# Patient Record
Sex: FEMALE | Race: WHITE | NOT HISPANIC OR LATINO
[De-identification: names, ages, dates, MRNs, and addresses within clinical notes are randomized per-mention and may not be internally consistent; named-entity substitution may affect disease eponyms.]

---

## 2018-03-06 PROBLEM — Z00.00 ENCOUNTER FOR PREVENTIVE HEALTH EXAMINATION: Status: ACTIVE | Noted: 2018-03-06

## 2018-04-11 ENCOUNTER — APPOINTMENT (OUTPATIENT)
Dept: ORTHOPEDIC SURGERY | Facility: CLINIC | Age: 58
End: 2018-04-11
Payer: MEDICARE

## 2018-05-04 ENCOUNTER — APPOINTMENT (OUTPATIENT)
Dept: ORTHOPEDIC SURGERY | Facility: CLINIC | Age: 58
End: 2018-05-04
Payer: MEDICARE

## 2018-05-04 VITALS
WEIGHT: 242 LBS | SYSTOLIC BLOOD PRESSURE: 144 MMHG | DIASTOLIC BLOOD PRESSURE: 88 MMHG | TEMPERATURE: 97.9 F | HEIGHT: 68.5 IN | BODY MASS INDEX: 36.26 KG/M2 | HEART RATE: 90 BPM

## 2018-05-04 DIAGNOSIS — Z86.69 PERSONAL HISTORY OF OTHER DISEASES OF THE NERVOUS SYSTEM AND SENSE ORGANS: ICD-10-CM

## 2018-05-04 DIAGNOSIS — Z80.42 FAMILY HISTORY OF MALIGNANT NEOPLASM OF PROSTATE: ICD-10-CM

## 2018-05-04 DIAGNOSIS — Z86.79 PERSONAL HISTORY OF OTHER DISEASES OF THE CIRCULATORY SYSTEM: ICD-10-CM

## 2018-05-04 DIAGNOSIS — Z87.09 PERSONAL HISTORY OF OTHER DISEASES OF THE RESPIRATORY SYSTEM: ICD-10-CM

## 2018-05-04 DIAGNOSIS — Z82.61 FAMILY HISTORY OF ARTHRITIS: ICD-10-CM

## 2018-05-04 DIAGNOSIS — Z87.39 PERSONAL HISTORY OF OTHER DISEASES OF THE MUSCULOSKELETAL SYSTEM AND CONNECTIVE TISSUE: ICD-10-CM

## 2018-05-04 DIAGNOSIS — Z78.9 OTHER SPECIFIED HEALTH STATUS: ICD-10-CM

## 2018-05-04 PROCEDURE — 73502 X-RAY EXAM HIP UNI 2-3 VIEWS: CPT | Mod: LT

## 2018-05-04 PROCEDURE — 99204 OFFICE O/P NEW MOD 45 MIN: CPT

## 2018-05-04 RX ORDER — MELOXICAM 15 MG/1
15 TABLET ORAL DAILY
Qty: 90 | Refills: 3 | Status: ACTIVE | COMMUNITY
Start: 2018-05-04 | End: 1900-01-01

## 2018-05-05 ENCOUNTER — TRANSCRIPTION ENCOUNTER (OUTPATIENT)
Age: 58
End: 2018-05-05

## 2018-05-10 ENCOUNTER — APPOINTMENT (OUTPATIENT)
Dept: MRI IMAGING | Facility: CLINIC | Age: 58
End: 2018-05-10
Payer: MEDICARE

## 2018-05-10 ENCOUNTER — OUTPATIENT (OUTPATIENT)
Dept: OUTPATIENT SERVICES | Facility: HOSPITAL | Age: 58
LOS: 1 days | End: 2018-05-10

## 2018-05-10 DIAGNOSIS — T56.91XA: ICD-10-CM

## 2018-05-10 PROCEDURE — 73721 MRI JNT OF LWR EXTRE W/O DYE: CPT | Mod: 26,LT

## 2018-05-16 LAB
COBALT: NORMAL UG/L
CR BLD-MCNC: 1.6 UG/L
CRP SERPL-MCNC: 0.2 MG/DL
ERYTHROCYTE [SEDIMENTATION RATE] IN BLOOD BY WESTERGREN METHOD: 41 MM/HR

## 2019-06-24 ENCOUNTER — OUTPATIENT (OUTPATIENT)
Dept: OUTPATIENT SERVICES | Facility: HOSPITAL | Age: 59
LOS: 1 days | Discharge: HOME | End: 2019-06-24
Payer: MEDICARE

## 2019-06-24 DIAGNOSIS — R93.7 ABNORMAL FINDINGS ON DIAGNOSTIC IMAGING OF OTHER PARTS OF MUSCULOSKELETAL SYSTEM: ICD-10-CM

## 2019-06-24 PROCEDURE — 72148 MRI LUMBAR SPINE W/O DYE: CPT | Mod: 26

## 2019-06-25 ENCOUNTER — OUTPATIENT (OUTPATIENT)
Dept: OUTPATIENT SERVICES | Facility: HOSPITAL | Age: 59
LOS: 1 days | Discharge: HOME | End: 2019-06-25
Payer: MEDICARE

## 2019-06-25 DIAGNOSIS — G89.29 OTHER CHRONIC PAIN: ICD-10-CM

## 2019-06-25 PROCEDURE — 72100 X-RAY EXAM L-S SPINE 2/3 VWS: CPT | Mod: 26

## 2019-07-08 ENCOUNTER — INPATIENT (INPATIENT)
Facility: HOSPITAL | Age: 59
LOS: 10 days | Discharge: HOME HEALTH | End: 2019-07-19
Attending: INTERNAL MEDICINE | Admitting: INTERNAL MEDICINE
Payer: MEDICARE

## 2019-07-08 VITALS
HEIGHT: 68 IN | DIASTOLIC BLOOD PRESSURE: 102 MMHG | OXYGEN SATURATION: 99 % | SYSTOLIC BLOOD PRESSURE: 146 MMHG | TEMPERATURE: 98 F | RESPIRATION RATE: 18 BRPM | HEART RATE: 94 BPM | WEIGHT: 250 LBS

## 2019-07-08 DIAGNOSIS — Z98.51 TUBAL LIGATION STATUS: Chronic | ICD-10-CM

## 2019-07-08 DIAGNOSIS — Z90.710 ACQUIRED ABSENCE OF BOTH CERVIX AND UTERUS: Chronic | ICD-10-CM

## 2019-07-08 DIAGNOSIS — Z96.649 PRESENCE OF UNSPECIFIED ARTIFICIAL HIP JOINT: Chronic | ICD-10-CM

## 2019-07-08 DIAGNOSIS — M51.16 INTERVERTEBRAL DISC DISORDERS WITH RADICULOPATHY, LUMBAR REGION: ICD-10-CM

## 2019-07-08 DIAGNOSIS — E11.9 TYPE 2 DIABETES MELLITUS WITHOUT COMPLICATIONS: ICD-10-CM

## 2019-07-08 LAB
ALBUMIN SERPL ELPH-MCNC: 3 G/DL — LOW (ref 3.3–5)
ALP SERPL-CCNC: 66 U/L — SIGNIFICANT CHANGE UP (ref 40–120)
ALT FLD-CCNC: 31 U/L — SIGNIFICANT CHANGE UP (ref 12–78)
ANION GAP SERPL CALC-SCNC: 5 MMOL/L — SIGNIFICANT CHANGE UP (ref 5–17)
APPEARANCE UR: CLEAR — SIGNIFICANT CHANGE UP
APTT BLD: 28.8 SEC — SIGNIFICANT CHANGE UP (ref 28.5–37)
AST SERPL-CCNC: 21 U/L — SIGNIFICANT CHANGE UP (ref 15–37)
BASOPHILS # BLD AUTO: 0.02 K/UL — SIGNIFICANT CHANGE UP (ref 0–0.2)
BASOPHILS NFR BLD AUTO: 0.4 % — SIGNIFICANT CHANGE UP (ref 0–2)
BILIRUB SERPL-MCNC: 0.3 MG/DL — SIGNIFICANT CHANGE UP (ref 0.2–1.2)
BILIRUB UR-MCNC: NEGATIVE — SIGNIFICANT CHANGE UP
BLD GP AB SCN SERPL QL: SIGNIFICANT CHANGE UP
BUN SERPL-MCNC: 14 MG/DL — SIGNIFICANT CHANGE UP (ref 7–23)
CALCIUM SERPL-MCNC: 8.8 MG/DL — SIGNIFICANT CHANGE UP (ref 8.5–10.1)
CHLORIDE SERPL-SCNC: 107 MMOL/L — SIGNIFICANT CHANGE UP (ref 96–108)
CO2 SERPL-SCNC: 29 MMOL/L — SIGNIFICANT CHANGE UP (ref 22–31)
COLOR SPEC: YELLOW — SIGNIFICANT CHANGE UP
CREAT SERPL-MCNC: 0.98 MG/DL — SIGNIFICANT CHANGE UP (ref 0.5–1.3)
DIFF PNL FLD: NEGATIVE — SIGNIFICANT CHANGE UP
EOSINOPHIL # BLD AUTO: 0.35 K/UL — SIGNIFICANT CHANGE UP (ref 0–0.5)
EOSINOPHIL NFR BLD AUTO: 7 % — HIGH (ref 0–6)
GLUCOSE SERPL-MCNC: 118 MG/DL — HIGH (ref 70–99)
GLUCOSE UR QL: NEGATIVE MG/DL — SIGNIFICANT CHANGE UP
HCG SERPL-ACNC: <1 MIU/ML — SIGNIFICANT CHANGE UP
HCT VFR BLD CALC: 36.2 % — SIGNIFICANT CHANGE UP (ref 34.5–45)
HGB BLD-MCNC: 11.5 G/DL — SIGNIFICANT CHANGE UP (ref 11.5–15.5)
IMM GRANULOCYTES NFR BLD AUTO: 0.2 % — SIGNIFICANT CHANGE UP (ref 0–1.5)
INR BLD: 0.93 RATIO — SIGNIFICANT CHANGE UP (ref 0.88–1.16)
KETONES UR-MCNC: NEGATIVE — SIGNIFICANT CHANGE UP
LEUKOCYTE ESTERASE UR-ACNC: NEGATIVE — SIGNIFICANT CHANGE UP
LYMPHOCYTES # BLD AUTO: 0.91 K/UL — LOW (ref 1–3.3)
LYMPHOCYTES # BLD AUTO: 18.2 % — SIGNIFICANT CHANGE UP (ref 13–44)
MCHC RBC-ENTMCNC: 28.3 PG — SIGNIFICANT CHANGE UP (ref 27–34)
MCHC RBC-ENTMCNC: 31.8 GM/DL — LOW (ref 32–36)
MCV RBC AUTO: 88.9 FL — SIGNIFICANT CHANGE UP (ref 80–100)
MONOCYTES # BLD AUTO: 0.35 K/UL — SIGNIFICANT CHANGE UP (ref 0–0.9)
MONOCYTES NFR BLD AUTO: 7 % — SIGNIFICANT CHANGE UP (ref 2–14)
NEUTROPHILS # BLD AUTO: 3.36 K/UL — SIGNIFICANT CHANGE UP (ref 1.8–7.4)
NEUTROPHILS NFR BLD AUTO: 67.2 % — SIGNIFICANT CHANGE UP (ref 43–77)
NITRITE UR-MCNC: NEGATIVE — SIGNIFICANT CHANGE UP
NRBC # BLD: 0 /100 WBCS — SIGNIFICANT CHANGE UP (ref 0–0)
PH UR: 7 — SIGNIFICANT CHANGE UP (ref 5–8)
PLATELET # BLD AUTO: 295 K/UL — SIGNIFICANT CHANGE UP (ref 150–400)
POTASSIUM SERPL-MCNC: 3.7 MMOL/L — SIGNIFICANT CHANGE UP (ref 3.5–5.3)
POTASSIUM SERPL-SCNC: 3.7 MMOL/L — SIGNIFICANT CHANGE UP (ref 3.5–5.3)
PROT SERPL-MCNC: 6.8 GM/DL — SIGNIFICANT CHANGE UP (ref 6–8.3)
PROT UR-MCNC: NEGATIVE MG/DL — SIGNIFICANT CHANGE UP
PROTHROM AB SERPL-ACNC: 10.4 SEC — SIGNIFICANT CHANGE UP (ref 10–12.9)
RBC # BLD: 4.07 M/UL — SIGNIFICANT CHANGE UP (ref 3.8–5.2)
RBC # FLD: 14.2 % — SIGNIFICANT CHANGE UP (ref 10.3–14.5)
SODIUM SERPL-SCNC: 141 MMOL/L — SIGNIFICANT CHANGE UP (ref 135–145)
SP GR SPEC: 1 — LOW (ref 1.01–1.02)
UROBILINOGEN FLD QL: NEGATIVE MG/DL — SIGNIFICANT CHANGE UP
WBC # BLD: 5 K/UL — SIGNIFICANT CHANGE UP (ref 3.8–10.5)
WBC # FLD AUTO: 5 K/UL — SIGNIFICANT CHANGE UP (ref 3.8–10.5)

## 2019-07-08 PROCEDURE — 99285 EMERGENCY DEPT VISIT HI MDM: CPT

## 2019-07-08 PROCEDURE — 71045 X-RAY EXAM CHEST 1 VIEW: CPT | Mod: 26

## 2019-07-08 PROCEDURE — 93010 ELECTROCARDIOGRAM REPORT: CPT

## 2019-07-08 RX ORDER — MORPHINE SULFATE 50 MG/1
4 CAPSULE, EXTENDED RELEASE ORAL ONCE
Refills: 0 | Status: DISCONTINUED | OUTPATIENT
Start: 2019-07-08 | End: 2019-07-08

## 2019-07-08 RX ORDER — DIAZEPAM 5 MG
5 TABLET ORAL ONCE
Refills: 0 | Status: DISCONTINUED | OUTPATIENT
Start: 2019-07-08 | End: 2019-07-08

## 2019-07-08 RX ORDER — GABAPENTIN 400 MG/1
300 CAPSULE ORAL THREE TIMES A DAY
Refills: 0 | Status: DISCONTINUED | OUTPATIENT
Start: 2019-07-08 | End: 2019-07-09

## 2019-07-08 RX ORDER — SODIUM CHLORIDE 9 MG/ML
1000 INJECTION, SOLUTION INTRAVENOUS
Refills: 0 | Status: DISCONTINUED | OUTPATIENT
Start: 2019-07-08 | End: 2019-07-08

## 2019-07-08 RX ORDER — POLYETHYLENE GLYCOL 3350 17 G/17G
17 POWDER, FOR SOLUTION ORAL DAILY
Refills: 0 | Status: DISCONTINUED | OUTPATIENT
Start: 2019-07-08 | End: 2019-07-09

## 2019-07-08 RX ORDER — DULOXETINE HYDROCHLORIDE 30 MG/1
60 CAPSULE, DELAYED RELEASE ORAL DAILY
Refills: 0 | Status: DISCONTINUED | OUTPATIENT
Start: 2019-07-08 | End: 2019-07-09

## 2019-07-08 RX ORDER — METOCLOPRAMIDE HCL 10 MG
5 TABLET ORAL EVERY 6 HOURS
Refills: 0 | Status: DISCONTINUED | OUTPATIENT
Start: 2019-07-08 | End: 2019-07-09

## 2019-07-08 RX ORDER — VERAPAMIL HCL 240 MG
180 CAPSULE, EXTENDED RELEASE PELLETS 24 HR ORAL DAILY
Refills: 0 | Status: DISCONTINUED | OUTPATIENT
Start: 2019-07-08 | End: 2019-07-09

## 2019-07-08 RX ORDER — ALPRAZOLAM 0.25 MG
0.5 TABLET ORAL DAILY
Refills: 0 | Status: DISCONTINUED | OUTPATIENT
Start: 2019-07-09 | End: 2019-07-09

## 2019-07-08 RX ORDER — SODIUM CHLORIDE 9 MG/ML
1000 INJECTION, SOLUTION INTRAVENOUS
Refills: 0 | Status: DISCONTINUED | OUTPATIENT
Start: 2019-07-08 | End: 2019-07-09

## 2019-07-08 RX ORDER — OXYCODONE AND ACETAMINOPHEN 5; 325 MG/1; MG/1
1 TABLET ORAL ONCE
Refills: 0 | Status: DISCONTINUED | OUTPATIENT
Start: 2019-07-08 | End: 2019-07-08

## 2019-07-08 RX ADMIN — Medication 5 MILLIGRAM(S): at 11:49

## 2019-07-08 RX ADMIN — MORPHINE SULFATE 4 MILLIGRAM(S): 50 CAPSULE, EXTENDED RELEASE ORAL at 11:49

## 2019-07-08 RX ADMIN — GABAPENTIN 300 MILLIGRAM(S): 400 CAPSULE ORAL at 14:36

## 2019-07-08 RX ADMIN — DULOXETINE HYDROCHLORIDE 60 MILLIGRAM(S): 30 CAPSULE, DELAYED RELEASE ORAL at 22:32

## 2019-07-08 RX ADMIN — GABAPENTIN 300 MILLIGRAM(S): 400 CAPSULE ORAL at 22:32

## 2019-07-08 RX ADMIN — OXYCODONE AND ACETAMINOPHEN 1 TABLET(S): 5; 325 TABLET ORAL at 23:13

## 2019-07-08 NOTE — PROGRESS NOTE ADULT - SUBJECTIVE AND OBJECTIVE BOX
Dx: L foot drop     Sx:  L3S1 Laminectomy with PSF    Surgeons:  Dr. An    Med Cleared: yes    H&P: completed by medicine    ALL:     Vital Signs Last 24 Hrs  Vital Signs Last 24 Hrs  T(C): 36.8 (08 Jul 2019 14:29), Max: 36.8 (08 Jul 2019 14:29)  T(F): 98.3 (08 Jul 2019 14:29), Max: 98.3 (08 Jul 2019 14:29)  HR: 78 (08 Jul 2019 14:29) (78 - 94)  BP: 147/81 (08 Jul 2019 14:29) (141/70 - 147/81)  BP(mean): --  RR: 16 (08 Jul 2019 14:29) (16 - 18)  SpO2: 98% (08 Jul 2019 14:29) (98% - 99%)    Labs   LABS:                        11.5   5.00  )-----------( 295      ( 08 Jul 2019 11:59 )             36.2     08 Jul 2019 11:59    141    |  107    |  14     ----------------------------<  118    3.7     |  29     |  0.98     Ca    8.8        08 Jul 2019 11:59    TPro  6.8    /  Alb  3.0    /  TBili  0.3    /  DBili  x      /  AST  21     /  ALT  31     /  AlkPhos  66     08 Jul 2019 11:59    PT/INR - ( 08 Jul 2019 11:59 )   PT: 10.4 sec;   INR: 0.93 ratio         PTT - ( 08 Jul 2019 11:59 )  PTT:28.8 sec    Type and Screen:   completed    UA:    pending    EKG:   in chart     CXR: no Pacemaker      58 yo F w/ left foot sid. To OR tomorrow  for _______ w/  _____.    - Pain Control  - (NWB?)  - DVT PPx: _____ - hold all chemical PPx at midnight  - NPO except for meds  - IVF while NPO  - Medical Clearance per  _____  - Plan for OR _____    Pt discussed w/  ____ who agrees with the plan. Dx: L foot drop     Sx:  L3S1 Laminectomy with PSF    Surgeons:  Dr. An    Med Cleared: yes    H&P: completed by medicine    ALL:     Vital Signs Last 24 Hrs  Vital Signs Last 24 Hrs  T(C): 36.8 (08 Jul 2019 14:29), Max: 36.8 (08 Jul 2019 14:29)  T(F): 98.3 (08 Jul 2019 14:29), Max: 98.3 (08 Jul 2019 14:29)  HR: 78 (08 Jul 2019 14:29) (78 - 94)  BP: 147/81 (08 Jul 2019 14:29) (141/70 - 147/81)  BP(mean): --  RR: 16 (08 Jul 2019 14:29) (16 - 18)  SpO2: 98% (08 Jul 2019 14:29) (98% - 99%)    Labs   LABS:                        11.5   5.00  )-----------( 295      ( 08 Jul 2019 11:59 )             36.2     08 Jul 2019 11:59    141    |  107    |  14     ----------------------------<  118    3.7     |  29     |  0.98     Ca    8.8        08 Jul 2019 11:59    TPro  6.8    /  Alb  3.0    /  TBili  0.3    /  DBili  x      /  AST  21     /  ALT  31     /  AlkPhos  66     08 Jul 2019 11:59    PT/INR - ( 08 Jul 2019 11:59 )   PT: 10.4 sec;   INR: 0.93 ratio         PTT - ( 08 Jul 2019 11:59 )  PTT:28.8 sec    Type and Screen:   completed    UA:    neg    EKG:   in chart     CXR: no Pacemaker      60 yo F w/ left foot sid. To OR tomorrow  for L3-S1 laminectomy w PSF w/ Dr. An.    - Pain Control  - (NWB?)  - DVT PPx: - hold all chemical PPx at midnight  - NPO except for meds  - IVF while NPO  - Medical Clearance per Dr. Linda  - Plan for OR 7/9    Pt discussed w/ Dr. Riley agrees with the plan.

## 2019-07-08 NOTE — ED ADULT NURSE NOTE - NSIMPLEMENTINTERV_GEN_ALL_ED
Implemented All Fall with Harm Risk Interventions:  [place] to call system. Call bell, personal items and telephone within reach. Instruct patient to call for assistance. Room bathroom lighting operational. Non-slip footwear when patient is off stretcher. Physically safe environment: no spills, clutter or unnecessary equipment. Stretcher in lowest position, wheels locked, appropriate side rails in place. Provide visual cue, wrist band, yellow gown, etc. Monitor gait and stability. Monitor for mental status changes and reorient to person, place, and time. Review medications for side effects contributing to fall risk. Reinforce activity limits and safety measures with patient and family. Provide visual clues: red socks.

## 2019-07-08 NOTE — ED ADULT TRIAGE NOTE - CHIEF COMPLAINT QUOTE
sent by dr An to be admitted for back surgery. history of severe radiculopathy with left foot drop and decreased sensation.  history of htn, dm and lupus

## 2019-07-08 NOTE — ED PROVIDER NOTE - MUSCULOSKELETAL MINIMAL EXAM
left leg with diminished sensation from toe up to left inner thigh, 1-2/5 strength of left leg, tenderness of left lower spinal area.

## 2019-07-08 NOTE — PHARMACY COMMUNICATION NOTE - COMMENTS
dr martínez verified start date of 7/9/19 for xanax due to pt having received diazepam 5mg at 11am 7/8/19

## 2019-07-08 NOTE — CONSULT NOTE ADULT - ASSESSMENT
58 yo F with L4 L5  spondylolistheses with L foot drop, patient's foot drop has not improved and will go to OR 7/9 for L3-S1 Laminectomy with PSF.       - plan for OR tomorrow 7/9 L3-S1 laminectomy and PSF   - Surgical plan, and risks discussed with patient. Patient is in agreeance with plan  - NPO after midnight  - medically cleared by medicine  - hold DVT ppx  - SCDS 60 yo F with L4 L5  spondylolistheses with L foot drop, patient's foot drop has not improved and will go to OR 7/9 for L3-S1 Laminectomy with PSF.       - plan for OR tomorrow 7/9 L3-S1 laminectomy and PSF   - Surgical plan, and risks discussed with patient. Patient is in agreeance with plan  - NPO after midnight  - medically cleared by medicine  - hold chemical DVT ppx  - SCDS  - Case discussed with attending who agrees with above

## 2019-07-08 NOTE — CONSULT NOTE ADULT - SUBJECTIVE AND OBJECTIVE BOX
Orthopedics was consulted to see Shea Thompson, a 59y F with hx of SLE, Guillan-Gunnison syndrome, L chronic L foot drop L4/L5 spondylolisthesis Patient came from [facility], and is known to Dr. An. Patient was seen and evaluated in the ED, and complains of L foot weakness and some L leg numbness which is unchanged. Patient does not complain of any pain. When in contact with the ED, they said pt has some decreased sensation around her perirectal area, but no decreased rectal tone on exam. Patient does not complain of any bladder or bowel incontinence.     O:  Vital Signs Last 24 Hrs  T(C): 36.7 (08 Jul 2019 12:21), Max: 36.7 (08 Jul 2019 09:48)  T(F): 98 (08 Jul 2019 12:21), Max: 98 (08 Jul 2019 09:48)  HR: 91 (08 Jul 2019 12:21) (91 - 94)  BP: 141/70 (08 Jul 2019 12:21) (141/70 - 146/102)  BP(mean): --  RR: 17 (08 Jul 2019 12:21) (17 - 18)  SpO2: 98% (08 Jul 2019 12:21) (98% - 99%)    PAST MEDICAL & SURGICAL HISTORY:  DM (diabetes mellitus)  Lupus  History of bilateral tubal ligation  H/O total hysterectomy  History of hip replacement: left    Home Medications:  Cymbalta 60 mg oral delayed release capsule: 1 cap(s) orally once a day (08 Jul 2019 10:53)  gabapentin: 900  orally once a day (08 Jul 2019 10:53)  verapamil 180 mg/12 hours oral tablet, extended release: 1 tab(s) orally once a day (in the morning) (08 Jul 2019 10:53)  Xanax 0.5 mg oral tablet: 1 tab(s) orally prn (08 Jul 2019 10:53)      Exam:  Gen: NAD, AOx3, laying in bed comfortably  Cervical spine: NTTP   Upper extremity  C4-T1 SILT b/l  RUE Weakness with abduction  RUE TTP near acromion and deltoid  RUE mildly painful passive range of motion   Radial pulses 2+ b/l  AIN/PIN,med/rad/ulnar 5/5    Lower extremity  NTTP to lower extremities  SILT L2-S5  Pain with L leg log roll in groin  Full hip ROM, knee ROM  RLE 5/5 EHL, TA, gastroc  LLE 4/5 EHL, TA, gastroc  Dp/PT 2+    Rectal exam:  Decreased sensation around perirectal area  + sphincter tone Orthopedics was consulted to see Shea Thompson, a 59y F with hx of SLE, Guillan-Hamel syndrome, L chronic L foot drop L4/L5 spondylolisthesis.  patient with known weakness that appears to have progressed.  She has not been able to ambulate with PT even after two weeks of intensive PT at rehab.  She continues to be in severe pain when she tries to stand up.  Because she has neurologic weakness, decreased ability to stand and walk patient is sent back for reevaluation as her neurologic pain and weakness is not improving.  Rehabiliation treatment is not working for the patient.  She complains of L foot weakness and some L leg numbness. Patient does not complain of any pain. When in contact with the ED, they said pt has some decreased sensation around her perirectal area, but no decreased rectal tone on exam. Patient has had two episodes of bed wetting while at rehab though she does have control most of the time.    O:  Vital Signs Last 24 Hrs  T(C): 36.7 (08 Jul 2019 12:21), Max: 36.7 (08 Jul 2019 09:48)  T(F): 98 (08 Jul 2019 12:21), Max: 98 (08 Jul 2019 09:48)  HR: 91 (08 Jul 2019 12:21) (91 - 94)  BP: 141/70 (08 Jul 2019 12:21) (141/70 - 146/102)  BP(mean): --  RR: 17 (08 Jul 2019 12:21) (17 - 18)  SpO2: 98% (08 Jul 2019 12:21) (98% - 99%)    PAST MEDICAL & SURGICAL HISTORY:  DM (diabetes mellitus)  Lupus  History of bilateral tubal ligation  H/O total hysterectomy  History of hip replacement: left  Knee OA    Home Medications:  Cymbalta 60 mg oral delayed release capsule: 1 cap(s) orally once a day (08 Jul 2019 10:53)  gabapentin: 900  orally once a day (08 Jul 2019 10:53)  verapamil 180 mg/12 hours oral tablet, extended release: 1 tab(s) orally once a day (in the morning) (08 Jul 2019 10:53)  Xanax 0.5 mg oral tablet: 1 tab(s) orally prn (08 Jul 2019 10:53)      Exam:  Gen: NAD, AOx3,  Not able to stand up and walk due to severe leg pain  Cervical spine: NTTP   Upper extremity  C4-T1 SILT b/l  RUE Weakness with abduction  RUE TTP near acromion and deltoid  RUE mildly painful passive range of motion   Radial pulses 2+ b/l  AIN/PIN,med/rad/ulnar 5/5    Lower extremity  NTTP to lower extremities  SILT L2-S5  Pain with L leg log roll in groin  Full hip ROM, knee ROM  RLE 5/5 EHL, TA, gastroc  LLE 3/5 EHL, 2/5 TA, 3/5 gastroc, (+) SLR  Dp/PT 2+    Rectal exam:  Decreased sensation around perirectal area  + sphincter tone

## 2019-07-08 NOTE — H&P ADULT - HISTORY OF PRESENT ILLNESS
59 yr old female with PMH of SLE, GB syndrome and previous left hip replacement, chronic left foot drop, otherwise with lower back pain and leg weakness that has increased with numbness down left leg - back MRI noted L3-S1 broad based disc herniations - otherwise sent from [facility] for spinal surgery with Dr. An.

## 2019-07-08 NOTE — ED PROVIDER NOTE - CLINICAL SUMMARY MEDICAL DECISION MAKING FREE TEXT BOX
pt with lumbar disc herniation with radiculopathy - will need admission for spinal surgery with Dr. An - Dr. Linda to admit.

## 2019-07-08 NOTE — ED ADULT NURSE NOTE - OBJECTIVE STATEMENT
pt a&o x3, pt admitted from [facility], pt states " I need to have back surgery". py receievs physcila therapy partial weight bearing, unable to wlak without assistance. pt pmh lupus, foot drop x few months, increased back pain and difficulty walking. As per md engle pt has compressed lumbar L4/L5. Pt alos c.o of difficulty bearing weight, back pain and left foot numbness, diminished sensation of left leg.

## 2019-07-08 NOTE — H&P ADULT - NSHPLABSRESULTS_GEN_ALL_CORE
11.5   5.00  )-----------( 295      ( 08 Jul 2019 11:59 )             36.2     07-08    141  |  107  |  14  ----------------------------<  118<H>  3.7   |  29  |  0.98    Ca    8.8      08 Jul 2019 11:59    TPro  6.8  /  Alb  3.0<L>  /  TBili  0.3  /  DBili  x   /  AST  21  /  ALT  31  /  AlkPhos  66  07-08    PT/INR - ( 08 Jul 2019 11:59 )   PT: 10.4 sec;   INR: 0.93 ratio         PTT - ( 08 Jul 2019 11:59 )  PTT:28.8 sec

## 2019-07-08 NOTE — ED PROVIDER NOTE - OBJECTIVE STATEMENT
59 yr old female with PMH of SLE, GB syndrome and previous left hip replacement, chronic left foot drop, otherwise with lower back pain and leg weakness that has increased with numbness down left leg - back MRI noted L3-S1 broad based disc herniations - otherwise sent from [facility] for spinal surgery with Dr. An.

## 2019-07-09 ENCOUNTER — TRANSCRIPTION ENCOUNTER (OUTPATIENT)
Age: 59
End: 2019-07-09

## 2019-07-09 LAB
ANION GAP SERPL CALC-SCNC: 6 MMOL/L — SIGNIFICANT CHANGE UP (ref 5–17)
ANION GAP SERPL CALC-SCNC: 8 MMOL/L — SIGNIFICANT CHANGE UP (ref 5–17)
APTT BLD: 28.6 SEC — SIGNIFICANT CHANGE UP (ref 28.5–37)
BASOPHILS # BLD AUTO: 0.03 K/UL — SIGNIFICANT CHANGE UP (ref 0–0.2)
BASOPHILS NFR BLD AUTO: 0.2 % — SIGNIFICANT CHANGE UP (ref 0–2)
BUN SERPL-MCNC: 11 MG/DL — SIGNIFICANT CHANGE UP (ref 7–23)
BUN SERPL-MCNC: 14 MG/DL — SIGNIFICANT CHANGE UP (ref 7–23)
CALCIUM SERPL-MCNC: 8.7 MG/DL — SIGNIFICANT CHANGE UP (ref 8.5–10.1)
CALCIUM SERPL-MCNC: 9.4 MG/DL — SIGNIFICANT CHANGE UP (ref 8.5–10.1)
CHLORIDE SERPL-SCNC: 106 MMOL/L — SIGNIFICANT CHANGE UP (ref 96–108)
CHLORIDE SERPL-SCNC: 107 MMOL/L — SIGNIFICANT CHANGE UP (ref 96–108)
CO2 SERPL-SCNC: 24 MMOL/L — SIGNIFICANT CHANGE UP (ref 22–31)
CO2 SERPL-SCNC: 30 MMOL/L — SIGNIFICANT CHANGE UP (ref 22–31)
CREAT SERPL-MCNC: 0.8 MG/DL — SIGNIFICANT CHANGE UP (ref 0.5–1.3)
CREAT SERPL-MCNC: 1.13 MG/DL — SIGNIFICANT CHANGE UP (ref 0.5–1.3)
EOSINOPHIL # BLD AUTO: 0.01 K/UL — SIGNIFICANT CHANGE UP (ref 0–0.5)
EOSINOPHIL NFR BLD AUTO: 0.1 % — SIGNIFICANT CHANGE UP (ref 0–6)
GLUCOSE BLDC GLUCOMTR-MCNC: 109 MG/DL — HIGH (ref 70–99)
GLUCOSE SERPL-MCNC: 183 MG/DL — HIGH (ref 70–99)
GLUCOSE SERPL-MCNC: 99 MG/DL — SIGNIFICANT CHANGE UP (ref 70–99)
HCG SERPL-ACNC: <1 MIU/ML — SIGNIFICANT CHANGE UP
HCT VFR BLD CALC: 34.7 % — SIGNIFICANT CHANGE UP (ref 34.5–45)
HCT VFR BLD CALC: 36.1 % — SIGNIFICANT CHANGE UP (ref 34.5–45)
HCV AB S/CO SERPL IA: 0.14 S/CO — SIGNIFICANT CHANGE UP (ref 0–0.99)
HCV AB SERPL-IMP: SIGNIFICANT CHANGE UP
HGB BLD-MCNC: 10.8 G/DL — LOW (ref 11.5–15.5)
HGB BLD-MCNC: 11.6 G/DL — SIGNIFICANT CHANGE UP (ref 11.5–15.5)
IMM GRANULOCYTES NFR BLD AUTO: 0.6 % — SIGNIFICANT CHANGE UP (ref 0–1.5)
INR BLD: 0.91 RATIO — SIGNIFICANT CHANGE UP (ref 0.88–1.16)
LYMPHOCYTES # BLD AUTO: 0.67 K/UL — LOW (ref 1–3.3)
LYMPHOCYTES # BLD AUTO: 4.7 % — LOW (ref 13–44)
MCHC RBC-ENTMCNC: 27.9 PG — SIGNIFICANT CHANGE UP (ref 27–34)
MCHC RBC-ENTMCNC: 28.3 PG — SIGNIFICANT CHANGE UP (ref 27–34)
MCHC RBC-ENTMCNC: 31.1 GM/DL — LOW (ref 32–36)
MCHC RBC-ENTMCNC: 32.1 GM/DL — SIGNIFICANT CHANGE UP (ref 32–36)
MCV RBC AUTO: 88 FL — SIGNIFICANT CHANGE UP (ref 80–100)
MCV RBC AUTO: 89.7 FL — SIGNIFICANT CHANGE UP (ref 80–100)
MONOCYTES # BLD AUTO: 0.27 K/UL — SIGNIFICANT CHANGE UP (ref 0–0.9)
MONOCYTES NFR BLD AUTO: 1.9 % — LOW (ref 2–14)
NEUTROPHILS # BLD AUTO: 13.15 K/UL — HIGH (ref 1.8–7.4)
NEUTROPHILS NFR BLD AUTO: 92.5 % — HIGH (ref 43–77)
NRBC # BLD: 0 /100 WBCS — SIGNIFICANT CHANGE UP (ref 0–0)
NRBC # BLD: 0 /100 WBCS — SIGNIFICANT CHANGE UP (ref 0–0)
PLATELET # BLD AUTO: 249 K/UL — SIGNIFICANT CHANGE UP (ref 150–400)
PLATELET # BLD AUTO: 293 K/UL — SIGNIFICANT CHANGE UP (ref 150–400)
POTASSIUM SERPL-MCNC: 3.6 MMOL/L — SIGNIFICANT CHANGE UP (ref 3.5–5.3)
POTASSIUM SERPL-MCNC: 4.6 MMOL/L — SIGNIFICANT CHANGE UP (ref 3.5–5.3)
POTASSIUM SERPL-SCNC: 3.6 MMOL/L — SIGNIFICANT CHANGE UP (ref 3.5–5.3)
POTASSIUM SERPL-SCNC: 4.6 MMOL/L — SIGNIFICANT CHANGE UP (ref 3.5–5.3)
PROTHROM AB SERPL-ACNC: 10.2 SEC — SIGNIFICANT CHANGE UP (ref 10–12.9)
RBC # BLD: 3.87 M/UL — SIGNIFICANT CHANGE UP (ref 3.8–5.2)
RBC # BLD: 4.1 M/UL — SIGNIFICANT CHANGE UP (ref 3.8–5.2)
RBC # FLD: 14.2 % — SIGNIFICANT CHANGE UP (ref 10.3–14.5)
RBC # FLD: 14.4 % — SIGNIFICANT CHANGE UP (ref 10.3–14.5)
SODIUM SERPL-SCNC: 139 MMOL/L — SIGNIFICANT CHANGE UP (ref 135–145)
SODIUM SERPL-SCNC: 142 MMOL/L — SIGNIFICANT CHANGE UP (ref 135–145)
WBC # BLD: 14.22 K/UL — HIGH (ref 3.8–10.5)
WBC # BLD: 4.07 K/UL — SIGNIFICANT CHANGE UP (ref 3.8–10.5)
WBC # FLD AUTO: 14.22 K/UL — HIGH (ref 3.8–10.5)
WBC # FLD AUTO: 4.07 K/UL — SIGNIFICANT CHANGE UP (ref 3.8–10.5)

## 2019-07-09 RX ORDER — GABAPENTIN 400 MG/1
200 CAPSULE ORAL THREE TIMES A DAY
Refills: 0 | Status: DISCONTINUED | OUTPATIENT
Start: 2019-07-09 | End: 2019-07-10

## 2019-07-09 RX ORDER — DOCUSATE SODIUM 100 MG
100 CAPSULE ORAL THREE TIMES A DAY
Refills: 0 | Status: DISCONTINUED | OUTPATIENT
Start: 2019-07-09 | End: 2019-07-19

## 2019-07-09 RX ORDER — CEFAZOLIN SODIUM 1 G
2000 VIAL (EA) INJECTION EVERY 8 HOURS
Refills: 0 | Status: DISCONTINUED | OUTPATIENT
Start: 2019-07-09 | End: 2019-07-15

## 2019-07-09 RX ORDER — ALPRAZOLAM 0.25 MG
0.5 TABLET ORAL DAILY
Refills: 0 | Status: DISCONTINUED | OUTPATIENT
Start: 2019-07-09 | End: 2019-07-14

## 2019-07-09 RX ORDER — ACETAMINOPHEN 500 MG
650 TABLET ORAL EVERY 6 HOURS
Refills: 0 | Status: DISCONTINUED | OUTPATIENT
Start: 2019-07-09 | End: 2019-07-19

## 2019-07-09 RX ORDER — SODIUM CHLORIDE 9 MG/ML
1000 INJECTION, SOLUTION INTRAVENOUS
Refills: 0 | Status: DISCONTINUED | OUTPATIENT
Start: 2019-07-09 | End: 2019-07-11

## 2019-07-09 RX ORDER — ONDANSETRON 8 MG/1
4 TABLET, FILM COATED ORAL
Refills: 0 | Status: DISCONTINUED | OUTPATIENT
Start: 2019-07-09 | End: 2019-07-10

## 2019-07-09 RX ORDER — HYDROMORPHONE HYDROCHLORIDE 2 MG/ML
30 INJECTION INTRAMUSCULAR; INTRAVENOUS; SUBCUTANEOUS
Refills: 0 | Status: DISCONTINUED | OUTPATIENT
Start: 2019-07-09 | End: 2019-07-11

## 2019-07-09 RX ORDER — CYCLOBENZAPRINE HYDROCHLORIDE 10 MG/1
10 TABLET, FILM COATED ORAL THREE TIMES A DAY
Refills: 0 | Status: DISCONTINUED | OUTPATIENT
Start: 2019-07-09 | End: 2019-07-19

## 2019-07-09 RX ORDER — ACETAMINOPHEN 500 MG
1000 TABLET ORAL ONCE
Refills: 0 | Status: COMPLETED | OUTPATIENT
Start: 2019-07-09 | End: 2019-07-10

## 2019-07-09 RX ORDER — NALOXONE HYDROCHLORIDE 4 MG/.1ML
0.1 SPRAY NASAL
Refills: 0 | Status: DISCONTINUED | OUTPATIENT
Start: 2019-07-09 | End: 2019-07-19

## 2019-07-09 RX ORDER — BENZOCAINE AND MENTHOL 5; 1 G/100ML; G/100ML
1 LIQUID ORAL
Refills: 0 | Status: DISCONTINUED | OUTPATIENT
Start: 2019-07-09 | End: 2019-07-19

## 2019-07-09 RX ORDER — HYDROMORPHONE HYDROCHLORIDE 2 MG/ML
1 INJECTION INTRAMUSCULAR; INTRAVENOUS; SUBCUTANEOUS
Refills: 0 | Status: DISCONTINUED | OUTPATIENT
Start: 2019-07-09 | End: 2019-07-09

## 2019-07-09 RX ORDER — GABAPENTIN 400 MG/1
300 CAPSULE ORAL THREE TIMES A DAY
Refills: 0 | Status: DISCONTINUED | OUTPATIENT
Start: 2019-07-09 | End: 2019-07-19

## 2019-07-09 RX ORDER — ONDANSETRON 8 MG/1
4 TABLET, FILM COATED ORAL EVERY 6 HOURS
Refills: 0 | Status: DISCONTINUED | OUTPATIENT
Start: 2019-07-09 | End: 2019-07-10

## 2019-07-09 RX ORDER — HYDROMORPHONE HYDROCHLORIDE 2 MG/ML
0.5 INJECTION INTRAMUSCULAR; INTRAVENOUS; SUBCUTANEOUS
Refills: 0 | Status: DISCONTINUED | OUTPATIENT
Start: 2019-07-09 | End: 2019-07-09

## 2019-07-09 RX ORDER — FOLIC ACID 0.8 MG
1 TABLET ORAL DAILY
Refills: 0 | Status: DISCONTINUED | OUTPATIENT
Start: 2019-07-09 | End: 2019-07-19

## 2019-07-09 RX ORDER — LANOLIN ALCOHOL/MO/W.PET/CERES
3 CREAM (GRAM) TOPICAL AT BEDTIME
Refills: 0 | Status: DISCONTINUED | OUTPATIENT
Start: 2019-07-09 | End: 2019-07-19

## 2019-07-09 RX ORDER — SENNA PLUS 8.6 MG/1
2 TABLET ORAL AT BEDTIME
Refills: 0 | Status: DISCONTINUED | OUTPATIENT
Start: 2019-07-09 | End: 2019-07-19

## 2019-07-09 RX ORDER — ONDANSETRON 8 MG/1
4 TABLET, FILM COATED ORAL EVERY 4 HOURS
Refills: 0 | Status: DISCONTINUED | OUTPATIENT
Start: 2019-07-09 | End: 2019-07-19

## 2019-07-09 RX ORDER — VERAPAMIL HCL 240 MG
180 CAPSULE, EXTENDED RELEASE PELLETS 24 HR ORAL DAILY
Refills: 0 | Status: DISCONTINUED | OUTPATIENT
Start: 2019-07-09 | End: 2019-07-19

## 2019-07-09 RX ORDER — SUGAMMADEX 100 MG/ML
450 INJECTION, SOLUTION INTRAVENOUS ONCE
Refills: 0 | Status: DISCONTINUED | OUTPATIENT
Start: 2019-07-09 | End: 2019-07-19

## 2019-07-09 RX ORDER — HYDROMORPHONE HYDROCHLORIDE 2 MG/ML
0.5 INJECTION INTRAMUSCULAR; INTRAVENOUS; SUBCUTANEOUS
Refills: 0 | Status: DISCONTINUED | OUTPATIENT
Start: 2019-07-09 | End: 2019-07-11

## 2019-07-09 RX ORDER — OXYCODONE HYDROCHLORIDE 5 MG/1
5 TABLET ORAL EVERY 6 HOURS
Refills: 0 | Status: DISCONTINUED | OUTPATIENT
Start: 2019-07-09 | End: 2019-07-09

## 2019-07-09 RX ORDER — ASCORBIC ACID 60 MG
500 TABLET,CHEWABLE ORAL
Refills: 0 | Status: DISCONTINUED | OUTPATIENT
Start: 2019-07-09 | End: 2019-07-19

## 2019-07-09 RX ORDER — OXYCODONE HYDROCHLORIDE 5 MG/1
10 TABLET ORAL EVERY 6 HOURS
Refills: 0 | Status: DISCONTINUED | OUTPATIENT
Start: 2019-07-09 | End: 2019-07-09

## 2019-07-09 RX ADMIN — Medication 180 MILLIGRAM(S): at 06:23

## 2019-07-09 RX ADMIN — OXYCODONE AND ACETAMINOPHEN 1 TABLET(S): 5; 325 TABLET ORAL at 07:50

## 2019-07-09 RX ADMIN — HYDROMORPHONE HYDROCHLORIDE 30 MILLILITER(S): 2 INJECTION INTRAMUSCULAR; INTRAVENOUS; SUBCUTANEOUS at 20:30

## 2019-07-09 RX ADMIN — Medication 100 MILLIGRAM(S): at 22:00

## 2019-07-09 RX ADMIN — SODIUM CHLORIDE 75 MILLILITER(S): 9 INJECTION, SOLUTION INTRAVENOUS at 00:07

## 2019-07-09 RX ADMIN — SENNA PLUS 2 TABLET(S): 8.6 TABLET ORAL at 22:00

## 2019-07-09 RX ADMIN — HYDROMORPHONE HYDROCHLORIDE 30 MILLILITER(S): 2 INJECTION INTRAMUSCULAR; INTRAVENOUS; SUBCUTANEOUS at 18:48

## 2019-07-09 RX ADMIN — GABAPENTIN 300 MILLIGRAM(S): 400 CAPSULE ORAL at 06:23

## 2019-07-09 RX ADMIN — Medication 1 TABLET(S): at 22:00

## 2019-07-09 RX ADMIN — GABAPENTIN 300 MILLIGRAM(S): 400 CAPSULE ORAL at 22:00

## 2019-07-09 RX ADMIN — Medication 100 MILLIGRAM(S): at 23:59

## 2019-07-09 RX ADMIN — SODIUM CHLORIDE 150 MILLILITER(S): 9 INJECTION, SOLUTION INTRAVENOUS at 19:38

## 2019-07-09 NOTE — PROGRESS NOTE ADULT - ASSESSMENT
A/P: 59y F s/p posterior spinal fusion L3-S1, L4-5 TLIF, bilateral hemilaminectomy L5-S1 with discectomy, right hemilaminectomy L3-L4 POD 0      Patient tolerated procedure well  JOSE R drains, please record drainage  Pt on PCA for analgesia at this time, tolerating well  DVT ppx, SCDs NO chemical dvt ppx in the setting of recent spine surgery  WBAT BL LE with LSO. Pt may ambulate and get OOBTC with abdominal binder until LSO is at bedside.   FU XR L Spine AM  Plan to DC Sera in the AM  PT/OT  DC planning  Will d/w attending and advise if plan changes.

## 2019-07-09 NOTE — PROGRESS NOTE ADULT - SUBJECTIVE AND OBJECTIVE BOX
Pt seen & examined. Pain controlled. No acute events overnight.  All vital signs stable      Exam:  Gen: NAD, AOx3,  Not able to stand up and walk due to severe leg pain  Cervical spine: NTTP   Upper extremity  C4-T1 SILT b/l and 5/5  Radial pulses 2+ b/l      Lower extremity  NTTP to lower extremities  SILT L2-S5  Full hip ROM, knee ROM  RLE 5/5 EHL, TA, gastroc  LLE 3/5 EHL, 2/5 TA, 3/5 gastroc, (+) SLR  Dp/PT 2+

## 2019-07-09 NOTE — PROGRESS NOTE ADULT - SUBJECTIVE AND OBJECTIVE BOX
Post op Check    Patient seen and examined at bedside. Pain is well controlled, on PCA. Patient tolerated procedure well, no complications. Denies new numbness/tingling of the BL LE. Denies fever/chills. No other complaints at this time.       Vital Signs Last 24 Hrs  T(C): 36.8 (09 Jul 2019 23:30), Max: 36.8 (09 Jul 2019 06:07)  T(F): 98.2 (09 Jul 2019 23:30), Max: 98.3 (09 Jul 2019 06:07)  HR: 90 (09 Jul 2019 23:30) (84 - 122)  BP: 137/87 (09 Jul 2019 23:30) (99/69 - 161/78)  BP(mean): --  RR: 16 (09 Jul 2019 23:30) (12 - 24)  SpO2: 98% (09 Jul 2019 23:30) (94% - 100%)      PE: Post op Check    Patient seen and examined at bedside. Pain is well controlled, on PCA. Patient tolerated procedure well, no complications. Denies new numbness/tingling of the BL LE. Denies fever/chills. No other complaints at this time.       Vital Signs Last 24 Hrs  T(C): 36.8 (09 Jul 2019 23:30), Max: 36.8 (09 Jul 2019 06:07)  T(F): 98.2 (09 Jul 2019 23:30), Max: 98.3 (09 Jul 2019 06:07)  HR: 90 (09 Jul 2019 23:30) (84 - 122)  BP: 137/87 (09 Jul 2019 23:30) (99/69 - 161/78)  BP(mean): --  RR: 16 (09 Jul 2019 23:30) (12 - 24)  SpO2: 98% (09 Jul 2019 23:30) (94% - 100%)    PE:    GEN: NAD     Spine:  Dsg c/d/i  JOSE R Drain x 2 in place  SILT C5-T1 & L2-S1  Distal pulses intact  Soft compartments, - calf ttp   Motor:               C5           C6            C7               C8           T1   R         5/5          5/5            5/5             5/5          5/5  L          5/5          5/5            5/5             5/5          5/5                L2             L3             L4               L5            S1  R         5/5           5/5          5/5             5/5           5/5  L          4/5          4/5           3/5             3/5           4/5    Sensory:            C5         C6         C7      C8       T1        (0=absent, 1=impaired, 2=normal, NT=not testable)  R         2            2           2        2         2  L          2            2           2        2         2               L2          L3         L4      L5       S1         (0=absent, 1=impaired, 2=normal, NT=not testable)  R         2            2            2        2        2  L          2            2           2        2         2 Post op Check    Patient seen and examined at bedside. Pain is well controlled, on PCA. Patient tolerated procedure well, no complications. Denies new numbness/tingling of the BL LE. Denies fever/chills. Pt reports of some L arm/wrist pain/swelling most likely from positioning. No other complaints at this time.       Vital Signs Last 24 Hrs  T(C): 36.8 (09 Jul 2019 23:30), Max: 36.8 (09 Jul 2019 06:07)  T(F): 98.2 (09 Jul 2019 23:30), Max: 98.3 (09 Jul 2019 06:07)  HR: 90 (09 Jul 2019 23:30) (84 - 122)  BP: 137/87 (09 Jul 2019 23:30) (99/69 - 161/78)  BP(mean): --  RR: 16 (09 Jul 2019 23:30) (12 - 24)  SpO2: 98% (09 Jul 2019 23:30) (94% - 100%)    PE:    GEN: NAD     Spine:  Dsg c/d/i  JOSE R Drain x 2 in place  SILT C5-T1 & L2-S1  Distal pulses intact  Soft compartments, - calf ttp   Motor:               C5           C6            C7               C8           T1   R         5/5          5/5            5/5             5/5          5/5  L          5/5          5/5            5/5             5/5          5/5                L2             L3             L4               L5            S1  R         5/5           5/5          5/5             5/5           5/5  L          4/5          4/5           3/5             3/5           4/5    Sensory:            C5         C6         C7      C8       T1        (0=absent, 1=impaired, 2=normal, NT=not testable)  R         2            2           2        2         2  L          2            2           2        2         2               L2          L3         L4      L5       S1         (0=absent, 1=impaired, 2=normal, NT=not testable)  R         2            2            2        2        2  L          2            2           2        2         2

## 2019-07-09 NOTE — PROGRESS NOTE ADULT - ASSESSMENT
59F w/ L4-5 spondylolisthesis and L foot drop  NPO  IVF  No chemical AC  Incentive spirometry  Analgesia  WBAT  Medically optimized for OR  Plan for OR today

## 2019-07-09 NOTE — PROGRESS NOTE ADULT - SUBJECTIVE AND OBJECTIVE BOX
Patient is a 59y old  Female who presents with a chief complaint of left foot drop (10 Jul 2019 06:31)      INTERVAL HPI/OVERNIGHT EVENTS:  pt feeling better  MEDICATIONS  (STANDING):  ascorbic acid 500 milliGRAM(s) Oral two times a day  calcium carbonate 1250 mG  + Vitamin D (OsCal 500 + D) 1 Tablet(s) Oral three times a day  ceFAZolin   IVPB 2000 milliGRAM(s) IV Intermittent every 8 hours  docusate sodium 100 milliGRAM(s) Oral three times a day  folic acid 1 milliGRAM(s) Oral daily  gabapentin 300 milliGRAM(s) Oral three times a day  HYDROmorphone PCA (1 mG/mL) 30 milliLiter(s) PCA Continuous PCA Continuous  lactated ringers. 1000 milliLiter(s) (150 mL/Hr) IV Continuous <Continuous>  lactated ringers. 1000 milliLiter(s) (150 mL/Hr) IV Continuous <Continuous>  multivitamin 1 Tablet(s) Oral daily  senna 2 Tablet(s) Oral at bedtime  sugammadex Injectable 450 milliGRAM(s) IV Push once  verapamil  milliGRAM(s) Oral daily    MEDICATIONS  (PRN):  acetaminophen   Tablet .. 650 milliGRAM(s) Oral every 6 hours PRN Temp greater or equal to 38C (100.4F), Mild Pain (1 - 3)  ALPRAZolam 0.5 milliGRAM(s) Oral daily PRN anxiety  benzocaine 15 mG/menthol 3.6 mG Lozenge 1 Lozenge Oral every 3 hours PRN Sore Throat  cyclobenzaprine 10 milliGRAM(s) Oral three times a day PRN Muscle Spasm  HYDROmorphone PCA (1 mG/mL) Rescue Clinician Bolus 0.5 milliGRAM(s) IV Push every 15 minutes PRN for Pain Scale GREATER THAN 6  melatonin 3 milliGRAM(s) Oral at bedtime PRN Sleep  naloxone Injectable 0.1 milliGRAM(s) IV Push every 3 minutes PRN For ANY of the following changes in patient status:  A. RR LESS THAN 10 breaths per minute, B. Oxygen saturation LESS THAN 90%, C. Sedation score of 6  ondansetron Injectable 4 milliGRAM(s) IV Push every 4 hours PRN Nausea  petrolatum white Ointment 1 Application(s) Topical daily PRN dry lips      Allergies    No Known Allergies    Intolerances        REVIEW OF SYSTEMS:  CONSTITUTIONAL: No fever, weight loss, or fatigue  EYES: No eye pain, visual disturbances, or discharge  ENMT:  No difficulty hearing, tinnitus, vertigo; No sinus or throat pain  NECK: No pain or stiffness  BREASTS: No pain, masses, or nipple discharge  RESPIRATORY: No cough, wheezing, chills or hemoptysis; No shortness of breath  CARDIOVASCULAR: No chest pain, palpitations, dizziness, or leg swelling  GASTROINTESTINAL: No abdominal or epigastric pain. No nausea, vomiting, or hematemesis; No diarrhea or constipation. No melena or hematochezia.  GENITOURINARY: No dysuria, frequency, hematuria, or incontinence  NEUROLOGICAL: No headaches, memory loss, loss of strength, numbness, or tremors  SKIN: No itching, burning, rashes, or lesions   LYMPH NODES: No enlarged glands  ENDOCRINE: No heat or cold intolerance; No hair loss  MUSCULOSKELETAL: No joint pain or swelling; No muscle, back, or extremity pain  PSYCHIATRIC: No depression, anxiety, mood swings, or difficulty sleeping  HEME/LYMPH: No easy bruising, or bleeding gums  ALLERGY AND IMMUNOLOGIC: No hives or eczema    Vital Signs Last 24 Hrs  T(C): 37 (10 Jul 2019 19:50), Max: 37 (10 Jul 2019 19:50)  T(F): 98.6 (10 Jul 2019 19:50), Max: 98.6 (10 Jul 2019 19:50)  HR: 91 (10 Jul 2019 19:50) (90 - 108)  BP: 132/70 (10 Jul 2019 19:50) (123/85 - 148/90)  BP(mean): --  RR: 17 (10 Jul 2019 19:50) (15 - 18)  SpO2: 100% (10 Jul 2019 19:50) (98% - 100%)    PHYSICAL EXAM:  GENERAL: NAD, well-groomed, well-developed  HEAD:  Atraumatic, Normocephalic  EYES: EOMI, PERRLA, conjunctiva and sclera clear  ENMT: No tonsillar erythema, exudates, or enlargement; Moist mucous membranes, Good dentition, No lesions  NECK: Supple, No JVD, Normal thyroid  NERVOUS SYSTEM:  Alert & Oriented X3, Good concentration; Motor Strength 5/5 B/L upper and lower extremities; DTRs 2+ intact and symmetric  CHEST/LUNG: Clear to percussion bilaterally; No rales, rhonchi, wheezing, or rubs  HEART: Regular rate and rhythm; No murmurs, rubs, or gallops  ABDOMEN: Soft, Nontender, Nondistended; Bowel sounds present  EXTREMITIES:  2+ Peripheral Pulses, No clubbing, cyanosis, or edema  LYMPH: No lymphadenopathy noted  SKIN: No rashes or lesions    LABS:                        9.9    8.57  )-----------( 229      ( 10 Jul 2019 06:34 )             31.7     07-10    139  |  104  |  16  ----------------------------<  146<H>  4.5   |  30  |  0.97    Ca    8.5      10 Jul 2019 06:34      PT/INR - ( 09 Jul 2019 05:31 )   PT: 10.2 sec;   INR: 0.91 ratio         PTT - ( 09 Jul 2019 05:31 )  PTT:28.6 sec    CAPILLARY BLOOD GLUCOSE        CULTURES:    HEMOGLOBIN A1C:    CHOLESTEROL:        RADIOLOGY & ADDITIONAL TESTS:

## 2019-07-09 NOTE — BRIEF OPERATIVE NOTE - OPERATION/FINDINGS
posterior spinal fusion L3-S1, L4-5 TLIF, bilateral hemilaminectomy L5-S1 with discectomy, right hemilaminectomy L3-L4

## 2019-07-10 LAB
ANION GAP SERPL CALC-SCNC: 5 MMOL/L — SIGNIFICANT CHANGE UP (ref 5–17)
BASOPHILS # BLD AUTO: 0.01 K/UL — SIGNIFICANT CHANGE UP (ref 0–0.2)
BASOPHILS NFR BLD AUTO: 0.1 % — SIGNIFICANT CHANGE UP (ref 0–2)
BUN SERPL-MCNC: 16 MG/DL — SIGNIFICANT CHANGE UP (ref 7–23)
CALCIUM SERPL-MCNC: 8.5 MG/DL — SIGNIFICANT CHANGE UP (ref 8.5–10.1)
CHLORIDE SERPL-SCNC: 104 MMOL/L — SIGNIFICANT CHANGE UP (ref 96–108)
CO2 SERPL-SCNC: 30 MMOL/L — SIGNIFICANT CHANGE UP (ref 22–31)
CREAT SERPL-MCNC: 0.97 MG/DL — SIGNIFICANT CHANGE UP (ref 0.5–1.3)
EOSINOPHIL # BLD AUTO: 0 K/UL — SIGNIFICANT CHANGE UP (ref 0–0.5)
EOSINOPHIL NFR BLD AUTO: 0 % — SIGNIFICANT CHANGE UP (ref 0–6)
GLUCOSE SERPL-MCNC: 146 MG/DL — HIGH (ref 70–99)
HCT VFR BLD CALC: 31.7 % — LOW (ref 34.5–45)
HGB BLD-MCNC: 9.9 G/DL — LOW (ref 11.5–15.5)
IMM GRANULOCYTES NFR BLD AUTO: 0.4 % — SIGNIFICANT CHANGE UP (ref 0–1.5)
LYMPHOCYTES # BLD AUTO: 1.01 K/UL — SIGNIFICANT CHANGE UP (ref 1–3.3)
LYMPHOCYTES # BLD AUTO: 11.8 % — LOW (ref 13–44)
MCHC RBC-ENTMCNC: 27.9 PG — SIGNIFICANT CHANGE UP (ref 27–34)
MCHC RBC-ENTMCNC: 31.2 GM/DL — LOW (ref 32–36)
MCV RBC AUTO: 89.3 FL — SIGNIFICANT CHANGE UP (ref 80–100)
MONOCYTES # BLD AUTO: 0.41 K/UL — SIGNIFICANT CHANGE UP (ref 0–0.9)
MONOCYTES NFR BLD AUTO: 4.8 % — SIGNIFICANT CHANGE UP (ref 2–14)
NEUTROPHILS # BLD AUTO: 7.11 K/UL — SIGNIFICANT CHANGE UP (ref 1.8–7.4)
NEUTROPHILS NFR BLD AUTO: 82.9 % — HIGH (ref 43–77)
NRBC # BLD: 0 /100 WBCS — SIGNIFICANT CHANGE UP (ref 0–0)
PLATELET # BLD AUTO: 229 K/UL — SIGNIFICANT CHANGE UP (ref 150–400)
POTASSIUM SERPL-MCNC: 4.5 MMOL/L — SIGNIFICANT CHANGE UP (ref 3.5–5.3)
POTASSIUM SERPL-SCNC: 4.5 MMOL/L — SIGNIFICANT CHANGE UP (ref 3.5–5.3)
RBC # BLD: 3.55 M/UL — LOW (ref 3.8–5.2)
RBC # FLD: 14.3 % — SIGNIFICANT CHANGE UP (ref 10.3–14.5)
SODIUM SERPL-SCNC: 139 MMOL/L — SIGNIFICANT CHANGE UP (ref 135–145)
WBC # BLD: 8.57 K/UL — SIGNIFICANT CHANGE UP (ref 3.8–10.5)
WBC # FLD AUTO: 8.57 K/UL — SIGNIFICANT CHANGE UP (ref 3.8–10.5)

## 2019-07-10 RX ORDER — PETROLATUM,WHITE
1 JELLY (GRAM) TOPICAL DAILY
Refills: 0 | Status: DISCONTINUED | OUTPATIENT
Start: 2019-07-10 | End: 2019-07-19

## 2019-07-10 RX ORDER — ACETAMINOPHEN 500 MG
1000 TABLET ORAL ONCE
Refills: 0 | Status: COMPLETED | OUTPATIENT
Start: 2019-07-10 | End: 2019-07-10

## 2019-07-10 RX ADMIN — SODIUM CHLORIDE 150 MILLILITER(S): 9 INJECTION, SOLUTION INTRAVENOUS at 06:18

## 2019-07-10 RX ADMIN — Medication 400 MILLIGRAM(S): at 14:12

## 2019-07-10 RX ADMIN — Medication 500 MILLIGRAM(S): at 06:15

## 2019-07-10 RX ADMIN — CYCLOBENZAPRINE HYDROCHLORIDE 10 MILLIGRAM(S): 10 TABLET, FILM COATED ORAL at 12:33

## 2019-07-10 RX ADMIN — CYCLOBENZAPRINE HYDROCHLORIDE 10 MILLIGRAM(S): 10 TABLET, FILM COATED ORAL at 21:28

## 2019-07-10 RX ADMIN — Medication 1 TABLET(S): at 06:13

## 2019-07-10 RX ADMIN — Medication 1 MILLIGRAM(S): at 12:33

## 2019-07-10 RX ADMIN — Medication 100 MILLIGRAM(S): at 14:34

## 2019-07-10 RX ADMIN — Medication 1000 MILLIGRAM(S): at 14:37

## 2019-07-10 RX ADMIN — Medication 100 MILLIGRAM(S): at 06:13

## 2019-07-10 RX ADMIN — Medication 500 MILLIGRAM(S): at 17:34

## 2019-07-10 RX ADMIN — Medication 180 MILLIGRAM(S): at 06:15

## 2019-07-10 RX ADMIN — GABAPENTIN 300 MILLIGRAM(S): 400 CAPSULE ORAL at 06:14

## 2019-07-10 RX ADMIN — Medication 1 TABLET(S): at 21:28

## 2019-07-10 RX ADMIN — SODIUM CHLORIDE 150 MILLILITER(S): 9 INJECTION, SOLUTION INTRAVENOUS at 17:33

## 2019-07-10 RX ADMIN — Medication 1000 MILLIGRAM(S): at 03:00

## 2019-07-10 RX ADMIN — GABAPENTIN 300 MILLIGRAM(S): 400 CAPSULE ORAL at 21:28

## 2019-07-10 RX ADMIN — Medication 400 MILLIGRAM(S): at 02:36

## 2019-07-10 RX ADMIN — Medication 100 MILLIGRAM(S): at 23:55

## 2019-07-10 RX ADMIN — Medication 100 MILLIGRAM(S): at 16:07

## 2019-07-10 RX ADMIN — HYDROMORPHONE HYDROCHLORIDE 30 MILLILITER(S): 2 INJECTION INTRAMUSCULAR; INTRAVENOUS; SUBCUTANEOUS at 07:28

## 2019-07-10 RX ADMIN — Medication 1 TABLET(S): at 12:36

## 2019-07-10 RX ADMIN — SENNA PLUS 2 TABLET(S): 8.6 TABLET ORAL at 21:28

## 2019-07-10 RX ADMIN — Medication 1 TABLET(S): at 14:34

## 2019-07-10 RX ADMIN — Medication 1 APPLICATION(S): at 04:14

## 2019-07-10 RX ADMIN — HYDROMORPHONE HYDROCHLORIDE 30 MILLILITER(S): 2 INJECTION INTRAMUSCULAR; INTRAVENOUS; SUBCUTANEOUS at 19:35

## 2019-07-10 RX ADMIN — GABAPENTIN 300 MILLIGRAM(S): 400 CAPSULE ORAL at 14:35

## 2019-07-10 RX ADMIN — Medication 100 MILLIGRAM(S): at 07:24

## 2019-07-10 RX ADMIN — HYDROMORPHONE HYDROCHLORIDE 0.5 MILLIGRAM(S): 2 INJECTION INTRAMUSCULAR; INTRAVENOUS; SUBCUTANEOUS at 12:55

## 2019-07-10 RX ADMIN — Medication 100 MILLIGRAM(S): at 21:28

## 2019-07-10 NOTE — PROGRESS NOTE ADULT - ASSESSMENT
A/P: 59y F s/p posterior spinal fusion L3-S1, L4-5 TLIF, bilateral hemilaminectomy L5-S1 with discectomy, right hemilaminectomy L3-L4 POD 1      Patient tolerated procedure well  JOSE R drains, please record drainage  -abx while drains in place  Pt on PCA for analgesia at this time, tolerating well  DVT ppx, SCDs NO chemical dvt ppx in the setting of recent spine surgery  WBAT BL LE with LSO. Pt may ambulate and get OOBTC with abdominal binder until LSO is at bedside.   FU XR L Spine AM  Plan to DC Sera in the AM  PT/OT  DC planning  Will d/w attending and advise if plan changes.

## 2019-07-10 NOTE — DISCHARGE NOTE PROVIDER - CARE PROVIDER_API CALL
Mati An (DO)  Orthopaedic Surgery Orthopaedics Surgery  [address] [facility] [facility], [place] [address]  Phone: [phone]  Fax: [phone]  Follow Up Time:

## 2019-07-10 NOTE — PROGRESS NOTE ADULT - SUBJECTIVE AND OBJECTIVE BOX
Patient seen and examined at bedside this am. Pain is well controlled, on PCA. NO acute overnight events. Pt reports of some L Arm/wrist swelling, most likely positional related. Denies new numbness/tingling of the BL LE. Denies fever/chills. No other complaints at this time.       Vital Signs Last 24 Hrs  T(C): 36.7 (10 Jul 2019 03:30), Max: 36.8 (09 Jul 2019 20:30)  T(F): 98 (10 Jul 2019 03:30), Max: 98.2 (09 Jul 2019 20:30)  HR: 101 (10 Jul 2019 03:30) (89 - 122)  BP: 135/87 (10 Jul 2019 03:30) (99/69 - 161/78)  BP(mean): --  RR: 16 (10 Jul 2019 03:30) (12 - 24)  SpO2: 100% (10 Jul 2019 03:30) (94% - 100%)    PE:    GEN: NAD     Spine:  Dsg c/d/i  JOSE R Drain x 2 in place  SILT C5-T1 & L2-S1  Distal pulses intact  Soft compartments, - calf ttp   Motor:               C5           C6            C7               C8           T1   R         5/5          5/5            5/5             5/5          5/5  L          5/5          5/5            5/5             5/5          5/5                L2             L3             L4               L5            S1  R         5/5           5/5          5/5             5/5           5/5  L          4/5          4/5           3/5             3/5           4/5    Sensory:            C5         C6         C7      C8       T1        (0=absent, 1=impaired, 2=normal, NT=not testable)  R         2            2           2        2         2  L          2            2           2        2         2               L2          L3         L4      L5       S1         (0=absent, 1=impaired, 2=normal, NT=not testable)  R         2            2            2        2        2  L          2            2           2        2         2 Patient seen and examined at bedside this am. Pain is well controlled, on PCA. NO acute overnight events. Pt reports of some L Arm/wrist swelling, most likely positional related, improved this am. Denies new numbness/tingling of the BL LE. Denies fever/chills. No other complaints at this time.       Vital Signs Last 24 Hrs  T(C): 36.7 (10 Jul 2019 03:30), Max: 36.8 (09 Jul 2019 20:30)  T(F): 98 (10 Jul 2019 03:30), Max: 98.2 (09 Jul 2019 20:30)  HR: 101 (10 Jul 2019 03:30) (89 - 122)  BP: 135/87 (10 Jul 2019 03:30) (99/69 - 161/78)  BP(mean): --  RR: 16 (10 Jul 2019 03:30) (12 - 24)  SpO2: 100% (10 Jul 2019 03:30) (94% - 100%)    PE:    GEN: NAD     Spine:  Dsg c/d/i  JOSE R Drain x 2 in place  SILT C5-T1 & L2-S1  Distal pulses intact  Soft compartments, - calf ttp   Motor:               C5           C6            C7               C8           T1   R         5/5          5/5            5/5             5/5          5/5  L          5/5          5/5            5/5             5/5          5/5                L2             L3             L4               L5            S1  R         5/5           5/5          5/5             5/5           5/5  L          4/5          4/5           3/5             3/5           4/5    Sensory:            C5         C6         C7      C8       T1        (0=absent, 1=impaired, 2=normal, NT=not testable)  R         2            2           2        2         2  L          2            2           2        2         2               L2          L3         L4      L5       S1         (0=absent, 1=impaired, 2=normal, NT=not testable)  R         2            2            2        2        2  L          2            2           2        2         2

## 2019-07-10 NOTE — DISCHARGE NOTE PROVIDER - HOSPITAL COURSE
The patient is a 59 year old Female status post PSF  of laminectomy and [id number] being admitted from [facility] for failed conservative treatment of L foot drop. The Patient was medically optimized for the previously mentioned surgical procedure. The patient was taken to the operating room on date mentioned above. Prophylactic antibiotics were started before the procedure and continued for 24 hours.  There were no complications during the procedure and patient tolerated the procedure well.  The patient was transferred to recovery room in stable condition and subsequently to surgical floor. Patient was given SCDs for DVT prophylaxis.  All home medications were continued.  The patient received physical therapy daily and daily labs were followed.  The dressing was kept clean, dry, intact. The drain was removed when output was appropriately decreased. Patient developed signs and symptoms consistent with PE during hospital admission, and CTA was obtained and confirmed the presence of a L  sided PE. She was started on therapeutic lovenox. The rest of the hospital stay was unremarkable. The patient was discharged in stable condition to rehab to follow up as outpatient.

## 2019-07-10 NOTE — DISCHARGE NOTE PROVIDER - NSDCCPTREATMENT_GEN_ALL_CORE_FT
PRINCIPAL PROCEDURE  Procedure: Lumbar spinal fusion  Findings and Treatment: 1.	Pain Control  2.	Walking with full weight bearing as tolerated, with assistive devices (walker/cane as needed).  3.	PT as needed  4.	Follow up with Dr. An as outpatient in 7-10 days after discharge from the hospital or rehab. Call office for appointment.  5.	Keep dressing clean and dry. Remove on Post-Op Day 3.  6.	No baths/hot tubs or soaks.   7.     Xaralto for DVT ppx

## 2019-07-10 NOTE — DISCHARGE NOTE PROVIDER - NSDCFUSCHEDAPPT_GEN_ALL_CORE_FT
ROSSI ROJAS ; 07/10/2019 ; [facility] PreAdmits  ROSSI ROJAS ; 07/19/2019 ; [facility] PreAdmits ROSSI ROJAS ; 07/19/2019 ; [facility] PreAdmits

## 2019-07-10 NOTE — PROGRESS NOTE ADULT - SUBJECTIVE AND OBJECTIVE BOX
Patient is a 59y old  Female who presents with a chief complaint of left foot drop (10 Jul 2019 06:31)      INTERVAL HPI/OVERNIGHT EVENTS:  pt still in pain she said  MEDICATIONS  (STANDING):  ascorbic acid 500 milliGRAM(s) Oral two times a day  calcium carbonate 1250 mG  + Vitamin D (OsCal 500 + D) 1 Tablet(s) Oral three times a day  ceFAZolin   IVPB 2000 milliGRAM(s) IV Intermittent every 8 hours  docusate sodium 100 milliGRAM(s) Oral three times a day  folic acid 1 milliGRAM(s) Oral daily  gabapentin 300 milliGRAM(s) Oral three times a day  HYDROmorphone PCA (1 mG/mL) 30 milliLiter(s) PCA Continuous PCA Continuous  lactated ringers. 1000 milliLiter(s) (150 mL/Hr) IV Continuous <Continuous>  lactated ringers. 1000 milliLiter(s) (150 mL/Hr) IV Continuous <Continuous>  multivitamin 1 Tablet(s) Oral daily  senna 2 Tablet(s) Oral at bedtime  sugammadex Injectable 450 milliGRAM(s) IV Push once  verapamil  milliGRAM(s) Oral daily    MEDICATIONS  (PRN):  acetaminophen   Tablet .. 650 milliGRAM(s) Oral every 6 hours PRN Temp greater or equal to 38C (100.4F), Mild Pain (1 - 3)  ALPRAZolam 0.5 milliGRAM(s) Oral daily PRN anxiety  benzocaine 15 mG/menthol 3.6 mG Lozenge 1 Lozenge Oral every 3 hours PRN Sore Throat  cyclobenzaprine 10 milliGRAM(s) Oral three times a day PRN Muscle Spasm  HYDROmorphone PCA (1 mG/mL) Rescue Clinician Bolus 0.5 milliGRAM(s) IV Push every 15 minutes PRN for Pain Scale GREATER THAN 6  melatonin 3 milliGRAM(s) Oral at bedtime PRN Sleep  naloxone Injectable 0.1 milliGRAM(s) IV Push every 3 minutes PRN For ANY of the following changes in patient status:  A. RR LESS THAN 10 breaths per minute, B. Oxygen saturation LESS THAN 90%, C. Sedation score of 6  ondansetron Injectable 4 milliGRAM(s) IV Push every 4 hours PRN Nausea  petrolatum white Ointment 1 Application(s) Topical daily PRN dry lips      Allergies    No Known Allergies    Intolerances        REVIEW OF SYSTEMS:  CONSTITUTIONAL: No fever, weight loss, or fatigue  EYES: No eye pain, visual disturbances, or discharge  ENMT:  No difficulty hearing, tinnitus, vertigo; No sinus or throat pain  NECK: No pain or stiffness  BREASTS: No pain, masses, or nipple discharge  RESPIRATORY: No cough, wheezing, chills or hemoptysis; No shortness of breath  CARDIOVASCULAR: No chest pain, palpitations, dizziness, or leg swelling  GASTROINTESTINAL: No abdominal or epigastric pain. No nausea, vomiting, or hematemesis; No diarrhea or constipation. No melena or hematochezia.  GENITOURINARY: No dysuria, frequency, hematuria, or incontinence  NEUROLOGICAL: No headaches, memory loss, loss of strength, numbness, or tremors  SKIN: No itching, burning, rashes, or lesions   LYMPH NODES: No enlarged glands  ENDOCRINE: No heat or cold intolerance; No hair loss  MUSCULOSKELETAL: No joint pain or swelling; No muscle, back, or extremity pain  PSYCHIATRIC: No depression, anxiety, mood swings, or difficulty sleeping  HEME/LYMPH: No easy bruising, or bleeding gums  ALLERGY AND IMMUNOLOGIC: No hives or eczema    Vital Signs Last 24 Hrs  T(C): 37 (10 Jul 2019 19:50), Max: 37 (10 Jul 2019 19:50)  T(F): 98.6 (10 Jul 2019 19:50), Max: 98.6 (10 Jul 2019 19:50)  HR: 91 (10 Jul 2019 19:50) (90 - 108)  BP: 132/70 (10 Jul 2019 19:50) (123/85 - 148/90)  BP(mean): --  RR: 17 (10 Jul 2019 19:50) (15 - 18)  SpO2: 100% (10 Jul 2019 19:50) (98% - 100%)    PHYSICAL EXAM:  GENERAL: NAD, well-groomed, well-developed  HEAD:  Atraumatic, Normocephalic  EYES: EOMI, PERRLA, conjunctiva and sclera clear  ENMT: No tonsillar erythema, exudates, or enlargement; Moist mucous membranes, Good dentition, No lesions  NECK: Supple, No JVD, Normal thyroid  NERVOUS SYSTEM:  Alert & Oriented X3, Good concentration; Motor Strength 5/5 B/L upper and lower extremities; DTRs 2+ intact and symmetric  CHEST/LUNG: Clear to percussion bilaterally; No rales, rhonchi, wheezing, or rubs  HEART: Regular rate and rhythm; No murmurs, rubs, or gallops  ABDOMEN: Soft, Nontender, Nondistended; Bowel sounds present  EXTREMITIES:  2+ Peripheral Pulses, No clubbing, cyanosis, or edema  LYMPH: No lymphadenopathy noted  SKIN: No rashes or lesions    LABS:                        9.9    8.57  )-----------( 229      ( 10 Jul 2019 06:34 )             31.7     07-10    139  |  104  |  16  ----------------------------<  146<H>  4.5   |  30  |  0.97    Ca    8.5      10 Jul 2019 06:34      PT/INR - ( 09 Jul 2019 05:31 )   PT: 10.2 sec;   INR: 0.91 ratio         PTT - ( 09 Jul 2019 05:31 )  PTT:28.6 sec    CAPILLARY BLOOD GLUCOSE        CULTURES:    HEMOGLOBIN A1C:    CHOLESTEROL:        RADIOLOGY & ADDITIONAL TESTS:

## 2019-07-11 DIAGNOSIS — R74.8 ABNORMAL LEVELS OF OTHER SERUM ENZYMES: ICD-10-CM

## 2019-07-11 LAB
ANION GAP SERPL CALC-SCNC: 5 MMOL/L — SIGNIFICANT CHANGE UP (ref 5–17)
BASOPHILS # BLD AUTO: 0.01 K/UL — SIGNIFICANT CHANGE UP (ref 0–0.2)
BASOPHILS NFR BLD AUTO: 0.1 % — SIGNIFICANT CHANGE UP (ref 0–2)
BUN SERPL-MCNC: 11 MG/DL — SIGNIFICANT CHANGE UP (ref 7–23)
CALCIUM SERPL-MCNC: 8.5 MG/DL — SIGNIFICANT CHANGE UP (ref 8.5–10.1)
CHLORIDE SERPL-SCNC: 103 MMOL/L — SIGNIFICANT CHANGE UP (ref 96–108)
CK MB BLD-MCNC: 0.2 % — SIGNIFICANT CHANGE UP (ref 0–3.5)
CK MB CFR SERPL CALC: 7.3 NG/ML — HIGH (ref 0.5–3.6)
CK SERPL-CCNC: 3483 U/L — HIGH (ref 26–192)
CO2 SERPL-SCNC: 29 MMOL/L — SIGNIFICANT CHANGE UP (ref 22–31)
CREAT SERPL-MCNC: 0.69 MG/DL — SIGNIFICANT CHANGE UP (ref 0.5–1.3)
EOSINOPHIL # BLD AUTO: 0.06 K/UL — SIGNIFICANT CHANGE UP (ref 0–0.5)
EOSINOPHIL NFR BLD AUTO: 0.9 % — SIGNIFICANT CHANGE UP (ref 0–6)
GLUCOSE SERPL-MCNC: 120 MG/DL — HIGH (ref 70–99)
HCT VFR BLD CALC: 25.6 % — LOW (ref 34.5–45)
HCT VFR BLD CALC: 31.9 % — LOW (ref 34.5–45)
HGB BLD-MCNC: 10.3 G/DL — LOW (ref 11.5–15.5)
HGB BLD-MCNC: 8.2 G/DL — LOW (ref 11.5–15.5)
IMM GRANULOCYTES NFR BLD AUTO: 0.3 % — SIGNIFICANT CHANGE UP (ref 0–1.5)
LYMPHOCYTES # BLD AUTO: 1.37 K/UL — SIGNIFICANT CHANGE UP (ref 1–3.3)
LYMPHOCYTES # BLD AUTO: 20.4 % — SIGNIFICANT CHANGE UP (ref 13–44)
MCHC RBC-ENTMCNC: 28 PG — SIGNIFICANT CHANGE UP (ref 27–34)
MCHC RBC-ENTMCNC: 28.4 PG — SIGNIFICANT CHANGE UP (ref 27–34)
MCHC RBC-ENTMCNC: 32 GM/DL — SIGNIFICANT CHANGE UP (ref 32–36)
MCHC RBC-ENTMCNC: 32.3 GM/DL — SIGNIFICANT CHANGE UP (ref 32–36)
MCV RBC AUTO: 87.4 FL — SIGNIFICANT CHANGE UP (ref 80–100)
MCV RBC AUTO: 87.9 FL — SIGNIFICANT CHANGE UP (ref 80–100)
MONOCYTES # BLD AUTO: 0.42 K/UL — SIGNIFICANT CHANGE UP (ref 0–0.9)
MONOCYTES NFR BLD AUTO: 6.3 % — SIGNIFICANT CHANGE UP (ref 2–14)
NEUTROPHILS # BLD AUTO: 4.84 K/UL — SIGNIFICANT CHANGE UP (ref 1.8–7.4)
NEUTROPHILS NFR BLD AUTO: 72 % — SIGNIFICANT CHANGE UP (ref 43–77)
NRBC # BLD: 0 /100 WBCS — SIGNIFICANT CHANGE UP (ref 0–0)
NRBC # BLD: 0 /100 WBCS — SIGNIFICANT CHANGE UP (ref 0–0)
PLATELET # BLD AUTO: 175 K/UL — SIGNIFICANT CHANGE UP (ref 150–400)
PLATELET # BLD AUTO: 213 K/UL — SIGNIFICANT CHANGE UP (ref 150–400)
POTASSIUM SERPL-MCNC: 3.6 MMOL/L — SIGNIFICANT CHANGE UP (ref 3.5–5.3)
POTASSIUM SERPL-SCNC: 3.6 MMOL/L — SIGNIFICANT CHANGE UP (ref 3.5–5.3)
RBC # BLD: 2.93 M/UL — LOW (ref 3.8–5.2)
RBC # BLD: 3.63 M/UL — LOW (ref 3.8–5.2)
RBC # FLD: 14.1 % — SIGNIFICANT CHANGE UP (ref 10.3–14.5)
RBC # FLD: 15.1 % — HIGH (ref 10.3–14.5)
SODIUM SERPL-SCNC: 137 MMOL/L — SIGNIFICANT CHANGE UP (ref 135–145)
TROPONIN I SERPL-MCNC: 0.06 NG/ML — HIGH (ref 0.01–0.04)
TROPONIN I SERPL-MCNC: <.015 NG/ML — SIGNIFICANT CHANGE UP (ref 0.01–0.04)
WBC # BLD: 10.27 K/UL — SIGNIFICANT CHANGE UP (ref 3.8–10.5)
WBC # BLD: 6.72 K/UL — SIGNIFICANT CHANGE UP (ref 3.8–10.5)
WBC # FLD AUTO: 10.27 K/UL — SIGNIFICANT CHANGE UP (ref 3.8–10.5)
WBC # FLD AUTO: 6.72 K/UL — SIGNIFICANT CHANGE UP (ref 3.8–10.5)

## 2019-07-11 PROCEDURE — 93971 EXTREMITY STUDY: CPT | Mod: 26,RT

## 2019-07-11 PROCEDURE — 93010 ELECTROCARDIOGRAM REPORT: CPT

## 2019-07-11 PROCEDURE — 93971 EXTREMITY STUDY: CPT | Mod: 26,59

## 2019-07-11 RX ORDER — ACETAMINOPHEN 500 MG
1000 TABLET ORAL ONCE
Refills: 0 | Status: COMPLETED | OUTPATIENT
Start: 2019-07-11 | End: 2019-07-11

## 2019-07-11 RX ORDER — OXYCODONE HYDROCHLORIDE 5 MG/1
10 TABLET ORAL EVERY 12 HOURS
Refills: 0 | Status: DISCONTINUED | OUTPATIENT
Start: 2019-07-11 | End: 2019-07-18

## 2019-07-11 RX ORDER — HYDROMORPHONE HYDROCHLORIDE 2 MG/ML
0.5 INJECTION INTRAMUSCULAR; INTRAVENOUS; SUBCUTANEOUS EVERY 4 HOURS
Refills: 0 | Status: DISCONTINUED | OUTPATIENT
Start: 2019-07-11 | End: 2019-07-18

## 2019-07-11 RX ORDER — DILTIAZEM HCL 120 MG
20 CAPSULE, EXT RELEASE 24 HR ORAL EVERY 4 HOURS
Refills: 0 | Status: DISCONTINUED | OUTPATIENT
Start: 2019-07-11 | End: 2019-07-19

## 2019-07-11 RX ORDER — OXYCODONE HYDROCHLORIDE 5 MG/1
5 TABLET ORAL EVERY 6 HOURS
Refills: 0 | Status: DISCONTINUED | OUTPATIENT
Start: 2019-07-11 | End: 2019-07-11

## 2019-07-11 RX ORDER — OXYCODONE HYDROCHLORIDE 5 MG/1
10 TABLET ORAL EVERY 6 HOURS
Refills: 0 | Status: DISCONTINUED | OUTPATIENT
Start: 2019-07-11 | End: 2019-07-11

## 2019-07-11 RX ORDER — SODIUM CHLORIDE 9 MG/ML
1000 INJECTION, SOLUTION INTRAVENOUS
Refills: 0 | Status: DISCONTINUED | OUTPATIENT
Start: 2019-07-11 | End: 2019-07-12

## 2019-07-11 RX ORDER — OXYCODONE HYDROCHLORIDE 5 MG/1
10 TABLET ORAL EVERY 4 HOURS
Refills: 0 | Status: DISCONTINUED | OUTPATIENT
Start: 2019-07-11 | End: 2019-07-18

## 2019-07-11 RX ORDER — OXYCODONE HYDROCHLORIDE 5 MG/1
5 TABLET ORAL EVERY 4 HOURS
Refills: 0 | Status: DISCONTINUED | OUTPATIENT
Start: 2019-07-11 | End: 2019-07-11

## 2019-07-11 RX ADMIN — CYCLOBENZAPRINE HYDROCHLORIDE 10 MILLIGRAM(S): 10 TABLET, FILM COATED ORAL at 05:34

## 2019-07-11 RX ADMIN — Medication 1000 MILLIGRAM(S): at 19:04

## 2019-07-11 RX ADMIN — GABAPENTIN 300 MILLIGRAM(S): 400 CAPSULE ORAL at 05:35

## 2019-07-11 RX ADMIN — Medication 100 MILLIGRAM(S): at 05:35

## 2019-07-11 RX ADMIN — Medication 100 MILLIGRAM(S): at 15:53

## 2019-07-11 RX ADMIN — HYDROMORPHONE HYDROCHLORIDE 0.5 MILLIGRAM(S): 2 INJECTION INTRAMUSCULAR; INTRAVENOUS; SUBCUTANEOUS at 10:07

## 2019-07-11 RX ADMIN — OXYCODONE HYDROCHLORIDE 10 MILLIGRAM(S): 5 TABLET ORAL at 18:39

## 2019-07-11 RX ADMIN — Medication 20 MILLIGRAM(S): at 23:09

## 2019-07-11 RX ADMIN — OXYCODONE HYDROCHLORIDE 10 MILLIGRAM(S): 5 TABLET ORAL at 17:39

## 2019-07-11 RX ADMIN — HYDROMORPHONE HYDROCHLORIDE 0.5 MILLIGRAM(S): 2 INJECTION INTRAMUSCULAR; INTRAVENOUS; SUBCUTANEOUS at 14:24

## 2019-07-11 RX ADMIN — GABAPENTIN 300 MILLIGRAM(S): 400 CAPSULE ORAL at 22:08

## 2019-07-11 RX ADMIN — OXYCODONE HYDROCHLORIDE 10 MILLIGRAM(S): 5 TABLET ORAL at 11:43

## 2019-07-11 RX ADMIN — OXYCODONE HYDROCHLORIDE 10 MILLIGRAM(S): 5 TABLET ORAL at 23:09

## 2019-07-11 RX ADMIN — OXYCODONE HYDROCHLORIDE 10 MILLIGRAM(S): 5 TABLET ORAL at 12:43

## 2019-07-11 RX ADMIN — HYDROMORPHONE HYDROCHLORIDE 0.5 MILLIGRAM(S): 2 INJECTION INTRAMUSCULAR; INTRAVENOUS; SUBCUTANEOUS at 14:39

## 2019-07-11 RX ADMIN — SENNA PLUS 2 TABLET(S): 8.6 TABLET ORAL at 22:09

## 2019-07-11 RX ADMIN — HYDROMORPHONE HYDROCHLORIDE 0.5 MILLIGRAM(S): 2 INJECTION INTRAMUSCULAR; INTRAVENOUS; SUBCUTANEOUS at 20:03

## 2019-07-11 RX ADMIN — Medication 500 MILLIGRAM(S): at 17:39

## 2019-07-11 RX ADMIN — SODIUM CHLORIDE 150 MILLILITER(S): 9 INJECTION, SOLUTION INTRAVENOUS at 22:13

## 2019-07-11 RX ADMIN — Medication 1 TABLET(S): at 11:43

## 2019-07-11 RX ADMIN — Medication 400 MILLIGRAM(S): at 18:34

## 2019-07-11 RX ADMIN — CYCLOBENZAPRINE HYDROCHLORIDE 10 MILLIGRAM(S): 10 TABLET, FILM COATED ORAL at 13:11

## 2019-07-11 RX ADMIN — HYDROMORPHONE HYDROCHLORIDE 0.5 MILLIGRAM(S): 2 INJECTION INTRAMUSCULAR; INTRAVENOUS; SUBCUTANEOUS at 20:18

## 2019-07-11 RX ADMIN — HYDROMORPHONE HYDROCHLORIDE 0.5 MILLIGRAM(S): 2 INJECTION INTRAMUSCULAR; INTRAVENOUS; SUBCUTANEOUS at 10:22

## 2019-07-11 RX ADMIN — Medication 100 MILLIGRAM(S): at 07:23

## 2019-07-11 RX ADMIN — Medication 1 MILLIGRAM(S): at 11:43

## 2019-07-11 RX ADMIN — SODIUM CHLORIDE 150 MILLILITER(S): 9 INJECTION, SOLUTION INTRAVENOUS at 12:58

## 2019-07-11 RX ADMIN — GABAPENTIN 300 MILLIGRAM(S): 400 CAPSULE ORAL at 13:11

## 2019-07-11 RX ADMIN — Medication 650 MILLIGRAM(S): at 12:43

## 2019-07-11 RX ADMIN — Medication 1 TABLET(S): at 05:34

## 2019-07-11 RX ADMIN — Medication 1 TABLET(S): at 13:11

## 2019-07-11 RX ADMIN — Medication 100 MILLIGRAM(S): at 13:11

## 2019-07-11 RX ADMIN — CYCLOBENZAPRINE HYDROCHLORIDE 10 MILLIGRAM(S): 10 TABLET, FILM COATED ORAL at 20:03

## 2019-07-11 RX ADMIN — Medication 400 MILLIGRAM(S): at 03:40

## 2019-07-11 RX ADMIN — Medication 500 MILLIGRAM(S): at 05:34

## 2019-07-11 RX ADMIN — Medication 100 MILLIGRAM(S): at 22:12

## 2019-07-11 RX ADMIN — SODIUM CHLORIDE 150 MILLILITER(S): 9 INJECTION, SOLUTION INTRAVENOUS at 18:33

## 2019-07-11 RX ADMIN — Medication 1000 MILLIGRAM(S): at 04:00

## 2019-07-11 RX ADMIN — Medication 1 TABLET(S): at 22:08

## 2019-07-11 RX ADMIN — Medication 100 MILLIGRAM(S): at 22:09

## 2019-07-11 RX ADMIN — Medication 650 MILLIGRAM(S): at 11:43

## 2019-07-11 NOTE — PROGRESS NOTE ADULT - SUBJECTIVE AND OBJECTIVE BOX
Patient is a 59y old  Female who presents with a chief complaint of left foot drop (11 Jul 2019 05:52)      INTERVAL HPI/OVERNIGHT EVENTS:  c/o pain at the incision site, no chest pain  MEDICATIONS  (STANDING):  ascorbic acid 500 milliGRAM(s) Oral two times a day  calcium carbonate 1250 mG  + Vitamin D (OsCal 500 + D) 1 Tablet(s) Oral three times a day  ceFAZolin   IVPB 2000 milliGRAM(s) IV Intermittent every 8 hours  docusate sodium 100 milliGRAM(s) Oral three times a day  folic acid 1 milliGRAM(s) Oral daily  gabapentin 300 milliGRAM(s) Oral three times a day  lactated ringers. 1000 milliLiter(s) (150 mL/Hr) IV Continuous <Continuous>  lactated ringers. 1000 milliLiter(s) (150 mL/Hr) IV Continuous <Continuous>  multivitamin 1 Tablet(s) Oral daily  senna 2 Tablet(s) Oral at bedtime  sugammadex Injectable 450 milliGRAM(s) IV Push once  verapamil  milliGRAM(s) Oral daily    MEDICATIONS  (PRN):  acetaminophen   Tablet .. 650 milliGRAM(s) Oral every 6 hours PRN Temp greater or equal to 38C (100.4F), Mild Pain (1 - 3)  ALPRAZolam 0.5 milliGRAM(s) Oral daily PRN anxiety  benzocaine 15 mG/menthol 3.6 mG Lozenge 1 Lozenge Oral every 3 hours PRN Sore Throat  cyclobenzaprine 10 milliGRAM(s) Oral three times a day PRN Muscle Spasm  HYDROmorphone  Injectable 0.5 milliGRAM(s) IV Push every 4 hours PRN Severe Pain (7 - 10)  melatonin 3 milliGRAM(s) Oral at bedtime PRN Sleep  naloxone Injectable 0.1 milliGRAM(s) IV Push every 3 minutes PRN For ANY of the following changes in patient status:  A. RR LESS THAN 10 breaths per minute, B. Oxygen saturation LESS THAN 90%, C. Sedation score of 6  ondansetron Injectable 4 milliGRAM(s) IV Push every 4 hours PRN Nausea  oxyCODONE    IR 5 milliGRAM(s) Oral every 6 hours PRN Mild Pain (1 - 3)  oxyCODONE    IR 10 milliGRAM(s) Oral every 6 hours PRN Moderate Pain (4 - 6)  petrolatum white Ointment 1 Application(s) Topical daily PRN dry lips      Allergies    No Known Allergies    Intolerances        REVIEW OF SYSTEMS:  CONSTITUTIONAL: No fever, weight loss, or fatigue  EYES: No eye pain, visual disturbances, or discharge  ENMT:  No difficulty hearing, tinnitus, vertigo; No sinus or throat pain  NECK: No pain or stiffness  BREASTS: No pain, masses, or nipple discharge  RESPIRATORY: No cough, wheezing, chills or hemoptysis; No shortness of breath  CARDIOVASCULAR: No chest pain, palpitations, dizziness, or leg swelling  GASTROINTESTINAL: No abdominal or epigastric pain. No nausea, vomiting, or hematemesis; No diarrhea or constipation. No melena or hematochezia.  GENITOURINARY: No dysuria, frequency, hematuria, or incontinence  NEUROLOGICAL: No headaches, memory loss, loss of strength, numbness, or tremors  SKIN: No itching, burning, rashes, or lesions   LYMPH NODES: No enlarged glands  ENDOCRINE: No heat or cold intolerance; No hair loss  MUSCULOSKELETAL: No joint pain or swelling; No muscle, back, or extremity pain  PSYCHIATRIC: No depression, anxiety, mood swings, or difficulty sleeping  HEME/LYMPH: No easy bruising, or bleeding gums  ALLERGY AND IMMUNOLOGIC: No hives or eczema    Vital Signs Last 24 Hrs  T(C): 37.7 (11 Jul 2019 12:25), Max: 37.7 (11 Jul 2019 12:25)  T(F): 99.9 (11 Jul 2019 12:25), Max: 99.9 (11 Jul 2019 12:25)  HR: 119 (11 Jul 2019 12:25) (90 - 120)  BP: 148/91 (11 Jul 2019 12:25) (103/69 - 149/95)  BP(mean): --  RR: 18 (11 Jul 2019 12:25) (16 - 18)  SpO2: 95% (11 Jul 2019 12:25) (94% - 100%)    PHYSICAL EXAM:  GENERAL: NAD, well-groomed, well-developed  HEAD:  Atraumatic, Normocephalic  EYES: EOMI, PERRLA, conjunctiva and sclera clear  ENMT: No tonsillar erythema, exudates, or enlargement; Moist mucous membranes, Good dentition, No lesions  NECK: Supple, No JVD, Normal thyroid  NERVOUS SYSTEM:  Alert & Oriented X3, Good concentration; Motor Strength 5/5 B/L upper and lower extremities; DTRs 2+ intact and symmetric  CHEST/LUNG: Clear to percussion bilaterally; No rales, rhonchi, wheezing, or rubs  HEART: Regular rate and rhythm; No murmurs, rubs, or gallops  ABDOMEN: Soft, Nontender, Nondistended; Bowel sounds present  EXTREMITIES:  2+ Peripheral Pulses, No clubbing, cyanosis, or edema  LYMPH: No lymphadenopathy noted  SKIN: No rashes or lesions    LABS:                        8.2    6.72  )-----------( 175      ( 11 Jul 2019 06:28 )             25.6     07-11    137  |  103  |  11  ----------------------------<  120<H>  3.6   |  29  |  0.69    Ca    8.5      11 Jul 2019 06:28          CAPILLARY BLOOD GLUCOSE        CULTURES:    HEMOGLOBIN A1C:    CHOLESTEROL:        RADIOLOGY & ADDITIONAL TESTS:

## 2019-07-11 NOTE — PROGRESS NOTE ADULT - ASSESSMENT
A/P: 59y F s/p posterior spinal fusion L3-S1, L4-5 TLIF, bilateral hemilaminectomy L5-S1 with discectomy, right hemilaminectomy L3-L4 POD 1      Patient tolerated procedure well  JOSE R drains, please record drainage  -abx while drains in place  Pt on PCA for analgesia at this time, plan for d/c pca today  DVT ppx, SCDs NO chemical dvt ppx in the setting of recent spine surgery  WBAT BL LE with LSO. Pt may ambulate and get OOBTC with abdominal binder until LSO is at bedside.   doppler LLE   XR L Spine   PT/OT  DC planning  Will d/w attending and advise if plan changes. A/P: 59y F s/p posterior spinal fusion L3-S1, L4-5 TLIF, bilateral hemilaminectomy L5-S1 with discectomy, right hemilaminectomy L3-L4 POD 2      Patient tolerated procedure well  JOSE R drains, please record drainage  -abx while drains in place  Pt on PCA for analgesia at this time, plan for d/c pca today  DVT ppx, SCDs NO chemical dvt ppx in the setting of recent spine surgery  WBAT BL LE with LSO. Pt may ambulate and get OOBTC with abdominal binder until LSO is at bedside.   doppler LLE   XR L Spine   PT/OT  DC planning  Will d/w attending and advise if plan changes.

## 2019-07-11 NOTE — PROVIDER CONTACT NOTE (CRITICAL VALUE NOTIFICATION) - SITUATION
S/p lumbar karmen S/p posterior spinal fusion L3-S1, L4-5 TLIF, bilateral hemilaminectomy L5-S1 with discectomy, right hemilaminectomy L3-. POD 3.

## 2019-07-11 NOTE — PROGRESS NOTE ADULT - SUBJECTIVE AND OBJECTIVE BOX
Patient seen and examined at bedside this am. Pain is well controlled, on PCA. NO acute overnight events. Pt reports of some L Arm/wrist swelling, most likely positional related, improved this am. Denies new numbness/tingling of the BL LE. Denies fever/chills. complaining of some LLE pain. Tachycardiac into 110s.      Vital Signs Last 24 Hrs  T(C): 37.5 (11 Jul 2019 05:37), Max: 37.6 (11 Jul 2019 03:30)  T(F): 99.5 (11 Jul 2019 05:37), Max: 99.6 (11 Jul 2019 03:30)  HR: 114 (11 Jul 2019 05:37) (90 - 117)  BP: 103/69 (11 Jul 2019 05:37) (103/69 - 148/90)  BP(mean): --  RR: 17 (11 Jul 2019 05:37) (16 - 18)  SpO2: 98% (11 Jul 2019 05:37) (94% - 100%)    PE:    GEN: NAD     Spine:  Dsg c/d/i  JOSE R Drain x 2 in place  SILT C5-T1 & L2-S1  Distal pulses intact  Soft compartments, + calf ttp LLE  + shayna sign   Motor:               C5           C6            C7               C8           T1   R         5/5          5/5            5/5             5/5          5/5  L          5/5          5/5            5/5             5/5          5/5                L2             L3             L4               L5            S1  R         5/5           5/5          5/5             5/5           5/5  L          4/5          4/5           3/5             3/5           4/5    Sensory:            C5         C6         C7      C8       T1        (0=absent, 1=impaired, 2=normal, NT=not testable)  R         2            2           2        2         2  L          2            2           2        2         2               L2          L3         L4      L5       S1         (0=absent, 1=impaired, 2=normal, NT=not testable)  R         2            2            2        2        2  L          2            2           2        2         2

## 2019-07-11 NOTE — CONSULT NOTE ADULT - SUBJECTIVE AND OBJECTIVE BOX
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 An. (08 Jul 2019 12:47)      Chief Complaint:  Patient is a 59y old  Female who presents with a chief complaint of left foot drop (11 Jul 2019 14:05)      Review of Systems:    General:  No wt loss, fevers, chills, night sweats  Eyes:  Good vision, no reported pain  ENT:  No sore throat, pain, runny nose, dysphagia  CV:  No pain, palpitations,  Resp:  No dyspnea, cough, tachypnea, wheezing  GI:  No pain, nausea, vomiting, diarrhea, constipation           Social History/Family History  SOCHX:   tobacco,  -  alcohol    FMHX: FA/MO  - contributory       Discussed with:  PMD, Family    Physical Exam:    Vital Signs:  Vital Signs Last 24 Hrs  T(C): 36.7 (11 Jul 2019 20:00), Max: 37.7 (11 Jul 2019 12:25)  T(F): 98 (11 Jul 2019 20:00), Max: 99.9 (11 Jul 2019 12:25)  HR: 130 (11 Jul 2019 20:00) (106 - 130)  BP: 149/99 (11 Jul 2019 20:00) (103/69 - 165/82)  BP(mean): --  RR: 15 (11 Jul 2019 20:00) (15 - 18)  SpO2: 96% (11 Jul 2019 20:00) (94% - 98%)  Daily     Daily   I&O's Summary    10 Jul 2019 07:01  -  11 Jul 2019 07:00  --------------------------------------------------------  IN: 3430 mL / OUT: 1850 mL / NET: 1580 mL    11 Jul 2019 07:01  -  11 Jul 2019 21:51  --------------------------------------------------------  IN: 350 mL / OUT: 150 mL / NET: 200 mL          Chest:  Full & symmetric excursion, no increased effort, breath sounds clear  Cardiovascular:  Regular rhythm, S1, S2, no murmur/rub/S3/S4, tachycardia  Abdomen:  Soft, non-tender, non-distended, normoactive bowel sounds, no HSM      Laboratory:                          8.2    6.72  )-----------( 175      ( 11 Jul 2019 06:28 )             25.6     07-11    137  |  103  |  11  ----------------------------<  120<H>  3.6   |  29  |  0.69    Ca    8.5      11 Jul 2019 06:28        CARDIAC MARKERS ( 11 Jul 2019 19:20 )  .057 ng/mL / x     / x     / x     / x      CARDIAC MARKERS ( 11 Jul 2019 06:28 )  <.015 ng/mL / x     / 3483 U/L / x     / 7.3 ng/mL        Assessment:  Post spine surgery  Pain not well controlled  Tachycardia  slight troponin leak  Not likely acute CV event   No acute EKG changes  Monitor serial enzymes  Cardizem for HR control PRN  TTE

## 2019-07-11 NOTE — CHART NOTE - NSCHARTNOTEFT_GEN_A_CORE
Was called by RN for critical lab value Troponin of 0.057. Patient seen and examined at bedside. Pain is controlled, however does reports of pain in between medication dosing. Patient Denies Chest Pain, SOB, Numbness/tingling of the BL LEs.   -Reached out to Dr. Menjivar who requested a cardiology consultation. Per medicine, low suspicion for active cardiac event at this time, however requested I reach out to Medicine Hospitalist PA. Hospitalist PA has been called and paged, awaiting reply.  -Will order EKG  -Will Order Doppler of the RLE, Doppler of the LLE performed earlier today which was negative  -Patient is tachycardic, however it could be related to pain.   -Denies chest pain/sob at this time, patient is resting comfortably in bed.   -will d/w attending and FU on EKG/DOppler

## 2019-07-12 DIAGNOSIS — I26.99 OTHER PULMONARY EMBOLISM WITHOUT ACUTE COR PULMONALE: ICD-10-CM

## 2019-07-12 LAB
ANION GAP SERPL CALC-SCNC: 6 MMOL/L — SIGNIFICANT CHANGE UP (ref 5–17)
BASOPHILS # BLD AUTO: 0.04 K/UL — SIGNIFICANT CHANGE UP (ref 0–0.2)
BASOPHILS NFR BLD AUTO: 0.4 % — SIGNIFICANT CHANGE UP (ref 0–2)
BUN SERPL-MCNC: 7 MG/DL — SIGNIFICANT CHANGE UP (ref 7–23)
CALCIUM SERPL-MCNC: 8.5 MG/DL — SIGNIFICANT CHANGE UP (ref 8.5–10.1)
CHLORIDE SERPL-SCNC: 103 MMOL/L — SIGNIFICANT CHANGE UP (ref 96–108)
CO2 SERPL-SCNC: 32 MMOL/L — HIGH (ref 22–31)
CREAT SERPL-MCNC: 0.76 MG/DL — SIGNIFICANT CHANGE UP (ref 0.5–1.3)
D DIMER BLD IA.RAPID-MCNC: 907 NG/ML DDU — HIGH
EOSINOPHIL # BLD AUTO: 0.2 K/UL — SIGNIFICANT CHANGE UP (ref 0–0.5)
EOSINOPHIL NFR BLD AUTO: 2.1 % — SIGNIFICANT CHANGE UP (ref 0–6)
GLUCOSE SERPL-MCNC: 118 MG/DL — HIGH (ref 70–99)
HCT VFR BLD CALC: 29.6 % — LOW (ref 34.5–45)
HGB BLD-MCNC: 9.6 G/DL — LOW (ref 11.5–15.5)
IMM GRANULOCYTES NFR BLD AUTO: 0.4 % — SIGNIFICANT CHANGE UP (ref 0–1.5)
LYMPHOCYTES # BLD AUTO: 1.78 K/UL — SIGNIFICANT CHANGE UP (ref 1–3.3)
LYMPHOCYTES # BLD AUTO: 19.1 % — SIGNIFICANT CHANGE UP (ref 13–44)
MAGNESIUM SERPL-MCNC: 1.9 MG/DL — SIGNIFICANT CHANGE UP (ref 1.6–2.6)
MCHC RBC-ENTMCNC: 28.4 PG — SIGNIFICANT CHANGE UP (ref 27–34)
MCHC RBC-ENTMCNC: 32.4 GM/DL — SIGNIFICANT CHANGE UP (ref 32–36)
MCV RBC AUTO: 87.6 FL — SIGNIFICANT CHANGE UP (ref 80–100)
MONOCYTES # BLD AUTO: 0.55 K/UL — SIGNIFICANT CHANGE UP (ref 0–0.9)
MONOCYTES NFR BLD AUTO: 5.9 % — SIGNIFICANT CHANGE UP (ref 2–14)
NEUTROPHILS # BLD AUTO: 6.7 K/UL — SIGNIFICANT CHANGE UP (ref 1.8–7.4)
NEUTROPHILS NFR BLD AUTO: 72.1 % — SIGNIFICANT CHANGE UP (ref 43–77)
NRBC # BLD: 0 /100 WBCS — SIGNIFICANT CHANGE UP (ref 0–0)
PHOSPHATE SERPL-MCNC: 3.7 MG/DL — SIGNIFICANT CHANGE UP (ref 2.5–4.5)
PLATELET # BLD AUTO: 199 K/UL — SIGNIFICANT CHANGE UP (ref 150–400)
POTASSIUM SERPL-MCNC: 3.6 MMOL/L — SIGNIFICANT CHANGE UP (ref 3.5–5.3)
POTASSIUM SERPL-SCNC: 3.6 MMOL/L — SIGNIFICANT CHANGE UP (ref 3.5–5.3)
RBC # BLD: 3.38 M/UL — LOW (ref 3.8–5.2)
RBC # FLD: 15 % — HIGH (ref 10.3–14.5)
SODIUM SERPL-SCNC: 141 MMOL/L — SIGNIFICANT CHANGE UP (ref 135–145)
TROPONIN I SERPL-MCNC: 0.11 NG/ML — HIGH (ref 0.01–0.04)
TSH SERPL-MCNC: 2.4 UU/ML — SIGNIFICANT CHANGE UP (ref 0.36–3.74)
WBC # BLD: 9.31 K/UL — SIGNIFICANT CHANGE UP (ref 3.8–10.5)
WBC # FLD AUTO: 9.31 K/UL — SIGNIFICANT CHANGE UP (ref 3.8–10.5)

## 2019-07-12 PROCEDURE — 93306 TTE W/DOPPLER COMPLETE: CPT | Mod: 26

## 2019-07-12 PROCEDURE — 93010 ELECTROCARDIOGRAM REPORT: CPT

## 2019-07-12 PROCEDURE — 71275 CT ANGIOGRAPHY CHEST: CPT | Mod: 26

## 2019-07-12 RX ORDER — ENOXAPARIN SODIUM 100 MG/ML
110 INJECTION SUBCUTANEOUS DAILY
Refills: 0 | Status: DISCONTINUED | OUTPATIENT
Start: 2019-07-12 | End: 2019-07-12

## 2019-07-12 RX ORDER — ENOXAPARIN SODIUM 100 MG/ML
110 INJECTION SUBCUTANEOUS EVERY 12 HOURS
Refills: 0 | Status: DISCONTINUED | OUTPATIENT
Start: 2019-07-12 | End: 2019-07-15

## 2019-07-12 RX ADMIN — Medication 100 MILLIGRAM(S): at 23:30

## 2019-07-12 RX ADMIN — Medication 1 MILLIGRAM(S): at 12:19

## 2019-07-12 RX ADMIN — OXYCODONE HYDROCHLORIDE 10 MILLIGRAM(S): 5 TABLET ORAL at 06:16

## 2019-07-12 RX ADMIN — Medication 1 TABLET(S): at 21:20

## 2019-07-12 RX ADMIN — HYDROMORPHONE HYDROCHLORIDE 0.5 MILLIGRAM(S): 2 INJECTION INTRAMUSCULAR; INTRAVENOUS; SUBCUTANEOUS at 06:45

## 2019-07-12 RX ADMIN — OXYCODONE HYDROCHLORIDE 10 MILLIGRAM(S): 5 TABLET ORAL at 13:47

## 2019-07-12 RX ADMIN — CYCLOBENZAPRINE HYDROCHLORIDE 10 MILLIGRAM(S): 10 TABLET, FILM COATED ORAL at 05:45

## 2019-07-12 RX ADMIN — Medication 650 MILLIGRAM(S): at 00:13

## 2019-07-12 RX ADMIN — GABAPENTIN 300 MILLIGRAM(S): 400 CAPSULE ORAL at 21:20

## 2019-07-12 RX ADMIN — Medication 1 TABLET(S): at 12:19

## 2019-07-12 RX ADMIN — OXYCODONE HYDROCHLORIDE 10 MILLIGRAM(S): 5 TABLET ORAL at 00:09

## 2019-07-12 RX ADMIN — GABAPENTIN 300 MILLIGRAM(S): 400 CAPSULE ORAL at 13:43

## 2019-07-12 RX ADMIN — HYDROMORPHONE HYDROCHLORIDE 0.5 MILLIGRAM(S): 2 INJECTION INTRAMUSCULAR; INTRAVENOUS; SUBCUTANEOUS at 00:28

## 2019-07-12 RX ADMIN — Medication 100 MILLIGRAM(S): at 06:30

## 2019-07-12 RX ADMIN — Medication 3 MILLIGRAM(S): at 00:12

## 2019-07-12 RX ADMIN — Medication 1 TABLET(S): at 05:45

## 2019-07-12 RX ADMIN — Medication 100 MILLIGRAM(S): at 05:46

## 2019-07-12 RX ADMIN — SENNA PLUS 2 TABLET(S): 8.6 TABLET ORAL at 21:20

## 2019-07-12 RX ADMIN — HYDROMORPHONE HYDROCHLORIDE 0.5 MILLIGRAM(S): 2 INJECTION INTRAMUSCULAR; INTRAVENOUS; SUBCUTANEOUS at 23:45

## 2019-07-12 RX ADMIN — OXYCODONE HYDROCHLORIDE 10 MILLIGRAM(S): 5 TABLET ORAL at 05:45

## 2019-07-12 RX ADMIN — Medication 650 MILLIGRAM(S): at 16:13

## 2019-07-12 RX ADMIN — OXYCODONE HYDROCHLORIDE 10 MILLIGRAM(S): 5 TABLET ORAL at 18:30

## 2019-07-12 RX ADMIN — HYDROMORPHONE HYDROCHLORIDE 0.5 MILLIGRAM(S): 2 INJECTION INTRAMUSCULAR; INTRAVENOUS; SUBCUTANEOUS at 00:13

## 2019-07-12 RX ADMIN — HYDROMORPHONE HYDROCHLORIDE 0.5 MILLIGRAM(S): 2 INJECTION INTRAMUSCULAR; INTRAVENOUS; SUBCUTANEOUS at 06:30

## 2019-07-12 RX ADMIN — Medication 650 MILLIGRAM(S): at 17:00

## 2019-07-12 RX ADMIN — GABAPENTIN 300 MILLIGRAM(S): 400 CAPSULE ORAL at 05:45

## 2019-07-12 RX ADMIN — Medication 650 MILLIGRAM(S): at 01:13

## 2019-07-12 RX ADMIN — HYDROMORPHONE HYDROCHLORIDE 0.5 MILLIGRAM(S): 2 INJECTION INTRAMUSCULAR; INTRAVENOUS; SUBCUTANEOUS at 12:35

## 2019-07-12 RX ADMIN — OXYCODONE HYDROCHLORIDE 10 MILLIGRAM(S): 5 TABLET ORAL at 09:45

## 2019-07-12 RX ADMIN — OXYCODONE HYDROCHLORIDE 10 MILLIGRAM(S): 5 TABLET ORAL at 22:21

## 2019-07-12 RX ADMIN — HYDROMORPHONE HYDROCHLORIDE 0.5 MILLIGRAM(S): 2 INJECTION INTRAMUSCULAR; INTRAVENOUS; SUBCUTANEOUS at 12:19

## 2019-07-12 RX ADMIN — Medication 500 MILLIGRAM(S): at 17:59

## 2019-07-12 RX ADMIN — Medication 100 MILLIGRAM(S): at 15:52

## 2019-07-12 RX ADMIN — Medication 500 MILLIGRAM(S): at 05:46

## 2019-07-12 RX ADMIN — Medication 180 MILLIGRAM(S): at 05:45

## 2019-07-12 RX ADMIN — Medication 1 TABLET(S): at 13:42

## 2019-07-12 RX ADMIN — Medication 100 MILLIGRAM(S): at 21:20

## 2019-07-12 RX ADMIN — ENOXAPARIN SODIUM 110 MILLIGRAM(S): 100 INJECTION SUBCUTANEOUS at 13:46

## 2019-07-12 RX ADMIN — OXYCODONE HYDROCHLORIDE 10 MILLIGRAM(S): 5 TABLET ORAL at 09:13

## 2019-07-12 RX ADMIN — OXYCODONE HYDROCHLORIDE 10 MILLIGRAM(S): 5 TABLET ORAL at 04:07

## 2019-07-12 RX ADMIN — OXYCODONE HYDROCHLORIDE 10 MILLIGRAM(S): 5 TABLET ORAL at 17:59

## 2019-07-12 RX ADMIN — Medication 100 MILLIGRAM(S): at 13:42

## 2019-07-12 RX ADMIN — OXYCODONE HYDROCHLORIDE 10 MILLIGRAM(S): 5 TABLET ORAL at 21:21

## 2019-07-12 RX ADMIN — OXYCODONE HYDROCHLORIDE 10 MILLIGRAM(S): 5 TABLET ORAL at 03:07

## 2019-07-12 RX ADMIN — OXYCODONE HYDROCHLORIDE 10 MILLIGRAM(S): 5 TABLET ORAL at 14:30

## 2019-07-12 RX ADMIN — HYDROMORPHONE HYDROCHLORIDE 0.5 MILLIGRAM(S): 2 INJECTION INTRAMUSCULAR; INTRAVENOUS; SUBCUTANEOUS at 23:30

## 2019-07-12 NOTE — CHART NOTE - NSCHARTNOTEFT_GEN_A_CORE
Medicine Hospitalist PA    CTA chest resulted noted with +left pulmonary artery PE without right heart strain. Orthopedic resident discussed results with Dr. An who recommends therapeutic lovenox dosing. Order placed for lovenox 110mg BID, first dose STAT. Patient s/p spinal surgery POD#3. Risk for epidural hematoma therefore Will monitor closely for any signs/symptoms of bleeding, motor/sensory deficits, acute neurological changes.  -informed Dr. Linda and Dr. Camarillo    CT Angio Chest w/ IV Cont (07.12.19 @ 11:16)  IMPRESSION:  There is intraluminal abnormalities in the left the pulmonary arteries   suggesting pulmonary embolism. No evidence of right heart strain. See   above. In conjunction there are atelectatic changes of the lower lobes   particularly on the left. Medicine Hospitalist PA    CTA chest resulted noted with +left pulmonary artery PE without right heart strain. Orthopedic resident discussed results with Dr. An who recommends therapeutic lovenox dosing. Dr. Linda in agreement with plan. Order placed for lovenox 110mg BID, first dose STAT. Patient s/p spinal surgery POD#3. Risk for epidural hematoma therefore Will monitor closely for any signs/symptoms of bleeding, motor/sensory deficits, acute neurological changes.  -informed Dr. Linda and Dr. Camarillo    CT Angio Chest w/ IV Cont (07.12.19 @ 11:16)  IMPRESSION:  There is intraluminal abnormalities in the left the pulmonary arteries   suggesting pulmonary embolism. No evidence of right heart strain. See   above. In conjunction there are atelectatic changes of the lower lobes   particularly on the left. Medicine Hospitalist PA    CTA chest resulted noted with +left pulmonary artery PE without right heart strain. Orthopedic resident discussed results with Dr. An who recommends therapeutic lovenox dosing. Dr. Linda in agreement with plan. Order placed for lovenox 110mg BID, first dose STAT. Patient s/p spinal surgery POD#3. Risk for epidural hematoma therefore Will monitor closely for any signs/symptoms of bleeding, motor/sensory deficits, acute neurological changes.  -informed Dr. Linda and Dr. Camarillo    CT Angio Chest w/ IV Cont (07.12.19 @ 11:16)  IMPRESSION:  There is intraluminal abnormalities in the left the pulmonary arteries   suggesting pulmonary embolism. No evidence of right heart strain. See   above. In conjunction there are atelectatic changes of the lower lobes   particularly on the left.    ADDENDUM 14:48  Discussed with Dr. Camarillo regarding +PE and lovenox. +PE confirmed with Dr. Bucio. ICU consulted per Dr. Camarillo's request. Informed patient of administration of lovenox BID for AC for +PE. Informed patient of risks of receiving AC including but not limited to, bleeding, hematoma formation at incision, site of operation, as well as epidural hematoma. Patient verbalized understanding of risks and in agreement with plan of care. Medicine Hospitalist PA    CTA chest resulted noted with +left pulmonary artery PE without right heart strain. Orthopedic resident discussed results with Dr. An who recommends therapeutic lovenox dosing. Dr. Linda in agreement with plan. Order placed for lovenox 110mg BID, first dose STAT. Patient s/p spinal surgery POD#3. Risk for epidural hematoma therefore Will monitor closely for any signs/symptoms of bleeding, motor/sensory deficits, acute neurological changes.  -informed Dr. Linda and Dr. Camarillo    CT Angio Chest w/ IV Cont (07.12.19 @ 11:16)  IMPRESSION:  There is intraluminal abnormalities in the left the pulmonary arteries   suggesting pulmonary embolism. No evidence of right heart strain. See   above. In conjunction there are atelectatic changes of the lower lobes   particularly on the left.    ADDENDUM 14:48  Discussed with Dr. Camarillo regarding +PE and lovenox. +PE confirmed with Dr. Bucio. ICU consulted per Dr. Camarillo's request. Informed patient of administration of lovenox BID for AC for +PE. Informed patient of risks of receiving AC including but not limited to, bleeding, hematoma formation at incision, site of operation, as well as epidural hematoma. Patient verbalized understanding of risks and in agreement with plan of care. Monitor H/H closely

## 2019-07-12 NOTE — CHART NOTE - NSCHARTNOTEFT_GEN_A_CORE
patient seen and evaluated at bedside. Patient doing well, no complaints at the time.     Physical Exam:  Gen: NAD      Motor:               C5           C6            C7               C8           T1   R         5/5          5/5            5/5             5/5          5/5  L          5/5          5/5            5/5             5/5          5/5                L2             L3             L4               L5            S1  R         5/5           5/5          5/5             5/5           5/5  L          5/5          5/5           5/5             5/5           5/5    Sensory:            C5         C6         C7      C8       T1        (0=absent, 1=impaired, 2=normal, NT=not testable)  R         2            2           2        2         2  L          2            2           2        2         2               L2          L3         L4      L5       S1         (0=absent, 1=impaired, 2=normal, NT=not testable)  R         2            2            2        2        2  L          2            2           2        2         2      Patient doing well since receiving lovenox this morning, no change in neuro exam.    Plan:  q2 neuro checks

## 2019-07-12 NOTE — PROGRESS NOTE ADULT - SUBJECTIVE AND OBJECTIVE BOX
Patient seen and examined at bedside this am. Pain is well more controlled this am after medication adjustments. PCA DC'd. Pain medications were adjusted yesterday evening when patient was seen with attending during evening rounds. Pt was tachycardic overnight and had a one time temperature. Pt reports of some L Arm/wrist swelling, most likely positional related, has improved this am. Denies new numbness/tingling of the BL LE. Denies chills. Denies SOB, CHest Pain. NO other complaints at this time.       Vital Signs Last 24 Hrs  T(C): 37.2 (12 Jul 2019 04:00), Max: 38.1 (12 Jul 2019 00:00)  T(F): 99 (12 Jul 2019 04:00), Max: 100.6 (12 Jul 2019 00:00)  HR: 117 (12 Jul 2019 04:00) (107 - 130)  BP: 128/86 (12 Jul 2019 04:00) (124/62 - 171/87)  BP(mean): --  RR: 16 (12 Jul 2019 04:00) (15 - 18)  SpO2: 97% (12 Jul 2019 04:00) (91% - 97%)    PE:    GEN: NAD     Spine:  Dsg c/d/i  JOSE R Drain x 2 in place  SILT C5-T1 & L2-S1  Distal pulses intact  Soft compartments, + calf ttp LLE  + shayna sign   Motor:               C5           C6            C7               C8           T1   R         5/5          5/5            5/5             5/5          5/5  L          5/5          5/5            5/5             5/5          5/5                L2             L3             L4               L5            S1  R         5/5           5/5          5/5             5/5           5/5  L          4/5          4/5           3/5             3/5           4/5    Sensory:            C5         C6         C7      C8       T1        (0=absent, 1=impaired, 2=normal, NT=not testable)  R         2            2           2        2         2  L          2            2           2        2         2               L2          L3         L4      L5       S1         (0=absent, 1=impaired, 2=normal, NT=not testable)  R         2            2            2        2        2  L          2            2           2        2         2

## 2019-07-12 NOTE — CONSULT NOTE ADULT - ASSESSMENT
59 yr old female with PMH of SLE, GB syndrome and previous left hip replacement, chronic left foot drop POD 3 lumbar laminectomy,  hypoxia. hemodynamically stable.    - Patient with post op left PE with negative bilat lower extremity DVTs, no heart strain.   - agree with full dose anticoagulation  - continue to monitor respiratory effort, incentive spirometry,   - consider pulmonary consult, and follow up  - No need for ICU at this time.  Please reconsult if condition changes. Thank you for this consult.

## 2019-07-12 NOTE — CONSULT NOTE ADULT - SUBJECTIVE AND OBJECTIVE BOX
Patient is a 59y old  Female who presents with a chief complaint of left foot drop (12 Jul 2019 18:06)    HPI:  59 yr old female with HTN,  SLE, GB syndrome with neuropathy, S/P left hip replacement for avascular necrosis, Chronic left foot drop and Obesity.  With lower back pain and leg weakness that has increased with numbness down left leg - back MRI noted L3-S1 broad based disc herniations. Sent from [facility] for spinal surgery with Dr. An.   07/12/19: Lumbar spinal fusion 09-Jul-2019 19:46:38  Marvin Jennings.  Post Op with persistent tachycardia, found to have left PE.  Non smoker.    PAST MEDICAL & SURGICAL HISTORY:  DM (diabetes mellitus)  Lupus  History of bilateral tubal ligation  H/O total hysterectomy  History of hip replacement: left    FAMILY HISTORY: not known    SOCIAL HISTORY:  non smoker.    Allergies  No Known Allergies    MEDICATIONS  (STANDING):  ascorbic acid 500 milliGRAM(s) Oral two times a day  calcium carbonate 1250 mG  + Vitamin D (OsCal 500 + D) 1 Tablet(s) Oral three times a day  ceFAZolin   IVPB 2000 milliGRAM(s) IV Intermittent every 8 hours  docusate sodium 100 milliGRAM(s) Oral three times a day  enoxaparin Injectable 110 milliGRAM(s) SubCutaneous every 12 hours  folic acid 1 milliGRAM(s) Oral daily  gabapentin 300 milliGRAM(s) Oral three times a day  multivitamin 1 Tablet(s) Oral daily  oxyCODONE  ER Tablet 10 milliGRAM(s) Oral every 12 hours  senna 2 Tablet(s) Oral at bedtime  sugammadex Injectable 450 milliGRAM(s) IV Push once  verapamil  milliGRAM(s) Oral daily    MEDICATIONS  (PRN):  acetaminophen   Tablet .. 650 milliGRAM(s) Oral every 6 hours PRN Temp greater or equal to 38C (100.4F), Mild Pain (1 - 3)  ALPRAZolam 0.5 milliGRAM(s) Oral daily PRN anxiety  benzocaine 15 mG/menthol 3.6 mG Lozenge 1 Lozenge Oral every 3 hours PRN Sore Throat  cyclobenzaprine 10 milliGRAM(s) Oral three times a day PRN Muscle Spasm  diltiazem Injectable 20 milliGRAM(s) IV Push every 4 hours PRN only if HR greater than 120 and SBP maintained greater than 110  HYDROmorphone  Injectable 0.5 milliGRAM(s) IV Push every 4 hours PRN Severe Pain (7 - 10)  melatonin 3 milliGRAM(s) Oral at bedtime PRN Sleep  naloxone Injectable 0.1 milliGRAM(s) IV Push every 3 minutes PRN For ANY of the following changes in patient status:  A. RR LESS THAN 10 breaths per minute, B. Oxygen saturation LESS THAN 90%, C. Sedation score of 6  ondansetron Injectable 4 milliGRAM(s) IV Push every 4 hours PRN Nausea  oxyCODONE    IR 5 milliGRAM(s) Oral every 4 hours PRN Moderate Pain (4 - 6)  oxyCODONE    IR 10 milliGRAM(s) Oral every 4 hours PRN Severe Pain (7 - 10)  petrolatum white Ointment 1 Application(s) Topical daily PRN dry lips    REVIEW OF SYSTEMS:    Constitutional:            No fever, weight loss or fatigue  HEENT:                        No difficulty hearing, tinnitus, vertigo; No sinus or throat pain  Respiratory:                 no sob  Cardiovascular:           No chest pain, palpitations  Gastrointestinal:        No abdominal or epigastric pain. No N/V/diarrhea or hematemesis  Genitourinary:            No dysuria, frequency, hematuria or incontinence  SKIN:                             no rash  Musculoskeletal:        No joint pain or swelling  Extremities:                No swelling  Neurological:              No headaches  Psychiatric:                 No depression, anxiety    MACRA & MIPS:   Vaccines - Influenza: no and Pneumovax: no  Tobacco: no  Blood Pressure Screening / Control of: 171/87  Current Medications Reviewed: yes    Vital Signs Last 24 Hrs  T(C): 38.3 (12 Jul 2019 16:05), Max: 38.3 (12 Jul 2019 16:05)  T(F): 101 (12 Jul 2019 16:05), Max: 101 (12 Jul 2019 16:05)  HR: 106 (12 Jul 2019 16:05) (105 - 130)  BP: 135/77 (12 Jul 2019 16:05) (128/86 - 171/87)  BP(mean): --  RR: 17 (12 Jul 2019 16:05) (15 - 18)  SpO2: 97% (12 Jul 2019 16:05) (91% - 97%)    PHYSICAL EXAM:  GEN:         Awake, responsive and comfortable.  HEENT:    Normal.    RESP:       no wheezing.  CVS:          Regular rate and rhythm.   ABD:         Soft, non-tender, non-distended;   :             No costovertebral angle tenderness  SKIN:           Warm and dry.  EXTR:            No clubbing, cyanosis or edema  CNS:              Intact sensory and motor function.  PSYCH:        cooperative, no anxiety or depression    LABS:                        9.6    9.31  )-----------( 199      ( 12 Jul 2019 06:19 )             29.6     07-12    141  |  103  |  7   ----------------------------<  118<H>  3.6   |  32<H>  |  0.76    Ca    8.5      12 Jul 2019 06:19  Phos  3.7     07-12  Mg     1.9     07-12    EKG: sinus rhythm    RADIOLOGY & ADDITIONAL STUDIES:  < from: CT Angio Chest w/ IV Cont (07.12.19 @ 11:16) >    EXAM:  CT ANGIO CHEST (W)AW IC                          PROCEDURE DATE:  07/12/2019      INTERPRETATION:  HISTORY:  Chest pain. Shortness-of-breath. Rule out   pulmonary emboli.    TECHNIQUE : Thin section imaging was performed. 100 cc Omnipaque  350 was   administered with rapid infusion and bolus tracking.  MIP/3D and   Reformatted images were generated from the axial data .      FINDINGS.:    PULMONARY ARTERIES:  There is an intraluminal abnormality in the left   distal main pulmonary artery and in segmental branches of the left lower   lobe. This is consistent with pulmonary embolism. There is no saddle   embolism or changes of right heart strain.   AORTA:     Normal in size. No dissection or aneurysmal dilatation is   noted.  LUNGS:   There are atelectatic changes in the lung bases with more   prominent left lower lobe atelectasis. There are mild atelectatic changes   in the right lung base. There is very mild bilateral pleural reaction.  HILUM:     No adenopathy or mass is suggested.  MEDIASTINUM:   No pericardial effusion is noted.   UPPER ABDOMEN :  No acute abnormality is noted  BONES.:    No fracture or destructive lesion is visualized.    IMPRESSION:    There is intraluminal abnormalities in the left the pulmonary arteries   suggesting pulmonary embolism. No evidence of right heart strain. See   above. In conjunction there are atelectatic changes of the lower lobes   particularly on the left.    Findings reviewed with Dr. Smiley 11:15 AM.     DOMINIQUE TRINIDAD M.D., ATTENDING RADIOLOGIST  This document has been electronically signed. Jul 12 2019 11:20AM      ASSESSMENT AND PLAN:  ·	Left pulmonary embolism.  ·	Tachycardia.  ·	S/P lumber spinal fusion.  ·	Anemia.  ·	HTN.  ·	SLE.  ·	GBS history with Neuropathy.  ·	Obesity.    CTA chest reviewed with Radiologist.  Continue full dose Lovenox, switch to oral upon discharge.  Pain control.

## 2019-07-12 NOTE — PROGRESS NOTE ADULT - ASSESSMENT
A/P: 59y F s/p posterior spinal fusion L3-S1, L4-5 TLIF, bilateral hemilaminectomy L5-S1 with discectomy, right hemilaminectomy L3-L4 POD 3      Patient with Tachycardia in the 130s yesterday, Cardiology eval rec Cardizem x 1. Pt tachy improved to 110s this am.  Pt with One time Temp overnight at 100.6.   Pt denies chest pain/shortness or breath. EKG ordered last night demonstrated sinus tachycardia. RLE DOpller was ordered, will FU official read. Medicine attending and PA were notified. Will FU with Their recs this am. If continued tachy and Dec O2 Sat may rec CT angio.    JOSE R drains, please record drainage  -abx while drains in place  -Pain medications modified yesterday pt on Oxycontin q12   DVT ppx, SCDs NO chemical dvt ppx in the setting of recent spine surgery  WBAT BL LE with LSO.  doppler LLE   XR L Spine   PT/OT  DC planning  Will d/w attending and advise if plan changes. A/P: 59y F s/p posterior spinal fusion L3-S1, L4-5 TLIF, bilateral hemilaminectomy L5-S1 with discectomy, right hemilaminectomy L3-L4 POD 3      Patient with Tachycardia in the 130s yesterday, Cardiology eval rec Cardizem x 1. Pt tachy improved to 110s this am.  Pt with One time Temp overnight at 100.6.   Pt denies chest pain/shortness or breath. EKG ordered last night demonstrated sinus tachycardia. RLE Dopller was ordered, will FU official read. Medicine attending and PA were notified. Will FU with Their recs this am. Setting of tachy and Dec O2 Sat On NC rec CT angio.    -FU Cardio  -JOSE R drains, please record drainage  -abx while drains in place  -Pain medications modified yesterday pt on Oxycontin q12   -DVT ppx, SCDs NO chemical dvt ppx in the setting of recent spine surgery  -WBAT BL LE with LSO.  -doppler LLE : Negative  -XR L Spine: Ordered    -PT/OT  -DC planning  Will d/w attending and advise if plan changes. A/P: 59y F s/p posterior spinal fusion L3-S1, L4-5 TLIF, bilateral hemilaminectomy L5-S1 with discectomy, right hemilaminectomy L3-L4 POD 3      Patient with Tachycardia in the 130s yesterday, Cardiology eval rec Cardizem x 1. Pt tachy improved to 110s this am.  Pt with One time Temp overnight at 100.6.   Pt denies chest pain/shortness or breath. EKG ordered last night demonstrated sinus tachycardia. RLE Dopller was ordered, will FU official read. Medicine attending and PA were notified. Will FU with Their recs this am. Setting of tachy and Dec O2 Sat On NC will Order CTA   -FU Cardio  -JOSE R drains, please record drainage  -abx while drains in place  -Pain medications modified yesterday pt on Oxycontin q12   -DVT ppx, SCDs NO chemical dvt ppx in the setting of recent spine surgery  -WBAT BL LE with LSO.  -doppler LLE : Negative  -XR L Spine: Ordered    -PT/OT  -DC planning  Will d/w attending and advise if plan changes.

## 2019-07-12 NOTE — PROGRESS NOTE ADULT - SUBJECTIVE AND OBJECTIVE BOX
Patient is a 59y old  Female who presents with a chief complaint of left foot drop (12 Jul 2019 06:26)      INTERVAL HPI/OVERNIGHT EVENTS:  pt had sob and tachycardia this morning  MEDICATIONS  (STANDING):  ascorbic acid 500 milliGRAM(s) Oral two times a day  calcium carbonate 1250 mG  + Vitamin D (OsCal 500 + D) 1 Tablet(s) Oral three times a day  ceFAZolin   IVPB 2000 milliGRAM(s) IV Intermittent every 8 hours  docusate sodium 100 milliGRAM(s) Oral three times a day  enoxaparin Injectable 110 milliGRAM(s) SubCutaneous every 12 hours  folic acid 1 milliGRAM(s) Oral daily  gabapentin 300 milliGRAM(s) Oral three times a day  multivitamin 1 Tablet(s) Oral daily  oxyCODONE  ER Tablet 10 milliGRAM(s) Oral every 12 hours  senna 2 Tablet(s) Oral at bedtime  sugammadex Injectable 450 milliGRAM(s) IV Push once  verapamil  milliGRAM(s) Oral daily    MEDICATIONS  (PRN):  acetaminophen   Tablet .. 650 milliGRAM(s) Oral every 6 hours PRN Temp greater or equal to 38C (100.4F), Mild Pain (1 - 3)  ALPRAZolam 0.5 milliGRAM(s) Oral daily PRN anxiety  benzocaine 15 mG/menthol 3.6 mG Lozenge 1 Lozenge Oral every 3 hours PRN Sore Throat  cyclobenzaprine 10 milliGRAM(s) Oral three times a day PRN Muscle Spasm  diltiazem Injectable 20 milliGRAM(s) IV Push every 4 hours PRN only if HR greater than 120 and SBP maintained greater than 110  HYDROmorphone  Injectable 0.5 milliGRAM(s) IV Push every 4 hours PRN Severe Pain (7 - 10)  melatonin 3 milliGRAM(s) Oral at bedtime PRN Sleep  naloxone Injectable 0.1 milliGRAM(s) IV Push every 3 minutes PRN For ANY of the following changes in patient status:  A. RR LESS THAN 10 breaths per minute, B. Oxygen saturation LESS THAN 90%, C. Sedation score of 6  ondansetron Injectable 4 milliGRAM(s) IV Push every 4 hours PRN Nausea  oxyCODONE    IR 5 milliGRAM(s) Oral every 4 hours PRN Moderate Pain (4 - 6)  oxyCODONE    IR 10 milliGRAM(s) Oral every 4 hours PRN Severe Pain (7 - 10)  petrolatum white Ointment 1 Application(s) Topical daily PRN dry lips      Allergies    No Known Allergies    Intolerances        REVIEW OF SYSTEMS:  CONSTITUTIONAL: No fever, weight loss, or fatigue  EYES: No eye pain, visual disturbances, or discharge  ENMT:  No difficulty hearing, tinnitus, vertigo; No sinus or throat pain  NECK: No pain or stiffness  BREASTS: No pain, masses, or nipple discharge  RESPIRATORY: No cough, wheezing, chills or hemoptysis; No shortness of breath  CARDIOVASCULAR: No chest pain, palpitations, dizziness, or leg swelling  GASTROINTESTINAL: No abdominal or epigastric pain. No nausea, vomiting, or hematemesis; No diarrhea or constipation. No melena or hematochezia.  GENITOURINARY: No dysuria, frequency, hematuria, or incontinence  NEUROLOGICAL: No headaches, memory loss, loss of strength, numbness, or tremors  SKIN: No itching, burning, rashes, or lesions   LYMPH NODES: No enlarged glands  ENDOCRINE: No heat or cold intolerance; No hair loss  MUSCULOSKELETAL: No joint pain or swelling; No muscle, back, or extremity pain  PSYCHIATRIC: No depression, anxiety, mood swings, or difficulty sleeping  HEME/LYMPH: No easy bruising, or bleeding gums  ALLERGY AND IMMUNOLOGIC: No hives or eczema    Vital Signs Last 24 Hrs  T(C): 37.3 (12 Jul 2019 08:27), Max: 38.1 (12 Jul 2019 00:00)  T(F): 99.2 (12 Jul 2019 08:27), Max: 100.6 (12 Jul 2019 00:00)  HR: 105 (12 Jul 2019 08:27) (105 - 130)  BP: 136/84 (12 Jul 2019 08:27) (128/86 - 171/87)  BP(mean): --  RR: 16 (12 Jul 2019 08:27) (15 - 18)  SpO2: 97% (12 Jul 2019 08:27) (91% - 97%)    PHYSICAL EXAM:  GENERAL: NAD, well-groomed, well-developed  HEAD:  Atraumatic, Normocephalic  EYES: EOMI, PERRLA, conjunctiva and sclera clear  ENMT: No tonsillar erythema, exudates, or enlargement; Moist mucous membranes, Good dentition, No lesions  NECK: Supple, No JVD, Normal thyroid  NERVOUS SYSTEM:  Alert & Oriented X3, Good concentration; Motor Strength 5/5 B/L upper and lower extremities; DTRs 2+ intact and symmetric  CHEST/LUNG: Clear to percussion bilaterally; No rales, rhonchi, wheezing, or rubs  HEART: Regular rate and rhythm; No murmurs, rubs, or gallops  ABDOMEN: Soft, Nontender, Nondistended; Bowel sounds present  EXTREMITIES:  2+ Peripheral Pulses, No clubbing, cyanosis, or edema  LYMPH: No lymphadenopathy noted  SKIN: No rashes or lesions    LABS:                        9.6    9.31  )-----------( 199      ( 12 Jul 2019 06:19 )             29.6     07-12    141  |  103  |  7   ----------------------------<  118<H>  3.6   |  32<H>  |  0.76    Ca    8.5      12 Jul 2019 06:19  Phos  3.7     07-12  Mg     1.9     07-12          CAPILLARY BLOOD GLUCOSE        CULTURES:    HEMOGLOBIN A1C:    CHOLESTEROL:        RADIOLOGY & ADDITIONAL TESTS:

## 2019-07-12 NOTE — CONSULT NOTE ADULT - SUBJECTIVE AND OBJECTIVE BOX
HPI:  HPI:  59 yr old female with PMH of SLE, GB syndrome and previous left hip replacement, chronic left foot drop, otherwise with lower back pain and leg weakness that has increased with numbness down left leg - back MRI noted L3-S1 broad based disc herniations - otherwise sent from [facility] for spinal surgery with Dr. An. (08 Jul 2019 12:47)      Chief Complaint:  Patient is a 59y old  Female who presents with a chief complaint of left foot drop (12 Jul 2019 18:37)      Review of Systems:    General:  No wt loss, fevers, chills, night sweats  Eyes:  Good vision, no reported pain  ENT:  No sore throat, pain, runny nose, dysphagia  CV:  No pain, palpitations, hypo/hypertension  Resp:   dyspnea,   GI:  No pain, nausea, vomiting, diarrhea, constipation           Social History/Family History  SOCHX:   tobacco,  -  alcohol    FMHX: FA/MO  - contributory       Discussed with:  PMD, Family    Physical Exam:    Vital Signs:  Vital Signs Last 24 Hrs  T(C): 37.6 (12 Jul 2019 18:00), Max: 38.3 (12 Jul 2019 16:05)  T(F): 99.6 (12 Jul 2019 18:00), Max: 101 (12 Jul 2019 16:05)  HR: 116 (12 Jul 2019 18:00) (105 - 130)  BP: 133/77 (12 Jul 2019 18:00) (128/86 - 171/87)  BP(mean): --  RR: 18 (12 Jul 2019 18:00) (15 - 18)  SpO2: 95% (12 Jul 2019 18:00) (91% - 97%)  Daily     Daily   I&O's Summary    11 Jul 2019 07:01  -  12 Jul 2019 07:00  --------------------------------------------------------  IN: 400 mL / OUT: 250 mL / NET: 150 mL    12 Jul 2019 07:01  -  12 Jul 2019 19:05  --------------------------------------------------------  IN: 150 mL / OUT: 0 mL / NET: 150 mL          Chest:  Full & symmetric excursion, no increased effort, breath sounds clear  Cardiovascular:  Regular rhythm, S1, S2, no murmur/rub/S3/S4, no carotid/femoral/abdominal bruit, radial/pedal pulses 2+, no edema  Abdomen:  Soft, non-tender, non-distended, normoactive bowel sounds, no HSM      Laboratory:                          9.6    9.31  )-----------( 199      ( 12 Jul 2019 06:19 )             29.6     07-12    141  |  103  |  7   ----------------------------<  118<H>  3.6   |  32<H>  |  0.76    Ca    8.5      12 Jul 2019 06:19  Phos  3.7     07-12  Mg     1.9     07-12        CARDIAC MARKERS ( 12 Jul 2019 06:19 )  .111 ng/mL / x     / x     / x     / x      CARDIAC MARKERS ( 11 Jul 2019 19:20 )  .057 ng/mL / x     / x     / x     / x      CARDIAC MARKERS ( 11 Jul 2019 06:28 )  <.015 ng/mL / x     / 3483 U/L / x     / 7.3 ng/mL          Assessment:  Post spine surgery  PE noted, hypoxia  Full dose Lovenox initiated  ICU consult called  TTE completed

## 2019-07-12 NOTE — CONSULT NOTE ADULT - SUBJECTIVE AND OBJECTIVE BOX
HPI HPI      Patient is a 59y old  Female who presents with a chief complaint of left foot drop (12 Jul 2019 19:05)    HPI:  59 yr old female with PMH of SLE, GB syndrome and previous left hip replacement, chronic left foot drop, otherwise with lower back pain and leg weakness that has increased with numbness down left leg - back MRI noted L3-S1 broad based disc herniations - otherwise sent from [facility] for spinal surgery with Dr. An. (08 Jul 2019 12:47)      HOSPITAL COURSE: Post spine surgery  POD 3  noted to have tachycardia and hypoxia CTA with left PE , no heart strain. To start full dose anticoagulation with Lovenox,  ICU consult called for evaluation         Vital Signs Last 24 Hrs  T(C): 37.6 (12 Jul 2019 18:00), Max: 38.3 (12 Jul 2019 16:05)  T(F): 99.6 (12 Jul 2019 18:00), Max: 101 (12 Jul 2019 16:05)  HR: 118 (12 Jul 2019 21:10) (105 - 126)  BP: 154/72 (12 Jul 2019 21:10) (128/86 - 171/87)  BP(mean): --  RR: 18 (12 Jul 2019 18:00) (16 - 18)  SpO2: 97% (12 Jul 2019 21:10) (91% - 97%)  I&O's Summary    11 Jul 2019 07:01  -  12 Jul 2019 07:00  --------------------------------------------------------  IN: 400 mL / OUT: 250 mL / NET: 150 mL    12 Jul 2019 07:01  -  12 Jul 2019 22:02  --------------------------------------------------------  IN: 150 mL / OUT: 90 mL / NET: 60 mL      MEDICATIONS  (STANDING):  ascorbic acid 500 milliGRAM(s) Oral two times a day  calcium carbonate 1250 mG  + Vitamin D (OsCal 500 + D) 1 Tablet(s) Oral three times a day  ceFAZolin   IVPB 2000 milliGRAM(s) IV Intermittent every 8 hours  docusate sodium 100 milliGRAM(s) Oral three times a day  enoxaparin Injectable 110 milliGRAM(s) SubCutaneous every 12 hours  folic acid 1 milliGRAM(s) Oral daily  gabapentin 300 milliGRAM(s) Oral three times a day  multivitamin 1 Tablet(s) Oral daily  oxyCODONE  ER Tablet 10 milliGRAM(s) Oral every 12 hours  senna 2 Tablet(s) Oral at bedtime  sugammadex Injectable 450 milliGRAM(s) IV Push once  verapamil  milliGRAM(s) Oral daily    MEDICATIONS  (PRN):  acetaminophen   Tablet .. 650 milliGRAM(s) Oral every 6 hours PRN Temp greater or equal to 38C (100.4F), Mild Pain (1 - 3)  ALPRAZolam 0.5 milliGRAM(s) Oral daily PRN anxiety  benzocaine 15 mG/menthol 3.6 mG Lozenge 1 Lozenge Oral every 3 hours PRN Sore Throat  cyclobenzaprine 10 milliGRAM(s) Oral three times a day PRN Muscle Spasm  diltiazem Injectable 20 milliGRAM(s) IV Push every 4 hours PRN only if HR greater than 120 and SBP maintained greater than 110  HYDROmorphone  Injectable 0.5 milliGRAM(s) IV Push every 4 hours PRN Severe Pain (7 - 10)  melatonin 3 milliGRAM(s) Oral at bedtime PRN Sleep  naloxone Injectable 0.1 milliGRAM(s) IV Push every 3 minutes PRN For ANY of the following changes in patient status:  A. RR LESS THAN 10 breaths per minute, B. Oxygen saturation LESS THAN 90%, C. Sedation score of 6  ondansetron Injectable 4 milliGRAM(s) IV Push every 4 hours PRN Nausea  oxyCODONE    IR 5 milliGRAM(s) Oral every 4 hours PRN Moderate Pain (4 - 6)  oxyCODONE    IR 10 milliGRAM(s) Oral every 4 hours PRN Severe Pain (7 - 10)  petrolatum white Ointment 1 Application(s) Topical daily PRN dry lips      PHYSICAL EXAM:  General:	In no acute distress, lying in bed, conversant   Respiratory:	Lungs CTA b/l. No wheezing. Effort even and unlabored.  CV:		RRR. Normal S1/S2. No murmurs, rubs, or gallop.   Abdomen:	Soft, non-distended. Bowel sounds present. No palpable hepatosplenomegaly.  Extremities:	Warm and well perfused. No gross extremity deformities.  Neurologic:	Alert and oriented. JIANG's     LABS                        9.6    9.31  )-----------( 199      ( 12 Jul 2019 06:19 )             29.6     07-12    141  |  103  |  7   ----------------------------<  118<H>  3.6   |  32<H>  |  0.76    Ca    8.5      12 Jul 2019 06:19  Phos  3.7     07-12  Mg     1.9     07-12      < from: CT Angio Chest w/ IV Cont (07.12.19 @ 11:16) >  INTERPRETATION:  HISTORY:  Chest pain. Shortness-of-breath. Rule out   pulmonary emboli.    TECHNIQUE : Thin section imaging was performed. 100 cc Omnipaque  350 was   administered with rapid infusion and bolus tracking.  MIP/3D and   Reformatted images were generated from the axial data .      FINDINGS.:    PULMONARY ARTERIES:  There is an intraluminal abnormality in the left   distal main pulmonary artery and in segmental branches of the left lower   lobe. This is consistent with pulmonary embolism. There is no saddle   embolism or changes of right heart strain.   AORTA:     Normal in size. No dissection or aneurysmal dilatation is   noted.  LUNGS:   There are atelectatic changes in the lung bases with more   prominent left lower lobe atelectasis. There are mild atelectatic changes   in the right lung base. There is very mild bilateral pleural reaction.  HILUM:     No adenopathy or mass is suggested.  MEDIASTINUM:   No pericardial effusion is noted.   UPPER ABDOMEN :  No acute abnormality is noted  BONES.:    No fracture or destructive lesion is visualized.    IMPRESSION:    There is intraluminal abnormalities in the left the pulmonary arteries   suggesting pulmonary embolism. No evidence of right heart strain. See   above. In conjunction there are atelectatic changes of the lower lobes   particularly on the left.    Findings reviewed with Dr. Smiley 11:15 AM.       < end of copied text >

## 2019-07-13 LAB
ALBUMIN SERPL ELPH-MCNC: 2.4 G/DL — LOW (ref 3.3–5)
ALP SERPL-CCNC: 71 U/L — SIGNIFICANT CHANGE UP (ref 40–120)
ALT FLD-CCNC: 48 U/L — SIGNIFICANT CHANGE UP (ref 12–78)
ANION GAP SERPL CALC-SCNC: 3 MMOL/L — LOW (ref 5–17)
AST SERPL-CCNC: 94 U/L — HIGH (ref 15–37)
BASOPHILS # BLD AUTO: 0.04 K/UL — SIGNIFICANT CHANGE UP (ref 0–0.2)
BASOPHILS NFR BLD AUTO: 0.5 % — SIGNIFICANT CHANGE UP (ref 0–2)
BILIRUB SERPL-MCNC: 0.4 MG/DL — SIGNIFICANT CHANGE UP (ref 0.2–1.2)
BUN SERPL-MCNC: 7 MG/DL — SIGNIFICANT CHANGE UP (ref 7–23)
CALCIUM SERPL-MCNC: 8.7 MG/DL — SIGNIFICANT CHANGE UP (ref 8.5–10.1)
CHLORIDE SERPL-SCNC: 98 MMOL/L — SIGNIFICANT CHANGE UP (ref 96–108)
CO2 SERPL-SCNC: 35 MMOL/L — HIGH (ref 22–31)
CREAT SERPL-MCNC: 0.93 MG/DL — SIGNIFICANT CHANGE UP (ref 0.5–1.3)
EOSINOPHIL # BLD AUTO: 0.43 K/UL — SIGNIFICANT CHANGE UP (ref 0–0.5)
EOSINOPHIL NFR BLD AUTO: 4.9 % — SIGNIFICANT CHANGE UP (ref 0–6)
GLUCOSE SERPL-MCNC: 158 MG/DL — HIGH (ref 70–99)
HCT VFR BLD CALC: 28.7 % — LOW (ref 34.5–45)
HGB BLD-MCNC: 9.2 G/DL — LOW (ref 11.5–15.5)
IMM GRANULOCYTES NFR BLD AUTO: 0.6 % — SIGNIFICANT CHANGE UP (ref 0–1.5)
LYMPHOCYTES # BLD AUTO: 1.71 K/UL — SIGNIFICANT CHANGE UP (ref 1–3.3)
LYMPHOCYTES # BLD AUTO: 19.4 % — SIGNIFICANT CHANGE UP (ref 13–44)
MAGNESIUM SERPL-MCNC: 2.2 MG/DL — SIGNIFICANT CHANGE UP (ref 1.6–2.6)
MCHC RBC-ENTMCNC: 28.3 PG — SIGNIFICANT CHANGE UP (ref 27–34)
MCHC RBC-ENTMCNC: 32.1 GM/DL — SIGNIFICANT CHANGE UP (ref 32–36)
MCV RBC AUTO: 88.3 FL — SIGNIFICANT CHANGE UP (ref 80–100)
MONOCYTES # BLD AUTO: 0.55 K/UL — SIGNIFICANT CHANGE UP (ref 0–0.9)
MONOCYTES NFR BLD AUTO: 6.2 % — SIGNIFICANT CHANGE UP (ref 2–14)
NEUTROPHILS # BLD AUTO: 6.05 K/UL — SIGNIFICANT CHANGE UP (ref 1.8–7.4)
NEUTROPHILS NFR BLD AUTO: 68.4 % — SIGNIFICANT CHANGE UP (ref 43–77)
NRBC # BLD: 0 /100 WBCS — SIGNIFICANT CHANGE UP (ref 0–0)
PHOSPHATE SERPL-MCNC: 4 MG/DL — SIGNIFICANT CHANGE UP (ref 2.5–4.5)
PLATELET # BLD AUTO: 211 K/UL — SIGNIFICANT CHANGE UP (ref 150–400)
POTASSIUM SERPL-MCNC: 3.6 MMOL/L — SIGNIFICANT CHANGE UP (ref 3.5–5.3)
POTASSIUM SERPL-SCNC: 3.6 MMOL/L — SIGNIFICANT CHANGE UP (ref 3.5–5.3)
PROT SERPL-MCNC: 6.3 GM/DL — SIGNIFICANT CHANGE UP (ref 6–8.3)
RBC # BLD: 3.25 M/UL — LOW (ref 3.8–5.2)
RBC # FLD: 14.6 % — HIGH (ref 10.3–14.5)
SODIUM SERPL-SCNC: 136 MMOL/L — SIGNIFICANT CHANGE UP (ref 135–145)
WBC # BLD: 8.83 K/UL — SIGNIFICANT CHANGE UP (ref 3.8–10.5)
WBC # FLD AUTO: 8.83 K/UL — SIGNIFICANT CHANGE UP (ref 3.8–10.5)

## 2019-07-13 PROCEDURE — 72100 X-RAY EXAM L-S SPINE 2/3 VWS: CPT | Mod: 26

## 2019-07-13 PROCEDURE — 74018 RADEX ABDOMEN 1 VIEW: CPT | Mod: 26

## 2019-07-13 RX ADMIN — Medication 1 ENEMA: at 17:48

## 2019-07-13 RX ADMIN — Medication 100 MILLIGRAM(S): at 22:43

## 2019-07-13 RX ADMIN — Medication 100 MILLIGRAM(S): at 15:15

## 2019-07-13 RX ADMIN — Medication 100 MILLIGRAM(S): at 13:12

## 2019-07-13 RX ADMIN — Medication 1 TABLET(S): at 21:04

## 2019-07-13 RX ADMIN — HYDROMORPHONE HYDROCHLORIDE 0.5 MILLIGRAM(S): 2 INJECTION INTRAMUSCULAR; INTRAVENOUS; SUBCUTANEOUS at 06:34

## 2019-07-13 RX ADMIN — GABAPENTIN 300 MILLIGRAM(S): 400 CAPSULE ORAL at 13:12

## 2019-07-13 RX ADMIN — HYDROMORPHONE HYDROCHLORIDE 0.5 MILLIGRAM(S): 2 INJECTION INTRAMUSCULAR; INTRAVENOUS; SUBCUTANEOUS at 13:44

## 2019-07-13 RX ADMIN — OXYCODONE HYDROCHLORIDE 10 MILLIGRAM(S): 5 TABLET ORAL at 11:07

## 2019-07-13 RX ADMIN — CYCLOBENZAPRINE HYDROCHLORIDE 10 MILLIGRAM(S): 10 TABLET, FILM COATED ORAL at 21:04

## 2019-07-13 RX ADMIN — GABAPENTIN 300 MILLIGRAM(S): 400 CAPSULE ORAL at 05:32

## 2019-07-13 RX ADMIN — ENOXAPARIN SODIUM 110 MILLIGRAM(S): 100 INJECTION SUBCUTANEOUS at 13:12

## 2019-07-13 RX ADMIN — HYDROMORPHONE HYDROCHLORIDE 0.5 MILLIGRAM(S): 2 INJECTION INTRAMUSCULAR; INTRAVENOUS; SUBCUTANEOUS at 23:46

## 2019-07-13 RX ADMIN — Medication 20 MILLIGRAM(S): at 00:47

## 2019-07-13 RX ADMIN — Medication 100 MILLIGRAM(S): at 05:33

## 2019-07-13 RX ADMIN — CYCLOBENZAPRINE HYDROCHLORIDE 10 MILLIGRAM(S): 10 TABLET, FILM COATED ORAL at 01:17

## 2019-07-13 RX ADMIN — ENOXAPARIN SODIUM 110 MILLIGRAM(S): 100 INJECTION SUBCUTANEOUS at 00:50

## 2019-07-13 RX ADMIN — OXYCODONE HYDROCHLORIDE 10 MILLIGRAM(S): 5 TABLET ORAL at 15:14

## 2019-07-13 RX ADMIN — Medication 100 MILLIGRAM(S): at 07:35

## 2019-07-13 RX ADMIN — OXYCODONE HYDROCHLORIDE 10 MILLIGRAM(S): 5 TABLET ORAL at 19:58

## 2019-07-13 RX ADMIN — Medication 650 MILLIGRAM(S): at 16:40

## 2019-07-13 RX ADMIN — Medication 500 MILLIGRAM(S): at 05:31

## 2019-07-13 RX ADMIN — OXYCODONE HYDROCHLORIDE 10 MILLIGRAM(S): 5 TABLET ORAL at 20:18

## 2019-07-13 RX ADMIN — GABAPENTIN 300 MILLIGRAM(S): 400 CAPSULE ORAL at 21:04

## 2019-07-13 RX ADMIN — Medication 500 MILLIGRAM(S): at 17:44

## 2019-07-13 RX ADMIN — HYDROMORPHONE HYDROCHLORIDE 0.5 MILLIGRAM(S): 2 INJECTION INTRAMUSCULAR; INTRAVENOUS; SUBCUTANEOUS at 17:59

## 2019-07-13 RX ADMIN — Medication 3 MILLIGRAM(S): at 01:17

## 2019-07-13 RX ADMIN — OXYCODONE HYDROCHLORIDE 10 MILLIGRAM(S): 5 TABLET ORAL at 12:07

## 2019-07-13 RX ADMIN — HYDROMORPHONE HYDROCHLORIDE 0.5 MILLIGRAM(S): 2 INJECTION INTRAMUSCULAR; INTRAVENOUS; SUBCUTANEOUS at 09:48

## 2019-07-13 RX ADMIN — Medication 180 MILLIGRAM(S): at 05:33

## 2019-07-13 RX ADMIN — Medication 100 MILLIGRAM(S): at 21:04

## 2019-07-13 RX ADMIN — Medication 3 MILLIGRAM(S): at 23:45

## 2019-07-13 RX ADMIN — OXYCODONE HYDROCHLORIDE 10 MILLIGRAM(S): 5 TABLET ORAL at 08:02

## 2019-07-13 RX ADMIN — HYDROMORPHONE HYDROCHLORIDE 0.5 MILLIGRAM(S): 2 INJECTION INTRAMUSCULAR; INTRAVENOUS; SUBCUTANEOUS at 13:29

## 2019-07-13 RX ADMIN — Medication 650 MILLIGRAM(S): at 01:19

## 2019-07-13 RX ADMIN — Medication 650 MILLIGRAM(S): at 02:40

## 2019-07-13 RX ADMIN — OXYCODONE HYDROCHLORIDE 10 MILLIGRAM(S): 5 TABLET ORAL at 07:02

## 2019-07-13 RX ADMIN — OXYCODONE HYDROCHLORIDE 10 MILLIGRAM(S): 5 TABLET ORAL at 18:58

## 2019-07-13 RX ADMIN — Medication 1 TABLET(S): at 05:32

## 2019-07-13 RX ADMIN — Medication 1 MILLIGRAM(S): at 11:07

## 2019-07-13 RX ADMIN — OXYCODONE HYDROCHLORIDE 10 MILLIGRAM(S): 5 TABLET ORAL at 01:17

## 2019-07-13 RX ADMIN — OXYCODONE HYDROCHLORIDE 10 MILLIGRAM(S): 5 TABLET ORAL at 16:14

## 2019-07-13 RX ADMIN — OXYCODONE HYDROCHLORIDE 10 MILLIGRAM(S): 5 TABLET ORAL at 21:18

## 2019-07-13 RX ADMIN — OXYCODONE HYDROCHLORIDE 10 MILLIGRAM(S): 5 TABLET ORAL at 02:17

## 2019-07-13 RX ADMIN — HYDROMORPHONE HYDROCHLORIDE 0.5 MILLIGRAM(S): 2 INJECTION INTRAMUSCULAR; INTRAVENOUS; SUBCUTANEOUS at 09:33

## 2019-07-13 RX ADMIN — HYDROMORPHONE HYDROCHLORIDE 0.5 MILLIGRAM(S): 2 INJECTION INTRAMUSCULAR; INTRAVENOUS; SUBCUTANEOUS at 05:34

## 2019-07-13 RX ADMIN — HYDROMORPHONE HYDROCHLORIDE 0.5 MILLIGRAM(S): 2 INJECTION INTRAMUSCULAR; INTRAVENOUS; SUBCUTANEOUS at 17:44

## 2019-07-13 RX ADMIN — CYCLOBENZAPRINE HYDROCHLORIDE 10 MILLIGRAM(S): 10 TABLET, FILM COATED ORAL at 09:33

## 2019-07-13 RX ADMIN — Medication 1 TABLET(S): at 13:12

## 2019-07-13 RX ADMIN — Medication 1 TABLET(S): at 11:07

## 2019-07-13 RX ADMIN — SENNA PLUS 2 TABLET(S): 8.6 TABLET ORAL at 21:04

## 2019-07-13 NOTE — PROGRESS NOTE ADULT - SUBJECTIVE AND OBJECTIVE BOX
INTERVAL HPI:  59 yr old female with HTN,  SLE, GB syndrome with neuropathy, S/P left hip replacement for avascular necrosis, Chronic left foot drop and Obesity.  With lower back pain and leg weakness that has increased with numbness down left leg - back MRI noted L3-S1 broad based disc herniations. Sent from [facility] for spinal surgery with Dr. An.   07/12/19: Lumbar spinal fusion 09-Jul-2019 19:46:38  Marvin Jennings.  Post Op with persistent tachycardia, found to have left PE.  Non smoker.    OVERNIGHT EVENTS:  Denies sob or chest pain.    Vital Signs Last 24 Hrs  T(C): 36.9 (13 Jul 2019 05:35), Max: 38.3 (12 Jul 2019 16:05)  T(F): 98.4 (13 Jul 2019 05:35), Max: 101 (12 Jul 2019 16:05)  HR: 115 (13 Jul 2019 13:37) (101 - 125)  BP: 131/70 (13 Jul 2019 13:37) (116/59 - 154/72)  BP(mean): --  RR: 18 (13 Jul 2019 13:37) (17 - 20)  SpO2: 96% (13 Jul 2019 13:37) (94% - 97%)    PHYSICAL EXAM:  GEN:         Awake, responsive and comfortable.  HEENT:    Normal.    RESP:        no wheezing.  CVS:            Regular rate and rhythm.   ABD:         Soft, non-tender, non-distended;     MEDICATIONS  (STANDING):  ascorbic acid 500 milliGRAM(s) Oral two times a day  calcium carbonate 1250 mG  + Vitamin D (OsCal 500 + D) 1 Tablet(s) Oral three times a day  ceFAZolin   IVPB 2000 milliGRAM(s) IV Intermittent every 8 hours  docusate sodium 100 milliGRAM(s) Oral three times a day  enoxaparin Injectable 110 milliGRAM(s) SubCutaneous every 12 hours  folic acid 1 milliGRAM(s) Oral daily  gabapentin 300 milliGRAM(s) Oral three times a day  multivitamin 1 Tablet(s) Oral daily  oxyCODONE  ER Tablet 10 milliGRAM(s) Oral every 12 hours  senna 2 Tablet(s) Oral at bedtime  sugammadex Injectable 450 milliGRAM(s) IV Push once  verapamil  milliGRAM(s) Oral daily    MEDICATIONS  (PRN):  acetaminophen   Tablet .. 650 milliGRAM(s) Oral every 6 hours PRN Temp greater or equal to 38C (100.4F), Mild Pain (1 - 3)  ALPRAZolam 0.5 milliGRAM(s) Oral daily PRN anxiety  benzocaine 15 mG/menthol 3.6 mG Lozenge 1 Lozenge Oral every 3 hours PRN Sore Throat  cyclobenzaprine 10 milliGRAM(s) Oral three times a day PRN Muscle Spasm  diltiazem Injectable 20 milliGRAM(s) IV Push every 4 hours PRN only if HR greater than 120 and SBP maintained greater than 110  HYDROmorphone  Injectable 0.5 milliGRAM(s) IV Push every 4 hours PRN Severe Pain (7 - 10)  melatonin 3 milliGRAM(s) Oral at bedtime PRN Sleep  naloxone Injectable 0.1 milliGRAM(s) IV Push every 3 minutes PRN For ANY of the following changes in patient status:  A. RR LESS THAN 10 breaths per minute, B. Oxygen saturation LESS THAN 90%, C. Sedation score of 6  ondansetron Injectable 4 milliGRAM(s) IV Push every 4 hours PRN Nausea  oxyCODONE    IR 5 milliGRAM(s) Oral every 4 hours PRN Moderate Pain (4 - 6)  oxyCODONE    IR 10 milliGRAM(s) Oral every 4 hours PRN Severe Pain (7 - 10)  petrolatum white Ointment 1 Application(s) Topical daily PRN dry lips    LABS:                        9.2    8.83  )-----------( 211      ( 13 Jul 2019 06:34 )             28.7     07-13    136  |  98  |  7   ----------------------------<  158<H>  3.6   |  35<H>  |  0.93    Ca    8.7      13 Jul 2019 06:34  Phos  4.0     07-13  Mg     2.2     07-13    TPro  6.3  /  Alb  2.4<L>  /  TBili  0.4  /  DBili  x   /  AST  94<H>  /  ALT  48  /  AlkPhos  71  07-13    ASSESSMENT AND PLAN:  ·	Left pulmonary embolism.  ·	Tachycardia.  ·	S/P lumber spinal fusion.  ·	Anemia.  ·	HTN.  ·	SLE.  ·	GBS history with Neuropathy.  ·	Obesity.    Continue full dose Lovenox, switch to oral upon discharge.  Pain control.

## 2019-07-13 NOTE — PROGRESS NOTE ADULT - SUBJECTIVE AND OBJECTIVE BOX
Patient seen and examined at bedside this am. Pain is well controlled. Pt was tachycardic overnight and had a one time temperature. Denies new numbness/tingling of the BL LE. Denies chills. Denies SOB, Chest Pain. NO other complaints at this time.       Vital Signs Last 24 Hrs  T(C): 36.9 (13 Jul 2019 05:35), Max: 38.3 (12 Jul 2019 16:05)  T(F): 98.4 (13 Jul 2019 05:35), Max: 101 (12 Jul 2019 16:05)  HR: 101 (13 Jul 2019 05:35) (101 - 125)  BP: 127/73 (13 Jul 2019 05:35) (127/73 - 154/72)  BP(mean): --  RR: 17 (13 Jul 2019 05:35) (16 - 18)  SpO2: 97% (13 Jul 2019 05:35) (95% - 97%)    PE:    GEN: NAD     Spine:  Dsg c/d/i  JOSE R Drain x 2 in place  SILT C5-T1 & L2-S1  Distal pulses intact  Soft compartments  Motor:               C5           C6            C7               C8           T1   R         5/5          5/5            5/5             5/5          5/5  L          5/5          5/5            5/5             5/5          5/5                L2             L3             L4               L5            S1  R         5/5           5/5          5/5             5/5           5/5  L          4/5          4/5           3/5             3/5           4/5    Sensory:            C5         C6         C7      C8       T1        (0=absent, 1=impaired, 2=normal, NT=not testable)  R         2            2           2        2         2  L          2            2           2        2         2               L2          L3         L4      L5       S1         (0=absent, 1=impaired, 2=normal, NT=not testable)  R         2            2            2        2        2  L          2            2           2        2         2

## 2019-07-13 NOTE — PROGRESS NOTE ADULT - ASSESSMENT
A/P: 59y F s/p posterior spinal fusion L3-S1, L4-5 TLIF, bilateral hemilaminectomy L5-S1 with discectomy, right hemilaminectomy L3-L4 POD 4    Pt has PE confirmed by CTA, started on therapeutic lovenox, TTE ordered EF 60-65% with no evidence of R heart strain     - will follow medicines recs for recent PE  - patient tachycardic into 110s  -JOSE R drains, please record drainage  -abx while drains in place  -Pain medications modified yesterday pt on Oxycontin q12   -DVT ppx per medicine  -WBAT BL LE with LSO.  -doppler LLE : Negative  -XR L Spine: Ordered    -PT/OT  -DC planning  Will d/w attending and advise if plan changes.

## 2019-07-13 NOTE — PROGRESS NOTE ADULT - SUBJECTIVE AND OBJECTIVE BOX
59 yr old female with PMH of SLE, GB syndrome and previous left hip replacement, chronic left foot drop, otherwise with lower back pain and leg weakness that has increased with numbness down left leg - back MRI noted L3-S1 broad based disc herniations - otherwise sent from [facility] for spinal surgery with Dr. An. (08 Jul 2019 12:47)      Chief Complaint:  Patient is a 59y old  Female who presents with a chief complaint of left foot drop (12 Jul 2019 18:37)      Review of Systems:    General:  No wt loss, fevers, chills, night sweats  Eyes:  Good vision, no reported pain  ENT:  No sore throat, pain, runny nose, dysphagia  CV:  No pain, palpitations, hypo/hypertension  Resp:   dyspnea,   GI:  No pain, nausea, vomiting, diarrhea, constipation           Social History/Family History  SOCHX:   tobacco,  -  alcohol    FMHX: FA/MO  - contributory       Discussed with:  PMD, Family    Physical Exam:    Vital Signs:  Vital Signs Last 24 Hrs  T(C): 37.6 (12 Jul 2019 18:00), Max: 38.3 (12 Jul 2019 16:05)  T(F): 99.6 (12 Jul 2019 18:00), Max: 101 (12 Jul 2019 16:05)  HR: 116 (12 Jul 2019 18:00) (105 - 130)  BP: 133/77 (12 Jul 2019 18:00) (128/86 - 171/87)  BP(mean): --  RR: 18 (12 Jul 2019 18:00) (15 - 18)  SpO2: 95% (12 Jul 2019 18:00) (91% - 97%)  Daily     Daily   I&O's Summary    11 Jul 2019 07:01  -  12 Jul 2019 07:00  --------------------------------------------------------  IN: 400 mL / OUT: 250 mL / NET: 150 mL    12 Jul 2019 07:01  -  12 Jul 2019 19:05  --------------------------------------------------------  IN: 150 mL / OUT: 0 mL / NET: 150 mL          Chest:  Full & symmetric excursion, no increased effort, breath sounds clear  Cardiovascular:  Regular rhythm, S1, S2, no murmur/rub/S3/S4, no carotid/femoral/abdominal bruit, radial/pedal pulses 2+, no edema  Abdomen:  Soft, non-tender, non-distended, normoactive bowel sounds, no HSM      Laboratory:                          9.6    9.31  )-----------( 199      ( 12 Jul 2019 06:19 )             29.6     07-12    141  |  103  |  7   ----------------------------<  118<H>  3.6   |  32<H>  |  0.76    Ca    8.5      12 Jul 2019 06:19  Phos  3.7     07-12  Mg     1.9     07-12        CARDIAC MARKERS ( 12 Jul 2019 06:19 )  .111 ng/mL / x     / x     / x     / x      CARDIAC MARKERS ( 11 Jul 2019 19:20 )  .057 ng/mL / x     / x     / x     / x      CARDIAC MARKERS ( 11 Jul 2019 06:28 )  <.015 ng/mL / x     / 3483 U/L / x     / 7.3 ng/mL          Assessment:  Post spine surgery  PE noted, hypoxia  Full dose Lovenox initiated and BID   ICU consult called, not needed ICU, stable today  TTE completed, essentially normal  Lovenox to Xarelto on DC

## 2019-07-13 NOTE — PROGRESS NOTE ADULT - SUBJECTIVE AND OBJECTIVE BOX
Patient is a 59y old  Female who presents with a chief complaint of left foot drop (13 Jul 2019 13:42)      INTERVAL HPI/OVERNIGHT EVENTS:  pt with no new episode  MEDICATIONS  (STANDING):  ascorbic acid 500 milliGRAM(s) Oral two times a day  calcium carbonate 1250 mG  + Vitamin D (OsCal 500 + D) 1 Tablet(s) Oral three times a day  ceFAZolin   IVPB 2000 milliGRAM(s) IV Intermittent every 8 hours  docusate sodium 100 milliGRAM(s) Oral three times a day  enoxaparin Injectable 110 milliGRAM(s) SubCutaneous every 12 hours  folic acid 1 milliGRAM(s) Oral daily  gabapentin 300 milliGRAM(s) Oral three times a day  multivitamin 1 Tablet(s) Oral daily  oxyCODONE  ER Tablet 10 milliGRAM(s) Oral every 12 hours  senna 2 Tablet(s) Oral at bedtime  sugammadex Injectable 450 milliGRAM(s) IV Push once  verapamil  milliGRAM(s) Oral daily    MEDICATIONS  (PRN):  acetaminophen   Tablet .. 650 milliGRAM(s) Oral every 6 hours PRN Temp greater or equal to 38C (100.4F), Mild Pain (1 - 3)  ALPRAZolam 0.5 milliGRAM(s) Oral daily PRN anxiety  benzocaine 15 mG/menthol 3.6 mG Lozenge 1 Lozenge Oral every 3 hours PRN Sore Throat  cyclobenzaprine 10 milliGRAM(s) Oral three times a day PRN Muscle Spasm  diltiazem Injectable 20 milliGRAM(s) IV Push every 4 hours PRN only if HR greater than 120 and SBP maintained greater than 110  HYDROmorphone  Injectable 0.5 milliGRAM(s) IV Push every 4 hours PRN Severe Pain (7 - 10)  melatonin 3 milliGRAM(s) Oral at bedtime PRN Sleep  naloxone Injectable 0.1 milliGRAM(s) IV Push every 3 minutes PRN For ANY of the following changes in patient status:  A. RR LESS THAN 10 breaths per minute, B. Oxygen saturation LESS THAN 90%, C. Sedation score of 6  ondansetron Injectable 4 milliGRAM(s) IV Push every 4 hours PRN Nausea  oxyCODONE    IR 5 milliGRAM(s) Oral every 4 hours PRN Moderate Pain (4 - 6)  oxyCODONE    IR 10 milliGRAM(s) Oral every 4 hours PRN Severe Pain (7 - 10)  petrolatum white Ointment 1 Application(s) Topical daily PRN dry lips      Allergies    No Known Allergies    Intolerances        REVIEW OF SYSTEMS:  CONSTITUTIONAL: No fever, weight loss, or fatigue  EYES: No eye pain, visual disturbances, or discharge  ENMT:  No difficulty hearing, tinnitus, vertigo; No sinus or throat pain  NECK: No pain or stiffness  BREASTS: No pain, masses, or nipple discharge  RESPIRATORY: No cough, wheezing, chills or hemoptysis; No shortness of breath  CARDIOVASCULAR: No chest pain, palpitations, dizziness, or leg swelling  GASTROINTESTINAL: No abdominal or epigastric pain. No nausea, vomiting, or hematemesis; No diarrhea or constipation. No melena or hematochezia.  GENITOURINARY: No dysuria, frequency, hematuria, or incontinence  NEUROLOGICAL: No headaches, memory loss, loss of strength, numbness, or tremors  SKIN: No itching, burning, rashes, or lesions   LYMPH NODES: No enlarged glands  ENDOCRINE: No heat or cold intolerance; No hair loss  MUSCULOSKELETAL: No joint pain or swelling; No muscle, back, or extremity pain  PSYCHIATRIC: No depression, anxiety, mood swings, or difficulty sleeping  HEME/LYMPH: No easy bruising, or bleeding gums  ALLERGY AND IMMUNOLOGIC: No hives or eczema    Vital Signs Last 24 Hrs  T(C): 37.1 (13 Jul 2019 12:00), Max: 38.3 (12 Jul 2019 16:05)  T(F): 98.7 (13 Jul 2019 12:00), Max: 101 (12 Jul 2019 16:05)  HR: 115 (13 Jul 2019 13:37) (101 - 125)  BP: 131/70 (13 Jul 2019 13:37) (116/59 - 154/72)  BP(mean): --  RR: 18 (13 Jul 2019 13:37) (17 - 20)  SpO2: 96% (13 Jul 2019 13:37) (94% - 97%)    PHYSICAL EXAM:  GENERAL: NAD, well-groomed, well-developed  HEAD:  Atraumatic, Normocephalic  EYES: EOMI, PERRLA, conjunctiva and sclera clear  ENMT: No tonsillar erythema, exudates, or enlargement; Moist mucous membranes, Good dentition, No lesions  NECK: Supple, No JVD, Normal thyroid  NERVOUS SYSTEM:  Alert & Oriented X3, Good concentration; Motor Strength 5/5 B/L upper and lower extremities; DTRs 2+ intact and symmetric  CHEST/LUNG: Clear to percussion bilaterally; No rales, rhonchi, wheezing, or rubs  HEART: Regular rate and rhythm; No murmurs, rubs, or gallops  ABDOMEN: Soft, Nontender, Nondistended; Bowel sounds present  EXTREMITIES:  2+ Peripheral Pulses, No clubbing, cyanosis, or edema  LYMPH: No lymphadenopathy noted  SKIN: No rashes or lesions    LABS:                        9.2    8.83  )-----------( 211      ( 13 Jul 2019 06:34 )             28.7     07-13    136  |  98  |  7   ----------------------------<  158<H>  3.6   |  35<H>  |  0.93    Ca    8.7      13 Jul 2019 06:34  Phos  4.0     07-13  Mg     2.2     07-13    TPro  6.3  /  Alb  2.4<L>  /  TBili  0.4  /  DBili  x   /  AST  94<H>  /  ALT  48  /  AlkPhos  71  07-13        CAPILLARY BLOOD GLUCOSE        CULTURES:    HEMOGLOBIN A1C:    CHOLESTEROL:        RADIOLOGY & ADDITIONAL TESTS:

## 2019-07-14 LAB
ANION GAP SERPL CALC-SCNC: 5 MMOL/L — SIGNIFICANT CHANGE UP (ref 5–17)
APPEARANCE UR: CLEAR — SIGNIFICANT CHANGE UP
BASOPHILS # BLD AUTO: 0.03 K/UL — SIGNIFICANT CHANGE UP (ref 0–0.2)
BASOPHILS NFR BLD AUTO: 0.4 % — SIGNIFICANT CHANGE UP (ref 0–2)
BILIRUB UR-MCNC: NEGATIVE — SIGNIFICANT CHANGE UP
BUN SERPL-MCNC: 9 MG/DL — SIGNIFICANT CHANGE UP (ref 7–23)
CALCIUM SERPL-MCNC: 9 MG/DL — SIGNIFICANT CHANGE UP (ref 8.5–10.1)
CHLORIDE SERPL-SCNC: 97 MMOL/L — SIGNIFICANT CHANGE UP (ref 96–108)
CO2 SERPL-SCNC: 34 MMOL/L — HIGH (ref 22–31)
COLOR SPEC: YELLOW — SIGNIFICANT CHANGE UP
CREAT SERPL-MCNC: 0.91 MG/DL — SIGNIFICANT CHANGE UP (ref 0.5–1.3)
DIFF PNL FLD: NEGATIVE — SIGNIFICANT CHANGE UP
EOSINOPHIL # BLD AUTO: 0.51 K/UL — HIGH (ref 0–0.5)
EOSINOPHIL NFR BLD AUTO: 6.7 % — HIGH (ref 0–6)
GLUCOSE SERPL-MCNC: 120 MG/DL — HIGH (ref 70–99)
GLUCOSE UR QL: NEGATIVE MG/DL — SIGNIFICANT CHANGE UP
HCT VFR BLD CALC: 26.8 % — LOW (ref 34.5–45)
HGB BLD-MCNC: 8.7 G/DL — LOW (ref 11.5–15.5)
IMM GRANULOCYTES NFR BLD AUTO: 0.5 % — SIGNIFICANT CHANGE UP (ref 0–1.5)
KETONES UR-MCNC: ABNORMAL
LEUKOCYTE ESTERASE UR-ACNC: ABNORMAL
LYMPHOCYTES # BLD AUTO: 1.84 K/UL — SIGNIFICANT CHANGE UP (ref 1–3.3)
LYMPHOCYTES # BLD AUTO: 24 % — SIGNIFICANT CHANGE UP (ref 13–44)
MCHC RBC-ENTMCNC: 28.8 PG — SIGNIFICANT CHANGE UP (ref 27–34)
MCHC RBC-ENTMCNC: 32.5 GM/DL — SIGNIFICANT CHANGE UP (ref 32–36)
MCV RBC AUTO: 88.7 FL — SIGNIFICANT CHANGE UP (ref 80–100)
MONOCYTES # BLD AUTO: 0.58 K/UL — SIGNIFICANT CHANGE UP (ref 0–0.9)
MONOCYTES NFR BLD AUTO: 7.6 % — SIGNIFICANT CHANGE UP (ref 2–14)
NEUTROPHILS # BLD AUTO: 4.66 K/UL — SIGNIFICANT CHANGE UP (ref 1.8–7.4)
NEUTROPHILS NFR BLD AUTO: 60.8 % — SIGNIFICANT CHANGE UP (ref 43–77)
NITRITE UR-MCNC: NEGATIVE — SIGNIFICANT CHANGE UP
NRBC # BLD: 0 /100 WBCS — SIGNIFICANT CHANGE UP (ref 0–0)
PH UR: 6 — SIGNIFICANT CHANGE UP (ref 5–8)
PLATELET # BLD AUTO: 248 K/UL — SIGNIFICANT CHANGE UP (ref 150–400)
POTASSIUM SERPL-MCNC: 4.1 MMOL/L — SIGNIFICANT CHANGE UP (ref 3.5–5.3)
POTASSIUM SERPL-SCNC: 4.1 MMOL/L — SIGNIFICANT CHANGE UP (ref 3.5–5.3)
PROT UR-MCNC: 30 MG/DL
RBC # BLD: 3.02 M/UL — LOW (ref 3.8–5.2)
RBC # FLD: 14.6 % — HIGH (ref 10.3–14.5)
SODIUM SERPL-SCNC: 136 MMOL/L — SIGNIFICANT CHANGE UP (ref 135–145)
SP GR SPEC: 1.01 — SIGNIFICANT CHANGE UP (ref 1.01–1.02)
UROBILINOGEN FLD QL: NEGATIVE MG/DL — SIGNIFICANT CHANGE UP
WBC # BLD: 7.66 K/UL — SIGNIFICANT CHANGE UP (ref 3.8–10.5)
WBC # FLD AUTO: 7.66 K/UL — SIGNIFICANT CHANGE UP (ref 3.8–10.5)

## 2019-07-14 RX ADMIN — GABAPENTIN 300 MILLIGRAM(S): 400 CAPSULE ORAL at 05:22

## 2019-07-14 RX ADMIN — Medication 100 MILLIGRAM(S): at 14:34

## 2019-07-14 RX ADMIN — Medication 650 MILLIGRAM(S): at 12:38

## 2019-07-14 RX ADMIN — Medication 650 MILLIGRAM(S): at 22:15

## 2019-07-14 RX ADMIN — OXYCODONE HYDROCHLORIDE 10 MILLIGRAM(S): 5 TABLET ORAL at 11:38

## 2019-07-14 RX ADMIN — CYCLOBENZAPRINE HYDROCHLORIDE 10 MILLIGRAM(S): 10 TABLET, FILM COATED ORAL at 05:22

## 2019-07-14 RX ADMIN — Medication 1 MILLIGRAM(S): at 11:38

## 2019-07-14 RX ADMIN — OXYCODONE HYDROCHLORIDE 10 MILLIGRAM(S): 5 TABLET ORAL at 02:20

## 2019-07-14 RX ADMIN — HYDROMORPHONE HYDROCHLORIDE 0.5 MILLIGRAM(S): 2 INJECTION INTRAMUSCULAR; INTRAVENOUS; SUBCUTANEOUS at 23:09

## 2019-07-14 RX ADMIN — Medication 1 TABLET(S): at 21:15

## 2019-07-14 RX ADMIN — HYDROMORPHONE HYDROCHLORIDE 0.5 MILLIGRAM(S): 2 INJECTION INTRAMUSCULAR; INTRAVENOUS; SUBCUTANEOUS at 14:32

## 2019-07-14 RX ADMIN — Medication 1 TABLET(S): at 11:39

## 2019-07-14 RX ADMIN — OXYCODONE HYDROCHLORIDE 10 MILLIGRAM(S): 5 TABLET ORAL at 07:34

## 2019-07-14 RX ADMIN — Medication 100 MILLIGRAM(S): at 23:09

## 2019-07-14 RX ADMIN — Medication 100 MILLIGRAM(S): at 05:22

## 2019-07-14 RX ADMIN — Medication 500 MILLIGRAM(S): at 17:25

## 2019-07-14 RX ADMIN — OXYCODONE HYDROCHLORIDE 10 MILLIGRAM(S): 5 TABLET ORAL at 17:25

## 2019-07-14 RX ADMIN — GABAPENTIN 300 MILLIGRAM(S): 400 CAPSULE ORAL at 21:16

## 2019-07-14 RX ADMIN — Medication 1 TABLET(S): at 13:22

## 2019-07-14 RX ADMIN — CYCLOBENZAPRINE HYDROCHLORIDE 10 MILLIGRAM(S): 10 TABLET, FILM COATED ORAL at 21:15

## 2019-07-14 RX ADMIN — HYDROMORPHONE HYDROCHLORIDE 0.5 MILLIGRAM(S): 2 INJECTION INTRAMUSCULAR; INTRAVENOUS; SUBCUTANEOUS at 23:19

## 2019-07-14 RX ADMIN — ENOXAPARIN SODIUM 110 MILLIGRAM(S): 100 INJECTION SUBCUTANEOUS at 13:23

## 2019-07-14 RX ADMIN — Medication 500 MILLIGRAM(S): at 05:22

## 2019-07-14 RX ADMIN — OXYCODONE HYDROCHLORIDE 10 MILLIGRAM(S): 5 TABLET ORAL at 12:38

## 2019-07-14 RX ADMIN — Medication 650 MILLIGRAM(S): at 21:16

## 2019-07-14 RX ADMIN — HYDROMORPHONE HYDROCHLORIDE 0.5 MILLIGRAM(S): 2 INJECTION INTRAMUSCULAR; INTRAVENOUS; SUBCUTANEOUS at 10:26

## 2019-07-14 RX ADMIN — HYDROMORPHONE HYDROCHLORIDE 0.5 MILLIGRAM(S): 2 INJECTION INTRAMUSCULAR; INTRAVENOUS; SUBCUTANEOUS at 00:01

## 2019-07-14 RX ADMIN — SENNA PLUS 2 TABLET(S): 8.6 TABLET ORAL at 21:19

## 2019-07-14 RX ADMIN — Medication 1 ENEMA: at 17:29

## 2019-07-14 RX ADMIN — HYDROMORPHONE HYDROCHLORIDE 0.5 MILLIGRAM(S): 2 INJECTION INTRAMUSCULAR; INTRAVENOUS; SUBCUTANEOUS at 10:11

## 2019-07-14 RX ADMIN — Medication 0.5 MILLIGRAM(S): at 21:19

## 2019-07-14 RX ADMIN — Medication 650 MILLIGRAM(S): at 11:39

## 2019-07-14 RX ADMIN — Medication 100 MILLIGRAM(S): at 21:15

## 2019-07-14 RX ADMIN — OXYCODONE HYDROCHLORIDE 10 MILLIGRAM(S): 5 TABLET ORAL at 03:20

## 2019-07-14 RX ADMIN — ENOXAPARIN SODIUM 110 MILLIGRAM(S): 100 INJECTION SUBCUTANEOUS at 01:18

## 2019-07-14 RX ADMIN — Medication 100 MILLIGRAM(S): at 06:00

## 2019-07-14 RX ADMIN — Medication 1 TABLET(S): at 05:22

## 2019-07-14 RX ADMIN — GABAPENTIN 300 MILLIGRAM(S): 400 CAPSULE ORAL at 13:22

## 2019-07-14 RX ADMIN — HYDROMORPHONE HYDROCHLORIDE 0.5 MILLIGRAM(S): 2 INJECTION INTRAMUSCULAR; INTRAVENOUS; SUBCUTANEOUS at 14:47

## 2019-07-14 RX ADMIN — Medication 100 MILLIGRAM(S): at 13:22

## 2019-07-14 RX ADMIN — HYDROMORPHONE HYDROCHLORIDE 0.5 MILLIGRAM(S): 2 INJECTION INTRAMUSCULAR; INTRAVENOUS; SUBCUTANEOUS at 18:30

## 2019-07-14 RX ADMIN — OXYCODONE HYDROCHLORIDE 10 MILLIGRAM(S): 5 TABLET ORAL at 18:25

## 2019-07-14 RX ADMIN — OXYCODONE HYDROCHLORIDE 10 MILLIGRAM(S): 5 TABLET ORAL at 06:22

## 2019-07-14 RX ADMIN — OXYCODONE HYDROCHLORIDE 10 MILLIGRAM(S): 5 TABLET ORAL at 05:22

## 2019-07-14 RX ADMIN — CYCLOBENZAPRINE HYDROCHLORIDE 10 MILLIGRAM(S): 10 TABLET, FILM COATED ORAL at 13:22

## 2019-07-14 RX ADMIN — HYDROMORPHONE HYDROCHLORIDE 0.5 MILLIGRAM(S): 2 INJECTION INTRAMUSCULAR; INTRAVENOUS; SUBCUTANEOUS at 19:30

## 2019-07-14 RX ADMIN — OXYCODONE HYDROCHLORIDE 10 MILLIGRAM(S): 5 TABLET ORAL at 08:34

## 2019-07-14 NOTE — PROGRESS NOTE ADULT - SUBJECTIVE AND OBJECTIVE BOX
INTERVAL HPI:   59 yr old female with HTN,  SLE, GB syndrome with neuropathy, S/P left hip replacement for avascular necrosis, Chronic left foot drop and Obesity.  With lower back pain and leg weakness that has increased with numbness down left leg - back MRI noted L3-S1 broad based disc herniations. Sent from [facility] for spinal surgery with Dr. An.   07/12/19: Lumbar spinal fusion 09-Jul-2019 19:46:38  Marvin Jennings.  Post Op with persistent tachycardia, found to have left PE.  Non smoker.    OVERNIGHT EVENTS:  Comfortable    Vital Signs Last 24 Hrs  T(C): 37.9 (14 Jul 2019 11:32), Max: 38.6 (13 Jul 2019 16:30)  T(F): 100.3 (14 Jul 2019 11:32), Max: 101.5 (13 Jul 2019 16:30)  HR: 122 (14 Jul 2019 11:32) (109 - 122)  BP: 109/70 (14 Jul 2019 11:32) (108/67 - 127/83)  BP(mean): --  RR: 17 (14 Jul 2019 11:32) (16 - 18)  SpO2: 95% (14 Jul 2019 11:32) (90% - 98%)    PHYSICAL EXAM:  GEN:         Awake, responsive and comfortable.  HEENT:    Normal.    RESP:        no wheezing.  CVS:             Regular rate and rhythm.   ABD:         Soft, non-tender, non-distended;     MEDICATIONS  (STANDING):  ascorbic acid 500 milliGRAM(s) Oral two times a day  calcium carbonate 1250 mG  + Vitamin D (OsCal 500 + D) 1 Tablet(s) Oral three times a day  ceFAZolin   IVPB 2000 milliGRAM(s) IV Intermittent every 8 hours  docusate sodium 100 milliGRAM(s) Oral three times a day  enoxaparin Injectable 110 milliGRAM(s) SubCutaneous every 12 hours  folic acid 1 milliGRAM(s) Oral daily  gabapentin 300 milliGRAM(s) Oral three times a day  multivitamin 1 Tablet(s) Oral daily  oxyCODONE  ER Tablet 10 milliGRAM(s) Oral every 12 hours  senna 2 Tablet(s) Oral at bedtime  sugammadex Injectable 450 milliGRAM(s) IV Push once  verapamil  milliGRAM(s) Oral daily    MEDICATIONS  (PRN):  acetaminophen   Tablet .. 650 milliGRAM(s) Oral every 6 hours PRN Temp greater or equal to 38C (100.4F), Mild Pain (1 - 3)  ALPRAZolam 0.5 milliGRAM(s) Oral daily PRN anxiety  benzocaine 15 mG/menthol 3.6 mG Lozenge 1 Lozenge Oral every 3 hours PRN Sore Throat  cyclobenzaprine 10 milliGRAM(s) Oral three times a day PRN Muscle Spasm  diltiazem Injectable 20 milliGRAM(s) IV Push every 4 hours PRN only if HR greater than 120 and SBP maintained greater than 110  HYDROmorphone  Injectable 0.5 milliGRAM(s) IV Push every 4 hours PRN Severe Pain (7 - 10)  melatonin 3 milliGRAM(s) Oral at bedtime PRN Sleep  naloxone Injectable 0.1 milliGRAM(s) IV Push every 3 minutes PRN For ANY of the following changes in patient status:  A. RR LESS THAN 10 breaths per minute, B. Oxygen saturation LESS THAN 90%, C. Sedation score of 6  ondansetron Injectable 4 milliGRAM(s) IV Push every 4 hours PRN Nausea  oxyCODONE    IR 5 milliGRAM(s) Oral every 4 hours PRN Moderate Pain (4 - 6)  oxyCODONE    IR 10 milliGRAM(s) Oral every 4 hours PRN Severe Pain (7 - 10)  petrolatum white Ointment 1 Application(s) Topical daily PRN dry lips  saline laxative (FLEET) Rectal Enema 1 Enema Rectal once PRN constipation    LABS:                        8.7    7.66  )-----------( 248      ( 14 Jul 2019 06:37 )             26.8     07-14    136  |  97  |  9   ----------------------------<  120<H>  4.1   |  34<H>  |  0.91    Ca    9.0      14 Jul 2019 06:37  Phos  4.0     07-13  Mg     2.2     07-13    TPro  6.3  /  Alb  2.4<L>  /  TBili  0.4  /  DBili  x   /  AST  94<H>  /  ALT  48  /  AlkPhos  71  07-13    ASSESSMENT AND PLAN:  ·	Left pulmonary embolism.  ·	Tachycardia.  ·	S/P lumber spinal fusion.  ·	Anemia.  ·	HTN.  ·	SLE.  ·	GBS history with Neuropathy.  ·	Obesity.    On full dose Lovenox.  Change to Xarelto upon discharge.

## 2019-07-14 NOTE — PROGRESS NOTE ADULT - SUBJECTIVE AND OBJECTIVE BOX
Patient is a 59y old  Female who presents with a chief complaint of left foot drop (2019 16:03)      INTERVAL HPI/OVERNIGHT EVENTS:  pt is doing better  MEDICATIONS  (STANDING):  ascorbic acid 500 milliGRAM(s) Oral two times a day  calcium carbonate 1250 mG  + Vitamin D (OsCal 500 + D) 1 Tablet(s) Oral three times a day  ceFAZolin   IVPB 2000 milliGRAM(s) IV Intermittent every 8 hours  docusate sodium 100 milliGRAM(s) Oral three times a day  enoxaparin Injectable 110 milliGRAM(s) SubCutaneous every 12 hours  folic acid 1 milliGRAM(s) Oral daily  gabapentin 300 milliGRAM(s) Oral three times a day  multivitamin 1 Tablet(s) Oral daily  oxyCODONE  ER Tablet 10 milliGRAM(s) Oral every 12 hours  senna 2 Tablet(s) Oral at bedtime  sugammadex Injectable 450 milliGRAM(s) IV Push once  verapamil  milliGRAM(s) Oral daily    MEDICATIONS  (PRN):  acetaminophen   Tablet .. 650 milliGRAM(s) Oral every 6 hours PRN Temp greater or equal to 38C (100.4F), Mild Pain (1 - 3)  ALPRAZolam 0.5 milliGRAM(s) Oral daily PRN anxiety  benzocaine 15 mG/menthol 3.6 mG Lozenge 1 Lozenge Oral every 3 hours PRN Sore Throat  cyclobenzaprine 10 milliGRAM(s) Oral three times a day PRN Muscle Spasm  diltiazem Injectable 20 milliGRAM(s) IV Push every 4 hours PRN only if HR greater than 120 and SBP maintained greater than 110  HYDROmorphone  Injectable 0.5 milliGRAM(s) IV Push every 4 hours PRN Severe Pain (7 - 10)  melatonin 3 milliGRAM(s) Oral at bedtime PRN Sleep  naloxone Injectable 0.1 milliGRAM(s) IV Push every 3 minutes PRN For ANY of the following changes in patient status:  A. RR LESS THAN 10 breaths per minute, B. Oxygen saturation LESS THAN 90%, C. Sedation score of 6  ondansetron Injectable 4 milliGRAM(s) IV Push every 4 hours PRN Nausea  oxyCODONE    IR 5 milliGRAM(s) Oral every 4 hours PRN Moderate Pain (4 - 6)  oxyCODONE    IR 10 milliGRAM(s) Oral every 4 hours PRN Severe Pain (7 - 10)  petrolatum white Ointment 1 Application(s) Topical daily PRN dry lips      Allergies    No Known Allergies    Intolerances        REVIEW OF SYSTEMS:  CONSTITUTIONAL: No fever, weight loss, or fatigue  EYES: No eye pain, visual disturbances, or discharge  ENMT:  No difficulty hearing, tinnitus, vertigo; No sinus or throat pain  NECK: No pain or stiffness  BREASTS: No pain, masses, or nipple discharge  RESPIRATORY: No cough, wheezing, chills or hemoptysis; No shortness of breath  CARDIOVASCULAR: No chest pain, palpitations, dizziness, or leg swelling  GASTROINTESTINAL: No abdominal or epigastric pain. No nausea, vomiting, or hematemesis; No diarrhea or constipation. No melena or hematochezia.  GENITOURINARY: No dysuria, frequency, hematuria, or incontinence  NEUROLOGICAL: No headaches, memory loss, loss of strength, numbness, or tremors  SKIN: No itching, burning, rashes, or lesions   LYMPH NODES: No enlarged glands  ENDOCRINE: No heat or cold intolerance; No hair loss  MUSCULOSKELETAL: No joint pain or swelling; No muscle, back, or extremity pain  PSYCHIATRIC: No depression, anxiety, mood swings, or difficulty sleeping  HEME/LYMPH: No easy bruising, or bleeding gums  ALLERGY AND IMMUNOLOGIC: No hives or eczema    Vital Signs Last 24 Hrs  T(C): 36.4 (2019 19:55), Max: 37.9 (2019 11:32)  T(F): 97.5 (2019 19:55), Max: 100.3 (2019 11:32)  HR: 117 (2019 19:55) (109 - 132)  BP: 111/68 (2019 19:55) (108/67 - 140/100)  BP(mean): --  RR: 17 (2019 19:55) (14 - 19)  SpO2: 100% (2019 19:55) (90% - 100%)    PHYSICAL EXAM:  GENERAL: NAD, well-groomed, well-developed  HEAD:  Atraumatic, Normocephalic  EYES: EOMI, PERRLA, conjunctiva and sclera clear  ENMT: No tonsillar erythema, exudates, or enlargement; Moist mucous membranes, Good dentition, No lesions  NECK: Supple, No JVD, Normal thyroid  NERVOUS SYSTEM:  Alert & Oriented X3, Good concentration; Motor Strength 5/5 B/L upper and lower extremities; DTRs 2+ intact and symmetric  CHEST/LUNG: Clear to percussion bilaterally; No rales, rhonchi, wheezing, or rubs  HEART: Regular rate and rhythm; No murmurs, rubs, or gallops  ABDOMEN: Soft, Nontender, Nondistended; Bowel sounds present  EXTREMITIES:  2+ Peripheral Pulses, No clubbing, cyanosis, or edema  LYMPH: No lymphadenopathy noted  SKIN: No rashes or lesions    LABS:                        8.7    7.66  )-----------( 248      ( 2019 06:37 )             26.8     07-14    136  |  97  |  9   ----------------------------<  120<H>  4.1   |  34<H>  |  0.91    Ca    9.0      2019 06:37  Phos  4.0     07-13  Mg     2.2     07-13    TPro  6.3  /  Alb  2.4<L>  /  TBili  0.4  /  DBili  x   /  AST  94<H>  /  ALT  48  /  AlkPhos  71  07-13      Urinalysis Basic - ( 2019 15:15 )    Color: Yellow / Appearance: Clear / S.015 / pH: x  Gluc: x / Ketone: Trace  / Bili: Negative / Urobili: Negative mg/dL   Blood: x / Protein: 30 mg/dL / Nitrite: Negative   Leuk Esterase: Trace / RBC: 0-2 /HPF / WBC 0-2   Sq Epi: x / Non Sq Epi: Few / Bacteria: Few      CAPILLARY BLOOD GLUCOSE        CULTURES:    HEMOGLOBIN A1C:    CHOLESTEROL:        RADIOLOGY & ADDITIONAL TESTS:

## 2019-07-14 NOTE — PROGRESS NOTE ADULT - SUBJECTIVE AND OBJECTIVE BOX
Patient seen and examined at bedside this am. Pain is well controlled. Pt was tachycardic overnight and had fever overnight to 101. Denies new numbness/tingling of the BL LE. Denies chills. Denies SOB, Chest Pain. NO other complaints at this time.       Vital Signs Last 24 Hrs  T(C): 36.9 (13 Jul 2019 05:35), Max: 38.3 (12 Jul 2019 16:05)  T(F): 98.4 (13 Jul 2019 05:35), Max: 101 (12 Jul 2019 16:05)  HR: 101 (13 Jul 2019 05:35) (101 - 125)  BP: 127/73 (13 Jul 2019 05:35) (127/73 - 154/72)  BP(mean): --  RR: 17 (13 Jul 2019 05:35) (16 - 18)  SpO2: 97% (13 Jul 2019 05:35) (95% - 97%)  Vital Signs Last 24 Hrs  T(C): 37.9 (14 Jul 2019 11:32), Max: 38.6 (13 Jul 2019 16:30)  T(F): 100.3 (14 Jul 2019 11:32), Max: 101.5 (13 Jul 2019 16:30)  HR: 115 (14 Jul 2019 14:11) (109 - 122)  BP: 123/69 (14 Jul 2019 14:11) (108/67 - 127/83)  BP(mean): --  RR: 18 (14 Jul 2019 14:11) (16 - 18)  SpO2: 94% (14 Jul 2019 14:11) (90% - 98%)  PE:    GEN: NAD     Spine:  Dsg c/d/i  JOSE R Drain x 2 in place  SILT C5-T1 & L2-S1  Distal pulses intact  Soft compartments  Motor:               C5           C6            C7               C8           T1   R         5/5          5/5            5/5             5/5          5/5  L          5/5          5/5            5/5             5/5          5/5                L2             L3             L4               L5            S1  R         5/5           5/5          5/5             5/5           5/5  L          4/5          4/5           3/5             3/5           4/5    Sensory:            C5         C6         C7      C8       T1        (0=absent, 1=impaired, 2=normal, NT=not testable)  R         2            2           2        2         2  L          2            2           2        2         2               L2          L3         L4      L5       S1         (0=absent, 1=impaired, 2=normal, NT=not testable)  R         2            2            2        2        2  L          2            2           2        2         2

## 2019-07-14 NOTE — PROGRESS NOTE ADULT - ASSESSMENT
A/P: 59y F s/p posterior spinal fusion L3-S1, L4-5 TLIF, bilateral hemilaminectomy L5-S1 with discectomy, right hemilaminectomy L3-L4 POD 5    Pt has PE confirmed by CTA, started on therapeutic lovenox, TTE ordered EF 60-65% with no evidence of R heart strain     - will follow medicines recs for recent PE  - patient tachycardic into 110s  -JOSE R drains, please record drainage, plan to pull tomorrow  -abx while drains in place, dc ABX when drains pulled  -Pain medications modified yesterday pt on Oxycontin q12   -DVT ppx per medicine  -WBAT BL LE with LSO.  -doppler LLE : Negative  -PT/OT  -DC planning, plan for DC tomorrow   Will d/w attending and advise if plan changes.

## 2019-07-14 NOTE — PROGRESS NOTE ADULT - SUBJECTIVE AND OBJECTIVE BOX
Patient is a 59y old  Female who presents with a chief complaint of left foot drop (15 Jul 2019 06:19)      Review of Systems:    General:  No wt loss, fevers, chills, night sweats  ENT:  No sore throat, pain, runny nose, dysphagia  CV:  No pain, palpitations, hypo/hypertension  Resp:  No dyspnea, cough, tachypnea, wheezing      Discussed with:  PMD, Family    Physical Exam:    Vital Signs:  Vital Signs Last 24 Hrs  T(C): 37.3 (15 Jul 2019 11:20), Max: 37.3 (15 Jul 2019 11:20)  T(F): 99.2 (15 Jul 2019 11:20), Max: 99.2 (15 Jul 2019 11:20)  HR: 104 (15 Jul 2019 11:20) (104 - 132)  BP: 100/72 (15 Jul 2019 11:20) (100/72 - 140/100)  BP(mean): --  RR: 16 (15 Jul 2019 11:20) (14 - 19)  SpO2: 96% (15 Jul 2019 11:20) (94% - 100%)  Daily     Daily   I&O's Summary    2019 07:01  -  15 Jul 2019 07:00  --------------------------------------------------------  IN: 630 mL / OUT: 105 mL / NET: 525 mL          Chest:  Full & symmetric excursion, no increased effort, breath sounds clear  Cardiovascular:  Regular rhythm, S1, S2, no murmur/rub/S3/S4, no carotid/femoral/abdominal bruit, radial/pedal pulses 2+,   Abdomen:  Soft, non-tender, non-distended, normoactive bowel sounds, no HSM      Laboratory:                          8.1    7.09  )-----------( 256      ( 15 Jul 2019 06:23 )             25.1     07-15    137  |  99  |  8   ----------------------------<  114<H>  3.9   |  33<H>  |  0.86    Ca    8.6      15 Jul 2019 06:23        Urinalysis Basic - ( 2019 15:15 )    Color: Yellow / Appearance: Clear / S.015 / pH: x  Gluc: x / Ketone: Trace  / Bili: Negative / Urobili: Negative mg/dL   Blood: x / Protein: 30 mg/dL / Nitrite: Negative   Leuk Esterase: Trace / RBC: 0-2 /HPF / WBC 0-2   Sq Epi: x / Non Sq Epi: Few / Bacteria: Few      Assessment:  Post spine surgery  PE noted, hypoxia  Full dose Lovenox initiated and BID   ICU consult called, not needed ICU, stable today  TTE completed, essentially normal  Lovenox to Xarelto on DC

## 2019-07-15 LAB
ANION GAP SERPL CALC-SCNC: 5 MMOL/L — SIGNIFICANT CHANGE UP (ref 5–17)
BASOPHILS # BLD AUTO: 0.04 K/UL — SIGNIFICANT CHANGE UP (ref 0–0.2)
BASOPHILS NFR BLD AUTO: 0.6 % — SIGNIFICANT CHANGE UP (ref 0–2)
BUN SERPL-MCNC: 8 MG/DL — SIGNIFICANT CHANGE UP (ref 7–23)
CALCIUM SERPL-MCNC: 8.6 MG/DL — SIGNIFICANT CHANGE UP (ref 8.5–10.1)
CHLORIDE SERPL-SCNC: 99 MMOL/L — SIGNIFICANT CHANGE UP (ref 96–108)
CO2 SERPL-SCNC: 33 MMOL/L — HIGH (ref 22–31)
CREAT SERPL-MCNC: 0.86 MG/DL — SIGNIFICANT CHANGE UP (ref 0.5–1.3)
CULTURE RESULTS: NO GROWTH — SIGNIFICANT CHANGE UP
EOSINOPHIL # BLD AUTO: 0.42 K/UL — SIGNIFICANT CHANGE UP (ref 0–0.5)
EOSINOPHIL NFR BLD AUTO: 5.9 % — SIGNIFICANT CHANGE UP (ref 0–6)
GLUCOSE SERPL-MCNC: 114 MG/DL — HIGH (ref 70–99)
HCT VFR BLD CALC: 25.1 % — LOW (ref 34.5–45)
HGB BLD-MCNC: 8.1 G/DL — LOW (ref 11.5–15.5)
IMM GRANULOCYTES NFR BLD AUTO: 0.4 % — SIGNIFICANT CHANGE UP (ref 0–1.5)
LYMPHOCYTES # BLD AUTO: 1.49 K/UL — SIGNIFICANT CHANGE UP (ref 1–3.3)
LYMPHOCYTES # BLD AUTO: 21 % — SIGNIFICANT CHANGE UP (ref 13–44)
MCHC RBC-ENTMCNC: 28.7 PG — SIGNIFICANT CHANGE UP (ref 27–34)
MCHC RBC-ENTMCNC: 32.3 GM/DL — SIGNIFICANT CHANGE UP (ref 32–36)
MCV RBC AUTO: 89 FL — SIGNIFICANT CHANGE UP (ref 80–100)
MONOCYTES # BLD AUTO: 0.67 K/UL — SIGNIFICANT CHANGE UP (ref 0–0.9)
MONOCYTES NFR BLD AUTO: 9.4 % — SIGNIFICANT CHANGE UP (ref 2–14)
NEUTROPHILS # BLD AUTO: 4.44 K/UL — SIGNIFICANT CHANGE UP (ref 1.8–7.4)
NEUTROPHILS NFR BLD AUTO: 62.7 % — SIGNIFICANT CHANGE UP (ref 43–77)
NRBC # BLD: 0 /100 WBCS — SIGNIFICANT CHANGE UP (ref 0–0)
PLATELET # BLD AUTO: 256 K/UL — SIGNIFICANT CHANGE UP (ref 150–400)
POTASSIUM SERPL-MCNC: 3.9 MMOL/L — SIGNIFICANT CHANGE UP (ref 3.5–5.3)
POTASSIUM SERPL-SCNC: 3.9 MMOL/L — SIGNIFICANT CHANGE UP (ref 3.5–5.3)
RBC # BLD: 2.82 M/UL — LOW (ref 3.8–5.2)
RBC # FLD: 14.2 % — SIGNIFICANT CHANGE UP (ref 10.3–14.5)
SODIUM SERPL-SCNC: 137 MMOL/L — SIGNIFICANT CHANGE UP (ref 135–145)
SPECIMEN SOURCE: SIGNIFICANT CHANGE UP
WBC # BLD: 7.09 K/UL — SIGNIFICANT CHANGE UP (ref 3.8–10.5)
WBC # FLD AUTO: 7.09 K/UL — SIGNIFICANT CHANGE UP (ref 3.8–10.5)

## 2019-07-15 RX ORDER — DOCUSATE SODIUM 100 MG
1 CAPSULE ORAL
Qty: 0 | Refills: 0 | DISCHARGE
Start: 2019-07-15

## 2019-07-15 RX ORDER — RIVAROXABAN 15 MG-20MG
1 KIT ORAL
Qty: 0 | Refills: 0 | DISCHARGE
Start: 2019-07-15

## 2019-07-15 RX ORDER — ACETAMINOPHEN 500 MG
2 TABLET ORAL
Qty: 0 | Refills: 0 | DISCHARGE
Start: 2019-07-15

## 2019-07-15 RX ORDER — GABAPENTIN 400 MG/1
900 CAPSULE ORAL
Qty: 0 | Refills: 0 | DISCHARGE

## 2019-07-15 RX ORDER — DILTIAZEM HCL 120 MG
1 CAPSULE, EXT RELEASE 24 HR ORAL
Qty: 60 | Refills: 0
Start: 2019-07-15 | End: 2019-08-13

## 2019-07-15 RX ORDER — CYCLOBENZAPRINE HYDROCHLORIDE 10 MG/1
1 TABLET, FILM COATED ORAL
Qty: 0 | Refills: 0 | DISCHARGE
Start: 2019-07-15

## 2019-07-15 RX ORDER — GABAPENTIN 400 MG/1
1 CAPSULE ORAL
Qty: 0 | Refills: 0 | DISCHARGE
Start: 2019-07-15

## 2019-07-15 RX ORDER — DILTIAZEM HCL 120 MG
4 CAPSULE, EXT RELEASE 24 HR ORAL
Qty: 0 | Refills: 0 | DISCHARGE
Start: 2019-07-15

## 2019-07-15 RX ORDER — PETROLATUM,WHITE
1 JELLY (GRAM) TOPICAL
Qty: 0 | Refills: 0 | DISCHARGE
Start: 2019-07-15

## 2019-07-15 RX ORDER — OXYCODONE HYDROCHLORIDE 5 MG/1
1 TABLET ORAL
Qty: 0 | Refills: 0 | DISCHARGE
Start: 2019-07-15

## 2019-07-15 RX ORDER — FOLIC ACID 0.8 MG
1 TABLET ORAL
Qty: 0 | Refills: 0 | DISCHARGE
Start: 2019-07-15

## 2019-07-15 RX ORDER — SENNA PLUS 8.6 MG/1
2 TABLET ORAL
Qty: 0 | Refills: 0 | DISCHARGE
Start: 2019-07-15

## 2019-07-15 RX ORDER — HYDROMORPHONE HYDROCHLORIDE 2 MG/ML
0.5 INJECTION INTRAMUSCULAR; INTRAVENOUS; SUBCUTANEOUS
Qty: 0 | Refills: 0 | DISCHARGE
Start: 2019-07-15

## 2019-07-15 RX ORDER — LANOLIN ALCOHOL/MO/W.PET/CERES
1 CREAM (GRAM) TOPICAL
Qty: 0 | Refills: 0 | DISCHARGE
Start: 2019-07-15

## 2019-07-15 RX ORDER — RIVAROXABAN 15 MG-20MG
15 KIT ORAL
Refills: 0 | Status: DISCONTINUED | OUTPATIENT
Start: 2019-07-15 | End: 2019-07-19

## 2019-07-15 RX ADMIN — Medication 100 MILLIGRAM(S): at 07:14

## 2019-07-15 RX ADMIN — Medication 3 MILLIGRAM(S): at 01:16

## 2019-07-15 RX ADMIN — Medication 100 MILLIGRAM(S): at 05:08

## 2019-07-15 RX ADMIN — Medication 1 TABLET(S): at 21:35

## 2019-07-15 RX ADMIN — OXYCODONE HYDROCHLORIDE 10 MILLIGRAM(S): 5 TABLET ORAL at 22:30

## 2019-07-15 RX ADMIN — Medication 180 MILLIGRAM(S): at 05:07

## 2019-07-15 RX ADMIN — Medication 500 MILLIGRAM(S): at 17:45

## 2019-07-15 RX ADMIN — HYDROMORPHONE HYDROCHLORIDE 0.5 MILLIGRAM(S): 2 INJECTION INTRAMUSCULAR; INTRAVENOUS; SUBCUTANEOUS at 22:57

## 2019-07-15 RX ADMIN — Medication 1 TABLET(S): at 12:24

## 2019-07-15 RX ADMIN — GABAPENTIN 300 MILLIGRAM(S): 400 CAPSULE ORAL at 14:28

## 2019-07-15 RX ADMIN — ENOXAPARIN SODIUM 110 MILLIGRAM(S): 100 INJECTION SUBCUTANEOUS at 01:12

## 2019-07-15 RX ADMIN — GABAPENTIN 300 MILLIGRAM(S): 400 CAPSULE ORAL at 21:35

## 2019-07-15 RX ADMIN — SENNA PLUS 2 TABLET(S): 8.6 TABLET ORAL at 23:01

## 2019-07-15 RX ADMIN — OXYCODONE HYDROCHLORIDE 10 MILLIGRAM(S): 5 TABLET ORAL at 08:00

## 2019-07-15 RX ADMIN — OXYCODONE HYDROCHLORIDE 10 MILLIGRAM(S): 5 TABLET ORAL at 17:44

## 2019-07-15 RX ADMIN — OXYCODONE HYDROCHLORIDE 10 MILLIGRAM(S): 5 TABLET ORAL at 18:45

## 2019-07-15 RX ADMIN — Medication 1 TABLET(S): at 14:28

## 2019-07-15 RX ADMIN — HYDROMORPHONE HYDROCHLORIDE 0.5 MILLIGRAM(S): 2 INJECTION INTRAMUSCULAR; INTRAVENOUS; SUBCUTANEOUS at 05:11

## 2019-07-15 RX ADMIN — Medication 100 MILLIGRAM(S): at 14:28

## 2019-07-15 RX ADMIN — Medication 1 TABLET(S): at 05:07

## 2019-07-15 RX ADMIN — Medication 500 MILLIGRAM(S): at 05:07

## 2019-07-15 RX ADMIN — OXYCODONE HYDROCHLORIDE 10 MILLIGRAM(S): 5 TABLET ORAL at 10:45

## 2019-07-15 RX ADMIN — HYDROMORPHONE HYDROCHLORIDE 0.5 MILLIGRAM(S): 2 INJECTION INTRAMUSCULAR; INTRAVENOUS; SUBCUTANEOUS at 05:25

## 2019-07-15 RX ADMIN — Medication 1 MILLIGRAM(S): at 12:24

## 2019-07-15 RX ADMIN — GABAPENTIN 300 MILLIGRAM(S): 400 CAPSULE ORAL at 05:07

## 2019-07-15 RX ADMIN — OXYCODONE HYDROCHLORIDE 10 MILLIGRAM(S): 5 TABLET ORAL at 11:45

## 2019-07-15 RX ADMIN — OXYCODONE HYDROCHLORIDE 10 MILLIGRAM(S): 5 TABLET ORAL at 21:36

## 2019-07-15 RX ADMIN — HYDROMORPHONE HYDROCHLORIDE 0.5 MILLIGRAM(S): 2 INJECTION INTRAMUSCULAR; INTRAVENOUS; SUBCUTANEOUS at 23:07

## 2019-07-15 RX ADMIN — RIVAROXABAN 15 MILLIGRAM(S): KIT at 14:28

## 2019-07-15 RX ADMIN — OXYCODONE HYDROCHLORIDE 10 MILLIGRAM(S): 5 TABLET ORAL at 07:14

## 2019-07-15 RX ADMIN — Medication 100 MILLIGRAM(S): at 21:36

## 2019-07-15 NOTE — DIETITIAN INITIAL EVALUATION ADULT. - OTHER INFO
Pt reports hx of DM treated c metformin which was discontinued as HbA1/GC% improved c wt. loss.  Pt/spouse did the shopping/cooking PTA.  Diet PTA: CHO controlled.  Pt educated on meal planning c plate method, Low sodium , low fat/ low cholesterol diet. Pt reports hx of DM treated c metformin which was discontinued as HbA1/GC% improved c wt. loss.  Pt/spouse did the shopping/cooking PTA.  Diet PTA: CHO controlled.  Pt educated on meal planning c plate method, Low sodium , low fat/ low cholesterol diet.  Pt is awaiting discharge to Rehab.

## 2019-07-15 NOTE — PROGRESS NOTE ADULT - ASSESSMENT
A/P: 59y F s/p posterior spinal fusion L3-S1, L4-5 TLIF, bilateral hemilaminectomy L5-S1 with discectomy, right hemilaminectomy L3-L4 POD 6    Pt has PE confirmed by CTA, started on therapeutic lovenox, TTE ordered EF 60-65% with no evidence of R heart strain     - will follow medicines recs for recent PE  -JOSE R drains, please record drainage, plan to pull today  -abx while drains in place, dc ABX when drains pulled  -Pain medications modified yesterday pt on Oxycontin q12   -DVT ppx per medicine  -WBAT BL LE with LSO.  -doppler LLE : Negative  -PT/OT  -DC planning  Will d/w attending and advise if plan changes.

## 2019-07-15 NOTE — DIETITIAN INITIAL EVALUATION ADULT. - PERTINENT MEDS FT
MEDICATIONS  (STANDING):  ascorbic acid 500 milliGRAM(s) Oral two times a day  calcium carbonate 1250 mG  + Vitamin D (OsCal 500 + D) 1 Tablet(s) Oral three times a day  docusate sodium 100 milliGRAM(s) Oral three times a day  folic acid 1 milliGRAM(s) Oral daily  gabapentin 300 milliGRAM(s) Oral three times a day  multivitamin 1 Tablet(s) Oral daily  oxyCODONE  ER Tablet 10 milliGRAM(s) Oral every 12 hours  rivaroxaban 15 milliGRAM(s) Oral two times a day with meals  senna 2 Tablet(s) Oral at bedtime  sugammadex Injectable 450 milliGRAM(s) IV Push once  verapamil  milliGRAM(s) Oral daily    MEDICATIONS  (PRN):  acetaminophen   Tablet .. 650 milliGRAM(s) Oral every 6 hours PRN Temp greater or equal to 38C (100.4F), Mild Pain (1 - 3)  ALPRAZolam 0.5 milliGRAM(s) Oral daily PRN anxiety  benzocaine 15 mG/menthol 3.6 mG Lozenge 1 Lozenge Oral every 3 hours PRN Sore Throat  cyclobenzaprine 10 milliGRAM(s) Oral three times a day PRN Muscle Spasm  diltiazem Injectable 20 milliGRAM(s) IV Push every 4 hours PRN only if HR greater than 120 and SBP maintained greater than 110  HYDROmorphone  Injectable 0.5 milliGRAM(s) IV Push every 4 hours PRN Severe Pain (7 - 10)  melatonin 3 milliGRAM(s) Oral at bedtime PRN Sleep  naloxone Injectable 0.1 milliGRAM(s) IV Push every 3 minutes PRN For ANY of the following changes in patient status:  A. RR LESS THAN 10 breaths per minute, B. Oxygen saturation LESS THAN 90%, C. Sedation score of 6  ondansetron Injectable 4 milliGRAM(s) IV Push every 4 hours PRN Nausea  oxyCODONE    IR 5 milliGRAM(s) Oral every 4 hours PRN Moderate Pain (4 - 6)  oxyCODONE    IR 10 milliGRAM(s) Oral every 4 hours PRN Severe Pain (7 - 10)  petrolatum white Ointment 1 Application(s) Topical daily PRN dry lips

## 2019-07-15 NOTE — PROGRESS NOTE ADULT - SUBJECTIVE AND OBJECTIVE BOX
59y old  Female who presents with a chief complaint of left foot drop (15 Jul 2019 06:19)      Review of Systems:    General:  No wt loss, fevers, chills, night sweats  ENT:  No sore throat, pain, runny nose, dysphagia  CV:  No pain, palpitations, hypo/hypertension  Resp:  No dyspnea, cough, tachypnea, wheezing      Discussed with:  PMD, Family    Physical Exam:    Vital Signs:  Vital Signs Last 24 Hrs  T(C): 37.3 (15 Jul 2019 11:20), Max: 37.3 (15 Jul 2019 11:20)  T(F): 99.2 (15 Jul 2019 11:20), Max: 99.2 (15 Jul 2019 11:20)  HR: 104 (15 Jul 2019 11:20) (104 - 132)  BP: 100/72 (15 Jul 2019 11:20) (100/72 - 140/100)  BP(mean): --  RR: 16 (15 Jul 2019 11:20) (14 - 19)  SpO2: 96% (15 Jul 2019 11:20) (94% - 100%)  Daily     Daily   I&O's Summary    2019 07:01  -  15 Jul 2019 07:00  --------------------------------------------------------  IN: 630 mL / OUT: 105 mL / NET: 525 mL          Chest:  Full & symmetric excursion, no increased effort, breath sounds clear  Cardiovascular:  Regular rhythm, S1, S2, no murmur/rub/S3/S4, no carotid/femoral/abdominal bruit, radial/pedal pulses 2+,   Abdomen:  Soft, non-tender, non-distended, normoactive bowel sounds, no HSM      Laboratory:                          8.1    7.09  )-----------( 256      ( 15 Jul 2019 06:23 )             25.1     07-15    137  |  99  |  8   ----------------------------<  114<H>  3.9   |  33<H>  |  0.86    Ca    8.6      15 Jul 2019 06:23        Urinalysis Basic - ( 2019 15:15 )    Color: Yellow / Appearance: Clear / S.015 / pH: x  Gluc: x / Ketone: Trace  / Bili: Negative / Urobili: Negative mg/dL   Blood: x / Protein: 30 mg/dL / Nitrite: Negative   Leuk Esterase: Trace / RBC: 0-2 /HPF / WBC 0-2   Sq Epi: x / Non Sq Epi: Few / Bacteria: Few      Assessment:  Post spine surgery  PE noted, hypoxia  TTE completed, essentially normal  Lovenox to Xarelto

## 2019-07-15 NOTE — DIETITIAN INITIAL EVALUATION ADULT. - ENERGY NEEDS
Height: 5'8'/ 172.72 cm   IBW:  63.5 kg         % IBW:  180%          UBW:  136 kg           %UBW: 84%

## 2019-07-15 NOTE — PROGRESS NOTE ADULT - SUBJECTIVE AND OBJECTIVE BOX
Patient seen and examined at bedside this am. Doing well. Progressing slowly. Plans for drains out this morning.    Vital Signs Last 24 Hrs  T(C): 36.6 (15 Jul 2019 05:00), Max: 37.9 (14 Jul 2019 11:32)  T(F): 97.9 (15 Jul 2019 05:00), Max: 100.3 (14 Jul 2019 11:32)  HR: 111 (15 Jul 2019 05:00) (109 - 132)  BP: 133/71 (15 Jul 2019 05:00) (109/70 - 140/100)  BP(mean): --  RR: 18 (15 Jul 2019 05:00) (14 - 19)  SpO2: 95% (15 Jul 2019 05:00) (92% - 100%)    GEN: NAD     Spine:  Dsg c/d/i  JOSE R Drain x 2 in place  SILT C5-T1 & L2-S1  Distal pulses intact  Soft compartments  Motor:               C5           C6            C7               C8           T1   R         5/5          5/5            5/5             5/5          5/5  L          5/5          5/5            5/5             5/5          5/5                L2             L3             L4               L5            S1  R         5/5           5/5          5/5             5/5           5/5  L          4/5          4/5           4/5             4/5           4/5    Sensory:            C5         C6         C7      C8       T1        (0=absent, 1=impaired, 2=normal, NT=not testable)  R         2            2           2        2         2  L          2            2           2        2         2               L2          L3         L4      L5       S1         (0=absent, 1=impaired, 2=normal, NT=not testable)  R         2            2            2        2        2  L          2            2           2        2         2

## 2019-07-15 NOTE — PROGRESS NOTE ADULT - SUBJECTIVE AND OBJECTIVE BOX
INTERVAL HPI:    59 yr old female with HTN,  SLE, GB syndrome with neuropathy, S/P left hip replacement for avascular necrosis, Chronic left foot drop and Obesity.  With lower back pain and leg weakness that has increased with numbness down left leg - back MRI noted L3-S1 broad based disc herniations. Sent from [facility] for spinal surgery with Dr. An.   19: Lumbar spinal fusion 2019 19:46:38  Marvin Jennings.  Post Op with persistent tachycardia, found to have left PE.  Non smoker.    OVERNIGHT EVENTS:  Complains of back pain.    Vital Signs Last 24 Hrs  T(C): 37.1 (15 Jul 2019 20:57), Max: 37.3 (15 Jul 2019 11:20)  T(F): 98.8 (15 Jul 2019 20:57), Max: 99.2 (15 Jul 2019 11:20)  HR: 98 (15 Jul 2019 20:57) (98 - 128)  BP: 128/67 (15 Jul 2019 20:57) (100/72 - 133/71)  BP(mean): --  RR: 15 (15 Jul 2019 20:57) (15 - 18)  SpO2: 98% (15 Jul 2019 20:57) (95% - 100%)    PHYSICAL EXAM:  GEN:         Awake, responsive and comfortable.  HEENT:    Normal.    RESP:       no wheezing.  CVS:          Regular rate and rhythm.     MEDICATIONS  (STANDING):  ascorbic acid 500 milliGRAM(s) Oral two times a day  calcium carbonate 1250 mG  + Vitamin D (OsCal 500 + D) 1 Tablet(s) Oral three times a day  docusate sodium 100 milliGRAM(s) Oral three times a day  folic acid 1 milliGRAM(s) Oral daily  gabapentin 300 milliGRAM(s) Oral three times a day  multivitamin 1 Tablet(s) Oral daily  oxyCODONE  ER Tablet 10 milliGRAM(s) Oral every 12 hours  rivaroxaban 15 milliGRAM(s) Oral two times a day with meals  senna 2 Tablet(s) Oral at bedtime  sugammadex Injectable 450 milliGRAM(s) IV Push once  verapamil  milliGRAM(s) Oral daily    MEDICATIONS  (PRN):  acetaminophen   Tablet .. 650 milliGRAM(s) Oral every 6 hours PRN Temp greater or equal to 38C (100.4F), Mild Pain (1 - 3)  ALPRAZolam 0.5 milliGRAM(s) Oral daily PRN anxiety  benzocaine 15 mG/menthol 3.6 mG Lozenge 1 Lozenge Oral every 3 hours PRN Sore Throat  cyclobenzaprine 10 milliGRAM(s) Oral three times a day PRN Muscle Spasm  diltiazem Injectable 20 milliGRAM(s) IV Push every 4 hours PRN only if HR greater than 120 and SBP maintained greater than 110  HYDROmorphone  Injectable 0.5 milliGRAM(s) IV Push every 4 hours PRN Severe Pain (7 - 10)  melatonin 3 milliGRAM(s) Oral at bedtime PRN Sleep  naloxone Injectable 0.1 milliGRAM(s) IV Push every 3 minutes PRN For ANY of the following changes in patient status:  A. RR LESS THAN 10 breaths per minute, B. Oxygen saturation LESS THAN 90%, C. Sedation score of 6  ondansetron Injectable 4 milliGRAM(s) IV Push every 4 hours PRN Nausea  oxyCODONE    IR 5 milliGRAM(s) Oral every 4 hours PRN Moderate Pain (4 - 6)  oxyCODONE    IR 10 milliGRAM(s) Oral every 4 hours PRN Severe Pain (7 - 10)  petrolatum white Ointment 1 Application(s) Topical daily PRN dry lips    LABS:                        8.1    7.09  )-----------( 256      ( 15 Jul 2019 06:23 )             25.1     07-15    137  |  99  |  8   ----------------------------<  114<H>  3.9   |  33<H>  |  0.86    Ca    8.6      15 Jul 2019 06:23    Urinalysis Basic - ( 2019 15:15 )    Color: Yellow / Appearance: Clear / S.015 / pH: x  Gluc: x / Ketone: Trace  / Bili: Negative / Urobili: Negative mg/dL   Blood: x / Protein: 30 mg/dL / Nitrite: Negative   Leuk Esterase: Trace / RBC: 0-2 /HPF / WBC 0-2   Sq Epi: x / Non Sq Epi: Few / Bacteria: Few    ASSESSMENT AND PLAN:  ·	Left pulmonary embolism.  ·	Tachycardia.  ·	S/P lumber spinal fusion.  ·	Anemia.  ·	HTN.  ·	SLE.  ·	GBS history with Neuropathy.  ·	Obesity.    Changed to Xarelto.  Discussed with .

## 2019-07-15 NOTE — DIETITIAN INITIAL EVALUATION ADULT. - PERTINENT LABORATORY DATA
07-15   Hgb 8.1 g/dL<L> Hct 25.1 %<L>  07-15 Na137 mmol/L Glu 114 mg/dL<H> K+ 3.9 mmol/L Cr  0.86 mg/dL BUN 8 mg/dL 07-13 Phos 4.0 mg/dL 07-13 Alb 2.4 g/dL<L>07-13 ALT 48 U/L AST 94 U/L<H> Alkaline Phosphatase 71 U/L

## 2019-07-15 NOTE — DIETITIAN INITIAL EVALUATION ADULT. - NS FNS CHANGE IN WEIGHT
Patient Instructions by Briana Johnson APN at 07/10/17 09:09 AM     Author:  Briana Johnson APN Service:  (none) Author Type:  Nurse Practitioner     Filed:  07/10/17 09:09 AM Encounter Date:  7/10/2017 Status:  Signed     :  Briana Johnson APN (Nurse Practitioner)            PATIENT INFORMATION   Please follow up:  Make an appointment for 1 month(s).     Please feel free to contact our office at [phone] with any additional concerns.    Thank you for allowing us to serve you today!     Additional Educational Resources:  For additional resources regarding your symptoms, diagnosis, or further health information, please visit the Health Resources section on [email or web address] or the Online Health Resources section in [email or web address].          Revision History        User Key Date/Time User Provider Type Action    > [N/A] 07/10/17 09:09 AM Briana Johnson APN Nurse Practitioner Sign            
Loss

## 2019-07-15 NOTE — DIETITIAN INITIAL EVALUATION ADULT. - FACTORS AFF FOOD INTAKE
depressed oral intake due constipation from meds, advised to try prune/prune juice, warm fluids/other (specify)

## 2019-07-16 DIAGNOSIS — R50.9 FEVER, UNSPECIFIED: ICD-10-CM

## 2019-07-16 LAB
ANION GAP SERPL CALC-SCNC: 5 MMOL/L — SIGNIFICANT CHANGE UP (ref 5–17)
BASOPHILS # BLD AUTO: 0.02 K/UL — SIGNIFICANT CHANGE UP (ref 0–0.2)
BASOPHILS NFR BLD AUTO: 0.3 % — SIGNIFICANT CHANGE UP (ref 0–2)
BUN SERPL-MCNC: 8 MG/DL — SIGNIFICANT CHANGE UP (ref 7–23)
CALCIUM SERPL-MCNC: 8.4 MG/DL — LOW (ref 8.5–10.1)
CHLORIDE SERPL-SCNC: 98 MMOL/L — SIGNIFICANT CHANGE UP (ref 96–108)
CO2 SERPL-SCNC: 32 MMOL/L — HIGH (ref 22–31)
CREAT SERPL-MCNC: 0.87 MG/DL — SIGNIFICANT CHANGE UP (ref 0.5–1.3)
EOSINOPHIL # BLD AUTO: 0.38 K/UL — SIGNIFICANT CHANGE UP (ref 0–0.5)
EOSINOPHIL NFR BLD AUTO: 4.9 % — SIGNIFICANT CHANGE UP (ref 0–6)
GLUCOSE SERPL-MCNC: 105 MG/DL — HIGH (ref 70–99)
HCT VFR BLD CALC: 26 % — LOW (ref 34.5–45)
HGB BLD-MCNC: 8.2 G/DL — LOW (ref 11.5–15.5)
IMM GRANULOCYTES NFR BLD AUTO: 0.5 % — SIGNIFICANT CHANGE UP (ref 0–1.5)
LYMPHOCYTES # BLD AUTO: 1.53 K/UL — SIGNIFICANT CHANGE UP (ref 1–3.3)
LYMPHOCYTES # BLD AUTO: 19.6 % — SIGNIFICANT CHANGE UP (ref 13–44)
MCHC RBC-ENTMCNC: 27.7 PG — SIGNIFICANT CHANGE UP (ref 27–34)
MCHC RBC-ENTMCNC: 31.5 GM/DL — LOW (ref 32–36)
MCV RBC AUTO: 87.8 FL — SIGNIFICANT CHANGE UP (ref 80–100)
MONOCYTES # BLD AUTO: 0.61 K/UL — SIGNIFICANT CHANGE UP (ref 0–0.9)
MONOCYTES NFR BLD AUTO: 7.8 % — SIGNIFICANT CHANGE UP (ref 2–14)
NEUTROPHILS # BLD AUTO: 5.24 K/UL — SIGNIFICANT CHANGE UP (ref 1.8–7.4)
NEUTROPHILS NFR BLD AUTO: 66.9 % — SIGNIFICANT CHANGE UP (ref 43–77)
NRBC # BLD: 0 /100 WBCS — SIGNIFICANT CHANGE UP (ref 0–0)
PLATELET # BLD AUTO: 330 K/UL — SIGNIFICANT CHANGE UP (ref 150–400)
POTASSIUM SERPL-MCNC: 4 MMOL/L — SIGNIFICANT CHANGE UP (ref 3.5–5.3)
POTASSIUM SERPL-SCNC: 4 MMOL/L — SIGNIFICANT CHANGE UP (ref 3.5–5.3)
RBC # BLD: 2.96 M/UL — LOW (ref 3.8–5.2)
RBC # FLD: 14.2 % — SIGNIFICANT CHANGE UP (ref 10.3–14.5)
SODIUM SERPL-SCNC: 135 MMOL/L — SIGNIFICANT CHANGE UP (ref 135–145)
WBC # BLD: 7.82 K/UL — SIGNIFICANT CHANGE UP (ref 3.8–10.5)
WBC # FLD AUTO: 7.82 K/UL — SIGNIFICANT CHANGE UP (ref 3.8–10.5)

## 2019-07-16 PROCEDURE — 71045 X-RAY EXAM CHEST 1 VIEW: CPT | Mod: 26

## 2019-07-16 RX ORDER — POLYETHYLENE GLYCOL 3350 17 G/17G
17 POWDER, FOR SOLUTION ORAL ONCE
Refills: 0 | Status: DISCONTINUED | OUTPATIENT
Start: 2019-07-16 | End: 2019-07-19

## 2019-07-16 RX ADMIN — Medication 100 MILLIGRAM(S): at 05:20

## 2019-07-16 RX ADMIN — Medication 3 MILLIGRAM(S): at 21:55

## 2019-07-16 RX ADMIN — OXYCODONE HYDROCHLORIDE 10 MILLIGRAM(S): 5 TABLET ORAL at 13:37

## 2019-07-16 RX ADMIN — GABAPENTIN 300 MILLIGRAM(S): 400 CAPSULE ORAL at 13:54

## 2019-07-16 RX ADMIN — OXYCODONE HYDROCHLORIDE 10 MILLIGRAM(S): 5 TABLET ORAL at 17:38

## 2019-07-16 RX ADMIN — GABAPENTIN 300 MILLIGRAM(S): 400 CAPSULE ORAL at 21:54

## 2019-07-16 RX ADMIN — RIVAROXABAN 15 MILLIGRAM(S): KIT at 02:15

## 2019-07-16 RX ADMIN — GABAPENTIN 300 MILLIGRAM(S): 400 CAPSULE ORAL at 05:22

## 2019-07-16 RX ADMIN — SENNA PLUS 2 TABLET(S): 8.6 TABLET ORAL at 21:59

## 2019-07-16 RX ADMIN — Medication 650 MILLIGRAM(S): at 17:35

## 2019-07-16 RX ADMIN — Medication 100 MILLIGRAM(S): at 21:54

## 2019-07-16 RX ADMIN — Medication 1 MILLIGRAM(S): at 12:44

## 2019-07-16 RX ADMIN — OXYCODONE HYDROCHLORIDE 10 MILLIGRAM(S): 5 TABLET ORAL at 04:30

## 2019-07-16 RX ADMIN — Medication 650 MILLIGRAM(S): at 18:24

## 2019-07-16 RX ADMIN — HYDROMORPHONE HYDROCHLORIDE 0.5 MILLIGRAM(S): 2 INJECTION INTRAMUSCULAR; INTRAVENOUS; SUBCUTANEOUS at 13:00

## 2019-07-16 RX ADMIN — HYDROMORPHONE HYDROCHLORIDE 0.5 MILLIGRAM(S): 2 INJECTION INTRAMUSCULAR; INTRAVENOUS; SUBCUTANEOUS at 12:43

## 2019-07-16 RX ADMIN — OXYCODONE HYDROCHLORIDE 10 MILLIGRAM(S): 5 TABLET ORAL at 06:20

## 2019-07-16 RX ADMIN — Medication 650 MILLIGRAM(S): at 05:20

## 2019-07-16 RX ADMIN — Medication 1 TABLET(S): at 21:55

## 2019-07-16 RX ADMIN — OXYCODONE HYDROCHLORIDE 10 MILLIGRAM(S): 5 TABLET ORAL at 18:24

## 2019-07-16 RX ADMIN — OXYCODONE HYDROCHLORIDE 10 MILLIGRAM(S): 5 TABLET ORAL at 14:24

## 2019-07-16 RX ADMIN — Medication 180 MILLIGRAM(S): at 05:21

## 2019-07-16 RX ADMIN — CYCLOBENZAPRINE HYDROCHLORIDE 10 MILLIGRAM(S): 10 TABLET, FILM COATED ORAL at 21:54

## 2019-07-16 RX ADMIN — HYDROMORPHONE HYDROCHLORIDE 0.5 MILLIGRAM(S): 2 INJECTION INTRAMUSCULAR; INTRAVENOUS; SUBCUTANEOUS at 22:09

## 2019-07-16 RX ADMIN — RIVAROXABAN 15 MILLIGRAM(S): KIT at 17:38

## 2019-07-16 RX ADMIN — Medication 1 TABLET(S): at 12:44

## 2019-07-16 RX ADMIN — OXYCODONE HYDROCHLORIDE 10 MILLIGRAM(S): 5 TABLET ORAL at 03:34

## 2019-07-16 RX ADMIN — Medication 1 TABLET(S): at 13:54

## 2019-07-16 RX ADMIN — Medication 100 MILLIGRAM(S): at 13:54

## 2019-07-16 RX ADMIN — Medication 500 MILLIGRAM(S): at 05:20

## 2019-07-16 RX ADMIN — Medication 500 MILLIGRAM(S): at 17:38

## 2019-07-16 RX ADMIN — OXYCODONE HYDROCHLORIDE 10 MILLIGRAM(S): 5 TABLET ORAL at 05:20

## 2019-07-16 RX ADMIN — HYDROMORPHONE HYDROCHLORIDE 0.5 MILLIGRAM(S): 2 INJECTION INTRAMUSCULAR; INTRAVENOUS; SUBCUTANEOUS at 21:54

## 2019-07-16 RX ADMIN — Medication 1 TABLET(S): at 05:22

## 2019-07-16 NOTE — PROGRESS NOTE ADULT - SUBJECTIVE AND OBJECTIVE BOX
Patient seen and examined at bedside this am. Doing well. No acute events overnight.    Vital Signs Last 24 Hrs  T(C): 38.9 (16 Jul 2019 05:00), Max: 38.9 (16 Jul 2019 05:00)  T(F): 102 (16 Jul 2019 05:00), Max: 102 (16 Jul 2019 05:00)  HR: 111 (16 Jul 2019 05:00) (98 - 111)  BP: 134/75 (16 Jul 2019 05:00) (100/72 - 134/75)  BP(mean): --  RR: 17 (16 Jul 2019 05:00) (15 - 17)  SpO2: 95% (16 Jul 2019 05:00) (95% - 100%)  GEN: NAD     Spine:  Dsg c/d/i  SILT C5-T1 & L2-S1  Distal pulses intact  Soft compartments  Motor:               C5           C6            C7               C8           T1   R         5/5          5/5            5/5             5/5          5/5  L          5/5          5/5            5/5             5/5          5/5                L2             L3             L4               L5            S1  R         5/5           5/5          5/5             5/5           5/5  L          4/5          4/5           4/5             4/5           4/5    Sensory:            C5         C6         C7      C8       T1        (0=absent, 1=impaired, 2=normal, NT=not testable)  R         2            2           2        2         2  L          2            2           2        2         2               L2          L3         L4      L5       S1         (0=absent, 1=impaired, 2=normal, NT=not testable)  R         2            2            2        2        2  L          2            2           2        2         2

## 2019-07-16 NOTE — PROGRESS NOTE ADULT - SUBJECTIVE AND OBJECTIVE BOX
59y old  Female who presents with a chief complaint of left foot drop (15 Jul 2019 06:19)      Review of Systems:    General:  No wt loss, fevers, chills, night sweats  ENT:  No sore throat, pain, runny nose, dysphagia  CV:  No pain, palpitations, hypo/hypertension  Resp:  No dyspnea, cough, tachypnea, wheezing      Discussed with:  PMD, Family    Physical Exam:    Vital Signs:  Vital Signs Last 24 Hrs  T(C): 37.3 (15 Jul 2019 11:20), Max: 37.3 (15 Jul 2019 11:20)  T(F): 99.2 (15 Jul 2019 11:20), Max: 99.2 (15 Jul 2019 11:20)  HR: 104 (15 Jul 2019 11:20) (104 - 132)  BP: 100/72 (15 Jul 2019 11:20) (100/72 - 140/100)  BP(mean): --  RR: 16 (15 Jul 2019 11:20) (14 - 19)  SpO2: 96% (15 Jul 2019 11:20) (94% - 100%)  Daily     Daily   I&O's Summary    2019 07:01  -  15 Jul 2019 07:00  --------------------------------------------------------  IN: 630 mL / OUT: 105 mL / NET: 525 mL          Chest:  Full & symmetric excursion, no increased effort, breath sounds clear  Cardiovascular:  Regular rhythm, S1, S2, no murmur/rub/S3/S4, no carotid/femoral/abdominal bruit, radial/pedal pulses 2+,   Abdomen:  Soft, non-tender, non-distended, normoactive bowel sounds, no HSM      Laboratory:                          8.1    7.09  )-----------( 256      ( 15 Jul 2019 06:23 )             25.1     07-15    137  |  99  |  8   ----------------------------<  114<H>  3.9   |  33<H>  |  0.86    Ca    8.6      15 Jul 2019 06:23        Urinalysis Basic - ( 2019 15:15 )    Color: Yellow / Appearance: Clear / S.015 / pH: x  Gluc: x / Ketone: Trace  / Bili: Negative / Urobili: Negative mg/dL   Blood: x / Protein: 30 mg/dL / Nitrite: Negative   Leuk Esterase: Trace / RBC: 0-2 /HPF / WBC 0-2   Sq Epi: x / Non Sq Epi: Few / Bacteria: Few      Assessment:  Post spine surgery  PE noted, hypoxia  TTE completed, essentially normal  Lovenox to Xarelto  Now with fevers,   PULM and now ID called

## 2019-07-16 NOTE — PROGRESS NOTE ADULT - SUBJECTIVE AND OBJECTIVE BOX
Patient is a 59y old  Female who presents with a chief complaint of left foot drop, leg weakness, intractable severe pain (2019 09:02)      INTERVAL HPI/OVERNIGHT EVENTS:  pt had Temp 102  MEDICATIONS  (STANDING):  ascorbic acid 500 milliGRAM(s) Oral two times a day  calcium carbonate 1250 mG  + Vitamin D (OsCal 500 + D) 1 Tablet(s) Oral three times a day  docusate sodium 100 milliGRAM(s) Oral three times a day  folic acid 1 milliGRAM(s) Oral daily  gabapentin 300 milliGRAM(s) Oral three times a day  multivitamin 1 Tablet(s) Oral daily  oxyCODONE  ER Tablet 10 milliGRAM(s) Oral every 12 hours  rivaroxaban 15 milliGRAM(s) Oral two times a day with meals  senna 2 Tablet(s) Oral at bedtime  sugammadex Injectable 450 milliGRAM(s) IV Push once  verapamil  milliGRAM(s) Oral daily    MEDICATIONS  (PRN):  acetaminophen   Tablet .. 650 milliGRAM(s) Oral every 6 hours PRN Temp greater or equal to 38C (100.4F), Mild Pain (1 - 3)  ALPRAZolam 0.5 milliGRAM(s) Oral daily PRN anxiety  benzocaine 15 mG/menthol 3.6 mG Lozenge 1 Lozenge Oral every 3 hours PRN Sore Throat  cyclobenzaprine 10 milliGRAM(s) Oral three times a day PRN Muscle Spasm  diltiazem Injectable 20 milliGRAM(s) IV Push every 4 hours PRN only if HR greater than 120 and SBP maintained greater than 110  HYDROmorphone  Injectable 0.5 milliGRAM(s) IV Push every 4 hours PRN Severe Pain (7 - 10)  melatonin 3 milliGRAM(s) Oral at bedtime PRN Sleep  naloxone Injectable 0.1 milliGRAM(s) IV Push every 3 minutes PRN For ANY of the following changes in patient status:  A. RR LESS THAN 10 breaths per minute, B. Oxygen saturation LESS THAN 90%, C. Sedation score of 6  ondansetron Injectable 4 milliGRAM(s) IV Push every 4 hours PRN Nausea  oxyCODONE    IR 5 milliGRAM(s) Oral every 4 hours PRN Moderate Pain (4 - 6)  oxyCODONE    IR 10 milliGRAM(s) Oral every 4 hours PRN Severe Pain (7 - 10)  petrolatum white Ointment 1 Application(s) Topical daily PRN dry lips      Allergies    No Known Allergies    Intolerances        REVIEW OF SYSTEMS:  CONSTITUTIONAL: No fever, weight loss, or fatigue  EYES: No eye pain, visual disturbances, or discharge  ENMT:  No difficulty hearing, tinnitus, vertigo; No sinus or throat pain  NECK: No pain or stiffness  BREASTS: No pain, masses, or nipple discharge  RESPIRATORY: No cough, wheezing, chills or hemoptysis; No shortness of breath  CARDIOVASCULAR: No chest pain, palpitations, dizziness, or leg swelling  GASTROINTESTINAL: No abdominal or epigastric pain. No nausea, vomiting, or hematemesis; No diarrhea or constipation. No melena or hematochezia.  GENITOURINARY: No dysuria, frequency, hematuria, or incontinence  NEUROLOGICAL: No headaches, memory loss, loss of strength, numbness, or tremors  SKIN: No itching, burning, rashes, or lesions   LYMPH NODES: No enlarged glands  ENDOCRINE: No heat or cold intolerance; No hair loss  MUSCULOSKELETAL: No joint pain or swelling; No muscle, back, or extremity pain  PSYCHIATRIC: No depression, anxiety, mood swings, or difficulty sleeping  HEME/LYMPH: No easy bruising, or bleeding gums  ALLERGY AND IMMUNOLOGIC: No hives or eczema    Vital Signs Last 24 Hrs  T(C): 37.9 (2019 12:27), Max: 38.9 (2019 05:00)  T(F): 100.2 (2019 12:27), Max: 102 (2019 05:00)  HR: 103 (2019 12:27) (98 - 111)  BP: 139/76 (2019 12:27) (108/55 - 139/76)  BP(mean): --  RR: 17 (2019 12:27) (15 - 17)  SpO2: 97% (2019 12:27) (95% - 100%)    PHYSICAL EXAM:  GENERAL: NAD, well-groomed, well-developed  HEAD:  Atraumatic, Normocephalic  EYES: EOMI, PERRLA, conjunctiva and sclera clear  ENMT: No tonsillar erythema, exudates, or enlargement; Moist mucous membranes, Good dentition, No lesions  NECK: Supple, No JVD, Normal thyroid  NERVOUS SYSTEM:  Alert & Oriented X3, Good concentration; Motor Strength 5/5 B/L upper and lower extremities; DTRs 2+ intact and symmetric  CHEST/LUNG: Clear to percussion bilaterally; No rales, rhonchi, wheezing, or rubs  HEART: Regular rate and rhythm; No murmurs, rubs, or gallops  ABDOMEN: Soft, Nontender, Nondistended; Bowel sounds present  EXTREMITIES:  2+ Peripheral Pulses, No clubbing, cyanosis, or edema  LYMPH: No lymphadenopathy noted  SKIN: No rashes or lesions    LABS:                        8.2    7.82  )-----------( 330      ( 2019 06:23 )             26.0     -    135  |  98  |  8   ----------------------------<  105<H>  4.0   |  32<H>  |  0.87    Ca    8.4<L>      2019 06:23        Urinalysis Basic - ( 2019 15:15 )    Color: Yellow / Appearance: Clear / S.015 / pH: x  Gluc: x / Ketone: Trace  / Bili: Negative / Urobili: Negative mg/dL   Blood: x / Protein: 30 mg/dL / Nitrite: Negative   Leuk Esterase: Trace / RBC: 0-2 /HPF / WBC 0-2   Sq Epi: x / Non Sq Epi: Few / Bacteria: Few      CAPILLARY BLOOD GLUCOSE        CULTURES:  Culture Results:   No growth ( @ 16:54)    HEMOGLOBIN A1C:    CHOLESTEROL:        RADIOLOGY & ADDITIONAL TESTS:

## 2019-07-16 NOTE — PROGRESS NOTE ADULT - SUBJECTIVE AND OBJECTIVE BOX
[facility] [place], [place]  Operative Report  Date of Surgery: 07/09/19  Patient: dena Thompson  Surgeon: Mati nA DO – Orthopedic Surgery Attending  Assistant: Orthopedic Resident Dr. Jennings  Dictation:   Preop Diagnosis: Leg weakness, acute foot drop on top of chronic leg weakness, Lumbar Disc Herniation L5/S1, l3/4 and L4/5, Lumbar Stenosis L3/4, L4/5, L5/S1, lumbar radiculopathy, severe back pain, unable to ambulate even after 3 weeks of rehab.    Post op Diagnosis: as above  Procedure Summary:   L5/S1, L4/5 an L3/4 Partial Facetectomy on right side.    Complete facetectomy with henna osteotomy at L4/5 to help reduce spondylolisthesis at left L4/5  Partial facetectomy left L3/4 and left L4/5  Hemilaminectomy and foramintomy Right L3  Hemilaminectomy and foraminotomy Right L4  Hemilaminectomy and foraminotomy Right L5  Hemilaminectomy and foraminotomy Right S1  Hemilaminectomy and foraminotomy Left L5  Hemilaminectomy and foraminotomy Left S1  Hemilaminectomy and foraminotomy Left L4  Subtotal discectomy Left L4/5  Microdiscectomy Left L5/S1  Segmental Instrumentation L3/4/5/S1 with Pedicle Screw Insertion and Interconnecting bilateral rods  Arthrodesis posterolateral L3/4/L5/S1  Arthrodesis interbody L4/5 with biomechanical cage  Local autograft Harvestation  Flouroscopy  Allograft, BMP    Anesthesia: General Endotracheal Intubation    Positioning: Prone on Reynold Table with Neto frame    Complication None:    Procedure in Detail:  Preoperative Consent was obtained.  Patient has severe back pain and leg pain. She has developed progressive leg weakness after her fall and is not refractory to inpatient rehabilitation treatment.  she has also developed foot drop on the left leg that is not improving.  She is still not able to walk and stand safely with inpatient rehabilitation.  Due to foot drop, lack of improvement, patient was sent to ER for repeat evaluation.  Therefore, surgical treatment was offered due to lack of improvement and acute foot drop on top of chronic leg weakness.      Patient has significant bilateral leg pain.  She reports overall pain score is 8/10 – 10/10 on most days.  Due to severe back pain, with lumbar instability and multilevel disc herniations lumbar fusion was added to her surgical treatment.  Extensive discussion regarding patient’s diagnosis, imaging, operative and non-operative options discussed with patient over multiple visits.  All questions are answered.  Informed consent in obtained.  No guarantees implied.  Goals are improvement in pain and quality of life.    Patient received general anesthesia.  Neuromonitoring devices were placed by neuromonitoring service.  Zamora catheter was inserted. Patient was placed prone on the operative table.  All bony prominences were padded.  Neuromonitoring signals were confirmed.  Patient received preoperative antibiotics.      Patient’s lumbar spine was prepped and draped in sterile manner.  Flouroscopy was used to confirm the location of the pedicle. Midline incision were made and lamina of L3, L4, L5 and S1 exposed.  Interspaced confirmed with xray.  Subsequently, facet joints and Transverse processes was exposed from L3, L4, L5-S1 for posterolateral fusion.  Severe facet hypertrophy is noted at L4/5 level leading to the lumbar spondylolisthesis.  This can potentially explain the severe back pain after injury as a cause of additional severe pain.  Decortication of pars, facets, dorsal lamina and transverse processes was performed until cancellous bone seen to prepare bed for posterolateral fusion bilateral.     Each pedicle was then tapped and probed.  Biplanar flouroscope was used to confirm instrument positioning.   Pediguard probe was used prepare the pedicles. Subsequently 6.5*40mm and 6.5*45mm Nuvasive pedicle screws were instrumented from L3-S1 due to excellent bone quality otherwise.  At this point wound was irrigated.  Midline interspinous ligament was perserved to prevent any additional instability of spine and part of minimum invasive techniques.     Partial facetectomies were performed to improve alignment, restore lordosis and develop decorticated bone bed for fusion L3-S1 bilateral.     Decompression  First right sided decompression performed L3, L4 and L5 and S1    Laminectomy of S1 and superior hemilaminectomy of L5 to adequately decompress the exiting nerves on the right side.  Next dura was mobilized over the disc-space.  Broad based degenerative bulge noted at L5-s1.  Once foraminotomy and laminotomy was performed nerve was noted to be free free and lateral recess decompressed.  Bipolar was used to maintain coagulation.     Next Right sided laminectomy and foraminotomy L3, L4    Laminectomy of S1 and superior hemilaminectomy of L5 to adequately decompress the exiting nerves on the right side.  Next dura was mobilized over the disc-space.  Broad based degenerative bulge noted at L5-s1.  Once foraminotomy and laminotomy was performed nerve was noted to be free free and lateral recess decompressed.  Bipolar was used to maintain coagulation.     Next Left sided decompression performed L5 and S1 with microdiscectomy    Laminectomy of S1 and superior hemilaminectomy of L5 to adequately decompress the exiting nerves on the right side.  Next dura was mobilized over the disc-space.  Broad based degenerative bulge noted at L5-s1.  Once foraminotomy and laminotomy was performed nerve was noted to be free free and lateral recess decompressed.  Bipolar was used to maintain coagulation.   Annulotomy was performed and then herniated disc-was decompressed.  No pressure on the nerve roots noted.   Partial facetectomies performed bilateral L5/S1. Nabb was used to confirm adequate foraminal space as well.    Next Left sided decompression, henna osteotomy with subtotal discectomy  Complete Henna osteotomy was performed on the left side.  Inferior articular facet of L4,  Superior articular facet of L5,  pars interarticularis are completely resected.  Foraminal disc herniation identified.  Complete discectomy performed, melissa are used to prepare endplates for interbody fusion.  ring curretes, rasps is used to prepare end plates.  Next, trials are used and expandable 13mm interbody cage is noted to be off good size.  Disc space is irrigated.  Bone graft is inserted and packed and then cage is inserted and expanded.    Then faye reduction steps are performed from L4-L5 to reduce spondylolisthesis to realign spine.  no changes in EMG are noted on spondylolisthesis reduction.    Then, rods are place from L3-s1 bilateral.  Instrumentation is final tightened.    Wound is copiously irrigated.    Subsequently, BMP, autograft from the facets and partial laminotomy and decortication and allograft using coricocancellous chips were placed in the gutters for posterolateral fusion.  Subsequently, faye was inserted and set screws placed.  All set screws were final tightened.  Final xrays were taken and I was satisfied with overall positioning of hardware and instrumentation.     Next, each incision was closed with number 1 vicryl for fascia, 0 vicryl for subcutaneous tissue and 2.0 vicryl.  Drain was inserted over the epidural space.   2gm of vancomycin powder was also placed in deep and superficial fascia.    Skin adhesive was applied.  Dry sterile dressing was applied.  Patient was transferred to supine position on regular bed.  Patient was extubated.  Patient had tolerated the procedure well.  Patient was moving b/l lower extremity    I personally spoke with patient's family at the end of the case.    Mati An DO  Orthopedic Spine Surgeon

## 2019-07-16 NOTE — PROGRESS NOTE ADULT - ASSESSMENT
A/P: 59y F s/p posterior spinal fusion L3-S1, L4-5 TLIF, bilateral hemilaminectomy L5-S1 with discectomy, right hemilaminectomy L3-L4 POD 6    - will follow medicines recs for recent PE  -Analgesia  -DVT ppx and PE treatment - Xarelto per medicine  -WBAT BL LE with LSO.  -PT/OT  -DC planning  Will d/w attending and advise if plan changes. A/P: 59y F s/p posterior spinal fusion L3-S1, L4-5 TLIF, bilateral hemilaminectomy L5-S1 with discectomy, right hemilaminectomy L3-L4 POD 7    - will follow medicines recs for recent PE  -Analgesia  -DVT ppx and PE treatment - Xarelto per medicine  -WBAT BL LE with LSO.  -PT/OT  -DC planning  Will d/w attending and advise if plan changes.

## 2019-07-16 NOTE — PROGRESS NOTE ADULT - SUBJECTIVE AND OBJECTIVE BOX
INTERVAL HPI:    59 yr old female with HTN,  SLE, GB syndrome with neuropathy, S/P left hip replacement for avascular necrosis, Chronic left foot drop and Obesity.  With lower back pain and leg weakness that has increased with numbness down left leg - back MRI noted L3-S1 broad based disc herniations. Sent from [facility] for spinal surgery with Dr. An.   07/12/19: Lumbar spinal fusion 09-Jul-2019 19:46:38  Marvin Jennings.  Post Op with persistent tachycardia, found to have left PE.  Non smoker.    OVERNIGHT EVENTS:  Awake, responsive. Fever spike to 102 noted.    Vital Signs Last 24 Hrs  T(C): 38.8 (16 Jul 2019 17:21), Max: 38.9 (16 Jul 2019 05:00)  T(F): 101.8 (16 Jul 2019 17:21), Max: 102 (16 Jul 2019 05:00)  HR: 106 (16 Jul 2019 17:21) (98 - 111)  BP: 120/61 (16 Jul 2019 17:21) (110/56 - 139/76)  BP(mean): --  RR: 16 (16 Jul 2019 17:21) (15 - 17)  SpO2: 96% (16 Jul 2019 17:21) (95% - 100%)    PHYSICAL EXAM:  GEN:         Awake, responsive and comfortable.  HEENT:    Normal.    RESP:        no distress.  CVS:           Regular rate and rhythm.   ABD:         Soft, non-tender, non-distended;     MEDICATIONS  (STANDING):  ascorbic acid 500 milliGRAM(s) Oral two times a day  calcium carbonate 1250 mG  + Vitamin D (OsCal 500 + D) 1 Tablet(s) Oral three times a day  docusate sodium 100 milliGRAM(s) Oral three times a day  folic acid 1 milliGRAM(s) Oral daily  gabapentin 300 milliGRAM(s) Oral three times a day  multivitamin 1 Tablet(s) Oral daily  oxyCODONE  ER Tablet 10 milliGRAM(s) Oral every 12 hours  rivaroxaban 15 milliGRAM(s) Oral two times a day with meals  senna 2 Tablet(s) Oral at bedtime  sugammadex Injectable 450 milliGRAM(s) IV Push once  verapamil  milliGRAM(s) Oral daily    MEDICATIONS  (PRN):  acetaminophen   Tablet .. 650 milliGRAM(s) Oral every 6 hours PRN Temp greater or equal to 38C (100.4F), Mild Pain (1 - 3)  ALPRAZolam 0.5 milliGRAM(s) Oral daily PRN anxiety  benzocaine 15 mG/menthol 3.6 mG Lozenge 1 Lozenge Oral every 3 hours PRN Sore Throat  cyclobenzaprine 10 milliGRAM(s) Oral three times a day PRN Muscle Spasm  diltiazem Injectable 20 milliGRAM(s) IV Push every 4 hours PRN only if HR greater than 120 and SBP maintained greater than 110  HYDROmorphone  Injectable 0.5 milliGRAM(s) IV Push every 4 hours PRN Severe Pain (7 - 10)  melatonin 3 milliGRAM(s) Oral at bedtime PRN Sleep  naloxone Injectable 0.1 milliGRAM(s) IV Push every 3 minutes PRN For ANY of the following changes in patient status:  A. RR LESS THAN 10 breaths per minute, B. Oxygen saturation LESS THAN 90%, C. Sedation score of 6  ondansetron Injectable 4 milliGRAM(s) IV Push every 4 hours PRN Nausea  oxyCODONE    IR 5 milliGRAM(s) Oral every 4 hours PRN Moderate Pain (4 - 6)  oxyCODONE    IR 10 milliGRAM(s) Oral every 4 hours PRN Severe Pain (7 - 10)  petrolatum white Ointment 1 Application(s) Topical daily PRN dry lips    LABS:                        8.2    7.82  )-----------( 330      ( 16 Jul 2019 06:23 )             26.0     07-16    135  |  98  |  8   ----------------------------<  105<H>  4.0   |  32<H>  |  0.87    Ca    8.4<L>      16 Jul 2019 06:23  < from: Xray Chest 1 View-PORTABLE IMMEDIATE (07.16.19 @ 12:44) >    EXAM:  XR CHEST PORTABLE IMMED 1V                          PROCEDURE DATE:  07/16/2019      INTERPRETATION:  CLINICAL STATEMENT: Fever    TECHNIQUE: AP view of the chest.    COMPARISON: 7/8/2019     Lordotic projection. The cardiomediastinal silhouette is normal and the   christina are not enlarged. The trachea is midline. There is no focal   infiltrate or pleural effusion. The osseous structures are intact.    IMPRESSION:    Normal chest    ARLEEN MARAVILLA M.D., ATTENDING RADIOLOGIST  This document has been electronically signed. Jul 16 2019  2:07PM      ASSESSMENT AND PLAN:  ·	Left pulmonary embolism.  ·	Tachycardia.  ·	S/P lumber spinal fusion.  ·	Anemia.  ·	HTN.  ·	SLE.  ·	GBS history with Neuropathy.  ·	Obesity.  ·	Fever.    CXR clear. No significant pulmonary symptoms, so reason for fever is not clear.  ID consult called by Dr. Linda.  Continue Xaronto.

## 2019-07-17 DIAGNOSIS — D50.0 IRON DEFICIENCY ANEMIA SECONDARY TO BLOOD LOSS (CHRONIC): ICD-10-CM

## 2019-07-17 LAB
ANION GAP SERPL CALC-SCNC: 8 MMOL/L — SIGNIFICANT CHANGE UP (ref 5–17)
BUN SERPL-MCNC: 8 MG/DL — SIGNIFICANT CHANGE UP (ref 7–23)
CALCIUM SERPL-MCNC: 8.7 MG/DL — SIGNIFICANT CHANGE UP (ref 8.5–10.1)
CHLORIDE SERPL-SCNC: 99 MMOL/L — SIGNIFICANT CHANGE UP (ref 96–108)
CO2 SERPL-SCNC: 30 MMOL/L — SIGNIFICANT CHANGE UP (ref 22–31)
CREAT SERPL-MCNC: 0.82 MG/DL — SIGNIFICANT CHANGE UP (ref 0.5–1.3)
GLUCOSE SERPL-MCNC: 104 MG/DL — HIGH (ref 70–99)
HCT VFR BLD CALC: 24.4 % — LOW (ref 34.5–45)
HGB BLD-MCNC: 7.6 G/DL — LOW (ref 11.5–15.5)
MCHC RBC-ENTMCNC: 27.9 PG — SIGNIFICANT CHANGE UP (ref 27–34)
MCHC RBC-ENTMCNC: 31.1 GM/DL — LOW (ref 32–36)
MCV RBC AUTO: 89.7 FL — SIGNIFICANT CHANGE UP (ref 80–100)
NRBC # BLD: 0 /100 WBCS — SIGNIFICANT CHANGE UP (ref 0–0)
PLATELET # BLD AUTO: 362 K/UL — SIGNIFICANT CHANGE UP (ref 150–400)
POTASSIUM SERPL-MCNC: 4.1 MMOL/L — SIGNIFICANT CHANGE UP (ref 3.5–5.3)
POTASSIUM SERPL-SCNC: 4.1 MMOL/L — SIGNIFICANT CHANGE UP (ref 3.5–5.3)
RBC # BLD: 2.72 M/UL — LOW (ref 3.8–5.2)
RBC # FLD: 14.3 % — SIGNIFICANT CHANGE UP (ref 10.3–14.5)
SODIUM SERPL-SCNC: 137 MMOL/L — SIGNIFICANT CHANGE UP (ref 135–145)
WBC # BLD: 7.71 K/UL — SIGNIFICANT CHANGE UP (ref 3.8–10.5)
WBC # FLD AUTO: 7.71 K/UL — SIGNIFICANT CHANGE UP (ref 3.8–10.5)

## 2019-07-17 RX ADMIN — OXYCODONE HYDROCHLORIDE 10 MILLIGRAM(S): 5 TABLET ORAL at 06:31

## 2019-07-17 RX ADMIN — OXYCODONE HYDROCHLORIDE 10 MILLIGRAM(S): 5 TABLET ORAL at 20:53

## 2019-07-17 RX ADMIN — Medication 100 MILLIGRAM(S): at 21:28

## 2019-07-17 RX ADMIN — GABAPENTIN 300 MILLIGRAM(S): 400 CAPSULE ORAL at 06:32

## 2019-07-17 RX ADMIN — Medication 180 MILLIGRAM(S): at 06:33

## 2019-07-17 RX ADMIN — GABAPENTIN 300 MILLIGRAM(S): 400 CAPSULE ORAL at 14:25

## 2019-07-17 RX ADMIN — Medication 650 MILLIGRAM(S): at 18:32

## 2019-07-17 RX ADMIN — OXYCODONE HYDROCHLORIDE 10 MILLIGRAM(S): 5 TABLET ORAL at 21:53

## 2019-07-17 RX ADMIN — Medication 100 MILLIGRAM(S): at 06:32

## 2019-07-17 RX ADMIN — GABAPENTIN 300 MILLIGRAM(S): 400 CAPSULE ORAL at 21:28

## 2019-07-17 RX ADMIN — Medication 500 MILLIGRAM(S): at 06:32

## 2019-07-17 RX ADMIN — Medication 1 TABLET(S): at 06:32

## 2019-07-17 RX ADMIN — Medication 650 MILLIGRAM(S): at 06:32

## 2019-07-17 RX ADMIN — Medication 1 TABLET(S): at 12:07

## 2019-07-17 RX ADMIN — RIVAROXABAN 15 MILLIGRAM(S): KIT at 08:04

## 2019-07-17 RX ADMIN — HYDROMORPHONE HYDROCHLORIDE 0.5 MILLIGRAM(S): 2 INJECTION INTRAMUSCULAR; INTRAVENOUS; SUBCUTANEOUS at 18:38

## 2019-07-17 RX ADMIN — OXYCODONE HYDROCHLORIDE 10 MILLIGRAM(S): 5 TABLET ORAL at 09:01

## 2019-07-17 RX ADMIN — Medication 500 MILLIGRAM(S): at 18:32

## 2019-07-17 RX ADMIN — HYDROMORPHONE HYDROCHLORIDE 0.5 MILLIGRAM(S): 2 INJECTION INTRAMUSCULAR; INTRAVENOUS; SUBCUTANEOUS at 22:58

## 2019-07-17 RX ADMIN — Medication 650 MILLIGRAM(S): at 01:07

## 2019-07-17 RX ADMIN — Medication 1 MILLIGRAM(S): at 12:07

## 2019-07-17 RX ADMIN — SENNA PLUS 2 TABLET(S): 8.6 TABLET ORAL at 21:28

## 2019-07-17 RX ADMIN — Medication 1 TABLET(S): at 21:27

## 2019-07-17 RX ADMIN — HYDROMORPHONE HYDROCHLORIDE 0.5 MILLIGRAM(S): 2 INJECTION INTRAMUSCULAR; INTRAVENOUS; SUBCUTANEOUS at 07:26

## 2019-07-17 RX ADMIN — Medication 650 MILLIGRAM(S): at 00:10

## 2019-07-17 RX ADMIN — HYDROMORPHONE HYDROCHLORIDE 0.5 MILLIGRAM(S): 2 INJECTION INTRAMUSCULAR; INTRAVENOUS; SUBCUTANEOUS at 07:45

## 2019-07-17 RX ADMIN — RIVAROXABAN 15 MILLIGRAM(S): KIT at 18:38

## 2019-07-17 RX ADMIN — Medication 1 TABLET(S): at 14:25

## 2019-07-17 RX ADMIN — CYCLOBENZAPRINE HYDROCHLORIDE 10 MILLIGRAM(S): 10 TABLET, FILM COATED ORAL at 14:25

## 2019-07-17 RX ADMIN — OXYCODONE HYDROCHLORIDE 10 MILLIGRAM(S): 5 TABLET ORAL at 07:11

## 2019-07-17 RX ADMIN — Medication 650 MILLIGRAM(S): at 07:26

## 2019-07-17 RX ADMIN — Medication 100 MILLIGRAM(S): at 14:25

## 2019-07-17 RX ADMIN — HYDROMORPHONE HYDROCHLORIDE 0.5 MILLIGRAM(S): 2 INJECTION INTRAMUSCULAR; INTRAVENOUS; SUBCUTANEOUS at 23:03

## 2019-07-17 RX ADMIN — OXYCODONE HYDROCHLORIDE 10 MILLIGRAM(S): 5 TABLET ORAL at 08:04

## 2019-07-17 NOTE — PROGRESS NOTE ADULT - ASSESSMENT
A/P: 59y F s/p posterior spinal fusion L3-S1, L4-5 TLIF, bilateral hemilaminectomy L5-S1 with discectomy, right hemilaminectomy L3-L4 POD 8  -febrile overnight, FU ID C/s per medicine  - will follow medicines recs for recent PE  -Analgesia  -DVT ppx and PE treatment - Xarelto per medicine  -WBAT BL LE with LSO.  -PT/OT  -DC planning  Will d/w attending and advise if plan changes.

## 2019-07-17 NOTE — PROGRESS NOTE ADULT - SUBJECTIVE AND OBJECTIVE BOX
Patient is a 59y old  Female who presents with a chief complaint of left foot drop (17 Jul 2019 06:03)      INTERVAL HPI/OVERNIGHT EVENTS:  less pain today  MEDICATIONS  (STANDING):  ascorbic acid 500 milliGRAM(s) Oral two times a day  calcium carbonate 1250 mG  + Vitamin D (OsCal 500 + D) 1 Tablet(s) Oral three times a day  docusate sodium 100 milliGRAM(s) Oral three times a day  folic acid 1 milliGRAM(s) Oral daily  gabapentin 300 milliGRAM(s) Oral three times a day  multivitamin 1 Tablet(s) Oral daily  oxyCODONE  ER Tablet 10 milliGRAM(s) Oral every 12 hours  polyethylene glycol 3350 17 Gram(s) Oral once  rivaroxaban 15 milliGRAM(s) Oral two times a day with meals  senna 2 Tablet(s) Oral at bedtime  sugammadex Injectable 450 milliGRAM(s) IV Push once  verapamil  milliGRAM(s) Oral daily    MEDICATIONS  (PRN):  acetaminophen   Tablet .. 650 milliGRAM(s) Oral every 6 hours PRN Temp greater or equal to 38C (100.4F), Mild Pain (1 - 3)  benzocaine 15 mG/menthol 3.6 mG Lozenge 1 Lozenge Oral every 3 hours PRN Sore Throat  cyclobenzaprine 10 milliGRAM(s) Oral three times a day PRN Muscle Spasm  diltiazem Injectable 20 milliGRAM(s) IV Push every 4 hours PRN only if HR greater than 120 and SBP maintained greater than 110  HYDROmorphone  Injectable 0.5 milliGRAM(s) IV Push every 4 hours PRN Severe Pain (7 - 10)  melatonin 3 milliGRAM(s) Oral at bedtime PRN Sleep  naloxone Injectable 0.1 milliGRAM(s) IV Push every 3 minutes PRN For ANY of the following changes in patient status:  A. RR LESS THAN 10 breaths per minute, B. Oxygen saturation LESS THAN 90%, C. Sedation score of 6  ondansetron Injectable 4 milliGRAM(s) IV Push every 4 hours PRN Nausea  oxyCODONE    IR 5 milliGRAM(s) Oral every 4 hours PRN Moderate Pain (4 - 6)  oxyCODONE    IR 10 milliGRAM(s) Oral every 4 hours PRN Severe Pain (7 - 10)  petrolatum white Ointment 1 Application(s) Topical daily PRN dry lips      Allergies    No Known Allergies    Intolerances        REVIEW OF SYSTEMS:  CONSTITUTIONAL: No fever, weight loss, or fatigue  EYES: No eye pain, visual disturbances, or discharge  ENMT:  No difficulty hearing, tinnitus, vertigo; No sinus or throat pain  NECK: No pain or stiffness  BREASTS: No pain, masses, or nipple discharge  RESPIRATORY: No cough, wheezing, chills or hemoptysis; No shortness of breath  CARDIOVASCULAR: No chest pain, palpitations, dizziness, or leg swelling  GASTROINTESTINAL: No abdominal or epigastric pain. No nausea, vomiting, or hematemesis; No diarrhea or constipation. No melena or hematochezia.  GENITOURINARY: No dysuria, frequency, hematuria, or incontinence  NEUROLOGICAL: No headaches, memory loss, loss of strength, numbness, or tremors  SKIN: No itching, burning, rashes, or lesions   LYMPH NODES: No enlarged glands  ENDOCRINE: No heat or cold intolerance; No hair loss  MUSCULOSKELETAL: No joint pain or swelling; No muscle, back, or extremity pain  PSYCHIATRIC: No depression, anxiety, mood swings, or difficulty sleeping  HEME/LYMPH: No easy bruising, or bleeding gums  ALLERGY AND IMMUNOLOGIC: No hives or eczema    Vital Signs Last 24 Hrs  T(C): 38.3 (17 Jul 2019 06:04), Max: 39.2 (17 Jul 2019 00:34)  T(F): 100.9 (17 Jul 2019 06:04), Max: 102.5 (17 Jul 2019 00:34)  HR: 99 (17 Jul 2019 10:33) (99 - 110)  BP: 120/79 (17 Jul 2019 10:33) (116/71 - 139/76)  BP(mean): --  RR: 18 (17 Jul 2019 10:33) (16 - 18)  SpO2: 95% (17 Jul 2019 10:33) (93% - 97%)    PHYSICAL EXAM:  GENERAL: NAD, well-groomed, well-developed  HEAD:  Atraumatic, Normocephalic  EYES: EOMI, PERRLA, conjunctiva and sclera clear  ENMT: No tonsillar erythema, exudates, or enlargement; Moist mucous membranes, Good dentition, No lesions  NECK: Supple, No JVD, Normal thyroid  NERVOUS SYSTEM:  Alert & Oriented X3, Good concentration; Motor Strength 5/5 B/L upper and lower extremities; DTRs 2+ intact and symmetric  CHEST/LUNG: Clear to percussion bilaterally; No rales, rhonchi, wheezing, or rubs  HEART: Regular rate and rhythm; No murmurs, rubs, or gallops  ABDOMEN: Soft, Nontender, Nondistended; Bowel sounds present  EXTREMITIES:  2+ Peripheral Pulses, No clubbing, cyanosis, or edema  LYMPH: No lymphadenopathy noted  SKIN: No rashes or lesions    LABS:                        7.6    7.71  )-----------( 362      ( 17 Jul 2019 06:42 )             24.4     07-17    137  |  99  |  8   ----------------------------<  104<H>  4.1   |  30  |  0.82    Ca    8.7      17 Jul 2019 06:42          CAPILLARY BLOOD GLUCOSE        CULTURES:  Culture Results:   No growth (07-14 @ 16:54)    HEMOGLOBIN A1C:    CHOLESTEROL:        RADIOLOGY & ADDITIONAL TESTS:

## 2019-07-17 NOTE — PROGRESS NOTE ADULT - ATTENDING COMMENTS
Patient personally seen and examined.  JOSE R out put reviewed.  JOSE R output drending lower and not rising with anticoagulation, therefore at this point it will be safe to remove it tomorrow.  Will get medicine and cardiology input for discharge planning.    tachycardia - likely due to PE, pulse ox 100% on RA    Pain - primarily back pain as expected after multilevel fusion with overlapping obesity making recovery slower along with marked deconditioning due to lack of significant activity for last one month.  Leg pain and strength is improved from baseline.  Continue combination of long acting oxycontin and short acting oxycodone with tylenol and muscle relaxant as needed.
agree with plan as outlined above.
Patient seen in evening rounds.  Patient's cardiac work up is reviewed.  1UpRBC given already.  Follow cbc is pending.  Cardizem ordered by cardiology.  Input received from medicine team appreciated.  Ordered remote tele for monitoring overnight.  Pain management is adjusted with addition of long acting oxycontin and increasing frequency of short acting oxycodone.      goals of care and treatment plan discussed with patient and spouse.  Treatment plan acknowledged    Medicine, cardiac team input appreciated.

## 2019-07-17 NOTE — PROGRESS NOTE ADULT - SUBJECTIVE AND OBJECTIVE BOX
INTERVAL HPI:   59 yr old female with HTN,  SLE, GB syndrome with neuropathy, S/P left hip replacement for avascular necrosis, Chronic left foot drop and Obesity.  With lower back pain and leg weakness that has increased with numbness down left leg - back MRI noted L3-S1 broad based disc herniations. Sent from [facility] [address] [facility] for spinal surgery with Dr. An.   07/12/19: Lumbar spinal fusion 09-Jul-2019 19:46:38  Marvin Jennings.  Post Op with persistent tachycardia, found to have left PE.  Non smoker.    OVERNIGHT EVENTS:  Still with fever. Denies SOB.    Vital Signs Last 24 Hrs  T(C): 37.2 (17 Jul 2019 15:23), Max: 39.2 (17 Jul 2019 00:34)  T(F): 98.9 (17 Jul 2019 15:23), Max: 102.5 (17 Jul 2019 00:34)  HR: 91 (17 Jul 2019 15:23) (89 - 110)  BP: 124/65 (17 Jul 2019 15:23) (111/64 - 127/75)  BP(mean): --  RR: 18 (17 Jul 2019 15:23) (16 - 18)  SpO2: 98% (17 Jul 2019 15:23) (93% - 99%)    PHYSICAL EXAM:  GEN:         Awake, responsive and comfortable.  HEENT:    Normal.    RESP:        no wheezing.  CVS:           Regular rate and rhythm.   ABD:         Soft, non-tender, non-distended;     MEDICATIONS  (STANDING):  ascorbic acid 500 milliGRAM(s) Oral two times a day  calcium carbonate 1250 mG  + Vitamin D (OsCal 500 + D) 1 Tablet(s) Oral three times a day  docusate sodium 100 milliGRAM(s) Oral three times a day  folic acid 1 milliGRAM(s) Oral daily  gabapentin 300 milliGRAM(s) Oral three times a day  multivitamin 1 Tablet(s) Oral daily  oxyCODONE  ER Tablet 10 milliGRAM(s) Oral every 12 hours  polyethylene glycol 3350 17 Gram(s) Oral once  rivaroxaban 15 milliGRAM(s) Oral two times a day with meals  senna 2 Tablet(s) Oral at bedtime  sugammadex Injectable 450 milliGRAM(s) IV Push once  verapamil  milliGRAM(s) Oral daily    MEDICATIONS  (PRN):  acetaminophen   Tablet .. 650 milliGRAM(s) Oral every 6 hours PRN Temp greater or equal to 38C (100.4F), Mild Pain (1 - 3)  benzocaine 15 mG/menthol 3.6 mG Lozenge 1 Lozenge Oral every 3 hours PRN Sore Throat  cyclobenzaprine 10 milliGRAM(s) Oral three times a day PRN Muscle Spasm  diltiazem Injectable 20 milliGRAM(s) IV Push every 4 hours PRN only if HR greater than 120 and SBP maintained greater than 110  HYDROmorphone  Injectable 0.5 milliGRAM(s) IV Push every 4 hours PRN Severe Pain (7 - 10)  melatonin 3 milliGRAM(s) Oral at bedtime PRN Sleep  naloxone Injectable 0.1 milliGRAM(s) IV Push every 3 minutes PRN For ANY of the following changes in patient status:  A. RR LESS THAN 10 breaths per minute, B. Oxygen saturation LESS THAN 90%, C. Sedation score of 6  ondansetron Injectable 4 milliGRAM(s) IV Push every 4 hours PRN Nausea  oxyCODONE    IR 5 milliGRAM(s) Oral every 4 hours PRN Moderate Pain (4 - 6)  oxyCODONE    IR 10 milliGRAM(s) Oral every 4 hours PRN Severe Pain (7 - 10)  petrolatum white Ointment 1 Application(s) Topical daily PRN dry lips    LABS:                        7.6    7.71  )-----------( 362      ( 17 Jul 2019 06:42 )             24.4     07-17    137  |  99  |  8   ----------------------------<  104<H>  4.1   |  30  |  0.82    Ca    8.7      17 Jul 2019 06:42    ASSESSMENT AND PLAN:  ·	Left pulmonary embolism.  ·	Tachycardia.  ·	S/P lumber spinal fusion.  ·	Anemia.  ·	HTN.  ·	SLE.  ·	GBS history with Neuropathy.  ·	Obesity.  ·	Fever.    Getting PRBC.  On Xarelto.  Awaiting ID consult.

## 2019-07-17 NOTE — PROGRESS NOTE ADULT - SUBJECTIVE AND OBJECTIVE BOX
Patient seen, doing well, comfortable, pain control adequate.  Reports marked improvement in leg pain, and weakness in the last 3 days.  Able to walk better with PT.    Febrile    H/H: Acute blood loss anemia - receiving transfusion today, labs reviewed    tachycardia - secondary to PE and two episodes of fever - improving    Incision: reevaluated, no redness, no drainage, no concerns for infection noted    Foot drop: strength now 4+/5 on exam today so 80% improvement in pain and function    A: patient needs ongoing treatment for PE, febrile episodes likely secondary to PE,  msg left with Dr. Serrano for recommendations,  Blood cx pending results, patient is non-toxic appearing    P: Discussed role of post hospital care  If she is able to walk 200 feet and manage stairs she may be discharged home.  Xarelto for PE  Will wait for febrile episodes to resolve prior to discharge.

## 2019-07-17 NOTE — PROGRESS NOTE ADULT - SUBJECTIVE AND OBJECTIVE BOX
Patient seen and examined at bedside this am. Febrile overnight.    Vital Signs Last 24 Hrs  T(C): 38.1 (17 Jul 2019 02:30), Max: 39.2 (17 Jul 2019 00:34)  T(F): 100.5 (17 Jul 2019 02:30), Max: 102.5 (17 Jul 2019 00:34)  HR: 110 (17 Jul 2019 00:34) (99 - 110)  BP: 125/78 (17 Jul 2019 00:34) (120/61 - 139/76)  BP(mean): --  RR: 17 (17 Jul 2019 00:34) (16 - 17)  SpO2: 93% (17 Jul 2019 00:34) (93% - 97%)    GEN: NAD     Spine:  Dsg changed today--incision well-approximated, no erythema/edema drainage  SILT C5-T1 & L2-S1  Distal pulses intact  Soft compartments  Motor:               C5           C6            C7               C8           T1   R         5/5          5/5            5/5             5/5          5/5  L          5/5          5/5            5/5             5/5          5/5                L2             L3             L4               L5            S1  R         5/5           5/5          5/5             5/5           5/5  L          4/5          4/5           4/5             4/5           4/5    Sensory:            C5         C6         C7      C8       T1        (0=absent, 1=impaired, 2=normal, NT=not testable)  R         2            2           2        2         2  L          2            2           2        2         2               L2          L3         L4      L5       S1         (0=absent, 1=impaired, 2=normal, NT=not testable)  R         2            2            2        2        2  L          2            2           2        2         2

## 2019-07-17 NOTE — PROGRESS NOTE ADULT - SUBJECTIVE AND OBJECTIVE BOX
59y old  Female who presents with a chief complaint of left foot drop (15 Jul 2019 06:19)      Review of Systems:    General:  No wt loss, fevers, chills, night sweats  ENT:  No sore throat, pain, runny nose, dysphagia  CV:  No pain, palpitations, hypo/hypertension  Resp:  No dyspnea, cough, tachypnea, wheezing      Discussed with:  PMD, Family    Physical Exam:    Vital Signs:  Vital Signs Last 24 Hrs  T(C): 37.3 (15 Jul 2019 11:20), Max: 37.3 (15 Jul 2019 11:20)  T(F): 99.2 (15 Jul 2019 11:20), Max: 99.2 (15 Jul 2019 11:20)  HR: 104 (15 Jul 2019 11:20) (104 - 132)  BP: 100/72 (15 Jul 2019 11:20) (100/72 - 140/100)  BP(mean): --  RR: 16 (15 Jul 2019 11:20) (14 - 19)  SpO2: 96% (15 Jul 2019 11:20) (94% - 100%)  Daily     Daily   I&O's Summary    2019 07:01  -  15 Jul 2019 07:00  --------------------------------------------------------  IN: 630 mL / OUT: 105 mL / NET: 525 mL          Chest:  Full & symmetric excursion, no increased effort, breath sounds clear  Cardiovascular:  Regular rhythm, S1, S2, no murmur/rub/S3/S4, no carotid/femoral/abdominal bruit, radial/pedal pulses 2+,   Abdomen:  Soft, non-tender, non-distended, normoactive bowel sounds, no HSM      Laboratory:                          8.1    7.09  )-----------( 256      ( 15 Jul 2019 06:23 )             25.1     07-15    137  |  99  |  8   ----------------------------<  114<H>  3.9   |  33<H>  |  0.86    Ca    8.6      15 Jul 2019 06:23        Urinalysis Basic - ( 2019 15:15 )    Color: Yellow / Appearance: Clear / S.015 / pH: x  Gluc: x / Ketone: Trace  / Bili: Negative / Urobili: Negative mg/dL   Blood: x / Protein: 30 mg/dL / Nitrite: Negative   Leuk Esterase: Trace / RBC: 0-2 /HPF / WBC 0-2   Sq Epi: x / Non Sq Epi: Few / Bacteria: Few      Assessment:  Post spine surgery  PE   TTE completed, essentially normal  Lovenox to Xarelto  Fevers improved  PULM and ID noted

## 2019-07-18 LAB
ANION GAP SERPL CALC-SCNC: 7 MMOL/L — SIGNIFICANT CHANGE UP (ref 5–17)
BUN SERPL-MCNC: 8 MG/DL — SIGNIFICANT CHANGE UP (ref 7–23)
CALCIUM SERPL-MCNC: 8.8 MG/DL — SIGNIFICANT CHANGE UP (ref 8.5–10.1)
CHLORIDE SERPL-SCNC: 100 MMOL/L — SIGNIFICANT CHANGE UP (ref 96–108)
CO2 SERPL-SCNC: 29 MMOL/L — SIGNIFICANT CHANGE UP (ref 22–31)
CREAT SERPL-MCNC: 0.82 MG/DL — SIGNIFICANT CHANGE UP (ref 0.5–1.3)
GLUCOSE SERPL-MCNC: 118 MG/DL — HIGH (ref 70–99)
HCT VFR BLD CALC: 28.4 % — LOW (ref 34.5–45)
HGB BLD-MCNC: 9.3 G/DL — LOW (ref 11.5–15.5)
MCHC RBC-ENTMCNC: 28.1 PG — SIGNIFICANT CHANGE UP (ref 27–34)
MCHC RBC-ENTMCNC: 32.7 GM/DL — SIGNIFICANT CHANGE UP (ref 32–36)
MCV RBC AUTO: 85.8 FL — SIGNIFICANT CHANGE UP (ref 80–100)
NRBC # BLD: 0 /100 WBCS — SIGNIFICANT CHANGE UP (ref 0–0)
PLATELET # BLD AUTO: 386 K/UL — SIGNIFICANT CHANGE UP (ref 150–400)
POTASSIUM SERPL-MCNC: 3.9 MMOL/L — SIGNIFICANT CHANGE UP (ref 3.5–5.3)
POTASSIUM SERPL-SCNC: 3.9 MMOL/L — SIGNIFICANT CHANGE UP (ref 3.5–5.3)
RBC # BLD: 3.31 M/UL — LOW (ref 3.8–5.2)
RBC # FLD: 14.3 % — SIGNIFICANT CHANGE UP (ref 10.3–14.5)
SODIUM SERPL-SCNC: 136 MMOL/L — SIGNIFICANT CHANGE UP (ref 135–145)
WBC # BLD: 7.68 K/UL — SIGNIFICANT CHANGE UP (ref 3.8–10.5)
WBC # FLD AUTO: 7.68 K/UL — SIGNIFICANT CHANGE UP (ref 3.8–10.5)

## 2019-07-18 RX ORDER — RIVAROXABAN 15 MG-20MG
1 KIT ORAL
Qty: 60 | Refills: 0
Start: 2019-07-18 | End: 2019-08-16

## 2019-07-18 RX ADMIN — Medication 1 MILLIGRAM(S): at 11:32

## 2019-07-18 RX ADMIN — Medication 1 TABLET(S): at 14:45

## 2019-07-18 RX ADMIN — HYDROMORPHONE HYDROCHLORIDE 0.5 MILLIGRAM(S): 2 INJECTION INTRAMUSCULAR; INTRAVENOUS; SUBCUTANEOUS at 05:29

## 2019-07-18 RX ADMIN — Medication 100 MILLIGRAM(S): at 05:25

## 2019-07-18 RX ADMIN — OXYCODONE HYDROCHLORIDE 10 MILLIGRAM(S): 5 TABLET ORAL at 21:00

## 2019-07-18 RX ADMIN — Medication 650 MILLIGRAM(S): at 18:30

## 2019-07-18 RX ADMIN — Medication 1 TABLET(S): at 11:32

## 2019-07-18 RX ADMIN — Medication 500 MILLIGRAM(S): at 17:54

## 2019-07-18 RX ADMIN — Medication 1 TABLET(S): at 21:39

## 2019-07-18 RX ADMIN — SENNA PLUS 2 TABLET(S): 8.6 TABLET ORAL at 21:40

## 2019-07-18 RX ADMIN — OXYCODONE HYDROCHLORIDE 10 MILLIGRAM(S): 5 TABLET ORAL at 23:52

## 2019-07-18 RX ADMIN — GABAPENTIN 300 MILLIGRAM(S): 400 CAPSULE ORAL at 21:43

## 2019-07-18 RX ADMIN — HYDROMORPHONE HYDROCHLORIDE 0.5 MILLIGRAM(S): 2 INJECTION INTRAMUSCULAR; INTRAVENOUS; SUBCUTANEOUS at 05:44

## 2019-07-18 RX ADMIN — OXYCODONE HYDROCHLORIDE 10 MILLIGRAM(S): 5 TABLET ORAL at 17:55

## 2019-07-18 RX ADMIN — CYCLOBENZAPRINE HYDROCHLORIDE 10 MILLIGRAM(S): 10 TABLET, FILM COATED ORAL at 21:39

## 2019-07-18 RX ADMIN — GABAPENTIN 300 MILLIGRAM(S): 400 CAPSULE ORAL at 14:45

## 2019-07-18 RX ADMIN — RIVAROXABAN 15 MILLIGRAM(S): KIT at 07:59

## 2019-07-18 RX ADMIN — OXYCODONE HYDROCHLORIDE 10 MILLIGRAM(S): 5 TABLET ORAL at 20:04

## 2019-07-18 RX ADMIN — Medication 3 MILLIGRAM(S): at 23:53

## 2019-07-18 RX ADMIN — OXYCODONE HYDROCHLORIDE 10 MILLIGRAM(S): 5 TABLET ORAL at 18:55

## 2019-07-18 RX ADMIN — Medication 1 ENEMA: at 11:32

## 2019-07-18 RX ADMIN — OXYCODONE HYDROCHLORIDE 10 MILLIGRAM(S): 5 TABLET ORAL at 09:00

## 2019-07-18 RX ADMIN — OXYCODONE HYDROCHLORIDE 10 MILLIGRAM(S): 5 TABLET ORAL at 07:58

## 2019-07-18 RX ADMIN — GABAPENTIN 300 MILLIGRAM(S): 400 CAPSULE ORAL at 05:25

## 2019-07-18 RX ADMIN — Medication 180 MILLIGRAM(S): at 06:08

## 2019-07-18 RX ADMIN — Medication 500 MILLIGRAM(S): at 05:25

## 2019-07-18 RX ADMIN — Medication 1 TABLET(S): at 05:25

## 2019-07-18 RX ADMIN — RIVAROXABAN 15 MILLIGRAM(S): KIT at 17:55

## 2019-07-18 RX ADMIN — Medication 100 MILLIGRAM(S): at 14:45

## 2019-07-18 RX ADMIN — Medication 100 MILLIGRAM(S): at 21:40

## 2019-07-18 RX ADMIN — Medication 650 MILLIGRAM(S): at 17:54

## 2019-07-18 NOTE — PHYSICAL THERAPY INITIAL EVALUATION ADULT - IMPAIRED TRANSFERS: SIT/STAND, REHAB EVAL
decreased sensation/impaired postural control/impaired sensory feedback/decreased strength/decreased flexibility/pain/impaired balance
decreased ROM/impaired balance/narrow base of support/decreased strength

## 2019-07-18 NOTE — PROGRESS NOTE ADULT - SUBJECTIVE AND OBJECTIVE BOX
Patient is a 59y old  Female who presents with a chief complaint of left foot drop (18 Jul 2019 06:19)      INTERVAL HPI/OVERNIGHT EVENTS:  No fever  MEDICATIONS  (STANDING):  ascorbic acid 500 milliGRAM(s) Oral two times a day  calcium carbonate 1250 mG  + Vitamin D (OsCal 500 + D) 1 Tablet(s) Oral three times a day  docusate sodium 100 milliGRAM(s) Oral three times a day  folic acid 1 milliGRAM(s) Oral daily  gabapentin 300 milliGRAM(s) Oral three times a day  multivitamin 1 Tablet(s) Oral daily  oxyCODONE  ER Tablet 10 milliGRAM(s) Oral every 12 hours  polyethylene glycol 3350 17 Gram(s) Oral once  rivaroxaban 15 milliGRAM(s) Oral two times a day with meals  senna 2 Tablet(s) Oral at bedtime  sugammadex Injectable 450 milliGRAM(s) IV Push once  verapamil  milliGRAM(s) Oral daily    MEDICATIONS  (PRN):  acetaminophen   Tablet .. 650 milliGRAM(s) Oral every 6 hours PRN Temp greater or equal to 38C (100.4F), Mild Pain (1 - 3)  benzocaine 15 mG/menthol 3.6 mG Lozenge 1 Lozenge Oral every 3 hours PRN Sore Throat  cyclobenzaprine 10 milliGRAM(s) Oral three times a day PRN Muscle Spasm  diltiazem Injectable 20 milliGRAM(s) IV Push every 4 hours PRN only if HR greater than 120 and SBP maintained greater than 110  HYDROmorphone  Injectable 0.5 milliGRAM(s) IV Push every 4 hours PRN Severe Pain (7 - 10)  melatonin 3 milliGRAM(s) Oral at bedtime PRN Sleep  naloxone Injectable 0.1 milliGRAM(s) IV Push every 3 minutes PRN For ANY of the following changes in patient status:  A. RR LESS THAN 10 breaths per minute, B. Oxygen saturation LESS THAN 90%, C. Sedation score of 6  ondansetron Injectable 4 milliGRAM(s) IV Push every 4 hours PRN Nausea  oxyCODONE    IR 5 milliGRAM(s) Oral every 4 hours PRN Moderate Pain (4 - 6)  oxyCODONE    IR 10 milliGRAM(s) Oral every 4 hours PRN Severe Pain (7 - 10)  petrolatum white Ointment 1 Application(s) Topical daily PRN dry lips      Allergies    No Known Allergies    Intolerances        REVIEW OF SYSTEMS:  CONSTITUTIONAL: No fever, weight loss, or fatigue  EYES: No eye pain, visual disturbances, or discharge  ENMT:  No difficulty hearing, tinnitus, vertigo; No sinus or throat pain  NECK: No pain or stiffness  BREASTS: No pain, masses, or nipple discharge  RESPIRATORY: No cough, wheezing, chills or hemoptysis; No shortness of breath  CARDIOVASCULAR: No chest pain, palpitations, dizziness, or leg swelling  GASTROINTESTINAL: No abdominal or epigastric pain. No nausea, vomiting, or hematemesis; No diarrhea or constipation. No melena or hematochezia.  GENITOURINARY: No dysuria, frequency, hematuria, or incontinence  NEUROLOGICAL: No headaches, memory loss, loss of strength, numbness, or tremors  SKIN: No itching, burning, rashes, or lesions   LYMPH NODES: No enlarged glands  ENDOCRINE: No heat or cold intolerance; No hair loss  MUSCULOSKELETAL: No joint pain or swelling; No muscle, back, or extremity pain  PSYCHIATRIC: No depression, anxiety, mood swings, or difficulty sleeping  HEME/LYMPH: No easy bruising, or bleeding gums  ALLERGY AND IMMUNOLOGIC: No hives or eczema    Vital Signs Last 24 Hrs  T(C): 37.1 (18 Jul 2019 06:01), Max: 38.1 (17 Jul 2019 18:16)  T(F): 98.8 (18 Jul 2019 06:01), Max: 100.6 (17 Jul 2019 18:16)  HR: 95 (18 Jul 2019 06:01) (89 - 107)  BP: 126/63 (18 Jul 2019 06:01) (111/64 - 126/69)  BP(mean): --  RR: 17 (18 Jul 2019 06:01) (16 - 18)  SpO2: 97% (18 Jul 2019 06:01) (96% - 99%)    PHYSICAL EXAM:  GENERAL: NAD, well-groomed, well-developed  HEAD:  Atraumatic, Normocephalic  EYES: EOMI, PERRLA, conjunctiva and sclera clear  ENMT: No tonsillar erythema, exudates, or enlargement; Moist mucous membranes, Good dentition, No lesions  NECK: Supple, No JVD, Normal thyroid  NERVOUS SYSTEM:  Alert & Oriented X3, Good concentration; Motor Strength 5/5 B/L upper and lower extremities; DTRs 2+ intact and symmetric  CHEST/LUNG: Clear to percussion bilaterally; No rales, rhonchi, wheezing, or rubs  HEART: Regular rate and rhythm; No murmurs, rubs, or gallops  ABDOMEN: Soft, Nontender, Nondistended; Bowel sounds present  EXTREMITIES:  2+ Peripheral Pulses, No clubbing, cyanosis, or edema  LYMPH: No lymphadenopathy noted  SKIN: No rashes or lesions    LABS:                        9.3    7.68  )-----------( 386      ( 18 Jul 2019 06:32 )             28.4     07-18    136  |  100  |  8   ----------------------------<  118<H>  3.9   |  29  |  0.82    Ca    8.8      18 Jul 2019 06:32          CAPILLARY BLOOD GLUCOSE        CULTURES:  Culture Results:   No growth to date. (07-16 @ 16:44)    HEMOGLOBIN A1C:    CHOLESTEROL:        RADIOLOGY & ADDITIONAL TESTS:

## 2019-07-18 NOTE — PHYSICAL THERAPY INITIAL EVALUATION ADULT - DIAGNOSIS, PT EVAL
Z 98.1 surgical fusion ; M62.8 muscle weakness ; Z74.0 Reduced mobility
R26.2 Difficulty Walking, M62.81 Muscle Weakness

## 2019-07-18 NOTE — PHYSICAL THERAPY INITIAL EVALUATION ADULT - PRECAUTIONS/LIMITATIONS, REHAB EVAL
spinal precautions/fall precautions/oxygen therapy device and L/min
fall precautions/spinal precautions

## 2019-07-18 NOTE — PHYSICAL THERAPY INITIAL EVALUATION ADULT - ACTIVE RANGE OF MOTION EXAMINATION, REHAB EVAL
AROM of L shoulder flexion 0-30 degrees in flexion./deficits as listed below
AROM of L shoulder flexion 0-30 degrees in flexion./deficits as listed below

## 2019-07-18 NOTE — PROGRESS NOTE ADULT - PROBLEM SELECTOR PLAN 2
cont AC
cont Levonox,
cont AC
cont Levonox,
RISS
RISS
cont AC
surgery follow up, pain control
surgery follow up, pain control

## 2019-07-18 NOTE — PROGRESS NOTE ADULT - SUBJECTIVE AND OBJECTIVE BOX
Patient seen, doing well, comfortable, pain control adequate.  Reports marked improvement in leg pain, and weakness in the last 4 days. Pt received 2 transfusions PRBC yesterday and tolerated. Able to walk better with PT.    Febrile    H/H: Acute blood loss anemia - post 2U PRBC labs reviewed    tachycardia - secondary to PE and episode of fever last night - improving    Incision: reevaluated, no redness, no drainage, no concerns for infection noted    Foot drop: strength now 4+/5 on exam today so 80% improvement in pain and function    A: patient needs ongoing treatment for PE, febrile episodes likely secondary to PE,  msg left with Dr. Serrano for recommendations,  Blood cx NGTD, patient is non-toxic appearing    P: Discussed role of post hospital care  If she is able to walk 200 feet and manage stairs she may be discharged home.  Xarelto for PE  Will wait for febrile episodes to resolve prior to discharge

## 2019-07-18 NOTE — PROGRESS NOTE ADULT - PROBLEM SELECTOR PLAN 1
no WBC, fever resolved, pt is clinically improving doubt infectious, will hold off abx till pt is seen by ID, probable -post op fever no WBC, fever resolved, pt is clinically improving, CXR normal, blood and urine cx are negative doubt infectious, will hold off abx till pt is seen by ID, probable -post op fever

## 2019-07-18 NOTE — PHYSICAL THERAPY INITIAL EVALUATION ADULT - BALANCE TRAINING, PT EVAL
Pt will increase static/dynamic sitting balance to good and static/dynamic standing balance to good to perform all functional mobility without LOB, c a Rolling Walker, by 4weeks.
GOAL: Patient will demonstrate independent performance of ADLS c low falls risk with appropriate mobility/adaptive devices, with adherence to orthopedic precautions, in 4 weeks.

## 2019-07-18 NOTE — PHYSICAL THERAPY INITIAL EVALUATION ADULT - BED MOBILITY LIMITATIONS, REHAB EVAL
decreased ability to use arms for pushing/pulling
decreased ability to use legs for bridging/pushing/impaired ability to control trunk for mobility/decreased ability to use arms for pushing/pulling

## 2019-07-18 NOTE — PHYSICAL THERAPY INITIAL EVALUATION ADULT - GAIT TRAINING, PT EVAL
Pt will independently ambulate 200 feet with rolling walker without loss of balance, by 4 weeks.
GOAL: Patient will demonstrate independent and safe gait indoors with appropriate mobility/adaptive devices for =/> 500 feet, in 4 weeks.

## 2019-07-18 NOTE — PHYSICAL THERAPY INITIAL EVALUATION ADULT - MANUAL MUSCLE TESTING RESULTS, REHAB EVAL
Myotomes: L1-S1 grossly 3+/5 on both lower limbs
R UE 3/5; L shoulder 3-/5, L elbow and wrist and hand 3/5; R hip 3/5, R knee and ankle 3+/5, and L hip 3-/5, L knee and ankle, foot 3/5

## 2019-07-18 NOTE — PHYSICAL THERAPY INITIAL EVALUATION ADULT - DISCHARGE DISPOSITION, PT EVAL
subacute rehab./rehabilitation facility
Change in recommendation to Home c home PT to further improve balance, strength and endurance in home setting with now evidence of sufficient pain control, confidence with near-independent mobility and stable hemodynamics.

## 2019-07-18 NOTE — PROGRESS NOTE ADULT - PROBLEM SELECTOR PLAN 3
surgery follow up
spoke to ortho, agree Levonox, pulmonary consult called

## 2019-07-18 NOTE — PHYSICAL THERAPY INITIAL EVALUATION ADULT - PLANNED THERAPY INTERVENTIONS, PT EVAL
transfer training/ROM/strengthening/bed mobility training/gait training/balance training
transfer training/balance training/strengthening/gait training

## 2019-07-18 NOTE — PHYSICAL THERAPY INITIAL EVALUATION ADULT - GAIT DEVIATIONS NOTED, PT EVAL
increased time in double stance/decreased velocity of limb motion/decreased step length/decreased weight-shifting ability/decreased cristofer
decreased step length/decreased stride length/decreased cristofer

## 2019-07-18 NOTE — PHYSICAL THERAPY INITIAL EVALUATION ADULT - GENERAL OBSERVATIONS, REHAB EVAL
Pt was seen in supine c IV, PCA, O2 at 2l/min  through nasal cannula , penny catheter,  Venodyne pumps, and lumbar  dressing and lidia Neto Kumar drainage intact, alert and Ox4. Pt was instructed spinal precaution prior to P.T. intervention and pt had verbal understanding. Pt needed constant verbal cues for proper body mechanics to perform all functional activities.
Patient sat on edge of bed. + Cardiac monitor, continuous pulse oximetry. Lower back dressing rolled up from bottom, clean and dry.

## 2019-07-18 NOTE — PROGRESS NOTE ADULT - PROBLEM SELECTOR PROBLEM 3
Lumbar disc herniation with radiculopathy
Acute pulmonary embolism without acute cor pulmonale, unspecified pulmonary embolism type

## 2019-07-18 NOTE — PHYSICAL THERAPY INITIAL EVALUATION ADULT - CRITERIA FOR SKILLED THERAPEUTIC INTERVENTIONS
anticipated discharge recommendation/predicted duration of therapy intervention/functional limitations in following categories/impairments found/risk reduction/prevention/rehab potential
impairments found/rehab potential/risk reduction/prevention/anticipated equipment needs at discharge/functional limitations in following categories/anticipated discharge recommendation

## 2019-07-18 NOTE — PROGRESS NOTE ADULT - PROBLEM SELECTOR PROBLEM 2
Acute pulmonary embolism without acute cor pulmonale, unspecified pulmonary embolism type
Lumbar disc herniation with radiculopathy
Type 2 diabetes mellitus without complication, without long-term current use of insulin
Type 2 diabetes mellitus without complication, without long-term current use of insulin
Lumbar disc herniation with radiculopathy
Acute pulmonary embolism without acute cor pulmonale, unspecified pulmonary embolism type

## 2019-07-18 NOTE — CONSULT NOTE ADULT - CONSULT REQUESTED DATE/TIME
08-Jul-2019 12:55
11-Jul-2019 21:51
12-Jul-2019 14:00
12-Jul-2019 19:05
19-Jul-2019
12-Jul-2019 18:37

## 2019-07-18 NOTE — CONSULT NOTE ADULT - SUBJECTIVE AND OBJECTIVE BOX
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 An. (08 Jul 2019 12:47)  sp sx   feels fine     PAST MEDICAL & SURGICAL HISTORY:  DM (diabetes mellitus)  Lupus  History of bilateral tubal ligation  H/O total hysterectomy  History of hip replacement: left      SOCHX:  no tobacco,  no-  alcohol    FMHX: FA/MO  -non contributory       Recent Travel:    Immunizations:    Allergies    No Known Allergies    Intolerances        MEDICATIONS  (STANDING):  ascorbic acid 500 milliGRAM(s) Oral two times a day  calcium carbonate 1250 mG  + Vitamin D (OsCal 500 + D) 1 Tablet(s) Oral three times a day  docusate sodium 100 milliGRAM(s) Oral three times a day  folic acid 1 milliGRAM(s) Oral daily  gabapentin 300 milliGRAM(s) Oral three times a day  multivitamin 1 Tablet(s) Oral daily  polyethylene glycol 3350 17 Gram(s) Oral once  rivaroxaban 15 milliGRAM(s) Oral two times a day with meals  senna 2 Tablet(s) Oral at bedtime  sugammadex Injectable 450 milliGRAM(s) IV Push once  verapamil  milliGRAM(s) Oral daily    MEDICATIONS  (PRN):  acetaminophen   Tablet .. 650 milliGRAM(s) Oral every 6 hours PRN Temp greater or equal to 38C (100.4F), Mild Pain (1 - 3)  benzocaine 15 mG/menthol 3.6 mG Lozenge 1 Lozenge Oral every 3 hours PRN Sore Throat  cyclobenzaprine 10 milliGRAM(s) Oral three times a day PRN Muscle Spasm  diltiazem Injectable 20 milliGRAM(s) IV Push every 4 hours PRN only if HR greater than 120 and SBP maintained greater than 110  melatonin 3 milliGRAM(s) Oral at bedtime PRN Sleep  naloxone Injectable 0.1 milliGRAM(s) IV Push every 3 minutes PRN For ANY of the following changes in patient status:  A. RR LESS THAN 10 breaths per minute, B. Oxygen saturation LESS THAN 90%, C. Sedation score of 6  ondansetron Injectable 4 milliGRAM(s) IV Push every 4 hours PRN Nausea  petrolatum white Ointment 1 Application(s) Topical daily PRN dry lips      REVIEW OF SYSTEMS:  CONSTITUTIONAL: No fever, weight loss, or fatigue  EYES: No eye pain, visual disturbances, or discharge  ENMT:  No difficulty hearing, tinnitus, vertigo; No sinus or throat pain  NECK: No pain or stiffness  BREASTS: No pain, masses, or nipple discharge  RESPIRATORY: No cough, wheezing, chills or hemoptysis; No shortness of breath  CARDIOVASCULAR: No chest pain, palpitations, dizziness, or leg swelling  GASTROINTESTINAL: No abdominal or epigastric pain. No nausea, vomiting, or hematemesis; No diarrhea or constipation. No melena or hematochezia.  GENITOURINARY: No dysuria, frequency, hematuria, or incontinence  NEUROLOGICAL: No headaches, memory loss, loss of strength, numbness, or tremors  SKIN: No itching, burning, rashes, or lesions   LYMPH NODES: No enlarged glands  ENDOCRINE: No heat or cold intolerance; No hair loss  MUSCULOSKELETAL: recurrent joint pains and muscle pains   PSYCHIATRIC: No depression, anxiety, mood swings, or difficulty sleeping  HEME/LYMPH: No easy bruising, or bleeding gums  ALLERGY AND IMMUNOLOGIC: No hives or eczema    VITAL SIGNS:    T(C): 37.8 (07-18-19 @ 23:15), Max: 38 (07-18-19 @ 17:43)  T(F): 100 (07-18-19 @ 23:15), Max: 100.4 (07-18-19 @ 17:43)  HR: 84 (07-18-19 @ 23:15) (84 - 104)  BP: 141/82 (07-18-19 @ 23:15) (125/67 - 141/82)  RR: 16 (07-18-19 @ 23:15) (16 - 18)  SpO2: 97% (07-18-19 @ 23:15) (97% - 99%)    PHYSICAL EXAM:    GENERAL: NAD, well-groomed, well-developed  HEAD:  Atraumatic, Normocephalic  EYES: EOMI, PERRLA, conjunctiva and sclera clear  ENMT: No tonsillar erythema, exudates, or enlargement; Moist mucous membrane  NECK: Supple, No JVD, Normal thyroid  NERVOUS SYSTEM:  Alert & Oriented X3, Good concentration;   CHEST/LUNG: Clear to auscultation bilaterally; No rales, rhonchi, wheezing bilaterally  HEART: Regular rate and rhythm; No murmurs, rubs, or gallops  ABDOMEN: Soft, Nontender, Nondistended; Bowel sounds present  EXTREMITIES:  2+ Peripheral Pulses, No clubbing, cyanosis, or edema  LYMPH: No lymphadenopathy noted  SKIN: No rashes or lesions  BACK: no pressor sore     LABS:                         9.3    7.68  )-----------( 386      ( 18 Jul 2019 06:32 )             28.4     07-18    136  |  100  |  8   ----------------------------<  118<H>  3.9   |  29  |  0.82    Ca    8.8      18 Jul 2019 06:32                                Culture Results:   No growth to date. (07-16 @ 16:44)  Culture Results:   No growth (07-14 @ 16:54)                Radiology:

## 2019-07-18 NOTE — PHYSICAL THERAPY INITIAL EVALUATION ADULT - IMPAIRMENTS FOUND, PT EVAL
aerobic capacity/endurance/ROM/gait, locomotion, and balance/muscle strength
aerobic capacity/endurance/joint integrity and mobility/ROM/muscle strength/poor safety awareness/posture

## 2019-07-18 NOTE — PHYSICAL THERAPY INITIAL EVALUATION ADULT - ADDITIONAL COMMENTS
(Per EMILIANO Mccray on July 8,2019, patient confirming as the same.) Pt came from [facility]. Pt was able to ambulate c a Rolling Walker for about 100 ft under supervision in [facility] prior to surgery. At home, there is 2 steps at the entry w/o rail and one flight of stairs c chair lift. Family assist ADL as needed prior to admitted to [facility]. Pt has a Rolling Walker at home. Today also states has old crutches that need replacing.
Pt came from [facility]. Pt was able to ambulate c a Rolling Walker for about 100 ft under supervision in [facility] prior to surgery. At home, there is 2 steps at the entry w/o rail and one flight of stairs c chair lift. Family assist ADL as needed prior to admitted to [facility]. Pt has a Rolling Walker at home.

## 2019-07-18 NOTE — PHYSICAL THERAPY INITIAL EVALUATION ADULT - IMPAIRMENTS CONTRIBUTING IMPAIRED BED MOBILITY, REHAB EVAL
decreased strength/decreased ROM/impaired balance/narrow base of support
decreased flexibility/pain/decreased strength

## 2019-07-18 NOTE — PHYSICAL THERAPY INITIAL EVALUATION ADULT - IMPAIRMENTS CONTRIBUTING TO GAIT DEVIATIONS, PT EVAL
decreased strength/pain/decreased sensation/impaired sensory feedback/decreased flexibility/impaired balance
impaired balance/decreased strength/decreased ROM/narrow base of support

## 2019-07-18 NOTE — PHYSICAL THERAPY INITIAL EVALUATION ADULT - STRENGTHENING, PT EVAL
To improve strength of all four extremities by one grade in 4 weeks.
GOAL: To demonstrate gross muscle strength of 4/5 or greater to both lower limbs, to enable transfers and gait across all surfaces, elevated and flat, without undue fatigue or falls risk but with good lower limb control and balance.

## 2019-07-18 NOTE — PHYSICAL THERAPY INITIAL EVALUATION ADULT - PERTINENT HX OF CURRENT PROBLEM, REHAB EVAL
L drop foot; lumbar radiculopathy, herniated disc ; lumbar stenosis.
Increased length of stay due to spinal surgery complicated by pulmonary embolism and later pyrexial episodes and pain management issues. Received x2 pRBCs with good effect yesterday. Patient now apyrexial, on xarelto for PE. Pain sufficiently controlled (no analgesia given in past hour).

## 2019-07-18 NOTE — CONSULT NOTE ADULT - ASSESSMENT
resolved post op fever today   as per patient when ever she has lupus exacerbation she gets fever  feels fine   antibiotics   follow up for health care associated [place]   will follow with you thanks

## 2019-07-18 NOTE — PHYSICAL THERAPY INITIAL EVALUATION ADULT - DID THE PATIENT HAVE SURGERY?
yes/s/p PSF L3-S1, L4-5 TLIF, lidia hemilaminectomy L5-S1 c disectomy, R heminectomy L3-4
yes/s/p PSF L3-S1, L4-5 TLIF, lidia hemilaminectomy L5-S1 c disectomy, R heminectomy L3-4

## 2019-07-18 NOTE — PHYSICAL THERAPY INITIAL EVALUATION ADULT - TRANSFER TRAINING, PT EVAL
Pt will independently perform sit to stand to sit transfers without LOB using rolling walker by 2-5 days
GOAL: Patient will demonstrate independent transfers across all surfaces with appropriate mobility/adaptive devices, with good balance, in 4 weeks.

## 2019-07-19 ENCOUNTER — TRANSCRIPTION ENCOUNTER (OUTPATIENT)
Age: 59
End: 2019-07-19

## 2019-07-19 VITALS
RESPIRATION RATE: 17 BRPM | DIASTOLIC BLOOD PRESSURE: 72 MMHG | HEART RATE: 88 BPM | OXYGEN SATURATION: 98 % | SYSTOLIC BLOOD PRESSURE: 126 MMHG | TEMPERATURE: 98 F

## 2019-07-19 DIAGNOSIS — E11.9 TYPE 2 DIABETES MELLITUS WITHOUT COMPLICATIONS: ICD-10-CM

## 2019-07-19 RX ORDER — VERAPAMIL HCL 240 MG
1 CAPSULE, EXTENDED RELEASE PELLETS 24 HR ORAL
Qty: 30 | Refills: 0
Start: 2019-07-19 | End: 2019-08-17

## 2019-07-19 RX ORDER — DULOXETINE HYDROCHLORIDE 30 MG/1
1 CAPSULE, DELAYED RELEASE ORAL
Qty: 30 | Refills: 0
Start: 2019-07-19 | End: 2019-08-17

## 2019-07-19 RX ORDER — CYCLOBENZAPRINE HYDROCHLORIDE 10 MG/1
1 TABLET, FILM COATED ORAL
Qty: 40 | Refills: 0
Start: 2019-07-19 | End: 2019-08-07

## 2019-07-19 RX ORDER — FOLIC ACID 0.8 MG
1 TABLET ORAL
Qty: 30 | Refills: 0
Start: 2019-07-19 | End: 2019-08-17

## 2019-07-19 RX ORDER — OXYCODONE HYDROCHLORIDE 5 MG/1
1 TABLET ORAL
Qty: 30 | Refills: 0
Start: 2019-07-19 | End: 2019-07-23

## 2019-07-19 RX ORDER — SENNA PLUS 8.6 MG/1
2 TABLET ORAL
Qty: 60 | Refills: 0
Start: 2019-07-19 | End: 2019-08-17

## 2019-07-19 RX ORDER — LANOLIN ALCOHOL/MO/W.PET/CERES
1 CREAM (GRAM) TOPICAL
Qty: 30 | Refills: 0
Start: 2019-07-19 | End: 2019-08-17

## 2019-07-19 RX ORDER — DOCUSATE SODIUM 100 MG
1 CAPSULE ORAL
Qty: 90 | Refills: 0
Start: 2019-07-19 | End: 2019-08-17

## 2019-07-19 RX ORDER — DULOXETINE HYDROCHLORIDE 30 MG/1
1 CAPSULE, DELAYED RELEASE ORAL
Qty: 0 | Refills: 0 | DISCHARGE

## 2019-07-19 RX ORDER — ALPRAZOLAM 0.25 MG
1 TABLET ORAL
Qty: 0 | Refills: 0 | DISCHARGE

## 2019-07-19 RX ORDER — GABAPENTIN 400 MG/1
1 CAPSULE ORAL
Qty: 90 | Refills: 0
Start: 2019-07-19 | End: 2019-08-17

## 2019-07-19 RX ORDER — RIVAROXABAN 15 MG-20MG
1 KIT ORAL
Qty: 42 | Refills: 0
Start: 2019-07-19 | End: 2019-08-08

## 2019-07-19 RX ORDER — VERAPAMIL HCL 240 MG
1 CAPSULE, EXTENDED RELEASE PELLETS 24 HR ORAL
Qty: 0 | Refills: 0 | DISCHARGE

## 2019-07-19 RX ADMIN — Medication 650 MILLIGRAM(S): at 14:56

## 2019-07-19 RX ADMIN — CYCLOBENZAPRINE HYDROCHLORIDE 10 MILLIGRAM(S): 10 TABLET, FILM COATED ORAL at 11:27

## 2019-07-19 RX ADMIN — Medication 1 TABLET(S): at 06:05

## 2019-07-19 RX ADMIN — Medication 1 MILLIGRAM(S): at 11:27

## 2019-07-19 RX ADMIN — RIVAROXABAN 15 MILLIGRAM(S): KIT at 17:37

## 2019-07-19 RX ADMIN — Medication 650 MILLIGRAM(S): at 15:30

## 2019-07-19 RX ADMIN — Medication 1 TABLET(S): at 11:28

## 2019-07-19 RX ADMIN — GABAPENTIN 300 MILLIGRAM(S): 400 CAPSULE ORAL at 06:05

## 2019-07-19 RX ADMIN — Medication 650 MILLIGRAM(S): at 06:56

## 2019-07-19 RX ADMIN — Medication 650 MILLIGRAM(S): at 06:05

## 2019-07-19 RX ADMIN — Medication 1 TABLET(S): at 14:55

## 2019-07-19 RX ADMIN — Medication 100 MILLIGRAM(S): at 06:04

## 2019-07-19 RX ADMIN — RIVAROXABAN 15 MILLIGRAM(S): KIT at 07:41

## 2019-07-19 RX ADMIN — OXYCODONE HYDROCHLORIDE 10 MILLIGRAM(S): 5 TABLET ORAL at 00:50

## 2019-07-19 RX ADMIN — Medication 500 MILLIGRAM(S): at 17:37

## 2019-07-19 RX ADMIN — Medication 500 MILLIGRAM(S): at 06:04

## 2019-07-19 RX ADMIN — GABAPENTIN 300 MILLIGRAM(S): 400 CAPSULE ORAL at 14:56

## 2019-07-19 NOTE — PROGRESS NOTE ADULT - SUBJECTIVE AND OBJECTIVE BOX
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 An. (08 Jul 2019 12:47)      Allergies    No Known Allergies    Intolerances        MEDICATIONS  (STANDING):  ascorbic acid 500 milliGRAM(s) Oral two times a day  calcium carbonate 1250 mG  + Vitamin D (OsCal 500 + D) 1 Tablet(s) Oral three times a day  docusate sodium 100 milliGRAM(s) Oral three times a day  folic acid 1 milliGRAM(s) Oral daily  gabapentin 300 milliGRAM(s) Oral three times a day  multivitamin 1 Tablet(s) Oral daily  polyethylene glycol 3350 17 Gram(s) Oral once  rivaroxaban 15 milliGRAM(s) Oral two times a day with meals  senna 2 Tablet(s) Oral at bedtime  sugammadex Injectable 450 milliGRAM(s) IV Push once  verapamil  milliGRAM(s) Oral daily    MEDICATIONS  (PRN):  acetaminophen   Tablet .. 650 milliGRAM(s) Oral every 6 hours PRN Temp greater or equal to 38C (100.4F), Mild Pain (1 - 3)  benzocaine 15 mG/menthol 3.6 mG Lozenge 1 Lozenge Oral every 3 hours PRN Sore Throat  cyclobenzaprine 10 milliGRAM(s) Oral three times a day PRN Muscle Spasm  diltiazem Injectable 20 milliGRAM(s) IV Push every 4 hours PRN only if HR greater than 120 and SBP maintained greater than 110  melatonin 3 milliGRAM(s) Oral at bedtime PRN Sleep  naloxone Injectable 0.1 milliGRAM(s) IV Push every 3 minutes PRN For ANY of the following changes in patient status:  A. RR LESS THAN 10 breaths per minute, B. Oxygen saturation LESS THAN 90%, C. Sedation score of 6  ondansetron Injectable 4 milliGRAM(s) IV Push every 4 hours PRN Nausea  petrolatum white Ointment 1 Application(s) Topical daily PRN dry lips      REVIEW OF SYSTEMS:    CONSTITUTIONAL: No fever, chills, weight loss, or fatigue  HEENT: No sore throat, runny nose, ear ache  RESPIRATORY: No cough, wheezing, No shortness of breath  CARDIOVASCULAR: No chest pain, palpitations, dizziness  GASTROINTESTINAL: No abdominal pain. No nausea, vomiting, diarrhea  GENITOURINARY: No dysuria, increase frequency, hematuria, or incontinence  NEUROLOGICAL: No headaches, memory loss, loss of strength, numbness, or tremors, no weakness  EXTREMITY: No pedal edema BLE  SKIN: No itching, burning, rashes, or lesions     VITAL SIGNS:  T(C): 37.6 (07-19-19 @ 05:02), Max: 38 (07-18-19 @ 17:43)  T(F): 99.6 (07-19-19 @ 05:02), Max: 100.4 (07-18-19 @ 17:43)  HR: 100 (07-19-19 @ 11:23) (84 - 104)  BP: 127/77 (07-19-19 @ 11:23) (105/68 - 141/82)  RR: 18 (07-19-19 @ 11:23) (16 - 18)  SpO2: 98% (07-19-19 @ 11:23) (97% - 99%)  Wt(kg): --    PHYSICAL EXAM:    GENERAL: not in any distress  HEENT: Neck is supple, normocephalic, atraumatic   CHEST/LUNG: Clear to percussion bilaterally; No rales, rhonchi, wheezing  HEART: Regular rate and rhythm; No murmurs, rubs, or gallops  ABDOMEN: Soft, Nontender, Nondistended; Bowel sounds present, no rebound   EXTREMITIES:  2+ Peripheral Pulses, No clubbing, cyanosis, or edema  GENITOURINARY:   SKIN: No rashes or lesions  BACK: no pressor sore   NERVOUS SYSTEM:  Alert & Oriented X3, Good concentration  PSYCH: normal affect     LABS:                         9.3    7.68  )-----------( 386      ( 18 Jul 2019 06:32 )             28.4     07-18    136  |  100  |  8   ----------------------------<  118<H>  3.9   |  29  |  0.82    Ca    8.8      18 Jul 2019 06:32                      Culture Results:   No growth to date. (07-16 @ 16:44)  Culture Results:   No growth (07-14 @ 16:54)                Radiology:

## 2019-07-19 NOTE — PROGRESS NOTE ADULT - SUBJECTIVE AND OBJECTIVE BOX
Patient seen, doing well, comfortable, pain control adequate.  Reports marked improvement in leg pain, and weakness in the last 4 days. Pt received 2 transfusions PRBC yesterday and tolerated. Able to walk better with PT.    Afebrile    H/H: Acute blood loss anemia - post 2U PRBC labs reviewed    tachycardia - secondary to PE and episode of fever last night - improving    Incision: reevaluated, no redness, no drainage, no concerns for infection noted    Foot drop: strength 4+/5 on exam today so 80% improvement in pain and function      s/p: L3-S1 decompression and fusion  P: Discussed role of post hospital care  Analgesia  Xarelto for PE  Per ID, fevers likely 2/2 lupus  WBAT  PT/OT  Discharge today

## 2019-07-19 NOTE — DISCHARGE NOTE NURSING/CASE MANAGEMENT/SOCIAL WORK - NSDCDPATPORTLINK_GEN_ALL_CORE
You can access the [facility] Patient Portal, offered by [facility], by registering with the following website: [email or web address]

## 2019-07-19 NOTE — PROGRESS NOTE ADULT - PROVIDER SPECIALTY LIST ADULT
Cardiology
Infectious Disease
Internal Medicine
Orthopedics
Pulmonology
Internal Medicine
Cardiology
Pulmonology
Orthopedics
Orthopedics
Internal Medicine

## 2019-07-19 NOTE — CONSULT NOTE ADULT - SUBJECTIVE AND OBJECTIVE BOX
59y old  Female who presents with a chief complaint of left foot drop (15 Jul 2019 06:19)      Review of Systems:    General:  No wt loss, fevers, chills, night sweats  ENT:  No sore throat, pain, runny nose, dysphagia  CV:  No pain, palpitations, hypo/hypertension  Resp:  No dyspnea, cough, tachypnea, wheezing      Discussed with:  PMD, Family    Physical Exam:    Vital Signs:  Vital Signs Last 24 Hrs  T(C): 37.3 (15 Jul 2019 11:20), Max: 37.3 (15 Jul 2019 11:20)  T(F): 99.2 (15 Jul 2019 11:20), Max: 99.2 (15 Jul 2019 11:20)  HR: 104 (15 Jul 2019 11:20) (104 - 132)  BP: 100/72 (15 Jul 2019 11:20) (100/72 - 140/100)  BP(mean): --  RR: 16 (15 Jul 2019 11:20) (14 - 19)  SpO2: 96% (15 Jul 2019 11:20) (94% - 100%)  Daily     Daily   I&O's Summary    2019 07:01  -  15 Jul 2019 07:00  --------------------------------------------------------  IN: 630 mL / OUT: 105 mL / NET: 525 mL          Chest:  Full & symmetric excursion, no increased effort, breath sounds clear  Cardiovascular:  Regular rhythm, S1, S2, no murmur/rub/S3/S4, no carotid/femoral/abdominal bruit, radial/pedal pulses 2+,   Abdomen:  Soft, non-tender, non-distended, normoactive bowel sounds, no HSM      Laboratory:                          8.1    7.09  )-----------( 256      ( 15 Jul 2019 06:23 )             25.1     07-15    137  |  99  |  8   ----------------------------<  114<H>  3.9   |  33<H>  |  0.86    Ca    8.6      15 Jul 2019 06:23        Urinalysis Basic - ( 2019 15:15 )    Color: Yellow / Appearance: Clear / S.015 / pH: x  Gluc: x / Ketone: Trace  / Bili: Negative / Urobili: Negative mg/dL   Blood: x / Protein: 30 mg/dL / Nitrite: Negative   Leuk Esterase: Trace / RBC: 0-2 /HPF / WBC 0-2   Sq Epi: x / Non Sq Epi: Few / Bacteria: Few      Assessment:  Post spine surgery  PE   TTE completed, essentially normal  Lovenox to Xarelto  Fevers improved  PULM and ID noted  For DC

## 2019-07-19 NOTE — PROGRESS NOTE ADULT - ASSESSMENT
resolved post op fever   low grade fever of 100.4 with sweating last night   no cough, no diarrhea, nontoxic   as per patient when ever she has lupus exacerbation she gets fever  cxr clear and no cough  clinically ok   not on antibiotics and ok  will follow with you thanks   case discussed with pt

## 2019-07-19 NOTE — CONSULT NOTE ADULT - REASON FOR ADMISSION
left foot drop

## 2019-07-19 NOTE — PROGRESS NOTE ADULT - REASON FOR ADMISSION
left foot drop
left foot drop, leg weakness, intractable severe pain
left foot drop

## 2019-07-21 LAB
CULTURE RESULTS: SIGNIFICANT CHANGE UP
SPECIMEN SOURCE: SIGNIFICANT CHANGE UP

## 2019-07-29 DIAGNOSIS — R09.02 HYPOXEMIA: ICD-10-CM

## 2019-07-29 DIAGNOSIS — I10 ESSENTIAL (PRIMARY) HYPERTENSION: ICD-10-CM

## 2019-07-29 DIAGNOSIS — E66.9 OBESITY, UNSPECIFIED: ICD-10-CM

## 2019-07-29 DIAGNOSIS — M21.372 FOOT DROP, LEFT FOOT: ICD-10-CM

## 2019-07-29 DIAGNOSIS — M48.061 SPINAL STENOSIS, LUMBAR REGION WITHOUT NEUROGENIC CLAUDICATION: ICD-10-CM

## 2019-07-29 DIAGNOSIS — I26.99 OTHER PULMONARY EMBOLISM WITHOUT ACUTE COR PULMONALE: ICD-10-CM

## 2019-07-29 DIAGNOSIS — R50.9 FEVER, UNSPECIFIED: ICD-10-CM

## 2019-07-29 DIAGNOSIS — M43.16 SPONDYLOLISTHESIS, LUMBAR REGION: ICD-10-CM

## 2019-07-29 DIAGNOSIS — M51.16 INTERVERTEBRAL DISC DISORDERS WITH RADICULOPATHY, LUMBAR REGION: ICD-10-CM

## 2019-07-29 DIAGNOSIS — M32.9 SYSTEMIC LUPUS ERYTHEMATOSUS, UNSPECIFIED: ICD-10-CM

## 2019-07-29 DIAGNOSIS — D62 ACUTE POSTHEMORRHAGIC ANEMIA: ICD-10-CM

## 2019-07-29 DIAGNOSIS — G61.0 GUILLAIN-BARRE SYNDROME: ICD-10-CM

## 2019-07-29 DIAGNOSIS — Z96.642 PRESENCE OF LEFT ARTIFICIAL HIP JOINT: ICD-10-CM

## 2019-07-29 DIAGNOSIS — R00.0 TACHYCARDIA, UNSPECIFIED: ICD-10-CM

## 2019-07-29 DIAGNOSIS — E11.9 TYPE 2 DIABETES MELLITUS WITHOUT COMPLICATIONS: ICD-10-CM

## 2019-08-10 ENCOUNTER — EMERGENCY (EMERGENCY)
Facility: HOSPITAL | Age: 59
LOS: 0 days | Discharge: HOME | End: 2019-08-10
Payer: MEDICARE

## 2019-08-10 VITALS
WEIGHT: 250 LBS | HEART RATE: 84 BPM | OXYGEN SATURATION: 100 % | SYSTOLIC BLOOD PRESSURE: 128 MMHG | RESPIRATION RATE: 98 BRPM | TEMPERATURE: 98 F | HEIGHT: 68 IN | DIASTOLIC BLOOD PRESSURE: 76 MMHG

## 2019-08-10 DIAGNOSIS — Z96.649 PRESENCE OF UNSPECIFIED ARTIFICIAL HIP JOINT: Chronic | ICD-10-CM

## 2019-08-10 DIAGNOSIS — M32.9 SYSTEMIC LUPUS ERYTHEMATOSUS, UNSPECIFIED: ICD-10-CM
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
## 2019-08-10 DIAGNOSIS — T81.49XA INFECTION FOLLOWING A PROCEDURE, OTHER SURGICAL SITE, INITIAL ENCOUNTER: ICD-10-CM

## 2019-08-10 DIAGNOSIS — Z90.710 ACQUIRED ABSENCE OF BOTH CERVIX AND UTERUS: Chronic | ICD-10-CM

## 2019-08-10 DIAGNOSIS — Z00.00 ENCOUNTER FOR GENERAL ADULT MEDICAL EXAMINATION WITHOUT ABNORMAL FINDINGS: ICD-10-CM

## 2019-08-10 DIAGNOSIS — E11.9 TYPE 2 DIABETES MELLITUS WITHOUT COMPLICATIONS: ICD-10-CM

## 2019-08-10 DIAGNOSIS — Z98.51 TUBAL LIGATION STATUS: Chronic | ICD-10-CM

## 2019-08-10 LAB
ERYTHROCYTE [SEDIMENTATION RATE] IN BLOOD: 42 MM/HR — HIGH (ref 0–20)
HCT VFR BLD CALC: 36.4 % — SIGNIFICANT CHANGE UP (ref 34.5–45)
HGB BLD-MCNC: 11.5 G/DL — SIGNIFICANT CHANGE UP (ref 11.5–15.5)
MCHC RBC-ENTMCNC: 27.6 PG — SIGNIFICANT CHANGE UP (ref 27–34)
MCHC RBC-ENTMCNC: 31.6 GM/DL — LOW (ref 32–36)
MCV RBC AUTO: 87.5 FL — SIGNIFICANT CHANGE UP (ref 80–100)
NRBC # BLD: 0 /100 WBCS — SIGNIFICANT CHANGE UP (ref 0–0)
PLATELET # BLD AUTO: 249 K/UL — SIGNIFICANT CHANGE UP (ref 150–400)
RBC # BLD: 4.16 M/UL — SIGNIFICANT CHANGE UP (ref 3.8–5.2)
RBC # FLD: 14.6 % — HIGH (ref 10.3–14.5)
WBC # BLD: 5.66 K/UL — SIGNIFICANT CHANGE UP (ref 3.8–10.5)
WBC # FLD AUTO: 5.66 K/UL — SIGNIFICANT CHANGE UP (ref 3.8–10.5)

## 2019-08-10 PROCEDURE — 99284 EMERGENCY DEPT VISIT MOD MDM: CPT

## 2019-08-10 RX ORDER — AZTREONAM 2 G
1 VIAL (EA) INJECTION
Qty: 14 | Refills: 0
Start: 2019-08-10 | End: 2019-08-16

## 2019-08-10 RX ADMIN — Medication 1 TABLET(S): at 20:12

## 2019-08-10 NOTE — ED ADULT TRIAGE NOTE - CHIEF COMPLAINT QUOTE
S/P laminectomy w/fusion July 9 @ [facility] PW with painful non-healing incision site with moderate amount of pink tinged discharge pt denies fever and chills. Dr. Yan whom performed sx told pt to come to ED and have incision. site checked. PT on Xarelto. back brace present. PMHX of HTN and Lupus

## 2019-08-10 NOTE — ED PROVIDER NOTE - CLINICAL SUMMARY MEDICAL DECISION MAKING FREE TEXT BOX
Based on exam and history likely wound infection, ortho at the bedside ordering labs, medications and patient will follow up in office in 2 weeks.

## 2019-08-10 NOTE — ED PROVIDER NOTE - OBJECTIVE STATEMENT
60 yo female with PMHx of laminectomy 6 weeks ago, presenting to ED with possible wound dehiscence and infection. Patient was sent in by wound care nurse with concerns of the superior portion of wound opening with drainage. Patient denies pain, fever or discomfort. No other complaints at this time.

## 2019-08-10 NOTE — CONSULT NOTE ADULT - SUBJECTIVE AND OBJECTIVE BOX
Patient is a 59y Female presenting with wound drainage s/p posterior spinal fusion L3-S1, L4-5 TLIF, bilateral hemilaminectomy L5-S1 with discectomy, right hemilaminectomy L3-L4 on 7/9/19 with Dr. An. Pt endorses a small amount of serosanguineous wound drainage from the top half of surgical incision. Pt denies trauma, headstrike, LOC, or other orthopaedic injuries. Pt denies numbness, tingling, or paresthesias in saddle distribution or extremities. Denies bowel/bladder incontinence. Pt is a community ambulatory at baseline. Denies fevers, recent illness, dizziness, CP, SOB, N/V, weakness, calf pain.    PMHx:  DM (diabetes mellitus)  Lupus    PSHx:  History of bilateral tubal ligation  H/O total hysterectomy  History of hip replacement  posterior spinal fusion L3-S1, L4-5 TLIF, bilateral hemilaminectomy L5-S1 with discectomy, right hemilaminectomy L3-L4 on 7/9/19 with Dr. An    Allergies:  No Known Allergies    Social: Denies tobacco, EtOH, or illicit drug use.    Vitals:  T(C): 36.8 (08-10-19 @ 19:06), Max: 36.8 (08-10-19 @ 19:06)  HR: 84 (08-10-19 @ 19:06) (84 - 84)  BP: 128/76 (08-10-19 @ 19:06) (128/76 - 128/76)  RR: 98 (08-10-19 @ 19:06) (98 - 98)  SpO2: 100% (08-10-19 @ 19:06) (100% - 100%)    Physical Exam:  Gen: NAD    Spine Exam:  1 cm opening at proximal-most aspect of lumbar surgical incision  No active drainage  <1cc of purulent drainage expressed  Wound probed without obvious sinus tract or deep tissue involved  Superficial knot of suture excised from wound  No gross deformity  No midline TTP C/T/L/S spine  No bony step offs  No paraspinal muscle TTP/Hypertonicity   No Radicular Symptoms with SLR  Negative clonus  Negative babinski  Negative maria  Negative ulnar drift  No calf TTP    Motor:               C5           C6            C7               C8           T1   R         5/5          5/5            5/5             5/5          5/5  L          5/5          5/5            5/5             5/5          5/5                L2             L3             L4               L5            S1  R         5/5           5/5          5/5             5/5           5/5  L          5/5          5/5           4/5             4/5           5/5 *Baseline per pt    Sensory:            C5         C6         C7      C8       T1        (0=absent, 1=impaired, 2=normal, NT=not testable)  R         2            2           2        2         2  L          2            2           2        2         2               L2          L3         L4      L5       S1         (0=absent, 1=impaired, 2=normal, NT=not testable)  R         2            2            2        2        2  L          2            2           2        2         2

## 2019-08-10 NOTE — CONSULT NOTE ADULT - ASSESSMENT
Pt is a 59y Female s/p lumbar spine surgery on 7/9/19 with wound drainage.  -Afebrile, no leukocytosis (5.6)  -Wound probed without deeper tissue involvement, superficial suture excised, steri strips placed   -Pain Control  -WBAT  -FU BCx, ESR, CRP  -Recommend Bactrim x 7 days  -Med Management  -No further orthopaedic surgical intervention  -Ortho stable for DC  -Will follow up labs and cultures peripherally    Ivette Crocker M.D.  PGY-2 Orthopaedic Surgery

## 2019-08-10 NOTE — ED ADULT NURSE NOTE - CHIEF COMPLAINT QUOTE
S/P laminectomy w/fusion July 9 @ [facility] PW with painful non-healing incision site with moderate amount of pink tinged discharge pt denies fever and chills. Dr. Yan whom performed sx told pt to come to ED and have incision. site checked. PT on Xarelto. back brace present. PMHX of HTN and Lupus

## 2019-08-11 PROBLEM — M32.9 SYSTEMIC LUPUS ERYTHEMATOSUS, UNSPECIFIED: Chronic | Status: ACTIVE | Noted: 2019-07-08

## 2019-08-11 LAB — CRP SERPL-MCNC: 0.36 MG/DL — SIGNIFICANT CHANGE UP (ref 0–0.4)

## 2019-08-16 ENCOUNTER — TRANSCRIPTION ENCOUNTER (OUTPATIENT)
Age: 59
End: 2019-08-16

## 2019-08-16 LAB
CULTURE RESULTS: SIGNIFICANT CHANGE UP
CULTURE RESULTS: SIGNIFICANT CHANGE UP
SPECIMEN SOURCE: SIGNIFICANT CHANGE UP
SPECIMEN SOURCE: SIGNIFICANT CHANGE UP

## 2019-11-13 ENCOUNTER — OUTPATIENT (OUTPATIENT)
Dept: OUTPATIENT SERVICES | Facility: HOSPITAL | Age: 59
LOS: 1 days | Discharge: HOME | End: 2019-11-13

## 2019-11-13 VITALS
HEART RATE: 97 BPM | HEIGHT: 68 IN | DIASTOLIC BLOOD PRESSURE: 64 MMHG | OXYGEN SATURATION: 98 % | TEMPERATURE: 98 F | RESPIRATION RATE: 16 BRPM | SYSTOLIC BLOOD PRESSURE: 124 MMHG | WEIGHT: 241.41 LBS

## 2019-11-13 DIAGNOSIS — Z90.710 ACQUIRED ABSENCE OF BOTH CERVIX AND UTERUS: Chronic | ICD-10-CM

## 2019-11-13 DIAGNOSIS — Z98.1 ARTHRODESIS STATUS: Chronic | ICD-10-CM

## 2019-11-13 DIAGNOSIS — S83.209A UNSPECIFIED TEAR OF UNSPECIFIED MENISCUS, CURRENT INJURY, UNSPECIFIED KNEE, INITIAL ENCOUNTER: ICD-10-CM

## 2019-11-13 DIAGNOSIS — S83.289A OTHER TEAR OF LATERAL MENISCUS, CURRENT INJURY, UNSPECIFIED KNEE, INITIAL ENCOUNTER: ICD-10-CM

## 2019-11-13 DIAGNOSIS — I26.99 OTHER PULMONARY EMBOLISM WITHOUT ACUTE COR PULMONALE: ICD-10-CM

## 2019-11-13 DIAGNOSIS — Z96.649 PRESENCE OF UNSPECIFIED ARTIFICIAL HIP JOINT: Chronic | ICD-10-CM

## 2019-11-13 DIAGNOSIS — Z98.890 OTHER SPECIFIED POSTPROCEDURAL STATES: Chronic | ICD-10-CM

## 2019-11-13 DIAGNOSIS — Z01.818 ENCOUNTER FOR OTHER PREPROCEDURAL EXAMINATION: ICD-10-CM

## 2019-11-13 DIAGNOSIS — Z98.51 TUBAL LIGATION STATUS: Chronic | ICD-10-CM

## 2019-11-13 LAB
ANION GAP SERPL CALC-SCNC: 6 MMOL/L — SIGNIFICANT CHANGE UP (ref 5–17)
APTT BLD: 32.4 SEC — SIGNIFICANT CHANGE UP (ref 27.5–36.3)
BASOPHILS # BLD AUTO: 0.02 K/UL — SIGNIFICANT CHANGE UP (ref 0–0.2)
BASOPHILS NFR BLD AUTO: 0.4 % — SIGNIFICANT CHANGE UP (ref 0–2)
BUN SERPL-MCNC: 13 MG/DL — SIGNIFICANT CHANGE UP (ref 7–23)
CALCIUM SERPL-MCNC: 9.7 MG/DL — SIGNIFICANT CHANGE UP (ref 8.5–10.1)
CHLORIDE SERPL-SCNC: 104 MMOL/L — SIGNIFICANT CHANGE UP (ref 96–108)
CO2 SERPL-SCNC: 30 MMOL/L — SIGNIFICANT CHANGE UP (ref 22–31)
CREAT SERPL-MCNC: 0.92 MG/DL — SIGNIFICANT CHANGE UP (ref 0.5–1.3)
EOSINOPHIL # BLD AUTO: 0.51 K/UL — HIGH (ref 0–0.5)
EOSINOPHIL NFR BLD AUTO: 10 % — HIGH (ref 0–6)
GLUCOSE SERPL-MCNC: 93 MG/DL — SIGNIFICANT CHANGE UP (ref 70–99)
HCT VFR BLD CALC: 40.5 % — SIGNIFICANT CHANGE UP (ref 34.5–45)
HGB BLD-MCNC: 12.7 G/DL — SIGNIFICANT CHANGE UP (ref 11.5–15.5)
IMM GRANULOCYTES NFR BLD AUTO: 0.2 % — SIGNIFICANT CHANGE UP (ref 0–1.5)
INR BLD: 1.03 RATIO — SIGNIFICANT CHANGE UP (ref 0.88–1.16)
LYMPHOCYTES # BLD AUTO: 1.35 K/UL — SIGNIFICANT CHANGE UP (ref 1–3.3)
LYMPHOCYTES # BLD AUTO: 26.6 % — SIGNIFICANT CHANGE UP (ref 13–44)
MCHC RBC-ENTMCNC: 26.9 PG — LOW (ref 27–34)
MCHC RBC-ENTMCNC: 31.4 GM/DL — LOW (ref 32–36)
MCV RBC AUTO: 85.8 FL — SIGNIFICANT CHANGE UP (ref 80–100)
MONOCYTES # BLD AUTO: 0.33 K/UL — SIGNIFICANT CHANGE UP (ref 0–0.9)
MONOCYTES NFR BLD AUTO: 6.5 % — SIGNIFICANT CHANGE UP (ref 2–14)
NEUTROPHILS # BLD AUTO: 2.86 K/UL — SIGNIFICANT CHANGE UP (ref 1.8–7.4)
NEUTROPHILS NFR BLD AUTO: 56.3 % — SIGNIFICANT CHANGE UP (ref 43–77)
NRBC # BLD: 0 /100 WBCS — SIGNIFICANT CHANGE UP (ref 0–0)
PLATELET # BLD AUTO: 321 K/UL — SIGNIFICANT CHANGE UP (ref 150–400)
POTASSIUM SERPL-MCNC: 4.3 MMOL/L — SIGNIFICANT CHANGE UP (ref 3.5–5.3)
POTASSIUM SERPL-SCNC: 4.3 MMOL/L — SIGNIFICANT CHANGE UP (ref 3.5–5.3)
PROTHROM AB SERPL-ACNC: 11.5 SEC — SIGNIFICANT CHANGE UP (ref 10–12.9)
RBC # BLD: 4.72 M/UL — SIGNIFICANT CHANGE UP (ref 3.8–5.2)
RBC # FLD: 16.2 % — HIGH (ref 10.3–14.5)
SODIUM SERPL-SCNC: 140 MMOL/L — SIGNIFICANT CHANGE UP (ref 135–145)
WBC # BLD: 5.08 K/UL — SIGNIFICANT CHANGE UP (ref 3.8–10.5)
WBC # FLD AUTO: 5.08 K/UL — SIGNIFICANT CHANGE UP (ref 3.8–10.5)

## 2019-11-13 NOTE — H&P PST ADULT - NSICDXPASTSURGICALHX_GEN_ALL_CORE_FT
PAST SURGICAL HISTORY:  H/O total hysterectomy     History of bilateral tubal ligation     History of hip replacement left    History of shoulder surgery     S/P laminectomy with spinal fusion

## 2019-11-13 NOTE — H&P PST ADULT - HISTORY OF PRESENT ILLNESS
59 year old female with pain to right knee, scheduled for right knee arthroscopy with partial lateral meniscal resection on 11/22/19

## 2019-11-13 NOTE — H&P PST ADULT - ASSESSMENT
59 year old female with pain to right knee, scheduled for right knee arthroscopy with partial lateral meniscal resection on 19.    CAPRINI SCORE [CLOT]    AGE RELATED RISK FACTORS                                                       MOBILITY RELATED FACTORS  [x ] Age 41-60 years                                            (1 Point)                  [ ] Bed rest                                                        (1 Point)  [ ] Age: 61-74 years                                           (2 Points)                 [ ] Plaster cast                                                   (2 Points)  [ ] Age= 75 years                                              (3 Points)                 [ ] Bed bound for more than 72 hours                 (2 Points)    DISEASE RELATED RISK FACTORS                                               GENDER SPECIFIC FACTORS  [ ] Edema in the lower extremities                       (1 Point)                  [ ] Pregnancy                                                     (1 Point)  [ ] Varicose veins                                               (1 Point)                  [ ] Post-partum < 6 weeks                                   (1 Point)             [ ] BMI > 25 Kg/m2                                            (1 Point)                  [ ] Hormonal therapy  or oral contraception          (1 Point)                 [ ] Sepsis (in the previous month)                        (1 Point)                  [ ] History of pregnancy complications                 (1 point)  [ ] Pneumonia or serious lung disease                                               [ ] Unexplained or recurrent                     (1 Point)           (in the previous month)                               (1 Point)  [ ] Abnormal pulmonary function test                     (1 Point)                 SURGERY RELATED RISK FACTORS  [ ] Acute myocardial infarction                              (1 Point)                 [ ]  Section                                             (1 Point)  [ ] Congestive heart failure (in the previous month)  (1 Point)               [ ] Minor surgery                                                  (1 Point)   [ ] Inflammatory bowel disease                             (1 Point)                 [x ] Arthroscopic surgery                                        (2 Points)  [ ] Central venous access                                      (2 Points)                [ ] General surgery lasting more than 45 minutes   (2 Points)       [ ] Stroke (in the previous month)                          (5 Points)               [ ] Elective arthroplasty                                         (5 Points)                                                                                                                                               HEMATOLOGY RELATED FACTORS                                                 TRAUMA RELATED RISK FACTORS  [ x] Prior episodes of VTE                                     (3 Points)                [ ] Fracture of the hip, pelvis, or leg                       (5 Points)  [ ] Positive family history for VTE                         (3 Points)                 [ ] Acute spinal cord injury (in the previous month)  (5 Points)  [ ] Prothrombin 66724 A                                     (3 Points)                 [ ] Paralysis  (less than 1 month)                             (5 Points)  [ ] Factor V Leiden                                             (3 Points)                  [ ] Multiple Trauma within 1 month                        (5 Points)  [ ] Lupus anticoagulants                                     (3 Points)                                                           [ ] Anticardiolipin antibodies                               (3 Points)                                                       [ ] High homocysteine in the blood                      (3 Points)                                             [ ] Other congenital or acquired thrombophilia      (3 Points)                                                [ ] Heparin induced thrombocytopenia                  (3 Points)                                          Total Score [    6      ]    Caprini Score 0 - 2:  Low Risk, No VTE Prophylaxis required for most patients, encourage ambulation  Caprini Score 3 - 6:  At Risk, pharmacologic VTE prophylaxis is indicated for most patients (in the absence of a contraindication)  Caprini Score Greater than or = 7:  High Risk, pharmacologic VTE prophylaxis is indicated for most patients (in the absence of a contraindication)

## 2019-11-13 NOTE — H&P PST ADULT - ATTENDING COMMENTS
here today for right knee arthroscopy and possible lateral mensical resection. r/b/a of surgical intervention discussed in detail with the patient, patient given the opportunity to ask all questions and all questions were answered.  patient, under no duress, signed informed consent and would like to proceed with surgery and discussed

## 2019-11-13 NOTE — H&P PST ADULT - NSICDXPROBLEM_GEN_ALL_CORE_FT
PROBLEM DIAGNOSES  Problem: Traumatic tear of lateral meniscus of knee  Assessment and Plan: right knee arthroscopy with partial lateral meniscal rescection

## 2019-11-21 ENCOUNTER — TRANSCRIPTION ENCOUNTER (OUTPATIENT)
Age: 59
End: 2019-11-21

## 2019-11-22 ENCOUNTER — OUTPATIENT (OUTPATIENT)
Dept: OUTPATIENT SERVICES | Facility: HOSPITAL | Age: 59
LOS: 1 days | Discharge: HOME | End: 2019-11-22

## 2019-11-22 VITALS
DIASTOLIC BLOOD PRESSURE: 79 MMHG | HEIGHT: 68 IN | RESPIRATION RATE: 16 BRPM | TEMPERATURE: 99 F | WEIGHT: 240.08 LBS | SYSTOLIC BLOOD PRESSURE: 130 MMHG | OXYGEN SATURATION: 99 % | HEART RATE: 104 BPM

## 2019-11-22 VITALS
TEMPERATURE: 98 F | HEART RATE: 85 BPM | OXYGEN SATURATION: 100 % | DIASTOLIC BLOOD PRESSURE: 76 MMHG | RESPIRATION RATE: 15 BRPM | SYSTOLIC BLOOD PRESSURE: 124 MMHG

## 2019-11-22 DIAGNOSIS — Z90.710 ACQUIRED ABSENCE OF BOTH CERVIX AND UTERUS: Chronic | ICD-10-CM

## 2019-11-22 DIAGNOSIS — Z98.51 TUBAL LIGATION STATUS: Chronic | ICD-10-CM

## 2019-11-22 DIAGNOSIS — Z98.890 OTHER SPECIFIED POSTPROCEDURAL STATES: Chronic | ICD-10-CM

## 2019-11-22 DIAGNOSIS — Z96.649 PRESENCE OF UNSPECIFIED ARTIFICIAL HIP JOINT: Chronic | ICD-10-CM

## 2019-11-22 DIAGNOSIS — Z98.1 ARTHRODESIS STATUS: Chronic | ICD-10-CM

## 2019-11-22 RX ORDER — HYDROMORPHONE HYDROCHLORIDE 2 MG/ML
0.5 INJECTION INTRAMUSCULAR; INTRAVENOUS; SUBCUTANEOUS
Refills: 0 | Status: DISCONTINUED | OUTPATIENT
Start: 2019-11-22 | End: 2019-11-23

## 2019-11-22 RX ORDER — SODIUM CHLORIDE 9 MG/ML
3 INJECTION INTRAMUSCULAR; INTRAVENOUS; SUBCUTANEOUS EVERY 8 HOURS
Refills: 0 | Status: DISCONTINUED | OUTPATIENT
Start: 2019-11-22 | End: 2019-11-22

## 2019-11-22 RX ORDER — SODIUM CHLORIDE 9 MG/ML
1000 INJECTION, SOLUTION INTRAVENOUS
Refills: 0 | Status: DISCONTINUED | OUTPATIENT
Start: 2019-11-22 | End: 2019-11-23

## 2019-11-22 RX ADMIN — SODIUM CHLORIDE 75 MILLILITER(S): 9 INJECTION, SOLUTION INTRAVENOUS at 13:29

## 2019-11-22 NOTE — BRIEF OPERATIVE NOTE - NSICDXBRIEFPROCEDURE_GEN_ALL_CORE_FT
PROCEDURES:  Arthroscopic debridement of knee joint 22-Nov-2019 12:52:02  Ajit Braun  Meniscectomy, knee, lateral 22-Nov-2019 12:51:46  Ajit Braun

## 2019-11-22 NOTE — ASU DISCHARGE PLAN (ADULT/PEDIATRIC) - CALL YOUR DOCTOR IF YOU HAVE ANY OF THE FOLLOWING:
Fever greater than (need to indicate Fahrenheit or Celsius)/Wound/Surgical Site with redness, or foul smelling discharge or pus/Bleeding that does not stop/Swelling that gets worse/Numbness, tingling, color or temperature change to extremity/Pain not relieved by Medications

## 2019-11-22 NOTE — ASU PATIENT PROFILE, ADULT - PSH
H/O total hysterectomy    History of bilateral tubal ligation    History of hip replacement  left  History of shoulder surgery    S/P laminectomy with spinal fusion

## 2019-11-22 NOTE — ASU DISCHARGE PLAN (ADULT/PEDIATRIC) - CARE PROVIDER_API CALL
Gino Joya)  Georgi Hardin  Physicians  [address] [place] [address]  Phone: [phone]  Fax: [phone]  Follow Up Time:

## 2019-11-29 DIAGNOSIS — Z79.82 LONG TERM (CURRENT) USE OF ASPIRIN: ICD-10-CM

## 2019-11-29 DIAGNOSIS — M23.251 DERANGEMENT OF POSTERIOR HORN OF LATERAL MENISCUS DUE TO OLD TEAR OR INJURY, RIGHT KNEE: ICD-10-CM

## 2019-11-29 DIAGNOSIS — E11.9 TYPE 2 DIABETES MELLITUS WITHOUT COMPLICATIONS: ICD-10-CM

## 2019-11-29 DIAGNOSIS — Z79.01 LONG TERM (CURRENT) USE OF ANTICOAGULANTS: ICD-10-CM

## 2019-11-29 DIAGNOSIS — M32.9 SYSTEMIC LUPUS ERYTHEMATOSUS, UNSPECIFIED: ICD-10-CM

## 2019-11-29 DIAGNOSIS — Z96.642 PRESENCE OF LEFT ARTIFICIAL HIP JOINT: ICD-10-CM

## 2019-11-29 DIAGNOSIS — M17.11 UNILATERAL PRIMARY OSTEOARTHRITIS, RIGHT KNEE: ICD-10-CM

## 2019-11-29 DIAGNOSIS — D64.9 ANEMIA, UNSPECIFIED: ICD-10-CM

## 2019-11-29 DIAGNOSIS — F32.9 MAJOR DEPRESSIVE DISORDER, SINGLE EPISODE, UNSPECIFIED: ICD-10-CM

## 2019-11-29 DIAGNOSIS — Z90.710 ACQUIRED ABSENCE OF BOTH CERVIX AND UTERUS: ICD-10-CM

## 2019-11-29 DIAGNOSIS — F41.9 ANXIETY DISORDER, UNSPECIFIED: ICD-10-CM

## 2019-11-29 DIAGNOSIS — G47.00 INSOMNIA, UNSPECIFIED: ICD-10-CM

## 2019-11-29 DIAGNOSIS — M23.41 LOOSE BODY IN KNEE, RIGHT KNEE: ICD-10-CM

## 2019-11-29 DIAGNOSIS — Z79.1 LONG TERM (CURRENT) USE OF NON-STEROIDAL ANTI-INFLAMMATORIES (NSAID): ICD-10-CM

## 2019-11-29 DIAGNOSIS — J45.909 UNSPECIFIED ASTHMA, UNCOMPLICATED: ICD-10-CM

## 2019-11-29 DIAGNOSIS — Z98.1 ARTHRODESIS STATUS: ICD-10-CM

## 2019-11-29 DIAGNOSIS — Z79.891 LONG TERM (CURRENT) USE OF OPIATE ANALGESIC: ICD-10-CM

## 2019-11-29 DIAGNOSIS — I10 ESSENTIAL (PRIMARY) HYPERTENSION: ICD-10-CM

## 2020-01-02 ENCOUNTER — EMERGENCY (EMERGENCY)
Facility: HOSPITAL | Age: 60
LOS: 0 days | Discharge: HOME | End: 2020-01-03
Attending: EMERGENCY MEDICINE
Payer: MEDICARE

## 2020-01-02 VITALS
HEART RATE: 100 BPM | TEMPERATURE: 98 F | WEIGHT: 238.98 LBS | DIASTOLIC BLOOD PRESSURE: 103 MMHG | OXYGEN SATURATION: 100 % | SYSTOLIC BLOOD PRESSURE: 163 MMHG | RESPIRATION RATE: 17 BRPM | HEIGHT: 68 IN

## 2020-01-02 DIAGNOSIS — I10 ESSENTIAL (PRIMARY) HYPERTENSION: ICD-10-CM

## 2020-01-02 DIAGNOSIS — G62.9 POLYNEUROPATHY, UNSPECIFIED: ICD-10-CM

## 2020-01-02 DIAGNOSIS — Z90.710 ACQUIRED ABSENCE OF BOTH CERVIX AND UTERUS: Chronic | ICD-10-CM

## 2020-01-02 DIAGNOSIS — R07.9 CHEST PAIN, UNSPECIFIED: ICD-10-CM

## 2020-01-02 DIAGNOSIS — L93.2 OTHER LOCAL LUPUS ERYTHEMATOSUS: ICD-10-CM

## 2020-01-02 DIAGNOSIS — Z98.1 ARTHRODESIS STATUS: Chronic | ICD-10-CM

## 2020-01-02 DIAGNOSIS — J45.909 UNSPECIFIED ASTHMA, UNCOMPLICATED: ICD-10-CM

## 2020-01-02 DIAGNOSIS — E10.9 TYPE 1 DIABETES MELLITUS WITHOUT COMPLICATIONS: ICD-10-CM

## 2020-01-02 DIAGNOSIS — Z98.890 OTHER SPECIFIED POSTPROCEDURAL STATES: Chronic | ICD-10-CM

## 2020-01-02 DIAGNOSIS — Z98.51 TUBAL LIGATION STATUS: Chronic | ICD-10-CM

## 2020-01-02 DIAGNOSIS — Z96.649 PRESENCE OF UNSPECIFIED ARTIFICIAL HIP JOINT: Chronic | ICD-10-CM

## 2020-01-02 PROCEDURE — 93010 ELECTROCARDIOGRAM REPORT: CPT

## 2020-01-02 PROCEDURE — 99284 EMERGENCY DEPT VISIT MOD MDM: CPT

## 2020-01-03 VITALS
HEART RATE: 86 BPM | DIASTOLIC BLOOD PRESSURE: 86 MMHG | OXYGEN SATURATION: 100 % | SYSTOLIC BLOOD PRESSURE: 148 MMHG | TEMPERATURE: 98 F | RESPIRATION RATE: 18 BRPM

## 2020-01-03 PROBLEM — E11.9 TYPE 2 DIABETES MELLITUS WITHOUT COMPLICATIONS: Chronic | Status: ACTIVE | Noted: 2019-11-13

## 2020-01-03 PROBLEM — J45.909 UNSPECIFIED ASTHMA, UNCOMPLICATED: Chronic | Status: ACTIVE | Noted: 2019-11-13

## 2020-01-03 PROBLEM — G62.9 POLYNEUROPATHY, UNSPECIFIED: Chronic | Status: ACTIVE | Noted: 2019-11-22

## 2020-01-03 PROBLEM — I10 ESSENTIAL (PRIMARY) HYPERTENSION: Chronic | Status: ACTIVE | Noted: 2019-11-13

## 2020-01-03 LAB
ACETONE SERPL-MCNC: NEGATIVE — SIGNIFICANT CHANGE UP
ALBUMIN SERPL ELPH-MCNC: 3.5 G/DL — SIGNIFICANT CHANGE UP (ref 3.3–5)
ALP SERPL-CCNC: 82 U/L — SIGNIFICANT CHANGE UP (ref 40–120)
ALT FLD-CCNC: 21 U/L — SIGNIFICANT CHANGE UP (ref 12–78)
ANION GAP SERPL CALC-SCNC: 7 MMOL/L — SIGNIFICANT CHANGE UP (ref 5–17)
APTT BLD: 39.3 SEC — HIGH (ref 27.5–36.3)
AST SERPL-CCNC: 18 U/L — SIGNIFICANT CHANGE UP (ref 15–37)
BILIRUB SERPL-MCNC: 0.2 MG/DL — SIGNIFICANT CHANGE UP (ref 0.2–1.2)
BUN SERPL-MCNC: 15 MG/DL — SIGNIFICANT CHANGE UP (ref 7–23)
CALCIUM SERPL-MCNC: 9.1 MG/DL — SIGNIFICANT CHANGE UP (ref 8.5–10.1)
CHLORIDE SERPL-SCNC: 107 MMOL/L — SIGNIFICANT CHANGE UP (ref 96–108)
CO2 SERPL-SCNC: 28 MMOL/L — SIGNIFICANT CHANGE UP (ref 22–31)
CREAT SERPL-MCNC: 0.87 MG/DL — SIGNIFICANT CHANGE UP (ref 0.5–1.3)
GLUCOSE BLDC GLUCOMTR-MCNC: 80 MG/DL — SIGNIFICANT CHANGE UP (ref 70–99)
GLUCOSE SERPL-MCNC: 95 MG/DL — SIGNIFICANT CHANGE UP (ref 70–99)
HCT VFR BLD CALC: 38 % — SIGNIFICANT CHANGE UP (ref 34.5–45)
HGB BLD-MCNC: 12 G/DL — SIGNIFICANT CHANGE UP (ref 11.5–15.5)
INR BLD: 1.57 RATIO — HIGH (ref 0.88–1.16)
LIDOCAIN IGE QN: 105 U/L — SIGNIFICANT CHANGE UP (ref 73–393)
MCHC RBC-ENTMCNC: 27.4 PG — SIGNIFICANT CHANGE UP (ref 27–34)
MCHC RBC-ENTMCNC: 31.6 GM/DL — LOW (ref 32–36)
MCV RBC AUTO: 86.8 FL — SIGNIFICANT CHANGE UP (ref 80–100)
NRBC # BLD: 0 /100 WBCS — SIGNIFICANT CHANGE UP (ref 0–0)
NT-PROBNP SERPL-SCNC: 11 PG/ML — SIGNIFICANT CHANGE UP (ref 0–125)
PLATELET # BLD AUTO: 311 K/UL — SIGNIFICANT CHANGE UP (ref 150–400)
POTASSIUM SERPL-MCNC: 4.1 MMOL/L — SIGNIFICANT CHANGE UP (ref 3.5–5.3)
POTASSIUM SERPL-SCNC: 4.1 MMOL/L — SIGNIFICANT CHANGE UP (ref 3.5–5.3)
PROT SERPL-MCNC: 8.1 GM/DL — SIGNIFICANT CHANGE UP (ref 6–8.3)
PROTHROM AB SERPL-ACNC: 17.8 SEC — HIGH (ref 10–12.9)
RBC # BLD: 4.38 M/UL — SIGNIFICANT CHANGE UP (ref 3.8–5.2)
RBC # FLD: 15.2 % — HIGH (ref 10.3–14.5)
SODIUM SERPL-SCNC: 142 MMOL/L — SIGNIFICANT CHANGE UP (ref 135–145)
TROPONIN I SERPL-MCNC: <.015 NG/ML — SIGNIFICANT CHANGE UP (ref 0.01–0.04)
TROPONIN I SERPL-MCNC: <.015 NG/ML — SIGNIFICANT CHANGE UP (ref 0.01–0.04)
WBC # BLD: 5.11 K/UL — SIGNIFICANT CHANGE UP (ref 3.8–10.5)
WBC # FLD AUTO: 5.11 K/UL — SIGNIFICANT CHANGE UP (ref 3.8–10.5)

## 2020-01-03 PROCEDURE — 71045 X-RAY EXAM CHEST 1 VIEW: CPT | Mod: 26

## 2020-01-03 RX ORDER — SODIUM CHLORIDE 9 MG/ML
1000 INJECTION INTRAMUSCULAR; INTRAVENOUS; SUBCUTANEOUS ONCE
Refills: 0 | Status: COMPLETED | OUTPATIENT
Start: 2020-01-03 | End: 2020-01-03

## 2020-01-03 RX ADMIN — SODIUM CHLORIDE 1000 MILLILITER(S): 9 INJECTION INTRAMUSCULAR; INTRAVENOUS; SUBCUTANEOUS at 01:07

## 2020-01-03 NOTE — ED PROVIDER NOTE - PATIENT PORTAL LINK FT
You can access the FollowMyHealth Patient Portal offered by [facility] by registering at the following website: [email or web address]. By joining [employer]’s FollowMyHealth portal, you will also be able to view your health information using other applications (apps) compatible with our system.

## 2020-01-03 NOTE — ED ADULT NURSE NOTE - NSHOSCREENINGQ1_ED_ALL_ED
Patient is Patient is a 57 yo male with h/o PUD (erosive gastritis and duodenitis, s/p EGD) who presented to [facility] ED with acute pancreatitis with diffuse abdominal pain and distension. CT A/P and labs were consistent with cholelithiasis and borderline dilated CBD without obstructing stone. The patient was admitted to the SICU for close monitoring and started on IVF and pain control. On second day of admission CT was done and showed reactive ileus. Patient had NG tube inserted which drained copious bilious fluid. GI was consulted and MRCP was performed, which showed multiple gallstones with partial obstruction of CBD. ERCP with stone removal and sphincterotomy was performed on 6/21. After ERCP patient reported improved abdominal pain and multiple BM. He was then stepped down to regional floor. Patient developed new fevers on 6/25, and CT Abd/pelvis was done and showed persistent pancreatitis with necrosis and significant ileus. PICC line was started for TPN. On 7/6 patient began tolerating clear liquid diet and reported improved pain. In subsequent days fever and leukocytosis resolved, diet was advanced and patient tolerated with no complaints. TPN was discontinued on 7/10. On day of discharge patient is tolerating soft diet well, afebrile, and stable for discharge home. Patient is a 55 yo male with h/o PUD (erosive gastritis and duodenitis, s/p EGD) who presented to [facility] ED with acute pancreatitis with diffuse abdominal pain and distension. CT A/P and labs were consistent with cholelithiasis and borderline dilated CBD without obstructing stone. The patient was admitted to the SICU for close monitoring and started on IVF and pain control. On second day of admission CT was done and showed reactive ileus. Patient had NG tube inserted which drained copious bilious fluid. GI was consulted and MRCP was performed, which showed multiple gallstones with partial obstruction of CBD. ERCP with stone removal and sphincterotomy was performed on 6/21. After ERCP patient reported improved abdominal pain and multiple BMs. He was then stepped down to regional floor. Patient developed new fevers on 6/25, and CT Abd/pelvis was done and showed persistent pancreatitis with necrosis and significant ileus. PICC line was started for TPN. On 7/6 patient began tolerating clear liquid diet and reported improved pain. In subsequent days fever and leukocytosis resolved, diet was advanced and patient tolerated with no complaints. TPN was discontinued on 7/10. On day of discharge patient is tolerating soft diet well, afebrile, and stable for discharge home. No

## 2020-01-03 NOTE — ED PROVIDER NOTE - OBJECTIVE STATEMENT
58 y/o F with pmhx of HTN, DM1, lupus, and asthma presents to ED for evaluation of high blood sugar since 9pm today. Pt reports CP since last night that has been constant and described as pressure. Pt also reports tingling in fingers, diaphoresis, and SOB for a few days. Pt reports similar feeling to prev PE. Pt on Xarelto. 60 y/o F with pmhx of HTN, DM1, lupus, and asthma presents to ED for evaluation of high blood sugar since 9pm today. Pt reports CP since last night that has been constant and described as pressure. Pt also reports tingling in fingers, diaphoresis, and SOB for a few days. Pt on Xarelto. No pleuritic pain. No chest pain now.

## 2020-01-03 NOTE — ED PROVIDER NOTE - CARDIAC, MLM
Normal rate, regular rhythm.  Heart sounds S1, S2.  No murmurs, rubs or gallops. + center chest pain since yesterday, no pleuritic pain on Xarelto.

## 2020-01-03 NOTE — ED PROVIDER NOTE - CONSTITUTIONAL, MLM
normal... appears unwell, awake, alert, oriented to person, place, time/situation and in no apparent distress.

## 2020-01-03 NOTE — ED PROVIDER NOTE - CLINICAL SUMMARY MEDICAL DECISION MAKING FREE TEXT BOX
Hyperglycemia/ DKA , rule out ACS, heart score 2, no pleuritic pain to suggest PE    PLAN: CBC, CMP, cxr, fluids, reassess.

## 2020-01-04 NOTE — DISCHARGE NOTE NURSING/CASE MANAGEMENT/SOCIAL WORK - MODE OF TRANSPORTATION
Pt arrived to unit via cart. Pt alert and oriented.  Pt transferred from cart to bed x3 staff assist. Wheelchair/Stroller

## 2020-03-13 ENCOUNTER — OUTPATIENT (OUTPATIENT)
Dept: OUTPATIENT SERVICES | Facility: HOSPITAL | Age: 60
LOS: 1 days | Discharge: HOME | End: 2020-03-13

## 2020-03-13 VITALS
HEIGHT: 68 IN | TEMPERATURE: 99 F | WEIGHT: 244.05 LBS | DIASTOLIC BLOOD PRESSURE: 87 MMHG | OXYGEN SATURATION: 99 % | SYSTOLIC BLOOD PRESSURE: 142 MMHG | HEART RATE: 98 BPM | RESPIRATION RATE: 18 BRPM

## 2020-03-13 DIAGNOSIS — Z01.818 ENCOUNTER FOR OTHER PREPROCEDURAL EXAMINATION: ICD-10-CM

## 2020-03-13 DIAGNOSIS — Z96.649 PRESENCE OF UNSPECIFIED ARTIFICIAL HIP JOINT: Chronic | ICD-10-CM

## 2020-03-13 DIAGNOSIS — Z98.890 OTHER SPECIFIED POSTPROCEDURAL STATES: Chronic | ICD-10-CM

## 2020-03-13 DIAGNOSIS — Z98.51 TUBAL LIGATION STATUS: Chronic | ICD-10-CM

## 2020-03-13 DIAGNOSIS — I10 ESSENTIAL (PRIMARY) HYPERTENSION: ICD-10-CM

## 2020-03-13 DIAGNOSIS — M75.122 COMPLETE ROTATOR CUFF TEAR OR RUPTURE OF LEFT SHOULDER, NOT SPECIFIED AS TRAUMATIC: ICD-10-CM

## 2020-03-13 DIAGNOSIS — M32.9 SYSTEMIC LUPUS ERYTHEMATOSUS, UNSPECIFIED: ICD-10-CM

## 2020-03-13 DIAGNOSIS — M75.102 UNSPECIFIED ROTATOR CUFF TEAR OR RUPTURE OF LEFT SHOULDER, NOT SPECIFIED AS TRAUMATIC: ICD-10-CM

## 2020-03-13 DIAGNOSIS — Z90.710 ACQUIRED ABSENCE OF BOTH CERVIX AND UTERUS: Chronic | ICD-10-CM

## 2020-03-13 DIAGNOSIS — Z98.1 ARTHRODESIS STATUS: Chronic | ICD-10-CM

## 2020-03-13 DIAGNOSIS — Z86.711 PERSONAL HISTORY OF PULMONARY EMBOLISM: ICD-10-CM

## 2020-03-13 LAB
ANION GAP SERPL CALC-SCNC: 6 MMOL/L — SIGNIFICANT CHANGE UP (ref 5–17)
APTT BLD: 43.6 SEC — HIGH (ref 28.5–37)
BASOPHILS # BLD AUTO: 0.03 K/UL — SIGNIFICANT CHANGE UP (ref 0–0.2)
BASOPHILS NFR BLD AUTO: 0.8 % — SIGNIFICANT CHANGE UP (ref 0–2)
BUN SERPL-MCNC: 11 MG/DL — SIGNIFICANT CHANGE UP (ref 7–23)
CALCIUM SERPL-MCNC: 9.4 MG/DL — SIGNIFICANT CHANGE UP (ref 8.5–10.1)
CHLORIDE SERPL-SCNC: 105 MMOL/L — SIGNIFICANT CHANGE UP (ref 96–108)
CO2 SERPL-SCNC: 30 MMOL/L — SIGNIFICANT CHANGE UP (ref 22–31)
CREAT SERPL-MCNC: 0.9 MG/DL — SIGNIFICANT CHANGE UP (ref 0.5–1.3)
EOSINOPHIL # BLD AUTO: 0.27 K/UL — SIGNIFICANT CHANGE UP (ref 0–0.5)
EOSINOPHIL NFR BLD AUTO: 6.8 % — HIGH (ref 0–6)
GLUCOSE SERPL-MCNC: 111 MG/DL — HIGH (ref 70–99)
HCT VFR BLD CALC: 39.9 % — SIGNIFICANT CHANGE UP (ref 34.5–45)
HGB BLD-MCNC: 12.8 G/DL — SIGNIFICANT CHANGE UP (ref 11.5–15.5)
IMM GRANULOCYTES NFR BLD AUTO: 0 % — SIGNIFICANT CHANGE UP (ref 0–1.5)
INR BLD: 1.83 RATIO — HIGH (ref 0.88–1.16)
LYMPHOCYTES # BLD AUTO: 1.49 K/UL — SIGNIFICANT CHANGE UP (ref 1–3.3)
LYMPHOCYTES # BLD AUTO: 37.3 % — SIGNIFICANT CHANGE UP (ref 13–44)
MCHC RBC-ENTMCNC: 27.6 PG — SIGNIFICANT CHANGE UP (ref 27–34)
MCHC RBC-ENTMCNC: 32.1 GM/DL — SIGNIFICANT CHANGE UP (ref 32–36)
MCV RBC AUTO: 86.2 FL — SIGNIFICANT CHANGE UP (ref 80–100)
MONOCYTES # BLD AUTO: 0.24 K/UL — SIGNIFICANT CHANGE UP (ref 0–0.9)
MONOCYTES NFR BLD AUTO: 6 % — SIGNIFICANT CHANGE UP (ref 2–14)
NEUTROPHILS # BLD AUTO: 1.96 K/UL — SIGNIFICANT CHANGE UP (ref 1.8–7.4)
NEUTROPHILS NFR BLD AUTO: 49.1 % — SIGNIFICANT CHANGE UP (ref 43–77)
NRBC # BLD: 0 /100 WBCS — SIGNIFICANT CHANGE UP (ref 0–0)
PLATELET # BLD AUTO: 315 K/UL — SIGNIFICANT CHANGE UP (ref 150–400)
POTASSIUM SERPL-MCNC: 4 MMOL/L — SIGNIFICANT CHANGE UP (ref 3.5–5.3)
POTASSIUM SERPL-SCNC: 4 MMOL/L — SIGNIFICANT CHANGE UP (ref 3.5–5.3)
PROTHROM AB SERPL-ACNC: 20.9 SEC — HIGH (ref 10–12.9)
RBC # BLD: 4.63 M/UL — SIGNIFICANT CHANGE UP (ref 3.8–5.2)
RBC # FLD: 14.6 % — HIGH (ref 10.3–14.5)
SODIUM SERPL-SCNC: 141 MMOL/L — SIGNIFICANT CHANGE UP (ref 135–145)
WBC # BLD: 3.99 K/UL — SIGNIFICANT CHANGE UP (ref 3.8–10.5)
WBC # FLD AUTO: 3.99 K/UL — SIGNIFICANT CHANGE UP (ref 3.8–10.5)

## 2020-03-13 NOTE — H&P PST ADULT - HISTORY OF PRESENT ILLNESS
61 yo female c/o left shoulder pain secondary to rotator cuff tear - scheduled for left shoulder arthroscopy

## 2020-03-13 NOTE — H&P PST ADULT - NSICDXPASTMEDICALHX_GEN_ALL_CORE_FT
PAST MEDICAL HISTORY:  Asthma     Diabetes     Hypertension     Lupus     Peripheral polyneuropathy     Pulmonary emboli 7/2019

## 2020-03-13 NOTE — H&P PST ADULT - NSICDXPROBLEM_GEN_ALL_CORE_FT
PROBLEM DIAGNOSES  Problem: Left rotator cuff tear  Assessment and Plan: scheduled for left shoulder arthroscopy    Problem: History of pulmonary embolism  Assessment and Plan: to hold xarelto as per MD 2- 3 days    Problem: Lupus  Assessment and Plan:     Problem: HTN (hypertension)  Assessment and Plan: continue meds

## 2020-03-13 NOTE — H&P PST ADULT - ASSESSMENT
left shoulder rotator cuff tear  CAPRINI SCORE    AGE RELATED RISK FACTORS                                                       MOBILITY RELATED FACTORS  [x ] Age 41-60 years                                            (1 Point)                  [ ] Bed rest                                                        (1 Point)  [ ] Age: 61-74 years                                           (2 Points)                [ ] Plaster cast                                                   (2 Points)  [ ] Age= 75 years                                              (3 Points)                 [ ] Bed bound for more than 72 hours                   (2 Points)    DISEASE RELATED RISK FACTORS                                               GENDER SPECIFIC FACTORS  [ ] Edema in the lower extremities                       (1 Point)                  [ ] Pregnancy                                                     (1 Point)  [ ] Varicose veins                                               (1 Point)                  [ ] Post-partum < 6 weeks                                   (1 Point)             x[ ] BMI > 25 Kg/m2                                            (1 Point)                  [ ] Hormonal therapy  or oral contraception            (1 Point)                 [ ] Sepsis (in the previous month)                        (1 Point)                  [ ] History of pregnancy complications  [ ] Pneumonia or serious lung disease                                               [ ] Unexplained or recurrent                       (1 Point)           (in the previous month)                               (1 Point)  [ ] Abnormal pulmonary function test                     (1 Point)                 SURGERY RELATED RISK FACTORS  [ ] Acute myocardial infarction                              (1 Point)                 [ ]  Section                                            (1 Point)  [ ] Congestive heart failure (in the previous month)  (1 Point)                 [ ] Minor surgery                                                 (1 Point)   [ ] Inflammatory bowel disease                             (1 Point)                 x[ ] Arthroscopic surgery                                        (2 Points)  [ ] Central venous access                                    (2 Points)                [ ] General surgery lasting more than 45 minutes   (2 Points)       [ ] Stroke (in the previous month)                          (5 Points)               [ ] Elective arthroplasty                                        (5 Points)                                                                                                                                               HEMATOLOGY RELATED FACTORS                                                 TRAUMA RELATED RISK FACTORS  [x ] Prior episodes of VTE                                     (3 Points)                 [ ] Fracture of the hip, pelvis, or leg                       (5 Points)  [ ] Positive family history for VTE                         (3 Points)                 [ ] Acute spinal cord injury (in the previous month)  (5 Points)  [ ] Prothrombin 45609 A                                      (3 Points)                 [ ] Paralysis  (less than 1 month)                          (5 Points)  [ ] Factor V Leiden                                             (3 Points)                 [ ] Multiple Trauma within 1 month                         (5 Points)  [ ] Lupus anticoagulants                                     (3 Points)                                                           [ ] Anticardiolipin antibodies                                (3 Points)                                                       [ ] High homocysteine in the blood                      (3 Points)                                             [ ] Other congenital or acquired thrombophilia       (3 Points)                                                [ ] Heparin induced thrombocytopenia                  (3 Points)                                          Total Score [    6      ]  Caprini Score 0-2: Low risk, No VTE Prophylaxis required for most patient's, encourage ambulation  Caprini Score 3-6: At Risk, Pharmacologic VTE prophylaxis is indicated for most patients ( in the absence of a contraindication)  Caprini Score Greater than or = 7: High Risk , pharmacologic VTE is indicated for most patients ( in the absence of a contraindication)    Caprini score indicates that the patient is high risk for VTE event ( score 6 or greater). Surgical patient's in this group will benefit from both pharmacologic prophylaxis and intermittent compression devices . Surgical team will determine the balance between VTE  risk and bleeding risk and other clinical considerations

## 2020-03-13 NOTE — H&P PST ADULT - NSICDXPASTSURGICALHX_GEN_ALL_CORE_FT
PAST SURGICAL HISTORY:  H/O knee surgery     H/O total hysterectomy     History of bilateral tubal ligation     History of hip replacement left    History of shoulder surgery     S/P laminectomy with spinal fusion

## 2020-07-18 NOTE — PROGRESS NOTE ADULT - SUBJECTIVE AND OBJECTIVE BOX
done INTERVAL HPI:   59 yr old female with HTN,  SLE, GB syndrome with neuropathy, S/P left hip replacement for avascular necrosis, Chronic left foot drop and Obesity.  With lower back pain and leg weakness that has increased with numbness down left leg - back MRI noted L3-S1 broad based disc herniations. Sent from [facility] for spinal surgery with Dr. An.   07/12/19: Lumbar spinal fusion 09-Jul-2019 19:46:38  Marvin Jennings.  Post Op with persistent tachycardia, found to have left PE.  Non smoker.    OVERNIGHT EVENTS:  Low grade fever. Denies sob.    Vital Signs Last 24 Hrs  T(C): 37.1 (18 Jul 2019 12:57), Max: 38.1 (17 Jul 2019 18:16)  T(F): 98.7 (18 Jul 2019 12:57), Max: 100.6 (17 Jul 2019 18:16)  HR: 101 (18 Jul 2019 12:57) (89 - 107)  BP: 125/67 (18 Jul 2019 12:57) (111/64 - 126/63)  BP(mean): --  RR: 16 (18 Jul 2019 12:57) (16 - 18)  SpO2: 98% (18 Jul 2019 12:57) (96% - 99%)    PHYSICAL EXAM:  GEN:         Awake, responsive and comfortable.  HEENT:    Normal.    RESP:      no distress  CVS:          Regular rate and rhythm.   ABD:         Soft, non-tender, non-distended;     MEDICATIONS  (STANDING):  ascorbic acid 500 milliGRAM(s) Oral two times a day  calcium carbonate 1250 mG  + Vitamin D (OsCal 500 + D) 1 Tablet(s) Oral three times a day  docusate sodium 100 milliGRAM(s) Oral three times a day  folic acid 1 milliGRAM(s) Oral daily  gabapentin 300 milliGRAM(s) Oral three times a day  multivitamin 1 Tablet(s) Oral daily  oxyCODONE  ER Tablet 10 milliGRAM(s) Oral every 12 hours  polyethylene glycol 3350 17 Gram(s) Oral once  rivaroxaban 15 milliGRAM(s) Oral two times a day with meals  senna 2 Tablet(s) Oral at bedtime  sugammadex Injectable 450 milliGRAM(s) IV Push once  verapamil  milliGRAM(s) Oral daily    MEDICATIONS  (PRN):  acetaminophen   Tablet .. 650 milliGRAM(s) Oral every 6 hours PRN Temp greater or equal to 38C (100.4F), Mild Pain (1 - 3)  benzocaine 15 mG/menthol 3.6 mG Lozenge 1 Lozenge Oral every 3 hours PRN Sore Throat  cyclobenzaprine 10 milliGRAM(s) Oral three times a day PRN Muscle Spasm  diltiazem Injectable 20 milliGRAM(s) IV Push every 4 hours PRN only if HR greater than 120 and SBP maintained greater than 110  HYDROmorphone  Injectable 0.5 milliGRAM(s) IV Push every 4 hours PRN Severe Pain (7 - 10)  melatonin 3 milliGRAM(s) Oral at bedtime PRN Sleep  naloxone Injectable 0.1 milliGRAM(s) IV Push every 3 minutes PRN For ANY of the following changes in patient status:  A. RR LESS THAN 10 breaths per minute, B. Oxygen saturation LESS THAN 90%, C. Sedation score of 6  ondansetron Injectable 4 milliGRAM(s) IV Push every 4 hours PRN Nausea  oxyCODONE    IR 5 milliGRAM(s) Oral every 4 hours PRN Moderate Pain (4 - 6)  oxyCODONE    IR 10 milliGRAM(s) Oral every 4 hours PRN Severe Pain (7 - 10)  petrolatum white Ointment 1 Application(s) Topical daily PRN dry lips    LABS:                        9.3    7.68  )-----------( 386      ( 18 Jul 2019 06:32 )             28.4     07-18    136  |  100  |  8   ----------------------------<  118<H>  3.9   |  29  |  0.82    Ca    8.8      18 Jul 2019 06:32    ASSESSMENT AND PLAN:  ·	Left pulmonary embolism.  ·	Tachycardia.  ·	S/P lumber spinal fusion.  ·	Anemia.  ·	HTN.  ·	SLE.  ·	GBS history with Neuropathy.  ·	Obesity.  ·	Fever    Continue Xarelto for PE.

## 2021-01-04 PROBLEM — I26.99 OTHER PULMONARY EMBOLISM WITHOUT ACUTE COR PULMONALE: Chronic | Status: ACTIVE | Noted: 2020-03-13

## 2021-01-06 ENCOUNTER — APPOINTMENT (OUTPATIENT)
Dept: RHEUMATOLOGY | Facility: CLINIC | Age: 61
End: 2021-01-06
Payer: MEDICARE

## 2021-01-06 ENCOUNTER — LABORATORY RESULT (OUTPATIENT)
Age: 61
End: 2021-01-06

## 2021-01-06 VITALS
WEIGHT: 259 LBS | BODY MASS INDEX: 38.81 KG/M2 | DIASTOLIC BLOOD PRESSURE: 93 MMHG | OXYGEN SATURATION: 97 % | TEMPERATURE: 94.4 F | HEART RATE: 111 BPM | RESPIRATION RATE: 16 BRPM | SYSTOLIC BLOOD PRESSURE: 148 MMHG

## 2021-01-06 DIAGNOSIS — Z80.42 FAMILY HISTORY OF MALIGNANT NEOPLASM OF PROSTATE: ICD-10-CM

## 2021-01-06 DIAGNOSIS — Z82.49 FAMILY HISTORY OF ISCHEMIC HEART DISEASE AND OTHER DISEASES OF THE CIRCULATORY SYSTEM: ICD-10-CM

## 2021-01-06 DIAGNOSIS — M32.9 SYSTEMIC LUPUS ERYTHEMATOSUS, UNSPECIFIED: ICD-10-CM

## 2021-01-06 DIAGNOSIS — Z86.79 PERSONAL HISTORY OF OTHER DISEASES OF THE CIRCULATORY SYSTEM: ICD-10-CM

## 2021-01-06 DIAGNOSIS — Z86.39 PERSONAL HISTORY OF OTHER ENDOCRINE, NUTRITIONAL AND METABOLIC DISEASE: ICD-10-CM

## 2021-01-06 DIAGNOSIS — Z13.820 ENCOUNTER FOR SCREENING FOR OSTEOPOROSIS: ICD-10-CM

## 2021-01-06 PROCEDURE — 99204 OFFICE O/P NEW MOD 45 MIN: CPT

## 2021-01-06 PROCEDURE — 99072 ADDL SUPL MATRL&STAF TM PHE: CPT

## 2021-01-06 RX ORDER — VERAPAMIL HYDROCHLORIDE 80 MG/1
TABLET ORAL
Refills: 0 | Status: ACTIVE | COMMUNITY

## 2021-01-11 ENCOUNTER — APPOINTMENT (OUTPATIENT)
Dept: RADIOLOGY | Facility: CLINIC | Age: 61
End: 2021-01-11
Payer: MEDICARE

## 2021-01-11 ENCOUNTER — OUTPATIENT (OUTPATIENT)
Dept: OUTPATIENT SERVICES | Facility: HOSPITAL | Age: 61
LOS: 1 days | End: 2021-01-11
Payer: MEDICARE

## 2021-01-11 DIAGNOSIS — Z98.1 ARTHRODESIS STATUS: Chronic | ICD-10-CM

## 2021-01-11 DIAGNOSIS — Z13.820 ENCOUNTER FOR SCREENING FOR OSTEOPOROSIS: ICD-10-CM

## 2021-01-11 DIAGNOSIS — Z98.890 OTHER SPECIFIED POSTPROCEDURAL STATES: Chronic | ICD-10-CM

## 2021-01-11 DIAGNOSIS — Z90.710 ACQUIRED ABSENCE OF BOTH CERVIX AND UTERUS: Chronic | ICD-10-CM

## 2021-01-11 DIAGNOSIS — Z96.649 PRESENCE OF UNSPECIFIED ARTIFICIAL HIP JOINT: Chronic | ICD-10-CM

## 2021-01-11 DIAGNOSIS — Z98.51 TUBAL LIGATION STATUS: Chronic | ICD-10-CM

## 2021-01-11 PROCEDURE — 77080 DXA BONE DENSITY AXIAL: CPT

## 2021-01-11 PROCEDURE — 77080 DXA BONE DENSITY AXIAL: CPT | Mod: 26

## 2021-01-14 LAB
25(OH)D3 SERPL-MCNC: 35 NG/ML
ALBUMIN MFR SERPL ELPH: 56.6 %
ALBUMIN SERPL ELPH-MCNC: 4.4 G/DL
ALBUMIN SERPL-MCNC: 4 G/DL
ALBUMIN/GLOB SERPL: 1.3 RATIO
ALP BLD-CCNC: 79 U/L
ALPHA1 GLOB MFR SERPL ELPH: 4.1 %
ALPHA1 GLOB SERPL ELPH-MCNC: 0.3 G/DL
ALPHA2 GLOB MFR SERPL ELPH: 12.9 %
ALPHA2 GLOB SERPL ELPH-MCNC: 0.9 G/DL
ALT SERPL-CCNC: 17 U/L
ANA SER IF-ACNC: NEGATIVE
ANION GAP SERPL CALC-SCNC: 12 MMOL/L
APPEARANCE: CLEAR
AST SERPL-CCNC: 11 U/L
B-GLOBULIN MFR SERPL ELPH: 11.5 %
B-GLOBULIN SERPL ELPH-MCNC: 0.8 G/DL
B2 GLYCOPROT1 IGA SERPL IA-ACNC: <5 SAU
B2 GLYCOPROT1 IGG SER-ACNC: <5 SGU
B2 GLYCOPROT1 IGM SER-ACNC: <5 SMU
BASOPHILS # BLD AUTO: 0.04 K/UL
BASOPHILS NFR BLD AUTO: 1 %
BILIRUB SERPL-MCNC: 0.2 MG/DL
BILIRUBIN URINE: NEGATIVE
BLOOD URINE: NEGATIVE
BUN SERPL-MCNC: 11 MG/DL
C3 SERPL-MCNC: 172 MG/DL
C4 SERPL-MCNC: 36 MG/DL
CALCIUM SERPL-MCNC: 10 MG/DL
CARDIOLIPIN AB SER IA-ACNC: NEGATIVE
CARDIOLIPIN IGM SER-MCNC: 6.5 MPL
CARDIOLIPIN IGM SER-MCNC: <5 GPL
CCP AB SER IA-ACNC: <8 UNITS
CHLORIDE SERPL-SCNC: 103 MMOL/L
CK SERPL-CCNC: 61 U/L
CO2 SERPL-SCNC: 28 MMOL/L
COLOR: YELLOW
CONFIRM: 59.2 SEC
CREAT SERPL-MCNC: 0.95 MG/DL
CREAT SPEC-SCNC: 271 MG/DL
CREAT/PROT UR: 0.1 RATIO
CRP SERPL-MCNC: 0.15 MG/DL
DEPRECATED CARDIOLIPIN IGA SER: <5 APL
DEPRECATED KAPPA LC FREE/LAMBDA SER: 1.63 RATIO
DRVVT IMM 1:2 NP PPP: ABNORMAL
DRVVT SCREEN TO CONFIRM RATIO: 1.69 RATIO
DSDNA AB SER-ACNC: <12 IU/ML
ENA RNP AB SER IA-ACNC: 0.2 AL
ENA SM AB SER IA-ACNC: <0.2 AL
ENA SS-A AB SER IA-ACNC: <0.2 AL
ENA SS-B AB SER IA-ACNC: <0.2 AL
EOSINOPHIL # BLD AUTO: 0.16 K/UL
EOSINOPHIL NFR BLD AUTO: 4.1 %
ERYTHROCYTE [SEDIMENTATION RATE] IN BLOOD BY WESTERGREN METHOD: 43 MM/HR
FERRITIN SERPL-MCNC: 103 NG/ML
G6PD SER-CCNC: 17.5 U/G HGB
GAMMA GLOB FLD ELPH-MCNC: 1.1 G/DL
GAMMA GLOB MFR SERPL ELPH: 14.9 %
GLUCOSE QUALITATIVE U: NEGATIVE
GLUCOSE SERPL-MCNC: 91 MG/DL
HCT VFR BLD CALC: 37.9 %
HGB BLD-MCNC: 11.7 G/DL
IGA SER QL IEP: 160 MG/DL
IGG SER QL IEP: 1250 MG/DL
IGM SER QL IEP: 171 MG/DL
IMM GRANULOCYTES NFR BLD AUTO: 0.3 %
INTERPRETATION SERPL IEP-IMP: NORMAL
KAPPA LC CSF-MCNC: 2.11 MG/DL
KAPPA LC SERPL-MCNC: 3.43 MG/DL
KETONES URINE: NORMAL
LEUKOCYTE ESTERASE URINE: NEGATIVE
LYMPHOCYTES # BLD AUTO: 1.19 K/UL
LYMPHOCYTES NFR BLD AUTO: 30.5 %
MAN DIFF?: NORMAL
MCHC RBC-ENTMCNC: 27.1 PG
MCHC RBC-ENTMCNC: 30.9 GM/DL
MCV RBC AUTO: 87.9 FL
MONOCYTES # BLD AUTO: 0.35 K/UL
MONOCYTES NFR BLD AUTO: 9 %
NEUTROPHILS # BLD AUTO: 2.15 K/UL
NEUTROPHILS NFR BLD AUTO: 55.1 %
NITRITE URINE: NEGATIVE
PH URINE: 6.5
PLATELET # BLD AUTO: 314 K/UL
POTASSIUM SERPL-SCNC: 4.2 MMOL/L
PROT SERPL-MCNC: 7.1 G/DL
PROT UR-MCNC: 19 MG/DL
PROTEIN URINE: NORMAL
PS IGA SER QL: 1
PS IGG SER QL: 4
PS IGM SER QL: 20
RBC # BLD: 4.31 M/UL
RBC # FLD: 15.3 %
RF+CCP IGG SER-IMP: NEGATIVE
RHEUMATOID FACT SER QL: <10 IU/ML
SCREEN DRVVT: 110.7 SEC
SILICA CLOTTING TIME INTERPRETATION: NORMAL
SILICA CLOTTING TIME S/C: 1.09 RATIO
SODIUM SERPL-SCNC: 142 MMOL/L
SPECIFIC GRAVITY URINE: 1.03
THYROGLOB AB SERPL-ACNC: <20 IU/ML
THYROPEROXIDASE AB SERPL IA-ACNC: <10 IU/ML
UROBILINOGEN URINE: ABNORMAL
WBC # FLD AUTO: 3.9 K/UL

## 2021-01-19 LAB
MISCELLANEOUS TEST: NORMAL
PROC NAME: NORMAL
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
## 2021-03-01 ENCOUNTER — NON-APPOINTMENT (OUTPATIENT)
Age: 61
End: 2021-03-01

## 2021-03-08 ENCOUNTER — APPOINTMENT (OUTPATIENT)
Dept: RHEUMATOLOGY | Facility: CLINIC | Age: 61
End: 2021-03-08

## 2021-07-20 ENCOUNTER — APPOINTMENT (OUTPATIENT)
Dept: RHEUMATOLOGY | Facility: CLINIC | Age: 61
End: 2021-07-20
Payer: MEDICARE

## 2021-07-20 VITALS
HEART RATE: 92 BPM | OXYGEN SATURATION: 98 % | BODY MASS INDEX: 29.66 KG/M2 | TEMPERATURE: 96 F | WEIGHT: 198 LBS | HEIGHT: 68.5 IN | DIASTOLIC BLOOD PRESSURE: 78 MMHG | RESPIRATION RATE: 16 BRPM | SYSTOLIC BLOOD PRESSURE: 120 MMHG

## 2021-07-20 PROCEDURE — 99072 ADDL SUPL MATRL&STAF TM PHE: CPT

## 2021-07-20 PROCEDURE — 99214 OFFICE O/P EST MOD 30 MIN: CPT

## 2021-07-22 LAB
ALBUMIN SERPL ELPH-MCNC: 4.2 G/DL
ALP BLD-CCNC: 62 U/L
ALT SERPL-CCNC: 23 U/L
ANION GAP SERPL CALC-SCNC: 14 MMOL/L
AST SERPL-CCNC: 21 U/L
BASOPHILS # BLD AUTO: 0.03 K/UL
BASOPHILS NFR BLD AUTO: 0.7 %
BILIRUB SERPL-MCNC: 0.2 MG/DL
BUN SERPL-MCNC: 13 MG/DL
CALCIUM SERPL-MCNC: 10.2 MG/DL
CHLORIDE SERPL-SCNC: 104 MMOL/L
CO2 SERPL-SCNC: 27 MMOL/L
CREAT SERPL-MCNC: 0.89 MG/DL
CRP SERPL-MCNC: <3 MG/L
EOSINOPHIL # BLD AUTO: 0.13 K/UL
EOSINOPHIL NFR BLD AUTO: 3.2 %
ERYTHROCYTE [SEDIMENTATION RATE] IN BLOOD BY WESTERGREN METHOD: 45 MM/HR
GLUCOSE SERPL-MCNC: 128 MG/DL
HCT VFR BLD CALC: 38.4 %
HGB BLD-MCNC: 11.8 G/DL
IMM GRANULOCYTES NFR BLD AUTO: 0.2 %
LYMPHOCYTES # BLD AUTO: 1.18 K/UL
LYMPHOCYTES NFR BLD AUTO: 29.1 %
MAN DIFF?: NORMAL
MCHC RBC-ENTMCNC: 28.4 PG
MCHC RBC-ENTMCNC: 30.7 GM/DL
MCV RBC AUTO: 92.3 FL
MONOCYTES # BLD AUTO: 0.26 K/UL
MONOCYTES NFR BLD AUTO: 6.4 %
NEUTROPHILS # BLD AUTO: 2.45 K/UL
NEUTROPHILS NFR BLD AUTO: 60.4 %
PLATELET # BLD AUTO: 263 K/UL
POTASSIUM SERPL-SCNC: 4.4 MMOL/L
PROT SERPL-MCNC: 7.1 G/DL
RBC # BLD: 4.16 M/UL
RBC # FLD: 15.2 %
SODIUM SERPL-SCNC: 145 MMOL/L
WBC # FLD AUTO: 4.06 K/UL

## 2021-07-22 NOTE — HISTORY OF PRESENT ILLNESS
[___ Month(s) Ago] : [unfilled] month(s) ago [FreeTextEntry1] : noticing more rashes on the arms and chest wall from sun exposure. Rash gets itchy then painful.  [Currently Experiencing] : currently [Anorexia] : no anorexia [Weight Loss] : no weight loss [Malaise] : no malaise [Fever] : no fever [Chills] : no chills [Fatigue] : no fatigue [Depression] : no depression [Malar Facial Rash] : no malar facial rash [Skin Lesions] : skin lesions [Skin Nodules] : no skin nodules [Oral Ulcers] : no oral ulcers [Cough] : no cough [Dry Mouth] : no dry mouth [Dysphonia] : no dysphonia [Dysphagia] : no dysphagia [Shortness of Breath] : no shortness of breath [Chest Pain] : no chest pain [Arthralgias] : arthralgias [Joint Swelling] : no joint swelling [Joint Warmth] : no joint warmth [Joint Deformity] : no joint deformity [Decreased ROM] : no decreased range of motion [Morning Stiffness] : no morning stiffness [Falls] : no falls [Difficulty Standing] : no difficulty standing [Difficulty Walking] : no difficulty walking [Dyspnea] : no dyspnea [Myalgias] : no myalgias [Muscle Weakness] : no muscle weakness [Muscle Spasms] : no muscle spasms [Muscle Cramping] : no muscle cramping [Visual Changes] : no visual changes [Eye Pain] : no eye pain [Eye Redness] : no eye redness [Dry Eyes] : no dry eyes

## 2021-07-22 NOTE — REVIEW OF SYSTEMS
[SOB on Exertion] : shortness of breath during exertion [Arthralgias] : arthralgias [Joint Stiffness] : joint stiffness [Limb Pain] : limb pain [As Noted in HPI] : as noted in HPI [Skin Lesions] : skin lesion [Chest Pain] : no chest pain [Palpitations] : no palpitations [Lower Ext Edema] : no lower extremity edema [Shortness Of Breath] : no shortness of breath [Joint Pain] : no joint pain [Joint Swelling] : no joint swelling [Limb Swelling] : no limb swelling [Skin Wound] : no skin wound [Negative] : Cardiovascular
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
## 2021-07-22 NOTE — PHYSICAL EXAM
[General Appearance - Alert] : alert [General Appearance - In No Acute Distress] : in no acute distress [Sclera] : the sclera and conjunctiva were normal [PERRL With Normal Accommodation] : pupils were equal in size, round, and reactive to light [Extraocular Movements] : extraocular movements were intact [Oropharynx] : the oropharynx was normal [Outer Ear] : the ears and nose were normal in appearance [Neck Appearance] : the appearance of the neck was normal [Neck Cervical Mass (___cm)] : no neck mass was observed [Jugular Venous Distention Increased] : there was no jugular-venous distention [Thyroid Diffuse Enlargement] : the thyroid was not enlarged [Thyroid Nodule] : there were no palpable thyroid nodules [] : no respiratory distress [Auscultation Breath Sounds / Voice Sounds] : lungs were clear to auscultation bilaterally [Heart Rate And Rhythm] : heart rate was normal and rhythm regular [Heart Sounds] : normal S1 and S2 [Heart Sounds Gallop] : no gallops [Murmurs] : no murmurs [Heart Sounds Pericardial Friction Rub] : no pericardial rub [Full Pulse] : the pedal pulses are present [Edema] : there was no peripheral edema [Cervical Lymph Nodes Enlarged Posterior Bilaterally] : posterior cervical [Cervical Lymph Nodes Enlarged Anterior Bilaterally] : anterior cervical [Supraclavicular Lymph Nodes Enlarged Bilaterally] : supraclavicular [Axillary Lymph Nodes Enlarged Bilaterally] : axillary [No Spinal Tenderness] : no spinal tenderness [No CVA Tenderness] : no ~M costovertebral angle tenderness [Abnormal Walk] : normal gait [Nail Clubbing] : no clubbing  or cyanosis of the fingernails [Involuntary Movements] : no involuntary movements were seen [Musculoskeletal - Swelling] : no joint swelling seen [Motor Tone] : muscle strength and tone were normal [Skin Color & Pigmentation] : normal skin color and pigmentation [Skin Turgor] : normal skin turgor [No Focal Deficits] : no focal deficits [Oriented To Time, Place, And Person] : oriented to person, place, and time [Impaired Insight] : insight and judgment were intact [Affect] : the affect was normal [FreeTextEntry1] : faint erythema over the R arm and cheeks

## 2021-07-28 LAB
MISCELLANEOUS TEST: NORMAL
PROC NAME: NORMAL

## 2021-08-06 LAB
EJ AB SER QL: NEGATIVE
ENA JO1 AB SER IA-ACNC: <20 UNITS
ENA PM/SCL AB SER-ACNC: <20 UNITS
ENA SM+RNP AB SER IA-ACNC: <20 UNITS
ENA SS-A IGG SER QL: <20 UNITS
FIBRILLARIN AB SER QL: NEGATIVE
KU AB SER QL: NEGATIVE
MDA-5 (P140)(CADM-140): <20 UNITS
MI2 AB SER QL: NEGATIVE
NXP-2 (P140): <20 UNITS
OJ AB SER QL: NEGATIVE
PL12 AB SER QL: NEGATIVE
PL7 AB SER QL: NEGATIVE
SRP AB SERPL QL: NEGATIVE
TIF GAMMA (P155/140): <20 UNITS
U2 SNRNP AB SER QL: NEGATIVE

## 2021-11-09 ENCOUNTER — APPOINTMENT (OUTPATIENT)
Dept: RHEUMATOLOGY | Facility: CLINIC | Age: 61
End: 2021-11-09

## 2021-11-09 NOTE — PHYSICAL EXAM
[General Appearance - Alert] : alert [General Appearance - In No Acute Distress] : in no acute distress [Sclera] : the sclera and conjunctiva were normal [PERRL With Normal Accommodation] : pupils were equal in size, round, and reactive to light [Extraocular Movements] : extraocular movements were intact [Outer Ear] : the ears and nose were normal in appearance [Oropharynx] : the oropharynx was normal [Neck Appearance] : the appearance of the neck was normal [Neck Cervical Mass (___cm)] : no neck mass was observed [Jugular Venous Distention Increased] : there was no jugular-venous distention [Thyroid Diffuse Enlargement] : the thyroid was not enlarged [Thyroid Nodule] : there were no palpable thyroid nodules [] : no respiratory distress [Auscultation Breath Sounds / Voice Sounds] : lungs were clear to auscultation bilaterally [Heart Rate And Rhythm] : heart rate was normal and rhythm regular [Heart Sounds] : normal S1 and S2 [Heart Sounds Gallop] : no gallops [Murmurs] : no murmurs [Heart Sounds Pericardial Friction Rub] : no pericardial rub [Full Pulse] : the pedal pulses are present [Edema] : there was no peripheral edema [Cervical Lymph Nodes Enlarged Posterior Bilaterally] : posterior cervical [Cervical Lymph Nodes Enlarged Anterior Bilaterally] : anterior cervical [Supraclavicular Lymph Nodes Enlarged Bilaterally] : supraclavicular [Axillary Lymph Nodes Enlarged Bilaterally] : axillary [No CVA Tenderness] : no ~M costovertebral angle tenderness [No Spinal Tenderness] : no spinal tenderness [Abnormal Walk] : normal gait [Nail Clubbing] : no clubbing  or cyanosis of the fingernails [Involuntary Movements] : no involuntary movements were seen [Musculoskeletal - Swelling] : no joint swelling seen [Motor Tone] : muscle strength and tone were normal [Skin Color & Pigmentation] : normal skin color and pigmentation [Skin Turgor] : normal skin turgor [FreeTextEntry1] : faint erythema over the R arm and cheeks [No Focal Deficits] : no focal deficits [Oriented To Time, Place, And Person] : oriented to person, place, and time [Impaired Insight] : insight and judgment were intact [Affect] : the affect was normal

## 2021-11-09 NOTE — REVIEW OF SYSTEMS
[Chest Pain] : no chest pain [Palpitations] : no palpitations [Lower Ext Edema] : no lower extremity edema [Shortness Of Breath] : no shortness of breath [SOB on Exertion] : shortness of breath during exertion [Arthralgias] : arthralgias [Joint Pain] : no joint pain [Joint Swelling] : no joint swelling [Joint Stiffness] : joint stiffness [Limb Pain] : limb pain [Limb Swelling] : no limb swelling [As Noted in HPI] : as noted in HPI [Skin Lesions] : skin lesion [Skin Wound] : no skin wound [Negative] : Heme/Lymph

## 2021-11-09 NOTE — HISTORY OF PRESENT ILLNESS
[FreeTextEntry1] : 61 year old female here for evaluation of SLE, arthralgia \par \par Patient is here because of a h/o SLE in 2007 - malar rash, alopecia, full body pain, arthralgia in the bigger joints and abnormal labs at the time. However symptoms have been mild in the last 2 years. Initially was taking Hydroxychloroquine for a few years but had a lot of side effects and on and off Prednisone. \par \par H/o Lupus anticoagulant positive, had a PE in July 2019 following a long surgery with laminectomy and lumbar fusion. No prior h/o DVT. No h/o strokes, CAD. Had had 4 pregnancies and 1 miscarriage < 10 weeks. 3 successful pregnancies after the miscarriage without any complication to the mother or babies. \par \par Has been feeling unwell on and off lately. Has had some arrhythmia and tachycardia with HR in 120s at rest - on a Holter at this time. Has had some chest discomfort as well. Currently awaiting her work up which has been postponed due to tachycardia. Saw her hematologist a few months ago at the recommendation of her cardiologist. Labs looked good except had an elevated LAC. Has had rashes over the arms and belly with itching - Benadryl does not help. Fatigue + C/o arthralgias in the fingers as well as knees and hips. Sometimes they swell up like sausages, usually at the end of the day. Also ankle and feet edema. Has had some mucosal ulcers in the mouth on the sides of the cheeks, painful. No hard palate ulcers. \par \par HCM: Mammogram: 2 years ago with densities. Colonoscopy: 4 years ago was normal.  DEXA: never done\par \par 07/2021: noticing more rashes on the arms and chest wall from sun exposure. Rash gets itchy then painful. \par  [___ Month(s) Ago] : [unfilled] month(s) ago [Currently Experiencing] : currently [Anorexia] : no anorexia [Weight Loss] : no weight loss [Malaise] : no malaise [Fever] : no fever [Chills] : no chills [Fatigue] : no fatigue [Depression] : no depression [Malar Facial Rash] : no malar facial rash [Skin Lesions] : skin lesions [Skin Nodules] : no skin nodules [Oral Ulcers] : no oral ulcers [Cough] : no cough [Dry Mouth] : no dry mouth [Dysphonia] : no dysphonia [Dysphagia] : no dysphagia [Shortness of Breath] : no shortness of breath [Chest Pain] : no chest pain [Arthralgias] : arthralgias [Joint Swelling] : no joint swelling [Joint Warmth] : no joint warmth [Joint Deformity] : no joint deformity [Decreased ROM] : no decreased range of motion [Morning Stiffness] : no morning stiffness [Falls] : no falls [Difficulty Standing] : no difficulty standing [Difficulty Walking] : no difficulty walking [Dyspnea] : no dyspnea [Myalgias] : no myalgias [Muscle Weakness] : no muscle weakness [Muscle Spasms] : no muscle spasms [Muscle Cramping] : no muscle cramping [Visual Changes] : no visual changes [Eye Pain] : no eye pain [Eye Redness] : no eye redness [Dry Eyes] : no dry eyes

## 2022-01-09 NOTE — H&P PST ADULT - MAMMOGRAM, RESULTS OF LAST, PROFILE
Anemia  Froilan persona tiene anemia cuando oleary cuerpo no posee suficientes glóbulos rojos sanos. Los glóbulos rojos tamiko parte de la rivera y se encargan de transportar el oxígeno por todo el cuerpo. Froilan proteína llamada hemoglobina les permite a los glóbulos rojos absorber y liberar el oxígeno. Sin glóbulos rojos o hemoglobina suficientes, el cuerpo no recibe el oxígeno necesario. Por consiguiente, pueden presentarse síntomas de anemia.     ¿Cuáles son los síntomas de la anemia?   Algunas personas con anemia no tienen síntomas. Mitul la mayoría tiene síntomas que van de leves a graves. Estos incluyen los siguientes:   · cansancio (fatiga);  · debilidad;  · palidez;  · falta de aire;  · mareos o desmayos;  · latidos acelerados;  · problemas para realizar las actividades habituales;  · coloración amarillenta de los ojos, la piel o la boca y orina oscura (ictericia).  ¿Cuáles son las causas de la anemia?   La anemia puede ocurrir cuando el cuerpo:   · pierde demasiada rivera;  · no produce suficientes glóbulos rojos;  · elimina los glóbulos rojos más rápido de lo que los produce;  · no produce froilan cantidad normal de hemoglobina en los glóbulos rojos.  Dichos problemas pueden ocurrir por varios motivos; por ejemplo:   · froilan afección de nacimiento (congénita o hereditaria), dilshad anemia de células falciformes o talasemia;  · sangrado intenso por cualquier motivo, ya sea lesión, cirugía, parto o hasta períodos menstruales intensos;  · la falta de determinados nutrientes dilshad faby, folato o vitamina B12;  · algunas afecciones de pineda plazo (crónicas) dilshad diabetes, artritis o enfermedad renal;  · algunas infecciones crónicas dilshad tuberculosis o VIH;  · la exposición a determinados medicamentos, dilshad los usados para la quimioterapia.  Hay diferentes tipos de anemia. Oleary proveedor de atención médica puede brindarle más información sobre el tipo de anemia que tiene y ana maria causas.   ¿Cómo se diagnostica la anemia?  Para  diagnosticar la anemia, oleary proveedor de atención médica pedirá análisis de rivera. Estos incluyen los siguientes:   · Un hemograma completo. Esta prueba mide las cantidades de los diferentes tipos de células sanguíneas.  · Un frotis de rivera. Esta prueba evalúa el tamaño y la forma de las células sanguíneas. Para realizar la prueba, se debe analizar froilan gota de rivera con un microscopio. Se usa un colorante para hacer más visibles las células sanguíneas.  · Exámenes de faby. Miden la cantidad de faby que hay en la rivera. El faby es necesario para producir hemoglobina en los glóbulos rojos.  · Exámenes de vitamina B12 y folato. Estos exámenes comprueban la existencia de algunos de los componentes que ayudan a cedric a los glóbulos rojos un tamaño y froilan forma normales.  · Recuento de reticulocitos. Con esta prueba se puede medir la cantidad de glóbulos rojos nuevos que produce la médula ósea.  · Electroforesis de hemoglobina. Esta prueba sirve para descubrir problemas en la hemoglobina de los glóbulos rojos.  · Biopsia de la médula ósea. Esta prueba evalúa la médula ósea donde se producen los glóbulos rojos.  ¿Cómo se trata la anemia?  Los tratamientos que se utilizan dependen del tipo de anemia, oleary causa y la gravedad de los síntomas. Los tratamientos pueden incluir los siguientes:   · Cambios en la dieta. Consisten en aumentar la cantidad de determinados nutrientes en la dieta, dilshad faby, vitamina B12 o folato. También es posible que oleary proveedor de atención médica le recete suplementos nutritivos.  · Medicamentos. Algunos medicamentos se utilizan para tratar la causa de la anemia. Otros ayudan a crear glóbulos rojos nuevos o a aliviar los síntomas. Si algún medicamento le produce anemia, debe dejar de tomarlo o cambiarlo.  · Transfusiones de rivera. Reemplazar parte de oleary rivera puede aumentar el número de glóbulos rojos sanos de oleary cuerpo.  · Cirugía. En algunos casos, oleary proveedor de atención médica puede  realizar froilan cirugía para tratar la causa subyacente de la anemia. Si dicha cirugía es necesaria, oleary proveedor de atención médica le explicará el procedimiento y le dará froilan idea general de los beneficios y riesgos que puede tener.  ¿Cuáles son las inquietudes a pineda plazo?   Si tiene algunos tipos de anemia, puede recuperarse completamente froilan vez terminado el tratamiento. Si tiene otros tipos de anemia (en especial las que son de nacimiento), necesitará tratamiento mallkia toda la basil. Oleary proveedor de atención médica puede darle más información al respecto.   © 6181-9693 [employer]. [address] [place] [id number]. Todos los derechos reservados. Esta información no pretende sustituir la atención médica profesional. Sólo oleary médico puede diagnosticar y tratar un problema de reinier.         ok

## 2022-01-18 NOTE — H&P ADULT - PROBLEM SELECTOR PLAN 2
RISS Pt with c/o of chest tightness and burning.  Pt states she tested positive for covid yesterday with an at home rapid test.  Denies NVD

## 2022-03-17 NOTE — PRE-OP CHECKLIST - VERIFY SURGICAL SITE/SIDE WITH PATIENT
"Flavio Curiel - Telemetry Stepdown ([place] [id number])  [facility] Medicine  Progress Note    Patient Name: Marisol Kerr  MRN: [id number]  Patient Class: OP- Observation   Admission Date: 3/15/2022  Length of Stay: 0 days  Attending Physician: Nico Street MD  Primary Care Provider: Khadar Dwyer MD        Subjective:     Principal Problem:Chest pain        HPI:  Marisol Kerr is a 74 y.o. female with CAD, CHF, HTN, HLD, T2DM, CKD, and COPD on 2 L nasal cannula who presented to the ED from [facility] for evaluation of chest pain. She was laying in bed when she noticed a substernal chest tightness that felt like a "chest cold". The pain was 10/10 but improved on its own prior to receiving NTG with EMS. She denies associated nausea or diaphoresis. She says the pain is different from her previous chest pain, but it scared her. She did have an episode of her usual chest pain a few days ago that was severe and relieved with NTG. She denies SOB, cough, abdominal pain, LE edema.     Of note, patient was admitted on 2/11/22 for NSTEMI. Interventional cardiology and CTS were both consulted. CABG was not recommended due to patient's debility. They were planning for LHC, however patient developed a rectal sheath hematoma requiring IR embolization. Patient had anemia requiring blood transfusions later in the admission. Interventional cardiology defered procedure to outpatient setting due to recent bleed. Patient was discharged to SNF on 2/22. Has appointment with Dr. Chappell for possible PCI on 3/17/2022.     ED: VSSAF. H/H stable. K 5.4. Cr 1.7. . Troponin 0.014 (lower than prev admission). EKG without acute ST changes.       Overview/Hospital Course:  74 y.o. who was admitted to hospital medicine for chest pain.       Interval History: NAEON. Pt seen and evaluated at bedside this am. Pt is doing well. Interventional cards have no plans for angiography/intervention during this hospitalization. Will continue anemia workup, checking " ferritin and retic.    Review of Systems   Constitutional:  Negative for activity change, chills, diaphoresis, fatigue and fever.   HENT:  Negative for congestion, facial swelling, rhinorrhea and sore throat.    Eyes:  Negative for photophobia, itching and visual disturbance.   Respiratory:  Negative for cough, chest tightness, shortness of breath and wheezing.    Cardiovascular:  Positive for chest pain. Negative for palpitations and leg swelling.   Gastrointestinal:  Negative for abdominal distention, abdominal pain, blood in stool, constipation, diarrhea, nausea and vomiting.   Genitourinary:  Negative for difficulty urinating, dysuria, frequency, hematuria and urgency.   Musculoskeletal:  Positive for arthralgias (chronic). Negative for back pain, myalgias and neck stiffness.   Skin:  Negative for rash.   Neurological:  Negative for dizziness, tremors, seizures, syncope, weakness, light-headedness, numbness and headaches.   Psychiatric/Behavioral:  Negative for agitation, confusion and hallucinations.    Objective:     Vital Signs (Most Recent):  Temp: 97.7 °F (36.5 °C) (03/17/22 1625)  Pulse: 64 (03/17/22 1625)  Resp: 18 (03/17/22 1625)  BP: 139/63 (03/17/22 1625)  SpO2: 100 % (03/17/22 1625) Vital Signs (24h Range):  Temp:  [97.7 °F (36.5 °C)-98.4 °F (36.9 °C)] 97.7 °F (36.5 °C)  Pulse:  [52-70] 64  Resp:  [18-20] 18  SpO2:  [95 %-100 %] 100 %  BP: (114-156)/(50-71) 139/63     Weight: 87.2 kg (192 lb 3.9 oz)  Body mass index is 34.05 kg/m².    Intake/Output Summary (Last 24 hours) at 3/17/2022 1625  Last data filed at 3/17/2022 0828  Gross per 24 hour   Intake 25 ml   Output 1050 ml   Net -1025 ml      Physical Exam  Vitals and nursing note reviewed.   Constitutional:       General: She is not in acute distress.     Appearance: She is well-developed.   HENT:      Head: Normocephalic and atraumatic.      Mouth/Throat:      Mouth: Mucous membranes are moist.   Eyes:      Conjunctiva/sclera: Conjunctivae normal.       Pupils: Pupils are equal, round, and reactive to light.   Cardiovascular:      Rate and Rhythm: Normal rate and regular rhythm.      Heart sounds: Normal heart sounds.   Pulmonary:      Effort: No respiratory distress.      Breath sounds: Normal breath sounds. No wheezing.      Comments: On 2L NC  Abdominal:      General: Bowel sounds are normal. There is no distension.      Palpations: Abdomen is soft.      Tenderness: There is no abdominal tenderness.   Musculoskeletal:         General: No tenderness. Normal range of motion.      Cervical back: Normal range of motion and neck supple.      Right lower leg: No edema.      Left lower leg: No edema.   Lymphadenopathy:      Cervical: No cervical adenopathy.   Skin:     General: Skin is warm and dry.      Capillary Refill: Capillary refill takes less than 2 seconds.      Comments: Chronic skin changes to bilateral LE   Neurological:      General: No focal deficit present.      Mental Status: She is alert and oriented to person, place, and time.      Cranial Nerves: No cranial nerve deficit.   Psychiatric:         Behavior: Behavior normal.         Thought Content: Thought content normal.         Judgment: Judgment normal.       Significant Labs: All pertinent labs within the past 24 hours have been reviewed.    Significant Imaging: I have reviewed all pertinent imaging results/findings within the past 24 hours.      Assessment/Plan:      * Chest pain  - EKG without ST changes, troponin 0.014, will trend  - Recent hx of increasing episodes of angina requiring more frequent Nitroglycerin use and underwent LHC at OSH.   -Recent Left heart cath[02/11/22] showed:  · The RPAV lesion was 100% stenosed.  · The RPDA lesion was 100% stenosed.  · The Prox Cx to Mid Cx lesion was 80% stenosed.  · The 1st LPL lesion was 90% stenosed.  · The Prox RCA-2 lesion was 70% stenosed.  · The Prox RCA-1 lesion was 90% stenosed.  · The Mid LAD-2 lesion was 60% stenosed.  - pt scheduled  with cards on 3/17 for follow up appointment, will consult  - interventional cardiology consulted, no plans for angiogram at this time  - continue 81 mg ASA qd, Atorvastatin 80mg qhs, Metoprolol 50 mg XL, Ticagrelor 90mg bid  - EKG prn chest pain  - NTG prn chest pain    Hyperkalemia  - K 5.4, given lokelma at SNF and had multiple bowel movements  - no EKG changes  - monitor with daily labs    Chronic diastolic congestive heart failure  - TTE notable for Grade II left ventricular diastolic dysfunction, EF 65%  - continue Lasix 40 mg BID, lisinopril 10 mg daily  - Fluid restriction at 1.5L with strict I/Os and daily weights    Chronic hypoxemic respiratory failure  Emphysema    - Known history of severe COPD (GOLD IV D PFT 2020). On home oxygen (2-3L)  - Home medications: albuterol (VENTOLIN HFA) 90 mcg/actuation inhaler, ipratropium-albuteroL (COMBIVENT RESPIMAT)  mcg/actuation inhaler,  umeclidinium (INCRUSE ELLIPTA) 62.5 mcg/actuation inhalation capsule, fluticasone-salmeterol diskus inhaler 250-50 mcg  - continue Breo and Spiriva inhaler   - Continue DuoNebs PRN    CKD (chronic kidney disease), stage III  - Baseline sCr 1.5- 1.8   - avoid nephrotoxic agents  - renally dose medications when appropriate    Gastroesophageal reflux disease without esophagitis  - continue PPI    Coronary artery disease, multivessel with history of previous PCI  - continue ASA, statin, brilinta     DM type 2, controlled, with complication  - last A1c 5.2 on 01/27/2022  - Accu-Cheks AC/HS  - continue LDSSI PRN     Essential hypertension, benign  - home regimen with amlodipine 5 mg daily, benazepril 40 mg daily  - continue amlodipine 10 mg daily, lisinopril 10 mg daily, metoprolol XL 50 mg daily    Hypercholesterolemia  - continue statin      VTE Risk Mitigation (From admission, onward)         Ordered     IP VTE HIGH RISK PATIENT  Once         03/16/22 0202     Place sequential compression device  Until discontinued          03/16/22 0202                Discharge Planning   LUZ: 3/18/2022     Code Status: Full Code   Is the patient medically ready for discharge?: No    Reason for patient still in hospital (select all that apply): Patient trending condition, Laboratory test, Treatment and Pending disposition  Discharge Plan A: Skilled Nursing Facility                  Nighat Mcdonald PA-C  Department of Hospital Medicine   Flavio rosita - Telemetry Stepdown ([place]-[facility])     done

## 2022-07-15 NOTE — H&P ADULT - ASSESSMENT
59 yr old female with PMH of SLE, GB syndrome and previous left hip replacement, chronic left foot drop, otherwise with lower back pain and leg weakness that has increased with numbness down left leg - back MRI noted L3-S1 broad based disc herniations - otherwise sent from [facility] for spinal surgery with Dr. An. Topical Retinoid counseling:  Patient advised to apply a pea-sized amount only at bedtime and wait 30 minutes after washing their face before applying.  If too drying, patient may add a non-comedogenic moisturizer. The patient verbalized understanding of the proper use and possible adverse effects of retinoids.  All of the patient's questions and concerns were addressed.

## 2022-11-23 NOTE — ED PROVIDER NOTE - NS ED ROS FT
Wound dehiscence and possible infection, s/p surgery 6 weeks ago. Yes - the patient is able to be screened

## 2023-02-28 ENCOUNTER — APPOINTMENT (OUTPATIENT)
Dept: ORTHOPEDIC SURGERY | Facility: CLINIC | Age: 63
End: 2023-02-28

## 2023-05-05 ENCOUNTER — INPATIENT (INPATIENT)
Facility: HOSPITAL | Age: 63
LOS: 3 days | Discharge: HOME | End: 2023-05-09
Attending: HOSPITALIST | Admitting: HOSPITALIST
Payer: MEDICARE

## 2023-05-05 VITALS
OXYGEN SATURATION: 99 % | TEMPERATURE: 98 F | RESPIRATION RATE: 17 BRPM | SYSTOLIC BLOOD PRESSURE: 104 MMHG | DIASTOLIC BLOOD PRESSURE: 77 MMHG | WEIGHT: 266.1 LBS | HEIGHT: 68 IN | HEART RATE: 103 BPM

## 2023-05-05 DIAGNOSIS — Z90.710 ACQUIRED ABSENCE OF BOTH CERVIX AND UTERUS: Chronic | ICD-10-CM

## 2023-05-05 DIAGNOSIS — Z98.1 ARTHRODESIS STATUS: Chronic | ICD-10-CM

## 2023-05-05 DIAGNOSIS — Z96.649 PRESENCE OF UNSPECIFIED ARTIFICIAL HIP JOINT: Chronic | ICD-10-CM

## 2023-05-05 DIAGNOSIS — Z98.51 TUBAL LIGATION STATUS: Chronic | ICD-10-CM

## 2023-05-05 DIAGNOSIS — Z98.890 OTHER SPECIFIED POSTPROCEDURAL STATES: Chronic | ICD-10-CM

## 2023-05-05 LAB
ALBUMIN SERPL ELPH-MCNC: 3.4 G/DL — SIGNIFICANT CHANGE UP (ref 3.3–5)
ALP SERPL-CCNC: 77 U/L — SIGNIFICANT CHANGE UP (ref 40–120)
ALT FLD-CCNC: 57 U/L — SIGNIFICANT CHANGE UP (ref 12–78)
ANION GAP SERPL CALC-SCNC: 4 MMOL/L — LOW (ref 5–17)
APTT BLD: 42.7 SEC — HIGH (ref 27.5–35.5)
AST SERPL-CCNC: 50 U/L — HIGH (ref 15–37)
BASOPHILS # BLD AUTO: 0.01 K/UL — SIGNIFICANT CHANGE UP (ref 0–0.2)
BASOPHILS NFR BLD AUTO: 0.3 % — SIGNIFICANT CHANGE UP (ref 0–2)
BILIRUB SERPL-MCNC: 0.2 MG/DL — SIGNIFICANT CHANGE UP (ref 0.2–1.2)
BUN SERPL-MCNC: 17 MG/DL — SIGNIFICANT CHANGE UP (ref 7–23)
CALCIUM SERPL-MCNC: 9.8 MG/DL — SIGNIFICANT CHANGE UP (ref 8.5–10.1)
CHLORIDE SERPL-SCNC: 105 MMOL/L — SIGNIFICANT CHANGE UP (ref 96–108)
CO2 SERPL-SCNC: 28 MMOL/L — SIGNIFICANT CHANGE UP (ref 22–31)
CREAT SERPL-MCNC: 1.4 MG/DL — HIGH (ref 0.5–1.3)
EGFR: 42 ML/MIN/1.73M2 — LOW
EOSINOPHIL # BLD AUTO: 0 K/UL — SIGNIFICANT CHANGE UP (ref 0–0.5)
EOSINOPHIL NFR BLD AUTO: 0 % — SIGNIFICANT CHANGE UP (ref 0–6)
GLUCOSE SERPL-MCNC: 103 MG/DL — HIGH (ref 70–99)
HCT VFR BLD CALC: 36.5 % — SIGNIFICANT CHANGE UP (ref 34.5–45)
HGB BLD-MCNC: 11.7 G/DL — SIGNIFICANT CHANGE UP (ref 11.5–15.5)
IMM GRANULOCYTES NFR BLD AUTO: 0.3 % — SIGNIFICANT CHANGE UP (ref 0–0.9)
INR BLD: 1.86 RATIO — HIGH (ref 0.88–1.16)
LYMPHOCYTES # BLD AUTO: 0.95 K/UL — LOW (ref 1–3.3)
LYMPHOCYTES # BLD AUTO: 26.3 % — SIGNIFICANT CHANGE UP (ref 13–44)
MCHC RBC-ENTMCNC: 27.3 PG — SIGNIFICANT CHANGE UP (ref 27–34)
MCHC RBC-ENTMCNC: 32.1 G/DL — SIGNIFICANT CHANGE UP (ref 32–36)
MCV RBC AUTO: 85.1 FL — SIGNIFICANT CHANGE UP (ref 80–100)
MONOCYTES # BLD AUTO: 0.27 K/UL — SIGNIFICANT CHANGE UP (ref 0–0.9)
MONOCYTES NFR BLD AUTO: 7.5 % — SIGNIFICANT CHANGE UP (ref 2–14)
NEUTROPHILS # BLD AUTO: 2.37 K/UL — SIGNIFICANT CHANGE UP (ref 1.8–7.4)
NEUTROPHILS NFR BLD AUTO: 65.6 % — SIGNIFICANT CHANGE UP (ref 43–77)
NRBC # BLD: 0 /100 WBCS — SIGNIFICANT CHANGE UP (ref 0–0)
PLATELET # BLD AUTO: 229 K/UL — SIGNIFICANT CHANGE UP (ref 150–400)
POTASSIUM SERPL-MCNC: 4 MMOL/L — SIGNIFICANT CHANGE UP (ref 3.5–5.3)
POTASSIUM SERPL-SCNC: 4 MMOL/L — SIGNIFICANT CHANGE UP (ref 3.5–5.3)
PROT SERPL-MCNC: 7.7 GM/DL — SIGNIFICANT CHANGE UP (ref 6–8.3)
PROTHROM AB SERPL-ACNC: 22.5 SEC — HIGH (ref 10.5–13.4)
RBC # BLD: 4.29 M/UL — SIGNIFICANT CHANGE UP (ref 3.8–5.2)
RBC # FLD: 14.6 % — HIGH (ref 10.3–14.5)
SODIUM SERPL-SCNC: 137 MMOL/L — SIGNIFICANT CHANGE UP (ref 135–145)
TROPONIN I, HIGH SENSITIVITY RESULT: 3.4 NG/L — SIGNIFICANT CHANGE UP
WBC # BLD: 3.61 K/UL — LOW (ref 3.8–10.5)
WBC # FLD AUTO: 3.61 K/UL — LOW (ref 3.8–10.5)

## 2023-05-05 PROCEDURE — 70496 CT ANGIOGRAPHY HEAD: CPT | Mod: 26,MA

## 2023-05-05 PROCEDURE — 99285 EMERGENCY DEPT VISIT HI MDM: CPT

## 2023-05-05 PROCEDURE — 70498 CT ANGIOGRAPHY NECK: CPT | Mod: 26,MA

## 2023-05-05 PROCEDURE — 70450 CT HEAD/BRAIN W/O DYE: CPT | Mod: 26,59,MA

## 2023-05-05 RX ORDER — GABAPENTIN 400 MG/1
300 CAPSULE ORAL THREE TIMES A DAY
Refills: 0 | Status: DISCONTINUED | OUTPATIENT
Start: 2023-05-05 | End: 2023-05-09

## 2023-05-05 RX ORDER — ATORVASTATIN CALCIUM 80 MG/1
80 TABLET, FILM COATED ORAL AT BEDTIME
Refills: 0 | Status: DISCONTINUED | OUTPATIENT
Start: 2023-05-05 | End: 2023-05-09

## 2023-05-05 RX ORDER — ASPIRIN/CALCIUM CARB/MAGNESIUM 324 MG
81 TABLET ORAL DAILY
Refills: 0 | Status: DISCONTINUED | OUTPATIENT
Start: 2023-05-05 | End: 2023-05-07

## 2023-05-05 RX ORDER — DULOXETINE HYDROCHLORIDE 30 MG/1
60 CAPSULE, DELAYED RELEASE ORAL DAILY
Refills: 0 | Status: DISCONTINUED | OUTPATIENT
Start: 2023-05-05 | End: 2023-05-09

## 2023-05-05 RX ORDER — VERAPAMIL HCL 240 MG
180 CAPSULE, EXTENDED RELEASE PELLETS 24 HR ORAL DAILY
Refills: 0 | Status: DISCONTINUED | OUTPATIENT
Start: 2023-05-05 | End: 2023-05-05

## 2023-05-05 RX ORDER — FOLIC ACID 0.8 MG
1 TABLET ORAL DAILY
Refills: 0 | Status: DISCONTINUED | OUTPATIENT
Start: 2023-05-05 | End: 2023-05-09

## 2023-05-05 NOTE — ED PROVIDER NOTE - ST/T WAVE
80-year-old female complains of some lower back pain started up to 3 days ago. She was lifting a box at that time and it dropped. She caught it but it jerked her back and she felt a pop. Increasing pain since then. Pain worse with bending or twisting. Legs feel slightly weak but no change in bowel or bladder. No focal numbness or weakness. Pain does not really radiate down the legs. No abdominal pain. Past medical history negative. The history is provided by the patient. Back Pain    This is a new problem. The current episode started more than 2 days ago. The problem has been gradually worsening. The problem occurs constantly. The pain is associated with lifting and twisting. The pain is present in the lower back. The quality of the pain is described as aching and dull. The pain does not radiate. The pain is moderate. The symptoms are aggravated by bending, certain positions and twisting. Pertinent negatives include no fever, no numbness, no abdominal pain, no dysuria and no weakness. She has tried NSAIDs for the symptoms. History reviewed. No pertinent past medical history. History reviewed. No pertinent surgical history. History reviewed. No pertinent family history.     Social History     Socioeconomic History    Marital status: Not on file     Spouse name: Not on file    Number of children: Not on file    Years of education: Not on file    Highest education level: Not on file   Occupational History    Not on file   Social Needs    Financial resource strain: Not on file    Food insecurity:     Worry: Not on file     Inability: Not on file    Transportation needs:     Medical: Not on file     Non-medical: Not on file   Tobacco Use    Smoking status: Never Smoker    Smokeless tobacco: Never Used   Substance and Sexual Activity    Alcohol use: Never     Frequency: Never    Drug use: Never    Sexual activity: Not on file   Lifestyle    Physical activity:     Days per week: Not on file     Minutes per session: Not on file    Stress: Not on file   Relationships    Social connections:     Talks on phone: Not on file     Gets together: Not on file     Attends Ten[employer] service: Not on file     Active member of club or organization: Not on file     Attends meetings of clubs or organizations: Not on file     Relationship status: Not on file    Intimate partner violence:     Fear of current or ex partner: Not on file     Emotionally abused: Not on file     Physically abused: Not on file     Forced sexual activity: Not on file   Other Topics Concern    Not on file   Social History Narrative    Not on file         ALLERGIES: Patient has no known allergies. Review of Systems   Constitutional: Negative for chills and fever. Gastrointestinal: Negative for abdominal pain and vomiting. Genitourinary: Negative for difficulty urinating, dysuria and hematuria. Musculoskeletal: Positive for back pain. Neurological: Negative for weakness and numbness. Vitals:    10/15/19 1652   BP: (!) 145/92   Pulse: 94   Resp: 16   Temp: 97.8 °F (36.6 °C)   SpO2: 98%   Weight: 104.3 kg (230 lb)   Height: 5' 8\" (1.727 m)            Physical Exam   Constitutional: She appears well-developed and well-nourished. No distress. Musculoskeletal:        Lumbar back: She exhibits decreased range of motion and tenderness. She exhibits no bony tenderness. Neurological:   Reflex Scores:       Patellar reflexes are 2+ on the right side and 2+ on the left side. Achilles reflexes are 2+ on the right side and 2+ on the left side. Straight leg raising negative   Skin: Skin is warm and dry. Nursing note and vitals reviewed. MDM  Number of Diagnoses or Management Options  Diagnosis management comments: Suspect lumbar strain.   No imaging needed    Risk of Complications, Morbidity, and/or Mortality  Presenting problems: moderate  Diagnostic procedures: minimal  Management options: low    Patient Progress  Patient progress: stable         Procedures no stemi

## 2023-05-05 NOTE — ED PROVIDER NOTE - PHYSICAL EXAMINATION
General appearance: Nontoxic appearing, conversant, afebrile    Eyes: anicteric sclerae, CHAZ, EOMI   HENT: Atraumatic; oropharynx clear, MMM and no ulcerations, no pharyngeal erythema or exudate   Neck: Trachea midline; Full range of motion, supple   Pulm: CTA bl, normal respiratory effort and no intercostal retractions, normal work of breathing   CV: RRR, No murmurs, rubs, or gallops   Abdomen: Soft, non-tender, non-distended; no guarding or rebound   Extremities: No peripheral edema, no gross deformities, FROM x4   Skin: Dry, normal temperature, turgor and texture; no rash   Psych: Appropriate affect, cooperative    CN2-12 grossly intact, speech coherent, MS +5/5 in UE and LE BL, finger to nose smooth and rapid, gross sensation intact in UE and LE BL, NIHSS 2

## 2023-05-05 NOTE — ED ADULT TRIAGE NOTE - CHIEF COMPLAINT QUOTE
left side numbness for 2 days , today felt his eye different left side numbness for 2 days , today felt his eye different,

## 2023-05-05 NOTE — ED ADULT NURSE NOTE - OBJECTIVE STATEMENT
Pt is A&OX4, ambulatory. Complaining of numbness on left side of body. Pt states starting having slurred speech and numbness since Wednesday night. States slurred speech has improved. Denies dizziness and HA. No neuro deficients at this time. Not in acute distress.

## 2023-05-05 NOTE — ED ADULT NURSE NOTE - NSIMPLEMENTINTERV_GEN_ALL_ED
Implemented All Universal Safety Interventions:  [place] to call system. Call bell, personal items and telephone within reach. Instruct patient to call for assistance. Room bathroom lighting operational. Non-slip footwear when patient is off stretcher. Physically safe environment: no spills, clutter or unnecessary equipment. Stretcher in lowest position, wheels locked, appropriate side rails in place.

## 2023-05-05 NOTE — ED PROVIDER NOTE - CLINICAL SUMMARY MEDICAL DECISION MAKING FREE TEXT BOX
L sided numbness/ weakness with episode slurred speech now resolved.  NIHSS 2 for LLE and decreased sensation.  No hx cva.  Code stroke not called as out of window.  Plan for labs, ct, admit.

## 2023-05-05 NOTE — ED PROVIDER NOTE - OBJECTIVE STATEMENT
62yo female with pmh sle, htn, dm, gbs, presenting with L sided numbness, weakness.  Started 2 days ago noted decreased sensation in LUE/ LLE.  Yesterday had brief episode of slurring speech and now with slight numbness/ tingling to L face.  Has difficulty walking 2/2 LLE symptoms.  No pain.  No trauma, ac.

## 2023-05-06 ENCOUNTER — TRANSCRIPTION ENCOUNTER (OUTPATIENT)
Age: 63
End: 2023-05-06

## 2023-05-06 DIAGNOSIS — G62.9 POLYNEUROPATHY, UNSPECIFIED: ICD-10-CM

## 2023-05-06 DIAGNOSIS — I63.9 CEREBRAL INFARCTION, UNSPECIFIED: ICD-10-CM

## 2023-05-06 DIAGNOSIS — E11.9 TYPE 2 DIABETES MELLITUS WITHOUT COMPLICATIONS: ICD-10-CM

## 2023-05-06 DIAGNOSIS — M32.9 SYSTEMIC LUPUS ERYTHEMATOSUS, UNSPECIFIED: ICD-10-CM

## 2023-05-06 DIAGNOSIS — I10 ESSENTIAL (PRIMARY) HYPERTENSION: ICD-10-CM

## 2023-05-06 LAB
ANION GAP SERPL CALC-SCNC: 7 MMOL/L — SIGNIFICANT CHANGE UP (ref 5–17)
BUN SERPL-MCNC: 17 MG/DL — SIGNIFICANT CHANGE UP (ref 7–23)
CALCIUM SERPL-MCNC: 9.7 MG/DL — SIGNIFICANT CHANGE UP (ref 8.5–10.1)
CHLORIDE SERPL-SCNC: 106 MMOL/L — SIGNIFICANT CHANGE UP (ref 96–108)
CHOLEST SERPL-MCNC: 151 MG/DL — SIGNIFICANT CHANGE UP
CO2 SERPL-SCNC: 26 MMOL/L — SIGNIFICANT CHANGE UP (ref 22–31)
CREAT SERPL-MCNC: 1.15 MG/DL — SIGNIFICANT CHANGE UP (ref 0.5–1.3)
EGFR: 54 ML/MIN/1.73M2 — LOW
GLUCOSE BLDC GLUCOMTR-MCNC: 112 MG/DL — HIGH (ref 70–99)
GLUCOSE BLDC GLUCOMTR-MCNC: 123 MG/DL — HIGH (ref 70–99)
GLUCOSE BLDC GLUCOMTR-MCNC: 127 MG/DL — HIGH (ref 70–99)
GLUCOSE SERPL-MCNC: 97 MG/DL — SIGNIFICANT CHANGE UP (ref 70–99)
HDLC SERPL-MCNC: 58 MG/DL — SIGNIFICANT CHANGE UP
LIPID PNL WITH DIRECT LDL SERPL: 70 MG/DL — SIGNIFICANT CHANGE UP
NON HDL CHOLESTEROL: 93 MG/DL — SIGNIFICANT CHANGE UP
POTASSIUM SERPL-MCNC: 3.8 MMOL/L — SIGNIFICANT CHANGE UP (ref 3.5–5.3)
POTASSIUM SERPL-SCNC: 3.8 MMOL/L — SIGNIFICANT CHANGE UP (ref 3.5–5.3)
SODIUM SERPL-SCNC: 139 MMOL/L — SIGNIFICANT CHANGE UP (ref 135–145)
TRIGL SERPL-MCNC: 114 MG/DL — SIGNIFICANT CHANGE UP

## 2023-05-06 PROCEDURE — 93010 ELECTROCARDIOGRAM REPORT: CPT

## 2023-05-06 PROCEDURE — 99223 1ST HOSP IP/OBS HIGH 75: CPT

## 2023-05-06 RX ORDER — AMITRIPTYLINE HCL 25 MG
150 TABLET ORAL AT BEDTIME
Refills: 0 | Status: DISCONTINUED | OUTPATIENT
Start: 2023-05-06 | End: 2023-05-09

## 2023-05-06 RX ORDER — DEXTROSE 50 % IN WATER 50 %
25 SYRINGE (ML) INTRAVENOUS ONCE
Refills: 0 | Status: DISCONTINUED | OUTPATIENT
Start: 2023-05-06 | End: 2023-05-09

## 2023-05-06 RX ORDER — SODIUM CHLORIDE 9 MG/ML
1000 INJECTION, SOLUTION INTRAVENOUS
Refills: 0 | Status: DISCONTINUED | OUTPATIENT
Start: 2023-05-06 | End: 2023-05-09

## 2023-05-06 RX ORDER — RIVAROXABAN 15 MG-20MG
20 KIT ORAL
Refills: 0 | Status: DISCONTINUED | OUTPATIENT
Start: 2023-05-06 | End: 2023-05-09

## 2023-05-06 RX ORDER — GLUCAGON INJECTION, SOLUTION 0.5 MG/.1ML
1 INJECTION, SOLUTION SUBCUTANEOUS ONCE
Refills: 0 | Status: DISCONTINUED | OUTPATIENT
Start: 2023-05-06 | End: 2023-05-09

## 2023-05-06 RX ORDER — DEXTROSE 50 % IN WATER 50 %
12.5 SYRINGE (ML) INTRAVENOUS ONCE
Refills: 0 | Status: DISCONTINUED | OUTPATIENT
Start: 2023-05-06 | End: 2023-05-09

## 2023-05-06 RX ORDER — DEXTROSE 50 % IN WATER 50 %
15 SYRINGE (ML) INTRAVENOUS ONCE
Refills: 0 | Status: DISCONTINUED | OUTPATIENT
Start: 2023-05-06 | End: 2023-05-09

## 2023-05-06 RX ORDER — INSULIN LISPRO 100/ML
VIAL (ML) SUBCUTANEOUS
Refills: 0 | Status: DISCONTINUED | OUTPATIENT
Start: 2023-05-06 | End: 2023-05-09

## 2023-05-06 RX ADMIN — Medication 81 MILLIGRAM(S): at 12:24

## 2023-05-06 RX ADMIN — Medication 150 MILLIGRAM(S): at 21:34

## 2023-05-06 RX ADMIN — GABAPENTIN 300 MILLIGRAM(S): 400 CAPSULE ORAL at 06:25

## 2023-05-06 RX ADMIN — GABAPENTIN 300 MILLIGRAM(S): 400 CAPSULE ORAL at 14:11

## 2023-05-06 RX ADMIN — RIVAROXABAN 20 MILLIGRAM(S): KIT at 17:56

## 2023-05-06 RX ADMIN — GABAPENTIN 300 MILLIGRAM(S): 400 CAPSULE ORAL at 21:34

## 2023-05-06 RX ADMIN — Medication 1 MILLIGRAM(S): at 12:24

## 2023-05-06 RX ADMIN — DULOXETINE HYDROCHLORIDE 60 MILLIGRAM(S): 30 CAPSULE, DELAYED RELEASE ORAL at 14:11

## 2023-05-06 RX ADMIN — ATORVASTATIN CALCIUM 80 MILLIGRAM(S): 80 TABLET, FILM COATED ORAL at 21:34

## 2023-05-06 NOTE — H&P ADULT - PROBLEM SELECTOR PLAN 1
pt with elft sided weakness and numbness  CT imaging negative.   FU MRI in AM   Neuro consult  ASA  restarted xarelto

## 2023-05-06 NOTE — PATIENT PROFILE ADULT - FUNCTIONAL SCREEN CURRENT LEVEL: SWALLOWING (IF SCORE 2 OR MORE FOR ANY ITEM, CONSULT REHAB SERVICES), MLM)
Prior to admit pt states she has been having a hard time swallowing./2 = difficulty swallowing foods

## 2023-05-06 NOTE — PATIENT PROFILE ADULT - FALL HARM RISK - HARM RISK INTERVENTIONS

## 2023-05-06 NOTE — CHART NOTE - NSCHARTNOTEFT_GEN_A_CORE
Patient seen and examined  H and P  reviewed. Agree with H and P with the following addenda    Patient clinically stable  for MRI and echo  Neuro consulted by writer  on xarelto    Rest of care as per H and P Otezla Counseling: The side effects of Otezla were discussed with the patient, including but not limited to worsening or new depression, weight loss, diarrhea, nausea, upper respiratory tract infection, and headache. Patient instructed to call the office should any adverse effect occur.  The patient verbalized understanding of the proper use and possible adverse effects of Otezla.  All the patient's questions and concerns were addressed.

## 2023-05-06 NOTE — H&P ADULT - NSHPREVIEWOFSYSTEMS_GEN_ALL_CORE
REVIEW OF SYSTEMS:    CONSTITUTIONAL: No weakness, fevers or chills  EYES/ENT: No visual changes;  No vertigo or throat pain   NECK: No pain or stiffness  RESPIRATORY: No cough, wheezing, hemoptysis; No shortness of breath  CARDIOVASCULAR: No chest pain or palpitations  GASTROINTESTINAL: No abdominal or epigastric pain. No nausea, vomiting, or hematemesis; No diarrhea or constipation. No melena or hematochezia.  GENITOURINARY: No dysuria, frequency or hematuria  NEUROLOGICAL: + numbness + weakness  SKIN: No itching, rashes

## 2023-05-06 NOTE — DISCHARGE NOTE NURSING/CASE MANAGEMENT/SOCIAL WORK - PATIENT PORTAL LINK FT
You can access the FollowMyHealth Patient Portal offered by [facility] by registering at the following website: [email or web address]. By joining [employer]’s FollowMyHealth portal, you will also be able to view your health information using other applications (apps) compatible with our system.

## 2023-05-06 NOTE — PATIENT PROFILE ADULT - NSPROPTRIGHTNOTIFYOBTAINDET_GEN_A_NUR
Detail Level: Simple Initiate Treatment: Betamethasone cream \\ntwice weekly for maintenance Initiate Treatment: betamethasone dipropionate 0.05 % topical cream \\nApply a thin layer topically to the affected area on the back BID for up to 2 weeks. Primary MD not aware of admit

## 2023-05-06 NOTE — H&P ADULT - ASSESSMENT
64yo female with pmh sle, htn, dm, gbs,chronic left foot drop  presenting with L sided numbness, weakness. workup negative for CT findings for stroke.

## 2023-05-06 NOTE — DISCHARGE NOTE NURSING/CASE MANAGEMENT/SOCIAL WORK - NSDCPEFALRISK_GEN_ALL_CORE
For information on Fall & Injury Prevention, visit: [email or web address] OR  [email or web address] OR  https://www.cdc.gov/steadi/patient.html

## 2023-05-06 NOTE — H&P ADULT - HISTORY OF PRESENT ILLNESS
64yo female with pmh sle, htn, dm, gbs,chronic left foot drop  presenting with L sided numbness, weakness.  Started 2 days ago noted decreased sensation in LUE/ LLE and face.   Yesterday had brief episode of slurring speech and now with slight numbness/ tingling to L face.  Has difficulty walking 2/2 LLE symptoms.  No pain.  No trauma, pt taking home medications includinc xarelto.

## 2023-05-06 NOTE — PATIENT PROFILE ADULT - NSPROHMDIABETMGMTSTRAT_GEN_A_NUR
walking and riding recumbent bike. Metformin gave pt lose BM's, so doctor lowered dose./activity/blood glucose testing/diet modification/exercise/medication therapy

## 2023-05-06 NOTE — PATIENT PROFILE ADULT - NSPROHMSYMPCOND_GEN_A_NUR
Problem: Falls - Risk of 
Goal: *Absence of Falls Document Yadira Latin Fall Risk and appropriate interventions in the flowsheet. Outcome: Progressing Towards Goal 
Fall Risk Interventions: 
  
 
  
 
Medication Interventions: Evaluate medications/consider consulting pharmacy, Patient to call before getting OOB Elimination Interventions: Call light in reach, Elevated toilet seat, Patient to call for help with toileting needs History of Falls Interventions: Consult care management for discharge planning, Door open when patient unattended, Investigate reason for fall, Evaluate medications/consider consulting pharmacy cardiovascular/diabetes/musculoskeletal/neurologic/respiratory

## 2023-05-06 NOTE — CONSULT NOTE ADULT - SUBJECTIVE AND OBJECTIVE BOX
BIB family late yesterday.  History from Admission H&P early today.    <Start of quote(s) from H&P>  "Reason for Admission: stroke/tia  History of Present Illness:   62yo female with pmh sle, htn, dm, gbs,chronic left foot drop  presenting with L sided numbness, weakness.  Started 2 days ago noted decreased sensation in LUE/ LLE and face.   Yesterday had brief episode of slurring speech and now with slight numbness/ tingling to L face.  Has difficulty walking 2/2 LLE symptoms.  No pain.  No trauma, pt taking home medications includinc xarelto.      Review of Systems:  Review of Systems: REVIEW OF SYSTEMS:    CONSTITUTIONAL: No weakness, fevers or chills  EYES/ENT: No visual changes;  No vertigo or throat pain   NECK: No pain or stiffness  RESPIRATORY: No cough, wheezing, hemoptysis; No shortness of breath  CARDIOVASCULAR: No chest pain or palpitations  GASTROINTESTINAL: No abdominal or epigastric pain. No nausea, vomiting, or hematemesis; No diarrhea or constipation. No melena or hematochezia.  GENITOURINARY: No dysuria, frequency or hematuria  NEUROLOGICAL: + numbness + weakness  SKIN: No itching, rashes     . . .     Home Medications:   * Incomplete Medication History as of 05-May-2023 23:44 documented in Structured Notes  · 	folic acid 1 mg oral tablet: Last Dose Taken:  , 1 tab(s) orally once a day  · 	cyclobenzaprine 10 mg oral tablet: Last Dose Taken:  , 1 tab(s) orally 2 times a day, As Needed -Muscle Spasm MDD:2 tablets  · 	docusate sodium 100 mg oral capsule: Last Dose Taken:  , 1 cap(s) orally 3 times a day  · 	verapamil 180 mg/12 hours oral tablet, extended release: 1 tab(s) orally once a day (in the morning)  · 	melatonin 3 mg oral tablet: 1 tab(s) orally once a day (at bedtime), As needed, Sleep  · 	Cymbalta 60 mg oral delayed release capsule: Last Dose Taken:  , 1 cap(s) orally once a day  · 	gabapentin 300 mg oral capsule: Last Dose Taken:  , 1 cap(s) orally 3 times a day  · 	oxyCODONE 10 mg oral tablet: 1 tab(s) orally every 4 hours, As needed, Severe Pain (7 - 10) MDD:6  · 	Xarelto 20 mg oral tablet: orally 2 times a day  · 	amitriptyline 150 mg oral tablet: Last Dose Taken:  , 1 orally once a day (at bedtime)  · 	nebivolol 10 mg oral tablet: Last Dose Taken:  , 1 orally once a day    Patient History:    Past Medical, Past Surgical, and Family History:  PAST MEDICAL HISTORY:  Asthma   Diabetes   Hypertension   Lupus   Peripheral polyneuropathy   Pulmonary emboli 7/2019.     PAST SURGICAL HISTORY:  H/O knee surgery   H/O total hysterectomy   History of bilateral tubal ligation   History of hip replacement left  History of shoulder surgery   S/P laminectomy with spinal fusion.     Social History:  · Substance use	No   . . .   · Has the patient used tobacco in the past 30 days?	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  She was hospitalized at [facility]; required respirator support for a "short time."  She does not know what results there may have been from CSF analysis.  She had lumbar spine surgery here in 2019.    Per Brief Op Note 7/9/19 (partial quote):  "·  PRE-OP DIAGNOSIS:  Lumbar stenosis 09-Jul-2019 19:46:50  Marvin Jennings.  ·  POST-OP DIAGNOSIS:  Lumbar stenosis 09-Jul-2019 19:47:01  Marvin Jennings.  ·  PROCEDURES:  Lumbar spinal fusion 09-Jul-2019 19:46:38  Marvin Jennings.      Operative Findings:  · Operative Findings  posterior spinal fusion L3-S1, L4-5 TLIF, bilateral hemilaminectomy L5-S1 with discectomy, right hemilaminectomy L3-L4"    Per Brief Op Note 11/22/19 (partial quote):  "·  PRE-OP DIAGNOSIS:  Tear of meniscus of knee 22-Nov-2019 12:51:42  Ajit Braun.  ·  POST-OP DIAGNOSIS:  Tear of meniscus of knee 22-Nov-2019 12:51:37  Ajit Braun.  ·  PROCEDURES:  Arthroscopic debridement of knee joint 22-Nov-2019 12:52:02  Ajit Braun  Meniscectomy, knee, lateral 22-Nov-2019 12:51:46  Ajit Braun.      Operative Findings:  · Operative Findings  Right knee arthroscopic lateral menisectomy, debridement patella fat pad"  xx BIB family late yesterday.  History from Admission H&P early today.    <Start of quote(s) from H&P>  "Reason for Admission: stroke/tia  History of Present Illness:   62yo female with pmh sle, htn, dm, gbs,chronic left foot drop  presenting with L sided numbness, weakness.  Started 2 days ago noted decreased sensation in LUE/ LLE and face.   Yesterday had brief episode of slurring speech and now with slight numbness/ tingling to L face.  Has difficulty walking 2/2 LLE symptoms.  No pain.  No trauma, pt taking home medications includinc xarelto.      Review of Systems:  Review of Systems: REVIEW OF SYSTEMS:    CONSTITUTIONAL: No weakness, fevers or chills  EYES/ENT: No visual changes;  No vertigo or throat pain   NECK: No pain or stiffness  RESPIRATORY: No cough, wheezing, hemoptysis; No shortness of breath  CARDIOVASCULAR: No chest pain or palpitations  GASTROINTESTINAL: No abdominal or epigastric pain. No nausea, vomiting, or hematemesis; No diarrhea or constipation. No melena or hematochezia.  GENITOURINARY: No dysuria, frequency or hematuria  NEUROLOGICAL: + numbness + weakness  SKIN: No itching, rashes     . . .     Home Medications:   * Incomplete Medication History as of 05-May-2023 23:44 documented in Structured Notes  · 	folic acid 1 mg oral tablet: Last Dose Taken:  , 1 tab(s) orally once a day  · 	cyclobenzaprine 10 mg oral tablet: Last Dose Taken:  , 1 tab(s) orally 2 times a day, As Needed -Muscle Spasm MDD:2 tablets  · 	docusate sodium 100 mg oral capsule: Last Dose Taken:  , 1 cap(s) orally 3 times a day  · 	verapamil 180 mg/12 hours oral tablet, extended release: 1 tab(s) orally once a day (in the morning)  · 	melatonin 3 mg oral tablet: 1 tab(s) orally once a day (at bedtime), As needed, Sleep  · 	Cymbalta 60 mg oral delayed release capsule: Last Dose Taken:  , 1 cap(s) orally once a day  · 	gabapentin 300 mg oral capsule: Last Dose Taken:  , 1 cap(s) orally 3 times a day  · 	oxyCODONE 10 mg oral tablet: 1 tab(s) orally every 4 hours, As needed, Severe Pain (7 - 10) MDD:6  · 	Xarelto 20 mg oral tablet: orally 2 times a day  · 	amitriptyline 150 mg oral tablet: Last Dose Taken:  , 1 orally once a day (at bedtime)  · 	nebivolol 10 mg oral tablet: Last Dose Taken:  , 1 orally once a day    Patient History:    Past Medical, Past Surgical, and Family History:  PAST MEDICAL HISTORY:  Asthma   Diabetes   Hypertension   Lupus   Peripheral polyneuropathy   Pulmonary emboli 7/2019.     PAST SURGICAL HISTORY:  H/O knee surgery   H/O total hysterectomy   History of bilateral tubal ligation   History of hip replacement left  History of shoulder surgery   S/P laminectomy with spinal fusion.     Social History:  · Substance use	No   . . .   · Has the patient used tobacco in the past 30 days?	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  She was hospitalized at [facility]; required respirator support for a "short time."  She does not know what results there may have been from CSF analysis.  She had lumbar spine surgery here in 2019.    Per Ortho Consult Note 7/8/19 (selected quotes):  ". . .  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  patient with known weakness that appears to have progressed.  She has not been able to ambulate with PT even after two weeks of intensive PT at rehab.  She continues to be in severe pain when she tries to stand up. . . . She complains of L foot weakness and some L leg numbness. . . .  Patient has had two episodes of bed wetting while at rehab though she does have control most of the time.   . . .   · Assessment	  58 yo F with L4 L5  spondylolistheses with L foot drop, patient's foot drop has not improved and will go to OR 7/9 for L3-S1 Laminectomy with PSF."    Per Brief Op Note 7/9/19 (partial quote):  "·  PRE-OP DIAGNOSIS:  Lumbar stenosis 09-Jul-2019 19:46:50  Marvin Jennings.  ·  POST-OP DIAGNOSIS:  Lumbar stenosis 09-Jul-2019 19:47:01  Marvin Jennings.  ·  PROCEDURES:  Lumbar spinal fusion 09-Jul-2019 19:46:38  Marvin Jennings.    Operative Findings:  · Operative Findings  posterior spinal fusion L3-S1, L4-5 TLIF, bilateral hemilaminectomy L5-S1 with discectomy, right hemilaminectomy L3-L4"    Per Brief Op Note 11/22/19 (partial quote):  "·  PRE-OP DIAGNOSIS:  Tear of meniscus of knee 22-Nov-2019 12:51:42  Ajit Braun.  ·  POST-OP DIAGNOSIS:  Tear of meniscus of knee 22-Nov-2019 12:51:37  Ajit Braun.  ·  PROCEDURES:  Arthroscopic debridement of knee joint 22-Nov-2019 12:52:02  Ajit Braun  Meniscectomy, knee, lateral 22-Nov-2019 12:51:46  Ajit Braun.    Operative Findings:  · Operative Findings  Right knee arthroscopic lateral menisectomy, debridement patella fat pad"      EXAMINATION    Awake, alert.  Grossly normal comprehension, expressio.  S L O W deliberate speech , mildly slurred.  PERRL.  Left homonynous hemianopsia.      Reflex                           Right    Left   Comment    Scapulohumeral               0         0  Biceps                             2         2  Triceps                           2-        2-2+  Brachiorad                      2+        2+  Schwab                     absent   present  Patellar                           0           0  Gastroc                           0          0  Plantar                         flexor    flexor    Step length < foot length.  Wide based.      Does not differentiate P from LT entire L side (including face).         To be completed. BIB family late yesterday.  History from Admission H&P early today.    <Start of quote(s) from H&P>  "Reason for Admission: stroke/tia  History of Present Illness:   64yo female with pmh sle, htn, dm, gbs,chronic left foot drop  presenting with L sided numbness, weakness.  Started 2 days ago noted decreased sensation in LUE/ LLE and face.   Yesterday had brief episode of slurring speech and now with slight numbness/ tingling to L face.  Has difficulty walking 2/2 LLE symptoms.  No pain.  No trauma, pt taking home medications includinc xarelto.      Review of Systems:  Review of Systems: REVIEW OF SYSTEMS:    CONSTITUTIONAL: No weakness, fevers or chills  EYES/ENT: No visual changes;  No vertigo or throat pain   NECK: No pain or stiffness  RESPIRATORY: No cough, wheezing, hemoptysis; No shortness of breath  CARDIOVASCULAR: No chest pain or palpitations  GASTROINTESTINAL: No abdominal or epigastric pain. No nausea, vomiting, or hematemesis; No diarrhea or constipation. No melena or hematochezia.  GENITOURINARY: No dysuria, frequency or hematuria  NEUROLOGICAL: + numbness + weakness  SKIN: No itching, rashes     . . .     Home Medications:   * Incomplete Medication History as of 05-May-2023 23:44 documented in Structured Notes  · 	folic acid 1 mg oral tablet: Last Dose Taken:  , 1 tab(s) orally once a day  · 	cyclobenzaprine 10 mg oral tablet: Last Dose Taken:  , 1 tab(s) orally 2 times a day, As Needed -Muscle Spasm MDD:2 tablets  · 	docusate sodium 100 mg oral capsule: Last Dose Taken:  , 1 cap(s) orally 3 times a day  · 	verapamil 180 mg/12 hours oral tablet, extended release: 1 tab(s) orally once a day (in the morning)  · 	melatonin 3 mg oral tablet: 1 tab(s) orally once a day (at bedtime), As needed, Sleep  · 	Cymbalta 60 mg oral delayed release capsule: Last Dose Taken:  , 1 cap(s) orally once a day  · 	gabapentin 300 mg oral capsule: Last Dose Taken:  , 1 cap(s) orally 3 times a day  · 	oxyCODONE 10 mg oral tablet: 1 tab(s) orally every 4 hours, As needed, Severe Pain (7 - 10) MDD:6  · 	Xarelto 20 mg oral tablet: orally 2 times a day  · 	amitriptyline 150 mg oral tablet: Last Dose Taken:  , 1 orally once a day (at bedtime)  · 	nebivolol 10 mg oral tablet: Last Dose Taken:  , 1 orally once a day    Patient History:    Past Medical, Past Surgical, and Family History:  PAST MEDICAL HISTORY:  Asthma   Diabetes   Hypertension   Lupus   Peripheral polyneuropathy   Pulmonary emboli 7/2019.     PAST SURGICAL HISTORY:  H/O knee surgery   H/O total hysterectomy   History of bilateral tubal ligation   History of hip replacement left  History of shoulder surgery   S/P laminectomy with spinal fusion.     Social History:  · Substance use	No   . . .   · Has the patient used tobacco in the past 30 days?	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  She was hospitalized at [facility]; required respirator support for a "short time."  She does not know what results there may have been from CSF analysis.  She had lumbar spine surgery here in 2019.    Per Ortho Consult Note 7/8/19 (selected quotes):  ". . .  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  patient with known weakness that appears to have progressed.  She has not been able to ambulate with PT even after two weeks of intensive PT at rehab.  She continues to be in severe pain when she tries to stand up. . . . She complains of L foot weakness and some L leg numbness. . . .  Patient has had two episodes of bed wetting while at rehab though she does have control most of the time.   . . .   · Assessment	  58 yo F with L4 L5  spondylolistheses with L foot drop, patient's foot drop has not improved and will go to OR 7/9 for L3-S1 Laminectomy with PSF."    Per Brief Op Note 7/9/19 (partial quote):  "·  PRE-OP DIAGNOSIS:  Lumbar stenosis 09-Jul-2019 19:46:50  Marvin Jennings.  ·  POST-OP DIAGNOSIS:  Lumbar stenosis 09-Jul-2019 19:47:01  Marvin Jennings.  ·  PROCEDURES:  Lumbar spinal fusion 09-Jul-2019 19:46:38  Marvin Jennings.    Operative Findings:  · Operative Findings  posterior spinal fusion L3-S1, L4-5 TLIF, bilateral hemilaminectomy L5-S1 with discectomy, right hemilaminectomy L3-L4"    Per Brief Op Note 11/22/19 (partial quote):  "·  PRE-OP DIAGNOSIS:  Tear of meniscus of knee 22-Nov-2019 12:51:42  Ajit Braun.  ·  POST-OP DIAGNOSIS:  Tear of meniscus of knee 22-Nov-2019 12:51:37  Ajit Braun.  ·  PROCEDURES:  Arthroscopic debridement of knee joint 22-Nov-2019 12:52:02  Ajit Braun  Meniscectomy, knee, lateral 22-Nov-2019 12:51:46  Ajit Braun.    Operative Findings:  · Operative Findings  Right knee arthroscopic lateral menisectomy, debridement patella fat pad"      Per radiology report of non-con head CT yesterday:  "  No prior studies are available for comparison.    FINDINGS:    There is no acute intra-axial or extra-axial hemorrhage. There is no mass   effect or shift of the midline. The basal cisterns are not effaced. The   ventricles are not dilated. Gray-white matter differentiation is   preserved. There is no CT evidence of an acutevascular territorial   infarct.    The regional soft tissues and osseous structures are unremarkable. The   visualized paranasal sinuses and tympanic/mastoid cavities are clear   apart from minimal left anterior ethmoid mucosal thickening.      IMPRESSION:    No acute intracranial hemorrhage, mass effect, or CT evidence of an acute   vascular territorial infarct."      Per radiology report of [facility]As brain and neck yesterday:  "IMPRESSION:    CTA NECK:  No significant stenosis of the cervical carotid arteries based   on NASCET criteria. Patent cervical vertebral arteries. No evidence of   cervical carotid or vertebral artery dissection.    CTA HEAD: No high-grade stenosis stenosis or occlusion of the major   proximal arterial branches."    Per cardiology report of [facility] earlier today:  ". . . Technically limited study. Study quality was adversely   affected due to patient obesity and body habitus.  . . .   Summary:   1. Technically limited study.   2. Normal global left ventricular systolic function.   3. Left ventricular ejection fraction, by visual estimation, is 50 to   55%.   4. Increased relative wall thickness with normal mass index consistent   with left ventricular concentric remodeling.   5. Spectral Doppler shows impaired relaxation pattern of left   ventricular myocardial filling (Grade I diastolic dysfunction).   6. The left atrium is normal in size.   7. Structurally normal mitral valve, with normal leaflet excursion.   8. Normal trileaflet aortic valve with normal opening.   9. Structurally normal tricuspid valve, with normal leaflet excursion.  10. Structurally normal pulmonic valve, with normal leaflet excursion."        EXAMINATION    Awake, alert.  Grossly normal comprehension, expressio.  S L O W deliberate speech , mildly slurred.  PERRL.  Left homonynous hemianopsia.      Reflex                           Right    Left   Comment    Scapulohumeral               0         0  Biceps                             2         2  Triceps                           2-        2-2+  Brachiorad                      2+        2+  Schwab                     absent   present  Patellar                           0           0  Gastroc                           0          0  Plantar                         flexor    flexor    Step length < foot length.  Wide based.      Does not differentiate P from LT entire L side (including face).         To be completed. BIB family late yesterday.  History from Admission H&P early today.    <Start of quote(s) from H&P>  "Reason for Admission: stroke/tia  History of Present Illness:   64yo female with pmh sle, htn, dm, gbs,chronic left foot drop  presenting with L sided numbness, weakness.  Started 2 days ago noted decreased sensation in LUE/ LLE and face.   Yesterday had brief episode of slurring speech and now with slight numbness/ tingling to L face.  Has difficulty walking 2/2 LLE symptoms.  No pain.  No trauma, pt taking home medications includinc xarelto.      Review of Systems:  Review of Systems: REVIEW OF SYSTEMS:    CONSTITUTIONAL: No weakness, fevers or chills  EYES/ENT: No visual changes;  No vertigo or throat pain   NECK: No pain or stiffness  RESPIRATORY: No cough, wheezing, hemoptysis; No shortness of breath  CARDIOVASCULAR: No chest pain or palpitations  GASTROINTESTINAL: No abdominal or epigastric pain. No nausea, vomiting, or hematemesis; No diarrhea or constipation. No melena or hematochezia.  GENITOURINARY: No dysuria, frequency or hematuria  NEUROLOGICAL: + numbness + weakness  SKIN: No itching, rashes     . . .     Home Medications:   * Incomplete Medication History as of 05-May-2023 23:44 documented in Structured Notes  · 	folic acid 1 mg oral tablet: Last Dose Taken:  , 1 tab(s) orally once a day  · 	cyclobenzaprine 10 mg oral tablet: Last Dose Taken:  , 1 tab(s) orally 2 times a day, As Needed -Muscle Spasm MDD:2 tablets  · 	docusate sodium 100 mg oral capsule: Last Dose Taken:  , 1 cap(s) orally 3 times a day  · 	verapamil 180 mg/12 hours oral tablet, extended release: 1 tab(s) orally once a day (in the morning)  · 	melatonin 3 mg oral tablet: 1 tab(s) orally once a day (at bedtime), As needed, Sleep  · 	Cymbalta 60 mg oral delayed release capsule: Last Dose Taken:  , 1 cap(s) orally once a day  · 	gabapentin 300 mg oral capsule: Last Dose Taken:  , 1 cap(s) orally 3 times a day  · 	oxyCODONE 10 mg oral tablet: 1 tab(s) orally every 4 hours, As needed, Severe Pain (7 - 10) MDD:6  · 	Xarelto 20 mg oral tablet: orally 2 times a day  · 	amitriptyline 150 mg oral tablet: Last Dose Taken:  , 1 orally once a day (at bedtime)  · 	nebivolol 10 mg oral tablet: Last Dose Taken:  , 1 orally once a day    Patient History:    Past Medical, Past Surgical, and Family History:  PAST MEDICAL HISTORY:  Asthma   Diabetes   Hypertension   Lupus   Peripheral polyneuropathy   Pulmonary emboli 7/2019.     PAST SURGICAL HISTORY:  H/O knee surgery   H/O total hysterectomy   History of bilateral tubal ligation   History of hip replacement left  History of shoulder surgery   S/P laminectomy with spinal fusion.     Social History:  · Substance use	No   . . .   · Has the patient used tobacco in the past 30 days?	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  She was hospitalized at [facility]; required respirator support for a "short time."  She does not know what results there may have been from CSF analysis.  She had lumbar spine surgery here in 2019.    Per Ortho Consult Note 7/8/19 (selected quotes):  ". . .  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  patient with known weakness that appears to have progressed.  She has not been able to ambulate with PT even after two weeks of intensive PT at rehab.  She continues to be in severe pain when she tries to stand up. . . . She complains of L foot weakness and some L leg numbness. . . .  Patient has had two episodes of bed wetting while at rehab though she does have control most of the time.   . . .   · Assessment	  58 yo F with L4 L5  spondylolistheses with L foot drop, patient's foot drop has not improved and will go to OR 7/9 for L3-S1 Laminectomy with PSF."    Per Brief Op Note 7/9/19 (partial quote):  "·  PRE-OP DIAGNOSIS:  Lumbar stenosis 09-Jul-2019 19:46:50  Marvin Jennings.  ·  POST-OP DIAGNOSIS:  Lumbar stenosis 09-Jul-2019 19:47:01  Marvin Jennings.  ·  PROCEDURES:  Lumbar spinal fusion 09-Jul-2019 19:46:38  Marvin Jennings.    Operative Findings:  · Operative Findings  posterior spinal fusion L3-S1, L4-5 TLIF, bilateral hemilaminectomy L5-S1 with discectomy, right hemilaminectomy L3-L4"    Per Brief Op Note 11/22/19 (partial quote):  "·  PRE-OP DIAGNOSIS:  Tear of meniscus of knee 22-Nov-2019 12:51:42  Ajit Braun.  ·  POST-OP DIAGNOSIS:  Tear of meniscus of knee 22-Nov-2019 12:51:37  Ajit Braun.  ·  PROCEDURES:  Arthroscopic debridement of knee joint 22-Nov-2019 12:52:02  Ajti Braun  Meniscectomy, knee, lateral 22-Nov-2019 12:51:46  Ajit Braun.    Operative Findings:  · Operative Findings  Right knee arthroscopic lateral menisectomy, debridement patella fat pad"      Per radiology report of non-con head CT yesterday:  "  No prior studies are available for comparison.    FINDINGS:    There is no acute intra-axial or extra-axial hemorrhage. There is no mass   effect or shift of the midline. The basal cisterns are not effaced. The   ventricles are not dilated. Gray-white matter differentiation is   preserved. There is no CT evidence of an acutevascular territorial   infarct.    The regional soft tissues and osseous structures are unremarkable. The   visualized paranasal sinuses and tympanic/mastoid cavities are clear   apart from minimal left anterior ethmoid mucosal thickening.      IMPRESSION:    No acute intracranial hemorrhage, mass effect, or CT evidence of an acute   vascular territorial infarct."      Per radiology report of [facility]As brain and neck yesterday:  "IMPRESSION:    CTA NECK:  No significant stenosis of the cervical carotid arteries based   on NASCET criteria. Patent cervical vertebral arteries. No evidence of   cervical carotid or vertebral artery dissection.    CTA HEAD: No high-grade stenosis stenosis or occlusion of the major   proximal arterial branches."    Per cardiology report of [facility] earlier today:  ". . . Technically limited study. Study quality was adversely   affected due to patient obesity and body habitus.  . . .   Summary:   1. Technically limited study.   2. Normal global left ventricular systolic function.   3. Left ventricular ejection fraction, by visual estimation, is 50 to   55%.   4. Increased relative wall thickness with normal mass index consistent   with left ventricular concentric remodeling.   5. Spectral Doppler shows impaired relaxation pattern of left   ventricular myocardial filling (Grade I diastolic dysfunction).   6. The left atrium is normal in size.   7. Structurally normal mitral valve, with normal leaflet excursion.   8. Normal trileaflet aortic valve with normal opening.   9. Structurally normal tricuspid valve, with normal leaflet excursion.  10. Structurally normal pulmonic valve, with normal leaflet excursion."        EXAMINATION    Awake, alert.  Grossly normal comprehension, expression.  S L O W deliberate speech , mildly slurred.  PERRL.  Left homonymous hemianopsia.      Generalized weakness; guarding of effort due to painful joints.  Poor ability to sustain effort.  LLE clearly weaker distally compared with the RLE (chronic deficit?).         Reflex                           Right    Left   Comment    Scapulohumeral               0         0  Biceps                             2         2  Triceps                           2-        2-2+  Brachiorad                      2+        2+  Schwab                     absent   present  Patellar                           0           0  Gastroc                           0          0  Plantar                         flexor    flexor    Step length < foot length.  Wide based.      Does not differentiate P from LT entire L side (including face).

## 2023-05-06 NOTE — CONSULT NOTE ADULT - ASSESSMENT
To be completed    L homonymous hemianopsia.      L hemisensory disturbance.    Dysarthria.      Bilateral UE relative hyperreflexia. L homonymous hemianopsia (presumed acute; was not a Pt complaint).      L hemisensory disturbance (acute by Hx)    Dysarthria (acute by Hx).      Generalized weakness, weaker on the L side.      Bilateral UE relative hyperreflexia.    Suspicious for having cervical spondylosis and myelopathy (the reflex findings, and the already-known lumbar multilevel degenerative spine disease).      Hx of GBS 1999.  Pt relates that she has had four-limb pain and paresthesias since then, ascribed to the GBS.  The long-term symptoms do not correspond with sequelae of GBS.    Persistently elevated INR/PT - why?.      RECOMMENDATIONS    IN ADDITION TO MR BRAIN she needs non-con MR c-spine.    ESR, CRP, RF, CCP, SPEP, B12, folate, methylmalonic acid, homocysteine, copper, zinc, GGT, ammonia, B6 level, serum and urine immunofixation, RPR, THERON.                                                             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        Patients requiring neurological follow-up after discharge may contact one of the following offices.     [facility] [place]  [address], [place] [id number] [phone]    [facility]  [address].  [place], [place]  [phone]      L homonymous hemianopsia (presumed acute; was not a Pt complaint).      L hemisensory disturbance (acute by Hx)    Dysarthria (acute by Hx).      Generalized weakness;  weaker LLE (unknown baseline - i.e., unknown what the degree of motor function was prior to the current episode of hemianopsia, dysarthria, hemisensory disturbance).        Bilateral UE relative hyperreflexia.    Clinically: acute R MCA ischemic stroke, CT (so far) negative.       Suspicious for having cervical spondylosis and myelopathy (the reflex findings, the already-known lumbar multilevel degenerative spine disease, and qiuadriparesis).      Hx of GBS 1999.  Pt relates that she has had four-limb pain and paresthesias since then, ascribed to the GBS.  The long-term symptoms do not correspond with sequelae of GBS.    Persistently elevated INR/PT - why?    Pt is on rivaroxaban.  From the neurologic perspective she should not also be on an antiplatelet agent because the risk of ICH is > any additional stroke prevention benefit.  I am D/C'ing ASA.  There would have to be a clear medical non-neurologic indication to be on both classes of medications.        RECOMMENDATIONS    IN ADDITION TO MR BRAIN she needs non-con MR c-spine.    ESR, CRP, Hgb A1c, RF, CCP, SPEP, B12, folate, methylmalonic acid, homocysteine, copper, zinc, GGT, ammonia, B6 level, serum and urine immunofixation, RPR, THERON.      ASA D/C'ed.                                                             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        Patients requiring neurological follow-up after discharge may contact one of the following offices.     [facility] [place]  [address].  [place], [place] [id number] [phone]    [facility]  [address].  [place], [place]  [phone]      L homonymous hemianopsia (presumed acute; was not a Pt complaint).      L hemisensory disturbance (acute by Hx)    Dysarthria (acute by Hx).      Generalized weakness;  weaker LLE (unknown baseline - i.e., unknown what the degree of motor function was prior to the current episode of hemianopsia, dysarthria, hemisensory disturbance).        Bilateral UE relative hyperreflexia.    Clinically: acute R MCA ischemic stroke, CT (so far) negative.       Suspicious for having cervical spondylosis and myelopathy (the reflex findings, the already-known lumbar multilevel degenerative spine disease, and quadriparesis).      Polyarthralgia; OA; R/O seropositive arthropathy.      Hx of GBS 1999.  Pt relates that she has had four-limb pain and paresthesias since then, ascribed to the GBS.  The long-term symptoms do not correspond with sequelae of GBS.    Persistently elevated INR/PT - why?    Pt is on rivaroxaban.  From the neurologic perspective she should not also be on an antiplatelet agent because the risk of ICH is > any additional stroke prevention benefit.  I am D/C'ing ASA.  There would have to be a clear medical non-neurologic indication to be on both classes of medications.        RECOMMENDATIONS    IN ADDITION TO MR BRAIN she needs non-con MR c-spine.    ESR, CRP, Hgb A1c, RF, CCP, SPEP, B12, folate, methylmalonic acid, homocysteine, copper, zinc, GGT, ammonia, B6 level, serum and urine immunofixation, RPR, THERON.      ASA D/C'ed.                                                             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        Patients requiring neurological follow-up after discharge may contact one of the following offices.     [facility] [place]  [address] [place] [address]  [phone]    [facility]  [address].  [place], [place]  [phone]

## 2023-05-06 NOTE — H&P ADULT - NSHPLABSRESULTS_GEN_ALL_CORE
=======================================================  Labs:                        11.7   3.61  )-----------( 229      ( 05 May 2023 21:05 )             36.5     05-05    137  |  105  |  17  ----------------------------<  103<H>  4.0   |  28  |  1.40<H>    Ca    9.8      05 May 2023 21:05    TPro  7.7  /  Alb  3.4  /  TBili  0.2  /  DBili  x   /  AST  50<H>  /  ALT  57  /  AlkPhos  77  05-05      Creatinine, Serum: 1.40 mg/dL (05-05-23 @ 21:05)            WBC Count: 3.61 K/uL (05-05-23 @ 21:05)        Alkaline Phosphatase, Serum: 77 U/L (05-05-23 @ 21:05)  Alanine Aminotransferase (ALT/SGPT): 57 U/L (05-05-23 @ 21:05)  Aspartate Aminotransferase (AST/SGOT): 50 U/L (05-05-23 @ 21:05)  Bilirubin Total, Serum: 0.2 mg/dL (05-05-23 @ 21:05)      < from: CT Angio Neck Stroke Protocol w/ IV Cont (05.05.23 @ 22:33) >    IMPRESSION:    CTA NECK:  No significant stenosis of the cervical carotid arteries based   on NASCET criteria. Patent cervical vertebral arteries. No evidence of   cervical carotid or vertebral artery dissection.    CTA HEAD: No high-grade stenosis stenosis or occlusion of the major   proximal arterial branches.    < end of copied text >    < from: CT Brain Stroke Protocol (05.05.23 @ 22:18) >    FINDINGS:    There is no acute intra-axial or extra-axial hemorrhage. There is no mass   effect or shift of the midline. The basal cisterns are not effaced. The   ventricles are not dilated. Gray-white matter differentiation is   preserved. There is no CT evidence of an acutevascular territorial   infarct.    The regional soft tissues and osseous structures are unremarkable. The   visualized paranasal sinuses and tympanic/mastoid cavities are clear   apart from minimal left anterior ethmoid mucosal thickening.      IMPRESSION:    No acute intracranial hemorrhage, mass effect, or CT evidence of an acute   vascular territorial infarct.    < end of copied text >

## 2023-05-06 NOTE — H&P ADULT - NSHPPHYSICALEXAM_GEN_ALL_CORE
VITALS:   T(C): 36.9 (05-06-23 @ 05:45), Max: 36.9 (05-06-23 @ 05:45)  HR: 94 (05-06-23 @ 05:45) (89 - 103)  BP: 151/93 (05-06-23 @ 05:45) (104/77 - 151/93)  RR: 14 (05-06-23 @ 05:45) (14 - 17)  SpO2: 99% (05-06-23 @ 05:45) (93% - 99%)    GENERAL: NAD, lying in bed comfortably  HEAD:  Atraumatic, Normocephalic  EYES: EOMI, PERRLA, conjunctiva and sclera clear  ENT: Moist mucous membranes  NECK: Supple, No JVD  CHEST/LUNG: Clear to auscultation bilaterally; No rales, rhonchi, wheezing, or rubs. Unlabored respirations  HEART: Regular rate and rhythm; No murmurs, rubs, or gallops  ABDOMEN: BSx4; Soft, nontender, nondistended  EXTREMITIES:  2+ Peripheral Pulses, brisk capillary refill. No clubbing, cyanosis, or edema  NERVOUS SYSTEM:  A&Ox3, CN 2-12 grossly intact. LLE 3-4/5 vs RLE 5/5 mild decrease sensation LUE , LLE and left side of face.   SKIN: No rashes or lesions

## 2023-05-07 LAB
A1C WITH ESTIMATED AVERAGE GLUCOSE RESULT: 6.5 % — HIGH (ref 4–5.6)
ESTIMATED AVERAGE GLUCOSE: 140 MG/DL — HIGH (ref 68–114)
GLUCOSE BLDC GLUCOMTR-MCNC: 141 MG/DL — HIGH (ref 70–99)
GLUCOSE BLDC GLUCOMTR-MCNC: 148 MG/DL — HIGH (ref 70–99)
GLUCOSE BLDC GLUCOMTR-MCNC: 152 MG/DL — HIGH (ref 70–99)

## 2023-05-07 PROCEDURE — 99233 SBSQ HOSP IP/OBS HIGH 50: CPT

## 2023-05-07 PROCEDURE — 93306 TTE W/DOPPLER COMPLETE: CPT | Mod: 26

## 2023-05-07 RX ADMIN — Medication 150 MILLIGRAM(S): at 22:01

## 2023-05-07 RX ADMIN — Medication 1 MILLIGRAM(S): at 12:32

## 2023-05-07 RX ADMIN — RIVAROXABAN 20 MILLIGRAM(S): KIT at 16:44

## 2023-05-07 RX ADMIN — DULOXETINE HYDROCHLORIDE 60 MILLIGRAM(S): 30 CAPSULE, DELAYED RELEASE ORAL at 12:32

## 2023-05-07 RX ADMIN — GABAPENTIN 300 MILLIGRAM(S): 400 CAPSULE ORAL at 22:03

## 2023-05-07 RX ADMIN — Medication 1: at 11:23

## 2023-05-07 RX ADMIN — GABAPENTIN 300 MILLIGRAM(S): 400 CAPSULE ORAL at 12:32

## 2023-05-07 RX ADMIN — ATORVASTATIN CALCIUM 80 MILLIGRAM(S): 80 TABLET, FILM COATED ORAL at 22:04

## 2023-05-07 RX ADMIN — GABAPENTIN 300 MILLIGRAM(S): 400 CAPSULE ORAL at 06:18

## 2023-05-07 NOTE — PROGRESS NOTE ADULT - SUBJECTIVE AND OBJECTIVE BOX
Patient seen and examined  vitals stable  for MR brain and C spine  Neuro eval appreciated    Review of Systems:  General:denies fever chills, headache, weakness  HEENT: denies blurry vision,diffculty swallowing, difficulty hearing, tinnitus  Cardiovascular: denies chest pain  ,palpitations  Pulmonary:denies shortness of breath, cough, wheezing, hemoptysis  Gastrointestinal: denies abdominal pain, constipation, diarrhea,nausea , vomiting, hematochezia  : denies hematuria, dysuria, or incontinence  Neurological: denies weakness, numbness , tingling, dizziness, tremors  MSK: denies muscle pain, difficulty ambulating, swelling, back pain  skin: denies skin rash, itching, burning, or  skin lesions  Psychiatrical: denies mood disturbances, anxierty, feeling depressed, depression , or difficulty sleeping    Objective:  Vitals  T(C): 36.6 (05-07-23 @ 11:25), Max: 36.6 (05-06-23 @ 15:49)  HR: 92 (05-07-23 @ 11:25) (84 - 95)  BP: 128/80 (05-07-23 @ 11:25) (123/84 - 159/89)  RR: 17 (05-07-23 @ 11:25) (17 - 18)  SpO2: 98% (05-07-23 @ 11:25) (97% - 100%)    Physical Exam:  General: comfortable, no acute distress  HEENT: Atraumatic, no LAD, trachea midline  Cardiovascular: normal s1s2, no murmurs, gallops or fricition rubs  Pulmonary: clear to ausculation Bilaterally, no wheezing , rhonchi  Gastrointestinal: soft non tender non distended, no masses felt, no organomegally  Muscloskeletal: no lower extremity edema, intact bilateral lower extremity pulses  Neurological: CN II-12 intact. LLE weaker distally compared with the RLE (chronic deficit?), slightly dysarthric    Psychiatrical: normal mood, cooperative  SKIN: no rash, lesions or ulcers    Labs:                          11.7   3.61  )-----------( 229      ( 05 May 2023 21:05 )             36.5     05-06    139  |  106  |  17  ----------------------------<  97  3.8   |  26  |  1.15    Ca    9.7      06 May 2023 09:55    TPro  7.7  /  Alb  3.4  /  TBili  0.2  /  DBili  x   /  AST  50<H>  /  ALT  57  /  AlkPhos  77  05-05    LIVER FUNCTIONS - ( 05 May 2023 21:05 )  Alb: 3.4 g/dL / Pro: 7.7 gm/dL / ALK PHOS: 77 U/L / ALT: 57 U/L / AST: 50 U/L / GGT: x           PT/INR - ( 05 May 2023 21:05 )   PT: 22.5 sec;   INR: 1.86 ratio         PTT - ( 05 May 2023 21:05 )  PTT:42.7 sec      Active Medications  MEDICATIONS  (STANDING):  amitriptyline 150 milliGRAM(s) Oral at bedtime  atorvastatin 80 milliGRAM(s) Oral at bedtime  dextrose 5%. 1000 milliLiter(s) (50 mL/Hr) IV Continuous <Continuous>  dextrose 5%. 1000 milliLiter(s) (100 mL/Hr) IV Continuous <Continuous>  dextrose 50% Injectable 25 Gram(s) IV Push once  dextrose 50% Injectable 25 Gram(s) IV Push once  dextrose 50% Injectable 12.5 Gram(s) IV Push once  DULoxetine 60 milliGRAM(s) Oral daily  folic acid 1 milliGRAM(s) Oral daily  gabapentin 300 milliGRAM(s) Oral three times a day  glucagon  Injectable 1 milliGRAM(s) IntraMuscular once  insulin lispro (ADMELOG) corrective regimen sliding scale   SubCutaneous three times a day before meals  rivaroxaban 20 milliGRAM(s) Oral with dinner    MEDICATIONS  (PRN):  dextrose Oral Gel 15 Gram(s) Oral once PRN Blood Glucose LESS THAN 70 milliGRAM(s)/deciliter  oxycodone    5 mG/acetaminophen 325 mG 1 Tablet(s) Oral every 8 hours PRN Severe Pain (7 - 10)     Patient seen and examined  vitals stable bp acceptable  for MR brain and C spine  Neuro eval appreciated: likely clinical  CVA with myelopathy  patient asking for rehab    Review of Systems:  General:denies fever chills, headache, weakness  HEENT: denies blurry vision,diffculty swallowing, difficulty hearing, tinnitus  Cardiovascular: denies chest pain  ,palpitations  Pulmonary:denies shortness of breath, cough, wheezing, hemoptysis  Gastrointestinal: denies abdominal pain, constipation, diarrhea,nausea , vomiting, hematochezia  : denies hematuria, dysuria, or incontinence  Neurological: denies weakness, numbness , tingling, dizziness, tremors  MSK: denies muscle pain, difficulty ambulating, swelling, back pain  skin: denies skin rash, itching, burning, or  skin lesions  Psychiatrical: denies mood disturbances, anxierty, feeling depressed, depression , or difficulty sleeping    Objective:  Vitals  T(C): 36.6 (05-07-23 @ 11:25), Max: 36.6 (05-06-23 @ 15:49)  HR: 92 (05-07-23 @ 11:25) (84 - 95)  BP: 128/80 (05-07-23 @ 11:25) (123/84 - 159/89)  RR: 17 (05-07-23 @ 11:25) (17 - 18)  SpO2: 98% (05-07-23 @ 11:25) (97% - 100%)    Physical Exam:  General: comfortable, no acute distress  HEENT: Atraumatic, no LAD, trachea midline  Cardiovascular: normal s1s2, no murmurs, gallops or fricition rubs  Pulmonary: clear to ausculation Bilaterally, no wheezing , rhonchi  Gastrointestinal: soft non tender non distended, no masses felt, no organomegally  Muscloskeletal: no lower extremity edema, intact bilateral lower extremity pulses  Neurological: CN II-12 intact. LLE weaker distally compared with the RLE (chronic deficit?), slightly dysarthric    Psychiatrical: normal mood, cooperative  SKIN: no rash, lesions or ulcers    Labs:                          11.7   3.61  )-----------( 229      ( 05 May 2023 21:05 )             36.5     05-06    139  |  106  |  17  ----------------------------<  97  3.8   |  26  |  1.15    Ca    9.7      06 May 2023 09:55    TPro  7.7  /  Alb  3.4  /  TBili  0.2  /  DBili  x   /  AST  50<H>  /  ALT  57  /  AlkPhos  77  05-05    LIVER FUNCTIONS - ( 05 May 2023 21:05 )  Alb: 3.4 g/dL / Pro: 7.7 gm/dL / ALK PHOS: 77 U/L / ALT: 57 U/L / AST: 50 U/L / GGT: x           PT/INR - ( 05 May 2023 21:05 )   PT: 22.5 sec;   INR: 1.86 ratio         PTT - ( 05 May 2023 21:05 )  PTT:42.7 sec      Active Medications  MEDICATIONS  (STANDING):  amitriptyline 150 milliGRAM(s) Oral at bedtime  atorvastatin 80 milliGRAM(s) Oral at bedtime  dextrose 5%. 1000 milliLiter(s) (50 mL/Hr) IV Continuous <Continuous>  dextrose 5%. 1000 milliLiter(s) (100 mL/Hr) IV Continuous <Continuous>  dextrose 50% Injectable 25 Gram(s) IV Push once  dextrose 50% Injectable 25 Gram(s) IV Push once  dextrose 50% Injectable 12.5 Gram(s) IV Push once  DULoxetine 60 milliGRAM(s) Oral daily  folic acid 1 milliGRAM(s) Oral daily  gabapentin 300 milliGRAM(s) Oral three times a day  glucagon  Injectable 1 milliGRAM(s) IntraMuscular once  insulin lispro (ADMELOG) corrective regimen sliding scale   SubCutaneous three times a day before meals  rivaroxaban 20 milliGRAM(s) Oral with dinner    MEDICATIONS  (PRN):  dextrose Oral Gel 15 Gram(s) Oral once PRN Blood Glucose LESS THAN 70 milliGRAM(s)/deciliter  oxycodone    5 mG/acetaminophen 325 mG 1 Tablet(s) Oral every 8 hours PRN Severe Pain (7 - 10)

## 2023-05-08 LAB
ANION GAP SERPL CALC-SCNC: 3 MMOL/L — LOW (ref 5–17)
ANISOCYTOSIS BLD QL: SLIGHT — SIGNIFICANT CHANGE UP
BASOPHILS # BLD AUTO: 0 K/UL — SIGNIFICANT CHANGE UP (ref 0–0.2)
BASOPHILS NFR BLD AUTO: 0 % — SIGNIFICANT CHANGE UP (ref 0–2)
BUN SERPL-MCNC: 11 MG/DL — SIGNIFICANT CHANGE UP (ref 7–23)
CALCIUM SERPL-MCNC: 9.6 MG/DL — SIGNIFICANT CHANGE UP (ref 8.5–10.1)
CHLORIDE SERPL-SCNC: 107 MMOL/L — SIGNIFICANT CHANGE UP (ref 96–108)
CO2 SERPL-SCNC: 29 MMOL/L — SIGNIFICANT CHANGE UP (ref 22–31)
CREAT SERPL-MCNC: 0.97 MG/DL — SIGNIFICANT CHANGE UP (ref 0.5–1.3)
EGFR: 66 ML/MIN/1.73M2 — SIGNIFICANT CHANGE UP
EOSINOPHIL # BLD AUTO: 0 K/UL — SIGNIFICANT CHANGE UP (ref 0–0.5)
EOSINOPHIL NFR BLD AUTO: 0 % — SIGNIFICANT CHANGE UP (ref 0–6)
GLUCOSE BLDC GLUCOMTR-MCNC: 104 MG/DL — HIGH (ref 70–99)
GLUCOSE BLDC GLUCOMTR-MCNC: 119 MG/DL — HIGH (ref 70–99)
GLUCOSE BLDC GLUCOMTR-MCNC: 155 MG/DL — HIGH (ref 70–99)
GLUCOSE SERPL-MCNC: 116 MG/DL — HIGH (ref 70–99)
HCT VFR BLD CALC: 34.4 % — LOW (ref 34.5–45)
HGB BLD-MCNC: 11 G/DL — LOW (ref 11.5–15.5)
HYPOCHROMIA BLD QL: SLIGHT — SIGNIFICANT CHANGE UP
LYMPHOCYTES # BLD AUTO: 1.17 K/UL — SIGNIFICANT CHANGE UP (ref 1–3.3)
LYMPHOCYTES # BLD AUTO: 37 % — SIGNIFICANT CHANGE UP (ref 13–44)
MANUAL SMEAR VERIFICATION: SIGNIFICANT CHANGE UP
MCHC RBC-ENTMCNC: 27.4 PG — SIGNIFICANT CHANGE UP (ref 27–34)
MCHC RBC-ENTMCNC: 32 G/DL — SIGNIFICANT CHANGE UP (ref 32–36)
MCV RBC AUTO: 85.8 FL — SIGNIFICANT CHANGE UP (ref 80–100)
MICROCYTES BLD QL: SLIGHT — SIGNIFICANT CHANGE UP
MONOCYTES # BLD AUTO: 0.44 K/UL — SIGNIFICANT CHANGE UP (ref 0–0.9)
MONOCYTES NFR BLD AUTO: 14 % — SIGNIFICANT CHANGE UP (ref 2–14)
NEUTROPHILS # BLD AUTO: 1.33 K/UL — LOW (ref 1.8–7.4)
NEUTROPHILS NFR BLD AUTO: 42 % — LOW (ref 43–77)
NRBC # BLD: 0 /100 — SIGNIFICANT CHANGE UP (ref 0–0)
NRBC # BLD: SIGNIFICANT CHANGE UP /100 WBCS (ref 0–0)
OVALOCYTES BLD QL SMEAR: SLIGHT — SIGNIFICANT CHANGE UP
PLAT MORPH BLD: NORMAL — SIGNIFICANT CHANGE UP
PLATELET # BLD AUTO: 222 K/UL — SIGNIFICANT CHANGE UP (ref 150–400)
POIKILOCYTOSIS BLD QL AUTO: SLIGHT — SIGNIFICANT CHANGE UP
POTASSIUM SERPL-MCNC: 3.9 MMOL/L — SIGNIFICANT CHANGE UP (ref 3.5–5.3)
POTASSIUM SERPL-SCNC: 3.9 MMOL/L — SIGNIFICANT CHANGE UP (ref 3.5–5.3)
RBC # BLD: 4.01 M/UL — SIGNIFICANT CHANGE UP (ref 3.8–5.2)
RBC # FLD: 14.6 % — HIGH (ref 10.3–14.5)
RBC BLD AUTO: ABNORMAL
SODIUM SERPL-SCNC: 139 MMOL/L — SIGNIFICANT CHANGE UP (ref 135–145)
VARIANT LYMPHS # BLD: 7 % — HIGH (ref 0–6)
WBC # BLD: 3.17 K/UL — LOW (ref 3.8–10.5)
WBC # FLD AUTO: 3.17 K/UL — LOW (ref 3.8–10.5)

## 2023-05-08 PROCEDURE — 72141 MRI NECK SPINE W/O DYE: CPT | Mod: 26

## 2023-05-08 PROCEDURE — 99233 SBSQ HOSP IP/OBS HIGH 50: CPT

## 2023-05-08 PROCEDURE — 70551 MRI BRAIN STEM W/O DYE: CPT | Mod: 26

## 2023-05-08 RX ORDER — LANOLIN ALCOHOL/MO/W.PET/CERES
3 CREAM (GRAM) TOPICAL ONCE
Refills: 0 | Status: COMPLETED | OUTPATIENT
Start: 2023-05-08 | End: 2023-05-08

## 2023-05-08 RX ADMIN — GABAPENTIN 300 MILLIGRAM(S): 400 CAPSULE ORAL at 06:25

## 2023-05-08 RX ADMIN — ATORVASTATIN CALCIUM 80 MILLIGRAM(S): 80 TABLET, FILM COATED ORAL at 22:37

## 2023-05-08 RX ADMIN — Medication 1 MILLIGRAM(S): at 13:04

## 2023-05-08 RX ADMIN — Medication 150 MILLIGRAM(S): at 22:25

## 2023-05-08 RX ADMIN — GABAPENTIN 300 MILLIGRAM(S): 400 CAPSULE ORAL at 13:05

## 2023-05-08 RX ADMIN — DULOXETINE HYDROCHLORIDE 60 MILLIGRAM(S): 30 CAPSULE, DELAYED RELEASE ORAL at 13:04

## 2023-05-08 RX ADMIN — GABAPENTIN 300 MILLIGRAM(S): 400 CAPSULE ORAL at 22:25

## 2023-05-08 RX ADMIN — RIVAROXABAN 20 MILLIGRAM(S): KIT at 17:04

## 2023-05-08 RX ADMIN — Medication 3 MILLIGRAM(S): at 00:20

## 2023-05-08 NOTE — PROGRESS NOTE ADULT - SUBJECTIVE AND OBJECTIVE BOX
Neurology consult    ROSSI [id number]     Patient is a 63y old  Female who presents with a chief complaint of stroke/tia (07 May 2023 12:03)      HPI:  62yo female with pmh sle, htn, dm, gbs,chronic left foot drop  presenting with L sided numbness, weakness.  Started 2 days ago noted decreased sensation in LUE/ LLE and face.   Yesterday had brief episode of slurring speech and now with slight numbness/ tingling to L face.  Has difficulty walking 2/2 LLE symptoms.  No pain.  No trauma, pt taking home medications includinc xarelto.  (06 May 2023 07:32)          MEDICATIONS    amitriptyline 150 milliGRAM(s) Oral at bedtime  atorvastatin 80 milliGRAM(s) Oral at bedtime  dextrose 5%. 1000 milliLiter(s) IV Continuous <Continuous>  dextrose 5%. 1000 milliLiter(s) IV Continuous <Continuous>  dextrose 50% Injectable 25 Gram(s) IV Push once  dextrose 50% Injectable 25 Gram(s) IV Push once  dextrose 50% Injectable 12.5 Gram(s) IV Push once  dextrose Oral Gel 15 Gram(s) Oral once PRN  DULoxetine 60 milliGRAM(s) Oral daily  folic acid 1 milliGRAM(s) Oral daily  gabapentin 300 milliGRAM(s) Oral three times a day  glucagon  Injectable 1 milliGRAM(s) IntraMuscular once  insulin lispro (ADMELOG) corrective regimen sliding scale   SubCutaneous three times a day before meals  oxycodone    5 mG/acetaminophen 325 mG 1 Tablet(s) Oral every 8 hours PRN  rivaroxaban 20 milliGRAM(s) Oral with dinner      PMH: Lupus    DM (diabetes mellitus)    Diabetes    Hypertension    Asthma    Peripheral polyneuropathy    Pulmonary emboli         PSH: No significant past surgical history    History of hip replacement    H/O total hysterectomy    History of bilateral tubal ligation    S/P laminectomy with spinal fusion    History of shoulder surgery    H/O knee surgery        Family history: No history of dementia, strokes, or seizures   FAMILY HISTORY:      SOCIAL HISTORY:  No history of tobacco or alcohol use     Allergies    No Known Allergies    Intolerances            Vital Signs Last 24 Hrs  T(C): 36.6 (08 May 2023 12:39), Max: 37.2 (08 May 2023 00:44)  T(F): 97.9 (08 May 2023 12:39), Max: 98.9 (08 May 2023 00:44)  HR: 94 (08 May 2023 12:39) (89 - 98)  BP: 135/85 (08 May 2023 12:39) (135/85 - 152/94)  BP(mean): --  RR: 18 (08 May 2023 12:39) (18 - 18)  SpO2: 98% (08 May 2023 12:39) (95% - 98%)    Parameters below as of 08 May 2023 05:35  Patient On (Oxygen Delivery Method): room air          On Neurological Examination:    Mental Status - Patient is alert, awake, oriented X3. fluent, names, no dysarthria no aphasia Follows commands well slow speech stutters    Cranial Nerves - PERRL, EOMI, VFF, V1-V3 intact, no gross facial asymmetry, tongue/uvula midline    Motor Exam -   no drift tremulouesness LLE weakness    Sensory    Intact to light touch and pinprick bilaterally    Coord: FTN intact bilaterally     Gait -  normal      Reflexes:         brisk            LABS:  CBC Full  -  ( 08 May 2023 06:07 )  WBC Count : 3.17 K/uL  RBC Count : 4.01 M/uL  Hemoglobin : 11.0 g/dL  Hematocrit : 34.4 %  Platelet Count - Automated : 222 K/uL  Mean Cell Volume : 85.8 fl  Mean Cell Hemoglobin : 27.4 pg  Mean Cell Hemoglobin Concentration : 32.0 g/dL  Auto Neutrophil # : 1.33 K/uL  Auto Lymphocyte # : 1.17 K/uL  Auto Monocyte # : 0.44 K/uL  Auto Eosinophil # : 0.00 K/uL  Auto Basophil # : 0.00 K/uL  Auto Neutrophil % : 42.0 %  Auto Lymphocyte % : 37.0 %  Auto Monocyte % : 14.0 %  Auto Eosinophil % : 0.0 %  Auto Basophil % : 0.0 %      05-08    139  |  107  |  11  ----------------------------<  116<H>  3.9   |  29  |  0.97    Ca    9.6      08 May 2023 06:07        Hemoglobin A1C:             RADIOLOGY  [facility]   MRI:

## 2023-05-08 NOTE — PROGRESS NOTE ADULT - SUBJECTIVE AND OBJECTIVE BOX
Patient seen and examined  anxious today  reports facial twitching, slurred speech and numbness  MR head negative  MR cspine: with disk herniations . no cord compression    Review of Systems:  General:denies fever chills, headache, weakness  HEENT: denies blurry vision,diffculty swallowing, difficulty hearing, tinnitus  Cardiovascular: denies chest pain  ,palpitations  Pulmonary:denies shortness of breath, cough, wheezing, hemoptysis  Gastrointestinal: denies abdominal pain, constipation, diarrhea,nausea , vomiting, hematochezia  : denies hematuria, dysuria, or incontinence  Neurological: denies weakness, numbness , tingling, dizziness, tremors  MSK: denies muscle pain, difficulty ambulating, swelling, back pain  skin: denies skin rash, itching, burning, or  skin lesions  Psychiatrical: denies mood disturbances, anxierty, feeling depressed, depression , or difficulty sleeping    Objective:  Vitals  T(C): 36.6 (05-08-23 @ 12:39), Max: 37.2 (05-08-23 @ 00:44)  HR: 94 (05-08-23 @ 12:39) (89 - 98)  BP: 135/85 (05-08-23 @ 12:39) (135/85 - 152/94)  RR: 18 (05-08-23 @ 12:39) (18 - 18)  SpO2: 98% (05-08-23 @ 12:39) (95% - 98%)    Physical Exam:  General: comfortable, no acute distress  HEENT: Atraumatic, no LAD, trachea midline, PERRLA  Cardiovascular: normal s1s2, no murmurs, gallops or fricition rubs  Pulmonary: clear to ausculation Bilaterally, no wheezing , rhonchi  Gastrointestinal: soft non tender non distended, no masses felt, no organomegally  Muscloskeletal: no lower extremity edema, intact bilateral lower extremity pulses  Neurological: CN II-12 intact. No focal weakness  Psychiatrical: normal mood, cooperative  SKIN: no rash, lesions or ulcers    Labs:                          11.0   3.17  )-----------( 222      ( 08 May 2023 06:07 )             34.4     05-08    139  |  107  |  11  ----------------------------<  116<H>  3.9   |  29  |  0.97    Ca    9.6      08 May 2023 06:07              Active Medications  MEDICATIONS  (STANDING):  amitriptyline 150 milliGRAM(s) Oral at bedtime  atorvastatin 80 milliGRAM(s) Oral at bedtime  dextrose 5%. 1000 milliLiter(s) (100 mL/Hr) IV Continuous <Continuous>  dextrose 5%. 1000 milliLiter(s) (50 mL/Hr) IV Continuous <Continuous>  dextrose 50% Injectable 25 Gram(s) IV Push once  dextrose 50% Injectable 25 Gram(s) IV Push once  dextrose 50% Injectable 12.5 Gram(s) IV Push once  DULoxetine 60 milliGRAM(s) Oral daily  folic acid 1 milliGRAM(s) Oral daily  gabapentin 300 milliGRAM(s) Oral three times a day  glucagon  Injectable 1 milliGRAM(s) IntraMuscular once  insulin lispro (ADMELOG) corrective regimen sliding scale   SubCutaneous three times a day before meals  rivaroxaban 20 milliGRAM(s) Oral with dinner    MEDICATIONS  (PRN):  dextrose Oral Gel 15 Gram(s) Oral once PRN Blood Glucose LESS THAN 70 milliGRAM(s)/deciliter  oxycodone    5 mG/acetaminophen 325 mG 1 Tablet(s) Oral every 8 hours PRN Severe Pain (7 - 10)

## 2023-05-08 NOTE — CONSULT NOTE ADULT - SUBJECTIVE AND OBJECTIVE BOX
Patient is a 63y Female who presents c/o LUE, LLE and left face subjective numbness/tingling and weakness. Pt admitted for r/o CVA. Pt has a h/o lupus, polyneuropathy and DM. Pt s/p L3-S1 PSF, L4-5 TLIF, L5-S1 lami with Dr. An in 2019. Denies any trauma. Denies pain/injury elsewhere. Endorses trouble with fine motor functions/tasks with LUE. Denies bowel/bladder incontinence. Denies fevers/chills. No other complaints at this time.    HEALTH ISSUES - PROBLEM Dx:  Lupus    Hypertension    Diabetes    Peripheral polyneuropathy    Cerebrovascular accident (CVA)            MEDICATIONS  (STANDING):  amitriptyline 150 milliGRAM(s) Oral at bedtime  atorvastatin 80 milliGRAM(s) Oral at bedtime  dextrose 5%. 1000 milliLiter(s) IV Continuous <Continuous>  dextrose 5%. 1000 milliLiter(s) IV Continuous <Continuous>  dextrose 50% Injectable 25 Gram(s) IV Push once  dextrose 50% Injectable 25 Gram(s) IV Push once  dextrose 50% Injectable 12.5 Gram(s) IV Push once  DULoxetine 60 milliGRAM(s) Oral daily  folic acid 1 milliGRAM(s) Oral daily  gabapentin 300 milliGRAM(s) Oral three times a day  glucagon  Injectable 1 milliGRAM(s) IntraMuscular once  insulin lispro (ADMELOG) corrective regimen sliding scale   SubCutaneous three times a day before meals  rivaroxaban 20 milliGRAM(s) Oral with dinner      Allergies    No Known Allergies    Intolerances        PAST MEDICAL & SURGICAL HISTORY:  Lupus    DM (diabetes mellitus)    Diabetes    Hypertension    Asthma    Peripheral polyneuropathy    Pulmonary emboli  7/2019    No significant past surgical history    History of hip replacement  left    H/O total hysterectomy    History of bilateral tubal ligation    S/P laminectomy with spinal fusion    History of shoulder surgery    H/O knee surgery                              11.0   3.17  )-----------( 222      ( 08 May 2023 06:07 )             34.4       08 May 2023 06:07    139    |  107    |  11     ----------------------------<  116    3.9     |  29     |  0.97     Ca    9.6        08 May 2023 06:07      Vital Signs Last 24 Hrs  T(C): 37.1 (05-08-23 @ 15:37), Max: 37.2 (05-08-23 @ 00:44)  T(F): 98.7 (05-08-23 @ 15:37), Max: 98.9 (05-08-23 @ 00:44)  HR: 97 (05-08-23 @ 15:37) (94 - 98)  BP: 154/85 (05-08-23 @ 15:37) (135/85 - 154/85)  BP(mean): --  RR: 19 (05-08-23 @ 15:37) (18 - 19)  SpO2: 97% (05-08-23 @ 15:37) (95% - 98%)    Gen: NAD    Spine PE:  Skin intact  No gross deformity  No midnline TTP C/T/L/S spine  No bony step offs  No paraspinal muscle ttp/hypertonicity   Negative clonus  Negative babinski  + maria LUE                    Bicep        Tricep       Wrist Ext        Finger Flex          Finger Abd  R            5/5          5/5           5/5                       5/5	           5/5                          L             4/5           4/5          4/5                       4/5                   3/5                                        Hip Flex       Quad     Ankle DF        Ankle PF       Toe Ext            R            5/5              5/5          5/5                 5/5                 5/5	                  L            4/5              4/5           4/5                 4/5                 3/5                    Sensory:            C5         C6         C7      C8       T1        (0=absent, 1=impaired, 2=normal, NT=not testable)  R         2            2           2        2         2  L          2            2           2        2         2               L2          L3         L4      L5       S1         (0=absent, 1=impaired, 2=normal, NT=not testable)  R         2            2            2        2        2  L          1            1           1        1         1      Imaging: MR Cervical spine demonstrating degenerative changes, no cord effacement,   CT cervical spine, XR C/L spine ordered     A/P: 63y Female with paresthesia and mild weakness to LUE and LLE, likely at least partially myelopathic in nature  Pain control prn  WBAT with assistive devices as needed  FU Labs/imaging  No acute orthopaedic surgical intervention indicated at this time. This patient is orthopaedically stable for discharge.   Patient to follow up with Dr. An and/or Dr Velasquez as an outpatient for further evaluation and management and possible discussion for operative management of cervical spine.   Medical management per primary  Follow neuro recs   All of the patient's questions and concerns were answered and addressed.

## 2023-05-08 NOTE — PROGRESS NOTE ADULT - ASSESSMENT
64yo female with pmh sle, htn, dm, gbs,chronic left foot drop  presenting with L sided numbness, weakness. workup negative for CT findings for stroke.     Left sided weakness and numbness and facial numbness  MRI of Brain negative: C spine may account for findings  Neuro consulted  restarted xarelto.  dc asa    Lupus.   pt with hx of lupus increasing risk of stoke.     Problem/Plan - 3:  ·  Problem: Peripheral polyneuropathy.   ·  Plan: pt on gabapentin   Amitryptaline 150 QHS  Cymbalta.     Problem/Plan - 4:  ·  Problem: Diabetes.   ·  Plan: sliding scale.     Problem/Plan - 5:  ·  Problem: Hypertension.     
62yo female with pmh sle, htn, dm, gbs,chronic left foot drop  presenting with L sided numbness, weakness. Pe non-focal other than stuttering tremors and chronic LLE weakness  MRI brain no acute fining  MRI C-spine degenerative changes      Impression: numbness    On Xarelto  outpt Spine evaluation  Can follow up with Neurology, Dr. Domenico Salvador at [phone] for EMG  pt/ot  -No further inpatient Neurologic workup at this time
62yo female with pmh sle, htn, dm, gbs,chronic left foot drop  presenting with L sided numbness, weakness. workup negative for CT findings for stroke.        Problem/Plan - 1:  ·  Problem: Cerebrovascular accident (CVA).   ·  Plan: pt with elft sided weakness and numbness  CT imaging negative.   FU MRI of Brain and  Cspine    Neuro consulted  restarted xarelto.  dc asa     Problem/Plan - 2:  ·  Problem: Lupus.   ·  Plan: pt with hx of lupus increasing risk of stoke.     Problem/Plan - 3:  ·  Problem: Peripheral polyneuropathy.   ·  Plan: pt on gabapentin   Amitryptaline 150 QHS  Cymbalta.     Problem/Plan - 4:  ·  Problem: Diabetes.   ·  Plan: sliding scale.     Problem/Plan - 5:  ·  Problem: Hypertension.   ·  Plan: home med held for BP being on the lower end. Can restart when appropriate.

## 2023-05-08 NOTE — PROGRESS NOTE ADULT - TIME BILLING
Patient encounter, f-2-f, Medical decision making and chart review
Patient encounter, f-2-f, Medical decision making and chart review

## 2023-05-09 ENCOUNTER — TRANSCRIPTION ENCOUNTER (OUTPATIENT)
Age: 63
End: 2023-05-09

## 2023-05-09 VITALS — HEART RATE: 118 BPM | DIASTOLIC BLOOD PRESSURE: 73 MMHG | SYSTOLIC BLOOD PRESSURE: 110 MMHG | OXYGEN SATURATION: 97 %

## 2023-05-09 LAB — GLUCOSE BLDC GLUCOMTR-MCNC: 96 MG/DL — SIGNIFICANT CHANGE UP (ref 70–99)

## 2023-05-09 PROCEDURE — 99239 HOSP IP/OBS DSCHRG MGMT >30: CPT

## 2023-05-09 PROCEDURE — 72125 CT NECK SPINE W/O DYE: CPT | Mod: 26

## 2023-05-09 PROCEDURE — 72040 X-RAY EXAM NECK SPINE 2-3 VW: CPT | Mod: 26

## 2023-05-09 PROCEDURE — 72100 X-RAY EXAM L-S SPINE 2/3 VWS: CPT | Mod: 26

## 2023-05-09 RX ORDER — VERAPAMIL HCL 240 MG
1 CAPSULE, EXTENDED RELEASE PELLETS 24 HR ORAL
Qty: 0 | Refills: 0 | DISCHARGE
Start: 2023-05-09

## 2023-05-09 RX ORDER — VERAPAMIL HCL 240 MG
180 CAPSULE, EXTENDED RELEASE PELLETS 24 HR ORAL DAILY
Refills: 0 | Status: DISCONTINUED | OUTPATIENT
Start: 2023-05-09 | End: 2023-05-09

## 2023-05-09 RX ORDER — AMITRIPTYLINE HCL 25 MG
1 TABLET ORAL
Refills: 0 | DISCHARGE

## 2023-05-09 RX ORDER — AMITRIPTYLINE HCL 25 MG
1 TABLET ORAL
Qty: 0 | Refills: 0 | DISCHARGE
Start: 2023-05-09

## 2023-05-09 RX ADMIN — Medication 1 MILLIGRAM(S): at 12:12

## 2023-05-09 RX ADMIN — GABAPENTIN 300 MILLIGRAM(S): 400 CAPSULE ORAL at 06:05

## 2023-05-09 RX ADMIN — DULOXETINE HYDROCHLORIDE 60 MILLIGRAM(S): 30 CAPSULE, DELAYED RELEASE ORAL at 12:12

## 2023-05-09 NOTE — CHART NOTE - NSCHARTNOTEFT_GEN_A_CORE
Imaging reviewed and case discussed with Dr. Velasquez, plan for pt to see neurology outpatient first then follow up outpatient in office with Dr. Velasquez for evaluation and discussion of possible need for operative intervention. Patient is on xarelto for lupus, per disscusion with Dr. Bennett pt may stop xarelto 3 days preop.     Discussion with neurologist Dr. Salvador regarding patient left facial/LUE/LLE numbness, neuro not convinced pt had CVA, stating may be anxiety or metabolic in nature. Neuro recommending outpatient neurology follow up.      No acute orthopaedic surgical intervention indicated at this time. This patient is orthopaedically stable for discharge.   Patient to follow up with Dr. Velasquez as an outpatient for further evaluation and management.   All of the patient's questions and concerns were answered and addressed.  Dr. Velasquez aware and in agreement with above plan. Imaging reviewed and case discussed with Dr. Velasquez, plan for pt to see neurology outpatient first then follow up outpatient in office with Dr. Velasquez for evaluation and discussion of possible need for operative intervention. Patient is on xarelto for lupus, per disscusion with Dr. Bennett pt may stop xarelto 3 days preop.     Discussion with neurologist Dr. Salvador regarding patient left facial/LUE/LLE numbness, neuro not convinced pt had CVA, stating may be anxiety or metabolic in nature. Neuro recommending outpatient neurology follow up.      The patient will follow-up as an outpatient to discuss surgical intervention in the cervical spine.  Given the above discussion with neurology while the patient has remained admitted to the hospital, the patient will follow-up with her neurologist as an outpatient and then follow-up with orthopedic surgery.  Given that the patient is currently taking Xarelto, which would need to be held preoperatively, and Dr. Bennett has reported that the patient may stop Xarelto 3 days preoperatively–patient will be discharged in order to allow for appropriate management and timing of surgical intervention.  The patient should return to the emergency department for rapidly progressive or worsening of signs/symptoms.  Questions were sought and answered satisfactorily.  Patient voiced understanding of the above.  Dr. Velasquez aware and in agreement with above plan.

## 2023-05-09 NOTE — DISCHARGE NOTE PROVIDER - NSDCCPCAREPLAN_GEN_ALL_CORE_FT
PRINCIPAL DISCHARGE DIAGNOSIS  Diagnosis: Left sided numbness  Assessment and Plan of Treatment: Your left sided numbness is likely due to your Cervical Spine  Stroke was ruled out  We consulted  Neurology and Orthopedic Surgery  please followup with Dr. Salvador ( Neurology ) in 1- 2 weeks post discharge  Please followup with Dr. Velasquez (Orthopedic Spine Surgery in 1-2 weeks post discharge

## 2023-05-09 NOTE — DISCHARGE NOTE PROVIDER - CARE PROVIDER_API CALL
Isra Velasquez (DO)  Orthopaedic Surgery Surgery  [address] [place] [address]  Phone: [phone]  Fax: [phone]  Follow Up Time: 2 weeks    Domenico Salvador)  Neurology; Neurophysiology  [address] [facility], [address]  [place] [address]  Phone: [phone]  Fax: [phone]  Follow Up Time: 1 week

## 2023-05-09 NOTE — DISCHARGE NOTE PROVIDER - PROVIDER TOKENS
PROVIDER:[TOKEN:[[id number]],FOLLOWUP:[2 weeks]],PROVIDER:[TOKEN:[[id number]],FOLLOWUP:[1 week]] Detail Level: Zone Otc Regimen: Benzoyl peroxide wash Initiate Treatment: Tretinoin twice daily to the face Plan: Use vasoline and sun protection daily Otc Regimen: Amlactin

## 2023-05-09 NOTE — DISCHARGE NOTE PROVIDER - HOSPITAL COURSE
62yo female with pmh sle, htn, dm, gbs,chronic left foot drop  presenting with L sided numbness, weakness. workup negative for CT findings for stroke.     Left sided weakness and numbness and facial numbness  MRI of Brain negative: C spine may account for findings  Neuro consulted  restarted xarelto.  dc asa    Lupus.   pt with hx of lupus increasing risk of stoke.     Problem/Plan - 3:  ·  Problem: Peripheral polyneuropathy.   ·  Plan: pt on gabapentin   Amitryptaline 150 QHS  Cymbalta.     Problem/Plan - 4:  ·  Problem: Diabetes.   ·  Plan: sliding scale.     Problem/Plan - 5:  ·  Problem: Hypertension.

## 2023-05-09 NOTE — DISCHARGE NOTE PROVIDER - NSDCMRMEDTOKEN_GEN_ALL_CORE_FT
amitriptyline 150 mg oral tablet: 1 tab(s) orally once a day (at bedtime)  Cymbalta 60 mg oral delayed release capsule: 1 cap(s) orally once a day  docusate sodium 100 mg oral capsule: 1 cap(s) orally 3 times a day  folic acid 1 mg oral tablet: 1 tab(s) orally once a day  gabapentin 300 mg oral capsule: 1 cap(s) orally 3 times a day  melatonin 3 mg oral tablet: 1 tab(s) orally once a day (at bedtime), As needed, Sleep  nebivolol 10 mg oral tablet: 1 orally once a day  oxyCODONE 10 mg oral tablet: 1 tab(s) orally every 4 hours, As needed, Severe Pain (7 - 10) MDD:6  verapamil 180 mg/12 hours oral tablet, extended release: 1 tab(s) orally once a day (in the morning)  Xarelto 20 mg oral tablet: orally 2 times a day

## 2023-05-09 NOTE — PHYSICAL THERAPY INITIAL EVALUATION ADULT - PAIN SENSATION, RUE, REHAB EVAL
L Supraclavicular Brachial Plexus Single Injection Block    Patient location during procedure: OR    Reason for block: primary anesthetic   Diagnosis: ESRD   Start time: 11/5/2020 9:00 AM  Timeout: 11/5/2020 9:00 AM   End time: 11/5/2020 9:08 AM    Staffing  Authorizing Provider: Chio Day MD  Performing Provider: Dominguez Valdivia MD    Preanesthetic Checklist  Completed: patient identified, site marked, surgical consent, pre-op evaluation, timeout performed, IV checked, risks and benefits discussed and monitors and equipment checked  Peripheral Block  Patient position: supine  Prep: ChloraPrep  Patient monitoring: heart rate, cardiac monitor, continuous pulse ox, continuous capnometry and frequent blood pressure checks  Block type: supraclavicular  Laterality: left  Injection technique: single shot  Needle  Needle type: Stimuplex   Needle gauge: 22 G  Needle length: 2 in  Needle localization: anatomical landmarks and ultrasound guidance   -ultrasound image captured on disc.  Assessment  Injection assessment: negative aspiration, negative parasthesia and local visualized surrounding nerve  Paresthesia pain: none  Heart rate change: no  Slow fractionated injection: yes  Additional Notes  Intercostal brachial field block performed with mepivacaine 1.5% 6ml for additional coverage.    VSS.  See anesthesia record.  Patient tolerated procedure well.               
within normal limits

## 2023-05-09 NOTE — PROGRESS NOTE ADULT - SUBJECTIVE AND OBJECTIVE BOX
Patient seen and examined at bedside. No acute complaints at this time. Pain well controlled. Still reporting subjective numbness/tingling LUE/LLE. Denies chest pain, shortness of breath, nausea or vomiting.     PE:  Vital Signs Last 24 Hrs  T(C): 37.1 (05-09-23 @ 04:30), Max: 37.4 (05-08-23 @ 23:50)  T(F): 98.7 (05-09-23 @ 04:30), Max: 99.3 (05-08-23 @ 23:50)  HR: 116 (05-09-23 @ 04:30) (94 - 119)  BP: 136/88 (05-09-23 @ 04:30) (116/87 - 154/85)  BP(mean): --  RR: 18 (05-09-23 @ 04:30) (18 - 19)  SpO2: 97% (05-09-23 @ 04:30) (97% - 99%)                          11.0   3.17  )-----------( 222      ( 08 May 2023 06:07 )             34.4       05-08    139  |  107  |  11  ----------------------------<  116<H>  3.9   |  29  |  0.97    Ca    9.6      08 May 2023 06:07            General: NAD, resting comfortably in bed    2+ radial pulses  2+ DP Pulses    Spine PE:  Skin intact  No gross deformity  No midnline TTP C/T/L/S spine  No bony step offs  No paraspinal muscle ttp/hypertonicity   Negative clonus  Negative babinski  + maria LUE                  Bicep        Tricep       Wrist Ext        Finger Flex          Finger Abd  R            5/5          5/5           5/5                       5/5	           5/5                          L             4/5           4/5          4/5                       4/5                   3/5                                        Hip Flex       Quad     Ankle DF        Ankle PF       Toe Ext            R            5/5              5/5          5/5                 5/5                 5/5	                  L            4/5              4/5           4/5                 4/5                 3/5                    Sensory:            C5         C6         C7      C8       T1        (0=absent, 1=impaired, 2=normal, NT=not testable)  R         2            2           2        2         2  L          2            2           2        2         2               L2          L3         L4      L5       S1         (0=absent, 1=impaired, 2=normal, NT=not testable)  R         2            2            2        2        2  L          1            1           1        1         1      A/P: 63y Female with paresthesia and mild weakness to LUE and LLE, likely at least partially myelopathic in nature  Pain control prn  WBAT with assistive devices as needed  FU Labs/imaging  No acute orthopaedic surgical intervention indicated at this time. This patient is orthopaedically stable for discharge.   Patient to follow up with Dr Velasquez as an outpatient for further evaluation and management and possible discussion for operative management of cervical spine.   Medical management per primary  Follow neuro recs   All of the patient's questions and concerns were answered and addressed.

## 2023-05-09 NOTE — PHYSICAL THERAPY INITIAL EVALUATION ADULT - ADDITIONAL COMMENTS
pt encountered in bed supine, aaox4, pt indep in bed mob and transfers,  able o amb with RW x 80 ft, no gait deficit. pt lives with spouse and mother in-law at home, has no steps to enter the house, uses stair lift to get to bedroom on second floor. pt uses rollator at home for mobility, and only amb short distances. indep with ADL

## 2023-05-09 NOTE — DISCHARGE NOTE PROVIDER - NSDCFUADDAPPT_GEN_ALL_CORE_FT
we have scheduled an appoitment for you with Dr. Mooney  to  discuss your preoperative assessment on June 1

## 2023-05-16 DIAGNOSIS — Z96.642 PRESENCE OF LEFT ARTIFICIAL HIP JOINT: ICD-10-CM

## 2023-05-16 DIAGNOSIS — I10 ESSENTIAL (PRIMARY) HYPERTENSION: ICD-10-CM

## 2023-05-16 DIAGNOSIS — M32.9 SYSTEMIC LUPUS ERYTHEMATOSUS, UNSPECIFIED: ICD-10-CM

## 2023-05-16 DIAGNOSIS — M47.12 OTHER SPONDYLOSIS WITH MYELOPATHY, CERVICAL REGION: ICD-10-CM

## 2023-05-16 DIAGNOSIS — R20.0 ANESTHESIA OF SKIN: ICD-10-CM

## 2023-05-16 DIAGNOSIS — R47.81 SLURRED SPEECH: ICD-10-CM

## 2023-05-16 DIAGNOSIS — M21.372 FOOT DROP, LEFT FOOT: ICD-10-CM

## 2023-05-16 DIAGNOSIS — E11.42 TYPE 2 DIABETES MELLITUS WITH DIABETIC POLYNEUROPATHY: ICD-10-CM

## 2023-06-27 NOTE — PROGRESS NOTE ADULT - SUBJECTIVE AND OBJECTIVE BOX
Patient: Zainab Jimenes Date of Service: 2023   : 1981 MRN: [id number]     SUBJECTIVE:     HISTORY OF PRESENT ILLNESS:  CC:  Zainab Jimenes is a 41 year old year old female who presents to the clinic today for physical. 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      Patient's medications, allergies, past medical, surgical, social and family histories were reviewed and updated as appropriate.      Recent ER visit or hospitalization- none  Recent injuries- none    last PE 2023      PAST MEDICAL HISTORY:  Past Medical History:   Diagnosis Date   • Acne    • Anxiety    • GERD (gastroesophageal reflux disease)    :    There is no problem list on file for this patient.      History reviewed. No pertinent family history.:    Current Outpatient Medications   Medication Sig   • buPROPion XL (WELLBUTRIN XL) 150 MG 24 hr tablet Take 150 mg by mouth every morning.   • erythromycin (ILOTYCIN) ophthalmic ointment Place 0.25 inches into left eye every 6 hours.   • methylPREDNISolone (MEDROL DOSEPAK) 4 MG tablet Take 1 tablet by mouth as directed. Do not start before 2021. follow package directions   • albuterol 108 (90 Base) MCG/ACT inhaler Inhale 2 puffs into the lungs every 4 hours as needed for Shortness of Breath, Wheezing or Other (cough).   • citalopram (CELEXA) 40 MG tablet Take 40 mg by mouth daily.   • spironolactone (ALDACTONE) 100 MG tablet Take 100 mg by mouth daily.   • albuterol 108 (90 Base) MCG/ACT inhaler Administer 2 puffs po Q 4-6 hrs/prn for tightness in chest     No current facility-administered medications for this visit.   :      ALLERGIES:  No Known Allergies:    Past Surgical History:   Procedure Laterality Date   •  section, low transverse     :    SOCIAL HISTORY:  Social History     Tobacco Use   • Smoking status: Never   • Smokeless tobacco: Never   Substance Use Topics   • Alcohol use: Yes   • Drug use: Never          REVIEW OF SYSTEMS:  Gen: Feeling well, no fever/chills, no recent rapid weight changes.  Derm: No new or changes in existing moles or lesions. No rashes.  Neuro: No dizziness, no HAs, numbness/tingling  Resp: No cough, wheeze, SOB  Nodes: No noted lymphadenopathy, swollen glands  Cardiac: No chest pain, palpitations, skipped beats. No prior hx of murmur. No hx of syncope.  GI: No emesis, diarrhea, constipation, or blood in stools  : No polyuria or dysuria, urgency, or frequency  MS: No joint swelling/warmth, no recent injuries  Hemat: No easy bruising or bleeding  Psych: No depressive symptoms. No sleeping issues.  No safety concerns.          OBJECTIVE:     PHYSICAL EXAM:    VISION SCREENING NOTED- No results found.    Blood pressure 100/60, pulse 88, temperature 99.4 °F (37.4 °C), resp. rate 16, height 5' 6\" (1.676 m), weight 55.8 kg (123 lb 0.3 oz), SpO2 100 %.  Body mass index is 19.86 kg/m².    Gen: Well appearing female, no acute distress  Derm: No lesions or rashes noted  HEENT: PERRL, EOMI, no conjunctival injection. No scleral icterus. TM with normal landmarks bilaterally.  Oropharynx with moist mucus membranes, clear.  Neck: supple  Nodes: no adenopathy  Lungs: Clear to auscultation. No wheezes, rales, rhonchi. Normal work of breathing.  Cardiac: Regular rate and rhythm, normal S1S2, no murmur appreciated.  Abdomen: Soft, nontender, nondistended. Normoactive bowel sounds. No organomegaly or masses.  Extremities: Full ROM UE/LE. Warm, well perfused. No clubbing/cyanosis/edema  Neuro: Grossly intact. Strength 5/5 bilaterally. Normal tone. Gait normal      No LMP recorded. Patient has had an ablation.      DIAGNOSTIC STUDIES:     No results found for this visit on 06/27/23.        ASSESSMENT AND PLAN:     Assessment   Problem List Items Addressed This Visit    None  Visit Diagnoses     Physical exam, pre-employment    -  Primary          ASSESSMENT AND PLAN:  I have examined patient and completed  59y old  Female who presents with a chief complaint of left foot drop (15 Jul 2019 06:19)      Review of Systems:    General:  No wt loss, fevers, chills, night sweats  ENT:  No sore throat, pain, runny nose, dysphagia  CV:  No pain, palpitations, hypo/hypertension  Resp:  No dyspnea, cough, tachypnea, wheezing      Discussed with:  PMD, Family    Physical Exam:    Vital Signs:  Vital Signs Last 24 Hrs  T(C): 37.3 (15 Jul 2019 11:20), Max: 37.3 (15 Jul 2019 11:20)  T(F): 99.2 (15 Jul 2019 11:20), Max: 99.2 (15 Jul 2019 11:20)  HR: 104 (15 Jul 2019 11:20) (104 - 132)  BP: 100/72 (15 Jul 2019 11:20) (100/72 - 140/100)  BP(mean): --  RR: 16 (15 Jul 2019 11:20) (14 - 19)  SpO2: 96% (15 Jul 2019 11:20) (94% - 100%)  Daily     Daily   I&O's Summary    2019 07:01  -  15 Jul 2019 07:00  --------------------------------------------------------  IN: 630 mL / OUT: 105 mL / NET: 525 mL          Chest:  Full & symmetric excursion, no increased effort, breath sounds clear  Cardiovascular:  Regular rhythm, S1, S2, no murmur/rub/S3/S4, no carotid/femoral/abdominal bruit, radial/pedal pulses 2+,   Abdomen:  Soft, non-tender, non-distended, normoactive bowel sounds, no HSM      Laboratory:                          8.1    7.09  )-----------( 256      ( 15 Jul 2019 06:23 )             25.1     07-15    137  |  99  |  8   ----------------------------<  114<H>  3.9   |  33<H>  |  0.86    Ca    8.6      15 Jul 2019 06:23        Urinalysis Basic - ( 2019 15:15 )    Color: Yellow / Appearance: Clear / S.015 / pH: x  Gluc: x / Ketone: Trace  / Bili: Negative / Urobili: Negative mg/dL   Blood: x / Protein: 30 mg/dL / Nitrite: Negative   Leuk Esterase: Trace / RBC: 0-2 /HPF / WBC 0-2   Sq Epi: x / Non Sq Epi: Few / Bacteria: Few      Assessment:  Post spine surgery  PE   TTE completed, essentially normal  Lovenox to Xarelto  Fevers improved  PULM and ID noted  For DC the administrative physical evaluation.   The patient does not present with apparent clinical contraindications to work  outlined above.  If conditions arise after the patient has been cleared for work, a physician may rescind the clearance until the problem is resolved.      Refer to your Human Resource Department for tests or immunizations required for employment.      This does not replace your annual wellness exam with your primary doctor.  Continue regular follow up with your primary or you can call [phone] for assistance in choosing a primary /family physician.        Idalmis Bello, CNP

## 2023-07-07 NOTE — ED PROVIDER NOTE - BIRTH SEX
The patient has been examined and the H&P has been reviewed:    I concur with the findings and no changes have occurred since H&P was written.  The risks and benefits of this intervention, and alternative therapies were discussed with the patient.  The discussion of risks included infection, bleeding, need for additional procedures or surgery, nerve damage.  Questions regarding the procedure, risks, expected outcome, and possible side effects were solicited and answered to the patient's satisfaction.  Karla Bonilla wishes to proceed with the injection or procedure.  Written consent was obtained.      There are no hospital problems to display for this patient.    
Female

## 2023-07-11 ENCOUNTER — APPOINTMENT (OUTPATIENT)
Dept: ORTHOPEDIC SURGERY | Facility: CLINIC | Age: 63
End: 2023-07-11
Payer: MEDICARE

## 2023-07-11 VITALS — HEIGHT: 68 IN | BODY MASS INDEX: 39.4 KG/M2 | WEIGHT: 260 LBS

## 2023-07-11 DIAGNOSIS — Z96.649 BROKEN INTERNAL JOINT PROSTHESIS, OTHER SITE, INITIAL ENCOUNTER: ICD-10-CM

## 2023-07-11 DIAGNOSIS — M16.11 UNILATERAL PRIMARY OSTEOARTHRITIS, RIGHT HIP: ICD-10-CM

## 2023-07-11 DIAGNOSIS — T84.018A BROKEN INTERNAL JOINT PROSTHESIS, OTHER SITE, INITIAL ENCOUNTER: ICD-10-CM

## 2023-07-11 DIAGNOSIS — T56.91XA TOXIC EFFECT OF UNSPECIFIED METAL, ACCIDENTAL (UNINTENTIONAL), INITIAL ENCOUNTER: ICD-10-CM

## 2023-07-11 PROCEDURE — 99204 OFFICE O/P NEW MOD 45 MIN: CPT

## 2023-07-11 PROCEDURE — 73523 X-RAY EXAM HIPS BI 5/> VIEWS: CPT

## 2023-07-11 NOTE — DISCUSSION/SUMMARY
[de-identified] : This patient has right hip pain secondary to right hip osteoarthritis.  She is well-functioning left total hip replacement however this is metal-on-metal.  She should recheck her cobalt chromium ESR and CRP which were all ordered.  I prescribed right hip intra-articular cortisone injection.  The patient has been counseled regarding the elevated risks associated with surgical complications in patients with a BMI>35.  I explained to the patient the risk of obesity, which is well documented in the orthopedic literature as increasing risks of wound breakdown (dehiscence), drainage, periprosthetic joint infection, dissatisfaction, chronic pain, early failure and/or loosening of the prosthesis, and impaired overall outcome to the patient. The patient demonstrates a profound understanding of the increased risk. Nutritionist referral, methods of monitoring nutrition intake and calorie counting and bariatric surgery options were discussed with the patient. After a lengthy discussion, the patient agreed to make a coordinated effort at weight loss. The patient understands our BMI policy and if they are planning surgical intervention, then they will need to bring their BMI below 40 in order to proceed and they are agreeable to this.  The patient is not an appropriate candidate for surgical intervention at this time. An extensive discussion was conducted on the natural history of the disease and the variety of surgical and non-surgical options available to the patient including, but not limited to non-steroidal anti-inflammatory medications, steroid injections, physical therapy, maintenance of ideal body weight, and reduction of activity. The patient will schedule an appointment as needed

## 2023-07-11 NOTE — PHYSICAL EXAM
[de-identified] : Patient is well nourished, well-developed, in no acute distress, with appropriate mood and affect. The patient is oriented to time, place, and person. Respirations are even and unlabored. Gait evaluation reveals a limp. There is no inguinal adenopathy.  The right limb is well-perfused and showed 2+ dp/pt pulses, without skin lesions, shows a grossly normal motor and sensory examination. Examination of the hip shows no skin lesions. Hip motion is reduced and causes pain. FADIR is positive and MADDIE is positive. Stinchfield test is positive. The left limb is well-perfused and showed 2+ dp/pt pulses, without skin lesions, shows a grossly normal motor and sensory examination. Examination of the hip shows a well-healed surgical scar. Hip motion is full and painless . FADIR is negative and MADDIE is negative.  Stinchfield test is negative.  Leg lengths are approximately equal. Both hips are stable and muscle strength is normal with good strength with resisted abduction and adduction. Pedal pulses are palpable. [de-identified] : AP and lateral x-rays of the right hip, pelvis, and femur were ordered and taken in the office and demonstrate degenerative joint disease of the hip with joint space narrowing, osteophyte formation, and subchondral sclerosis.\par \par AP pelvis, AP hip, and lateral x-rays of the left hip were ordered and obtained in the office and demonstrate satisfactory position and alignment of the components are present. No signs of loosening are seen.

## 2023-07-11 NOTE — HISTORY OF PRESENT ILLNESS
[de-identified] : This is very nice 63-year-old female experiencing right hip and groin and thigh pain, which is severe in intensity. The pain substantially limits activities of daily living. Walking tolerance is reduced.  Also has a history of a left total hip arthroplasty which is metal-on-metal done by Dr. Nye in 2008.  She has had cobalt and chromium levels checked 5 years ago but nothing recently.  Sometimes has left hip pain.  No fevers or chills.  Ambulates with a walker.  BMI is 40.  No attempts at weight loss.  The patient denies any radiation of the pain to the feet and it is not associated with numbness, tingling, or weakness.

## 2023-09-13 NOTE — PHYSICAL THERAPY INITIAL EVALUATION ADULT - TRANSFER SKILLS, REHAB EVAL
Cryotherapy Text: The wound bed was treated with cryotherapy after the biopsy was performed.
needs device/with a rolling walker/independent
independent/with a rolling walker/needs device

## 2023-09-25 NOTE — ED PROVIDER NOTE - NS_EDPROVIDERDISPOUSERTYPE_ED_A_ED
Theo Cardenas MD (Nephrology progress note)  [address], [facility],  [address],  [id number]  TEl: [phone]  Cell: [phone]    Patient is a 57y Male seen and evaluated at bedside. Vital signs, laboratory data, imaging studies, consult notes reviewed done within past 24 hours. Overnight events noted. Patient lying in bed remains with poor oral intake and fatigue. Interval S cr 1.6 with non oliguria.    Allergies    No Known Allergies    Intolerances        ROS:  CONSTITUTIONAL: No fever or chills.  HEENT: No headcahe or visual disturbances.  RESPIRATORY: No shortness of breath or cough  CARDIOVASCULAR: No Chest pain or SOB  GASTROINTESTINAL: Poor appetite and nausea but denies diarrhea, hematemesis or blood per rectum.  GENITOURINARY: No flank or supra pubic pain  NEUROLOGICAL: No headache, focal limb weakness, tingling or numbness or seizure like activity  SKIN: No skin rash or lesion  MUSCULOSKELETAL: No leg pain, calf pain or swelling    VITALS:    T(C): 36.7 (09-25-23 @ 09:47), Max: 37 (09-24-23 @ 22:06)  HR: 123 (09-25-23 @ 09:47) (93 - 123)  BP: 119/96 (09-25-23 @ 09:47) (119/96 - 129/100)  RR: 18 (09-25-23 @ 09:47) (18 - 20)  SpO2: 100% (09-25-23 @ 09:47) (95% - 100%)  CAPILLARY BLOOD GLUCOSE      POCT Blood Glucose.: 106 mg/dL (25 Sep 2023 04:14)  POCT Blood Glucose.: 115 mg/dL (24 Sep 2023 11:03)      09-24-23 @ 07:01  -  09-25-23 @ 07:00  --------------------------------------------------------  IN: 0 mL / OUT: 350 mL / NET: -350 mL      MEDICATIONS  (STANDING):  dextrose 5% + sodium chloride 0.45%. 1000 milliLiter(s) (75 mL/Hr) IV Continuous <Continuous>  dextrose 50% Injectable 12.5 Gram(s) IV Push once  dextrose 50% Injectable 25 Gram(s) IV Push once  dextrose 50% Injectable 25 Gram(s) IV Push once  dextrose Oral Gel 15 Gram(s) Oral once  enoxaparin Injectable 60 milliGRAM(s) SubCutaneous every 12 hours  fluconAZOLE   Tablet 200 milliGRAM(s) Oral daily  glucagon  Injectable 1 milliGRAM(s) IntraMuscular once  pantoprazole    Tablet 40 milliGRAM(s) Oral two times a day    MEDICATIONS  (PRN):      PHYSICAL EXAM:  GENERAL: Alert, awake, cachectica appearing  HEENT: ROBYN, EOMI, neck supple, no JVP  CHEST/LUNG: Bilateral clear breath sounds, no rales, no crepitations, no rhonchi, no wheezing  HEART: Regular rate and rhythm, LOVE II/VI at LPSB, no gallops, no rub   ABDOMEN: Soft, nontender, non distended, bowel sounds present  : No flank or supra pubic tenderness.  EXTREMITIES: Peripheral pulses are palpable, no pedal edema  Neurology: AAOx3, no focal neurological deficit  SKIN: No rash or skin lesion  Musculoskeletal: No joint deformity or spinal tenderness.      Vascular ACCESS:     LABS:                        11.2   4.17  )-----------( 444      ( 25 Sep 2023 05:50 )             35.6     09-25    135  |  96<L>  |  19  ----------------------------<  131<H>  4.0   |  22  |  1.65<H>    Ca    9.4      25 Sep 2023 05:50  Phos  2.9     09-25  Mg     1.90     09-25          Urinalysis Basic - ( 25 Sep 2023 05:50 )    Color: x / Appearance: x / SG: x / pH: x  Gluc: 131 mg/dL / Ketone: x  / Bili: x / Urobili: x   Blood: x / Protein: x / Nitrite: x   Leuk Esterase: x / RBC: x / WBC x   Sq Epi: x / Non Sq Epi: x / Bacteria: x            RADIOLOGY & ADDITIONAL STUDIES:  rad  Imaging Personally Reviewed:  [x] YES  [ ] NO    Consultant(s) Notes Reviewed:  [x] YES  [ ] NO    Care Discussed with Primary team/ Other Providers [x] YES  [ ] NO       Attending Attestation (For Attendings USE Only)...

## 2023-11-17 ENCOUNTER — INPATIENT (INPATIENT)
Facility: HOSPITAL | Age: 63
LOS: 4 days | Discharge: NURSING, REHAB OR LONG-TERM CARE | End: 2023-11-22
Attending: INTERNAL MEDICINE | Admitting: INTERNAL MEDICINE
Payer: MEDICARE

## 2023-11-17 VITALS
OXYGEN SATURATION: 96 % | HEART RATE: 84 BPM | DIASTOLIC BLOOD PRESSURE: 70 MMHG | RESPIRATION RATE: 18 BRPM | HEIGHT: 68 IN | SYSTOLIC BLOOD PRESSURE: 106 MMHG | TEMPERATURE: 101 F | WEIGHT: 248.02 LBS

## 2023-11-17 DIAGNOSIS — I10 ESSENTIAL (PRIMARY) HYPERTENSION: ICD-10-CM

## 2023-11-17 DIAGNOSIS — I63.9 CEREBRAL INFARCTION, UNSPECIFIED: ICD-10-CM

## 2023-11-17 DIAGNOSIS — Z98.51 TUBAL LIGATION STATUS: Chronic | ICD-10-CM

## 2023-11-17 DIAGNOSIS — E11.9 TYPE 2 DIABETES MELLITUS WITHOUT COMPLICATIONS: ICD-10-CM

## 2023-11-17 DIAGNOSIS — Z96.649 PRESENCE OF UNSPECIFIED ARTIFICIAL HIP JOINT: Chronic | ICD-10-CM

## 2023-11-17 DIAGNOSIS — Z98.890 OTHER SPECIFIED POSTPROCEDURAL STATES: Chronic | ICD-10-CM

## 2023-11-17 DIAGNOSIS — Z98.1 ARTHRODESIS STATUS: Chronic | ICD-10-CM

## 2023-11-17 DIAGNOSIS — M32.9 SYSTEMIC LUPUS ERYTHEMATOSUS, UNSPECIFIED: ICD-10-CM

## 2023-11-17 DIAGNOSIS — Z90.710 ACQUIRED ABSENCE OF BOTH CERVIX AND UTERUS: Chronic | ICD-10-CM

## 2023-11-17 LAB
A1C WITH ESTIMATED AVERAGE GLUCOSE RESULT: 6.6 % — HIGH (ref 4–5.6)
A1C WITH ESTIMATED AVERAGE GLUCOSE RESULT: 6.6 % — HIGH (ref 4–5.6)
ALBUMIN SERPL ELPH-MCNC: 3 G/DL — LOW (ref 3.3–5)
ALBUMIN SERPL ELPH-MCNC: 3 G/DL — LOW (ref 3.3–5)
ALP SERPL-CCNC: 74 U/L — SIGNIFICANT CHANGE UP (ref 40–120)
ALP SERPL-CCNC: 74 U/L — SIGNIFICANT CHANGE UP (ref 40–120)
ALT FLD-CCNC: 32 U/L — SIGNIFICANT CHANGE UP (ref 12–78)
ALT FLD-CCNC: 32 U/L — SIGNIFICANT CHANGE UP (ref 12–78)
AMMONIA BLD-MCNC: 12 UMOL/L — SIGNIFICANT CHANGE UP (ref 11–32)
AMMONIA BLD-MCNC: 12 UMOL/L — SIGNIFICANT CHANGE UP (ref 11–32)
ANION GAP SERPL CALC-SCNC: 7 MMOL/L — SIGNIFICANT CHANGE UP (ref 5–17)
ANION GAP SERPL CALC-SCNC: 7 MMOL/L — SIGNIFICANT CHANGE UP (ref 5–17)
APTT BLD: 34.3 SEC — SIGNIFICANT CHANGE UP (ref 24.5–35.6)
APTT BLD: 34.3 SEC — SIGNIFICANT CHANGE UP (ref 24.5–35.6)
AST SERPL-CCNC: 24 U/L — SIGNIFICANT CHANGE UP (ref 15–37)
AST SERPL-CCNC: 24 U/L — SIGNIFICANT CHANGE UP (ref 15–37)
BASOPHILS # BLD AUTO: 0.01 K/UL — SIGNIFICANT CHANGE UP (ref 0–0.2)
BASOPHILS # BLD AUTO: 0.01 K/UL — SIGNIFICANT CHANGE UP (ref 0–0.2)
BASOPHILS NFR BLD AUTO: 0.1 % — SIGNIFICANT CHANGE UP (ref 0–2)
BASOPHILS NFR BLD AUTO: 0.1 % — SIGNIFICANT CHANGE UP (ref 0–2)
BILIRUB SERPL-MCNC: 0.3 MG/DL — SIGNIFICANT CHANGE UP (ref 0.2–1.2)
BILIRUB SERPL-MCNC: 0.3 MG/DL — SIGNIFICANT CHANGE UP (ref 0.2–1.2)
BLD GP AB SCN SERPL QL: SIGNIFICANT CHANGE UP
BLD GP AB SCN SERPL QL: SIGNIFICANT CHANGE UP
BUN SERPL-MCNC: 15 MG/DL — SIGNIFICANT CHANGE UP (ref 7–23)
BUN SERPL-MCNC: 15 MG/DL — SIGNIFICANT CHANGE UP (ref 7–23)
CALCIUM SERPL-MCNC: 9 MG/DL — SIGNIFICANT CHANGE UP (ref 8.5–10.1)
CALCIUM SERPL-MCNC: 9 MG/DL — SIGNIFICANT CHANGE UP (ref 8.5–10.1)
CHLORIDE SERPL-SCNC: 103 MMOL/L — SIGNIFICANT CHANGE UP (ref 96–108)
CHLORIDE SERPL-SCNC: 103 MMOL/L — SIGNIFICANT CHANGE UP (ref 96–108)
CHOLEST SERPL-MCNC: 102 MG/DL — SIGNIFICANT CHANGE UP
CHOLEST SERPL-MCNC: 102 MG/DL — SIGNIFICANT CHANGE UP
CO2 SERPL-SCNC: 27 MMOL/L — SIGNIFICANT CHANGE UP (ref 22–31)
CO2 SERPL-SCNC: 27 MMOL/L — SIGNIFICANT CHANGE UP (ref 22–31)
CREAT SERPL-MCNC: 1.24 MG/DL — SIGNIFICANT CHANGE UP (ref 0.5–1.3)
CREAT SERPL-MCNC: 1.24 MG/DL — SIGNIFICANT CHANGE UP (ref 0.5–1.3)
EGFR: 49 ML/MIN/1.73M2 — LOW
EGFR: 49 ML/MIN/1.73M2 — LOW
EOSINOPHIL # BLD AUTO: 0 K/UL — SIGNIFICANT CHANGE UP (ref 0–0.5)
EOSINOPHIL # BLD AUTO: 0 K/UL — SIGNIFICANT CHANGE UP (ref 0–0.5)
EOSINOPHIL NFR BLD AUTO: 0 % — SIGNIFICANT CHANGE UP (ref 0–6)
EOSINOPHIL NFR BLD AUTO: 0 % — SIGNIFICANT CHANGE UP (ref 0–6)
ESTIMATED AVERAGE GLUCOSE: 143 MG/DL — HIGH (ref 68–114)
ESTIMATED AVERAGE GLUCOSE: 143 MG/DL — HIGH (ref 68–114)
ETHANOL SERPL-MCNC: <10 MG/DL — SIGNIFICANT CHANGE UP (ref 0–10)
ETHANOL SERPL-MCNC: <10 MG/DL — SIGNIFICANT CHANGE UP (ref 0–10)
GLUCOSE BLDC GLUCOMTR-MCNC: 117 MG/DL — HIGH (ref 70–99)
GLUCOSE BLDC GLUCOMTR-MCNC: 117 MG/DL — HIGH (ref 70–99)
GLUCOSE BLDC GLUCOMTR-MCNC: 120 MG/DL — HIGH (ref 70–99)
GLUCOSE BLDC GLUCOMTR-MCNC: 120 MG/DL — HIGH (ref 70–99)
GLUCOSE BLDC GLUCOMTR-MCNC: 135 MG/DL — HIGH (ref 70–99)
GLUCOSE BLDC GLUCOMTR-MCNC: 135 MG/DL — HIGH (ref 70–99)
GLUCOSE SERPL-MCNC: 128 MG/DL — HIGH (ref 70–99)
GLUCOSE SERPL-MCNC: 128 MG/DL — HIGH (ref 70–99)
HCT VFR BLD CALC: 32.5 % — LOW (ref 34.5–45)
HCT VFR BLD CALC: 32.5 % — LOW (ref 34.5–45)
HDLC SERPL-MCNC: 56 MG/DL — SIGNIFICANT CHANGE UP
HDLC SERPL-MCNC: 56 MG/DL — SIGNIFICANT CHANGE UP
HGB BLD-MCNC: 10.4 G/DL — LOW (ref 11.5–15.5)
HGB BLD-MCNC: 10.4 G/DL — LOW (ref 11.5–15.5)
IMM GRANULOCYTES NFR BLD AUTO: 0.6 % — SIGNIFICANT CHANGE UP (ref 0–0.9)
IMM GRANULOCYTES NFR BLD AUTO: 0.6 % — SIGNIFICANT CHANGE UP (ref 0–0.9)
INR BLD: 1.37 RATIO — HIGH (ref 0.85–1.18)
INR BLD: 1.37 RATIO — HIGH (ref 0.85–1.18)
LACTATE SERPL-SCNC: 2 MMOL/L — SIGNIFICANT CHANGE UP (ref 0.7–2)
LACTATE SERPL-SCNC: 2 MMOL/L — SIGNIFICANT CHANGE UP (ref 0.7–2)
LIPID PNL WITH DIRECT LDL SERPL: 33 MG/DL — SIGNIFICANT CHANGE UP
LIPID PNL WITH DIRECT LDL SERPL: 33 MG/DL — SIGNIFICANT CHANGE UP
LYMPHOCYTES # BLD AUTO: 1.25 K/UL — SIGNIFICANT CHANGE UP (ref 1–3.3)
LYMPHOCYTES # BLD AUTO: 1.25 K/UL — SIGNIFICANT CHANGE UP (ref 1–3.3)
LYMPHOCYTES # BLD AUTO: 13.5 % — SIGNIFICANT CHANGE UP (ref 13–44)
LYMPHOCYTES # BLD AUTO: 13.5 % — SIGNIFICANT CHANGE UP (ref 13–44)
MCHC RBC-ENTMCNC: 27.1 PG — SIGNIFICANT CHANGE UP (ref 27–34)
MCHC RBC-ENTMCNC: 27.1 PG — SIGNIFICANT CHANGE UP (ref 27–34)
MCHC RBC-ENTMCNC: 32 G/DL — SIGNIFICANT CHANGE UP (ref 32–36)
MCHC RBC-ENTMCNC: 32 G/DL — SIGNIFICANT CHANGE UP (ref 32–36)
MCV RBC AUTO: 84.6 FL — SIGNIFICANT CHANGE UP (ref 80–100)
MCV RBC AUTO: 84.6 FL — SIGNIFICANT CHANGE UP (ref 80–100)
MONOCYTES # BLD AUTO: 0.6 K/UL — SIGNIFICANT CHANGE UP (ref 0–0.9)
MONOCYTES # BLD AUTO: 0.6 K/UL — SIGNIFICANT CHANGE UP (ref 0–0.9)
MONOCYTES NFR BLD AUTO: 6.5 % — SIGNIFICANT CHANGE UP (ref 2–14)
MONOCYTES NFR BLD AUTO: 6.5 % — SIGNIFICANT CHANGE UP (ref 2–14)
NEUTROPHILS # BLD AUTO: 7.35 K/UL — SIGNIFICANT CHANGE UP (ref 1.8–7.4)
NEUTROPHILS # BLD AUTO: 7.35 K/UL — SIGNIFICANT CHANGE UP (ref 1.8–7.4)
NEUTROPHILS NFR BLD AUTO: 79.3 % — HIGH (ref 43–77)
NEUTROPHILS NFR BLD AUTO: 79.3 % — HIGH (ref 43–77)
NON HDL CHOLESTEROL: 46 MG/DL — SIGNIFICANT CHANGE UP
NON HDL CHOLESTEROL: 46 MG/DL — SIGNIFICANT CHANGE UP
NRBC # BLD: 0 /100 WBCS — SIGNIFICANT CHANGE UP (ref 0–0)
NRBC # BLD: 0 /100 WBCS — SIGNIFICANT CHANGE UP (ref 0–0)
PLATELET # BLD AUTO: 299 K/UL — SIGNIFICANT CHANGE UP (ref 150–400)
PLATELET # BLD AUTO: 299 K/UL — SIGNIFICANT CHANGE UP (ref 150–400)
POTASSIUM SERPL-MCNC: 3.9 MMOL/L — SIGNIFICANT CHANGE UP (ref 3.5–5.3)
POTASSIUM SERPL-MCNC: 3.9 MMOL/L — SIGNIFICANT CHANGE UP (ref 3.5–5.3)
POTASSIUM SERPL-SCNC: 3.9 MMOL/L — SIGNIFICANT CHANGE UP (ref 3.5–5.3)
POTASSIUM SERPL-SCNC: 3.9 MMOL/L — SIGNIFICANT CHANGE UP (ref 3.5–5.3)
PROT SERPL-MCNC: 7 GM/DL — SIGNIFICANT CHANGE UP (ref 6–8.3)
PROT SERPL-MCNC: 7 GM/DL — SIGNIFICANT CHANGE UP (ref 6–8.3)
PROTHROM AB SERPL-ACNC: 16.1 SEC — HIGH (ref 9.5–13)
PROTHROM AB SERPL-ACNC: 16.1 SEC — HIGH (ref 9.5–13)
RAPID RVP RESULT: SIGNIFICANT CHANGE UP
RAPID RVP RESULT: SIGNIFICANT CHANGE UP
RBC # BLD: 3.84 M/UL — SIGNIFICANT CHANGE UP (ref 3.8–5.2)
RBC # BLD: 3.84 M/UL — SIGNIFICANT CHANGE UP (ref 3.8–5.2)
RBC # FLD: 16 % — HIGH (ref 10.3–14.5)
RBC # FLD: 16 % — HIGH (ref 10.3–14.5)
SARS-COV-2 RNA SPEC QL NAA+PROBE: SIGNIFICANT CHANGE UP
SARS-COV-2 RNA SPEC QL NAA+PROBE: SIGNIFICANT CHANGE UP
SODIUM SERPL-SCNC: 137 MMOL/L — SIGNIFICANT CHANGE UP (ref 135–145)
SODIUM SERPL-SCNC: 137 MMOL/L — SIGNIFICANT CHANGE UP (ref 135–145)
TRIGL SERPL-MCNC: 59 MG/DL — SIGNIFICANT CHANGE UP
TRIGL SERPL-MCNC: 59 MG/DL — SIGNIFICANT CHANGE UP
TROPONIN I, HIGH SENSITIVITY RESULT: 4.2 NG/L — SIGNIFICANT CHANGE UP
TROPONIN I, HIGH SENSITIVITY RESULT: 4.2 NG/L — SIGNIFICANT CHANGE UP
WBC # BLD: 9.27 K/UL — SIGNIFICANT CHANGE UP (ref 3.8–10.5)
WBC # BLD: 9.27 K/UL — SIGNIFICANT CHANGE UP (ref 3.8–10.5)
WBC # FLD AUTO: 9.27 K/UL — SIGNIFICANT CHANGE UP (ref 3.8–10.5)
WBC # FLD AUTO: 9.27 K/UL — SIGNIFICANT CHANGE UP (ref 3.8–10.5)

## 2023-11-17 PROCEDURE — 99232 SBSQ HOSP IP/OBS MODERATE 35: CPT

## 2023-11-17 PROCEDURE — 70496 CT ANGIOGRAPHY HEAD: CPT | Mod: 26,MA

## 2023-11-17 PROCEDURE — 99223 1ST HOSP IP/OBS HIGH 75: CPT

## 2023-11-17 PROCEDURE — 99291 CRITICAL CARE FIRST HOUR: CPT

## 2023-11-17 PROCEDURE — 70551 MRI BRAIN STEM W/O DYE: CPT | Mod: 26

## 2023-11-17 PROCEDURE — 70450 CT HEAD/BRAIN W/O DYE: CPT | Mod: 26,MA,XU

## 2023-11-17 PROCEDURE — 93010 ELECTROCARDIOGRAM REPORT: CPT

## 2023-11-17 PROCEDURE — 0042T: CPT | Mod: MA

## 2023-11-17 PROCEDURE — 70498 CT ANGIOGRAPHY NECK: CPT | Mod: 26,MA

## 2023-11-17 RX ORDER — ATORVASTATIN CALCIUM 80 MG/1
80 TABLET, FILM COATED ORAL AT BEDTIME
Refills: 0 | Status: DISCONTINUED | OUTPATIENT
Start: 2023-11-17 | End: 2023-11-22

## 2023-11-17 RX ORDER — DEXTROSE 50 % IN WATER 50 %
12.5 SYRINGE (ML) INTRAVENOUS ONCE
Refills: 0 | Status: DISCONTINUED | OUTPATIENT
Start: 2023-11-17 | End: 2023-11-22

## 2023-11-17 RX ORDER — SODIUM CHLORIDE 9 MG/ML
1000 INJECTION, SOLUTION INTRAVENOUS
Refills: 0 | Status: DISCONTINUED | OUTPATIENT
Start: 2023-11-17 | End: 2023-11-22

## 2023-11-17 RX ORDER — ACETAMINOPHEN 500 MG
975 TABLET ORAL ONCE
Refills: 0 | Status: COMPLETED | OUTPATIENT
Start: 2023-11-17 | End: 2023-11-17

## 2023-11-17 RX ORDER — GABAPENTIN 400 MG/1
300 CAPSULE ORAL THREE TIMES A DAY
Refills: 0 | Status: DISCONTINUED | OUTPATIENT
Start: 2023-11-17 | End: 2023-11-22

## 2023-11-17 RX ORDER — AMITRIPTYLINE HCL 25 MG
150 TABLET ORAL AT BEDTIME
Refills: 0 | Status: DISCONTINUED | OUTPATIENT
Start: 2023-11-17 | End: 2023-11-22

## 2023-11-17 RX ORDER — DEXTROSE 50 % IN WATER 50 %
25 SYRINGE (ML) INTRAVENOUS ONCE
Refills: 0 | Status: DISCONTINUED | OUTPATIENT
Start: 2023-11-17 | End: 2023-11-22

## 2023-11-17 RX ORDER — DEXTROSE 50 % IN WATER 50 %
15 SYRINGE (ML) INTRAVENOUS ONCE
Refills: 0 | Status: DISCONTINUED | OUTPATIENT
Start: 2023-11-17 | End: 2023-11-22

## 2023-11-17 RX ORDER — GLUCAGON INJECTION, SOLUTION 0.5 MG/.1ML
1 INJECTION, SOLUTION SUBCUTANEOUS ONCE
Refills: 0 | Status: DISCONTINUED | OUTPATIENT
Start: 2023-11-17 | End: 2023-11-22

## 2023-11-17 RX ORDER — INFLUENZA VIRUS VACCINE 15; 15; 15; 15 UG/.5ML; UG/.5ML; UG/.5ML; UG/.5ML
0.5 SUSPENSION INTRAMUSCULAR ONCE
Refills: 0 | Status: COMPLETED | OUTPATIENT
Start: 2023-11-17 | End: 2023-11-17

## 2023-11-17 RX ORDER — RIVAROXABAN 15 MG-20MG
20 KIT ORAL
Refills: 0 | Status: DISCONTINUED | OUTPATIENT
Start: 2023-11-17 | End: 2023-11-22

## 2023-11-17 RX ORDER — FOLIC ACID 0.8 MG
1 TABLET ORAL DAILY
Refills: 0 | Status: DISCONTINUED | OUTPATIENT
Start: 2023-11-17 | End: 2023-11-22

## 2023-11-17 RX ORDER — INSULIN LISPRO 100/ML
VIAL (ML) SUBCUTANEOUS
Refills: 0 | Status: DISCONTINUED | OUTPATIENT
Start: 2023-11-17 | End: 2023-11-22

## 2023-11-17 RX ORDER — CARVEDILOL PHOSPHATE 80 MG/1
12.5 CAPSULE, EXTENDED RELEASE ORAL EVERY 12 HOURS
Refills: 0 | Status: DISCONTINUED | OUTPATIENT
Start: 2023-11-17 | End: 2023-11-22

## 2023-11-17 RX ORDER — DULOXETINE HYDROCHLORIDE 30 MG/1
60 CAPSULE, DELAYED RELEASE ORAL DAILY
Refills: 0 | Status: DISCONTINUED | OUTPATIENT
Start: 2023-11-17 | End: 2023-11-22

## 2023-11-17 RX ORDER — MONTELUKAST 4 MG/1
10 TABLET, CHEWABLE ORAL DAILY
Refills: 0 | Status: DISCONTINUED | OUTPATIENT
Start: 2023-11-17 | End: 2023-11-22

## 2023-11-17 RX ORDER — ASPIRIN/CALCIUM CARB/MAGNESIUM 324 MG
81 TABLET ORAL DAILY
Refills: 0 | Status: DISCONTINUED | OUTPATIENT
Start: 2023-11-17 | End: 2023-11-22

## 2023-11-17 RX ADMIN — MONTELUKAST 10 MILLIGRAM(S): 4 TABLET, CHEWABLE ORAL at 12:49

## 2023-11-17 RX ADMIN — DULOXETINE HYDROCHLORIDE 60 MILLIGRAM(S): 30 CAPSULE, DELAYED RELEASE ORAL at 12:49

## 2023-11-17 RX ADMIN — Medication 1 MILLIGRAM(S): at 12:49

## 2023-11-17 RX ADMIN — Medication 150 MILLIGRAM(S): at 21:30

## 2023-11-17 RX ADMIN — GABAPENTIN 300 MILLIGRAM(S): 400 CAPSULE ORAL at 13:25

## 2023-11-17 RX ADMIN — Medication 81 MILLIGRAM(S): at 12:49

## 2023-11-17 RX ADMIN — Medication 975 MILLIGRAM(S): at 05:47

## 2023-11-17 RX ADMIN — GABAPENTIN 300 MILLIGRAM(S): 400 CAPSULE ORAL at 21:30

## 2023-11-17 RX ADMIN — RIVAROXABAN 20 MILLIGRAM(S): KIT at 17:05

## 2023-11-17 RX ADMIN — CARVEDILOL PHOSPHATE 12.5 MILLIGRAM(S): 80 CAPSULE, EXTENDED RELEASE ORAL at 17:05

## 2023-11-17 RX ADMIN — ATORVASTATIN CALCIUM 80 MILLIGRAM(S): 80 TABLET, FILM COATED ORAL at 21:30

## 2023-11-17 NOTE — ED ADULT NURSE NOTE - NSFALLLASTSIX_ED_ALL_ED
abdominal pain abdominal pain abdominal pain abdominal pain abdominal pain abdominal pain abdominal pain No.

## 2023-11-17 NOTE — ED PROVIDER NOTE - INTERPRETATION
EKG performed in ED, NSR at 88, No acute ischemic changes, +non-specific TW changes, normal intervals

## 2023-11-17 NOTE — H&P ADULT - PROBLEM SELECTOR PLAN 1
hx of previous CVA with left side deficits  Left side weaker than baseline as per family though strength in 4/5 when I examined.   CT negative for new stroke  ASA, xarelto( previos med)  lipitor  MRI in AM   Neuro consult in AM
pt owns rolling walker and straight cane

## 2023-11-17 NOTE — ED ADULT NURSE NOTE - OBJECTIVE STATEMENT
10:30 fine went to sleep  after midnight, pt calling  she was sitting on the floor in the bed, as per pt she fell and hit her head   helped her to bathroom  after bathroom she felt very weak, couldn't walk much  0030  stutter worse weakness, confused    previous cva in august 2023  L side numbness, stutter, weakness    fall last week - head strike, L ankle pain sprain Pt A&Ox4 presents to ED for stroke eval. Of note, pt PMH CVA in May 2023 with residual L sided weakness/numbness and stuttering. As per  at bedside last known normal 2230 tonight;  woke up at 0130 and found pt on the floor. Of  reports that patient seems more weak and AMS. Upon presentation to ED, code stroke called and pt transported to CT stat. IV access placed, pt placed on cardiac monitor, EKG completed. Pt endorsing mild headache at this time. Pt denies cp, sob, n/v/d, cough, abdominal pain, dysuria, hematuria. VSS, nad noted. NKDA.

## 2023-11-17 NOTE — ED PROVIDER NOTE - CLINICAL SUMMARY MEDICAL DECISION MAKING FREE TEXT BOX
64 yo F with acute change in mental status, L sided weakness worse than before per 's history at bedside, difficulty word-finding, slurred speech, no reported trauma, concerning for stroke/bleed; code stroke called at triage  -cbc, cmp, lactate, lipid profile, finger stick, trop, CTA head/neck, CT brain, EKG, iv, monitor, sz precautions  -out of window for TPA  -f/u results, reeval

## 2023-11-17 NOTE — H&P ADULT - NSHPPHYSICALEXAM_GEN_ALL_CORE
VITALS:   T(C): 37.4 (11-17-23 @ 06:35), Max: 38.2 (11-17-23 @ 04:06)  HR: 85 (11-17-23 @ 06:35) (84 - 86)  BP: 138/86 (11-17-23 @ 06:35) (106/70 - 138/86)  RR: 20 (11-17-23 @ 06:35) (17 - 20)  SpO2: 96% (11-17-23 @ 06:35) (96% - 98%)    GENERAL: NAD, lying in bed comfortably  HEAD:  Atraumatic, Normocephalic  EYES: EOMI, PERRLA, conjunctiva and sclera clear  ENT: Moist mucous membranes  NECK: Supple, No JVD  CHEST/LUNG: Clear to auscultation bilaterally;  Unlabored respirations  HEART: Regular rate and rhythm; No murmurs, rubs, or gallops  ABDOMEN: BSx4; Soft, nontender, nondistended  EXTREMITIES:  2+ Peripheral Pulses, brisk capillary refill. No clubbing, cyanosis, or edema  NERVOUS SYSTEM:  A&Ox2, moving all extremities. Lest side 4/5 vs right 5/5 both upper and lower. Sensation intact. word finding difficulty and confusion noted when answering questions.   SKIN: No rashes or lesions

## 2023-11-17 NOTE — ED ADULT TRIAGE NOTE - CHIEF COMPLAINT QUOTE
BIBA, generalized weakness since 1:30am,  (pt had L-sided weakness s/p CVA 8/2023). pt sitting on rollator in living room.  per EMS,   SO states last known normal was approx 10:30pm 11/16, pt was sitting on floor, has slurred speech and thought process slower

## 2023-11-17 NOTE — H&P ADULT - ASSESSMENT
64 yo F with  PMH SLE, htn, dm, gbs, chronic left foot drop, stroke/tia here with acute onset AMS, acute on chronic L sided weakness, pt with mild symptoms in ED also noted fever in ED. admit for stroke workup.

## 2023-11-17 NOTE — ED PROVIDER NOTE - OBJECTIVE STATEMENT
62 yo F with acute onset AMS, acute on chronic L sided weakness, noted at  tonight by , last seen normal 10:30 pm today, found on floor at 1:30 am today.    ROS: negative for fever, cough, headache, chest pain, shortness of breath, abd pain, nausea, vomiting, diarrhea, rash--all other systems reviewed are negative.   PMH: SLE, htn, dm, gbs, chronic left foot drop, stroke/tia (07 May 2023); Meds: See EMR for list; SH: Denies smoking/drinking/drug use

## 2023-11-17 NOTE — PATIENT PROFILE ADULT - FUNCTIONAL ASSESSMENT - BASIC MOBILITY 6.
4-calculated by average/Not able to assess (calculate score using [facility] averaging method)

## 2023-11-17 NOTE — PROGRESS NOTE ADULT - PROBLEM SELECTOR PLAN 1
hx of previous CVA with left side deficits  Left side weaker than baseline as per family though strength in 4/5  CT negative for new stroke  ASA, Xarelto (previous med)  lipitor  MRI pending  Neuro consult placed

## 2023-11-17 NOTE — H&P ADULT - NSHPLABSRESULTS_GEN_ALL_CORE
=======================================================  Labs:                        10.4   9.27  )-----------( 299      ( 17 Nov 2023 04:47 )             32.5     11-17    137  |  103  |  15  ----------------------------<  128<H>  3.9   |  27  |  1.24    Ca    9.0      17 Nov 2023 04:47    TPro  7.0  /  Alb  3.0<L>  /  TBili  0.3  /  DBili  x   /  AST  24  /  ALT  32  /  AlkPhos  74  11-17      Creatinine: 1.24 mg/dL (11-17-23 @ 04:47)            WBC Count: 9.27 K/uL (11-17-23 @ 04:47)    SARS-CoV-2: NotDetec (11-17-23 @ 05:46)      Alkaline Phosphatase: 74 U/L (11-17-23 @ 04:47)  Alanine Aminotransferase (ALT/SGPT): 32 U/L (11-17-23 @ 04:47)  Aspartate Aminotransferase (AST/SGOT): 24 U/L (11-17-23 @ 04:47)  Bilirubin Total: 0.3 mg/dL (11-17-23 @ 04:47)    < from: CT Angio Neck Stroke Protocol w/ IV Cont (11.17.23 @ 04:41) >    IMPRESSION:    CT PERFUSION BRAIN:  Degraded by motion. Apparent delayed perfusion to both MCA territories   probably artifactual given the arterial systemon the CTA.    CTA HEAD:  No flow-limiting stenosis, occlusion or aneurysm.    CTA NECK:  No flow-limiting stenosis, occlusion or dissection.      < end of copied text >    < from: CT Brain Stroke Protocol (11.17.23 @ 04:35) >      BRAIN: No apparent acute infarct, hemorrhage or mass. No hydrocephalus.   Normal brain volume and density.    CALVARIUM: No skull fracture or concerning osseous lesions.    EXTRACRANIAL SOFT TISSUES: No acute findings.    VISUALIZED PORTIONS OF THE PARANASAL SINUSES AND MASTOID AIR CELLS: No   air fluid levels.    IMPRESSION:  No acute intracranial findings. Discussed with Dr.Ewy, [place] prior to   dictation.    < end of copied text >

## 2023-11-17 NOTE — ED PROVIDER NOTE - PHYSICAL EXAMINATION
Vitals: WNL  Gen: AAOx3, NAD, sitting comfortably in stretcher  Head: ncat, perrla, eomi b/l  Neck: supple, no lymphadenopathy, no midline deviation  Heart: rrr, no m/r/g  Lungs: CTA b/l, no rales/ronchi/wheezes  Abd: soft, nontender, non-distended, no rebound or guarding  Ext: no clubbing/cyanosis/edema  Neuro: L sided facial droop, slurred speech, difficulty finding words, fragmented speech all present now, (reportedly worse than baseline)

## 2023-11-17 NOTE — ED ADULT NURSE NOTE - ED STAT RN HANDOFF DETAILS
report given to fabiana johansen on ed hold. rn made aware of pt pmh and hpi. rn made aware of lab and imaging results, iv access, medications given, and POC. RN made aware NIHSS 5 at this time. RN made aware pt A&Ox4, nad noted, vss except pt febrile- tylenol administered. rn made aware of pending UA/culture.

## 2023-11-17 NOTE — PATIENT PROFILE ADULT - FALL HARM RISK - HARM RISK INTERVENTIONS

## 2023-11-17 NOTE — ED PROVIDER NOTE - PROGRESS NOTE DETAILS
imaging negative for acute stroke, rvp and ua/cx added for fever, no focal source, normal WBCs  will admit for further care/investigation   pt. stable/comfortable

## 2023-11-17 NOTE — H&P ADULT - NSICDXPASTMEDICALHX_GEN_ALL_CORE_FT
Dr. Chitra ELLINGTON had spoken with the patient's wife regarding the importance of being seen in clinic with possible cellulitis. The redness is on the left leg. Intermittently painful. He does not have a fever. It is very red and swollen. He is able to walk on it. Noticed it two days ago, does not seem like it is spreading. He was seen in the ED twice for cellulitis in the same area. The redness came back two days ago.   Can you review message below? Please advise.  Lyubov Holt RN     PAST MEDICAL HISTORY:  Asthma     Diabetes     Hypertension     Lupus     Peripheral polyneuropathy     Pulmonary emboli 7/2019

## 2023-11-17 NOTE — ED PROVIDER NOTE - CARE PLAN
1 Principal Discharge DX:	Weakness  Secondary Diagnosis:	AMS (altered mental status)   Principal Discharge DX:	Weakness  Secondary Diagnosis:	AMS (altered mental status)  Secondary Diagnosis:	Fever

## 2023-11-17 NOTE — H&P ADULT - HISTORY OF PRESENT ILLNESS
62 yo F with  PMH SLE, htn, dm, gbs, chronic left foot drop, stroke/tia here with acute onset AMS, acute on chronic L sided weakness, noted at  tonight by , last seen normal 10:30 pm today, found on floor at 1:30 am today.      Per  when he came to her room he found her on the floor. She was slurring her speech and very weak. he had to practically carry her to the chair. he also says he stuttering has worsened and she is confused.  Pt had a fever noted while in the ED but family denies any at home or other symptoms of infectious process.

## 2023-11-17 NOTE — PROGRESS NOTE ADULT - ASSESSMENT
64 yo F with  PMH SLE, htn, dm, gbs, chronic left foot drop, stroke/tia here with acute onset AMS, acute on chronic L sided weakness, pt with mild symptoms in ED also noted fever in ED. admit for stroke workup.     64 yo F with  PMH SLE, htn, dm, gbs, chronic left foot drop, stroke/tia here with acute onset AMS, acute on chronic L sided weakness, pt with mild symptoms in ED also noted fever in ED. admit for stroke workup.     Dispo- pending PT/OT evaluation

## 2023-11-18 LAB
A1C WITH ESTIMATED AVERAGE GLUCOSE RESULT: 6.1 % — HIGH (ref 4–5.6)
A1C WITH ESTIMATED AVERAGE GLUCOSE RESULT: 6.1 % — HIGH (ref 4–5.6)
ANION GAP SERPL CALC-SCNC: 6 MMOL/L — SIGNIFICANT CHANGE UP (ref 5–17)
ANION GAP SERPL CALC-SCNC: 6 MMOL/L — SIGNIFICANT CHANGE UP (ref 5–17)
APPEARANCE UR: CLEAR — SIGNIFICANT CHANGE UP
APPEARANCE UR: CLEAR — SIGNIFICANT CHANGE UP
BACTERIA # UR AUTO: ABNORMAL /HPF
BACTERIA # UR AUTO: ABNORMAL /HPF
BILIRUB UR-MCNC: NEGATIVE — SIGNIFICANT CHANGE UP
BILIRUB UR-MCNC: NEGATIVE — SIGNIFICANT CHANGE UP
BUN SERPL-MCNC: 14 MG/DL — SIGNIFICANT CHANGE UP (ref 7–23)
BUN SERPL-MCNC: 14 MG/DL — SIGNIFICANT CHANGE UP (ref 7–23)
CALCIUM SERPL-MCNC: 9.1 MG/DL — SIGNIFICANT CHANGE UP (ref 8.5–10.1)
CALCIUM SERPL-MCNC: 9.1 MG/DL — SIGNIFICANT CHANGE UP (ref 8.5–10.1)
CHLORIDE SERPL-SCNC: 104 MMOL/L — SIGNIFICANT CHANGE UP (ref 96–108)
CHLORIDE SERPL-SCNC: 104 MMOL/L — SIGNIFICANT CHANGE UP (ref 96–108)
CHOLEST SERPL-MCNC: 108 MG/DL — SIGNIFICANT CHANGE UP
CHOLEST SERPL-MCNC: 108 MG/DL — SIGNIFICANT CHANGE UP
CO2 SERPL-SCNC: 30 MMOL/L — SIGNIFICANT CHANGE UP (ref 22–31)
CO2 SERPL-SCNC: 30 MMOL/L — SIGNIFICANT CHANGE UP (ref 22–31)
COLOR SPEC: YELLOW — SIGNIFICANT CHANGE UP
COLOR SPEC: YELLOW — SIGNIFICANT CHANGE UP
CREAT SERPL-MCNC: 1.15 MG/DL — SIGNIFICANT CHANGE UP (ref 0.5–1.3)
CREAT SERPL-MCNC: 1.15 MG/DL — SIGNIFICANT CHANGE UP (ref 0.5–1.3)
DIFF PNL FLD: ABNORMAL
DIFF PNL FLD: ABNORMAL
EGFR: 54 ML/MIN/1.73M2 — LOW
EGFR: 54 ML/MIN/1.73M2 — LOW
EPI CELLS # UR: PRESENT
EPI CELLS # UR: PRESENT
ESTIMATED AVERAGE GLUCOSE: 128 MG/DL — HIGH (ref 68–114)
ESTIMATED AVERAGE GLUCOSE: 128 MG/DL — HIGH (ref 68–114)
GLUCOSE BLDC GLUCOMTR-MCNC: 116 MG/DL — HIGH (ref 70–99)
GLUCOSE BLDC GLUCOMTR-MCNC: 116 MG/DL — HIGH (ref 70–99)
GLUCOSE BLDC GLUCOMTR-MCNC: 118 MG/DL — HIGH (ref 70–99)
GLUCOSE BLDC GLUCOMTR-MCNC: 118 MG/DL — HIGH (ref 70–99)
GLUCOSE BLDC GLUCOMTR-MCNC: 132 MG/DL — HIGH (ref 70–99)
GLUCOSE BLDC GLUCOMTR-MCNC: 132 MG/DL — HIGH (ref 70–99)
GLUCOSE BLDC GLUCOMTR-MCNC: 140 MG/DL — HIGH (ref 70–99)
GLUCOSE BLDC GLUCOMTR-MCNC: 140 MG/DL — HIGH (ref 70–99)
GLUCOSE SERPL-MCNC: 118 MG/DL — HIGH (ref 70–99)
GLUCOSE SERPL-MCNC: 118 MG/DL — HIGH (ref 70–99)
GLUCOSE UR QL: NEGATIVE MG/DL — SIGNIFICANT CHANGE UP
GLUCOSE UR QL: NEGATIVE MG/DL — SIGNIFICANT CHANGE UP
HDLC SERPL-MCNC: 65 MG/DL — SIGNIFICANT CHANGE UP
HDLC SERPL-MCNC: 65 MG/DL — SIGNIFICANT CHANGE UP
KETONES UR-MCNC: NEGATIVE MG/DL — SIGNIFICANT CHANGE UP
KETONES UR-MCNC: NEGATIVE MG/DL — SIGNIFICANT CHANGE UP
LEUKOCYTE ESTERASE UR-ACNC: ABNORMAL
LEUKOCYTE ESTERASE UR-ACNC: ABNORMAL
LIPID PNL WITH DIRECT LDL SERPL: 29 MG/DL — SIGNIFICANT CHANGE UP
LIPID PNL WITH DIRECT LDL SERPL: 29 MG/DL — SIGNIFICANT CHANGE UP
NITRITE UR-MCNC: NEGATIVE — SIGNIFICANT CHANGE UP
NITRITE UR-MCNC: NEGATIVE — SIGNIFICANT CHANGE UP
NON HDL CHOLESTEROL: 43 MG/DL — SIGNIFICANT CHANGE UP
NON HDL CHOLESTEROL: 43 MG/DL — SIGNIFICANT CHANGE UP
PH UR: 6 — SIGNIFICANT CHANGE UP (ref 5–8)
PH UR: 6 — SIGNIFICANT CHANGE UP (ref 5–8)
POTASSIUM SERPL-MCNC: 3.8 MMOL/L — SIGNIFICANT CHANGE UP (ref 3.5–5.3)
POTASSIUM SERPL-MCNC: 3.8 MMOL/L — SIGNIFICANT CHANGE UP (ref 3.5–5.3)
POTASSIUM SERPL-SCNC: 3.8 MMOL/L — SIGNIFICANT CHANGE UP (ref 3.5–5.3)
POTASSIUM SERPL-SCNC: 3.8 MMOL/L — SIGNIFICANT CHANGE UP (ref 3.5–5.3)
PROT UR-MCNC: NEGATIVE MG/DL — SIGNIFICANT CHANGE UP
PROT UR-MCNC: NEGATIVE MG/DL — SIGNIFICANT CHANGE UP
RBC CASTS # UR COMP ASSIST: SIGNIFICANT CHANGE UP /HPF (ref 0–4)
RBC CASTS # UR COMP ASSIST: SIGNIFICANT CHANGE UP /HPF (ref 0–4)
SODIUM SERPL-SCNC: 140 MMOL/L — SIGNIFICANT CHANGE UP (ref 135–145)
SODIUM SERPL-SCNC: 140 MMOL/L — SIGNIFICANT CHANGE UP (ref 135–145)
SP GR SPEC: 1.01 — SIGNIFICANT CHANGE UP (ref 1–1.03)
SP GR SPEC: 1.01 — SIGNIFICANT CHANGE UP (ref 1–1.03)
TRIGL SERPL-MCNC: 68 MG/DL — SIGNIFICANT CHANGE UP
TRIGL SERPL-MCNC: 68 MG/DL — SIGNIFICANT CHANGE UP
UROBILINOGEN FLD QL: 0.2 MG/DL — SIGNIFICANT CHANGE UP (ref 0.2–1)
UROBILINOGEN FLD QL: 0.2 MG/DL — SIGNIFICANT CHANGE UP (ref 0.2–1)
WBC UR QL: SIGNIFICANT CHANGE UP /HPF (ref 0–5)
WBC UR QL: SIGNIFICANT CHANGE UP /HPF (ref 0–5)

## 2023-11-18 PROCEDURE — 99232 SBSQ HOSP IP/OBS MODERATE 35: CPT

## 2023-11-18 RX ADMIN — RIVAROXABAN 20 MILLIGRAM(S): KIT at 17:17

## 2023-11-18 RX ADMIN — Medication 81 MILLIGRAM(S): at 11:48

## 2023-11-18 RX ADMIN — CARVEDILOL PHOSPHATE 12.5 MILLIGRAM(S): 80 CAPSULE, EXTENDED RELEASE ORAL at 17:17

## 2023-11-18 RX ADMIN — GABAPENTIN 300 MILLIGRAM(S): 400 CAPSULE ORAL at 22:17

## 2023-11-18 RX ADMIN — MONTELUKAST 10 MILLIGRAM(S): 4 TABLET, CHEWABLE ORAL at 11:48

## 2023-11-18 RX ADMIN — ATORVASTATIN CALCIUM 80 MILLIGRAM(S): 80 TABLET, FILM COATED ORAL at 22:17

## 2023-11-18 RX ADMIN — GABAPENTIN 300 MILLIGRAM(S): 400 CAPSULE ORAL at 13:54

## 2023-11-18 RX ADMIN — DULOXETINE HYDROCHLORIDE 60 MILLIGRAM(S): 30 CAPSULE, DELAYED RELEASE ORAL at 11:49

## 2023-11-18 RX ADMIN — Medication 1 MILLIGRAM(S): at 11:49

## 2023-11-18 RX ADMIN — Medication 150 MILLIGRAM(S): at 22:16

## 2023-11-18 RX ADMIN — GABAPENTIN 300 MILLIGRAM(S): 400 CAPSULE ORAL at 06:26

## 2023-11-18 NOTE — PHYSICAL THERAPY INITIAL EVALUATION ADULT - PERTINENT HX OF CURRENT PROBLEM, REHAB EVAL
per EMR: 64 yo F with  PMH SLE, htn, dm, gbs, chronic left foot drop, stroke/tia here with acute onset AMS, acute on chronic L sided weakness, noted at  tonight by , last seen normal 10:30 pm today, found on floor at 1:30 am today.      Per  when he came to her room he found her on the floor. She was slurring her speech and very weak. he had to practically carry her to the chair. he also says he stuttering has worsened and she is confused.  Pt had a fever noted while in the ED but family denies any at home or other symptoms of infectious process.

## 2023-11-18 NOTE — OCCUPATIONAL THERAPY INITIAL EVALUATION ADULT - COORDINATION ASSESSED, REHAB EVAL
LUE finger to nose grossly impaired; RUE finger to nose grossly intact; BLE heel to shin grossly intact.

## 2023-11-18 NOTE — CONSULT NOTE ADULT - SUBJECTIVE AND OBJECTIVE BOX
Neurology consult    ROSSI [id number]     Patient is a 63y old  Female who presents with a chief complaint of AMS , Stroke workup (2023 15:22)      HPI:   62 yo F with  PMH SLE, htn, dm, gbs, chronic left foot drop, stroke/tia here with acute onset AMS, acute on chronic L sided weakness, noted at  tonight by , last seen normal 10:30 pm today, found on floor at 1:30 am today.      Per  when he came to her room he found her on the floor. She was slurring her speech and very weak. he had to practically carry her to the chair. he also says he stuttering has worsened and she is confused.  Pt had a fever noted while in the ED but family denies any at home or other symptoms of infectious process.  (2023 07:34)      MEDICATIONS    amitriptyline 150 milliGRAM(s) Oral at bedtime  aspirin enteric coated 81 milliGRAM(s) Oral daily  atorvastatin 80 milliGRAM(s) Oral at bedtime  carvedilol 12.5 milliGRAM(s) Oral every 12 hours  dextrose 5%. 1000 milliLiter(s) IV Continuous <Continuous>  dextrose 5%. 1000 milliLiter(s) IV Continuous <Continuous>  dextrose 50% Injectable 25 Gram(s) IV Push once  dextrose 50% Injectable 12.5 Gram(s) IV Push once  dextrose 50% Injectable 25 Gram(s) IV Push once  dextrose Oral Gel 15 Gram(s) Oral once PRN  DULoxetine 60 milliGRAM(s) Oral daily  folic acid 1 milliGRAM(s) Oral daily  gabapentin 300 milliGRAM(s) Oral three times a day  glucagon  Injectable 1 milliGRAM(s) IntraMuscular once  influenza   Vaccine 0.5 milliLiter(s) IntraMuscular once  insulin lispro (ADMELOG) corrective regimen sliding scale   SubCutaneous three times a day before meals  montelukast 10 milliGRAM(s) Oral daily  rivaroxaban 20 milliGRAM(s) Oral with dinner      PMH: Lupus    DM (diabetes mellitus)    Diabetes    Hypertension    Asthma    Peripheral polyneuropathy    Pulmonary emboli         PSH: No significant past surgical history    History of hip replacement    H/O total hysterectomy    History of bilateral tubal ligation    S/P laminectomy with spinal fusion    History of shoulder surgery    H/O knee surgery        Family history: No history of dementia, strokes, or seizures   FAMILY HISTORY:      SOCIAL HISTORY:  No history of tobacco or alcohol use     Allergies    No Known Allergies    Intolerances            Vital Signs Last 24 Hrs  T(C): 36.8 (2023 16:44), Max: 37.5 (2023 04:59)  T(F): 98.3 (2023 16:44), Max: 99.5 (2023 04:59)  HR: 86 (2023 16:44) (78 - 86)  BP: 153/91 (2023 16:44) (103/69 - 153/91)  BP(mean): --  RR: 18 (2023 16:44) (18 - 18)  SpO2: 96% (2023 16:44) (96% - 98%)    Parameters below as of 2023 16:44  Patient On (Oxygen Delivery Method): room air          On Neurological Examination:    Mental Status - Patient is alert, awake, oriented X3. fluent, names, no dysarthria no aphasia Follows commands well and able to answer questions appropriately. Mood and affect  normal    Cranial Nerves - PERRL, EOMI, VFF, V1-V3 intact, no gross facial asymmetry, tongue/uvula midline    Motor Exam -   Right upper 5/5  Left upper porr effort 4/5  Right lower 5/5  Left lower poor effort 4/5     nml bulk/tone    Sensory    Intact to light touch and pinprick bilaterally    Coord: FTN intact bilaterally     Reflexes:          1+ throughout                                             LABS:  CBC Full  -  ( 2023 04:47 )  WBC Count : 9.27 K/uL  RBC Count : 3.84 M/uL  Hemoglobin : 10.4 g/dL  Hematocrit : 32.5 %  Platelet Count - Automated : 299 K/uL  Mean Cell Volume : 84.6 fl  Mean Cell Hemoglobin : 27.1 pg  Mean Cell Hemoglobin Concentration : 32.0 g/dL  Auto Neutrophil # : 7.35 K/uL  Auto Lymphocyte # : 1.25 K/uL  Auto Monocyte # : 0.60 K/uL  Auto Eosinophil # : 0.00 K/uL  Auto Basophil # : 0.01 K/uL  Auto Neutrophil % : 79.3 %  Auto Lymphocyte % : 13.5 %  Auto Monocyte % : 6.5 %  Auto Eosinophil % : 0.0 %  Auto Basophil % : 0.1 %    Urinalysis Basic - ( 2023 06:30 )    Color: Yellow / Appearance: Clear / S.011 / pH: x  Gluc: x / Ketone: Negative mg/dL  / Bili: Negative / Urobili: 0.2 mg/dL   Blood: x / Protein: Negative mg/dL / Nitrite: Negative   Leuk Esterase: Trace / RBC: 0-2 /HPF / WBC 0-2 /HPF   Sq Epi: x / Non Sq Epi: x / Bacteria: Many /HPF          140  |  104  |  14  ----------------------------<  118<H>  3.8   |  30  |  1.15    Ca    9.1      2023 06:20    TPro  7.0  /  Alb  3.0<L>  /  TBili  0.3  /  DBili  x   /  AST  24  /  ALT  32  /  AlkPhos  74      LIVER FUNCTIONS - ( 2023 04:47 )  Alb: 3.0 g/dL / Pro: 7.0 gm/dL / ALK PHOS: 74 U/L / ALT: 32 U/L / AST: 24 U/L / GGT: x           Hemoglobin A1C:   Lipid Panel  @ 06:20  Total Cholesterol, Serum 108  LDL --  Triglycerides 68      PT/INR - ( 2023 04:47 )   PT: 16.1 sec;   INR: 1.37 ratio         PTT - ( 2023 04:47 )  PTT:34.3 sec      RADIOLOGY  < from: MR Head No Cont (23 @ 16:48) >    1)  No diffusion restriction or MR evidence of acute ischemia. Scattered   white matter lesions noted in both hemispheres, many of which are   superficial and/or subcortical. See above. Within the differential as the   possibility of inflammatory etiologies and/or Lyme's disease that. No   hemorrhagic lesion or mass identified.  2)  clear sinuses and mastoids..    --- End of Report ---    < end of copied text >  < from: CT Angio Neck Stroke Protocol w/ IV Cont (23 @ 04:41) >    CT PERFUSION BRAIN:  Degraded by motion. Apparent delayed perfusion to both MCA territories   probably artifactual given the arterial systemon the CTA.    CTA HEAD:  No flow-limiting stenosis, occlusion or aneurysm.    CTA NECK:  No flow-limiting stenosis, occlusion or dissection.    < end of copied text >

## 2023-11-18 NOTE — PHYSICAL THERAPY INITIAL EVALUATION ADULT - FOLLOWS COMMANDS/ANSWERS QUESTIONS, REHAB EVAL
verbal cueing and manual guidance/100% of the time/able to follow multistep instructions/able to follow single-step instructions

## 2023-11-18 NOTE — PHYSICAL THERAPY INITIAL EVALUATION ADULT - GENERAL OBSERVATIONS, REHAB EVAL
pt encountered semi-reclined in bed, alert and oriented x4, cardiac monitor in place, NAD. MRI (-) for acute changes.

## 2023-11-18 NOTE — PROGRESS NOTE ADULT - PROBLEM SELECTOR PLAN 1
hx of previous CVA with left side deficits  Left side weaker than baseline as per family though strength in 4/5  CT negative for new stroke  ASA, Xarelto (previous med)  lipitor  MRI-H reviewed  Neuro recs appreciated

## 2023-11-18 NOTE — PHYSICAL THERAPY INITIAL EVALUATION ADULT - ADDITIONAL COMMENTS
pt reporting that she lives in a private house with 1 threshold step to enter and a chair lift to the 2nd floor. Bathroom is a tub shower with regular toilet height, no grab bars. Pt uses a rollator when out, a cane prn at home. Pt uses reading glasses, R handed, does not use orthotics. Pt has a fall history, reduction in mobility recently - ambulating mostly household distances.

## 2023-11-18 NOTE — OCCUPATIONAL THERAPY INITIAL EVALUATION ADULT - IMPAIRED TRANSFERS: SIT/STAND, REHAB EVAL
ataxic/impaired balance/impaired coordination/impaired motor control/pain/impaired postural control/impaired sensory feedback/decreased strength

## 2023-11-18 NOTE — CONSULT NOTE ADULT - ASSESSMENT
62 yo F with  PMH SLE, htn, dm, gbs, chronic left foot drop, stroke/tia here with acute onset AMS, resolved   PE left sided weakness, fluctuating exam, seems to be elaborating  MRI brain no acute finding, compared to 5/23, does not appear substantively different    Impression left sided weakness    MRI brain reviewed  Reviewed MRI Brain and C-spine from 5/23  reviwed TTE from 5/23  Can get EEG here or as an outpt  Can follow up with Neurology, Dr. Domenico Salvador at [phone]

## 2023-11-18 NOTE — OCCUPATIONAL THERAPY INITIAL EVALUATION ADULT - ADDITIONAL COMMENTS
Pt reports that she lives in a private house with 1 threshold step to enter and a chair lift to the 2nd floor. Bathroom is a tub shower with regular toilet height, no grab bars. Pt uses a rollator when out, a cane prn at home. Pt uses reading glasses, R handed, does not use orthotics. Pt has a fall history, reduction in mobility recently - ambulating mostly household distances.

## 2023-11-18 NOTE — PROGRESS NOTE ADULT - ASSESSMENT
64 yo F with  PMH SLE, htn, dm, gbs, chronic left foot drop, stroke/tia here with acute onset AMS, acute on chronic L sided weakness, pt with mild symptoms in ED also noted fever in ED. admit for stroke workup.     Dispo- pending PT/OT evaluation

## 2023-11-18 NOTE — OCCUPATIONAL THERAPY INITIAL EVALUATION ADULT - GENERAL OBSERVATIONS, REHAB EVAL
Pt was encountered supine in bed; NAD, portable telemetry +, alert, verbalized name and , slurred speech, word finding difficulties, followed commands, cooperative; pt c/o pain in L thigh which impacts pts ability to perform functional tasks/transfers and mobility. PT Saray present.

## 2023-11-18 NOTE — OCCUPATIONAL THERAPY INITIAL EVALUATION ADULT - TRANSFER TRAINING, PT EVAL
Patient will be able to perform functional transfers, using least restrictive device, independently within 4 weeks. Soolantra Counseling: I discussed with the patients the risks of topial Soolantra. This is a medicine which decreases the number of mites and inflammation in the skin. You experience burning, stinging, eye irritation or allergic reactions.  Please call our office if you develop any problems from using this medication.

## 2023-11-18 NOTE — OCCUPATIONAL THERAPY INITIAL EVALUATION ADULT - PERTINENT HX OF CURRENT PROBLEM, REHAB EVAL
As per H&P, 64 yo F with  PMH SLE, htn, dm, gbs, chronic left foot drop, stroke/tia here with acute onset AMS, acute on chronic L sided weakness, noted at  tonight by , last seen normal 10:30 pm today, found on floor at 1:30 am today.      Per  when he came to her room he found her on the floor. She was slurring her speech and very weak. he had to practically carry her to the chair. he also says he stuttering has worsened and she is confused.  Pt had a fever noted while in the ED but family denies any at home or other symptoms of infectious process.

## 2023-11-19 LAB
GLUCOSE BLDC GLUCOMTR-MCNC: 107 MG/DL — HIGH (ref 70–99)
GLUCOSE BLDC GLUCOMTR-MCNC: 107 MG/DL — HIGH (ref 70–99)
GLUCOSE BLDC GLUCOMTR-MCNC: 121 MG/DL — HIGH (ref 70–99)
GLUCOSE BLDC GLUCOMTR-MCNC: 121 MG/DL — HIGH (ref 70–99)
GLUCOSE BLDC GLUCOMTR-MCNC: 138 MG/DL — HIGH (ref 70–99)
GLUCOSE BLDC GLUCOMTR-MCNC: 138 MG/DL — HIGH (ref 70–99)
GLUCOSE BLDC GLUCOMTR-MCNC: 161 MG/DL — HIGH (ref 70–99)
GLUCOSE BLDC GLUCOMTR-MCNC: 161 MG/DL — HIGH (ref 70–99)

## 2023-11-19 PROCEDURE — 99232 SBSQ HOSP IP/OBS MODERATE 35: CPT

## 2023-11-19 RX ADMIN — DULOXETINE HYDROCHLORIDE 60 MILLIGRAM(S): 30 CAPSULE, DELAYED RELEASE ORAL at 12:32

## 2023-11-19 RX ADMIN — GABAPENTIN 300 MILLIGRAM(S): 400 CAPSULE ORAL at 13:42

## 2023-11-19 RX ADMIN — Medication 1 MILLIGRAM(S): at 12:32

## 2023-11-19 RX ADMIN — Medication 150 MILLIGRAM(S): at 22:11

## 2023-11-19 RX ADMIN — CARVEDILOL PHOSPHATE 12.5 MILLIGRAM(S): 80 CAPSULE, EXTENDED RELEASE ORAL at 17:13

## 2023-11-19 RX ADMIN — ATORVASTATIN CALCIUM 80 MILLIGRAM(S): 80 TABLET, FILM COATED ORAL at 22:11

## 2023-11-19 RX ADMIN — MONTELUKAST 10 MILLIGRAM(S): 4 TABLET, CHEWABLE ORAL at 12:32

## 2023-11-19 RX ADMIN — GABAPENTIN 300 MILLIGRAM(S): 400 CAPSULE ORAL at 22:11

## 2023-11-19 RX ADMIN — GABAPENTIN 300 MILLIGRAM(S): 400 CAPSULE ORAL at 06:09

## 2023-11-19 RX ADMIN — RIVAROXABAN 20 MILLIGRAM(S): KIT at 17:13

## 2023-11-19 RX ADMIN — Medication 81 MILLIGRAM(S): at 12:32

## 2023-11-19 NOTE — PROGRESS NOTE ADULT - PROBLEM SELECTOR PLAN 1
hx of previous CVA with left side deficits  Left side weaker than baseline as per family though strength in 4/5  CT negative for new stroke  ASA, Xarelto (previous med)  lipitor  MRI-H reviewed  Neuro recs appreciated hx of previous CVA with left side deficits  Left side weaker than baseline as per family though strength in 4/5  CT negative for new stroke  ASA, Xarelto (previous med)  lipitor  MRI-H reviewed  Neuro recs appreciated- will need outpatient EEG

## 2023-11-19 NOTE — PROGRESS NOTE ADULT - ASSESSMENT
62 yo F with  PMH SLE, htn, dm, gbs, chronic left foot drop, stroke/tia here with acute onset AMS, acute on chronic L sided weakness, pt with mild symptoms in ED also noted fever in ED. admit for stroke workup.     Dispo- pending acute rehab

## 2023-11-19 NOTE — PROGRESS NOTE ADULT - ASSESSMENT
64 yo F with  PMH SLE, htn, dm, gbs, chronic left foot drop, stroke/tia here with acute onset AMS, resolved   PE left sided weakness, fluctuating exam, seems to be elaborating  MRI brain no acute finding, compared to 5/23, does not appear substantively different    Impression left sided weakness no clear cause with fluctuating exam    MRI brain reviewed  Reviewed MRI Brain and C-spine from 5/23  reviewed TTE from 5/23  Can get EEG here or as an outpt  Can follow up with Neurology, Dr. Domenico Salvador at [phone]  physical therapy

## 2023-11-20 LAB
CULTURE RESULTS: SIGNIFICANT CHANGE UP
CULTURE RESULTS: SIGNIFICANT CHANGE UP
GLUCOSE BLDC GLUCOMTR-MCNC: 108 MG/DL — HIGH (ref 70–99)
GLUCOSE BLDC GLUCOMTR-MCNC: 108 MG/DL — HIGH (ref 70–99)
GLUCOSE BLDC GLUCOMTR-MCNC: 114 MG/DL — HIGH (ref 70–99)
GLUCOSE BLDC GLUCOMTR-MCNC: 114 MG/DL — HIGH (ref 70–99)
GLUCOSE BLDC GLUCOMTR-MCNC: 118 MG/DL — HIGH (ref 70–99)
GLUCOSE BLDC GLUCOMTR-MCNC: 118 MG/DL — HIGH (ref 70–99)
GLUCOSE BLDC GLUCOMTR-MCNC: 128 MG/DL — HIGH (ref 70–99)
GLUCOSE BLDC GLUCOMTR-MCNC: 128 MG/DL — HIGH (ref 70–99)
SPECIMEN SOURCE: SIGNIFICANT CHANGE UP
SPECIMEN SOURCE: SIGNIFICANT CHANGE UP

## 2023-11-20 PROCEDURE — 99232 SBSQ HOSP IP/OBS MODERATE 35: CPT

## 2023-11-20 RX ADMIN — GABAPENTIN 300 MILLIGRAM(S): 400 CAPSULE ORAL at 05:50

## 2023-11-20 RX ADMIN — ATORVASTATIN CALCIUM 80 MILLIGRAM(S): 80 TABLET, FILM COATED ORAL at 22:07

## 2023-11-20 RX ADMIN — CARVEDILOL PHOSPHATE 12.5 MILLIGRAM(S): 80 CAPSULE, EXTENDED RELEASE ORAL at 18:02

## 2023-11-20 RX ADMIN — GABAPENTIN 300 MILLIGRAM(S): 400 CAPSULE ORAL at 22:06

## 2023-11-20 RX ADMIN — RIVAROXABAN 20 MILLIGRAM(S): KIT at 18:02

## 2023-11-20 RX ADMIN — CARVEDILOL PHOSPHATE 12.5 MILLIGRAM(S): 80 CAPSULE, EXTENDED RELEASE ORAL at 05:50

## 2023-11-20 RX ADMIN — DULOXETINE HYDROCHLORIDE 60 MILLIGRAM(S): 30 CAPSULE, DELAYED RELEASE ORAL at 11:04

## 2023-11-20 RX ADMIN — Medication 1 MILLIGRAM(S): at 11:03

## 2023-11-20 RX ADMIN — Medication 81 MILLIGRAM(S): at 11:04

## 2023-11-20 RX ADMIN — GABAPENTIN 300 MILLIGRAM(S): 400 CAPSULE ORAL at 15:00

## 2023-11-20 RX ADMIN — MONTELUKAST 10 MILLIGRAM(S): 4 TABLET, CHEWABLE ORAL at 11:04

## 2023-11-20 RX ADMIN — Medication 150 MILLIGRAM(S): at 22:03

## 2023-11-20 NOTE — SWALLOW BEDSIDE ASSESSMENT ADULT - SWALLOW EVAL: PATIENT/FAMILY GOALS STATEMENT
Pt c/o right neck pain, occasional food "going down the wrong way", ongoing hx of slurred speech and occasional word finding difficulty which her  helps her with.

## 2023-11-20 NOTE — SWALLOW BEDSIDE ASSESSMENT ADULT - COMMENTS
MRI HEAD (11/17/23): 1)  No diffusion restriction or MR evidence of acute ischemia. Scattered white matter lesions noted in both hemispheres, many of which are superficial and/or subcortical. See above. Within the differential as the possibility of inflammatory etiologies and/or Lyme's disease that. No hemorrhagic lesion or mass identified.2)  clear sinuses and mastoids..

## 2023-11-20 NOTE — SWALLOW BEDSIDE ASSESSMENT ADULT - H & P REVIEW
"62 yo F with  PMH SLE, htn, dm, gbs, chronic left foot drop, stroke/tia here with acute onset AMS, acute on chronic L sided weakness, noted at  tonight by , slurring speech and stuttering."/yes

## 2023-11-20 NOTE — SWALLOW BEDSIDE ASSESSMENT ADULT - SLP PERTINENT HISTORY OF CURRENT PROBLEM
Pt presented with acute onset AMS and slurred speech; MRI brain no acute finding, compared to 5/23, does not appear substantively different.

## 2023-11-20 NOTE — PROGRESS NOTE ADULT - ASSESSMENT
62 yo F with  PMH SLE, htn, dm, gbs, chronic left foot drop, stroke/tia here with acute onset AMS, acute on chronic L sided weakness, pt with mild symptoms in ED also noted fever in ED. admit for stroke workup.     Dispo- pending acute rehab  64 yo F with  PMH SLE, htn, dm, gbs, chronic left foot drop, stroke/tia here with acute onset AMS, acute on chronic L sided weakness, pt with mild symptoms in ED also noted fever in ED. admit for stroke workup.     Dispo- pending acute rehab  Patient with clinical CVA and will benefit from acute rehab.

## 2023-11-20 NOTE — SWALLOW BEDSIDE ASSESSMENT ADULT - SLP GENERAL OBSERVATIONS
Pt approached sleeping in bed; easily aroused, HOB elevated with NAD. At baseline, noted with mild dysarthria marked by +slurred speech and fair intelligibility. Pt able to follow directions and effectively express wants/needs.

## 2023-11-20 NOTE — SWALLOW BEDSIDE ASSESSMENT ADULT - SWALLOW EVAL: DIAGNOSIS
Pt presented with overtly adequate oropharyngeal skills for puree, regular solid and thin liquid with no overt signs of suspected aspiration.

## 2023-11-21 LAB
GLUCOSE BLDC GLUCOMTR-MCNC: 121 MG/DL — HIGH (ref 70–99)
GLUCOSE BLDC GLUCOMTR-MCNC: 121 MG/DL — HIGH (ref 70–99)
GLUCOSE BLDC GLUCOMTR-MCNC: 141 MG/DL — HIGH (ref 70–99)
GLUCOSE BLDC GLUCOMTR-MCNC: 141 MG/DL — HIGH (ref 70–99)
GLUCOSE BLDC GLUCOMTR-MCNC: 243 MG/DL — HIGH (ref 70–99)
GLUCOSE BLDC GLUCOMTR-MCNC: 243 MG/DL — HIGH (ref 70–99)
GLUCOSE BLDC GLUCOMTR-MCNC: 96 MG/DL — SIGNIFICANT CHANGE UP (ref 70–99)
GLUCOSE BLDC GLUCOMTR-MCNC: 96 MG/DL — SIGNIFICANT CHANGE UP (ref 70–99)
GLUCOSE BLDC GLUCOMTR-MCNC: >600 MG/DL — CRITICAL HIGH (ref 70–99)
GLUCOSE BLDC GLUCOMTR-MCNC: >600 MG/DL — CRITICAL HIGH (ref 70–99)

## 2023-11-21 PROCEDURE — 99232 SBSQ HOSP IP/OBS MODERATE 35: CPT

## 2023-11-21 RX ORDER — ACETAMINOPHEN 500 MG
650 TABLET ORAL EVERY 6 HOURS
Refills: 0 | Status: DISCONTINUED | OUTPATIENT
Start: 2023-11-21 | End: 2023-11-22

## 2023-11-21 RX ADMIN — CARVEDILOL PHOSPHATE 12.5 MILLIGRAM(S): 80 CAPSULE, EXTENDED RELEASE ORAL at 17:14

## 2023-11-21 RX ADMIN — DULOXETINE HYDROCHLORIDE 60 MILLIGRAM(S): 30 CAPSULE, DELAYED RELEASE ORAL at 11:24

## 2023-11-21 RX ADMIN — Medication 1 MILLIGRAM(S): at 11:24

## 2023-11-21 RX ADMIN — ATORVASTATIN CALCIUM 80 MILLIGRAM(S): 80 TABLET, FILM COATED ORAL at 22:22

## 2023-11-21 RX ADMIN — GABAPENTIN 300 MILLIGRAM(S): 400 CAPSULE ORAL at 22:22

## 2023-11-21 RX ADMIN — GABAPENTIN 300 MILLIGRAM(S): 400 CAPSULE ORAL at 11:24

## 2023-11-21 RX ADMIN — GABAPENTIN 300 MILLIGRAM(S): 400 CAPSULE ORAL at 06:01

## 2023-11-21 RX ADMIN — Medication 150 MILLIGRAM(S): at 22:22

## 2023-11-21 RX ADMIN — Medication 650 MILLIGRAM(S): at 23:26

## 2023-11-21 RX ADMIN — MONTELUKAST 10 MILLIGRAM(S): 4 TABLET, CHEWABLE ORAL at 11:24

## 2023-11-21 RX ADMIN — RIVAROXABAN 20 MILLIGRAM(S): KIT at 17:13

## 2023-11-21 RX ADMIN — Medication 650 MILLIGRAM(S): at 22:49

## 2023-11-21 RX ADMIN — Medication 81 MILLIGRAM(S): at 11:24

## 2023-11-21 RX ADMIN — Medication 2: at 11:58

## 2023-11-21 NOTE — PROGRESS NOTE ADULT - PROBLEM SELECTOR PLAN 4
pt on nebivolol at home   switch for coreg with parameters

## 2023-11-21 NOTE — PROGRESS NOTE ADULT - ASSESSMENT
64 yo F with  PMH SLE, htn, dm, gbs, chronic left foot drop, stroke/tia here with acute onset AMS, acute on chronic L sided weakness, pt with mild symptoms in ED also noted fever in ED. admit for stroke workup.     Dispo- pending acute rehab  Patient with clinical CVA and will benefit from acute rehab.

## 2023-11-21 NOTE — PROGRESS NOTE ADULT - ASSESSMENT
62 yo F with  PMH SLE, htn, dm, gbs, chronic left foot drop, stroke/tia here with acute onset AMS, resolved   PE left sided weakness, fluctuating exam, seems to be elaborating  MRI brain no acute finding, compared to 5/23, does not appear substantively different    Impression left sided weakness no clear cause with fluctuating exam    MRI brain reviewed  Reviewed MRI Brain and C-spine from 5/23  reviewed TTE from 5/23  Can get EEG here or as an outpt  Can follow up with Neurology, Dr. Domenico Salvador at [phone]  physical therapy    62 yo F with  PMH SLE, htn, dm, gbs, chronic left foot drop, stroke/tia here with acute onset AMS, resolved   PE left sided weakness, fluctuating exam, seems to be elaborating  MRI brain no acute finding, compared to 5/23, does not appear substantively different    Impression left sided weakness no clear cause with fluctuating exam. possibly functional. Would treat as possible MRI negative clinical stroke     MRI brain reviewed  On Aspirin statin  Reviewed MRI Brain and C-spine from 5/23  reviewed TTE from 5/23  Can get EEG here or as an outpt  Can follow up with Neurology, Dr. Domenico Salvador at [phone]  Acute rehab for clinical stroke

## 2023-11-21 NOTE — PROGRESS NOTE ADULT - PROBLEM SELECTOR PLAN 1
hx of previous CVA with left side deficits  Left side weaker than baseline as per family though strength in 4/5  CT negative for new stroke  ASA, Xarelto, lipitor  MRI-H reviewed  Neuro recs appreciated- will need outpatient EEG    - Patient with clinical CVA and will benefit from acute rehab.   - F/u S/S evaluation

## 2023-11-22 ENCOUNTER — TRANSCRIPTION ENCOUNTER (OUTPATIENT)
Age: 63
End: 2023-11-22

## 2023-11-22 VITALS
OXYGEN SATURATION: 96 % | HEART RATE: 91 BPM | SYSTOLIC BLOOD PRESSURE: 148 MMHG | DIASTOLIC BLOOD PRESSURE: 89 MMHG | TEMPERATURE: 98 F | RESPIRATION RATE: 18 BRPM

## 2023-11-22 LAB
FLUAV AG NPH QL: SIGNIFICANT CHANGE UP
FLUAV AG NPH QL: SIGNIFICANT CHANGE UP
FLUBV AG NPH QL: SIGNIFICANT CHANGE UP
FLUBV AG NPH QL: SIGNIFICANT CHANGE UP
GLUCOSE BLDC GLUCOMTR-MCNC: 103 MG/DL — HIGH (ref 70–99)
GLUCOSE BLDC GLUCOMTR-MCNC: 103 MG/DL — HIGH (ref 70–99)
GLUCOSE BLDC GLUCOMTR-MCNC: 131 MG/DL — HIGH (ref 70–99)
GLUCOSE BLDC GLUCOMTR-MCNC: 131 MG/DL — HIGH (ref 70–99)
GLUCOSE BLDC GLUCOMTR-MCNC: 155 MG/DL — HIGH (ref 70–99)
GLUCOSE BLDC GLUCOMTR-MCNC: 155 MG/DL — HIGH (ref 70–99)
SARS-COV-2 RNA SPEC QL NAA+PROBE: SIGNIFICANT CHANGE UP
SARS-COV-2 RNA SPEC QL NAA+PROBE: SIGNIFICANT CHANGE UP

## 2023-11-22 PROCEDURE — 99232 SBSQ HOSP IP/OBS MODERATE 35: CPT

## 2023-11-22 RX ORDER — KETOROLAC TROMETHAMINE 30 MG/ML
15 SYRINGE (ML) INJECTION ONCE
Refills: 0 | Status: DISCONTINUED | OUTPATIENT
Start: 2023-11-22 | End: 2023-11-22

## 2023-11-22 RX ORDER — ATORVASTATIN CALCIUM 80 MG/1
1 TABLET, FILM COATED ORAL
Qty: 30 | Refills: 0
Start: 2023-11-22

## 2023-11-22 RX ORDER — ASPIRIN/CALCIUM CARB/MAGNESIUM 324 MG
1 TABLET ORAL
Qty: 0 | Refills: 0 | DISCHARGE
Start: 2023-11-22

## 2023-11-22 RX ORDER — ATORVASTATIN CALCIUM 80 MG/1
0 TABLET, FILM COATED ORAL
Qty: 0 | Refills: 1 | DISCHARGE

## 2023-11-22 RX ADMIN — CARVEDILOL PHOSPHATE 12.5 MILLIGRAM(S): 80 CAPSULE, EXTENDED RELEASE ORAL at 06:12

## 2023-11-22 RX ADMIN — RIVAROXABAN 20 MILLIGRAM(S): KIT at 17:01

## 2023-11-22 RX ADMIN — Medication 15 MILLIGRAM(S): at 11:40

## 2023-11-22 RX ADMIN — MONTELUKAST 10 MILLIGRAM(S): 4 TABLET, CHEWABLE ORAL at 11:08

## 2023-11-22 RX ADMIN — Medication 15 MILLIGRAM(S): at 12:10

## 2023-11-22 RX ADMIN — CARVEDILOL PHOSPHATE 12.5 MILLIGRAM(S): 80 CAPSULE, EXTENDED RELEASE ORAL at 17:01

## 2023-11-22 RX ADMIN — Medication 81 MILLIGRAM(S): at 11:09

## 2023-11-22 RX ADMIN — Medication 15 MILLIGRAM(S): at 02:01

## 2023-11-22 RX ADMIN — Medication 15 MILLIGRAM(S): at 02:45

## 2023-11-22 RX ADMIN — GABAPENTIN 300 MILLIGRAM(S): 400 CAPSULE ORAL at 06:12

## 2023-11-22 RX ADMIN — Medication 1 MILLIGRAM(S): at 11:08

## 2023-11-22 RX ADMIN — GABAPENTIN 300 MILLIGRAM(S): 400 CAPSULE ORAL at 15:01

## 2023-11-22 RX ADMIN — DULOXETINE HYDROCHLORIDE 60 MILLIGRAM(S): 30 CAPSULE, DELAYED RELEASE ORAL at 11:09

## 2023-11-22 NOTE — DISCHARGE NOTE NURSING/CASE MANAGEMENT/SOCIAL WORK - PATIENT PORTAL LINK FT
You can access the FollowMyHealth Patient Portal offered by [facility] by registering at the following website: [email or web address]. By joining [facility]’s FollowMyHealth portal, you will also be able to view your health information using other applications (apps) compatible with our system.

## 2023-11-22 NOTE — SPEECH LANGUAGE PATHOLOGY EVALUATION - SLP GENERAL OBSERVATIONS
pt seen bedside sitting upright, alert & ox4. administered functional communication assessment, pt motivated and cooperative. she c/o HA "coming back", RN vicente made aware. pt known to this service wilmer mhor completed 11/20, see report for details.

## 2023-11-22 NOTE — PROGRESS NOTE ADULT - SUBJECTIVE AND OBJECTIVE BOX
Patient seen and examined this am. No new events    MEDICATIONS:    amitriptyline 150 milliGRAM(s) Oral at bedtime  aspirin enteric coated 81 milliGRAM(s) Oral daily  atorvastatin 80 milliGRAM(s) Oral at bedtime  carvedilol 12.5 milliGRAM(s) Oral every 12 hours  dextrose 5%. 1000 milliLiter(s) IV Continuous <Continuous>  dextrose 5%. 1000 milliLiter(s) IV Continuous <Continuous>  dextrose 50% Injectable 25 Gram(s) IV Push once  dextrose 50% Injectable 25 Gram(s) IV Push once  dextrose 50% Injectable 12.5 Gram(s) IV Push once  dextrose Oral Gel 15 Gram(s) Oral once PRN  DULoxetine 60 milliGRAM(s) Oral daily  folic acid 1 milliGRAM(s) Oral daily  gabapentin 300 milliGRAM(s) Oral three times a day  glucagon  Injectable 1 milliGRAM(s) IntraMuscular once  influenza   Vaccine 0.5 milliLiter(s) IntraMuscular once  insulin lispro (ADMELOG) corrective regimen sliding scale   SubCutaneous three times a day before meals  montelukast 10 milliGRAM(s) Oral daily  rivaroxaban 20 milliGRAM(s) Oral with dinner      LABS:            CAPILLARY BLOOD GLUCOSE      POCT Blood Glucose.: 96 mg/dL (21 Nov 2023 07:59)  POCT Blood Glucose.: 108 mg/dL (20 Nov 2023 22:49)  POCT Blood Glucose.: 118 mg/dL (20 Nov 2023 16:56)  POCT Blood Glucose.: 128 mg/dL (20 Nov 2023 12:09)        I&O's Summary    20 Nov 2023 07:01  -  21 Nov 2023 07:00  --------------------------------------------------------  IN: 740 mL / OUT: 0 mL / NET: 740 mL      Vital Signs Last 24 Hrs  T(C): 37.1 (21 Nov 2023 04:55), Max: 37.1 (21 Nov 2023 04:55)  T(F): 98.7 (21 Nov 2023 04:55), Max: 98.7 (21 Nov 2023 04:55)  HR: 88 (21 Nov 2023 04:55) (78 - 88)  BP: 119/78 (21 Nov 2023 04:55) (119/78 - 151/83)  BP(mean): --  RR: 18 (21 Nov 2023 04:55) (17 - 18)  SpO2: 98% (21 Nov 2023 04:55) (96% - 99%)    Parameters below as of 21 Nov 2023 04:55  Patient On (Oxygen Delivery Method): room air        On Neurological Examination:    Mental Status - Patient is alert, awake, oriented X3. fluent, names, no dysarthria no aphasia Follows commands well and able to answer questions appropriately. Mood and affect  normal    Cranial Nerves - PERRL, EOMI, VFF, V1-V3 intact, no gross facial asymmetry, tongue/uvula midline    Motor Exam -   Right upper 5/5  Left upper porr effort 4/5  Right lower 5/5  Left lower poor effort 4/5     nml bulk/tone    Sensory    Intact to light touch and pinprick bilaterally    Coord: FTN intact bilaterally     Reflexes:          1+ throughout                                           RADIOLOGY  < from: MR Head No Cont (11.17.23 @ 16:48) >    1)  No diffusion restriction or MR evidence of acute ischemia. Scattered   white matter lesions noted in both hemispheres, many of which are   superficial and/or subcortical. See above. Within the differential as the   possibility of inflammatory etiologies and/or Lyme's disease that. No   hemorrhagic lesion or mass identified.  2)  clear sinuses and mastoids..    --- End of Report ---    < end of copied text >  < from: CT Angio Neck Stroke Protocol w/ IV Cont (11.17.23 @ 04:41) >    CT PERFUSION BRAIN:  Degraded by motion. Apparent delayed perfusion to both MCA territories   probably artifactual given the arterial systemon the CTA.    CTA HEAD:  No flow-limiting stenosis, occlusion or aneurysm.    CTA NECK:  No flow-limiting stenosis, occlusion or dissection.    < end of copied text >                
Patient is a 63y old  Female who presents with a chief complaint of AMS , Stroke workup (17 Nov 2023 07:34)    INTERVAL HPI/OVERNIGHT EVENTS: No acute events overnight. HD stable.     MEDICATIONS  (STANDING):  amitriptyline 150 milliGRAM(s) Oral at bedtime  aspirin enteric coated 81 milliGRAM(s) Oral daily  atorvastatin 80 milliGRAM(s) Oral at bedtime  carvedilol 12.5 milliGRAM(s) Oral every 12 hours  dextrose 5%. 1000 milliLiter(s) (100 mL/Hr) IV Continuous <Continuous>  dextrose 5%. 1000 milliLiter(s) (50 mL/Hr) IV Continuous <Continuous>  dextrose 50% Injectable 25 Gram(s) IV Push once  dextrose 50% Injectable 12.5 Gram(s) IV Push once  dextrose 50% Injectable 25 Gram(s) IV Push once  DULoxetine 60 milliGRAM(s) Oral daily  folic acid 1 milliGRAM(s) Oral daily  gabapentin 300 milliGRAM(s) Oral three times a day  glucagon  Injectable 1 milliGRAM(s) IntraMuscular once  influenza   Vaccine 0.5 milliLiter(s) IntraMuscular once  insulin lispro (ADMELOG) corrective regimen sliding scale   SubCutaneous three times a day before meals  montelukast 10 milliGRAM(s) Oral daily  rivaroxaban 20 milliGRAM(s) Oral with dinner    MEDICATIONS  (PRN):  dextrose Oral Gel 15 Gram(s) Oral once PRN Blood Glucose LESS THAN 70 milliGRAM(s)/deciliter      Allergies    No Known Allergies    Intolerances        REVIEW OF SYSTEMS: all negative with exception of above    Vital Signs Last 24 Hrs  T(C): 37.4 (17 Nov 2023 06:35), Max: 38.2 (17 Nov 2023 04:06)  T(F): 99.3 (17 Nov 2023 06:35), Max: 100.7 (17 Nov 2023 04:06)  HR: 85 (17 Nov 2023 06:35) (84 - 86)  BP: 138/86 (17 Nov 2023 06:35) (106/70 - 138/86)  BP(mean): --  RR: 20 (17 Nov 2023 06:35) (17 - 20)  SpO2: 96% (17 Nov 2023 06:35) (96% - 98%)    Parameters below as of 17 Nov 2023 06:35  Patient On (Oxygen Delivery Method): room air        PHYSICAL EXAM:  GENERAL: NAD, lying in bed comfortably  CHEST/LUNG: Clear to auscultation bilaterally;  Unlabored respirations  HEART: Regular rate and rhythm; No murmurs, rubs, or gallops  ABDOMEN: BSx4; Soft, nontender, nondistended  EXTREMITIES:  2+ Peripheral Pulses, brisk capillary refill. No clubbing, cyanosis, or edema  NERVOUS SYSTEM:  A&Ox2, moving all extremities. Lest side 4/5 vs right 5/5 both upper and lower. Sensation intact. word finding difficulty and confusion noted when answering questions.     LABS:                        10.4   9.27  )-----------( 299      ( 17 Nov 2023 04:47 )             32.5     11-17    137  |  103  |  15  ----------------------------<  128<H>  3.9   |  27  |  1.24    Ca    9.0      17 Nov 2023 04:47    TPro  7.0  /  Alb  3.0<L>  /  TBili  0.3  /  DBili  x   /  AST  24  /  ALT  32  /  AlkPhos  74  11-17    PT/INR - ( 17 Nov 2023 04:47 )   PT: 16.1 sec;   INR: 1.37 ratio         PTT - ( 17 Nov 2023 04:47 )  PTT:34.3 sec  Urinalysis Basic - ( 17 Nov 2023 04:47 )    Color: x / Appearance: x / SG: x / pH: x  Gluc: 128 mg/dL / Ketone: x  / Bili: x / Urobili: x   Blood: x / Protein: x / Nitrite: x   Leuk Esterase: x / RBC: x / WBC x   Sq Epi: x / Non Sq Epi: x / Bacteria: x      CAPILLARY BLOOD GLUCOSE      POCT Blood Glucose.: 117 mg/dL (17 Nov 2023 12:26)  POCT Blood Glucose.: 127 mg/dL (17 Nov 2023 04:26)      RADIOLOGY & ADDITIONAL TESTS:    Imaging Personally Reviewed:  [ ] YES  [ ] NO    Consultant(s) Notes Reviewed:  [ ] YES  [ ] NO    Care Discussed with Consultants/Other Providers [ ] YES  [ ] NO
                          Patient: ROSSI ROJAS [id number] 63y Female                            Hospitalist Attending Note    Reports continued L sided weakness.  No new weakness / numbness.       ____________________PHYSICAL EXAM:  GENERAL:  NAD Alert and Oriented x 3   HEENT: NCAT  CARDIOVASCULAR:  S1, S2  LUNGS: CTAB  ABDOMEN:  soft, (-) tenderness, (-) distension, (+) bowel sounds, (-) guarding, (-) rebound (-) rigidity  EXTREMITIES:  no cyanosis / clubbing / edema.   NEURO: mild L sided weakness, L foot drop.    ____________________     VITALS:  Vital Signs Last 24 Hrs  T(C): 36.7 (22 Nov 2023 11:00), Max: 37.1 (21 Nov 2023 16:09)  T(F): 98 (22 Nov 2023 11:00), Max: 98.7 (21 Nov 2023 16:09)  HR: 81 (22 Nov 2023 11:00) (81 - 95)  BP: 147/86 (22 Nov 2023 11:00) (135/79 - 162/82)  BP(mean): --  RR: 19 (22 Nov 2023 11:00) (18 - 19)  SpO2: 96% (22 Nov 2023 11:00) (95% - 98%)    Parameters below as of 22 Nov 2023 11:00  Patient On (Oxygen Delivery Method): room air     Daily     Daily   CAPILLARY BLOOD GLUCOSE      POCT Blood Glucose.: 131 mg/dL (22 Nov 2023 12:02)  POCT Blood Glucose.: 103 mg/dL (22 Nov 2023 08:33)  POCT Blood Glucose.: 141 mg/dL (21 Nov 2023 22:48)  POCT Blood Glucose.: 121 mg/dL (21 Nov 2023 17:10)    I&O's Summary      HISTORY:  PAST MEDICAL & SURGICAL HISTORY:  Lupus      Diabetes      Hypertension      Asthma      Peripheral polyneuropathy      Pulmonary emboli  7/2019      History of hip replacement  left      H/O total hysterectomy      History of bilateral tubal ligation      S/P laminectomy with spinal fusion      History of shoulder surgery      H/O knee surgery      Allergies    No Known Allergies    Intolerances       LABS:                        MEDICATIONS:  MEDICATIONS  (STANDING):  amitriptyline 150 milliGRAM(s) Oral at bedtime  aspirin enteric coated 81 milliGRAM(s) Oral daily  atorvastatin 80 milliGRAM(s) Oral at bedtime  carvedilol 12.5 milliGRAM(s) Oral every 12 hours  dextrose 5%. 1000 milliLiter(s) (100 mL/Hr) IV Continuous <Continuous>  dextrose 5%. 1000 milliLiter(s) (50 mL/Hr) IV Continuous <Continuous>  dextrose 50% Injectable 25 Gram(s) IV Push once  dextrose 50% Injectable 25 Gram(s) IV Push once  dextrose 50% Injectable 12.5 Gram(s) IV Push once  DULoxetine 60 milliGRAM(s) Oral daily  folic acid 1 milliGRAM(s) Oral daily  gabapentin 300 milliGRAM(s) Oral three times a day  glucagon  Injectable 1 milliGRAM(s) IntraMuscular once  influenza   Vaccine 0.5 milliLiter(s) IntraMuscular once  insulin lispro (ADMELOG) corrective regimen sliding scale   SubCutaneous three times a day before meals  montelukast 10 milliGRAM(s) Oral daily  rivaroxaban 20 milliGRAM(s) Oral with dinner    MEDICATIONS  (PRN):  acetaminophen     Tablet .. 650 milliGRAM(s) Oral every 6 hours PRN Mild Pain (1 - 3)  dextrose Oral Gel 15 Gram(s) Oral once PRN Blood Glucose LESS THAN 70 milliGRAM(s)/deciliter  
Patient seen and examined this am. No new events    MEDICATIONS:    amitriptyline 150 milliGRAM(s) Oral at bedtime  aspirin enteric coated 81 milliGRAM(s) Oral daily  atorvastatin 80 milliGRAM(s) Oral at bedtime  carvedilol 12.5 milliGRAM(s) Oral every 12 hours  dextrose 5%. 1000 milliLiter(s) IV Continuous <Continuous>  dextrose 5%. 1000 milliLiter(s) IV Continuous <Continuous>  dextrose 50% Injectable 25 Gram(s) IV Push once  dextrose 50% Injectable 12.5 Gram(s) IV Push once  dextrose 50% Injectable 25 Gram(s) IV Push once  dextrose Oral Gel 15 Gram(s) Oral once PRN  DULoxetine 60 milliGRAM(s) Oral daily  folic acid 1 milliGRAM(s) Oral daily  gabapentin 300 milliGRAM(s) Oral three times a day  glucagon  Injectable 1 milliGRAM(s) IntraMuscular once  influenza   Vaccine 0.5 milliLiter(s) IntraMuscular once  insulin lispro (ADMELOG) corrective regimen sliding scale   SubCutaneous three times a day before meals  montelukast 10 milliGRAM(s) Oral daily  rivaroxaban 20 milliGRAM(s) Oral with dinner      LABS:          140  |  104  |  14  ----------------------------<  118<H>  3.8   |  30  |  1.15    Ca    9.1      2023 06:20      CAPILLARY BLOOD GLUCOSE      POCT Blood Glucose.: 107 mg/dL (2023 08:11)  POCT Blood Glucose.: 140 mg/dL (2023 22:28)  POCT Blood Glucose.: 118 mg/dL (2023 17:11)      Urinalysis Basic - ( 2023 06:30 )    Color: Yellow / Appearance: Clear / S.011 / pH: x  Gluc: x / Ketone: Negative mg/dL  / Bili: Negative / Urobili: 0.2 mg/dL   Blood: x / Protein: Negative mg/dL / Nitrite: Negative   Leuk Esterase: Trace / RBC: 0-2 /HPF / WBC 0-2 /HPF   Sq Epi: x / Non Sq Epi: x / Bacteria: Many /HPF      I&O's Summary    2023 07:01  -  2023 07:00  --------------------------------------------------------  IN: 320 mL / OUT: 0 mL / NET: 320 mL      Vital Signs Last 24 Hrs  T(C): 36.9 (2023 10:29), Max: 37.1 (2023 23:55)  T(F): 98.4 (2023 10:29), Max: 98.8 (2023 23:55)  HR: 73 (2023 10:29) (73 - 86)  BP: 129/86 (2023 10:) (116/79 - 153/91)  BP(mean): --  RR: 18 (2023 10:29) (18 - 18)  SpO2: 95% (2023 10:) (95% - 96%)    Parameters below as of 2023 10:29  Patient On (Oxygen Delivery Method): room air          On Neurological Examination:    Mental Status - Patient is alert, awake, oriented X3. fluent, names, no dysarthria no aphasia Follows commands well and able to answer questions appropriately. Mood and affect  normal    Cranial Nerves - PERRL, EOMI, VFF, V1-V3 intact, no gross facial asymmetry, tongue/uvula midline    Motor Exam -   Right upper 5/5  Left upper porr effort 4/5  Right lower 5/5  Left lower poor effort 4/5     nml bulk/tone    Sensory    Intact to light touch and pinprick bilaterally    Coord: FTN intact bilaterally     Reflexes:          1+ throughout                                           RADIOLOGY  < from: MR Head No Cont (23 @ 16:48) >    1)  No diffusion restriction or MR evidence of acute ischemia. Scattered   white matter lesions noted in both hemispheres, many of which are   superficial and/or subcortical. See above. Within the differential as the   possibility of inflammatory etiologies and/or Lyme's disease that. No   hemorrhagic lesion or mass identified.  2)  clear sinuses and mastoids..    --- End of Report ---    < end of copied text >  < from: CT Angio Neck Stroke Protocol w/ IV Cont (23 @ 04:41) >    CT PERFUSION BRAIN:  Degraded by motion. Apparent delayed perfusion to both MCA territories   probably artifactual given the arterial systemon the CTA.    CTA HEAD:  No flow-limiting stenosis, occlusion or aneurysm.    CTA NECK:  No flow-limiting stenosis, occlusion or dissection.    < end of copied text >                
Patient is a 63y old  Female who presents with a chief complaint of AMS , Stroke workup (2023 22:14)    INTERVAL HPI/OVERNIGHT EVENTS: No acute events overnight. HD stable.     MEDICATIONS  (STANDING):  amitriptyline 150 milliGRAM(s) Oral at bedtime  aspirin enteric coated 81 milliGRAM(s) Oral daily  atorvastatin 80 milliGRAM(s) Oral at bedtime  carvedilol 12.5 milliGRAM(s) Oral every 12 hours  dextrose 5%. 1000 milliLiter(s) (100 mL/Hr) IV Continuous <Continuous>  dextrose 5%. 1000 milliLiter(s) (50 mL/Hr) IV Continuous <Continuous>  dextrose 50% Injectable 25 Gram(s) IV Push once  dextrose 50% Injectable 12.5 Gram(s) IV Push once  dextrose 50% Injectable 25 Gram(s) IV Push once  DULoxetine 60 milliGRAM(s) Oral daily  folic acid 1 milliGRAM(s) Oral daily  gabapentin 300 milliGRAM(s) Oral three times a day  glucagon  Injectable 1 milliGRAM(s) IntraMuscular once  influenza   Vaccine 0.5 milliLiter(s) IntraMuscular once  insulin lispro (ADMELOG) corrective regimen sliding scale   SubCutaneous three times a day before meals  montelukast 10 milliGRAM(s) Oral daily  rivaroxaban 20 milliGRAM(s) Oral with dinner    MEDICATIONS  (PRN):  dextrose Oral Gel 15 Gram(s) Oral once PRN Blood Glucose LESS THAN 70 milliGRAM(s)/deciliter      Allergies    No Known Allergies    Intolerances        REVIEW OF SYSTEMS: all negative with exception of above    Vital Signs Last 24 Hrs  T(C): 36.9 (2023 04:56), Max: 37.1 (2023 23:55)  T(F): 98.5 (2023 04:56), Max: 98.8 (2023 23:55)  HR: 75 (2023 04:56) (75 - 86)  BP: 116/79 (2023 04:56) (116/79 - 153/91)  BP(mean): --  RR: 18 (2023 04:56) (18 - 18)  SpO2: 96% (2023 04:56) (96% - 96%)    Parameters below as of 2023 04:56  Patient On (Oxygen Delivery Method): room air    PHYSICAL EXAM:  GENERAL: NAD, well-groomed  NERVOUS SYSTEM:  Alert & Oriented X3, Good concentration; Motor Strength 5/5 B/L upper and lower extremities; DTRs 2+ intact and symmetric  CHEST/LUNG: Clear to percussion bilaterally; No rales, rhonchi, wheezing, or rubs  HEART: Regular rate and rhythm; No murmurs, rubs, or gallops  ABDOMEN: Soft, Nontender, Nondistended; Bowel sounds present  EXTREMITIES:  2+ Peripheral Pulses, No clubbing, cyanosis, or edema    LABS:        140  |  104  |  14  ----------------------------<  118<H>  3.8   |  30  |  1.15    Ca    9.1      2023 06:20        Urinalysis Basic - ( 2023 06:30 )    Color: Yellow / Appearance: Clear / S.011 / pH: x  Gluc: x / Ketone: Negative mg/dL  / Bili: Negative / Urobili: 0.2 mg/dL   Blood: x / Protein: Negative mg/dL / Nitrite: Negative   Leuk Esterase: Trace / RBC: 0-2 /HPF / WBC 0-2 /HPF   Sq Epi: x / Non Sq Epi: x / Bacteria: Many /HPF      CAPILLARY BLOOD GLUCOSE      POCT Blood Glucose.: 107 mg/dL (2023 08:11)  POCT Blood Glucose.: 140 mg/dL (2023 22:28)  POCT Blood Glucose.: 118 mg/dL (2023 17:11)      RADIOLOGY & ADDITIONAL TESTS:    Imaging Personally Reviewed:  [ ] YES  [ ] NO    Consultant(s) Notes Reviewed:  [ ] YES  [ ] NO    Care Discussed with Consultants/Other Providers [ ] YES  [ ] NO
Patient is a 63y old  Female who presents with a chief complaint of AMS , Stroke workup (21 Nov 2023 09:24)    INTERVAL HPI/OVERNIGHT EVENTS: No acute events overnight. HD stable.     MEDICATIONS  (STANDING):  amitriptyline 150 milliGRAM(s) Oral at bedtime  aspirin enteric coated 81 milliGRAM(s) Oral daily  atorvastatin 80 milliGRAM(s) Oral at bedtime  carvedilol 12.5 milliGRAM(s) Oral every 12 hours  dextrose 5%. 1000 milliLiter(s) (100 mL/Hr) IV Continuous <Continuous>  dextrose 5%. 1000 milliLiter(s) (50 mL/Hr) IV Continuous <Continuous>  dextrose 50% Injectable 25 Gram(s) IV Push once  dextrose 50% Injectable 25 Gram(s) IV Push once  dextrose 50% Injectable 12.5 Gram(s) IV Push once  DULoxetine 60 milliGRAM(s) Oral daily  folic acid 1 milliGRAM(s) Oral daily  gabapentin 300 milliGRAM(s) Oral three times a day  glucagon  Injectable 1 milliGRAM(s) IntraMuscular once  influenza   Vaccine 0.5 milliLiter(s) IntraMuscular once  insulin lispro (ADMELOG) corrective regimen sliding scale   SubCutaneous three times a day before meals  montelukast 10 milliGRAM(s) Oral daily  rivaroxaban 20 milliGRAM(s) Oral with dinner    MEDICATIONS  (PRN):  dextrose Oral Gel 15 Gram(s) Oral once PRN Blood Glucose LESS THAN 70 milliGRAM(s)/deciliter      Allergies    No Known Allergies    Intolerances        REVIEW OF SYSTEMS: all negative with exception of above    Vital Signs Last 24 Hrs  T(C): 37.2 (21 Nov 2023 11:24), Max: 37.2 (21 Nov 2023 11:24)  T(F): 98.9 (21 Nov 2023 11:24), Max: 98.9 (21 Nov 2023 11:24)  HR: 94 (21 Nov 2023 11:24) (78 - 94)  BP: 158/100 (21 Nov 2023 11:24) (119/78 - 158/100)  BP(mean): --  RR: 18 (21 Nov 2023 11:24) (17 - 18)  SpO2: 96% (21 Nov 2023 11:24) (96% - 99%)    Parameters below as of 21 Nov 2023 11:24  Patient On (Oxygen Delivery Method): room air        PHYSICAL EXAM:  GENERAL: NAD, well-groomed  NERVOUS SYSTEM:  Alert & Oriented X3, Good concentration; Motor Strength 5/5 B/L upper and lower extremities; DTRs 2+ intact and symmetric  CHEST/LUNG: Clear to percussion bilaterally; No rales, rhonchi, wheezing, or rubs  HEART: Regular rate and rhythm; No murmurs, rubs, or gallops  ABDOMEN: Soft, Nontender, Nondistended; Bowel sounds present  EXTREMITIES:  2+ Peripheral Pulses, No clubbing, cyanosis, or edema    LABS:              CAPILLARY BLOOD GLUCOSE      POCT Blood Glucose.: 243 mg/dL (21 Nov 2023 11:30)  POCT Blood Glucose.: >600 mg/dL (21 Nov 2023 11:27)  POCT Blood Glucose.: 96 mg/dL (21 Nov 2023 07:59)  POCT Blood Glucose.: 108 mg/dL (20 Nov 2023 22:49)  POCT Blood Glucose.: 118 mg/dL (20 Nov 2023 16:56)      RADIOLOGY & ADDITIONAL TESTS:    Imaging Personally Reviewed:  [ ] YES  [ ] NO    Consultant(s) Notes Reviewed:  [ ] YES  [ ] NO    Care Discussed with Consultants/Other Providers [ ] YES  [ ] NO
Patient is a 63y old  Female who presents with a chief complaint of AMS , Stroke workup (19 Nov 2023 11:41)    INTERVAL HPI/OVERNIGHT EVENTS: No acute events overnight. HD stable.     MEDICATIONS  (STANDING):  amitriptyline 150 milliGRAM(s) Oral at bedtime  aspirin enteric coated 81 milliGRAM(s) Oral daily  atorvastatin 80 milliGRAM(s) Oral at bedtime  carvedilol 12.5 milliGRAM(s) Oral every 12 hours  dextrose 5%. 1000 milliLiter(s) (100 mL/Hr) IV Continuous <Continuous>  dextrose 5%. 1000 milliLiter(s) (50 mL/Hr) IV Continuous <Continuous>  dextrose 50% Injectable 25 Gram(s) IV Push once  dextrose 50% Injectable 25 Gram(s) IV Push once  dextrose 50% Injectable 12.5 Gram(s) IV Push once  DULoxetine 60 milliGRAM(s) Oral daily  folic acid 1 milliGRAM(s) Oral daily  gabapentin 300 milliGRAM(s) Oral three times a day  glucagon  Injectable 1 milliGRAM(s) IntraMuscular once  influenza   Vaccine 0.5 milliLiter(s) IntraMuscular once  insulin lispro (ADMELOG) corrective regimen sliding scale   SubCutaneous three times a day before meals  montelukast 10 milliGRAM(s) Oral daily  rivaroxaban 20 milliGRAM(s) Oral with dinner    MEDICATIONS  (PRN):  dextrose Oral Gel 15 Gram(s) Oral once PRN Blood Glucose LESS THAN 70 milliGRAM(s)/deciliter      Allergies    No Known Allergies    Intolerances        REVIEW OF SYSTEMS: all negative with exception of above    Vital Signs Last 24 Hrs  T(C): 36.3 (20 Nov 2023 11:30), Max: 37.1 (20 Nov 2023 00:13)  T(F): 97.3 (20 Nov 2023 11:30), Max: 98.7 (20 Nov 2023 00:13)  HR: 85 (20 Nov 2023 11:30) (73 - 89)  BP: 135/83 (20 Nov 2023 11:30) (131/76 - 137/87)  BP(mean): --  RR: 18 (20 Nov 2023 11:30) (18 - 18)  SpO2: 96% (20 Nov 2023 11:30) (96% - 100%)    Parameters below as of 19 Nov 2023 16:14  Patient On (Oxygen Delivery Method): room air        PHYSICAL EXAM:  GENERAL: NAD, well-groomed  NERVOUS SYSTEM:  Alert & Oriented X3, Good concentration; Motor Strength 5/5 B/L upper and lower extremities; DTRs 2+ intact and symmetric  CHEST/LUNG: Clear to percussion bilaterally; No rales, rhonchi, wheezing, or rubs  HEART: Regular rate and rhythm; No murmurs, rubs, or gallops  ABDOMEN: Soft, Nontender, Nondistended; Bowel sounds present  EXTREMITIES:  2+ Peripheral Pulses, No clubbing, cyanosis, or edema    LABS:              CAPILLARY BLOOD GLUCOSE      POCT Blood Glucose.: 128 mg/dL (20 Nov 2023 12:09)  POCT Blood Glucose.: 114 mg/dL (20 Nov 2023 08:29)  POCT Blood Glucose.: 161 mg/dL (19 Nov 2023 22:41)  POCT Blood Glucose.: 121 mg/dL (19 Nov 2023 16:50)      RADIOLOGY & ADDITIONAL TESTS:    Imaging Personally Reviewed:  [ ] YES  [ ] NO    Consultant(s) Notes Reviewed:  [ ] YES  [ ] NO    Care Discussed with Consultants/Other Providers [ ] YES  [ ] NO
Patient is a 63y old  Female who presents with a chief complaint of AMS , Stroke workup (2023 14:17)    INTERVAL HPI/OVERNIGHT EVENTS: No acute events overnight. HD stable.     MEDICATIONS  (STANDING):  amitriptyline 150 milliGRAM(s) Oral at bedtime  aspirin enteric coated 81 milliGRAM(s) Oral daily  atorvastatin 80 milliGRAM(s) Oral at bedtime  carvedilol 12.5 milliGRAM(s) Oral every 12 hours  dextrose 5%. 1000 milliLiter(s) (100 mL/Hr) IV Continuous <Continuous>  dextrose 5%. 1000 milliLiter(s) (50 mL/Hr) IV Continuous <Continuous>  dextrose 50% Injectable 25 Gram(s) IV Push once  dextrose 50% Injectable 12.5 Gram(s) IV Push once  dextrose 50% Injectable 25 Gram(s) IV Push once  DULoxetine 60 milliGRAM(s) Oral daily  folic acid 1 milliGRAM(s) Oral daily  gabapentin 300 milliGRAM(s) Oral three times a day  glucagon  Injectable 1 milliGRAM(s) IntraMuscular once  influenza   Vaccine 0.5 milliLiter(s) IntraMuscular once  insulin lispro (ADMELOG) corrective regimen sliding scale   SubCutaneous three times a day before meals  montelukast 10 milliGRAM(s) Oral daily  rivaroxaban 20 milliGRAM(s) Oral with dinner    MEDICATIONS  (PRN):  dextrose Oral Gel 15 Gram(s) Oral once PRN Blood Glucose LESS THAN 70 milliGRAM(s)/deciliter      Allergies    No Known Allergies    Intolerances        REVIEW OF SYSTEMS: all negative with exception of above    Vital Signs Last 24 Hrs  T(C): 36.9 (2023 10:40), Max: 37.5 (2023 04:59)  T(F): 98.4 (2023 10:40), Max: 99.5 (2023 04:59)  HR: 82 (2023 10:40) (74 - 83)  BP: 116/75 (2023 10:40) (103/69 - 119/69)  BP(mean): --  RR: 18 (2023 10:40) (18 - 18)  SpO2: 98% (2023 10:40) (95% - 98%)    Parameters below as of 2023 10:40  Patient On (Oxygen Delivery Method): room air    PHYSICAL EXAM:  GENERAL: NAD, well-groomed  NERVOUS SYSTEM:  Alert & Oriented X3, Good concentration; Motor Strength 5/5 B/L upper and lower extremities; DTRs 2+ intact and symmetric  CHEST/LUNG: Clear to percussion bilaterally; No rales, rhonchi, wheezing, or rubs  HEART: Regular rate and rhythm; No murmurs, rubs, or gallops  ABDOMEN: Soft, Nontender, Nondistended; Bowel sounds present  EXTREMITIES:  2+ Peripheral Pulses, No clubbing, cyanosis, or edema    LABS:                        10.4   9.27  )-----------( 299      ( 2023 04:47 )             32.5     11-18    140  |  104  |  14  ----------------------------<  118<H>  3.8   |  30  |  1.15    Ca    9.1      2023 06:20    TPro  7.0  /  Alb  3.0<L>  /  TBili  0.3  /  DBili  x   /  AST  24  /  ALT  32  /  AlkPhos  74  11-17    PT/INR - ( 2023 04:47 )   PT: 16.1 sec;   INR: 1.37 ratio         PTT - ( 2023 04:47 )  PTT:34.3 sec  Urinalysis Basic - ( 2023 06:30 )    Color: Yellow / Appearance: Clear / S.011 / pH: x  Gluc: x / Ketone: Negative mg/dL  / Bili: Negative / Urobili: 0.2 mg/dL   Blood: x / Protein: Negative mg/dL / Nitrite: Negative   Leuk Esterase: Trace / RBC: 0-2 /HPF / WBC 0-2 /HPF   Sq Epi: x / Non Sq Epi: x / Bacteria: Many /HPF      CAPILLARY BLOOD GLUCOSE      POCT Blood Glucose.: 132 mg/dL (2023 11:16)  POCT Blood Glucose.: 116 mg/dL (2023 08:06)  POCT Blood Glucose.: 135 mg/dL (2023 22:35)  POCT Blood Glucose.: 120 mg/dL (2023 16:50)      RADIOLOGY & ADDITIONAL TESTS:    Imaging Personally Reviewed:  [ ] YES  [ ] NO    Consultant(s) Notes Reviewed:  [ ] YES  [ ] NO    Care Discussed with Consultants/Other Providers [ ] YES  [ ] NO

## 2023-11-22 NOTE — SPEECH LANGUAGE PATHOLOGY EVALUATION - SLP PERTINENT HISTORY OF CURRENT PROBLEM
c/o found on the floor at home, slurring speech and very weak. admitted to telemetry for AMS , Stroke workup

## 2023-11-22 NOTE — PROGRESS NOTE ADULT - REASON FOR ADMISSION
AMS , Stroke workup

## 2023-11-22 NOTE — PROGRESS NOTE ADULT - ASSESSMENT
64 yo F with  PMH SLE, htn, dm, gbs, chronic left foot drop, stroke/tia here with acute onset AMS, acute on chronic L sided weakness, pt with mild symptoms in ED also noted fever in ED. admit for stroke workup.     # Presumed Acute CVA (cerebrovascular accident).   ·  Plan: hx of previous CVA with left side deficits -  Left side weaker than baseline as per family though strength in 4/5  CT negative for new stroke  ASA, Xarelto, lipitor  MRI-H reviewed  Neuro recs appreciated- will need outpatient EEG  - Patient with clinical CVA and will benefit from acute rehab - she refuses, wishes for d/c home.  Tolerating po intake.  # SLE - outpatient f/u.  On AC  # Diabetes Type II - monitor fingersticks.  Insulin coverage for hyperglycemia.  # Essential HTN - Monitor BP.  Continue antihypertensives.    Option of Acute Rehab d/w pt.  She acknowledges the risks, benefits and alternatives, opts for d/c home.

## 2023-11-22 NOTE — DISCHARGE NOTE PROVIDER - NSDCFUADDAPPT_GEN_ALL_CORE_FT
APPTS ARE READY TO BE MADE: [X] YES    Best Family or Patient Contact (if needed):    Additional Information about above appointments (if needed):    1:   2:   3:     Other comments or requests:    APPTS ARE READY TO BE MADE: [X] YES    Best Family or Patient Contact (if needed):    Additional Information about above appointments (if needed):    1:   2:   3:     Other comments or requests:   Outreached on 11/24, 11/27 and 11/28 which have been unsuccessful. Will await call back from patient/caregiver to coordinate follow up care.

## 2023-11-22 NOTE — SPEECH LANGUAGE PATHOLOGY EVALUATION - SLP DIAGNOSIS
pt presented with receptive language WFL krystal by adequate word recognition, left/right discrimination, body part ID, auditory comprehension for factual/biographical yes no questions, complex yes no questions, and 1 & 2 step directions. noted functional oral reading for single words and reading comprehension for phrases and sentences. mild deficits in expressive language marked by adequate expository speech, responsive/confrontational naming and difficulty with automatic sequences. expressive language effortful, halting, noted dysfluency and perseveration. pt presented with receptive language WFL krystal by adequate word recognition, left/right discrimination, body part ID, auditory comprehension for factual/biographical yes no questions, complex yes no questions, and 1 & 2 step directions. noted functional oral reading for single words and reading comprehension for phrases and sentences. mild deficits in expressive language marked by adequate expository speech, responsive/confrontational naming and difficulty with automatic sequences. expressive language effortful, halting, noted dysfluency ?blocking and perseveration.

## 2023-11-22 NOTE — SPEECH LANGUAGE PATHOLOGY EVALUATION - COMMENTS
MRI head no contrast 11/17/2023 IMPRESSION: 1)  No diffusion restriction or MR evidence of acute ischemia. Scattered white matter lesions noted in both hemispheres, many of which are superficial and/or subcortical. See above. Within the differential as the possibility of inflammatory etiologies and/or Lyme's disease that. No hemorrhagic lesion or mass identified. 2)  clear sinuses and mastoids..

## 2023-11-22 NOTE — DISCHARGE NOTE PROVIDER - NSDCMRMEDTOKEN_GEN_ALL_CORE_FT
amitriptyline 150 mg oral tablet: 1 tab(s) orally once a day (at bedtime)  aspirin 81 mg oral delayed release tablet: 1 tab(s) orally once a day  atorvastatin 80 mg oral tablet: 1 tab(s) orally once a day (at bedtime)  Cymbalta 60 mg oral delayed release capsule: 1 cap(s) orally once a day  docusate sodium 100 mg oral capsule: 1 cap(s) orally 3 times a day  folic acid 1 mg oral tablet: 1 tab(s) orally once a day  gabapentin 300 mg oral capsule: 1 cap(s) orally 3 times a day  MONTELUKAST SOD 10 MG TABLET: TAKE 1 TABLET BY MOUTH EVERY DAY AS NEEDED  nebivolol 10 mg oral tablet: 1 orally once a day  verapamil 180 mg/12 hours oral tablet, extended release: 1 tab(s) orally once a day  Xarelto 20 mg oral tablet: orally 2 times a day

## 2023-11-22 NOTE — DISCHARGE NOTE NURSING/CASE MANAGEMENT/SOCIAL WORK - NSDCPEFALRISK_GEN_ALL_CORE
For information on Fall & Injury Prevention, visit: [email or web address] OR  [email or web address] OR  https://www.cdc.gov/steadi/patient.html

## 2023-11-22 NOTE — PROGRESS NOTE ADULT - PROVIDER SPECIALTY LIST ADULT
Hospitalist
Hospitalist
Internal Medicine
Neurology
Neurology
Hospitalist
Hospitalist
No
Hospitalist

## 2023-11-22 NOTE — SPEECH LANGUAGE PATHOLOGY EVALUATION - H & P REVIEW
"64 yo F with  PMH SLE, htn, dm, gbs, chronic left foot drop, stroke/tia here with acute onset AMS, acute on chronic L sided weakness, noted at  tonight by , last seen normal 10:30 pm today, found on floor at 1:30 am today."/yes

## 2023-11-22 NOTE — DISCHARGE NOTE PROVIDER - CARE PROVIDER_API CALL
Domenico Salvador)  Neurology  [address], [address]  [place] [address]  Phone: [phone]  Fax: [phone]  Follow Up Time:     Primary MD,   Phone: (   )    -  Fax: (   )    -  Follow Up Time:

## 2023-11-22 NOTE — DISCHARGE NOTE PROVIDER - NSDCCPCAREPLAN_GEN_ALL_CORE_FT
PRINCIPAL DISCHARGE DIAGNOSIS  Diagnosis: CVA (cerebrovascular accident)  Assessment and Plan of Treatment:       SECONDARY DISCHARGE DIAGNOSES  Diagnosis: Diabetes  Assessment and Plan of Treatment:     Diagnosis: Lupus  Assessment and Plan of Treatment:     Diagnosis: AMS (altered mental status)  Assessment and Plan of Treatment:

## 2023-11-22 NOTE — DISCHARGE NOTE PROVIDER - NSDCFUADDINST_GEN_ALL_CORE_FT
It is important to see your primary physician as well as any specialty physicians within the next week to perform a comprehensive medical review.  Call their offices for an appointment as soon as you leave the hospital.  You will also need to see them for renewal of your medications.  If have any difficulty following with a physician, contact the [facility] [phone] or via [email or web address].   To obtain your results, you can access the [employer] Patient Portal at [email or web address].  Your medical issues appear to be stable at this time, but if your symptoms recur or worsen, contact your physicians and/or return to the hospital if necessary.  If you encounter any issues or questions with your medication, call your physicians before stopping the medication.  Do not drive.  Limit your diet to 2 grams of sodium daily.

## 2023-11-22 NOTE — DISCHARGE NOTE PROVIDER - HOSPITAL COURSE
62 yo F with  PMH SLE, htn, dm, gbs, chronic left foot drop, stroke/tia here with acute onset AMS, acute on chronic L sided weakness, pt with mild symptoms in ED also noted fever in ED. admit for stroke workup.     < from: CT Angio Neck Stroke Protocol w/ IV Cont (11.17.23 @ 04:41) >  CT PERFUSION BRAIN: Degraded by motion. Apparent delayed perfusion to both MCA territories  probably artifactual given the arterial systemon the CTA.  CTA HEAD: No flow-limiting stenosis, occlusion or aneurysm.  CTA NECK: No flow-limiting stenosis, occlusion or dissection.  < end of copied text >  < from: CT Brain Stroke Protocol (11.17.23 @ 04:35) >  No acute intracranial findings < end of copied text >  < from: MR Head No Cont (11.17.23 @ 16:48) >    1)  No diffusion restriction or MR evidence of acute ischemia. Scattered   white matter lesions noted in both hemispheres, many of which are   superficial and/or subcortical. See above. Within the differential as the   possibility of inflammatory etiologies and/or Lyme's disease that. No   hemorrhagic lesion or mass identified.  2)  clear sinuses and mastoids..    < end of copied text >          # Presumed Acute CVA (cerebrovascular accident).   ·  Plan: hx of previous CVA with left side deficits -  Left side weaker than baseline as per family though strength in 4/5  CT negative for new stroke  ASA, Xarelto, lipitor  MRI-H reviewed  Neuro recs appreciated- will need outpatient EEG  - Patient with clinical CVA and will benefit from acute rehab - she refuses, wishes for d/c home.  Tolerating po intake.  # SLE - outpatient f/u.  On AC  # Diabetes Type II - monitor fingersticks.  Insulin coverage for hyperglycemia.  # Essential HTN - Monitor BP.  Continue antihypertensives.    Option of Acute Rehab d/w pt.  She acknowledges the risks, benefits and alternatives, opts for d/c home.

## 2023-11-27 NOTE — CHART NOTE - NSCHARTNOTEFT_GEN_A_CORE
Left (2) message(s) for patient in regards to follow up care with callback information.
A voicemail was left to schedule follow up appointments on 11/24.

## 2023-12-06 DIAGNOSIS — I10 ESSENTIAL (PRIMARY) HYPERTENSION: ICD-10-CM

## 2023-12-06 DIAGNOSIS — J45.909 UNSPECIFIED ASTHMA, UNCOMPLICATED: ICD-10-CM

## 2023-12-06 DIAGNOSIS — R29.706 NIHSS SCORE 6: ICD-10-CM

## 2023-12-06 DIAGNOSIS — E11.42 TYPE 2 DIABETES MELLITUS WITH DIABETIC POLYNEUROPATHY: ICD-10-CM

## 2023-12-06 DIAGNOSIS — M32.9 SYSTEMIC LUPUS ERYTHEMATOSUS, UNSPECIFIED: ICD-10-CM

## 2023-12-06 DIAGNOSIS — Z96.642 PRESENCE OF LEFT ARTIFICIAL HIP JOINT: ICD-10-CM

## 2023-12-06 DIAGNOSIS — Z86.711 PERSONAL HISTORY OF PULMONARY EMBOLISM: ICD-10-CM

## 2023-12-06 DIAGNOSIS — I63.9 CEREBRAL INFARCTION, UNSPECIFIED: ICD-10-CM

## 2023-12-06 DIAGNOSIS — M21.372 FOOT DROP, LEFT FOOT: ICD-10-CM

## 2023-12-17 ENCOUNTER — INPATIENT (INPATIENT)
Facility: HOSPITAL | Age: 63
LOS: 23 days | Discharge: HOME | End: 2024-01-10
Attending: STUDENT IN AN ORGANIZED HEALTH CARE EDUCATION/TRAINING PROGRAM | Admitting: STUDENT IN AN ORGANIZED HEALTH CARE EDUCATION/TRAINING PROGRAM
Payer: MEDICARE

## 2023-12-17 VITALS
HEIGHT: 68 IN | DIASTOLIC BLOOD PRESSURE: 77 MMHG | TEMPERATURE: 99 F | WEIGHT: 202.38 LBS | SYSTOLIC BLOOD PRESSURE: 114 MMHG | HEART RATE: 77 BPM | OXYGEN SATURATION: 97 % | RESPIRATION RATE: 18 BRPM

## 2023-12-17 DIAGNOSIS — Z96.649 PRESENCE OF UNSPECIFIED ARTIFICIAL HIP JOINT: Chronic | ICD-10-CM

## 2023-12-17 DIAGNOSIS — Z98.51 TUBAL LIGATION STATUS: Chronic | ICD-10-CM

## 2023-12-17 DIAGNOSIS — Z98.1 ARTHRODESIS STATUS: Chronic | ICD-10-CM

## 2023-12-17 DIAGNOSIS — Z98.890 OTHER SPECIFIED POSTPROCEDURAL STATES: Chronic | ICD-10-CM

## 2023-12-17 DIAGNOSIS — Z90.710 ACQUIRED ABSENCE OF BOTH CERVIX AND UTERUS: Chronic | ICD-10-CM

## 2023-12-17 LAB
ALBUMIN SERPL ELPH-MCNC: 2.3 G/DL — LOW (ref 3.3–5)
ALBUMIN SERPL ELPH-MCNC: 2.3 G/DL — LOW (ref 3.3–5)
ALP SERPL-CCNC: 84 U/L — SIGNIFICANT CHANGE UP (ref 40–120)
ALP SERPL-CCNC: 84 U/L — SIGNIFICANT CHANGE UP (ref 40–120)
ALT FLD-CCNC: 21 U/L — SIGNIFICANT CHANGE UP (ref 12–78)
ALT FLD-CCNC: 21 U/L — SIGNIFICANT CHANGE UP (ref 12–78)
AMPHET UR-MCNC: NEGATIVE — SIGNIFICANT CHANGE UP
AMPHET UR-MCNC: NEGATIVE — SIGNIFICANT CHANGE UP
ANION GAP SERPL CALC-SCNC: 7 MMOL/L — SIGNIFICANT CHANGE UP (ref 5–17)
ANION GAP SERPL CALC-SCNC: 7 MMOL/L — SIGNIFICANT CHANGE UP (ref 5–17)
APPEARANCE UR: CLEAR — SIGNIFICANT CHANGE UP
APPEARANCE UR: CLEAR — SIGNIFICANT CHANGE UP
APTT BLD: 40.4 SEC — HIGH (ref 24.5–35.6)
APTT BLD: 40.4 SEC — HIGH (ref 24.5–35.6)
AST SERPL-CCNC: 33 U/L — SIGNIFICANT CHANGE UP (ref 15–37)
AST SERPL-CCNC: 33 U/L — SIGNIFICANT CHANGE UP (ref 15–37)
BACTERIA # UR AUTO: ABNORMAL /HPF
BACTERIA # UR AUTO: ABNORMAL /HPF
BARBITURATES UR SCN-MCNC: POSITIVE — SIGNIFICANT CHANGE UP
BARBITURATES UR SCN-MCNC: POSITIVE — SIGNIFICANT CHANGE UP
BASOPHILS # BLD AUTO: 0.03 K/UL — SIGNIFICANT CHANGE UP (ref 0–0.2)
BASOPHILS # BLD AUTO: 0.03 K/UL — SIGNIFICANT CHANGE UP (ref 0–0.2)
BASOPHILS NFR BLD AUTO: 0.3 % — SIGNIFICANT CHANGE UP (ref 0–2)
BASOPHILS NFR BLD AUTO: 0.3 % — SIGNIFICANT CHANGE UP (ref 0–2)
BENZODIAZ UR-MCNC: NEGATIVE — SIGNIFICANT CHANGE UP
BENZODIAZ UR-MCNC: NEGATIVE — SIGNIFICANT CHANGE UP
BILIRUB SERPL-MCNC: 0.3 MG/DL — SIGNIFICANT CHANGE UP (ref 0.2–1.2)
BILIRUB SERPL-MCNC: 0.3 MG/DL — SIGNIFICANT CHANGE UP (ref 0.2–1.2)
BILIRUB UR-MCNC: NEGATIVE — SIGNIFICANT CHANGE UP
BILIRUB UR-MCNC: NEGATIVE — SIGNIFICANT CHANGE UP
BUN SERPL-MCNC: 20 MG/DL — SIGNIFICANT CHANGE UP (ref 7–23)
BUN SERPL-MCNC: 20 MG/DL — SIGNIFICANT CHANGE UP (ref 7–23)
CALCIUM SERPL-MCNC: 8.5 MG/DL — SIGNIFICANT CHANGE UP (ref 8.5–10.1)
CALCIUM SERPL-MCNC: 8.5 MG/DL — SIGNIFICANT CHANGE UP (ref 8.5–10.1)
CHLORIDE SERPL-SCNC: 101 MMOL/L — SIGNIFICANT CHANGE UP (ref 96–108)
CHLORIDE SERPL-SCNC: 101 MMOL/L — SIGNIFICANT CHANGE UP (ref 96–108)
CO2 SERPL-SCNC: 27 MMOL/L — SIGNIFICANT CHANGE UP (ref 22–31)
CO2 SERPL-SCNC: 27 MMOL/L — SIGNIFICANT CHANGE UP (ref 22–31)
COCAINE METAB.OTHER UR-MCNC: NEGATIVE — SIGNIFICANT CHANGE UP
COCAINE METAB.OTHER UR-MCNC: NEGATIVE — SIGNIFICANT CHANGE UP
COLOR SPEC: YELLOW — SIGNIFICANT CHANGE UP
COLOR SPEC: YELLOW — SIGNIFICANT CHANGE UP
CREAT SERPL-MCNC: 1.21 MG/DL — SIGNIFICANT CHANGE UP (ref 0.5–1.3)
CREAT SERPL-MCNC: 1.21 MG/DL — SIGNIFICANT CHANGE UP (ref 0.5–1.3)
DIFF PNL FLD: NEGATIVE — SIGNIFICANT CHANGE UP
DIFF PNL FLD: NEGATIVE — SIGNIFICANT CHANGE UP
EGFR: 50 ML/MIN/1.73M2 — LOW
EGFR: 50 ML/MIN/1.73M2 — LOW
EOSINOPHIL # BLD AUTO: 0.22 K/UL — SIGNIFICANT CHANGE UP (ref 0–0.5)
EOSINOPHIL # BLD AUTO: 0.22 K/UL — SIGNIFICANT CHANGE UP (ref 0–0.5)
EOSINOPHIL NFR BLD AUTO: 2.5 % — SIGNIFICANT CHANGE UP (ref 0–6)
EOSINOPHIL NFR BLD AUTO: 2.5 % — SIGNIFICANT CHANGE UP (ref 0–6)
EPI CELLS # UR: PRESENT
EPI CELLS # UR: PRESENT
GLUCOSE BLDC GLUCOMTR-MCNC: 114 MG/DL — HIGH (ref 70–99)
GLUCOSE BLDC GLUCOMTR-MCNC: 114 MG/DL — HIGH (ref 70–99)
GLUCOSE BLDC GLUCOMTR-MCNC: 116 MG/DL — HIGH (ref 70–99)
GLUCOSE BLDC GLUCOMTR-MCNC: 116 MG/DL — HIGH (ref 70–99)
GLUCOSE BLDC GLUCOMTR-MCNC: 124 MG/DL — HIGH (ref 70–99)
GLUCOSE BLDC GLUCOMTR-MCNC: 124 MG/DL — HIGH (ref 70–99)
GLUCOSE SERPL-MCNC: 125 MG/DL — HIGH (ref 70–99)
GLUCOSE SERPL-MCNC: 125 MG/DL — HIGH (ref 70–99)
GLUCOSE UR QL: NEGATIVE MG/DL — SIGNIFICANT CHANGE UP
GLUCOSE UR QL: NEGATIVE MG/DL — SIGNIFICANT CHANGE UP
HCT VFR BLD CALC: 31.3 % — LOW (ref 34.5–45)
HCT VFR BLD CALC: 31.3 % — LOW (ref 34.5–45)
HGB BLD-MCNC: 9.8 G/DL — LOW (ref 11.5–15.5)
HGB BLD-MCNC: 9.8 G/DL — LOW (ref 11.5–15.5)
IMM GRANULOCYTES NFR BLD AUTO: 0.4 % — SIGNIFICANT CHANGE UP (ref 0–0.9)
IMM GRANULOCYTES NFR BLD AUTO: 0.4 % — SIGNIFICANT CHANGE UP (ref 0–0.9)
INR BLD: 1.81 RATIO — HIGH (ref 0.85–1.18)
INR BLD: 1.81 RATIO — HIGH (ref 0.85–1.18)
KETONES UR-MCNC: NEGATIVE MG/DL — SIGNIFICANT CHANGE UP
KETONES UR-MCNC: NEGATIVE MG/DL — SIGNIFICANT CHANGE UP
LEUKOCYTE ESTERASE UR-ACNC: NEGATIVE — SIGNIFICANT CHANGE UP
LEUKOCYTE ESTERASE UR-ACNC: NEGATIVE — SIGNIFICANT CHANGE UP
LYMPHOCYTES # BLD AUTO: 1.17 K/UL — SIGNIFICANT CHANGE UP (ref 1–3.3)
LYMPHOCYTES # BLD AUTO: 1.17 K/UL — SIGNIFICANT CHANGE UP (ref 1–3.3)
LYMPHOCYTES # BLD AUTO: 13.2 % — SIGNIFICANT CHANGE UP (ref 13–44)
LYMPHOCYTES # BLD AUTO: 13.2 % — SIGNIFICANT CHANGE UP (ref 13–44)
MCHC RBC-ENTMCNC: 26.8 PG — LOW (ref 27–34)
MCHC RBC-ENTMCNC: 26.8 PG — LOW (ref 27–34)
MCHC RBC-ENTMCNC: 31.3 G/DL — LOW (ref 32–36)
MCHC RBC-ENTMCNC: 31.3 G/DL — LOW (ref 32–36)
MCV RBC AUTO: 85.5 FL — SIGNIFICANT CHANGE UP (ref 80–100)
MCV RBC AUTO: 85.5 FL — SIGNIFICANT CHANGE UP (ref 80–100)
METHADONE UR-MCNC: NEGATIVE — SIGNIFICANT CHANGE UP
METHADONE UR-MCNC: NEGATIVE — SIGNIFICANT CHANGE UP
MONOCYTES # BLD AUTO: 0.58 K/UL — SIGNIFICANT CHANGE UP (ref 0–0.9)
MONOCYTES # BLD AUTO: 0.58 K/UL — SIGNIFICANT CHANGE UP (ref 0–0.9)
MONOCYTES NFR BLD AUTO: 6.5 % — SIGNIFICANT CHANGE UP (ref 2–14)
MONOCYTES NFR BLD AUTO: 6.5 % — SIGNIFICANT CHANGE UP (ref 2–14)
NEUTROPHILS # BLD AUTO: 6.85 K/UL — SIGNIFICANT CHANGE UP (ref 1.8–7.4)
NEUTROPHILS # BLD AUTO: 6.85 K/UL — SIGNIFICANT CHANGE UP (ref 1.8–7.4)
NEUTROPHILS NFR BLD AUTO: 77.1 % — HIGH (ref 43–77)
NEUTROPHILS NFR BLD AUTO: 77.1 % — HIGH (ref 43–77)
NITRITE UR-MCNC: NEGATIVE — SIGNIFICANT CHANGE UP
NITRITE UR-MCNC: NEGATIVE — SIGNIFICANT CHANGE UP
NRBC # BLD: 0 /100 WBCS — SIGNIFICANT CHANGE UP (ref 0–0)
NRBC # BLD: 0 /100 WBCS — SIGNIFICANT CHANGE UP (ref 0–0)
OPIATES UR-MCNC: POSITIVE — SIGNIFICANT CHANGE UP
OPIATES UR-MCNC: POSITIVE — SIGNIFICANT CHANGE UP
PCP SPEC-MCNC: SIGNIFICANT CHANGE UP
PCP SPEC-MCNC: SIGNIFICANT CHANGE UP
PCP UR-MCNC: NEGATIVE — SIGNIFICANT CHANGE UP
PCP UR-MCNC: NEGATIVE — SIGNIFICANT CHANGE UP
PH UR: 6.5 — SIGNIFICANT CHANGE UP (ref 5–8)
PH UR: 6.5 — SIGNIFICANT CHANGE UP (ref 5–8)
PLATELET # BLD AUTO: 310 K/UL — SIGNIFICANT CHANGE UP (ref 150–400)
PLATELET # BLD AUTO: 310 K/UL — SIGNIFICANT CHANGE UP (ref 150–400)
POTASSIUM SERPL-MCNC: 4.3 MMOL/L — SIGNIFICANT CHANGE UP (ref 3.5–5.3)
POTASSIUM SERPL-MCNC: 4.3 MMOL/L — SIGNIFICANT CHANGE UP (ref 3.5–5.3)
POTASSIUM SERPL-SCNC: 4.3 MMOL/L — SIGNIFICANT CHANGE UP (ref 3.5–5.3)
POTASSIUM SERPL-SCNC: 4.3 MMOL/L — SIGNIFICANT CHANGE UP (ref 3.5–5.3)
PROT SERPL-MCNC: 6.6 GM/DL — SIGNIFICANT CHANGE UP (ref 6–8.3)
PROT SERPL-MCNC: 6.6 GM/DL — SIGNIFICANT CHANGE UP (ref 6–8.3)
PROT UR-MCNC: 30 MG/DL
PROT UR-MCNC: 30 MG/DL
PROTHROM AB SERPL-ACNC: 21.2 SEC — HIGH (ref 9.5–13)
PROTHROM AB SERPL-ACNC: 21.2 SEC — HIGH (ref 9.5–13)
RAPID RVP RESULT: SIGNIFICANT CHANGE UP
RAPID RVP RESULT: SIGNIFICANT CHANGE UP
RBC # BLD: 3.66 M/UL — LOW (ref 3.8–5.2)
RBC # BLD: 3.66 M/UL — LOW (ref 3.8–5.2)
RBC # FLD: 16.9 % — HIGH (ref 10.3–14.5)
RBC # FLD: 16.9 % — HIGH (ref 10.3–14.5)
RBC CASTS # UR COMP ASSIST: SIGNIFICANT CHANGE UP /HPF (ref 0–4)
RBC CASTS # UR COMP ASSIST: SIGNIFICANT CHANGE UP /HPF (ref 0–4)
SARS-COV-2 RNA SPEC QL NAA+PROBE: SIGNIFICANT CHANGE UP
SARS-COV-2 RNA SPEC QL NAA+PROBE: SIGNIFICANT CHANGE UP
SODIUM SERPL-SCNC: 135 MMOL/L — SIGNIFICANT CHANGE UP (ref 135–145)
SODIUM SERPL-SCNC: 135 MMOL/L — SIGNIFICANT CHANGE UP (ref 135–145)
SP GR SPEC: >1.03 — HIGH (ref 1–1.03)
SP GR SPEC: >1.03 — HIGH (ref 1–1.03)
THC UR QL: NEGATIVE — SIGNIFICANT CHANGE UP
THC UR QL: NEGATIVE — SIGNIFICANT CHANGE UP
TROPONIN I, HIGH SENSITIVITY RESULT: 4.6 NG/L — SIGNIFICANT CHANGE UP
TROPONIN I, HIGH SENSITIVITY RESULT: 4.6 NG/L — SIGNIFICANT CHANGE UP
UROBILINOGEN FLD QL: 0.2 MG/DL — SIGNIFICANT CHANGE UP (ref 0.2–1)
UROBILINOGEN FLD QL: 0.2 MG/DL — SIGNIFICANT CHANGE UP (ref 0.2–1)
WBC # BLD: 8.89 K/UL — SIGNIFICANT CHANGE UP (ref 3.8–10.5)
WBC # BLD: 8.89 K/UL — SIGNIFICANT CHANGE UP (ref 3.8–10.5)
WBC # FLD AUTO: 8.89 K/UL — SIGNIFICANT CHANGE UP (ref 3.8–10.5)
WBC # FLD AUTO: 8.89 K/UL — SIGNIFICANT CHANGE UP (ref 3.8–10.5)
WBC UR QL: SIGNIFICANT CHANGE UP /HPF (ref 0–5)
WBC UR QL: SIGNIFICANT CHANGE UP /HPF (ref 0–5)

## 2023-12-17 PROCEDURE — 99285 EMERGENCY DEPT VISIT HI MDM: CPT

## 2023-12-17 PROCEDURE — 70450 CT HEAD/BRAIN W/O DYE: CPT | Mod: 26,MA,XU

## 2023-12-17 PROCEDURE — 70498 CT ANGIOGRAPHY NECK: CPT | Mod: 26,MA

## 2023-12-17 PROCEDURE — 0042T: CPT | Mod: MA

## 2023-12-17 PROCEDURE — 93010 ELECTROCARDIOGRAM REPORT: CPT

## 2023-12-17 PROCEDURE — 71045 X-RAY EXAM CHEST 1 VIEW: CPT | Mod: 26

## 2023-12-17 PROCEDURE — 70496 CT ANGIOGRAPHY HEAD: CPT | Mod: 26,MA

## 2023-12-17 PROCEDURE — 99223 1ST HOSP IP/OBS HIGH 75: CPT

## 2023-12-17 RX ORDER — RIVAROXABAN 15 MG-20MG
20 KIT ORAL
Refills: 0 | Status: DISCONTINUED | OUTPATIENT
Start: 2023-12-17 | End: 2024-01-10

## 2023-12-17 RX ORDER — AMITRIPTYLINE HCL 25 MG
0 TABLET ORAL
Qty: 0 | Refills: 0 | DISCHARGE

## 2023-12-17 RX ORDER — INSULIN LISPRO 100/ML
VIAL (ML) SUBCUTANEOUS
Refills: 0 | Status: DISCONTINUED | OUTPATIENT
Start: 2023-12-17 | End: 2024-01-10

## 2023-12-17 RX ORDER — DEXTROSE 50 % IN WATER 50 %
25 SYRINGE (ML) INTRAVENOUS ONCE
Refills: 0 | Status: DISCONTINUED | OUTPATIENT
Start: 2023-12-17 | End: 2024-01-10

## 2023-12-17 RX ORDER — LANOLIN ALCOHOL/MO/W.PET/CERES
3 CREAM (GRAM) TOPICAL AT BEDTIME
Refills: 0 | Status: DISCONTINUED | OUTPATIENT
Start: 2023-12-17 | End: 2024-01-10

## 2023-12-17 RX ORDER — INFLUENZA VIRUS VACCINE 15; 15; 15; 15 UG/.5ML; UG/.5ML; UG/.5ML; UG/.5ML
0.5 SUSPENSION INTRAMUSCULAR ONCE
Refills: 0 | Status: DISCONTINUED | OUTPATIENT
Start: 2023-12-17 | End: 2024-01-10

## 2023-12-17 RX ORDER — INSULIN LISPRO 100/ML
VIAL (ML) SUBCUTANEOUS AT BEDTIME
Refills: 0 | Status: DISCONTINUED | OUTPATIENT
Start: 2023-12-17 | End: 2024-01-10

## 2023-12-17 RX ORDER — PIPERACILLIN AND TAZOBACTAM 4; .5 G/20ML; G/20ML
3.38 INJECTION, POWDER, LYOPHILIZED, FOR SOLUTION INTRAVENOUS ONCE
Refills: 0 | Status: COMPLETED | OUTPATIENT
Start: 2023-12-17 | End: 2023-12-17

## 2023-12-17 RX ORDER — SODIUM CHLORIDE 9 MG/ML
1000 INJECTION, SOLUTION INTRAVENOUS
Refills: 0 | Status: DISCONTINUED | OUTPATIENT
Start: 2023-12-17 | End: 2024-01-10

## 2023-12-17 RX ORDER — ASPIRIN/CALCIUM CARB/MAGNESIUM 324 MG
81 TABLET ORAL DAILY
Refills: 0 | Status: DISCONTINUED | OUTPATIENT
Start: 2023-12-17 | End: 2024-01-10

## 2023-12-17 RX ORDER — DULOXETINE HYDROCHLORIDE 30 MG/1
60 CAPSULE, DELAYED RELEASE ORAL DAILY
Refills: 0 | Status: DISCONTINUED | OUTPATIENT
Start: 2023-12-17 | End: 2024-01-10

## 2023-12-17 RX ORDER — PIPERACILLIN AND TAZOBACTAM 4; .5 G/20ML; G/20ML
3.38 INJECTION, POWDER, LYOPHILIZED, FOR SOLUTION INTRAVENOUS EVERY 8 HOURS
Refills: 0 | Status: DISCONTINUED | OUTPATIENT
Start: 2023-12-17 | End: 2023-12-21

## 2023-12-17 RX ORDER — VANCOMYCIN HCL 1 G
VIAL (EA) INTRAVENOUS
Refills: 0 | Status: DISCONTINUED | OUTPATIENT
Start: 2023-12-17 | End: 2023-12-18

## 2023-12-17 RX ORDER — VERAPAMIL HCL 240 MG
240 CAPSULE, EXTENDED RELEASE PELLETS 24 HR ORAL DAILY
Refills: 0 | Status: DISCONTINUED | OUTPATIENT
Start: 2023-12-17 | End: 2024-01-10

## 2023-12-17 RX ORDER — AMITRIPTYLINE HCL 25 MG
150 TABLET ORAL DAILY
Refills: 0 | Status: DISCONTINUED | OUTPATIENT
Start: 2023-12-17 | End: 2023-12-18

## 2023-12-17 RX ORDER — GLUCAGON INJECTION, SOLUTION 0.5 MG/.1ML
1 INJECTION, SOLUTION SUBCUTANEOUS ONCE
Refills: 0 | Status: DISCONTINUED | OUTPATIENT
Start: 2023-12-17 | End: 2024-01-10

## 2023-12-17 RX ORDER — GABAPENTIN 400 MG/1
600 CAPSULE ORAL AT BEDTIME
Refills: 0 | Status: DISCONTINUED | OUTPATIENT
Start: 2023-12-17 | End: 2024-01-10

## 2023-12-17 RX ORDER — SODIUM CHLORIDE 9 MG/ML
1000 INJECTION, SOLUTION INTRAVENOUS ONCE
Refills: 0 | Status: COMPLETED | OUTPATIENT
Start: 2023-12-17 | End: 2023-12-17

## 2023-12-17 RX ORDER — CYCLOBENZAPRINE HYDROCHLORIDE 10 MG/1
5 TABLET, FILM COATED ORAL THREE TIMES A DAY
Refills: 0 | Status: DISCONTINUED | OUTPATIENT
Start: 2023-12-17 | End: 2023-12-20

## 2023-12-17 RX ORDER — DEXTROSE 50 % IN WATER 50 %
12.5 SYRINGE (ML) INTRAVENOUS ONCE
Refills: 0 | Status: DISCONTINUED | OUTPATIENT
Start: 2023-12-17 | End: 2024-01-10

## 2023-12-17 RX ORDER — ONDANSETRON 8 MG/1
4 TABLET, FILM COATED ORAL EVERY 8 HOURS
Refills: 0 | Status: DISCONTINUED | OUTPATIENT
Start: 2023-12-17 | End: 2023-12-20

## 2023-12-17 RX ORDER — VANCOMYCIN HCL 1 G
1750 VIAL (EA) INTRAVENOUS EVERY 24 HOURS
Refills: 0 | Status: DISCONTINUED | OUTPATIENT
Start: 2023-12-18 | End: 2023-12-18

## 2023-12-17 RX ORDER — ATORVASTATIN CALCIUM 80 MG/1
0 TABLET, FILM COATED ORAL
Qty: 0 | Refills: 0 | DISCHARGE

## 2023-12-17 RX ORDER — ATORVASTATIN CALCIUM 80 MG/1
80 TABLET, FILM COATED ORAL AT BEDTIME
Refills: 0 | Status: DISCONTINUED | OUTPATIENT
Start: 2023-12-17 | End: 2024-01-10

## 2023-12-17 RX ORDER — DEXTROSE 50 % IN WATER 50 %
15 SYRINGE (ML) INTRAVENOUS ONCE
Refills: 0 | Status: DISCONTINUED | OUTPATIENT
Start: 2023-12-17 | End: 2024-01-10

## 2023-12-17 RX ORDER — CELECOXIB 200 MG/1
200 CAPSULE ORAL DAILY
Refills: 0 | Status: DISCONTINUED | OUTPATIENT
Start: 2023-12-17 | End: 2024-01-10

## 2023-12-17 RX ORDER — VANCOMYCIN HCL 1 G
1750 VIAL (EA) INTRAVENOUS ONCE
Refills: 0 | Status: COMPLETED | OUTPATIENT
Start: 2023-12-17 | End: 2023-12-17

## 2023-12-17 RX ORDER — MONTELUKAST 4 MG/1
10 TABLET, CHEWABLE ORAL DAILY
Refills: 0 | Status: DISCONTINUED | OUTPATIENT
Start: 2023-12-17 | End: 2024-01-10

## 2023-12-17 RX ORDER — ACETAMINOPHEN 500 MG
650 TABLET ORAL EVERY 6 HOURS
Refills: 0 | Status: DISCONTINUED | OUTPATIENT
Start: 2023-12-17 | End: 2024-01-10

## 2023-12-17 RX ORDER — RIVAROXABAN 15 MG-20MG
0 KIT ORAL
Qty: 0 | Refills: 0 | DISCHARGE

## 2023-12-17 RX ADMIN — Medication 81 MILLIGRAM(S): at 11:34

## 2023-12-17 RX ADMIN — CELECOXIB 200 MILLIGRAM(S): 200 CAPSULE ORAL at 11:33

## 2023-12-17 RX ADMIN — ATORVASTATIN CALCIUM 80 MILLIGRAM(S): 80 TABLET, FILM COATED ORAL at 21:47

## 2023-12-17 RX ADMIN — GABAPENTIN 600 MILLIGRAM(S): 400 CAPSULE ORAL at 21:47

## 2023-12-17 RX ADMIN — Medication 150 MILLIGRAM(S): at 11:32

## 2023-12-17 RX ADMIN — Medication 1750 MILLIGRAM(S): at 11:46

## 2023-12-17 RX ADMIN — SODIUM CHLORIDE 1000 MILLILITER(S): 9 INJECTION, SOLUTION INTRAVENOUS at 05:11

## 2023-12-17 RX ADMIN — DULOXETINE HYDROCHLORIDE 60 MILLIGRAM(S): 30 CAPSULE, DELAYED RELEASE ORAL at 11:34

## 2023-12-17 RX ADMIN — MONTELUKAST 10 MILLIGRAM(S): 4 TABLET, CHEWABLE ORAL at 11:33

## 2023-12-17 RX ADMIN — PIPERACILLIN AND TAZOBACTAM 25 GRAM(S): 4; .5 INJECTION, POWDER, LYOPHILIZED, FOR SOLUTION INTRAVENOUS at 17:36

## 2023-12-17 RX ADMIN — PIPERACILLIN AND TAZOBACTAM 25 GRAM(S): 4; .5 INJECTION, POWDER, LYOPHILIZED, FOR SOLUTION INTRAVENOUS at 23:49

## 2023-12-17 RX ADMIN — Medication 240 MILLIGRAM(S): at 17:35

## 2023-12-17 RX ADMIN — PIPERACILLIN AND TAZOBACTAM 200 GRAM(S): 4; .5 INJECTION, POWDER, LYOPHILIZED, FOR SOLUTION INTRAVENOUS at 12:22

## 2023-12-17 RX ADMIN — RIVAROXABAN 20 MILLIGRAM(S): KIT at 17:35

## 2023-12-17 NOTE — PATIENT PROFILE ADULT - LIVING ENVIRONMENT
Patient ate breakfast and attended group. Patient had no issues this am. Patient did morning ADL'S. Patient cleaned room and made bed. Patient interacted with the other patients. Patient involved in no falls this shift, Skid proof footwear utilized. Patient is safe on the unit. Patient took morning medications. no

## 2023-12-17 NOTE — ED PROVIDER NOTE - CLINICAL SUMMARY MEDICAL DECISION MAKING FREE TEXT BOX
62 y/o F with PMH SLE, HTN, DM, GBS, chronic L foot drop, CVA w/ L sided deficits, presenting to the ED w/ AMS.  Vitals stable.  Patient is altered at bedside. NIH >10  EKG is NSR  CODE STROKE initiated  Will obtain stroke eval   Patient is not TNK candidate as currently on xarelto 64 y/o F with PMH SLE, HTN, DM, GBS, chronic L foot drop, CVA w/ L sided deficits, presenting to the ED w/ AMS.  Vitals stable.  Patient is altered at bedside. NIH >10  EKG is NSR  CODE STROKE initiated  Will obtain stroke eval   Patient is not TNK candidate as currently on xarelto

## 2023-12-17 NOTE — H&P ADULT - NSHPLABSRESULTS_GEN_ALL_CORE
T(C): 37.4 (12-17-23 @ 09:18), Max: 37.4 (12-17-23 @ 09:18)  HR: 82 (12-17-23 @ 09:18) (77 - 87)  BP: 112/83 (12-17-23 @ 09:18) (94/61 - 142/108)  RR: 14 (12-17-23 @ 09:18) (13 - 18)  SpO2: 91% (12-17-23 @ 09:18) (86% - 99%)                        9.8    8.89  )-----------( 310      ( 17 Dec 2023 05:06 )             31.3     12-17    135  |  101  |  20  ----------------------------<  125<H>  4.3   |  27  |  1.21    Ca    8.5      17 Dec 2023 05:31    TPro  6.6  /  Alb  2.3<L>  /  TBili  0.3  /  DBili  x   /  AST  33  /  ALT  21  /  AlkPhos  84  12-17    LIVER FUNCTIONS - ( 17 Dec 2023 05:31 )  Alb: 2.3 g/dL / Pro: 6.6 gm/dL / ALK PHOS: 84 U/L / ALT: 21 U/L / AST: 33 U/L / GGT: x           PT/INR - ( 17 Dec 2023 05:06 )   PT: 21.2 sec;   INR: 1.81 ratio         PTT - ( 17 Dec 2023 05:06 )  PTT:40.4 sec  Urinalysis Basic - ( 17 Dec 2023 06:16 )    Color: Yellow / Appearance: Clear / SG: >1.030 / pH: x  Gluc: x / Ketone: Negative mg/dL  / Bili: Negative / Urobili: 0.2 mg/dL   Blood: x / Protein: 30 mg/dL / Nitrite: Negative   Leuk Esterase: Negative / RBC: 0-2 /HPF / WBC 0-2 /HPF   Sq Epi: x / Non Sq Epi: x / Bacteria: Occasional /HPF    < from: CT Angio Brain Stroke Protocol  w/ IV Cont (12.17.23 @ 05:12) >    IMPRESSION:    CT PERFUSION: Markedly degraded by patient motion. No core infarct. Small   areas of abnormal perfusion in the left frontal and right temporal lobes   not confined to a vascular territory, likely artifactual.    If symptoms persist consider follow up head CT or MRI, MRA  if no   contraindication.    CTA COW:  Patent intracranial circulation without flow limiting stenosis.    CTA NECK: Patent, ECAs, ICAs, no  hemodynamically significant stenosis at    ICA origins by NASCET criteria. Stable fusiform aneurysmal dilatation of   the ICA origins/proximal segments.  Bilateral vertebral arteries are patent without flow limiting stenosis.    Patchy nonspecific groundglass and consolidations in the visualized upper   lobes.      < end of copied text >    < from: Xray Chest 1 View- PORTABLE-Urgent (12.17.23 @ 05:35) >    IMPRESSION: Multifocal pneumonia. Recommend follow-up.      < end of copied text >      EKG - NSR, nonsp T changes      acetaminophen     Tablet .. 650 milliGRAM(s) Oral every 6 hours PRN  aluminum hydroxide/magnesium hydroxide/simethicone Suspension 30 milliLiter(s) Oral every 4 hours PRN  amitriptyline 150 milliGRAM(s) Oral daily  aspirin enteric coated 81 milliGRAM(s) Oral daily  atorvastatin 80 milliGRAM(s) Oral at bedtime  celecoxib 200 milliGRAM(s) Oral daily  cyclobenzaprine 5 milliGRAM(s) Oral three times a day PRN  dextrose 5%. 1000 milliLiter(s) IV Continuous <Continuous>  dextrose 5%. 1000 milliLiter(s) IV Continuous <Continuous>  dextrose 50% Injectable 25 Gram(s) IV Push once  dextrose 50% Injectable 12.5 Gram(s) IV Push once  dextrose 50% Injectable 25 Gram(s) IV Push once  dextrose Oral Gel 15 Gram(s) Oral once PRN  DULoxetine 60 milliGRAM(s) Oral daily  gabapentin 600 milliGRAM(s) Oral at bedtime  glucagon  Injectable 1 milliGRAM(s) IntraMuscular once  influenza   Vaccine 0.5 milliLiter(s) IntraMuscular once  insulin lispro (ADMELOG) corrective regimen sliding scale   SubCutaneous three times a day before meals  insulin lispro (ADMELOG) corrective regimen sliding scale   SubCutaneous at bedtime  melatonin 3 milliGRAM(s) Oral at bedtime PRN  montelukast 10 milliGRAM(s) Oral daily  ondansetron Injectable 4 milliGRAM(s) IV Push every 8 hours PRN  oxycodone    5 mG/acetaminophen 325 mG 1 Tablet(s) Oral every 4 hours PRN  rivaroxaban 20 milliGRAM(s) Oral with dinner  verapamil  milliGRAM(s) Oral daily

## 2023-12-17 NOTE — H&P ADULT - ASSESSMENT
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 Of note pt recently developed URI symptoms and was started on Doxy as well as Hycodan, Alprazolam and Fioricet. Of note pt already on Percocet, flexeril for chronic pain, Gabapentin for seizures per  all which she took. Pt being admitted for Acute hypoxic respiratory failure due to Multifocal PNA, AMS r/o TIA/CVA  vs metabolic encephalopathy secondary to polypharmacy and/or hypoxia.    # AMS  # r/o TIA/CVA  vs metabolic encephalopathy secondary to polypharmacy and/or hypoxia.  - f/u MRI, MRA  - Per  pt w/ hx of Seizure in the past for which she is on Gabapentin  - f/u EEG  - c/w ASA, statin  - PT/OT    #Acute hypoxic respiratory failure due to Multifocal PNA  - pt hospitalized for 1 week last month, will cover for HCAP  - place on Zosyn and Vanco  - c/w supplemental oxygen    # PE  - c/w Xarelto    # SLE  # GBS  # Chronic pain  - c/w Percocet and flexeril prn    # HTN/HLD  - c/w verapemil, statin    # DM type 2  - FS qAC and HS w/ SSI    # DVT ppx - Pt on Xarelto

## 2023-12-17 NOTE — ED ADULT NURSE NOTE - OBJECTIVE STATEMENT
Pt alert and oriented x 0, change in mental status, last time seeing normal was at 0200 hx stroke with left side weakness, pt on XARELTO .Pt had a cold last few days. Code stroke called in triage, placed on monitor, NSR. Hypoxic to low 80s on room air, placed on 3 L NC 99%. Unable to follow any commands but is noted to be Moving extremities, displaying generalized weakness, pupils are perrla, speech is muffled and incoherent.

## 2023-12-17 NOTE — ED PROVIDER NOTE - PROGRESS NOTE DETAILS
CT and labs unremarkable. Discussed with Dr. Larkin on call for neurology, recommends MRI/EEG. Results discussed with  at bedside. Will admit for stroke work up.

## 2023-12-17 NOTE — ED ADULT NURSE REASSESSMENT NOTE - NS ED NURSE REASSESS COMMENT FT1
Pt observed lying in stretcher sleeping. Attempted to arouse patient to conduct neurological exam. Pt responsive to sternal rub from sleep, cannot follow commands. Pt pupils PERRLA. Pt currently attached to continuous pulse oximetry monitoring and continuous cardiac monitor. Pt  endorses recent intake of codeine prescription by Pt prior to hospitalization which may be contributing to current state.

## 2023-12-17 NOTE — ED ADULT NURSE NOTE - CHIEF COMPLAINT QUOTE
alter mental status last time seeing normal was at 0200 hx stroke with left side weakness, pt on XARELTO .Pt had a cold last few days .

## 2023-12-17 NOTE — ED ADULT TRIAGE NOTE - CHIEF COMPLAINT QUOTE
alter mental status last time seeing normal was at 0200 hx stroke with left side weakness alter mental status last time seeing normal was at 0200 hx stroke with left side weakness, pt on XARELTO .Pt had a cold last few days .

## 2023-12-17 NOTE — PATIENT PROFILE ADULT - FALL HARM RISK - HARM RISK INTERVENTIONS
Assistance with ambulation/Assistance OOB with selected safe patient handling equipment/Communicate Risk of Fall with Harm to all staff/Discuss with provider need for PT consult/Monitor gait and stability/Provide patient with walking aids - walker, cane, crutches/Reinforce activity limits and safety measures with patient and family/Tailored Fall Risk Interventions/Visual Cue: Yellow wristband and red socks/Bed in lowest position, wheels locked, appropriate side rails in place/Call bell, personal items and telephone in reach/Instruct patient to call for assistance before getting out of bed or chair/Non-slip footwear when patient is out of bed/[place] to call system/Physically safe environment - no spills, clutter or unnecessary equipment/Purposeful Proactive Rounding/Room/bathroom lighting operational, light cord in reach Assistance with ambulation/Assistance OOB with selected safe patient handling equipment/Communicate Risk of Fall with Harm to all staff/Discuss with provider need for PT consult/Monitor gait and stability/Provide patient with walking aids - walker, cane, crutches/Reinforce activity limits and safety measures with patient and family/Tailored Fall Risk Interventions/Visual Cue: Yellow wristband and red socks/Bed in lowest position, wheels locked, appropriate side rails in place/Call bell, personal items and telephone in reach/Instruct patient to call for assistance before getting out of bed or chair/Non-slip footwear when patient is out of bed/[place] to call system/Physically safe environment - no spills, clutter or unnecessary equipment/Purposeful Proactive Rounding/Room/bathroom lighting operational, light cord in reach

## 2023-12-17 NOTE — ED ADULT NURSE NOTE - NSFALLHARMRISKINTERV_ED_ALL_ED
Assistance OOB with selected safe patient handling equipment if applicable/Assistance with ambulation/Communicate risk of Fall with Harm to all staff, patient, and family/Monitor gait and stability/Monitor for mental status changes and reorient to person, place, and time, as needed/Move patient closer to nursing station/within visual sight of ED staff/Provide visual cue: red socks, yellow wristband, yellow gown, etc/Reinforce activity limits and safety measures with patient and family/Toileting schedule using arm’s reach rule for commode and bathroom/Use of alarms - bed, stretcher, chair and/or video monitoring/Bed in lowest position, wheels locked, appropriate side rails in place/Call bell, personal items and telephone in reach/Instruct patient to call for assistance before getting out of bed/chair/stretcher/Non-slip footwear applied when patient is off stretcher/[place] to call system/Physically safe environment - no spills, clutter or unnecessary equipment/Purposeful Proactive Rounding/Room/bathroom lighting operational, light cord in reach Assistance OOB with selected safe patient handling equipment if applicable/Assistance with ambulation/Communicate risk of Fall with Harm to all staff, patient, and family/Monitor gait and stability/Monitor for mental status changes and reorient to person, place, and time, as needed/Move patient closer to nursing station/within visual sight of ED staff/Provide visual cue: red socks, yellow wristband, yellow gown, etc/Reinforce activity limits and safety measures with patient and family/Toileting schedule using arm’s reach rule for commode and bathroom/Use of alarms - bed, stretcher, chair and/or video monitoring/Bed in lowest position, wheels locked, appropriate side rails in place/Call bell, personal items and telephone in reach/Instruct patient to call for assistance before getting out of bed/chair/stretcher/Non-slip footwear applied when patient is off stretcher/[place] to call system/Physically safe environment - no spills, clutter or unnecessary equipment/Purposeful Proactive Rounding/Room/bathroom lighting operational, light cord in reach

## 2023-12-17 NOTE — PATIENT PROFILE ADULT - FUNCTIONAL ASSESSMENT - BASIC MOBILITY 6.
2-calculated by average/Not able to assess (calculate score using [facility] averaging method)  2-calculated by average/Not able to assess (calculate score using [facility] averaging method)

## 2023-12-17 NOTE — H&P ADULT - HISTORY OF PRESENT ILLNESS
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  Pt lethargic rousable, but confused, oriented to self and time; not to place or situation. Hx obtained from  at bedside. Per  last evening he helped pt to the restroom and a few minutes later when he  on her she was unresponsive.  reports pt seemed to open eyes at times, however was not speaking thus he called EMS. Pt remained same way till after arrival to the ED. Per , pt recently developed URI symptoms and was started on Doxy as well as Hycodan, Alprazolam and Fioricet. Of note pt already on Percocet, flexeril for chronic pain, Gabapentin for seizures per . All of which  believes pt took last evening.     ED Course  Vitals BP 94/61, SPO2 84% on RA, rest of vitals stable. Labs sig for H/H 9.8/31.3, rest of labs stable. CT head neg for acute stroke, CT perfusion Markedly degraded by patient motion. No core infarct. Small areas of abnormal perfusion in the left frontal and right temporal lobes not confined to a vascular territory, likely artifactual. CXR showed Multifocalpneumonia.         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  Pt lethargic rousable, but confused, oriented to self and time; not to place or situation. Hx obtained from  at bedside. Per  last evening he helped pt to the restroom and a few minutes later when he  on her she was unresponsive.  reports pt seemed to open eyes at times, however was not speaking thus he called EMS. Pt remained same way till after arrival to the ED. Per , pt recently developed URI symptoms and was started on Doxy as well as Hycodan, Alprazolam and Fioricet. Of note pt already on Percocet, flexeril for chronic pain, Gabapentin for seizures per . All of which  believes pt took last evening.     ED Course  Vitals BP 94/61, SPO2 84% on RA, rest of vitals stable. Labs sig for H/H 9.8/31.3, rest of labs stable. CT head neg for acute stroke, CT perfusion Markedly degraded by patient motion. No core infarct. Small areas of abnormal perfusion in the left frontal and right temporal lobes not confined to a vascular territory, likely artifactual. CXR showed Multifocalpneumonia.

## 2023-12-17 NOTE — ED PROVIDER NOTE - PHYSICAL EXAMINATION
GENERAL: Awake, alert, NAD  HEENT: NC/AT, moist mucous membranes, PERRL, EOMI  LUNGS: CTAB, no wheezes or crackles   CARDIAC: RRR, no m/r/g  ABDOMEN: Soft, non tender, non distended, no rebound, no guarding  EXT: No edema, no calf tenderness, 2+ DP pulses bilaterally, no deformities.  NEURO: A&Ox2. Moving all extremities. +L sided FNDs  SKIN: Warm and dry. No rash.  PSYCH: Normal affect.

## 2023-12-17 NOTE — H&P ADULT - NSHPPHYSICALEXAM_GEN_ALL_CORE
PHYSICAL EXAM:    Vital Signs Last 24 Hrs  T(C): 37.4 (17 Dec 2023 09:18), Max: 37.4 (17 Dec 2023 09:18)  T(F): 99.3 (17 Dec 2023 09:18), Max: 99.3 (17 Dec 2023 09:18)  HR: 82 (17 Dec 2023 09:18) (77 - 87)  BP: 112/83 (17 Dec 2023 09:18) (94/61 - 142/108)  BP(mean): 91 (17 Dec 2023 07:13) (91 - 91)  RR: 14 (17 Dec 2023 09:18) (13 - 18)  SpO2: 91% (17 Dec 2023 09:18) (86% - 99%)    Parameters below as of 17 Dec 2023 09:18  Patient On (Oxygen Delivery Method): nasal cannula  O2 Flow (L/min): 2      GENERAL: Pt lying in bed comfortably in NAD, lethargic  HEENT:  Atraumatic, EOMI, PERRL, conjunctiva and sclera clear, dry MM  NECK: Supple  CHEST/LUNG: rhonchi  HEART: Regular rate and rhythm  ABDOMEN: Bowel sounds present; Soft, Nontender, Nondistended.   EXTREMITIES:  2+ Peripheral Pulses, brisk capillary refill. No edema  NEUROLOGICAL:  lethargic, oriented to self and time. confused. 4/5 LE, 4-/5 UE, sensation intact  MSK: no overt deformities  SKIN: warm, dry

## 2023-12-17 NOTE — ED PROVIDER NOTE - OBJECTIVE STATEMENT
64 y/o F with PMH SLE, HTN, DM, GBS, chronic L foot drop, CVA w/ L sided deficits, presenting to the ED w/ AMS.  Per EMS, patient was able to ambulate to the bathroom around 2:00am but at that time became altered and unable to get off the toilet.  Per , patient had similar symptoms when she was admitted for likely CVA last month.  On exam, patient w/ waxing-waning mental status, A&OX2. Hx limited. Currently on ASA/xarelto.

## 2023-12-18 LAB
ANION GAP SERPL CALC-SCNC: 7 MMOL/L — SIGNIFICANT CHANGE UP (ref 5–17)
ANION GAP SERPL CALC-SCNC: 7 MMOL/L — SIGNIFICANT CHANGE UP (ref 5–17)
BUN SERPL-MCNC: 16 MG/DL — SIGNIFICANT CHANGE UP (ref 7–23)
BUN SERPL-MCNC: 16 MG/DL — SIGNIFICANT CHANGE UP (ref 7–23)
CALCIUM SERPL-MCNC: 8.7 MG/DL — SIGNIFICANT CHANGE UP (ref 8.5–10.1)
CALCIUM SERPL-MCNC: 8.7 MG/DL — SIGNIFICANT CHANGE UP (ref 8.5–10.1)
CHLORIDE SERPL-SCNC: 105 MMOL/L — SIGNIFICANT CHANGE UP (ref 96–108)
CHLORIDE SERPL-SCNC: 105 MMOL/L — SIGNIFICANT CHANGE UP (ref 96–108)
CHOLEST SERPL-MCNC: 91 MG/DL — SIGNIFICANT CHANGE UP
CHOLEST SERPL-MCNC: 91 MG/DL — SIGNIFICANT CHANGE UP
CO2 SERPL-SCNC: 27 MMOL/L — SIGNIFICANT CHANGE UP (ref 22–31)
CO2 SERPL-SCNC: 27 MMOL/L — SIGNIFICANT CHANGE UP (ref 22–31)
CREAT SERPL-MCNC: 1 MG/DL — SIGNIFICANT CHANGE UP (ref 0.5–1.3)
CREAT SERPL-MCNC: 1 MG/DL — SIGNIFICANT CHANGE UP (ref 0.5–1.3)
EGFR: 63 ML/MIN/1.73M2 — SIGNIFICANT CHANGE UP
EGFR: 63 ML/MIN/1.73M2 — SIGNIFICANT CHANGE UP
GLUCOSE BLDC GLUCOMTR-MCNC: 118 MG/DL — HIGH (ref 70–99)
GLUCOSE BLDC GLUCOMTR-MCNC: 118 MG/DL — HIGH (ref 70–99)
GLUCOSE BLDC GLUCOMTR-MCNC: 131 MG/DL — HIGH (ref 70–99)
GLUCOSE BLDC GLUCOMTR-MCNC: 140 MG/DL — HIGH (ref 70–99)
GLUCOSE BLDC GLUCOMTR-MCNC: 140 MG/DL — HIGH (ref 70–99)
GLUCOSE SERPL-MCNC: 101 MG/DL — HIGH (ref 70–99)
GLUCOSE SERPL-MCNC: 101 MG/DL — HIGH (ref 70–99)
HCT VFR BLD CALC: 26 % — LOW (ref 34.5–45)
HCT VFR BLD CALC: 26 % — LOW (ref 34.5–45)
HDLC SERPL-MCNC: 46 MG/DL — LOW
HDLC SERPL-MCNC: 46 MG/DL — LOW
HGB BLD-MCNC: 8 G/DL — LOW (ref 11.5–15.5)
HGB BLD-MCNC: 8 G/DL — LOW (ref 11.5–15.5)
LEGIONELLA AG UR QL: NEGATIVE — SIGNIFICANT CHANGE UP
LEGIONELLA AG UR QL: NEGATIVE — SIGNIFICANT CHANGE UP
LIPID PNL WITH DIRECT LDL SERPL: 33 MG/DL — SIGNIFICANT CHANGE UP
LIPID PNL WITH DIRECT LDL SERPL: 33 MG/DL — SIGNIFICANT CHANGE UP
MCHC RBC-ENTMCNC: 25.8 PG — LOW (ref 27–34)
MCHC RBC-ENTMCNC: 25.8 PG — LOW (ref 27–34)
MCHC RBC-ENTMCNC: 30.8 G/DL — LOW (ref 32–36)
MCHC RBC-ENTMCNC: 30.8 G/DL — LOW (ref 32–36)
MCV RBC AUTO: 83.9 FL — SIGNIFICANT CHANGE UP (ref 80–100)
MCV RBC AUTO: 83.9 FL — SIGNIFICANT CHANGE UP (ref 80–100)
NON HDL CHOLESTEROL: 46 MG/DL — SIGNIFICANT CHANGE UP
NON HDL CHOLESTEROL: 46 MG/DL — SIGNIFICANT CHANGE UP
NRBC # BLD: 0 /100 WBCS — SIGNIFICANT CHANGE UP (ref 0–0)
NRBC # BLD: 0 /100 WBCS — SIGNIFICANT CHANGE UP (ref 0–0)
PLATELET # BLD AUTO: 298 K/UL — SIGNIFICANT CHANGE UP (ref 150–400)
PLATELET # BLD AUTO: 298 K/UL — SIGNIFICANT CHANGE UP (ref 150–400)
POTASSIUM SERPL-MCNC: 3.8 MMOL/L — SIGNIFICANT CHANGE UP (ref 3.5–5.3)
POTASSIUM SERPL-MCNC: 3.8 MMOL/L — SIGNIFICANT CHANGE UP (ref 3.5–5.3)
POTASSIUM SERPL-SCNC: 3.8 MMOL/L — SIGNIFICANT CHANGE UP (ref 3.5–5.3)
POTASSIUM SERPL-SCNC: 3.8 MMOL/L — SIGNIFICANT CHANGE UP (ref 3.5–5.3)
RBC # BLD: 3.1 M/UL — LOW (ref 3.8–5.2)
RBC # BLD: 3.1 M/UL — LOW (ref 3.8–5.2)
RBC # FLD: 16.6 % — HIGH (ref 10.3–14.5)
RBC # FLD: 16.6 % — HIGH (ref 10.3–14.5)
S PNEUM AG UR QL: NEGATIVE — SIGNIFICANT CHANGE UP
S PNEUM AG UR QL: NEGATIVE — SIGNIFICANT CHANGE UP
SODIUM SERPL-SCNC: 139 MMOL/L — SIGNIFICANT CHANGE UP (ref 135–145)
SODIUM SERPL-SCNC: 139 MMOL/L — SIGNIFICANT CHANGE UP (ref 135–145)
TRIGL SERPL-MCNC: 52 MG/DL — SIGNIFICANT CHANGE UP
TRIGL SERPL-MCNC: 52 MG/DL — SIGNIFICANT CHANGE UP
WBC # BLD: 6.18 K/UL — SIGNIFICANT CHANGE UP (ref 3.8–10.5)
WBC # BLD: 6.18 K/UL — SIGNIFICANT CHANGE UP (ref 3.8–10.5)
WBC # FLD AUTO: 6.18 K/UL — SIGNIFICANT CHANGE UP (ref 3.8–10.5)
WBC # FLD AUTO: 6.18 K/UL — SIGNIFICANT CHANGE UP (ref 3.8–10.5)

## 2023-12-18 PROCEDURE — 95816 EEG AWAKE AND DROWSY: CPT | Mod: 26

## 2023-12-18 PROCEDURE — 70544 MR ANGIOGRAPHY HEAD W/O DYE: CPT | Mod: 26,XU

## 2023-12-18 PROCEDURE — 70551 MRI BRAIN STEM W/O DYE: CPT | Mod: 26

## 2023-12-18 PROCEDURE — 99233 SBSQ HOSP IP/OBS HIGH 50: CPT

## 2023-12-18 PROCEDURE — 70547 MR ANGIOGRAPHY NECK W/O DYE: CPT | Mod: 26

## 2023-12-18 RX ORDER — AMITRIPTYLINE HCL 25 MG
50 TABLET ORAL AT BEDTIME
Refills: 0 | Status: DISCONTINUED | OUTPATIENT
Start: 2023-12-18 | End: 2023-12-20

## 2023-12-18 RX ADMIN — Medication 650 MILLIGRAM(S): at 05:20

## 2023-12-18 RX ADMIN — GABAPENTIN 600 MILLIGRAM(S): 400 CAPSULE ORAL at 21:53

## 2023-12-18 RX ADMIN — PIPERACILLIN AND TAZOBACTAM 25 GRAM(S): 4; .5 INJECTION, POWDER, LYOPHILIZED, FOR SOLUTION INTRAVENOUS at 21:52

## 2023-12-18 RX ADMIN — Medication 3 MILLIGRAM(S): at 01:38

## 2023-12-18 RX ADMIN — RIVAROXABAN 20 MILLIGRAM(S): KIT at 17:34

## 2023-12-18 RX ADMIN — ATORVASTATIN CALCIUM 80 MILLIGRAM(S): 80 TABLET, FILM COATED ORAL at 21:53

## 2023-12-18 RX ADMIN — Medication 50 MILLIGRAM(S): at 21:54

## 2023-12-18 RX ADMIN — Medication 81 MILLIGRAM(S): at 13:36

## 2023-12-18 RX ADMIN — CELECOXIB 200 MILLIGRAM(S): 200 CAPSULE ORAL at 14:30

## 2023-12-18 RX ADMIN — PIPERACILLIN AND TAZOBACTAM 25 GRAM(S): 4; .5 INJECTION, POWDER, LYOPHILIZED, FOR SOLUTION INTRAVENOUS at 05:22

## 2023-12-18 RX ADMIN — PIPERACILLIN AND TAZOBACTAM 25 GRAM(S): 4; .5 INJECTION, POWDER, LYOPHILIZED, FOR SOLUTION INTRAVENOUS at 13:42

## 2023-12-18 RX ADMIN — DULOXETINE HYDROCHLORIDE 60 MILLIGRAM(S): 30 CAPSULE, DELAYED RELEASE ORAL at 13:36

## 2023-12-18 RX ADMIN — Medication 240 MILLIGRAM(S): at 05:20

## 2023-12-18 RX ADMIN — CELECOXIB 200 MILLIGRAM(S): 200 CAPSULE ORAL at 13:36

## 2023-12-18 RX ADMIN — MONTELUKAST 10 MILLIGRAM(S): 4 TABLET, CHEWABLE ORAL at 13:37

## 2023-12-18 NOTE — OCCUPATIONAL THERAPY INITIAL EVALUATION ADULT - PERTINENT HX OF CURRENT PROBLEM, REHAB EVAL
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  Pt lethargic rousable, but confused, oriented to self and time; not to place or situation. Hx obtained from  at bedside. Per  last evening he helped pt to the restroom and a few minutes later when he  on her she was unresponsive.  reports pt seemed to open eyes at times, however was not speaking thus he called EMS. Pt remained same way till after arrival to the ED. Per , pt recently developed URI symptoms and was started on Doxy as well as Hycodan, Alprazolam and Fioricet. Of note pt already on Percocet, flexeril for chronic pain, Gabapentin for seizures per . All of which  believes pt took last evening.

## 2023-12-18 NOTE — PROGRESS NOTE ADULT - ASSESSMENT
63F with PMHx of SLE, HTN, T2DM, PE (on Xarelto), seizure?? and prior CVA/TIA w/ mild residual left sided weakness presenting for confusion and being found on the floor. She was recently being teated for URI symptoms and has been on Hycodan, Fioricet and Alprazolam? Also on percocet and flexeril for pain. Patient likely with encephalopathy of metabolic origin and found to have AHRF 2/2 to multifocal PNA.    #Acute Encephalopathy  - Seems resolved and back at baseline  - Neurology consult appreciated  - MRI brain w/o stroke. MRA H&N limited, but no noted LVO  - EEG w/ background slowing, but no epileptic discharge  - Treat for infection below    #Multifocal PNA with AHRF  - Zosyn, can likely DC on Augmentin  - MRSA swab  - Follow up on mycoplasma, strep, and legionella  - Wean O2 as tolerated  - Will need OP CT chest in 4-6 weeks for resolution    #Prior PE  - Co Xarelto    #SLE  - Continue with Plaquenil    #Chronic pain  - c/w Percocet and flexeril prn    #HTN/HLD  - c/w verapemil, statin    #DM type 2  - FS qAC and HS w/ SSI

## 2023-12-18 NOTE — PHYSICAL THERAPY INITIAL EVALUATION ADULT - ADDITIONAL COMMENTS
As per previous PT eval and reviewed with patient: Patient lives in a private house with 1 threshold step to enter and a chair lift to the 2nd floor. Bathroom is a tub shower with regular toilet height, no grab bars. Pt uses a rollator when out, a cane prn at home. Pt has a fall history.

## 2023-12-18 NOTE — OCCUPATIONAL THERAPY INITIAL EVALUATION ADULT - GENERAL OBSERVATIONS, REHAB EVAL
Pt was encountered supine in bed; NAD, O2 supplementation (3LO2) via nasal cannula, portable telemetry +, primafit +, AXOX3, followed commands, cooperative; pt had no c/o pain. PT Keely present.

## 2023-12-18 NOTE — SWALLOW BEDSIDE ASSESSMENT ADULT - SLP GENERAL OBSERVATIONS
Pt approached Seated upright in bed, alert and orientedx4 with patient's daughter at bedside. Pt able to effectively communicate wants/needs and follow commands however effortfull speech nand +dysarthria noted, although pt with fair+ intelligibility.

## 2023-12-18 NOTE — SPEECH LANGUAGE PATHOLOGY EVALUATION - SLP DIAGNOSIS
Pt presented as alert and orientedx4 with receptive skills judged to be WFL characterized by adequate ability to follow commands, respond to yes/no and responsive naming questions, and able to repeat at word/phrase/sentence level. Pt noted with mild expressive aphasia marked by overall effortful speech and difficulty with automatic tasks with halting/pausing noted.  Mild dysarthria noted marked by +slurred speech and +dysphonic vocal quality.

## 2023-12-18 NOTE — SWALLOW BEDSIDE ASSESSMENT ADULT - SWALLOW EVAL: DIAGNOSIS
Pt presented with overtly adequate oropharyngeal skills for puree and thin liquid with timely A-P transit time, timely pharyngeal swallow trigger and no overt signs of suspected aspiration. Oral dysphagia for regular solid marked by +prolonged mastication time however oral cavity effectively cleared post-swallow. Pt offered easy to chew diet consistency for easy of management however she prefers to remain on regular solid at this time.

## 2023-12-18 NOTE — OCCUPATIONAL THERAPY INITIAL EVALUATION ADULT - ADDITIONAL COMMENTS
Patient lives in a private house with 1 threshold step to enter and a chair lift to the 2nd floor. Bathroom is a tub shower with regular toilet height, no grab bars. Pt uses a rollator when out, a cane prn at home. Pt has a fall history.

## 2023-12-18 NOTE — EEG REPORT - NS EEG TEXT BOX
ROSSI ROJAS [facility]-[id number]     Study Date: 		12-18-23  Duration:  x22 mins    --------------------------------------------------------------------------------------------------  History:  CC/ HPI Patient is a 63y old  Female who presents with a chief complaint of AMS, HCAP, Acute Hypoxic respiratory failure (18 Dec 2023 07:40)    MEDICATIONS  (STANDING):  amitriptyline 150 milliGRAM(s) Oral daily  aspirin enteric coated 81 milliGRAM(s) Oral daily  atorvastatin 80 milliGRAM(s) Oral at bedtime  celecoxib 200 milliGRAM(s) Oral daily  dextrose 5%. 1000 milliLiter(s) (100 mL/Hr) IV Continuous <Continuous>  dextrose 5%. 1000 milliLiter(s) (50 mL/Hr) IV Continuous <Continuous>  dextrose 50% Injectable 25 Gram(s) IV Push once  dextrose 50% Injectable 12.5 Gram(s) IV Push once  dextrose 50% Injectable 25 Gram(s) IV Push once  DULoxetine 60 milliGRAM(s) Oral daily  gabapentin 600 milliGRAM(s) Oral at bedtime  glucagon  Injectable 1 milliGRAM(s) IntraMuscular once  influenza   Vaccine 0.5 milliLiter(s) IntraMuscular once  insulin lispro (ADMELOG) corrective regimen sliding scale   SubCutaneous three times a day before meals  insulin lispro (ADMELOG) corrective regimen sliding scale   SubCutaneous at bedtime  montelukast 10 milliGRAM(s) Oral daily  piperacillin/tazobactam IVPB.. 3.375 Gram(s) IV Intermittent every 8 hours  rivaroxaban 20 milliGRAM(s) Oral with dinner  verapamil  milliGRAM(s) Oral daily    --------------------------------------------------------------------------------------------------  Study Interpretation:    [Abbreviation Key:  PDR=alpha rhythm/posterior dominant rhythm. A-P=anterior posterior.  Amplitude: ‘very low’:<20; ‘low’:20-49; ‘medium’:; ‘high’:>150uV.  Persistence for periodic/rhythmic patterns (% of epoch) ‘rare’:<1%; ‘occasional’:1-10%; ‘frequent’:10-50%; ‘abundant’:50-90%; ‘continuous’:>90%.  Persistence for sporadic discharges: ‘rare’:<1/hr; ‘occasional’:1/min-1/hr; ‘frequent’:>1/min; ‘abundant’:>1/10 sec.  RPP=rhythmic and periodic patterns; GRDA=generalized rhythmic delta activity; FIRDA=frontal intermittent GRDA; LRDA=lateralized rhythmic delta activity; TIRDA=temporal intermittent rhythmic delta activity;  LPD=PLED=lateralized periodic discharges; GPD=generalized periodic discharges; BIPDs =bilateral independent periodic discharges; Mf=multifocal; SIRPDs=stimulus induced rhythmic, periodic, or ictal appearing discharges; BIRDs=brief potentially ictal rhythmic discharges >4 Hz, lasting .5-10s; PFA (paroxysmal bursts >13 Hz or =8 Hz <10s).  Modifiers: +F=with fast component; +S=with spike component; +R=with rhythmic component.  S-B=burst suppression pattern.  Max=maximal. N1-drowsy; N2-stage II sleep; N3-slow wave sleep. SSS/BETS=small sharp spikes/benign epileptiform transients of sleep. HV=hyperventilation; PS=photic stimulation]    FINDINGS:      Background:  Continuity: continuous  Symmetry: symmetric  PDR: 8 Hz activity, with amplitude to 40 uV, that attenuated to eye opening.    Reactivity: present  Voltage: normal (between 20-150uV)  Anterior Posterior Gradient: present  Other background findings: none  Breach: absent    Background Slowing:  Generalized slowing: none  Focal slowing: none was present.    State Changes:   -N2 sleep transients were not recorded.    Sporadic Epileptiform Discharges:    None    Rhythmic and Periodic Patterns (RPPs):  None     Electrographic and Electroclinical seizures:  None    Other Clinical Events:  None    Activation Procedures:   -Hyperventilation was not performed.    -Photic stimulation was performed and did not elicit any abnormalities.       Artifacts:  Abundant electrode, myogenic and movement artifacts significantly limits the evaluation.    ECG:  The heart rate on single channel ECG was predominantly between 60-70 BPM.    EEG Classification / Summary:  Abnormal EEG study  Technically limited study due to abundant artifacts as above shows mild generalized background slowing.    -----------------------------------------------------------------------------------------------------    Clinical Impression:  Technically limited study shows mild diffuse/multi-focal cerebral dysfunction, not specific as to etiology.  There were no clear epileptiform abnormalities or seizures recorded.   This is a preliminary report pending attending review and attestation.    Ike Zavala MD  Fellow, [facility]      -------------------------------------------------------------------------------------------------------  [facility] EEG Reading Room Ph#: [phone]  Epilepsy Answering Service after 5PM and before 8:30AM: Ph#: [phone]   ROSSI ROJAS [facility]-[id number]     Study Date: 		12-18-23  Duration:  x22 mins    --------------------------------------------------------------------------------------------------  History:  CC/ HPI Patient is a 63y old  Female who presents with a chief complaint of AMS, HCAP, Acute Hypoxic respiratory failure (18 Dec 2023 07:40)    MEDICATIONS  (STANDING):  amitriptyline 150 milliGRAM(s) Oral daily  aspirin enteric coated 81 milliGRAM(s) Oral daily  atorvastatin 80 milliGRAM(s) Oral at bedtime  celecoxib 200 milliGRAM(s) Oral daily  dextrose 5%. 1000 milliLiter(s) (100 mL/Hr) IV Continuous <Continuous>  dextrose 5%. 1000 milliLiter(s) (50 mL/Hr) IV Continuous <Continuous>  dextrose 50% Injectable 25 Gram(s) IV Push once  dextrose 50% Injectable 12.5 Gram(s) IV Push once  dextrose 50% Injectable 25 Gram(s) IV Push once  DULoxetine 60 milliGRAM(s) Oral daily  gabapentin 600 milliGRAM(s) Oral at bedtime  glucagon  Injectable 1 milliGRAM(s) IntraMuscular once  influenza   Vaccine 0.5 milliLiter(s) IntraMuscular once  insulin lispro (ADMELOG) corrective regimen sliding scale   SubCutaneous three times a day before meals  insulin lispro (ADMELOG) corrective regimen sliding scale   SubCutaneous at bedtime  montelukast 10 milliGRAM(s) Oral daily  piperacillin/tazobactam IVPB.. 3.375 Gram(s) IV Intermittent every 8 hours  rivaroxaban 20 milliGRAM(s) Oral with dinner  verapamil  milliGRAM(s) Oral daily    --------------------------------------------------------------------------------------------------  Study Interpretation:    [Abbreviation Key:  PDR=alpha rhythm/posterior dominant rhythm. A-P=anterior posterior.  Amplitude: ‘very low’:<20; ‘low’:20-49; ‘medium’:; ‘high’:>150uV.  Persistence for periodic/rhythmic patterns (% of epoch) ‘rare’:<1%; ‘occasional’:1-10%; ‘frequent’:10-50%; ‘abundant’:50-90%; ‘continuous’:>90%.  Persistence for sporadic discharges: ‘rare’:<1/hr; ‘occasional’:1/min-1/hr; ‘frequent’:>1/min; ‘abundant’:>1/10 sec.  RPP=rhythmic and periodic patterns; GRDA=generalized rhythmic delta activity; FIRDA=frontal intermittent GRDA; LRDA=lateralized rhythmic delta activity; TIRDA=temporal intermittent rhythmic delta activity;  LPD=PLED=lateralized periodic discharges; GPD=generalized periodic discharges; BIPDs =bilateral independent periodic discharges; Mf=multifocal; SIRPDs=stimulus induced rhythmic, periodic, or ictal appearing discharges; BIRDs=brief potentially ictal rhythmic discharges >4 Hz, lasting .5-10s; PFA (paroxysmal bursts >13 Hz or =8 Hz <10s).  Modifiers: +F=with fast component; +S=with spike component; +R=with rhythmic component.  S-B=burst suppression pattern.  Max=maximal. N1-drowsy; N2-stage II sleep; N3-slow wave sleep. SSS/BETS=small sharp spikes/benign epileptiform transients of sleep. HV=hyperventilation; PS=photic stimulation]    FINDINGS:      Background:  Continuity: continuous  Symmetry: symmetric  PDR: 8 Hz activity, with amplitude to 40 uV, that attenuated to eye opening.    Reactivity: present  Voltage: normal (between 20-150uV)  Anterior Posterior Gradient: present  Other background findings: none  Breach: absent    Background Slowing:  Generalized slowing: none  Focal slowing: none was present.    State Changes:   -N2 sleep transients were not recorded.    Sporadic Epileptiform Discharges:    None    Rhythmic and Periodic Patterns (RPPs):  None     Electrographic and Electroclinical seizures:  None    Other Clinical Events:  None    Activation Procedures:   -Hyperventilation was not performed.    -Photic stimulation was performed and did not elicit any abnormalities.       Artifacts:  Abundant electrode, myogenic and movement artifacts significantly limits the evaluation.    ECG:  The heart rate on single channel ECG was predominantly between 60-70 BPM.    EEG Classification / Summary:  Abnormal EEG study  Technically limited study due to abundant artifacts as above shows mild generalized background slowing.    -----------------------------------------------------------------------------------------------------    Clinical Impression:  Technically limited study shows mild diffuse/multi-focal cerebral dysfunction, not specific as to etiology.  There were no clear epileptiform abnormalities or seizures recorded.   This is a preliminary report pending attending review and attestation.    Ike Zavala MD  Fellow, [facility]      -------------------------------------------------------------------------------------------------------  [facility] EEG Reading Room Ph#: [phone]  Epilepsy Answering Service after 5PM and before 8:30AM: Ph#: [phone]   ROSSI ROJAS [facility]-[id number]     Study Date: 12-18-23  Duration:  22 min    --------------------------------------------------------------------------------------------------  History:  CC/ HPI Patient is a 63y old  Female who presents with a chief complaint of AMS, HCAP, Acute Hypoxic respiratory failure (18 Dec 2023 07:40)    MEDICATIONS  (STANDING):  amitriptyline 150 milliGRAM(s) Oral daily  aspirin enteric coated 81 milliGRAM(s) Oral daily  atorvastatin 80 milliGRAM(s) Oral at bedtime  celecoxib 200 milliGRAM(s) Oral daily  dextrose 5%. 1000 milliLiter(s) (100 mL/Hr) IV Continuous <Continuous>  dextrose 5%. 1000 milliLiter(s) (50 mL/Hr) IV Continuous <Continuous>  dextrose 50% Injectable 25 Gram(s) IV Push once  dextrose 50% Injectable 12.5 Gram(s) IV Push once  dextrose 50% Injectable 25 Gram(s) IV Push once  DULoxetine 60 milliGRAM(s) Oral daily  gabapentin 600 milliGRAM(s) Oral at bedtime  glucagon  Injectable 1 milliGRAM(s) IntraMuscular once  influenza   Vaccine 0.5 milliLiter(s) IntraMuscular once  insulin lispro (ADMELOG) corrective regimen sliding scale   SubCutaneous three times a day before meals  insulin lispro (ADMELOG) corrective regimen sliding scale   SubCutaneous at bedtime  montelukast 10 milliGRAM(s) Oral daily  piperacillin/tazobactam IVPB.. 3.375 Gram(s) IV Intermittent every 8 hours  rivaroxaban 20 milliGRAM(s) Oral with dinner  verapamil  milliGRAM(s) Oral daily    --------------------------------------------------------------------------------------------------  Study Interpretation:    [Abbreviation Key:  PDR=alpha rhythm/posterior dominant rhythm. A-P=anterior posterior.  Amplitude: ‘very low’:<20; ‘low’:20-49; ‘medium’:; ‘high’:>150uV.  Persistence for periodic/rhythmic patterns (% of epoch) ‘rare’:<1%; ‘occasional’:1-10%; ‘frequent’:10-50%; ‘abundant’:50-90%; ‘continuous’:>90%.  Persistence for sporadic discharges: ‘rare’:<1/hr; ‘occasional’:1/min-1/hr; ‘frequent’:>1/min; ‘abundant’:>1/10 sec.  RPP=rhythmic and periodic patterns; GRDA=generalized rhythmic delta activity; FIRDA=frontal intermittent GRDA; LRDA=lateralized rhythmic delta activity; TIRDA=temporal intermittent rhythmic delta activity;  LPD=PLED=lateralized periodic discharges; GPD=generalized periodic discharges; BIPDs =bilateral independent periodic discharges; Mf=multifocal; SIRPDs=stimulus induced rhythmic, periodic, or ictal appearing discharges; BIRDs=brief potentially ictal rhythmic discharges >4 Hz, lasting .5-10s; PFA (paroxysmal bursts >13 Hz or =8 Hz <10s).  Modifiers: +F=with fast component; +S=with spike component; +R=with rhythmic component.  S-B=burst suppression pattern.  Max=maximal. N1-drowsy; N2-stage II sleep; N3-slow wave sleep. SSS/BETS=small sharp spikes/benign epileptiform transients of sleep. HV=hyperventilation; PS=photic stimulation]    FINDINGS:      Background:  Continuity: continuous  Symmetry: symmetric  PDR: 7.5-8 Hz, attenuated to eye opening.    Voltage: normal  Anterior Posterior Gradient: present  Other background findings: none  Breach: absent    Background Slowing:  Generalized slowing: as above  Focal slowing: none    State Changes:   Drowsiness and stage 2 sleep not captured.    Sporadic Epileptiform Discharges:    None    Rhythmic and Periodic Patterns (RPPs):  None     Electrographic and Electroclinical seizures:  None    Other Clinical Events:  None    Activation Procedures:   -Hyperventilation was not performed.    -Photic stimulation was performed and did not elicit any abnormalities.       Artifacts:  Frequent myogenic and movement artifacts somewhat limit interpretation.    Single-lead EKG: Regular rhythm    -----------------------------------------------------------------------------------------------------    EEG Classification / Summary:  Abnormal routine EEG in the awake state.  Mild diffuse slowing.  No epileptiform abnormalities.  Artifacts somewhat limit interpretation.    Clinical Impression:  Mild diffuse cerebral dysfunction is nonspecific in etiology.   Artifacts somewhat limit interpretation.    -------------------------------------------------------------------------------------------------------  Iek Zavala MD  Fellow, [facility] [place]    Mitra Jason MD  Attending Physician, [facility] EEG Reading Room Ph#: [phone]  Epilepsy Answering Service after 5PM and before 8:30AM: Ph#: [phone]   ROSSI ROJAS [facility]-[id number]     Study Date: 12-18-23  Duration:  22 min    --------------------------------------------------------------------------------------------------  History:  CC/ HPI Patient is a 63y old  Female who presents with a chief complaint of AMS, HCAP, Acute Hypoxic respiratory failure (18 Dec 2023 07:40)    MEDICATIONS  (STANDING):  amitriptyline 150 milliGRAM(s) Oral daily  aspirin enteric coated 81 milliGRAM(s) Oral daily  atorvastatin 80 milliGRAM(s) Oral at bedtime  celecoxib 200 milliGRAM(s) Oral daily  dextrose 5%. 1000 milliLiter(s) (100 mL/Hr) IV Continuous <Continuous>  dextrose 5%. 1000 milliLiter(s) (50 mL/Hr) IV Continuous <Continuous>  dextrose 50% Injectable 25 Gram(s) IV Push once  dextrose 50% Injectable 12.5 Gram(s) IV Push once  dextrose 50% Injectable 25 Gram(s) IV Push once  DULoxetine 60 milliGRAM(s) Oral daily  gabapentin 600 milliGRAM(s) Oral at bedtime  glucagon  Injectable 1 milliGRAM(s) IntraMuscular once  influenza   Vaccine 0.5 milliLiter(s) IntraMuscular once  insulin lispro (ADMELOG) corrective regimen sliding scale   SubCutaneous three times a day before meals  insulin lispro (ADMELOG) corrective regimen sliding scale   SubCutaneous at bedtime  montelukast 10 milliGRAM(s) Oral daily  piperacillin/tazobactam IVPB.. 3.375 Gram(s) IV Intermittent every 8 hours  rivaroxaban 20 milliGRAM(s) Oral with dinner  verapamil  milliGRAM(s) Oral daily    --------------------------------------------------------------------------------------------------  Study Interpretation:    [Abbreviation Key:  PDR=alpha rhythm/posterior dominant rhythm. A-P=anterior posterior.  Amplitude: ‘very low’:<20; ‘low’:20-49; ‘medium’:; ‘high’:>150uV.  Persistence for periodic/rhythmic patterns (% of epoch) ‘rare’:<1%; ‘occasional’:1-10%; ‘frequent’:10-50%; ‘abundant’:50-90%; ‘continuous’:>90%.  Persistence for sporadic discharges: ‘rare’:<1/hr; ‘occasional’:1/min-1/hr; ‘frequent’:>1/min; ‘abundant’:>1/10 sec.  RPP=rhythmic and periodic patterns; GRDA=generalized rhythmic delta activity; FIRDA=frontal intermittent GRDA; LRDA=lateralized rhythmic delta activity; TIRDA=temporal intermittent rhythmic delta activity;  LPD=PLED=lateralized periodic discharges; GPD=generalized periodic discharges; BIPDs =bilateral independent periodic discharges; Mf=multifocal; SIRPDs=stimulus induced rhythmic, periodic, or ictal appearing discharges; BIRDs=brief potentially ictal rhythmic discharges >4 Hz, lasting .5-10s; PFA (paroxysmal bursts >13 Hz or =8 Hz <10s).  Modifiers: +F=with fast component; +S=with spike component; +R=with rhythmic component.  S-B=burst suppression pattern.  Max=maximal. N1-drowsy; N2-stage II sleep; N3-slow wave sleep. SSS/BETS=small sharp spikes/benign epileptiform transients of sleep. HV=hyperventilation; PS=photic stimulation]    FINDINGS:      Background:  Continuity: continuous  Symmetry: symmetric  PDR: 7.5-8 Hz, attenuated to eye opening.    Voltage: normal  Anterior Posterior Gradient: present  Other background findings: none  Breach: absent    Background Slowing:  Generalized slowing: as above  Focal slowing: none    State Changes:   Drowsiness and stage 2 sleep not captured.    Sporadic Epileptiform Discharges:    None    Rhythmic and Periodic Patterns (RPPs):  None     Electrographic and Electroclinical seizures:  None    Other Clinical Events:  None    Activation Procedures:   -Hyperventilation was not performed.    -Photic stimulation was performed and did not elicit any abnormalities.       Artifacts:  Frequent myogenic and movement artifacts somewhat limit interpretation.    Single-lead EKG: Regular rhythm    -----------------------------------------------------------------------------------------------------    EEG Classification / Summary:  Abnormal routine EEG in the awake state.  Mild diffuse slowing.  No epileptiform abnormalities.  Artifacts somewhat limit interpretation.    Clinical Impression:  Mild diffuse cerebral dysfunction is nonspecific in etiology.   Artifacts somewhat limit interpretation.    -------------------------------------------------------------------------------------------------------  Ike Zavala MD  Fellow, [facility] [place]    Mitra Jason MD  Attending Physician, [facility] EEG Reading Room Ph#: [phone]  Epilepsy Answering Service after 5PM and before 8:30AM: Ph#: [phone]

## 2023-12-18 NOTE — PHYSICAL THERAPY INITIAL EVALUATION ADULT - TRANSFER TRAINING, PT EVAL
Patient will perform sit to stand transfers in 2-3 weeks with minimal assistance x 1 with rolling walker.

## 2023-12-18 NOTE — CONSULT NOTE ADULT - ASSESSMENT
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 Jose treated for URI outpatient. Hypoxic to 84% on RA,  CT head without acute pathology  CTA H/N - bilateral ICA calcifications with ectasia of proximal R ICA and L cerivcal ICA fusiform aneurysm 1.4cm, R V4 narrowing  CTP motion degraded  CXR showed Multifocal PNA    Impression:  AMS likely TME - infectious, metabolic, possible polypharmacy. 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 Jose treated for URI outpatient. Hypoxic to 84% on RA,  CT head without acute pathology  CTA H/N - bilateral ICA calcifications with ectasia of proximal R ICA and L cerivcal ICA fusiform aneurysm 1.4cm, R V4 narrowing  CTP motion degraded  CXR showed Multifocal PNA    Impression:  AMS likely TME - infectious, metabolic, possible polypharmacy. 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 Being treated for URI outpatient. Hypoxic to 84% on RA,  CT head without acute pathology  CTA H/N - bilateral ICA calcifications with ectasia of proximal R ICA and L cerivcal ICA fusiform aneurysm 1.4cm, R V4 narrowing  CTP motion degraded  CXR showed Multifocal PNA    Impression:  AMS likely TME - infectious, metabolic, possible polypharmacy. Low suspicion for neurovascular etiology, seizure  SLE  PE on xarelto    Plan:  - continue xarelto for recent PE - should likely be on lifelong AC given hx of SLE  - consider EEG, MRI if mental status does not improve with treatment of underlying infections  - would hold sedating meds to monitor mental status   - ?on gabapentin for seizures vs neuropathy (does not know name of outpt neurologist) - unsual choice, ok to continue for now but would rec outpatient followup    Sophy Liang,   Vascular Neurology  Office [phone]  63F with SLE, HTN, DM type 2, GBS, chronic left foot drop, PE on Xarelto, 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 Being treated for URI outpatient. Hypoxic to 84% on RA,  CT head without acute pathology  CTA H/N - bilateral ICA calcifications with ectasia of proximal R ICA and L cerivcal ICA fusiform aneurysm 1.4cm, R V4 narrowing  CTP motion degraded  CXR showed Multifocal PNA    Impression:  AMS likely TME - infectious, metabolic, possible polypharmacy. Low suspicion for neurovascular etiology, seizure  SLE  PE on xarelto    Plan:  - continue xarelto for recent PE - should likely be on lifelong AC given hx of SLE  - consider EEG, MRI if mental status does not improve with treatment of underlying infections  - would hold sedating meds to monitor mental status   - ?on gabapentin for seizures vs neuropathy (does not know name of outpt neurologist) - unsual choice, ok to continue for now but would rec outpatient followup    Sophy Liang,   Vascular Neurology  Office [phone]

## 2023-12-18 NOTE — SWALLOW BEDSIDE ASSESSMENT ADULT - H & P REVIEW
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

## 2023-12-18 NOTE — SWALLOW BEDSIDE ASSESSMENT ADULT - SWALLOW EVAL: PATIENT/FAMILY GOALS STATEMENT
Patient stated "My throat hesitates sometimes and it takes me a while to chew" and that since her most recent hospitalization here ~2 weeks ago, she has noticed intermittent coughing when eating/drinking.

## 2023-12-18 NOTE — PHYSICAL THERAPY INITIAL EVALUATION ADULT - GAIT TRAINING, PT EVAL
Patient will ambulate 50 feet with rolling walker with minimal assistance x 1 for household ambulation in 3-4 weeks.

## 2023-12-18 NOTE — CONSULT NOTE ADULT - SUBJECTIVE AND OBJECTIVE BOX
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  Pt lethargic rousable, but confused, oriented to self and time; not to place or situation. Hx obtained from  at bedside. Per  last evening he helped pt to the restroom and a few minutes later when he  on her she was unresponsive.  reports pt seemed to open eyes at times, however was not speaking thus he called EMS. Pt remained same way till after arrival to the ED. Per , pt recently developed URI symptoms and was started on Doxy as well as Hycodan, Alprazolam and Fioricet. Of note pt already on Percocet, flexeril for chronic pain, Gabapentin for seizures per . All of which  believes pt took last evening.     ED Course  Vitals BP 94/61, SPO2 84% on RA, rest of vitals stable. Labs sig for H/H 9.8/31.3, rest of labs stable. CT head neg for acute stroke, CT perfusion Markedly degraded by patient motion. No core infarct. Small areas of abnormal perfusion in the left frontal and right temporal lobes not confined to a vascular territory, likely artifactual. CXR showed Multifocalpneumonia.  (17 Dec 2023 09:06)       PAST MEDICAL & SURGICAL HISTORY:  Lupus      Diabetes      Hypertension      Asthma      Peripheral polyneuropathy      Pulmonary emboli  7/2019      History of hip replacement  left      H/O total hysterectomy      History of bilateral tubal ligation      S/P laminectomy with spinal fusion      History of shoulder surgery      H/O knee surgery          Allergies: Allergies    No Known Allergies    Intolerances        Social History: Denies toxic habits including tobacco, ETOH or illicit drugs.    Family History: FAMILY HISTORY:  No pertinent family history in first degree relatives    . No family history of strokes    Medications: MEDICATIONS  (STANDING):  amitriptyline 150 milliGRAM(s) Oral daily  aspirin enteric coated 81 milliGRAM(s) Oral daily  atorvastatin 80 milliGRAM(s) Oral at bedtime  celecoxib 200 milliGRAM(s) Oral daily  dextrose 5%. 1000 milliLiter(s) (100 mL/Hr) IV Continuous <Continuous>  dextrose 5%. 1000 milliLiter(s) (50 mL/Hr) IV Continuous <Continuous>  dextrose 50% Injectable 25 Gram(s) IV Push once  dextrose 50% Injectable 12.5 Gram(s) IV Push once  dextrose 50% Injectable 25 Gram(s) IV Push once  DULoxetine 60 milliGRAM(s) Oral daily  gabapentin 600 milliGRAM(s) Oral at bedtime  glucagon  Injectable 1 milliGRAM(s) IntraMuscular once  influenza   Vaccine 0.5 milliLiter(s) IntraMuscular once  insulin lispro (ADMELOG) corrective regimen sliding scale   SubCutaneous three times a day before meals  insulin lispro (ADMELOG) corrective regimen sliding scale   SubCutaneous at bedtime  montelukast 10 milliGRAM(s) Oral daily  piperacillin/tazobactam IVPB.. 3.375 Gram(s) IV Intermittent every 8 hours  rivaroxaban 20 milliGRAM(s) Oral with dinner  vancomycin  IVPB      vancomycin  IVPB 1750 milliGRAM(s) IV Intermittent every 24 hours  verapamil  milliGRAM(s) Oral daily    MEDICATIONS  (PRN):  acetaminophen     Tablet .. 650 milliGRAM(s) Oral every 6 hours PRN Temp greater or equal to 38C (100.4F), Mild Pain (1 - 3)  aluminum hydroxide/magnesium hydroxide/simethicone Suspension 30 milliLiter(s) Oral every 4 hours PRN Dyspepsia  cyclobenzaprine 5 milliGRAM(s) Oral three times a day PRN Muscle Spasm  dextrose Oral Gel 15 Gram(s) Oral once PRN Blood Glucose LESS THAN 70 milliGRAM(s)/deciliter  melatonin 3 milliGRAM(s) Oral at bedtime PRN Insomnia  ondansetron Injectable 4 milliGRAM(s) IV Push every 8 hours PRN Nausea and/or Vomiting  oxycodone    5 mG/acetaminophen 325 mG 1 Tablet(s) Oral every 4 hours PRN Severe Pain (7 - 10)      Review of Systems:  CONSTITUTIONAL:  No weight loss, fever, chills, weakness or fatigue.  HEENT:  Eyes:  No visual loss, blurred vision, double vision or yellow sclera. Ears, Nose, Throat:  No hearing loss, sneezing, congestion, runny nose or sore throat.  SKIN:  No rash or itching.  CARDIOVASCULAR:  No chest pain, chest pressure or chest discomfort. No palpitations or edema.  RESPIRATORY:  No shortness of breath, cough or sputum.  GASTROINTESTINAL:  No anorexia, nausea, vomiting or diarrhea. No abdominal pain or blood.  GENITOURINARY:  No burning on urination or incontinence   NEUROLOGICAL:  No headache, dizziness, syncope, paralysis, ataxia, numbness or tingling in the extremities. No change in bowel or bladder control. no limb weakness. no vision changes.   MUSCULOSKELETAL:  No muscle, back pain, joint pain or stiffness.  HEMATOLOGIC:  No anemia, bleeding or bruising.  LYMPHATICS:  No enlarged nodes. No history of splenectomy.  PSYCHIATRIC:  No history of depression or anxiety.  ENDOCRINOLOGIC:  No reports of sweating, cold or heat intolerance. No polyuria or polydipsia.      Vitals:  Vital Signs Last 24 Hrs  T(C): 37.8 (18 Dec 2023 06:24), Max: 38.2 (18 Dec 2023 04:46)  T(F): 100.1 (18 Dec 2023 06:24), Max: 100.7 (18 Dec 2023 04:46)  HR: 86 (18 Dec 2023 04:46) (65 - 86)  BP: 157/78 (18 Dec 2023 04:46) (104/65 - 157/78)  BP(mean): --  RR: 20 (18 Dec 2023 06:24) (13 - 20)  SpO2: 94% (18 Dec 2023 06:24) (90% - 97%)    Parameters below as of 18 Dec 2023 06:24  Patient On (Oxygen Delivery Method): nasal cannula  O2 Flow (L/min): 3      General Exam:   General Appearance: Appropriately dressed and in no acute distress       Head: Normocephalic, atraumatic and no dysmorphic features  Ear, Nose, and Throat: Moist mucous membranes  CVS: S1S2+  Resp: No SOB, no wheeze or rhonchi  GI: soft NT/ND  Extremities: No edema or cyanosis  Skin: No bruises or rashes     Neurological Exam:  Mental Status: Awake, alert and oriented x 3.  Able to follow simple and complex verbal commands. Able to name and repeat. fluent speech. No obvious aphasia or dysarthria noted.   Cranial Nerves: PERRL, EOMI, VFFC, sensation V1-V3 intact,  no obvious facial asymmetry, equal elevation of palate, scm/trap 5/5, tongue is midline on protrusion. no obvious papilledema on fundoscopic exam. hearing is grossly intact.   Motor: Normal bulk, tone and strength throughout. Fine finger movements were intact and symmetric. no tremors or drift noted.    Sensation: Intact to light touch and pinprick throughout. no right/left confusion. no extinction to tactile on DSS. Romberg was negative.   Reflexes: 1+ throughout at biceps, brachioradialis, triceps, patellars and ankles bilaterally and equal. No clonus. R toe and L toe were both downgoing.  Coordination: No dysmetria on FNF or HKS  Gait: Narrow based and steady. Able to tandem. no limitations in gait.     Data/Labs/Imaging which I personally reviewed.     Labs:     CBC Full  -  ( 17 Dec 2023 05:06 )  WBC Count : 8.89 K/uL  RBC Count : 3.66 M/uL  Hemoglobin : 9.8 g/dL  Hematocrit : 31.3 %  Platelet Count - Automated : 310 K/uL  Mean Cell Volume : 85.5 fl  Mean Cell Hemoglobin : 26.8 pg  Mean Cell Hemoglobin Concentration : 31.3 g/dL  Auto Neutrophil # : 6.85 K/uL  Auto Lymphocyte # : 1.17 K/uL  Auto Monocyte # : 0.58 K/uL  Auto Eosinophil # : 0.22 K/uL  Auto Basophil # : 0.03 K/uL  Auto Neutrophil % : 77.1 %  Auto Lymphocyte % : 13.2 %  Auto Monocyte % : 6.5 %  Auto Eosinophil % : 2.5 %  Auto Basophil % : 0.3 %    12-17    135  |  101  |  20  ----------------------------<  125<H>  4.3   |  27  |  1.21    Ca    8.5      17 Dec 2023 05:31    TPro  6.6  /  Alb  2.3<L>  /  TBili  0.3  /  DBili  x   /  AST  33  /  ALT  21  /  AlkPhos  84  12-17    LIVER FUNCTIONS - ( 17 Dec 2023 05:31 )  Alb: 2.3 g/dL / Pro: 6.6 gm/dL / ALK PHOS: 84 U/L / ALT: 21 U/L / AST: 33 U/L / GGT: x           PT/INR - ( 17 Dec 2023 05:06 )   PT: 21.2 sec;   INR: 1.81 ratio         PTT - ( 17 Dec 2023 05:06 )  PTT:40.4 sec  Urinalysis Basic - ( 17 Dec 2023 06:16 )    Color: Yellow / Appearance: Clear / SG: >1.030 / pH: x  Gluc: x / Ketone: Negative mg/dL  / Bili: Negative / Urobili: 0.2 mg/dL   Blood: x / Protein: 30 mg/dL / Nitrite: Negative   Leuk Esterase: Negative / RBC: 0-2 /HPF / WBC 0-2 /HPF   Sq Epi: x / Non Sq Epi: x / Bacteria: Occasional /HPF    < from: CT Brain Stroke Protocol (12.17.23 @ 04:51) >    ACC: [id number] EXAM:  CT BRAIN STROKE PROTOCOL   ORDERED BY: MINA CONROY     PROCEDURE DATE:  12/17/2023          INTERPRETATION:  CLINICAL INFORMATION:  Stroke Code    TECHNIQUE:  Axial CT images were acquired through the head.  Intravenouscontrast: None  Two-dimensional reformats were generated.    COMPARISON STUDY: MRI brain 11/17/2023, CTA head and neck 11/17/2023.    FINDINGS:    There is no CT evidence of acute intracranial hemorrhage, mass effect,   midline shift, or acute, largeterritorial infarct.  The ventricles and   sulci are age-appropriate in size and configuration. The basal cisterns   are patent.    The mastoid air cells and middle ear cavities are grossly clear. The   visualized paranasal sinuses are well aerated.    The calvarium and skull base are grossly intact.    IMPRESSION:  No acute intracranial hemorrhage or mass effect.    Results were discussed with Dr. Conroy by Dr. Gastelum at 4:50 AM on   12/17/2023. with read back followed.    < end of copied text >        < from: CT Angio Neck Stroke Protocol w/ IV Cont (12.17.23 @ 05:12) >    ACC: [id number] EXAM:  CT ANGIO NECK STROKE PROTCL IC   ORDERED BY: MINA CONROY     ACC: [id number] EXAM:  CT BRAIN PERFUSION MAPS STROKE   ORDERED BY: MINA CONROY     ACC: [id number] EXAM:  CT ANGIO BRAIN STROKE PROTC IC   ORDERED BY: MINA CONROY     PROCEDURE DATE:  12/17/2023          INTERPRETATION:  CT PERFUSION, CTA OF THE [place] OF [place] AND NECK:    INDICATIONS: Stroke code.    TECHNIQUE:    RAPID artificial intelligence was used for perfusion analysis and for   preliminary evaluation of intracranial hemorrhage.    CTA [place]:    After the intravenous power injection of non-ionic contrast material,   serial thin sections were obtained through the intracranial circulation   on a multislice CT scanner.  Images were reformatted using a dedicated 3D   software package and viewed on a dedicated workstation in multiple planes.    CTA NECK:    After the intravenous power injection of non-ionic contrast material,   serial thin sections were obtained through the cervical circulation on a   multislice CT scanner.  Images were reformatted using a dedicated 3D   software package and viewed on a dedicated workstation in multiple planes.      COMPARISON EXAMINATION: Noncontrast head CT performed the same day. CTA   head and neck 11/17/2023    FINDINGS:      CT RAPID PERFUSION:  Markedly degraded by patient motion.    INFARCT CORE: 0 cc    TISSUE AT RISK: 8 cc combined in the left frontal and right temporal lobes    MISMATCH RATIO: N/A      CTA [place]:    ANTERIOR CIRCULATION    ICA  CAVERNOUS, SUPRACLINOID, BIFURCATION SEGMENTS: Patent without flow   limiting stenosis. Atherosclerotic calcifications of the bilateral   cavernous ICA segments.    ANTERIOR CEREBRAL ARTERIES: Bilateral A1, anterior communicating and A2   anterior cerebral arteries are unremarkable in course and caliber without   flow limiting stenosis. Hypoplastic right A1 segment.    MIDDLE CEREBRAL ARTERIES: Patent bilateral M1, M2, and distal MCA   branches without flow limiting stenosis.    POSTERIOR CIRCULATION:    VERTEBRAL ARTERIES: Patent without flow limiting stenosis. Mild focal   narrowing of the right V4 segment.    BASILAR ARTERY: Patent no flow limiting stenosis.    POSTERIOR CEREBRAL ARTERIES: Patent without flow limiting stenosis.    CTA NECK:    GREAT VESSELS: Visualized segments are patent, no flow limiting stenosis.   Bovine aortic arch, normal variant.    COMMON CAROTID ARTERIES:  RIGHT: Patent without flow limiting stenosis  LEFT: Patent without flow limiting stenosis    CAROTID BULBS:  RIGHT: Patent without flow limiting stenosis  LEFT: Patent without flow limiting stenosis    INTERNAL CAROTID ARTERIES:  RIGHT: Patent no evidence for any hemodynamically significant stenosis at   the ICA origin by NASCET criteria. Stable mild ectasia of the proximal   cervical ICA measuring 0.9 cm in diameter. Partial retropharyngeal course.  LEFT: Patent no evidence for any hemodynamically significant stenosis at   the ICA origin by NASCET criteria. Stable fusiform aneurysmal dilatation   of the proximal cervical ICA measuring 1.4 cm in diameter and gradually   tapering in the mid segment. Partial retropharyngeal course.    VERTEBRAL ARTERIES:    RIGHT: Patent no evidence for any flow limiting stenosis.  LEFT: Patent no evidence for any flow limiting stenosis. Left dominant   vertebral system.      SOFT TISSUES: Patchy nonspecific groundglass opacities and consolidations   in the visualized upper lobes.    BONES: Multilevel degenerative changes inthe spine.    IMPRESSION:    CT PERFUSION: Markedly degraded by patient motion. No core infarct. Small   areas of abnormal perfusion in the left frontal and right temporal lobes   not confined to a vascular territory, likely artifactual.    If symptoms persist consider follow up head CT or MRI, MRA  if no   contraindication.    CTA COW:  Patent intracranial circulation without flow limiting stenosis.    CTA NECK: Patent, ECAs, ICAs, no  hemodynamically significant stenosis at    ICA origins by NASCET criteria. Stable fusiform aneurysmal dilatation of   the ICA origins/proximal segments.  Bilateral vertebral arteries are patent without flow limiting stenosis.    Patchy nonspecific groundglass and consolidations in the visualized upper   lobes.    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  Pt lethargic rousable, but confused, oriented to self and time; not to place or situation. Hx obtained from  at bedside. Per  last evening he helped pt to the restroom and a few minutes later when he  on her she was unresponsive.  reports pt seemed to open eyes at times, however was not speaking thus he called EMS. Pt remained same way till after arrival to the ED. Per , pt recently developed URI symptoms and was started on Doxy as well as Hycodan, Alprazolam and Fioricet. Of note pt already on Percocet, flexeril for chronic pain, Gabapentin for seizures per . All of which  believes pt took last evening.     ED Course  Vitals BP 94/61, SPO2 84% on RA, rest of vitals stable. Labs sig for H/H 9.8/31.3, rest of labs stable. CT head neg for acute stroke, CT perfusion Markedly degraded by patient motion. No core infarct. Small areas of abnormal perfusion in the left frontal and right temporal lobes not confined to a vascular territory, likely artifactual. CXR showed Multifocalpneumonia.  (17 Dec 2023 09:06)       PAST MEDICAL & SURGICAL HISTORY:  Lupus      Diabetes      Hypertension      Asthma      Peripheral polyneuropathy      Pulmonary emboli  7/2019      History of hip replacement  left      H/O total hysterectomy      History of bilateral tubal ligation      S/P laminectomy with spinal fusion      History of shoulder surgery      H/O knee surgery          Allergies: Allergies    No Known Allergies    Intolerances        Social History: Denies toxic habits including tobacco, ETOH or illicit drugs.    Family History: FAMILY HISTORY:  No pertinent family history in first degree relatives    . No family history of strokes    Medications: MEDICATIONS  (STANDING):  amitriptyline 150 milliGRAM(s) Oral daily  aspirin enteric coated 81 milliGRAM(s) Oral daily  atorvastatin 80 milliGRAM(s) Oral at bedtime  celecoxib 200 milliGRAM(s) Oral daily  dextrose 5%. 1000 milliLiter(s) (100 mL/Hr) IV Continuous <Continuous>  dextrose 5%. 1000 milliLiter(s) (50 mL/Hr) IV Continuous <Continuous>  dextrose 50% Injectable 25 Gram(s) IV Push once  dextrose 50% Injectable 12.5 Gram(s) IV Push once  dextrose 50% Injectable 25 Gram(s) IV Push once  DULoxetine 60 milliGRAM(s) Oral daily  gabapentin 600 milliGRAM(s) Oral at bedtime  glucagon  Injectable 1 milliGRAM(s) IntraMuscular once  influenza   Vaccine 0.5 milliLiter(s) IntraMuscular once  insulin lispro (ADMELOG) corrective regimen sliding scale   SubCutaneous three times a day before meals  insulin lispro (ADMELOG) corrective regimen sliding scale   SubCutaneous at bedtime  montelukast 10 milliGRAM(s) Oral daily  piperacillin/tazobactam IVPB.. 3.375 Gram(s) IV Intermittent every 8 hours  rivaroxaban 20 milliGRAM(s) Oral with dinner  vancomycin  IVPB      vancomycin  IVPB 1750 milliGRAM(s) IV Intermittent every 24 hours  verapamil  milliGRAM(s) Oral daily    MEDICATIONS  (PRN):  acetaminophen     Tablet .. 650 milliGRAM(s) Oral every 6 hours PRN Temp greater or equal to 38C (100.4F), Mild Pain (1 - 3)  aluminum hydroxide/magnesium hydroxide/simethicone Suspension 30 milliLiter(s) Oral every 4 hours PRN Dyspepsia  cyclobenzaprine 5 milliGRAM(s) Oral three times a day PRN Muscle Spasm  dextrose Oral Gel 15 Gram(s) Oral once PRN Blood Glucose LESS THAN 70 milliGRAM(s)/deciliter  melatonin 3 milliGRAM(s) Oral at bedtime PRN Insomnia  ondansetron Injectable 4 milliGRAM(s) IV Push every 8 hours PRN Nausea and/or Vomiting  oxycodone    5 mG/acetaminophen 325 mG 1 Tablet(s) Oral every 4 hours PRN Severe Pain (7 - 10)      Review of Systems:  CONSTITUTIONAL:  No weight loss, fever, chills, weakness or fatigue.  HEENT:  Eyes:  No visual loss, blurred vision, double vision or yellow sclera. Ears, Nose, Throat:  No hearing loss, sneezing, congestion, runny nose or sore throat.  SKIN:  No rash or itching.  CARDIOVASCULAR:  No chest pain, chest pressure or chest discomfort. No palpitations or edema.  RESPIRATORY:  No shortness of breath, cough or sputum.  GASTROINTESTINAL:  No anorexia, nausea, vomiting or diarrhea. No abdominal pain or blood.  GENITOURINARY:  No burning on urination or incontinence   NEUROLOGICAL:  No headache, dizziness, syncope, paralysis, ataxia, numbness or tingling in the extremities. No change in bowel or bladder control. no limb weakness. no vision changes.   MUSCULOSKELETAL:  No muscle, back pain, joint pain or stiffness.  HEMATOLOGIC:  No anemia, bleeding or bruising.  LYMPHATICS:  No enlarged nodes. No history of splenectomy.  PSYCHIATRIC:  No history of depression or anxiety.  ENDOCRINOLOGIC:  No reports of sweating, cold or heat intolerance. No polyuria or polydipsia.      Vitals:  Vital Signs Last 24 Hrs  T(C): 37.8 (18 Dec 2023 06:24), Max: 38.2 (18 Dec 2023 04:46)  T(F): 100.1 (18 Dec 2023 06:24), Max: 100.7 (18 Dec 2023 04:46)  HR: 86 (18 Dec 2023 04:46) (65 - 86)  BP: 157/78 (18 Dec 2023 04:46) (104/65 - 157/78)  BP(mean): --  RR: 20 (18 Dec 2023 06:24) (13 - 20)  SpO2: 94% (18 Dec 2023 06:24) (90% - 97%)    Parameters below as of 18 Dec 2023 06:24  Patient On (Oxygen Delivery Method): nasal cannula  O2 Flow (L/min): 3      General Exam:   General Appearance: Appropriately dressed and in no acute distress       Head: Normocephalic, atraumatic and no dysmorphic features  Ear, Nose, and Throat: Moist mucous membranes  CVS: S1S2+  Resp: No SOB, no wheeze or rhonchi  GI: soft NT/ND  Extremities: No edema or cyanosis  Skin: No bruises or rashes     Neurological Exam:  Mental Status: Awake, alert and oriented x 3.  Able to follow simple and complex verbal commands. Able to name and repeat. fluent speech. No obvious aphasia or dysarthria noted.   Cranial Nerves: PERRL, EOMI, VFFC, sensation V1-V3 intact,  no obvious facial asymmetry, equal elevation of palate, scm/trap 5/5, tongue is midline on protrusion. no obvious papilledema on fundoscopic exam. hearing is grossly intact.   Motor: Normal bulk, tone and strength throughout. Fine finger movements were intact and symmetric. no tremors or drift noted.    Sensation: Intact to light touch and pinprick throughout. no right/left confusion. no extinction to tactile on DSS. Romberg was negative.   Reflexes: 1+ throughout at biceps, brachioradialis, triceps, patellars and ankles bilaterally and equal. No clonus. R toe and L toe were both downgoing.  Coordination: No dysmetria on FNF or HKS  Gait: Narrow based and steady. Able to tandem. no limitations in gait.     Data/Labs/Imaging which I personally reviewed.     Labs:     CBC Full  -  ( 17 Dec 2023 05:06 )  WBC Count : 8.89 K/uL  RBC Count : 3.66 M/uL  Hemoglobin : 9.8 g/dL  Hematocrit : 31.3 %  Platelet Count - Automated : 310 K/uL  Mean Cell Volume : 85.5 fl  Mean Cell Hemoglobin : 26.8 pg  Mean Cell Hemoglobin Concentration : 31.3 g/dL  Auto Neutrophil # : 6.85 K/uL  Auto Lymphocyte # : 1.17 K/uL  Auto Monocyte # : 0.58 K/uL  Auto Eosinophil # : 0.22 K/uL  Auto Basophil # : 0.03 K/uL  Auto Neutrophil % : 77.1 %  Auto Lymphocyte % : 13.2 %  Auto Monocyte % : 6.5 %  Auto Eosinophil % : 2.5 %  Auto Basophil % : 0.3 %    12-17    135  |  101  |  20  ----------------------------<  125<H>  4.3   |  27  |  1.21    Ca    8.5      17 Dec 2023 05:31    TPro  6.6  /  Alb  2.3<L>  /  TBili  0.3  /  DBili  x   /  AST  33  /  ALT  21  /  AlkPhos  84  12-17    LIVER FUNCTIONS - ( 17 Dec 2023 05:31 )  Alb: 2.3 g/dL / Pro: 6.6 gm/dL / ALK PHOS: 84 U/L / ALT: 21 U/L / AST: 33 U/L / GGT: x           PT/INR - ( 17 Dec 2023 05:06 )   PT: 21.2 sec;   INR: 1.81 ratio         PTT - ( 17 Dec 2023 05:06 )  PTT:40.4 sec  Urinalysis Basic - ( 17 Dec 2023 06:16 )    Color: Yellow / Appearance: Clear / SG: >1.030 / pH: x  Gluc: x / Ketone: Negative mg/dL  / Bili: Negative / Urobili: 0.2 mg/dL   Blood: x / Protein: 30 mg/dL / Nitrite: Negative   Leuk Esterase: Negative / RBC: 0-2 /HPF / WBC 0-2 /HPF   Sq Epi: x / Non Sq Epi: x / Bacteria: Occasional /HPF    < from: CT Brain Stroke Protocol (12.17.23 @ 04:51) >    ACC: [id number] EXAM:  CT BRAIN STROKE PROTOCOL   ORDERED BY: MINA CONROY     PROCEDURE DATE:  12/17/2023          INTERPRETATION:  CLINICAL INFORMATION:  Stroke Code    TECHNIQUE:  Axial CT images were acquired through the head.  Intravenouscontrast: None  Two-dimensional reformats were generated.    COMPARISON STUDY: MRI brain 11/17/2023, CTA head and neck 11/17/2023.    FINDINGS:    There is no CT evidence of acute intracranial hemorrhage, mass effect,   midline shift, or acute, largeterritorial infarct.  The ventricles and   sulci are age-appropriate in size and configuration. The basal cisterns   are patent.    The mastoid air cells and middle ear cavities are grossly clear. The   visualized paranasal sinuses are well aerated.    The calvarium and skull base are grossly intact.    IMPRESSION:  No acute intracranial hemorrhage or mass effect.    Results were discussed with Dr. Conroy by Dr. Gastelum at 4:50 AM on   12/17/2023. with read back followed.    < end of copied text >        < from: CT Angio Neck Stroke Protocol w/ IV Cont (12.17.23 @ 05:12) >    ACC: [id number] EXAM:  CT ANGIO NECK STROKE PROTCL IC   ORDERED BY: MINA CONROY     ACC: [id number] EXAM:  CT BRAIN PERFUSION MAPS STROKE   ORDERED BY: MINA CONROY     ACC: [id number] EXAM:  CT ANGIO BRAIN STROKE PROTC IC   ORDERED BY: MINA CONROY     PROCEDURE DATE:  12/17/2023          INTERPRETATION:  CT PERFUSION, CTA OF THE [place] OF [place] AND NECK:    INDICATIONS: Stroke code.    TECHNIQUE:    RAPID artificial intelligence was used for perfusion analysis and for   preliminary evaluation of intracranial hemorrhage.    CTA [place] OF [place]:    After the intravenous power injection of non-ionic contrast material,   serial thin sections were obtained through the intracranial circulation   on a multislice CT scanner.  Images were reformatted using a dedicated 3D   software package and viewed on a dedicated workstation in multiple planes.    CTA NECK:    After the intravenous power injection of non-ionic contrast material,   serial thin sections were obtained through the cervical circulation on a   multislice CT scanner.  Images were reformatted using a dedicated 3D   software package and viewed on a dedicated workstation in multiple planes.      COMPARISON EXAMINATION: Noncontrast head CT performed the same day. CTA   head and neck 11/17/2023    FINDINGS:      CT RAPID PERFUSION:  Markedly degraded by patient motion.    INFARCT CORE: 0 cc    TISSUE AT RISK: 8 cc combined in the left frontal and right temporal lobes    MISMATCH RATIO: N/A      CTA [place] [facility] [place]:    ANTERIOR CIRCULATION    ICA  CAVERNOUS, SUPRACLINOID, BIFURCATION SEGMENTS: Patent without flow   limiting stenosis. Atherosclerotic calcifications of the bilateral   cavernous ICA segments.    ANTERIOR CEREBRAL ARTERIES: Bilateral A1, anterior communicating and A2   anterior cerebral arteries are unremarkable in course and caliber without   flow limiting stenosis. Hypoplastic right A1 segment.    MIDDLE CEREBRAL ARTERIES: Patent bilateral M1, M2, and distal MCA   branches without flow limiting stenosis.    POSTERIOR CIRCULATION:    VERTEBRAL ARTERIES: Patent without flow limiting stenosis. Mild focal   narrowing of the right V4 segment.    BASILAR ARTERY: Patent no flow limiting stenosis.    POSTERIOR CEREBRAL ARTERIES: Patent without flow limiting stenosis.    CTA NECK:    GREAT VESSELS: Visualized segments are patent, no flow limiting stenosis.   Bovine aortic arch, normal variant.    COMMON CAROTID ARTERIES:  RIGHT: Patent without flow limiting stenosis  LEFT: Patent without flow limiting stenosis    CAROTID BULBS:  RIGHT: Patent without flow limiting stenosis  LEFT: Patent without flow limiting stenosis    INTERNAL CAROTID ARTERIES:  RIGHT: Patent no evidence for any hemodynamically significant stenosis at   the ICA origin by NASCET criteria. Stable mild ectasia of the proximal   cervical ICA measuring 0.9 cm in diameter. Partial retropharyngeal course.  LEFT: Patent no evidence for any hemodynamically significant stenosis at   the ICA origin by NASCET criteria. Stable fusiform aneurysmal dilatation   of the proximal cervical ICA measuring 1.4 cm in diameter and gradually   tapering in the mid segment. Partial retropharyngeal course.    VERTEBRAL ARTERIES:    RIGHT: Patent no evidence for any flow limiting stenosis.  LEFT: Patent no evidence for any flow limiting stenosis. Left dominant   vertebral system.      SOFT TISSUES: Patchy nonspecific groundglass opacities and consolidations   in the visualized upper lobes.    BONES: Multilevel degenerative changes inthe spine.    IMPRESSION:    CT PERFUSION: Markedly degraded by patient motion. No core infarct. Small   areas of abnormal perfusion in the left frontal and right temporal lobes   not confined to a vascular territory, likely artifactual.    If symptoms persist consider follow up head CT or MRI, MRA  if no   contraindication.    CTA COW:  Patent intracranial circulation without flow limiting stenosis.    CTA NECK: Patent, ECAs, ICAs, no  hemodynamically significant stenosis at    ICA origins by NASCET criteria. Stable fusiform aneurysmal dilatation of   the ICA origins/proximal segments.  Bilateral vertebral arteries are patent without flow limiting stenosis.    Patchy nonspecific groundglass and consolidations in the visualized upper   lobes.    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  Pt lethargic rousable, but confused, oriented to self and time; not to place or situation. Hx obtained from  at bedside. Per  last evening he helped pt to the restroom and a few minutes later when he  on her she was unresponsive.  reports pt seemed to open eyes at times, however was not speaking thus he called EMS. Pt remained same way till after arrival to the ED. Per , pt recently developed URI symptoms and was started on Doxy as well as Hycodan, Alprazolam and Fioricet. Of note pt already on Percocet, flexeril for chronic pain, Gabapentin for seizures per . All of which  believes pt took last evening.     ED Course  Vitals BP 94/61, SPO2 84% on RA, rest of vitals stable. Labs sig for H/H 9.8/31.3, rest of labs stable. CT head neg for acute stroke, CT perfusion Markedly degraded by patient motion. No core infarct. Small areas of abnormal perfusion in the left frontal and right temporal lobes not confined to a vascular territory, likely artifactual. CXR showed Multifocalpneumonia.  (17 Dec 2023 09:06)       PAST MEDICAL & SURGICAL HISTORY:  Lupus      Diabetes      Hypertension      Asthma      Peripheral polyneuropathy      Pulmonary emboli  7/2019      History of hip replacement  left      H/O total hysterectomy      History of bilateral tubal ligation      S/P laminectomy with spinal fusion      History of shoulder surgery      H/O knee surgery          Allergies: Allergies    No Known Allergies    Intolerances        Social History: Denies toxic habits including tobacco, ETOH or illicit drugs.    Family History: FAMILY HISTORY:  No pertinent family history in first degree relatives    . No family history of strokes    Medications: MEDICATIONS  (STANDING):  amitriptyline 150 milliGRAM(s) Oral daily  aspirin enteric coated 81 milliGRAM(s) Oral daily  atorvastatin 80 milliGRAM(s) Oral at bedtime  celecoxib 200 milliGRAM(s) Oral daily  dextrose 5%. 1000 milliLiter(s) (100 mL/Hr) IV Continuous <Continuous>  dextrose 5%. 1000 milliLiter(s) (50 mL/Hr) IV Continuous <Continuous>  dextrose 50% Injectable 25 Gram(s) IV Push once  dextrose 50% Injectable 12.5 Gram(s) IV Push once  dextrose 50% Injectable 25 Gram(s) IV Push once  DULoxetine 60 milliGRAM(s) Oral daily  gabapentin 600 milliGRAM(s) Oral at bedtime  glucagon  Injectable 1 milliGRAM(s) IntraMuscular once  influenza   Vaccine 0.5 milliLiter(s) IntraMuscular once  insulin lispro (ADMELOG) corrective regimen sliding scale   SubCutaneous three times a day before meals  insulin lispro (ADMELOG) corrective regimen sliding scale   SubCutaneous at bedtime  montelukast 10 milliGRAM(s) Oral daily  piperacillin/tazobactam IVPB.. 3.375 Gram(s) IV Intermittent every 8 hours  rivaroxaban 20 milliGRAM(s) Oral with dinner  vancomycin  IVPB      vancomycin  IVPB 1750 milliGRAM(s) IV Intermittent every 24 hours  verapamil  milliGRAM(s) Oral daily    MEDICATIONS  (PRN):  acetaminophen     Tablet .. 650 milliGRAM(s) Oral every 6 hours PRN Temp greater or equal to 38C (100.4F), Mild Pain (1 - 3)  aluminum hydroxide/magnesium hydroxide/simethicone Suspension 30 milliLiter(s) Oral every 4 hours PRN Dyspepsia  cyclobenzaprine 5 milliGRAM(s) Oral three times a day PRN Muscle Spasm  dextrose Oral Gel 15 Gram(s) Oral once PRN Blood Glucose LESS THAN 70 milliGRAM(s)/deciliter  melatonin 3 milliGRAM(s) Oral at bedtime PRN Insomnia  ondansetron Injectable 4 milliGRAM(s) IV Push every 8 hours PRN Nausea and/or Vomiting  oxycodone    5 mG/acetaminophen 325 mG 1 Tablet(s) Oral every 4 hours PRN Severe Pain (7 - 10)      Review of Systems:  CONSTITUTIONAL:  No weight loss, fever, chills, weakness or fatigue.  HEENT:  Eyes:  No visual loss, blurred vision, double vision or yellow sclera. Ears, Nose, Throat:  No hearing loss, sneezing, congestion, runny nose or sore throat.  SKIN:  No rash or itching.  CARDIOVASCULAR:  No chest pain, chest pressure or chest discomfort. No palpitations or edema.  RESPIRATORY:  No shortness of breath, cough or sputum.  GASTROINTESTINAL:  No anorexia, nausea, vomiting or diarrhea. No abdominal pain or blood.  GENITOURINARY:  No burning on urination or incontinence   NEUROLOGICAL:  No headache, dizziness, syncope, paralysis, ataxia, numbness or tingling in the extremities. No change in bowel or bladder control. no limb weakness. no vision changes.   MUSCULOSKELETAL:  No muscle, back pain, joint pain or stiffness.  HEMATOLOGIC:  No anemia, bleeding or bruising.  LYMPHATICS:  No enlarged nodes. No history of splenectomy.  PSYCHIATRIC:  No history of depression or anxiety.  ENDOCRINOLOGIC:  No reports of sweating, cold or heat intolerance. No polyuria or polydipsia.      Vitals:  Vital Signs Last 24 Hrs  T(C): 37.8 (18 Dec 2023 06:24), Max: 38.2 (18 Dec 2023 04:46)  T(F): 100.1 (18 Dec 2023 06:24), Max: 100.7 (18 Dec 2023 04:46)  HR: 86 (18 Dec 2023 04:46) (65 - 86)  BP: 157/78 (18 Dec 2023 04:46) (104/65 - 157/78)  BP(mean): --  RR: 20 (18 Dec 2023 06:24) (13 - 20)  SpO2: 94% (18 Dec 2023 06:24) (90% - 97%)    Parameters below as of 18 Dec 2023 06:24  Patient On (Oxygen Delivery Method): nasal cannula  O2 Flow (L/min): 3      General Exam:   General Appearance: Appropriately dressed and in no acute distress       Head: Normocephalic, atraumatic and no dysmorphic features  Ear, Nose, and Throat: Moist mucous membranes  CVS: S1S2+  Resp: No SOB, no wheeze or rhonchi  GI: soft NT/ND  Extremities: No edema or cyanosis  Skin: No bruises or rashes     Neurological Exam:  Mental Status: Awake, alert and oriented x 3.  Able to follow simple verbal commands. Able to name and repeat. Speech is fluent but slow  Cranial Nerves: PERRL, EOMI, VFFC, sensation V1-V3 intact,  no obvious facial asymmetry, equal elevation of palate, scm/trap 5/5, tongue is midline on protrusion.. hearing is grossly intact.   Motor: Normal bulk, tone. JIANG, LLE slightly weaker but poor efffort  Sensation: Intact to light touch and pinprick throughout. no right/left confusion. no extinction to tactile on DSS.   Reflexes: 1+ throughout at biceps, brachioradialis, triceps, patellars and ankles bilaterally and equal. No clonus. R toe and L toe were both downgoing.  Coordination: No dysmetria on FNF   Gait: deferred    Data/Labs/Imaging which I personally reviewed.     Labs:     CBC Full  -  ( 17 Dec 2023 05:06 )  WBC Count : 8.89 K/uL  RBC Count : 3.66 M/uL  Hemoglobin : 9.8 g/dL  Hematocrit : 31.3 %  Platelet Count - Automated : 310 K/uL  Mean Cell Volume : 85.5 fl  Mean Cell Hemoglobin : 26.8 pg  Mean Cell Hemoglobin Concentration : 31.3 g/dL  Auto Neutrophil # : 6.85 K/uL  Auto Lymphocyte # : 1.17 K/uL  Auto Monocyte # : 0.58 K/uL  Auto Eosinophil # : 0.22 K/uL  Auto Basophil # : 0.03 K/uL  Auto Neutrophil % : 77.1 %  Auto Lymphocyte % : 13.2 %  Auto Monocyte % : 6.5 %  Auto Eosinophil % : 2.5 %  Auto Basophil % : 0.3 %    12-17    135  |  101  |  20  ----------------------------<  125<H>  4.3   |  27  |  1.21    Ca    8.5      17 Dec 2023 05:31    TPro  6.6  /  Alb  2.3<L>  /  TBili  0.3  /  DBili  x   /  AST  33  /  ALT  21  /  AlkPhos  84  12-17    LIVER FUNCTIONS - ( 17 Dec 2023 05:31 )  Alb: 2.3 g/dL / Pro: 6.6 gm/dL / ALK PHOS: 84 U/L / ALT: 21 U/L / AST: 33 U/L / GGT: x           PT/INR - ( 17 Dec 2023 05:06 )   PT: 21.2 sec;   INR: 1.81 ratio         PTT - ( 17 Dec 2023 05:06 )  PTT:40.4 sec  Urinalysis Basic - ( 17 Dec 2023 06:16 )    Color: Yellow / Appearance: Clear / SG: >1.030 / pH: x  Gluc: x / Ketone: Negative mg/dL  / Bili: Negative / Urobili: 0.2 mg/dL   Blood: x / Protein: 30 mg/dL / Nitrite: Negative   Leuk Esterase: Negative / RBC: 0-2 /HPF / WBC 0-2 /HPF   Sq Epi: x / Non Sq Epi: x / Bacteria: Occasional /HPF    < from: CT Brain Stroke Protocol (12.17.23 @ 04:51) >    ACC: [id number] EXAM:  CT BRAIN STROKE PROTOCOL   ORDERED BY: MINA CONROY     PROCEDURE DATE:  12/17/2023          INTERPRETATION:  CLINICAL INFORMATION:  Stroke Code    TECHNIQUE:  Axial CT images were acquired through the head.  Intravenouscontrast: None  Two-dimensional reformats were generated.    COMPARISON STUDY: MRI brain 11/17/2023, CTA head and neck 11/17/2023.    FINDINGS:    There is no CT evidence of acute intracranial hemorrhage, mass effect,   midline shift, or acute, largeterritorial infarct.  The ventricles and   sulci are age-appropriate in size and configuration. The basal cisterns   are patent.    The mastoid air cells and middle ear cavities are grossly clear. The   visualized paranasal sinuses are well aerated.    The calvarium and skull base are grossly intact.    IMPRESSION:  No acute intracranial hemorrhage or mass effect.    Results were discussed with Dr. Conroy by Dr. Gastelum at 4:50 AM on   12/17/2023. with read back followed.    < end of copied text >        < from: CT Angio Neck Stroke Protocol w/ IV Cont (12.17.23 @ 05:12) >    ACC: [id number] EXAM:  CT ANGIO NECK STROKE PROTCL IC   ORDERED BY: MINA CONROY     ACC: [id number] EXAM:  CT BRAIN PERFUSION MAPS STROKE   ORDERED BY: MINA CONROY     ACC: [id number] EXAM:  CT ANGIO BRAIN STROKE PROTC IC   ORDERED BY: MINA CONROY     PROCEDURE DATE:  12/17/2023          INTERPRETATION:  CT PERFUSION, CTA OF THE [place] [facility] [place] AND NECK:    INDICATIONS: Stroke code.    TECHNIQUE:    RAPID artificial intelligence was used for perfusion analysis and for   preliminary evaluation of intracranial hemorrhage.    CTA [place] [facility] [place]:    After the intravenous power injection of non-ionic contrast material,   serial thin sections were obtained through the intracranial circulation   on a multislice CT scanner.  Images were reformatted using a dedicated 3D   software package and viewed on a dedicated workstation in multiple planes.    CTA NECK:    After the intravenous power injection of non-ionic contrast material,   serial thin sections were obtained through the cervical circulation on a   multislice CT scanner.  Images were reformatted using a dedicated 3D   software package and viewed on a dedicated workstation in multiple planes.      COMPARISON EXAMINATION: Noncontrast head CT performed the same day. CTA   head and neck 11/17/2023    FINDINGS:      CT RAPID PERFUSION:  Markedly degraded by patient motion.    INFARCT CORE: 0 cc    TISSUE AT RISK: 8 cc combined in the left frontal and right temporal lobes    MISMATCH RATIO: N/A      CTA [place] [facility] [place]:    ANTERIOR CIRCULATION    ICA  CAVERNOUS, SUPRACLINOID, BIFURCATION SEGMENTS: Patent without flow   limiting stenosis. Atherosclerotic calcifications of the bilateral   cavernous ICA segments.    ANTERIOR CEREBRAL ARTERIES: Bilateral A1, anterior communicating and A2   anterior cerebral arteries are unremarkable in course and caliber without   flow limiting stenosis. Hypoplastic right A1 segment.    MIDDLE CEREBRAL ARTERIES: Patent bilateral M1, M2, and distal MCA   branches without flow limiting stenosis.    POSTERIOR CIRCULATION:    VERTEBRAL ARTERIES: Patent without flow limiting stenosis. Mild focal   narrowing of the right V4 segment.    BASILAR ARTERY: Patent no flow limiting stenosis.    POSTERIOR CEREBRAL ARTERIES: Patent without flow limiting stenosis.    CTA NECK:    GREAT VESSELS: Visualized segments are patent, no flow limiting stenosis.   Bovine aortic arch, normal variant.    COMMON CAROTID ARTERIES:  RIGHT: Patent without flow limiting stenosis  LEFT: Patent without flow limiting stenosis    CAROTID BULBS:  RIGHT: Patent without flow limiting stenosis  LEFT: Patent without flow limiting stenosis    INTERNAL CAROTID ARTERIES:  RIGHT: Patent no evidence for any hemodynamically significant stenosis at   the ICA origin by NASCET criteria. Stable mild ectasia of the proximal   cervical ICA measuring 0.9 cm in diameter. Partial retropharyngeal course.  LEFT: Patent no evidence for any hemodynamically significant stenosis at   the ICA origin by NASCET criteria. Stable fusiform aneurysmal dilatation   of the proximal cervical ICA measuring 1.4 cm in diameter and gradually   tapering in the mid segment. Partial retropharyngeal course.    VERTEBRAL ARTERIES:    RIGHT: Patent no evidence for any flow limiting stenosis.  LEFT: Patent no evidence for any flow limiting stenosis. Left dominant   vertebral system.      SOFT TISSUES: Patchy nonspecific groundglass opacities and consolidations   in the visualized upper lobes.    BONES: Multilevel degenerative changes inthe spine.    IMPRESSION:    CT PERFUSION: Markedly degraded by patient motion. No core infarct. Small   areas of abnormal perfusion in the left frontal and right temporal lobes   not confined to a vascular territory, likely artifactual.    If symptoms persist consider follow up head CT or MRI, MRA  if no   contraindication.    CTA COW:  Patent intracranial circulation without flow limiting stenosis.    CTA NECK: Patent, ECAs, ICAs, no  hemodynamically significant stenosis at    ICA origins by NASCET criteria. Stable fusiform aneurysmal dilatation of   the ICA origins/proximal segments.  Bilateral vertebral arteries are patent without flow limiting stenosis.    Patchy nonspecific groundglass and consolidations in the visualized upper   lobes.    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  Pt lethargic rousable, but confused, oriented to self and time; not to place or situation. Hx obtained from  at bedside. Per  last evening he helped pt to the restroom and a few minutes later when he  on her she was unresponsive.  reports pt seemed to open eyes at times, however was not speaking thus he called EMS. Pt remained same way till after arrival to the ED. Per , pt recently developed URI symptoms and was started on Doxy as well as Hycodan, Alprazolam and Fioricet. Of note pt already on Percocet, flexeril for chronic pain, Gabapentin for seizures per . All of which  believes pt took last evening.     ED Course  Vitals BP 94/61, SPO2 84% on RA, rest of vitals stable. Labs sig for H/H 9.8/31.3, rest of labs stable. CT head neg for acute stroke, CT perfusion Markedly degraded by patient motion. No core infarct. Small areas of abnormal perfusion in the left frontal and right temporal lobes not confined to a vascular territory, likely artifactual. CXR showed Multifocalpneumonia.  (17 Dec 2023 09:06)       PAST MEDICAL & SURGICAL HISTORY:  Lupus      Diabetes      Hypertension      Asthma      Peripheral polyneuropathy      Pulmonary emboli  7/2019      History of hip replacement  left      H/O total hysterectomy      History of bilateral tubal ligation      S/P laminectomy with spinal fusion      History of shoulder surgery      H/O knee surgery          Allergies: Allergies    No Known Allergies    Intolerances        Social History: Denies toxic habits including tobacco, ETOH or illicit drugs.    Family History: FAMILY HISTORY:  No pertinent family history in first degree relatives    . No family history of strokes    Medications: MEDICATIONS  (STANDING):  amitriptyline 150 milliGRAM(s) Oral daily  aspirin enteric coated 81 milliGRAM(s) Oral daily  atorvastatin 80 milliGRAM(s) Oral at bedtime  celecoxib 200 milliGRAM(s) Oral daily  dextrose 5%. 1000 milliLiter(s) (100 mL/Hr) IV Continuous <Continuous>  dextrose 5%. 1000 milliLiter(s) (50 mL/Hr) IV Continuous <Continuous>  dextrose 50% Injectable 25 Gram(s) IV Push once  dextrose 50% Injectable 12.5 Gram(s) IV Push once  dextrose 50% Injectable 25 Gram(s) IV Push once  DULoxetine 60 milliGRAM(s) Oral daily  gabapentin 600 milliGRAM(s) Oral at bedtime  glucagon  Injectable 1 milliGRAM(s) IntraMuscular once  influenza   Vaccine 0.5 milliLiter(s) IntraMuscular once  insulin lispro (ADMELOG) corrective regimen sliding scale   SubCutaneous three times a day before meals  insulin lispro (ADMELOG) corrective regimen sliding scale   SubCutaneous at bedtime  montelukast 10 milliGRAM(s) Oral daily  piperacillin/tazobactam IVPB.. 3.375 Gram(s) IV Intermittent every 8 hours  rivaroxaban 20 milliGRAM(s) Oral with dinner  vancomycin  IVPB      vancomycin  IVPB 1750 milliGRAM(s) IV Intermittent every 24 hours  verapamil  milliGRAM(s) Oral daily    MEDICATIONS  (PRN):  acetaminophen     Tablet .. 650 milliGRAM(s) Oral every 6 hours PRN Temp greater or equal to 38C (100.4F), Mild Pain (1 - 3)  aluminum hydroxide/magnesium hydroxide/simethicone Suspension 30 milliLiter(s) Oral every 4 hours PRN Dyspepsia  cyclobenzaprine 5 milliGRAM(s) Oral three times a day PRN Muscle Spasm  dextrose Oral Gel 15 Gram(s) Oral once PRN Blood Glucose LESS THAN 70 milliGRAM(s)/deciliter  melatonin 3 milliGRAM(s) Oral at bedtime PRN Insomnia  ondansetron Injectable 4 milliGRAM(s) IV Push every 8 hours PRN Nausea and/or Vomiting  oxycodone    5 mG/acetaminophen 325 mG 1 Tablet(s) Oral every 4 hours PRN Severe Pain (7 - 10)      Review of Systems:  CONSTITUTIONAL:  No weight loss, fever, chills, weakness or fatigue.  HEENT:  Eyes:  No visual loss, blurred vision, double vision or yellow sclera. Ears, Nose, Throat:  No hearing loss, sneezing, congestion, runny nose or sore throat.  SKIN:  No rash or itching.  CARDIOVASCULAR:  No chest pain, chest pressure or chest discomfort. No palpitations or edema.  RESPIRATORY:  No shortness of breath, cough or sputum.  GASTROINTESTINAL:  No anorexia, nausea, vomiting or diarrhea. No abdominal pain or blood.  GENITOURINARY:  No burning on urination or incontinence   NEUROLOGICAL:  No headache, dizziness, syncope, paralysis, ataxia, numbness or tingling in the extremities. No change in bowel or bladder control. no limb weakness. no vision changes.   MUSCULOSKELETAL:  No muscle, back pain, joint pain or stiffness.  HEMATOLOGIC:  No anemia, bleeding or bruising.  LYMPHATICS:  No enlarged nodes. No history of splenectomy.  PSYCHIATRIC:  No history of depression or anxiety.  ENDOCRINOLOGIC:  No reports of sweating, cold or heat intolerance. No polyuria or polydipsia.      Vitals:  Vital Signs Last 24 Hrs  T(C): 37.8 (18 Dec 2023 06:24), Max: 38.2 (18 Dec 2023 04:46)  T(F): 100.1 (18 Dec 2023 06:24), Max: 100.7 (18 Dec 2023 04:46)  HR: 86 (18 Dec 2023 04:46) (65 - 86)  BP: 157/78 (18 Dec 2023 04:46) (104/65 - 157/78)  BP(mean): --  RR: 20 (18 Dec 2023 06:24) (13 - 20)  SpO2: 94% (18 Dec 2023 06:24) (90% - 97%)    Parameters below as of 18 Dec 2023 06:24  Patient On (Oxygen Delivery Method): nasal cannula  O2 Flow (L/min): 3      General Exam:   General Appearance: Appropriately dressed and in no acute distress       Head: Normocephalic, atraumatic and no dysmorphic features  Ear, Nose, and Throat: Moist mucous membranes  CVS: S1S2+  Resp: No SOB, no wheeze or rhonchi  GI: soft NT/ND  Extremities: No edema or cyanosis  Skin: No bruises or rashes     Neurological Exam:  Mental Status: Awake, alert and oriented x 3.  Able to follow simple verbal commands. Able to name and repeat. Speech is fluent but slow  Cranial Nerves: PERRL, EOMI, VFFC, sensation V1-V3 intact,  no obvious facial asymmetry, equal elevation of palate, scm/trap 5/5, tongue is midline on protrusion.. hearing is grossly intact.   Motor: Normal bulk, tone. JIANG, LLE slightly weaker but poor efffort  Sensation: Intact to light touch and pinprick throughout. no right/left confusion. no extinction to tactile on DSS.   Reflexes: 1+ throughout at biceps, brachioradialis, triceps, patellars and ankles bilaterally and equal. No clonus. R toe and L toe were both downgoing.  Coordination: No dysmetria on FNF   Gait: deferred    Data/Labs/Imaging which I personally reviewed.     Labs:     CBC Full  -  ( 17 Dec 2023 05:06 )  WBC Count : 8.89 K/uL  RBC Count : 3.66 M/uL  Hemoglobin : 9.8 g/dL  Hematocrit : 31.3 %  Platelet Count - Automated : 310 K/uL  Mean Cell Volume : 85.5 fl  Mean Cell Hemoglobin : 26.8 pg  Mean Cell Hemoglobin Concentration : 31.3 g/dL  Auto Neutrophil # : 6.85 K/uL  Auto Lymphocyte # : 1.17 K/uL  Auto Monocyte # : 0.58 K/uL  Auto Eosinophil # : 0.22 K/uL  Auto Basophil # : 0.03 K/uL  Auto Neutrophil % : 77.1 %  Auto Lymphocyte % : 13.2 %  Auto Monocyte % : 6.5 %  Auto Eosinophil % : 2.5 %  Auto Basophil % : 0.3 %    12-17    135  |  101  |  20  ----------------------------<  125<H>  4.3   |  27  |  1.21    Ca    8.5      17 Dec 2023 05:31    TPro  6.6  /  Alb  2.3<L>  /  TBili  0.3  /  DBili  x   /  AST  33  /  ALT  21  /  AlkPhos  84  12-17    LIVER FUNCTIONS - ( 17 Dec 2023 05:31 )  Alb: 2.3 g/dL / Pro: 6.6 gm/dL / ALK PHOS: 84 U/L / ALT: 21 U/L / AST: 33 U/L / GGT: x           PT/INR - ( 17 Dec 2023 05:06 )   PT: 21.2 sec;   INR: 1.81 ratio         PTT - ( 17 Dec 2023 05:06 )  PTT:40.4 sec  Urinalysis Basic - ( 17 Dec 2023 06:16 )    Color: Yellow / Appearance: Clear / SG: >1.030 / pH: x  Gluc: x / Ketone: Negative mg/dL  / Bili: Negative / Urobili: 0.2 mg/dL   Blood: x / Protein: 30 mg/dL / Nitrite: Negative   Leuk Esterase: Negative / RBC: 0-2 /HPF / WBC 0-2 /HPF   Sq Epi: x / Non Sq Epi: x / Bacteria: Occasional /HPF    < from: CT Brain Stroke Protocol (12.17.23 @ 04:51) >    ACC: [id number] EXAM:  CT BRAIN STROKE PROTOCOL   ORDERED BY: MINA CONROY     PROCEDURE DATE:  12/17/2023          INTERPRETATION:  CLINICAL INFORMATION:  Stroke Code    TECHNIQUE:  Axial CT images were acquired through the head.  Intravenouscontrast: None  Two-dimensional reformats were generated.    COMPARISON STUDY: MRI brain 11/17/2023, CTA head and neck 11/17/2023.    FINDINGS:    There is no CT evidence of acute intracranial hemorrhage, mass effect,   midline shift, or acute, largeterritorial infarct.  The ventricles and   sulci are age-appropriate in size and configuration. The basal cisterns   are patent.    The mastoid air cells and middle ear cavities are grossly clear. The   visualized paranasal sinuses are well aerated.    The calvarium and skull base are grossly intact.    IMPRESSION:  No acute intracranial hemorrhage or mass effect.    Results were discussed with Dr. Conroy by Dr. Gastelum at 4:50 AM on   12/17/2023. with read back followed.    < end of copied text >        < from: CT Angio Neck Stroke Protocol w/ IV Cont (12.17.23 @ 05:12) >    ACC: [id number] EXAM:  CT ANGIO NECK STROKE PROTCL IC   ORDERED BY: MINA CONROY     ACC: [id number] EXAM:  CT BRAIN PERFUSION MAPS STROKE   ORDERED BY: MINA CONROY     ACC: [id number] EXAM:  CT ANGIO BRAIN STROKE PROTC IC   ORDERED BY: MINA CONROY     PROCEDURE DATE:  12/17/2023          INTERPRETATION:  CT PERFUSION, CTA OF THE [place] AND NECK:    INDICATIONS: Stroke code.    TECHNIQUE:    RAPID artificial intelligence was used for perfusion analysis and for   preliminary evaluation of intracranial hemorrhage.    CTA [place]:    After the intravenous power injection of non-ionic contrast material,   serial thin sections were obtained through the intracranial circulation   on a multislice CT scanner.  Images were reformatted using a dedicated 3D   software package and viewed on a dedicated workstation in multiple planes.    CTA NECK:    After the intravenous power injection of non-ionic contrast material,   serial thin sections were obtained through the cervical circulation on a   multislice CT scanner.  Images were reformatted using a dedicated 3D   software package and viewed on a dedicated workstation in multiple planes.      COMPARISON EXAMINATION: Noncontrast head CT performed the same day. CTA   head and neck 11/17/2023    FINDINGS:      CT RAPID PERFUSION:  Markedly degraded by patient motion.    INFARCT CORE: 0 cc    TISSUE AT RISK: 8 cc combined in the left frontal and right temporal lobes    MISMATCH RATIO: N/A      CTA [place]:    ANTERIOR CIRCULATION    ICA  CAVERNOUS, SUPRACLINOID, BIFURCATION SEGMENTS: Patent without flow   limiting stenosis. Atherosclerotic calcifications of the bilateral   cavernous ICA segments.    ANTERIOR CEREBRAL ARTERIES: Bilateral A1, anterior communicating and A2   anterior cerebral arteries are unremarkable in course and caliber without   flow limiting stenosis. Hypoplastic right A1 segment.    MIDDLE CEREBRAL ARTERIES: Patent bilateral M1, M2, and distal MCA   branches without flow limiting stenosis.    POSTERIOR CIRCULATION:    VERTEBRAL ARTERIES: Patent without flow limiting stenosis. Mild focal   narrowing of the right V4 segment.    BASILAR ARTERY: Patent no flow limiting stenosis.    POSTERIOR CEREBRAL ARTERIES: Patent without flow limiting stenosis.    CTA NECK:    GREAT VESSELS: Visualized segments are patent, no flow limiting stenosis.   Bovine aortic arch, normal variant.    COMMON CAROTID ARTERIES:  RIGHT: Patent without flow limiting stenosis  LEFT: Patent without flow limiting stenosis    CAROTID BULBS:  RIGHT: Patent without flow limiting stenosis  LEFT: Patent without flow limiting stenosis    INTERNAL CAROTID ARTERIES:  RIGHT: Patent no evidence for any hemodynamically significant stenosis at   the ICA origin by NASCET criteria. Stable mild ectasia of the proximal   cervical ICA measuring 0.9 cm in diameter. Partial retropharyngeal course.  LEFT: Patent no evidence for any hemodynamically significant stenosis at   the ICA origin by NASCET criteria. Stable fusiform aneurysmal dilatation   of the proximal cervical ICA measuring 1.4 cm in diameter and gradually   tapering in the mid segment. Partial retropharyngeal course.    VERTEBRAL ARTERIES:    RIGHT: Patent no evidence for any flow limiting stenosis.  LEFT: Patent no evidence for any flow limiting stenosis. Left dominant   vertebral system.      SOFT TISSUES: Patchy nonspecific groundglass opacities and consolidations   in the visualized upper lobes.    BONES: Multilevel degenerative changes inthe spine.    IMPRESSION:    CT PERFUSION: Markedly degraded by patient motion. No core infarct. Small   areas of abnormal perfusion in the left frontal and right temporal lobes   not confined to a vascular territory, likely artifactual.    If symptoms persist consider follow up head CT or MRI, MRA  if no   contraindication.    CTA COW:  Patent intracranial circulation without flow limiting stenosis.    CTA NECK: Patent, ECAs, ICAs, no  hemodynamically significant stenosis at    ICA origins by NASCET criteria. Stable fusiform aneurysmal dilatation of   the ICA origins/proximal segments.  Bilateral vertebral arteries are patent without flow limiting stenosis.    Patchy nonspecific groundglass and consolidations in the visualized upper   lobes.    --- End of Report ---      < end of copied text >       None

## 2023-12-18 NOTE — PROGRESS NOTE ADULT - SUBJECTIVE AND OBJECTIVE BOX
Patient is a 63y old  Female who presents with a chief complaint of AMS, HCAP, Acute Hypoxic respiratory failure (18 Dec 2023 07:40)      SUBJECTIVE / OVERNIGHT EVENTS: Patient seen and examined at bedside. No acute events overnight. AOx3. Seems to be back at baseline mentation. No complaints, feels okay.    MEDICATIONS  (STANDING):  amitriptyline 150 milliGRAM(s) Oral daily  aspirin enteric coated 81 milliGRAM(s) Oral daily  atorvastatin 80 milliGRAM(s) Oral at bedtime  celecoxib 200 milliGRAM(s) Oral daily  dextrose 5%. 1000 milliLiter(s) (100 mL/Hr) IV Continuous <Continuous>  dextrose 5%. 1000 milliLiter(s) (50 mL/Hr) IV Continuous <Continuous>  dextrose 50% Injectable 25 Gram(s) IV Push once  dextrose 50% Injectable 12.5 Gram(s) IV Push once  dextrose 50% Injectable 25 Gram(s) IV Push once  DULoxetine 60 milliGRAM(s) Oral daily  gabapentin 600 milliGRAM(s) Oral at bedtime  glucagon  Injectable 1 milliGRAM(s) IntraMuscular once  influenza   Vaccine 0.5 milliLiter(s) IntraMuscular once  insulin lispro (ADMELOG) corrective regimen sliding scale   SubCutaneous three times a day before meals  insulin lispro (ADMELOG) corrective regimen sliding scale   SubCutaneous at bedtime  montelukast 10 milliGRAM(s) Oral daily  piperacillin/tazobactam IVPB.. 3.375 Gram(s) IV Intermittent every 8 hours  rivaroxaban 20 milliGRAM(s) Oral with dinner  verapamil  milliGRAM(s) Oral daily    MEDICATIONS  (PRN):  acetaminophen     Tablet .. 650 milliGRAM(s) Oral every 6 hours PRN Temp greater or equal to 38C (100.4F), Mild Pain (1 - 3)  cyclobenzaprine 5 milliGRAM(s) Oral three times a day PRN Muscle Spasm  dextrose Oral Gel 15 Gram(s) Oral once PRN Blood Glucose LESS THAN 70 milliGRAM(s)/deciliter  melatonin 3 milliGRAM(s) Oral at bedtime PRN Insomnia  ondansetron Injectable 4 milliGRAM(s) IV Push every 8 hours PRN Nausea and/or Vomiting  oxycodone    5 mG/acetaminophen 325 mG 1 Tablet(s) Oral every 4 hours PRN Severe Pain (7 - 10)      CAPILLARY BLOOD GLUCOSE      POCT Blood Glucose.: 140 mg/dL (18 Dec 2023 13:32)  POCT Blood Glucose.: 118 mg/dL (18 Dec 2023 07:45)  POCT Blood Glucose.: 124 mg/dL (17 Dec 2023 21:12)  POCT Blood Glucose.: 116 mg/dL (17 Dec 2023 17:41)    I&O's Summary    17 Dec 2023 07:01  -  18 Dec 2023 07:00  --------------------------------------------------------  IN: 0 mL / OUT: 950 mL / NET: -950 mL        PHYSICAL EXAM:  Vital Signs Last 24 Hrs  T(C): 37.8 (18 Dec 2023 06:24), Max: 38.2 (18 Dec 2023 04:46)  T(F): 100.1 (18 Dec 2023 06:24), Max: 100.7 (18 Dec 2023 04:46)  HR: 65 (18 Dec 2023 08:59) (65 - 86)  BP: 110/70 (18 Dec 2023 08:59) (104/65 - 157/78)  BP(mean): --  RR: 18 (18 Dec 2023 08:59) (16 - 20)  SpO2: 96% (18 Dec 2023 08:59) (90% - 96%)    Parameters below as of 18 Dec 2023 08:59  Patient On (Oxygen Delivery Method): nasal cannula  O2 Flow (L/min): 3      GEN: female in NAD, appears comfortable, no diaphoresis  EYES: No scleral injection, EOMI  ENTM: neck supple & symmetric without tracheal deviation, moist membranes, no gross hearing impairment, thyroid gland not enlarged  CV: +S1/S2, no m/r/g, no abdominal bruit, no LE edema  RESP: breathing comfortably, no respiratory accessory muscle use, CTAB, no w/r/r  GI: normoactive BS, soft, NTND, no rebounding/guarding, no palpable masses    LABS:                        8.0    6.18  )-----------( 298      ( 18 Dec 2023 07:02 )             26.0     12-18    139  |  105  |  16  ----------------------------<  101<H>  3.8   |  27  |  1.00    Ca    8.7      18 Dec 2023 07:02    TPro  6.6  /  Alb  2.3<L>  /  TBili  0.3  /  DBili  x   /  AST  33  /  ALT  21  /  AlkPhos  84  12-17    PT/INR - ( 17 Dec 2023 05:06 )   PT: 21.2 sec;   INR: 1.81 ratio         PTT - ( 17 Dec 2023 05:06 )  PTT:40.4 sec      Urinalysis Basic - ( 18 Dec 2023 07:02 )    Color: x / Appearance: x / SG: x / pH: x  Gluc: 101 mg/dL / Ketone: x  / Bili: x / Urobili: x   Blood: x / Protein: x / Nitrite: x   Leuk Esterase: x / RBC: x / WBC x   Sq Epi: x / Non Sq Epi: x / Bacteria: x          RADIOLOGY & ADDITIONAL TESTS:  Results Reviewed:   Imaging Personally Reviewed:  Electrocardiogram Personally Reviewed:    COORDINATION OF CARE:  Care Discussed with Consultants/Other Providers [Y/N]:  Prior or Outpatient Records Reviewed [Y/N]:

## 2023-12-18 NOTE — PHYSICAL THERAPY INITIAL EVALUATION ADULT - GAIT DEVIATIONS NOTED, PT EVAL
B knee buckling/decreased cristofer/decreased step length/decreased stride length/decreased weight-shifting ability

## 2023-12-18 NOTE — PHYSICAL THERAPY INITIAL EVALUATION ADULT - PERTINENT HX OF CURRENT PROBLEM, REHAB EVAL
Patient admitted with altered mental status, CT head negative for acute processes, MR brain pending. Patient noted to have multifocal pneumonia.

## 2023-12-18 NOTE — SWALLOW BEDSIDE ASSESSMENT ADULT - SLP PERTINENT HISTORY OF CURRENT PROBLEM
presented to ED with AMS and acute hypoxic respiratory failure. CXR results showed multifocal pneumonia.

## 2023-12-18 NOTE — OCCUPATIONAL THERAPY INITIAL EVALUATION ADULT - RANGE OF MOTION EXAMINATION, UPPER EXTREMITY
Spoke with pt, he was numb Monday, very sore Tuesday and today feeling better relief but still very sore.  He was advised to allow up to 2 weeks, can apply ice/ibuprofen or tylenol.  He asked if he can use heat.  Advised preferably ice is better but he could try heat as well.  
Grossly/bilateral UE Active ROM was WFL  (within functional limits)

## 2023-12-19 LAB
ANION GAP SERPL CALC-SCNC: 5 MMOL/L — SIGNIFICANT CHANGE UP (ref 5–17)
ANION GAP SERPL CALC-SCNC: 5 MMOL/L — SIGNIFICANT CHANGE UP (ref 5–17)
BUN SERPL-MCNC: 15 MG/DL — SIGNIFICANT CHANGE UP (ref 7–23)
BUN SERPL-MCNC: 15 MG/DL — SIGNIFICANT CHANGE UP (ref 7–23)
CALCIUM SERPL-MCNC: 8.7 MG/DL — SIGNIFICANT CHANGE UP (ref 8.5–10.1)
CALCIUM SERPL-MCNC: 8.7 MG/DL — SIGNIFICANT CHANGE UP (ref 8.5–10.1)
CHLORIDE SERPL-SCNC: 106 MMOL/L — SIGNIFICANT CHANGE UP (ref 96–108)
CHLORIDE SERPL-SCNC: 106 MMOL/L — SIGNIFICANT CHANGE UP (ref 96–108)
CO2 SERPL-SCNC: 29 MMOL/L — SIGNIFICANT CHANGE UP (ref 22–31)
CO2 SERPL-SCNC: 29 MMOL/L — SIGNIFICANT CHANGE UP (ref 22–31)
CREAT SERPL-MCNC: 0.86 MG/DL — SIGNIFICANT CHANGE UP (ref 0.5–1.3)
CREAT SERPL-MCNC: 0.86 MG/DL — SIGNIFICANT CHANGE UP (ref 0.5–1.3)
EGFR: 76 ML/MIN/1.73M2 — SIGNIFICANT CHANGE UP
EGFR: 76 ML/MIN/1.73M2 — SIGNIFICANT CHANGE UP
GLUCOSE BLDC GLUCOMTR-MCNC: 102 MG/DL — HIGH (ref 70–99)
GLUCOSE BLDC GLUCOMTR-MCNC: 102 MG/DL — HIGH (ref 70–99)
GLUCOSE BLDC GLUCOMTR-MCNC: 103 MG/DL — HIGH (ref 70–99)
GLUCOSE BLDC GLUCOMTR-MCNC: 103 MG/DL — HIGH (ref 70–99)
GLUCOSE BLDC GLUCOMTR-MCNC: 104 MG/DL — HIGH (ref 70–99)
GLUCOSE BLDC GLUCOMTR-MCNC: 104 MG/DL — HIGH (ref 70–99)
GLUCOSE BLDC GLUCOMTR-MCNC: 108 MG/DL — HIGH (ref 70–99)
GLUCOSE BLDC GLUCOMTR-MCNC: 108 MG/DL — HIGH (ref 70–99)
GLUCOSE SERPL-MCNC: 102 MG/DL — HIGH (ref 70–99)
GLUCOSE SERPL-MCNC: 102 MG/DL — HIGH (ref 70–99)
HCT VFR BLD CALC: 26.9 % — LOW (ref 34.5–45)
HCT VFR BLD CALC: 26.9 % — LOW (ref 34.5–45)
HGB BLD-MCNC: 8.4 G/DL — LOW (ref 11.5–15.5)
HGB BLD-MCNC: 8.4 G/DL — LOW (ref 11.5–15.5)
MCHC RBC-ENTMCNC: 26.2 PG — LOW (ref 27–34)
MCHC RBC-ENTMCNC: 26.2 PG — LOW (ref 27–34)
MCHC RBC-ENTMCNC: 31.2 G/DL — LOW (ref 32–36)
MCHC RBC-ENTMCNC: 31.2 G/DL — LOW (ref 32–36)
MCV RBC AUTO: 83.8 FL — SIGNIFICANT CHANGE UP (ref 80–100)
MCV RBC AUTO: 83.8 FL — SIGNIFICANT CHANGE UP (ref 80–100)
MRSA PCR RESULT.: SIGNIFICANT CHANGE UP
MRSA PCR RESULT.: SIGNIFICANT CHANGE UP
NRBC # BLD: 0 /100 WBCS — SIGNIFICANT CHANGE UP (ref 0–0)
NRBC # BLD: 0 /100 WBCS — SIGNIFICANT CHANGE UP (ref 0–0)
PLATELET # BLD AUTO: 337 K/UL — SIGNIFICANT CHANGE UP (ref 150–400)
PLATELET # BLD AUTO: 337 K/UL — SIGNIFICANT CHANGE UP (ref 150–400)
POTASSIUM SERPL-MCNC: 3.5 MMOL/L — SIGNIFICANT CHANGE UP (ref 3.5–5.3)
POTASSIUM SERPL-MCNC: 3.5 MMOL/L — SIGNIFICANT CHANGE UP (ref 3.5–5.3)
POTASSIUM SERPL-SCNC: 3.5 MMOL/L — SIGNIFICANT CHANGE UP (ref 3.5–5.3)
POTASSIUM SERPL-SCNC: 3.5 MMOL/L — SIGNIFICANT CHANGE UP (ref 3.5–5.3)
RBC # BLD: 3.21 M/UL — LOW (ref 3.8–5.2)
RBC # BLD: 3.21 M/UL — LOW (ref 3.8–5.2)
RBC # FLD: 16.4 % — HIGH (ref 10.3–14.5)
RBC # FLD: 16.4 % — HIGH (ref 10.3–14.5)
S AUREUS DNA NOSE QL NAA+PROBE: SIGNIFICANT CHANGE UP
S AUREUS DNA NOSE QL NAA+PROBE: SIGNIFICANT CHANGE UP
SODIUM SERPL-SCNC: 140 MMOL/L — SIGNIFICANT CHANGE UP (ref 135–145)
SODIUM SERPL-SCNC: 140 MMOL/L — SIGNIFICANT CHANGE UP (ref 135–145)
WBC # BLD: 5.26 K/UL — SIGNIFICANT CHANGE UP (ref 3.8–10.5)
WBC # BLD: 5.26 K/UL — SIGNIFICANT CHANGE UP (ref 3.8–10.5)
WBC # FLD AUTO: 5.26 K/UL — SIGNIFICANT CHANGE UP (ref 3.8–10.5)
WBC # FLD AUTO: 5.26 K/UL — SIGNIFICANT CHANGE UP (ref 3.8–10.5)

## 2023-12-19 PROCEDURE — 99233 SBSQ HOSP IP/OBS HIGH 50: CPT

## 2023-12-19 PROCEDURE — 74230 X-RAY XM SWLNG FUNCJ C+: CPT | Mod: 26

## 2023-12-19 RX ADMIN — Medication 240 MILLIGRAM(S): at 05:17

## 2023-12-19 RX ADMIN — MONTELUKAST 10 MILLIGRAM(S): 4 TABLET, CHEWABLE ORAL at 12:56

## 2023-12-19 RX ADMIN — Medication 50 MILLIGRAM(S): at 22:24

## 2023-12-19 RX ADMIN — PIPERACILLIN AND TAZOBACTAM 25 GRAM(S): 4; .5 INJECTION, POWDER, LYOPHILIZED, FOR SOLUTION INTRAVENOUS at 05:17

## 2023-12-19 RX ADMIN — CELECOXIB 200 MILLIGRAM(S): 200 CAPSULE ORAL at 13:40

## 2023-12-19 RX ADMIN — Medication 81 MILLIGRAM(S): at 12:56

## 2023-12-19 RX ADMIN — RIVAROXABAN 20 MILLIGRAM(S): KIT at 17:04

## 2023-12-19 RX ADMIN — PIPERACILLIN AND TAZOBACTAM 25 GRAM(S): 4; .5 INJECTION, POWDER, LYOPHILIZED, FOR SOLUTION INTRAVENOUS at 14:29

## 2023-12-19 RX ADMIN — DULOXETINE HYDROCHLORIDE 60 MILLIGRAM(S): 30 CAPSULE, DELAYED RELEASE ORAL at 12:56

## 2023-12-19 RX ADMIN — ATORVASTATIN CALCIUM 80 MILLIGRAM(S): 80 TABLET, FILM COATED ORAL at 22:25

## 2023-12-19 RX ADMIN — CELECOXIB 200 MILLIGRAM(S): 200 CAPSULE ORAL at 12:57

## 2023-12-19 RX ADMIN — PIPERACILLIN AND TAZOBACTAM 25 GRAM(S): 4; .5 INJECTION, POWDER, LYOPHILIZED, FOR SOLUTION INTRAVENOUS at 22:25

## 2023-12-19 RX ADMIN — GABAPENTIN 600 MILLIGRAM(S): 400 CAPSULE ORAL at 22:24

## 2023-12-19 NOTE — SWALLOW VFSS/MBS ASSESSMENT ADULT - DIAGNOSTIC IMPRESSIONS
oral and pharyngeal phases of swallow were grossly functional for solids and liquids. oral phase marked by adequate labial seal/ oral containment, and delayed bolus collection/formation and AP transport for thin liquid- oral holding. oral residue coating lingual surface post primary swallow adequate but prolonged mastication bolus manipulation and AP transport for solid. likely impacted by decreased tolerance for barium. pharyngeal phase marked by adequate BOT retraction, epiglottic deflection, pharyngeal contraction, laryngeal elevation and laryngeal closure. there was no aspiration observed before during or after the swallow. oral and pharyngeal phases of swallow were grossly functional for solids and liquids. oral phase marked by adequate labial seal/ oral containment, and delayed bolus collection/formation and AP transport for thin liquid- oral holding suspect behavioral secondary to reduced tolerance of barium. trace to mild oral residue coating lingual surface post primary swallow. adequate but prolonged mastication/ bolus manipulation and AP transport for solid. likely impacted by decreased tolerance for barium. pharyngeal phase marked by adequate BOT retraction, epiglottic deflection, pharyngeal contraction, laryngeal elevation and laryngeal vestibule closure. there was no aspiration observed before, during or after the swallow.

## 2023-12-19 NOTE — SWALLOW VFSS/MBS ASSESSMENT ADULT - H & P REVIEW
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  Pt lethargic rousable, but confused, oriented to self and time; not to place or situation. Hx obtained from  at bedside. Per  last evening he helped pt to the restroom and a few minutes later when he  on her she was unresponsive.  reports pt seemed to open eyes at times, however was not speaking thus he called EMS. 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  Pt lethargic rousable, but confused, oriented to self and time; not to place or situation. Hx obtained from  at bedside. Per  last evening he helped pt to the restroom and a few minutes later when he  on her she was unresponsive.  reports pt seemed to open eyes at times, however was not speaking thus he called EMS. Pt remained same way till after arrival to the ED"/yes

## 2023-12-19 NOTE — PROGRESS NOTE ADULT - SUBJECTIVE AND OBJECTIVE BOX
CHIEF COMPLAINT: FU of confusion with acute respiratory failure with multifocal pneumonia  no fever  no chest pain  no diarrhea reported  no active gross bleeding   +JARVIS    PHYSICAL EXAM:    GENERAL: Obese and on NC oxygen  CHEST/LUNG: + crackles bilaterally. no wheezing   HEART: Regular rate and rhythm;   ABDOMEN: Soft, Nontender, Nondistended; Bowel sounds present  EXTREMITIES:  No clubbing, cyanosis, or edema  NERVOUS SYSTEM:  Grossly non focal.  Psychiatry: AAO x 3, mood is appropriate       OBJECTIVE DATA:   Vital Signs Last 24 Hrs  T(C): 37.5 (19 Dec 2023 11:05), Max: 37.5 (19 Dec 2023 11:05)  T(F): 99.5 (19 Dec 2023 11:05), Max: 99.5 (19 Dec 2023 11:05)  HR: 79 (19 Dec 2023 11:05) (60 - 96)  BP: 108/70 (19 Dec 2023 11:05) (93/60 - 148/84)  BP(mean): --  RR: 18 (19 Dec 2023 11:05) (18 - 20)  SpO2: 91% (19 Dec 2023 11:05) (91% - 97%)    Parameters below as of 19 Dec 2023 11:05  Patient On (Oxygen Delivery Method): room air               Daily     Daily   LABS:                        8.4    5.26  )-----------( 337      ( 19 Dec 2023 06:36 )             26.9             12-19    140  |  106  |  15  ----------------------------<  102<H>  3.5   |  29  |  0.86    Ca    8.7      19 Dec 2023 06:36                  Urinalysis Basic - ( 19 Dec 2023 06:36 )    Color: x / Appearance: x / SG: x / pH: x  Gluc: 102 mg/dL / Ketone: x  / Bili: x / Urobili: x   Blood: x / Protein: x / Nitrite: x   Leuk Esterase: x / RBC: x / WBC x   Sq Epi: x / Non Sq Epi: x / Bacteria: x            CAPILLARY BLOOD GLUCOSE      POCT Blood Glucose.: 108 mg/dL (19 Dec 2023 12:56)    Interval Radiology studies: reviewed by me  < from: MR Angio Head No Cont (12.18.23 @ 12:53) >  IMPRESSION: No acute territorial infarcts are seen    MRA of the neck is limited by motion    MRA of the [place] appears grossly unremarkable.    < end of copied text >    Tele reviewed by me showed NSR     MEDICATIONS  (STANDING):  amitriptyline 50 milliGRAM(s) Oral at bedtime  aspirin enteric coated 81 milliGRAM(s) Oral daily  atorvastatin 80 milliGRAM(s) Oral at bedtime  celecoxib 200 milliGRAM(s) Oral daily  dextrose 5%. 1000 milliLiter(s) (100 mL/Hr) IV Continuous <Continuous>  dextrose 5%. 1000 milliLiter(s) (50 mL/Hr) IV Continuous <Continuous>  dextrose 50% Injectable 25 Gram(s) IV Push once  dextrose 50% Injectable 12.5 Gram(s) IV Push once  dextrose 50% Injectable 25 Gram(s) IV Push once  DULoxetine 60 milliGRAM(s) Oral daily  gabapentin 600 milliGRAM(s) Oral at bedtime  glucagon  Injectable 1 milliGRAM(s) IntraMuscular once  influenza   Vaccine 0.5 milliLiter(s) IntraMuscular once  insulin lispro (ADMELOG) corrective regimen sliding scale   SubCutaneous three times a day before meals  insulin lispro (ADMELOG) corrective regimen sliding scale   SubCutaneous at bedtime  montelukast 10 milliGRAM(s) Oral daily  piperacillin/tazobactam IVPB.. 3.375 Gram(s) IV Intermittent every 8 hours  rivaroxaban 20 milliGRAM(s) Oral with dinner  verapamil  milliGRAM(s) Oral daily    MEDICATIONS  (PRN):  acetaminophen     Tablet .. 650 milliGRAM(s) Oral every 6 hours PRN Temp greater or equal to 38C (100.4F), Mild Pain (1 - 3)  cyclobenzaprine 5 milliGRAM(s) Oral three times a day PRN Muscle Spasm  dextrose Oral Gel 15 Gram(s) Oral once PRN Blood Glucose LESS THAN 70 milliGRAM(s)/deciliter  melatonin 3 milliGRAM(s) Oral at bedtime PRN Insomnia  ondansetron Injectable 4 milliGRAM(s) IV Push every 8 hours PRN Nausea and/or Vomiting  oxycodone    5 mG/acetaminophen 325 mG 1 Tablet(s) Oral every 4 hours PRN Severe Pain (7 - 10)       CHIEF COMPLAINT: FU of confusion with acute respiratory failure with multifocal pneumonia  no fever  no chest pain  no diarrhea reported  no active gross bleeding   +JARVIS    PHYSICAL EXAM:    GENERAL: Obese and on NC oxygen  CHEST/LUNG: + crackles bilaterally. no wheezing   HEART: Regular rate and rhythm;   ABDOMEN: Soft, Nontender, Nondistended; Bowel sounds present  EXTREMITIES:  No clubbing, cyanosis, or edema  NERVOUS SYSTEM:  Grossly non focal.  Psychiatry: AAO x 3, mood is appropriate       OBJECTIVE DATA:   Vital Signs Last 24 Hrs  T(C): 37.5 (19 Dec 2023 11:05), Max: 37.5 (19 Dec 2023 11:05)  T(F): 99.5 (19 Dec 2023 11:05), Max: 99.5 (19 Dec 2023 11:05)  HR: 79 (19 Dec 2023 11:05) (60 - 96)  BP: 108/70 (19 Dec 2023 11:05) (93/60 - 148/84)  BP(mean): --  RR: 18 (19 Dec 2023 11:05) (18 - 20)  SpO2: 91% (19 Dec 2023 11:05) (91% - 97%)    Parameters below as of 19 Dec 2023 11:05  Patient On (Oxygen Delivery Method): room air               Daily     Daily   LABS:                        8.4    5.26  )-----------( 337      ( 19 Dec 2023 06:36 )             26.9             12-19    140  |  106  |  15  ----------------------------<  102<H>  3.5   |  29  |  0.86    Ca    8.7      19 Dec 2023 06:36                  Urinalysis Basic - ( 19 Dec 2023 06:36 )    Color: x / Appearance: x / SG: x / pH: x  Gluc: 102 mg/dL / Ketone: x  / Bili: x / Urobili: x   Blood: x / Protein: x / Nitrite: x   Leuk Esterase: x / RBC: x / WBC x   Sq Epi: x / Non Sq Epi: x / Bacteria: x            CAPILLARY BLOOD GLUCOSE      POCT Blood Glucose.: 108 mg/dL (19 Dec 2023 12:56)    Interval Radiology studies: reviewed by me  < from: MR Angio Head No Cont (12.18.23 @ 12:53) >  IMPRESSION: No acute territorial infarcts are seen    MRA of the neck is limited by motion    MRA of the [place] appears grossly unremarkable.    < end of copied text >    Tele reviewed by me showed NSR     MEDICATIONS  (STANDING):  amitriptyline 50 milliGRAM(s) Oral at bedtime  aspirin enteric coated 81 milliGRAM(s) Oral daily  atorvastatin 80 milliGRAM(s) Oral at bedtime  celecoxib 200 milliGRAM(s) Oral daily  dextrose 5%. 1000 milliLiter(s) (100 mL/Hr) IV Continuous <Continuous>  dextrose 5%. 1000 milliLiter(s) (50 mL/Hr) IV Continuous <Continuous>  dextrose 50% Injectable 25 Gram(s) IV Push once  dextrose 50% Injectable 12.5 Gram(s) IV Push once  dextrose 50% Injectable 25 Gram(s) IV Push once  DULoxetine 60 milliGRAM(s) Oral daily  gabapentin 600 milliGRAM(s) Oral at bedtime  glucagon  Injectable 1 milliGRAM(s) IntraMuscular once  influenza   Vaccine 0.5 milliLiter(s) IntraMuscular once  insulin lispro (ADMELOG) corrective regimen sliding scale   SubCutaneous three times a day before meals  insulin lispro (ADMELOG) corrective regimen sliding scale   SubCutaneous at bedtime  montelukast 10 milliGRAM(s) Oral daily  piperacillin/tazobactam IVPB.. 3.375 Gram(s) IV Intermittent every 8 hours  rivaroxaban 20 milliGRAM(s) Oral with dinner  verapamil  milliGRAM(s) Oral daily    MEDICATIONS  (PRN):  acetaminophen     Tablet .. 650 milliGRAM(s) Oral every 6 hours PRN Temp greater or equal to 38C (100.4F), Mild Pain (1 - 3)  cyclobenzaprine 5 milliGRAM(s) Oral three times a day PRN Muscle Spasm  dextrose Oral Gel 15 Gram(s) Oral once PRN Blood Glucose LESS THAN 70 milliGRAM(s)/deciliter  melatonin 3 milliGRAM(s) Oral at bedtime PRN Insomnia  ondansetron Injectable 4 milliGRAM(s) IV Push every 8 hours PRN Nausea and/or Vomiting  oxycodone    5 mG/acetaminophen 325 mG 1 Tablet(s) Oral every 4 hours PRN Severe Pain (7 - 10)

## 2023-12-19 NOTE — SWALLOW VFSS/MBS ASSESSMENT ADULT - ORAL PHASE COMMENTS
suspect behavioral holding bolus/delayed transport and oral residue secondary to pt dislike of barium holding bolus/delayed transport and oral residue suspect behavioral secondary to pt dislike/tolerance of barium oral holding /delayed transport  suspect behavioral secondary to pt dislike/tolerance of barium.

## 2023-12-19 NOTE — PROGRESS NOTE ADULT - ASSESSMENT
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 Of note pt recently developed URI symptoms and was started on Doxy as well as Hycodan, Alprazolam and Fioricet. Of note pt already on Percocet, flexeril for chronic pain, Gabapentin for seizures per  all which she took. Pt being admitted for Acute hypoxic respiratory failure due to Multifocal PNA, AMS r/o TIA/CVA  vs metabolic encephalopathy secondary to polypharmacy and/or hypoxia.    # Acute metabolic encephalopathy likely with acute respiratory failure with multifocal pneumonia. acute stroke is ruled out. EEG did not show any epileptiform activity.   cont iv zosyn and follow final blood cultures and procalcitonin. cont bronchodilators. legionella and streptococcus negative. advised adequate oral hydration. seizure precautions  cont oxygen to keep sat > 94%.     # PE  - c/w Xarelto. no active gross bleeding.     # SLE  # GBS  # Chronic pain  - c/w Percocet and flexeril prn    # HTN/HLD. controlled.   - c/w verapemil, statin    # DM type 2. controlled.   - FS qAC and HS w/ SSI    #Weakness. PT eval>>[facility]      # DVT ppx - Pt on Xarelto    Full code.   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 Of note pt recently developed URI symptoms and was started on Doxy as well as Hycodan, Alprazolam and Fioricet. Of note pt already on Percocet, flexeril for chronic pain, Gabapentin for seizures per  all which she took. Pt being admitted for Acute hypoxic respiratory failure due to Multifocal PNA, AMS r/o TIA/CVA  vs metabolic encephalopathy secondary to polypharmacy and/or hypoxia.    # Acute metabolic encephalopathy likely with acute respiratory failure with multifocal pneumonia. acute stroke is ruled out. EEG did not show any epileptiform activity.   cont iv zosyn and follow final blood cultures and procalcitonin. cont bronchodilators. legionella and streptococcus negative. advised adequate oral hydration. seizure precautions  cont oxygen to keep sat > 94%.     # PE  - c/w Xarelto. no active gross bleeding.     # SLE  # GBS  # Chronic pain  - c/w Percocet and flexeril prn    # HTN/HLD. controlled.   - c/w verapemil, statin    # DM type 2. controlled.   - FS qAC and HS w/ SSI    #Weakness. PT eval>>[facility]      # DVT ppx - Pt on Xarelto    Full code.   Time spent by me managing the patient including but not limited to reviewing the chart, discussion with the nurse and Family, physical exam and assessment and plan is 54 mins

## 2023-12-19 NOTE — SWALLOW VFSS/MBS ASSESSMENT ADULT - SLP GENERAL OBSERVATIONS
pt seen in radiology suite sitting upright securely in the stretcher, on RA and alert. pt cognition WFL for testing, she was able to follow directions. adequate ROM/strength of articulators and +dentition- partial lower/upper. noted baseline dry cough prior to testing and pt reported poor appetite. speech intelligibility subjectively judged to be WNL. noted delay in response. pt seen in radiology suite sitting upright securely in the stretcher, on RA and alert. pt cognition WFL for testing, she was able to follow directions, and noted delay in response. adequate ROM/strength of articulators and +dentition- partial lower/upper. speech intelligibility subjectively judged to be WNL. pt with baseline dry cough prior to testing and she reported decreased appetite. ?nonobstructive and obstructive cervical osteophytes C2-5

## 2023-12-19 NOTE — SWALLOW VFSS/MBS ASSESSMENT ADULT - COMMENTS
MRI head no contrast 12/18/2023 IMPRESSION: No acute territorial infarcts are seen    CXR 12/17/2023 Comparison: 1/3/2020 Normal heart and mediastinum. Diffuse lung infiltrates greatest at the right upper lobe, right lung base and left perihilar region are noted. Angles are minimally blunted consistent with small effusions. Bones without acute abnormality. IMPRESSION: Multifocal pneumonia. Recommend follow-up.    in chart review noted initial clinical swallow eval completed 12/18, with recommendations for regular solid/thin liquid. see report for details.

## 2023-12-19 NOTE — PROGRESS NOTE ADULT - ASSESSMENT
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 Being treated for URI outpatient. Hypoxic to 84% on RA,  CT head without acute pathology  CTA H/N - bilateral ICA calcifications with ectasia of proximal R ICA and L cerivcal ICA fusiform aneurysm 1.4cm, R V4 narrowing  CTP motion degraded  CXR showed Multifocal PNA    Impression:  AMS likely TME - infectious, metabolic, possible polypharmacy. Low suspicion for neurovascular etiology, seizure  SLE  PE on xarelto    Plan:  - continue xarelto for recent PE - should likely be on lifelong AC given hx of SLE  - consider EEG, MRI if mental status does not improve with treatment of underlying infections  - would hold sedating meds to monitor mental status   - ?on gabapentin for seizures vs neuropathy (does not know name of outpt neurologist) - unsual choice, ok to continue for now but would rec outpatient followup    Sophy Liang,   Vascular Neurology  Office [phone]          63F with SLE, HTN, DM type 2, GBS, chronic left foot drop, PE on Xarelto, 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 Being treated for URI outpatient. Hypoxic to 84% on RA,  CT head without acute pathology  CTA H/N - bilateral ICA calcifications with ectasia of proximal R ICA and L cerivcal ICA fusiform aneurysm 1.4cm, R V4 narrowing  CTP motion degraded  CXR showed Multifocal PNA    Impression:  AMS likely TME - infectious, metabolic, possible polypharmacy. Low suspicion for neurovascular etiology, seizure  SLE  PE on xarelto    Plan:  - continue xarelto for recent PE - should likely be on lifelong AC given hx of SLE  - consider EEG, MRI if mental status does not improve with treatment of underlying infections  - would hold sedating meds to monitor mental status   - ?on gabapentin for seizures vs neuropathy (does not know name of outpt neurologist) - unsual choice, ok to continue for now but would rec outpatient followup    Sophy Liang,   Vascular Neurology  Office [phone]          63F with SLE, HTN, DM type 2, GBS, chronic left foot drop, PE on Xarelto, 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 Being treated for URI outpatient. Hypoxic to 84% on RA,  CT head without acute pathology  CTA H/N - bilateral ICA calcifications with ectasia of proximal R ICA and L cerivcal ICA fusiform aneurysm 1.4cm, R V4 narrowing  CTP motion degraded  CXR showed Multifocal PNA  Routine eeg with diffuse slowing    Impression:  AMS likely TME - infectious, metabolic, possible polypharmacy. Low suspicion for neurovascular etiology, seizure  SLE  PE on xarelto    Plan:  - continue xarelto for recent PE - should likely be on lifelong AC given hx of SLE  - EEG as above  - consider MRI if mental status does not improve with treatment of underlying infections  - would hold sedating meds to monitor mental status   - ?on gabapentin for seizures vs neuropathy (does not know name of outpt neurologist) - unsual choice, ok to continue for now but would rec outpatient followup    Sophy Liang,   Vascular Neurology  Office [phone]          63F with SLE, HTN, DM type 2, GBS, chronic left foot drop, PE on Xarelto, 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 Being treated for URI outpatient. Hypoxic to 84% on RA,  CT head without acute pathology  CTA H/N - bilateral ICA calcifications with ectasia of proximal R ICA and L cerivcal ICA fusiform aneurysm 1.4cm, R V4 narrowing  CTP motion degraded  CXR showed Multifocal PNA  Routine eeg with diffuse slowing    Impression:  AMS likely TME - infectious, metabolic, possible polypharmacy. Low suspicion for neurovascular etiology, seizure  SLE  PE on xarelto    Plan:  - continue xarelto for recent PE - should likely be on lifelong AC given hx of SLE  - EEG as above  - consider MRI if mental status does not improve with treatment of underlying infections  - would hold sedating meds to monitor mental status   - ?on gabapentin for seizures vs neuropathy (does not know name of outpt neurologist) - unsual choice, ok to continue for now but would rec outpatient followup    Sophy Liang,   Vascular Neurology  Office [phone]

## 2023-12-19 NOTE — PROGRESS NOTE ADULT - SUBJECTIVE AND OBJECTIVE BOX
Neurology Progress Note    S: Patient seen and examined. No new events overnight. patient denied CP, SOB, HA or pain.     Medication:  acetaminophen     Tablet .. 650 milliGRAM(s) Oral every 6 hours PRN  amitriptyline 50 milliGRAM(s) Oral at bedtime  aspirin enteric coated 81 milliGRAM(s) Oral daily  atorvastatin 80 milliGRAM(s) Oral at bedtime  celecoxib 200 milliGRAM(s) Oral daily  cyclobenzaprine 5 milliGRAM(s) Oral three times a day PRN  dextrose 5%. 1000 milliLiter(s) IV Continuous <Continuous>  dextrose 5%. 1000 milliLiter(s) IV Continuous <Continuous>  dextrose 50% Injectable 25 Gram(s) IV Push once  dextrose 50% Injectable 12.5 Gram(s) IV Push once  dextrose 50% Injectable 25 Gram(s) IV Push once  dextrose Oral Gel 15 Gram(s) Oral once PRN  DULoxetine 60 milliGRAM(s) Oral daily  gabapentin 600 milliGRAM(s) Oral at bedtime  glucagon  Injectable 1 milliGRAM(s) IntraMuscular once  influenza   Vaccine 0.5 milliLiter(s) IntraMuscular once  insulin lispro (ADMELOG) corrective regimen sliding scale   SubCutaneous three times a day before meals  insulin lispro (ADMELOG) corrective regimen sliding scale   SubCutaneous at bedtime  melatonin 3 milliGRAM(s) Oral at bedtime PRN  montelukast 10 milliGRAM(s) Oral daily  ondansetron Injectable 4 milliGRAM(s) IV Push every 8 hours PRN  oxycodone    5 mG/acetaminophen 325 mG 1 Tablet(s) Oral every 4 hours PRN  piperacillin/tazobactam IVPB.. 3.375 Gram(s) IV Intermittent every 8 hours  rivaroxaban 20 milliGRAM(s) Oral with dinner  verapamil  milliGRAM(s) Oral daily      Vitals:  Vital Signs Last 24 Hrs  T(C): 37.5 (19 Dec 2023 11:05), Max: 37.5 (19 Dec 2023 11:05)  T(F): 99.5 (19 Dec 2023 11:05), Max: 99.5 (19 Dec 2023 11:05)  HR: 79 (19 Dec 2023 11:05) (60 - 96)  BP: 108/70 (19 Dec 2023 11:05) (93/60 - 148/84)  BP(mean): --  RR: 18 (19 Dec 2023 11:05) (18 - 20)  SpO2: 91% (19 Dec 2023 11:05) (91% - 96%)    Parameters below as of 19 Dec 2023 11:05  Patient On (Oxygen Delivery Method): room air        General Exam:   General Appearance: Appropriately dressed and in no acute distress       Head: Normocephalic, atraumatic and no dysmorphic features  Ear, Nose, and Throat: Moist mucous membranes  CVS: S1S2+  Resp: No SOB, no wheeze or rhonchi  Abd: soft NTND  Extremities: No edema, no cyanosis  Skin: No bruises, no rashes     Neurological Exam:  Mental Status: Awake, alert and oriented x 3.  Able to follow simple verbal commands. Able to name and repeat. Speech is fluent but slow  Cranial Nerves: PERRL, EOMI, VFFC, sensation V1-V3 intact,  no obvious facial asymmetry, equal elevation of palate, scm/trap 5/5, tongue is midline on protrusion.. hearing is grossly intact.   Motor: Normal bulk, tone. JIANG, LLE slightly weaker but poor efffort  Sensation: Intact to light touch and pinprick throughout. no right/left confusion. no extinction to tactile on DSS.   Reflexes: 1+ throughout at biceps, brachioradialis, triceps, patellars and ankles bilaterally and equal. No clonus. R toe and L toe were both downgoing.  Coordination: No dysmetria on FNF   Gait: deferred      I personally reviewed the below data/images/labs:      CBC Full  -  ( 19 Dec 2023 06:36 )  WBC Count : 5.26 K/uL  RBC Count : 3.21 M/uL  Hemoglobin : 8.4 g/dL  Hematocrit : 26.9 %  Platelet Count - Automated : 337 K/uL  Mean Cell Volume : 83.8 fl  Mean Cell Hemoglobin : 26.2 pg  Mean Cell Hemoglobin Concentration : 31.2 g/dL  Auto Neutrophil # : x  Auto Lymphocyte # : x  Auto Monocyte # : x  Auto Eosinophil # : x  Auto Basophil # : x  Auto Neutrophil % : x  Auto Lymphocyte % : x  Auto Monocyte % : x  Auto Eosinophil % : x  Auto Basophil % : x    12-19    140  |  106  |  15  ----------------------------<  102<H>  3.5   |  29  |  0.86    Ca    8.7      19 Dec 2023 06:36          Urinalysis Basic - ( 19 Dec 2023 06:36 )    Color: x / Appearance: x / SG: x / pH: x  Gluc: 102 mg/dL / Ketone: x  / Bili: x / Urobili: x   Blood: x / Protein: x / Nitrite: x   Leuk Esterase: x / RBC: x / WBC x   Sq Epi: x / Non Sq Epi: x / Bacteria: x    < from: CT Brain Stroke Protocol (12.17.23 @ 04:51) >    ACC: [id number] EXAM:  CT BRAIN STROKE PROTOCOL   ORDERED BY: MINA CONROY     PROCEDURE DATE:  12/17/2023          INTERPRETATION:  CLINICAL INFORMATION:  Stroke Code    TECHNIQUE:  Axial CT images were acquired through the head.  Intravenouscontrast: None  Two-dimensional reformats were generated.    COMPARISON STUDY: MRI brain 11/17/2023, CTA head and neck 11/17/2023.    FINDINGS:    There is no CT evidence of acute intracranial hemorrhage, mass effect,   midline shift, or acute, largeterritorial infarct.  The ventricles and   sulci are age-appropriate in size and configuration. The basal cisterns   are patent.    The mastoid air cells and middle ear cavities are grossly clear. The   visualized paranasal sinuses are well aerated.    The calvarium and skull base are grossly intact.    IMPRESSION:  No acute intracranial hemorrhage or mass effect.    Results were discussed with Dr. Conroy by Dr. Gastelum at 4:50 AM on   12/17/2023. with read back followed.    < end of copied text >        < from: CT Angio Neck Stroke Protocol w/ IV Cont (12.17.23 @ 05:12) >    ACC: [id number] EXAM:  CT ANGIO NECK STROKE PROTCL IC   ORDERED BY: MINA CONROY     ACC: [id number] EXAM:  CT BRAIN PERFUSION MAPS STROKE   ORDERED BY: MINA CONROY     ACC: [id number] EXAM:  CT ANGIO BRAIN STROKE PROTC IC   ORDERED BY: MINA CONROY     PROCEDURE DATE:  12/17/2023          INTERPRETATION:  CT PERFUSION, CTA OF THE [place] OF [place] AND NECK:    INDICATIONS: Stroke code.    TECHNIQUE:    RAPID artificial intelligence was used for perfusion analysis and for   preliminary evaluation of intracranial hemorrhage.    CTA [place]:    After the intravenous power injection of non-ionic contrast material,   serial thin sections were obtained through the intracranial circulation   on a multislice CT scanner.  Images were reformatted using a dedicated 3D   software package and viewed on a dedicated workstation in multiple planes.    CTA NECK:    After the intravenous power injection of non-ionic contrast material,   serial thin sections were obtained through the cervical circulation on a   multislice CT scanner.  Images were reformatted using a dedicated 3D   software package and viewed on a dedicated workstation in multiple planes.      COMPARISON EXAMINATION: Noncontrast head CT performed the same day. CTA   head and neck 11/17/2023    FINDINGS:      CT RAPID PERFUSION:  Markedly degraded by patient motion.    INFARCT CORE: 0 cc    TISSUE AT RISK: 8 cc combined in the left frontal and right temporal lobes    MISMATCH RATIO: N/A      CTA [place]:    ANTERIOR CIRCULATION    ICA  CAVERNOUS, SUPRACLINOID, BIFURCATION SEGMENTS: Patent without flow   limiting stenosis. Atherosclerotic calcifications of the bilateral   cavernous ICA segments.    ANTERIOR CEREBRAL ARTERIES: Bilateral A1, anterior communicating and A2   anterior cerebral arteries are unremarkable in course and caliber without   flow limiting stenosis. Hypoplastic right A1 segment.    MIDDLE CEREBRAL ARTERIES: Patent bilateral M1, M2, and distal MCA   branches without flow limiting stenosis.    POSTERIOR CIRCULATION:    VERTEBRAL ARTERIES: Patent without flow limiting stenosis. Mild focal   narrowing of the right V4 segment.    BASILAR ARTERY: Patent no flow limiting stenosis.    POSTERIOR CEREBRAL ARTERIES: Patent without flow limiting stenosis.    CTA NECK:    GREAT VESSELS: Visualized segments are patent, no flow limiting stenosis.   Bovine aortic arch, normal variant.    COMMON CAROTID ARTERIES:  RIGHT: Patent without flow limiting stenosis  LEFT: Patent without flow limiting stenosis    CAROTID BULBS:  RIGHT: Patent without flow limiting stenosis  LEFT: Patent without flow limiting stenosis    INTERNAL CAROTID ARTERIES:  RIGHT: Patent no evidence for any hemodynamically significant stenosis at   the ICA origin by NASCET criteria. Stable mild ectasia of the proximal   cervical ICA measuring 0.9 cm in diameter. Partial retropharyngeal course.  LEFT: Patent no evidence for any hemodynamically significant stenosis at   the ICA origin by NASCET criteria. Stable fusiform aneurysmal dilatation   of the proximal cervical ICA measuring 1.4 cm in diameter and gradually   tapering in the mid segment. Partial retropharyngeal course.    VERTEBRAL ARTERIES:    RIGHT: Patent no evidence for any flow limiting stenosis.  LEFT: Patent no evidence for any flow limiting stenosis. Left dominant   vertebral system.      SOFT TISSUES: Patchy nonspecific groundglass opacities and consolidations   in the visualized upper lobes.    BONES: Multilevel degenerative changes inthe spine.    IMPRESSION:    CT PERFUSION: Markedly degraded by patient motion. No core infarct. Small   areas of abnormal perfusion in the left frontal and right temporal lobes   not confined to a vascular territory, likely artifactual.    If symptoms persist consider follow up head CT or MRI, MRA  if no   contraindication.    CTA COW:  Patent intracranial circulation without flow limiting stenosis.    CTA NECK: Patent, ECAs, ICAs, no  hemodynamically significant stenosis at    ICA origins by NASCET criteria. Stable fusiform aneurysmal dilatation of   the ICA origins/proximal segments.  Bilateral vertebral arteries are patent without flow limiting stenosis.    Patchy nonspecific groundglass and consolidations in the visualized upper   lobes.    --- End of Report ---      < end of copied text >         Neurology Progress Note    S: Patient seen and examined. No new events overnight. patient denied CP, SOB, HA or pain.     Medication:  acetaminophen     Tablet .. 650 milliGRAM(s) Oral every 6 hours PRN  amitriptyline 50 milliGRAM(s) Oral at bedtime  aspirin enteric coated 81 milliGRAM(s) Oral daily  atorvastatin 80 milliGRAM(s) Oral at bedtime  celecoxib 200 milliGRAM(s) Oral daily  cyclobenzaprine 5 milliGRAM(s) Oral three times a day PRN  dextrose 5%. 1000 milliLiter(s) IV Continuous <Continuous>  dextrose 5%. 1000 milliLiter(s) IV Continuous <Continuous>  dextrose 50% Injectable 25 Gram(s) IV Push once  dextrose 50% Injectable 12.5 Gram(s) IV Push once  dextrose 50% Injectable 25 Gram(s) IV Push once  dextrose Oral Gel 15 Gram(s) Oral once PRN  DULoxetine 60 milliGRAM(s) Oral daily  gabapentin 600 milliGRAM(s) Oral at bedtime  glucagon  Injectable 1 milliGRAM(s) IntraMuscular once  influenza   Vaccine 0.5 milliLiter(s) IntraMuscular once  insulin lispro (ADMELOG) corrective regimen sliding scale   SubCutaneous three times a day before meals  insulin lispro (ADMELOG) corrective regimen sliding scale   SubCutaneous at bedtime  melatonin 3 milliGRAM(s) Oral at bedtime PRN  montelukast 10 milliGRAM(s) Oral daily  ondansetron Injectable 4 milliGRAM(s) IV Push every 8 hours PRN  oxycodone    5 mG/acetaminophen 325 mG 1 Tablet(s) Oral every 4 hours PRN  piperacillin/tazobactam IVPB.. 3.375 Gram(s) IV Intermittent every 8 hours  rivaroxaban 20 milliGRAM(s) Oral with dinner  verapamil  milliGRAM(s) Oral daily      Vitals:  Vital Signs Last 24 Hrs  T(C): 37.5 (19 Dec 2023 11:05), Max: 37.5 (19 Dec 2023 11:05)  T(F): 99.5 (19 Dec 2023 11:05), Max: 99.5 (19 Dec 2023 11:05)  HR: 79 (19 Dec 2023 11:05) (60 - 96)  BP: 108/70 (19 Dec 2023 11:05) (93/60 - 148/84)  BP(mean): --  RR: 18 (19 Dec 2023 11:05) (18 - 20)  SpO2: 91% (19 Dec 2023 11:05) (91% - 96%)    Parameters below as of 19 Dec 2023 11:05  Patient On (Oxygen Delivery Method): room air        General Exam:   General Appearance: Appropriately dressed and in no acute distress       Head: Normocephalic, atraumatic and no dysmorphic features  Ear, Nose, and Throat: Moist mucous membranes  CVS: S1S2+  Resp: No SOB, no wheeze or rhonchi  Abd: soft NTND  Extremities: No edema, no cyanosis  Skin: No bruises, no rashes     Neurological Exam:  Mental Status: Awake, alert and oriented x 3.  Able to follow simple verbal commands. Able to name and repeat. Speech is fluent but slow  Cranial Nerves: PERRL, EOMI, VFFC, sensation V1-V3 intact,  no obvious facial asymmetry, equal elevation of palate, scm/trap 5/5, tongue is midline on protrusion.. hearing is grossly intact.   Motor: Normal bulk, tone. JIANG, LLE slightly weaker but poor efffort  Sensation: Intact to light touch and pinprick throughout. no right/left confusion. no extinction to tactile on DSS.   Reflexes: 1+ throughout at biceps, brachioradialis, triceps, patellars and ankles bilaterally and equal. No clonus. R toe and L toe were both downgoing.  Coordination: No dysmetria on FNF   Gait: deferred      I personally reviewed the below data/images/labs:      CBC Full  -  ( 19 Dec 2023 06:36 )  WBC Count : 5.26 K/uL  RBC Count : 3.21 M/uL  Hemoglobin : 8.4 g/dL  Hematocrit : 26.9 %  Platelet Count - Automated : 337 K/uL  Mean Cell Volume : 83.8 fl  Mean Cell Hemoglobin : 26.2 pg  Mean Cell Hemoglobin Concentration : 31.2 g/dL  Auto Neutrophil # : x  Auto Lymphocyte # : x  Auto Monocyte # : x  Auto Eosinophil # : x  Auto Basophil # : x  Auto Neutrophil % : x  Auto Lymphocyte % : x  Auto Monocyte % : x  Auto Eosinophil % : x  Auto Basophil % : x    12-19    140  |  106  |  15  ----------------------------<  102<H>  3.5   |  29  |  0.86    Ca    8.7      19 Dec 2023 06:36          Urinalysis Basic - ( 19 Dec 2023 06:36 )    Color: x / Appearance: x / SG: x / pH: x  Gluc: 102 mg/dL / Ketone: x  / Bili: x / Urobili: x   Blood: x / Protein: x / Nitrite: x   Leuk Esterase: x / RBC: x / WBC x   Sq Epi: x / Non Sq Epi: x / Bacteria: x    < from: CT Brain Stroke Protocol (12.17.23 @ 04:51) >    ACC: [id number] EXAM:  CT BRAIN STROKE PROTOCOL   ORDERED BY: MINA CONROY     PROCEDURE DATE:  12/17/2023          INTERPRETATION:  CLINICAL INFORMATION:  Stroke Code    TECHNIQUE:  Axial CT images were acquired through the head.  Intravenouscontrast: None  Two-dimensional reformats were generated.    COMPARISON STUDY: MRI brain 11/17/2023, CTA head and neck 11/17/2023.    FINDINGS:    There is no CT evidence of acute intracranial hemorrhage, mass effect,   midline shift, or acute, largeterritorial infarct.  The ventricles and   sulci are age-appropriate in size and configuration. The basal cisterns   are patent.    The mastoid air cells and middle ear cavities are grossly clear. The   visualized paranasal sinuses are well aerated.    The calvarium and skull base are grossly intact.    IMPRESSION:  No acute intracranial hemorrhage or mass effect.    Results were discussed with Dr. Conroy by Dr. Gastelum at 4:50 AM on   12/17/2023. with read back followed.    < end of copied text >        < from: CT Angio Neck Stroke Protocol w/ IV Cont (12.17.23 @ 05:12) >    ACC: [id number] EXAM:  CT ANGIO NECK STROKE PROTCL IC   ORDERED BY: MINA CONROY     ACC: [id number] EXAM:  CT BRAIN PERFUSION MAPS STROKE   ORDERED BY: MINA CONROY     ACC: [id number] EXAM:  CT ANGIO BRAIN STROKE PROTC IC   ORDERED BY: MINA CONROY     PROCEDURE DATE:  12/17/2023          INTERPRETATION:  CT PERFUSION, CTA OF THE [place] OF [place] AND NECK:    INDICATIONS: Stroke code.    TECHNIQUE:    RAPID artificial intelligence was used for perfusion analysis and for   preliminary evaluation of intracranial hemorrhage.    CTA [place] OF [place]:    After the intravenous power injection of non-ionic contrast material,   serial thin sections were obtained through the intracranial circulation   on a multislice CT scanner.  Images were reformatted using a dedicated 3D   software package and viewed on a dedicated workstation in multiple planes.    CTA NECK:    After the intravenous power injection of non-ionic contrast material,   serial thin sections were obtained through the cervical circulation on a   multislice CT scanner.  Images were reformatted using a dedicated 3D   software package and viewed on a dedicated workstation in multiple planes.      COMPARISON EXAMINATION: Noncontrast head CT performed the same day. CTA   head and neck 11/17/2023    FINDINGS:      CT RAPID PERFUSION:  Markedly degraded by patient motion.    INFARCT CORE: 0 cc    TISSUE AT RISK: 8 cc combined in the left frontal and right temporal lobes    MISMATCH RATIO: N/A      CTA [place] OF [place]:    ANTERIOR CIRCULATION    ICA  CAVERNOUS, SUPRACLINOID, BIFURCATION SEGMENTS: Patent without flow   limiting stenosis. Atherosclerotic calcifications of the bilateral   cavernous ICA segments.    ANTERIOR CEREBRAL ARTERIES: Bilateral A1, anterior communicating and A2   anterior cerebral arteries are unremarkable in course and caliber without   flow limiting stenosis. Hypoplastic right A1 segment.    MIDDLE CEREBRAL ARTERIES: Patent bilateral M1, M2, and distal MCA   branches without flow limiting stenosis.    POSTERIOR CIRCULATION:    VERTEBRAL ARTERIES: Patent without flow limiting stenosis. Mild focal   narrowing of the right V4 segment.    BASILAR ARTERY: Patent no flow limiting stenosis.    POSTERIOR CEREBRAL ARTERIES: Patent without flow limiting stenosis.    CTA NECK:    GREAT VESSELS: Visualized segments are patent, no flow limiting stenosis.   Bovine aortic arch, normal variant.    COMMON CAROTID ARTERIES:  RIGHT: Patent without flow limiting stenosis  LEFT: Patent without flow limiting stenosis    CAROTID BULBS:  RIGHT: Patent without flow limiting stenosis  LEFT: Patent without flow limiting stenosis    INTERNAL CAROTID ARTERIES:  RIGHT: Patent no evidence for any hemodynamically significant stenosis at   the ICA origin by NASCET criteria. Stable mild ectasia of the proximal   cervical ICA measuring 0.9 cm in diameter. Partial retropharyngeal course.  LEFT: Patent no evidence for any hemodynamically significant stenosis at   the ICA origin by NASCET criteria. Stable fusiform aneurysmal dilatation   of the proximal cervical ICA measuring 1.4 cm in diameter and gradually   tapering in the mid segment. Partial retropharyngeal course.    VERTEBRAL ARTERIES:    RIGHT: Patent no evidence for any flow limiting stenosis.  LEFT: Patent no evidence for any flow limiting stenosis. Left dominant   vertebral system.      SOFT TISSUES: Patchy nonspecific groundglass opacities and consolidations   in the visualized upper lobes.    BONES: Multilevel degenerative changes inthe spine.    IMPRESSION:    CT PERFUSION: Markedly degraded by patient motion. No core infarct. Small   areas of abnormal perfusion in the left frontal and right temporal lobes   not confined to a vascular territory, likely artifactual.    If symptoms persist consider follow up head CT or MRI, MRA  if no   contraindication.    CTA COW:  Patent intracranial circulation without flow limiting stenosis.    CTA NECK: Patent, ECAs, ICAs, no  hemodynamically significant stenosis at    ICA origins by NASCET criteria. Stable fusiform aneurysmal dilatation of   the ICA origins/proximal segments.  Bilateral vertebral arteries are patent without flow limiting stenosis.    Patchy nonspecific groundglass and consolidations in the visualized upper   lobes.    --- End of Report ---      < end of copied text >         Neurology Progress Note    S: Patient seen and examined. No new events overnight. patient denied CP, SOB, HA or pain.     Medication:  acetaminophen     Tablet .. 650 milliGRAM(s) Oral every 6 hours PRN  amitriptyline 50 milliGRAM(s) Oral at bedtime  aspirin enteric coated 81 milliGRAM(s) Oral daily  atorvastatin 80 milliGRAM(s) Oral at bedtime  celecoxib 200 milliGRAM(s) Oral daily  cyclobenzaprine 5 milliGRAM(s) Oral three times a day PRN  dextrose 5%. 1000 milliLiter(s) IV Continuous <Continuous>  dextrose 5%. 1000 milliLiter(s) IV Continuous <Continuous>  dextrose 50% Injectable 25 Gram(s) IV Push once  dextrose 50% Injectable 12.5 Gram(s) IV Push once  dextrose 50% Injectable 25 Gram(s) IV Push once  dextrose Oral Gel 15 Gram(s) Oral once PRN  DULoxetine 60 milliGRAM(s) Oral daily  gabapentin 600 milliGRAM(s) Oral at bedtime  glucagon  Injectable 1 milliGRAM(s) IntraMuscular once  influenza   Vaccine 0.5 milliLiter(s) IntraMuscular once  insulin lispro (ADMELOG) corrective regimen sliding scale   SubCutaneous three times a day before meals  insulin lispro (ADMELOG) corrective regimen sliding scale   SubCutaneous at bedtime  melatonin 3 milliGRAM(s) Oral at bedtime PRN  montelukast 10 milliGRAM(s) Oral daily  ondansetron Injectable 4 milliGRAM(s) IV Push every 8 hours PRN  oxycodone    5 mG/acetaminophen 325 mG 1 Tablet(s) Oral every 4 hours PRN  piperacillin/tazobactam IVPB.. 3.375 Gram(s) IV Intermittent every 8 hours  rivaroxaban 20 milliGRAM(s) Oral with dinner  verapamil  milliGRAM(s) Oral daily      Vitals:  Vital Signs Last 24 Hrs  T(C): 37.5 (19 Dec 2023 11:05), Max: 37.5 (19 Dec 2023 11:05)  T(F): 99.5 (19 Dec 2023 11:05), Max: 99.5 (19 Dec 2023 11:05)  HR: 79 (19 Dec 2023 11:05) (60 - 96)  BP: 108/70 (19 Dec 2023 11:05) (93/60 - 148/84)  BP(mean): --  RR: 18 (19 Dec 2023 11:05) (18 - 20)  SpO2: 91% (19 Dec 2023 11:05) (91% - 96%)    Parameters below as of 19 Dec 2023 11:05  Patient On (Oxygen Delivery Method): room air        General Exam:   General Appearance: Appropriately dressed and in no acute distress       Head: Normocephalic, atraumatic and no dysmorphic features  Ear, Nose, and Throat: Moist mucous membranes  CVS: S1S2+  Resp: No SOB, no wheeze or rhonchi  Abd: soft NTND  Extremities: No edema, no cyanosis  Skin: No bruises, no rashes     Neurological Exam:  Mental Status: Awake, alert and oriented x 3.  Able to follow simple verbal commands. Able to name and repeat. Speech is fluent but slow  Cranial Nerves: PERRL, EOMI, VFFC, sensation V1-V3 intact,  no obvious facial asymmetry, equal elevation of palate, scm/trap 5/5, tongue is midline on protrusion.. hearing is grossly intact.   Motor: Normal bulk, tone. JIANG, LLE slightly weaker but poor efffort  Sensation: Intact to light touch and pinprick throughout. no right/left confusion. no extinction to tactile on DSS.   Reflexes: 1+ throughout at biceps, brachioradialis, triceps, patellars and ankles bilaterally and equal. No clonus. R toe and L toe were both downgoing.  Coordination: No dysmetria on FNF   Gait: deferred      I personally reviewed the below data/images/labs:      CBC Full  -  ( 19 Dec 2023 06:36 )  WBC Count : 5.26 K/uL  RBC Count : 3.21 M/uL  Hemoglobin : 8.4 g/dL  Hematocrit : 26.9 %  Platelet Count - Automated : 337 K/uL  Mean Cell Volume : 83.8 fl  Mean Cell Hemoglobin : 26.2 pg  Mean Cell Hemoglobin Concentration : 31.2 g/dL  Auto Neutrophil # : x  Auto Lymphocyte # : x  Auto Monocyte # : x  Auto Eosinophil # : x  Auto Basophil # : x  Auto Neutrophil % : x  Auto Lymphocyte % : x  Auto Monocyte % : x  Auto Eosinophil % : x  Auto Basophil % : x    12-19    140  |  106  |  15  ----------------------------<  102<H>  3.5   |  29  |  0.86    Ca    8.7      19 Dec 2023 06:36          Urinalysis Basic - ( 19 Dec 2023 06:36 )    Color: x / Appearance: x / SG: x / pH: x  Gluc: 102 mg/dL / Ketone: x  / Bili: x / Urobili: x   Blood: x / Protein: x / Nitrite: x   Leuk Esterase: x / RBC: x / WBC x   Sq Epi: x / Non Sq Epi: x / Bacteria: x    < from: CT Brain Stroke Protocol (12.17.23 @ 04:51) >    ACC: [id number] EXAM:  CT BRAIN STROKE PROTOCOL   ORDERED BY: MINA CONROY     PROCEDURE DATE:  12/17/2023          INTERPRETATION:  CLINICAL INFORMATION:  Stroke Code    TECHNIQUE:  Axial CT images were acquired through the head.  Intravenouscontrast: None  Two-dimensional reformats were generated.    COMPARISON STUDY: MRI brain 11/17/2023, CTA head and neck 11/17/2023.    FINDINGS:    There is no CT evidence of acute intracranial hemorrhage, mass effect,   midline shift, or acute, largeterritorial infarct.  The ventricles and   sulci are age-appropriate in size and configuration. The basal cisterns   are patent.    The mastoid air cells and middle ear cavities are grossly clear. The   visualized paranasal sinuses are well aerated.    The calvarium and skull base are grossly intact.    IMPRESSION:  No acute intracranial hemorrhage or mass effect.    Results were discussed with Dr. Conroy by Dr. Gastelum at 4:50 AM on   12/17/2023. with read back followed.    < end of copied text >        < from: CT Angio Neck Stroke Protocol w/ IV Cont (12.17.23 @ 05:12) >    ACC: [id number] EXAM:  CT ANGIO NECK STROKE PROTCL IC   ORDERED BY: MINA CONROY     ACC: [id number] EXAM:  CT BRAIN PERFUSION MAPS STROKE   ORDERED BY: MINA CONROY     ACC: [id number] EXAM:  CT ANGIO BRAIN STROKE PROTC IC   ORDERED BY: MINA CONROY     PROCEDURE DATE:  12/17/2023          INTERPRETATION:  CT PERFUSION, CTA OF THE [place] OF G[place]ARD AND NECK:    INDICATIONS: Stroke code.    TECHNIQUE:    RAPID artificial intelligence was used for perfusion analysis and for   preliminary evaluation of intracranial hemorrhage.    CTA [place] OF GIRARD:    After the intravenous power injection of non-ionic contrast material,   serial thin sections were obtained through the intracranial circulation   on a multislice CT scanner.  Images were reformatted using a dedicated 3D   software package and viewed on a dedicated workstation in multiple planes.    CTA NECK:    After the intravenous power injection of non-ionic contrast material,   serial thin sections were obtained through the cervical circulation on a   multislice CT scanner.  Images were reformatted using a dedicated 3D   software package and viewed on a dedicated workstation in multiple planes.      COMPARISON EXAMINATION: Noncontrast head CT performed the same day. CTA   head and neck 11/17/2023    FINDINGS:      CT RAPID PERFUSION:  Markedly degraded by patient motion.    INFARCT CORE: 0 cc    TISSUE AT RISK: 8 cc combined in the left frontal and right temporal lobes    MISMATCH RATIO: N/A      CTA [place] OF GIRARD:    ANTERIOR CIRCULATION    ICA  CAVERNOUS, SUPRACLINOID, BIFURCATION SEGMENTS: Patent without flow   limiting stenosis. Atherosclerotic calcifications of the bilateral   cavernous ICA segments.    ANTERIOR CEREBRAL ARTERIES: Bilateral A1, anterior communicating and A2   anterior cerebral arteries are unremarkable in course and caliber without   flow limiting stenosis. Hypoplastic right A1 segment.    MIDDLE CEREBRAL ARTERIES: Patent bilateral M1, M2, and distal MCA   branches without flow limiting stenosis.    POSTERIOR CIRCULATION:    VERTEBRAL ARTERIES: Patent without flow limiting stenosis. Mild focal   narrowing of the right V4 segment.    BASILAR ARTERY: Patent no flow limiting stenosis.    POSTERIOR CEREBRAL ARTERIES: Patent without flow limiting stenosis.    CTA NECK:    GREAT VESSELS: Visualized segments are patent, no flow limiting stenosis.   Bovine aortic arch, normal variant.    COMMON CAROTID ARTERIES:  RIGHT: Patent without flow limiting stenosis  LEFT: Patent without flow limiting stenosis    CAROTID BULBS:  RIGHT: Patent without flow limiting stenosis  LEFT: Patent without flow limiting stenosis    INTERNAL CAROTID ARTERIES:  RIGHT: Patent no evidence for any hemodynamically significant stenosis at   the ICA origin by NASCET criteria. Stable mild ectasia of the proximal   cervical ICA measuring 0.9 cm in diameter. Partial retropharyngeal course.  LEFT: Patent no evidence for any hemodynamically significant stenosis at   the ICA origin by NASCET criteria. Stable fusiform aneurysmal dilatation   of the proximal cervical ICA measuring 1.4 cm in diameter and gradually   tapering in the mid segment. Partial retropharyngeal course.    VERTEBRAL ARTERIES:    RIGHT: Patent no evidence for any flow limiting stenosis.  LEFT: Patent no evidence for any flow limiting stenosis. Left dominant   vertebral system.      SOFT TISSUES: Patchy nonspecific groundglass opacities and consolidations   in the visualized upper lobes.    BONES: Multilevel degenerative changes inthe spine.    IMPRESSION:    CT PERFUSION: Markedly degraded by patient motion. No core infarct. Small   areas of abnormal perfusion in the left frontal and right temporal lobes   not confined to a vascular territory, likely artifactual.    If symptoms persist consider follow up head CT or MRI, MRA  if no   contraindication.    CTA COW:  Patent intracranial circulation without flow limiting stenosis.    CTA NECK: Patent, ECAs, ICAs, no  hemodynamically significant stenosis at    ICA origins by NASCET criteria. Stable fusiform aneurysmal dilatation of   the ICA origins/proximal segments.  Bilateral vertebral arteries are patent without flow limiting stenosis.    Patchy nonspecific groundglass and consolidations in the visualized upper   lobes.    --- End of Report ---      < end of copied text >        EEG Classification / Summary:  Abnormal routine EEG in the awake state.  Mild diffuse slowing.  No epileptiform abnormalities.  Artifacts somewhat limit interpretation.    Clinical Impression:  Mild diffuse cerebral dysfunction is nonspecific in etiology.   Artifacts somewhat limit interpretation.   Neurology Progress Note    S: Patient seen and examined. No new events overnight. patient denied CP, SOB, HA or pain.     Medication:  acetaminophen     Tablet .. 650 milliGRAM(s) Oral every 6 hours PRN  amitriptyline 50 milliGRAM(s) Oral at bedtime  aspirin enteric coated 81 milliGRAM(s) Oral daily  atorvastatin 80 milliGRAM(s) Oral at bedtime  celecoxib 200 milliGRAM(s) Oral daily  cyclobenzaprine 5 milliGRAM(s) Oral three times a day PRN  dextrose 5%. 1000 milliLiter(s) IV Continuous <Continuous>  dextrose 5%. 1000 milliLiter(s) IV Continuous <Continuous>  dextrose 50% Injectable 25 Gram(s) IV Push once  dextrose 50% Injectable 12.5 Gram(s) IV Push once  dextrose 50% Injectable 25 Gram(s) IV Push once  dextrose Oral Gel 15 Gram(s) Oral once PRN  DULoxetine 60 milliGRAM(s) Oral daily  gabapentin 600 milliGRAM(s) Oral at bedtime  glucagon  Injectable 1 milliGRAM(s) IntraMuscular once  influenza   Vaccine 0.5 milliLiter(s) IntraMuscular once  insulin lispro (ADMELOG) corrective regimen sliding scale   SubCutaneous three times a day before meals  insulin lispro (ADMELOG) corrective regimen sliding scale   SubCutaneous at bedtime  melatonin 3 milliGRAM(s) Oral at bedtime PRN  montelukast 10 milliGRAM(s) Oral daily  ondansetron Injectable 4 milliGRAM(s) IV Push every 8 hours PRN  oxycodone    5 mG/acetaminophen 325 mG 1 Tablet(s) Oral every 4 hours PRN  piperacillin/tazobactam IVPB.. 3.375 Gram(s) IV Intermittent every 8 hours  rivaroxaban 20 milliGRAM(s) Oral with dinner  verapamil  milliGRAM(s) Oral daily      Vitals:  Vital Signs Last 24 Hrs  T(C): 37.5 (19 Dec 2023 11:05), Max: 37.5 (19 Dec 2023 11:05)  T(F): 99.5 (19 Dec 2023 11:05), Max: 99.5 (19 Dec 2023 11:05)  HR: 79 (19 Dec 2023 11:05) (60 - 96)  BP: 108/70 (19 Dec 2023 11:05) (93/60 - 148/84)  BP(mean): --  RR: 18 (19 Dec 2023 11:05) (18 - 20)  SpO2: 91% (19 Dec 2023 11:05) (91% - 96%)    Parameters below as of 19 Dec 2023 11:05  Patient On (Oxygen Delivery Method): room air        General Exam:   General Appearance: Appropriately dressed and in no acute distress       Head: Normocephalic, atraumatic and no dysmorphic features  Ear, Nose, and Throat: Moist mucous membranes  CVS: S1S2+  Resp: No SOB, no wheeze or rhonchi  Abd: soft NTND  Extremities: No edema, no cyanosis  Skin: No bruises, no rashes     Neurological Exam:  Mental Status: Awake, alert and oriented x 3.  Able to follow simple verbal commands. Able to name and repeat. Speech is fluent but slow  Cranial Nerves: PERRL, EOMI, VFFC, sensation V1-V3 intact,  no obvious facial asymmetry, equal elevation of palate, scm/trap 5/5, tongue is midline on protrusion.. hearing is grossly intact.   Motor: Normal bulk, tone. JIANG, LLE slightly weaker but poor efffort  Sensation: Intact to light touch and pinprick throughout. no right/left confusion. no extinction to tactile on DSS.   Reflexes: 1+ throughout at biceps, brachioradialis, triceps, patellars and ankles bilaterally and equal. No clonus. R toe and L toe were both downgoing.  Coordination: No dysmetria on FNF   Gait: deferred      I personally reviewed the below data/images/labs:      CBC Full  -  ( 19 Dec 2023 06:36 )  WBC Count : 5.26 K/uL  RBC Count : 3.21 M/uL  Hemoglobin : 8.4 g/dL  Hematocrit : 26.9 %  Platelet Count - Automated : 337 K/uL  Mean Cell Volume : 83.8 fl  Mean Cell Hemoglobin : 26.2 pg  Mean Cell Hemoglobin Concentration : 31.2 g/dL  Auto Neutrophil # : x  Auto Lymphocyte # : x  Auto Monocyte # : x  Auto Eosinophil # : x  Auto Basophil # : x  Auto Neutrophil % : x  Auto Lymphocyte % : x  Auto Monocyte % : x  Auto Eosinophil % : x  Auto Basophil % : x    12-19    140  |  106  |  15  ----------------------------<  102<H>  3.5   |  29  |  0.86    Ca    8.7      19 Dec 2023 06:36          Urinalysis Basic - ( 19 Dec 2023 06:36 )    Color: x / Appearance: x / SG: x / pH: x  Gluc: 102 mg/dL / Ketone: x  / Bili: x / Urobili: x   Blood: x / Protein: x / Nitrite: x   Leuk Esterase: x / RBC: x / WBC x   Sq Epi: x / Non Sq Epi: x / Bacteria: x    < from: CT Brain Stroke Protocol (12.17.23 @ 04:51) >    ACC: [id number] EXAM:  CT BRAIN STROKE PROTOCOL   ORDERED BY: MINA CONROY     PROCEDURE DATE:  12/17/2023          INTERPRETATION:  CLINICAL INFORMATION:  Stroke Code    TECHNIQUE:  Axial CT images were acquired through the head.  Intravenouscontrast: None  Two-dimensional reformats were generated.    COMPARISON STUDY: MRI brain 11/17/2023, CTA head and neck 11/17/2023.    FINDINGS:    There is no CT evidence of acute intracranial hemorrhage, mass effect,   midline shift, or acute, largeterritorial infarct.  The ventricles and   sulci are age-appropriate in size and configuration. The basal cisterns   are patent.    The mastoid air cells and middle ear cavities are grossly clear. The   visualized paranasal sinuses are well aerated.    The calvarium and skull base are grossly intact.    IMPRESSION:  No acute intracranial hemorrhage or mass effect.    Results were discussed with Dr. Conroy by Dr. Gastelum at 4:50 AM on   12/17/2023. with read back followed.    < end of copied text >        < from: CT Angio Neck Stroke Protocol w/ IV Cont (12.17.23 @ 05:12) >    ACC: [id number] EXAM:  CT ANGIO NECK STROKE PROTCL IC   ORDERED BY: MINA CONROY     ACC: [id number] EXAM:  CT BRAIN PERFUSION MAPS STROKE   ORDERED BY: MINA CONROY     ACC: [id number] EXAM:  CT ANGIO BRAIN STROKE PROTC IC   ORDERED BY: MINA CONROY     PROCEDURE DATE:  12/17/2023          INTERPRETATION:  CT PERFUSION, CTA OF THE [place] OF [place] AND NECK:    INDICATIONS: Stroke code.    TECHNIQUE:    RAPID artificial intelligence was used for perfusion analysis and for   preliminary evaluation of intracranial hemorrhage.    CTA [place] OF [place]:    After the intravenous power injection of non-ionic contrast material,   serial thin sections were obtained through the intracranial circulation   on a multislice CT scanner.  Images were reformatted using a dedicated 3D   software package and viewed on a dedicated workstation in multiple planes.    CTA NECK:    After the intravenous power injection of non-ionic contrast material,   serial thin sections were obtained through the cervical circulation on a   multislice CT scanner.  Images were reformatted using a dedicated 3D   software package and viewed on a dedicated workstation in multiple planes.      COMPARISON EXAMINATION: Noncontrast head CT performed the same day. CTA   head and neck 11/17/2023    FINDINGS:      CT RAPID PERFUSION:  Markedly degraded by patient motion.    INFARCT CORE: 0 cc    TISSUE AT RISK: 8 cc combined in the left frontal and right temporal lobes    MISMATCH RATIO: N/A      CTA [place] OF G[place]ARD:    ANTERIOR CIRCULATION    ICA  CAVERNOUS, SUPRACLINOID, BIFURCATION SEGMENTS: Patent without flow   limiting stenosis. Atherosclerotic calcifications of the bilateral   cavernous ICA segments.    ANTERIOR CEREBRAL ARTERIES: Bilateral A1, anterior communicating and A2   anterior cerebral arteries are unremarkable in course and caliber without   flow limiting stenosis. Hypoplastic right A1 segment.    MIDDLE CEREBRAL ARTERIES: Patent bilateral M1, M2, and distal MCA   branches without flow limiting stenosis.    POSTERIOR CIRCULATION:    VERTEBRAL ARTERIES: Patent without flow limiting stenosis. Mild focal   narrowing of the right V4 segment.    BASILAR ARTERY: Patent no flow limiting stenosis.    POSTERIOR CEREBRAL ARTERIES: Patent without flow limiting stenosis.    CTA NECK:    GREAT VESSELS: Visualized segments are patent, no flow limiting stenosis.   Bovine aortic arch, normal variant.    COMMON CAROTID ARTERIES:  RIGHT: Patent without flow limiting stenosis  LEFT: Patent without flow limiting stenosis    CAROTID BULBS:  RIGHT: Patent without flow limiting stenosis  LEFT: Patent without flow limiting stenosis    INTERNAL CAROTID ARTERIES:  RIGHT: Patent no evidence for any hemodynamically significant stenosis at   the ICA origin by NASCET criteria. Stable mild ectasia of the proximal   cervical ICA measuring 0.9 cm in diameter. Partial retropharyngeal course.  LEFT: Patent no evidence for any hemodynamically significant stenosis at   the ICA origin by NASCET criteria. Stable fusiform aneurysmal dilatation   of the proximal cervical ICA measuring 1.4 cm in diameter and gradually   tapering in the mid segment. Partial retropharyngeal course.    VERTEBRAL ARTERIES:    RIGHT: Patent no evidence for any flow limiting stenosis.  LEFT: Patent no evidence for any flow limiting stenosis. Left dominant   vertebral system.      SOFT TISSUES: Patchy nonspecific groundglass opacities and consolidations   in the visualized upper lobes.    BONES: Multilevel degenerative changes inthe spine.    IMPRESSION:    CT PERFUSION: Markedly degraded by patient motion. No core infarct. Small   areas of abnormal perfusion in the left frontal and right temporal lobes   not confined to a vascular territory, likely artifactual.    If symptoms persist consider follow up head CT or MRI, MRA  if no   contraindication.    CTA COW:  Patent intracranial circulation without flow limiting stenosis.    CTA NECK: Patent, ECAs, ICAs, no  hemodynamically significant stenosis at    ICA origins by NASCET criteria. Stable fusiform aneurysmal dilatation of   the ICA origins/proximal segments.  Bilateral vertebral arteries are patent without flow limiting stenosis.    Patchy nonspecific groundglass and consolidations in the visualized upper   lobes.    --- End of Report ---      < end of copied text >        EEG Classification / Summary:  Abnormal routine EEG in the awake state.  Mild diffuse slowing.  No epileptiform abnormalities.  Artifacts somewhat limit interpretation.    Clinical Impression:  Mild diffuse cerebral dysfunction is nonspecific in etiology.   Artifacts somewhat limit interpretation.

## 2023-12-20 LAB
ANION GAP SERPL CALC-SCNC: 5 MMOL/L — SIGNIFICANT CHANGE UP (ref 5–17)
ANION GAP SERPL CALC-SCNC: 5 MMOL/L — SIGNIFICANT CHANGE UP (ref 5–17)
BUN SERPL-MCNC: 10 MG/DL — SIGNIFICANT CHANGE UP (ref 7–23)
BUN SERPL-MCNC: 10 MG/DL — SIGNIFICANT CHANGE UP (ref 7–23)
CALCIUM SERPL-MCNC: 8.4 MG/DL — LOW (ref 8.5–10.1)
CALCIUM SERPL-MCNC: 8.4 MG/DL — LOW (ref 8.5–10.1)
CHLORIDE SERPL-SCNC: 107 MMOL/L — SIGNIFICANT CHANGE UP (ref 96–108)
CHLORIDE SERPL-SCNC: 107 MMOL/L — SIGNIFICANT CHANGE UP (ref 96–108)
CO2 SERPL-SCNC: 26 MMOL/L — SIGNIFICANT CHANGE UP (ref 22–31)
CO2 SERPL-SCNC: 26 MMOL/L — SIGNIFICANT CHANGE UP (ref 22–31)
CREAT SERPL-MCNC: 0.68 MG/DL — SIGNIFICANT CHANGE UP (ref 0.5–1.3)
CREAT SERPL-MCNC: 0.68 MG/DL — SIGNIFICANT CHANGE UP (ref 0.5–1.3)
EGFR: 98 ML/MIN/1.73M2 — SIGNIFICANT CHANGE UP
EGFR: 98 ML/MIN/1.73M2 — SIGNIFICANT CHANGE UP
GLUCOSE BLDC GLUCOMTR-MCNC: 103 MG/DL — HIGH (ref 70–99)
GLUCOSE BLDC GLUCOMTR-MCNC: 103 MG/DL — HIGH (ref 70–99)
GLUCOSE BLDC GLUCOMTR-MCNC: 111 MG/DL — HIGH (ref 70–99)
GLUCOSE BLDC GLUCOMTR-MCNC: 111 MG/DL — HIGH (ref 70–99)
GLUCOSE BLDC GLUCOMTR-MCNC: 157 MG/DL — HIGH (ref 70–99)
GLUCOSE BLDC GLUCOMTR-MCNC: 157 MG/DL — HIGH (ref 70–99)
GLUCOSE BLDC GLUCOMTR-MCNC: 98 MG/DL — SIGNIFICANT CHANGE UP (ref 70–99)
GLUCOSE BLDC GLUCOMTR-MCNC: 98 MG/DL — SIGNIFICANT CHANGE UP (ref 70–99)
GLUCOSE SERPL-MCNC: 106 MG/DL — HIGH (ref 70–99)
GLUCOSE SERPL-MCNC: 106 MG/DL — HIGH (ref 70–99)
HCT VFR BLD CALC: 26.5 % — LOW (ref 34.5–45)
HCT VFR BLD CALC: 26.5 % — LOW (ref 34.5–45)
HGB BLD-MCNC: 8.6 G/DL — LOW (ref 11.5–15.5)
HGB BLD-MCNC: 8.6 G/DL — LOW (ref 11.5–15.5)
M PNEUMO IGG SER IA-ACNC: 0.87 INDEX — SIGNIFICANT CHANGE UP (ref 0–0.9)
M PNEUMO IGG SER IA-ACNC: 0.87 INDEX — SIGNIFICANT CHANGE UP (ref 0–0.9)
M PNEUMO IGG SER IA-ACNC: NEGATIVE — SIGNIFICANT CHANGE UP
M PNEUMO IGG SER IA-ACNC: NEGATIVE — SIGNIFICANT CHANGE UP
M PNEUMO IGM SER-ACNC: 1.23 INDEX — HIGH (ref 0–0.9)
M PNEUMO IGM SER-ACNC: 1.23 INDEX — HIGH (ref 0–0.9)
MCHC RBC-ENTMCNC: 26.6 PG — LOW (ref 27–34)
MCHC RBC-ENTMCNC: 26.6 PG — LOW (ref 27–34)
MCHC RBC-ENTMCNC: 32.5 G/DL — SIGNIFICANT CHANGE UP (ref 32–36)
MCHC RBC-ENTMCNC: 32.5 G/DL — SIGNIFICANT CHANGE UP (ref 32–36)
MCV RBC AUTO: 82 FL — SIGNIFICANT CHANGE UP (ref 80–100)
MCV RBC AUTO: 82 FL — SIGNIFICANT CHANGE UP (ref 80–100)
MYCOPLASMA AG SPEC QL: POSITIVE
MYCOPLASMA AG SPEC QL: POSITIVE
NRBC # BLD: 0 /100 WBCS — SIGNIFICANT CHANGE UP (ref 0–0)
NRBC # BLD: 0 /100 WBCS — SIGNIFICANT CHANGE UP (ref 0–0)
PLATELET # BLD AUTO: 362 K/UL — SIGNIFICANT CHANGE UP (ref 150–400)
PLATELET # BLD AUTO: 362 K/UL — SIGNIFICANT CHANGE UP (ref 150–400)
POTASSIUM SERPL-MCNC: 3.7 MMOL/L — SIGNIFICANT CHANGE UP (ref 3.5–5.3)
POTASSIUM SERPL-MCNC: 3.7 MMOL/L — SIGNIFICANT CHANGE UP (ref 3.5–5.3)
POTASSIUM SERPL-SCNC: 3.7 MMOL/L — SIGNIFICANT CHANGE UP (ref 3.5–5.3)
POTASSIUM SERPL-SCNC: 3.7 MMOL/L — SIGNIFICANT CHANGE UP (ref 3.5–5.3)
PROCALCITONIN SERPL-MCNC: 0.09 NG/ML — SIGNIFICANT CHANGE UP (ref 0.02–0.1)
PROCALCITONIN SERPL-MCNC: 0.09 NG/ML — SIGNIFICANT CHANGE UP (ref 0.02–0.1)
RBC # BLD: 3.23 M/UL — LOW (ref 3.8–5.2)
RBC # BLD: 3.23 M/UL — LOW (ref 3.8–5.2)
RBC # FLD: 16.2 % — HIGH (ref 10.3–14.5)
RBC # FLD: 16.2 % — HIGH (ref 10.3–14.5)
SODIUM SERPL-SCNC: 138 MMOL/L — SIGNIFICANT CHANGE UP (ref 135–145)
SODIUM SERPL-SCNC: 138 MMOL/L — SIGNIFICANT CHANGE UP (ref 135–145)
WBC # BLD: 6.02 K/UL — SIGNIFICANT CHANGE UP (ref 3.8–10.5)
WBC # BLD: 6.02 K/UL — SIGNIFICANT CHANGE UP (ref 3.8–10.5)
WBC # FLD AUTO: 6.02 K/UL — SIGNIFICANT CHANGE UP (ref 3.8–10.5)
WBC # FLD AUTO: 6.02 K/UL — SIGNIFICANT CHANGE UP (ref 3.8–10.5)

## 2023-12-20 PROCEDURE — 99232 SBSQ HOSP IP/OBS MODERATE 35: CPT

## 2023-12-20 RX ORDER — GUAIFENESIN/DEXTROMETHORPHAN 600MG-30MG
10 TABLET, EXTENDED RELEASE 12 HR ORAL EVERY 6 HOURS
Refills: 0 | Status: DISCONTINUED | OUTPATIENT
Start: 2023-12-20 | End: 2024-01-10

## 2023-12-20 RX ADMIN — Medication 81 MILLIGRAM(S): at 13:31

## 2023-12-20 RX ADMIN — CELECOXIB 200 MILLIGRAM(S): 200 CAPSULE ORAL at 14:20

## 2023-12-20 RX ADMIN — CELECOXIB 200 MILLIGRAM(S): 200 CAPSULE ORAL at 13:30

## 2023-12-20 RX ADMIN — RIVAROXABAN 20 MILLIGRAM(S): KIT at 17:33

## 2023-12-20 RX ADMIN — ATORVASTATIN CALCIUM 80 MILLIGRAM(S): 80 TABLET, FILM COATED ORAL at 21:32

## 2023-12-20 RX ADMIN — Medication 10 MILLILITER(S): at 21:32

## 2023-12-20 RX ADMIN — PIPERACILLIN AND TAZOBACTAM 25 GRAM(S): 4; .5 INJECTION, POWDER, LYOPHILIZED, FOR SOLUTION INTRAVENOUS at 13:29

## 2023-12-20 RX ADMIN — MONTELUKAST 10 MILLIGRAM(S): 4 TABLET, CHEWABLE ORAL at 13:31

## 2023-12-20 RX ADMIN — GABAPENTIN 600 MILLIGRAM(S): 400 CAPSULE ORAL at 21:32

## 2023-12-20 RX ADMIN — PIPERACILLIN AND TAZOBACTAM 25 GRAM(S): 4; .5 INJECTION, POWDER, LYOPHILIZED, FOR SOLUTION INTRAVENOUS at 05:24

## 2023-12-20 RX ADMIN — Medication 200 MILLIGRAM(S): at 00:52

## 2023-12-20 RX ADMIN — PIPERACILLIN AND TAZOBACTAM 25 GRAM(S): 4; .5 INJECTION, POWDER, LYOPHILIZED, FOR SOLUTION INTRAVENOUS at 21:33

## 2023-12-20 RX ADMIN — DULOXETINE HYDROCHLORIDE 60 MILLIGRAM(S): 30 CAPSULE, DELAYED RELEASE ORAL at 13:29

## 2023-12-20 RX ADMIN — Medication 240 MILLIGRAM(S): at 05:24

## 2023-12-20 NOTE — PROGRESS NOTE ADULT - ASSESSMENT
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 Of note pt recently developed URI symptoms and was started on Doxy as well as Hycodan, Alprazolam and Fioricet. Of note pt already on Percocet, flexeril for chronic pain, Gabapentin for seizures per  all which she took. Pt being admitted for Acute hypoxic respiratory failure due to Multifocal PNA, AMS r/o TIA/CVA  vs metabolic encephalopathy secondary to polypharmacy and/or hypoxia.      # Acute metabolic encephalopathy likely with acute respiratory failure with multifocal pneumonia. acute stroke is ruled out. EEG did not show any epileptiform activity.  Cont iv zosyn and follow final blood cultures and procalcitonin. cont bronchodilators. legionella and streptococcus negative. advised adequate oral hydration. seizure precautions  cont oxygen to keep sat > 94%.     Per Neuro note, monitor MS with HCAP infection, if no improvement will order brain MRI.     # PE  - c/w Xarelto. no active gross bleeding.     # SLE    # HTN/HLD. controlled with Verapemil, statin    # DM type 2. controlled.   - FS qAC and HS w/ SSI    #Weakness. 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 Of note pt recently developed URI symptoms and was started on Doxy as well as Hycodan, Alprazolam and Fioricet. Of note pt already on Percocet, flexeril for chronic pain, Gabapentin for seizures per  all which she took. Pt being admitted for Acute hypoxic respiratory failure due to Multifocal PNA, AMS r/o TIA/CVA  vs metabolic encephalopathy secondary to polypharmacy and/or hypoxia.      # Acute metabolic encephalopathy likely with acute respiratory failure with multifocal pneumonia. acute stroke is ruled out. EEG did not show any epileptiform activity.  Cont iv zosyn and follow final blood cultures and procalcitonin. cont bronchodilators. legionella and streptococcus negative. advised adequate oral hydration. seizure precautions  cont oxygen to keep sat > 94%.     Per Neuro note, monitor MS with HCAP infection, if no improvement will order brain MRI.     # PE  - c/w Xarelto. no active gross bleeding.     # SLE    # HTN/HLD. controlled with Verapemil, statin    # DM type 2. controlled.   - FS qAC and HS w/ SSI    #Weakness. 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 Of note pt recently developed URI symptoms and was started on Doxy as well as Hycodan, Alprazolam and Fioricet. Of note pt already on Percocet, flexeril for chronic pain, Gabapentin for seizures per  all which she took. Pt being admitted for Acute hypoxic respiratory failure due to Multifocal PNA, AMS r/o TIA/CVA  vs metabolic encephalopathy secondary to polypharmacy and/or hypoxia.      # Acute metabolic encephalopathy likely with acute respiratory failure with multifocal pneumonia. acute stroke is ruled out. EEG did not show any epileptiform activity.  Cont iv zosyn and follow final blood cultures and procalcitonin. cont bronchodilators. legionella and streptococcus negative. advised adequate oral hydration. seizure precautions  cont oxygen to keep sat > 94%.     Per Neuro note, monitor MS with HCAP infection, if no improvement will order brain MRI. Zosyn for 5-7 days.     # PE  - c/w Xarelto. no active gross bleeding.     # SLE stable.     # HTN/HLD. controlled with Verapemil, statin    # DM type 2. controlled.   - FS qAC and HS w/ SSI    #Weakness. PT eval>>W[facility]ON    Will stop Elavil, no need for Elavil and Neurontin QHS.

## 2023-12-20 NOTE — PROGRESS NOTE ADULT - SUBJECTIVE AND OBJECTIVE BOX
Neurology Progress Note    S: Patient seen and examined. No new events overnight. patient denied CP, SOB, HA or pain.     Medications: MEDICATIONS  (STANDING):  amitriptyline 50 milliGRAM(s) Oral at bedtime  aspirin enteric coated 81 milliGRAM(s) Oral daily  atorvastatin 80 milliGRAM(s) Oral at bedtime  celecoxib 200 milliGRAM(s) Oral daily  dextrose 5%. 1000 milliLiter(s) (100 mL/Hr) IV Continuous <Continuous>  dextrose 5%. 1000 milliLiter(s) (50 mL/Hr) IV Continuous <Continuous>  dextrose 50% Injectable 25 Gram(s) IV Push once  dextrose 50% Injectable 12.5 Gram(s) IV Push once  dextrose 50% Injectable 25 Gram(s) IV Push once  DULoxetine 60 milliGRAM(s) Oral daily  gabapentin 600 milliGRAM(s) Oral at bedtime  glucagon  Injectable 1 milliGRAM(s) IntraMuscular once  influenza   Vaccine 0.5 milliLiter(s) IntraMuscular once  insulin lispro (ADMELOG) corrective regimen sliding scale   SubCutaneous three times a day before meals  insulin lispro (ADMELOG) corrective regimen sliding scale   SubCutaneous at bedtime  montelukast 10 milliGRAM(s) Oral daily  piperacillin/tazobactam IVPB.. 3.375 Gram(s) IV Intermittent every 8 hours  rivaroxaban 20 milliGRAM(s) Oral with dinner  verapamil  milliGRAM(s) Oral daily    MEDICATIONS  (PRN):  acetaminophen     Tablet .. 650 milliGRAM(s) Oral every 6 hours PRN Temp greater or equal to 38C (100.4F), Mild Pain (1 - 3)  cyclobenzaprine 5 milliGRAM(s) Oral three times a day PRN Muscle Spasm  dextrose Oral Gel 15 Gram(s) Oral once PRN Blood Glucose LESS THAN 70 milliGRAM(s)/deciliter  melatonin 3 milliGRAM(s) Oral at bedtime PRN Insomnia  ondansetron Injectable 4 milliGRAM(s) IV Push every 8 hours PRN Nausea and/or Vomiting  oxycodone    5 mG/acetaminophen 325 mG 1 Tablet(s) Oral every 4 hours PRN Severe Pain (7 - 10)       Vitals:  Vital Signs Last 24 Hrs  T(C): 37 (20 Dec 2023 05:18), Max: 37 (20 Dec 2023 05:18)  T(F): 98.6 (20 Dec 2023 05:18), Max: 98.6 (20 Dec 2023 05:18)  HR: 91 (20 Dec 2023 06:36) (73 - 101)  BP: 143/85 (20 Dec 2023 06:36) (118/74 - 168/98)  BP(mean): --  RR: 18 (20 Dec 2023 06:36) (18 - 18)  SpO2: 95% (20 Dec 2023 06:36) (92% - 97%)    Parameters below as of 20 Dec 2023 06:36  Patient On (Oxygen Delivery Method): nasal cannula, 2L              General Exam:   General Appearance: Appropriately dressed and in no acute distress       Head: Normocephalic, atraumatic and no dysmorphic features  Ear, Nose, and Throat: Moist mucous membranes  CVS: S1S2+  Resp: No SOB, no wheeze or rhonchi  Abd: soft NTND  Extremities: No edema, no cyanosis  Skin: No bruises, no rashes     Neurological Exam:  Mental Status: Awake, alert and oriented x 3.  Able to follow simple verbal commands. Able to name and repeat. Speech is fluent but slow  Cranial Nerves: PERRL, EOMI, VFFC, sensation V1-V3 intact,  no obvious facial asymmetry, equal elevation of palate, scm/trap 5/5, tongue is midline on protrusion.. hearing is grossly intact.   Motor: Normal bulk, tone. JIANG, LLE slightly weaker but poor efffort  Sensation: Intact to light touch and pinprick throughout. no right/left confusion. no extinction to tactile on DSS.   Reflexes: 1+ throughout at biceps, brachioradialis, triceps, patellars and ankles bilaterally and equal. No clonus. R toe and L toe were both downgoing.  Coordination: No dysmetria on FNF   Gait: deferred      I personally reviewed the below data/images/labs:      LABS:                          8.6    6.02  )-----------( 362      ( 20 Dec 2023 07:01 )             26.5     12-20    138  |  107  |  10  ----------------------------<  106<H>  3.7   |  26  |  0.68    Ca    8.4<L>      20 Dec 2023 07:01          Urinalysis Basic - ( 20 Dec 2023 07:01 )    Color: x / Appearance: x / SG: x / pH: x  Gluc: 106 mg/dL / Ketone: x  / Bili: x / Urobili: x   Blood: x / Protein: x / Nitrite: x   Leuk Esterase: x / RBC: x / WBC x   Sq Epi: x / Non Sq Epi: x / Bacteria: x      < from: CT Brain Stroke Protocol (12.17.23 @ 04:51) >    ACC: [id number] EXAM:  CT BRAIN STROKE PROTOCOL   ORDERED BY: MINA CONROY     PROCEDURE DATE:  12/17/2023          INTERPRETATION:  CLINICAL INFORMATION:  Stroke Code    TECHNIQUE:  Axial CT images were acquired through the head.  Intravenouscontrast: None  Two-dimensional reformats were generated.    COMPARISON STUDY: MRI brain 11/17/2023, CTA head and neck 11/17/2023.    FINDINGS:    There is no CT evidence of acute intracranial hemorrhage, mass effect,   midline shift, or acute, largeterritorial infarct.  The ventricles and   sulci are age-appropriate in size and configuration. The basal cisterns   are patent.    The mastoid air cells and middle ear cavities are grossly clear. The   visualized paranasal sinuses are well aerated.    The calvarium and skull base are grossly intact.    IMPRESSION:  No acute intracranial hemorrhage or mass effect.    Results were discussed with Dr. Conroy by Dr. Gastelum at 4:50 AM on   12/17/2023. with read back followed.    < end of copied text >        < from: CT Angio Neck Stroke Protocol w/ IV Cont (12.17.23 @ 05:12) >    ACC: [id number] EXAM:  CT ANGIO NECK STROKE PROTCL IC   ORDERED BY: MINA CONROY     ACC: [id number] EXAM:  CT BRAIN PERFUSION MAPS STROKE   ORDERED BY: MINA CONROY     ACC: [id number] EXAM:  CT ANGIO BRAIN STROKE PROTC IC   ORDERED BY: MINA CONROY     PROCEDURE DATE:  12/17/2023          INTERPRETATION:  CT PERFUSION, CTA OF THE [place] OF [place] AND NECK:    INDICATIONS: Stroke code.    TECHNIQUE:    RAPID artificial intelligence was used for perfusion analysis and for   preliminary evaluation of intracranial hemorrhage.    CTA [place] OF [place]:    After the intravenous power injection of non-ionic contrast material,   serial thin sections were obtained through the intracranial circulation   on a multislice CT scanner.  Images were reformatted using a dedicated 3D   software package and viewed on a dedicated workstation in multiple planes.    CTA NECK:    After the intravenous power injection of non-ionic contrast material,   serial thin sections were obtained through the cervical circulation on a   multislice CT scanner.  Images were reformatted using a dedicated 3D   software package and viewed on a dedicated workstation in multiple planes.      COMPARISON EXAMINATION: Noncontrast head CT performed the same day. CTA   head and neck 11/17/2023    FINDINGS:      CT RAPID PERFUSION:  Markedly degraded by patient motion.    INFARCT CORE: 0 cc    TISSUE AT RISK: 8 cc combined in the left frontal and right temporal lobes    MISMATCH RATIO: N/A      CTA [place]:    ANTERIOR CIRCULATION    ICA  CAVERNOUS, SUPRACLINOID, BIFURCATION SEGMENTS: Patent without flow   limiting stenosis. Atherosclerotic calcifications of the bilateral   cavernous ICA segments.    ANTERIOR CEREBRAL ARTERIES: Bilateral A1, anterior communicating and A2   anterior cerebral arteries are unremarkable in course and caliber without   flow limiting stenosis. Hypoplastic right A1 segment.    MIDDLE CEREBRAL ARTERIES: Patent bilateral M1, M2, and distal MCA   branches without flow limiting stenosis.    POSTERIOR CIRCULATION:    VERTEBRAL ARTERIES: Patent without flow limiting stenosis. Mild focal   narrowing of the right V4 segment.    BASILAR ARTERY: Patent no flow limiting stenosis.    POSTERIOR CEREBRAL ARTERIES: Patent without flow limiting stenosis.    CTA NECK:    GREAT VESSELS: Visualized segments are patent, no flow limiting stenosis.   Bovine aortic arch, normal variant.    COMMON CAROTID ARTERIES:  RIGHT: Patent without flow limiting stenosis  LEFT: Patent without flow limiting stenosis    CAROTID BULBS:  RIGHT: Patent without flow limiting stenosis  LEFT: Patent without flow limiting stenosis    INTERNAL CAROTID ARTERIES:  RIGHT: Patent no evidence for any hemodynamically significant stenosis at   the ICA origin by NASCET criteria. Stable mild ectasia of the proximal   cervical ICA measuring 0.9 cm in diameter. Partial retropharyngeal course.  LEFT: Patent no evidence for any hemodynamically significant stenosis at   the ICA origin by NASCET criteria. Stable fusiform aneurysmal dilatation   of the proximal cervical ICA measuring 1.4 cm in diameter and gradually   tapering in the mid segment. Partial retropharyngeal course.    VERTEBRAL ARTERIES:    RIGHT: Patent no evidence for any flow limiting stenosis.  LEFT: Patent no evidence for any flow limiting stenosis. Left dominant   vertebral system.      SOFT TISSUES: Patchy nonspecific groundglass opacities and consolidations   in the visualized upper lobes.    BONES: Multilevel degenerative changes inthe spine.    IMPRESSION:    CT PERFUSION: Markedly degraded by patient motion. No core infarct. Small   areas of abnormal perfusion in the left frontal and right temporal lobes   not confined to a vascular territory, likely artifactual.    If symptoms persist consider follow up head CT or MRI, MRA  if no   contraindication.    CTA COW:  Patent intracranial circulation without flow limiting stenosis.    CTA NECK: Patent, ECAs, ICAs, no  hemodynamically significant stenosis at    ICA origins by NASCET criteria. Stable fusiform aneurysmal dilatation of   the ICA origins/proximal segments.  Bilateral vertebral arteries are patent without flow limiting stenosis.    Patchy nonspecific groundglass and consolidations in the visualized upper   lobes.    --- End of Report ---      < end of copied text >        EEG Classification / Summary:  Abnormal routine EEG in the awake state.  Mild diffuse slowing.  No epileptiform abnormalities.  Artifacts somewhat limit interpretation.    Clinical Impression:  Mild diffuse cerebral dysfunction is nonspecific in etiology.   Artifacts somewhat limit interpretation.   Neurology Progress Note    S: Patient seen and examined. No new events overnight. patient denied CP, SOB, HA or pain.     Medications: MEDICATIONS  (STANDING):  amitriptyline 50 milliGRAM(s) Oral at bedtime  aspirin enteric coated 81 milliGRAM(s) Oral daily  atorvastatin 80 milliGRAM(s) Oral at bedtime  celecoxib 200 milliGRAM(s) Oral daily  dextrose 5%. 1000 milliLiter(s) (100 mL/Hr) IV Continuous <Continuous>  dextrose 5%. 1000 milliLiter(s) (50 mL/Hr) IV Continuous <Continuous>  dextrose 50% Injectable 25 Gram(s) IV Push once  dextrose 50% Injectable 12.5 Gram(s) IV Push once  dextrose 50% Injectable 25 Gram(s) IV Push once  DULoxetine 60 milliGRAM(s) Oral daily  gabapentin 600 milliGRAM(s) Oral at bedtime  glucagon  Injectable 1 milliGRAM(s) IntraMuscular once  influenza   Vaccine 0.5 milliLiter(s) IntraMuscular once  insulin lispro (ADMELOG) corrective regimen sliding scale   SubCutaneous three times a day before meals  insulin lispro (ADMELOG) corrective regimen sliding scale   SubCutaneous at bedtime  montelukast 10 milliGRAM(s) Oral daily  piperacillin/tazobactam IVPB.. 3.375 Gram(s) IV Intermittent every 8 hours  rivaroxaban 20 milliGRAM(s) Oral with dinner  verapamil  milliGRAM(s) Oral daily    MEDICATIONS  (PRN):  acetaminophen     Tablet .. 650 milliGRAM(s) Oral every 6 hours PRN Temp greater or equal to 38C (100.4F), Mild Pain (1 - 3)  cyclobenzaprine 5 milliGRAM(s) Oral three times a day PRN Muscle Spasm  dextrose Oral Gel 15 Gram(s) Oral once PRN Blood Glucose LESS THAN 70 milliGRAM(s)/deciliter  melatonin 3 milliGRAM(s) Oral at bedtime PRN Insomnia  ondansetron Injectable 4 milliGRAM(s) IV Push every 8 hours PRN Nausea and/or Vomiting  oxycodone    5 mG/acetaminophen 325 mG 1 Tablet(s) Oral every 4 hours PRN Severe Pain (7 - 10)       Vitals:  Vital Signs Last 24 Hrs  T(C): 37 (20 Dec 2023 05:18), Max: 37 (20 Dec 2023 05:18)  T(F): 98.6 (20 Dec 2023 05:18), Max: 98.6 (20 Dec 2023 05:18)  HR: 91 (20 Dec 2023 06:36) (73 - 101)  BP: 143/85 (20 Dec 2023 06:36) (118/74 - 168/98)  BP(mean): --  RR: 18 (20 Dec 2023 06:36) (18 - 18)  SpO2: 95% (20 Dec 2023 06:36) (92% - 97%)    Parameters below as of 20 Dec 2023 06:36  Patient On (Oxygen Delivery Method): nasal cannula, 2L              General Exam:   General Appearance: Appropriately dressed and in no acute distress       Head: Normocephalic, atraumatic and no dysmorphic features  Ear, Nose, and Throat: Moist mucous membranes  CVS: S1S2+  Resp: No SOB, no wheeze or rhonchi  Abd: soft NTND  Extremities: No edema, no cyanosis  Skin: No bruises, no rashes     Neurological Exam:  Mental Status: Awake, alert and oriented x 3.  Able to follow simple verbal commands. Able to name and repeat. Speech is fluent but slow  Cranial Nerves: PERRL, EOMI, VFFC, sensation V1-V3 intact,  no obvious facial asymmetry, equal elevation of palate, scm/trap 5/5, tongue is midline on protrusion.. hearing is grossly intact.   Motor: Normal bulk, tone. JIANG, LLE slightly weaker but poor efffort  Sensation: Intact to light touch and pinprick throughout. no right/left confusion. no extinction to tactile on DSS.   Reflexes: 1+ throughout at biceps, brachioradialis, triceps, patellars and ankles bilaterally and equal. No clonus. R toe and L toe were both downgoing.  Coordination: No dysmetria on FNF   Gait: deferred      I personally reviewed the below data/images/labs:      LABS:                          8.6    6.02  )-----------( 362      ( 20 Dec 2023 07:01 )             26.5     12-20    138  |  107  |  10  ----------------------------<  106<H>  3.7   |  26  |  0.68    Ca    8.4<L>      20 Dec 2023 07:01          Urinalysis Basic - ( 20 Dec 2023 07:01 )    Color: x / Appearance: x / SG: x / pH: x  Gluc: 106 mg/dL / Ketone: x  / Bili: x / Urobili: x   Blood: x / Protein: x / Nitrite: x   Leuk Esterase: x / RBC: x / WBC x   Sq Epi: x / Non Sq Epi: x / Bacteria: x      < from: CT Brain Stroke Protocol (12.17.23 @ 04:51) >    ACC: [id number] EXAM:  CT BRAIN STROKE PROTOCOL   ORDERED BY: MINA CONROY     PROCEDURE DATE:  12/17/2023          INTERPRETATION:  CLINICAL INFORMATION:  Stroke Code    TECHNIQUE:  Axial CT images were acquired through the head.  Intravenouscontrast: None  Two-dimensional reformats were generated.    COMPARISON STUDY: MRI brain 11/17/2023, CTA head and neck 11/17/2023.    FINDINGS:    There is no CT evidence of acute intracranial hemorrhage, mass effect,   midline shift, or acute, largeterritorial infarct.  The ventricles and   sulci are age-appropriate in size and configuration. The basal cisterns   are patent.    The mastoid air cells and middle ear cavities are grossly clear. The   visualized paranasal sinuses are well aerated.    The calvarium and skull base are grossly intact.    IMPRESSION:  No acute intracranial hemorrhage or mass effect.    Results were discussed with Dr. Conroy by Dr. Gastelum at 4:50 AM on   12/17/2023. with read back followed.    < end of copied text >        < from: CT Angio Neck Stroke Protocol w/ IV Cont (12.17.23 @ 05:12) >    ACC: [id number] EXAM:  CT ANGIO NECK STROKE PROTCL IC   ORDERED BY: MINA CONROY     ACC: [id number] EXAM:  CT BRAIN PERFUSION MAPS STROKE   ORDERED BY: MINA CONROY     ACC: [id number] EXAM:  CT ANGIO BRAIN STROKE PROTC IC   ORDERED BY: MINA CONROY     PROCEDURE DATE:  12/17/2023          INTERPRETATION:  CT PERFUSION, CTA OF THE [place] OF [place] AND NECK:    INDICATIONS: Stroke code.    TECHNIQUE:    RAPID artificial intelligence was used for perfusion analysis and for   preliminary evaluation of intracranial hemorrhage.    CTA [place] OF [place]:    After the intravenous power injection of non-ionic contrast material,   serial thin sections were obtained through the intracranial circulation   on a multislice CT scanner.  Images were reformatted using a dedicated 3D   software package and viewed on a dedicated workstation in multiple planes.    CTA NECK:    After the intravenous power injection of non-ionic contrast material,   serial thin sections were obtained through the cervical circulation on a   multislice CT scanner.  Images were reformatted using a dedicated 3D   software package and viewed on a dedicated workstation in multiple planes.      COMPARISON EXAMINATION: Noncontrast head CT performed the same day. CTA   head and neck 11/17/2023    FINDINGS:      CT RAPID PERFUSION:  Markedly degraded by patient motion.    INFARCT CORE: 0 cc    TISSUE AT RISK: 8 cc combined in the left frontal and right temporal lobes    MISMATCH RATIO: N/A      CTA [place] OF [place]:    ANTERIOR CIRCULATION    ICA  CAVERNOUS, SUPRACLINOID, BIFURCATION SEGMENTS: Patent without flow   limiting stenosis. Atherosclerotic calcifications of the bilateral   cavernous ICA segments.    ANTERIOR CEREBRAL ARTERIES: Bilateral A1, anterior communicating and A2   anterior cerebral arteries are unremarkable in course and caliber without   flow limiting stenosis. Hypoplastic right A1 segment.    MIDDLE CEREBRAL ARTERIES: Patent bilateral M1, M2, and distal MCA   branches without flow limiting stenosis.    POSTERIOR CIRCULATION:    VERTEBRAL ARTERIES: Patent without flow limiting stenosis. Mild focal   narrowing of the right V4 segment.    BASILAR ARTERY: Patent no flow limiting stenosis.    POSTERIOR CEREBRAL ARTERIES: Patent without flow limiting stenosis.    CTA NECK:    GREAT VESSELS: Visualized segments are patent, no flow limiting stenosis.   Bovine aortic arch, normal variant.    COMMON CAROTID ARTERIES:  RIGHT: Patent without flow limiting stenosis  LEFT: Patent without flow limiting stenosis    CAROTID BULBS:  RIGHT: Patent without flow limiting stenosis  LEFT: Patent without flow limiting stenosis    INTERNAL CAROTID ARTERIES:  RIGHT: Patent no evidence for any hemodynamically significant stenosis at   the ICA origin by NASCET criteria. Stable mild ectasia of the proximal   cervical ICA measuring 0.9 cm in diameter. Partial retropharyngeal course.  LEFT: Patent no evidence for any hemodynamically significant stenosis at   the ICA origin by NASCET criteria. Stable fusiform aneurysmal dilatation   of the proximal cervical ICA measuring 1.4 cm in diameter and gradually   tapering in the mid segment. Partial retropharyngeal course.    VERTEBRAL ARTERIES:    RIGHT: Patent no evidence for any flow limiting stenosis.  LEFT: Patent no evidence for any flow limiting stenosis. Left dominant   vertebral system.      SOFT TISSUES: Patchy nonspecific groundglass opacities and consolidations   in the visualized upper lobes.    BONES: Multilevel degenerative changes inthe spine.    IMPRESSION:    CT PERFUSION: Markedly degraded by patient motion. No core infarct. Small   areas of abnormal perfusion in the left frontal and right temporal lobes   not confined to a vascular territory, likely artifactual.    If symptoms persist consider follow up head CT or MRI, MRA  if no   contraindication.    CTA COW:  Patent intracranial circulation without flow limiting stenosis.    CTA NECK: Patent, ECAs, ICAs, no  hemodynamically significant stenosis at    ICA origins by NASCET criteria. Stable fusiform aneurysmal dilatation of   the ICA origins/proximal segments.  Bilateral vertebral arteries are patent without flow limiting stenosis.    Patchy nonspecific groundglass and consolidations in the visualized upper   lobes.    --- End of Report ---      < end of copied text >        EEG Classification / Summary:  Abnormal routine EEG in the awake state.  Mild diffuse slowing.  No epileptiform abnormalities.  Artifacts somewhat limit interpretation.    Clinical Impression:  Mild diffuse cerebral dysfunction is nonspecific in etiology.   Artifacts somewhat limit interpretation.

## 2023-12-20 NOTE — PROGRESS NOTE ADULT - SUBJECTIVE AND OBJECTIVE BOX
INTERVAL HPI/OVERNIGHT EVENTS:  Pt seen and examined at bedside.     Allergies/Intolerance: No Known Allergies      MEDICATIONS  (STANDING):  amitriptyline 50 milliGRAM(s) Oral at bedtime  aspirin enteric coated 81 milliGRAM(s) Oral daily  atorvastatin 80 milliGRAM(s) Oral at bedtime  celecoxib 200 milliGRAM(s) Oral daily  dextrose 5%. 1000 milliLiter(s) (100 mL/Hr) IV Continuous <Continuous>  dextrose 5%. 1000 milliLiter(s) (50 mL/Hr) IV Continuous <Continuous>  dextrose 50% Injectable 25 Gram(s) IV Push once  dextrose 50% Injectable 12.5 Gram(s) IV Push once  dextrose 50% Injectable 25 Gram(s) IV Push once  DULoxetine 60 milliGRAM(s) Oral daily  gabapentin 600 milliGRAM(s) Oral at bedtime  glucagon  Injectable 1 milliGRAM(s) IntraMuscular once  influenza   Vaccine 0.5 milliLiter(s) IntraMuscular once  insulin lispro (ADMELOG) corrective regimen sliding scale   SubCutaneous three times a day before meals  insulin lispro (ADMELOG) corrective regimen sliding scale   SubCutaneous at bedtime  montelukast 10 milliGRAM(s) Oral daily  piperacillin/tazobactam IVPB.. 3.375 Gram(s) IV Intermittent every 8 hours  rivaroxaban 20 milliGRAM(s) Oral with dinner  verapamil  milliGRAM(s) Oral daily    MEDICATIONS  (PRN):  acetaminophen     Tablet .. 650 milliGRAM(s) Oral every 6 hours PRN Temp greater or equal to 38C (100.4F), Mild Pain (1 - 3)  cyclobenzaprine 5 milliGRAM(s) Oral three times a day PRN Muscle Spasm  dextrose Oral Gel 15 Gram(s) Oral once PRN Blood Glucose LESS THAN 70 milliGRAM(s)/deciliter  melatonin 3 milliGRAM(s) Oral at bedtime PRN Insomnia  oxycodone    5 mG/acetaminophen 325 mG 1 Tablet(s) Oral every 4 hours PRN Severe Pain (7 - 10)        ROS: all systems reviewed and wnl      PHYSICAL EXAMINATION:  Vital Signs Last 24 Hrs  T(C): 36.8 (20 Dec 2023 11:20), Max: 37 (20 Dec 2023 05:18)  T(F): 98.3 (20 Dec 2023 11:20), Max: 98.6 (20 Dec 2023 05:18)  HR: 63 (20 Dec 2023 11:20) (63 - 101)  BP: 102/67 (20 Dec 2023 11:20) (102/67 - 168/98)  BP(mean): --  RR: 19 (20 Dec 2023 11:20) (18 - 19)  SpO2: 93% (20 Dec 2023 11:20) (92% - 97%)    Parameters below as of 20 Dec 2023 06:36  Patient On (Oxygen Delivery Method): nasal cannula, 2L      CAPILLARY BLOOD GLUCOSE      POCT Blood Glucose.: 98 mg/dL (20 Dec 2023 12:02)  POCT Blood Glucose.: 103 mg/dL (20 Dec 2023 08:43)  POCT Blood Glucose.: 103 mg/dL (19 Dec 2023 22:22)  POCT Blood Glucose.: 104 mg/dL (19 Dec 2023 16:56)      12-19 @ 07:01  -  12-20 @ 07:00  --------------------------------------------------------  IN: 500 mL / OUT: 0 mL / NET: 500 mL        GENERAL: stable, no fevers or cough.   NECK: supple, No JVD  CHEST/LUNG: clear to auscultation bilaterally; no rales, rhonchi, or wheezing b/l  HEART: normal S1, S2  ABDOMEN: BS+, soft, ND, NT   EXTREMITIES:  pulses palpable; no clubbing, cyanosis, or edema b/l LEs    LABS:                        8.6    6.02  )-----------( 362      ( 20 Dec 2023 07:01 )             26.5     12-20    138  |  107  |  10  ----------------------------<  106<H>  3.7   |  26  |  0.68    Ca    8.4<L>      20 Dec 2023 07:01        Urinalysis Basic - ( 20 Dec 2023 07:01 )    Color: x / Appearance: x / SG: x / pH: x  Gluc: 106 mg/dL / Ketone: x  / Bili: x / Urobili: x   Blood: x / Protein: x / Nitrite: x   Leuk Esterase: x / RBC: x / WBC x   Sq Epi: x / Non Sq Epi: x / Bacteria: x

## 2023-12-20 NOTE — PROGRESS NOTE ADULT - ASSESSMENT
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 Being treated for URI outpatient. Hypoxic to 84% on RA,  CT head without acute pathology  CTA H/N - bilateral ICA calcifications with ectasia of proximal R ICA and L cerivcal ICA fusiform aneurysm 1.4cm, R V4 narrowing  CTP motion degraded  CXR showed Multifocal PNA  Routine eeg with diffuse slowing    Impression:  AMS likely TME - infectious, metabolic, possible polypharmacy. Low suspicion for neurovascular etiology, seizure  SLE  PE on xarelto    Plan:  - continue xarelto for recent PE - should likely be on lifelong AC given hx of SLE  - EEG as above  - consider MRI if mental status does not improve with treatment of underlying infections  - would hold sedating meds to monitor mental status   - ?on gabapentin for seizures vs neuropathy (does not know name of outpt neurologist) - unsual choice, ok to continue for now but would rec outpatient followup    Sophy Liang,   Vascular Neurology  Office [phone]          63F with SLE, HTN, DM type 2, GBS, chronic left foot drop, PE on Xarelto, 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 Being treated for URI outpatient. Hypoxic to 84% on RA,  CT head without acute pathology  CTA H/N - bilateral ICA calcifications with ectasia of proximal R ICA and L cerivcal ICA fusiform aneurysm 1.4cm, R V4 narrowing  CTP motion degraded  CXR showed Multifocal PNA  Routine eeg with diffuse slowing    Impression:  AMS likely TME - infectious, metabolic, possible polypharmacy. Low suspicion for neurovascular etiology, seizure  SLE  PE on xarelto    Plan:  - continue xarelto for recent PE - should likely be on lifelong AC given hx of SLE  - EEG as above  - consider MRI if mental status does not improve with treatment of underlying infections  - would hold sedating meds to monitor mental status   - ?on gabapentin for seizures vs neuropathy (does not know name of outpt neurologist) - unsual choice, ok to continue for now but would rec outpatient followup    Sophy Liang,   Vascular Neurology  Office [phone]

## 2023-12-21 LAB
GLUCOSE BLDC GLUCOMTR-MCNC: 104 MG/DL — HIGH (ref 70–99)
GLUCOSE BLDC GLUCOMTR-MCNC: 104 MG/DL — HIGH (ref 70–99)
GLUCOSE BLDC GLUCOMTR-MCNC: 107 MG/DL — HIGH (ref 70–99)
GLUCOSE BLDC GLUCOMTR-MCNC: 107 MG/DL — HIGH (ref 70–99)
GLUCOSE BLDC GLUCOMTR-MCNC: 110 MG/DL — HIGH (ref 70–99)
GLUCOSE BLDC GLUCOMTR-MCNC: 110 MG/DL — HIGH (ref 70–99)
GLUCOSE BLDC GLUCOMTR-MCNC: 118 MG/DL — HIGH (ref 70–99)
GLUCOSE BLDC GLUCOMTR-MCNC: 118 MG/DL — HIGH (ref 70–99)
RAPID RVP RESULT: SIGNIFICANT CHANGE UP
RAPID RVP RESULT: SIGNIFICANT CHANGE UP
SARS-COV-2 RNA SPEC QL NAA+PROBE: SIGNIFICANT CHANGE UP
SARS-COV-2 RNA SPEC QL NAA+PROBE: SIGNIFICANT CHANGE UP

## 2023-12-21 PROCEDURE — 99232 SBSQ HOSP IP/OBS MODERATE 35: CPT

## 2023-12-21 PROCEDURE — 99222 1ST HOSP IP/OBS MODERATE 55: CPT

## 2023-12-21 RX ADMIN — PIPERACILLIN AND TAZOBACTAM 25 GRAM(S): 4; .5 INJECTION, POWDER, LYOPHILIZED, FOR SOLUTION INTRAVENOUS at 05:43

## 2023-12-21 RX ADMIN — Medication 240 MILLIGRAM(S): at 05:42

## 2023-12-21 RX ADMIN — CELECOXIB 200 MILLIGRAM(S): 200 CAPSULE ORAL at 12:22

## 2023-12-21 RX ADMIN — RIVAROXABAN 20 MILLIGRAM(S): KIT at 16:33

## 2023-12-21 RX ADMIN — MONTELUKAST 10 MILLIGRAM(S): 4 TABLET, CHEWABLE ORAL at 11:56

## 2023-12-21 RX ADMIN — Medication 650 MILLIGRAM(S): at 05:42

## 2023-12-21 RX ADMIN — Medication 650 MILLIGRAM(S): at 06:42

## 2023-12-21 RX ADMIN — Medication 3 MILLIGRAM(S): at 21:35

## 2023-12-21 RX ADMIN — CELECOXIB 200 MILLIGRAM(S): 200 CAPSULE ORAL at 11:57

## 2023-12-21 RX ADMIN — GABAPENTIN 600 MILLIGRAM(S): 400 CAPSULE ORAL at 21:35

## 2023-12-21 RX ADMIN — Medication 81 MILLIGRAM(S): at 11:56

## 2023-12-21 RX ADMIN — ATORVASTATIN CALCIUM 80 MILLIGRAM(S): 80 TABLET, FILM COATED ORAL at 21:34

## 2023-12-21 RX ADMIN — PIPERACILLIN AND TAZOBACTAM 25 GRAM(S): 4; .5 INJECTION, POWDER, LYOPHILIZED, FOR SOLUTION INTRAVENOUS at 13:44

## 2023-12-21 RX ADMIN — DULOXETINE HYDROCHLORIDE 60 MILLIGRAM(S): 30 CAPSULE, DELAYED RELEASE ORAL at 11:56

## 2023-12-21 RX ADMIN — Medication 10 MILLILITER(S): at 05:42

## 2023-12-21 NOTE — PROGRESS NOTE ADULT - ASSESSMENT
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 Of note pt recently developed URI symptoms and was started on Doxy as well as Hycodan, Alprazolam and Fioricet. Of note pt already on Percocet, flexeril for chronic pain, Gabapentin for seizures per  all which she took. Pt being admitted for Acute hypoxic respiratory failure due to Multifocal PNA, AMS r/o TIA/CVA  vs metabolic encephalopathy secondary to polypharmacy and/or hypoxia.      # Acute metabolic encephalopathy likely with acute respiratory failure with multifocal pneumonia. acute stroke is ruled out. EEG did not show any epileptiform activity.   Continues to have low grade fevers - Seen by ID today    Will hold further iv zosyn   cont oxygen to keep sat > 94%.   Mental status improved - patient AAO3   .     # PE  - c/w Xarelto. no active gross bleeding.     # SLE stable.     # HTN/HLD. controlled with Verapemil, statin    # DM type 2. controlled.   - FS qAC and HS w/ SSI    #Weakness. PT eval>>W[facility]ON    Will stop Elavil, no need for Elavil and Neurontin QHS.  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 Of note pt recently developed URI symptoms and was started on Doxy as well as Hycodan, Alprazolam and Fioricet. Of note pt already on Percocet, flexeril for chronic pain, Gabapentin for seizures per  all which she took. Pt being admitted for Acute hypoxic respiratory failure due to Multifocal PNA, AMS r/o TIA/CVA  vs metabolic encephalopathy secondary to polypharmacy and/or hypoxia.      # Acute metabolic encephalopathy likely with acute respiratory failure with multifocal pneumonia. acute stroke is ruled out. EEG did not show any epileptiform activity.   Continues to have low grade fevers - Seen by ID today    Will hold further iv zosyn   cont oxygen to keep sat > 94%.   Mental status improved - patient AAO3   .     # PE  - c/w Xarelto. no active gross bleeding.     # SLE stable.     # HTN/HLD. controlled with Verapemil, statin    # DM type 2. controlled.   - FS qAC and HS w/ SSI    #Weakness. PT eval>>[facility]    Will stop Elavil, no need for Elavil and Neurontin QHS.

## 2023-12-21 NOTE — CONSULT NOTE ADULT - SUBJECTIVE AND OBJECTIVE BOX
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  Pt lethargic rousable, but confused, oriented to self and time; not to place or situation. Hx obtained from  at bedside. Per  last evening he helped pt to the restroom and a few minutes later when he  on her she was unresponsive.  reports pt seemed to open eyes at times, however was not speaking thus he called EMS. Pt remained same way till after arrival to the ED. Per , pt recently developed URI symptoms and was started on Doxy as well as Hycodan, Alprazolam and Fioricet. Of note pt already on Percocet, flexeril for chronic pain, Gabapentin for seizures per . All of which  believes pt took last evening.     ED Course  Vitals BP 94/61, SPO2 84% on RA, rest of vitals stable. Labs sig for H/H 9.8/31.3, rest of labs stable. CT head neg for acute stroke, CT perfusion Markedly degraded by patient motion. No core infarct. Small areas of abnormal perfusion in the left frontal and right temporal lobes not confined to a vascular territory, likely artifactual. CXR showed Multifocalpneumonia.  (17 Dec 2023 09:06)      PAST MEDICAL & SURGICAL HISTORY:  Lupus      Diabetes      Hypertension      Asthma      Peripheral polyneuropathy      Pulmonary emboli  7/2019      History of hip replacement  left      H/O total hysterectomy      History of bilateral tubal ligation      S/P laminectomy with spinal fusion      History of shoulder surgery      H/O knee surgery          Allergies    No Known Allergies    Intolerances        ANTIMICROBIALS:  piperacillin/tazobactam IVPB.. 3.375 every 8 hours      OTHER MEDS:  acetaminophen     Tablet .. 650 milliGRAM(s) Oral every 6 hours PRN  aspirin enteric coated 81 milliGRAM(s) Oral daily  atorvastatin 80 milliGRAM(s) Oral at bedtime  celecoxib 200 milliGRAM(s) Oral daily  dextrose 5%. 1000 milliLiter(s) IV Continuous <Continuous>  dextrose 5%. 1000 milliLiter(s) IV Continuous <Continuous>  dextrose 50% Injectable 12.5 Gram(s) IV Push once  dextrose 50% Injectable 25 Gram(s) IV Push once  dextrose 50% Injectable 25 Gram(s) IV Push once  dextrose Oral Gel 15 Gram(s) Oral once PRN  DULoxetine 60 milliGRAM(s) Oral daily  gabapentin 600 milliGRAM(s) Oral at bedtime  glucagon  Injectable 1 milliGRAM(s) IntraMuscular once  guaifenesin/dextromethorphan Oral Liquid 10 milliLiter(s) Oral every 6 hours PRN  influenza   Vaccine 0.5 milliLiter(s) IntraMuscular once  insulin lispro (ADMELOG) corrective regimen sliding scale   SubCutaneous three times a day before meals  insulin lispro (ADMELOG) corrective regimen sliding scale   SubCutaneous at bedtime  melatonin 3 milliGRAM(s) Oral at bedtime PRN  montelukast 10 milliGRAM(s) Oral daily  oxycodone    5 mG/acetaminophen 325 mG 1 Tablet(s) Oral every 4 hours PRN  rivaroxaban 20 milliGRAM(s) Oral with dinner  verapamil  milliGRAM(s) Oral daily      SOCIAL HISTORY:    Marital Status:    Occupation:   Lives with:     Substance Use (street drugs):   Tobacco Usage:    Alcohol Usage: Social EtOH    FAMILY HISTORY:  No pertinent family history in first degree relatives        ROS:  Unobtainable because:   All other systems negative     Constitutional: no fever, no chills, no weight loss, no night sweats  Eye: no eye pain, no redness, no vision changes  ENT:  no sore throat, no rhinorrhea  Cardiovascular:  no chest pain, no palpitation  Respiratory:  no SOB, no cough  GI:  no abd pain, no vomiting, no diarrhea  urinary: no dysuria, no hematuria, no flank pain  : no  discharge or bleeding  musculoskeletal:  no joint pain, no joint swelling  skin:  no rash  neurology:  no headache, no seizure, no change in mental status  psych: no anxiety, no depression     Physical Exam:    General:    NAD, non toxic  Head: atraumatic, normocephalic  Eyes: normal sclera and conjunctiva  ENT:   no oropharyngeal lesions, no LAD, neck supple  Cardio:    regular S1,S2, no murmur  Respiratory:   clear to auscultation b/l, no wheezing  abd:   soft, BS +, not tender, no hepatosplenomegaly  :     no CVAT, no suprapubic tenderness, no penny  Musculoskeletal : no joint swelling, no edema  Skin:    no rash  vascular:  normal pulses  Neurologic:     no focal deficits  psych: normal affect, no suicidal ideation      Drug Dosing Weight  Height (cm): 172.7 (17 Dec 2023 04:40)  Weight (kg): 112.6 (17 Dec 2023 09:18)  BMI (kg/m2): 37.8 (17 Dec 2023 09:18)  BSA (m2): 2.24 (17 Dec 2023 09:18)    Vital Signs Last 24 Hrs  T(F): 98.2 (12-21-23 @ 11:07), Max: 100.7 (12-18-23 @ 04:46)    Vital Signs Last 24 Hrs  HR: 58 (12-21-23 @ 11:07) (58 - 93)  BP: 109/63 (12-21-23 @ 11:07) (109/63 - 147/86)  RR: 16 (12-21-23 @ 11:07)  SpO2: 92% (12-21-23 @ 11:07) (92% - 97%)  Wt(kg): --                          8.6    6.02  )-----------( 362      ( 20 Dec 2023 07:01 )             26.5       12-20    138  |  107  |  10  ----------------------------<  106<H>  3.7   |  26  |  0.68    Ca    8.4<L>      20 Dec 2023 07:01        Urinalysis Basic - ( 20 Dec 2023 07:01 )    Color: x / Appearance: x / SG: x / pH: x  Gluc: 106 mg/dL / Ketone: x  / Bili: x / Urobili: x   Blood: x / Protein: x / Nitrite: x   Leuk Esterase: x / RBC: x / WBC x   Sq Epi: x / Non Sq Epi: x / Bacteria: x        MICROBIOLOGY:  v      Rapid RVP Result: Jagdeeptec (12-17 @ 05:51)          RADIOLOGY:       HPI:         64 y/o F with  PMHx of SLE, HTN, DM type 2, GBS, chronic left foot drop, PE on Xarelto, 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  Pt lethargic rousable, but confused, oriented to self and time; not to place or situation. Hx obtained from  at bedside. Per  last evening he helped pt to the restroom and a few minutes later when he  on her she was unresponsive.  reports pt seemed to open eyes at times, however was not speaking thus he called EMS. Pt remained same way till after arrival to the ED. Per , pt recently developed URI symptoms and was started on Doxy as well as Hycodan, Alprazolam and Fioricet. Of note pt already on Percocet, flexeril for chronic pain, Gabapentin for seizures per . All of which  believes pt took last evening.     ED Course  Vitals BP 94/61, SPO2 84% on RA, rest of vitals stable. Labs sig for H/H 9.8/31.3, rest of labs stable. CT head neg for acute stroke, CT perfusion Markedly degraded by patient motion. No core infarct. Small areas of abnormal perfusion in the left frontal and right temporal lobes not confined to a vascular territory, likely artifactual. CXR showed Multifocalpneumonia.  (17 Dec 2023 09:06)      PAST MEDICAL & SURGICAL HISTORY:  Lupus      Diabetes      Hypertension      Asthma      Peripheral polyneuropathy      Pulmonary emboli  7/2019      History of hip replacement  left      H/O total hysterectomy      History of bilateral tubal ligation      S/P laminectomy with spinal fusion      History of shoulder surgery      H/O knee surgery          Allergies    No Known Allergies    Intolerances        ANTIMICROBIALS:  piperacillin/tazobactam IVPB.. 3.375 every 8 hours      OTHER MEDS:  acetaminophen     Tablet .. 650 milliGRAM(s) Oral every 6 hours PRN  aspirin enteric coated 81 milliGRAM(s) Oral daily  atorvastatin 80 milliGRAM(s) Oral at bedtime  celecoxib 200 milliGRAM(s) Oral daily  dextrose 5%. 1000 milliLiter(s) IV Continuous <Continuous>  dextrose 5%. 1000 milliLiter(s) IV Continuous <Continuous>  dextrose 50% Injectable 12.5 Gram(s) IV Push once  dextrose 50% Injectable 25 Gram(s) IV Push once  dextrose 50% Injectable 25 Gram(s) IV Push once  dextrose Oral Gel 15 Gram(s) Oral once PRN  DULoxetine 60 milliGRAM(s) Oral daily  gabapentin 600 milliGRAM(s) Oral at bedtime  glucagon  Injectable 1 milliGRAM(s) IntraMuscular once  guaifenesin/dextromethorphan Oral Liquid 10 milliLiter(s) Oral every 6 hours PRN  influenza   Vaccine 0.5 milliLiter(s) IntraMuscular once  insulin lispro (ADMELOG) corrective regimen sliding scale   SubCutaneous three times a day before meals  insulin lispro (ADMELOG) corrective regimen sliding scale   SubCutaneous at bedtime  melatonin 3 milliGRAM(s) Oral at bedtime PRN  montelukast 10 milliGRAM(s) Oral daily  oxycodone    5 mG/acetaminophen 325 mG 1 Tablet(s) Oral every 4 hours PRN  rivaroxaban 20 milliGRAM(s) Oral with dinner  verapamil  milliGRAM(s) Oral daily      SOCIAL HISTORY:    Marital Status:    Occupation:   Lives with:     Substance Use (street drugs):   Tobacco Usage:    Alcohol Usage: Social EtOH    FAMILY HISTORY:  No pertinent family history in first degree relatives        ROS:  Unobtainable because:   All other systems negative     Constitutional: no fever, no chills, no weight loss, no night sweats  Eye: no eye pain, no redness, no vision changes  ENT:  no sore throat, no rhinorrhea  Cardiovascular:  no chest pain, no palpitation  Respiratory:  no SOB, no cough  GI:  no abd pain, no vomiting, no diarrhea  urinary: no dysuria, no hematuria, no flank pain  : no  discharge or bleeding  musculoskeletal:  no joint pain, no joint swelling  skin:  no rash  neurology:  no headache, no seizure, no change in mental status  psych: no anxiety, no depression     Physical Exam:    General:    NAD, non toxic  Head: atraumatic, normocephalic  Eyes: normal sclera and conjunctiva  ENT:   no oropharyngeal lesions, no LAD, neck supple  Cardio:    regular S1,S2, no murmur  Respiratory:   clear to auscultation b/l, no wheezing  abd:   soft, BS +, not tender, no hepatosplenomegaly  :     no CVAT, no suprapubic tenderness, no penny  Musculoskeletal : no joint swelling, no edema  Skin:    no rash  vascular:  normal pulses  Neurologic:     no focal deficits  psych: normal affect, no suicidal ideation      Drug Dosing Weight  Height (cm): 172.7 (17 Dec 2023 04:40)  Weight (kg): 112.6 (17 Dec 2023 09:18)  BMI (kg/m2): 37.8 (17 Dec 2023 09:18)  BSA (m2): 2.24 (17 Dec 2023 09:18)    Vital Signs Last 24 Hrs  T(F): 98.2 (12-21-23 @ 11:07), Max: 100.7 (12-18-23 @ 04:46)    Vital Signs Last 24 Hrs  HR: 58 (12-21-23 @ 11:07) (58 - 93)  BP: 109/63 (12-21-23 @ 11:07) (109/63 - 147/86)  RR: 16 (12-21-23 @ 11:07)  SpO2: 92% (12-21-23 @ 11:07) (92% - 97%)  Wt(kg): --                          8.6    6.02  )-----------( 362      ( 20 Dec 2023 07:01 )             26.5       12-20    138  |  107  |  10  ----------------------------<  106<H>  3.7   |  26  |  0.68    Ca    8.4<L>      20 Dec 2023 07:01        Urinalysis Basic - ( 20 Dec 2023 07:01 )    Color: x / Appearance: x / SG: x / pH: x  Gluc: 106 mg/dL / Ketone: x  / Bili: x / Urobili: x   Blood: x / Protein: x / Nitrite: x   Leuk Esterase: x / RBC: x / WBC x   Sq Epi: x / Non Sq Epi: x / Bacteria: x        MICROBIOLOGY:  v      Rapid RVP Result: Jagdeeptec (12-17 @ 05:51)          RADIOLOGY:       HPI:  Patient is a 64 y/o Female  with  PMHx of SLE, HTN, DM type 2, GBS, chronic left foot drop, PE on Xarelto, 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   as per med records on admission Pt was  lethargic rousable, but confused, oriented to self and time; not to place or situation  currently patient is awake, alert but ID consultation is requested to help with antibiotic management as her CXR had reported multifocal pneumonia.  Patient states she does have alot of cough but dry cough, she denies any fever, denies any nausea, denies any diarrhea, denies any sob, denies any chest pain, denies any abd pain, denies any dysurea.        PAST MEDICAL & SURGICAL HISTORY:  Lupus      Diabetes      Hypertension      Asthma      Peripheral polyneuropathy      Pulmonary emboli  7/2019      History of hip replacement  left      H/O total hysterectomy      History of bilateral tubal ligation      S/P laminectomy with spinal fusion      History of shoulder surgery      H/O knee surgery          Allergies    No Known Drug Allergies      ANTIMICROBIALS:  piperacillin/tazobactam IVPB.. 3.375 every 8 hours      OTHER MEDS:  acetaminophen     Tablet .. 650 milliGRAM(s) Oral every 6 hours PRN  aspirin enteric coated 81 milliGRAM(s) Oral daily  atorvastatin 80 milliGRAM(s) Oral at bedtime  celecoxib 200 milliGRAM(s) Oral daily  dextrose 5%. 1000 milliLiter(s) IV Continuous <Continuous>  dextrose 5%. 1000 milliLiter(s) IV Continuous <Continuous>  dextrose 50% Injectable 12.5 Gram(s) IV Push once  dextrose 50% Injectable 25 Gram(s) IV Push once  dextrose 50% Injectable 25 Gram(s) IV Push once  dextrose Oral Gel 15 Gram(s) Oral once PRN  DULoxetine 60 milliGRAM(s) Oral daily  gabapentin 600 milliGRAM(s) Oral at bedtime  glucagon  Injectable 1 milliGRAM(s) IntraMuscular once  guaifenesin/dextromethorphan Oral Liquid 10 milliLiter(s) Oral every 6 hours PRN  influenza   Vaccine 0.5 milliLiter(s) IntraMuscular once  insulin lispro (ADMELOG) corrective regimen sliding scale   SubCutaneous three times a day before meals  insulin lispro (ADMELOG) corrective regimen sliding scale   SubCutaneous at bedtime  melatonin 3 milliGRAM(s) Oral at bedtime PRN  montelukast 10 milliGRAM(s) Oral daily  oxycodone    5 mG/acetaminophen 325 mG 1 Tablet(s) Oral every 4 hours PRN  rivaroxaban 20 milliGRAM(s) Oral with dinner  verapamil  milliGRAM(s) Oral daily      SOCIAL HISTORY:    Lives with: family      Tobacco Usage:  denies  Alcohol Usage: denies    FAMILY HISTORY:  No pertinent family history in first degree relatives        ROS:    Constitutional: no fever, no chills, no weight loss, no night sweats  Eye: no eye pain, no redness, no vision changes  ENT:  no sore throat, no rhinorrhea  Cardiovascular:  no chest pain, no palpitation  Respiratory:  no SOB, + cough dry   GI:  no abd pain, no vomiting, no diarrhea  urinary: no dysuria, no hematuria, no flank pain  : no  discharge or bleeding  musculoskeletal:  no joint pain, no joint swelling  skin:  no rash  neurology:  no headache      Physical Exam:    General:    NAD, non toxic  Head: atraumatic, normocephalic  Eyes: normal sclera and conjunctiva  ENT:   no oropharyngeal lesions, no LAD, neck supple  Cardio:    regular S1,S2, no murmur  Respiratory:   no wheezing, scattered rhonchi   abd:   soft, BS +, not tender, no distention  :     no CVAT   Musculoskeletal : no joint swelling, no edema  Skin:    no rash  vascular:  normal pulses  Neurologic:     no focal deficits  psych: normal affect      Drug Dosing Weight  Height (cm): 172.7 (17 Dec 2023 04:40)  Weight (kg): 112.6 (17 Dec 2023 09:18)  BMI (kg/m2): 37.8 (17 Dec 2023 09:18)  BSA (m2): 2.24 (17 Dec 2023 09:18)    Vital Signs Last 24 Hrs  T(F): 98.2 (12-21-23 @ 11:07), Max: 100.7 (12-18-23 @ 04:46)    Vital Signs Last 24 Hrs  HR: 58 (12-21-23 @ 11:07) (58 - 93)  BP: 109/63 (12-21-23 @ 11:07) (109/63 - 147/86)  RR: 16 (12-21-23 @ 11:07)  SpO2: 92% (12-21-23 @ 11:07) (92% - 97%)  Wt(kg): --                          8.6    6.02  )-----------( 362      ( 20 Dec 2023 07:01 )             26.5       12-20    138  |  107  |  10  ----------------------------<  106<H>  3.7   |  26  |  0.68    Ca    8.4<L>      20 Dec 2023 07:01        Urinalysis Basic - ( 20 Dec 2023 07:01 )    Color: x / Appearance: x / SG: x / pH: x  Gluc: 106 mg/dL / Ketone: x  / Bili: x / Urobili: x   Blood: x / Protein: x / Nitrite: x   Leuk Esterase: x / RBC: x / WBC x   Sq Epi: x / Non Sq Epi: x / Bacteria: x      MICROBIOLOGY:        Rapid RVP Result: Apolonia (12-17 @ 05:51)

## 2023-12-21 NOTE — PROGRESS NOTE ADULT - SUBJECTIVE AND OBJECTIVE BOX
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 Admitted for multifocal PNA       Patient seen and examined at the bedside  Patient complains of continued unproductive cough  Also having low grade temperatures overnight  Has been on zosyn for multiple days    Consulted ID for reassessment          Physical exam:  General: patient in no acute distress, resting comfortably  Head:  Atraumatic, Normocephalic  Eyes: EOMI, PERRLA, clear sclera  Neck: Supple, thyroid nontender, non enlarged  Cardio: S1/S2 +ve, regular rate and rhythm, no M/G/R  Resp: occasional rales  GI: abdomen soft, nontender, non distended, no guarding, BS +ve x 4  Ext: no significant pedal edema  Neuro: AAOx3,   Skin: No rashes or lesions     Recent Vitals  T(C): 36.5 (12-21-23 @ 16:59), Max: 38.1 (12-21-23 @ 06:08)  HR: 70 (12-21-23 @ 16:59) (58 - 93)  BP: 106/66 (12-21-23 @ 16:59) (106/66 - 147/86)  RR: 16 (12-21-23 @ 16:59) (16 - 18)  SpO2: 95% (12-21-23 @ 16:59) (92% - 95%)                        8.6    6.02  )-----------( 362      ( 20 Dec 2023 07:01 )             26.5     12-20    138  |  107  |  10  ----------------------------<  106<H>  3.7   |  26  |  0.68    Ca    8.4<L>      20 Dec 2023 07:01          Urinalysis Basic - ( 20 Dec 2023 07:01 )    Color: x / Appearance: x / SG: x / pH: x  Gluc: 106 mg/dL / Ketone: x  / Bili: x / Urobili: x   Blood: x / Protein: x / Nitrite: x   Leuk Esterase: x / RBC: x / WBC x   Sq Epi: x / Non Sq Epi: x / Bacteria: x        Home Medications:  ALPRAZOLAM 0.5 MG TABLET: TAKE 1/2 TABLET (0.25 MG TOTAL) BY MOUTH NIGHTLY AS NEEDED FOR SLEEP. MAX DAILY AMOUNT: 0.25 MG. (17 Dec 2023 09:17)  AMITRIPTYLINE HCL 50 MG TAB: TAKE 1 TABLET BY MOUTH EVERY DAY AT NIGHT (17 Dec 2023 09:17)  aspirin 81 mg oral delayed release tablet: 1 tab(s) orally once a day (17 Dec 2023 09:17)  [id number] -40: TAKE 1 TABLET BY MOUTH EVERY 4 (FOUR) HOURS AS NEEDED FOR PAIN. MAX DAILY AMOUNT: 6 TABLETS. (17 Dec 2023 09:17)  CYCLOBENZAPRINE 10 MG TABLET: TAKE 1 TABLET BY MOUTH TWICE A DAY (17 Dec 2023 09:17)  DOXYCYCLINE HYCLATE  100 MG TABS:  (17 Dec 2023 09:17)  DULOXETINE HCL DR 60 MG CAP: TAKE 1 CAPSULE BY MOUTH EVERY DAY (17 Dec 2023 09:17)  GABAPENTIN 300 MG CAPSULE: TAKE 2 CAPSULES BY MOUTH AT BEDTIME. (17 Dec 2023 09:17)  HYDROCHLOROTHIAZIDE 12.5 MG TB: TAKE 1 TABLET BY MOUTH DAILY AS NEEDED. (17 Dec 2023 09:17)  HYDROCODONE-HOMATROPINE SOLN: TAKE 5 MLS BY MOUTH 4 (FOUR) TIMES DAILY AS NEEDED. MAX DAILY AMOUNT: 20 MLS. (17 Dec 2023 09:17)  MELOXICAM 15 MG TABLET: TAKE 1 TABLET BY MOUTH EVERY DAY (17 Dec 2023 09:17)  METFORMIN HCL  MG TABLET: TAKE 1 TABLET BY MOUTH IN THE MORNING AND TAKE 1 TABLET IN THE EVENING (17 Dec 2023 09:17)  MONTELUKAST SOD 10 MG TABLET: TAKE 1 TABLET BY MOUTH EVERY DAY AS NEEDED (17 Dec 2023 09:17)  nebivolol 10 mg oral tablet: 1 orally once a day (17 Dec 2023 09:17)  OXYCODONE/ACETAMINOPHEN 5-325MG TAB: TAKE 1 TABLET BY MOUTH EVERY DAY (17 Dec 2023 09:17)  OZEMPIC 0.25-0.5 MG/DOSE PEN: INJECT 0.5 MG INTO THE SKIN ONCE A WEEK.. 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 Admitted for multifocal PNA       Patient seen and examined at the bedside  Patient complains of continued unproductive cough  Also having low grade temperatures overnight  Has been on zosyn for multiple days    Consulted ID for reassessment          Physical exam:  General: patient in no acute distress, resting comfortably  Head:  Atraumatic, Normocephalic  Eyes: EOMI, PERRLA, clear sclera  Neck: Supple, thyroid nontender, non enlarged  Cardio: S1/S2 +ve, regular rate and rhythm, no M/G/R  Resp: occasional rales  GI: abdomen soft, nontender, non distended, no guarding, BS +ve x 4  Ext: no significant pedal edema  Neuro: AAOx3,   Skin: No rashes or lesions     Recent Vitals  T(C): 36.5 (12-21-23 @ 16:59), Max: 38.1 (12-21-23 @ 06:08)  HR: 70 (12-21-23 @ 16:59) (58 - 93)  BP: 106/66 (12-21-23 @ 16:59) (106/66 - 147/86)  RR: 16 (12-21-23 @ 16:59) (16 - 18)  SpO2: 95% (12-21-23 @ 16:59) (92% - 95%)                        8.6    6.02  )-----------( 362      ( 20 Dec 2023 07:01 )             26.5     12-20    138  |  107  |  10  ----------------------------<  106<H>  3.7   |  26  |  0.68    Ca    8.4<L>      20 Dec 2023 07:01          Urinalysis Basic - ( 20 Dec 2023 07:01 )    Color: x / Appearance: x / SG: x / pH: x  Gluc: 106 mg/dL / Ketone: x  / Bili: x / Urobili: x   Blood: x / Protein: x / Nitrite: x   Leuk Esterase: x / RBC: x / WBC x   Sq Epi: x / Non Sq Epi: x / Bacteria: x        Home Medications:  ALPRAZOLAM 0.5 MG TABLET: TAKE 1/2 TABLET (0.25 MG TOTAL) BY MOUTH NIGHTLY AS NEEDED FOR SLEEP. MAX DAILY AMOUNT: 0.25 MG. (17 Dec 2023 09:17)  AMITRIPTYLINE HCL 50 MG TAB: TAKE 1 TABLET BY MOUTH EVERY DAY AT NIGHT (17 Dec 2023 09:17)  aspirin 81 mg oral delayed release tablet: 1 tab(s) orally once a day (17 Dec 2023 09:17)  YQZVVJ-[id number]-ULUZ -40: TAKE 1 TABLET BY MOUTH EVERY 4 (FOUR) HOURS AS NEEDED FOR PAIN. MAX DAILY AMOUNT: 6 TABLETS. (17 Dec 2023 09:17)  CYCLOBENZAPRINE 10 MG TABLET: TAKE 1 TABLET BY MOUTH TWICE A DAY (17 Dec 2023 09:17)  DOXYCYCLINE HYCLATE  100 MG TABS:  (17 Dec 2023 09:17)  DULOXETINE HCL DR 60 MG CAP: TAKE 1 CAPSULE BY MOUTH EVERY DAY (17 Dec 2023 09:17)  GABAPENTIN 300 MG CAPSULE: TAKE 2 CAPSULES BY MOUTH AT BEDTIME. (17 Dec 2023 09:17)  HYDROCHLOROTHIAZIDE 12.5 MG TB: TAKE 1 TABLET BY MOUTH DAILY AS NEEDED. (17 Dec 2023 09:17)  HYDROCODONE-HOMATROPINE SOLN: TAKE 5 MLS BY MOUTH 4 (FOUR) TIMES DAILY AS NEEDED. MAX DAILY AMOUNT: 20 MLS. (17 Dec 2023 09:17)  MELOXICAM 15 MG TABLET: TAKE 1 TABLET BY MOUTH EVERY DAY (17 Dec 2023 09:17)  METFORMIN HCL  MG TABLET: TAKE 1 TABLET BY MOUTH IN THE MORNING AND TAKE 1 TABLET IN THE EVENING (17 Dec 2023 09:17)  MONTELUKAST SOD 10 MG TABLET: TAKE 1 TABLET BY MOUTH EVERY DAY AS NEEDED (17 Dec 2023 09:17)  nebivolol 10 mg oral tablet: 1 orally once a day (17 Dec 2023 09:17)  OXYCODONE/ACETAMINOPHEN 5-325MG TAB: TAKE 1 TABLET BY MOUTH EVERY DAY (17 Dec 2023 09:17)  OZEMPIC 0.25-0.5 MG/DOSE PEN: INJECT 0.5 MG INTO THE SKIN ONCE A WEEK.. (17 Dec 2023 09:17)  VERAPAMIL ER  MG CAPSULE: TAKE 1 CAPSULE BY MOUTH EVERY DAY (17 Dec 2023 09:17)  XARELTO 20 MG TABLET: TAKE 1 TABLET BY MOUTH EVERY DAY IN THE MORNING (17 Dec 2023 09:17)

## 2023-12-21 NOTE — CONSULT NOTE ADULT - ASSESSMENT
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     found to have multifocal pneumonia on CXR on admission  low grade temp today  no Leukocytosis    plan-  check sputum cx  has been on zosyn since 12/17 ( 5 days).  advise to d/c IV abx and observe off antibiotics  check RVP      All labs and imaging and chart notes reviewed.     All above discussed with patient and patient verbalizes full understanding of all above and agrees with above plan of care.    Thank you for this consultation.    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     found to have multifocal pneumonia on CXR on admission  low grade temp today  no Leukocytosis    plan-  check sputum cx  has been on zosyn since 12/17 ( 5 days).  advise to d/c IV abx and observe off antibiotics  check RVP      All labs and imaging and chart notes reviewed.     All above discussed with patient and patient verbalizes full understanding of all above and agrees with above plan of care.    Thank you for this consultation.    Betina Soni MD  Infectious Disease Attending    for any questions please do not hesitate to contact me either via teams or by calling [phone]

## 2023-12-22 LAB
ANION GAP SERPL CALC-SCNC: 9 MMOL/L — SIGNIFICANT CHANGE UP (ref 5–17)
ANION GAP SERPL CALC-SCNC: 9 MMOL/L — SIGNIFICANT CHANGE UP (ref 5–17)
BUN SERPL-MCNC: 10 MG/DL — SIGNIFICANT CHANGE UP (ref 7–23)
BUN SERPL-MCNC: 10 MG/DL — SIGNIFICANT CHANGE UP (ref 7–23)
CALCIUM SERPL-MCNC: 8.5 MG/DL — SIGNIFICANT CHANGE UP (ref 8.5–10.1)
CALCIUM SERPL-MCNC: 8.5 MG/DL — SIGNIFICANT CHANGE UP (ref 8.5–10.1)
CHLORIDE SERPL-SCNC: 107 MMOL/L — SIGNIFICANT CHANGE UP (ref 96–108)
CHLORIDE SERPL-SCNC: 107 MMOL/L — SIGNIFICANT CHANGE UP (ref 96–108)
CO2 SERPL-SCNC: 24 MMOL/L — SIGNIFICANT CHANGE UP (ref 22–31)
CO2 SERPL-SCNC: 24 MMOL/L — SIGNIFICANT CHANGE UP (ref 22–31)
CREAT SERPL-MCNC: 0.65 MG/DL — SIGNIFICANT CHANGE UP (ref 0.5–1.3)
CREAT SERPL-MCNC: 0.65 MG/DL — SIGNIFICANT CHANGE UP (ref 0.5–1.3)
EGFR: 99 ML/MIN/1.73M2 — SIGNIFICANT CHANGE UP
EGFR: 99 ML/MIN/1.73M2 — SIGNIFICANT CHANGE UP
GLUCOSE BLDC GLUCOMTR-MCNC: 129 MG/DL — HIGH (ref 70–99)
GLUCOSE BLDC GLUCOMTR-MCNC: 129 MG/DL — HIGH (ref 70–99)
GLUCOSE BLDC GLUCOMTR-MCNC: 134 MG/DL — HIGH (ref 70–99)
GLUCOSE BLDC GLUCOMTR-MCNC: 134 MG/DL — HIGH (ref 70–99)
GLUCOSE BLDC GLUCOMTR-MCNC: 91 MG/DL — SIGNIFICANT CHANGE UP (ref 70–99)
GLUCOSE BLDC GLUCOMTR-MCNC: 91 MG/DL — SIGNIFICANT CHANGE UP (ref 70–99)
GLUCOSE BLDC GLUCOMTR-MCNC: 96 MG/DL — SIGNIFICANT CHANGE UP (ref 70–99)
GLUCOSE BLDC GLUCOMTR-MCNC: 96 MG/DL — SIGNIFICANT CHANGE UP (ref 70–99)
GLUCOSE SERPL-MCNC: 100 MG/DL — HIGH (ref 70–99)
GLUCOSE SERPL-MCNC: 100 MG/DL — HIGH (ref 70–99)
HCT VFR BLD CALC: 27 % — LOW (ref 34.5–45)
HCT VFR BLD CALC: 27 % — LOW (ref 34.5–45)
HGB BLD-MCNC: 8.6 G/DL — LOW (ref 11.5–15.5)
HGB BLD-MCNC: 8.6 G/DL — LOW (ref 11.5–15.5)
MCHC RBC-ENTMCNC: 26.4 PG — LOW (ref 27–34)
MCHC RBC-ENTMCNC: 26.4 PG — LOW (ref 27–34)
MCHC RBC-ENTMCNC: 31.9 G/DL — LOW (ref 32–36)
MCHC RBC-ENTMCNC: 31.9 G/DL — LOW (ref 32–36)
MCV RBC AUTO: 82.8 FL — SIGNIFICANT CHANGE UP (ref 80–100)
MCV RBC AUTO: 82.8 FL — SIGNIFICANT CHANGE UP (ref 80–100)
NRBC # BLD: 0 /100 WBCS — SIGNIFICANT CHANGE UP (ref 0–0)
NRBC # BLD: 0 /100 WBCS — SIGNIFICANT CHANGE UP (ref 0–0)
PLATELET # BLD AUTO: 414 K/UL — HIGH (ref 150–400)
PLATELET # BLD AUTO: 414 K/UL — HIGH (ref 150–400)
POTASSIUM SERPL-MCNC: 3.6 MMOL/L — SIGNIFICANT CHANGE UP (ref 3.5–5.3)
POTASSIUM SERPL-MCNC: 3.6 MMOL/L — SIGNIFICANT CHANGE UP (ref 3.5–5.3)
POTASSIUM SERPL-SCNC: 3.6 MMOL/L — SIGNIFICANT CHANGE UP (ref 3.5–5.3)
POTASSIUM SERPL-SCNC: 3.6 MMOL/L — SIGNIFICANT CHANGE UP (ref 3.5–5.3)
RBC # BLD: 3.26 M/UL — LOW (ref 3.8–5.2)
RBC # BLD: 3.26 M/UL — LOW (ref 3.8–5.2)
RBC # FLD: 16.6 % — HIGH (ref 10.3–14.5)
RBC # FLD: 16.6 % — HIGH (ref 10.3–14.5)
SODIUM SERPL-SCNC: 140 MMOL/L — SIGNIFICANT CHANGE UP (ref 135–145)
SODIUM SERPL-SCNC: 140 MMOL/L — SIGNIFICANT CHANGE UP (ref 135–145)
WBC # BLD: 7.44 K/UL — SIGNIFICANT CHANGE UP (ref 3.8–10.5)
WBC # BLD: 7.44 K/UL — SIGNIFICANT CHANGE UP (ref 3.8–10.5)
WBC # FLD AUTO: 7.44 K/UL — SIGNIFICANT CHANGE UP (ref 3.8–10.5)
WBC # FLD AUTO: 7.44 K/UL — SIGNIFICANT CHANGE UP (ref 3.8–10.5)

## 2023-12-22 PROCEDURE — 99232 SBSQ HOSP IP/OBS MODERATE 35: CPT

## 2023-12-22 RX ORDER — AZITHROMYCIN 500 MG/1
250 TABLET, FILM COATED ORAL DAILY
Refills: 0 | Status: COMPLETED | OUTPATIENT
Start: 2023-12-23 | End: 2023-12-26

## 2023-12-22 RX ORDER — AZITHROMYCIN 500 MG/1
500 TABLET, FILM COATED ORAL ONCE
Refills: 0 | Status: COMPLETED | OUTPATIENT
Start: 2023-12-22 | End: 2023-12-22

## 2023-12-22 RX ORDER — IPRATROPIUM/ALBUTEROL SULFATE 18-103MCG
3 AEROSOL WITH ADAPTER (GRAM) INHALATION ONCE
Refills: 0 | Status: COMPLETED | OUTPATIENT
Start: 2023-12-22 | End: 2023-12-22

## 2023-12-22 RX ADMIN — AZITHROMYCIN 500 MILLIGRAM(S): 500 TABLET, FILM COATED ORAL at 16:21

## 2023-12-22 RX ADMIN — ATORVASTATIN CALCIUM 80 MILLIGRAM(S): 80 TABLET, FILM COATED ORAL at 22:10

## 2023-12-22 RX ADMIN — Medication 10 MILLILITER(S): at 16:21

## 2023-12-22 RX ADMIN — Medication 240 MILLIGRAM(S): at 05:28

## 2023-12-22 RX ADMIN — CELECOXIB 200 MILLIGRAM(S): 200 CAPSULE ORAL at 12:04

## 2023-12-22 RX ADMIN — Medication 3 MILLILITER(S): at 14:15

## 2023-12-22 RX ADMIN — CELECOXIB 200 MILLIGRAM(S): 200 CAPSULE ORAL at 12:03

## 2023-12-22 RX ADMIN — Medication 650 MILLIGRAM(S): at 05:28

## 2023-12-22 RX ADMIN — DULOXETINE HYDROCHLORIDE 60 MILLIGRAM(S): 30 CAPSULE, DELAYED RELEASE ORAL at 12:03

## 2023-12-22 RX ADMIN — Medication 650 MILLIGRAM(S): at 06:28

## 2023-12-22 RX ADMIN — RIVAROXABAN 20 MILLIGRAM(S): KIT at 16:49

## 2023-12-22 RX ADMIN — GABAPENTIN 600 MILLIGRAM(S): 400 CAPSULE ORAL at 22:10

## 2023-12-22 RX ADMIN — MONTELUKAST 10 MILLIGRAM(S): 4 TABLET, CHEWABLE ORAL at 12:03

## 2023-12-22 RX ADMIN — Medication 81 MILLIGRAM(S): at 12:03

## 2023-12-22 RX ADMIN — Medication 10 MILLILITER(S): at 22:09

## 2023-12-22 NOTE — PROGRESS NOTE ADULT - SUBJECTIVE AND OBJECTIVE BOX
Patient is a 63y old  Female who presents with a chief complaint of AMS, HCAP, Acute Hypoxic respiratory failure (21 Dec 2023 17:40)      INTERVAL HPI/OVERNIGHT EVENTS:  Pt was seen and examined, no acute events.      MEDICATIONS  (STANDING):  aspirin enteric coated 81 milliGRAM(s) Oral daily  atorvastatin 80 milliGRAM(s) Oral at bedtime  celecoxib 200 milliGRAM(s) Oral daily  dextrose 5%. 1000 milliLiter(s) (100 mL/Hr) IV Continuous <Continuous>  dextrose 5%. 1000 milliLiter(s) (50 mL/Hr) IV Continuous <Continuous>  dextrose 50% Injectable 25 Gram(s) IV Push once  dextrose 50% Injectable 12.5 Gram(s) IV Push once  dextrose 50% Injectable 25 Gram(s) IV Push once  DULoxetine 60 milliGRAM(s) Oral daily  gabapentin 600 milliGRAM(s) Oral at bedtime  glucagon  Injectable 1 milliGRAM(s) IntraMuscular once  influenza   Vaccine 0.5 milliLiter(s) IntraMuscular once  insulin lispro (ADMELOG) corrective regimen sliding scale   SubCutaneous three times a day before meals  insulin lispro (ADMELOG) corrective regimen sliding scale   SubCutaneous at bedtime  montelukast 10 milliGRAM(s) Oral daily  rivaroxaban 20 milliGRAM(s) Oral with dinner  verapamil  milliGRAM(s) Oral daily    MEDICATIONS  (PRN):  acetaminophen     Tablet .. 650 milliGRAM(s) Oral every 6 hours PRN Temp greater or equal to 38C (100.4F), Mild Pain (1 - 3)  dextrose Oral Gel 15 Gram(s) Oral once PRN Blood Glucose LESS THAN 70 milliGRAM(s)/deciliter  guaifenesin/dextromethorphan Oral Liquid 10 milliLiter(s) Oral every 6 hours PRN Cough  hydrocodone/homatropine Syrup 5 milliLiter(s) Oral every 6 hours PRN Cough  melatonin 3 milliGRAM(s) Oral at bedtime PRN Insomnia  oxycodone    5 mG/acetaminophen 325 mG 1 Tablet(s) Oral every 4 hours PRN Severe Pain (7 - 10)      Allergies  No Known Allergies        Vital Signs Last 24 Hrs  T(C): 38.2 (22 Dec 2023 05:23), Max: 38.2 (22 Dec 2023 05:23)  T(F): 100.7 (22 Dec 2023 05:23), Max: 100.7 (22 Dec 2023 05:23)  HR: 109 (22 Dec 2023 05:23) (70 - 109)  BP: 145/82 (22 Dec 2023 05:23) (106/66 - 150/97)  BP(mean): --  RR: 18 (22 Dec 2023 05:23) (16 - 18)  SpO2: 91% (22 Dec 2023 05:23) (91% - 95%)    Parameters below as of 22 Dec 2023 05:23  Patient On (Oxygen Delivery Method): nasal cannula  O2 Flow (L/min): 2      PHYSICAL EXAM:  General: patient in no acute distress  Head:  Atraumatic, Normocephalic  Eyes: EOMI, PERRLA, clear sclera  Neck: Supple, thyroid nontender, non enlarged  Cardio: S1/S2 +ve, regular rate and rhythm,  Resp: occasional rales  GI: abdomen soft, nontender, non distended, no guarding, BS +ve   Ext: no significant pedal edema  Neuro: AAOx3,   Skin: No rashes or lesions         LABS:                        8.6    7.44  )-----------( 414      ( 22 Dec 2023 07:09 )             27.0     12-22    140  |  107  |  10  ----------------------------<  100<H>  3.6   |  24  |  0.65    Ca    8.5      22 Dec 2023 07:09        Urinalysis Basic - ( 22 Dec 2023 07:09 )    Color: x / Appearance: x / SG: x / pH: x  Gluc: 100 mg/dL / Ketone: x  / Bili: x / Urobili: x   Blood: x / Protein: x / Nitrite: x   Leuk Esterase: x / RBC: x / WBC x   Sq Epi: x / Non Sq Epi: x / Bacteria: x      CAPILLARY BLOOD GLUCOSE      POCT Blood Glucose.: 134 mg/dL (22 Dec 2023 11:55)  POCT Blood Glucose.: 96 mg/dL (22 Dec 2023 08:06)  POCT Blood Glucose.: 110 mg/dL (21 Dec 2023 21:25)  POCT Blood Glucose.: 118 mg/dL (21 Dec 2023 16:25)      RADIOLOGY & ADDITIONAL TESTS:    Imaging Personally Reviewed:  [ ] YES  [ ] NO    Consultant(s) Notes Reviewed:  [ ] YES  [ ] NO    Care Discussed with Consultants/Other Providers [ ] YES  [ ] NO

## 2023-12-22 NOTE — PROGRESS NOTE ADULT - ASSESSMENT
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 Of note pt recently developed URI symptoms and was started on Doxy as well as Hycodan, Alprazolam and Fioricet. Of note pt already on Percocet, flexeril for chronic pain, Gabapentin for seizures per  all which she took. Pt being admitted for Acute hypoxic respiratory failure due to Multifocal PNA, AMS r/o TIA/CVA  vs metabolic encephalopathy secondary to polypharmacy and/or hypoxia.      # Acute metabolic encephalopathy likely with acute respiratory failure with multifocal pneumonia. acute stroke is ruled out. EEG did not show any epileptiform activity.   Continues to have low grade fevers - Seen by ID  Will hold further iv zosyn   cont oxygen to keep sat > 94%.   Mental status improved - patient AAO3   .     # PE  - c/w Xarelto. no active gross bleeding.     # SLE stable.     # HTN/HLD. controlled with Verapemil, statin    # DM type 2. controlled.   - FS qAC and HS w/ SSI    #Weakness. PT eval>>[facility]    Stopped Elavil, no need for Elavil and Neurontin QHS.

## 2023-12-22 NOTE — PROGRESS NOTE ADULT - SUBJECTIVE AND OBJECTIVE BOX
ROSSI ROJAS  MRN-[id number]    Follow Up:  pna, fever    Interval History: the pt was seen and examined earlier, not in acute distress, complains of persistent dry cough, had a low grade temp this morning 100.7, no leukocytosis.     PAST MEDICAL & SURGICAL HISTORY:  Lupus      Diabetes      Hypertension      Asthma      Peripheral polyneuropathy      Pulmonary emboli  7/2019      History of hip replacement  left      H/O total hysterectomy      History of bilateral tubal ligation      S/P laminectomy with spinal fusion      History of shoulder surgery      H/O knee surgery          ROS:    [ ] Unobtainable because:  [x ] All other systems negative    Constitutional: no fever, no chills  Head: no trauma  Eyes: no vision changes, no eye pain  ENT:  no sore throat, no rhinorrhea  Cardiovascular:  no chest pain, no palpitation  Respiratory:  no SOB, + cough  GI:  no abd pain, no vomiting, no diarrhea  urinary: no dysuria, no hematuria, no flank pain  musculoskeletal:  no joint pain, no joint swelling  skin:  no rash  neurology:  no headache, no seizure, no change in mental status  psych: no anxiety, no depression         Allergies  No Known Allergies        ANTIMICROBIALS:      OTHER MEDS:  acetaminophen     Tablet .. 650 milliGRAM(s) Oral every 6 hours PRN  aspirin enteric coated 81 milliGRAM(s) Oral daily  atorvastatin 80 milliGRAM(s) Oral at bedtime  celecoxib 200 milliGRAM(s) Oral daily  dextrose 5%. 1000 milliLiter(s) IV Continuous <Continuous>  dextrose 5%. 1000 milliLiter(s) IV Continuous <Continuous>  dextrose 50% Injectable 25 Gram(s) IV Push once  dextrose 50% Injectable 12.5 Gram(s) IV Push once  dextrose 50% Injectable 25 Gram(s) IV Push once  dextrose Oral Gel 15 Gram(s) Oral once PRN  DULoxetine 60 milliGRAM(s) Oral daily  gabapentin 600 milliGRAM(s) Oral at bedtime  glucagon  Injectable 1 milliGRAM(s) IntraMuscular once  guaifenesin/dextromethorphan Oral Liquid 10 milliLiter(s) Oral every 6 hours PRN  hydrocodone/homatropine Syrup 5 milliLiter(s) Oral every 6 hours PRN  influenza   Vaccine 0.5 milliLiter(s) IntraMuscular once  insulin lispro (ADMELOG) corrective regimen sliding scale   SubCutaneous three times a day before meals  insulin lispro (ADMELOG) corrective regimen sliding scale   SubCutaneous at bedtime  melatonin 3 milliGRAM(s) Oral at bedtime PRN  montelukast 10 milliGRAM(s) Oral daily  oxycodone    5 mG/acetaminophen 325 mG 1 Tablet(s) Oral every 4 hours PRN  rivaroxaban 20 milliGRAM(s) Oral with dinner  verapamil  milliGRAM(s) Oral daily      Vital Signs Last 24 Hrs  T(C): 36.4 (22 Dec 2023 12:00), Max: 38.2 (22 Dec 2023 05:23)  T(F): 97.5 (22 Dec 2023 12:00), Max: 100.7 (22 Dec 2023 05:23)  HR: 100 (22 Dec 2023 12:00) (70 - 109)  BP: 141/60 (22 Dec 2023 12:00) (106/66 - 150/97)  BP(mean): --  RR: 18 (22 Dec 2023 12:00) (16 - 18)  SpO2: 95% (22 Dec 2023 12:00) (91% - 95%)    Parameters below as of 22 Dec 2023 12:00  Patient On (Oxygen Delivery Method): nasal cannula  O2 Flow (L/min): 2      Physical Exam:  General:    NAD, non toxic, NC  Head: atraumatic, normocephalic  Eyes: normal sclera and conjunctiva  ENT:   no oropharyngeal lesions, no LAD, neck supple  Cardio:    regular S1,S2, no murmur  Respiratory:   no wheezing, scattered rhonchi b/l, + persistent dry cough   abd:   soft, BS +, not tender, no distention, obese   :     no CVAT, no penny   Musculoskeletal : no joint swelling, no edema  Skin:    no rash  vascular:  normal pulses  Neurologic:     no focal deficits  psych: normal affect    WBC Count: 7.44 K/uL (12-22 @ 07:09)  WBC Count: 6.02 K/uL (12-20 @ 07:01)  WBC Count: 5.26 K/uL (12-19 @ 06:36)  WBC Count: 6.18 K/uL (12-18 @ 07:02)  WBC Count: 8.89 K/uL (12-17 @ 05:06)                            8.6    7.44  )-----------( 414      ( 22 Dec 2023 07:09 )             27.0       12-22    140  |  107  |  10  ----------------------------<  100<H>  3.6   |  24  |  0.65    Ca    8.5      22 Dec 2023 07:09        Urinalysis Basic - ( 22 Dec 2023 07:09 )    Color: x / Appearance: x / SG: x / pH: x  Gluc: 100 mg/dL / Ketone: x  / Bili: x / Urobili: x   Blood: x / Protein: x / Nitrite: x   Leuk Esterase: x / RBC: x / WBC x   Sq Epi: x / Non Sq Epi: x / Bacteria: x        Creatinine Trend: 0.65<--, 0.68<--, 0.86<--, 1.00<--, 1.21<--      MICROBIOLOGY:  v      Rapid RVP Result: NotDetec (12-21 @ 16:30)  Rapid RVP Result: NotDetec (12-17 @ 05:51)      Procalcitonin, Serum: 0.09 (12-20-23 @ 07:01)    SARS-CoV-2: NotDetec (12-21-23 @ 16:30)  Rapid RVP Result: NotDetec (12-21-23 @ 16:30)    SARS-CoV-2: NotDetec (21 Dec 2023 16:30)  SARS-CoV-2: NotDetec (17 Dec 2023 05:51)  SARS-CoV-2: NotDetec (17 Nov 2023 05:46)    RADIOLOGY:         ROSSI ROJAS  MRN-[id number]    Follow Up:  pna, fever    Interval History: the pt was seen and examined earlier, not in acute distress, complains of persistent dry cough, had a low grade temp this morning 100.7, no leukocytosis.     PAST MEDICAL & SURGICAL HISTORY:  Lupus      Diabetes      Hypertension      Asthma      Peripheral polyneuropathy      Pulmonary emboli  7/2019      History of hip replacement  left      H/O total hysterectomy      History of bilateral tubal ligation      S/P laminectomy with spinal fusion      History of shoulder surgery      H/O knee surgery          ROS:    [ ] Unobtainable because:  [x ] All other systems negative    Constitutional: no fever, no chills  Head: no trauma  Eyes: no vision changes, no eye pain  ENT:  no sore throat, no rhinorrhea  Cardiovascular:  no chest pain, no palpitation  Respiratory:  no SOB, + cough  GI:  no abd pain, no vomiting, no diarrhea  urinary: no dysuria, no hematuria, no flank pain  musculoskeletal:  no joint pain, no joint swelling  skin:  no rash  neurology:  no headache, no seizure, no change in mental status  psych: no anxiety, no depression         Allergies  No Known Allergies        ANTIMICROBIALS:      OTHER MEDS:  acetaminophen     Tablet .. 650 milliGRAM(s) Oral every 6 hours PRN  aspirin enteric coated 81 milliGRAM(s) Oral daily  atorvastatin 80 milliGRAM(s) Oral at bedtime  celecoxib 200 milliGRAM(s) Oral daily  dextrose 5%. 1000 milliLiter(s) IV Continuous <Continuous>  dextrose 5%. 1000 milliLiter(s) IV Continuous <Continuous>  dextrose 50% Injectable 25 Gram(s) IV Push once  dextrose 50% Injectable 12.5 Gram(s) IV Push once  dextrose 50% Injectable 25 Gram(s) IV Push once  dextrose Oral Gel 15 Gram(s) Oral once PRN  DULoxetine 60 milliGRAM(s) Oral daily  gabapentin 600 milliGRAM(s) Oral at bedtime  glucagon  Injectable 1 milliGRAM(s) IntraMuscular once  guaifenesin/dextromethorphan Oral Liquid 10 milliLiter(s) Oral every 6 hours PRN  hydrocodone/homatropine Syrup 5 milliLiter(s) Oral every 6 hours PRN  influenza   Vaccine 0.5 milliLiter(s) IntraMuscular once  insulin lispro (ADMELOG) corrective regimen sliding scale   SubCutaneous three times a day before meals  insulin lispro (ADMELOG) corrective regimen sliding scale   SubCutaneous at bedtime  melatonin 3 milliGRAM(s) Oral at bedtime PRN  montelukast 10 milliGRAM(s) Oral daily  oxycodone    5 mG/acetaminophen 325 mG 1 Tablet(s) Oral every 4 hours PRN  rivaroxaban 20 milliGRAM(s) Oral with dinner  verapamil  milliGRAM(s) Oral daily      Vital Signs Last 24 Hrs  T(C): 36.4 (22 Dec 2023 12:00), Max: 38.2 (22 Dec 2023 05:23)  T(F): 97.5 (22 Dec 2023 12:00), Max: 100.7 (22 Dec 2023 05:23)  HR: 100 (22 Dec 2023 12:00) (70 - 109)  BP: 141/60 (22 Dec 2023 12:00) (106/66 - 150/97)  BP(mean): --  RR: 18 (22 Dec 2023 12:00) (16 - 18)  SpO2: 95% (22 Dec 2023 12:00) (91% - 95%)    Parameters below as of 22 Dec 2023 12:00  Patient On (Oxygen Delivery Method): nasal cannula  O2 Flow (L/min): 2      Physical Exam:  General:    NAD, non toxic, NC  Head: atraumatic, normocephalic  Eyes: normal sclera and conjunctiva  ENT:   no oropharyngeal lesions, no LAD, neck supple  Cardio:    regular S1,S2, no murmur  Respiratory:   no wheezing, scattered rhonchi b/l, + persistent dry cough   abd:   soft, BS +, not tender, no distention, obese   :     no CVAT, no penny   Musculoskeletal : no joint swelling, no edema  Skin:    no rash  vascular:  normal pulses  Neurologic:     no focal deficits  psych: normal affect    WBC Count: 7.44 K/uL (12-22 @ 07:09)  WBC Count: 6.02 K/uL (12-20 @ 07:01)  WBC Count: 5.26 K/uL (12-19 @ 06:36)  WBC Count: 6.18 K/uL (12-18 @ 07:02)  WBC Count: 8.89 K/uL (12-17 @ 05:06)                            8.6    7.44  )-----------( 414      ( 22 Dec 2023 07:09 )             27.0       12-22    140  |  107  |  10  ----------------------------<  100<H>  3.6   |  24  |  0.65    Ca    8.5      22 Dec 2023 07:09        Urinalysis Basic - ( 22 Dec 2023 07:09 )    Color: x / Appearance: x / SG: x / pH: x  Gluc: 100 mg/dL / Ketone: x  / Bili: x / Urobili: x   Blood: x / Protein: x / Nitrite: x   Leuk Esterase: x / RBC: x / WBC x   Sq Epi: x / Non Sq Epi: x / Bacteria: x        Creatinine Trend: 0.65<--, 0.68<--, 0.86<--, 1.00<--, 1.21<--      MICROBIOLOGY:  v      Rapid RVP Result: NotDetec (12-21 @ 16:30)  Rapid RVP Result: NotDetec (12-17 @ 05:51)      Procalcitonin, Serum: 0.09 (12-20-23 @ 07:01)    SARS-CoV-2: NotDetec (12-21-23 @ 16:30)  Rapid RVP Result: NotDetec (12-21-23 @ 16:30)    SARS-CoV-2: NotDetec (21 Dec 2023 16:30)  SARS-CoV-2: NotDetec (17 Dec 2023 05:51)  SARS-CoV-2: NotDetec (17 Nov 2023 05:46)    RADIOLOGY:

## 2023-12-22 NOTE — PROGRESS NOTE ADULT - ASSESSMENT
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     found to have multifocal pneumonia on CXR on admission  low grade temp today  no Leukocytosis  RVP negative x 3 on this admission   MRSA PCR negative   Legionella ag - negative  Strep pneumo ag - negative  Mycoplasma IGM positive     plan-  start Azithromycin po for the treatment of Mycoplasma pna - ordered   check sputum cx - pending result   has been on zosyn since 12/17 ( 5 days).  obtain two sets of BCs   wean off supplemental O2 as able, pt has O2 at home, has not been using it for a long time, as per pt  cough suppression    Discussed with Dr. Soni  Msg sent to Dr. Stover   Discussed with RN

## 2023-12-22 NOTE — PROGRESS NOTE ADULT - NS ATTEND AMEND GEN_ALL_CORE FT
All labs and cultures and imaging and pertinent chart notes reviewed by me.    case d/w Np Leidy at length and agree with her assessment and plan.    zithromax started for positive Mycoplasma    Betina Soni MD  Infectious Disease Attending     will be covering the ID service for next 4 days and if any questions please contact him either via teams or by calling [phone] All labs and cultures and imaging and pertinent chart notes reviewed by me.    case d/w Np Leidy at length and agree with her assessment and plan.    zithromax started for positive Mycoplasma    Betina Soni MD  Infectious Disease Attending     will be covering the ID service for next 4 days and if any questions please contact him either via teams or by calling [phone]

## 2023-12-23 LAB
GLUCOSE BLDC GLUCOMTR-MCNC: 108 MG/DL — HIGH (ref 70–99)
GLUCOSE BLDC GLUCOMTR-MCNC: 108 MG/DL — HIGH (ref 70–99)
GLUCOSE BLDC GLUCOMTR-MCNC: 112 MG/DL — HIGH (ref 70–99)
GLUCOSE BLDC GLUCOMTR-MCNC: 112 MG/DL — HIGH (ref 70–99)
GLUCOSE BLDC GLUCOMTR-MCNC: 118 MG/DL — HIGH (ref 70–99)
GLUCOSE BLDC GLUCOMTR-MCNC: 118 MG/DL — HIGH (ref 70–99)
GLUCOSE BLDC GLUCOMTR-MCNC: 121 MG/DL — HIGH (ref 70–99)
GLUCOSE BLDC GLUCOMTR-MCNC: 121 MG/DL — HIGH (ref 70–99)

## 2023-12-23 PROCEDURE — 99232 SBSQ HOSP IP/OBS MODERATE 35: CPT

## 2023-12-23 RX ADMIN — AZITHROMYCIN 250 MILLIGRAM(S): 500 TABLET, FILM COATED ORAL at 11:40

## 2023-12-23 RX ADMIN — Medication 3 MILLIGRAM(S): at 21:28

## 2023-12-23 RX ADMIN — DULOXETINE HYDROCHLORIDE 60 MILLIGRAM(S): 30 CAPSULE, DELAYED RELEASE ORAL at 11:41

## 2023-12-23 RX ADMIN — GABAPENTIN 600 MILLIGRAM(S): 400 CAPSULE ORAL at 21:27

## 2023-12-23 RX ADMIN — RIVAROXABAN 20 MILLIGRAM(S): KIT at 17:20

## 2023-12-23 RX ADMIN — Medication 81 MILLIGRAM(S): at 11:40

## 2023-12-23 RX ADMIN — Medication 650 MILLIGRAM(S): at 09:37

## 2023-12-23 RX ADMIN — Medication 650 MILLIGRAM(S): at 08:37

## 2023-12-23 RX ADMIN — CELECOXIB 200 MILLIGRAM(S): 200 CAPSULE ORAL at 12:42

## 2023-12-23 RX ADMIN — Medication 240 MILLIGRAM(S): at 05:40

## 2023-12-23 RX ADMIN — ATORVASTATIN CALCIUM 80 MILLIGRAM(S): 80 TABLET, FILM COATED ORAL at 21:27

## 2023-12-23 RX ADMIN — CELECOXIB 200 MILLIGRAM(S): 200 CAPSULE ORAL at 11:42

## 2023-12-23 RX ADMIN — MONTELUKAST 10 MILLIGRAM(S): 4 TABLET, CHEWABLE ORAL at 11:42

## 2023-12-23 NOTE — PROGRESS NOTE ADULT - ASSESSMENT
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 Of note pt recently developed URI symptoms and was started on Doxy as well as Hycodan, Alprazolam and Fioricet. Of note pt already on Percocet, flexeril for chronic pain, Gabapentin for seizures per  all which she took. Pt being admitted for Acute hypoxic respiratory failure due to Multifocal PNA, AMS r/o TIA/CVA  vs metabolic encephalopathy secondary to polypharmacy and/or hypoxia.      # Acute metabolic encephalopathy    acute respiratory failure with multifocal pneumonia.   acute stroke is ruled out. EEG did not show any epileptiform activity.   Continues to have low grade fevers - Seen by ID  Will hold further iv zosyn   Added Azithromycin, + mycoplasma   cont oxygen to keep sat > 94%.  SO2 in RA after ambulation 86 today  Mental status improved - patient AAO3   .     # PE  - c/w Xarelto. no active gross bleeding.     # SLE stable.     # HTN/HLD. controlled with Verapemil, statin    # DM type 2. controlled.   - FS qAC and HS w/ SSI    #Weakness. PT eval>>[facility]    Stopped Elavil, no need for Elavil and Neurontin QHS.

## 2023-12-23 NOTE — PROGRESS NOTE ADULT - SUBJECTIVE AND OBJECTIVE BOX
Patient is a 63y old  Female who presents with a chief complaint of AMS, HCAP, Acute Hypoxic respiratory failure (22 Dec 2023 15:06)      INTERVAL HPI/OVERNIGHT EVENTS:  Pt was seen and examined, no acute events.      MEDICATIONS  (STANDING):  aspirin enteric coated 81 milliGRAM(s) Oral daily  atorvastatin 80 milliGRAM(s) Oral at bedtime  azithromycin   Tablet 250 milliGRAM(s) Oral daily  celecoxib 200 milliGRAM(s) Oral daily  dextrose 5%. 1000 milliLiter(s) (50 mL/Hr) IV Continuous <Continuous>  dextrose 5%. 1000 milliLiter(s) (100 mL/Hr) IV Continuous <Continuous>  dextrose 50% Injectable 25 Gram(s) IV Push once  dextrose 50% Injectable 12.5 Gram(s) IV Push once  dextrose 50% Injectable 25 Gram(s) IV Push once  DULoxetine 60 milliGRAM(s) Oral daily  gabapentin 600 milliGRAM(s) Oral at bedtime  glucagon  Injectable 1 milliGRAM(s) IntraMuscular once  influenza   Vaccine 0.5 milliLiter(s) IntraMuscular once  insulin lispro (ADMELOG) corrective regimen sliding scale   SubCutaneous three times a day before meals  insulin lispro (ADMELOG) corrective regimen sliding scale   SubCutaneous at bedtime  montelukast 10 milliGRAM(s) Oral daily  rivaroxaban 20 milliGRAM(s) Oral with dinner  verapamil  milliGRAM(s) Oral daily    MEDICATIONS  (PRN):  acetaminophen     Tablet .. 650 milliGRAM(s) Oral every 6 hours PRN Temp greater or equal to 38C (100.4F), Mild Pain (1 - 3)  dextrose Oral Gel 15 Gram(s) Oral once PRN Blood Glucose LESS THAN 70 milliGRAM(s)/deciliter  guaifenesin/dextromethorphan Oral Liquid 10 milliLiter(s) Oral every 6 hours PRN Cough  hydrocodone/homatropine Syrup 5 milliLiter(s) Oral every 6 hours PRN Cough  melatonin 3 milliGRAM(s) Oral at bedtime PRN Insomnia  oxycodone    5 mG/acetaminophen 325 mG 1 Tablet(s) Oral every 4 hours PRN Severe Pain (7 - 10)      Allergies  No Known Allergies        Vital Signs Last 24 Hrs  T(C): 36.6 (23 Dec 2023 10:54), Max: 37.1 (22 Dec 2023 23:11)  T(F): 97.8 (23 Dec 2023 10:54), Max: 98.7 (22 Dec 2023 23:11)  HR: 98 (23 Dec 2023 13:29) (98 - 110)  BP: 103/65 (23 Dec 2023 10:54) (103/65 - 135/79)  BP(mean): --  RR: 18 (23 Dec 2023 10:54) (18 - 18)  SpO2: 94% (23 Dec 2023 13:29) (86% - 98%)    Parameters below as of 23 Dec 2023 13:29  Patient On (Oxygen Delivery Method): nasal cannula, 2LPM sitting  O2 Flow (L/min): 2      PHYSICAL EXAM:  General: patient in no acute distress  Head:  Atraumatic, Normocephalic  Eyes: EOMI, PERRLA, clear sclera  Neck: Supple, thyroid nontender, non enlarged  Cardio: S1/S2 +ve, regular rate and rhythm,  Resp: occasional rales  GI: abdomen soft, nontender, non distended, no guarding, BS +ve   Ext: no significant pedal edema  Neuro: AAOx3,   Skin: No rashes or lesions             LABS:                        8.6    7.44  )-----------( 414      ( 22 Dec 2023 07:09 )             27.0     12-22    140  |  107  |  10  ----------------------------<  100<H>  3.6   |  24  |  0.65    Ca    8.5      22 Dec 2023 07:09        Urinalysis Basic - ( 22 Dec 2023 07:09 )    Color: x / Appearance: x / SG: x / pH: x  Gluc: 100 mg/dL / Ketone: x  / Bili: x / Urobili: x   Blood: x / Protein: x / Nitrite: x   Leuk Esterase: x / RBC: x / WBC x   Sq Epi: x / Non Sq Epi: x / Bacteria: x      CAPILLARY BLOOD GLUCOSE      POCT Blood Glucose.: 108 mg/dL (23 Dec 2023 16:48)  POCT Blood Glucose.: 112 mg/dL (23 Dec 2023 11:31)  POCT Blood Glucose.: 118 mg/dL (23 Dec 2023 08:05)  POCT Blood Glucose.: 91 mg/dL (22 Dec 2023 21:30)      RADIOLOGY & ADDITIONAL TESTS:    Imaging Personally Reviewed:  [ ] YES  [ ] NO    Consultant(s) Notes Reviewed:  [ ] YES  [ ] NO    Care Discussed with Consultants/Other Providers [ ] YES  [ ] NO

## 2023-12-24 LAB
GLUCOSE BLDC GLUCOMTR-MCNC: 104 MG/DL — HIGH (ref 70–99)
GLUCOSE BLDC GLUCOMTR-MCNC: 104 MG/DL — HIGH (ref 70–99)
GLUCOSE BLDC GLUCOMTR-MCNC: 107 MG/DL — HIGH (ref 70–99)
GLUCOSE BLDC GLUCOMTR-MCNC: 107 MG/DL — HIGH (ref 70–99)
GLUCOSE BLDC GLUCOMTR-MCNC: 110 MG/DL — HIGH (ref 70–99)
GLUCOSE BLDC GLUCOMTR-MCNC: 110 MG/DL — HIGH (ref 70–99)
GLUCOSE BLDC GLUCOMTR-MCNC: 145 MG/DL — HIGH (ref 70–99)
GLUCOSE BLDC GLUCOMTR-MCNC: 145 MG/DL — HIGH (ref 70–99)

## 2023-12-24 PROCEDURE — 99232 SBSQ HOSP IP/OBS MODERATE 35: CPT

## 2023-12-24 PROCEDURE — 71045 X-RAY EXAM CHEST 1 VIEW: CPT | Mod: 26

## 2023-12-24 RX ORDER — ALBUTEROL 90 UG/1
1 AEROSOL, METERED ORAL EVERY 6 HOURS
Refills: 0 | Status: DISCONTINUED | OUTPATIENT
Start: 2023-12-24 | End: 2023-12-25

## 2023-12-24 RX ADMIN — CELECOXIB 200 MILLIGRAM(S): 200 CAPSULE ORAL at 11:39

## 2023-12-24 RX ADMIN — AZITHROMYCIN 250 MILLIGRAM(S): 500 TABLET, FILM COATED ORAL at 11:39

## 2023-12-24 RX ADMIN — DULOXETINE HYDROCHLORIDE 60 MILLIGRAM(S): 30 CAPSULE, DELAYED RELEASE ORAL at 11:38

## 2023-12-24 RX ADMIN — GABAPENTIN 600 MILLIGRAM(S): 400 CAPSULE ORAL at 21:57

## 2023-12-24 RX ADMIN — Medication 10 MILLILITER(S): at 09:38

## 2023-12-24 RX ADMIN — MONTELUKAST 10 MILLIGRAM(S): 4 TABLET, CHEWABLE ORAL at 11:39

## 2023-12-24 RX ADMIN — Medication 240 MILLIGRAM(S): at 05:36

## 2023-12-24 RX ADMIN — Medication 81 MILLIGRAM(S): at 11:40

## 2023-12-24 RX ADMIN — ALBUTEROL 1 PUFF(S): 90 AEROSOL, METERED ORAL at 05:36

## 2023-12-24 RX ADMIN — ATORVASTATIN CALCIUM 80 MILLIGRAM(S): 80 TABLET, FILM COATED ORAL at 21:57

## 2023-12-24 RX ADMIN — RIVAROXABAN 20 MILLIGRAM(S): KIT at 17:44

## 2023-12-24 RX ADMIN — Medication 650 MILLIGRAM(S): at 11:40

## 2023-12-24 RX ADMIN — ALBUTEROL 1 PUFF(S): 90 AEROSOL, METERED ORAL at 19:38

## 2023-12-24 NOTE — PROGRESS NOTE ADULT - SUBJECTIVE AND OBJECTIVE BOX
Patient is a 63y old  Female who presents with a chief complaint of ATRIAL MENTAL STATUS (24 Dec 2023 12:51)      INTERVAL HPI/OVERNIGHT EVENTS:  Pt was seen and examined, no acute events.      MEDICATIONS  (STANDING):  aspirin enteric coated 81 milliGRAM(s) Oral daily  atorvastatin 80 milliGRAM(s) Oral at bedtime  azithromycin   Tablet 250 milliGRAM(s) Oral daily  celecoxib 200 milliGRAM(s) Oral daily  dextrose 5%. 1000 milliLiter(s) (50 mL/Hr) IV Continuous <Continuous>  dextrose 5%. 1000 milliLiter(s) (100 mL/Hr) IV Continuous <Continuous>  dextrose 50% Injectable 25 Gram(s) IV Push once  dextrose 50% Injectable 12.5 Gram(s) IV Push once  dextrose 50% Injectable 25 Gram(s) IV Push once  DULoxetine 60 milliGRAM(s) Oral daily  gabapentin 600 milliGRAM(s) Oral at bedtime  glucagon  Injectable 1 milliGRAM(s) IntraMuscular once  influenza   Vaccine 0.5 milliLiter(s) IntraMuscular once  insulin lispro (ADMELOG) corrective regimen sliding scale   SubCutaneous three times a day before meals  insulin lispro (ADMELOG) corrective regimen sliding scale   SubCutaneous at bedtime  montelukast 10 milliGRAM(s) Oral daily  rivaroxaban 20 milliGRAM(s) Oral with dinner  verapamil  milliGRAM(s) Oral daily    MEDICATIONS  (PRN):  acetaminophen     Tablet .. 650 milliGRAM(s) Oral every 6 hours PRN Temp greater or equal to 38C (100.4F), Mild Pain (1 - 3)  albuterol    90 MICROgram(s) HFA Inhaler 1 Puff(s) Inhalation every 6 hours PRN Bronchospasm  dextrose Oral Gel 15 Gram(s) Oral once PRN Blood Glucose LESS THAN 70 milliGRAM(s)/deciliter  guaifenesin/dextromethorphan Oral Liquid 10 milliLiter(s) Oral every 6 hours PRN Cough  hydrocodone/homatropine Syrup 5 milliLiter(s) Oral every 6 hours PRN Cough  melatonin 3 milliGRAM(s) Oral at bedtime PRN Insomnia  oxycodone    5 mG/acetaminophen 325 mG 1 Tablet(s) Oral every 4 hours PRN Severe Pain (7 - 10)      Allergies  No Known Allergies    Intolerances  dislikes Glucerna Shake (Other)        Vital Signs Last 24 Hrs  T(C): 36.8 (24 Dec 2023 17:35), Max: 38.1 (24 Dec 2023 11:09)  T(F): 98.2 (24 Dec 2023 17:35), Max: 100.6 (24 Dec 2023 11:09)  HR: 84 (24 Dec 2023 17:35) (84 - 100)  BP: 123/73 (24 Dec 2023 17:35) (123/73 - 156/89)  BP(mean): --  RR: 18 (24 Dec 2023 17:35) (18 - 18)  SpO2: 94% (24 Dec 2023 17:35) (81% - 97%)    Parameters below as of 24 Dec 2023 17:35  Patient On (Oxygen Delivery Method): nasal cannula        PHYSICAL EXAM:  General: patient in no acute distress  Head:  Atraumatic, Normocephalic  Eyes: EOMI, PERRLA, clear sclera  Neck: Supple, thyroid nontender, non enlarged  Cardio: S1/S2 +ve, regular rate and rhythm,  Resp: occasional rales  GI: abdomen soft, nontender, non distended, no guarding, BS +ve   Ext: no significant pedal edema  Neuro: AAOx3,   Skin: No rashes or lesions         LABS:              CAPILLARY BLOOD GLUCOSE      POCT Blood Glucose.: 110 mg/dL (24 Dec 2023 15:59)  POCT Blood Glucose.: 145 mg/dL (24 Dec 2023 11:18)  POCT Blood Glucose.: 104 mg/dL (24 Dec 2023 08:13)  POCT Blood Glucose.: 121 mg/dL (23 Dec 2023 22:19)      Culture - Blood (collected 22 Dec 2023 16:15)  Source: .Blood Blood-Peripheral  Preliminary Report (23 Dec 2023 22:02):    No growth at 24 hours    Culture - Blood (collected 22 Dec 2023 16:00)  Source: .Blood Blood-Peripheral  Preliminary Report (23 Dec 2023 22:02):    No growth at 24 hours      RADIOLOGY & ADDITIONAL TESTS:    Imaging Personally Reviewed:  [ ] YES  [ ] NO    Consultant(s) Notes Reviewed:  [ ] YES  [ ] NO    Care Discussed with Consultants/Other Providers [ ] YES  [ ] NO

## 2023-12-24 NOTE — DIETITIAN INITIAL EVALUATION ADULT - NAME AND PHONE
Nona Messina RD, CDN, [facility]  Nona Messina RD, CDN, [facility]

## 2023-12-24 NOTE — DIETITIAN INITIAL EVALUATION ADULT - PHYSICAL ASSESSMENT TEMPLES
S/P hernia repair  b/l  S/P knee replacement  b/l  Status post cataract extraction and insertion of intraocular lens, unspecified laterality  b/l none

## 2023-12-24 NOTE — DIETITIAN INITIAL EVALUATION ADULT - ORAL INTAKE PTA/DIET HISTORY
Pt reports depressed appetite for ~ 2 weeks PTA.  Pt's  did the shopping/cooking.  Diet PTA: CHO controlled for diabetes.  Pt without report of food allergies/intolerances.  Pt reports some difficulty c swallowing due to persistent cough.

## 2023-12-24 NOTE — DIETITIAN INITIAL EVALUATION ADULT - OTHER INFO
Per chart review, problem diagnosis include acute metabolic encephalopathy, acute respiratory failure, pneumonia, AMS improved.  PMH include GBS, chronic left foot droop,  CVA/TIA, chronic residual left sided weakness, slurred speech, seizure disorder.  D/C plan is for [facility].  12/19, swallow evaluation, VFSS/MBS c recommendation for regular solids, thin liquids.  Pt was on metformin and medication similar to Ozempic as per pt for DM.  Pt was not self monitoring blood glucose regularly, was unsure of A1C% PTA.  Pt educated on adequate intake of protein-energy intake, willing to try AdNectar Glucose Support supplement, dislikes Glucerna Shake.  Pt declined written education on DM as she stated that she is aware of diabetes nutrition therapy. Per chart review, problem diagnosis include acute metabolic encephalopathy, acute respiratory failure, pneumonia, AMS improved.  PMH include GBS, chronic left foot droop,  CVA/TIA, chronic residual left sided weakness, slurred speech, seizure disorder.  D/C plan is for [facility].  12/19, swallow evaluation, VFSS/MBS c recommendation for regular solids, thin liquids.  Pt was on metformin and medication similar to Ozempic as per pt for DM.  Pt was not self monitoring blood glucose regularly, was unsure of A1C% PTA.  Pt educated on adequate intake of protein-energy intake, willing to try AdMaster Glucose Support supplement, dislikes Glucerna Shake.  Pt declined written education on DM as she stated that she is aware of diabetes nutrition therapy.

## 2023-12-24 NOTE — DIETITIAN INITIAL EVALUATION ADULT - PERTINENT MEDS FT
MEDICATIONS  (STANDING):  aspirin enteric coated 81 milliGRAM(s) Oral daily  atorvastatin 80 milliGRAM(s) Oral at bedtime  azithromycin   Tablet 250 milliGRAM(s) Oral daily  celecoxib 200 milliGRAM(s) Oral daily  dextrose 5%. 1000 milliLiter(s) (50 mL/Hr) IV Continuous <Continuous>  dextrose 5%. 1000 milliLiter(s) (100 mL/Hr) IV Continuous <Continuous>  dextrose 50% Injectable 25 Gram(s) IV Push once  dextrose 50% Injectable 12.5 Gram(s) IV Push once  dextrose 50% Injectable 25 Gram(s) IV Push once  DULoxetine 60 milliGRAM(s) Oral daily  gabapentin 600 milliGRAM(s) Oral at bedtime  glucagon  Injectable 1 milliGRAM(s) IntraMuscular once  influenza   Vaccine 0.5 milliLiter(s) IntraMuscular once  insulin lispro (ADMELOG) corrective regimen sliding scale   SubCutaneous three times a day before meals  insulin lispro (ADMELOG) corrective regimen sliding scale   SubCutaneous at bedtime  montelukast 10 milliGRAM(s) Oral daily  rivaroxaban 20 milliGRAM(s) Oral with dinner  verapamil  milliGRAM(s) Oral daily    MEDICATIONS  (PRN):  acetaminophen     Tablet .. 650 milliGRAM(s) Oral every 6 hours PRN Temp greater or equal to 38C (100.4F), Mild Pain (1 - 3)  albuterol    90 MICROgram(s) HFA Inhaler 1 Puff(s) Inhalation every 6 hours PRN Bronchospasm  dextrose Oral Gel 15 Gram(s) Oral once PRN Blood Glucose LESS THAN 70 milliGRAM(s)/deciliter  guaifenesin/dextromethorphan Oral Liquid 10 milliLiter(s) Oral every 6 hours PRN Cough  hydrocodone/homatropine Syrup 5 milliLiter(s) Oral every 6 hours PRN Cough  melatonin 3 milliGRAM(s) Oral at bedtime PRN Insomnia  oxycodone    5 mG/acetaminophen 325 mG 1 Tablet(s) Oral every 4 hours PRN Severe Pain (7 - 10)

## 2023-12-24 NOTE — DIETITIAN INITIAL EVALUATION ADULT - PERTINENT LABORATORY DATA
POCT Blood Glucose.: 145 mg/dL (12-24-23 @ 11:18)  A1C with Estimated Average Glucose Result: 6.1 %<H-good glycemic control> (11-18-23 @ 06:20)  A1C with Estimated Average Glucose Result: 6.6 % (11-17-23 @ 04:47)  A1C with Estimated Average Glucose Result: 6.5 % (05-07-23 @ 05:15)

## 2023-12-24 NOTE — DIETITIAN INITIAL EVALUATION ADULT - ORAL NUTRITION SUPPLEMENTS
[facility] Glucose Support 250 ml 1 x day(will provide c dinner meal)=300 calories, 16 grams proten  [facility] Glucose Support 250 ml 1 x day(will provide c dinner meal)=300 calories, 16 grams proten

## 2023-12-24 NOTE — DIETITIAN INITIAL EVALUATION ADULT - NS FNS DIET ORDER
Diet, DASH/TLC:   Sodium & Cholesterol Restricted  Consistent Carbohydrate {Evening Snack} (12-17-23 @ 10:22) [Active]

## 2023-12-24 NOTE — PROGRESS NOTE ADULT - ASSESSMENT
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 Of note pt recently developed URI symptoms and was started on Doxy as well as Hycodan, Alprazolam and Fioricet. Of note pt already on Percocet, flexeril for chronic pain, Gabapentin for seizures per  all which she took. Pt being admitted for Acute hypoxic respiratory failure due to Multifocal PNA, AMS r/o TIA/CVA  vs metabolic encephalopathy secondary to polypharmacy and/or hypoxia.      # Acute metabolic encephalopathy   Acute respiratory failure with multifocal pneumonia.   Acute stroke is ruled out. EEG did not show any epileptiform activity.   Continues to have low grade fevers - Seen by ID  Will hold further iv zosyn , completed 5 days   Added Azithromycin, + mycoplasma   cont oxygen to keep sat > 94%.  SO2 in RA after ambulation 86   Mental status improved - patient AAO3   Cough meds, albuterol PRN  .     # PE  - c/w Xarelto. no active gross bleeding.     # SLE stable.     # HTN/HLD. controlled with Verapemil, statin    # DM type 2. controlled.   - FS qAC and HS w/ SSI    #Weakness. PT eval>>[facility], pt unsure , will follow up     Stopped Elavil, no need for Elavil and Neurontin QHS.  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 Of note pt recently developed URI symptoms and was started on Doxy as well as Hycodan, Alprazolam and Fioricet. Of note pt already on Percocet, flexeril for chronic pain, Gabapentin for seizures per  all which she took. Pt being admitted for Acute hypoxic respiratory failure due to Multifocal PNA, AMS r/o TIA/CVA  vs metabolic encephalopathy secondary to polypharmacy and/or hypoxia.      # Acute metabolic encephalopathy   Acute respiratory failure with multifocal pneumonia.   Acute stroke is ruled out. EEG did not show any epileptiform activity.   Continues to have low grade fevers - Seen by ID  Will hold further iv zosyn , completed 5 days   Added Azithromycin, + mycoplasma   cont oxygen to keep sat > 94%.  SO2 in RA after ambulation 86   Mental status improved - patient AAO3   Cough meds, albuterol PRN  .     # PE  - c/w Xarelto. no active gross bleeding.     # SLE stable.     # HTN/HLD. controlled with Verapemil, statin    # DM type 2. controlled.   - FS qAC and HS w/ SSI    #Weakness. PT eval>>[facility], pt unsure , will follow up     Stopped Elavil, no need for Elavil and Neurontin QHS.

## 2023-12-25 LAB
ALBUMIN SERPL ELPH-MCNC: 1.9 G/DL — LOW (ref 3.3–5)
ALBUMIN SERPL ELPH-MCNC: 1.9 G/DL — LOW (ref 3.3–5)
ALP SERPL-CCNC: 105 U/L — SIGNIFICANT CHANGE UP (ref 40–120)
ALP SERPL-CCNC: 105 U/L — SIGNIFICANT CHANGE UP (ref 40–120)
ALT FLD-CCNC: 20 U/L — SIGNIFICANT CHANGE UP (ref 12–78)
ALT FLD-CCNC: 20 U/L — SIGNIFICANT CHANGE UP (ref 12–78)
ANION GAP SERPL CALC-SCNC: 6 MMOL/L — SIGNIFICANT CHANGE UP (ref 5–17)
ANION GAP SERPL CALC-SCNC: 6 MMOL/L — SIGNIFICANT CHANGE UP (ref 5–17)
AST SERPL-CCNC: 29 U/L — SIGNIFICANT CHANGE UP (ref 15–37)
AST SERPL-CCNC: 29 U/L — SIGNIFICANT CHANGE UP (ref 15–37)
BASE EXCESS BLDA CALC-SCNC: 3.5 MMOL/L — HIGH (ref -2–3)
BASE EXCESS BLDA CALC-SCNC: 3.5 MMOL/L — HIGH (ref -2–3)
BILIRUB SERPL-MCNC: 0.4 MG/DL — SIGNIFICANT CHANGE UP (ref 0.2–1.2)
BILIRUB SERPL-MCNC: 0.4 MG/DL — SIGNIFICANT CHANGE UP (ref 0.2–1.2)
BLOOD GAS COMMENTS ARTERIAL: SIGNIFICANT CHANGE UP
BLOOD GAS COMMENTS ARTERIAL: SIGNIFICANT CHANGE UP
BUN SERPL-MCNC: 10 MG/DL — SIGNIFICANT CHANGE UP (ref 7–23)
BUN SERPL-MCNC: 10 MG/DL — SIGNIFICANT CHANGE UP (ref 7–23)
CALCIUM SERPL-MCNC: 9.2 MG/DL — SIGNIFICANT CHANGE UP (ref 8.5–10.1)
CALCIUM SERPL-MCNC: 9.2 MG/DL — SIGNIFICANT CHANGE UP (ref 8.5–10.1)
CHLORIDE SERPL-SCNC: 103 MMOL/L — SIGNIFICANT CHANGE UP (ref 96–108)
CHLORIDE SERPL-SCNC: 103 MMOL/L — SIGNIFICANT CHANGE UP (ref 96–108)
CO2 BLDA-SCNC: 29 MMOL/L — HIGH (ref 19–24)
CO2 BLDA-SCNC: 29 MMOL/L — HIGH (ref 19–24)
CO2 SERPL-SCNC: 29 MMOL/L — SIGNIFICANT CHANGE UP (ref 22–31)
CO2 SERPL-SCNC: 29 MMOL/L — SIGNIFICANT CHANGE UP (ref 22–31)
CREAT SERPL-MCNC: 0.68 MG/DL — SIGNIFICANT CHANGE UP (ref 0.5–1.3)
CREAT SERPL-MCNC: 0.68 MG/DL — SIGNIFICANT CHANGE UP (ref 0.5–1.3)
EGFR: 98 ML/MIN/1.73M2 — SIGNIFICANT CHANGE UP
EGFR: 98 ML/MIN/1.73M2 — SIGNIFICANT CHANGE UP
GAS PNL BLDA: SIGNIFICANT CHANGE UP
GAS PNL BLDA: SIGNIFICANT CHANGE UP
GLUCOSE BLDC GLUCOMTR-MCNC: 100 MG/DL — HIGH (ref 70–99)
GLUCOSE BLDC GLUCOMTR-MCNC: 100 MG/DL — HIGH (ref 70–99)
GLUCOSE BLDC GLUCOMTR-MCNC: 110 MG/DL — HIGH (ref 70–99)
GLUCOSE BLDC GLUCOMTR-MCNC: 110 MG/DL — HIGH (ref 70–99)
GLUCOSE BLDC GLUCOMTR-MCNC: 112 MG/DL — HIGH (ref 70–99)
GLUCOSE BLDC GLUCOMTR-MCNC: 112 MG/DL — HIGH (ref 70–99)
GLUCOSE BLDC GLUCOMTR-MCNC: 117 MG/DL — HIGH (ref 70–99)
GLUCOSE BLDC GLUCOMTR-MCNC: 117 MG/DL — HIGH (ref 70–99)
GLUCOSE SERPL-MCNC: 119 MG/DL — HIGH (ref 70–99)
GLUCOSE SERPL-MCNC: 119 MG/DL — HIGH (ref 70–99)
HCO3 BLDA-SCNC: 28 MMOL/L — SIGNIFICANT CHANGE UP (ref 21–28)
HCO3 BLDA-SCNC: 28 MMOL/L — SIGNIFICANT CHANGE UP (ref 21–28)
HCT VFR BLD CALC: 26.1 % — LOW (ref 34.5–45)
HCT VFR BLD CALC: 26.1 % — LOW (ref 34.5–45)
HGB BLD-MCNC: 8.3 G/DL — LOW (ref 11.5–15.5)
HGB BLD-MCNC: 8.3 G/DL — LOW (ref 11.5–15.5)
HOROWITZ INDEX BLDA+IHG-RTO: 44 — SIGNIFICANT CHANGE UP
HOROWITZ INDEX BLDA+IHG-RTO: 44 — SIGNIFICANT CHANGE UP
MAGNESIUM SERPL-MCNC: 1.7 MG/DL — SIGNIFICANT CHANGE UP (ref 1.6–2.6)
MAGNESIUM SERPL-MCNC: 1.7 MG/DL — SIGNIFICANT CHANGE UP (ref 1.6–2.6)
MCHC RBC-ENTMCNC: 26.1 PG — LOW (ref 27–34)
MCHC RBC-ENTMCNC: 26.1 PG — LOW (ref 27–34)
MCHC RBC-ENTMCNC: 31.8 G/DL — LOW (ref 32–36)
MCHC RBC-ENTMCNC: 31.8 G/DL — LOW (ref 32–36)
MCV RBC AUTO: 82.1 FL — SIGNIFICANT CHANGE UP (ref 80–100)
MCV RBC AUTO: 82.1 FL — SIGNIFICANT CHANGE UP (ref 80–100)
NRBC # BLD: 0 /100 WBCS — SIGNIFICANT CHANGE UP (ref 0–0)
NRBC # BLD: 0 /100 WBCS — SIGNIFICANT CHANGE UP (ref 0–0)
PCO2 BLDA: 40 MMHG — SIGNIFICANT CHANGE UP (ref 32–46)
PCO2 BLDA: 40 MMHG — SIGNIFICANT CHANGE UP (ref 32–46)
PH BLDA: 7.45 — SIGNIFICANT CHANGE UP (ref 7.35–7.45)
PH BLDA: 7.45 — SIGNIFICANT CHANGE UP (ref 7.35–7.45)
PHOSPHATE SERPL-MCNC: 2.8 MG/DL — SIGNIFICANT CHANGE UP (ref 2.5–4.5)
PHOSPHATE SERPL-MCNC: 2.8 MG/DL — SIGNIFICANT CHANGE UP (ref 2.5–4.5)
PLATELET # BLD AUTO: 472 K/UL — HIGH (ref 150–400)
PLATELET # BLD AUTO: 472 K/UL — HIGH (ref 150–400)
PO2 BLDA: 57 MMHG — LOW (ref 83–108)
PO2 BLDA: 57 MMHG — LOW (ref 83–108)
POTASSIUM SERPL-MCNC: 4 MMOL/L — SIGNIFICANT CHANGE UP (ref 3.5–5.3)
POTASSIUM SERPL-MCNC: 4 MMOL/L — SIGNIFICANT CHANGE UP (ref 3.5–5.3)
POTASSIUM SERPL-SCNC: 4 MMOL/L — SIGNIFICANT CHANGE UP (ref 3.5–5.3)
POTASSIUM SERPL-SCNC: 4 MMOL/L — SIGNIFICANT CHANGE UP (ref 3.5–5.3)
PROT SERPL-MCNC: 6.8 GM/DL — SIGNIFICANT CHANGE UP (ref 6–8.3)
PROT SERPL-MCNC: 6.8 GM/DL — SIGNIFICANT CHANGE UP (ref 6–8.3)
RAPID RVP RESULT: SIGNIFICANT CHANGE UP
RAPID RVP RESULT: SIGNIFICANT CHANGE UP
RBC # BLD: 3.18 M/UL — LOW (ref 3.8–5.2)
RBC # BLD: 3.18 M/UL — LOW (ref 3.8–5.2)
RBC # FLD: 16.6 % — HIGH (ref 10.3–14.5)
RBC # FLD: 16.6 % — HIGH (ref 10.3–14.5)
SAO2 % BLDA: 88.8 % — LOW (ref 94–98)
SAO2 % BLDA: 88.8 % — LOW (ref 94–98)
SARS-COV-2 RNA SPEC QL NAA+PROBE: SIGNIFICANT CHANGE UP
SARS-COV-2 RNA SPEC QL NAA+PROBE: SIGNIFICANT CHANGE UP
SODIUM SERPL-SCNC: 138 MMOL/L — SIGNIFICANT CHANGE UP (ref 135–145)
SODIUM SERPL-SCNC: 138 MMOL/L — SIGNIFICANT CHANGE UP (ref 135–145)
WBC # BLD: 12.42 K/UL — HIGH (ref 3.8–10.5)
WBC # BLD: 12.42 K/UL — HIGH (ref 3.8–10.5)
WBC # FLD AUTO: 12.42 K/UL — HIGH (ref 3.8–10.5)
WBC # FLD AUTO: 12.42 K/UL — HIGH (ref 3.8–10.5)

## 2023-12-25 PROCEDURE — 99291 CRITICAL CARE FIRST HOUR: CPT

## 2023-12-25 PROCEDURE — 71250 CT THORAX DX C-: CPT | Mod: 26

## 2023-12-25 PROCEDURE — 99232 SBSQ HOSP IP/OBS MODERATE 35: CPT

## 2023-12-25 RX ORDER — IPRATROPIUM/ALBUTEROL SULFATE 18-103MCG
3 AEROSOL WITH ADAPTER (GRAM) INHALATION EVERY 6 HOURS
Refills: 0 | Status: DISCONTINUED | OUTPATIENT
Start: 2023-12-25 | End: 2024-01-04

## 2023-12-25 RX ADMIN — ALBUTEROL 1 PUFF(S): 90 AEROSOL, METERED ORAL at 10:06

## 2023-12-25 RX ADMIN — Medication 81 MILLIGRAM(S): at 11:39

## 2023-12-25 RX ADMIN — AZITHROMYCIN 250 MILLIGRAM(S): 500 TABLET, FILM COATED ORAL at 11:39

## 2023-12-25 RX ADMIN — ALBUTEROL 1 PUFF(S): 90 AEROSOL, METERED ORAL at 16:22

## 2023-12-25 RX ADMIN — Medication 650 MILLIGRAM(S): at 05:54

## 2023-12-25 RX ADMIN — Medication 3 MILLILITER(S): at 18:11

## 2023-12-25 RX ADMIN — Medication 10 MILLILITER(S): at 08:47

## 2023-12-25 RX ADMIN — DULOXETINE HYDROCHLORIDE 60 MILLIGRAM(S): 30 CAPSULE, DELAYED RELEASE ORAL at 11:39

## 2023-12-25 RX ADMIN — Medication 240 MILLIGRAM(S): at 05:48

## 2023-12-25 RX ADMIN — CELECOXIB 200 MILLIGRAM(S): 200 CAPSULE ORAL at 11:39

## 2023-12-25 RX ADMIN — RIVAROXABAN 20 MILLIGRAM(S): KIT at 16:53

## 2023-12-25 RX ADMIN — CELECOXIB 200 MILLIGRAM(S): 200 CAPSULE ORAL at 12:18

## 2023-12-25 RX ADMIN — Medication 60 MILLIGRAM(S): at 18:29

## 2023-12-25 RX ADMIN — Medication 650 MILLIGRAM(S): at 07:30

## 2023-12-25 RX ADMIN — MONTELUKAST 10 MILLIGRAM(S): 4 TABLET, CHEWABLE ORAL at 11:39

## 2023-12-25 NOTE — RAPID RESPONSE TEAM SUMMARY - NSSITUATIONBACKGROUNDRRT_GEN_ALL_CORE
Responded to RRT called for pt feeling SOB and O2 sat 89% on 6L NC. Pt with history of asthma, htn, Finger sticks every ac/hs with humulog insulin sliding scale coverage, lupus. Reported has never been intubated for her asthma. Pt on zithromax po for mycoplasma pna and xarelto for PE. Completed 5 day course of zosyn. RVP done today, negative, repeat CXR yesterday improved from prior cxr.  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  Pt lethargic rousable, but confused, oriented to self and time; not to place or situation. Hx obtained from  at bedside. Per  last evening he helped pt to the restroom and a few minutes later when he  on her she was unresponsive.  reports pt seemed to open eyes at times, however was not speaking thus he called EMS. Pt remained same way till after arrival to the ED. Per , pt recently developed URI symptoms and was started on Doxy as well as Hycodan, Alprazolam and Fioricet. Of note pt already on Percocet, flexeril for chronic pain, Gabapentin for seizures per . All of which  believes pt took last evening.     ED Course  Vitals BP 94/61, SPO2 84% on RA, rest of vitals stable. Labs sig for H/H 9.8/31.3, rest of labs stable. CT head neg for acute stroke, CT perfusion Markedly degraded by patient motion. No core infarct. Small areas of abnormal perfusion in the left frontal and right temporal lobes not confined to a vascular territory, likely artifactual. CXR showed Multifocalpneumonia.  (17 Dec 2023 09:06)  Also present at RRT hospitalist Dr. Ford

## 2023-12-25 NOTE — PROGRESS NOTE ADULT - SUBJECTIVE AND OBJECTIVE BOX
Neurology Progress Note    S: Patient seen and examined. No new events overnight.     MEDICATIONS:    acetaminophen     Tablet .. 650 milliGRAM(s) Oral every 6 hours PRN  albuterol    90 MICROgram(s) HFA Inhaler 1 Puff(s) Inhalation every 6 hours PRN  aspirin enteric coated 81 milliGRAM(s) Oral daily  atorvastatin 80 milliGRAM(s) Oral at bedtime  azithromycin   Tablet 250 milliGRAM(s) Oral daily  celecoxib 200 milliGRAM(s) Oral daily  dextrose 5%. 1000 milliLiter(s) IV Continuous <Continuous>  dextrose 5%. 1000 milliLiter(s) IV Continuous <Continuous>  dextrose 50% Injectable 25 Gram(s) IV Push once  dextrose 50% Injectable 12.5 Gram(s) IV Push once  dextrose 50% Injectable 25 Gram(s) IV Push once  dextrose Oral Gel 15 Gram(s) Oral once PRN  DULoxetine 60 milliGRAM(s) Oral daily  gabapentin 600 milliGRAM(s) Oral at bedtime  glucagon  Injectable 1 milliGRAM(s) IntraMuscular once  guaifenesin/dextromethorphan Oral Liquid 10 milliLiter(s) Oral every 6 hours PRN  hydrocodone/homatropine Syrup 5 milliLiter(s) Oral every 6 hours PRN  influenza   Vaccine 0.5 milliLiter(s) IntraMuscular once  insulin lispro (ADMELOG) corrective regimen sliding scale   SubCutaneous three times a day before meals  insulin lispro (ADMELOG) corrective regimen sliding scale   SubCutaneous at bedtime  melatonin 3 milliGRAM(s) Oral at bedtime PRN  montelukast 10 milliGRAM(s) Oral daily  rivaroxaban 20 milliGRAM(s) Oral with dinner  verapamil  milliGRAM(s) Oral daily      LABS:            CAPILLARY BLOOD GLUCOSE      POCT Blood Glucose.: 117 mg/dL (25 Dec 2023 11:25)  POCT Blood Glucose.: 110 mg/dL (25 Dec 2023 08:46)  POCT Blood Glucose.: 107 mg/dL (24 Dec 2023 21:21)  POCT Blood Glucose.: 110 mg/dL (24 Dec 2023 15:59)        I&O's Summary    Vital Signs Last 24 Hrs  T(C): 37.7 (25 Dec 2023 08:02), Max: 38.2 (25 Dec 2023 06:48)  T(F): 99.8 (25 Dec 2023 08:02), Max: 100.7 (25 Dec 2023 06:48)  HR: 106 (25 Dec 2023 08:02) (84 - 127)  BP: 140/82 (25 Dec 2023 08:02) (123/73 - 157/84)  BP(mean): --  RR: 19 (25 Dec 2023 08:02) (17 - 20)  SpO2: 97% (25 Dec 2023 08:02) (94% - 97%)    Parameters below as of 25 Dec 2023 08:02  Patient On (Oxygen Delivery Method): nasal cannula  O2 Flow (L/min): 5          General Exam:   General Appearance: Appropriately dressed and in no acute distress       Head: Normocephalic, atraumatic and no dysmorphic features  Ear, Nose, and Throat: Moist mucous membranes  CVS: S1S2+  Resp: No SOB, no wheeze or rhonchi  Abd: soft NTND  Extremities: No edema, no cyanosis  Skin: No bruises, no rashes     Neurological Exam:  Mental Status: Awake, alert and oriented x 3.  Able to follow simple verbal commands. Able to name and repeat. Speech is fluent but slow  Cranial Nerves: PERRL, EOMI, VFFC, sensation V1-V3 intact,  no obvious facial asymmetry, equal elevation of palate, scm/trap 5/5, tongue is midline on protrusion.. hearing is grossly intact.   Motor: Normal bulk, tone. JIANG, LLE slightly weaker but poor efffort  Sensation: Intact to light touch and pinprick throughout. no right/left confusion. no extinction to tactile on DSS.   Reflexes: 1+ throughout at biceps, brachioradialis, triceps, patellars and ankles bilaterally and equal. No clonus. R toe and L toe were both downgoing.  Coordination: No dysmetria on FNF   Gait: deferred      I personally reviewed the below data/images/labs:      LABS:    < from: CT Brain Stroke Protocol (12.17.23 @ 04:51) >    ACC: [id number] EXAM:  CT BRAIN STROKE PROTOCOL   ORDERED BY: MINA CONROY     PROCEDURE DATE:  12/17/2023          INTERPRETATION:  CLINICAL INFORMATION:  Stroke Code    TECHNIQUE:  Axial CT images were acquired through the head.  Intravenouscontrast: None  Two-dimensional reformats were generated.    COMPARISON STUDY: MRI brain 11/17/2023, CTA head and neck 11/17/2023.    FINDINGS:    There is no CT evidence of acute intracranial hemorrhage, mass effect,   midline shift, or acute, largeterritorial infarct.  The ventricles and   sulci are age-appropriate in size and configuration. The basal cisterns   are patent.    The mastoid air cells and middle ear cavities are grossly clear. The   visualized paranasal sinuses are well aerated.    The calvarium and skull base are grossly intact.    IMPRESSION:  No acute intracranial hemorrhage or mass effect.    Results were discussed with Dr. Conroy by Dr. Gastelum at 4:50 AM on   12/17/2023. with read back followed.    < end of copied text >        < from: CT Angio Neck Stroke Protocol w/ IV Cont (12.17.23 @ 05:12) >    ACC: [id number] EXAM:  CT ANGIO NECK STROKE PROTCL IC   ORDERED BY: MINA CONROY     ACC: [id number] EXAM:  CT BRAIN PERFUSION MAPS STROKE   ORDERED BY: MINA CONROY     ACC: [id number] EXAM:  CT ANGIO BRAIN STROKE PROTC IC   ORDERED BY: MINA CONROY     PROCEDURE DATE:  12/17/2023          INTERPRETATION:  CT PERFUSION, CTA OF THE [place] AND NECK:    INDICATIONS: Stroke code.    TECHNIQUE:    RAPID artificial intelligence was used for perfusion analysis and for   preliminary evaluation of intracranial hemorrhage.    CTA [place]:    After the intravenous power injection of non-ionic contrast material,   serial thin sections were obtained through the intracranial circulation   on a multislice CT scanner.  Images were reformatted using a dedicated 3D   software package and viewed on a dedicated workstation in multiple planes.    CTA NECK:    After the intravenous power injection of non-ionic contrast material,   serial thin sections were obtained through the cervical circulation on a   multislice CT scanner.  Images were reformatted using a dedicated 3D   software package and viewed on a dedicated workstation in multiple planes.      COMPARISON EXAMINATION: Noncontrast head CT performed the same day. CTA   head and neck 11/17/2023    FINDINGS:      CT RAPID PERFUSION:  Markedly degraded by patient motion.    INFARCT CORE: 0 cc    TISSUE AT RISK: 8 cc combined in the left frontal and right temporal lobes    MISMATCH RATIO: N/A      CTA [place]:    ANTERIOR CIRCULATION    ICA  CAVERNOUS, SUPRACLINOID, BIFURCATION SEGMENTS: Patent without flow   limiting stenosis. Atherosclerotic calcifications of the bilateral   cavernous ICA segments.    ANTERIOR CEREBRAL ARTERIES: Bilateral A1, anterior communicating and A2   anterior cerebral arteries are unremarkable in course and caliber without   flow limiting stenosis. Hypoplastic right A1 segment.    MIDDLE CEREBRAL ARTERIES: Patent bilateral M1, M2, and distal MCA   branches without flow limiting stenosis.    POSTERIOR CIRCULATION:    VERTEBRAL ARTERIES: Patent without flow limiting stenosis. Mild focal   narrowing of the right V4 segment.    BASILAR ARTERY: Patent no flow limiting stenosis.    POSTERIOR CEREBRAL ARTERIES: Patent without flow limiting stenosis.    CTA NECK:    GREAT VESSELS: Visualized segments are patent, no flow limiting stenosis.   Bovine aortic arch, normal variant.    COMMON CAROTID ARTERIES:  RIGHT: Patent without flow limiting stenosis  LEFT: Patent without flow limiting stenosis    CAROTID BULBS:  RIGHT: Patent without flow limiting stenosis  LEFT: Patent without flow limiting stenosis    INTERNAL CAROTID ARTERIES:  RIGHT: Patent no evidence for any hemodynamically significant stenosis at   the ICA origin by NASCET criteria. Stable mild ectasia of the proximal   cervical ICA measuring 0.9 cm in diameter. Partial retropharyngeal course.  LEFT: Patent no evidence for any hemodynamically significant stenosis at   the ICA origin by NASCET criteria. Stable fusiform aneurysmal dilatation   of the proximal cervical ICA measuring 1.4 cm in diameter and gradually   tapering in the mid segment. Partial retropharyngeal course.    VERTEBRAL ARTERIES:    RIGHT: Patent no evidence for any flow limiting stenosis.  LEFT: Patent no evidence for any flow limiting stenosis. Left dominant   vertebral system.      SOFT TISSUES: Patchy nonspecific groundglass opacities and consolidations   in the visualized upper lobes.    BONES: Multilevel degenerative changes inthe spine.    IMPRESSION:    CT PERFUSION: Markedly degraded by patient motion. No core infarct. Small   areas of abnormal perfusion in the left frontal and right temporal lobes   not confined to a vascular territory, likely artifactual.    If symptoms persist consider follow up head CT or MRI, MRA  if no   contraindication.    CTA COW:  Patent intracranial circulation without flow limiting stenosis.    CTA NECK: Patent, ECAs, ICAs, no  hemodynamically significant stenosis at    ICA origins by NASCET criteria. Stable fusiform aneurysmal dilatation of   the ICA origins/proximal segments.  Bilateral vertebral arteries are patent without flow limiting stenosis.    Patchy nonspecific groundglass and consolidations in the visualized upper   lobes.    --- End of Report ---      < end of copied text >    < from: MR Head No Cont (12.18.23 @ 12:52) >  IMPRESSION: No acute territorial infarcts are seen    < end of copied text >      EEG Classification / Summary:  Abnormal routine EEG in the awake state.  Mild diffuse slowing.  No epileptiform abnormalities.  Artifacts somewhat limit interpretation.    Clinical Impression:  Mild diffuse cerebral dysfunction is nonspecific in etiology.   Artifacts somewhat limit interpretation.   Neurology Progress Note    S: Patient seen and examined. No new events overnight.     MEDICATIONS:    acetaminophen     Tablet .. 650 milliGRAM(s) Oral every 6 hours PRN  albuterol    90 MICROgram(s) HFA Inhaler 1 Puff(s) Inhalation every 6 hours PRN  aspirin enteric coated 81 milliGRAM(s) Oral daily  atorvastatin 80 milliGRAM(s) Oral at bedtime  azithromycin   Tablet 250 milliGRAM(s) Oral daily  celecoxib 200 milliGRAM(s) Oral daily  dextrose 5%. 1000 milliLiter(s) IV Continuous <Continuous>  dextrose 5%. 1000 milliLiter(s) IV Continuous <Continuous>  dextrose 50% Injectable 25 Gram(s) IV Push once  dextrose 50% Injectable 12.5 Gram(s) IV Push once  dextrose 50% Injectable 25 Gram(s) IV Push once  dextrose Oral Gel 15 Gram(s) Oral once PRN  DULoxetine 60 milliGRAM(s) Oral daily  gabapentin 600 milliGRAM(s) Oral at bedtime  glucagon  Injectable 1 milliGRAM(s) IntraMuscular once  guaifenesin/dextromethorphan Oral Liquid 10 milliLiter(s) Oral every 6 hours PRN  hydrocodone/homatropine Syrup 5 milliLiter(s) Oral every 6 hours PRN  influenza   Vaccine 0.5 milliLiter(s) IntraMuscular once  insulin lispro (ADMELOG) corrective regimen sliding scale   SubCutaneous three times a day before meals  insulin lispro (ADMELOG) corrective regimen sliding scale   SubCutaneous at bedtime  melatonin 3 milliGRAM(s) Oral at bedtime PRN  montelukast 10 milliGRAM(s) Oral daily  rivaroxaban 20 milliGRAM(s) Oral with dinner  verapamil  milliGRAM(s) Oral daily      LABS:            CAPILLARY BLOOD GLUCOSE      POCT Blood Glucose.: 117 mg/dL (25 Dec 2023 11:25)  POCT Blood Glucose.: 110 mg/dL (25 Dec 2023 08:46)  POCT Blood Glucose.: 107 mg/dL (24 Dec 2023 21:21)  POCT Blood Glucose.: 110 mg/dL (24 Dec 2023 15:59)        I&O's Summary    Vital Signs Last 24 Hrs  T(C): 37.7 (25 Dec 2023 08:02), Max: 38.2 (25 Dec 2023 06:48)  T(F): 99.8 (25 Dec 2023 08:02), Max: 100.7 (25 Dec 2023 06:48)  HR: 106 (25 Dec 2023 08:02) (84 - 127)  BP: 140/82 (25 Dec 2023 08:02) (123/73 - 157/84)  BP(mean): --  RR: 19 (25 Dec 2023 08:02) (17 - 20)  SpO2: 97% (25 Dec 2023 08:02) (94% - 97%)    Parameters below as of 25 Dec 2023 08:02  Patient On (Oxygen Delivery Method): nasal cannula  O2 Flow (L/min): 5          General Exam:   General Appearance: Appropriately dressed and in no acute distress       Head: Normocephalic, atraumatic and no dysmorphic features  Ear, Nose, and Throat: Moist mucous membranes  CVS: S1S2+  Resp: No SOB, no wheeze or rhonchi  Abd: soft NTND  Extremities: No edema, no cyanosis  Skin: No bruises, no rashes     Neurological Exam:  Mental Status: Awake, alert and oriented x 3.  Able to follow simple verbal commands. Able to name and repeat. Speech is fluent but slow  Cranial Nerves: PERRL, EOMI, VFFC, sensation V1-V3 intact,  no obvious facial asymmetry, equal elevation of palate, scm/trap 5/5, tongue is midline on protrusion.. hearing is grossly intact.   Motor: Normal bulk, tone. JIANG, LLE slightly weaker but poor efffort  Sensation: Intact to light touch and pinprick throughout. no right/left confusion. no extinction to tactile on DSS.   Reflexes: 1+ throughout at biceps, brachioradialis, triceps, patellars and ankles bilaterally and equal. No clonus. R toe and L toe were both downgoing.  Coordination: No dysmetria on FNF   Gait: deferred      I personally reviewed the below data/images/labs:      LABS:    < from: CT Brain Stroke Protocol (12.17.23 @ 04:51) >    ACC: [id number] EXAM:  CT BRAIN STROKE PROTOCOL   ORDERED BY: MINA CONROY     PROCEDURE DATE:  12/17/2023          INTERPRETATION:  CLINICAL INFORMATION:  Stroke Code    TECHNIQUE:  Axial CT images were acquired through the head.  Intravenouscontrast: None  Two-dimensional reformats were generated.    COMPARISON STUDY: MRI brain 11/17/2023, CTA head and neck 11/17/2023.    FINDINGS:    There is no CT evidence of acute intracranial hemorrhage, mass effect,   midline shift, or acute, largeterritorial infarct.  The ventricles and   sulci are age-appropriate in size and configuration. The basal cisterns   are patent.    The mastoid air cells and middle ear cavities are grossly clear. The   visualized paranasal sinuses are well aerated.    The calvarium and skull base are grossly intact.    IMPRESSION:  No acute intracranial hemorrhage or mass effect.    Results were discussed with Dr. Conroy by Dr. Gastelum at 4:50 AM on   12/17/2023. with read back followed.    < end of copied text >        < from: CT Angio Neck Stroke Protocol w/ IV Cont (12.17.23 @ 05:12) >    ACC: [id number] EXAM:  CT ANGIO NECK STROKE PROTCL IC   ORDERED BY: MINA CONROY     ACC: [id number] EXAM:  CT BRAIN PERFUSION MAPS STROKE   ORDERED BY: MINA CONROY     ACC: [id number] EXAM:  CT ANGIO BRAIN STROKE PROTC IC   ORDERED BY: MINA CONROY     PROCEDURE DATE:  12/17/2023          INTERPRETATION:  CT PERFUSION, CTA OF THE [place] AND NECK:    INDICATIONS: Stroke code.    TECHNIQUE:    RAPID artificial intelligence was used for perfusion analysis and for   preliminary evaluation of intracranial hemorrhage.    CTA [place]:    After the intravenous power injection of non-ionic contrast material,   serial thin sections were obtained through the intracranial circulation   on a multislice CT scanner.  Images were reformatted using a dedicated 3D   software package and viewed on a dedicated workstation in multiple planes.    CTA NECK:    After the intravenous power injection of non-ionic contrast material,   serial thin sections were obtained through the cervical circulation on a   multislice CT scanner.  Images were reformatted using a dedicated 3D   software package and viewed on a dedicated workstation in multiple planes.      COMPARISON EXAMINATION: Noncontrast head CT performed the same day. CTA   head and neck 11/17/2023    FINDINGS:      CT RAPID PERFUSION:  Markedly degraded by patient motion.    INFARCT CORE: 0 cc    TISSUE AT RISK: 8 cc combined in the left frontal and right temporal lobes    MISMATCH RATIO: N/A      CTA [place]:    ANTERIOR CIRCULATION    ICA  CAVERNOUS, SUPRACLINOID, BIFURCATION SEGMENTS: Patent without flow   limiting stenosis. Atherosclerotic calcifications of the bilateral   cavernous ICA segments.    ANTERIOR CEREBRAL ARTERIES: Bilateral A1, anterior communicating and A2   anterior cerebral arteries are unremarkable in course and caliber without   flow limiting stenosis. Hypoplastic right A1 segment.    MIDDLE CEREBRAL ARTERIES: Patent bilateral M1, M2, and distal MCA   branches without flow limiting stenosis.    POSTERIOR CIRCULATION:    VERTEBRAL ARTERIES: Patent without flow limiting stenosis. Mild focal   narrowing of the right V4 segment.    BASILAR ARTERY: Patent no flow limiting stenosis.    POSTERIOR CEREBRAL ARTERIES: Patent without flow limiting stenosis.    CTA NECK:    GREAT VESSELS: Visualized segments are patent, no flow limiting stenosis.   Bovine aortic arch, normal variant.    COMMON CAROTID ARTERIES:  RIGHT: Patent without flow limiting stenosis  LEFT: Patent without flow limiting stenosis    CAROTID BULBS:  RIGHT: Patent without flow limiting stenosis  LEFT: Patent without flow limiting stenosis    INTERNAL CAROTID ARTERIES:  RIGHT: Patent no evidence for any hemodynamically significant stenosis at   the ICA origin by NASCET criteria. Stable mild ectasia of the proximal   cervical ICA measuring 0.9 cm in diameter. Partial retropharyngeal course.  LEFT: Patent no evidence for any hemodynamically significant stenosis at   the ICA origin by NASCET criteria. Stable fusiform aneurysmal dilatation   of the proximal cervical ICA measuring 1.4 cm in diameter and gradually   tapering in the mid segment. Partial retropharyngeal course.    VERTEBRAL ARTERIES:    RIGHT: Patent no evidence for any flow limiting stenosis.  LEFT: Patent no evidence for any flow limiting stenosis. Left dominant   vertebral system.      SOFT TISSUES: Patchy nonspecific groundglass opacities and consolidations   in the visualized upper lobes.    BONES: Multilevel degenerative changes inthe spine.    IMPRESSION:    CT PERFUSION: Markedly degraded by patient motion. No core infarct. Small   areas of abnormal perfusion in the left frontal and right temporal lobes   not confined to a vascular territory, likely artifactual.    If symptoms persist consider follow up head CT or MRI, MRA  if no   contraindication.    CTA COW:  Patent intracranial circulation without flow limiting stenosis.    CTA NECK: Patent, ECAs, ICAs, no  hemodynamically significant stenosis at    ICA origins by NASCET criteria. Stable fusiform aneurysmal dilatation of   the ICA origins/proximal segments.  Bilateral vertebral arteries are patent without flow limiting stenosis.    Patchy nonspecific groundglass and consolidations in the visualized upper   lobes.    --- End of Report ---      < end of copied text >    < from: MR Head No Cont (12.18.23 @ 12:52) >  IMPRESSION: No acute territorial infarcts are seen    < end of copied text >      EEG Classification / Summary:  Abnormal routine EEG in the awake state.  Mild diffuse slowing.  No epileptiform abnormalities.  Artifacts somewhat limit interpretation.    Clinical Impression:  Mild diffuse cerebral dysfunction is nonspecific in etiology.   Artifacts somewhat limit interpretation.

## 2023-12-25 NOTE — RAPID RESPONSE TEAM SUMMARY - NSADDTLFINDINGSRRT_GEN_ALL_CORE
T 99.3 /85 RR20 O2 sat 86% on 6L NC Blood sugar 100  Pt a+O x 4  pulm-+ dyspnea + tachypnea +accessory muscles in use + wheeze and tightness to BL lungs  extr- no edema

## 2023-12-25 NOTE — PROGRESS NOTE ADULT - ASSESSMENT
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 Of note pt recently developed URI symptoms and was started on Doxy as well as Hycodan, Alprazolam and Fioricet. Of note pt already on Percocet, flexeril for chronic pain, Gabapentin for seizures per  all which she took. Pt being admitted for Acute hypoxic respiratory failure due to Multifocal PNA, AMS r/o TIA/CVA  vs metabolic encephalopathy secondary to polypharmacy and/or hypoxia.      # Acute metabolic encephalopathy   Acute respiratory failure with multifocal pneumonia.   Acute stroke is ruled out. EEG did not show any epileptiform activity.   Intermittent low grade temp, Seen by ID  Will hold further iv zosyn , completed 5 days   Added Azithromycin, + mycoplasma   cont oxygen to keep sat > 94%.  SO2 in RA after ambulation 86   Mental status improved - patient AAO3   Cough meds, albuterol PRN  Albuterol PRN    # PE  - c/w Xarelto. no active gross bleeding.     # SLE stable.     # HTN/HLD. controlled with Verapemil, statin    # DM type 2. controlled.   - FS qAC and HS w/ SSI    #Weakness. PT eval>>WINSTON, pt unsure , will follow up     Stopped Elavil, no need for Elavil and Neurontin QHS.  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 Of note pt recently developed URI symptoms and was started on Doxy as well as Hycodan, Alprazolam and Fioricet. Of note pt already on Percocet, flexeril for chronic pain, Gabapentin for seizures per  all which she took. Pt being admitted for Acute hypoxic respiratory failure due to Multifocal PNA, AMS r/o TIA/CVA  vs metabolic encephalopathy secondary to polypharmacy and/or hypoxia.      # Acute metabolic encephalopathy   Acute respiratory failure with multifocal pneumonia.   Acute stroke is ruled out. EEG did not show any epileptiform activity.   Intermittent low grade temp, Seen by ID  Will hold further iv zosyn , completed 5 days   Added Azithromycin, + mycoplasma   cont oxygen to keep sat > 94%.  SO2 in RA after ambulation 86   Mental status improved - patient AAO3   Cough meds, albuterol PRN  Albuterol PRN    # PE  - c/w Xarelto. no active gross bleeding.     # SLE stable.     # HTN/HLD. controlled with Verapemil, statin    # DM type 2. controlled.   - FS qAC and HS w/ SSI    #Weakness. PT eval>>W[facility]ON, pt unsure , will follow up     Stopped Elavil, no need for Elavil and Neurontin QHS.

## 2023-12-25 NOTE — PROGRESS NOTE ADULT - ASSESSMENT
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 Being treated for URI outpatient. Hypoxic to 84% on RA,  CT head without acute pathology  CTA H/N - bilateral ICA calcifications with ectasia of proximal R ICA and L cerivcal ICA fusiform aneurysm 1.4cm, R V4 narrowing  CTP motion degraded  CXR showed Multifocal PNA  Routine eeg with diffuse slowing    Impression:  AMS likely TME - infectious, metabolic, possible polypharmacy. 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 Being treated for URI outpatient. Hypoxic to 84% on RA,  CT head without acute pathology  CTA H/N - bilateral ICA calcifications with ectasia of proximal R ICA and L cerivcal ICA fusiform aneurysm 1.4cm, R V4 narrowing  CTP motion degraded  CXR showed Multifocal PNA  Routine eeg with diffuse slowing    Impression:  AMS likely TME - infectious, metabolic, possible polypharmacy. Low suspicion for neurovascular etiology, seizure  SLE  PE on xarelto    Plan:  - continue xarelto for recent PE - should likely be on lifelong AC given hx of SLE  - EEG as above  - MRI no aucte findings  - would hold sedating meds to monitor mental status   - ?on gabapentin for seizures vs neuropathy,    Neurology  Office [phone]

## 2023-12-25 NOTE — RAPID RESPONSE TEAM SUMMARY - NSOTHERINTERVENTIONSRRT_GEN_ALL_CORE
cbc, cmp, mg, phos, procal, CRP  Discussed with Dr. Stover, if no improvement will CT chest and call pulm in am    Time spent 45 minutes

## 2023-12-25 NOTE — PROGRESS NOTE ADULT - SUBJECTIVE AND OBJECTIVE BOX
Patient is a 63y old  Female who presents with a chief complaint of AMS, HCAP, Acute Hypoxic respiratory failure (25 Dec 2023 12:43)      INTERVAL HPI/OVERNIGHT EVENTS:  Pt was seen and examined, no acute events.      MEDICATIONS  (STANDING):  aspirin enteric coated 81 milliGRAM(s) Oral daily  atorvastatin 80 milliGRAM(s) Oral at bedtime  azithromycin   Tablet 250 milliGRAM(s) Oral daily  celecoxib 200 milliGRAM(s) Oral daily  dextrose 5%. 1000 milliLiter(s) (50 mL/Hr) IV Continuous <Continuous>  dextrose 5%. 1000 milliLiter(s) (100 mL/Hr) IV Continuous <Continuous>  dextrose 50% Injectable 25 Gram(s) IV Push once  dextrose 50% Injectable 12.5 Gram(s) IV Push once  dextrose 50% Injectable 25 Gram(s) IV Push once  DULoxetine 60 milliGRAM(s) Oral daily  gabapentin 600 milliGRAM(s) Oral at bedtime  glucagon  Injectable 1 milliGRAM(s) IntraMuscular once  influenza   Vaccine 0.5 milliLiter(s) IntraMuscular once  insulin lispro (ADMELOG) corrective regimen sliding scale   SubCutaneous three times a day before meals  insulin lispro (ADMELOG) corrective regimen sliding scale   SubCutaneous at bedtime  montelukast 10 milliGRAM(s) Oral daily  rivaroxaban 20 milliGRAM(s) Oral with dinner  verapamil  milliGRAM(s) Oral daily    MEDICATIONS  (PRN):  acetaminophen     Tablet .. 650 milliGRAM(s) Oral every 6 hours PRN Temp greater or equal to 38C (100.4F), Mild Pain (1 - 3)  albuterol    90 MICROgram(s) HFA Inhaler 1 Puff(s) Inhalation every 6 hours PRN Bronchospasm  dextrose Oral Gel 15 Gram(s) Oral once PRN Blood Glucose LESS THAN 70 milliGRAM(s)/deciliter  guaifenesin/dextromethorphan Oral Liquid 10 milliLiter(s) Oral every 6 hours PRN Cough  hydrocodone/homatropine Syrup 5 milliLiter(s) Oral every 6 hours PRN Cough  melatonin 3 milliGRAM(s) Oral at bedtime PRN Insomnia      Allergies    No Known Allergies    Intolerances    dislikes Glucerna Shake (Other)        Vital Signs Last 24 Hrs  T(C): 36.9 (25 Dec 2023 12:46), Max: 38.2 (25 Dec 2023 06:48)  T(F): 98.4 (25 Dec 2023 12:46), Max: 100.7 (25 Dec 2023 06:48)  HR: 95 (25 Dec 2023 12:46) (84 - 127)  BP: 105/71 (25 Dec 2023 12:46) (105/71 - 157/84)  BP(mean): --  RR: 17 (25 Dec 2023 12:46) (17 - 20)  SpO2: 95% (25 Dec 2023 12:46) (94% - 97%)    Parameters below as of 25 Dec 2023 12:46  Patient On (Oxygen Delivery Method): nasal cannula  O2 Flow (L/min): 4      PHYSICAL EXAM:  General: patient in no acute distress  Head:  Atraumatic, Normocephalic  Eyes: EOMI, PERRLA, clear sclera  Neck: Supple, thyroid nontender, non enlarged  Cardio: S1/S2 +ve, regular rate and rhythm,  Resp: occasional rales  GI: abdomen soft, nontender, non distended, no guarding, BS +ve   Ext: no significant pedal edema  Neuro: AAOx3,   Skin: No rashes or lesions         LABS:        CAPILLARY BLOOD GLUCOSE      POCT Blood Glucose.: 112 mg/dL (25 Dec 2023 16:09)  POCT Blood Glucose.: 117 mg/dL (25 Dec 2023 11:25)  POCT Blood Glucose.: 110 mg/dL (25 Dec 2023 08:46)  POCT Blood Glucose.: 107 mg/dL (24 Dec 2023 21:21)      Culture - Blood (collected 22 Dec 2023 16:15)  Source: .Blood Blood-Peripheral  Preliminary Report (24 Dec 2023 22:01):    No growth at 48 Hours    Culture - Blood (collected 22 Dec 2023 16:00)  Source: .Blood Blood-Peripheral  Preliminary Report (24 Dec 2023 22:01):    No growth at 48 Hours      RADIOLOGY & ADDITIONAL TESTS:    Imaging Personally Reviewed:  [ ] YES  [ ] NO    Consultant(s) Notes Reviewed:  [ ] YES  [ ] NO    Care Discussed with Consultants/Other Providers [ ] YES  [ ] NO

## 2023-12-26 LAB
ALBUMIN SERPL ELPH-MCNC: 1.8 G/DL — LOW (ref 3.3–5)
ALBUMIN SERPL ELPH-MCNC: 1.8 G/DL — LOW (ref 3.3–5)
ALP SERPL-CCNC: 100 U/L — SIGNIFICANT CHANGE UP (ref 40–120)
ALP SERPL-CCNC: 100 U/L — SIGNIFICANT CHANGE UP (ref 40–120)
ALT FLD-CCNC: 17 U/L — SIGNIFICANT CHANGE UP (ref 12–78)
ALT FLD-CCNC: 17 U/L — SIGNIFICANT CHANGE UP (ref 12–78)
ANION GAP SERPL CALC-SCNC: 7 MMOL/L — SIGNIFICANT CHANGE UP (ref 5–17)
ANION GAP SERPL CALC-SCNC: 7 MMOL/L — SIGNIFICANT CHANGE UP (ref 5–17)
AST SERPL-CCNC: 23 U/L — SIGNIFICANT CHANGE UP (ref 15–37)
AST SERPL-CCNC: 23 U/L — SIGNIFICANT CHANGE UP (ref 15–37)
BILIRUB SERPL-MCNC: 0.3 MG/DL — SIGNIFICANT CHANGE UP (ref 0.2–1.2)
BILIRUB SERPL-MCNC: 0.3 MG/DL — SIGNIFICANT CHANGE UP (ref 0.2–1.2)
BUN SERPL-MCNC: 10 MG/DL — SIGNIFICANT CHANGE UP (ref 7–23)
BUN SERPL-MCNC: 10 MG/DL — SIGNIFICANT CHANGE UP (ref 7–23)
CALCIUM SERPL-MCNC: 8.8 MG/DL — SIGNIFICANT CHANGE UP (ref 8.5–10.1)
CALCIUM SERPL-MCNC: 8.8 MG/DL — SIGNIFICANT CHANGE UP (ref 8.5–10.1)
CHLORIDE SERPL-SCNC: 103 MMOL/L — SIGNIFICANT CHANGE UP (ref 96–108)
CHLORIDE SERPL-SCNC: 103 MMOL/L — SIGNIFICANT CHANGE UP (ref 96–108)
CO2 SERPL-SCNC: 27 MMOL/L — SIGNIFICANT CHANGE UP (ref 22–31)
CO2 SERPL-SCNC: 27 MMOL/L — SIGNIFICANT CHANGE UP (ref 22–31)
CREAT SERPL-MCNC: 0.57 MG/DL — SIGNIFICANT CHANGE UP (ref 0.5–1.3)
CREAT SERPL-MCNC: 0.57 MG/DL — SIGNIFICANT CHANGE UP (ref 0.5–1.3)
CRP SERPL-MCNC: 159 MG/L — HIGH
CRP SERPL-MCNC: 159 MG/L — HIGH
EGFR: 102 ML/MIN/1.73M2 — SIGNIFICANT CHANGE UP
EGFR: 102 ML/MIN/1.73M2 — SIGNIFICANT CHANGE UP
GLUCOSE BLDC GLUCOMTR-MCNC: 166 MG/DL — HIGH (ref 70–99)
GLUCOSE BLDC GLUCOMTR-MCNC: 166 MG/DL — HIGH (ref 70–99)
GLUCOSE BLDC GLUCOMTR-MCNC: 174 MG/DL — HIGH (ref 70–99)
GLUCOSE BLDC GLUCOMTR-MCNC: 174 MG/DL — HIGH (ref 70–99)
GLUCOSE BLDC GLUCOMTR-MCNC: 179 MG/DL — HIGH (ref 70–99)
GLUCOSE BLDC GLUCOMTR-MCNC: 179 MG/DL — HIGH (ref 70–99)
GLUCOSE BLDC GLUCOMTR-MCNC: 186 MG/DL — HIGH (ref 70–99)
GLUCOSE BLDC GLUCOMTR-MCNC: 186 MG/DL — HIGH (ref 70–99)
GLUCOSE BLDC GLUCOMTR-MCNC: 197 MG/DL — HIGH (ref 70–99)
GLUCOSE BLDC GLUCOMTR-MCNC: 197 MG/DL — HIGH (ref 70–99)
GLUCOSE SERPL-MCNC: 200 MG/DL — HIGH (ref 70–99)
GLUCOSE SERPL-MCNC: 200 MG/DL — HIGH (ref 70–99)
HCT VFR BLD CALC: 25.3 % — LOW (ref 34.5–45)
HCT VFR BLD CALC: 25.3 % — LOW (ref 34.5–45)
HGB BLD-MCNC: 8.2 G/DL — LOW (ref 11.5–15.5)
HGB BLD-MCNC: 8.2 G/DL — LOW (ref 11.5–15.5)
MCHC RBC-ENTMCNC: 26.4 PG — LOW (ref 27–34)
MCHC RBC-ENTMCNC: 26.4 PG — LOW (ref 27–34)
MCHC RBC-ENTMCNC: 32.4 G/DL — SIGNIFICANT CHANGE UP (ref 32–36)
MCHC RBC-ENTMCNC: 32.4 G/DL — SIGNIFICANT CHANGE UP (ref 32–36)
MCV RBC AUTO: 81.4 FL — SIGNIFICANT CHANGE UP (ref 80–100)
MCV RBC AUTO: 81.4 FL — SIGNIFICANT CHANGE UP (ref 80–100)
NRBC # BLD: 0 /100 WBCS — SIGNIFICANT CHANGE UP (ref 0–0)
NRBC # BLD: 0 /100 WBCS — SIGNIFICANT CHANGE UP (ref 0–0)
PLATELET # BLD AUTO: 501 K/UL — HIGH (ref 150–400)
PLATELET # BLD AUTO: 501 K/UL — HIGH (ref 150–400)
POTASSIUM SERPL-MCNC: 4 MMOL/L — SIGNIFICANT CHANGE UP (ref 3.5–5.3)
POTASSIUM SERPL-MCNC: 4 MMOL/L — SIGNIFICANT CHANGE UP (ref 3.5–5.3)
POTASSIUM SERPL-SCNC: 4 MMOL/L — SIGNIFICANT CHANGE UP (ref 3.5–5.3)
POTASSIUM SERPL-SCNC: 4 MMOL/L — SIGNIFICANT CHANGE UP (ref 3.5–5.3)
PROCALCITONIN SERPL-MCNC: 0.24 NG/ML — HIGH (ref 0.02–0.1)
PROCALCITONIN SERPL-MCNC: 0.24 NG/ML — HIGH (ref 0.02–0.1)
PROT SERPL-MCNC: 6.7 GM/DL — SIGNIFICANT CHANGE UP (ref 6–8.3)
PROT SERPL-MCNC: 6.7 GM/DL — SIGNIFICANT CHANGE UP (ref 6–8.3)
RBC # BLD: 3.11 M/UL — LOW (ref 3.8–5.2)
RBC # BLD: 3.11 M/UL — LOW (ref 3.8–5.2)
RBC # FLD: 16.7 % — HIGH (ref 10.3–14.5)
RBC # FLD: 16.7 % — HIGH (ref 10.3–14.5)
SODIUM SERPL-SCNC: 137 MMOL/L — SIGNIFICANT CHANGE UP (ref 135–145)
SODIUM SERPL-SCNC: 137 MMOL/L — SIGNIFICANT CHANGE UP (ref 135–145)
WBC # BLD: 11.54 K/UL — HIGH (ref 3.8–10.5)
WBC # BLD: 11.54 K/UL — HIGH (ref 3.8–10.5)
WBC # FLD AUTO: 11.54 K/UL — HIGH (ref 3.8–10.5)
WBC # FLD AUTO: 11.54 K/UL — HIGH (ref 3.8–10.5)

## 2023-12-26 PROCEDURE — 99232 SBSQ HOSP IP/OBS MODERATE 35: CPT

## 2023-12-26 RX ORDER — AZITHROMYCIN 500 MG/1
250 TABLET, FILM COATED ORAL DAILY
Refills: 0 | Status: COMPLETED | OUTPATIENT
Start: 2023-12-27 | End: 2023-12-28

## 2023-12-26 RX ADMIN — Medication 40 MILLIGRAM(S): at 06:44

## 2023-12-26 RX ADMIN — Medication 240 MILLIGRAM(S): at 06:43

## 2023-12-26 RX ADMIN — Medication 1: at 08:03

## 2023-12-26 RX ADMIN — MONTELUKAST 10 MILLIGRAM(S): 4 TABLET, CHEWABLE ORAL at 11:24

## 2023-12-26 RX ADMIN — CELECOXIB 200 MILLIGRAM(S): 200 CAPSULE ORAL at 12:13

## 2023-12-26 RX ADMIN — Medication 1: at 11:30

## 2023-12-26 RX ADMIN — AZITHROMYCIN 250 MILLIGRAM(S): 500 TABLET, FILM COATED ORAL at 11:24

## 2023-12-26 RX ADMIN — DULOXETINE HYDROCHLORIDE 60 MILLIGRAM(S): 30 CAPSULE, DELAYED RELEASE ORAL at 11:24

## 2023-12-26 RX ADMIN — CELECOXIB 200 MILLIGRAM(S): 200 CAPSULE ORAL at 11:24

## 2023-12-26 RX ADMIN — Medication 3 MILLILITER(S): at 06:40

## 2023-12-26 RX ADMIN — ATORVASTATIN CALCIUM 80 MILLIGRAM(S): 80 TABLET, FILM COATED ORAL at 00:10

## 2023-12-26 RX ADMIN — GABAPENTIN 600 MILLIGRAM(S): 400 CAPSULE ORAL at 22:00

## 2023-12-26 RX ADMIN — Medication 3 MILLILITER(S): at 17:01

## 2023-12-26 RX ADMIN — Medication 3 MILLILITER(S): at 00:52

## 2023-12-26 RX ADMIN — Medication 3 MILLILITER(S): at 11:13

## 2023-12-26 RX ADMIN — Medication 3 MILLIGRAM(S): at 01:19

## 2023-12-26 RX ADMIN — Medication 3 MILLILITER(S): at 23:36

## 2023-12-26 RX ADMIN — Medication 40 MILLIGRAM(S): at 18:22

## 2023-12-26 RX ADMIN — Medication 81 MILLIGRAM(S): at 11:24

## 2023-12-26 RX ADMIN — Medication 1: at 17:05

## 2023-12-26 RX ADMIN — ATORVASTATIN CALCIUM 80 MILLIGRAM(S): 80 TABLET, FILM COATED ORAL at 22:01

## 2023-12-26 RX ADMIN — GABAPENTIN 600 MILLIGRAM(S): 400 CAPSULE ORAL at 00:09

## 2023-12-26 RX ADMIN — RIVAROXABAN 20 MILLIGRAM(S): KIT at 18:22

## 2023-12-26 NOTE — PROGRESS NOTE ADULT - NS ATTEND AMEND GEN_ALL_CORE FT
I have reviewed all pertinent clinical information and agree with the NP's note.   s/p RRT yesterday for hypoxemia, required Nonrebreather mask, then Bipap, now on 10L NC  RVP negativesent yesterday 12/25    MRSA PCR negative   Legionella ag - negative  Strep pneumo ag - negative  Mycoplasma IGM positive, already treated for 5 days which is typically enough   Her CTA neck saw GGO CXR showed some clearing though now has worsening respiratory status   could be fluids vs her underlying lung disease not responding to treatment     plan-  would monitor status and attempt to wean amount of oxygen with aggressive pulmonary toilet  if febrile again or worsening respiratory status, please start meropenem 1g q8hrs   Azithromycin  two more days   check sputum cx - new sputum is pending collection   completed course of zosyn since 12/17  follow all culture data   wean off supplemental O2 as able, pt has O2 at home, has not been using it for a long time, as per pt  cough suppression    Akbar Eldridge, DO  Infectious Disease Attending  Reachable via [employer] Teams or ID office: [phone]  After 5pm/weekends please call [phone] for all inquiries and new consults I have reviewed all pertinent clinical information and agree with the NP's note.   s/p RRT yesterday for hypoxemia, required Nonrebreather mask, then Bipap, now on 10L NC  RVP negativesent yesterday 12/25    MRSA PCR negative   Legionella ag - negative  Strep pneumo ag - negative  Mycoplasma IGM positive, already treated for 5 days which is typically enough   Her CTA neck saw GGO CXR showed some clearing though now has worsening respiratory status   could be fluids vs her underlying lung disease not responding to treatment     plan-  would monitor status and attempt to wean amount of oxygen with aggressive pulmonary toilet  if febrile again or worsening respiratory status, please start meropenem 1g q8hrs   Azithromycin  two more days   check sputum cx - new sputum is pending collection   completed course of zosyn since 12/17  follow all culture data   wean off supplemental O2 as able, pt has O2 at home, has not been using it for a long time, as per pt  cough suppression    Akbar Eldridge, DO  Infectious Disease Attending  Reachable via [employer] Teams or ID office: [phone]  After 5pm/weekends please call [phone] for all inquiries and new consults

## 2023-12-26 NOTE — PROGRESS NOTE ADULT - ASSESSMENT
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     multifocal pneumonia on CXR on admission  low grade temp yesterday  s/p RRT yesterday for hypoxemia, required Nonrebreather mask, then Bipap, now on 10L NC  Leukocytosis better 11.54 - on steroids   RVP negative x 4 on this admission   MRSA PCR negative   Legionella ag - negative  Strep pneumo ag - negative  Mycoplasma IGM positive     plan-  completed 5 days course of Azithromycin po for the treatment of Mycoplasma pna  Azithromycin resumed by medicine for two more days   check sputum cx - cancelled by lab (oropharyngeal contamination), new sputum is pending collection   completed course of zosyn since 12/17 ( 5 days).  follow all culture data   wean off supplemental O2 as able, pt has O2 at home, has not been using it for a long time, as per pt  cough suppression    discussed with Dr. Eldridge

## 2023-12-26 NOTE — PROGRESS NOTE ADULT - ASSESSMENT
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 Of note pt recently developed URI symptoms and was started on Doxy as well as Hycodan, Alprazolam and Fioricet. Of note pt already on Percocet, flexeril for chronic pain, Gabapentin for seizures per  all which she took. Pt being admitted for Acute hypoxic respiratory failure due to Multifocal PNA, AMS r/o TIA/CVA  vs metabolic encephalopathy secondary to polypharmacy and/or hypoxia.      # Acute metabolic encephalopathy   # Acute respiratory failure with multifocal pneumonia.   Acute stroke is ruled out. EEG did not show any epileptiform activity.   Intermittent low grade temp, Seen by ID  Will hold further iv zosyn , completed 5 days   Added Azithromycin, + mycoplasma   cont oxygen to keep sat > 94%.  SO2 in RA after ambulation 86   Repeat CT with multifocal PNA  Mental status improved - patient AAO3   Cough meds, albuterol PRN  Albuterol PRN  ID follow up , pulm consulted     # PE  - c/w Xarelto. no active gross bleeding.     # SLE stable.     # HTN/HLD. controlled with Verapemil, statin    # DM type 2. controlled.   - FS qAC and HS w/ SSI    #Weakness. PT eval>>[facility], pt unsure , will follow up     Stopped Elavil, no need for Elavil and Neurontin QHS.

## 2023-12-26 NOTE — PROGRESS NOTE ADULT - SUBJECTIVE AND OBJECTIVE BOX
ROSSI ROJAS  MRN-[id number]    Follow Up:  Mycoplasma PNA    Interval History: the pt was seen and examined earlier, not in acute distress, on telemetry unit now. Pt had a low grade temp yesterday, NC, still has cough but with improvement. Pt complains of being tired.     PAST MEDICAL & SURGICAL HISTORY:  Lupus      Diabetes      Hypertension      Asthma      Peripheral polyneuropathy      Pulmonary emboli  7/2019      History of hip replacement  left      H/O total hysterectomy      History of bilateral tubal ligation      S/P laminectomy with spinal fusion      History of shoulder surgery      H/O knee surgery          ROS:    [ ] Unobtainable because:  [x ] All other systems negative    Constitutional: no fever, no chills  Head: no trauma  Eyes: no vision changes, no eye pain  ENT:  no sore throat, no rhinorrhea  Cardiovascular:  no chest pain, no palpitation  Respiratory:  had SOB prior, ok on supplemental O2, + cough  GI:  no abd pain, no vomiting, no diarrhea  urinary: no dysuria, no hematuria, no flank pain  musculoskeletal:  no joint pain, no joint swelling  skin:  no rash  neurology:  no headache, no seizure, no change in mental status  psych: no anxiety, no depression         Allergies  No Known Allergies        ANTIMICROBIALS:      OTHER MEDS:  acetaminophen     Tablet .. 650 milliGRAM(s) Oral every 6 hours PRN  albuterol/ipratropium for Nebulization 3 milliLiter(s) Nebulizer every 6 hours  aspirin enteric coated 81 milliGRAM(s) Oral daily  atorvastatin 80 milliGRAM(s) Oral at bedtime  celecoxib 200 milliGRAM(s) Oral daily  dextrose 5%. 1000 milliLiter(s) IV Continuous <Continuous>  dextrose 5%. 1000 milliLiter(s) IV Continuous <Continuous>  dextrose 50% Injectable 25 Gram(s) IV Push once  dextrose 50% Injectable 25 Gram(s) IV Push once  dextrose 50% Injectable 12.5 Gram(s) IV Push once  dextrose Oral Gel 15 Gram(s) Oral once PRN  DULoxetine 60 milliGRAM(s) Oral daily  gabapentin 600 milliGRAM(s) Oral at bedtime  glucagon  Injectable 1 milliGRAM(s) IntraMuscular once  guaifenesin/dextromethorphan Oral Liquid 10 milliLiter(s) Oral every 6 hours PRN  hydrocodone/homatropine Syrup 5 milliLiter(s) Oral every 6 hours  influenza   Vaccine 0.5 milliLiter(s) IntraMuscular once  insulin lispro (ADMELOG) corrective regimen sliding scale   SubCutaneous three times a day before meals  insulin lispro (ADMELOG) corrective regimen sliding scale   SubCutaneous at bedtime  melatonin 3 milliGRAM(s) Oral at bedtime PRN  methylPREDNISolone sodium succinate Injectable 40 milliGRAM(s) IV Push every 12 hours  montelukast 10 milliGRAM(s) Oral daily  rivaroxaban 20 milliGRAM(s) Oral with dinner  verapamil  milliGRAM(s) Oral daily      Vital Signs Last 24 Hrs  T(C): 36.3 (26 Dec 2023 16:21), Max: 37.6 (25 Dec 2023 19:00)  T(F): 97.4 (26 Dec 2023 16:21), Max: 99.6 (25 Dec 2023 19:00)  HR: 83 (26 Dec 2023 17:08) (83 - 108)  BP: 117/70 (26 Dec 2023 16:21) (106/78 - 135/83)  BP(mean): --  RR: 18 (26 Dec 2023 16:21) (18 - 19)  SpO2: 97% (26 Dec 2023 17:08) (93% - 98%)    Parameters below as of 26 Dec 2023 17:08  Patient On (Oxygen Delivery Method): nasal cannula        Physical Exam:  General:    NAD, non toxic, NC 10L  Head: atraumatic, normocephalic  Eyes: normal sclera and conjunctiva  ENT:   no oropharyngeal lesions, no LAD, neck supple  Cardio:    regular S1,S2, no murmur  Respiratory:   mild crackles bilaterally, no wheezes, no rhonchi   abd:   soft, BS +, not tender, no distention, obese   : no CVAT, no penny   Musculoskeletal : no joint swelling, no edema  Skin:    no rash  Neurologic:  answers questions appropriately, follows commands,  no focal deficits  psych: calm behavior     WBC Count: 11.54 K/uL (12-26 @ 05:15)  WBC Count: 12.42 K/uL (12-25 @ 18:20)  WBC Count: 7.44 K/uL (12-22 @ 07:09)  WBC Count: 6.02 K/uL (12-20 @ 07:01)                            8.2    11.54 )-----------( 501      ( 26 Dec 2023 05:15 )             25.3       12-26    137  |  103  |  10  ----------------------------<  200<H>  4.0   |  27  |  0.57    Ca    8.8      26 Dec 2023 05:15  Phos  2.8     12-25  Mg     1.7     12-25    TPro  6.7  /  Alb  1.8<L>  /  TBili  0.3  /  DBili  x   /  AST  23  /  ALT  17  /  AlkPhos  100  12-26      Urinalysis Basic - ( 26 Dec 2023 05:15 )    Color: x / Appearance: x / SG: x / pH: x  Gluc: 200 mg/dL / Ketone: x  / Bili: x / Urobili: x   Blood: x / Protein: x / Nitrite: x   Leuk Esterase: x / RBC: x / WBC x   Sq Epi: x / Non Sq Epi: x / Bacteria: x        Creatinine Trend: 0.57<--, 0.68<--, 0.65<--, 0.68<--, 0.86<--, 1.00<--      MICROBIOLOGY:  v  .Blood Blood-Peripheral  12-22-23   No growth at 72 Hours  --  --      .Blood Blood-Peripheral  12-22-23   No growth at 72 Hours  --  --          Rapid RVP Result: NotDetec (12-25 @ 10:15)  Rapid RVP Result: NotDetec (12-21 @ 16:30)        C-Reactive Protein, Serum: 159 (12-25)          Procalcitonin, Serum: 0.24 (12-25-23 @ 18:20)  Procalcitonin, Serum: 0.09 (12-20-23 @ 07:01)    SARS-CoV-2: NotDetec (12-25-23 @ 10:15)  Rapid RVP Result: NotDetec (12-25-23 @ 10:15)    SARS-CoV-2: NotDetec (25 Dec 2023 10:15)  SARS-CoV-2: NotDetec (21 Dec 2023 16:30)  SARS-CoV-2: NotDetec (17 Dec 2023 05:51)  SARS-CoV-2: NotDetec (17 Nov 2023 05:46)    RADIOLOGY:    < from: CT Chest No Cont (12.25.23 @ 22:20) >    ACC: [id number] EXAM:  CT CHEST   ORDERED BY: CADENCE WOODS     PROCEDURE DATE:  12/25/2023          INTERPRETATION:  HISTORY: Asthma. Recommend plasma pneumonia.    EXAMINATION: CT CHEST was performed without IV contrast.    COMPARISON: 7/12/2019.    FINDINGS:    AIRWAYS, LUNGS, PLEURA: Multifocal ground-glass and consolidative   opacities involving all lung lobes, but most severe in the right lower   lobe. Trace right pleural effusion. Clear central airways.    MEDIASTINUM: Normal heart size. No pericardial effusion. Thoracic aorta   normal caliber.  No large mediastinal lymph nodes.    IMAGED ABDOMEN: Unremarkable.    SOFT TISSUES: Unremarkable.    BONES: Unremarkable.      IMPRESSION:.    Multifocal pneumonia.    CT chest follow-up in 3 months recommended to ensure clearing.    --- End of Report ---            TESRING JOHANSEN MD; Attending Radiologist  This document has been electronically signed. Dec 26 2023  6:34AM    < end of copied text >     ROSSI ROJAS  MRN-[id number]    Follow Up:  Mycoplasma PNA    Interval History: the pt was seen and examined earlier, not in acute distress, on telemetry unit now. Pt had a low grade temp yesterday, NC, still has cough but with improvement. Pt complains of being tired.     PAST MEDICAL & SURGICAL HISTORY:  Lupus      Diabetes      Hypertension      Asthma      Peripheral polyneuropathy      Pulmonary emboli  7/2019      History of hip replacement  left      H/O total hysterectomy      History of bilateral tubal ligation      S/P laminectomy with spinal fusion      History of shoulder surgery      H/O knee surgery          ROS:    [ ] Unobtainable because:  [x ] All other systems negative    Constitutional: no fever, no chills  Head: no trauma  Eyes: no vision changes, no eye pain  ENT:  no sore throat, no rhinorrhea  Cardiovascular:  no chest pain, no palpitation  Respiratory:  had SOB prior, ok on supplemental O2, + cough  GI:  no abd pain, no vomiting, no diarrhea  urinary: no dysuria, no hematuria, no flank pain  musculoskeletal:  no joint pain, no joint swelling  skin:  no rash  neurology:  no headache, no seizure, no change in mental status  psych: no anxiety, no depression         Allergies  No Known Allergies        ANTIMICROBIALS:      OTHER MEDS:  acetaminophen     Tablet .. 650 milliGRAM(s) Oral every 6 hours PRN  albuterol/ipratropium for Nebulization 3 milliLiter(s) Nebulizer every 6 hours  aspirin enteric coated 81 milliGRAM(s) Oral daily  atorvastatin 80 milliGRAM(s) Oral at bedtime  celecoxib 200 milliGRAM(s) Oral daily  dextrose 5%. 1000 milliLiter(s) IV Continuous <Continuous>  dextrose 5%. 1000 milliLiter(s) IV Continuous <Continuous>  dextrose 50% Injectable 25 Gram(s) IV Push once  dextrose 50% Injectable 25 Gram(s) IV Push once  dextrose 50% Injectable 12.5 Gram(s) IV Push once  dextrose Oral Gel 15 Gram(s) Oral once PRN  DULoxetine 60 milliGRAM(s) Oral daily  gabapentin 600 milliGRAM(s) Oral at bedtime  glucagon  Injectable 1 milliGRAM(s) IntraMuscular once  guaifenesin/dextromethorphan Oral Liquid 10 milliLiter(s) Oral every 6 hours PRN  hydrocodone/homatropine Syrup 5 milliLiter(s) Oral every 6 hours  influenza   Vaccine 0.5 milliLiter(s) IntraMuscular once  insulin lispro (ADMELOG) corrective regimen sliding scale   SubCutaneous three times a day before meals  insulin lispro (ADMELOG) corrective regimen sliding scale   SubCutaneous at bedtime  melatonin 3 milliGRAM(s) Oral at bedtime PRN  methylPREDNISolone sodium succinate Injectable 40 milliGRAM(s) IV Push every 12 hours  montelukast 10 milliGRAM(s) Oral daily  rivaroxaban 20 milliGRAM(s) Oral with dinner  verapamil  milliGRAM(s) Oral daily      Vital Signs Last 24 Hrs  T(C): 36.3 (26 Dec 2023 16:21), Max: 37.6 (25 Dec 2023 19:00)  T(F): 97.4 (26 Dec 2023 16:21), Max: 99.6 (25 Dec 2023 19:00)  HR: 83 (26 Dec 2023 17:08) (83 - 108)  BP: 117/70 (26 Dec 2023 16:21) (106/78 - 135/83)  BP(mean): --  RR: 18 (26 Dec 2023 16:21) (18 - 19)  SpO2: 97% (26 Dec 2023 17:08) (93% - 98%)    Parameters below as of 26 Dec 2023 17:08  Patient On (Oxygen Delivery Method): nasal cannula        Physical Exam:  General:    NAD, non toxic, NC 10L  Head: atraumatic, normocephalic  Eyes: normal sclera and conjunctiva  ENT:   no oropharyngeal lesions, no LAD, neck supple  Cardio:    regular S1,S2, no murmur  Respiratory:   mild crackles bilaterally, no wheezes, no rhonchi   abd:   soft, BS +, not tender, no distention, obese   : no CVAT, no penny   Musculoskeletal : no joint swelling, no edema  Skin:    no rash  Neurologic:  answers questions appropriately, follows commands,  no focal deficits  psych: calm behavior     WBC Count: 11.54 K/uL (12-26 @ 05:15)  WBC Count: 12.42 K/uL (12-25 @ 18:20)  WBC Count: 7.44 K/uL (12-22 @ 07:09)  WBC Count: 6.02 K/uL (12-20 @ 07:01)                            8.2    11.54 )-----------( 501      ( 26 Dec 2023 05:15 )             25.3       12-26    137  |  103  |  10  ----------------------------<  200<H>  4.0   |  27  |  0.57    Ca    8.8      26 Dec 2023 05:15  Phos  2.8     12-25  Mg     1.7     12-25    TPro  6.7  /  Alb  1.8<L>  /  TBili  0.3  /  DBili  x   /  AST  23  /  ALT  17  /  AlkPhos  100  12-26      Urinalysis Basic - ( 26 Dec 2023 05:15 )    Color: x / Appearance: x / SG: x / pH: x  Gluc: 200 mg/dL / Ketone: x  / Bili: x / Urobili: x   Blood: x / Protein: x / Nitrite: x   Leuk Esterase: x / RBC: x / WBC x   Sq Epi: x / Non Sq Epi: x / Bacteria: x        Creatinine Trend: 0.57<--, 0.68<--, 0.65<--, 0.68<--, 0.86<--, 1.00<--      MICROBIOLOGY:  v  .Blood Blood-Peripheral  12-22-23   No growth at 72 Hours  --  --      .Blood Blood-Peripheral  12-22-23   No growth at 72 Hours  --  --          Rapid RVP Result: NotDetec (12-25 @ 10:15)  Rapid RVP Result: NotDetec (12-21 @ 16:30)        C-Reactive Protein, Serum: 159 (12-25)          Procalcitonin, Serum: 0.24 (12-25-23 @ 18:20)  Procalcitonin, Serum: 0.09 (12-20-23 @ 07:01)    SARS-CoV-2: NotDetec (12-25-23 @ 10:15)  Rapid RVP Result: NotDetec (12-25-23 @ 10:15)    SARS-CoV-2: NotDetec (25 Dec 2023 10:15)  SARS-CoV-2: NotDetec (21 Dec 2023 16:30)  SARS-CoV-2: NotDetec (17 Dec 2023 05:51)  SARS-CoV-2: NotDetec (17 Nov 2023 05:46)    RADIOLOGY:    < from: CT Chest No Cont (12.25.23 @ 22:20) >    ACC: [id number] EXAM:  CT CHEST   ORDERED BY: CADENCE WOODS     PROCEDURE DATE:  12/25/2023          INTERPRETATION:  HISTORY: Asthma. Recommend plasma pneumonia.    EXAMINATION: CT CHEST was performed without IV contrast.    COMPARISON: 7/12/2019.    FINDINGS:    AIRWAYS, LUNGS, PLEURA: Multifocal ground-glass and consolidative   opacities involving all lung lobes, but most severe in the right lower   lobe. Trace right pleural effusion. Clear central airways.    MEDIASTINUM: Normal heart size. No pericardial effusion. Thoracic aorta   normal caliber.  No large mediastinal lymph nodes.    IMAGED ABDOMEN: Unremarkable.    SOFT TISSUES: Unremarkable.    BONES: Unremarkable.      IMPRESSION:.    Multifocal pneumonia.    CT chest follow-up in 3 months recommended to ensure clearing.    --- End of Report ---            TSERING JOHANSEN MD; Attending Radiologist  This document has been electronically signed. Dec 26 2023  6:34AM    < end of copied text >     ROSSI ROJAS  MRN-[id number]    Follow Up:  Mycoplasma PNA    Interval History: the pt was seen and examined earlier, not in acute distress, on telemetry unit now. Pt had a low grade temp yesterday, NC, still has cough but with improvement. Pt complains of being tired.     PAST MEDICAL & SURGICAL HISTORY:  Lupus      Diabetes      Hypertension      Asthma      Peripheral polyneuropathy      Pulmonary emboli  7/2019      History of hip replacement  left      H/O total hysterectomy      History of bilateral tubal ligation      S/P laminectomy with spinal fusion      History of shoulder surgery      H/O knee surgery          ROS:    [ ] Unobtainable because:  [x ] All other systems negative    Constitutional: no fever, no chills  Head: no trauma  Eyes: no vision changes, no eye pain  ENT:  no sore throat, no rhinorrhea  Cardiovascular:  no chest pain, no palpitation  Respiratory:  had SOB prior, ok on supplemental O2, + cough  GI:  no abd pain, no vomiting, no diarrhea  urinary: no dysuria, no hematuria, no flank pain  musculoskeletal:  no joint pain, no joint swelling  skin:  no rash  neurology:  no headache, no seizure, no change in mental status  psych: no anxiety, no depression         Allergies  No Known Allergies        ANTIMICROBIALS:      OTHER MEDS:  acetaminophen     Tablet .. 650 milliGRAM(s) Oral every 6 hours PRN  albuterol/ipratropium for Nebulization 3 milliLiter(s) Nebulizer every 6 hours  aspirin enteric coated 81 milliGRAM(s) Oral daily  atorvastatin 80 milliGRAM(s) Oral at bedtime  celecoxib 200 milliGRAM(s) Oral daily  dextrose 5%. 1000 milliLiter(s) IV Continuous <Continuous>  dextrose 5%. 1000 milliLiter(s) IV Continuous <Continuous>  dextrose 50% Injectable 25 Gram(s) IV Push once  dextrose 50% Injectable 25 Gram(s) IV Push once  dextrose 50% Injectable 12.5 Gram(s) IV Push once  dextrose Oral Gel 15 Gram(s) Oral once PRN  DULoxetine 60 milliGRAM(s) Oral daily  gabapentin 600 milliGRAM(s) Oral at bedtime  glucagon  Injectable 1 milliGRAM(s) IntraMuscular once  guaifenesin/dextromethorphan Oral Liquid 10 milliLiter(s) Oral every 6 hours PRN  hydrocodone/homatropine Syrup 5 milliLiter(s) Oral every 6 hours  influenza   Vaccine 0.5 milliLiter(s) IntraMuscular once  insulin lispro (ADMELOG) corrective regimen sliding scale   SubCutaneous three times a day before meals  insulin lispro (ADMELOG) corrective regimen sliding scale   SubCutaneous at bedtime  melatonin 3 milliGRAM(s) Oral at bedtime PRN  methylPREDNISolone sodium succinate Injectable 40 milliGRAM(s) IV Push every 12 hours  montelukast 10 milliGRAM(s) Oral daily  rivaroxaban 20 milliGRAM(s) Oral with dinner  verapamil  milliGRAM(s) Oral daily      Vital Signs Last 24 Hrs  T(C): 36.3 (26 Dec 2023 16:21), Max: 37.6 (25 Dec 2023 19:00)  T(F): 97.4 (26 Dec 2023 16:21), Max: 99.6 (25 Dec 2023 19:00)  HR: 83 (26 Dec 2023 17:08) (83 - 108)  BP: 117/70 (26 Dec 2023 16:21) (106/78 - 135/83)  BP(mean): --  RR: 18 (26 Dec 2023 16:21) (18 - 19)  SpO2: 97% (26 Dec 2023 17:08) (93% - 98%)    Parameters below as of 26 Dec 2023 17:08  Patient On (Oxygen Delivery Method): nasal cannula        Physical Exam:  General:    NAD, non toxic, NC 10L  Head: atraumatic, normocephalic  Eyes: normal sclera and conjunctiva  ENT:   no oropharyngeal lesions, no LAD, neck supple  Cardio:    regular S1,S2, no murmur  Respiratory:   mild crackles bilaterally, no wheezes, no rhonchi   abd:   soft, BS +, not tender, no distention, obese   : no CVAT, no penny   Musculoskeletal : no joint swelling, no edema  Skin:    no rash  Neurologic:  answers questions appropriately, follows commands,  no focal deficits  psych: calm behavior     WBC Count: 11.54 K/uL (12-26 @ 05:15)  WBC Count: 12.42 K/uL (12-25 @ 18:20)  WBC Count: 7.44 K/uL (12-22 @ 07:09)  WBC Count: 6.02 K/uL (12-20 @ 07:01)                            8.2    11.54 )-----------( 501      ( 26 Dec 2023 05:15 )             25.3       12-26    137  |  103  |  10  ----------------------------<  200<H>  4.0   |  27  |  0.57    Ca    8.8      26 Dec 2023 05:15  Phos  2.8     12-25  Mg     1.7     12-25    TPro  6.7  /  Alb  1.8<L>  /  TBili  0.3  /  DBili  x   /  AST  23  /  ALT  17  /  AlkPhos  100  12-26      Urinalysis Basic - ( 26 Dec 2023 05:15 )    Color: x / Appearance: x / SG: x / pH: x  Gluc: 200 mg/dL / Ketone: x  / Bili: x / Urobili: x   Blood: x / Protein: x / Nitrite: x   Leuk Esterase: x / RBC: x / WBC x   Sq Epi: x / Non Sq Epi: x / Bacteria: x        Creatinine Trend: 0.57<--, 0.68<--, 0.65<--, 0.68<--, 0.86<--, 1.00<--      MICROBIOLOGY:  v  .Blood Blood-Peripheral  12-22-23   No growth at 72 Hours  --  --      .Blood Blood-Peripheral  12-22-23   No growth at 72 Hours  --  --          Rapid RVP Result: NotDetec (12-25 @ 10:15)  Rapid RVP Result: NotDetec (12-21 @ 16:30)        C-Reactive Protein, Serum: 159 (12-25)    Procalcitonin, Serum: 0.24 (12-25-23 @ 18:20)  Procalcitonin, Serum: 0.09 (12-20-23 @ 07:01)    SARS-CoV-2: NotDetec (12-25-23 @ 10:15)  Rapid RVP Result: NotDetec (12-25-23 @ 10:15)    SARS-CoV-2: NotDetec (25 Dec 2023 10:15)  SARS-CoV-2: NotDetec (21 Dec 2023 16:30)  SARS-CoV-2: NotDetec (17 Dec 2023 05:51)  SARS-CoV-2: NotDetec (17 Nov 2023 05:46)    RADIOLOGY:    < from: CT Chest No Cont (12.25.23 @ 22:20) >    ACC: [id number] EXAM:  CT CHEST   ORDERED BY: CADENCE CHUHT     PROCEDURE DATE:  12/25/2023          INTERPRETATION:  HISTORY: Asthma. Recommend plasma pneumonia.    EXAMINATION: CT CHEST was performed without IV contrast.    COMPARISON: 7/12/2019.    FINDINGS:    AIRWAYS, LUNGS, PLEURA: Multifocal ground-glass and consolidative   opacities involving all lung lobes, but most severe in the right lower   lobe. Trace right pleural effusion. Clear central airways.    MEDIASTINUM: Normal heart size. No pericardial effusion. Thoracic aorta   normal caliber.  No large mediastinal lymph nodes.    IMAGED ABDOMEN: Unremarkable.    SOFT TISSUES: Unremarkable.    BONES: Unremarkable.      IMPRESSION:.    Multifocal pneumonia.    CT chest follow-up in 3 months recommended to ensure clearing.        TSERING JOHANSEN MD; Attending Radiologist  This document has been electronically signed. Dec 26 2023  6:34AM    < end of copied text >     ROSSI ROJAS  MRN-[id number]    Follow Up:  Mycoplasma PNA    Interval History: the pt was seen and examined earlier, not in acute distress, on telemetry unit now. Pt had a low grade temp yesterday, NC, still has cough but with improvement. Pt complains of being tired.     PAST MEDICAL & SURGICAL HISTORY:  Lupus      Diabetes      Hypertension      Asthma      Peripheral polyneuropathy      Pulmonary emboli  7/2019      History of hip replacement  left      H/O total hysterectomy      History of bilateral tubal ligation      S/P laminectomy with spinal fusion      History of shoulder surgery      H/O knee surgery          ROS:    [ ] Unobtainable because:  [x ] All other systems negative    Constitutional: no fever, no chills  Head: no trauma  Eyes: no vision changes, no eye pain  ENT:  no sore throat, no rhinorrhea  Cardiovascular:  no chest pain, no palpitation  Respiratory:  had SOB prior, ok on supplemental O2, + cough  GI:  no abd pain, no vomiting, no diarrhea  urinary: no dysuria, no hematuria, no flank pain  musculoskeletal:  no joint pain, no joint swelling  skin:  no rash  neurology:  no headache, no seizure, no change in mental status  psych: no anxiety, no depression         Allergies  No Known Allergies        ANTIMICROBIALS:      OTHER MEDS:  acetaminophen     Tablet .. 650 milliGRAM(s) Oral every 6 hours PRN  albuterol/ipratropium for Nebulization 3 milliLiter(s) Nebulizer every 6 hours  aspirin enteric coated 81 milliGRAM(s) Oral daily  atorvastatin 80 milliGRAM(s) Oral at bedtime  celecoxib 200 milliGRAM(s) Oral daily  dextrose 5%. 1000 milliLiter(s) IV Continuous <Continuous>  dextrose 5%. 1000 milliLiter(s) IV Continuous <Continuous>  dextrose 50% Injectable 25 Gram(s) IV Push once  dextrose 50% Injectable 25 Gram(s) IV Push once  dextrose 50% Injectable 12.5 Gram(s) IV Push once  dextrose Oral Gel 15 Gram(s) Oral once PRN  DULoxetine 60 milliGRAM(s) Oral daily  gabapentin 600 milliGRAM(s) Oral at bedtime  glucagon  Injectable 1 milliGRAM(s) IntraMuscular once  guaifenesin/dextromethorphan Oral Liquid 10 milliLiter(s) Oral every 6 hours PRN  hydrocodone/homatropine Syrup 5 milliLiter(s) Oral every 6 hours  influenza   Vaccine 0.5 milliLiter(s) IntraMuscular once  insulin lispro (ADMELOG) corrective regimen sliding scale   SubCutaneous three times a day before meals  insulin lispro (ADMELOG) corrective regimen sliding scale   SubCutaneous at bedtime  melatonin 3 milliGRAM(s) Oral at bedtime PRN  methylPREDNISolone sodium succinate Injectable 40 milliGRAM(s) IV Push every 12 hours  montelukast 10 milliGRAM(s) Oral daily  rivaroxaban 20 milliGRAM(s) Oral with dinner  verapamil  milliGRAM(s) Oral daily      Vital Signs Last 24 Hrs  T(C): 36.3 (26 Dec 2023 16:21), Max: 37.6 (25 Dec 2023 19:00)  T(F): 97.4 (26 Dec 2023 16:21), Max: 99.6 (25 Dec 2023 19:00)  HR: 83 (26 Dec 2023 17:08) (83 - 108)  BP: 117/70 (26 Dec 2023 16:21) (106/78 - 135/83)  BP(mean): --  RR: 18 (26 Dec 2023 16:21) (18 - 19)  SpO2: 97% (26 Dec 2023 17:08) (93% - 98%)    Parameters below as of 26 Dec 2023 17:08  Patient On (Oxygen Delivery Method): nasal cannula        Physical Exam:  General:    NAD, non toxic, NC 10L  Head: atraumatic, normocephalic  Eyes: normal sclera and conjunctiva  ENT:   no oropharyngeal lesions, no LAD, neck supple  Cardio:    regular S1,S2, no murmur  Respiratory:   mild crackles bilaterally, no wheezes, no rhonchi   abd:   soft, BS +, not tender, no distention, obese   : no CVAT, no penny   Musculoskeletal : no joint swelling, no edema  Skin:    no rash  Neurologic:  answers questions appropriately, follows commands,  no focal deficits  psych: calm behavior     WBC Count: 11.54 K/uL (12-26 @ 05:15)  WBC Count: 12.42 K/uL (12-25 @ 18:20)  WBC Count: 7.44 K/uL (12-22 @ 07:09)  WBC Count: 6.02 K/uL (12-20 @ 07:01)                            8.2    11.54 )-----------( 501      ( 26 Dec 2023 05:15 )             25.3       12-26    137  |  103  |  10  ----------------------------<  200<H>  4.0   |  27  |  0.57    Ca    8.8      26 Dec 2023 05:15  Phos  2.8     12-25  Mg     1.7     12-25    TPro  6.7  /  Alb  1.8<L>  /  TBili  0.3  /  DBili  x   /  AST  23  /  ALT  17  /  AlkPhos  100  12-26      Urinalysis Basic - ( 26 Dec 2023 05:15 )    Color: x / Appearance: x / SG: x / pH: x  Gluc: 200 mg/dL / Ketone: x  / Bili: x / Urobili: x   Blood: x / Protein: x / Nitrite: x   Leuk Esterase: x / RBC: x / WBC x   Sq Epi: x / Non Sq Epi: x / Bacteria: x        Creatinine Trend: 0.57<--, 0.68<--, 0.65<--, 0.68<--, 0.86<--, 1.00<--      MICROBIOLOGY:  v  .Blood Blood-Peripheral  12-22-23   No growth at 72 Hours  --  --      .Blood Blood-Peripheral  12-22-23   No growth at 72 Hours  --  --          Rapid RVP Result: NotDetec (12-25 @ 10:15)  Rapid RVP Result: NotDetec (12-21 @ 16:30)        C-Reactive Protein, Serum: 159 (12-25)    Procalcitonin, Serum: 0.24 (12-25-23 @ 18:20)  Procalcitonin, Serum: 0.09 (12-20-23 @ 07:01)    SARS-CoV-2: NotDetec (12-25-23 @ 10:15)  Rapid RVP Result: NotDetec (12-25-23 @ 10:15)    SARS-CoV-2: NotDetec (25 Dec 2023 10:15)  SARS-CoV-2: NotDetec (21 Dec 2023 16:30)  SARS-CoV-2: NotDetec (17 Dec 2023 05:51)  SARS-CoV-2: NotDetec (17 Nov 2023 05:46)    RADIOLOGY:    < from: CT Chest No Cont (12.25.23 @ 22:20) >    ACC: [id number] EXAM:  CT CHEST   ORDERED BY: CADENCE CHUHT     PROCEDURE DATE:  12/25/2023          INTERPRETATION:  HISTORY: Asthma. Recommend plasma pneumonia.    EXAMINATION: CT CHEST was performed without IV contrast.    COMPARISON: 7/12/2019.    FINDINGS:    AIRWAYS, LUNGS, PLEURA: Multifocal ground-glass and consolidative   opacities involving all lung lobes, but most severe in the right lower   lobe. Trace right pleural effusion. Clear central airways.    MEDIASTINUM: Normal heart size. No pericardial effusion. Thoracic aorta   normal caliber.  No large mediastinal lymph nodes.    IMAGED ABDOMEN: Unremarkable.    SOFT TISSUES: Unremarkable.    BONES: Unremarkable.      IMPRESSION:.    Multifocal pneumonia.    CT chest follow-up in 3 months recommended to ensure clearing.        TSERING JOHANSNE MD; Attending Radiologist  This document has been electronically signed. Dec 26 2023  6:34AM    < end of copied text >

## 2023-12-26 NOTE — PROGRESS NOTE ADULT - SUBJECTIVE AND OBJECTIVE BOX
Neurology Progress Note    S: Patient seen and examined. No new events overnight.     MEDICATIONS:    acetaminophen     Tablet .. 650 milliGRAM(s) Oral every 6 hours PRN  albuterol/ipratropium for Nebulization 3 milliLiter(s) Nebulizer every 6 hours  aspirin enteric coated 81 milliGRAM(s) Oral daily  atorvastatin 80 milliGRAM(s) Oral at bedtime  azithromycin   Tablet 250 milliGRAM(s) Oral daily  celecoxib 200 milliGRAM(s) Oral daily  dextrose 5%. 1000 milliLiter(s) IV Continuous <Continuous>  dextrose 5%. 1000 milliLiter(s) IV Continuous <Continuous>  dextrose 50% Injectable 25 Gram(s) IV Push once  dextrose 50% Injectable 12.5 Gram(s) IV Push once  dextrose 50% Injectable 25 Gram(s) IV Push once  dextrose Oral Gel 15 Gram(s) Oral once PRN  DULoxetine 60 milliGRAM(s) Oral daily  gabapentin 600 milliGRAM(s) Oral at bedtime  glucagon  Injectable 1 milliGRAM(s) IntraMuscular once  guaifenesin/dextromethorphan Oral Liquid 10 milliLiter(s) Oral every 6 hours PRN  hydrocodone/homatropine Syrup 5 milliLiter(s) Oral every 6 hours PRN  influenza   Vaccine 0.5 milliLiter(s) IntraMuscular once  insulin lispro (ADMELOG) corrective regimen sliding scale   SubCutaneous three times a day before meals  insulin lispro (ADMELOG) corrective regimen sliding scale   SubCutaneous at bedtime  melatonin 3 milliGRAM(s) Oral at bedtime PRN  methylPREDNISolone sodium succinate Injectable 40 milliGRAM(s) IV Push every 12 hours  montelukast 10 milliGRAM(s) Oral daily  rivaroxaban 20 milliGRAM(s) Oral with dinner  verapamil  milliGRAM(s) Oral daily      LABS:                          8.2    11.54 )-----------( 501      ( 26 Dec 2023 05:15 )             25.3     12-26    137  |  103  |  10  ----------------------------<  200<H>  4.0   |  27  |  0.57    Ca    8.8      26 Dec 2023 05:15  Phos  2.8     12-25  Mg     1.7     12-25    TPro  6.7  /  Alb  1.8<L>  /  TBili  0.3  /  DBili  x   /  AST  23  /  ALT  17  /  AlkPhos  100  12-26    CAPILLARY BLOOD GLUCOSE      POCT Blood Glucose.: 197 mg/dL (26 Dec 2023 07:49)  POCT Blood Glucose.: 166 mg/dL (26 Dec 2023 00:07)  POCT Blood Glucose.: 100 mg/dL (25 Dec 2023 18:08)  POCT Blood Glucose.: 112 mg/dL (25 Dec 2023 16:09)  POCT Blood Glucose.: 117 mg/dL (25 Dec 2023 11:25)      Urinalysis Basic - ( 26 Dec 2023 05:15 )    Color: x / Appearance: x / SG: x / pH: x  Gluc: 200 mg/dL / Ketone: x  / Bili: x / Urobili: x   Blood: x / Protein: x / Nitrite: x   Leuk Esterase: x / RBC: x / WBC x   Sq Epi: x / Non Sq Epi: x / Bacteria: x      I&O's Summary    Vital Signs Last 24 Hrs  T(C): 36.5 (26 Dec 2023 04:55), Max: 37.6 (25 Dec 2023 18:20)  T(F): 97.7 (26 Dec 2023 04:55), Max: 99.6 (25 Dec 2023 18:20)  HR: 91 (26 Dec 2023 06:42) (91 - 111)  BP: 135/83 (26 Dec 2023 04:55) (105/71 - 145/72)  BP(mean): --  RR: 18 (26 Dec 2023 04:55) (17 - 20)  SpO2: 97% (26 Dec 2023 06:42) (85% - 98%)    Parameters below as of 26 Dec 2023 06:42  Patient On (Oxygen Delivery Method): nasal cannula        General Exam:   General Appearance: Appropriately dressed and in no acute distress       Head: Normocephalic, atraumatic and no dysmorphic features  Ear, Nose, and Throat: Moist mucous membranes  CVS: S1S2+  Resp: No SOB, no wheeze or rhonchi  Abd: soft NTND  Extremities: No edema, no cyanosis  Skin: No bruises, no rashes     Neurological Exam:  Mental Status: Awake, alert and oriented x 3.  Able to follow simple verbal commands. Able to name and repeat. Speech is fluent but slow  Cranial Nerves: PERRL, EOMI, VFFC, sensation V1-V3 intact,  no obvious facial asymmetry, equal elevation of palate, scm/trap 5/5, tongue is midline on protrusion.. hearing is grossly intact.   Motor: Normal bulk, tone. JIANG, LLE slightly weaker but poor efffort  Sensation: Intact to light touch and pinprick throughout. no right/left confusion. no extinction to tactile on DSS.   Reflexes: 1+ throughout at biceps, brachioradialis, triceps, patellars and ankles bilaterally and equal. No clonus. R toe and L toe were both downgoing.  Coordination: No dysmetria on FNF   Gait: deferred      I personally reviewed the below data/images/labs:      LABS:    < from: CT Brain Stroke Protocol (12.17.23 @ 04:51) >    ACC: [id number] EXAM:  CT BRAIN STROKE PROTOCOL   ORDERED BY: MINA CONROY     PROCEDURE DATE:  12/17/2023          INTERPRETATION:  CLINICAL INFORMATION:  Stroke Code    TECHNIQUE:  Axial CT images were acquired through the head.  Intravenouscontrast: None  Two-dimensional reformats were generated.    COMPARISON STUDY: MRI brain 11/17/2023, CTA head and neck 11/17/2023.    FINDINGS:    There is no CT evidence of acute intracranial hemorrhage, mass effect,   midline shift, or acute, largeterritorial infarct.  The ventricles and   sulci are age-appropriate in size and configuration. The basal cisterns   are patent.    The mastoid air cells and middle ear cavities are grossly clear. The   visualized paranasal sinuses are well aerated.    The calvarium and skull base are grossly intact.    IMPRESSION:  No acute intracranial hemorrhage or mass effect.    Results were discussed with Dr. Conroy by Dr. Gastelum at 4:50 AM on   12/17/2023. with read back followed.    < end of copied text >        < from: CT Angio Neck Stroke Protocol w/ IV Cont (12.17.23 @ 05:12) >    ACC: [id number] EXAM:  CT ANGIO NECK STROKE PROTCL IC   ORDERED BY: MINA CONROY     ACC: [id number] EXAM:  CT BRAIN PERFUSION MAPS STROKE   ORDERED BY: MINA CONROY     ACC: [id number] EXAM:  CT ANGIO BRAIN STROKE PROTC IC   ORDERED BY: MINA CONROY     PROCEDURE DATE:  12/17/2023          INTERPRETATION:  CT PERFUSION, CTA OF THE [place] OF [place] AND NECK:    INDICATIONS: Stroke code.    TECHNIQUE:    RAPID artificial intelligence was used for perfusion analysis and for   preliminary evaluation of intracranial hemorrhage.    CTA [place] OF [place]:    After the intravenous power injection of non-ionic contrast material,   serial thin sections were obtained through the intracranial circulation   on a multislice CT scanner.  Images were reformatted using a dedicated 3D   software package and viewed on a dedicated workstation in multiple planes.    CTA NECK:    After the intravenous power injection of non-ionic contrast material,   serial thin sections were obtained through the cervical circulation on a   multislice CT scanner.  Images were reformatted using a dedicated 3D   software package and viewed on a dedicated workstation in multiple planes.      COMPARISON EXAMINATION: Noncontrast head CT performed the same day. CTA   head and neck 11/17/2023    FINDINGS:      CT RAPID PERFUSION:  Markedly degraded by patient motion.    INFARCT CORE: 0 cc    TISSUE AT RISK: 8 cc combined in the left frontal and right temporal lobes    MISMATCH RATIO: N/A      CTA [place] OF G[place]ARD:    ANTERIOR CIRCULATION    ICA  CAVERNOUS, SUPRACLINOID, BIFURCATION SEGMENTS: Patent without flow   limiting stenosis. Atherosclerotic calcifications of the bilateral   cavernous ICA segments.    ANTERIOR CEREBRAL ARTERIES: Bilateral A1, anterior communicating and A2   anterior cerebral arteries are unremarkable in course and caliber without   flow limiting stenosis. Hypoplastic right A1 segment.    MIDDLE CEREBRAL ARTERIES: Patent bilateral M1, M2, and distal MCA   branches without flow limiting stenosis.    POSTERIOR CIRCULATION:    VERTEBRAL ARTERIES: Patent without flow limiting stenosis. Mild focal   narrowing of the right V4 segment.    BASILAR ARTERY: Patent no flow limiting stenosis.    POSTERIOR CEREBRAL ARTERIES: Patent without flow limiting stenosis.    CTA NECK:    GREAT VESSELS: Visualized segments are patent, no flow limiting stenosis.   Bovine aortic arch, normal variant.    COMMON CAROTID ARTERIES:  RIGHT: Patent without flow limiting stenosis  LEFT: Patent without flow limiting stenosis    CAROTID BULBS:  RIGHT: Patent without flow limiting stenosis  LEFT: Patent without flow limiting stenosis    INTERNAL CAROTID ARTERIES:  RIGHT: Patent no evidence for any hemodynamically significant stenosis at   the ICA origin by NASCET criteria. Stable mild ectasia of the proximal   cervical ICA measuring 0.9 cm in diameter. Partial retropharyngeal course.  LEFT: Patent no evidence for any hemodynamically significant stenosis at   the ICA origin by NASCET criteria. Stable fusiform aneurysmal dilatation   of the proximal cervical ICA measuring 1.4 cm in diameter and gradually   tapering in the mid segment. Partial retropharyngeal course.    VERTEBRAL ARTERIES:    RIGHT: Patent no evidence for any flow limiting stenosis.  LEFT: Patent no evidence for any flow limiting stenosis. Left dominant   vertebral system.      SOFT TISSUES: Patchy nonspecific groundglass opacities and consolidations   in the visualized upper lobes.    BONES: Multilevel degenerative changes inthe spine.    IMPRESSION:    CT PERFUSION: Markedly degraded by patient motion. No core infarct. Small   areas of abnormal perfusion in the left frontal and right temporal lobes   not confined to a vascular territory, likely artifactual.    If symptoms persist consider follow up head CT or MRI, MRA  if no   contraindication.    CTA COW:  Patent intracranial circulation without flow limiting stenosis.    CTA NECK: Patent, ECAs, ICAs, no  hemodynamically significant stenosis at    ICA origins by NASCET criteria. Stable fusiform aneurysmal dilatation of   the ICA origins/proximal segments.  Bilateral vertebral arteries are patent without flow limiting stenosis.    Patchy nonspecific groundglass and consolidations in the visualized upper   lobes.    --- End of Report ---      < end of copied text >    < from: MR Head No Cont (12.18.23 @ 12:52) >  IMPRESSION: No acute territorial infarcts are seen    < end of copied text >      EEG Classification / Summary:  Abnormal routine EEG in the awake state.  Mild diffuse slowing.  No epileptiform abnormalities.  Artifacts somewhat limit interpretation.    Clinical Impression:  Mild diffuse cerebral dysfunction is nonspecific in etiology.   Artifacts somewhat limit interpretation.   Neurology Progress Note    S: Patient seen and examined. No new events overnight.     MEDICATIONS:    acetaminophen     Tablet .. 650 milliGRAM(s) Oral every 6 hours PRN  albuterol/ipratropium for Nebulization 3 milliLiter(s) Nebulizer every 6 hours  aspirin enteric coated 81 milliGRAM(s) Oral daily  atorvastatin 80 milliGRAM(s) Oral at bedtime  azithromycin   Tablet 250 milliGRAM(s) Oral daily  celecoxib 200 milliGRAM(s) Oral daily  dextrose 5%. 1000 milliLiter(s) IV Continuous <Continuous>  dextrose 5%. 1000 milliLiter(s) IV Continuous <Continuous>  dextrose 50% Injectable 25 Gram(s) IV Push once  dextrose 50% Injectable 12.5 Gram(s) IV Push once  dextrose 50% Injectable 25 Gram(s) IV Push once  dextrose Oral Gel 15 Gram(s) Oral once PRN  DULoxetine 60 milliGRAM(s) Oral daily  gabapentin 600 milliGRAM(s) Oral at bedtime  glucagon  Injectable 1 milliGRAM(s) IntraMuscular once  guaifenesin/dextromethorphan Oral Liquid 10 milliLiter(s) Oral every 6 hours PRN  hydrocodone/homatropine Syrup 5 milliLiter(s) Oral every 6 hours PRN  influenza   Vaccine 0.5 milliLiter(s) IntraMuscular once  insulin lispro (ADMELOG) corrective regimen sliding scale   SubCutaneous three times a day before meals  insulin lispro (ADMELOG) corrective regimen sliding scale   SubCutaneous at bedtime  melatonin 3 milliGRAM(s) Oral at bedtime PRN  methylPREDNISolone sodium succinate Injectable 40 milliGRAM(s) IV Push every 12 hours  montelukast 10 milliGRAM(s) Oral daily  rivaroxaban 20 milliGRAM(s) Oral with dinner  verapamil  milliGRAM(s) Oral daily      LABS:                          8.2    11.54 )-----------( 501      ( 26 Dec 2023 05:15 )             25.3     12-26    137  |  103  |  10  ----------------------------<  200<H>  4.0   |  27  |  0.57    Ca    8.8      26 Dec 2023 05:15  Phos  2.8     12-25  Mg     1.7     12-25    TPro  6.7  /  Alb  1.8<L>  /  TBili  0.3  /  DBili  x   /  AST  23  /  ALT  17  /  AlkPhos  100  12-26    CAPILLARY BLOOD GLUCOSE      POCT Blood Glucose.: 197 mg/dL (26 Dec 2023 07:49)  POCT Blood Glucose.: 166 mg/dL (26 Dec 2023 00:07)  POCT Blood Glucose.: 100 mg/dL (25 Dec 2023 18:08)  POCT Blood Glucose.: 112 mg/dL (25 Dec 2023 16:09)  POCT Blood Glucose.: 117 mg/dL (25 Dec 2023 11:25)      Urinalysis Basic - ( 26 Dec 2023 05:15 )    Color: x / Appearance: x / SG: x / pH: x  Gluc: 200 mg/dL / Ketone: x  / Bili: x / Urobili: x   Blood: x / Protein: x / Nitrite: x   Leuk Esterase: x / RBC: x / WBC x   Sq Epi: x / Non Sq Epi: x / Bacteria: x      I&O's Summary    Vital Signs Last 24 Hrs  T(C): 36.5 (26 Dec 2023 04:55), Max: 37.6 (25 Dec 2023 18:20)  T(F): 97.7 (26 Dec 2023 04:55), Max: 99.6 (25 Dec 2023 18:20)  HR: 91 (26 Dec 2023 06:42) (91 - 111)  BP: 135/83 (26 Dec 2023 04:55) (105/71 - 145/72)  BP(mean): --  RR: 18 (26 Dec 2023 04:55) (17 - 20)  SpO2: 97% (26 Dec 2023 06:42) (85% - 98%)    Parameters below as of 26 Dec 2023 06:42  Patient On (Oxygen Delivery Method): nasal cannula        General Exam:   General Appearance: Appropriately dressed and in no acute distress       Head: Normocephalic, atraumatic and no dysmorphic features  Ear, Nose, and Throat: Moist mucous membranes  CVS: S1S2+  Resp: No SOB, no wheeze or rhonchi  Abd: soft NTND  Extremities: No edema, no cyanosis  Skin: No bruises, no rashes     Neurological Exam:  Mental Status: Awake, alert and oriented x 3.  Able to follow simple verbal commands. Able to name and repeat. Speech is fluent but slow  Cranial Nerves: PERRL, EOMI, VFFC, sensation V1-V3 intact,  no obvious facial asymmetry, equal elevation of palate, scm/trap 5/5, tongue is midline on protrusion.. hearing is grossly intact.   Motor: Normal bulk, tone. JIANG, LLE slightly weaker but poor efffort  Sensation: Intact to light touch and pinprick throughout. no right/left confusion. no extinction to tactile on DSS.   Reflexes: 1+ throughout at biceps, brachioradialis, triceps, patellars and ankles bilaterally and equal. No clonus. R toe and L toe were both downgoing.  Coordination: No dysmetria on FNF   Gait: deferred      I personally reviewed the below data/images/labs:      LABS:    < from: CT Brain Stroke Protocol (12.17.23 @ 04:51) >    ACC: [id number] EXAM:  CT BRAIN STROKE PROTOCOL   ORDERED BY: MINA CONROY     PROCEDURE DATE:  12/17/2023          INTERPRETATION:  CLINICAL INFORMATION:  Stroke Code    TECHNIQUE:  Axial CT images were acquired through the head.  Intravenouscontrast: None  Two-dimensional reformats were generated.    COMPARISON STUDY: MRI brain 11/17/2023, CTA head and neck 11/17/2023.    FINDINGS:    There is no CT evidence of acute intracranial hemorrhage, mass effect,   midline shift, or acute, largeterritorial infarct.  The ventricles and   sulci are age-appropriate in size and configuration. The basal cisterns   are patent.    The mastoid air cells and middle ear cavities are grossly clear. The   visualized paranasal sinuses are well aerated.    The calvarium and skull base are grossly intact.    IMPRESSION:  No acute intracranial hemorrhage or mass effect.    Results were discussed with Dr. Conroy by Dr. Gastelum at 4:50 AM on   12/17/2023. with read back followed.    < end of copied text >        < from: CT Angio Neck Stroke Protocol w/ IV Cont (12.17.23 @ 05:12) >    ACC: [id number] EXAM:  CT ANGIO NECK STROKE PROTCL IC   ORDERED BY: MINA CONROY     ACC: [id number] EXAM:  CT BRAIN PERFUSION MAPS STROKE   ORDERED BY: MINA CONROY     ACC: [id number] EXAM:  CT ANGIO BRAIN STROKE PROTC IC   ORDERED BY: MINA CONROY     PROCEDURE DATE:  12/17/2023          INTERPRETATION:  CT PERFUSION, CTA OF THE [place] OF [place] AND NECK:    INDICATIONS: Stroke code.    TECHNIQUE:    RAPID artificial intelligence was used for perfusion analysis and for   preliminary evaluation of intracranial hemorrhage.    CTA [place] OF [place]:    After the intravenous power injection of non-ionic contrast material,   serial thin sections were obtained through the intracranial circulation   on a multislice CT scanner.  Images were reformatted using a dedicated 3D   software package and viewed on a dedicated workstation in multiple planes.    CTA NECK:    After the intravenous power injection of non-ionic contrast material,   serial thin sections were obtained through the cervical circulation on a   multislice CT scanner.  Images were reformatted using a dedicated 3D   software package and viewed on a dedicated workstation in multiple planes.      COMPARISON EXAMINATION: Noncontrast head CT performed the same day. CTA   head and neck 11/17/2023    FINDINGS:      CT RAPID PERFUSION:  Markedly degraded by patient motion.    INFARCT CORE: 0 cc    TISSUE AT RISK: 8 cc combined in the left frontal and right temporal lobes    MISMATCH RATIO: N/A      CTA [place] OF G[place]ARD:    ANTERIOR CIRCULATION    ICA  CAVERNOUS, SUPRACLINOID, BIFURCATION SEGMENTS: Patent without flow   limiting stenosis. Atherosclerotic calcifications of the bilateral   cavernous ICA segments.    ANTERIOR CEREBRAL ARTERIES: Bilateral A1, anterior communicating and A2   anterior cerebral arteries are unremarkable in course and caliber without   flow limiting stenosis. Hypoplastic right A1 segment.    MIDDLE CEREBRAL ARTERIES: Patent bilateral M1, M2, and distal MCA   branches without flow limiting stenosis.    POSTERIOR CIRCULATION:    VERTEBRAL ARTERIES: Patent without flow limiting stenosis. Mild focal   narrowing of the right V4 segment.    BASILAR ARTERY: Patent no flow limiting stenosis.    POSTERIOR CEREBRAL ARTERIES: Patent without flow limiting stenosis.    CTA NECK:    GREAT VESSELS: Visualized segments are patent, no flow limiting stenosis.   Bovine aortic arch, normal variant.    COMMON CAROTID ARTERIES:  RIGHT: Patent without flow limiting stenosis  LEFT: Patent without flow limiting stenosis    CAROTID BULBS:  RIGHT: Patent without flow limiting stenosis  LEFT: Patent without flow limiting stenosis    INTERNAL CAROTID ARTERIES:  RIGHT: Patent no evidence for any hemodynamically significant stenosis at   the ICA origin by NASCET criteria. Stable mild ectasia of the proximal   cervical ICA measuring 0.9 cm in diameter. Partial retropharyngeal course.  LEFT: Patent no evidence for any hemodynamically significant stenosis at   the ICA origin by NASCET criteria. Stable fusiform aneurysmal dilatation   of the proximal cervical ICA measuring 1.4 cm in diameter and gradually   tapering in the mid segment. Partial retropharyngeal course.    VERTEBRAL ARTERIES:    RIGHT: Patent no evidence for any flow limiting stenosis.  LEFT: Patent no evidence for any flow limiting stenosis. Left dominant   vertebral system.      SOFT TISSUES: Patchy nonspecific groundglass opacities and consolidations   in the visualized upper lobes.    BONES: Multilevel degenerative changes inthe spine.    IMPRESSION:    CT PERFUSION: Markedly degraded by patient motion. No core infarct. Small   areas of abnormal perfusion in the left frontal and right temporal lobes   not confined to a vascular territory, likely artifactual.    If symptoms persist consider follow up head CT or MRI, MRA  if no   contraindication.    CTA COW:  Patent intracranial circulation without flow limiting stenosis.    CTA NECK: Patent, ECAs, ICAs, no  hemodynamically significant stenosis at    ICA origins by NASCET criteria. Stable fusiform aneurysmal dilatation of   the ICA origins/proximal segments.  Bilateral vertebral arteries are patent without flow limiting stenosis.    Patchy nonspecific groundglass and consolidations in the visualized upper   lobes.    --- End of Report ---      < end of copied text >    < from: MR Head No Cont (12.18.23 @ 12:52) >  IMPRESSION: No acute territorial infarcts are seen    < end of copied text >      EEG Classification / Summary:  Abnormal routine EEG in the awake state.  Mild diffuse slowing.  No epileptiform abnormalities.  Artifacts somewhat limit interpretation.    Clinical Impression:  Mild diffuse cerebral dysfunction is nonspecific in etiology.   Artifacts somewhat limit interpretation.

## 2023-12-26 NOTE — PROGRESS NOTE ADULT - SUBJECTIVE AND OBJECTIVE BOX
Patient is a 63y old  Female who presents with a chief complaint of AMS, HCAP, Acute Hypoxic respiratory failure (26 Dec 2023 09:53)      INTERVAL HPI/OVERNIGHT EVENTS:  Pt was seen and examined.  RRT was called yesterday for worsening hypoxia.        MEDICATIONS  (STANDING):  albuterol/ipratropium for Nebulization 3 milliLiter(s) Nebulizer every 6 hours  aspirin enteric coated 81 milliGRAM(s) Oral daily  atorvastatin 80 milliGRAM(s) Oral at bedtime  celecoxib 200 milliGRAM(s) Oral daily  dextrose 5%. 1000 milliLiter(s) (50 mL/Hr) IV Continuous <Continuous>  dextrose 5%. 1000 milliLiter(s) (100 mL/Hr) IV Continuous <Continuous>  dextrose 50% Injectable 25 Gram(s) IV Push once  dextrose 50% Injectable 12.5 Gram(s) IV Push once  dextrose 50% Injectable 25 Gram(s) IV Push once  DULoxetine 60 milliGRAM(s) Oral daily  gabapentin 600 milliGRAM(s) Oral at bedtime  glucagon  Injectable 1 milliGRAM(s) IntraMuscular once  hydrocodone/homatropine Syrup 5 milliLiter(s) Oral every 6 hours  influenza   Vaccine 0.5 milliLiter(s) IntraMuscular once  insulin lispro (ADMELOG) corrective regimen sliding scale   SubCutaneous three times a day before meals  insulin lispro (ADMELOG) corrective regimen sliding scale   SubCutaneous at bedtime  methylPREDNISolone sodium succinate Injectable 40 milliGRAM(s) IV Push every 12 hours  montelukast 10 milliGRAM(s) Oral daily  rivaroxaban 20 milliGRAM(s) Oral with dinner  verapamil  milliGRAM(s) Oral daily    MEDICATIONS  (PRN):  acetaminophen     Tablet .. 650 milliGRAM(s) Oral every 6 hours PRN Temp greater or equal to 38C (100.4F), Mild Pain (1 - 3)  dextrose Oral Gel 15 Gram(s) Oral once PRN Blood Glucose LESS THAN 70 milliGRAM(s)/deciliter  guaifenesin/dextromethorphan Oral Liquid 10 milliLiter(s) Oral every 6 hours PRN Cough  melatonin 3 milliGRAM(s) Oral at bedtime PRN Insomnia      Allergies  No Known Allergies    Intolerances  dislikes Glucerna Shake (Other)        Vital Signs Last 24 Hrs  T(C): 36.3 (26 Dec 2023 16:21), Max: 37.6 (25 Dec 2023 18:20)  T(F): 97.4 (26 Dec 2023 16:21), Max: 99.6 (25 Dec 2023 18:20)  HR: 86 (26 Dec 2023 16:21) (86 - 111)  BP: 117/70 (26 Dec 2023 16:21) (106/78 - 145/72)  BP(mean): --  RR: 18 (26 Dec 2023 16:21) (18 - 20)  SpO2: 93% (26 Dec 2023 16:21) (85% - 98%)    Parameters below as of 26 Dec 2023 16:21  Patient On (Oxygen Delivery Method): nasal cannula, high flow  O2 Flow (L/min): 10      PHYSICAL EXAM:  General: patient in no acute distress  Head:  Atraumatic, Normocephalic  Eyes: EOMI, PERRLA, clear sclera  Neck: Supple, thyroid nontender, non enlarged  Cardio: S1/S2 +ve, regular rate and rhythm,  Resp: + wheezing   GI: abdomen soft, nontender, non distended, no guarding, BS +ve   Ext: no significant pedal edema  Neuro: AAOx3,   Skin: No rashes or lesions           LABS:                        8.2    11.54 )-----------( 501      ( 26 Dec 2023 05:15 )             25.3     12-26    137  |  103  |  10  ----------------------------<  200<H>  4.0   |  27  |  0.57    Ca    8.8      26 Dec 2023 05:15  Phos  2.8     12-25  Mg     1.7     12-25    TPro  6.7  /  Alb  1.8<L>  /  TBili  0.3  /  DBili  x   /  AST  23  /  ALT  17  /  AlkPhos  100  12-26      Urinalysis Basic - ( 26 Dec 2023 05:15 )    Color: x / Appearance: x / SG: x / pH: x  Gluc: 200 mg/dL / Ketone: x  / Bili: x / Urobili: x   Blood: x / Protein: x / Nitrite: x   Leuk Esterase: x / RBC: x / WBC x   Sq Epi: x / Non Sq Epi: x / Bacteria: x      CAPILLARY BLOOD GLUCOSE      POCT Blood Glucose.: 174 mg/dL (26 Dec 2023 11:20)  POCT Blood Glucose.: 197 mg/dL (26 Dec 2023 07:49)  POCT Blood Glucose.: 166 mg/dL (26 Dec 2023 00:07)  POCT Blood Glucose.: 100 mg/dL (25 Dec 2023 18:08)      Culture - Blood (collected 22 Dec 2023 16:15)  Source: .Blood Blood-Peripheral  Preliminary Report (25 Dec 2023 22:01):    No growth at 72 Hours    Culture - Blood (collected 22 Dec 2023 16:00)  Source: .Blood Blood-Peripheral  Preliminary Report (25 Dec 2023 22:01):    No growth at 72 Hours      RADIOLOGY & ADDITIONAL TESTS:    Imaging Personally Reviewed:  [ ] YES  [ ] NO    Consultant(s) Notes Reviewed:  [ ] YES  [ ] NO    Care Discussed with Consultants/Other Providers [ ] YES  [ ] NO

## 2023-12-26 NOTE — PROGRESS NOTE ADULT - ASSESSMENT
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 Being treated for URI outpatient. Hypoxic to 84% on RA,  CT head without acute pathology  CTA H/N - bilateral ICA calcifications with ectasia of proximal R ICA and L cerivcal ICA fusiform aneurysm 1.4cm, R V4 narrowing  CTP motion degraded  CXR showed Multifocal PNA  Routine eeg with diffuse slowing    Impression:  AMS likely TME - infectious, metabolic, possible polypharmacy. 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 Being treated for URI outpatient. Hypoxic to 84% on RA,  CT head without acute pathology  CTA H/N - bilateral ICA calcifications with ectasia of proximal R ICA and L cerivcal ICA fusiform aneurysm 1.4cm, R V4 narrowing  CTP motion degraded  CXR showed Multifocal PNA  Routine eeg with diffuse slowing    Impression:  AMS likely TME - infectious, metabolic, possible polypharmacy. Low suspicion for neurovascular etiology, seizure  SLE  PE on xarelto    Plan:  - continue xarelto for recent PE - should likely be on lifelong AC given hx of SLE  - EEG as above  - MRI no acute findings  - would hold sedating meds to monitor mental status   - ?on gabapentin for seizures vs neuropathy,    Neurology  Office [phone]

## 2023-12-27 LAB
ALBUMIN SERPL ELPH-MCNC: 1.8 G/DL — LOW (ref 3.3–5)
ALBUMIN SERPL ELPH-MCNC: 1.8 G/DL — LOW (ref 3.3–5)
ALP SERPL-CCNC: 93 U/L — SIGNIFICANT CHANGE UP (ref 40–120)
ALP SERPL-CCNC: 93 U/L — SIGNIFICANT CHANGE UP (ref 40–120)
ALT FLD-CCNC: 18 U/L — SIGNIFICANT CHANGE UP (ref 12–78)
ALT FLD-CCNC: 18 U/L — SIGNIFICANT CHANGE UP (ref 12–78)
ANION GAP SERPL CALC-SCNC: 7 MMOL/L — SIGNIFICANT CHANGE UP (ref 5–17)
ANION GAP SERPL CALC-SCNC: 7 MMOL/L — SIGNIFICANT CHANGE UP (ref 5–17)
AST SERPL-CCNC: 24 U/L — SIGNIFICANT CHANGE UP (ref 15–37)
AST SERPL-CCNC: 24 U/L — SIGNIFICANT CHANGE UP (ref 15–37)
BASE EXCESS BLDA CALC-SCNC: 5.4 MMOL/L — HIGH (ref -2–3)
BASE EXCESS BLDA CALC-SCNC: 5.4 MMOL/L — HIGH (ref -2–3)
BILIRUB SERPL-MCNC: 0.2 MG/DL — SIGNIFICANT CHANGE UP (ref 0.2–1.2)
BILIRUB SERPL-MCNC: 0.2 MG/DL — SIGNIFICANT CHANGE UP (ref 0.2–1.2)
BLOOD GAS COMMENTS ARTERIAL: SIGNIFICANT CHANGE UP
BLOOD GAS COMMENTS ARTERIAL: SIGNIFICANT CHANGE UP
BUN SERPL-MCNC: 16 MG/DL — SIGNIFICANT CHANGE UP (ref 7–23)
BUN SERPL-MCNC: 16 MG/DL — SIGNIFICANT CHANGE UP (ref 7–23)
CALCIUM SERPL-MCNC: 9.1 MG/DL — SIGNIFICANT CHANGE UP (ref 8.5–10.1)
CALCIUM SERPL-MCNC: 9.1 MG/DL — SIGNIFICANT CHANGE UP (ref 8.5–10.1)
CHLORIDE SERPL-SCNC: 102 MMOL/L — SIGNIFICANT CHANGE UP (ref 96–108)
CHLORIDE SERPL-SCNC: 102 MMOL/L — SIGNIFICANT CHANGE UP (ref 96–108)
CO2 BLDA-SCNC: 31 MMOL/L — HIGH (ref 19–24)
CO2 BLDA-SCNC: 31 MMOL/L — HIGH (ref 19–24)
CO2 SERPL-SCNC: 28 MMOL/L — SIGNIFICANT CHANGE UP (ref 22–31)
CO2 SERPL-SCNC: 28 MMOL/L — SIGNIFICANT CHANGE UP (ref 22–31)
CREAT SERPL-MCNC: 0.74 MG/DL — SIGNIFICANT CHANGE UP (ref 0.5–1.3)
CREAT SERPL-MCNC: 0.74 MG/DL — SIGNIFICANT CHANGE UP (ref 0.5–1.3)
CULTURE RESULTS: SIGNIFICANT CHANGE UP
EGFR: 91 ML/MIN/1.73M2 — SIGNIFICANT CHANGE UP
EGFR: 91 ML/MIN/1.73M2 — SIGNIFICANT CHANGE UP
GAS PNL BLDA: SIGNIFICANT CHANGE UP
GAS PNL BLDA: SIGNIFICANT CHANGE UP
GLUCOSE BLDC GLUCOMTR-MCNC: 151 MG/DL — HIGH (ref 70–99)
GLUCOSE BLDC GLUCOMTR-MCNC: 151 MG/DL — HIGH (ref 70–99)
GLUCOSE BLDC GLUCOMTR-MCNC: 152 MG/DL — HIGH (ref 70–99)
GLUCOSE BLDC GLUCOMTR-MCNC: 152 MG/DL — HIGH (ref 70–99)
GLUCOSE BLDC GLUCOMTR-MCNC: 175 MG/DL — HIGH (ref 70–99)
GLUCOSE BLDC GLUCOMTR-MCNC: 175 MG/DL — HIGH (ref 70–99)
GLUCOSE BLDC GLUCOMTR-MCNC: 272 MG/DL — HIGH (ref 70–99)
GLUCOSE BLDC GLUCOMTR-MCNC: 272 MG/DL — HIGH (ref 70–99)
GLUCOSE SERPL-MCNC: 183 MG/DL — HIGH (ref 70–99)
GLUCOSE SERPL-MCNC: 183 MG/DL — HIGH (ref 70–99)
GRAM STN FLD: ABNORMAL
GRAM STN FLD: ABNORMAL
HCO3 BLDA-SCNC: 30 MMOL/L — HIGH (ref 21–28)
HCO3 BLDA-SCNC: 30 MMOL/L — HIGH (ref 21–28)
HCT VFR BLD CALC: 25 % — LOW (ref 34.5–45)
HCT VFR BLD CALC: 25 % — LOW (ref 34.5–45)
HGB BLD-MCNC: 7.8 G/DL — LOW (ref 11.5–15.5)
HGB BLD-MCNC: 7.8 G/DL — LOW (ref 11.5–15.5)
HOROWITZ INDEX BLDA+IHG-RTO: 60 — SIGNIFICANT CHANGE UP
HOROWITZ INDEX BLDA+IHG-RTO: 60 — SIGNIFICANT CHANGE UP
MCHC RBC-ENTMCNC: 25.5 PG — LOW (ref 27–34)
MCHC RBC-ENTMCNC: 25.5 PG — LOW (ref 27–34)
MCHC RBC-ENTMCNC: 31.2 G/DL — LOW (ref 32–36)
MCHC RBC-ENTMCNC: 31.2 G/DL — LOW (ref 32–36)
MCV RBC AUTO: 81.7 FL — SIGNIFICANT CHANGE UP (ref 80–100)
MCV RBC AUTO: 81.7 FL — SIGNIFICANT CHANGE UP (ref 80–100)
NRBC # BLD: 0 /100 WBCS — SIGNIFICANT CHANGE UP (ref 0–0)
NRBC # BLD: 0 /100 WBCS — SIGNIFICANT CHANGE UP (ref 0–0)
NT-PROBNP SERPL-SCNC: 306 PG/ML — HIGH (ref 0–125)
NT-PROBNP SERPL-SCNC: 306 PG/ML — HIGH (ref 0–125)
PCO2 BLDA: 42 MMHG — SIGNIFICANT CHANGE UP (ref 32–46)
PCO2 BLDA: 42 MMHG — SIGNIFICANT CHANGE UP (ref 32–46)
PH BLDA: 7.46 — HIGH (ref 7.35–7.45)
PH BLDA: 7.46 — HIGH (ref 7.35–7.45)
PLATELET # BLD AUTO: 496 K/UL — HIGH (ref 150–400)
PLATELET # BLD AUTO: 496 K/UL — HIGH (ref 150–400)
PO2 BLDA: 83 MMHG — SIGNIFICANT CHANGE UP (ref 83–108)
PO2 BLDA: 83 MMHG — SIGNIFICANT CHANGE UP (ref 83–108)
POTASSIUM SERPL-MCNC: 3.7 MMOL/L — SIGNIFICANT CHANGE UP (ref 3.5–5.3)
POTASSIUM SERPL-MCNC: 3.7 MMOL/L — SIGNIFICANT CHANGE UP (ref 3.5–5.3)
POTASSIUM SERPL-SCNC: 3.7 MMOL/L — SIGNIFICANT CHANGE UP (ref 3.5–5.3)
POTASSIUM SERPL-SCNC: 3.7 MMOL/L — SIGNIFICANT CHANGE UP (ref 3.5–5.3)
PROT SERPL-MCNC: 6.7 GM/DL — SIGNIFICANT CHANGE UP (ref 6–8.3)
PROT SERPL-MCNC: 6.7 GM/DL — SIGNIFICANT CHANGE UP (ref 6–8.3)
RBC # BLD: 3.06 M/UL — LOW (ref 3.8–5.2)
RBC # BLD: 3.06 M/UL — LOW (ref 3.8–5.2)
RBC # FLD: 17 % — HIGH (ref 10.3–14.5)
RBC # FLD: 17 % — HIGH (ref 10.3–14.5)
SAO2 % BLDA: 96.6 % — SIGNIFICANT CHANGE UP (ref 94–98)
SAO2 % BLDA: 96.6 % — SIGNIFICANT CHANGE UP (ref 94–98)
SODIUM SERPL-SCNC: 137 MMOL/L — SIGNIFICANT CHANGE UP (ref 135–145)
SODIUM SERPL-SCNC: 137 MMOL/L — SIGNIFICANT CHANGE UP (ref 135–145)
SPECIMEN SOURCE: SIGNIFICANT CHANGE UP
TROPONIN I, HIGH SENSITIVITY RESULT: 6.4 NG/L — SIGNIFICANT CHANGE UP
TROPONIN I, HIGH SENSITIVITY RESULT: 6.4 NG/L — SIGNIFICANT CHANGE UP
WBC # BLD: 15.66 K/UL — HIGH (ref 3.8–10.5)
WBC # BLD: 15.66 K/UL — HIGH (ref 3.8–10.5)
WBC # FLD AUTO: 15.66 K/UL — HIGH (ref 3.8–10.5)
WBC # FLD AUTO: 15.66 K/UL — HIGH (ref 3.8–10.5)

## 2023-12-27 PROCEDURE — 71045 X-RAY EXAM CHEST 1 VIEW: CPT | Mod: 26

## 2023-12-27 PROCEDURE — 99232 SBSQ HOSP IP/OBS MODERATE 35: CPT

## 2023-12-27 PROCEDURE — 99233 SBSQ HOSP IP/OBS HIGH 50: CPT

## 2023-12-27 RX ORDER — SODIUM CHLORIDE 9 MG/ML
4 INJECTION INTRAMUSCULAR; INTRAVENOUS; SUBCUTANEOUS EVERY 12 HOURS
Refills: 0 | Status: DISCONTINUED | OUTPATIENT
Start: 2023-12-27 | End: 2023-12-28

## 2023-12-27 RX ORDER — FUROSEMIDE 40 MG
40 TABLET ORAL ONCE
Refills: 0 | Status: COMPLETED | OUTPATIENT
Start: 2023-12-27 | End: 2023-12-27

## 2023-12-27 RX ORDER — CEFEPIME 1 G/1
1000 INJECTION, POWDER, FOR SOLUTION INTRAMUSCULAR; INTRAVENOUS EVERY 8 HOURS
Refills: 0 | Status: DISCONTINUED | OUTPATIENT
Start: 2023-12-27 | End: 2024-01-03

## 2023-12-27 RX ORDER — ALBUTEROL 90 UG/1
1 AEROSOL, METERED ORAL EVERY 4 HOURS
Refills: 0 | Status: DISCONTINUED | OUTPATIENT
Start: 2023-12-27 | End: 2024-01-10

## 2023-12-27 RX ADMIN — MONTELUKAST 10 MILLIGRAM(S): 4 TABLET, CHEWABLE ORAL at 11:27

## 2023-12-27 RX ADMIN — Medication 3 MILLIGRAM(S): at 22:37

## 2023-12-27 RX ADMIN — Medication 3 MILLILITER(S): at 05:02

## 2023-12-27 RX ADMIN — Medication 3 MILLILITER(S): at 11:02

## 2023-12-27 RX ADMIN — DULOXETINE HYDROCHLORIDE 60 MILLIGRAM(S): 30 CAPSULE, DELAYED RELEASE ORAL at 11:27

## 2023-12-27 RX ADMIN — CELECOXIB 200 MILLIGRAM(S): 200 CAPSULE ORAL at 11:21

## 2023-12-27 RX ADMIN — Medication 10 MILLILITER(S): at 00:55

## 2023-12-27 RX ADMIN — Medication 81 MILLIGRAM(S): at 11:21

## 2023-12-27 RX ADMIN — AZITHROMYCIN 250 MILLIGRAM(S): 500 TABLET, FILM COATED ORAL at 11:21

## 2023-12-27 RX ADMIN — SODIUM CHLORIDE 4 MILLILITER(S): 9 INJECTION INTRAMUSCULAR; INTRAVENOUS; SUBCUTANEOUS at 12:30

## 2023-12-27 RX ADMIN — CEFEPIME 100 MILLIGRAM(S): 1 INJECTION, POWDER, FOR SOLUTION INTRAMUSCULAR; INTRAVENOUS at 22:29

## 2023-12-27 RX ADMIN — RIVAROXABAN 20 MILLIGRAM(S): KIT at 17:04

## 2023-12-27 RX ADMIN — GABAPENTIN 600 MILLIGRAM(S): 400 CAPSULE ORAL at 22:21

## 2023-12-27 RX ADMIN — Medication 40 MILLIGRAM(S): at 11:45

## 2023-12-27 RX ADMIN — ATORVASTATIN CALCIUM 80 MILLIGRAM(S): 80 TABLET, FILM COATED ORAL at 22:21

## 2023-12-27 RX ADMIN — Medication 3 MILLILITER(S): at 23:52

## 2023-12-27 RX ADMIN — Medication 3 MILLILITER(S): at 17:01

## 2023-12-27 RX ADMIN — Medication 3: at 11:49

## 2023-12-27 RX ADMIN — Medication 40 MILLIGRAM(S): at 05:02

## 2023-12-27 RX ADMIN — Medication 240 MILLIGRAM(S): at 05:02

## 2023-12-27 RX ADMIN — CELECOXIB 200 MILLIGRAM(S): 200 CAPSULE ORAL at 12:12

## 2023-12-27 RX ADMIN — Medication 40 MILLIGRAM(S): at 17:04

## 2023-12-27 RX ADMIN — SODIUM CHLORIDE 4 MILLILITER(S): 9 INJECTION INTRAMUSCULAR; INTRAVENOUS; SUBCUTANEOUS at 17:01

## 2023-12-27 RX ADMIN — Medication 1: at 08:17

## 2023-12-27 RX ADMIN — Medication 1: at 17:03

## 2023-12-27 NOTE — PROGRESS NOTE ADULT - SUBJECTIVE AND OBJECTIVE BOX
Neurology Progress Note    S: Patient seen and examined. No new events overnight.     MEDICATIONS:    acetaminophen     Tablet .. 650 milliGRAM(s) Oral every 6 hours PRN  albuterol/ipratropium for Nebulization 3 milliLiter(s) Nebulizer every 6 hours  aspirin enteric coated 81 milliGRAM(s) Oral daily  atorvastatin 80 milliGRAM(s) Oral at bedtime  azithromycin   Tablet 250 milliGRAM(s) Oral daily  celecoxib 200 milliGRAM(s) Oral daily  dextrose 5%. 1000 milliLiter(s) IV Continuous <Continuous>  dextrose 5%. 1000 milliLiter(s) IV Continuous <Continuous>  dextrose 50% Injectable 25 Gram(s) IV Push once  dextrose 50% Injectable 12.5 Gram(s) IV Push once  dextrose 50% Injectable 25 Gram(s) IV Push once  dextrose Oral Gel 15 Gram(s) Oral once PRN  DULoxetine 60 milliGRAM(s) Oral daily  gabapentin 600 milliGRAM(s) Oral at bedtime  glucagon  Injectable 1 milliGRAM(s) IntraMuscular once  guaifenesin/dextromethorphan Oral Liquid 10 milliLiter(s) Oral every 6 hours PRN  hydrocodone/homatropine Syrup 5 milliLiter(s) Oral every 6 hours  influenza   Vaccine 0.5 milliLiter(s) IntraMuscular once  insulin lispro (ADMELOG) corrective regimen sliding scale   SubCutaneous three times a day before meals  insulin lispro (ADMELOG) corrective regimen sliding scale   SubCutaneous at bedtime  melatonin 3 milliGRAM(s) Oral at bedtime PRN  methylPREDNISolone sodium succinate Injectable 40 milliGRAM(s) IV Push every 12 hours  montelukast 10 milliGRAM(s) Oral daily  rivaroxaban 20 milliGRAM(s) Oral with dinner  verapamil  milliGRAM(s) Oral daily      LABS:                          7.8    15.66 )-----------( 496      ( 27 Dec 2023 05:42 )             25.0     12-27    137  |  102  |  16  ----------------------------<  183<H>  3.7   |  28  |  0.74    Ca    9.1      27 Dec 2023 05:42  Phos  2.8     12-25  Mg     1.7     12-25    TPro  6.7  /  Alb  1.8<L>  /  TBili  0.2  /  DBili  x   /  AST  24  /  ALT  18  /  AlkPhos  93  12-27    CAPILLARY BLOOD GLUCOSE      POCT Blood Glucose.: 175 mg/dL (27 Dec 2023 07:56)  POCT Blood Glucose.: 179 mg/dL (26 Dec 2023 21:54)  POCT Blood Glucose.: 186 mg/dL (26 Dec 2023 17:00)  POCT Blood Glucose.: 174 mg/dL (26 Dec 2023 11:20)      Urinalysis Basic - ( 27 Dec 2023 05:42 )    Color: x / Appearance: x / SG: x / pH: x  Gluc: 183 mg/dL / Ketone: x  / Bili: x / Urobili: x   Blood: x / Protein: x / Nitrite: x   Leuk Esterase: x / RBC: x / WBC x   Sq Epi: x / Non Sq Epi: x / Bacteria: x      I&O's Summary    26 Dec 2023 07:01  -  27 Dec 2023 07:00  --------------------------------------------------------  IN: 540 mL / OUT: 0 mL / NET: 540 mL      Vital Signs Last 24 Hrs  T(C): 36.4 (27 Dec 2023 10:31), Max: 36.4 (27 Dec 2023 10:31)  T(F): 97.5 (27 Dec 2023 10:31), Max: 97.5 (27 Dec 2023 10:31)  HR: 90 (27 Dec 2023 10:31) (80 - 105)  BP: 116/75 (27 Dec 2023 10:31) (106/78 - 135/77)  BP(mean): --  RR: 18 (27 Dec 2023 10:31) (18 - 18)  SpO2: 98% (27 Dec 2023 10:31) (90% - 98%)    Parameters below as of 27 Dec 2023 10:31  Patient On (Oxygen Delivery Method): nasal cannula        General Exam:   General Appearance: Appropriately dressed and in no acute distress       Head: Normocephalic, atraumatic and no dysmorphic features  Ear, Nose, and Throat: Moist mucous membranes  CVS: S1S2+  Resp: No SOB, no wheeze or rhonchi  Abd: soft NTND  Extremities: No edema, no cyanosis  Skin: No bruises, no rashes     Neurological Exam:  Mental Status: Awake, alert and oriented x 3.  Able to follow simple verbal commands. Able to name and repeat. Speech is fluent but slow  Cranial Nerves: PERRL, EOMI, VFFC, sensation V1-V3 intact,  no obvious facial asymmetry, equal elevation of palate, scm/trap 5/5, tongue is midline on protrusion.. hearing is grossly intact.   Motor: Normal bulk, tone. JIANG, LLE slightly weaker but poor efffort  Sensation: Intact to light touch and pinprick throughout. no right/left confusion. no extinction to tactile on DSS.   Reflexes: 1+ throughout at biceps, brachioradialis, triceps, patellars and ankles bilaterally and equal. No clonus. R toe and L toe were both downgoing.  Coordination: No dysmetria on FNF   Gait: deferred      I personally reviewed the below data/images/labs:      LABS:    < from: CT Brain Stroke Protocol (12.17.23 @ 04:51) >    ACC: [id number] EXAM:  CT BRAIN STROKE PROTOCOL   ORDERED BY: MINA CONROY     PROCEDURE DATE:  12/17/2023          INTERPRETATION:  CLINICAL INFORMATION:  Stroke Code    TECHNIQUE:  Axial CT images were acquired through the head.  Intravenouscontrast: None  Two-dimensional reformats were generated.    COMPARISON STUDY: MRI brain 11/17/2023, CTA head and neck 11/17/2023.    FINDINGS:    There is no CT evidence of acute intracranial hemorrhage, mass effect,   midline shift, or acute, largeterritorial infarct.  The ventricles and   sulci are age-appropriate in size and configuration. The basal cisterns   are patent.    The mastoid air cells and middle ear cavities are grossly clear. The   visualized paranasal sinuses are well aerated.    The calvarium and skull base are grossly intact.    IMPRESSION:  No acute intracranial hemorrhage or mass effect.    Results were discussed with Dr. Conroy by Dr. Gastelum at 4:50 AM on   12/17/2023. with read back followed.    < end of copied text >        < from: CT Angio Neck Stroke Protocol w/ IV Cont (12.17.23 @ 05:12) >    ACC: [id number] EXAM:  CT ANGIO NECK STROKE PROTCL IC   ORDERED BY: MINA CONROY     ACC: [id number] EXAM:  CT BRAIN PERFUSION MAPS STROKE   ORDERED BY: MINA CONROY     ACC: [id number] EXAM:  CT ANGIO BRAIN STROKE PROTC IC   ORDERED BY: MINA CONROY     PROCEDURE DATE:  12/17/2023          INTERPRETATION:  CT PERFUSION, CTA OF THE [place] AND NECK:    INDICATIONS: Stroke code.    TECHNIQUE:    RAPID artificial intelligence was used for perfusion analysis and for   preliminary evaluation of intracranial hemorrhage.    CTA [place]:    After the intravenous power injection of non-ionic contrast material,   serial thin sections were obtained through the intracranial circulation   on a multislice CT scanner.  Images were reformatted using a dedicated 3D   software package and viewed on a dedicated workstation in multiple planes.    CTA NECK:    After the intravenous power injection of non-ionic contrast material,   serial thin sections were obtained through the cervical circulation on a   multislice CT scanner.  Images were reformatted using a dedicated 3D   software package and viewed on a dedicated workstation in multiple planes.      COMPARISON EXAMINATION: Noncontrast head CT performed the same day. CTA   head and neck 11/17/2023    FINDINGS:      CT RAPID PERFUSION:  Markedly degraded by patient motion.    INFARCT CORE: 0 cc    TISSUE AT RISK: 8 cc combined in the left frontal and right temporal lobes    MISMATCH RATIO: N/A      CTA [place]:    ANTERIOR CIRCULATION    ICA  CAVERNOUS, SUPRACLINOID, BIFURCATION SEGMENTS: Patent without flow   limiting stenosis. Atherosclerotic calcifications of the bilateral   cavernous ICA segments.    ANTERIOR CEREBRAL ARTERIES: Bilateral A1, anterior communicating and A2   anterior cerebral arteries are unremarkable in course and caliber without   flow limiting stenosis. Hypoplastic right A1 segment.    MIDDLE CEREBRAL ARTERIES: Patent bilateral M1, M2, and distal MCA   branches without flow limiting stenosis.    POSTERIOR CIRCULATION:    VERTEBRAL ARTERIES: Patent without flow limiting stenosis. Mild focal   narrowing of the right V4 segment.    BASILAR ARTERY: Patent no flow limiting stenosis.    POSTERIOR CEREBRAL ARTERIES: Patent without flow limiting stenosis.    CTA NECK:    GREAT VESSELS: Visualized segments are patent, no flow limiting stenosis.   Bovine aortic arch, normal variant.    COMMON CAROTID ARTERIES:  RIGHT: Patent without flow limiting stenosis  LEFT: Patent without flow limiting stenosis    CAROTID BULBS:  RIGHT: Patent without flow limiting stenosis  LEFT: Patent without flow limiting stenosis    INTERNAL CAROTID ARTERIES:  RIGHT: Patent no evidence for any hemodynamically significant stenosis at   the ICA origin by NASCET criteria. Stable mild ectasia of the proximal   cervical ICA measuring 0.9 cm in diameter. Partial retropharyngeal course.  LEFT: Patent no evidence for any hemodynamically significant stenosis at   the ICA origin by NASCET criteria. Stable fusiform aneurysmal dilatation   of the proximal cervical ICA measuring 1.4 cm in diameter and gradually   tapering in the mid segment. Partial retropharyngeal course.    VERTEBRAL ARTERIES:    RIGHT: Patent no evidence for any flow limiting stenosis.  LEFT: Patent no evidence for any flow limiting stenosis. Left dominant   vertebral system.      SOFT TISSUES: Patchy nonspecific groundglass opacities and consolidations   in the visualized upper lobes.    BONES: Multilevel degenerative changes inthe spine.    IMPRESSION:    CT PERFUSION: Markedly degraded by patient motion. No core infarct. Small   areas of abnormal perfusion in the left frontal and right temporal lobes   not confined to a vascular territory, likely artifactual.    If symptoms persist consider follow up head CT or MRI, MRA  if no   contraindication.    CTA COW:  Patent intracranial circulation without flow limiting stenosis.    CTA NECK: Patent, ECAs, ICAs, no  hemodynamically significant stenosis at    ICA origins by NASCET criteria. Stable fusiform aneurysmal dilatation of   the ICA origins/proximal segments.  Bilateral vertebral arteries are patent without flow limiting stenosis.    Patchy nonspecific groundglass and consolidations in the visualized upper   lobes.    --- End of Report ---      < end of copied text >    < from: MR Head No Cont (12.18.23 @ 12:52) >  IMPRESSION: No acute territorial infarcts are seen    < end of copied text >      EEG Classification / Summary:  Abnormal routine EEG in the awake state.  Mild diffuse slowing.  No epileptiform abnormalities.  Artifacts somewhat limit interpretation.    Clinical Impression:  Mild diffuse cerebral dysfunction is nonspecific in etiology.   Artifacts somewhat limit interpretation.   Neurology Progress Note    S: Patient seen and examined. No new events overnight.     MEDICATIONS:    acetaminophen     Tablet .. 650 milliGRAM(s) Oral every 6 hours PRN  albuterol/ipratropium for Nebulization 3 milliLiter(s) Nebulizer every 6 hours  aspirin enteric coated 81 milliGRAM(s) Oral daily  atorvastatin 80 milliGRAM(s) Oral at bedtime  azithromycin   Tablet 250 milliGRAM(s) Oral daily  celecoxib 200 milliGRAM(s) Oral daily  dextrose 5%. 1000 milliLiter(s) IV Continuous <Continuous>  dextrose 5%. 1000 milliLiter(s) IV Continuous <Continuous>  dextrose 50% Injectable 25 Gram(s) IV Push once  dextrose 50% Injectable 12.5 Gram(s) IV Push once  dextrose 50% Injectable 25 Gram(s) IV Push once  dextrose Oral Gel 15 Gram(s) Oral once PRN  DULoxetine 60 milliGRAM(s) Oral daily  gabapentin 600 milliGRAM(s) Oral at bedtime  glucagon  Injectable 1 milliGRAM(s) IntraMuscular once  guaifenesin/dextromethorphan Oral Liquid 10 milliLiter(s) Oral every 6 hours PRN  hydrocodone/homatropine Syrup 5 milliLiter(s) Oral every 6 hours  influenza   Vaccine 0.5 milliLiter(s) IntraMuscular once  insulin lispro (ADMELOG) corrective regimen sliding scale   SubCutaneous three times a day before meals  insulin lispro (ADMELOG) corrective regimen sliding scale   SubCutaneous at bedtime  melatonin 3 milliGRAM(s) Oral at bedtime PRN  methylPREDNISolone sodium succinate Injectable 40 milliGRAM(s) IV Push every 12 hours  montelukast 10 milliGRAM(s) Oral daily  rivaroxaban 20 milliGRAM(s) Oral with dinner  verapamil  milliGRAM(s) Oral daily      LABS:                          7.8    15.66 )-----------( 496      ( 27 Dec 2023 05:42 )             25.0     12-27    137  |  102  |  16  ----------------------------<  183<H>  3.7   |  28  |  0.74    Ca    9.1      27 Dec 2023 05:42  Phos  2.8     12-25  Mg     1.7     12-25    TPro  6.7  /  Alb  1.8<L>  /  TBili  0.2  /  DBili  x   /  AST  24  /  ALT  18  /  AlkPhos  93  12-27    CAPILLARY BLOOD GLUCOSE      POCT Blood Glucose.: 175 mg/dL (27 Dec 2023 07:56)  POCT Blood Glucose.: 179 mg/dL (26 Dec 2023 21:54)  POCT Blood Glucose.: 186 mg/dL (26 Dec 2023 17:00)  POCT Blood Glucose.: 174 mg/dL (26 Dec 2023 11:20)      Urinalysis Basic - ( 27 Dec 2023 05:42 )    Color: x / Appearance: x / SG: x / pH: x  Gluc: 183 mg/dL / Ketone: x  / Bili: x / Urobili: x   Blood: x / Protein: x / Nitrite: x   Leuk Esterase: x / RBC: x / WBC x   Sq Epi: x / Non Sq Epi: x / Bacteria: x      I&O's Summary    26 Dec 2023 07:01  -  27 Dec 2023 07:00  --------------------------------------------------------  IN: 540 mL / OUT: 0 mL / NET: 540 mL      Vital Signs Last 24 Hrs  T(C): 36.4 (27 Dec 2023 10:31), Max: 36.4 (27 Dec 2023 10:31)  T(F): 97.5 (27 Dec 2023 10:31), Max: 97.5 (27 Dec 2023 10:31)  HR: 90 (27 Dec 2023 10:31) (80 - 105)  BP: 116/75 (27 Dec 2023 10:31) (106/78 - 135/77)  BP(mean): --  RR: 18 (27 Dec 2023 10:31) (18 - 18)  SpO2: 98% (27 Dec 2023 10:31) (90% - 98%)    Parameters below as of 27 Dec 2023 10:31  Patient On (Oxygen Delivery Method): nasal cannula        General Exam:   General Appearance: Appropriately dressed and in no acute distress       Head: Normocephalic, atraumatic and no dysmorphic features  Ear, Nose, and Throat: Moist mucous membranes  CVS: S1S2+  Resp: No SOB, no wheeze or rhonchi  Abd: soft NTND  Extremities: No edema, no cyanosis  Skin: No bruises, no rashes     Neurological Exam:  Mental Status: Awake, alert and oriented x 3.  Able to follow simple verbal commands. Able to name and repeat. Speech is fluent but slow  Cranial Nerves: PERRL, EOMI, VFFC, sensation V1-V3 intact,  no obvious facial asymmetry, equal elevation of palate, scm/trap 5/5, tongue is midline on protrusion.. hearing is grossly intact.   Motor: Normal bulk, tone. JIANG, LLE slightly weaker but poor efffort  Sensation: Intact to light touch and pinprick throughout. no right/left confusion. no extinction to tactile on DSS.   Reflexes: 1+ throughout at biceps, brachioradialis, triceps, patellars and ankles bilaterally and equal. No clonus. R toe and L toe were both downgoing.  Coordination: No dysmetria on FNF   Gait: deferred      I personally reviewed the below data/images/labs:      LABS:    < from: CT Brain Stroke Protocol (12.17.23 @ 04:51) >    ACC: [id number] EXAM:  CT BRAIN STROKE PROTOCOL   ORDERED BY: MINA CONROY     PROCEDURE DATE:  12/17/2023          INTERPRETATION:  CLINICAL INFORMATION:  Stroke Code    TECHNIQUE:  Axial CT images were acquired through the head.  Intravenouscontrast: None  Two-dimensional reformats were generated.    COMPARISON STUDY: MRI brain 11/17/2023, CTA head and neck 11/17/2023.    FINDINGS:    There is no CT evidence of acute intracranial hemorrhage, mass effect,   midline shift, or acute, largeterritorial infarct.  The ventricles and   sulci are age-appropriate in size and configuration. The basal cisterns   are patent.    The mastoid air cells and middle ear cavities are grossly clear. The   visualized paranasal sinuses are well aerated.    The calvarium and skull base are grossly intact.    IMPRESSION:  No acute intracranial hemorrhage or mass effect.    Results were discussed with Dr. Conroy by Dr. Gastelum at 4:50 AM on   12/17/2023. with read back followed.    < end of copied text >        < from: CT Angio Neck Stroke Protocol w/ IV Cont (12.17.23 @ 05:12) >    ACC: [id number] EXAM:  CT ANGIO NECK STROKE PROTCL IC   ORDERED BY: MINA CONROY     ACC: [id number] EXAM:  CT BRAIN PERFUSION MAPS STROKE   ORDERED BY: MINA CONROY     ACC: [id number] EXAM:  CT ANGIO BRAIN STROKE PROTC IC   ORDERED BY: MINA CONROY     PROCEDURE DATE:  12/17/2023          INTERPRETATION:  CT PERFUSION, CTA OF THE [place] OF [place] AND NECK:    INDICATIONS: Stroke code.    TECHNIQUE:    RAPID artificial intelligence was used for perfusion analysis and for   preliminary evaluation of intracranial hemorrhage.    CTA [place]:    After the intravenous power injection of non-ionic contrast material,   serial thin sections were obtained through the intracranial circulation   on a multislice CT scanner.  Images were reformatted using a dedicated 3D   software package and viewed on a dedicated workstation in multiple planes.    CTA NECK:    After the intravenous power injection of non-ionic contrast material,   serial thin sections were obtained through the cervical circulation on a   multislice CT scanner.  Images were reformatted using a dedicated 3D   software package and viewed on a dedicated workstation in multiple planes.      COMPARISON EXAMINATION: Noncontrast head CT performed the same day. CTA   head and neck 11/17/2023    FINDINGS:      CT RAPID PERFUSION:  Markedly degraded by patient motion.    INFARCT CORE: 0 cc    TISSUE AT RISK: 8 cc combined in the left frontal and right temporal lobes    MISMATCH RATIO: N/A      CTA [place] [facility] [place]:    ANTERIOR CIRCULATION    ICA  CAVERNOUS, SUPRACLINOID, BIFURCATION SEGMENTS: Patent without flow   limiting stenosis. Atherosclerotic calcifications of the bilateral   cavernous ICA segments.    ANTERIOR CEREBRAL ARTERIES: Bilateral A1, anterior communicating and A2   anterior cerebral arteries are unremarkable in course and caliber without   flow limiting stenosis. Hypoplastic right A1 segment.    MIDDLE CEREBRAL ARTERIES: Patent bilateral M1, M2, and distal MCA   branches without flow limiting stenosis.    POSTERIOR CIRCULATION:    VERTEBRAL ARTERIES: Patent without flow limiting stenosis. Mild focal   narrowing of the right V4 segment.    BASILAR ARTERY: Patent no flow limiting stenosis.    POSTERIOR CEREBRAL ARTERIES: Patent without flow limiting stenosis.    CTA NECK:    GREAT VESSELS: Visualized segments are patent, no flow limiting stenosis.   Bovine aortic arch, normal variant.    COMMON CAROTID ARTERIES:  RIGHT: Patent without flow limiting stenosis  LEFT: Patent without flow limiting stenosis    CAROTID BULBS:  RIGHT: Patent without flow limiting stenosis  LEFT: Patent without flow limiting stenosis    INTERNAL CAROTID ARTERIES:  RIGHT: Patent no evidence for any hemodynamically significant stenosis at   the ICA origin by NASCET criteria. Stable mild ectasia of the proximal   cervical ICA measuring 0.9 cm in diameter. Partial retropharyngeal course.  LEFT: Patent no evidence for any hemodynamically significant stenosis at   the ICA origin by NASCET criteria. Stable fusiform aneurysmal dilatation   of the proximal cervical ICA measuring 1.4 cm in diameter and gradually   tapering in the mid segment. Partial retropharyngeal course.    VERTEBRAL ARTERIES:    RIGHT: Patent no evidence for any flow limiting stenosis.  LEFT: Patent no evidence for any flow limiting stenosis. Left dominant   vertebral system.      SOFT TISSUES: Patchy nonspecific groundglass opacities and consolidations   in the visualized upper lobes.    BONES: Multilevel degenerative changes inthe spine.    IMPRESSION:    CT PERFUSION: Markedly degraded by patient motion. No core infarct. Small   areas of abnormal perfusion in the left frontal and right temporal lobes   not confined to a vascular territory, likely artifactual.    If symptoms persist consider follow up head CT or MRI, MRA  if no   contraindication.    CTA COW:  Patent intracranial circulation without flow limiting stenosis.    CTA NECK: Patent, ECAs, ICAs, no  hemodynamically significant stenosis at    ICA origins by NASCET criteria. Stable fusiform aneurysmal dilatation of   the ICA origins/proximal segments.  Bilateral vertebral arteries are patent without flow limiting stenosis.    Patchy nonspecific groundglass and consolidations in the visualized upper   lobes.    --- End of Report ---      < end of copied text >    < from: MR Head No Cont (12.18.23 @ 12:52) >  IMPRESSION: No acute territorial infarcts are seen    < end of copied text >      EEG Classification / Summary:  Abnormal routine EEG in the awake state.  Mild diffuse slowing.  No epileptiform abnormalities.  Artifacts somewhat limit interpretation.    Clinical Impression:  Mild diffuse cerebral dysfunction is nonspecific in etiology.   Artifacts somewhat limit interpretation.

## 2023-12-27 NOTE — PROGRESS NOTE ADULT - SUBJECTIVE AND OBJECTIVE BOX
patient seen and examined  on 10 liters  alert  crackles throughout  Review of Systems:  General:denies fever chills, headache, weakness  HEENT: denies blurry vision,diffculty swallowing, difficulty hearing, tinnitus  Cardiovascular: denies chest pain  ,palpitations  Pulmonary:denies shortness of breath, cough, wheezing, hemoptysis  Gastrointestinal: denies abdominal pain, constipation, diarrhea,nausea , vomiting, hematochezia  : denies hematuria, dysuria, or incontinence  Neurological: denies weakness, numbness , tingling, dizziness, tremors  MSK: denies muscle pain, difficulty ambulating, swelling, back pain  skin: denies skin rash, itching, burning, or  skin lesions  Psychiatrical: denies mood disturbances, anxierty, feeling depressed, depression , or difficulty sleeping    Objective:  Vitals  T(C): 36.4 (12-27-23 @ 10:31), Max: 36.4 (12-27-23 @ 10:31)  HR: 90 (12-27-23 @ 10:31) (80 - 105)  BP: 116/75 (12-27-23 @ 10:31) (106/78 - 135/77)  RR: 18 (12-27-23 @ 10:31) (18 - 18)  SpO2: 98% (12-27-23 @ 10:31) (90% - 98%)    Physical Exam:  General: comfortable, no acute distress  HEENT: Atraumatic, no LAD, trachea midline, PERRLA  Cardiovascular: normal s1s2, no murmurs, gallops or fricition rubs  Pulmonary: clear to ausculation Bilaterally, no wheezing , rhonchi  Gastrointestinal: soft non tender non distended, no masses felt, no organomegally  Muscloskeletal: no lower extremity edema, intact bilateral lower extremity pulses  Neurological: CN II-12 intact. No focal weakness  Psychiatrical: normal mood, cooperative  SKIN: no rash, lesions or ulcers    Labs:                          7.8    15.66 )-----------( 496      ( 27 Dec 2023 05:42 )             25.0     12-27    137  |  102  |  16  ----------------------------<  183<H>  3.7   |  28  |  0.74    Ca    9.1      27 Dec 2023 05:42  Phos  2.8     12-25  Mg     1.7     12-25    TPro  6.7  /  Alb  1.8<L>  /  TBili  0.2  /  DBili  x   /  AST  24  /  ALT  18  /  AlkPhos  93  12-27    LIVER FUNCTIONS - ( 27 Dec 2023 05:42 )  Alb: 1.8 g/dL / Pro: 6.7 gm/dL / ALK PHOS: 93 U/L / ALT: 18 U/L / AST: 24 U/L / GGT: x                 Active Medications  MEDICATIONS  (STANDING):  albuterol/ipratropium for Nebulization 3 milliLiter(s) Nebulizer every 6 hours  aspirin enteric coated 81 milliGRAM(s) Oral daily  atorvastatin 80 milliGRAM(s) Oral at bedtime  azithromycin   Tablet 250 milliGRAM(s) Oral daily  celecoxib 200 milliGRAM(s) Oral daily  dextrose 5%. 1000 milliLiter(s) (100 mL/Hr) IV Continuous <Continuous>  dextrose 5%. 1000 milliLiter(s) (50 mL/Hr) IV Continuous <Continuous>  dextrose 50% Injectable 25 Gram(s) IV Push once  dextrose 50% Injectable 12.5 Gram(s) IV Push once  dextrose 50% Injectable 25 Gram(s) IV Push once  DULoxetine 60 milliGRAM(s) Oral daily  furosemide   Injectable 40 milliGRAM(s) IV Push once  gabapentin 600 milliGRAM(s) Oral at bedtime  glucagon  Injectable 1 milliGRAM(s) IntraMuscular once  hydrocodone/homatropine Syrup 5 milliLiter(s) Oral every 6 hours  influenza   Vaccine 0.5 milliLiter(s) IntraMuscular once  insulin lispro (ADMELOG) corrective regimen sliding scale   SubCutaneous three times a day before meals  insulin lispro (ADMELOG) corrective regimen sliding scale   SubCutaneous at bedtime  methylPREDNISolone sodium succinate Injectable 40 milliGRAM(s) IV Push every 12 hours  montelukast 10 milliGRAM(s) Oral daily  rivaroxaban 20 milliGRAM(s) Oral with dinner  sodium chloride 3%  Inhalation 4 milliLiter(s) Inhalation every 12 hours  verapamil  milliGRAM(s) Oral daily    MEDICATIONS  (PRN):  acetaminophen     Tablet .. 650 milliGRAM(s) Oral every 6 hours PRN Temp greater or equal to 38C (100.4F), Mild Pain (1 - 3)  dextrose Oral Gel 15 Gram(s) Oral once PRN Blood Glucose LESS THAN 70 milliGRAM(s)/deciliter  guaifenesin/dextromethorphan Oral Liquid 10 milliLiter(s) Oral every 6 hours PRN Cough  melatonin 3 milliGRAM(s) Oral at bedtime PRN Insomnia

## 2023-12-27 NOTE — PROGRESS NOTE ADULT - ASSESSMENT
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   64 y/o Female  with  PMHx of SLE, HTN, DM type 2, GBS, chronic left foot drop, PE on Xarelto, 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

## 2023-12-27 NOTE — PROGRESS NOTE ADULT - SUBJECTIVE AND OBJECTIVE BOX
INTERVAL HPI/OVERNIGHT EVENTS: no acute events overnight. Pt satting 100% on 12LNC     SUBJECTIVE: Patient seen and examined at bedside. Pt endorses mild SOB with persistent dry hacking cough limiting her sleep at night. Denies chest pain, palpitations, or any sputum production    ROS: All negative except as listed above.    VITAL SIGNS:  ICU Vital Signs Last 24 Hrs  T(C): 36.4 (27 Dec 2023 10:31), Max: 36.4 (27 Dec 2023 10:31)  T(F): 97.5 (27 Dec 2023 10:31), Max: 97.5 (27 Dec 2023 10:31)  HR: 90 (27 Dec 2023 10:31) (80 - 105)  BP: 116/75 (27 Dec 2023 10:31) (106/78 - 135/77)  BP(mean): --  ABP: --  ABP(mean): --  RR: 18 (27 Dec 2023 10:31) (18 - 18)  SpO2: 98% (27 Dec 2023 10:31) (90% - 98%)    O2 Parameters below as of 27 Dec 2023 10:31  Patient On (Oxygen Delivery Method): nasal cannula      12-26 @ 07:01  -  12-27 @ 07:00  --------------------------------------------------------  IN: 540 mL / OUT: 0 mL / NET: 540 mL    12-27 @ 07:01  -  12-27 @ 13:38  --------------------------------------------------------  IN: 240 mL / OUT: 0 mL / NET: 240 mL      CAPILLARY BLOOD GLUCOSE      POCT Blood Glucose.: 272 mg/dL (27 Dec 2023 11:48)      ECG: reviewed.    PHYSICAL EXAM:    GENERAL:  lying in bed ill appearing but not in extremis    HEAD:  Atraumatic, normocephalic  EYES: EOMI, PERRL  NECK: Supple, trachea midline, no JVD  HEART: Regular rate and rhythm, no murmurs, rubs, or gallops  LUNGS: Unlabored respirations.  diminished BS at bases   ABDOMEN: Soft, nontender, nondistended, +BS  EXTREMITIES: 2+ peripheral pulses bilaterally, cap refill<2 secs. No clubbing, cyanosis, or edema  NERVOUS SYSTEM:  A&Ox3, following commands, moving all extremities, no focal deficits       MEDICATIONS:  MEDICATIONS  (STANDING):  albuterol/ipratropium for Nebulization 3 milliLiter(s) Nebulizer every 6 hours  aspirin enteric coated 81 milliGRAM(s) Oral daily  atorvastatin 80 milliGRAM(s) Oral at bedtime  azithromycin   Tablet 250 milliGRAM(s) Oral daily  celecoxib 200 milliGRAM(s) Oral daily  dextrose 5%. 1000 milliLiter(s) (100 mL/Hr) IV Continuous <Continuous>  dextrose 5%. 1000 milliLiter(s) (50 mL/Hr) IV Continuous <Continuous>  dextrose 50% Injectable 25 Gram(s) IV Push once  dextrose 50% Injectable 12.5 Gram(s) IV Push once  dextrose 50% Injectable 25 Gram(s) IV Push once  DULoxetine 60 milliGRAM(s) Oral daily  gabapentin 600 milliGRAM(s) Oral at bedtime  glucagon  Injectable 1 milliGRAM(s) IntraMuscular once  hydrocodone/homatropine Syrup 5 milliLiter(s) Oral every 6 hours  influenza   Vaccine 0.5 milliLiter(s) IntraMuscular once  insulin lispro (ADMELOG) corrective regimen sliding scale   SubCutaneous three times a day before meals  insulin lispro (ADMELOG) corrective regimen sliding scale   SubCutaneous at bedtime  methylPREDNISolone sodium succinate Injectable 40 milliGRAM(s) IV Push every 12 hours  montelukast 10 milliGRAM(s) Oral daily  rivaroxaban 20 milliGRAM(s) Oral with dinner  sodium chloride 3%  Inhalation 4 milliLiter(s) Inhalation every 12 hours  verapamil  milliGRAM(s) Oral daily    MEDICATIONS  (PRN):  acetaminophen     Tablet .. 650 milliGRAM(s) Oral every 6 hours PRN Temp greater or equal to 38C (100.4F), Mild Pain (1 - 3)  dextrose Oral Gel 15 Gram(s) Oral once PRN Blood Glucose LESS THAN 70 milliGRAM(s)/deciliter  guaifenesin/dextromethorphan Oral Liquid 10 milliLiter(s) Oral every 6 hours PRN Cough  melatonin 3 milliGRAM(s) Oral at bedtime PRN Insomnia      ALLERGIES:  Allergies    No Known Allergies    Intolerances    dislikes Glucerna Shake (Other)      LABS:                        7.8    15.66 )-----------( 496      ( 27 Dec 2023 05:42 )             25.0     12-27    137  |  102  |  16  ----------------------------<  183<H>  3.7   |  28  |  0.74    Ca    9.1      27 Dec 2023 05:42  Phos  2.8     12-25  Mg     1.7     12-25    TPro  6.7  /  Alb  1.8<L>  /  TBili  0.2  /  DBili  x   /  AST  24  /  ALT  18  /  AlkPhos  93  12-27      Urinalysis Basic - ( 27 Dec 2023 05:42 )    Color: x / Appearance: x / SG: x / pH: x  Gluc: 183 mg/dL / Ketone: x  / Bili: x / Urobili: x   Blood: x / Protein: x / Nitrite: x   Leuk Esterase: x / RBC: x / WBC x   Sq Epi: x / Non Sq Epi: x / Bacteria: x      Micro:    Culture - Blood (collected 12-22-23 @ 16:15)  Source: .Blood Blood-Peripheral  Preliminary Report (12-26-23 @ 22:01):    No growth at 4 days    Culture - Blood (collected 12-22-23 @ 16:00)  Source: .Blood Blood-Peripheral  Preliminary Report (12-26-23 @ 22:01):    No growth at 4 days          RADIOLOGY & ADDITIONAL TESTS: Reviewed.

## 2023-12-27 NOTE — PROGRESS NOTE ADULT - SUBJECTIVE AND OBJECTIVE BOX
[facility]  INFECTIOUS DISEASES   [address] [place] [address]  Tel: [phone]     Fax: [phone]  ==============================================================================  MD Akbar Monteiro, DO Jazmin Forbes, NP   ==============================================================================      ROSSI ROJAS  [id number]  63y ([birth year])      Interval History:    ROS:    [ ] Unobtainable because:  [ ] All other systems negative except as noted    Constitutional: no fever, no chills  Head: no trauma  Eyes: no vision changes, no eye pain  ENT:  no sore throat, no rhinorrhea  Cardiovascular:  no chest pain, no palpitation  Respiratory:  no SOB, no cough  GI:  no abd pain, no vomiting, no diarrhea  urinary: no dysuria, no hematuria, no flank pain  musculoskeletal:  no joint pain, no joint swelling  skin:  no rash  neurology:  no headache, no seizure, no change in mental status  psych: no anxiety, no depression         Allergies  No Known Allergies        ANTIMICROBIALS:  azithromycin   Tablet 250 daily        Physical Exam:  Vital Signs Last 24 Hrs  T(C): 36.4 (27 Dec 2023 10:31), Max: 36.4 (27 Dec 2023 10:31)  T(F): 97.5 (27 Dec 2023 10:31), Max: 97.5 (27 Dec 2023 10:31)  HR: 90 (27 Dec 2023 10:31) (80 - 105)  BP: 116/75 (27 Dec 2023 10:31) (106/78 - 135/77)  BP(mean): --  RR: 18 (27 Dec 2023 10:31) (18 - 18)  SpO2: 98% (27 Dec 2023 10:31) (90% - 98%)    Parameters below as of 27 Dec 2023 10:31  Patient On (Oxygen Delivery Method): nasal cannula        12-26-23 @ 07:01  -  12-27-23 @ 07:00  --------------------------------------------------------  IN: 540 mL / OUT: 0 mL / NET: 540 mL      General:    NAD,  non toxic  Head: atraumatic, normocephalic  Eye: normal sclera and conjunctiva  ENT:    no oral lesions, neck supple  Cardio:     regular S1, S2,  no murmur  Respiratory:    clear b/l,    no wheezing  abd:     soft,   BS +,   no tenderness  :   no CVAT,  no suprapubic tenderness,   no  penny  Musculoskeletal:   no joint swelling,   no edema  vascular: no central lines, +PIV   Skin:    no rash  Neurologic:     no focal deficit  psych: normal affect    WBC Count: 15.66 K/uL (12-27 @ 05:42)  WBC Count: 11.54 K/uL (12-26 @ 05:15)  WBC Count: 12.42 K/uL (12-25 @ 18:20)  WBC Count: 7.44 K/uL (12-22 @ 07:09)                            7.8    15.66 )-----------( 496      ( 27 Dec 2023 05:42 )             25.0       12-27    137  |  102  |  16  ----------------------------<  183<H>  3.7   |  28  |  0.74    Ca    9.1      27 Dec 2023 05:42  Phos  2.8     12-25  Mg     1.7     12-25    TPro  6.7  /  Alb  1.8<L>  /  TBili  0.2  /  DBili  x   /  AST  24  /  ALT  18  /  AlkPhos  93  12-27      Urinalysis Basic - ( 27 Dec 2023 05:42 )    Color: x / Appearance: x / SG: x / pH: x  Gluc: 183 mg/dL / Ketone: x  / Bili: x / Urobili: x   Blood: x / Protein: x / Nitrite: x   Leuk Esterase: x / RBC: x / WBC x   Sq Epi: x / Non Sq Epi: x / Bacteria: x          Creatinine Trend: 0.74<--, 0.57<--, 0.68<--, 0.65<--, 0.68<--, 0.86<--      MICROBIOLOGY:  v  .Blood Blood-Peripheral  12-22-23   No growth at 4 days  --  --      .Blood Blood-Peripheral  12-22-23   No growth at 4 days  --  --            Rapid RVP Result: NotDetec (12-25 @ 10:15)  Rapid RVP Result: NotDetec (12-21 @ 16:30)        C-Reactive Protein, Serum: 159 (12-25)          Procalcitonin, Serum: 0.24 (12-25-23 @ 18:20)    SARS-CoV-2: NotDetec (12-25-23 @ 10:15)  Rapid RVP Result: NotDetec (12-25-23 @ 10:15)        RADIOLOGY:   [facility]  INFECTIOUS DISEASES   [address] [place] [address]  Tel: [phone]     Fax: [phone]  ==============================================================================  MD Akbar Monteiro, DO Jazmin Forbes, NP   ==============================================================================      ROSSI ROJAS  MRN-[id number]  63y ([birth year])      Interval History:    ROS:    [ ] Unobtainable because:  [ ] All other systems negative except as noted    Constitutional: no fever, no chills  Head: no trauma  Eyes: no vision changes, no eye pain  ENT:  no sore throat, no rhinorrhea  Cardiovascular:  no chest pain, no palpitation  Respiratory:  no SOB, no cough  GI:  no abd pain, no vomiting, no diarrhea  urinary: no dysuria, no hematuria, no flank pain  musculoskeletal:  no joint pain, no joint swelling  skin:  no rash  neurology:  no headache, no seizure, no change in mental status  psych: no anxiety, no depression         Allergies  No Known Allergies        ANTIMICROBIALS:  azithromycin   Tablet 250 daily        Physical Exam:  Vital Signs Last 24 Hrs  T(C): 36.4 (27 Dec 2023 10:31), Max: 36.4 (27 Dec 2023 10:31)  T(F): 97.5 (27 Dec 2023 10:31), Max: 97.5 (27 Dec 2023 10:31)  HR: 90 (27 Dec 2023 10:31) (80 - 105)  BP: 116/75 (27 Dec 2023 10:31) (106/78 - 135/77)  BP(mean): --  RR: 18 (27 Dec 2023 10:31) (18 - 18)  SpO2: 98% (27 Dec 2023 10:31) (90% - 98%)    Parameters below as of 27 Dec 2023 10:31  Patient On (Oxygen Delivery Method): nasal cannula        12-26-23 @ 07:01  -  12-27-23 @ 07:00  --------------------------------------------------------  IN: 540 mL / OUT: 0 mL / NET: 540 mL      General:    NAD,  non toxic  Head: atraumatic, normocephalic  Eye: normal sclera and conjunctiva  ENT:    no oral lesions, neck supple  Cardio:     regular S1, S2,  no murmur  Respiratory:    clear b/l,    no wheezing  abd:     soft,   BS +,   no tenderness  :   no CVAT,  no suprapubic tenderness,   no  penny  Musculoskeletal:   no joint swelling,   no edema  vascular: no central lines, +PIV   Skin:    no rash  Neurologic:     no focal deficit  psych: normal affect    WBC Count: 15.66 K/uL (12-27 @ 05:42)  WBC Count: 11.54 K/uL (12-26 @ 05:15)  WBC Count: 12.42 K/uL (12-25 @ 18:20)  WBC Count: 7.44 K/uL (12-22 @ 07:09)                            7.8    15.66 )-----------( 496      ( 27 Dec 2023 05:42 )             25.0       12-27    137  |  102  |  16  ----------------------------<  183<H>  3.7   |  28  |  0.74    Ca    9.1      27 Dec 2023 05:42  Phos  2.8     12-25  Mg     1.7     12-25    TPro  6.7  /  Alb  1.8<L>  /  TBili  0.2  /  DBili  x   /  AST  24  /  ALT  18  /  AlkPhos  93  12-27      Urinalysis Basic - ( 27 Dec 2023 05:42 )    Color: x / Appearance: x / SG: x / pH: x  Gluc: 183 mg/dL / Ketone: x  / Bili: x / Urobili: x   Blood: x / Protein: x / Nitrite: x   Leuk Esterase: x / RBC: x / WBC x   Sq Epi: x / Non Sq Epi: x / Bacteria: x          Creatinine Trend: 0.74<--, 0.57<--, 0.68<--, 0.65<--, 0.68<--, 0.86<--      MICROBIOLOGY:  v  .Blood Blood-Peripheral  12-22-23   No growth at 4 days  --  --      .Blood Blood-Peripheral  12-22-23   No growth at 4 days  --  --            Rapid RVP Result: NotDetec (12-25 @ 10:15)  Rapid RVP Result: NotDetec (12-21 @ 16:30)        C-Reactive Protein, Serum: 159 (12-25)          Procalcitonin, Serum: 0.24 (12-25-23 @ 18:20)    SARS-CoV-2: NotDetec (12-25-23 @ 10:15)  Rapid RVP Result: NotDetec (12-25-23 @ 10:15)        RADIOLOGY:   [facility]  INFECTIOUS DISEASES   [address] [place] [address]  Tel: [phone]     Fax: [phone]  ==============================================================================  MD Akbar Monteiro, DO Jazmin Forbes, NP   ==============================================================================      ROSSI ROJAS  [id number]  63y ([birth year])      Interval History:  PAtient seen and examined today on tele floor.  she is on 10 L oxygen via NC.  has persistent dry cough and gets sob as soon as she tried to speak.  has been afebrile past 2 days.        ROS:      Constitutional: no fever, no chills  Head: no trauma  Eyes: no vision changes, no eye pain  ENT:  no sore throat, no rhinorrhea  Cardiovascular:  no chest pain, no palpitation  Respiratory: + sob, + cough  GI:  no abd pain, no vomiting, no diarrhea  urinary: no dysuria, no hematuria, no flank pain  musculoskeletal:  no joint pain, no joint swelling  skin:  no rash  neurology:  no headache  psych: no anxiety, no depression         Allergies  No Known drug  Allergies      ANTIMICROBIALS:  azithromycin   Tablet 250 daily      Physical Exam:  Vital Signs Last 24 Hrs  T(C): 36.4 (27 Dec 2023 10:31), Max: 36.4 (27 Dec 2023 10:31)  T(F): 97.5 (27 Dec 2023 10:31), Max: 97.5 (27 Dec 2023 10:31)  HR: 90 (27 Dec 2023 10:31) (80 - 105)  BP: 116/75 (27 Dec 2023 10:31) (106/78 - 135/77)  BP(mean): --  RR: 18 (27 Dec 2023 10:31) (18 - 18)  SpO2: 98% (27 Dec 2023 10:31) (90% - 98%)    Parameters below as of 27 Dec 2023 10:31  Patient On (Oxygen Delivery Method): nasal cannula        12-26-23 @ 07:01  -  12-27-23 @ 07:00  --------------------------------------------------------  IN: 540 mL / OUT: 0 mL / NET: 540 mL      General:    NAD, non toxic, NC 10L  Head: atraumatic, normocephalic  Eyes: normal sclera and conjunctiva  ENT:   no oropharyngeal lesions, no LAD, neck supple  Cardio:    regular S1,S2, no murmur  Respiratory:   +tachypnea, + wheezing , dry cough  abd:   soft, BS +, not tender, no distention  : no CVAT, no penny   Musculoskeletal : no joint swelling, no edema  Skin:    no rash  Neurologic:  answers questions appropriately, follows commands,  no focal deficits  psych: calm behavior         WBC Count: 15.66 K/uL (12-27 @ 05:42)  WBC Count: 11.54 K/uL (12-26 @ 05:15)  WBC Count: 12.42 K/uL (12-25 @ 18:20)  WBC Count: 7.44 K/uL (12-22 @ 07:09)                            7.8    15.66 )-----------( 496      ( 27 Dec 2023 05:42 )             25.0       12-27    137  |  102  |  16  ----------------------------<  183<H>  3.7   |  28  |  0.74    Ca    9.1      27 Dec 2023 05:42  Phos  2.8     12-25  Mg     1.7     12-25    TPro  6.7  /  Alb  1.8<L>  /  TBili  0.2  /  DBili  x   /  AST  24  /  ALT  18  /  AlkPhos  93  12-27      Urinalysis Basic - ( 27 Dec 2023 05:42 )    Color: x / Appearance: x / SG: x / pH: x  Gluc: 183 mg/dL / Ketone: x  / Bili: x / Urobili: x   Blood: x / Protein: x / Nitrite: x   Leuk Esterase: x / RBC: x / WBC x   Sq Epi: x / Non Sq Epi: x / Bacteria: x          Creatinine Trend: 0.74<--, 0.57<--, 0.68<--, 0.65<--, 0.68<--, 0.86<--      MICROBIOLOGY:  v  .Blood Blood-Peripheral  12-22-23   No growth at 4 days  --  --      .Blood Blood-Peripheral  12-22-23   No growth at 4 days  --  --      Rapid RVP Result: NotDetec (12-25 @ 10:15)  Rapid RVP Result: NotDetec (12-21 @ 16:30)        C-Reactive Protein, Serum: 159 (12-25)      Procalcitonin, Serum: 0.24 (12-25-23 @ 18:20)    SARS-CoV-2: NotDetec (12-25-23 @ 10:15)  Rapid RVP Result: NotDetec (12-25-23 @ 10:15)      RADIOLOGY:  < from: Xray Chest 1 View- PORTABLE-Urgent (Xray Chest 1 View- PORTABLE-Urgent .) (12.24.23 @ 14:06) >    ACC: [id number] EXAM:  XR CHEST PORTABLE URGENT 1V   ORDERED BY: CASS CUMMINGS     PROCEDURE DATE:  12/24/2023          INTERPRETATION:  Clinical history: Follow-up    Comparison 12/17/2023    A single frontal view the chest demonstrates interval improvement in   previously noted alveolar opacities although these still remain to some   degree. No effusion or pneumothorax is seen.    IMPRESSION:    Mild improvement in bilateral alveolar opacities which may represent   pneumonia and are still present albeit to a lesser degree.    --- End of Report ---            BUCK VENTURA MD; Attending Radiologist  This document has been electronically signed. Dec 24 2023  9:57PM    < end of copied text >    < from: CT Chest No Cont (12.25.23 @ 22:20) >    ACC: [id number] EXAM:  CT CHEST   ORDERED BY: CADENCE WOODS     PROCEDURE DATE:  12/25/2023          INTERPRETATION:  HISTORY: Asthma. Recommend plasma pneumonia.    EXAMINATION: CT CHEST was performed without IV contrast.    COMPARISON: 7/12/2019.    FINDINGS:    AIRWAYS, LUNGS, PLEURA: Multifocal ground-glass and consolidative   opacities involving all lung lobes, but most severe in the right lower   lobe. Trace right pleural effusion. Clear central airways.    MEDIASTINUM: Normal heart size. No pericardial effusion. Thoracic aorta   normal caliber.  No large mediastinal lymph nodes.    IMAGED ABDOMEN: Unremarkable.    SOFT TISSUES: Unremarkable.    BONES: Unremarkable.      IMPRESSION:.    Multifocal pneumonia.    CT chest follow-up in 3 months recommended to ensure clearing.    --- End of Report ---            TSERING JOHANSEN MD; Attending Radiologist  This document has been electronically signed. Dec 26 2023  6:34AM    < end of copied text >       [facility]  INFECTIOUS DISEASES   [address] [place] [address]  Tel: [phone]     Fax: [phone]  ==============================================================================  MD Akbar Monteiro, DO Jazmin Forbes, NP   ==============================================================================      ROSSI ROJAS  [id number]  63y ([birth year])      Interval History:  PAtient seen and examined today on tele floor.  she is on 10 L oxygen via NC.  has persistent dry cough and gets sob as soon as she tried to speak.  has been afebrile past 2 days.        ROS:      Constitutional: no fever, no chills  Head: no trauma  Eyes: no vision changes, no eye pain  ENT:  no sore throat, no rhinorrhea  Cardiovascular:  no chest pain, no palpitation  Respiratory: + sob, + cough  GI:  no abd pain, no vomiting, no diarrhea  urinary: no dysuria, no hematuria, no flank pain  musculoskeletal:  no joint pain, no joint swelling  skin:  no rash  neurology:  no headache  psych: no anxiety, no depression         Allergies  No Known drug  Allergies      ANTIMICROBIALS:  azithromycin   Tablet 250 daily      Physical Exam:  Vital Signs Last 24 Hrs  T(C): 36.4 (27 Dec 2023 10:31), Max: 36.4 (27 Dec 2023 10:31)  T(F): 97.5 (27 Dec 2023 10:31), Max: 97.5 (27 Dec 2023 10:31)  HR: 90 (27 Dec 2023 10:31) (80 - 105)  BP: 116/75 (27 Dec 2023 10:31) (106/78 - 135/77)  BP(mean): --  RR: 18 (27 Dec 2023 10:31) (18 - 18)  SpO2: 98% (27 Dec 2023 10:31) (90% - 98%)    Parameters below as of 27 Dec 2023 10:31  Patient On (Oxygen Delivery Method): nasal cannula        12-26-23 @ 07:01  -  12-27-23 @ 07:00  --------------------------------------------------------  IN: 540 mL / OUT: 0 mL / NET: 540 mL      General:    NAD, non toxic, NC 10L  Head: atraumatic, normocephalic  Eyes: normal sclera and conjunctiva  ENT:   no oropharyngeal lesions, no LAD, neck supple  Cardio:    regular S1,S2, no murmur  Respiratory:   +tachypnea, + wheezing , dry cough  abd:   soft, BS +, not tender, no distention  : no CVAT, no penny   Musculoskeletal : no joint swelling, no edema  Skin:    no rash  Neurologic:  answers questions appropriately, follows commands,  no focal deficits  psych: calm behavior         WBC Count: 15.66 K/uL (12-27 @ 05:42)  WBC Count: 11.54 K/uL (12-26 @ 05:15)  WBC Count: 12.42 K/uL (12-25 @ 18:20)  WBC Count: 7.44 K/uL (12-22 @ 07:09)                            7.8    15.66 )-----------( 496      ( 27 Dec 2023 05:42 )             25.0       12-27    137  |  102  |  16  ----------------------------<  183<H>  3.7   |  28  |  0.74    Ca    9.1      27 Dec 2023 05:42  Phos  2.8     12-25  Mg     1.7     12-25    TPro  6.7  /  Alb  1.8<L>  /  TBili  0.2  /  DBili  x   /  AST  24  /  ALT  18  /  AlkPhos  93  12-27      Urinalysis Basic - ( 27 Dec 2023 05:42 )    Color: x / Appearance: x / SG: x / pH: x  Gluc: 183 mg/dL / Ketone: x  / Bili: x / Urobili: x   Blood: x / Protein: x / Nitrite: x   Leuk Esterase: x / RBC: x / WBC x   Sq Epi: x / Non Sq Epi: x / Bacteria: x          Creatinine Trend: 0.74<--, 0.57<--, 0.68<--, 0.65<--, 0.68<--, 0.86<--      MICROBIOLOGY:  v  .Blood Blood-Peripheral  12-22-23   No growth at 4 days  --  --      .Blood Blood-Peripheral  12-22-23   No growth at 4 days  --  --      Rapid RVP Result: NotDetec (12-25 @ 10:15)  Rapid RVP Result: NotDetec (12-21 @ 16:30)        C-Reactive Protein, Serum: 159 (12-25)      Procalcitonin, Serum: 0.24 (12-25-23 @ 18:20)    SARS-CoV-2: NotDetec (12-25-23 @ 10:15)  Rapid RVP Result: NotDetec (12-25-23 @ 10:15)      RADIOLOGY:  < from: Xray Chest 1 View- PORTABLE-Urgent (Xray Chest 1 View- PORTABLE-Urgent .) (12.24.23 @ 14:06) >    ACC: [id number] EXAM:  XR CHEST PORTABLE URGENT 1V   ORDERED BY: CASS CUMMINGS     PROCEDURE DATE:  12/24/2023          INTERPRETATION:  Clinical history: Follow-up    Comparison 12/17/2023    A single frontal view the chest demonstrates interval improvement in   previously noted alveolar opacities although these still remain to some   degree. No effusion or pneumothorax is seen.    IMPRESSION:    Mild improvement in bilateral alveolar opacities which may represent   pneumonia and are still present albeit to a lesser degree.    --- End of Report ---            BUCK VENTURA MD; Attending Radiologist  This document has been electronically signed. Dec 24 2023  9:57PM    < end of copied text >    < from: CT Chest No Cont (12.25.23 @ 22:20) >    ACC: [id number] EXAM:  CT CHEST   ORDERED BY: CADENCE WOODS     PROCEDURE DATE:  12/25/2023          INTERPRETATION:  HISTORY: Asthma. Recommend plasma pneumonia.    EXAMINATION: CT CHEST was performed without IV contrast.    COMPARISON: 7/12/2019.    FINDINGS:    AIRWAYS, LUNGS, PLEURA: Multifocal ground-glass and consolidative   opacities involving all lung lobes, but most severe in the right lower   lobe. Trace right pleural effusion. Clear central airways.    MEDIASTINUM: Normal heart size. No pericardial effusion. Thoracic aorta   normal caliber.  No large mediastinal lymph nodes.    IMAGED ABDOMEN: Unremarkable.    SOFT TISSUES: Unremarkable.    BONES: Unremarkable.      IMPRESSION:.    Multifocal pneumonia.    CT chest follow-up in 3 months recommended to ensure clearing.    --- End of Report ---            TSERING JOHANSEN MD; Attending Radiologist  This document has been electronically signed. Dec 26 2023  6:34AM    < end of copied text >

## 2023-12-27 NOTE — PROGRESS NOTE ADULT - ASSESSMENT
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 Of note pt recently developed URI symptoms and was started on Doxy as well as Hycodan, Alprazolam and Fioricet. Of note pt already on Percocet, flexeril for chronic pain, Gabapentin for seizures per  all which she took. Pt being admitted for Acute hypoxic respiratory failure due to Multifocal PNA, AMS r/o TIA/CVA  vs metabolic encephalopathy secondary to polypharmacy and/or hypoxia.      # Acute metabolic encephalopathy   # Acute respiratory failure with multifocal pneumonia.   Acute stroke is ruled out. EEG did not show any epileptiform activity.   Intermittent low grade temp, Seen by ID  s/p iv zosyn , completed 5 days   Added Azithromycin, + mycoplasma   Repeat CT with multifocal PNA  Mental status baseline patient AAO3  although RRT for acute worsening hypoxemia; r/o aspiration vs flash pulm edema  plan:  Cough meds, albuterol PRN  Albuterol PRN  steroids  3 percent  trial of lasix  repeat echo  check trops    ID follow up , pulm consulted     # PE  - c/w Xarelto. no active gross bleeding.     # SLE stable.     # HTN/HLD. controlled with Verapemil, statin    # DM type 2. controlled.   - FS qAC and HS w/ SSI    #Weakness. PT eval>>WINSTON, pt unsure , will follow up     Stopped Elavil, no need for Elavil and Neurontin QHS.  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 Of note pt recently developed URI symptoms and was started on Doxy as well as Hycodan, Alprazolam and Fioricet. Of note pt already on Percocet, flexeril for chronic pain, Gabapentin for seizures per  all which she took. Pt being admitted for Acute hypoxic respiratory failure due to Multifocal PNA, AMS r/o TIA/CVA  vs metabolic encephalopathy secondary to polypharmacy and/or hypoxia.      # Acute metabolic encephalopathy   # Acute respiratory failure with multifocal pneumonia.   Acute stroke is ruled out. EEG did not show any epileptiform activity.   Intermittent low grade temp, Seen by ID  s/p iv zosyn , completed 5 days   Added Azithromycin, + mycoplasma   Repeat CT with multifocal PNA  Mental status baseline patient AAO3  although RRT for acute worsening hypoxemia; r/o aspiration vs flash pulm edema  plan:  Cough meds, albuterol PRN  Albuterol PRN  steroids  3 percent  trial of lasix  repeat echo  check trops    ID follow up , pulm consulted     # PE  - c/w Xarelto. no active gross bleeding.     # SLE stable.     # HTN/HLD. controlled with Verapemil, statin    # DM type 2. controlled.   - FS qAC and HS w/ SSI    #Weakness. PT eval>>WINSTON, pt unsure , will follow up     Stopped Elavil, no need for Elavil and Neurontin QHS.

## 2023-12-27 NOTE — PROGRESS NOTE ADULT - ASSESSMENT
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     multifocal pneumonia on CXR on admission was treated with 5 days of zosyn and improved  but 2 days ago  seems to have become hypoxemic and transferred  to [facility]   is on 10 L oxygen via [place]  is on steroids as per pulmonary   she is on zithromax for mycoplasma since 12/22  Leukocytosis today could be secondary to the steroids  afebrile past 48 hours  RVP negative x 4 on this admission   MRSA PCR negative   Legionella ag - negative  Strep pneumo ag - negative  Mycoplasma IGM positive     plan-  completed 5 days course of Azithromycin po for the treatment of Mycoplasma pna  was resume by medicine team , today is day 7   please d/c zithromax after todays dose   check sputum cx   completed course of zosyn since 12/17 ( 5 days).  follow all culture data   in light of worsening resp function and multifocal pneumonia on most recent chest CT advise to start cefepime .  wean off supplemental O2 as able, pt has O2 at home, has not been using it for a long time, as per pt      All labs and imaging and chart notes reviewed.     All above discussed with patient and patient verbalizes full understanding of all above and agrees with above plan of care.    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     multifocal pneumonia on CXR on admission was treated with 5 days of zosyn and improved  but 2 days ago  seems to have become hypoxemic and transferred  to [facility]   is on 10 L oxygen via [place]  is on steroids as per pulmonary   she is on zithromax for mycoplasma since 12/22  Leukocytosis today could be secondary to the steroids  afebrile past 48 hours  RVP negative x 4 on this admission   MRSA PCR negative   Legionella ag - negative  Strep pneumo ag - negative  Mycoplasma IGM positive     plan-  completed 5 days course of Azithromycin po for the treatment of Mycoplasma pna  was resume by medicine team , today is day 7   please d/c zithromax after todays dose   check sputum cx   completed course of zosyn since 12/17 ( 5 days).  follow all culture data   in light of worsening resp function and multifocal pneumonia on most recent chest CT advise to start cefepime .  wean off supplemental O2 as able, pt has O2 at home, has not been using it for a long time, as per pt      All labs and imaging and chart notes reviewed.     All above discussed with patient and patient verbalizes full understanding of all above and agrees with above plan of care.    Betina Soni MD  Infectious Disease Attending    for any questions please do not hesitate to contact me either via teams or by calling [phone]

## 2023-12-27 NOTE — PROGRESS NOTE ADULT - ASSESSMENT
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 Being treated for URI outpatient. Hypoxic to 84% on RA,  CT head without acute pathology  CTA H/N - bilateral ICA calcifications with ectasia of proximal R ICA and L cerivcal ICA fusiform aneurysm 1.4cm, R V4 narrowing  CTP motion degraded  CXR showed Multifocal PNA  Routine eeg with diffuse slowing    Impression:  AMS likely TME - infectious, metabolic, possible polypharmacy. 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 Being treated for URI outpatient. Hypoxic to 84% on RA,  CT head without acute pathology  CTA H/N - bilateral ICA calcifications with ectasia of proximal R ICA and L cerivcal ICA fusiform aneurysm 1.4cm, R V4 narrowing  CTP motion degraded  CXR showed Multifocal PNA  Routine eeg with diffuse slowing    Impression:  AMS likely TME - infectious, metabolic, possible polypharmacy. Low suspicion for neurovascular etiology, seizure  SLE  PE on xarelto    Plan:  - On Xarelto  - EEG as above  - MRI no acute findings  - would hold sedating meds to monitor mental status   - on gabapentin    Neurology  Office [phone]

## 2023-12-28 LAB
ANION GAP SERPL CALC-SCNC: 6 MMOL/L — SIGNIFICANT CHANGE UP (ref 5–17)
ANION GAP SERPL CALC-SCNC: 6 MMOL/L — SIGNIFICANT CHANGE UP (ref 5–17)
BUN SERPL-MCNC: 22 MG/DL — SIGNIFICANT CHANGE UP (ref 7–23)
BUN SERPL-MCNC: 22 MG/DL — SIGNIFICANT CHANGE UP (ref 7–23)
CALCIUM SERPL-MCNC: 9.6 MG/DL — SIGNIFICANT CHANGE UP (ref 8.5–10.1)
CALCIUM SERPL-MCNC: 9.6 MG/DL — SIGNIFICANT CHANGE UP (ref 8.5–10.1)
CHLORIDE SERPL-SCNC: 106 MMOL/L — SIGNIFICANT CHANGE UP (ref 96–108)
CHLORIDE SERPL-SCNC: 106 MMOL/L — SIGNIFICANT CHANGE UP (ref 96–108)
CO2 SERPL-SCNC: 27 MMOL/L — SIGNIFICANT CHANGE UP (ref 22–31)
CO2 SERPL-SCNC: 27 MMOL/L — SIGNIFICANT CHANGE UP (ref 22–31)
CREAT SERPL-MCNC: 0.84 MG/DL — SIGNIFICANT CHANGE UP (ref 0.5–1.3)
CREAT SERPL-MCNC: 0.84 MG/DL — SIGNIFICANT CHANGE UP (ref 0.5–1.3)
CULTURE RESULTS: ABNORMAL
CULTURE RESULTS: ABNORMAL
EGFR: 78 ML/MIN/1.73M2 — SIGNIFICANT CHANGE UP
EGFR: 78 ML/MIN/1.73M2 — SIGNIFICANT CHANGE UP
GLUCOSE BLDC GLUCOMTR-MCNC: 126 MG/DL — HIGH (ref 70–99)
GLUCOSE BLDC GLUCOMTR-MCNC: 126 MG/DL — HIGH (ref 70–99)
GLUCOSE BLDC GLUCOMTR-MCNC: 149 MG/DL — HIGH (ref 70–99)
GLUCOSE BLDC GLUCOMTR-MCNC: 149 MG/DL — HIGH (ref 70–99)
GLUCOSE BLDC GLUCOMTR-MCNC: 173 MG/DL — HIGH (ref 70–99)
GLUCOSE BLDC GLUCOMTR-MCNC: 173 MG/DL — HIGH (ref 70–99)
GLUCOSE BLDC GLUCOMTR-MCNC: 215 MG/DL — HIGH (ref 70–99)
GLUCOSE BLDC GLUCOMTR-MCNC: 215 MG/DL — HIGH (ref 70–99)
GLUCOSE SERPL-MCNC: 196 MG/DL — HIGH (ref 70–99)
GLUCOSE SERPL-MCNC: 196 MG/DL — HIGH (ref 70–99)
GRAM STN FLD: ABNORMAL
GRAM STN FLD: ABNORMAL
HCT VFR BLD CALC: 26.6 % — LOW (ref 34.5–45)
HCT VFR BLD CALC: 26.6 % — LOW (ref 34.5–45)
HGB BLD-MCNC: 8.4 G/DL — LOW (ref 11.5–15.5)
HGB BLD-MCNC: 8.4 G/DL — LOW (ref 11.5–15.5)
MCHC RBC-ENTMCNC: 26 PG — LOW (ref 27–34)
MCHC RBC-ENTMCNC: 26 PG — LOW (ref 27–34)
MCHC RBC-ENTMCNC: 31.6 G/DL — LOW (ref 32–36)
MCHC RBC-ENTMCNC: 31.6 G/DL — LOW (ref 32–36)
MCV RBC AUTO: 82.4 FL — SIGNIFICANT CHANGE UP (ref 80–100)
MCV RBC AUTO: 82.4 FL — SIGNIFICANT CHANGE UP (ref 80–100)
NRBC # BLD: 0 /100 WBCS — SIGNIFICANT CHANGE UP (ref 0–0)
NRBC # BLD: 0 /100 WBCS — SIGNIFICANT CHANGE UP (ref 0–0)
PLATELET # BLD AUTO: 571 K/UL — HIGH (ref 150–400)
PLATELET # BLD AUTO: 571 K/UL — HIGH (ref 150–400)
POTASSIUM SERPL-MCNC: 4 MMOL/L — SIGNIFICANT CHANGE UP (ref 3.5–5.3)
POTASSIUM SERPL-MCNC: 4 MMOL/L — SIGNIFICANT CHANGE UP (ref 3.5–5.3)
POTASSIUM SERPL-SCNC: 4 MMOL/L — SIGNIFICANT CHANGE UP (ref 3.5–5.3)
POTASSIUM SERPL-SCNC: 4 MMOL/L — SIGNIFICANT CHANGE UP (ref 3.5–5.3)
RBC # BLD: 3.23 M/UL — LOW (ref 3.8–5.2)
RBC # BLD: 3.23 M/UL — LOW (ref 3.8–5.2)
RBC # FLD: 17.2 % — HIGH (ref 10.3–14.5)
RBC # FLD: 17.2 % — HIGH (ref 10.3–14.5)
SODIUM SERPL-SCNC: 139 MMOL/L — SIGNIFICANT CHANGE UP (ref 135–145)
SODIUM SERPL-SCNC: 139 MMOL/L — SIGNIFICANT CHANGE UP (ref 135–145)
SPECIMEN SOURCE: SIGNIFICANT CHANGE UP
SPECIMEN SOURCE: SIGNIFICANT CHANGE UP
WBC # BLD: 16.47 K/UL — HIGH (ref 3.8–10.5)
WBC # BLD: 16.47 K/UL — HIGH (ref 3.8–10.5)
WBC # FLD AUTO: 16.47 K/UL — HIGH (ref 3.8–10.5)
WBC # FLD AUTO: 16.47 K/UL — HIGH (ref 3.8–10.5)

## 2023-12-28 PROCEDURE — 99232 SBSQ HOSP IP/OBS MODERATE 35: CPT

## 2023-12-28 PROCEDURE — 99233 SBSQ HOSP IP/OBS HIGH 50: CPT

## 2023-12-28 PROCEDURE — 93306 TTE W/DOPPLER COMPLETE: CPT | Mod: 26

## 2023-12-28 PROCEDURE — 71250 CT THORAX DX C-: CPT | Mod: 26

## 2023-12-28 RX ORDER — IRON SUCROSE 20 MG/ML
200 INJECTION, SOLUTION INTRAVENOUS ONCE
Refills: 0 | Status: COMPLETED | OUTPATIENT
Start: 2023-12-28 | End: 2023-12-28

## 2023-12-28 RX ORDER — PREGABALIN 225 MG/1
1000 CAPSULE ORAL ONCE
Refills: 0 | Status: COMPLETED | OUTPATIENT
Start: 2023-12-28 | End: 2023-12-28

## 2023-12-28 RX ORDER — SODIUM CHLORIDE 0.65 %
2 AEROSOL, SPRAY (ML) NASAL EVERY 6 HOURS
Refills: 0 | Status: DISCONTINUED | OUTPATIENT
Start: 2023-12-28 | End: 2024-01-10

## 2023-12-28 RX ORDER — SODIUM CHLORIDE 9 MG/ML
4 INJECTION INTRAMUSCULAR; INTRAVENOUS; SUBCUTANEOUS EVERY 6 HOURS
Refills: 0 | Status: DISCONTINUED | OUTPATIENT
Start: 2023-12-28 | End: 2024-01-04

## 2023-12-28 RX ADMIN — PREGABALIN 1000 MICROGRAM(S): 225 CAPSULE ORAL at 11:57

## 2023-12-28 RX ADMIN — Medication 1: at 12:36

## 2023-12-28 RX ADMIN — DULOXETINE HYDROCHLORIDE 60 MILLIGRAM(S): 30 CAPSULE, DELAYED RELEASE ORAL at 12:00

## 2023-12-28 RX ADMIN — ATORVASTATIN CALCIUM 80 MILLIGRAM(S): 80 TABLET, FILM COATED ORAL at 22:40

## 2023-12-28 RX ADMIN — AZITHROMYCIN 250 MILLIGRAM(S): 500 TABLET, FILM COATED ORAL at 12:21

## 2023-12-28 RX ADMIN — Medication 100 MILLIGRAM(S): at 18:01

## 2023-12-28 RX ADMIN — GABAPENTIN 600 MILLIGRAM(S): 400 CAPSULE ORAL at 22:40

## 2023-12-28 RX ADMIN — Medication 2 SPRAY(S): at 12:20

## 2023-12-28 RX ADMIN — Medication 3 MILLIGRAM(S): at 22:40

## 2023-12-28 RX ADMIN — Medication 3 MILLILITER(S): at 23:55

## 2023-12-28 RX ADMIN — SODIUM CHLORIDE 4 MILLILITER(S): 9 INJECTION INTRAMUSCULAR; INTRAVENOUS; SUBCUTANEOUS at 17:07

## 2023-12-28 RX ADMIN — CEFEPIME 100 MILLIGRAM(S): 1 INJECTION, POWDER, FOR SOLUTION INTRAMUSCULAR; INTRAVENOUS at 06:22

## 2023-12-28 RX ADMIN — MONTELUKAST 10 MILLIGRAM(S): 4 TABLET, CHEWABLE ORAL at 12:20

## 2023-12-28 RX ADMIN — Medication 3 MILLILITER(S): at 17:07

## 2023-12-28 RX ADMIN — Medication 240 MILLIGRAM(S): at 06:14

## 2023-12-28 RX ADMIN — Medication 10 MILLIGRAM(S): at 11:56

## 2023-12-28 RX ADMIN — CEFEPIME 100 MILLIGRAM(S): 1 INJECTION, POWDER, FOR SOLUTION INTRAMUSCULAR; INTRAVENOUS at 22:39

## 2023-12-28 RX ADMIN — SODIUM CHLORIDE 4 MILLILITER(S): 9 INJECTION INTRAMUSCULAR; INTRAVENOUS; SUBCUTANEOUS at 23:55

## 2023-12-28 RX ADMIN — RIVAROXABAN 20 MILLIGRAM(S): KIT at 17:08

## 2023-12-28 RX ADMIN — Medication 2: at 08:09

## 2023-12-28 RX ADMIN — Medication 3 MILLILITER(S): at 06:27

## 2023-12-28 RX ADMIN — CELECOXIB 200 MILLIGRAM(S): 200 CAPSULE ORAL at 12:20

## 2023-12-28 RX ADMIN — IRON SUCROSE 110 MILLIGRAM(S): 20 INJECTION, SOLUTION INTRAVENOUS at 12:22

## 2023-12-28 RX ADMIN — SODIUM CHLORIDE 4 MILLILITER(S): 9 INJECTION INTRAMUSCULAR; INTRAVENOUS; SUBCUTANEOUS at 06:29

## 2023-12-28 RX ADMIN — Medication 40 MILLIGRAM(S): at 06:13

## 2023-12-28 RX ADMIN — Medication 2 SPRAY(S): at 17:09

## 2023-12-28 RX ADMIN — CEFEPIME 100 MILLIGRAM(S): 1 INJECTION, POWDER, FOR SOLUTION INTRAMUSCULAR; INTRAVENOUS at 14:19

## 2023-12-28 NOTE — PROGRESS NOTE ADULT - ASSESSMENT
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 Being treated for URI outpatient. Hypoxic to 84% on RA,  CT head without acute pathology  CTA H/N - bilateral ICA calcifications with ectasia of proximal R ICA and L cerivcal ICA fusiform aneurysm 1.4cm, R V4 narrowing  CTP motion degraded  CXR showed Multifocal PNA  Routine eeg with diffuse slowing    Impression:  AMS likely TME - infectious, metabolic, possible polypharmacy. 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 Being treated for URI outpatient. Hypoxic to 84% on RA,  CT head without acute pathology  CTA H/N - bilateral ICA calcifications with ectasia of proximal R ICA and L cerivcal ICA fusiform aneurysm 1.4cm, R V4 narrowing  CTP motion degraded  CXR showed Multifocal PNA  Routine eeg with diffuse slowing    Impression:  AMS likely TME - infectious, metabolic, possible polypharmacy. Low suspicion for neurovascular etiology, seizure  SLE  PE on xarelto    Plan:  - On Xarelto  - EEG as above  - MRI no acute findings  - would hold sedating meds to monitor mental status   - on gabapentin    Neurology  Office [phone]

## 2023-12-28 NOTE — PROGRESS NOTE ADULT - SUBJECTIVE AND OBJECTIVE BOX
Neurology Progress Note    S: Patient seen and examined. No new events overnight.     MEDICATIONS:    acetaminophen     Tablet .. 650 milliGRAM(s) Oral every 6 hours PRN  albuterol    90 MICROgram(s) HFA Inhaler 1 Puff(s) Inhalation every 4 hours PRN  albuterol/ipratropium for Nebulization 3 milliLiter(s) Nebulizer every 6 hours  aspirin enteric coated 81 milliGRAM(s) Oral daily  atorvastatin 80 milliGRAM(s) Oral at bedtime  azithromycin   Tablet 250 milliGRAM(s) Oral daily  cefepime   IVPB 1000 milliGRAM(s) IV Intermittent every 8 hours  celecoxib 200 milliGRAM(s) Oral daily  dextrose 5%. 1000 milliLiter(s) IV Continuous <Continuous>  dextrose 5%. 1000 milliLiter(s) IV Continuous <Continuous>  dextrose 50% Injectable 25 Gram(s) IV Push once  dextrose 50% Injectable 12.5 Gram(s) IV Push once  dextrose 50% Injectable 25 Gram(s) IV Push once  dextrose Oral Gel 15 Gram(s) Oral once PRN  DULoxetine 60 milliGRAM(s) Oral daily  gabapentin 600 milliGRAM(s) Oral at bedtime  glucagon  Injectable 1 milliGRAM(s) IntraMuscular once  guaifenesin/dextromethorphan Oral Liquid 10 milliLiter(s) Oral every 6 hours PRN  hydrocodone/homatropine Syrup 5 milliLiter(s) Oral every 6 hours  influenza   Vaccine 0.5 milliLiter(s) IntraMuscular once  insulin lispro (ADMELOG) corrective regimen sliding scale   SubCutaneous three times a day before meals  insulin lispro (ADMELOG) corrective regimen sliding scale   SubCutaneous at bedtime  melatonin 3 milliGRAM(s) Oral at bedtime PRN  methylPREDNISolone sodium succinate Injectable 40 milliGRAM(s) IV Push every 12 hours  montelukast 10 milliGRAM(s) Oral daily  rivaroxaban 20 milliGRAM(s) Oral with dinner  sodium chloride 0.65% Nasal 2 Spray(s) Both Nostrils every 6 hours  sodium chloride 3%  Inhalation 4 milliLiter(s) Inhalation every 6 hours  verapamil  milliGRAM(s) Oral daily      LABS:                          7.8    15.66 )-----------( 496      ( 27 Dec 2023 05:42 )             25.0     12-27    137  |  102  |  16  ----------------------------<  183<H>  3.7   |  28  |  0.74    Ca    9.1      27 Dec 2023 05:42    TPro  6.7  /  Alb  1.8<L>  /  TBili  0.2  /  DBili  x   /  AST  24  /  ALT  18  /  AlkPhos  93  12-27    CAPILLARY BLOOD GLUCOSE      POCT Blood Glucose.: 215 mg/dL (28 Dec 2023 07:59)  POCT Blood Glucose.: 151 mg/dL (27 Dec 2023 21:20)  POCT Blood Glucose.: 152 mg/dL (27 Dec 2023 16:31)  POCT Blood Glucose.: 272 mg/dL (27 Dec 2023 11:48)      Urinalysis Basic - ( 27 Dec 2023 05:42 )    Color: x / Appearance: x / SG: x / pH: x  Gluc: 183 mg/dL / Ketone: x  / Bili: x / Urobili: x   Blood: x / Protein: x / Nitrite: x   Leuk Esterase: x / RBC: x / WBC x   Sq Epi: x / Non Sq Epi: x / Bacteria: x      I&O's Summary    27 Dec 2023 07:01  -  28 Dec 2023 07:00  --------------------------------------------------------  IN: 480 mL / OUT: 0 mL / NET: 480 mL      Vital Signs Last 24 Hrs  T(C): 36.9 (28 Dec 2023 10:00), Max: 36.9 (28 Dec 2023 10:00)  T(F): 98.4 (28 Dec 2023 10:00), Max: 98.4 (28 Dec 2023 10:00)  HR: 107 (28 Dec 2023 10:00) (89 - 108)  BP: 141/79 (28 Dec 2023 10:00) (134/82 - 148/88)  BP(mean): --  RR: 20 (28 Dec 2023 10:00) (17 - 20)  SpO2: 93% (28 Dec 2023 10:00) (93% - 100%)    Parameters below as of 28 Dec 2023 10:00  Patient On (Oxygen Delivery Method): nasal cannula          General Exam:   General Appearance: Appropriately dressed and in no acute distress       Head: Normocephalic, atraumatic and no dysmorphic features  Ear, Nose, and Throat: Moist mucous membranes  CVS: S1S2+  Resp: No SOB, no wheeze or rhonchi  Abd: soft NTND  Extremities: No edema, no cyanosis  Skin: No bruises, no rashes     Neurological Exam:  Mental Status: Awake, alert and oriented x 3.  Able to follow simple verbal commands. Able to name and repeat. Speech is fluent but slow  Cranial Nerves: PERRL, EOMI, VFFC, sensation V1-V3 intact,  no obvious facial asymmetry, equal elevation of palate, scm/trap 5/5, tongue is midline on protrusion.. hearing is grossly intact.   Motor: Normal bulk, tone. JIANG, LLE slightly weaker but poor efffort  Sensation: Intact to light touch and pinprick throughout. no right/left confusion. no extinction to tactile on DSS.   Reflexes: 1+ throughout at biceps, brachioradialis, triceps, patellars and ankles bilaterally and equal. No clonus. R toe and L toe were both downgoing.  Coordination: No dysmetria on FNF   Gait: deferred      I personally reviewed the below data/images/labs:      LABS:    < from: CT Brain Stroke Protocol (12.17.23 @ 04:51) >    ACC: [id number] EXAM:  CT BRAIN STROKE PROTOCOL   ORDERED BY: MINA CONROY     PROCEDURE DATE:  12/17/2023          INTERPRETATION:  CLINICAL INFORMATION:  Stroke Code    TECHNIQUE:  Axial CT images were acquired through the head.  Intravenouscontrast: None  Two-dimensional reformats were generated.    COMPARISON STUDY: MRI brain 11/17/2023, CTA head and neck 11/17/2023.    FINDINGS:    There is no CT evidence of acute intracranial hemorrhage, mass effect,   midline shift, or acute, largeterritorial infarct.  The ventricles and   sulci are age-appropriate in size and configuration. The basal cisterns   are patent.    The mastoid air cells and middle ear cavities are grossly clear. The   visualized paranasal sinuses are well aerated.    The calvarium and skull base are grossly intact.    IMPRESSION:  No acute intracranial hemorrhage or mass effect.    Results were discussed with Dr. Conroy by Dr. Gastelum at 4:50 AM on   12/17/2023. with read back followed.    < end of copied text >        < from: CT Angio Neck Stroke Protocol w/ IV Cont (12.17.23 @ 05:12) >    ACC: [id number] EXAM:  CT ANGIO NECK STROKE PROTCL IC   ORDERED BY: MINA CONROY     ACC: [id number] EXAM:  CT BRAIN PERFUSION MAPS STROKE   ORDERED BY: MINA CONROY     ACC: [id number] EXAM:  CT ANGIO BRAIN STROKE PROTC IC   ORDERED BY: MINA CONROY     PROCEDURE DATE:  12/17/2023          INTERPRETATION:  CT PERFUSION, CTA OF THE [place] OF [place] AND NECK:    INDICATIONS: Stroke code.    TECHNIQUE:    RAPID artificial intelligence was used for perfusion analysis and for   preliminary evaluation of intracranial hemorrhage.    CTA [place] OF [place]:    After the intravenous power injection of non-ionic contrast material,   serial thin sections were obtained through the intracranial circulation   on a multislice CT scanner.  Images were reformatted using a dedicated 3D   software package and viewed on a dedicated workstation in multiple planes.    CTA NECK:    After the intravenous power injection of non-ionic contrast material,   serial thin sections were obtained through the cervical circulation on a   multislice CT scanner.  Images were reformatted using a dedicated 3D   software package and viewed on a dedicated workstation in multiple planes.      COMPARISON EXAMINATION: Noncontrast head CT performed the same day. CTA   head and neck 11/17/2023    FINDINGS:      CT RAPID PERFUSION:  Markedly degraded by patient motion.    INFARCT CORE: 0 cc    TISSUE AT RISK: 8 cc combined in the left frontal and right temporal lobes    MISMATCH RATIO: N/A      CTA [place] OF [place]:    ANTERIOR CIRCULATION    ICA  CAVERNOUS, SUPRACLINOID, BIFURCATION SEGMENTS: Patent without flow   limiting stenosis. Atherosclerotic calcifications of the bilateral   cavernous ICA segments.    ANTERIOR CEREBRAL ARTERIES: Bilateral A1, anterior communicating and A2   anterior cerebral arteries are unremarkable in course and caliber without   flow limiting stenosis. Hypoplastic right A1 segment.    MIDDLE CEREBRAL ARTERIES: Patent bilateral M1, M2, and distal MCA   branches without flow limiting stenosis.    POSTERIOR CIRCULATION:    VERTEBRAL ARTERIES: Patent without flow limiting stenosis. Mild focal   narrowing of the right V4 segment.    BASILAR ARTERY: Patent no flow limiting stenosis.    POSTERIOR CEREBRAL ARTERIES: Patent without flow limiting stenosis.    CTA NECK:    GREAT VESSELS: Visualized segments are patent, no flow limiting stenosis.   Bovine aortic arch, normal variant.    COMMON CAROTID ARTERIES:  RIGHT: Patent without flow limiting stenosis  LEFT: Patent without flow limiting stenosis    CAROTID BULBS:  RIGHT: Patent without flow limiting stenosis  LEFT: Patent without flow limiting stenosis    INTERNAL CAROTID ARTERIES:  RIGHT: Patent no evidence for any hemodynamically significant stenosis at   the ICA origin by NASCET criteria. Stable mild ectasia of the proximal   cervical ICA measuring 0.9 cm in diameter. Partial retropharyngeal course.  LEFT: Patent no evidence for any hemodynamically significant stenosis at   the ICA origin by NASCET criteria. Stable fusiform aneurysmal dilatation   of the proximal cervical ICA measuring 1.4 cm in diameter and gradually   tapering in the mid segment. Partial retropharyngeal course.    VERTEBRAL ARTERIES:    RIGHT: Patent no evidence for any flow limiting stenosis.  LEFT: Patent no evidence for any flow limiting stenosis. Left dominant   vertebral system.      SOFT TISSUES: Patchy nonspecific groundglass opacities and consolidations   in the visualized upper lobes.    BONES: Multilevel degenerative changes inthe spine.    IMPRESSION:    CT PERFUSION: Markedly degraded by patient motion. No core infarct. Small   areas of abnormal perfusion in the left frontal and right temporal lobes   not confined to a vascular territory, likely artifactual.    If symptoms persist consider follow up head CT or MRI, MRA  if no   contraindication.    CTA COW:  Patent intracranial circulation without flow limiting stenosis.    CTA NECK: Patent, ECAs, ICAs, no  hemodynamically significant stenosis at    ICA origins by NASCET criteria. Stable fusiform aneurysmal dilatation of   the ICA origins/proximal segments.  Bilateral vertebral arteries are patent without flow limiting stenosis.    Patchy nonspecific groundglass and consolidations in the visualized upper   lobes.    --- End of Report ---      < end of copied text >    < from: MR Head No Cont (12.18.23 @ 12:52) >  IMPRESSION: No acute territorial infarcts are seen    < end of copied text >      EEG Classification / Summary:  Abnormal routine EEG in the awake state.  Mild diffuse slowing.  No epileptiform abnormalities.  Artifacts somewhat limit interpretation.    Clinical Impression:  Mild diffuse cerebral dysfunction is nonspecific in etiology.   Artifacts somewhat limit interpretation.   Neurology Progress Note    S: Patient seen and examined. No new events overnight.     MEDICATIONS:    acetaminophen     Tablet .. 650 milliGRAM(s) Oral every 6 hours PRN  albuterol    90 MICROgram(s) HFA Inhaler 1 Puff(s) Inhalation every 4 hours PRN  albuterol/ipratropium for Nebulization 3 milliLiter(s) Nebulizer every 6 hours  aspirin enteric coated 81 milliGRAM(s) Oral daily  atorvastatin 80 milliGRAM(s) Oral at bedtime  azithromycin   Tablet 250 milliGRAM(s) Oral daily  cefepime   IVPB 1000 milliGRAM(s) IV Intermittent every 8 hours  celecoxib 200 milliGRAM(s) Oral daily  dextrose 5%. 1000 milliLiter(s) IV Continuous <Continuous>  dextrose 5%. 1000 milliLiter(s) IV Continuous <Continuous>  dextrose 50% Injectable 25 Gram(s) IV Push once  dextrose 50% Injectable 12.5 Gram(s) IV Push once  dextrose 50% Injectable 25 Gram(s) IV Push once  dextrose Oral Gel 15 Gram(s) Oral once PRN  DULoxetine 60 milliGRAM(s) Oral daily  gabapentin 600 milliGRAM(s) Oral at bedtime  glucagon  Injectable 1 milliGRAM(s) IntraMuscular once  guaifenesin/dextromethorphan Oral Liquid 10 milliLiter(s) Oral every 6 hours PRN  hydrocodone/homatropine Syrup 5 milliLiter(s) Oral every 6 hours  influenza   Vaccine 0.5 milliLiter(s) IntraMuscular once  insulin lispro (ADMELOG) corrective regimen sliding scale   SubCutaneous three times a day before meals  insulin lispro (ADMELOG) corrective regimen sliding scale   SubCutaneous at bedtime  melatonin 3 milliGRAM(s) Oral at bedtime PRN  methylPREDNISolone sodium succinate Injectable 40 milliGRAM(s) IV Push every 12 hours  montelukast 10 milliGRAM(s) Oral daily  rivaroxaban 20 milliGRAM(s) Oral with dinner  sodium chloride 0.65% Nasal 2 Spray(s) Both Nostrils every 6 hours  sodium chloride 3%  Inhalation 4 milliLiter(s) Inhalation every 6 hours  verapamil  milliGRAM(s) Oral daily      LABS:                          7.8    15.66 )-----------( 496      ( 27 Dec 2023 05:42 )             25.0     12-27    137  |  102  |  16  ----------------------------<  183<H>  3.7   |  28  |  0.74    Ca    9.1      27 Dec 2023 05:42    TPro  6.7  /  Alb  1.8<L>  /  TBili  0.2  /  DBili  x   /  AST  24  /  ALT  18  /  AlkPhos  93  12-27    CAPILLARY BLOOD GLUCOSE      POCT Blood Glucose.: 215 mg/dL (28 Dec 2023 07:59)  POCT Blood Glucose.: 151 mg/dL (27 Dec 2023 21:20)  POCT Blood Glucose.: 152 mg/dL (27 Dec 2023 16:31)  POCT Blood Glucose.: 272 mg/dL (27 Dec 2023 11:48)      Urinalysis Basic - ( 27 Dec 2023 05:42 )    Color: x / Appearance: x / SG: x / pH: x  Gluc: 183 mg/dL / Ketone: x  / Bili: x / Urobili: x   Blood: x / Protein: x / Nitrite: x   Leuk Esterase: x / RBC: x / WBC x   Sq Epi: x / Non Sq Epi: x / Bacteria: x      I&O's Summary    27 Dec 2023 07:01  -  28 Dec 2023 07:00  --------------------------------------------------------  IN: 480 mL / OUT: 0 mL / NET: 480 mL      Vital Signs Last 24 Hrs  T(C): 36.9 (28 Dec 2023 10:00), Max: 36.9 (28 Dec 2023 10:00)  T(F): 98.4 (28 Dec 2023 10:00), Max: 98.4 (28 Dec 2023 10:00)  HR: 107 (28 Dec 2023 10:00) (89 - 108)  BP: 141/79 (28 Dec 2023 10:00) (134/82 - 148/88)  BP(mean): --  RR: 20 (28 Dec 2023 10:00) (17 - 20)  SpO2: 93% (28 Dec 2023 10:00) (93% - 100%)    Parameters below as of 28 Dec 2023 10:00  Patient On (Oxygen Delivery Method): nasal cannula          General Exam:   General Appearance: Appropriately dressed and in no acute distress       Head: Normocephalic, atraumatic and no dysmorphic features  Ear, Nose, and Throat: Moist mucous membranes  CVS: S1S2+  Resp: No SOB, no wheeze or rhonchi  Abd: soft NTND  Extremities: No edema, no cyanosis  Skin: No bruises, no rashes     Neurological Exam:  Mental Status: Awake, alert and oriented x 3.  Able to follow simple verbal commands. Able to name and repeat. Speech is fluent but slow  Cranial Nerves: PERRL, EOMI, VFFC, sensation V1-V3 intact,  no obvious facial asymmetry, equal elevation of palate, scm/trap 5/5, tongue is midline on protrusion.. hearing is grossly intact.   Motor: Normal bulk, tone. JIANG, LLE slightly weaker but poor efffort  Sensation: Intact to light touch and pinprick throughout. no right/left confusion. no extinction to tactile on DSS.   Reflexes: 1+ throughout at biceps, brachioradialis, triceps, patellars and ankles bilaterally and equal. No clonus. R toe and L toe were both downgoing.  Coordination: No dysmetria on FNF   Gait: deferred      I personally reviewed the below data/images/labs:      LABS:    < from: CT Brain Stroke Protocol (12.17.23 @ 04:51) >    ACC: [id number] EXAM:  CT BRAIN STROKE PROTOCOL   ORDERED BY: MINA CONROY     PROCEDURE DATE:  12/17/2023          INTERPRETATION:  CLINICAL INFORMATION:  Stroke Code    TECHNIQUE:  Axial CT images were acquired through the head.  Intravenouscontrast: None  Two-dimensional reformats were generated.    COMPARISON STUDY: MRI brain 11/17/2023, CTA head and neck 11/17/2023.    FINDINGS:    There is no CT evidence of acute intracranial hemorrhage, mass effect,   midline shift, or acute, largeterritorial infarct.  The ventricles and   sulci are age-appropriate in size and configuration. The basal cisterns   are patent.    The mastoid air cells and middle ear cavities are grossly clear. The   visualized paranasal sinuses are well aerated.    The calvarium and skull base are grossly intact.    IMPRESSION:  No acute intracranial hemorrhage or mass effect.    Results were discussed with Dr. Conroy by Dr. Gastelum at 4:50 AM on   12/17/2023. with read back followed.    < end of copied text >        < from: CT Angio Neck Stroke Protocol w/ IV Cont (12.17.23 @ 05:12) >    ACC: [id number] EXAM:  CT ANGIO NECK STROKE PROTCL IC   ORDERED BY: MINA CONROY     ACC: [id number] EXAM:  CT BRAIN PERFUSION MAPS STROKE   ORDERED BY: MINA CONROY     ACC: [id number] EXAM:  CT ANGIO BRAIN STROKE PROTC IC   ORDERED BY: MINA CONROY     PROCEDURE DATE:  12/17/2023          INTERPRETATION:  CT PERFUSION, CTA OF THE [place] OF [place] AND NECK:    INDICATIONS: Stroke code.    TECHNIQUE:    RAPID artificial intelligence was used for perfusion analysis and for   preliminary evaluation of intracranial hemorrhage.    CTA [place] OF [place]:    After the intravenous power injection of non-ionic contrast material,   serial thin sections were obtained through the intracranial circulation   on a multislice CT scanner.  Images were reformatted using a dedicated 3D   software package and viewed on a dedicated workstation in multiple planes.    CTA NECK:    After the intravenous power injection of non-ionic contrast material,   serial thin sections were obtained through the cervical circulation on a   multislice CT scanner.  Images were reformatted using a dedicated 3D   software package and viewed on a dedicated workstation in multiple planes.      COMPARISON EXAMINATION: Noncontrast head CT performed the same day. CTA   head and neck 11/17/2023    FINDINGS:      CT RAPID PERFUSION:  Markedly degraded by patient motion.    INFARCT CORE: 0 cc    TISSUE AT RISK: 8 cc combined in the left frontal and right temporal lobes    MISMATCH RATIO: N/A      CTA [place] OF [place]:    ANTERIOR CIRCULATION    ICA  CAVERNOUS, SUPRACLINOID, BIFURCATION SEGMENTS: Patent without flow   limiting stenosis. Atherosclerotic calcifications of the bilateral   cavernous ICA segments.    ANTERIOR CEREBRAL ARTERIES: Bilateral A1, anterior communicating and A2   anterior cerebral arteries are unremarkable in course and caliber without   flow limiting stenosis. Hypoplastic right A1 segment.    MIDDLE CEREBRAL ARTERIES: Patent bilateral M1, M2, and distal MCA   branches without flow limiting stenosis.    POSTERIOR CIRCULATION:    VERTEBRAL ARTERIES: Patent without flow limiting stenosis. Mild focal   narrowing of the right V4 segment.    BASILAR ARTERY: Patent no flow limiting stenosis.    POSTERIOR CEREBRAL ARTERIES: Patent without flow limiting stenosis.    CTA NECK:    GREAT VESSELS: Visualized segments are patent, no flow limiting stenosis.   Bovine aortic arch, normal variant.    COMMON CAROTID ARTERIES:  RIGHT: Patent without flow limiting stenosis  LEFT: Patent without flow limiting stenosis    CAROTID BULBS:  RIGHT: Patent without flow limiting stenosis  LEFT: Patent without flow limiting stenosis    INTERNAL CAROTID ARTERIES:  RIGHT: Patent no evidence for any hemodynamically significant stenosis at   the ICA origin by NASCET criteria. Stable mild ectasia of the proximal   cervical ICA measuring 0.9 cm in diameter. Partial retropharyngeal course.  LEFT: Patent no evidence for any hemodynamically significant stenosis at   the ICA origin by NASCET criteria. Stable fusiform aneurysmal dilatation   of the proximal cervical ICA measuring 1.4 cm in diameter and gradually   tapering in the mid segment. Partial retropharyngeal course.    VERTEBRAL ARTERIES:    RIGHT: Patent no evidence for any flow limiting stenosis.  LEFT: Patent no evidence for any flow limiting stenosis. Left dominant   vertebral system.      SOFT TISSUES: Patchy nonspecific groundglass opacities and consolidations   in the visualized upper lobes.    BONES: Multilevel degenerative changes inthe spine.    IMPRESSION:    CT PERFUSION: Markedly degraded by patient motion. No core infarct. Small   areas of abnormal perfusion in the left frontal and right temporal lobes   not confined to a vascular territory, likely artifactual.    If symptoms persist consider follow up head CT or MRI, MRA  if no   contraindication.    CTA COW:  Patent intracranial circulation without flow limiting stenosis.    CTA NECK: Patent, ECAs, ICAs, no  hemodynamically significant stenosis at    ICA origins by NASCET criteria. Stable fusiform aneurysmal dilatation of   the ICA origins/proximal segments.  Bilateral vertebral arteries are patent without flow limiting stenosis.    Patchy nonspecific groundglass and consolidations in the visualized upper   lobes.    --- End of Report ---      < end of copied text >    < from: MR Head No Cont (12.18.23 @ 12:52) >  IMPRESSION: No acute territorial infarcts are seen    < end of copied text >      EEG Classification / Summary:  Abnormal routine EEG in the awake state.  Mild diffuse slowing.  No epileptiform abnormalities.  Artifacts somewhat limit interpretation.    Clinical Impression:  Mild diffuse cerebral dysfunction is nonspecific in etiology.   Artifacts somewhat limit interpretation.

## 2023-12-28 NOTE — PROGRESS NOTE ADULT - SUBJECTIVE AND OBJECTIVE BOX
[facility]  INFECTIOUS DISEASES   [address] [place] [address]  Tel: [phone]     Fax: [phone]  ==============================================================================  MD Akbar Monteiro, DO Jazmin Forbes, NP   ==============================================================================      ROSSI ROJAS  [id number]  63y ([birth year])      Interval History:    ROS:    [ ] Unobtainable because:  [ ] All other systems negative except as noted    Constitutional: no fever, no chills  Head: no trauma  Eyes: no vision changes, no eye pain  ENT:  no sore throat, no rhinorrhea  Cardiovascular:  no chest pain, no palpitation  Respiratory:  no SOB, no cough  GI:  no abd pain, no vomiting, no diarrhea  urinary: no dysuria, no hematuria, no flank pain  musculoskeletal:  no joint pain, no joint swelling  skin:  no rash  neurology:  no headache, no seizure, no change in mental status  psych: no anxiety, no depression         Allergies  No Known Allergies        ANTIMICROBIALS:  azithromycin   Tablet 250 daily  cefepime   IVPB 1000 every 8 hours        Physical Exam:  Vital Signs Last 24 Hrs  T(C): 36.3 (28 Dec 2023 04:40), Max: 36.7 (27 Dec 2023 23:52)  T(F): 97.4 (28 Dec 2023 04:40), Max: 98 (27 Dec 2023 23:52)  HR: 96 (28 Dec 2023 07:40) (88 - 108)  BP: 148/88 (28 Dec 2023 04:40) (116/75 - 148/88)  BP(mean): --  RR: 17 (28 Dec 2023 04:40) (17 - 18)  SpO2: 100% (28 Dec 2023 06:29) (96% - 100%)    Parameters below as of 28 Dec 2023 06:29  Patient On (Oxygen Delivery Method): nasal cannula        12-27-23 @ 07:01  -  12-28-23 @ 07:00  --------------------------------------------------------  IN: 480 mL / OUT: 0 mL / NET: 480 mL      General:    NAD,  non toxic  Head: atraumatic, normocephalic  Eye: normal sclera and conjunctiva  ENT:    no oral lesions, neck supple  Cardio:     regular S1, S2,  no murmur  Respiratory:    clear b/l,    no wheezing  abd:     soft,   BS +,   no tenderness  :   no CVAT,  no suprapubic tenderness,   no  penny  Musculoskeletal:   no joint swelling,   no edema  vascular: no central lines, +PIV   Skin:    no rash  Neurologic:     no focal deficit  psych: normal affect    WBC Count: 15.66 K/uL (12-27 @ 05:42)  WBC Count: 11.54 K/uL (12-26 @ 05:15)  WBC Count: 12.42 K/uL (12-25 @ 18:20)  WBC Count: 7.44 K/uL (12-22 @ 07:09)                            7.8    15.66 )-----------( 496      ( 27 Dec 2023 05:42 )             25.0       12-27    137  |  102  |  16  ----------------------------<  183<H>  3.7   |  28  |  0.74    Ca    9.1      27 Dec 2023 05:42    TPro  6.7  /  Alb  1.8<L>  /  TBili  0.2  /  DBili  x   /  AST  24  /  ALT  18  /  AlkPhos  93  12-27      Urinalysis Basic - ( 27 Dec 2023 05:42 )    Color: x / Appearance: x / SG: x / pH: x  Gluc: 183 mg/dL / Ketone: x  / Bili: x / Urobili: x   Blood: x / Protein: x / Nitrite: x   Leuk Esterase: x / RBC: x / WBC x   Sq Epi: x / Non Sq Epi: x / Bacteria: x          Creatinine Trend: 0.74<--, 0.57<--, 0.68<--, 0.65<--, 0.68<--, 0.86<--      MICROBIOLOGY:  v  .Sputum Sputum  12-27-23 --  --    Few Squamous epithelial cells per low power field  Few polymorphonuclear leukocytes per low power field  Moderate Yeast per oil power field  Few Gram positive cocci in pairs per oil power field      .Blood Blood-Peripheral  12-22-23   No growth at 5 days  --  --      .Blood Blood-Peripheral  12-22-23   No growth at 5 days  --  --            Rapid RVP Result: NotDetec (12-25 @ 10:15)  Rapid RVP Result: NotDetec (12-21 @ 16:30)        C-Reactive Protein, Serum: 159 (12-25)          Procalcitonin, Serum: 0.24 (12-25-23 @ 18:20)    SARS-CoV-2: NotDetec (12-25-23 @ 10:15)  Rapid RVP Result: NotDetec (12-25-23 @ 10:15)        RADIOLOGY:   [facility]  INFECTIOUS DISEASES   [address] [place] [address]  Tel: [phone]     Fax: [phone]  ==============================================================================  MD Akbar Monteiro, DO Jazmin Forbes, NP   ==============================================================================      ROSSI ROJAS  [id number]  63y ([birth year])      Interval History:    ROS:    [ ] Unobtainable because:  [ ] All other systems negative except as noted    Constitutional: no fever, no chills  Head: no trauma  Eyes: no vision changes, no eye pain  ENT:  no sore throat, no rhinorrhea  Cardiovascular:  no chest pain, no palpitation  Respiratory:  no SOB, no cough  GI:  no abd pain, no vomiting, no diarrhea  urinary: no dysuria, no hematuria, no flank pain  musculoskeletal:  no joint pain, no joint swelling  skin:  no rash  neurology:  no headache, no seizure, no change in mental status  psych: no anxiety, no depression         Allergies  No Known Allergies        ANTIMICROBIALS:  azithromycin   Tablet 250 daily  cefepime   IVPB 1000 every 8 hours        Physical Exam:  Vital Signs Last 24 Hrs  T(C): 36.3 (28 Dec 2023 04:40), Max: 36.7 (27 Dec 2023 23:52)  T(F): 97.4 (28 Dec 2023 04:40), Max: 98 (27 Dec 2023 23:52)  HR: 96 (28 Dec 2023 07:40) (88 - 108)  BP: 148/88 (28 Dec 2023 04:40) (116/75 - 148/88)  BP(mean): --  RR: 17 (28 Dec 2023 04:40) (17 - 18)  SpO2: 100% (28 Dec 2023 06:29) (96% - 100%)    Parameters below as of 28 Dec 2023 06:29  Patient On (Oxygen Delivery Method): nasal cannula        12-27-23 @ 07:01  -  12-28-23 @ 07:00  --------------------------------------------------------  IN: 480 mL / OUT: 0 mL / NET: 480 mL      General:    NAD,  non toxic  Head: atraumatic, normocephalic  Eye: normal sclera and conjunctiva  ENT:    no oral lesions, neck supple  Cardio:     regular S1, S2,  no murmur  Respiratory:    clear b/l,    no wheezing  abd:     soft,   BS +,   no tenderness  :   no CVAT,  no suprapubic tenderness,   no  penny  Musculoskeletal:   no joint swelling,   no edema  vascular: no central lines, +PIV   Skin:    no rash  Neurologic:     no focal deficit  psych: normal affect    WBC Count: 15.66 K/uL (12-27 @ 05:42)  WBC Count: 11.54 K/uL (12-26 @ 05:15)  WBC Count: 12.42 K/uL (12-25 @ 18:20)  WBC Count: 7.44 K/uL (12-22 @ 07:09)                            7.8    15.66 )-----------( 496      ( 27 Dec 2023 05:42 )             25.0       12-27    137  |  102  |  16  ----------------------------<  183<H>  3.7   |  28  |  0.74    Ca    9.1      27 Dec 2023 05:42    TPro  6.7  /  Alb  1.8<L>  /  TBili  0.2  /  DBili  x   /  AST  24  /  ALT  18  /  AlkPhos  93  12-27      Urinalysis Basic - ( 27 Dec 2023 05:42 )    Color: x / Appearance: x / SG: x / pH: x  Gluc: 183 mg/dL / Ketone: x  / Bili: x / Urobili: x   Blood: x / Protein: x / Nitrite: x   Leuk Esterase: x / RBC: x / WBC x   Sq Epi: x / Non Sq Epi: x / Bacteria: x          Creatinine Trend: 0.74<--, 0.57<--, 0.68<--, 0.65<--, 0.68<--, 0.86<--      MICROBIOLOGY:  v  .Sputum Sputum  12-27-23 --  --    Few Squamous epithelial cells per low power field  Few polymorphonuclear leukocytes per low power field  Moderate Yeast per oil power field  Few Gram positive cocci in pairs per oil power field      .Blood Blood-Peripheral  12-22-23   No growth at 5 days  --  --      .Blood Blood-Peripheral  12-22-23   No growth at 5 days  --  --            Rapid RVP Result: NotDetec (12-25 @ 10:15)  Rapid RVP Result: NotDetec (12-21 @ 16:30)        C-Reactive Protein, Serum: 159 (12-25)          Procalcitonin, Serum: 0.24 (12-25-23 @ 18:20)    SARS-CoV-2: NotDetec (12-25-23 @ 10:15)  Rapid RVP Result: NotDetec (12-25-23 @ 10:15)        RADIOLOGY:   [facility]  INFECTIOUS DISEASES   [address] [place] [address]  Tel: [phone]     Fax: [phone]  ==============================================================================  MD Akbar Monteiro, DO Jazmin Forbes, NP   ==============================================================================      ROSSI ROJAS  [id number]  63y ([birth year])      Interval History:  Patient seen and examined today.  remains hypoxemic requiring 11 L oxygen via NC.  continues to have dry cough.  afebrile      ROS:     Constitutional: no fever, no chills  Head: no trauma  Eyes: no vision changes, no eye pain  ENT:  no sore throat, no rhinorrhea  Cardiovascular:  no chest pain, no palpitation  Respiratory:  + cough, +sob  GI:  no abd pain, no vomiting, no diarrhea  urinary: no dysuria, no hematuria, no flank pain  musculoskeletal:  no joint pain, no joint swelling  skin:  no rash  neurology:  no headache      Allergies  No Known Drug Allergies        ANTIMICROBIALS:    azithromycin   Tablet 250 daily  cefepime   IVPB 1000 every 8 hours        Physical Exam:  Vital Signs Last 24 Hrs  T(C): 36.3 (28 Dec 2023 04:40), Max: 36.7 (27 Dec 2023 23:52)  T(F): 97.4 (28 Dec 2023 04:40), Max: 98 (27 Dec 2023 23:52)  HR: 96 (28 Dec 2023 07:40) (88 - 108)  BP: 148/88 (28 Dec 2023 04:40) (116/75 - 148/88)  BP(mean): --  RR: 17 (28 Dec 2023 04:40) (17 - 18)  SpO2: 100% (28 Dec 2023 06:29) (96% - 100%)    Parameters below as of 28 Dec 2023 06:29  Patient On (Oxygen Delivery Method): nasal cannula        12-27-23 @ 07:01  -  12-28-23 @ 07:00  --------------------------------------------------------  IN: 480 mL / OUT: 0 mL / NET: 480 mL      General:    NAD but gets sob  with any movement  Head: atraumatic, normocephalic  Eye: normal sclera and conjunctiva  ENT:    no oral lesions, neck supple  Cardio:     regular S1, S2,  no murmur  Respiratory:    no wheezing, decreased breath sounds at right base up to mid lung region  no crackles  abd:     soft,   BS +,   no tenderness, ND  :   no CVAT,  no suprapubic tenderness  Musculoskeletal:   no joint swelling,   no edema  vascular: no central lines, +PIV   Skin:    no rash  Neurologic:     no focal deficit  psych: normal affect    WBC Count: 15.66 K/uL (12-27 @ 05:42)  WBC Count: 11.54 K/uL (12-26 @ 05:15)  WBC Count: 12.42 K/uL (12-25 @ 18:20)  WBC Count: 7.44 K/uL (12-22 @ 07:09)                            7.8    15.66 )-----------( 496      ( 27 Dec 2023 05:42 )             25.0       12-27    137  |  102  |  16  ----------------------------<  183<H>  3.7   |  28  |  0.74    Ca    9.1      27 Dec 2023 05:42    TPro  6.7  /  Alb  1.8<L>  /  TBili  0.2  /  DBili  x   /  AST  24  /  ALT  18  /  AlkPhos  93  12-27      Urinalysis Basic - ( 27 Dec 2023 05:42 )    Color: x / Appearance: x / SG: x / pH: x  Gluc: 183 mg/dL / Ketone: x  / Bili: x / Urobili: x   Blood: x / Protein: x / Nitrite: x   Leuk Esterase: x / RBC: x / WBC x   Sq Epi: x / Non Sq Epi: x / Bacteria: x          Creatinine Trend: 0.74<--, 0.57<--, 0.68<--, 0.65<--, 0.68<--, 0.86<--      MICROBIOLOGY:  v  .Sputum Sputum  12-27-23 --  --    Few Squamous epithelial cells per low power field  Few polymorphonuclear leukocytes per low power field  Moderate Yeast per oil power field  Few Gram positive cocci in pairs per oil power field      .Blood Blood-Peripheral  12-22-23   No growth at 5 days  --  --      .Blood Blood-Peripheral  12-22-23   No growth at 5 days  --  --            Rapid RVP Result: NotDetec (12-25 @ 10:15)  Rapid RVP Result: NotDetec (12-21 @ 16:30)        C-Reactive Protein, Serum: 159 (12-25)      Procalcitonin, Serum: 0.24 (12-25-23 @ 18:20)    SARS-CoV-2: NotDetec (12-25-23 @ 10:15)  Rapid RVP Result: NotDetec (12-25-23 @ 10:15)        RADIOLOGY:    < from: Xray Chest 1 View-PORTABLE IMMEDIATE (Xray Chest 1 View-PORTABLE IMMEDIATE .) (12.27.23 @ 13:06) >    ACC: [id number] EXAM:  XR CHEST PORTABLE IMMED 1V   ORDERED BY: CLEMENT DENNIS     PROCEDURE DATE:  12/27/2023          INTERPRETATION:  An AP portable semiupright chest x-ray was performed for   worsening pneumonia.    Comparison is made to 12/24/2023.    Once again demonstrated are bilateral upper lobe infiltrates, right   greater than left. There is no significant change on the left side while   the right-sided infiltrates appear somewhat increased. There is no   pneumothorax. There are no pleural effusions. Both christina are obscured by   the infiltrates. The cardiac silhouette is not enlarged. There is no CHF.   The bony thorax remains stable.    IMPRESSION:  1. Increasing right upper lobe infiltrate while that seen in the left   upper loberemains unchanged.    --- End of Report ---            KIKE DIA MD; Attending Radiologist  This document has been electronically signed. Dec 27 2023  5:34PM    < end of copied text >  < from: CT Chest No Cont (12.28.23 @ 10:14) >    ACC: [id number] EXAM:  CT CHEST   ORDERED BY: CLEMENT DENNIS     PROCEDURE DATE:  12/28/2023          INTERPRETATION:  CLINICAL INFORMATION: 63 years  Female with worsening   hypoxemia.    COMPARISON: 12/25/2023    CONTRAST/COMPLICATIONS:  IV Contrast: NONE  Oral Contrast: NONE  Complications: None reported at time of study completion    PROCEDURE:  CT of the Chest was performed.  Sagittal and coronal reformats were performed.    LIMITATION: Evaluation of the vascular structures and christina is limited by   lack of IV contrast.    FINDINGS:    LUNGS AND AIRWAYS: Patent central airways.  Multifocal groundglass and   consolidative opacities similar to prior. Right middle lobe involvement   has increased slightly. Left upper lobe involvement is mildly improved.  PLEURA: Stable trace right pleural effusion. No left pleural effusion.  MEDIASTINUM AND CHRISTINA: No lymphadenopathy.  VESSELS: Within normal limits.  HEART: Heart size is normal. No pericardial effusion.  CHEST WALL AND LOWER NECK: Within normal limits.  VISUALIZED UPPER ABDOMEN: Within normal limits.  BONES: Cervical and thoracic degenerative changes.    IMPRESSION:  Bilateral infiltrates overall unchanged. Mildly increased involvement of   the right middle lobe and mild improvement in the left upper lobe.        --- End of Report ---          SIABELA CLEMENT MD; Attending Radiologist  This document has been electronically signed. Dec 28 2023 10:35AM    < end of copied text >   [facility]  INFECTIOUS DISEASES   [address] [place] [address]  Tel: [phone]     Fax: [phone]  ==============================================================================  MD Akbar Monteiro, DO Jazmin Forbes, NP   ==============================================================================      ROSSI ROJAS  MRN-[id number]  63y ([birth year])      Interval History:  Patient seen and examined today.  remains hypoxemic requiring 11 L oxygen via NC.  continues to have dry cough.  afebrile      ROS:     Constitutional: no fever, no chills  Head: no trauma  Eyes: no vision changes, no eye pain  ENT:  no sore throat, no rhinorrhea  Cardiovascular:  no chest pain, no palpitation  Respiratory:  + cough, +sob  GI:  no abd pain, no vomiting, no diarrhea  urinary: no dysuria, no hematuria, no flank pain  musculoskeletal:  no joint pain, no joint swelling  skin:  no rash  neurology:  no headache      Allergies  No Known Drug Allergies        ANTIMICROBIALS:    azithromycin   Tablet 250 daily  cefepime   IVPB 1000 every 8 hours        Physical Exam:  Vital Signs Last 24 Hrs  T(C): 36.3 (28 Dec 2023 04:40), Max: 36.7 (27 Dec 2023 23:52)  T(F): 97.4 (28 Dec 2023 04:40), Max: 98 (27 Dec 2023 23:52)  HR: 96 (28 Dec 2023 07:40) (88 - 108)  BP: 148/88 (28 Dec 2023 04:40) (116/75 - 148/88)  BP(mean): --  RR: 17 (28 Dec 2023 04:40) (17 - 18)  SpO2: 100% (28 Dec 2023 06:29) (96% - 100%)    Parameters below as of 28 Dec 2023 06:29  Patient On (Oxygen Delivery Method): nasal cannula        12-27-23 @ 07:01  -  12-28-23 @ 07:00  --------------------------------------------------------  IN: 480 mL / OUT: 0 mL / NET: 480 mL      General:    NAD but gets sob  with any movement  Head: atraumatic, normocephalic  Eye: normal sclera and conjunctiva  ENT:    no oral lesions, neck supple  Cardio:     regular S1, S2,  no murmur  Respiratory:    no wheezing, decreased breath sounds at right base up to mid lung region  no crackles  abd:     soft,   BS +,   no tenderness, ND  :   no CVAT,  no suprapubic tenderness  Musculoskeletal:   no joint swelling,   no edema  vascular: no central lines, +PIV   Skin:    no rash  Neurologic:     no focal deficit  psych: normal affect    WBC Count: 15.66 K/uL (12-27 @ 05:42)  WBC Count: 11.54 K/uL (12-26 @ 05:15)  WBC Count: 12.42 K/uL (12-25 @ 18:20)  WBC Count: 7.44 K/uL (12-22 @ 07:09)                            7.8    15.66 )-----------( 496      ( 27 Dec 2023 05:42 )             25.0       12-27    137  |  102  |  16  ----------------------------<  183<H>  3.7   |  28  |  0.74    Ca    9.1      27 Dec 2023 05:42    TPro  6.7  /  Alb  1.8<L>  /  TBili  0.2  /  DBili  x   /  AST  24  /  ALT  18  /  AlkPhos  93  12-27      Urinalysis Basic - ( 27 Dec 2023 05:42 )    Color: x / Appearance: x / SG: x / pH: x  Gluc: 183 mg/dL / Ketone: x  / Bili: x / Urobili: x   Blood: x / Protein: x / Nitrite: x   Leuk Esterase: x / RBC: x / WBC x   Sq Epi: x / Non Sq Epi: x / Bacteria: x          Creatinine Trend: 0.74<--, 0.57<--, 0.68<--, 0.65<--, 0.68<--, 0.86<--      MICROBIOLOGY:  v  .Sputum Sputum  12-27-23 --  --    Few Squamous epithelial cells per low power field  Few polymorphonuclear leukocytes per low power field  Moderate Yeast per oil power field  Few Gram positive cocci in pairs per oil power field      .Blood Blood-Peripheral  12-22-23   No growth at 5 days  --  --      .Blood Blood-Peripheral  12-22-23   No growth at 5 days  --  --            Rapid RVP Result: NotDetec (12-25 @ 10:15)  Rapid RVP Result: NotDetec (12-21 @ 16:30)        C-Reactive Protein, Serum: 159 (12-25)      Procalcitonin, Serum: 0.24 (12-25-23 @ 18:20)    SARS-CoV-2: NotDetec (12-25-23 @ 10:15)  Rapid RVP Result: NotDetec (12-25-23 @ 10:15)        RADIOLOGY:    < from: Xray Chest 1 View-PORTABLE IMMEDIATE (Xray Chest 1 View-PORTABLE IMMEDIATE .) (12.27.23 @ 13:06) >    ACC: [id number] EXAM:  XR CHEST PORTABLE IMMED 1V   ORDERED BY: CLEMENT DENNIS     PROCEDURE DATE:  12/27/2023          INTERPRETATION:  An AP portable semiupright chest x-ray was performed for   worsening pneumonia.    Comparison is made to 12/24/2023.    Once again demonstrated are bilateral upper lobe infiltrates, right   greater than left. There is no significant change on the left side while   the right-sided infiltrates appear somewhat increased. There is no   pneumothorax. There are no pleural effusions. Both christina are obscured by   the infiltrates. The cardiac silhouette is not enlarged. There is no CHF.   The bony thorax remains stable.    IMPRESSION:  1. Increasing right upper lobe infiltrate while that seen in the left   upper loberemains unchanged.    --- End of Report ---            KIKE DIA MD; Attending Radiologist  This document has been electronically signed. Dec 27 2023  5:34PM    < end of copied text >  < from: CT Chest No Cont (12.28.23 @ 10:14) >    ACC: [id number] EXAM:  CT CHEST   ORDERED BY: CLEMENT DENNIS     PROCEDURE DATE:  12/28/2023          INTERPRETATION:  CLINICAL INFORMATION: 63 years  Female with worsening   hypoxemia.    COMPARISON: 12/25/2023    CONTRAST/COMPLICATIONS:  IV Contrast: NONE  Oral Contrast: NONE  Complications: None reported at time of study completion    PROCEDURE:  CT of the Chest was performed.  Sagittal and coronal reformats were performed.    LIMITATION: Evaluation of the vascular structures and christina is limited by   lack of IV contrast.    FINDINGS:    LUNGS AND AIRWAYS: Patent central airways.  Multifocal groundglass and   consolidative opacities similar to prior. Right middle lobe involvement   has increased slightly. Left upper lobe involvement is mildly improved.  PLEURA: Stable trace right pleural effusion. No left pleural effusion.  MEDIASTINUM AND CHRISTINA: No lymphadenopathy.  VESSELS: Within normal limits.  HEART: Heart size is normal. No pericardial effusion.  CHEST WALL AND LOWER NECK: Within normal limits.  VISUALIZED UPPER ABDOMEN: Within normal limits.  BONES: Cervical and thoracic degenerative changes.    IMPRESSION:  Bilateral infiltrates overall unchanged. Mildly increased involvement of   the right middle lobe and mild improvement in the left upper lobe.        --- End of Report ---          ISABELA CLEMENT MD; Attending Radiologist  This document has been electronically signed. Dec 28 2023 10:35AM    < end of copied text >

## 2023-12-28 NOTE — PROGRESS NOTE ADULT - SUBJECTIVE AND OBJECTIVE BOX
Patient seen and examined  on 12  liters      Objective:  Vitals  T(C): 36.9 (12-28-23 @ 10:00), Max: 36.9 (12-28-23 @ 10:00)  HR: 107 (12-28-23 @ 10:00) (89 - 108)  BP: 141/79 (12-28-23 @ 10:00) (134/82 - 148/88)  RR: 20 (12-28-23 @ 10:00) (17 - 20)  SpO2: 93% (12-28-23 @ 10:00) (93% - 100%)    Physical Exam:  General: comfortable, no acute distress  HEENT: Atraumatic, no LAD, trachea midline, PERRLA  Cardiovascular: normal s1s2, no murmurs, gallops or fricition rubs  Pulmonary: decreased no wheezing , rhonchi  Gastrointestinal: soft non tender non distended, no masses felt, no organomegally  Muscloskeletal: no lower extremity edema, intact bilateral lower extremity pulses  Neurological: CN II-12 intact. No focal weakness  Psychiatrical: normal mood, cooperative  SKIN: no rash, lesions or ulcers    Labs:                          7.8    15.66 )-----------( 496      ( 27 Dec 2023 05:42 )             25.0     12-27    137  |  102  |  16  ----------------------------<  183<H>  3.7   |  28  |  0.74    Ca    9.1      27 Dec 2023 05:42    TPro  6.7  /  Alb  1.8<L>  /  TBili  0.2  /  DBili  x   /  AST  24  /  ALT  18  /  AlkPhos  93  12-27    LIVER FUNCTIONS - ( 27 Dec 2023 05:42 )  Alb: 1.8 g/dL / Pro: 6.7 gm/dL / ALK PHOS: 93 U/L / ALT: 18 U/L / AST: 24 U/L / GGT: x                 Active Medications  MEDICATIONS  (STANDING):  albuterol/ipratropium for Nebulization 3 milliLiter(s) Nebulizer every 6 hours  aspirin enteric coated 81 milliGRAM(s) Oral daily  atorvastatin 80 milliGRAM(s) Oral at bedtime  azithromycin   Tablet 250 milliGRAM(s) Oral daily  cefepime   IVPB 1000 milliGRAM(s) IV Intermittent every 8 hours  celecoxib 200 milliGRAM(s) Oral daily  dextrose 5%. 1000 milliLiter(s) (100 mL/Hr) IV Continuous <Continuous>  dextrose 5%. 1000 milliLiter(s) (50 mL/Hr) IV Continuous <Continuous>  dextrose 50% Injectable 25 Gram(s) IV Push once  dextrose 50% Injectable 12.5 Gram(s) IV Push once  dextrose 50% Injectable 25 Gram(s) IV Push once  DULoxetine 60 milliGRAM(s) Oral daily  gabapentin 600 milliGRAM(s) Oral at bedtime  glucagon  Injectable 1 milliGRAM(s) IntraMuscular once  hydrocodone/homatropine Syrup 5 milliLiter(s) Oral every 6 hours  influenza   Vaccine 0.5 milliLiter(s) IntraMuscular once  insulin lispro (ADMELOG) corrective regimen sliding scale   SubCutaneous three times a day before meals  insulin lispro (ADMELOG) corrective regimen sliding scale   SubCutaneous at bedtime  methylPREDNISolone sodium succinate Injectable 40 milliGRAM(s) IV Push every 12 hours  montelukast 10 milliGRAM(s) Oral daily  rivaroxaban 20 milliGRAM(s) Oral with dinner  sodium chloride 0.65% Nasal 2 Spray(s) Both Nostrils every 6 hours  sodium chloride 3%  Inhalation 4 milliLiter(s) Inhalation every 6 hours  verapamil  milliGRAM(s) Oral daily    MEDICATIONS  (PRN):  acetaminophen     Tablet .. 650 milliGRAM(s) Oral every 6 hours PRN Temp greater or equal to 38C (100.4F), Mild Pain (1 - 3)  albuterol    90 MICROgram(s) HFA Inhaler 1 Puff(s) Inhalation every 4 hours PRN Shortness of Breath and/or Wheezing  dextrose Oral Gel 15 Gram(s) Oral once PRN Blood Glucose LESS THAN 70 milliGRAM(s)/deciliter  guaifenesin/dextromethorphan Oral Liquid 10 milliLiter(s) Oral every 6 hours PRN Cough  melatonin 3 milliGRAM(s) Oral at bedtime PRN Insomnia

## 2023-12-28 NOTE — PROGRESS NOTE ADULT - ASSESSMENT
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     multifocal pneumonia on CXR on admission was treated with 5 days of zosyn and improved  but  few days ago seems to have become hypoxemic and transferred  to [facility]   is on 11 L oxygen via NC  is on steroids as per pulmonary   completed 7 days of zithromax for mycoplasma   Leukocytosis today could be secondary to the steroids partially  afebrile past 72 hours  RVP negative x 4 on this admission   MRSA PCR negative   Legionella ag - negative  Strep pneumo ag - negative  Mycoplasma IGM positive   sputum cx- negative    worsening pneumonia as reported on CT on the right lung( please see full report above)    plan-  check another sputum cx.  in light of worsening resp function and multifocal pneumonia on most recent chest CT started cefepime yesterday, day #2  advise to start doxycyline for atypical coverage as patient sob and eventhough completed zithromax for mycoplasma she is not better clinically.  monitor O2 sat and wbc closely.  pulmonary follow up appreciated.    All labs and imaging and chart notes reviewed.     All above discussed with patient and patient verbalizes full understanding of all above and agrees with above plan of care.    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     multifocal pneumonia on CXR on admission was treated with 5 days of zosyn and improved  but  few days ago seems to have become hypoxemic and transferred  to [facility]   is on 11 L oxygen via [place]  is on steroids as per pulmonary   completed 7 days of zithromax for mycoplasma   Leukocytosis today could be secondary to the steroids partially  afebrile past 72 hours  RVP negative x 4 on this admission   MRSA PCR negative   Legionella ag - negative  Strep pneumo ag - negative  Mycoplasma IGM positive   sputum cx- negative    worsening pneumonia as reported on CT on the right lung( please see full report above)    plan-  check another sputum cx.  in light of worsening resp function and multifocal pneumonia on most recent chest CT started cefepime yesterday, day #2  advise to start doxycyline for atypical coverage as patient sob and eventhough completed zithromax for mycoplasma she is not better clinically.  monitor O2 sat and wbc closely.  pulmonary follow up appreciated.    All labs and imaging and chart notes reviewed.     All above discussed with patient and patient verbalizes full understanding of all above and agrees with above plan of care.    Betina Soni MD  Infectious Disease Attending    for any questions please do not hesitate to contact me either via teams or by calling [phone]

## 2023-12-28 NOTE — PROGRESS NOTE ADULT - ASSESSMENT
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 as well as slurred speech, Seizure disorder, admitted with respiratory failure 2/2 myocplasma pneumonia       Dx: acute hypoxic respiratory failure 2/2 mycoplasma pneumonia      - CT chest with multifocal pneumonia   - yesterday pt on 12L NC - today pt seen on room air after returning from CT and was satting mid 90s - down to 90% while talking during exam  - placed on 2L NC, wean O2 for sat>92%  - Mycoplasma IgM positive, s/p course of azithromycin  - cefepime started yesterday per ID, doxycycline added today for further mycoplasma coverage - abx per ID  - cont duonebs q6hr/hypersal nebs for airway clearance and encourage incentive spirometry  - no wheezing, will wean steroids to pred 40mg PO      SHADY attending Shazia

## 2023-12-28 NOTE — PROGRESS NOTE ADULT - ASSESSMENT
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 Of note pt recently developed URI symptoms and was started on Doxy as well as Hycodan, Alprazolam and Fioricet. Of note pt already on Percocet, flexeril for chronic pain, Gabapentin for seizures per  all which she took. Pt being admitted for Acute hypoxic respiratory failure due to Multifocal PNA, AMS r/o TIA/CVA  vs metabolic encephalopathy secondary to polypharmacy and/or hypoxia.      # Acute metabolic encephalopathy   # Acute respiratory failure with multifocal pneumonia.   Acute stroke is ruled out. EEG did not show any epileptiform activity.   Intermittent low grade temp, Seen by ID  s/p iv zosyn , completed 5 days   Added Azithromycin, + mycoplasma   Repeat CT with multifocal PNA  Mental status baseline patient AAO3  although RRT for acute worsening hypoxemia; r/o aspiration vs flash pulm edema  plan:  Cough meds, albuterol PRN  Albuterol PRN  steroids  3 percent  trial of lasix  repeat echo    ID follow up , pulm consulted     # PE  - c/w Xarelto. no active gross bleeding.     # SLE stable.     # HTN/HLD. controlled with Verapemil, statin    # DM type 2. controlled.   - FS qAC and HS w/ SSI    #Weakness. PT eval>>WINSTON, pt unsure , will follow up     Stopped Elavil, no need for Elavil and Neurontin QHS.

## 2023-12-28 NOTE — PROGRESS NOTE ADULT - SUBJECTIVE AND OBJECTIVE BOX
CHIEF COMPLAINT: Patient is a 63y old  Female who presents with a chief complaint of AMS, HCAP, Acute Hypoxic respiratory failure (27 Dec 2023 13:37)      Interval Events:        OBJECTIVE:  ICU Vital Signs Last 24 Hrs  T(C): 36.3 (28 Dec 2023 04:40), Max: 36.7 (27 Dec 2023 23:52)  T(F): 97.4 (28 Dec 2023 04:40), Max: 98 (27 Dec 2023 23:52)  HR: 96 (28 Dec 2023 07:40) (88 - 108)  BP: 148/88 (28 Dec 2023 04:40) (116/75 - 148/88)  BP(mean): --  ABP: --  ABP(mean): --  RR: 17 (28 Dec 2023 04:40) (17 - 18)  SpO2: 100% (28 Dec 2023 06:29) (96% - 100%)    O2 Parameters below as of 28 Dec 2023 06:29  Patient On (Oxygen Delivery Method): nasal cannula      12-27 @ 07:01  -  12-28 @ 07:00  --------------------------------------------------------  IN: 480 mL / OUT: 0 mL / NET: 480 mL      CAPILLARY BLOOD GLUCOSE  POCT Blood Glucose.: 215 mg/dL (28 Dec 2023 07:59)      HOSPITAL MEDICATIONS:  aspirin enteric coated 81 milliGRAM(s) Oral daily  rivaroxaban 20 milliGRAM(s) Oral with dinner    azithromycin   Tablet 250 milliGRAM(s) Oral daily  cefepime   IVPB 1000 milliGRAM(s) IV Intermittent every 8 hours    verapamil  milliGRAM(s) Oral daily    atorvastatin 80 milliGRAM(s) Oral at bedtime  dextrose 50% Injectable 25 Gram(s) IV Push once  dextrose 50% Injectable 12.5 Gram(s) IV Push once  dextrose 50% Injectable 25 Gram(s) IV Push once  dextrose Oral Gel 15 Gram(s) Oral once PRN  glucagon  Injectable 1 milliGRAM(s) IntraMuscular once  insulin lispro (ADMELOG) corrective regimen sliding scale   SubCutaneous three times a day before meals  insulin lispro (ADMELOG) corrective regimen sliding scale   SubCutaneous at bedtime  methylPREDNISolone sodium succinate Injectable 40 milliGRAM(s) IV Push every 12 hours    albuterol    90 MICROgram(s) HFA Inhaler 1 Puff(s) Inhalation every 4 hours PRN  albuterol/ipratropium for Nebulization 3 milliLiter(s) Nebulizer every 6 hours  guaifenesin/dextromethorphan Oral Liquid 10 milliLiter(s) Oral every 6 hours PRN  hydrocodone/homatropine Syrup 5 milliLiter(s) Oral every 6 hours  montelukast 10 milliGRAM(s) Oral daily  sodium chloride 3%  Inhalation 4 milliLiter(s) Inhalation every 6 hours    acetaminophen     Tablet .. 650 milliGRAM(s) Oral every 6 hours PRN  celecoxib 200 milliGRAM(s) Oral daily  DULoxetine 60 milliGRAM(s) Oral daily  gabapentin 600 milliGRAM(s) Oral at bedtime  melatonin 3 milliGRAM(s) Oral at bedtime PRN      dextrose 5%. 1000 milliLiter(s) IV Continuous <Continuous>  dextrose 5%. 1000 milliLiter(s) IV Continuous <Continuous>    influenza   Vaccine 0.5 milliLiter(s) IntraMuscular once          LABS:                        7.8    15.66 )-----------( 496      ( 27 Dec 2023 05:42 )             25.0     Hgb Trend: 7.8<--, 8.2<--, 8.3<--, 8.6<--  12-27    137  |  102  |  16  ----------------------------<  183<H>  3.7   |  28  |  0.74    Ca    9.1      27 Dec 2023 05:42    TPro  6.7  /  Alb  1.8<L>  /  TBili  0.2  /  DBili  x   /  AST  24  /  ALT  18  /  AlkPhos  93  12-27    Creatinine Trend: 0.74<--, 0.57<--, 0.68<--, 0.65<--, 0.68<--, 0.86<--    Urinalysis Basic - ( 27 Dec 2023 05:42 )    Color: x / Appearance: x / SG: x / pH: x  Gluc: 183 mg/dL / Ketone: x  / Bili: x / Urobili: x   Blood: x / Protein: x / Nitrite: x   Leuk Esterase: x / RBC: x / WBC x   Sq Epi: x / Non Sq Epi: x / Bacteria: x      Arterial Blood Gas:  12-27 @ 15:30  7.46/42/83/30/96.6/5.4  ABG lactate: --       CHIEF COMPLAINT: Patient is a 63y old  Female who presents with a chief complaint of AMS, HCAP, Acute Hypoxic respiratory failure (27 Dec 2023 13:37)      Interval Events:  had another CT chest done showing pneumonia  states she feels better than yesterday      OBJECTIVE:  ICU Vital Signs Last 24 Hrs  T(C): 36.3 (28 Dec 2023 04:40), Max: 36.7 (27 Dec 2023 23:52)  T(F): 97.4 (28 Dec 2023 04:40), Max: 98 (27 Dec 2023 23:52)  HR: 96 (28 Dec 2023 07:40) (88 - 108)  BP: 148/88 (28 Dec 2023 04:40) (116/75 - 148/88)  BP(mean): --  ABP: --  ABP(mean): --  RR: 17 (28 Dec 2023 04:40) (17 - 18)  SpO2: 100% (28 Dec 2023 06:29) (96% - 100%)    O2 Parameters below as of 28 Dec 2023 06:29  Patient On (Oxygen Delivery Method): nasal cannula      12-27 @ 07:01  -  12-28 @ 07:00  --------------------------------------------------------  IN: 480 mL / OUT: 0 mL / NET: 480 mL      CAPILLARY BLOOD GLUCOSE  POCT Blood Glucose.: 215 mg/dL (28 Dec 2023 07:59)      HOSPITAL MEDICATIONS:  aspirin enteric coated 81 milliGRAM(s) Oral daily  rivaroxaban 20 milliGRAM(s) Oral with dinner    azithromycin   Tablet 250 milliGRAM(s) Oral daily  cefepime   IVPB 1000 milliGRAM(s) IV Intermittent every 8 hours    verapamil  milliGRAM(s) Oral daily    atorvastatin 80 milliGRAM(s) Oral at bedtime  dextrose 50% Injectable 25 Gram(s) IV Push once  dextrose 50% Injectable 12.5 Gram(s) IV Push once  dextrose 50% Injectable 25 Gram(s) IV Push once  dextrose Oral Gel 15 Gram(s) Oral once PRN  glucagon  Injectable 1 milliGRAM(s) IntraMuscular once  insulin lispro (ADMELOG) corrective regimen sliding scale   SubCutaneous three times a day before meals  insulin lispro (ADMELOG) corrective regimen sliding scale   SubCutaneous at bedtime  methylPREDNISolone sodium succinate Injectable 40 milliGRAM(s) IV Push every 12 hours    albuterol    90 MICROgram(s) HFA Inhaler 1 Puff(s) Inhalation every 4 hours PRN  albuterol/ipratropium for Nebulization 3 milliLiter(s) Nebulizer every 6 hours  guaifenesin/dextromethorphan Oral Liquid 10 milliLiter(s) Oral every 6 hours PRN  hydrocodone/homatropine Syrup 5 milliLiter(s) Oral every 6 hours  montelukast 10 milliGRAM(s) Oral daily  sodium chloride 3%  Inhalation 4 milliLiter(s) Inhalation every 6 hours    acetaminophen     Tablet .. 650 milliGRAM(s) Oral every 6 hours PRN  celecoxib 200 milliGRAM(s) Oral daily  DULoxetine 60 milliGRAM(s) Oral daily  gabapentin 600 milliGRAM(s) Oral at bedtime  melatonin 3 milliGRAM(s) Oral at bedtime PRN      dextrose 5%. 1000 milliLiter(s) IV Continuous <Continuous>  dextrose 5%. 1000 milliLiter(s) IV Continuous <Continuous>    influenza   Vaccine 0.5 milliLiter(s) IntraMuscular once          LABS:                        7.8    15.66 )-----------( 496      ( 27 Dec 2023 05:42 )             25.0     Hgb Trend: 7.8<--, 8.2<--, 8.3<--, 8.6<--  12-27    137  |  102  |  16  ----------------------------<  183<H>  3.7   |  28  |  0.74    Ca    9.1      27 Dec 2023 05:42    TPro  6.7  /  Alb  1.8<L>  /  TBili  0.2  /  DBili  x   /  AST  24  /  ALT  18  /  AlkPhos  93  12-27    Creatinine Trend: 0.74<--, 0.57<--, 0.68<--, 0.65<--, 0.68<--, 0.86<--    Urinalysis Basic - ( 27 Dec 2023 05:42 )    Color: x / Appearance: x / SG: x / pH: x  Gluc: 183 mg/dL / Ketone: x  / Bili: x / Urobili: x   Blood: x / Protein: x / Nitrite: x   Leuk Esterase: x / RBC: x / WBC x   Sq Epi: x / Non Sq Epi: x / Bacteria: x      Arterial Blood Gas:  12-27 @ 15:30  7.46/42/83/30/96.6/5.4  ABG lactate: --

## 2023-12-29 LAB
ANION GAP SERPL CALC-SCNC: 5 MMOL/L — SIGNIFICANT CHANGE UP (ref 5–17)
ANION GAP SERPL CALC-SCNC: 5 MMOL/L — SIGNIFICANT CHANGE UP (ref 5–17)
ANISOCYTOSIS BLD QL: SLIGHT — SIGNIFICANT CHANGE UP
ANISOCYTOSIS BLD QL: SLIGHT — SIGNIFICANT CHANGE UP
BASOPHILS # BLD AUTO: 0 K/UL — SIGNIFICANT CHANGE UP (ref 0–0.2)
BASOPHILS # BLD AUTO: 0 K/UL — SIGNIFICANT CHANGE UP (ref 0–0.2)
BASOPHILS NFR BLD AUTO: 0 % — SIGNIFICANT CHANGE UP (ref 0–2)
BASOPHILS NFR BLD AUTO: 0 % — SIGNIFICANT CHANGE UP (ref 0–2)
BUN SERPL-MCNC: 20 MG/DL — SIGNIFICANT CHANGE UP (ref 7–23)
BUN SERPL-MCNC: 20 MG/DL — SIGNIFICANT CHANGE UP (ref 7–23)
CALCIUM SERPL-MCNC: 8.9 MG/DL — SIGNIFICANT CHANGE UP (ref 8.5–10.1)
CALCIUM SERPL-MCNC: 8.9 MG/DL — SIGNIFICANT CHANGE UP (ref 8.5–10.1)
CHLORIDE SERPL-SCNC: 105 MMOL/L — SIGNIFICANT CHANGE UP (ref 96–108)
CHLORIDE SERPL-SCNC: 105 MMOL/L — SIGNIFICANT CHANGE UP (ref 96–108)
CO2 SERPL-SCNC: 30 MMOL/L — SIGNIFICANT CHANGE UP (ref 22–31)
CO2 SERPL-SCNC: 30 MMOL/L — SIGNIFICANT CHANGE UP (ref 22–31)
CREAT SERPL-MCNC: 0.75 MG/DL — SIGNIFICANT CHANGE UP (ref 0.5–1.3)
CREAT SERPL-MCNC: 0.75 MG/DL — SIGNIFICANT CHANGE UP (ref 0.5–1.3)
EGFR: 89 ML/MIN/1.73M2 — SIGNIFICANT CHANGE UP
EGFR: 89 ML/MIN/1.73M2 — SIGNIFICANT CHANGE UP
ELLIPTOCYTES BLD QL SMEAR: SLIGHT — SIGNIFICANT CHANGE UP
ELLIPTOCYTES BLD QL SMEAR: SLIGHT — SIGNIFICANT CHANGE UP
EOSINOPHIL # BLD AUTO: 0 K/UL — SIGNIFICANT CHANGE UP (ref 0–0.5)
EOSINOPHIL # BLD AUTO: 0 K/UL — SIGNIFICANT CHANGE UP (ref 0–0.5)
EOSINOPHIL NFR BLD AUTO: 0 % — SIGNIFICANT CHANGE UP (ref 0–6)
EOSINOPHIL NFR BLD AUTO: 0 % — SIGNIFICANT CHANGE UP (ref 0–6)
GLUCOSE BLDC GLUCOMTR-MCNC: 105 MG/DL — HIGH (ref 70–99)
GLUCOSE BLDC GLUCOMTR-MCNC: 105 MG/DL — HIGH (ref 70–99)
GLUCOSE BLDC GLUCOMTR-MCNC: 178 MG/DL — HIGH (ref 70–99)
GLUCOSE BLDC GLUCOMTR-MCNC: 178 MG/DL — HIGH (ref 70–99)
GLUCOSE BLDC GLUCOMTR-MCNC: 181 MG/DL — HIGH (ref 70–99)
GLUCOSE BLDC GLUCOMTR-MCNC: 181 MG/DL — HIGH (ref 70–99)
GLUCOSE BLDC GLUCOMTR-MCNC: 280 MG/DL — HIGH (ref 70–99)
GLUCOSE BLDC GLUCOMTR-MCNC: 280 MG/DL — HIGH (ref 70–99)
GLUCOSE SERPL-MCNC: 164 MG/DL — HIGH (ref 70–99)
GLUCOSE SERPL-MCNC: 164 MG/DL — HIGH (ref 70–99)
HCT VFR BLD CALC: 25.9 % — LOW (ref 34.5–45)
HCT VFR BLD CALC: 25.9 % — LOW (ref 34.5–45)
HGB BLD-MCNC: 8.1 G/DL — LOW (ref 11.5–15.5)
HGB BLD-MCNC: 8.1 G/DL — LOW (ref 11.5–15.5)
HYPOCHROMIA BLD QL: SLIGHT — SIGNIFICANT CHANGE UP
HYPOCHROMIA BLD QL: SLIGHT — SIGNIFICANT CHANGE UP
LYMPHOCYTES # BLD AUTO: 1.47 K/UL — SIGNIFICANT CHANGE UP (ref 1–3.3)
LYMPHOCYTES # BLD AUTO: 1.47 K/UL — SIGNIFICANT CHANGE UP (ref 1–3.3)
LYMPHOCYTES # BLD AUTO: 10 % — LOW (ref 13–44)
LYMPHOCYTES # BLD AUTO: 10 % — LOW (ref 13–44)
MANUAL SMEAR VERIFICATION: SIGNIFICANT CHANGE UP
MANUAL SMEAR VERIFICATION: SIGNIFICANT CHANGE UP
MCHC RBC-ENTMCNC: 26.2 PG — LOW (ref 27–34)
MCHC RBC-ENTMCNC: 26.2 PG — LOW (ref 27–34)
MCHC RBC-ENTMCNC: 31.3 G/DL — LOW (ref 32–36)
MCHC RBC-ENTMCNC: 31.3 G/DL — LOW (ref 32–36)
MCV RBC AUTO: 83.8 FL — SIGNIFICANT CHANGE UP (ref 80–100)
MCV RBC AUTO: 83.8 FL — SIGNIFICANT CHANGE UP (ref 80–100)
MICROCYTES BLD QL: SLIGHT — SIGNIFICANT CHANGE UP
MICROCYTES BLD QL: SLIGHT — SIGNIFICANT CHANGE UP
MONOCYTES # BLD AUTO: 1.03 K/UL — HIGH (ref 0–0.9)
MONOCYTES # BLD AUTO: 1.03 K/UL — HIGH (ref 0–0.9)
MONOCYTES NFR BLD AUTO: 7 % — SIGNIFICANT CHANGE UP (ref 2–14)
MONOCYTES NFR BLD AUTO: 7 % — SIGNIFICANT CHANGE UP (ref 2–14)
MYELOCYTES NFR BLD: 1 % — HIGH (ref 0–0)
MYELOCYTES NFR BLD: 1 % — HIGH (ref 0–0)
NEUTROPHILS # BLD AUTO: 11.73 K/UL — HIGH (ref 1.8–7.4)
NEUTROPHILS # BLD AUTO: 11.73 K/UL — HIGH (ref 1.8–7.4)
NEUTROPHILS NFR BLD AUTO: 80 % — HIGH (ref 43–77)
NEUTROPHILS NFR BLD AUTO: 80 % — HIGH (ref 43–77)
NRBC # BLD: 0 /100 WBCS — SIGNIFICANT CHANGE UP (ref 0–0)
NRBC # BLD: 0 /100 WBCS — SIGNIFICANT CHANGE UP (ref 0–0)
NRBC # BLD: SIGNIFICANT CHANGE UP /100 WBCS (ref 0–0)
NRBC # BLD: SIGNIFICANT CHANGE UP /100 WBCS (ref 0–0)
PLAT MORPH BLD: NORMAL — SIGNIFICANT CHANGE UP
PLAT MORPH BLD: NORMAL — SIGNIFICANT CHANGE UP
PLATELET # BLD AUTO: 548 K/UL — HIGH (ref 150–400)
PLATELET # BLD AUTO: 548 K/UL — HIGH (ref 150–400)
POIKILOCYTOSIS BLD QL AUTO: SLIGHT — SIGNIFICANT CHANGE UP
POIKILOCYTOSIS BLD QL AUTO: SLIGHT — SIGNIFICANT CHANGE UP
POTASSIUM SERPL-MCNC: 3.5 MMOL/L — SIGNIFICANT CHANGE UP (ref 3.5–5.3)
POTASSIUM SERPL-MCNC: 3.5 MMOL/L — SIGNIFICANT CHANGE UP (ref 3.5–5.3)
POTASSIUM SERPL-SCNC: 3.5 MMOL/L — SIGNIFICANT CHANGE UP (ref 3.5–5.3)
POTASSIUM SERPL-SCNC: 3.5 MMOL/L — SIGNIFICANT CHANGE UP (ref 3.5–5.3)
RBC # BLD: 3.09 M/UL — LOW (ref 3.8–5.2)
RBC # BLD: 3.09 M/UL — LOW (ref 3.8–5.2)
RBC # FLD: 17.2 % — HIGH (ref 10.3–14.5)
RBC # FLD: 17.2 % — HIGH (ref 10.3–14.5)
RBC BLD AUTO: ABNORMAL
RBC BLD AUTO: ABNORMAL
SODIUM SERPL-SCNC: 140 MMOL/L — SIGNIFICANT CHANGE UP (ref 135–145)
SODIUM SERPL-SCNC: 140 MMOL/L — SIGNIFICANT CHANGE UP (ref 135–145)
VARIANT LYMPHS # BLD: 2 % — SIGNIFICANT CHANGE UP (ref 0–6)
VARIANT LYMPHS # BLD: 2 % — SIGNIFICANT CHANGE UP (ref 0–6)
WBC # BLD: 14.66 K/UL — HIGH (ref 3.8–10.5)
WBC # BLD: 14.66 K/UL — HIGH (ref 3.8–10.5)
WBC # FLD AUTO: 14.66 K/UL — HIGH (ref 3.8–10.5)
WBC # FLD AUTO: 14.66 K/UL — HIGH (ref 3.8–10.5)

## 2023-12-29 PROCEDURE — 99232 SBSQ HOSP IP/OBS MODERATE 35: CPT

## 2023-12-29 RX ORDER — SUCRALFATE 1 G
1 TABLET ORAL EVERY 6 HOURS
Refills: 0 | Status: DISCONTINUED | OUTPATIENT
Start: 2023-12-29 | End: 2024-01-10

## 2023-12-29 RX ORDER — PANTOPRAZOLE SODIUM 20 MG/1
40 TABLET, DELAYED RELEASE ORAL EVERY 12 HOURS
Refills: 0 | Status: COMPLETED | OUTPATIENT
Start: 2023-12-29 | End: 2023-12-29

## 2023-12-29 RX ADMIN — Medication 2 SPRAY(S): at 23:12

## 2023-12-29 RX ADMIN — MONTELUKAST 10 MILLIGRAM(S): 4 TABLET, CHEWABLE ORAL at 11:40

## 2023-12-29 RX ADMIN — PANTOPRAZOLE SODIUM 40 MILLIGRAM(S): 20 TABLET, DELAYED RELEASE ORAL at 22:05

## 2023-12-29 RX ADMIN — Medication 3 MILLILITER(S): at 23:58

## 2023-12-29 RX ADMIN — Medication 1 GRAM(S): at 17:00

## 2023-12-29 RX ADMIN — Medication 1 GRAM(S): at 12:18

## 2023-12-29 RX ADMIN — DULOXETINE HYDROCHLORIDE 60 MILLIGRAM(S): 30 CAPSULE, DELAYED RELEASE ORAL at 11:39

## 2023-12-29 RX ADMIN — GABAPENTIN 600 MILLIGRAM(S): 400 CAPSULE ORAL at 22:04

## 2023-12-29 RX ADMIN — CEFEPIME 100 MILLIGRAM(S): 1 INJECTION, POWDER, FOR SOLUTION INTRAMUSCULAR; INTRAVENOUS at 05:47

## 2023-12-29 RX ADMIN — Medication 1: at 16:56

## 2023-12-29 RX ADMIN — CELECOXIB 200 MILLIGRAM(S): 200 CAPSULE ORAL at 11:40

## 2023-12-29 RX ADMIN — Medication 240 MILLIGRAM(S): at 05:46

## 2023-12-29 RX ADMIN — Medication 2 SPRAY(S): at 11:41

## 2023-12-29 RX ADMIN — CEFEPIME 100 MILLIGRAM(S): 1 INJECTION, POWDER, FOR SOLUTION INTRAMUSCULAR; INTRAVENOUS at 14:15

## 2023-12-29 RX ADMIN — Medication 3 MILLILITER(S): at 11:39

## 2023-12-29 RX ADMIN — Medication 100 MILLIGRAM(S): at 16:55

## 2023-12-29 RX ADMIN — RIVAROXABAN 20 MILLIGRAM(S): KIT at 17:01

## 2023-12-29 RX ADMIN — ATORVASTATIN CALCIUM 80 MILLIGRAM(S): 80 TABLET, FILM COATED ORAL at 22:04

## 2023-12-29 RX ADMIN — Medication 3: at 11:40

## 2023-12-29 RX ADMIN — Medication 3 MILLILITER(S): at 05:47

## 2023-12-29 RX ADMIN — Medication 1: at 08:53

## 2023-12-29 RX ADMIN — Medication 81 MILLIGRAM(S): at 11:40

## 2023-12-29 RX ADMIN — SODIUM CHLORIDE 4 MILLILITER(S): 9 INJECTION INTRAMUSCULAR; INTRAVENOUS; SUBCUTANEOUS at 11:39

## 2023-12-29 RX ADMIN — Medication 3 MILLILITER(S): at 17:07

## 2023-12-29 RX ADMIN — Medication 2 SPRAY(S): at 05:52

## 2023-12-29 RX ADMIN — CELECOXIB 200 MILLIGRAM(S): 200 CAPSULE ORAL at 12:30

## 2023-12-29 RX ADMIN — CEFEPIME 100 MILLIGRAM(S): 1 INJECTION, POWDER, FOR SOLUTION INTRAMUSCULAR; INTRAVENOUS at 22:09

## 2023-12-29 RX ADMIN — Medication 110 MILLIGRAM(S): at 17:01

## 2023-12-29 RX ADMIN — Medication 2 SPRAY(S): at 17:01

## 2023-12-29 RX ADMIN — Medication 1 GRAM(S): at 23:12

## 2023-12-29 RX ADMIN — Medication 110 MILLIGRAM(S): at 05:55

## 2023-12-29 RX ADMIN — Medication 100 MILLIGRAM(S): at 22:04

## 2023-12-29 RX ADMIN — SODIUM CHLORIDE 4 MILLILITER(S): 9 INJECTION INTRAMUSCULAR; INTRAVENOUS; SUBCUTANEOUS at 05:47

## 2023-12-29 RX ADMIN — SODIUM CHLORIDE 4 MILLILITER(S): 9 INJECTION INTRAMUSCULAR; INTRAVENOUS; SUBCUTANEOUS at 17:07

## 2023-12-29 RX ADMIN — PANTOPRAZOLE SODIUM 40 MILLIGRAM(S): 20 TABLET, DELAYED RELEASE ORAL at 12:18

## 2023-12-29 RX ADMIN — Medication 40 MILLIGRAM(S): at 05:46

## 2023-12-29 NOTE — PROGRESS NOTE ADULT - ASSESSMENT
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     multifocal pneumonia on CXR on admission was treated with 5 days of zosyn and improved  but  few days ago seems to have become hypoxemic and transferred  to [facility]     slightly better today and is on 4 L O2 via NC from 11 L yesterday  is on steroids as per pulmonary but being tapered  completed 7 days of zithromax for mycoplasma   Leukocytosis decreasing  afebrile past 4 days  RVP negative x 4 on this admission   MRSA PCR negative   Legionella ag - negative  Strep pneumo ag - negative  Mycoplasma IGM positive   sputum cx- negative      plan-  in light of multifocal pneumonia on most recent chest CT continue with cefepime , day #3  advise to also continue with  doxycyline for atypical coverage as patient sob and eventhough completed zithromax for mycoplasma she is not much better.day #2  monitor O2 sat and wbc closely.  pulmonary follow up appreciated.    All labs and imaging and chart notes reviewed.     All above discussed with patient and patient verbalizes full understanding of all above and agrees with above plan of care.    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     multifocal pneumonia on CXR on admission was treated with 5 days of zosyn and improved  but  few days ago seems to have become hypoxemic and transferred  to [facility]     slightly better today and is on 4 L O2 via NC from 11 L yesterday  is on steroids as per pulmonary but being tapered  completed 7 days of zithromax for mycoplasma   Leukocytosis decreasing  afebrile past 4 days  RVP negative x 4 on this admission   MRSA PCR negative   Legionella ag - negative  Strep pneumo ag - negative  Mycoplasma IGM positive   sputum cx- negative      plan-  in light of multifocal pneumonia on most recent chest CT continue with cefepime , day #3  advise to also continue with  doxycyline for atypical coverage as patient sob and eventhough completed zithromax for mycoplasma she is not much better.day #2  monitor O2 sat and wbc closely.  pulmonary follow up appreciated.    All labs and imaging and chart notes reviewed.     All above discussed with patient and patient verbalizes full understanding of all above and agrees with above plan of care.    Betina Soni MD  Infectious Disease Attending    for any questions please do not hesitate to contact me either via teams or by calling [phone]

## 2023-12-29 NOTE — PROGRESS NOTE ADULT - SUBJECTIVE AND OBJECTIVE BOX
[facility]  INFECTIOUS DISEASES   [address] [place] [address]  Tel: [phone]     Fax: [phone]  ==============================================================================  MD Akbar Monteiro, DO Jazmin Forbes, NP   ==============================================================================      ROSSI ROJAS  MRN-[id number]  63y ([birth year])      Interval History:    ROS:    [ ] Unobtainable because:  [ ] All other systems negative except as noted    Constitutional: no fever, no chills  Head: no trauma  Eyes: no vision changes, no eye pain  ENT:  no sore throat, no rhinorrhea  Cardiovascular:  no chest pain, no palpitation  Respiratory:  no SOB, no cough  GI:  no abd pain, no vomiting, no diarrhea  urinary: no dysuria, no hematuria, no flank pain  musculoskeletal:  no joint pain, no joint swelling  skin:  no rash  neurology:  no headache, no seizure, no change in mental status  psych: no anxiety, no depression         Allergies  No Known Allergies        ANTIMICROBIALS:  cefepime   IVPB 1000 every 8 hours  doxycycline IVPB    doxycycline IVPB 100 every 12 hours        Physical Exam:  Vital Signs Last 24 Hrs  T(C): 36.8 (29 Dec 2023 10:45), Max: 36.9 (28 Dec 2023 16:57)  T(F): 98.3 (29 Dec 2023 10:45), Max: 98.4 (28 Dec 2023 16:57)  HR: 105 (29 Dec 2023 10:45) (86 - 105)  BP: 129/78 (29 Dec 2023 10:45) (122/87 - 158/88)  BP(mean): --  RR: 19 (29 Dec 2023 10:45) (18 - 20)  SpO2: 97% (29 Dec 2023 10:45) (91% - 98%)    Parameters below as of 29 Dec 2023 10:45  Patient On (Oxygen Delivery Method): nasal cannula  O2 Flow (L/min): 4      12-28-23 @ 07:01  -  12-29-23 @ 07:00  --------------------------------------------------------  IN: 720 mL / OUT: 1 mL / NET: 719 mL    12-29-23 @ 07:01  -  12-29-23 @ 10:55  --------------------------------------------------------  IN: 420 mL / OUT: 0 mL / NET: 420 mL      General:    NAD,  non toxic  Head: atraumatic, normocephalic  Eye: normal sclera and conjunctiva  ENT:    no oral lesions, neck supple  Cardio:     regular S1, S2,  no murmur  Respiratory:    clear b/l,    no wheezing  abd:     soft,   BS +,   no tenderness  :   no CVAT,  no suprapubic tenderness,   no  penny  Musculoskeletal:   no joint swelling,   no edema  vascular: no central lines, +PIV   Skin:    no rash  Neurologic:     no focal deficit  psych: normal affect    WBC Count: 14.66 K/uL (12-29 @ 06:40)  WBC Count: 16.47 K/uL (12-28 @ 12:17)  WBC Count: 15.66 K/uL (12-27 @ 05:42)  WBC Count: 11.54 K/uL (12-26 @ 05:15)  WBC Count: 12.42 K/uL (12-25 @ 18:20)                            8.1    14.66 )-----------( 548      ( 29 Dec 2023 06:40 )             25.9       12-29    140  |  105  |  20  ----------------------------<  164<H>  3.5   |  30  |  0.75    Ca    8.9      29 Dec 2023 06:40        Urinalysis Basic - ( 29 Dec 2023 06:40 )    Color: x / Appearance: x / SG: x / pH: x  Gluc: 164 mg/dL / Ketone: x  / Bili: x / Urobili: x   Blood: x / Protein: x / Nitrite: x   Leuk Esterase: x / RBC: x / WBC x   Sq Epi: x / Non Sq Epi: x / Bacteria: x          Creatinine Trend: 0.75<--, 0.84<--, 0.74<--, 0.57<--, 0.68<--, 0.65<--      MICROBIOLOGY:  v  .Sputum Sputum  12-27-23   Normal Respiratory Valarie present  --    Few Squamous epithelial cells per low power field  Few polymorphonuclear leukocytes per low power field  Moderate Yeast per oil power field  Few Gram positive cocci in pairs per oil power field      .Blood Blood-Peripheral  12-22-23   No growth at 5 days  --  --      .Blood Blood-Peripheral  12-22-23   No growth at 5 days  --  --            Rapid RVP Result: NotDetec (12-25 @ 10:15)        C-Reactive Protein, Serum: 159 (12-25)          Procalcitonin, Serum: 0.24 (12-25-23 @ 18:20)    SARS-CoV-2: NotDetec (12-25-23 @ 10:15)  Rapid RVP Result: NotDetec (12-25-23 @ 10:15)        RADIOLOGY:   [facility]  INFECTIOUS DISEASES   [address] [place] [address]  Tel: [phone]     Fax: [phone]  ==============================================================================  MD Akbar Monteiro, DO Jazmin Forbes, NP   ==============================================================================      ROSSI ROJAS  MRN-[id number]  63y ([birth year])      Interval History:    ROS:    [ ] Unobtainable because:  [ ] All other systems negative except as noted    Constitutional: no fever, no chills  Head: no trauma  Eyes: no vision changes, no eye pain  ENT:  no sore throat, no rhinorrhea  Cardiovascular:  no chest pain, no palpitation  Respiratory:  no SOB, no cough  GI:  no abd pain, no vomiting, no diarrhea  urinary: no dysuria, no hematuria, no flank pain  musculoskeletal:  no joint pain, no joint swelling  skin:  no rash  neurology:  no headache, no seizure, no change in mental status  psych: no anxiety, no depression         Allergies  No Known Allergies        ANTIMICROBIALS:  cefepime   IVPB 1000 every 8 hours  doxycycline IVPB    doxycycline IVPB 100 every 12 hours        Physical Exam:  Vital Signs Last 24 Hrs  T(C): 36.8 (29 Dec 2023 10:45), Max: 36.9 (28 Dec 2023 16:57)  T(F): 98.3 (29 Dec 2023 10:45), Max: 98.4 (28 Dec 2023 16:57)  HR: 105 (29 Dec 2023 10:45) (86 - 105)  BP: 129/78 (29 Dec 2023 10:45) (122/87 - 158/88)  BP(mean): --  RR: 19 (29 Dec 2023 10:45) (18 - 20)  SpO2: 97% (29 Dec 2023 10:45) (91% - 98%)    Parameters below as of 29 Dec 2023 10:45  Patient On (Oxygen Delivery Method): nasal cannula  O2 Flow (L/min): 4      12-28-23 @ 07:01  -  12-29-23 @ 07:00  --------------------------------------------------------  IN: 720 mL / OUT: 1 mL / NET: 719 mL    12-29-23 @ 07:01  -  12-29-23 @ 10:55  --------------------------------------------------------  IN: 420 mL / OUT: 0 mL / NET: 420 mL      General:    NAD,  non toxic  Head: atraumatic, normocephalic  Eye: normal sclera and conjunctiva  ENT:    no oral lesions, neck supple  Cardio:     regular S1, S2,  no murmur  Respiratory:    clear b/l,    no wheezing  abd:     soft,   BS +,   no tenderness  :   no CVAT,  no suprapubic tenderness,   no  penny  Musculoskeletal:   no joint swelling,   no edema  vascular: no central lines, +PIV   Skin:    no rash  Neurologic:     no focal deficit  psych: normal affect    WBC Count: 14.66 K/uL (12-29 @ 06:40)  WBC Count: 16.47 K/uL (12-28 @ 12:17)  WBC Count: 15.66 K/uL (12-27 @ 05:42)  WBC Count: 11.54 K/uL (12-26 @ 05:15)  WBC Count: 12.42 K/uL (12-25 @ 18:20)                            8.1    14.66 )-----------( 548      ( 29 Dec 2023 06:40 )             25.9       12-29    140  |  105  |  20  ----------------------------<  164<H>  3.5   |  30  |  0.75    Ca    8.9      29 Dec 2023 06:40        Urinalysis Basic - ( 29 Dec 2023 06:40 )    Color: x / Appearance: x / SG: x / pH: x  Gluc: 164 mg/dL / Ketone: x  / Bili: x / Urobili: x   Blood: x / Protein: x / Nitrite: x   Leuk Esterase: x / RBC: x / WBC x   Sq Epi: x / Non Sq Epi: x / Bacteria: x          Creatinine Trend: 0.75<--, 0.84<--, 0.74<--, 0.57<--, 0.68<--, 0.65<--      MICROBIOLOGY:  v  .Sputum Sputum  12-27-23   Normal Respiratory Valarie present  --    Few Squamous epithelial cells per low power field  Few polymorphonuclear leukocytes per low power field  Moderate Yeast per oil power field  Few Gram positive cocci in pairs per oil power field      .Blood Blood-Peripheral  12-22-23   No growth at 5 days  --  --      .Blood Blood-Peripheral  12-22-23   No growth at 5 days  --  --            Rapid RVP Result: NotDetec (12-25 @ 10:15)        C-Reactive Protein, Serum: 159 (12-25)          Procalcitonin, Serum: 0.24 (12-25-23 @ 18:20)    SARS-CoV-2: NotDetec (12-25-23 @ 10:15)  Rapid RVP Result: NotDetec (12-25-23 @ 10:15)        RADIOLOGY:   [facility]  INFECTIOUS DISEASES   [address] [place] [address]  Tel: [phone]     Fax: [phone]  ==============================================================================  MD Akbar Monteiro, DO Jazmin Forbes, NP   ==============================================================================      ROSSI ROJAS  MRN-[id number]  63y ([birth year])      Interval History:  Patient seen and examined today.  states she feels slightly better  today and she is less sob.    she has remained afebrile past few days.  her NC is down to 4 L today     ROS:      Constitutional: no fever, no chills  Head: no trauma  Eyes: no vision changes, no eye pain  ENT:  no sore throat, no rhinorrhea  Cardiovascular:  no chest pain, no palpitation  Respiratory:  less SOB, still has dry cough   GI:  no abd pain, no vomiting, no diarrhea  urinary: no dysuria, no hematuria, no flank pain  musculoskeletal:  no joint pain, no joint swelling  skin:  no rash  neurology:  no headache        Allergies  No Known Drug Allergies        ANTIMICROBIALS:  cefepime   IVPB 1000 every 8 hours  doxycycline IVPB    doxycycline IVPB 100 every 12 hours        Physical Exam:  Vital Signs Last 24 Hrs  T(C): 36.8 (29 Dec 2023 10:45), Max: 36.9 (28 Dec 2023 16:57)  T(F): 98.3 (29 Dec 2023 10:45), Max: 98.4 (28 Dec 2023 16:57)  HR: 105 (29 Dec 2023 10:45) (86 - 105)  BP: 129/78 (29 Dec 2023 10:45) (122/87 - 158/88)  BP(mean): --  RR: 19 (29 Dec 2023 10:45) (18 - 20)  SpO2: 97% (29 Dec 2023 10:45) (91% - 98%)    Parameters below as of 29 Dec 2023 10:45  Patient On (Oxygen Delivery Method): nasal cannula  O2 Flow (L/min): 4      12-28-23 @ 07:01  -  12-29-23 @ 07:00  --------------------------------------------------------  IN: 720 mL / OUT: 1 mL / NET: 719 mL    12-29-23 @ 07:01  -  12-29-23 @ 10:55  --------------------------------------------------------  IN: 420 mL / OUT: 0 mL / NET: 420 mL      General:    NAD but gets sob  with any movement  Head: atraumatic, normocephalic  Eye: normal sclera and conjunctiva  ENT:    no oral lesions, neck supple  Cardio:     regular S1, S2,  no murmur  Respiratory:    no wheezing, decreased breath sounds at right base up to mid lung region  no crackles  abd:     soft,   BS +,   no tenderness, ND  :   no CVAT,  no suprapubic tenderness  Musculoskeletal:   no joint swelling,   no edema  vascular: no central lines, +PIV   Skin:    no rash  Neurologic:     no focal deficit  psych: normal affect      WBC Count: 14.66 K/uL (12-29 @ 06:40)  WBC Count: 16.47 K/uL (12-28 @ 12:17)  WBC Count: 15.66 K/uL (12-27 @ 05:42)  WBC Count: 11.54 K/uL (12-26 @ 05:15)  WBC Count: 12.42 K/uL (12-25 @ 18:20)                            8.1    14.66 )-----------( 548      ( 29 Dec 2023 06:40 )             25.9       12-29    140  |  105  |  20  ----------------------------<  164<H>  3.5   |  30  |  0.75    Ca    8.9      29 Dec 2023 06:40        Urinalysis Basic - ( 29 Dec 2023 06:40 )    Color: x / Appearance: x / SG: x / pH: x  Gluc: 164 mg/dL / Ketone: x  / Bili: x / Urobili: x   Blood: x / Protein: x / Nitrite: x   Leuk Esterase: x / RBC: x / WBC x   Sq Epi: x / Non Sq Epi: x / Bacteria: x          Creatinine Trend: 0.75<--, 0.84<--, 0.74<--, 0.57<--, 0.68<--, 0.65<--    Rapid RVP Result: NotDetec (12.25.23 @ 10:15)          MICROBIOLOGY:  v  .Sputum Sputum  12-27-23   Normal Respiratory Valarie present  --    Few Squamous epithelial cells per low power field  Few polymorphonuclear leukocytes per low power field  Moderate Yeast per oil power field  Few Gram positive cocci in pairs per oil power field      .Blood Blood-Peripheral  12-22-23   No growth at 5 days  --  --      .Blood Blood-Peripheral  12-22-23   No growth at 5 days  --  --            Rapid RVP Result: NotDetec (12-25 @ 10:15)        C-Reactive Protein, Serum: 159 (12-25)          Procalcitonin, Serum: 0.24 (12-25-23 @ 18:20)    SARS-CoV-2: NotDetec (12-25-23 @ 10:15)  Rapid RVP Result: NotDetec (12-25-23 @ 10:15)        RADIOLOGY:  < from: CT Chest No Cont (12.28.23 @ 10:14) >  IMPRESSION:  Bilateral infiltrates overall unchanged. Mildly increased involvement of   the right middle lobe and mild improvement in the left upper lobe.        --- End of Report ---            ISABELA CLEMENT MD; Attending Radiologist  This document has been electronically signed. Dec 28 2023 10:35AM    < end of copied text >     [facility]  INFECTIOUS DISEASES   [address] [place] [address]  Tel: [phone]     Fax: [phone]  ==============================================================================  MD Akbar Monteiro, DO Jazmin Forbes, NP   ==============================================================================      ROSSI ROJAS  MRN-[id number]  63y ([birth year])      Interval History:  Patient seen and examined today.  states she feels slightly better  today and she is less sob.    she has remained afebrile past few days.  her NC is down to 4 L today     ROS:      Constitutional: no fever, no chills  Head: no trauma  Eyes: no vision changes, no eye pain  ENT:  no sore throat, no rhinorrhea  Cardiovascular:  no chest pain, no palpitation  Respiratory:  less SOB, still has dry cough   GI:  no abd pain, no vomiting, no diarrhea  urinary: no dysuria, no hematuria, no flank pain  musculoskeletal:  no joint pain, no joint swelling  skin:  no rash  neurology:  no headache        Allergies  No Known Drug Allergies        ANTIMICROBIALS:  cefepime   IVPB 1000 every 8 hours  doxycycline IVPB    doxycycline IVPB 100 every 12 hours        Physical Exam:  Vital Signs Last 24 Hrs  T(C): 36.8 (29 Dec 2023 10:45), Max: 36.9 (28 Dec 2023 16:57)  T(F): 98.3 (29 Dec 2023 10:45), Max: 98.4 (28 Dec 2023 16:57)  HR: 105 (29 Dec 2023 10:45) (86 - 105)  BP: 129/78 (29 Dec 2023 10:45) (122/87 - 158/88)  BP(mean): --  RR: 19 (29 Dec 2023 10:45) (18 - 20)  SpO2: 97% (29 Dec 2023 10:45) (91% - 98%)    Parameters below as of 29 Dec 2023 10:45  Patient On (Oxygen Delivery Method): nasal cannula  O2 Flow (L/min): 4      12-28-23 @ 07:01  -  12-29-23 @ 07:00  --------------------------------------------------------  IN: 720 mL / OUT: 1 mL / NET: 719 mL    12-29-23 @ 07:01  -  12-29-23 @ 10:55  --------------------------------------------------------  IN: 420 mL / OUT: 0 mL / NET: 420 mL      General:    NAD but gets sob  with any movement  Head: atraumatic, normocephalic  Eye: normal sclera and conjunctiva  ENT:    no oral lesions, neck supple  Cardio:     regular S1, S2,  no murmur  Respiratory:    no wheezing, decreased breath sounds at right base up to mid lung region  no crackles  abd:     soft,   BS +,   no tenderness, ND  :   no CVAT,  no suprapubic tenderness  Musculoskeletal:   no joint swelling,   no edema  vascular: no central lines, +PIV   Skin:    no rash  Neurologic:     no focal deficit  psych: normal affect      WBC Count: 14.66 K/uL (12-29 @ 06:40)  WBC Count: 16.47 K/uL (12-28 @ 12:17)  WBC Count: 15.66 K/uL (12-27 @ 05:42)  WBC Count: 11.54 K/uL (12-26 @ 05:15)  WBC Count: 12.42 K/uL (12-25 @ 18:20)                            8.1    14.66 )-----------( 548      ( 29 Dec 2023 06:40 )             25.9       12-29    140  |  105  |  20  ----------------------------<  164<H>  3.5   |  30  |  0.75    Ca    8.9      29 Dec 2023 06:40        Urinalysis Basic - ( 29 Dec 2023 06:40 )    Color: x / Appearance: x / SG: x / pH: x  Gluc: 164 mg/dL / Ketone: x  / Bili: x / Urobili: x   Blood: x / Protein: x / Nitrite: x   Leuk Esterase: x / RBC: x / WBC x   Sq Epi: x / Non Sq Epi: x / Bacteria: x          Creatinine Trend: 0.75<--, 0.84<--, 0.74<--, 0.57<--, 0.68<--, 0.65<--    Rapid RVP Result: NotDetec (12.25.23 @ 10:15)          MICROBIOLOGY:  v  .Sputum Sputum  12-27-23   Normal Respiratory Valarie present  --    Few Squamous epithelial cells per low power field  Few polymorphonuclear leukocytes per low power field  Moderate Yeast per oil power field  Few Gram positive cocci in pairs per oil power field      .Blood Blood-Peripheral  12-22-23   No growth at 5 days  --  --      .Blood Blood-Peripheral  12-22-23   No growth at 5 days  --  --            Rapid RVP Result: NotDetec (12-25 @ 10:15)        C-Reactive Protein, Serum: 159 (12-25)          Procalcitonin, Serum: 0.24 (12-25-23 @ 18:20)    SARS-CoV-2: NotDetec (12-25-23 @ 10:15)  Rapid RVP Result: NotDetec (12-25-23 @ 10:15)        RADIOLOGY:  < from: CT Chest No Cont (12.28.23 @ 10:14) >  IMPRESSION:  Bilateral infiltrates overall unchanged. Mildly increased involvement of   the right middle lobe and mild improvement in the left upper lobe.        --- End of Report ---            ISABELA CLEMENT MD; Attending Radiologist  This document has been electronically signed. Dec 28 2023 10:35AM    < end of copied text >

## 2023-12-29 NOTE — PROGRESS NOTE ADULT - ASSESSMENT
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 Being treated for URI outpatient. Hypoxic to 84% on RA,  CT head without acute pathology  CTA H/N - bilateral ICA calcifications with ectasia of proximal R ICA and L cerivcal ICA fusiform aneurysm 1.4cm, R V4 narrowing  CTP motion degraded  CXR showed Multifocal PNA  Routine eeg with diffuse slowing    Impression:  AMS likely TME - infectious, metabolic, possible polypharmacy. 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 Being treated for URI outpatient. Hypoxic to 84% on RA,  CT head without acute pathology  CTA H/N - bilateral ICA calcifications with ectasia of proximal R ICA and L cerivcal ICA fusiform aneurysm 1.4cm, R V4 narrowing  CTP motion degraded  CXR showed Multifocal PNA  Routine eeg with diffuse slowing    Impression:  AMS likely TME - infectious, metabolic, possible polypharmacy. Low suspicion for neurovascular etiology, seizure  SLE  PE on xarelto    Plan:  - On Xarelto  - EEG as above  - MRI no acute findings  - would hold sedating meds to monitor mental status   - on gabapentin    Neurology  Office [phone]

## 2023-12-29 NOTE — PROGRESS NOTE ADULT - SUBJECTIVE AND OBJECTIVE BOX
Patient seen and examined.  reports had rough morning  ambulated to bathroom very short of breath  used spirometer :coughing episode post  spirometer  patient reports she coughs after she swallows:     VItals: stable  on 4-5 liters nasal canula    Review of Systems:  General:denies fever chills, headache, weakness  HEENT: denies blurry vision,diffculty swallowing, difficulty hearing, tinnitus  Cardiovascular: denies chest pain  ,palpitations  Pulmonary:+shortness of breath, ++cough, wheezing, hemoptysis  Gastrointestinal: denies abdominal pain, constipation, diarrhea,nausea , vomiting, hematochezia  : denies hematuria, dysuria, or incontinence  Neurological: denies weakness, numbness , tingling, dizziness, tremors  MSK: denies muscle pain, difficulty ambulating, swelling, back pain  skin: denies skin rash, itching, burning, or  skin lesions  Psychiatrical: denies mood disturbances, anxierty, feeling depressed, depression , or difficulty sleeping    Objective:  Vitals  T(C): 36.8 (12-29-23 @ 10:45), Max: 36.9 (12-28-23 @ 16:57)  HR: 105 (12-29-23 @ 10:45) (86 - 105)  BP: 129/78 (12-29-23 @ 10:45) (122/87 - 158/88)  RR: 19 (12-29-23 @ 10:45) (18 - 20)  SpO2: 97% (12-29-23 @ 10:45) (91% - 98%)    Physical Exam:  General: comfortable, no acute distress  HEENT: Atraumatic, no LAD, trachea midline, PERRLA  Cardiovascular: normal s1s2, no murmurs, gallops or fricition rubs  Pulmonary: clear to ausculation Bilaterally, no wheezing , rhonchi  Gastrointestinal: soft non tender non distended, no masses felt, no organomegally  Muscloskeletal: no lower extremity edema, intact bilateral lower extremity pulses  Neurological: CN II-12 intact. No focal weakness  Psychiatrical: normal mood, cooperative  SKIN: no rash, lesions or ulcers    Labs:                          8.1    14.66 )-----------( 548      ( 29 Dec 2023 06:40 )             25.9     12-29    140  |  105  |  20  ----------------------------<  164<H>  3.5   |  30  |  0.75    Ca    8.9      29 Dec 2023 06:40              Active Medications  MEDICATIONS  (STANDING):  albuterol/ipratropium for Nebulization 3 milliLiter(s) Nebulizer every 6 hours  aspirin enteric coated 81 milliGRAM(s) Oral daily  atorvastatin 80 milliGRAM(s) Oral at bedtime  cefepime   IVPB 1000 milliGRAM(s) IV Intermittent every 8 hours  celecoxib 200 milliGRAM(s) Oral daily  dextrose 5%. 1000 milliLiter(s) (100 mL/Hr) IV Continuous <Continuous>  dextrose 5%. 1000 milliLiter(s) (50 mL/Hr) IV Continuous <Continuous>  dextrose 50% Injectable 25 Gram(s) IV Push once  dextrose 50% Injectable 12.5 Gram(s) IV Push once  dextrose 50% Injectable 25 Gram(s) IV Push once  doxycycline IVPB      doxycycline IVPB 100 milliGRAM(s) IV Intermittent every 12 hours  DULoxetine 60 milliGRAM(s) Oral daily  gabapentin 600 milliGRAM(s) Oral at bedtime  glucagon  Injectable 1 milliGRAM(s) IntraMuscular once  hydrocodone/homatropine Syrup 5 milliLiter(s) Oral every 6 hours  influenza   Vaccine 0.5 milliLiter(s) IntraMuscular once  insulin lispro (ADMELOG) corrective regimen sliding scale   SubCutaneous three times a day before meals  insulin lispro (ADMELOG) corrective regimen sliding scale   SubCutaneous at bedtime  montelukast 10 milliGRAM(s) Oral daily  pantoprazole    Tablet 40 milliGRAM(s) Oral every 12 hours  predniSONE   Tablet 40 milliGRAM(s) Oral daily  rivaroxaban 20 milliGRAM(s) Oral with dinner  sodium chloride 0.65% Nasal 2 Spray(s) Both Nostrils every 6 hours  sodium chloride 3%  Inhalation 4 milliLiter(s) Inhalation every 6 hours  sucralfate 1 Gram(s) Oral every 6 hours  verapamil  milliGRAM(s) Oral daily    MEDICATIONS  (PRN):  acetaminophen     Tablet .. 650 milliGRAM(s) Oral every 6 hours PRN Temp greater or equal to 38C (100.4F), Mild Pain (1 - 3)  albuterol    90 MICROgram(s) HFA Inhaler 1 Puff(s) Inhalation every 4 hours PRN Shortness of Breath and/or Wheezing  dextrose Oral Gel 15 Gram(s) Oral once PRN Blood Glucose LESS THAN 70 milliGRAM(s)/deciliter  guaifenesin/dextromethorphan Oral Liquid 10 milliLiter(s) Oral every 6 hours PRN Cough  melatonin 3 milliGRAM(s) Oral at bedtime PRN Insomnia

## 2023-12-29 NOTE — PROGRESS NOTE ADULT - ASSESSMENT
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 Of note pt recently developed URI symptoms and was started on Doxy as well as Hycodan, Alprazolam and Fioricet. Of note pt already on Percocet, flexeril for chronic pain, Gabapentin for seizures per  all which she took. Pt being admitted for Acute hypoxic respiratory failure due to Multifocal PNA, AMS r/o TIA/CVA  vs metabolic encephalopathy secondary to polypharmacy and/or hypoxia.      # Acute metabolic encephalopathy   # Acute respiratory failure with multifocal pneumonia.   Acute stroke is ruled out. EEG did not show any epileptiform activity.   Intermittent low grade temp, Seen by ID  s/p iv zosyn , completed 5 days   Added Azithromycin, + mycoplasma   Repeat CT with multifocal PNA  Mental status baseline patient AAO3  although RRT for acute worsening hypoxemia; r/o aspiration vs flash pulm edema  plan:  Cough meds, albuterol PRN  Albuterol PRN  steroids  3 percent  trial of lasix  repeat echo  ID follow up , pulm consulted     Coughing episode : acute blood loss anemia  hb 8 from 10  from steroids?  ordering protonix and carafate      # PE  - c/w Xarelto. no active gross bleeding.     # SLE stable.     # HTN/HLD. controlled with Verapemil, statin    # DM type 2. controlled.   - FS qAC and HS w/ SSI    #Weakness. PT eval>>W[facility]ON, pt unsure , will follow up     Stopped Elavil, no need for Elavil and Neurontin QHS.  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 Of note pt recently developed URI symptoms and was started on Doxy as well as Hycodan, Alprazolam and Fioricet. Of note pt already on Percocet, flexeril for chronic pain, Gabapentin for seizures per  all which she took. Pt being admitted for Acute hypoxic respiratory failure due to Multifocal PNA, AMS r/o TIA/CVA  vs metabolic encephalopathy secondary to polypharmacy and/or hypoxia.      # Acute metabolic encephalopathy   # Acute respiratory failure with multifocal pneumonia.   Acute stroke is ruled out. EEG did not show any epileptiform activity.   Intermittent low grade temp, Seen by ID  s/p iv zosyn , completed 5 days   Added Azithromycin, + mycoplasma   Repeat CT with multifocal PNA  Mental status baseline patient AAO3  although RRT for acute worsening hypoxemia; r/o aspiration vs flash pulm edema  plan:  Cough meds, albuterol PRN  Albuterol PRN  steroids  3 percent  trial of lasix  repeat echo  ID follow up , pulm consulted     Coughing episode : acute blood loss anemia  hb 8 from 10  from steroids?  ordering protonix and carafate      # PE  - c/w Xarelto. no active gross bleeding.     # SLE stable.     # HTN/HLD. controlled with Verapemil, statin    # DM type 2. controlled.   - FS qAC and HS w/ SSI    #Weakness. PT eval>>[facility]ON, pt unsure , will follow up     Stopped Elavil, no need for Elavil and Neurontin QHS.  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 Of note pt recently developed URI symptoms and was started on Doxy as well as Hycodan, Alprazolam and Fioricet. Of note pt already on Percocet, flexeril for chronic pain, Gabapentin for seizures per  all which she took. Pt being admitted for Acute hypoxic respiratory failure due to Multifocal PNA, AMS r/o TIA/CVA  vs metabolic encephalopathy secondary to polypharmacy and/or hypoxia.      # Acute metabolic encephalopathy   # Acute respiratory failure with multifocal pneumonia.   Acute stroke is ruled out. EEG did not show any epileptiform activity.   Intermittent low grade temp, Seen by ID  s/p iv zosyn , completed 5 days   Added Azithromycin, + mycoplasma : course completed;  started doxy  Repeat CT with multifocal PNA  Mental status baseline patient AAO3  although RRT for acute worsening hypoxemia; r/o aspiration vs flash pulm edema  plan:  Cough meds, albuterol PRN  Albuterol PRN  steroids  3 percent  trial of lasix  repeat echo  ID follow up , pulm consulted     Coughing episode : acute blood loss anemia  hb 8 from 10  from steroids?  ordering protonix and carafate      # PE  - c/w Xarelto. no active gross bleeding.     # SLE stable.     # HTN/HLD. controlled with Verapemil, statin    # DM type 2. controlled.   - FS qAC and HS w/ SSI    #Weakness. PT eval>>W[facility]ON, pt unsure , will follow up     Stopped Elavil, no need for Elavil and Neurontin QHS.  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 Of note pt recently developed URI symptoms and was started on Doxy as well as Hycodan, Alprazolam and Fioricet. Of note pt already on Percocet, flexeril for chronic pain, Gabapentin for seizures per  all which she took. Pt being admitted for Acute hypoxic respiratory failure due to Multifocal PNA, AMS r/o TIA/CVA  vs metabolic encephalopathy secondary to polypharmacy and/or hypoxia.      # Acute metabolic encephalopathy   # Acute respiratory failure with multifocal pneumonia.   Acute stroke is ruled out. EEG did not show any epileptiform activity.   Intermittent low grade temp, Seen by ID  s/p iv zosyn , completed 5 days   Added Azithromycin, + mycoplasma : course completed;  started doxy  Repeat CT with multifocal PNA  Mental status baseline patient AAO3  although RRT for acute worsening hypoxemia; r/o aspiration vs flash pulm edema  plan:  Cough meds, albuterol PRN  Albuterol PRN  steroids  3 percent  trial of lasix  repeat echo  ID follow up , pulm consulted     Coughing episode : acute blood loss anemia  hb 8 from 10  from steroids?  ordering protonix and carafate      # PE  - c/w Xarelto. no active gross bleeding.     # SLE stable.     # HTN/HLD. controlled with Verapemil, statin    # DM type 2. controlled.   - FS qAC and HS w/ SSI    #Weakness. PT eval>>[facility]ON, pt unsure , will follow up     Stopped Elavil, no need for Elavil and Neurontin QHS.

## 2023-12-29 NOTE — PROGRESS NOTE ADULT - ASSESSMENT
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 as well as slurred speech, Seizure disorder, admitted with respiratory failure 2/2 myocplasma pneumonia     Dx: acute hypoxic respiratory failure 2/2 mycoplasma pneumonia    - now on 4L NC o2 saturation maintaining > 92%  - CT chest consistent with multifocal pneumonia   - Mycoplasma IgM positive,  - Completed 7 days of azithromycin  - cefepime started 12/27 / doxycycline added 12/28 for further mycoplasma coverage - per ID     - cont duonebs q6hr/hypersal nebs for airway clearance and encourage incentive spirometry  - no wheezing, will wean steroids to pred 40mg PO    SHADY attending Shazia     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 as well as slurred speech, Seizure disorder, admitted with respiratory failure 2/2 myocplasma pneumonia     Dx: acute hypoxic respiratory failure 2/2 mycoplasma pneumonia    - now on 4L NC o2 saturation maintaining > 92%  - CT chest consistent with multifocal pneumonia   - Mycoplasma IgM positive,  - Completed 7 days of azithromycin  - cefepime started 12/27 / doxycycline added 12/28 for further mycoplasma coverage - per ID     - cont duonebs q6hr/hypersal nebs for airway clearance and encourage incentive spirometry  - no wheezing, will wean steroids to pred 40mg PO    SHADY attending Shazia     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 as well as slurred speech, Seizure disorder, admitted with respiratory failure 2/2 myocplasma pneumonia     Dx: acute hypoxic respiratory failure 2/2 mycoplasma pneumonia    - O2 titrated down to 2 L maintaining > 92%  - CT chest consistent with multifocal pneumonia   - Mycoplasma IgM positive,  - Completed 7 days of azithromycin  - cefepime started 12/27 / doxycycline added 12/28 for further mycoplasma coverage - per ID     - cont duonebs q6hr/hypersal nebs for airway clearance and encourage incentive spirometry  - no wheezing, will wean steroids to pred 40mg PO  - patient with episodes of coughing @ which point desats slightly   - will continue hycodan Q 6 and tesslon perle Q8 standing to suppress cough   - continue humidified O2     SHADY attending Shazia     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 as well as slurred speech, Seizure disorder, admitted with respiratory failure 2/2 myocplasma pneumonia     Dx: acute hypoxic respiratory failure 2/2 mycoplasma pneumonia    - O2 titrated down to 2 L maintaining > 92%  - CT chest consistent with multifocal pneumonia   - Mycoplasma IgM positive,  - Completed 7 days of azithromycin  - cefepime started 12/27 / doxycycline added 12/28 for further mycoplasma coverage - per ID     - cont duonebs q6hr/hypersal nebs for airway clearance and encourage incentive spirometry  - no wheezing, will wean steroids to pred 40mg PO  - patient with episodes of coughing @ which point desats slightly   - will continue hycodan Q 6 and tesslon perle Q8 standing to suppress cough   - continue humidified O2     SHADY attending Shazia     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 as well as slurred speech, Seizure disorder, admitted with respiratory failure 2/2 myocplasma pneumonia     Dx: acute hypoxic respiratory failure 2/2 mycoplasma pneumonia    - O2 titrated down to 2 L maintaining > 92%  - CT chest consistent with multifocal pneumonia   - Mycoplasma IgM positive,  - Completed 7 days of azithromycin  - cefepime started 12/27 / doxycycline added 12/28 for further mycoplasma coverage - per ID     - cont duonebs q6hr/hypersal nebs for airway clearance and encourage incentive spirometry  - no wheezing, will wean steroids to pred 40mg PO  - patient with episodes of coughing @ which point desats slightly   - will continue hycodan Q 6 and tesslon perle Q8 standing to suppress cough   - continue humidified O2     DW attending Shazia

## 2023-12-29 NOTE — PROGRESS NOTE ADULT - SUBJECTIVE AND OBJECTIVE BOX
CHIEF COMPLAINT:  ===============  STROKE SYMPTOMS  ATRIAL MENTAL STATUS    INTERVAL EVENTS:  ==================    - on 4 L NC   - T(F): , Max: 98.4 (12-28-23 @ 10:00)  - SpO2:  (91% - 98%)    REVIEW OF SYSTEMS:  ==================  - no fever, no chills  - no HA, no dizziness  - no visual changes, no auditory changes  - no sore throat, no sinus congestion  - no SOB, no cough  - no chest pain, no palpitations  - no abdominal pain, no N/V/D  - no dysuria, no hematuria  - no myalgias, no arthralgias  - no pain in extremity, no swelling   - no rashes, no pruritis  - no neuro deficits  - no psychiatric concerns       HPI:  62 y/o F with  PMHx of SLE, HTN, DM type 2, GBS, chronic left foot drop, PE on Xarelto, 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 Of note pt recently developed URI symptoms and was started on Doxy as well as Hycodan, Alprazolam and Fioricet. Of note pt already on Percocet, flexeril for chronic pain, Gabapentin for seizures per  all which she took. Pt being admitted for Acute hypoxic respiratory failure due to Multifocal PNA, AMS r/o TIA/CVA  vs metabolic encephalopathy secondary to polypharmacy and/or hypoxia.      OBJECTIVE:  ===========    ICU Vital Signs Last 24 Hrs  T(C): 36.6 (28 Dec 2023 23:00), Max: 36.9 (28 Dec 2023 10:00)  T(F): 97.8 (28 Dec 2023 23:00), Max: 98.4 (28 Dec 2023 10:00)  HR: 86 (29 Dec 2023 06:40) (86 - 107)  BP: 158/88 (28 Dec 2023 23:00) (122/87 - 158/88)  RR: 20 (28 Dec 2023 23:00) (18 - 20)  SpO2: 95% (29 Dec 2023 06:40) (91% - 98%)    O2 Parameters below as of 29 Dec 2023 06:40  Patient On (Oxygen Delivery Method): nasal cannula w/ humidification, 5L        12-28 @ 07:01  -  12-29 @ 07:00  --------------------------------------------------------  IN: 720 mL / OUT: 1 mL / NET: 719 mL      CAPILLARY BLOOD GLUCOSE  POCT Blood Glucose.: 126 mg/dL (28 Dec 2023 23:20)      PHYSICAL EXAM:  ==============  GENERAL: NAD, well-groomed, well-developed  HEAD:  Atraumatic, Normocephalic  EYES: EOMI, PERRLA, conjunctiva and sclera clear  ENMT: No tonsillar erythema, exudates, or enlargement; Moist mucous membranes, Good dentition, No lesions  NECK: Supple, No JVD, Normal thyroid  NERVOUS SYSTEM:  Alert & Oriented X3, Good concentration; Motor Strength 5/5 B/L upper and lower extremities; DTRs 2+ intact and symmetric  CHEST/LUNG:   HEART: Regular rate and rhythm; No murmurs, rubs, or gallops  ABDOMEN: Soft, Nontender, Nondistended; Bowel sounds present  EXTREMITIES:  2+ Peripheral Pulses, No clubbing, cyanosis, or edema  LYMPH: No lymphadenopathy noted  SKIN: No rashes or lesions        HOSPITAL MEDICATIONS:  ======================  aspirin enteric coated 81 milliGRAM(s) Oral daily  rivaroxaban 20 milliGRAM(s) Oral with dinner  cefepime   IVPB 1000 milliGRAM(s) IV Intermittent every 8 hours  doxycycline IVPB 100 milliGRAM(s) IV Intermittent every 12 hours  verapamil  milliGRAM(s) Oral daily  atorvastatin 80 milliGRAM(s) Oral at bedtime  dextrose 50% Injectable 25 Gram(s) IV Push once  dextrose 50% Injectable 12.5 Gram(s) IV Push once  dextrose 50% Injectable 25 Gram(s) IV Push once  dextrose Oral Gel 15 Gram(s) Oral once PRN  glucagon  Injectable 1 milliGRAM(s) IntraMuscular once  insulin lispro (ADMELOG) corrective regimen sliding scale   SubCutaneous three times a day before meals  insulin lispro (ADMELOG) corrective regimen sliding scale   SubCutaneous at bedtime  predniSONE   Tablet 40 milliGRAM(s) Oral daily  albuterol    90 MICROgram(s) HFA Inhaler 1 Puff(s) Inhalation every 4 hours PRN  albuterol/ipratropium for Nebulization 3 milliLiter(s) Nebulizer every 6 hours  guaifenesin/dextromethorphan Oral Liquid 10 milliLiter(s) Oral every 6 hours PRN  hydrocodone/homatropine Syrup 5 milliLiter(s) Oral every 6 hours  montelukast 10 milliGRAM(s) Oral daily  sodium chloride 3%  Inhalation 4 milliLiter(s) Inhalation every 6 hours  acetaminophen     Tablet .. 650 milliGRAM(s) Oral every 6 hours PRN  celecoxib 200 milliGRAM(s) Oral daily  DULoxetine 60 milliGRAM(s) Oral daily  gabapentin 600 milliGRAM(s) Oral at bedtime  melatonin 3 milliGRAM(s) Oral at bedtime PRN  dextrose 5%. 1000 milliLiter(s) IV Continuous <Continuous>  dextrose 5%. 1000 milliLiter(s) IV Continuous <Continuous>  influenza   Vaccine 0.5 milliLiter(s) IntraMuscular once  sodium chloride 0.65% Nasal 2 Spray(s) Both Nostrils every 6 hours        LABS:  =====                          8.1    14.66 )-----------( 548      ( 29 Dec 2023 06:40 )             25.9     Hgb Trend: 8.1<--, 8.4<--, 7.8<--, 8.2<--, 8.3<--  12-29    140  |  105  |  20  ----------------------------<  164<H>  3.5   |  30  |  0.75    Ca    8.9      29 Dec 2023 06:40      Creatinine Trend: 0.75<--, 0.84<--, 0.74<--, 0.57<--, 0.68<--, 0.65<--    Procalcitonin, Serum: 0.24 ng/mL (12-25-23 @ 18:20)  Procalcitonin, Serum: 0.09 ng/mL (12-20-23 @ 07:01)    Urinalysis Basic - ( 29 Dec 2023 06:40 )    Color: x / Appearance: x / SG: x / pH: x  Gluc: 164 mg/dL / Ketone: x  / Bili: x / Urobili: x   Blood: x / Protein: x / Nitrite: x   Leuk Esterase: x / RBC: x / WBC x   Sq Epi: x / Non Sq Epi: x / Bacteria: x      Arterial Blood Gas:  12-27 @ 15:30  7.46/42/83/30/96.6/5.4  ABG lactate: --    MICROBIOLOGY:     Culture - Sputum  Source: .Sputum Sputum  Gram Stain (12-28-23 @ 22:02):    Few Squamous epithelial cells per low power field    Few polymorphonuclear leukocytes per low power field    Moderate Yeast per oil power field    Few Gram positive cocci in pairs per oil power field  Final Report (12-28-23 @ 22:02):    Normal Respiratory Valarie present    Culture - Blood  Source: .Blood Blood-Peripheral  Final Report (12-27-23 @ 22:01):    No growth at 5 days    Culture - Blood  Source: .Blood Blood-Peripheral  Final Report (12-27-23 @ 22:01):    No growth at 5 days    cefepime   IVPB 1000 every 8 hours  doxycycline IVPB    doxycycline IVPB 100 every 12 hours      Legionella Antigen, Urine: Negative (12-18-23 @ 07:50)    MRSA PCR Result.: NotDetec (12-18-23 @ 17:50)    Rapid RVP Result: NotDetec (12-25-23 @ 10:15)  Rapid RVP Result: NotDetec (12-21-23 @ 16:30)  Rapid RVP Result: NotDetec (12-17-23 @ 05:51)      RADIOLOGY:  ==========  IMPRESSION:  Bilateral infiltrates overall unchanged. Mildly increased involvement of   the right middle lobe and mild improvement in the left upper lobe.    IMPRESSION: No acute territorial infarcts are seen  MRA of the neck is limited by motion  MRA of the [place] appears grossly unremarkable.    CT PERFUSION: Markedly degraded by patient motion. No core infarct. Small   areas of abnormal perfusion in the left frontal and right temporal lobes   not confined to a vascular territory, likely artifactual.    CTA COW:  Patent intracranial circulation without flow limiting stenosis.    CTA NECK: Patent, ECAs, ICAs, no  hemodynamically significant stenosis at    ICA origins by NASCET criteria. Stable fusiform aneurysmal dilatation of   the ICA origins/proximal segments.  Bilateral vertebral arteries are patent without flow limiting stenosis.    Patchy nonspecific groundglass and consolidations in the visualized upper   lobes.    PULMONARY:  ============    albuterol    90 MICROgram(s) HFA Inhaler 1 Inhalation every 4 hours PRN  albuterol/ipratropium for Nebulization 3 Nebulizer every 6 hours  guaifenesin/dextromethorphan Oral Liquid 10 Oral every 6 hours PRN  hydrocodone/homatropine Syrup 5 Oral every 6 hours  montelukast 10 Oral daily  sodium chloride 3%  Inhalation 4 Inhalation every 6 hours      CARDIAC:  ========  Summary:   1. Normal global left ventricular systolic function.   2. Left ventricular ejection fraction, by visual estimation, is 50 to   55%.   3. The left atrium is normal in size.   4. Elevated mean left atrial pressure.   5. Structurally normal mitral valve, with normal leaflet excursion.   6. Trace mitral valve regurgitation.   7. Normal trileaflet aortic valve with normal opening.   8. Structurally normal tricuspid valve, with normal leaflet excursion.   9. Mild-moderate tricuspid regurgitation.  10. Structurally normal pulmonic valve, with normal leaflet excursion.  11. Estimated pulmonary artery systolic pressure is 47.8 mmHg assuming a   right atrial pressure of 5 mmHg, which is consistent with mild pulmonary   hypertension.   CHIEF COMPLAINT:  ===============  STROKE SYMPTOMS  ATRIAL MENTAL STATUS    INTERVAL EVENTS:  ==================    - on 4 L NC   - T(F): , Max: 98.4 (12-28-23 @ 10:00)  - SpO2:  (91% - 98%)    REVIEW OF SYSTEMS:  ==================  - no fever, no chills  - no HA, no dizziness  - no visual changes, no auditory changes  - no sore throat, no sinus congestion  - no SOB, no cough  - no chest pain, no palpitations  - no abdominal pain, no N/V/D  - no dysuria, no hematuria  - no myalgias, no arthralgias  - no pain in extremity, no swelling   - no rashes, no pruritis  - no neuro deficits  - no psychiatric concerns       HPI:  62 y/o F with  PMHx of SLE, HTN, DM type 2, GBS, chronic left foot drop, PE on Xarelto, 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 Of note pt recently developed URI symptoms and was started on Doxy as well as Hycodan, Alprazolam and Fioricet. Of note pt already on Percocet, flexeril for chronic pain, Gabapentin for seizures per  all which she took. Pt being admitted for Acute hypoxic respiratory failure due to Multifocal PNA, AMS r/o TIA/CVA  vs metabolic encephalopathy secondary to polypharmacy and/or hypoxia.      OBJECTIVE:  ===========    ICU Vital Signs Last 24 Hrs  T(C): 36.6 (28 Dec 2023 23:00), Max: 36.9 (28 Dec 2023 10:00)  T(F): 97.8 (28 Dec 2023 23:00), Max: 98.4 (28 Dec 2023 10:00)  HR: 86 (29 Dec 2023 06:40) (86 - 107)  BP: 158/88 (28 Dec 2023 23:00) (122/87 - 158/88)  RR: 20 (28 Dec 2023 23:00) (18 - 20)  SpO2: 95% (29 Dec 2023 06:40) (91% - 98%)    O2 Parameters below as of 29 Dec 2023 06:40  Patient On (Oxygen Delivery Method): nasal cannula w/ humidification, 5L        12-28 @ 07:01  -  12-29 @ 07:00  --------------------------------------------------------  IN: 720 mL / OUT: 1 mL / NET: 719 mL      CAPILLARY BLOOD GLUCOSE  POCT Blood Glucose.: 126 mg/dL (28 Dec 2023 23:20)      PHYSICAL EXAM:  ==============  GENERAL: NAD, well-groomed, well-developed  HEAD:  Atraumatic, Normocephalic  EYES: EOMI, PERRLA, conjunctiva and sclera clear  ENMT: No tonsillar erythema, exudates, or enlargement; Moist mucous membranes, Good dentition, No lesions  NECK: Supple, No JVD, Normal thyroid  NERVOUS SYSTEM:  Alert & Oriented X3, Good concentration; Motor Strength 5/5 B/L upper and lower extremities; DTRs 2+ intact and symmetric  CHEST/LUNG:   HEART: Regular rate and rhythm; No murmurs, rubs, or gallops  ABDOMEN: Soft, Nontender, Nondistended; Bowel sounds present  EXTREMITIES:  2+ Peripheral Pulses, No clubbing, cyanosis, or edema  LYMPH: No lymphadenopathy noted  SKIN: No rashes or lesions        HOSPITAL MEDICATIONS:  ======================  aspirin enteric coated 81 milliGRAM(s) Oral daily  rivaroxaban 20 milliGRAM(s) Oral with dinner  cefepime   IVPB 1000 milliGRAM(s) IV Intermittent every 8 hours  doxycycline IVPB 100 milliGRAM(s) IV Intermittent every 12 hours  verapamil  milliGRAM(s) Oral daily  atorvastatin 80 milliGRAM(s) Oral at bedtime  dextrose 50% Injectable 25 Gram(s) IV Push once  dextrose 50% Injectable 12.5 Gram(s) IV Push once  dextrose 50% Injectable 25 Gram(s) IV Push once  dextrose Oral Gel 15 Gram(s) Oral once PRN  glucagon  Injectable 1 milliGRAM(s) IntraMuscular once  insulin lispro (ADMELOG) corrective regimen sliding scale   SubCutaneous three times a day before meals  insulin lispro (ADMELOG) corrective regimen sliding scale   SubCutaneous at bedtime  predniSONE   Tablet 40 milliGRAM(s) Oral daily  albuterol    90 MICROgram(s) HFA Inhaler 1 Puff(s) Inhalation every 4 hours PRN  albuterol/ipratropium for Nebulization 3 milliLiter(s) Nebulizer every 6 hours  guaifenesin/dextromethorphan Oral Liquid 10 milliLiter(s) Oral every 6 hours PRN  hydrocodone/homatropine Syrup 5 milliLiter(s) Oral every 6 hours  montelukast 10 milliGRAM(s) Oral daily  sodium chloride 3%  Inhalation 4 milliLiter(s) Inhalation every 6 hours  acetaminophen     Tablet .. 650 milliGRAM(s) Oral every 6 hours PRN  celecoxib 200 milliGRAM(s) Oral daily  DULoxetine 60 milliGRAM(s) Oral daily  gabapentin 600 milliGRAM(s) Oral at bedtime  melatonin 3 milliGRAM(s) Oral at bedtime PRN  dextrose 5%. 1000 milliLiter(s) IV Continuous <Continuous>  dextrose 5%. 1000 milliLiter(s) IV Continuous <Continuous>  influenza   Vaccine 0.5 milliLiter(s) IntraMuscular once  sodium chloride 0.65% Nasal 2 Spray(s) Both Nostrils every 6 hours        LABS:  =====                          8.1    14.66 )-----------( 548      ( 29 Dec 2023 06:40 )             25.9     Hgb Trend: 8.1<--, 8.4<--, 7.8<--, 8.2<--, 8.3<--  12-29    140  |  105  |  20  ----------------------------<  164<H>  3.5   |  30  |  0.75    Ca    8.9      29 Dec 2023 06:40      Creatinine Trend: 0.75<--, 0.84<--, 0.74<--, 0.57<--, 0.68<--, 0.65<--    Procalcitonin, Serum: 0.24 ng/mL (12-25-23 @ 18:20)  Procalcitonin, Serum: 0.09 ng/mL (12-20-23 @ 07:01)    Urinalysis Basic - ( 29 Dec 2023 06:40 )    Color: x / Appearance: x / SG: x / pH: x  Gluc: 164 mg/dL / Ketone: x  / Bili: x / Urobili: x   Blood: x / Protein: x / Nitrite: x   Leuk Esterase: x / RBC: x / WBC x   Sq Epi: x / Non Sq Epi: x / Bacteria: x      Arterial Blood Gas:  12-27 @ 15:30  7.46/42/83/30/96.6/5.4  ABG lactate: --    MICROBIOLOGY:     Culture - Sputum  Source: .Sputum Sputum  Gram Stain (12-28-23 @ 22:02):    Few Squamous epithelial cells per low power field    Few polymorphonuclear leukocytes per low power field    Moderate Yeast per oil power field    Few Gram positive cocci in pairs per oil power field  Final Report (12-28-23 @ 22:02):    Normal Respiratory Valarie present    Culture - Blood  Source: .Blood Blood-Peripheral  Final Report (12-27-23 @ 22:01):    No growth at 5 days    Culture - Blood  Source: .Blood Blood-Peripheral  Final Report (12-27-23 @ 22:01):    No growth at 5 days    cefepime   IVPB 1000 every 8 hours  doxycycline IVPB    doxycycline IVPB 100 every 12 hours      Legionella Antigen, Urine: Negative (12-18-23 @ 07:50)    MRSA PCR Result.: NotDetec (12-18-23 @ 17:50)    Rapid RVP Result: NotDetec (12-25-23 @ 10:15)  Rapid RVP Result: NotDetec (12-21-23 @ 16:30)  Rapid RVP Result: NotDetec (12-17-23 @ 05:51)      RADIOLOGY:  ==========  IMPRESSION:  Bilateral infiltrates overall unchanged. Mildly increased involvement of   the right middle lobe and mild improvement in the left upper lobe.    IMPRESSION: No acute territorial infarcts are seen  MRA of the neck is limited by motion  MRA of the [place] appears grossly unremarkable.    CT PERFUSION: Markedly degraded by patient motion. No core infarct. Small   areas of abnormal perfusion in the left frontal and right temporal lobes   not confined to a vascular territory, likely artifactual.    CTA COW:  Patent intracranial circulation without flow limiting stenosis.    CTA NECK: Patent, ECAs, ICAs, no  hemodynamically significant stenosis at    ICA origins by NASCET criteria. Stable fusiform aneurysmal dilatation of   the ICA origins/proximal segments.  Bilateral vertebral arteries are patent without flow limiting stenosis.    Patchy nonspecific groundglass and consolidations in the visualized upper   lobes.    PULMONARY:  ============    albuterol    90 MICROgram(s) HFA Inhaler 1 Inhalation every 4 hours PRN  albuterol/ipratropium for Nebulization 3 Nebulizer every 6 hours  guaifenesin/dextromethorphan Oral Liquid 10 Oral every 6 hours PRN  hydrocodone/homatropine Syrup 5 Oral every 6 hours  montelukast 10 Oral daily  sodium chloride 3%  Inhalation 4 Inhalation every 6 hours      CARDIAC:  ========  Summary:   1. Normal global left ventricular systolic function.   2. Left ventricular ejection fraction, by visual estimation, is 50 to   55%.   3. The left atrium is normal in size.   4. Elevated mean left atrial pressure.   5. Structurally normal mitral valve, with normal leaflet excursion.   6. Trace mitral valve regurgitation.   7. Normal trileaflet aortic valve with normal opening.   8. Structurally normal tricuspid valve, with normal leaflet excursion.   9. Mild-moderate tricuspid regurgitation.  10. Structurally normal pulmonic valve, with normal leaflet excursion.  11. Estimated pulmonary artery systolic pressure is 47.8 mmHg assuming a   right atrial pressure of 5 mmHg, which is consistent with mild pulmonary   hypertension.   CHIEF COMPLAINT:  ===============  STROKE SYMPTOMS  ATRIAL MENTAL STATUS    INTERVAL EVENTS:  ==================    - on 4 L NC  titrated to 2L  - T(F): , Max: 98.4 (12-28-23 @ 10:00)  - SpO2:  (91% - 98%)    REVIEW OF SYSTEMS:  ==================  - no fever, no chills  - no HA, no dizziness  - no visual changes, no auditory changes  - no sore throat, no sinus congestion  - no SOB, +cough  - no chest pain, no palpitations  - no abdominal pain, no N/V/D  - no dysuria, no hematuria  - no myalgias, no arthralgias  - no pain in extremity, no swelling   - no rashes, no pruritis  - no neuro deficits  - no psychiatric concerns       HPI:  64 y/o F with  PMHx of SLE, HTN, DM type 2, GBS, chronic left foot drop, PE on Xarelto, 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 Of note pt recently developed URI symptoms and was started on Doxy as well as Hycodan, Alprazolam and Fioricet. Of note pt already on Percocet, flexeril for chronic pain, Gabapentin for seizures per  all which she took. Pt being admitted for Acute hypoxic respiratory failure due to Multifocal PNA, AMS r/o TIA/CVA  vs metabolic encephalopathy secondary to polypharmacy and/or hypoxia.      OBJECTIVE:  ===========    ICU Vital Signs Last 24 Hrs  T(C): 36.6 (28 Dec 2023 23:00), Max: 36.9 (28 Dec 2023 10:00)  T(F): 97.8 (28 Dec 2023 23:00), Max: 98.4 (28 Dec 2023 10:00)  HR: 86 (29 Dec 2023 06:40) (86 - 107)  BP: 158/88 (28 Dec 2023 23:00) (122/87 - 158/88)  RR: 20 (28 Dec 2023 23:00) (18 - 20)  SpO2: 95% (29 Dec 2023 06:40) (91% - 98%)    O2 Parameters below as of 29 Dec 2023 06:40  Patient On (Oxygen Delivery Method): nasal cannula w/ humidification, 5L        12-28 @ 07:01  -  12-29 @ 07:00  --------------------------------------------------------  IN: 720 mL / OUT: 1 mL / NET: 719 mL      CAPILLARY BLOOD GLUCOSE  POCT Blood Glucose.: 126 mg/dL (28 Dec 2023 23:20)      PHYSICAL EXAM:  ==============  GENERAL: NAD, well-groomed, well-developed  HEAD:  Atraumatic, Normocephalic  EYES: EOMI, PERRLA, conjunctiva and sclera clear  ENMT: No tonsillar erythema, exudates, or enlargement; Moist mucous membranes, Good dentition, No lesions  NECK: Supple, No JVD, Normal thyroid  NERVOUS SYSTEM:  Alert & Oriented X3, Good concentration; Motor Strength 5/5 B/L upper and lower extremities; DTRs 2+ intact and symmetric  CHEST/LUNG: CTA NO wheezing + coughing episodes   HEART: Regular rate and rhythm; No murmurs, rubs, or gallops  ABDOMEN: Soft, Nontender, Nondistended; Bowel sounds present  EXTREMITIES:  2+ Peripheral Pulses, No clubbing, cyanosis, or edema  LYMPH: No lymphadenopathy noted  SKIN: No rashes or lesions    HOSPITAL MEDICATIONS:  ======================  aspirin enteric coated 81 milliGRAM(s) Oral daily  rivaroxaban 20 milliGRAM(s) Oral with dinner  cefepime   IVPB 1000 milliGRAM(s) IV Intermittent every 8 hours  doxycycline IVPB 100 milliGRAM(s) IV Intermittent every 12 hours  verapamil  milliGRAM(s) Oral daily  atorvastatin 80 milliGRAM(s) Oral at bedtime  dextrose 50% Injectable 25 Gram(s) IV Push once  dextrose 50% Injectable 12.5 Gram(s) IV Push once  dextrose 50% Injectable 25 Gram(s) IV Push once  dextrose Oral Gel 15 Gram(s) Oral once PRN  glucagon  Injectable 1 milliGRAM(s) IntraMuscular once  insulin lispro (ADMELOG) corrective regimen sliding scale   SubCutaneous three times a day before meals  insulin lispro (ADMELOG) corrective regimen sliding scale   SubCutaneous at bedtime  predniSONE   Tablet 40 milliGRAM(s) Oral daily  albuterol    90 MICROgram(s) HFA Inhaler 1 Puff(s) Inhalation every 4 hours PRN  albuterol/ipratropium for Nebulization 3 milliLiter(s) Nebulizer every 6 hours  guaifenesin/dextromethorphan Oral Liquid 10 milliLiter(s) Oral every 6 hours PRN  hydrocodone/homatropine Syrup 5 milliLiter(s) Oral every 6 hours  montelukast 10 milliGRAM(s) Oral daily  sodium chloride 3%  Inhalation 4 milliLiter(s) Inhalation every 6 hours  acetaminophen     Tablet .. 650 milliGRAM(s) Oral every 6 hours PRN  celecoxib 200 milliGRAM(s) Oral daily  DULoxetine 60 milliGRAM(s) Oral daily  gabapentin 600 milliGRAM(s) Oral at bedtime  melatonin 3 milliGRAM(s) Oral at bedtime PRN  dextrose 5%. 1000 milliLiter(s) IV Continuous <Continuous>  dextrose 5%. 1000 milliLiter(s) IV Continuous <Continuous>  influenza   Vaccine 0.5 milliLiter(s) IntraMuscular once  sodium chloride 0.65% Nasal 2 Spray(s) Both Nostrils every 6 hours        LABS:  =====                          8.1    14.66 )-----------( 548      ( 29 Dec 2023 06:40 )             25.9     Hgb Trend: 8.1<--, 8.4<--, 7.8<--, 8.2<--, 8.3<--  12-29    140  |  105  |  20  ----------------------------<  164<H>  3.5   |  30  |  0.75    Ca    8.9      29 Dec 2023 06:40      Creatinine Trend: 0.75<--, 0.84<--, 0.74<--, 0.57<--, 0.68<--, 0.65<--    Procalcitonin, Serum: 0.24 ng/mL (12-25-23 @ 18:20)  Procalcitonin, Serum: 0.09 ng/mL (12-20-23 @ 07:01)    Urinalysis Basic - ( 29 Dec 2023 06:40 )    Color: x / Appearance: x / SG: x / pH: x  Gluc: 164 mg/dL / Ketone: x  / Bili: x / Urobili: x   Blood: x / Protein: x / Nitrite: x   Leuk Esterase: x / RBC: x / WBC x   Sq Epi: x / Non Sq Epi: x / Bacteria: x      Arterial Blood Gas:  12-27 @ 15:30  7.46/42/83/30/96.6/5.4  ABG lactate: --    MICROBIOLOGY:     Culture - Sputum  Source: .Sputum Sputum  Gram Stain (12-28-23 @ 22:02):    Few Squamous epithelial cells per low power field    Few polymorphonuclear leukocytes per low power field    Moderate Yeast per oil power field    Few Gram positive cocci in pairs per oil power field  Final Report (12-28-23 @ 22:02):    Normal Respiratory Valarie present    Culture - Blood  Source: .Blood Blood-Peripheral  Final Report (12-27-23 @ 22:01):    No growth at 5 days    Culture - Blood  Source: .Blood Blood-Peripheral  Final Report (12-27-23 @ 22:01):    No growth at 5 days    cefepime   IVPB 1000 every 8 hours  doxycycline IVPB    doxycycline IVPB 100 every 12 hours      Legionella Antigen, Urine: Negative (12-18-23 @ 07:50)    MRSA PCR Result.: NotDetec (12-18-23 @ 17:50)    Rapid RVP Result: NotDetec (12-25-23 @ 10:15)  Rapid RVP Result: NotDetec (12-21-23 @ 16:30)  Rapid RVP Result: NotDetec (12-17-23 @ 05:51)      RADIOLOGY:  ==========  IMPRESSION:  Bilateral infiltrates overall unchanged. Mildly increased involvement of   the right middle lobe and mild improvement in the left upper lobe.    IMPRESSION: No acute territorial infarcts are seen  MRA of the neck is limited by motion  MRA of the [place] appears grossly unremarkable.    CT PERFUSION: Markedly degraded by patient motion. No core infarct. Small   areas of abnormal perfusion in the left frontal and right temporal lobes   not confined to a vascular territory, likely artifactual.    CTA COW:  Patent intracranial circulation without flow limiting stenosis.    CTA NECK: Patent, ECAs, ICAs, no  hemodynamically significant stenosis at    ICA origins by NASCET criteria. Stable fusiform aneurysmal dilatation of   the ICA origins/proximal segments.  Bilateral vertebral arteries are patent without flow limiting stenosis.    Patchy nonspecific groundglass and consolidations in the visualized upper   lobes.    PULMONARY:  ============    albuterol    90 MICROgram(s) HFA Inhaler 1 Inhalation every 4 hours PRN  albuterol/ipratropium for Nebulization 3 Nebulizer every 6 hours  guaifenesin/dextromethorphan Oral Liquid 10 Oral every 6 hours PRN  hydrocodone/homatropine Syrup 5 Oral every 6 hours  montelukast 10 Oral daily  sodium chloride 3%  Inhalation 4 Inhalation every 6 hours      CARDIAC:  ========  Summary:   1. Normal global left ventricular systolic function.   2. Left ventricular ejection fraction, by visual estimation, is 50 to   55%.   3. The left atrium is normal in size.   4. Elevated mean left atrial pressure.   5. Structurally normal mitral valve, with normal leaflet excursion.   6. Trace mitral valve regurgitation.   7. Normal trileaflet aortic valve with normal opening.   8. Structurally normal tricuspid valve, with normal leaflet excursion.   9. Mild-moderate tricuspid regurgitation.  10. Structurally normal pulmonic valve, with normal leaflet excursion.  11. Estimated pulmonary artery systolic pressure is 47.8 mmHg assuming a   right atrial pressure of 5 mmHg, which is consistent with mild pulmonary   hypertension.   CHIEF COMPLAINT:  ===============  STROKE SYMPTOMS  ATRIAL MENTAL STATUS    INTERVAL EVENTS:  ==================    - on 4 L NC  titrated to 2L  - T(F): , Max: 98.4 (12-28-23 @ 10:00)  - SpO2:  (91% - 98%)    REVIEW OF SYSTEMS:  ==================  - no fever, no chills  - no HA, no dizziness  - no visual changes, no auditory changes  - no sore throat, no sinus congestion  - no SOB, +cough  - no chest pain, no palpitations  - no abdominal pain, no N/V/D  - no dysuria, no hematuria  - no myalgias, no arthralgias  - no pain in extremity, no swelling   - no rashes, no pruritis  - no neuro deficits  - no psychiatric concerns       HPI:  62 y/o F with  PMHx of SLE, HTN, DM type 2, GBS, chronic left foot drop, PE on Xarelto, 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 Of note pt recently developed URI symptoms and was started on Doxy as well as Hycodan, Alprazolam and Fioricet. Of note pt already on Percocet, flexeril for chronic pain, Gabapentin for seizures per  all which she took. Pt being admitted for Acute hypoxic respiratory failure due to Multifocal PNA, AMS r/o TIA/CVA  vs metabolic encephalopathy secondary to polypharmacy and/or hypoxia.      OBJECTIVE:  ===========    ICU Vital Signs Last 24 Hrs  T(C): 36.6 (28 Dec 2023 23:00), Max: 36.9 (28 Dec 2023 10:00)  T(F): 97.8 (28 Dec 2023 23:00), Max: 98.4 (28 Dec 2023 10:00)  HR: 86 (29 Dec 2023 06:40) (86 - 107)  BP: 158/88 (28 Dec 2023 23:00) (122/87 - 158/88)  RR: 20 (28 Dec 2023 23:00) (18 - 20)  SpO2: 95% (29 Dec 2023 06:40) (91% - 98%)    O2 Parameters below as of 29 Dec 2023 06:40  Patient On (Oxygen Delivery Method): nasal cannula w/ humidification, 5L        12-28 @ 07:01  -  12-29 @ 07:00  --------------------------------------------------------  IN: 720 mL / OUT: 1 mL / NET: 719 mL      CAPILLARY BLOOD GLUCOSE  POCT Blood Glucose.: 126 mg/dL (28 Dec 2023 23:20)      PHYSICAL EXAM:  ==============  GENERAL: NAD, well-groomed, well-developed  HEAD:  Atraumatic, Normocephalic  EYES: EOMI, PERRLA, conjunctiva and sclera clear  ENMT: No tonsillar erythema, exudates, or enlargement; Moist mucous membranes, Good dentition, No lesions  NECK: Supple, No JVD, Normal thyroid  NERVOUS SYSTEM:  Alert & Oriented X3, Good concentration; Motor Strength 5/5 B/L upper and lower extremities; DTRs 2+ intact and symmetric  CHEST/LUNG: CTA NO wheezing + coughing episodes   HEART: Regular rate and rhythm; No murmurs, rubs, or gallops  ABDOMEN: Soft, Nontender, Nondistended; Bowel sounds present  EXTREMITIES:  2+ Peripheral Pulses, No clubbing, cyanosis, or edema  LYMPH: No lymphadenopathy noted  SKIN: No rashes or lesions    HOSPITAL MEDICATIONS:  ======================  aspirin enteric coated 81 milliGRAM(s) Oral daily  rivaroxaban 20 milliGRAM(s) Oral with dinner  cefepime   IVPB 1000 milliGRAM(s) IV Intermittent every 8 hours  doxycycline IVPB 100 milliGRAM(s) IV Intermittent every 12 hours  verapamil  milliGRAM(s) Oral daily  atorvastatin 80 milliGRAM(s) Oral at bedtime  dextrose 50% Injectable 25 Gram(s) IV Push once  dextrose 50% Injectable 12.5 Gram(s) IV Push once  dextrose 50% Injectable 25 Gram(s) IV Push once  dextrose Oral Gel 15 Gram(s) Oral once PRN  glucagon  Injectable 1 milliGRAM(s) IntraMuscular once  insulin lispro (ADMELOG) corrective regimen sliding scale   SubCutaneous three times a day before meals  insulin lispro (ADMELOG) corrective regimen sliding scale   SubCutaneous at bedtime  predniSONE   Tablet 40 milliGRAM(s) Oral daily  albuterol    90 MICROgram(s) HFA Inhaler 1 Puff(s) Inhalation every 4 hours PRN  albuterol/ipratropium for Nebulization 3 milliLiter(s) Nebulizer every 6 hours  guaifenesin/dextromethorphan Oral Liquid 10 milliLiter(s) Oral every 6 hours PRN  hydrocodone/homatropine Syrup 5 milliLiter(s) Oral every 6 hours  montelukast 10 milliGRAM(s) Oral daily  sodium chloride 3%  Inhalation 4 milliLiter(s) Inhalation every 6 hours  acetaminophen     Tablet .. 650 milliGRAM(s) Oral every 6 hours PRN  celecoxib 200 milliGRAM(s) Oral daily  DULoxetine 60 milliGRAM(s) Oral daily  gabapentin 600 milliGRAM(s) Oral at bedtime  melatonin 3 milliGRAM(s) Oral at bedtime PRN  dextrose 5%. 1000 milliLiter(s) IV Continuous <Continuous>  dextrose 5%. 1000 milliLiter(s) IV Continuous <Continuous>  influenza   Vaccine 0.5 milliLiter(s) IntraMuscular once  sodium chloride 0.65% Nasal 2 Spray(s) Both Nostrils every 6 hours        LABS:  =====                          8.1    14.66 )-----------( 548      ( 29 Dec 2023 06:40 )             25.9     Hgb Trend: 8.1<--, 8.4<--, 7.8<--, 8.2<--, 8.3<--  12-29    140  |  105  |  20  ----------------------------<  164<H>  3.5   |  30  |  0.75    Ca    8.9      29 Dec 2023 06:40      Creatinine Trend: 0.75<--, 0.84<--, 0.74<--, 0.57<--, 0.68<--, 0.65<--    Procalcitonin, Serum: 0.24 ng/mL (12-25-23 @ 18:20)  Procalcitonin, Serum: 0.09 ng/mL (12-20-23 @ 07:01)    Urinalysis Basic - ( 29 Dec 2023 06:40 )    Color: x / Appearance: x / SG: x / pH: x  Gluc: 164 mg/dL / Ketone: x  / Bili: x / Urobili: x   Blood: x / Protein: x / Nitrite: x   Leuk Esterase: x / RBC: x / WBC x   Sq Epi: x / Non Sq Epi: x / Bacteria: x      Arterial Blood Gas:  12-27 @ 15:30  7.46/42/83/30/96.6/5.4  ABG lactate: --    MICROBIOLOGY:     Culture - Sputum  Source: .Sputum Sputum  Gram Stain (12-28-23 @ 22:02):    Few Squamous epithelial cells per low power field    Few polymorphonuclear leukocytes per low power field    Moderate Yeast per oil power field    Few Gram positive cocci in pairs per oil power field  Final Report (12-28-23 @ 22:02):    Normal Respiratory Valarie present    Culture - Blood  Source: .Blood Blood-Peripheral  Final Report (12-27-23 @ 22:01):    No growth at 5 days    Culture - Blood  Source: .Blood Blood-Peripheral  Final Report (12-27-23 @ 22:01):    No growth at 5 days    cefepime   IVPB 1000 every 8 hours  doxycycline IVPB    doxycycline IVPB 100 every 12 hours      Legionella Antigen, Urine: Negative (12-18-23 @ 07:50)    MRSA PCR Result.: NotDetec (12-18-23 @ 17:50)    Rapid RVP Result: NotDetec (12-25-23 @ 10:15)  Rapid RVP Result: NotDetec (12-21-23 @ 16:30)  Rapid RVP Result: NotDetec (12-17-23 @ 05:51)      RADIOLOGY:  ==========  IMPRESSION:  Bilateral infiltrates overall unchanged. Mildly increased involvement of   the right middle lobe and mild improvement in the left upper lobe.    IMPRESSION: No acute territorial infarcts are seen  MRA of the neck is limited by motion  MRA of the [place] appears grossly unremarkable.    CT PERFUSION: Markedly degraded by patient motion. No core infarct. Small   areas of abnormal perfusion in the left frontal and right temporal lobes   not confined to a vascular territory, likely artifactual.    CTA COW:  Patent intracranial circulation without flow limiting stenosis.    CTA NECK: Patent, ECAs, ICAs, no  hemodynamically significant stenosis at    ICA origins by NASCET criteria. Stable fusiform aneurysmal dilatation of   the ICA origins/proximal segments.  Bilateral vertebral arteries are patent without flow limiting stenosis.    Patchy nonspecific groundglass and consolidations in the visualized upper   lobes.    PULMONARY:  ============    albuterol    90 MICROgram(s) HFA Inhaler 1 Inhalation every 4 hours PRN  albuterol/ipratropium for Nebulization 3 Nebulizer every 6 hours  guaifenesin/dextromethorphan Oral Liquid 10 Oral every 6 hours PRN  hydrocodone/homatropine Syrup 5 Oral every 6 hours  montelukast 10 Oral daily  sodium chloride 3%  Inhalation 4 Inhalation every 6 hours      CARDIAC:  ========  Summary:   1. Normal global left ventricular systolic function.   2. Left ventricular ejection fraction, by visual estimation, is 50 to   55%.   3. The left atrium is normal in size.   4. Elevated mean left atrial pressure.   5. Structurally normal mitral valve, with normal leaflet excursion.   6. Trace mitral valve regurgitation.   7. Normal trileaflet aortic valve with normal opening.   8. Structurally normal tricuspid valve, with normal leaflet excursion.   9. Mild-moderate tricuspid regurgitation.  10. Structurally normal pulmonic valve, with normal leaflet excursion.  11. Estimated pulmonary artery systolic pressure is 47.8 mmHg assuming a   right atrial pressure of 5 mmHg, which is consistent with mild pulmonary   hypertension.   CHIEF COMPLAINT:  ===============  STROKE SYMPTOMS  ALTERED MENTAL STATUS    INTERVAL EVENTS:  ==================    - on 4 L NC  titrated to 2L  - T(F): , Max: 98.4 (12-28-23 @ 10:00)  - SpO2:  (91% - 98%)    REVIEW OF SYSTEMS:  ==================  - no fever, no chills  - no HA, no dizziness  - no visual changes, no auditory changes  - no sore throat, no sinus congestion  - no SOB, +cough  - no chest pain, no palpitations  - no abdominal pain, no N/V/D  - no dysuria, no hematuria  - no myalgias, no arthralgias  - no pain in extremity, no swelling   - no rashes, no pruritis  - no neuro deficits  - no psychiatric concerns       HPI:  64 y/o F with  PMHx of SLE, HTN, DM type 2, GBS, chronic left foot drop, PE on Xarelto, 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 Of note pt recently developed URI symptoms and was started on Doxy as well as Hycodan, Alprazolam and Fioricet. Of note pt already on Percocet, flexeril for chronic pain, Gabapentin for seizures per  all which she took. Pt being admitted for Acute hypoxic respiratory failure due to Multifocal PNA, AMS r/o TIA/CVA  vs metabolic encephalopathy secondary to polypharmacy and/or hypoxia.      OBJECTIVE:  ===========    Vital Signs Last 24 Hrs  T(C): 36.6 (28 Dec 2023 23:00), Max: 36.9 (28 Dec 2023 10:00)  T(F): 97.8 (28 Dec 2023 23:00), Max: 98.4 (28 Dec 2023 10:00)  HR: 86 (29 Dec 2023 06:40) (86 - 107)  BP: 158/88 (28 Dec 2023 23:00) (122/87 - 158/88)  RR: 20 (28 Dec 2023 23:00) (18 - 20)  SpO2: 95% (29 Dec 2023 06:40) (91% - 98%)    O2 Parameters below as of 29 Dec 2023 06:40  Patient On (Oxygen Delivery Method): nasal cannula w/ humidification, 5L          CAPILLARY BLOOD GLUCOSE  POCT Blood Glucose.: 126 mg/dL (28 Dec 2023 23:20)      PHYSICAL EXAM:  ==============  GENERAL: obese female, NAD, coughing, gets dyspneic with speaking  HEAD:  Atraumatic, Normocephalic  EYES: EOMI, PERRLA, conjunctiva and sclera clear  ENMT: No tonsillar erythema, exudates, or enlargement; Moist mucous membranes, No lesions  NECK: Supple, No JVD  NERVOUS SYSTEM:  Alert & Oriented X3, Good concentration; moves all extremities spontaenously  CHEST/LUNG: CTA NO wheezing + coughing episodes   HEART: Regular rate and rhythm; No murmurs, rubs, or gallops  ABDOMEN: Soft, Nontender, Nondistended; Bowel sounds present  EXTREMITIES:  2+ Peripheral Pulses, No clubbing, cyanosis, or edema  LYMPH: No lymphadenopathy noted  SKIN: No rashes or lesions    HOSPITAL MEDICATIONS:  ======================  aspirin enteric coated 81 milliGRAM(s) Oral daily  rivaroxaban 20 milliGRAM(s) Oral with dinner  cefepime   IVPB 1000 milliGRAM(s) IV Intermittent every 8 hours  doxycycline IVPB 100 milliGRAM(s) IV Intermittent every 12 hours  verapamil  milliGRAM(s) Oral daily  atorvastatin 80 milliGRAM(s) Oral at bedtime  dextrose 50% Injectable 25 Gram(s) IV Push once  dextrose 50% Injectable 12.5 Gram(s) IV Push once  dextrose 50% Injectable 25 Gram(s) IV Push once  dextrose Oral Gel 15 Gram(s) Oral once PRN  glucagon  Injectable 1 milliGRAM(s) IntraMuscular once  insulin lispro (ADMELOG) corrective regimen sliding scale   SubCutaneous three times a day before meals  insulin lispro (ADMELOG) corrective regimen sliding scale   SubCutaneous at bedtime  predniSONE   Tablet 40 milliGRAM(s) Oral daily  albuterol    90 MICROgram(s) HFA Inhaler 1 Puff(s) Inhalation every 4 hours PRN  albuterol/ipratropium for Nebulization 3 milliLiter(s) Nebulizer every 6 hours  guaifenesin/dextromethorphan Oral Liquid 10 milliLiter(s) Oral every 6 hours PRN  hydrocodone/homatropine Syrup 5 milliLiter(s) Oral every 6 hours  montelukast 10 milliGRAM(s) Oral daily  sodium chloride 3%  Inhalation 4 milliLiter(s) Inhalation every 6 hours  acetaminophen     Tablet .. 650 milliGRAM(s) Oral every 6 hours PRN  celecoxib 200 milliGRAM(s) Oral daily  DULoxetine 60 milliGRAM(s) Oral daily  gabapentin 600 milliGRAM(s) Oral at bedtime  melatonin 3 milliGRAM(s) Oral at bedtime PRN  dextrose 5%. 1000 milliLiter(s) IV Continuous <Continuous>  dextrose 5%. 1000 milliLiter(s) IV Continuous <Continuous>  influenza   Vaccine 0.5 milliLiter(s) IntraMuscular once  sodium chloride 0.65% Nasal 2 Spray(s) Both Nostrils every 6 hours        LABS:  =====                          8.1    14.66 )-----------( 548      ( 29 Dec 2023 06:40 )             25.9     Hgb Trend: 8.1<--, 8.4<--, 7.8<--, 8.2<--, 8.3<--  12-29    140  |  105  |  20  ----------------------------<  164<H>  3.5   |  30  |  0.75    Ca    8.9      29 Dec 2023 06:40      Creatinine Trend: 0.75<--, 0.84<--, 0.74<--, 0.57<--, 0.68<--, 0.65<--    Procalcitonin, Serum: 0.24 ng/mL (12-25-23 @ 18:20)  Procalcitonin, Serum: 0.09 ng/mL (12-20-23 @ 07:01)      Arterial Blood Gas:  12-27 @ 15:30  7.46/42/83/30/96.6/5.4  ABG lactate: --    MICROBIOLOGY:   Culture - Sputum  Source: .Sputum Sputum  Gram Stain (12-28-23 @ 22:02):    Few Squamous epithelial cells per low power field    Few polymorphonuclear leukocytes per low power field    Moderate Yeast per oil power field    Few Gram positive cocci in pairs per oil power field  Final Report (12-28-23 @ 22:02):    Normal Respiratory Valarie present    Culture - Blood  Source: .Blood Blood-Peripheral  Final Report (12-27-23 @ 22:01):    No growth at 5 days    Culture - Blood  Source: .Blood Blood-Peripheral  Final Report (12-27-23 @ 22:01):    No growth at 5 days    cefepime   IVPB 1000 every 8 hours  doxycycline IVPB    doxycycline IVPB 100 every 12 hours      Legionella Antigen, Urine: Negative (12-18-23 @ 07:50)    MRSA PCR Result.: NotDetec (12-18-23 @ 17:50)    Rapid RVP Result: NotDetec (12-25-23 @ 10:15)  Rapid RVP Result: NotDetec (12-21-23 @ 16:30)  Rapid RVP Result: NotDetec (12-17-23 @ 05:51)      RADIOLOGY:  ==========  IMPRESSION:  Bilateral infiltrates overall unchanged. Mildly increased involvement of   the right middle lobe and mild improvement in the left upper lobe.    IMPRESSION: No acute territorial infarcts are seen  MRA of the neck is limited by motion  MRA of the [place] appears grossly unremarkable.    CT PERFUSION: Markedly degraded by patient motion. No core infarct. Small   areas of abnormal perfusion in the left frontal and right temporal lobes   not confined to a vascular territory, likely artifactual.    CTA COW:  Patent intracranial circulation without flow limiting stenosis.    CTA NECK: Patent, ECAs, ICAs, no  hemodynamically significant stenosis at    ICA origins by NASCET criteria. Stable fusiform aneurysmal dilatation of   the ICA origins/proximal segments.  Bilateral vertebral arteries are patent without flow limiting stenosis.    Patchy nonspecific groundglass and consolidations in the visualized upper   lobes.    PULMONARY:  ============    albuterol    90 MICROgram(s) HFA Inhaler 1 Inhalation every 4 hours PRN  albuterol/ipratropium for Nebulization 3 Nebulizer every 6 hours  guaifenesin/dextromethorphan Oral Liquid 10 Oral every 6 hours PRN  hydrocodone/homatropine Syrup 5 Oral every 6 hours  montelukast 10 Oral daily  sodium chloride 3%  Inhalation 4 Inhalation every 6 hours      CARDIAC:  ========  Summary:   1. Normal global left ventricular systolic function.   2. Left ventricular ejection fraction, by visual estimation, is 50 to   55%.   3. The left atrium is normal in size.   4. Elevated mean left atrial pressure.   5. Structurally normal mitral valve, with normal leaflet excursion.   6. Trace mitral valve regurgitation.   7. Normal trileaflet aortic valve with normal opening.   8. Structurally normal tricuspid valve, with normal leaflet excursion.   9. Mild-moderate tricuspid regurgitation.  10. Structurally normal pulmonic valve, with normal leaflet excursion.  11. Estimated pulmonary artery systolic pressure is 47.8 mmHg assuming a   right atrial pressure of 5 mmHg, which is consistent with mild pulmonary   hypertension.   CHIEF COMPLAINT:  ===============  STROKE SYMPTOMS  ALTERED MENTAL STATUS    INTERVAL EVENTS:  ==================    - on 4 L NC  titrated to 2L  - T(F): , Max: 98.4 (12-28-23 @ 10:00)  - SpO2:  (91% - 98%)    REVIEW OF SYSTEMS:  ==================  - no fever, no chills  - no HA, no dizziness  - no visual changes, no auditory changes  - no sore throat, no sinus congestion  - no SOB, +cough  - no chest pain, no palpitations  - no abdominal pain, no N/V/D  - no dysuria, no hematuria  - no myalgias, no arthralgias  - no pain in extremity, no swelling   - no rashes, no pruritis  - no neuro deficits  - no psychiatric concerns       HPI:  64 y/o F with  PMHx of SLE, HTN, DM type 2, GBS, chronic left foot drop, PE on Xarelto, 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 Of note pt recently developed URI symptoms and was started on Doxy as well as Hycodan, Alprazolam and Fioricet. Of note pt already on Percocet, flexeril for chronic pain, Gabapentin for seizures per  all which she took. Pt being admitted for Acute hypoxic respiratory failure due to Multifocal PNA, AMS r/o TIA/CVA  vs metabolic encephalopathy secondary to polypharmacy and/or hypoxia.      OBJECTIVE:  ===========    Vital Signs Last 24 Hrs  T(C): 36.6 (28 Dec 2023 23:00), Max: 36.9 (28 Dec 2023 10:00)  T(F): 97.8 (28 Dec 2023 23:00), Max: 98.4 (28 Dec 2023 10:00)  HR: 86 (29 Dec 2023 06:40) (86 - 107)  BP: 158/88 (28 Dec 2023 23:00) (122/87 - 158/88)  RR: 20 (28 Dec 2023 23:00) (18 - 20)  SpO2: 95% (29 Dec 2023 06:40) (91% - 98%)    O2 Parameters below as of 29 Dec 2023 06:40  Patient On (Oxygen Delivery Method): nasal cannula w/ humidification, 5L          CAPILLARY BLOOD GLUCOSE  POCT Blood Glucose.: 126 mg/dL (28 Dec 2023 23:20)      PHYSICAL EXAM:  ==============  GENERAL: obese female, NAD, coughing, gets dyspneic with speaking  HEAD:  Atraumatic, Normocephalic  EYES: EOMI, PERRLA, conjunctiva and sclera clear  ENMT: No tonsillar erythema, exudates, or enlargement; Moist mucous membranes, No lesions  NECK: Supple, No JVD  NERVOUS SYSTEM:  Alert & Oriented X3, Good concentration; moves all extremities spontaenously  CHEST/LUNG: CTA NO wheezing + coughing episodes   HEART: Regular rate and rhythm; No murmurs, rubs, or gallops  ABDOMEN: Soft, Nontender, Nondistended; Bowel sounds present  EXTREMITIES:  2+ Peripheral Pulses, No clubbing, cyanosis, or edema  LYMPH: No lymphadenopathy noted  SKIN: No rashes or lesions    HOSPITAL MEDICATIONS:  ======================  aspirin enteric coated 81 milliGRAM(s) Oral daily  rivaroxaban 20 milliGRAM(s) Oral with dinner  cefepime   IVPB 1000 milliGRAM(s) IV Intermittent every 8 hours  doxycycline IVPB 100 milliGRAM(s) IV Intermittent every 12 hours  verapamil  milliGRAM(s) Oral daily  atorvastatin 80 milliGRAM(s) Oral at bedtime  dextrose 50% Injectable 25 Gram(s) IV Push once  dextrose 50% Injectable 12.5 Gram(s) IV Push once  dextrose 50% Injectable 25 Gram(s) IV Push once  dextrose Oral Gel 15 Gram(s) Oral once PRN  glucagon  Injectable 1 milliGRAM(s) IntraMuscular once  insulin lispro (ADMELOG) corrective regimen sliding scale   SubCutaneous three times a day before meals  insulin lispro (ADMELOG) corrective regimen sliding scale   SubCutaneous at bedtime  predniSONE   Tablet 40 milliGRAM(s) Oral daily  albuterol    90 MICROgram(s) HFA Inhaler 1 Puff(s) Inhalation every 4 hours PRN  albuterol/ipratropium for Nebulization 3 milliLiter(s) Nebulizer every 6 hours  guaifenesin/dextromethorphan Oral Liquid 10 milliLiter(s) Oral every 6 hours PRN  hydrocodone/homatropine Syrup 5 milliLiter(s) Oral every 6 hours  montelukast 10 milliGRAM(s) Oral daily  sodium chloride 3%  Inhalation 4 milliLiter(s) Inhalation every 6 hours  acetaminophen     Tablet .. 650 milliGRAM(s) Oral every 6 hours PRN  celecoxib 200 milliGRAM(s) Oral daily  DULoxetine 60 milliGRAM(s) Oral daily  gabapentin 600 milliGRAM(s) Oral at bedtime  melatonin 3 milliGRAM(s) Oral at bedtime PRN  dextrose 5%. 1000 milliLiter(s) IV Continuous <Continuous>  dextrose 5%. 1000 milliLiter(s) IV Continuous <Continuous>  influenza   Vaccine 0.5 milliLiter(s) IntraMuscular once  sodium chloride 0.65% Nasal 2 Spray(s) Both Nostrils every 6 hours        LABS:  =====                          8.1    14.66 )-----------( 548      ( 29 Dec 2023 06:40 )             25.9     Hgb Trend: 8.1<--, 8.4<--, 7.8<--, 8.2<--, 8.3<--  12-29    140  |  105  |  20  ----------------------------<  164<H>  3.5   |  30  |  0.75    Ca    8.9      29 Dec 2023 06:40      Creatinine Trend: 0.75<--, 0.84<--, 0.74<--, 0.57<--, 0.68<--, 0.65<--    Procalcitonin, Serum: 0.24 ng/mL (12-25-23 @ 18:20)  Procalcitonin, Serum: 0.09 ng/mL (12-20-23 @ 07:01)      Arterial Blood Gas:  12-27 @ 15:30  7.46/42/83/30/96.6/5.4  ABG lactate: --    MICROBIOLOGY:   Culture - Sputum  Source: .Sputum Sputum  Gram Stain (12-28-23 @ 22:02):    Few Squamous epithelial cells per low power field    Few polymorphonuclear leukocytes per low power field    Moderate Yeast per oil power field    Few Gram positive cocci in pairs per oil power field  Final Report (12-28-23 @ 22:02):    Normal Respiratory Valarie present    Culture - Blood  Source: .Blood Blood-Peripheral  Final Report (12-27-23 @ 22:01):    No growth at 5 days    Culture - Blood  Source: .Blood Blood-Peripheral  Final Report (12-27-23 @ 22:01):    No growth at 5 days    cefepime   IVPB 1000 every 8 hours  doxycycline IVPB    doxycycline IVPB 100 every 12 hours      Legionella Antigen, Urine: Negative (12-18-23 @ 07:50)    MRSA PCR Result.: NotDetec (12-18-23 @ 17:50)    Rapid RVP Result: NotDetec (12-25-23 @ 10:15)  Rapid RVP Result: NotDetec (12-21-23 @ 16:30)  Rapid RVP Result: NotDetec (12-17-23 @ 05:51)      RADIOLOGY:  ==========  IMPRESSION:  Bilateral infiltrates overall unchanged. Mildly increased involvement of   the right middle lobe and mild improvement in the left upper lobe.    IMPRESSION: No acute territorial infarcts are seen  MRA of the neck is limited by motion  MRA of the [place] appears grossly unremarkable.    CT PERFUSION: Markedly degraded by patient motion. No core infarct. Small   areas of abnormal perfusion in the left frontal and right temporal lobes   not confined to a vascular territory, likely artifactual.    CTA COW:  Patent intracranial circulation without flow limiting stenosis.    CTA NECK: Patent, ECAs, ICAs, no  hemodynamically significant stenosis at    ICA origins by NASCET criteria. Stable fusiform aneurysmal dilatation of   the ICA origins/proximal segments.  Bilateral vertebral arteries are patent without flow limiting stenosis.    Patchy nonspecific groundglass and consolidations in the visualized upper   lobes.    PULMONARY:  ============    albuterol    90 MICROgram(s) HFA Inhaler 1 Inhalation every 4 hours PRN  albuterol/ipratropium for Nebulization 3 Nebulizer every 6 hours  guaifenesin/dextromethorphan Oral Liquid 10 Oral every 6 hours PRN  hydrocodone/homatropine Syrup 5 Oral every 6 hours  montelukast 10 Oral daily  sodium chloride 3%  Inhalation 4 Inhalation every 6 hours      CARDIAC:  ========  Summary:   1. Normal global left ventricular systolic function.   2. Left ventricular ejection fraction, by visual estimation, is 50 to   55%.   3. The left atrium is normal in size.   4. Elevated mean left atrial pressure.   5. Structurally normal mitral valve, with normal leaflet excursion.   6. Trace mitral valve regurgitation.   7. Normal trileaflet aortic valve with normal opening.   8. Structurally normal tricuspid valve, with normal leaflet excursion.   9. Mild-moderate tricuspid regurgitation.  10. Structurally normal pulmonic valve, with normal leaflet excursion.  11. Estimated pulmonary artery systolic pressure is 47.8 mmHg assuming a   right atrial pressure of 5 mmHg, which is consistent with mild pulmonary   hypertension.

## 2023-12-29 NOTE — PROGRESS NOTE ADULT - NS ATTEND AMEND GEN_ALL_CORE FT
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 Found to have multifocal PNA on chest imaging. Tested + for Mycoplasma. Hypoxia necessitating increased O2 requirements.     Dx: acute hypoxic respiratory failure, multifocal PNA due to mycoplasma pneumonia, acute asthma exacerbation in setting of bacterial PNA    - completed 7 day course of azithromycin for mycoplasma PNA  - had CT chest 12/25 showing multifocal PNA. primary team repeated CT chest yesterday 12/28 with again multifocal ground glass opacities consistent with PNA. would not anticipate drastic change in imaging of chest in such short duration in time  - seen by ID and placed on doxycycline to extend treatment for mycoplasma and started on cefepime for bacterial PNA coverage, procalcitonin up trended since admission  - sputum cx with normal respiratory hossein  - has been on solumedrol 40mg q12, no wheeze on exam with good air entry. taper steroid to prednisone 40mg daily. hypertonic saline nebs to help mobilize secretions (pt states unable to cough up phlegm)   - cough may be secondary to reactive airway: cont with duonebs, already on systemic steroids. antitussives hycodan around the clock. tessalon perles added  - goal to maintain O2 sat > 90%  - wean down FIo2 as tolerates  - currently weaned to 2L NC  - RVP check x3 and all negative  - incentive spirometer pt seen and examined with NP    still with dry cough and has episodes of coughing fits that cause her to get SOB  afebrile  O2 weaned from 12L to 2L NC yesterday (tele pulse oximeter read lower than portable pulse oximeter machines)   pt ambulated to bathroom today and became SOB and dyspneic with O2 increased to 5L NC with O2 sat 98%  weaned back to 2L NC this afternoon with O2 sat 95%    63F PMH HTN, DM type 2, SLE, hx of GBS s/p IVIG (required intubation), 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 Found to have multifocal PNA on chest imaging. Tested + for Mycoplasma. Hypoxia necessitating increased O2 requirements.     Dx: acute hypoxic respiratory failure, multifocal PNA due to mycoplasma pneumonia, acute asthma exacerbation in setting of bacterial PNA    - completed 7 day course of azithromycin for mycoplasma PNA  - had CT chest 12/25 showing multifocal PNA. primary team repeated CT chest yesterday 12/28 with again multifocal ground glass opacities consistent with PNA. would not anticipate drastic change in imaging of chest in such short duration in time  - seen by ID and placed on doxycycline to extend treatment for mycoplasma and started on cefepime for bacterial PNA coverage, procalcitonin up trended since admission  - sputum cx with normal respiratory hossein  - has been on solumedrol 40mg q12, no wheeze on exam with good air entry. taper steroid to prednisone 40mg daily. hypertonic saline nebs to help mobilize secretions (pt states unable to cough up phlegm)   - cough may be secondary to reactive airway: cont with duonebs, already on systemic steroids. antitussives hycodan around the clock. tessalon perles added  - goal to maintain O2 sat > 90%  - wean down FIo2 as tolerates  - currently weaned to 2L NC  - RVP check x3 and all negative  - incentive spirometer  - cont xarelto for underlying hx of PE  - OOB to chair

## 2023-12-29 NOTE — PROGRESS NOTE ADULT - SUBJECTIVE AND OBJECTIVE BOX
Neurology Progress Note    S: Patient seen and examined. No new events overnight.     MEDICATIONS:    acetaminophen     Tablet .. 650 milliGRAM(s) Oral every 6 hours PRN  albuterol    90 MICROgram(s) HFA Inhaler 1 Puff(s) Inhalation every 4 hours PRN  albuterol/ipratropium for Nebulization 3 milliLiter(s) Nebulizer every 6 hours  aspirin enteric coated 81 milliGRAM(s) Oral daily  atorvastatin 80 milliGRAM(s) Oral at bedtime  cefepime   IVPB 1000 milliGRAM(s) IV Intermittent every 8 hours  celecoxib 200 milliGRAM(s) Oral daily  dextrose 5%. 1000 milliLiter(s) IV Continuous <Continuous>  dextrose 5%. 1000 milliLiter(s) IV Continuous <Continuous>  dextrose 50% Injectable 25 Gram(s) IV Push once  dextrose 50% Injectable 12.5 Gram(s) IV Push once  dextrose 50% Injectable 25 Gram(s) IV Push once  dextrose Oral Gel 15 Gram(s) Oral once PRN  doxycycline IVPB      doxycycline IVPB 100 milliGRAM(s) IV Intermittent every 12 hours  DULoxetine 60 milliGRAM(s) Oral daily  gabapentin 600 milliGRAM(s) Oral at bedtime  glucagon  Injectable 1 milliGRAM(s) IntraMuscular once  guaifenesin/dextromethorphan Oral Liquid 10 milliLiter(s) Oral every 6 hours PRN  hydrocodone/homatropine Syrup 5 milliLiter(s) Oral every 6 hours  influenza   Vaccine 0.5 milliLiter(s) IntraMuscular once  insulin lispro (ADMELOG) corrective regimen sliding scale   SubCutaneous three times a day before meals  insulin lispro (ADMELOG) corrective regimen sliding scale   SubCutaneous at bedtime  melatonin 3 milliGRAM(s) Oral at bedtime PRN  montelukast 10 milliGRAM(s) Oral daily  predniSONE   Tablet 40 milliGRAM(s) Oral daily  rivaroxaban 20 milliGRAM(s) Oral with dinner  sodium chloride 0.65% Nasal 2 Spray(s) Both Nostrils every 6 hours  sodium chloride 3%  Inhalation 4 milliLiter(s) Inhalation every 6 hours  verapamil  milliGRAM(s) Oral daily      LABS:                          8.1    14.66 )-----------( 548      ( 29 Dec 2023 06:40 )             25.9     12-29    140  |  105  |  20  ----------------------------<  164<H>  3.5   |  30  |  0.75    Ca    8.9      29 Dec 2023 06:40      CAPILLARY BLOOD GLUCOSE      POCT Blood Glucose.: 181 mg/dL (29 Dec 2023 08:34)  POCT Blood Glucose.: 126 mg/dL (28 Dec 2023 23:20)  POCT Blood Glucose.: 149 mg/dL (28 Dec 2023 16:46)  POCT Blood Glucose.: 173 mg/dL (28 Dec 2023 12:34)      Urinalysis Basic - ( 29 Dec 2023 06:40 )    Color: x / Appearance: x / SG: x / pH: x  Gluc: 164 mg/dL / Ketone: x  / Bili: x / Urobili: x   Blood: x / Protein: x / Nitrite: x   Leuk Esterase: x / RBC: x / WBC x   Sq Epi: x / Non Sq Epi: x / Bacteria: x      I&O's Summary    28 Dec 2023 07:01  -  29 Dec 2023 07:00  --------------------------------------------------------  IN: 720 mL / OUT: 1 mL / NET: 719 mL      Vital Signs Last 24 Hrs  T(C): 36.6 (28 Dec 2023 23:00), Max: 36.9 (28 Dec 2023 16:57)  T(F): 97.8 (28 Dec 2023 23:00), Max: 98.4 (28 Dec 2023 16:57)  HR: 86 (29 Dec 2023 06:40) (86 - 101)  BP: 158/88 (28 Dec 2023 23:00) (122/87 - 158/88)  BP(mean): --  RR: 20 (28 Dec 2023 23:00) (18 - 20)  SpO2: 95% (29 Dec 2023 06:40) (91% - 98%)    Parameters below as of 29 Dec 2023 06:40  Patient On (Oxygen Delivery Method): nasal cannula w/ humidification, 5L        General Exam:   General Appearance: Appropriately dressed and in no acute distress       Head: Normocephalic, atraumatic and no dysmorphic features  Ear, Nose, and Throat: Moist mucous membranes  CVS: S1S2+  Resp: No SOB, no wheeze or rhonchi  Abd: soft NTND  Extremities: No edema, no cyanosis  Skin: No bruises, no rashes     Neurological Exam:  Mental Status: Awake, alert and oriented x 3.  Able to follow simple verbal commands. Able to name and repeat. Speech is fluent but slow  Cranial Nerves: PERRL, EOMI, VFFC, sensation V1-V3 intact,  no obvious facial asymmetry, equal elevation of palate, scm/trap 5/5, tongue is midline on protrusion.. hearing is grossly intact.   Motor: Normal bulk, tone. JIANG, LLE slightly weaker but poor efffort  Sensation: Intact to light touch and pinprick throughout. no right/left confusion. no extinction to tactile on DSS.   Reflexes: 1+ throughout at biceps, brachioradialis, triceps, patellars and ankles bilaterally and equal. No clonus. R toe and L toe were both downgoing.  Coordination: No dysmetria on FNF   Gait: deferred      I personally reviewed the below data/images/labs:      LABS:    < from: CT Brain Stroke Protocol (12.17.23 @ 04:51) >    ACC: [id number] EXAM:  CT BRAIN STROKE PROTOCOL   ORDERED BY: MINA CONROY     PROCEDURE DATE:  12/17/2023          INTERPRETATION:  CLINICAL INFORMATION:  Stroke Code    TECHNIQUE:  Axial CT images were acquired through the head.  Intravenouscontrast: None  Two-dimensional reformats were generated.    COMPARISON STUDY: MRI brain 11/17/2023, CTA head and neck 11/17/2023.    FINDINGS:    There is no CT evidence of acute intracranial hemorrhage, mass effect,   midline shift, or acute, largeterritorial infarct.  The ventricles and   sulci are age-appropriate in size and configuration. The basal cisterns   are patent.    The mastoid air cells and middle ear cavities are grossly clear. The   visualized paranasal sinuses are well aerated.    The calvarium and skull base are grossly intact.    IMPRESSION:  No acute intracranial hemorrhage or mass effect.    Results were discussed with Dr. Conroy by Dr. Gastelum at 4:50 AM on   12/17/2023. with read back followed.    < end of copied text >        < from: CT Angio Neck Stroke Protocol w/ IV Cont (12.17.23 @ 05:12) >    ACC: [id number] EXAM:  CT ANGIO NECK STROKE PROTCL IC   ORDERED BY: MINA CONROY     ACC: [id number] EXAM:  CT BRAIN PERFUSION MAPS STROKE   ORDERED BY: MINA CONROY     ACC: [id number] EXAM:  CT ANGIO BRAIN STROKE PROTC IC   ORDERED BY: MINA CONROY     PROCEDURE DATE:  12/17/2023          INTERPRETATION:  CT PERFUSION, CTA OF THE [place] OF GIRARD AND NECK:    INDICATIONS: Stroke code.    TECHNIQUE:    RAPID artificial intelligence was used for perfusion analysis and for   preliminary evaluation of intracranial hemorrhage.    CTA [place] OF GIRARD:    After the intravenous power injection of non-ionic contrast material,   serial thin sections were obtained through the intracranial circulation   on a multislice CT scanner.  Images were reformatted using a dedicated 3D   software package and viewed on a dedicated workstation in multiple planes.    CTA NECK:    After the intravenous power injection of non-ionic contrast material,   serial thin sections were obtained through the cervical circulation on a   multislice CT scanner.  Images were reformatted using a dedicated 3D   software package and viewed on a dedicated workstation in multiple planes.      COMPARISON EXAMINATION: Noncontrast head CT performed the same day. CTA   head and neck 11/17/2023    FINDINGS:      CT RAPID PERFUSION:  Markedly degraded by patient motion.    INFARCT CORE: 0 cc    TISSUE AT RISK: 8 cc combined in the left frontal and right temporal lobes    MISMATCH RATIO: N/A      CTA [place] OF GIRARD:    ANTERIOR CIRCULATION    ICA  CAVERNOUS, SUPRACLINOID, BIFURCATION SEGMENTS: Patent without flow   limiting stenosis. Atherosclerotic calcifications of the bilateral   cavernous ICA segments.    ANTERIOR CEREBRAL ARTERIES: Bilateral A1, anterior communicating and A2   anterior cerebral arteries are unremarkable in course and caliber without   flow limiting stenosis. Hypoplastic right A1 segment.    MIDDLE CEREBRAL ARTERIES: Patent bilateral M1, M2, and distal MCA   branches without flow limiting stenosis.    POSTERIOR CIRCULATION:    VERTEBRAL ARTERIES: Patent without flow limiting stenosis. Mild focal   narrowing of the right V4 segment.    BASILAR ARTERY: Patent no flow limiting stenosis.    POSTERIOR CEREBRAL ARTERIES: Patent without flow limiting stenosis.    CTA NECK:    GREAT VESSELS: Visualized segments are patent, no flow limiting stenosis.   Bovine aortic arch, normal variant.    COMMON CAROTID ARTERIES:  RIGHT: Patent without flow limiting stenosis  LEFT: Patent without flow limiting stenosis    CAROTID BULBS:  RIGHT: Patent without flow limiting stenosis  LEFT: Patent without flow limiting stenosis    INTERNAL CAROTID ARTERIES:  RIGHT: Patent no evidence for any hemodynamically significant stenosis at   the ICA origin by NASCET criteria. Stable mild ectasia of the proximal   cervical ICA measuring 0.9 cm in diameter. Partial retropharyngeal course.  LEFT: Patent no evidence for any hemodynamically significant stenosis at   the ICA origin by NASCET criteria. Stable fusiform aneurysmal dilatation   of the proximal cervical ICA measuring 1.4 cm in diameter and gradually   tapering in the mid segment. Partial retropharyngeal course.    VERTEBRAL ARTERIES:    RIGHT: Patent no evidence for any flow limiting stenosis.  LEFT: Patent no evidence for any flow limiting stenosis. Left dominant   vertebral system.      SOFT TISSUES: Patchy nonspecific groundglass opacities and consolidations   in the visualized upper lobes.    BONES: Multilevel degenerative changes inthe spine.    IMPRESSION:    CT PERFUSION: Markedly degraded by patient motion. No core infarct. Small   areas of abnormal perfusion in the left frontal and right temporal lobes   not confined to a vascular territory, likely artifactual.    If symptoms persist consider follow up head CT or MRI, MRA  if no   contraindication.    CTA COW:  Patent intracranial circulation without flow limiting stenosis.    CTA NECK: Patent, ECAs, ICAs, no  hemodynamically significant stenosis at    ICA origins by NASCET criteria. Stable fusiform aneurysmal dilatation of   the ICA origins/proximal segments.  Bilateral vertebral arteries are patent without flow limiting stenosis.    Patchy nonspecific groundglass and consolidations in the visualized upper   lobes.    --- End of Report ---      < end of copied text >    < from: MR Head No Cont (12.18.23 @ 12:52) >  IMPRESSION: No acute territorial infarcts are seen    < end of copied text >      EEG Classification / Summary:  Abnormal routine EEG in the awake state.  Mild diffuse slowing.  No epileptiform abnormalities.  Artifacts somewhat limit interpretation.    Clinical Impression:  Mild diffuse cerebral dysfunction is nonspecific in etiology.   Artifacts somewhat limit interpretation.   Neurology Progress Note    S: Patient seen and examined. No new events overnight.     MEDICATIONS:    acetaminophen     Tablet .. 650 milliGRAM(s) Oral every 6 hours PRN  albuterol    90 MICROgram(s) HFA Inhaler 1 Puff(s) Inhalation every 4 hours PRN  albuterol/ipratropium for Nebulization 3 milliLiter(s) Nebulizer every 6 hours  aspirin enteric coated 81 milliGRAM(s) Oral daily  atorvastatin 80 milliGRAM(s) Oral at bedtime  cefepime   IVPB 1000 milliGRAM(s) IV Intermittent every 8 hours  celecoxib 200 milliGRAM(s) Oral daily  dextrose 5%. 1000 milliLiter(s) IV Continuous <Continuous>  dextrose 5%. 1000 milliLiter(s) IV Continuous <Continuous>  dextrose 50% Injectable 25 Gram(s) IV Push once  dextrose 50% Injectable 12.5 Gram(s) IV Push once  dextrose 50% Injectable 25 Gram(s) IV Push once  dextrose Oral Gel 15 Gram(s) Oral once PRN  doxycycline IVPB      doxycycline IVPB 100 milliGRAM(s) IV Intermittent every 12 hours  DULoxetine 60 milliGRAM(s) Oral daily  gabapentin 600 milliGRAM(s) Oral at bedtime  glucagon  Injectable 1 milliGRAM(s) IntraMuscular once  guaifenesin/dextromethorphan Oral Liquid 10 milliLiter(s) Oral every 6 hours PRN  hydrocodone/homatropine Syrup 5 milliLiter(s) Oral every 6 hours  influenza   Vaccine 0.5 milliLiter(s) IntraMuscular once  insulin lispro (ADMELOG) corrective regimen sliding scale   SubCutaneous three times a day before meals  insulin lispro (ADMELOG) corrective regimen sliding scale   SubCutaneous at bedtime  melatonin 3 milliGRAM(s) Oral at bedtime PRN  montelukast 10 milliGRAM(s) Oral daily  predniSONE   Tablet 40 milliGRAM(s) Oral daily  rivaroxaban 20 milliGRAM(s) Oral with dinner  sodium chloride 0.65% Nasal 2 Spray(s) Both Nostrils every 6 hours  sodium chloride 3%  Inhalation 4 milliLiter(s) Inhalation every 6 hours  verapamil  milliGRAM(s) Oral daily      LABS:                          8.1    14.66 )-----------( 548      ( 29 Dec 2023 06:40 )             25.9     12-29    140  |  105  |  20  ----------------------------<  164<H>  3.5   |  30  |  0.75    Ca    8.9      29 Dec 2023 06:40      CAPILLARY BLOOD GLUCOSE      POCT Blood Glucose.: 181 mg/dL (29 Dec 2023 08:34)  POCT Blood Glucose.: 126 mg/dL (28 Dec 2023 23:20)  POCT Blood Glucose.: 149 mg/dL (28 Dec 2023 16:46)  POCT Blood Glucose.: 173 mg/dL (28 Dec 2023 12:34)      Urinalysis Basic - ( 29 Dec 2023 06:40 )    Color: x / Appearance: x / SG: x / pH: x  Gluc: 164 mg/dL / Ketone: x  / Bili: x / Urobili: x   Blood: x / Protein: x / Nitrite: x   Leuk Esterase: x / RBC: x / WBC x   Sq Epi: x / Non Sq Epi: x / Bacteria: x      I&O's Summary    28 Dec 2023 07:01  -  29 Dec 2023 07:00  --------------------------------------------------------  IN: 720 mL / OUT: 1 mL / NET: 719 mL      Vital Signs Last 24 Hrs  T(C): 36.6 (28 Dec 2023 23:00), Max: 36.9 (28 Dec 2023 16:57)  T(F): 97.8 (28 Dec 2023 23:00), Max: 98.4 (28 Dec 2023 16:57)  HR: 86 (29 Dec 2023 06:40) (86 - 101)  BP: 158/88 (28 Dec 2023 23:00) (122/87 - 158/88)  BP(mean): --  RR: 20 (28 Dec 2023 23:00) (18 - 20)  SpO2: 95% (29 Dec 2023 06:40) (91% - 98%)    Parameters below as of 29 Dec 2023 06:40  Patient On (Oxygen Delivery Method): nasal cannula w/ humidification, 5L        General Exam:   General Appearance: Appropriately dressed and in no acute distress       Head: Normocephalic, atraumatic and no dysmorphic features  Ear, Nose, and Throat: Moist mucous membranes  CVS: S1S2+  Resp: No SOB, no wheeze or rhonchi  Abd: soft NTND  Extremities: No edema, no cyanosis  Skin: No bruises, no rashes     Neurological Exam:  Mental Status: Awake, alert and oriented x 3.  Able to follow simple verbal commands. Able to name and repeat. Speech is fluent but slow  Cranial Nerves: PERRL, EOMI, VFFC, sensation V1-V3 intact,  no obvious facial asymmetry, equal elevation of palate, scm/trap 5/5, tongue is midline on protrusion.. hearing is grossly intact.   Motor: Normal bulk, tone. JIANG, LLE slightly weaker but poor efffort  Sensation: Intact to light touch and pinprick throughout. no right/left confusion. no extinction to tactile on DSS.   Reflexes: 1+ throughout at biceps, brachioradialis, triceps, patellars and ankles bilaterally and equal. No clonus. R toe and L toe were both downgoing.  Coordination: No dysmetria on FNF   Gait: deferred      I personally reviewed the below data/images/labs:      LABS:    < from: CT Brain Stroke Protocol (12.17.23 @ 04:51) >    ACC: [id number] EXAM:  CT BRAIN STROKE PROTOCOL   ORDERED BY: MINA CONROY     PROCEDURE DATE:  12/17/2023          INTERPRETATION:  CLINICAL INFORMATION:  Stroke Code    TECHNIQUE:  Axial CT images were acquired through the head.  Intravenouscontrast: None  Two-dimensional reformats were generated.    COMPARISON STUDY: MRI brain 11/17/2023, CTA head and neck 11/17/2023.    FINDINGS:    There is no CT evidence of acute intracranial hemorrhage, mass effect,   midline shift, or acute, largeterritorial infarct.  The ventricles and   sulci are age-appropriate in size and configuration. The basal cisterns   are patent.    The mastoid air cells and middle ear cavities are grossly clear. The   visualized paranasal sinuses are well aerated.    The calvarium and skull base are grossly intact.    IMPRESSION:  No acute intracranial hemorrhage or mass effect.    Results were discussed with Dr. Conroy by Dr. Gastelum at 4:50 AM on   12/17/2023. with read back followed.    < end of copied text >        < from: CT Angio Neck Stroke Protocol w/ IV Cont (12.17.23 @ 05:12) >    ACC: [id number] EXAM:  CT ANGIO NECK STROKE PROTCL IC   ORDERED BY: MINA CONROY     ACC: [id number] EXAM:  CT BRAIN PERFUSION MAPS STROKE   ORDERED BY: MINA CONROY     ACC: [id number] EXAM:  CT ANGIO BRAIN STROKE PROTC IC   ORDERED BY: MINA CONROY     PROCEDURE DATE:  12/17/2023          INTERPRETATION:  CT PERFUSION, CTA OF THE [place] OF GIRARD AND NECK:    INDICATIONS: Stroke code.    TECHNIQUE:    RAPID artificial intelligence was used for perfusion analysis and for   preliminary evaluation of intracranial hemorrhage.    CTA [place] OF G[place]:    After the intravenous power injection of non-ionic contrast material,   serial thin sections were obtained through the intracranial circulation   on a multislice CT scanner.  Images were reformatted using a dedicated 3D   software package and viewed on a dedicated workstation in multiple planes.    CTA NECK:    After the intravenous power injection of non-ionic contrast material,   serial thin sections were obtained through the cervical circulation on a   multislice CT scanner.  Images were reformatted using a dedicated 3D   software package and viewed on a dedicated workstation in multiple planes.      COMPARISON EXAMINATION: Noncontrast head CT performed the same day. CTA   head and neck 11/17/2023    FINDINGS:      CT RAPID PERFUSION:  Markedly degraded by patient motion.    INFARCT CORE: 0 cc    TISSUE AT RISK: 8 cc combined in the left frontal and right temporal lobes    MISMATCH RATIO: N/A      CTA [place] OF GIRARD:    ANTERIOR CIRCULATION    ICA  CAVERNOUS, SUPRACLINOID, BIFURCATION SEGMENTS: Patent without flow   limiting stenosis. Atherosclerotic calcifications of the bilateral   cavernous ICA segments.    ANTERIOR CEREBRAL ARTERIES: Bilateral A1, anterior communicating and A2   anterior cerebral arteries are unremarkable in course and caliber without   flow limiting stenosis. Hypoplastic right A1 segment.    MIDDLE CEREBRAL ARTERIES: Patent bilateral M1, M2, and distal MCA   branches without flow limiting stenosis.    POSTERIOR CIRCULATION:    VERTEBRAL ARTERIES: Patent without flow limiting stenosis. Mild focal   narrowing of the right V4 segment.    BASILAR ARTERY: Patent no flow limiting stenosis.    POSTERIOR CEREBRAL ARTERIES: Patent without flow limiting stenosis.    CTA NECK:    GREAT VESSELS: Visualized segments are patent, no flow limiting stenosis.   Bovine aortic arch, normal variant.    COMMON CAROTID ARTERIES:  RIGHT: Patent without flow limiting stenosis  LEFT: Patent without flow limiting stenosis    CAROTID BULBS:  RIGHT: Patent without flow limiting stenosis  LEFT: Patent without flow limiting stenosis    INTERNAL CAROTID ARTERIES:  RIGHT: Patent no evidence for any hemodynamically significant stenosis at   the ICA origin by NASCET criteria. Stable mild ectasia of the proximal   cervical ICA measuring 0.9 cm in diameter. Partial retropharyngeal course.  LEFT: Patent no evidence for any hemodynamically significant stenosis at   the ICA origin by NASCET criteria. Stable fusiform aneurysmal dilatation   of the proximal cervical ICA measuring 1.4 cm in diameter and gradually   tapering in the mid segment. Partial retropharyngeal course.    VERTEBRAL ARTERIES:    RIGHT: Patent no evidence for any flow limiting stenosis.  LEFT: Patent no evidence for any flow limiting stenosis. Left dominant   vertebral system.      SOFT TISSUES: Patchy nonspecific groundglass opacities and consolidations   in the visualized upper lobes.    BONES: Multilevel degenerative changes inthe spine.    IMPRESSION:    CT PERFUSION: Markedly degraded by patient motion. No core infarct. Small   areas of abnormal perfusion in the left frontal and right temporal lobes   not confined to a vascular territory, likely artifactual.    If symptoms persist consider follow up head CT or MRI, MRA  if no   contraindication.    CTA COW:  Patent intracranial circulation without flow limiting stenosis.    CTA NECK: Patent, ECAs, ICAs, no  hemodynamically significant stenosis at    ICA origins by NASCET criteria. Stable fusiform aneurysmal dilatation of   the ICA origins/proximal segments.  Bilateral vertebral arteries are patent without flow limiting stenosis.    Patchy nonspecific groundglass and consolidations in the visualized upper   lobes.    --- End of Report ---      < end of copied text >    < from: MR Head No Cont (12.18.23 @ 12:52) >  IMPRESSION: No acute territorial infarcts are seen    < end of copied text >      EEG Classification / Summary:  Abnormal routine EEG in the awake state.  Mild diffuse slowing.  No epileptiform abnormalities.  Artifacts somewhat limit interpretation.    Clinical Impression:  Mild diffuse cerebral dysfunction is nonspecific in etiology.   Artifacts somewhat limit interpretation.

## 2023-12-30 LAB
GLUCOSE BLDC GLUCOMTR-MCNC: 100 MG/DL — HIGH (ref 70–99)
GLUCOSE BLDC GLUCOMTR-MCNC: 100 MG/DL — HIGH (ref 70–99)
GLUCOSE BLDC GLUCOMTR-MCNC: 169 MG/DL — HIGH (ref 70–99)
GLUCOSE BLDC GLUCOMTR-MCNC: 169 MG/DL — HIGH (ref 70–99)
GLUCOSE BLDC GLUCOMTR-MCNC: 194 MG/DL — HIGH (ref 70–99)
GLUCOSE BLDC GLUCOMTR-MCNC: 194 MG/DL — HIGH (ref 70–99)
GLUCOSE BLDC GLUCOMTR-MCNC: 222 MG/DL — HIGH (ref 70–99)
GLUCOSE BLDC GLUCOMTR-MCNC: 222 MG/DL — HIGH (ref 70–99)

## 2023-12-30 PROCEDURE — 99232 SBSQ HOSP IP/OBS MODERATE 35: CPT

## 2023-12-30 PROCEDURE — 36000 PLACE NEEDLE IN VEIN: CPT

## 2023-12-30 RX ADMIN — Medication 1: at 08:45

## 2023-12-30 RX ADMIN — Medication 2: at 11:43

## 2023-12-30 RX ADMIN — Medication 100 MILLIGRAM(S): at 13:40

## 2023-12-30 RX ADMIN — Medication 2 SPRAY(S): at 12:00

## 2023-12-30 RX ADMIN — CELECOXIB 200 MILLIGRAM(S): 200 CAPSULE ORAL at 12:38

## 2023-12-30 RX ADMIN — SODIUM CHLORIDE 4 MILLILITER(S): 9 INJECTION INTRAMUSCULAR; INTRAVENOUS; SUBCUTANEOUS at 00:00

## 2023-12-30 RX ADMIN — Medication 100 MILLIGRAM(S): at 05:06

## 2023-12-30 RX ADMIN — Medication 10 MILLILITER(S): at 05:05

## 2023-12-30 RX ADMIN — Medication 110 MILLIGRAM(S): at 06:30

## 2023-12-30 RX ADMIN — Medication 240 MILLIGRAM(S): at 05:06

## 2023-12-30 RX ADMIN — Medication 2 SPRAY(S): at 06:35

## 2023-12-30 RX ADMIN — CELECOXIB 200 MILLIGRAM(S): 200 CAPSULE ORAL at 11:42

## 2023-12-30 RX ADMIN — Medication 2 SPRAY(S): at 17:40

## 2023-12-30 RX ADMIN — CEFEPIME 100 MILLIGRAM(S): 1 INJECTION, POWDER, FOR SOLUTION INTRAMUSCULAR; INTRAVENOUS at 21:26

## 2023-12-30 RX ADMIN — RIVAROXABAN 20 MILLIGRAM(S): KIT at 16:48

## 2023-12-30 RX ADMIN — Medication 3 MILLILITER(S): at 11:23

## 2023-12-30 RX ADMIN — MONTELUKAST 10 MILLIGRAM(S): 4 TABLET, CHEWABLE ORAL at 11:42

## 2023-12-30 RX ADMIN — DULOXETINE HYDROCHLORIDE 60 MILLIGRAM(S): 30 CAPSULE, DELAYED RELEASE ORAL at 12:00

## 2023-12-30 RX ADMIN — Medication 1 GRAM(S): at 05:05

## 2023-12-30 RX ADMIN — ATORVASTATIN CALCIUM 80 MILLIGRAM(S): 80 TABLET, FILM COATED ORAL at 21:28

## 2023-12-30 RX ADMIN — Medication 1 GRAM(S): at 17:40

## 2023-12-30 RX ADMIN — Medication 3 MILLILITER(S): at 05:53

## 2023-12-30 RX ADMIN — Medication 40 MILLIGRAM(S): at 05:06

## 2023-12-30 RX ADMIN — Medication 1 GRAM(S): at 11:41

## 2023-12-30 RX ADMIN — Medication 1: at 16:24

## 2023-12-30 RX ADMIN — GABAPENTIN 600 MILLIGRAM(S): 400 CAPSULE ORAL at 21:27

## 2023-12-30 RX ADMIN — SODIUM CHLORIDE 4 MILLILITER(S): 9 INJECTION INTRAMUSCULAR; INTRAVENOUS; SUBCUTANEOUS at 11:23

## 2023-12-30 RX ADMIN — Medication 100 MILLIGRAM(S): at 21:28

## 2023-12-30 RX ADMIN — SODIUM CHLORIDE 4 MILLILITER(S): 9 INJECTION INTRAMUSCULAR; INTRAVENOUS; SUBCUTANEOUS at 05:53

## 2023-12-30 RX ADMIN — Medication 110 MILLIGRAM(S): at 17:41

## 2023-12-30 RX ADMIN — CEFEPIME 100 MILLIGRAM(S): 1 INJECTION, POWDER, FOR SOLUTION INTRAMUSCULAR; INTRAVENOUS at 14:54

## 2023-12-30 RX ADMIN — CEFEPIME 100 MILLIGRAM(S): 1 INJECTION, POWDER, FOR SOLUTION INTRAMUSCULAR; INTRAVENOUS at 06:00

## 2023-12-30 RX ADMIN — Medication 81 MILLIGRAM(S): at 11:41

## 2023-12-30 NOTE — PROGRESS NOTE ADULT - ASSESSMENT
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 Of note pt recently developed URI symptoms and was started on Doxy as well as Hycodan, Alprazolam and Fioricet. Of note pt already on Percocet, flexeril for chronic pain, Gabapentin for seizures per  all which she took. Pt being admitted for Acute hypoxic respiratory failure due to Multifocal PNA, AMS r/o TIA/CVA  vs metabolic encephalopathy secondary to polypharmacy and/or hypoxia.      # Acute metabolic encephalopathy   # Acute respiratory failure with multifocal pneumonia.   Acute stroke is ruled out. EEG did not show any epileptiform activity.   Intermittent low grade temp, Seen by ID  s/p iv zosyn , completed 5 days   Added Azithromycin, + mycoplasma   Repeat CT with multifocal PNA  Mental status baseline patient AAO3  although RRT for acute worsening hypoxemia; r/o aspiration vs flash pulm edema  plan:  Cough meds, albuterol PRN  Albuterol PRN  steroids  3 percent  trial of lasix  repeat echo  ID follow up , pulm consulted     Coughing episode : acute blood loss anemia  hb 8 from 10  from steroids?  ordering protonix and carafate      # PE  - c/w Xarelto. no active gross bleeding.     # SLE stable.     # HTN/HLD. controlled with Verapemil, statin    # DM type 2. controlled.   - FS qAC and HS w/ SSI    #Weakness. PT eval>>WINSTON, pt unsure , will follow up     Stopped Elavil, no need for Elavil and Neurontin QHS.  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 Of note pt recently developed URI symptoms and was started on Doxy as well as Hycodan, Alprazolam and Fioricet. Of note pt already on Percocet, flexeril for chronic pain, Gabapentin for seizures per  all which she took. Pt being admitted for Acute hypoxic respiratory failure due to Multifocal PNA, AMS r/o TIA/CVA  vs metabolic encephalopathy secondary to polypharmacy and/or hypoxia.      # Acute metabolic encephalopathy   # Acute respiratory failure with multifocal pneumonia.   Acute stroke is ruled out. EEG did not show any epileptiform activity.   Intermittent low grade temp, Seen by ID  s/p iv zosyn , completed 5 days   Added Azithromycin, + mycoplasma   Repeat CT with multifocal PNA  Mental status baseline patient AAO3  although RRT for acute worsening hypoxemia; r/o aspiration vs flash pulm edema  plan:  Cough meds, albuterol PRN  Albuterol PRN  steroids  3 percent  trial of lasix  repeat echo  ID follow up , pulm consulted     Coughing episode : acute blood loss anemia  hb 8 from 10  from steroids?  ordering protonix and carafate      # PE  - c/w Xarelto. no active gross bleeding.     # SLE stable.     # HTN/HLD. controlled with Verapemil, statin    # DM type 2. controlled.   - FS qAC and HS w/ SSI    #Weakness. PT eval>>WINSTON, pt unsure , will follow up     Stopped Elavil, no need for Elavil and Neurontin QHS.  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 Of note pt recently developed URI symptoms and was started on Doxy as well as Hycodan, Alprazolam and Fioricet. Of note pt already on Percocet, flexeril for chronic pain, Gabapentin for seizures per  all which she took. Pt being admitted for Acute hypoxic respiratory failure due to Multifocal PNA, AMS r/o TIA/CVA  vs metabolic encephalopathy secondary to polypharmacy and/or hypoxia.      # Acute metabolic encephalopathy   # Acute respiratory failure with multifocal pneumonia.   Acute stroke is ruled out. EEG did not show any epileptiform activity.   Intermittent low grade temp, Seen by ID  s/p iv zosyn , completed 5 days   Added Azithromycin, + mycoplasma: course completed: for persistent symptoms now on doxy   Repeat CT with multifocal PNA  Mental status baseline patient AAO3  although RRT for acute worsening hypoxemia; r/o aspiration vs flash pulm edema  plan:  Cough meds, albuterol PRN  Albuterol PRN  steroids  3 percent  trial of lasix  repeat echo  ID follow up , pulm consulted     Coughing episode : acute blood loss anemia  hb 8 from 10  from steroids?  ordering protonix and carafate      # PE  - c/w Xarelto. no active gross bleeding.     # SLE stable.     # HTN/HLD. controlled with Verapemil, statin    # DM type 2. controlled.   - FS qAC and HS w/ SSI    #Weakness. PT eval>>WINSTON, pt unsure , will follow up     Stopped Elavil, no need for Elavil and Neurontin QHS.

## 2023-12-30 NOTE — PROGRESS NOTE ADULT - SUBJECTIVE AND OBJECTIVE BOX
Patient seen and examined  reports "marginally better":  still with hacking cough  02 requirements marginally improved; now on 4  Review of Systems:  General:denies fever chills, headache, weakness  HEENT: denies blurry vision,diffculty swallowing, difficulty hearing, tinnitus  Cardiovascular: denies chest pain  ,palpitations  Pulmonary:denies shortness of breath, cough, wheezing, hemoptysis  Gastrointestinal: denies abdominal pain, constipation, diarrhea,nausea , vomiting, hematochezia  : denies hematuria, dysuria, or incontinence  Neurological: denies weakness, numbness , tingling, dizziness, tremors  MSK: denies muscle pain, difficulty ambulating, swelling, back pain  skin: denies skin rash, itching, burning, or  skin lesions  Psychiatrical: denies mood disturbances, anxierty, feeling depressed, depression , or difficulty sleeping    Objective:  Vitals  T(C): 37 (12-30-23 @ 10:43), Max: 37.1 (12-30-23 @ 00:05)  HR: 96 (12-30-23 @ 10:43) (84 - 96)  BP: 108/64 (12-30-23 @ 10:43) (100/80 - 160/94)  RR: 19 (12-30-23 @ 10:43) (18 - 19)  SpO2: 100% (12-30-23 @ 10:43) (95% - 100%)    Physical Exam:  General: comfortable, no acute distress  HEENT: Atraumatic, no LAD, trachea midline, PERRLA  Cardiovascular: normal s1s2, no murmurs, gallops or fricition rubs  Pulmonary: decreased, no wheezing , rhonchi  Gastrointestinal: soft non tender non distended, no masses felt, no organomegally  Muscloskeletal: no lower extremity edema, intact bilateral lower extremity pulses  Neurological: CN II-12 intact. No focal weakness  Psychiatrical: normal mood, cooperative  SKIN: no rash, lesions or ulcers    Labs:                          8.1    14.66 )-----------( 548      ( 29 Dec 2023 06:40 )             25.9     12-29    140  |  105  |  20  ----------------------------<  164<H>  3.5   |  30  |  0.75    Ca    8.9      29 Dec 2023 06:40              Active Medications  MEDICATIONS  (STANDING):  albuterol/ipratropium for Nebulization 3 milliLiter(s) Nebulizer every 6 hours  aspirin enteric coated 81 milliGRAM(s) Oral daily  atorvastatin 80 milliGRAM(s) Oral at bedtime  benzonatate 100 milliGRAM(s) Oral every 8 hours  cefepime   IVPB 1000 milliGRAM(s) IV Intermittent every 8 hours  celecoxib 200 milliGRAM(s) Oral daily  dextrose 5%. 1000 milliLiter(s) (50 mL/Hr) IV Continuous <Continuous>  dextrose 5%. 1000 milliLiter(s) (100 mL/Hr) IV Continuous <Continuous>  dextrose 50% Injectable 25 Gram(s) IV Push once  dextrose 50% Injectable 12.5 Gram(s) IV Push once  dextrose 50% Injectable 25 Gram(s) IV Push once  doxycycline IVPB      doxycycline IVPB 100 milliGRAM(s) IV Intermittent every 12 hours  DULoxetine 60 milliGRAM(s) Oral daily  gabapentin 600 milliGRAM(s) Oral at bedtime  glucagon  Injectable 1 milliGRAM(s) IntraMuscular once  hydrocodone/homatropine Syrup 5 milliLiter(s) Oral every 6 hours  influenza   Vaccine 0.5 milliLiter(s) IntraMuscular once  insulin lispro (ADMELOG) corrective regimen sliding scale   SubCutaneous three times a day before meals  insulin lispro (ADMELOG) corrective regimen sliding scale   SubCutaneous at bedtime  montelukast 10 milliGRAM(s) Oral daily  predniSONE   Tablet 40 milliGRAM(s) Oral daily  rivaroxaban 20 milliGRAM(s) Oral with dinner  sodium chloride 0.65% Nasal 2 Spray(s) Both Nostrils every 6 hours  sodium chloride 3%  Inhalation 4 milliLiter(s) Inhalation every 6 hours  sucralfate 1 Gram(s) Oral every 6 hours  verapamil  milliGRAM(s) Oral daily    MEDICATIONS  (PRN):  acetaminophen     Tablet .. 650 milliGRAM(s) Oral every 6 hours PRN Temp greater or equal to 38C (100.4F), Mild Pain (1 - 3)  albuterol    90 MICROgram(s) HFA Inhaler 1 Puff(s) Inhalation every 4 hours PRN Shortness of Breath and/or Wheezing  dextrose Oral Gel 15 Gram(s) Oral once PRN Blood Glucose LESS THAN 70 milliGRAM(s)/deciliter  guaifenesin/dextromethorphan Oral Liquid 10 milliLiter(s) Oral every 6 hours PRN Cough  melatonin 3 milliGRAM(s) Oral at bedtime PRN Insomnia

## 2023-12-31 LAB
ANION GAP SERPL CALC-SCNC: 5 MMOL/L — SIGNIFICANT CHANGE UP (ref 5–17)
ANION GAP SERPL CALC-SCNC: 5 MMOL/L — SIGNIFICANT CHANGE UP (ref 5–17)
BASOPHILS # BLD AUTO: 0.03 K/UL — SIGNIFICANT CHANGE UP (ref 0–0.2)
BASOPHILS # BLD AUTO: 0.03 K/UL — SIGNIFICANT CHANGE UP (ref 0–0.2)
BASOPHILS NFR BLD AUTO: 0.2 % — SIGNIFICANT CHANGE UP (ref 0–2)
BASOPHILS NFR BLD AUTO: 0.2 % — SIGNIFICANT CHANGE UP (ref 0–2)
BUN SERPL-MCNC: 15 MG/DL — SIGNIFICANT CHANGE UP (ref 7–23)
BUN SERPL-MCNC: 15 MG/DL — SIGNIFICANT CHANGE UP (ref 7–23)
CALCIUM SERPL-MCNC: 8.9 MG/DL — SIGNIFICANT CHANGE UP (ref 8.5–10.1)
CALCIUM SERPL-MCNC: 8.9 MG/DL — SIGNIFICANT CHANGE UP (ref 8.5–10.1)
CHLORIDE SERPL-SCNC: 106 MMOL/L — SIGNIFICANT CHANGE UP (ref 96–108)
CHLORIDE SERPL-SCNC: 106 MMOL/L — SIGNIFICANT CHANGE UP (ref 96–108)
CO2 SERPL-SCNC: 29 MMOL/L — SIGNIFICANT CHANGE UP (ref 22–31)
CO2 SERPL-SCNC: 29 MMOL/L — SIGNIFICANT CHANGE UP (ref 22–31)
CREAT SERPL-MCNC: 0.64 MG/DL — SIGNIFICANT CHANGE UP (ref 0.5–1.3)
CREAT SERPL-MCNC: 0.64 MG/DL — SIGNIFICANT CHANGE UP (ref 0.5–1.3)
EGFR: 99 ML/MIN/1.73M2 — SIGNIFICANT CHANGE UP
EGFR: 99 ML/MIN/1.73M2 — SIGNIFICANT CHANGE UP
EOSINOPHIL # BLD AUTO: 0.26 K/UL — SIGNIFICANT CHANGE UP (ref 0–0.5)
EOSINOPHIL # BLD AUTO: 0.26 K/UL — SIGNIFICANT CHANGE UP (ref 0–0.5)
EOSINOPHIL NFR BLD AUTO: 2.1 % — SIGNIFICANT CHANGE UP (ref 0–6)
EOSINOPHIL NFR BLD AUTO: 2.1 % — SIGNIFICANT CHANGE UP (ref 0–6)
GLUCOSE BLDC GLUCOMTR-MCNC: 136 MG/DL — HIGH (ref 70–99)
GLUCOSE BLDC GLUCOMTR-MCNC: 136 MG/DL — HIGH (ref 70–99)
GLUCOSE BLDC GLUCOMTR-MCNC: 155 MG/DL — HIGH (ref 70–99)
GLUCOSE BLDC GLUCOMTR-MCNC: 155 MG/DL — HIGH (ref 70–99)
GLUCOSE BLDC GLUCOMTR-MCNC: 174 MG/DL — HIGH (ref 70–99)
GLUCOSE BLDC GLUCOMTR-MCNC: 174 MG/DL — HIGH (ref 70–99)
GLUCOSE BLDC GLUCOMTR-MCNC: 215 MG/DL — HIGH (ref 70–99)
GLUCOSE BLDC GLUCOMTR-MCNC: 215 MG/DL — HIGH (ref 70–99)
GLUCOSE SERPL-MCNC: 101 MG/DL — HIGH (ref 70–99)
GLUCOSE SERPL-MCNC: 101 MG/DL — HIGH (ref 70–99)
HCT VFR BLD CALC: 26 % — LOW (ref 34.5–45)
HCT VFR BLD CALC: 26 % — LOW (ref 34.5–45)
HGB BLD-MCNC: 8.1 G/DL — LOW (ref 11.5–15.5)
HGB BLD-MCNC: 8.1 G/DL — LOW (ref 11.5–15.5)
IMM GRANULOCYTES NFR BLD AUTO: 4 % — HIGH (ref 0–0.9)
IMM GRANULOCYTES NFR BLD AUTO: 4 % — HIGH (ref 0–0.9)
LYMPHOCYTES # BLD AUTO: 1.43 K/UL — SIGNIFICANT CHANGE UP (ref 1–3.3)
LYMPHOCYTES # BLD AUTO: 1.43 K/UL — SIGNIFICANT CHANGE UP (ref 1–3.3)
LYMPHOCYTES # BLD AUTO: 11.4 % — LOW (ref 13–44)
LYMPHOCYTES # BLD AUTO: 11.4 % — LOW (ref 13–44)
MAGNESIUM SERPL-MCNC: 1.9 MG/DL — SIGNIFICANT CHANGE UP (ref 1.6–2.6)
MAGNESIUM SERPL-MCNC: 1.9 MG/DL — SIGNIFICANT CHANGE UP (ref 1.6–2.6)
MCHC RBC-ENTMCNC: 25.8 PG — LOW (ref 27–34)
MCHC RBC-ENTMCNC: 25.8 PG — LOW (ref 27–34)
MCHC RBC-ENTMCNC: 31.2 G/DL — LOW (ref 32–36)
MCHC RBC-ENTMCNC: 31.2 G/DL — LOW (ref 32–36)
MCV RBC AUTO: 82.8 FL — SIGNIFICANT CHANGE UP (ref 80–100)
MCV RBC AUTO: 82.8 FL — SIGNIFICANT CHANGE UP (ref 80–100)
MONOCYTES # BLD AUTO: 0.68 K/UL — SIGNIFICANT CHANGE UP (ref 0–0.9)
MONOCYTES # BLD AUTO: 0.68 K/UL — SIGNIFICANT CHANGE UP (ref 0–0.9)
MONOCYTES NFR BLD AUTO: 5.4 % — SIGNIFICANT CHANGE UP (ref 2–14)
MONOCYTES NFR BLD AUTO: 5.4 % — SIGNIFICANT CHANGE UP (ref 2–14)
NEUTROPHILS # BLD AUTO: 9.68 K/UL — HIGH (ref 1.8–7.4)
NEUTROPHILS # BLD AUTO: 9.68 K/UL — HIGH (ref 1.8–7.4)
NEUTROPHILS NFR BLD AUTO: 76.9 % — SIGNIFICANT CHANGE UP (ref 43–77)
NEUTROPHILS NFR BLD AUTO: 76.9 % — SIGNIFICANT CHANGE UP (ref 43–77)
NRBC # BLD: 1 /100 WBCS — HIGH (ref 0–0)
NRBC # BLD: 1 /100 WBCS — HIGH (ref 0–0)
PHOSPHATE SERPL-MCNC: 2.4 MG/DL — LOW (ref 2.5–4.5)
PHOSPHATE SERPL-MCNC: 2.4 MG/DL — LOW (ref 2.5–4.5)
PLATELET # BLD AUTO: 493 K/UL — HIGH (ref 150–400)
PLATELET # BLD AUTO: 493 K/UL — HIGH (ref 150–400)
POTASSIUM SERPL-MCNC: 3.2 MMOL/L — LOW (ref 3.5–5.3)
POTASSIUM SERPL-MCNC: 3.2 MMOL/L — LOW (ref 3.5–5.3)
POTASSIUM SERPL-SCNC: 3.2 MMOL/L — LOW (ref 3.5–5.3)
POTASSIUM SERPL-SCNC: 3.2 MMOL/L — LOW (ref 3.5–5.3)
RBC # BLD: 3.14 M/UL — LOW (ref 3.8–5.2)
RBC # BLD: 3.14 M/UL — LOW (ref 3.8–5.2)
RBC # FLD: 17.3 % — HIGH (ref 10.3–14.5)
RBC # FLD: 17.3 % — HIGH (ref 10.3–14.5)
SODIUM SERPL-SCNC: 140 MMOL/L — SIGNIFICANT CHANGE UP (ref 135–145)
SODIUM SERPL-SCNC: 140 MMOL/L — SIGNIFICANT CHANGE UP (ref 135–145)
WBC # BLD: 12.58 K/UL — HIGH (ref 3.8–10.5)
WBC # BLD: 12.58 K/UL — HIGH (ref 3.8–10.5)
WBC # FLD AUTO: 12.58 K/UL — HIGH (ref 3.8–10.5)
WBC # FLD AUTO: 12.58 K/UL — HIGH (ref 3.8–10.5)

## 2023-12-31 PROCEDURE — 76937 US GUIDE VASCULAR ACCESS: CPT | Mod: 26

## 2023-12-31 PROCEDURE — 99232 SBSQ HOSP IP/OBS MODERATE 35: CPT

## 2023-12-31 PROCEDURE — 36000 PLACE NEEDLE IN VEIN: CPT

## 2023-12-31 RX ORDER — POTASSIUM CHLORIDE 20 MEQ
40 PACKET (EA) ORAL EVERY 4 HOURS
Refills: 0 | Status: COMPLETED | OUTPATIENT
Start: 2023-12-31 | End: 2023-12-31

## 2023-12-31 RX ADMIN — Medication 110 MILLIGRAM(S): at 05:54

## 2023-12-31 RX ADMIN — MONTELUKAST 10 MILLIGRAM(S): 4 TABLET, CHEWABLE ORAL at 11:41

## 2023-12-31 RX ADMIN — SODIUM CHLORIDE 4 MILLILITER(S): 9 INJECTION INTRAMUSCULAR; INTRAVENOUS; SUBCUTANEOUS at 17:06

## 2023-12-31 RX ADMIN — Medication 2: at 11:40

## 2023-12-31 RX ADMIN — SODIUM CHLORIDE 4 MILLILITER(S): 9 INJECTION INTRAMUSCULAR; INTRAVENOUS; SUBCUTANEOUS at 11:12

## 2023-12-31 RX ADMIN — Medication 2 SPRAY(S): at 05:21

## 2023-12-31 RX ADMIN — Medication 81 MILLIGRAM(S): at 11:41

## 2023-12-31 RX ADMIN — Medication 3 MILLILITER(S): at 11:12

## 2023-12-31 RX ADMIN — Medication 2 SPRAY(S): at 17:55

## 2023-12-31 RX ADMIN — CELECOXIB 200 MILLIGRAM(S): 200 CAPSULE ORAL at 12:45

## 2023-12-31 RX ADMIN — CEFEPIME 100 MILLIGRAM(S): 1 INJECTION, POWDER, FOR SOLUTION INTRAMUSCULAR; INTRAVENOUS at 22:14

## 2023-12-31 RX ADMIN — Medication 1 GRAM(S): at 17:53

## 2023-12-31 RX ADMIN — Medication 2 SPRAY(S): at 12:25

## 2023-12-31 RX ADMIN — ATORVASTATIN CALCIUM 80 MILLIGRAM(S): 80 TABLET, FILM COATED ORAL at 22:13

## 2023-12-31 RX ADMIN — Medication 1: at 16:57

## 2023-12-31 RX ADMIN — CEFEPIME 100 MILLIGRAM(S): 1 INJECTION, POWDER, FOR SOLUTION INTRAMUSCULAR; INTRAVENOUS at 14:46

## 2023-12-31 RX ADMIN — Medication 3 MILLILITER(S): at 05:50

## 2023-12-31 RX ADMIN — RIVAROXABAN 20 MILLIGRAM(S): KIT at 17:53

## 2023-12-31 RX ADMIN — Medication 110 MILLIGRAM(S): at 17:54

## 2023-12-31 RX ADMIN — Medication 3 MILLILITER(S): at 00:04

## 2023-12-31 RX ADMIN — Medication 3 MILLILITER(S): at 17:06

## 2023-12-31 RX ADMIN — DULOXETINE HYDROCHLORIDE 60 MILLIGRAM(S): 30 CAPSULE, DELAYED RELEASE ORAL at 11:41

## 2023-12-31 RX ADMIN — CELECOXIB 200 MILLIGRAM(S): 200 CAPSULE ORAL at 11:41

## 2023-12-31 RX ADMIN — SODIUM CHLORIDE 4 MILLILITER(S): 9 INJECTION INTRAMUSCULAR; INTRAVENOUS; SUBCUTANEOUS at 05:51

## 2023-12-31 RX ADMIN — Medication 100 MILLIGRAM(S): at 05:19

## 2023-12-31 RX ADMIN — Medication 10 MILLILITER(S): at 10:14

## 2023-12-31 RX ADMIN — GABAPENTIN 600 MILLIGRAM(S): 400 CAPSULE ORAL at 22:14

## 2023-12-31 RX ADMIN — Medication 40 MILLIEQUIVALENT(S): at 14:46

## 2023-12-31 RX ADMIN — Medication 1 GRAM(S): at 05:20

## 2023-12-31 RX ADMIN — Medication 240 MILLIGRAM(S): at 05:20

## 2023-12-31 RX ADMIN — SODIUM CHLORIDE 4 MILLILITER(S): 9 INJECTION INTRAMUSCULAR; INTRAVENOUS; SUBCUTANEOUS at 00:05

## 2023-12-31 RX ADMIN — CEFEPIME 100 MILLIGRAM(S): 1 INJECTION, POWDER, FOR SOLUTION INTRAMUSCULAR; INTRAVENOUS at 05:20

## 2023-12-31 RX ADMIN — Medication 40 MILLIGRAM(S): at 05:18

## 2023-12-31 RX ADMIN — Medication 100 MILLIGRAM(S): at 14:48

## 2023-12-31 RX ADMIN — Medication 1 GRAM(S): at 11:41

## 2023-12-31 NOTE — PROGRESS NOTE ADULT - ASSESSMENT
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 Of note pt recently developed URI symptoms and was started on Doxy as well as Hycodan, Alprazolam and Fioricet. Of note pt already on Percocet, flexeril for chronic pain, Gabapentin for seizures per  all which she took. Pt being admitted for Acute hypoxic respiratory failure due to Multifocal PNA, AMS r/o TIA/CVA  vs metabolic encephalopathy secondary to polypharmacy and/or hypoxia.      # Acute metabolic encephalopathy   # Acute respiratory failure with multifocal pneumonia.   Acute stroke is ruled out. EEG did not show any epileptiform activity.   Intermittent low grade temp, Seen by ID  s/p iv zosyn , completed 5 days   Added Azithromycin, + mycoplasma: course completed: for persistent symptoms now on doxy   Repeat CT with multifocal PNA  Mental status baseline patient AAO3  although RRT for acute worsening hypoxemia; r/o aspiration vs flash pulm edema  plan:  Cough meds, albuterol PRN  Albuterol PRN  steroids  3 percent  trial of lasix  repeat echo  ID follow up , pulm consulted     Coughing episode : acute blood loss anemia  hb 8 from 10  from steroids?  ordering protonix and carafate      # PE  - c/w Xarelto. no active gross bleeding.     # SLE stable.     # HTN/HLD. controlled with Verapemil, statin    # DM type 2. controlled.   - FS qAC and HS w/ SSI    #Weakness. PT eval>>WINSTON, pt unsure , will follow up     Stopped Elavil, no need for Elavil and Neurontin QHS.  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 Of note pt recently developed URI symptoms and was started on Doxy as well as Hycodan, Alprazolam and Fioricet. Of note pt already on Percocet, flexeril for chronic pain, Gabapentin for seizures per  all which she took. Pt being admitted for Acute hypoxic respiratory failure due to Multifocal PNA, AMS r/o TIA/CVA  vs metabolic encephalopathy secondary to polypharmacy and/or hypoxia.      # Acute metabolic encephalopathy   # Acute respiratory failure with multifocal pneumonia.   Acute stroke is ruled out. EEG did not show any epileptiform activity.   Intermittent low grade temp, Seen by ID  s/p iv zosyn , completed 5 days   Added Azithromycin, + mycoplasma: course completed: for persistent symptoms now on doxy   Repeat CT with multifocal PNA  Mental status baseline patient AAO3  although RRT for acute worsening hypoxemia; r/o aspiration vs flash pulm edema  plan:  Cough meds, albuterol PRN  Albuterol PRN  steroids  3 percent  trial of lasix  repeat echo  ID follow up , pulm consulted     Coughing episode : acute blood loss anemia  hb 8 from 10  from steroids?  ordering protonix and carafate      # PE  - c/w Xarelto. no active gross bleeding.     # SLE stable.     # HTN/HLD. controlled with Verapemil, statin    # DM type 2. controlled.   - FS qAC and HS w/ SSI    #Weakness. PT eval>>WINSTON, pt unsure , will follow up     Stopped Elavil, no need for Elavil and Neurontin QHS.

## 2023-12-31 NOTE — PROCEDURE NOTE - ADDITIONAL PROCEDURE DETAILS
Requested by RN to place an IV heplock in patient. As per RN pt is a difficult stick. Placed IV heplock in left forearm. #g using ultrasound guidance. IV flushed and secured. RN aware.

## 2023-12-31 NOTE — PROGRESS NOTE ADULT - SUBJECTIVE AND OBJECTIVE BOX
patient seen and examined  still reports "feeling miserable"  however 02 sats NOW 98 percent on 2 liters  Review of Systems:  General:denies fever chills, headache, weakness  HEENT: denies blurry vision,diffculty swallowing, difficulty hearing, tinnitus  Cardiovascular: denies chest pain  ,palpitations  Pulmonary:denies shortness of breath, cough, wheezing, hemoptysis  Gastrointestinal: denies abdominal pain, constipation, diarrhea,nausea , vomiting, hematochezia  : denies hematuria, dysuria, or incontinence  Neurological: denies weakness, numbness , tingling, dizziness, tremors  MSK: denies muscle pain, difficulty ambulating, swelling, back pain  skin: denies skin rash, itching, burning, or  skin lesions  Psychiatrical: denies mood disturbances, anxierty, feeling depressed, depression , or difficulty sleeping    Objective:  Vitals  T(C): 36.8 (12-31-23 @ 10:30), Max: 36.8 (12-31-23 @ 04:50)  HR: 93 (12-31-23 @ 11:12) (79 - 101)  BP: 135/74 (12-31-23 @ 10:30) (131/76 - 170/93)  RR: 19 (12-31-23 @ 10:30) (19 - 19)  SpO2: 97% (12-31-23 @ 11:12) (95% - 100%)    Physical Exam:  General: comfortable, no acute distress  HEENT: Atraumatic, no LAD, trachea midline, PERRLA  Cardiovascular: normal s1s2, no murmurs, gallops or fricition rubs  Pulmonary: clear to ausculation Bilaterally, no wheezing , rhonchi  Gastrointestinal: soft non tender non distended, no masses felt, no organomegally  Muscloskeletal: no lower extremity edema, intact bilateral lower extremity pulses  Neurological: CN II-12 intact. No focal weakness  Psychiatrical: normal mood, cooperative  SKIN: no rash, lesions or ulcers    Labs:                          8.1    12.58 )-----------( 493      ( 31 Dec 2023 05:45 )             26.0     12-31    140  |  106  |  15  ----------------------------<  101<H>  3.2<L>   |  29  |  0.64    Ca    8.9      31 Dec 2023 05:45  Phos  2.4     12-31  Mg     1.9     12-31              Active Medications  MEDICATIONS  (STANDING):  albuterol/ipratropium for Nebulization 3 milliLiter(s) Nebulizer every 6 hours  aspirin enteric coated 81 milliGRAM(s) Oral daily  atorvastatin 80 milliGRAM(s) Oral at bedtime  benzonatate 100 milliGRAM(s) Oral every 8 hours  cefepime   IVPB 1000 milliGRAM(s) IV Intermittent every 8 hours  celecoxib 200 milliGRAM(s) Oral daily  dextrose 5%. 1000 milliLiter(s) (50 mL/Hr) IV Continuous <Continuous>  dextrose 5%. 1000 milliLiter(s) (100 mL/Hr) IV Continuous <Continuous>  dextrose 50% Injectable 25 Gram(s) IV Push once  dextrose 50% Injectable 12.5 Gram(s) IV Push once  dextrose 50% Injectable 25 Gram(s) IV Push once  doxycycline IVPB      doxycycline IVPB 100 milliGRAM(s) IV Intermittent every 12 hours  DULoxetine 60 milliGRAM(s) Oral daily  gabapentin 600 milliGRAM(s) Oral at bedtime  glucagon  Injectable 1 milliGRAM(s) IntraMuscular once  hydrocodone/homatropine Syrup 5 milliLiter(s) Oral every 6 hours  influenza   Vaccine 0.5 milliLiter(s) IntraMuscular once  insulin lispro (ADMELOG) corrective regimen sliding scale   SubCutaneous three times a day before meals  insulin lispro (ADMELOG) corrective regimen sliding scale   SubCutaneous at bedtime  montelukast 10 milliGRAM(s) Oral daily  predniSONE   Tablet 40 milliGRAM(s) Oral daily  rivaroxaban 20 milliGRAM(s) Oral with dinner  sodium chloride 0.65% Nasal 2 Spray(s) Both Nostrils every 6 hours  sodium chloride 3%  Inhalation 4 milliLiter(s) Inhalation every 6 hours  sucralfate 1 Gram(s) Oral every 6 hours  verapamil  milliGRAM(s) Oral daily    MEDICATIONS  (PRN):  acetaminophen     Tablet .. 650 milliGRAM(s) Oral every 6 hours PRN Temp greater or equal to 38C (100.4F), Mild Pain (1 - 3)  albuterol    90 MICROgram(s) HFA Inhaler 1 Puff(s) Inhalation every 4 hours PRN Shortness of Breath and/or Wheezing  dextrose Oral Gel 15 Gram(s) Oral once PRN Blood Glucose LESS THAN 70 milliGRAM(s)/deciliter  guaifenesin/dextromethorphan Oral Liquid 10 milliLiter(s) Oral every 6 hours PRN Cough  melatonin 3 milliGRAM(s) Oral at bedtime PRN Insomnia

## 2024-01-01 LAB
GLUCOSE BLDC GLUCOMTR-MCNC: 152 MG/DL — HIGH (ref 70–99)
GLUCOSE BLDC GLUCOMTR-MCNC: 152 MG/DL — HIGH (ref 70–99)
GLUCOSE BLDC GLUCOMTR-MCNC: 159 MG/DL — HIGH (ref 70–99)
GLUCOSE BLDC GLUCOMTR-MCNC: 159 MG/DL — HIGH (ref 70–99)
GLUCOSE BLDC GLUCOMTR-MCNC: 167 MG/DL — HIGH (ref 70–99)
GLUCOSE BLDC GLUCOMTR-MCNC: 167 MG/DL — HIGH (ref 70–99)
GLUCOSE BLDC GLUCOMTR-MCNC: 228 MG/DL — HIGH (ref 70–99)
GLUCOSE BLDC GLUCOMTR-MCNC: 228 MG/DL — HIGH (ref 70–99)

## 2024-01-01 PROCEDURE — 71275 CT ANGIOGRAPHY CHEST: CPT | Mod: 26

## 2024-01-01 PROCEDURE — 70491 CT SOFT TISSUE NECK W/DYE: CPT | Mod: 26

## 2024-01-01 PROCEDURE — 99232 SBSQ HOSP IP/OBS MODERATE 35: CPT

## 2024-01-01 RX ADMIN — SODIUM CHLORIDE 4 MILLILITER(S): 9 INJECTION INTRAMUSCULAR; INTRAVENOUS; SUBCUTANEOUS at 00:09

## 2024-01-01 RX ADMIN — Medication 2 SPRAY(S): at 00:15

## 2024-01-01 RX ADMIN — Medication 3 MILLILITER(S): at 11:57

## 2024-01-01 RX ADMIN — CEFEPIME 100 MILLIGRAM(S): 1 INJECTION, POWDER, FOR SOLUTION INTRAMUSCULAR; INTRAVENOUS at 15:19

## 2024-01-01 RX ADMIN — Medication 2 SPRAY(S): at 18:03

## 2024-01-01 RX ADMIN — SODIUM CHLORIDE 4 MILLILITER(S): 9 INJECTION INTRAMUSCULAR; INTRAVENOUS; SUBCUTANEOUS at 17:16

## 2024-01-01 RX ADMIN — Medication 2 SPRAY(S): at 11:40

## 2024-01-01 RX ADMIN — CELECOXIB 200 MILLIGRAM(S): 200 CAPSULE ORAL at 11:40

## 2024-01-01 RX ADMIN — Medication 1: at 16:57

## 2024-01-01 RX ADMIN — SODIUM CHLORIDE 4 MILLILITER(S): 9 INJECTION INTRAMUSCULAR; INTRAVENOUS; SUBCUTANEOUS at 11:58

## 2024-01-01 RX ADMIN — CELECOXIB 200 MILLIGRAM(S): 200 CAPSULE ORAL at 12:40

## 2024-01-01 RX ADMIN — ATORVASTATIN CALCIUM 80 MILLIGRAM(S): 80 TABLET, FILM COATED ORAL at 21:25

## 2024-01-01 RX ADMIN — GABAPENTIN 600 MILLIGRAM(S): 400 CAPSULE ORAL at 21:25

## 2024-01-01 RX ADMIN — CEFEPIME 100 MILLIGRAM(S): 1 INJECTION, POWDER, FOR SOLUTION INTRAMUSCULAR; INTRAVENOUS at 05:25

## 2024-01-01 RX ADMIN — Medication 1 GRAM(S): at 05:18

## 2024-01-01 RX ADMIN — Medication 1: at 08:22

## 2024-01-01 RX ADMIN — Medication 3 MILLILITER(S): at 00:09

## 2024-01-01 RX ADMIN — Medication 3 MILLILITER(S): at 23:07

## 2024-01-01 RX ADMIN — Medication 240 MILLIGRAM(S): at 05:18

## 2024-01-01 RX ADMIN — MONTELUKAST 10 MILLIGRAM(S): 4 TABLET, CHEWABLE ORAL at 11:40

## 2024-01-01 RX ADMIN — Medication 110 MILLIGRAM(S): at 18:03

## 2024-01-01 RX ADMIN — Medication 3 MILLILITER(S): at 17:16

## 2024-01-01 RX ADMIN — Medication 1 GRAM(S): at 11:39

## 2024-01-01 RX ADMIN — Medication 81 MILLIGRAM(S): at 11:39

## 2024-01-01 RX ADMIN — SODIUM CHLORIDE 4 MILLILITER(S): 9 INJECTION INTRAMUSCULAR; INTRAVENOUS; SUBCUTANEOUS at 23:33

## 2024-01-01 RX ADMIN — Medication 2: at 11:46

## 2024-01-01 RX ADMIN — Medication 1 GRAM(S): at 18:03

## 2024-01-01 RX ADMIN — Medication 110 MILLIGRAM(S): at 05:18

## 2024-01-01 RX ADMIN — RIVAROXABAN 20 MILLIGRAM(S): KIT at 17:01

## 2024-01-01 RX ADMIN — Medication 2 SPRAY(S): at 05:19

## 2024-01-01 RX ADMIN — Medication 3 MILLILITER(S): at 06:05

## 2024-01-01 RX ADMIN — SODIUM CHLORIDE 4 MILLILITER(S): 9 INJECTION INTRAMUSCULAR; INTRAVENOUS; SUBCUTANEOUS at 06:05

## 2024-01-01 RX ADMIN — CEFEPIME 100 MILLIGRAM(S): 1 INJECTION, POWDER, FOR SOLUTION INTRAMUSCULAR; INTRAVENOUS at 21:26

## 2024-01-01 RX ADMIN — Medication 1 GRAM(S): at 00:15

## 2024-01-01 RX ADMIN — DULOXETINE HYDROCHLORIDE 60 MILLIGRAM(S): 30 CAPSULE, DELAYED RELEASE ORAL at 11:40

## 2024-01-01 RX ADMIN — Medication 40 MILLIGRAM(S): at 05:18

## 2024-01-01 NOTE — PROGRESS NOTE ADULT - ASSESSMENT
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 Being treated for URI outpatient. Hypoxic to 84% on RA,  CT head without acute pathology  CTA H/N - bilateral ICA calcifications with ectasia of proximal R ICA and L cerivcal ICA fusiform aneurysm 1.4cm, R V4 narrowing  CTP motion degraded  CXR showed Multifocal PNA  Routine eeg with diffuse slowing    Impression:  AMS likely TME - infectious, metabolic, possible polypharmacy. 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 Being treated for URI outpatient. Hypoxic to 84% on RA,  CT head without acute pathology  CTA H/N - bilateral ICA calcifications with ectasia of proximal R ICA and L cerivcal ICA fusiform aneurysm 1.4cm, R V4 narrowing  CTP motion degraded  CXR showed Multifocal PNA  Routine eeg with diffuse slowing    Impression:  AMS likely TME - infectious, metabolic, possible polypharmacy. Low suspicion for neurovascular etiology, seizure  SLE  PE on xarelto    Plan:  - On Xarelto  - EEG as above  - MRI no acute findings  - would hold sedating meds to monitor mental status   - on gabapentin    Neurology  Office [phone]

## 2024-01-01 NOTE — PROGRESS NOTE ADULT - SUBJECTIVE AND OBJECTIVE BOX
Patient seen and examined  cough  ambulates tobathroom  reports dyspneic  PATIENT FOUND SLEEPING SOUNDLY on room air    Review of Systems:  General:denies fever chills, headache, weakness  HEENT: denies blurry vision,diffculty swallowing, difficulty hearing, tinnitus  Cardiovascular: denies chest pain  ,palpitations  Pulmonary:+ shortness of breath, +cough, wheezing, hemoptysis  Gastrointestinal: denies abdominal pain, constipation, diarrhea,nausea , vomiting, hematochezia  : denies hematuria, dysuria, or incontinence  Neurological: denies weakness, numbness , tingling, dizziness, tremors  MSK: denies muscle pain, difficulty ambulating, swelling, back pain  skin: denies skin rash, itching, burning, or  skin lesions  Psychiatrical: denies mood disturbances, anxierty, feeling depressed, depression , or difficulty sleeping    Objective:  Vitals  T(C): 36.9 (01-01-24 @ 11:09), Max: 37.1 (12-31-23 @ 15:50)  HR: 100 (01-01-24 @ 11:09) (84 - 116)  BP: 159/72 (01-01-24 @ 11:09) (136/79 - 163/80)  RR: 20 (01-01-24 @ 11:09) (18 - 20)  SpO2: 93% (01-01-24 @ 11:09) (90% - 99%)    Physical Exam:  General: comfortable, no acute distress  HEENT: Atraumatic, no LAD, trachea midline, PERRLA  Cardiovascular: normal s1s2, no murmurs, gallops or fricition rubs  Pulmonary: clear to ausculation Bilaterally, no wheezing , rhonchi +cough induced bronchospasm  Gastrointestinal: soft non tender non distended, no masses felt, no organomegally  Muscloskeletal: no lower extremity edema, intact bilateral lower extremity pulses  Neurological: CN II-12 intact. No focal weakness  Psychiatrical: normal mood, cooperative  SKIN: no rash, lesions or ulcers    Labs:                          8.1    12.58 )-----------( 493      ( 31 Dec 2023 05:45 )             26.0     12-31    140  |  106  |  15  ----------------------------<  101<H>  3.2<L>   |  29  |  0.64    Ca    8.9      31 Dec 2023 05:45  Phos  2.4     12-31  Mg     1.9     12-31              Active Medications  MEDICATIONS  (STANDING):  albuterol/ipratropium for Nebulization 3 milliLiter(s) Nebulizer every 6 hours  aspirin enteric coated 81 milliGRAM(s) Oral daily  atorvastatin 80 milliGRAM(s) Oral at bedtime  cefepime   IVPB 1000 milliGRAM(s) IV Intermittent every 8 hours  celecoxib 200 milliGRAM(s) Oral daily  dextrose 5%. 1000 milliLiter(s) (50 mL/Hr) IV Continuous <Continuous>  dextrose 5%. 1000 milliLiter(s) (100 mL/Hr) IV Continuous <Continuous>  dextrose 50% Injectable 25 Gram(s) IV Push once  dextrose 50% Injectable 12.5 Gram(s) IV Push once  dextrose 50% Injectable 25 Gram(s) IV Push once  doxycycline IVPB      doxycycline IVPB 100 milliGRAM(s) IV Intermittent every 12 hours  DULoxetine 60 milliGRAM(s) Oral daily  gabapentin 600 milliGRAM(s) Oral at bedtime  glucagon  Injectable 1 milliGRAM(s) IntraMuscular once  hydrocodone/homatropine Syrup 5 milliLiter(s) Oral every 6 hours  influenza   Vaccine 0.5 milliLiter(s) IntraMuscular once  insulin lispro (ADMELOG) corrective regimen sliding scale   SubCutaneous three times a day before meals  insulin lispro (ADMELOG) corrective regimen sliding scale   SubCutaneous at bedtime  montelukast 10 milliGRAM(s) Oral daily  predniSONE   Tablet 40 milliGRAM(s) Oral daily  rivaroxaban 20 milliGRAM(s) Oral with dinner  sodium chloride 0.65% Nasal 2 Spray(s) Both Nostrils every 6 hours  sodium chloride 3%  Inhalation 4 milliLiter(s) Inhalation every 6 hours  sucralfate 1 Gram(s) Oral every 6 hours  verapamil  milliGRAM(s) Oral daily    MEDICATIONS  (PRN):  acetaminophen     Tablet .. 650 milliGRAM(s) Oral every 6 hours PRN Temp greater or equal to 38C (100.4F), Mild Pain (1 - 3)  albuterol    90 MICROgram(s) HFA Inhaler 1 Puff(s) Inhalation every 4 hours PRN Shortness of Breath and/or Wheezing  dextrose Oral Gel 15 Gram(s) Oral once PRN Blood Glucose LESS THAN 70 milliGRAM(s)/deciliter  guaifenesin/dextromethorphan Oral Liquid 10 milliLiter(s) Oral every 6 hours PRN Cough  melatonin 3 milliGRAM(s) Oral at bedtime PRN Insomnia     Leiomyoma of uterus, unspecified    Obesity

## 2024-01-01 NOTE — PROGRESS NOTE ADULT - SUBJECTIVE AND OBJECTIVE BOX
Neurology Progress Note    S: Patient seen and examined. No new events overnight.     MEDICATIONS:    acetaminophen     Tablet .. 650 milliGRAM(s) Oral every 6 hours PRN  albuterol    90 MICROgram(s) HFA Inhaler 1 Puff(s) Inhalation every 4 hours PRN  albuterol/ipratropium for Nebulization 3 milliLiter(s) Nebulizer every 6 hours  aspirin enteric coated 81 milliGRAM(s) Oral daily  atorvastatin 80 milliGRAM(s) Oral at bedtime  cefepime   IVPB 1000 milliGRAM(s) IV Intermittent every 8 hours  celecoxib 200 milliGRAM(s) Oral daily  dextrose 5%. 1000 milliLiter(s) IV Continuous <Continuous>  dextrose 5%. 1000 milliLiter(s) IV Continuous <Continuous>  dextrose 50% Injectable 25 Gram(s) IV Push once  dextrose 50% Injectable 12.5 Gram(s) IV Push once  dextrose 50% Injectable 25 Gram(s) IV Push once  dextrose Oral Gel 15 Gram(s) Oral once PRN  doxycycline IVPB      doxycycline IVPB 100 milliGRAM(s) IV Intermittent every 12 hours  DULoxetine 60 milliGRAM(s) Oral daily  gabapentin 600 milliGRAM(s) Oral at bedtime  glucagon  Injectable 1 milliGRAM(s) IntraMuscular once  guaifenesin/dextromethorphan Oral Liquid 10 milliLiter(s) Oral every 6 hours PRN  hydrocodone/homatropine Syrup 5 milliLiter(s) Oral every 6 hours  influenza   Vaccine 0.5 milliLiter(s) IntraMuscular once  insulin lispro (ADMELOG) corrective regimen sliding scale   SubCutaneous three times a day before meals  insulin lispro (ADMELOG) corrective regimen sliding scale   SubCutaneous at bedtime  melatonin 3 milliGRAM(s) Oral at bedtime PRN  montelukast 10 milliGRAM(s) Oral daily  predniSONE   Tablet 40 milliGRAM(s) Oral daily  rivaroxaban 20 milliGRAM(s) Oral with dinner  sodium chloride 0.65% Nasal 2 Spray(s) Both Nostrils every 6 hours  sodium chloride 3%  Inhalation 4 milliLiter(s) Inhalation every 6 hours  sucralfate 1 Gram(s) Oral every 6 hours  verapamil  milliGRAM(s) Oral daily      LABS:                          8.1    12.58 )-----------( 493      ( 31 Dec 2023 05:45 )             26.0     12-31    140  |  106  |  15  ----------------------------<  101<H>  3.2<L>   |  29  |  0.64    Ca    8.9      31 Dec 2023 05:45  Phos  2.4     12-31  Mg     1.9     12-31      CAPILLARY BLOOD GLUCOSE      POCT Blood Glucose.: 167 mg/dL (01 Jan 2024 08:10)  POCT Blood Glucose.: 155 mg/dL (31 Dec 2023 20:43)  POCT Blood Glucose.: 174 mg/dL (31 Dec 2023 16:43)  POCT Blood Glucose.: 215 mg/dL (31 Dec 2023 11:28)      Urinalysis Basic - ( 31 Dec 2023 05:45 )    Color: x / Appearance: x / SG: x / pH: x  Gluc: 101 mg/dL / Ketone: x  / Bili: x / Urobili: x   Blood: x / Protein: x / Nitrite: x   Leuk Esterase: x / RBC: x / WBC x   Sq Epi: x / Non Sq Epi: x / Bacteria: x      I&O's Summary    Vital Signs Last 24 Hrs  T(C): 36.7 (01 Jan 2024 05:26), Max: 37.1 (31 Dec 2023 15:50)  T(F): 98.1 (01 Jan 2024 05:26), Max: 98.7 (31 Dec 2023 15:50)  HR: 106 (01 Jan 2024 06:09) (84 - 116)  BP: 136/79 (01 Jan 2024 05:26) (135/74 - 163/80)  BP(mean): --  RR: 18 (01 Jan 2024 05:26) (18 - 19)  SpO2: 98% (01 Jan 2024 06:09) (90% - 99%)    Parameters below as of 01 Jan 2024 06:09  Patient On (Oxygen Delivery Method): nasal cannula        General Exam:   General Appearance: Appropriately dressed and in no acute distress       Head: Normocephalic, atraumatic and no dysmorphic features  Ear, Nose, and Throat: Moist mucous membranes  CVS: S1S2+  Resp: No SOB, no wheeze or rhonchi  Abd: soft NTND  Extremities: No edema, no cyanosis  Skin: No bruises, no rashes     Neurological Exam:  Mental Status: Awake, alert and oriented x 3.  Able to follow simple verbal commands. Able to name and repeat. Speech is fluent but slow  Cranial Nerves: PERRL, EOMI, VFFC, sensation V1-V3 intact,  no obvious facial asymmetry, equal elevation of palate, scm/trap 5/5, tongue is midline on protrusion.. hearing is grossly intact.   Motor: Normal bulk, tone. JIANG, LLE slightly weaker but poor efffort  Sensation: Intact to light touch and pinprick throughout. no right/left confusion. no extinction to tactile on DSS.   Reflexes: 1+ throughout at biceps, brachioradialis, triceps, patellars and ankles bilaterally and equal. No clonus. R toe and L toe were both downgoing.  Coordination: No dysmetria on FNF   Gait: deferred      I personally reviewed the below data/images/labs:      LABS:    < from: CT Brain Stroke Protocol (12.17.23 @ 04:51) >    ACC: [id number] EXAM:  CT BRAIN STROKE PROTOCOL   ORDERED BY: MINA CONROY     PROCEDURE DATE:  12/17/2023          INTERPRETATION:  CLINICAL INFORMATION:  Stroke Code    TECHNIQUE:  Axial CT images were acquired through the head.  Intravenouscontrast: None  Two-dimensional reformats were generated.    COMPARISON STUDY: MRI brain 11/17/2023, CTA head and neck 11/17/2023.    FINDINGS:    There is no CT evidence of acute intracranial hemorrhage, mass effect,   midline shift, or acute, largeterritorial infarct.  The ventricles and   sulci are age-appropriate in size and configuration. The basal cisterns   are patent.    The mastoid air cells and middle ear cavities are grossly clear. The   visualized paranasal sinuses are well aerated.    The calvarium and skull base are grossly intact.    IMPRESSION:  No acute intracranial hemorrhage or mass effect.    Results were discussed with Dr. Conroy by Dr. Gastelum at 4:50 AM on   12/17/2023. with read back followed.    < end of copied text >        < from: CT Angio Neck Stroke Protocol w/ IV Cont (12.17.23 @ 05:12) >    ACC: [id number] EXAM:  CT ANGIO NECK STROKE PROTCL IC   ORDERED BY: MINA CONROY     ACC: [id number] EXAM:  CT BRAIN PERFUSION MAPS STROKE   ORDERED BY: MINA CONROY     ACC: [id number] EXAM:  CT ANGIO BRAIN STROKE PROTC IC   ORDERED BY: MINA CONROY     PROCEDURE DATE:  12/17/2023          INTERPRETATION:  CT PERFUSION, CTA OF THE [place] OF [place]IRARD AND NECK:    INDICATIONS: Stroke code.    TECHNIQUE:    RAPID artificial intelligence was used for perfusion analysis and for   preliminary evaluation of intracranial hemorrhage.    CTA [place] OF [place]ARD:    After the intravenous power injection of non-ionic contrast material,   serial thin sections were obtained through the intracranial circulation   on a multislice CT scanner.  Images were reformatted using a dedicated 3D   software package and viewed on a dedicated workstation in multiple planes.    CTA NECK:    After the intravenous power injection of non-ionic contrast material,   serial thin sections were obtained through the cervical circulation on a   multislice CT scanner.  Images were reformatted using a dedicated 3D   software package and viewed on a dedicated workstation in multiple planes.      COMPARISON EXAMINATION: Noncontrast head CT performed the same day. CTA   head and neck 11/17/2023    FINDINGS:      CT RAPID PERFUSION:  Markedly degraded by patient motion.    INFARCT CORE: 0 cc    TISSUE AT RISK: 8 cc combined in the left frontal and right temporal lobes    MISMATCH RATIO: N/A      CTA [place] OF GIRARD:    ANTERIOR CIRCULATION    ICA  CAVERNOUS, SUPRACLINOID, BIFURCATION SEGMENTS: Patent without flow   limiting stenosis. Atherosclerotic calcifications of the bilateral   cavernous ICA segments.    ANTERIOR CEREBRAL ARTERIES: Bilateral A1, anterior communicating and A2   anterior cerebral arteries are unremarkable in course and caliber without   flow limiting stenosis. Hypoplastic right A1 segment.    MIDDLE CEREBRAL ARTERIES: Patent bilateral M1, M2, and distal MCA   branches without flow limiting stenosis.    POSTERIOR CIRCULATION:    VERTEBRAL ARTERIES: Patent without flow limiting stenosis. Mild focal   narrowing of the right V4 segment.    BASILAR ARTERY: Patent no flow limiting stenosis.    POSTERIOR CEREBRAL ARTERIES: Patent without flow limiting stenosis.    CTA NECK:    GREAT VESSELS: Visualized segments are patent, no flow limiting stenosis.   Bovine aortic arch, normal variant.    COMMON CAROTID ARTERIES:  RIGHT: Patent without flow limiting stenosis  LEFT: Patent without flow limiting stenosis    CAROTID BULBS:  RIGHT: Patent without flow limiting stenosis  LEFT: Patent without flow limiting stenosis    INTERNAL CAROTID ARTERIES:  RIGHT: Patent no evidence for any hemodynamically significant stenosis at   the ICA origin by NASCET criteria. Stable mild ectasia of the proximal   cervical ICA measuring 0.9 cm in diameter. Partial retropharyngeal course.  LEFT: Patent no evidence for any hemodynamically significant stenosis at   the ICA origin by NASCET criteria. Stable fusiform aneurysmal dilatation   of the proximal cervical ICA measuring 1.4 cm in diameter and gradually   tapering in the mid segment. Partial retropharyngeal course.    VERTEBRAL ARTERIES:    RIGHT: Patent no evidence for any flow limiting stenosis.  LEFT: Patent no evidence for any flow limiting stenosis. Left dominant   vertebral system.      SOFT TISSUES: Patchy nonspecific groundglass opacities and consolidations   in the visualized upper lobes.    BONES: Multilevel degenerative changes inthe spine.    IMPRESSION:    CT PERFUSION: Markedly degraded by patient motion. No core infarct. Small   areas of abnormal perfusion in the left frontal and right temporal lobes   not confined to a vascular territory, likely artifactual.    If symptoms persist consider follow up head CT or MRI, MRA  if no   contraindication.    CTA COW:  Patent intracranial circulation without flow limiting stenosis.    CTA NECK: Patent, ECAs, ICAs, no  hemodynamically significant stenosis at    ICA origins by NASCET criteria. Stable fusiform aneurysmal dilatation of   the ICA origins/proximal segments.  Bilateral vertebral arteries are patent without flow limiting stenosis.    Patchy nonspecific groundglass and consolidations in the visualized upper   lobes.    --- End of Report ---      < end of copied text >    < from: MR Head No Cont (12.18.23 @ 12:52) >  IMPRESSION: No acute territorial infarcts are seen    < end of copied text >      EEG Classification / Summary:  Abnormal routine EEG in the awake state.  Mild diffuse slowing.  No epileptiform abnormalities.  Artifacts somewhat limit interpretation.    Clinical Impression:  Mild diffuse cerebral dysfunction is nonspecific in etiology.   Artifacts somewhat limit interpretation.   Neurology Progress Note    S: Patient seen and examined. No new events overnight.     MEDICATIONS:    acetaminophen     Tablet .. 650 milliGRAM(s) Oral every 6 hours PRN  albuterol    90 MICROgram(s) HFA Inhaler 1 Puff(s) Inhalation every 4 hours PRN  albuterol/ipratropium for Nebulization 3 milliLiter(s) Nebulizer every 6 hours  aspirin enteric coated 81 milliGRAM(s) Oral daily  atorvastatin 80 milliGRAM(s) Oral at bedtime  cefepime   IVPB 1000 milliGRAM(s) IV Intermittent every 8 hours  celecoxib 200 milliGRAM(s) Oral daily  dextrose 5%. 1000 milliLiter(s) IV Continuous <Continuous>  dextrose 5%. 1000 milliLiter(s) IV Continuous <Continuous>  dextrose 50% Injectable 25 Gram(s) IV Push once  dextrose 50% Injectable 12.5 Gram(s) IV Push once  dextrose 50% Injectable 25 Gram(s) IV Push once  dextrose Oral Gel 15 Gram(s) Oral once PRN  doxycycline IVPB      doxycycline IVPB 100 milliGRAM(s) IV Intermittent every 12 hours  DULoxetine 60 milliGRAM(s) Oral daily  gabapentin 600 milliGRAM(s) Oral at bedtime  glucagon  Injectable 1 milliGRAM(s) IntraMuscular once  guaifenesin/dextromethorphan Oral Liquid 10 milliLiter(s) Oral every 6 hours PRN  hydrocodone/homatropine Syrup 5 milliLiter(s) Oral every 6 hours  influenza   Vaccine 0.5 milliLiter(s) IntraMuscular once  insulin lispro (ADMELOG) corrective regimen sliding scale   SubCutaneous three times a day before meals  insulin lispro (ADMELOG) corrective regimen sliding scale   SubCutaneous at bedtime  melatonin 3 milliGRAM(s) Oral at bedtime PRN  montelukast 10 milliGRAM(s) Oral daily  predniSONE   Tablet 40 milliGRAM(s) Oral daily  rivaroxaban 20 milliGRAM(s) Oral with dinner  sodium chloride 0.65% Nasal 2 Spray(s) Both Nostrils every 6 hours  sodium chloride 3%  Inhalation 4 milliLiter(s) Inhalation every 6 hours  sucralfate 1 Gram(s) Oral every 6 hours  verapamil  milliGRAM(s) Oral daily      LABS:                          8.1    12.58 )-----------( 493      ( 31 Dec 2023 05:45 )             26.0     12-31    140  |  106  |  15  ----------------------------<  101<H>  3.2<L>   |  29  |  0.64    Ca    8.9      31 Dec 2023 05:45  Phos  2.4     12-31  Mg     1.9     12-31      CAPILLARY BLOOD GLUCOSE      POCT Blood Glucose.: 167 mg/dL (01 Jan 2024 08:10)  POCT Blood Glucose.: 155 mg/dL (31 Dec 2023 20:43)  POCT Blood Glucose.: 174 mg/dL (31 Dec 2023 16:43)  POCT Blood Glucose.: 215 mg/dL (31 Dec 2023 11:28)      Urinalysis Basic - ( 31 Dec 2023 05:45 )    Color: x / Appearance: x / SG: x / pH: x  Gluc: 101 mg/dL / Ketone: x  / Bili: x / Urobili: x   Blood: x / Protein: x / Nitrite: x   Leuk Esterase: x / RBC: x / WBC x   Sq Epi: x / Non Sq Epi: x / Bacteria: x      I&O's Summary    Vital Signs Last 24 Hrs  T(C): 36.7 (01 Jan 2024 05:26), Max: 37.1 (31 Dec 2023 15:50)  T(F): 98.1 (01 Jan 2024 05:26), Max: 98.7 (31 Dec 2023 15:50)  HR: 106 (01 Jan 2024 06:09) (84 - 116)  BP: 136/79 (01 Jan 2024 05:26) (135/74 - 163/80)  BP(mean): --  RR: 18 (01 Jan 2024 05:26) (18 - 19)  SpO2: 98% (01 Jan 2024 06:09) (90% - 99%)    Parameters below as of 01 Jan 2024 06:09  Patient On (Oxygen Delivery Method): nasal cannula        General Exam:   General Appearance: Appropriately dressed and in no acute distress       Head: Normocephalic, atraumatic and no dysmorphic features  Ear, Nose, and Throat: Moist mucous membranes  CVS: S1S2+  Resp: No SOB, no wheeze or rhonchi  Abd: soft NTND  Extremities: No edema, no cyanosis  Skin: No bruises, no rashes     Neurological Exam:  Mental Status: Awake, alert and oriented x 3.  Able to follow simple verbal commands. Able to name and repeat. Speech is fluent but slow  Cranial Nerves: PERRL, EOMI, VFFC, sensation V1-V3 intact,  no obvious facial asymmetry, equal elevation of palate, scm/trap 5/5, tongue is midline on protrusion.. hearing is grossly intact.   Motor: Normal bulk, tone. JIANG, LLE slightly weaker but poor efffort  Sensation: Intact to light touch and pinprick throughout. no right/left confusion. no extinction to tactile on DSS.   Reflexes: 1+ throughout at biceps, brachioradialis, triceps, patellars and ankles bilaterally and equal. No clonus. R toe and L toe were both downgoing.  Coordination: No dysmetria on FNF   Gait: deferred      I personally reviewed the below data/images/labs:      LABS:    < from: CT Brain Stroke Protocol (12.17.23 @ 04:51) >    ACC: [id number] EXAM:  CT BRAIN STROKE PROTOCOL   ORDERED BY: MINA CONROY     PROCEDURE DATE:  12/17/2023          INTERPRETATION:  CLINICAL INFORMATION:  Stroke Code    TECHNIQUE:  Axial CT images were acquired through the head.  Intravenouscontrast: None  Two-dimensional reformats were generated.    COMPARISON STUDY: MRI brain 11/17/2023, CTA head and neck 11/17/2023.    FINDINGS:    There is no CT evidence of acute intracranial hemorrhage, mass effect,   midline shift, or acute, largeterritorial infarct.  The ventricles and   sulci are age-appropriate in size and configuration. The basal cisterns   are patent.    The mastoid air cells and middle ear cavities are grossly clear. The   visualized paranasal sinuses are well aerated.    The calvarium and skull base are grossly intact.    IMPRESSION:  No acute intracranial hemorrhage or mass effect.    Results were discussed with Dr. Conroy by Dr. Gastelum at 4:50 AM on   12/17/2023. with read back followed.    < end of copied text >        < from: CT Angio Neck Stroke Protocol w/ IV Cont (12.17.23 @ 05:12) >    ACC: [id number] EXAM:  CT ANGIO NECK STROKE PROTCL IC   ORDERED BY: MINA CONROY     ACC: [id number] EXAM:  CT BRAIN PERFUSION MAPS STROKE   ORDERED BY: MINA CONROY     ACC: [id number] EXAM:  CT ANGIO BRAIN STROKE PROTC IC   ORDERED BY: MINA CONROY     PROCEDURE DATE:  12/17/2023          INTERPRETATION:  CT PERFUSION, CTA OF THE [place] OF [place]ARD AND NECK:    INDICATIONS: Stroke code.    TECHNIQUE:    RAPID artificial intelligence was used for perfusion analysis and for   preliminary evaluation of intracranial hemorrhage.    CTA [place] OF GIRARD:    After the intravenous power injection of non-ionic contrast material,   serial thin sections were obtained through the intracranial circulation   on a multislice CT scanner.  Images were reformatted using a dedicated 3D   software package and viewed on a dedicated workstation in multiple planes.    CTA NECK:    After the intravenous power injection of non-ionic contrast material,   serial thin sections were obtained through the cervical circulation on a   multislice CT scanner.  Images were reformatted using a dedicated 3D   software package and viewed on a dedicated workstation in multiple planes.      COMPARISON EXAMINATION: Noncontrast head CT performed the same day. CTA   head and neck 11/17/2023    FINDINGS:      CT RAPID PERFUSION:  Markedly degraded by patient motion.    INFARCT CORE: 0 cc    TISSUE AT RISK: 8 cc combined in the left frontal and right temporal lobes    MISMATCH RATIO: N/A      CTA [place] OF G[place]ARD:    ANTERIOR CIRCULATION    ICA  CAVERNOUS, SUPRACLINOID, BIFURCATION SEGMENTS: Patent without flow   limiting stenosis. Atherosclerotic calcifications of the bilateral   cavernous ICA segments.    ANTERIOR CEREBRAL ARTERIES: Bilateral A1, anterior communicating and A2   anterior cerebral arteries are unremarkable in course and caliber without   flow limiting stenosis. Hypoplastic right A1 segment.    MIDDLE CEREBRAL ARTERIES: Patent bilateral M1, M2, and distal MCA   branches without flow limiting stenosis.    POSTERIOR CIRCULATION:    VERTEBRAL ARTERIES: Patent without flow limiting stenosis. Mild focal   narrowing of the right V4 segment.    BASILAR ARTERY: Patent no flow limiting stenosis.    POSTERIOR CEREBRAL ARTERIES: Patent without flow limiting stenosis.    CTA NECK:    GREAT VESSELS: Visualized segments are patent, no flow limiting stenosis.   Bovine aortic arch, normal variant.    COMMON CAROTID ARTERIES:  RIGHT: Patent without flow limiting stenosis  LEFT: Patent without flow limiting stenosis    CAROTID BULBS:  RIGHT: Patent without flow limiting stenosis  LEFT: Patent without flow limiting stenosis    INTERNAL CAROTID ARTERIES:  RIGHT: Patent no evidence for any hemodynamically significant stenosis at   the ICA origin by NASCET criteria. Stable mild ectasia of the proximal   cervical ICA measuring 0.9 cm in diameter. Partial retropharyngeal course.  LEFT: Patent no evidence for any hemodynamically significant stenosis at   the ICA origin by NASCET criteria. Stable fusiform aneurysmal dilatation   of the proximal cervical ICA measuring 1.4 cm in diameter and gradually   tapering in the mid segment. Partial retropharyngeal course.    VERTEBRAL ARTERIES:    RIGHT: Patent no evidence for any flow limiting stenosis.  LEFT: Patent no evidence for any flow limiting stenosis. Left dominant   vertebral system.      SOFT TISSUES: Patchy nonspecific groundglass opacities and consolidations   in the visualized upper lobes.    BONES: Multilevel degenerative changes inthe spine.    IMPRESSION:    CT PERFUSION: Markedly degraded by patient motion. No core infarct. Small   areas of abnormal perfusion in the left frontal and right temporal lobes   not confined to a vascular territory, likely artifactual.    If symptoms persist consider follow up head CT or MRI, MRA  if no   contraindication.    CTA COW:  Patent intracranial circulation without flow limiting stenosis.    CTA NECK: Patent, ECAs, ICAs, no  hemodynamically significant stenosis at    ICA origins by NASCET criteria. Stable fusiform aneurysmal dilatation of   the ICA origins/proximal segments.  Bilateral vertebral arteries are patent without flow limiting stenosis.    Patchy nonspecific groundglass and consolidations in the visualized upper   lobes.    --- End of Report ---      < end of copied text >    < from: MR Head No Cont (12.18.23 @ 12:52) >  IMPRESSION: No acute territorial infarcts are seen    < end of copied text >      EEG Classification / Summary:  Abnormal routine EEG in the awake state.  Mild diffuse slowing.  No epileptiform abnormalities.  Artifacts somewhat limit interpretation.    Clinical Impression:  Mild diffuse cerebral dysfunction is nonspecific in etiology.   Artifacts somewhat limit interpretation.

## 2024-01-01 NOTE — PROGRESS NOTE ADULT - ASSESSMENT
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 Of note pt recently developed URI symptoms and was started on Doxy as well as Hycodan, Alprazolam and Fioricet. Of note pt already on Percocet, flexeril for chronic pain, Gabapentin for seizures per  all which she took. Pt being admitted for Acute hypoxic respiratory failure due to Multifocal PNA, AMS r/o TIA/CVA  vs metabolic encephalopathy secondary to polypharmacy and/or hypoxia.      # Acute metabolic encephalopathy   # Acute respiratory failure with multifocal pneumonia.   Acute stroke is ruled out. EEG did not show any epileptiform activity.   Intermittent low grade temp, Seen by ID  s/p iv zosyn , completed 5 days   Added Azithromycin, + mycoplasma: course completed: for persistent symptoms now on doxy   Repeat CT with multifocal PNA  Mental status baseline patient AAO3  although RRT for acute worsening hypoxemia; r/o aspiration vs flash pulm edema  plan:  Cough meds, albuterol PRN  Albuterol PRN  steroids  3 percent  trial of lasix  repeat echo  ID follow up , pulm consulted     Coughing episode : acute blood loss anemia  hb 8 from 10  from steroids?  ordering protonix and carafate      # PE  - c/w Xarelto. no active gross bleeding.     # SLE stable.     # HTN/HLD. controlled with Verapemil, statin    # DM type 2. controlled.   - FS qAC and HS w/ SSI    #Weakness. PT eval>>[facility], pt unsure , will follow up     Stopped Elavil, no need for Elavil and Neurontin QHS.

## 2024-01-02 LAB
ANION GAP SERPL CALC-SCNC: 6 MMOL/L — SIGNIFICANT CHANGE UP (ref 5–17)
ANION GAP SERPL CALC-SCNC: 6 MMOL/L — SIGNIFICANT CHANGE UP (ref 5–17)
BASOPHILS # BLD AUTO: 0.02 K/UL — SIGNIFICANT CHANGE UP (ref 0–0.2)
BASOPHILS # BLD AUTO: 0.02 K/UL — SIGNIFICANT CHANGE UP (ref 0–0.2)
BASOPHILS NFR BLD AUTO: 0.2 % — SIGNIFICANT CHANGE UP (ref 0–2)
BASOPHILS NFR BLD AUTO: 0.2 % — SIGNIFICANT CHANGE UP (ref 0–2)
BUN SERPL-MCNC: 16 MG/DL — SIGNIFICANT CHANGE UP (ref 7–23)
BUN SERPL-MCNC: 16 MG/DL — SIGNIFICANT CHANGE UP (ref 7–23)
CALCIUM SERPL-MCNC: 9.2 MG/DL — SIGNIFICANT CHANGE UP (ref 8.5–10.1)
CALCIUM SERPL-MCNC: 9.2 MG/DL — SIGNIFICANT CHANGE UP (ref 8.5–10.1)
CHLORIDE SERPL-SCNC: 105 MMOL/L — SIGNIFICANT CHANGE UP (ref 96–108)
CHLORIDE SERPL-SCNC: 105 MMOL/L — SIGNIFICANT CHANGE UP (ref 96–108)
CO2 SERPL-SCNC: 30 MMOL/L — SIGNIFICANT CHANGE UP (ref 22–31)
CO2 SERPL-SCNC: 30 MMOL/L — SIGNIFICANT CHANGE UP (ref 22–31)
CREAT SERPL-MCNC: 0.67 MG/DL — SIGNIFICANT CHANGE UP (ref 0.5–1.3)
CREAT SERPL-MCNC: 0.67 MG/DL — SIGNIFICANT CHANGE UP (ref 0.5–1.3)
EGFR: 98 ML/MIN/1.73M2 — SIGNIFICANT CHANGE UP
EGFR: 98 ML/MIN/1.73M2 — SIGNIFICANT CHANGE UP
EOSINOPHIL # BLD AUTO: 0.25 K/UL — SIGNIFICANT CHANGE UP (ref 0–0.5)
EOSINOPHIL # BLD AUTO: 0.25 K/UL — SIGNIFICANT CHANGE UP (ref 0–0.5)
EOSINOPHIL NFR BLD AUTO: 2.5 % — SIGNIFICANT CHANGE UP (ref 0–6)
EOSINOPHIL NFR BLD AUTO: 2.5 % — SIGNIFICANT CHANGE UP (ref 0–6)
GLUCOSE BLDC GLUCOMTR-MCNC: 120 MG/DL — HIGH (ref 70–99)
GLUCOSE BLDC GLUCOMTR-MCNC: 120 MG/DL — HIGH (ref 70–99)
GLUCOSE BLDC GLUCOMTR-MCNC: 150 MG/DL — HIGH (ref 70–99)
GLUCOSE BLDC GLUCOMTR-MCNC: 150 MG/DL — HIGH (ref 70–99)
GLUCOSE BLDC GLUCOMTR-MCNC: 154 MG/DL — HIGH (ref 70–99)
GLUCOSE BLDC GLUCOMTR-MCNC: 154 MG/DL — HIGH (ref 70–99)
GLUCOSE BLDC GLUCOMTR-MCNC: 200 MG/DL — HIGH (ref 70–99)
GLUCOSE BLDC GLUCOMTR-MCNC: 200 MG/DL — HIGH (ref 70–99)
GLUCOSE SERPL-MCNC: 132 MG/DL — HIGH (ref 70–99)
GLUCOSE SERPL-MCNC: 132 MG/DL — HIGH (ref 70–99)
HCT VFR BLD CALC: 26 % — LOW (ref 34.5–45)
HCT VFR BLD CALC: 26 % — LOW (ref 34.5–45)
HGB BLD-MCNC: 8.1 G/DL — LOW (ref 11.5–15.5)
HGB BLD-MCNC: 8.1 G/DL — LOW (ref 11.5–15.5)
IMM GRANULOCYTES NFR BLD AUTO: 4.3 % — HIGH (ref 0–0.9)
IMM GRANULOCYTES NFR BLD AUTO: 4.3 % — HIGH (ref 0–0.9)
LYMPHOCYTES # BLD AUTO: 0.86 K/UL — LOW (ref 1–3.3)
LYMPHOCYTES # BLD AUTO: 0.86 K/UL — LOW (ref 1–3.3)
LYMPHOCYTES # BLD AUTO: 8.7 % — LOW (ref 13–44)
LYMPHOCYTES # BLD AUTO: 8.7 % — LOW (ref 13–44)
MCHC RBC-ENTMCNC: 26.1 PG — LOW (ref 27–34)
MCHC RBC-ENTMCNC: 26.1 PG — LOW (ref 27–34)
MCHC RBC-ENTMCNC: 31.2 G/DL — LOW (ref 32–36)
MCHC RBC-ENTMCNC: 31.2 G/DL — LOW (ref 32–36)
MCV RBC AUTO: 83.9 FL — SIGNIFICANT CHANGE UP (ref 80–100)
MCV RBC AUTO: 83.9 FL — SIGNIFICANT CHANGE UP (ref 80–100)
MONOCYTES # BLD AUTO: 0.7 K/UL — SIGNIFICANT CHANGE UP (ref 0–0.9)
MONOCYTES # BLD AUTO: 0.7 K/UL — SIGNIFICANT CHANGE UP (ref 0–0.9)
MONOCYTES NFR BLD AUTO: 7.1 % — SIGNIFICANT CHANGE UP (ref 2–14)
MONOCYTES NFR BLD AUTO: 7.1 % — SIGNIFICANT CHANGE UP (ref 2–14)
NEUTROPHILS # BLD AUTO: 7.63 K/UL — HIGH (ref 1.8–7.4)
NEUTROPHILS # BLD AUTO: 7.63 K/UL — HIGH (ref 1.8–7.4)
NEUTROPHILS NFR BLD AUTO: 77.2 % — HIGH (ref 43–77)
NEUTROPHILS NFR BLD AUTO: 77.2 % — HIGH (ref 43–77)
NRBC # BLD: 0 /100 WBCS — SIGNIFICANT CHANGE UP (ref 0–0)
NRBC # BLD: 0 /100 WBCS — SIGNIFICANT CHANGE UP (ref 0–0)
PLATELET # BLD AUTO: 468 K/UL — HIGH (ref 150–400)
PLATELET # BLD AUTO: 468 K/UL — HIGH (ref 150–400)
POTASSIUM SERPL-MCNC: 3.2 MMOL/L — LOW (ref 3.5–5.3)
POTASSIUM SERPL-MCNC: 3.2 MMOL/L — LOW (ref 3.5–5.3)
POTASSIUM SERPL-SCNC: 3.2 MMOL/L — LOW (ref 3.5–5.3)
POTASSIUM SERPL-SCNC: 3.2 MMOL/L — LOW (ref 3.5–5.3)
RBC # BLD: 3.1 M/UL — LOW (ref 3.8–5.2)
RBC # BLD: 3.1 M/UL — LOW (ref 3.8–5.2)
RBC # FLD: 18.1 % — HIGH (ref 10.3–14.5)
RBC # FLD: 18.1 % — HIGH (ref 10.3–14.5)
SODIUM SERPL-SCNC: 141 MMOL/L — SIGNIFICANT CHANGE UP (ref 135–145)
SODIUM SERPL-SCNC: 141 MMOL/L — SIGNIFICANT CHANGE UP (ref 135–145)
WBC # BLD: 9.88 K/UL — SIGNIFICANT CHANGE UP (ref 3.8–10.5)
WBC # BLD: 9.88 K/UL — SIGNIFICANT CHANGE UP (ref 3.8–10.5)
WBC # FLD AUTO: 9.88 K/UL — SIGNIFICANT CHANGE UP (ref 3.8–10.5)
WBC # FLD AUTO: 9.88 K/UL — SIGNIFICANT CHANGE UP (ref 3.8–10.5)

## 2024-01-02 PROCEDURE — 99232 SBSQ HOSP IP/OBS MODERATE 35: CPT

## 2024-01-02 PROCEDURE — 99233 SBSQ HOSP IP/OBS HIGH 50: CPT

## 2024-01-02 RX ORDER — POTASSIUM CHLORIDE 20 MEQ
40 PACKET (EA) ORAL EVERY 4 HOURS
Refills: 0 | Status: COMPLETED | OUTPATIENT
Start: 2024-01-02 | End: 2024-01-02

## 2024-01-02 RX ORDER — POTASSIUM CHLORIDE 20 MEQ
40 PACKET (EA) ORAL EVERY 4 HOURS
Refills: 0 | Status: DISCONTINUED | OUTPATIENT
Start: 2024-01-02 | End: 2024-01-02

## 2024-01-02 RX ADMIN — Medication 3 MILLILITER(S): at 23:40

## 2024-01-02 RX ADMIN — Medication 2 SPRAY(S): at 17:34

## 2024-01-02 RX ADMIN — Medication 1 GRAM(S): at 17:26

## 2024-01-02 RX ADMIN — Medication 40 MILLIGRAM(S): at 05:03

## 2024-01-02 RX ADMIN — Medication 2 SPRAY(S): at 23:56

## 2024-01-02 RX ADMIN — Medication 1 GRAM(S): at 12:27

## 2024-01-02 RX ADMIN — Medication 110 MILLIGRAM(S): at 06:19

## 2024-01-02 RX ADMIN — Medication 2 SPRAY(S): at 05:03

## 2024-01-02 RX ADMIN — SODIUM CHLORIDE 4 MILLILITER(S): 9 INJECTION INTRAMUSCULAR; INTRAVENOUS; SUBCUTANEOUS at 23:40

## 2024-01-02 RX ADMIN — CELECOXIB 200 MILLIGRAM(S): 200 CAPSULE ORAL at 13:45

## 2024-01-02 RX ADMIN — Medication 2 SPRAY(S): at 12:28

## 2024-01-02 RX ADMIN — Medication 40 MILLIEQUIVALENT(S): at 14:09

## 2024-01-02 RX ADMIN — Medication 110 MILLIGRAM(S): at 17:17

## 2024-01-02 RX ADMIN — Medication 1: at 08:06

## 2024-01-02 RX ADMIN — RIVAROXABAN 20 MILLIGRAM(S): KIT at 17:19

## 2024-01-02 RX ADMIN — CEFEPIME 100 MILLIGRAM(S): 1 INJECTION, POWDER, FOR SOLUTION INTRAMUSCULAR; INTRAVENOUS at 21:52

## 2024-01-02 RX ADMIN — Medication 3 MILLILITER(S): at 17:37

## 2024-01-02 RX ADMIN — Medication 40 MILLIEQUIVALENT(S): at 17:26

## 2024-01-02 RX ADMIN — Medication 100 MILLIGRAM(S): at 00:52

## 2024-01-02 RX ADMIN — DULOXETINE HYDROCHLORIDE 60 MILLIGRAM(S): 30 CAPSULE, DELAYED RELEASE ORAL at 12:27

## 2024-01-02 RX ADMIN — MONTELUKAST 10 MILLIGRAM(S): 4 TABLET, CHEWABLE ORAL at 12:27

## 2024-01-02 RX ADMIN — Medication 1 GRAM(S): at 23:56

## 2024-01-02 RX ADMIN — Medication 100 MILLIGRAM(S): at 14:08

## 2024-01-02 RX ADMIN — Medication 3 MILLILITER(S): at 05:19

## 2024-01-02 RX ADMIN — CELECOXIB 200 MILLIGRAM(S): 200 CAPSULE ORAL at 12:27

## 2024-01-02 RX ADMIN — Medication 1 GRAM(S): at 05:03

## 2024-01-02 RX ADMIN — SODIUM CHLORIDE 4 MILLILITER(S): 9 INJECTION INTRAMUSCULAR; INTRAVENOUS; SUBCUTANEOUS at 12:03

## 2024-01-02 RX ADMIN — SODIUM CHLORIDE 4 MILLILITER(S): 9 INJECTION INTRAMUSCULAR; INTRAVENOUS; SUBCUTANEOUS at 17:37

## 2024-01-02 RX ADMIN — ATORVASTATIN CALCIUM 80 MILLIGRAM(S): 80 TABLET, FILM COATED ORAL at 21:50

## 2024-01-02 RX ADMIN — CEFEPIME 100 MILLIGRAM(S): 1 INJECTION, POWDER, FOR SOLUTION INTRAMUSCULAR; INTRAVENOUS at 05:03

## 2024-01-02 RX ADMIN — Medication 100 MILLIGRAM(S): at 05:04

## 2024-01-02 RX ADMIN — Medication 100 MILLIGRAM(S): at 21:50

## 2024-01-02 RX ADMIN — Medication 81 MILLIGRAM(S): at 12:27

## 2024-01-02 RX ADMIN — Medication 240 MILLIGRAM(S): at 05:03

## 2024-01-02 RX ADMIN — CEFEPIME 100 MILLIGRAM(S): 1 INJECTION, POWDER, FOR SOLUTION INTRAMUSCULAR; INTRAVENOUS at 14:08

## 2024-01-02 RX ADMIN — Medication 1 GRAM(S): at 00:27

## 2024-01-02 RX ADMIN — Medication 40 MILLIEQUIVALENT(S): at 10:27

## 2024-01-02 RX ADMIN — SODIUM CHLORIDE 4 MILLILITER(S): 9 INJECTION INTRAMUSCULAR; INTRAVENOUS; SUBCUTANEOUS at 05:19

## 2024-01-02 RX ADMIN — Medication 2 SPRAY(S): at 00:27

## 2024-01-02 RX ADMIN — Medication 1: at 12:28

## 2024-01-02 RX ADMIN — Medication 3 MILLILITER(S): at 11:54

## 2024-01-02 RX ADMIN — GABAPENTIN 600 MILLIGRAM(S): 400 CAPSULE ORAL at 21:50

## 2024-01-02 NOTE — PROGRESS NOTE ADULT - ASSESSMENT
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 Found to have bilateral diffuse ground glass opacities consistent with multifocal PNA on chest imaging. Tested + for Mycoplasma. Hypoxia necessitating increased O2 requirements during hospital course. Now weaning down O2.     Dx: acute hypoxic respiratory failure, multifocal PNA due to mycoplasma pneumonia, acute asthma exacerbation in setting of bacterial PNA    - completed 7 day course of azithromycin for mycoplasma PNA  - had CT chest 12/25 showing multifocal PNA with diffuse bilateral GGO. primary team repeated CT chest non contrast 12/28 with again multifocal ground glass opacities consistent with PNA. would not anticipate drastic change in imaging of chest in such short duration in time  - sent for CTA chest yesterday 1/1/24 per primary team and CTA negative for PE, mild improvement in GGO, but with noted traction bronchiectasis  - findings may be due to underlying recent infection  - would repeat CT chest in 6 weeks to assess for lung parenchyma, chest imaging may take several weeks to clear  - she had CT chest done 7/2019 which did not have any findings of GGO/traction bronchiectasis. Findings on current imaging may reflect her infection/PNA    - seen by ID and placed on doxycycline to extend treatment for mycoplasma and started on cefepime for gram negative bacterial PNA / HCAP coverage, procalcitonin up trended since admission. day #7 of cefepime - would d/c after today's dose. day #6 of doxycycline- likely to cont doxy for another day to complete 7 day course. follow up with ID regarding antibx duration  - sputum cx with normal respiratory hossein  - had been on solumedrol 40mg q12 x4 days, no wheeze on exam with good air entry. steroids tapered to prednisone 40mg daily on 12/29. (today is day 5 of prednisone)  - likely to d/c steroids after tomorrow and pt would have completed 10 day course of steroids  - hypertonic saline nebs to help mobilize secretions   - cough may be secondary to reactive airway: cont with duonebs, already on systemic steroids. antitussives hycodan around the clock. tessalon perles added  - once off systemic steroids will place on ICS/LABA combo symbicort for underlying asthma/reactive airway  - goal to maintain O2 sat > 90%  - weaned off 2L NC today to RA  - monitor O2 sat on RA  - RVP x3 and all negative  - incentive spirometer  - cont xarelto for underlying hx of PE  - OOB to chair  -  at bedside, questions answered, update provided  - will need outpatient pulmonary follow up. She sees Dr. García in [place]   63F PMH obesity, HTN, DM type 2, SLE, hx of GBS s/p IVIG (required intubation), 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 Found to have bilateral diffuse ground glass opacities consistent with multifocal PNA on chest imaging. Tested + for Mycoplasma. Hypoxia necessitating increased O2 requirements during hospital course. Now weaning down O2.     Dx: acute hypoxic respiratory failure, multifocal PNA due to mycoplasma pneumonia, acute asthma exacerbation in setting of bacterial PNA    - completed 7 day course of azithromycin for mycoplasma PNA  - had CT chest 12/25 showing multifocal PNA with diffuse bilateral GGO. primary team repeated CT chest non contrast 12/28 with again multifocal ground glass opacities consistent with PNA. would not anticipate drastic change in imaging of chest in such short duration in time  - sent for CTA chest yesterday 1/1/24 per primary team and CTA negative for PE, mild improvement in GGO, but with noted traction bronchiectasis  - findings may be due to underlying recent infection  - would repeat CT chest in 6 weeks to assess for lung parenchyma, chest imaging may take several weeks to clear  - she had CT chest done 7/2019 which did not have any findings of GGO/traction bronchiectasis. Findings on current imaging may reflect her infection/PNA    - seen by ID and placed on doxycycline to extend treatment for mycoplasma and started on cefepime for gram negative bacterial PNA / HCAP coverage, procalcitonin up trended since admission. day #7 of cefepime - would d/c after today's dose. day #6 of doxycycline- likely to cont doxy for another day to complete 7 day course. follow up with ID regarding antibx duration  - sputum cx with normal respiratory hossein  - had been on solumedrol 40mg q12 x4 days, no wheeze on exam with good air entry. steroids tapered to prednisone 40mg daily on 12/29. (today is day 5 of prednisone)  - likely to d/c steroids after tomorrow and pt would have completed 10 day course of steroids  - hypertonic saline nebs to help mobilize secretions   - cough may be secondary to reactive airway: cont with duonebs, already on systemic steroids. antitussives hycodan around the clock. tessalon perles added  - once off systemic steroids will place on ICS/LABA combo symbicort for underlying asthma/reactive airway  - goal to maintain O2 sat > 90%  - weaned off 2L NC today to RA  - monitor O2 sat on RA  - RVP x3 and all negative  - incentive spirometer  - cont xarelto for underlying hx of PE  - OOB to chair  -  at bedside, questions answered, update provided  - will need outpatient pulmonary follow up. She sees Dr. García in [facility]

## 2024-01-02 NOTE — PROGRESS NOTE ADULT - ASSESSMENT
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 Being treated for URI outpatient. Hypoxic to 84% on RA,  CT head without acute pathology  CTA H/N - bilateral ICA calcifications with ectasia of proximal R ICA and L cerivcal ICA fusiform aneurysm 1.4cm, R V4 narrowing  CTP motion degraded  CXR showed Multifocal PNA  Routine eeg with diffuse slowing    Impression:  AMS likely TME - infectious, metabolic, possible polypharmacy. Low suspicion for neurovascular etiology, seizure  SLE  PE on xarelto    Plan:  - On Xarelto  - EEG as above  - MRI no acute findings  - would hold sedating meds to monitor mental status   - Some asterixis on exam likely 2/2 polypharmacy-consider lowering Gabapentin to 300 mg qhs

## 2024-01-02 NOTE — PROGRESS NOTE ADULT - SUBJECTIVE AND OBJECTIVE BOX
Neurology Progress Note    S: Patient seen and examined. No new events overnight.     MEDICATIONS:    acetaminophen     Tablet .. 650 milliGRAM(s) Oral every 6 hours PRN  albuterol    90 MICROgram(s) HFA Inhaler 1 Puff(s) Inhalation every 4 hours PRN  albuterol/ipratropium for Nebulization 3 milliLiter(s) Nebulizer every 6 hours  aspirin enteric coated 81 milliGRAM(s) Oral daily  atorvastatin 80 milliGRAM(s) Oral at bedtime  benzonatate 100 milliGRAM(s) Oral every 8 hours  cefepime   IVPB 1000 milliGRAM(s) IV Intermittent every 8 hours  celecoxib 200 milliGRAM(s) Oral daily  dextrose 5%. 1000 milliLiter(s) IV Continuous <Continuous>  dextrose 5%. 1000 milliLiter(s) IV Continuous <Continuous>  dextrose 50% Injectable 25 Gram(s) IV Push once  dextrose 50% Injectable 12.5 Gram(s) IV Push once  dextrose 50% Injectable 25 Gram(s) IV Push once  dextrose Oral Gel 15 Gram(s) Oral once PRN  doxycycline IVPB      doxycycline IVPB 100 milliGRAM(s) IV Intermittent every 12 hours  DULoxetine 60 milliGRAM(s) Oral daily  gabapentin 600 milliGRAM(s) Oral at bedtime  glucagon  Injectable 1 milliGRAM(s) IntraMuscular once  guaifenesin/dextromethorphan Oral Liquid 10 milliLiter(s) Oral every 6 hours PRN  hydrocodone/homatropine Syrup 5 milliLiter(s) Oral every 6 hours  influenza   Vaccine 0.5 milliLiter(s) IntraMuscular once  insulin lispro (ADMELOG) corrective regimen sliding scale   SubCutaneous three times a day before meals  insulin lispro (ADMELOG) corrective regimen sliding scale   SubCutaneous at bedtime  melatonin 3 milliGRAM(s) Oral at bedtime PRN  montelukast 10 milliGRAM(s) Oral daily  predniSONE   Tablet 40 milliGRAM(s) Oral daily  rivaroxaban 20 milliGRAM(s) Oral with dinner  sodium chloride 0.65% Nasal 2 Spray(s) Both Nostrils every 6 hours  sodium chloride 3%  Inhalation 4 milliLiter(s) Inhalation every 6 hours  sucralfate 1 Gram(s) Oral every 6 hours  verapamil  milliGRAM(s) Oral daily      LABS:                          8.1    9.88  )-----------( 468      ( 02 Jan 2024 07:45 )             26.0     01-02    141  |  105  |  16  ----------------------------<  132<H>  3.2<L>   |  30  |  0.67    Ca    9.2      02 Jan 2024 07:45      CAPILLARY BLOOD GLUCOSE      POCT Blood Glucose.: 154 mg/dL (02 Jan 2024 07:51)  POCT Blood Glucose.: 159 mg/dL (01 Jan 2024 21:07)  POCT Blood Glucose.: 152 mg/dL (01 Jan 2024 16:23)  POCT Blood Glucose.: 228 mg/dL (01 Jan 2024 11:42)      Urinalysis Basic - ( 02 Jan 2024 07:45 )    Color: x / Appearance: x / SG: x / pH: x  Gluc: 132 mg/dL / Ketone: x  / Bili: x / Urobili: x   Blood: x / Protein: x / Nitrite: x   Leuk Esterase: x / RBC: x / WBC x   Sq Epi: x / Non Sq Epi: x / Bacteria: x      I&O's Summary    01 Jan 2024 07:01  -  02 Jan 2024 07:00  --------------------------------------------------------  IN: 500 mL / OUT: 0 mL / NET: 500 mL    02 Jan 2024 07:01  -  02 Jan 2024 09:56  --------------------------------------------------------  IN: 300 mL / OUT: 0 mL / NET: 300 mL      Vital Signs Last 24 Hrs  T(C): 36.7 (02 Jan 2024 05:16), Max: 37.1 (01 Jan 2024 23:54)  T(F): 98 (02 Jan 2024 05:16), Max: 98.7 (01 Jan 2024 23:54)  HR: 104 (02 Jan 2024 06:10) (77 - 108)  BP: 164/95 (02 Jan 2024 05:16) (112/74 - 164/95)  BP(mean): --  RR: 18 (02 Jan 2024 05:16) (18 - 20)  SpO2: 95% (02 Jan 2024 06:10) (93% - 99%)    Parameters below as of 02 Jan 2024 06:10  Patient On (Oxygen Delivery Method): nasal cannula          General Exam:   General Appearance: Appropriately dressed and in no acute distress       Head: Normocephalic, atraumatic and no dysmorphic features  Ear, Nose, and Throat: Moist mucous membranes  CVS: S1S2+  Resp: No SOB, no wheeze or rhonchi  Abd: soft NTND  Extremities: No edema, no cyanosis  Skin: No bruises, no rashes     Neurological Exam:  Mental Status: Awake, alert and oriented x 3.  Able to follow simple verbal commands. Able to name and repeat. Speech is fluent but slow  Cranial Nerves: PERRL, EOMI, VFFC, sensation V1-V3 intact,  no obvious facial asymmetry, equal elevation of palate, scm/trap 5/5, tongue is midline on protrusion.. hearing is grossly intact.   Motor: Normal bulk, tone. JIANG, LLE slightly weaker but poor efffort  Sensation: Intact to light touch and pinprick throughout. no right/left confusion. no extinction to tactile on DSS.   Reflexes: 1+ throughout at biceps, brachioradialis, triceps, patellars and ankles bilaterally and equal. No clonus. R toe and L toe were both downgoing.  Coordination: No dysmetria on FNF   Gait: deferred      I personally reviewed the below data/images/labs:      LABS:    < from: CT Brain Stroke Protocol (12.17.23 @ 04:51) >    ACC: [id number] EXAM:  CT BRAIN STROKE PROTOCOL   ORDERED BY: MINA CONROY     PROCEDURE DATE:  12/17/2023          INTERPRETATION:  CLINICAL INFORMATION:  Stroke Code    TECHNIQUE:  Axial CT images were acquired through the head.  Intravenouscontrast: None  Two-dimensional reformats were generated.    COMPARISON STUDY: MRI brain 11/17/2023, CTA head and neck 11/17/2023.    FINDINGS:    There is no CT evidence of acute intracranial hemorrhage, mass effect,   midline shift, or acute, largeterritorial infarct.  The ventricles and   sulci are age-appropriate in size and configuration. The basal cisterns   are patent.    The mastoid air cells and middle ear cavities are grossly clear. The   visualized paranasal sinuses are well aerated.    The calvarium and skull base are grossly intact.    IMPRESSION:  No acute intracranial hemorrhage or mass effect.    Results were discussed with Dr. Conroy by Dr. Gastelum at 4:50 AM on   12/17/2023. with read back followed.    < end of copied text >        < from: CT Angio Neck Stroke Protocol w/ IV Cont (12.17.23 @ 05:12) >    ACC: [id number] EXAM:  CT ANGIO NECK STROKE PROTCL IC   ORDERED BY: MINA CONROY     ACC: [id number] EXAM:  CT BRAIN PERFUSION MAPS STROKE   ORDERED BY: MINA CONROY     ACC: [id number] EXAM:  CT ANGIO BRAIN STROKE PROTC IC   ORDERED BY: MINA CONROY     PROCEDURE DATE:  12/17/2023          INTERPRETATION:  CT PERFUSION, CTA OF THE [place] [facility] [place] AND NECK:    INDICATIONS: Stroke code.    TECHNIQUE:    RAPID artificial intelligence was used for perfusion analysis and for   preliminary evaluation of intracranial hemorrhage.    CTA [place] [facility] [place]:    After the intravenous power injection of non-ionic contrast material,   serial thin sections were obtained through the intracranial circulation   on a multislice CT scanner.  Images were reformatted using a dedicated 3D   software package and viewed on a dedicated workstation in multiple planes.    CTA NECK:    After the intravenous power injection of non-ionic contrast material,   serial thin sections were obtained through the cervical circulation on a   multislice CT scanner.  Images were reformatted using a dedicated 3D   software package and viewed on a dedicated workstation in multiple planes.      COMPARISON EXAMINATION: Noncontrast head CT performed the same day. CTA   head and neck 11/17/2023    FINDINGS:      CT RAPID PERFUSION:  Markedly degraded by patient motion.    INFARCT CORE: 0 cc    TISSUE AT RISK: 8 cc combined in the left frontal and right temporal lobes    MISMATCH RATIO: N/A      CTA [place] [facility] [place]:    ANTERIOR CIRCULATION    ICA  CAVERNOUS, SUPRACLINOID, BIFURCATION SEGMENTS: Patent without flow   limiting stenosis. Atherosclerotic calcifications of the bilateral   cavernous ICA segments.    ANTERIOR CEREBRAL ARTERIES: Bilateral A1, anterior communicating and A2   anterior cerebral arteries are unremarkable in course and caliber without   flow limiting stenosis. Hypoplastic right A1 segment.    MIDDLE CEREBRAL ARTERIES: Patent bilateral M1, M2, and distal MCA   branches without flow limiting stenosis.    POSTERIOR CIRCULATION:    VERTEBRAL ARTERIES: Patent without flow limiting stenosis. Mild focal   narrowing of the right V4 segment.    BASILAR ARTERY: Patent no flow limiting stenosis.    POSTERIOR CEREBRAL ARTERIES: Patent without flow limiting stenosis.    CTA NECK:    GREAT VESSELS: Visualized segments are patent, no flow limiting stenosis.   Bovine aortic arch, normal variant.    COMMON CAROTID ARTERIES:  RIGHT: Patent without flow limiting stenosis  LEFT: Patent without flow limiting stenosis    CAROTID BULBS:  RIGHT: Patent without flow limiting stenosis  LEFT: Patent without flow limiting stenosis    INTERNAL CAROTID ARTERIES:  RIGHT: Patent no evidence for any hemodynamically significant stenosis at   the ICA origin by NASCET criteria. Stable mild ectasia of the proximal   cervical ICA measuring 0.9 cm in diameter. Partial retropharyngeal course.  LEFT: Patent no evidence for any hemodynamically significant stenosis at   the ICA origin by NASCET criteria. Stable fusiform aneurysmal dilatation   of the proximal cervical ICA measuring 1.4 cm in diameter and gradually   tapering in the mid segment. Partial retropharyngeal course.    VERTEBRAL ARTERIES:    RIGHT: Patent no evidence for any flow limiting stenosis.  LEFT: Patent no evidence for any flow limiting stenosis. Left dominant   vertebral system.      SOFT TISSUES: Patchy nonspecific groundglass opacities and consolidations   in the visualized upper lobes.    BONES: Multilevel degenerative changes inthe spine.    IMPRESSION:    CT PERFUSION: Markedly degraded by patient motion. No core infarct. Small   areas of abnormal perfusion in the left frontal and right temporal lobes   not confined to a vascular territory, likely artifactual.    If symptoms persist consider follow up head CT or MRI, MRA  if no   contraindication.    CTA COW:  Patent intracranial circulation without flow limiting stenosis.    CTA NECK: Patent, ECAs, ICAs, no  hemodynamically significant stenosis at    ICA origins by NASCET criteria. Stable fusiform aneurysmal dilatation of   the ICA origins/proximal segments.  Bilateral vertebral arteries are patent without flow limiting stenosis.    Patchy nonspecific groundglass and consolidations in the visualized upper   lobes.    --- End of Report ---      < end of copied text >    < from: MR Head No Cont (12.18.23 @ 12:52) >  IMPRESSION: No acute territorial infarcts are seen    < end of copied text >      EEG Classification / Summary:  Abnormal routine EEG in the awake state.  Mild diffuse slowing.  No epileptiform abnormalities.  Artifacts somewhat limit interpretation.    Clinical Impression:  Mild diffuse cerebral dysfunction is nonspecific in etiology.   Artifacts somewhat limit interpretation.   Neurology Progress Note    S: Patient seen and examined. No new events overnight.     MEDICATIONS:    acetaminophen     Tablet .. 650 milliGRAM(s) Oral every 6 hours PRN  albuterol    90 MICROgram(s) HFA Inhaler 1 Puff(s) Inhalation every 4 hours PRN  albuterol/ipratropium for Nebulization 3 milliLiter(s) Nebulizer every 6 hours  aspirin enteric coated 81 milliGRAM(s) Oral daily  atorvastatin 80 milliGRAM(s) Oral at bedtime  benzonatate 100 milliGRAM(s) Oral every 8 hours  cefepime   IVPB 1000 milliGRAM(s) IV Intermittent every 8 hours  celecoxib 200 milliGRAM(s) Oral daily  dextrose 5%. 1000 milliLiter(s) IV Continuous <Continuous>  dextrose 5%. 1000 milliLiter(s) IV Continuous <Continuous>  dextrose 50% Injectable 25 Gram(s) IV Push once  dextrose 50% Injectable 12.5 Gram(s) IV Push once  dextrose 50% Injectable 25 Gram(s) IV Push once  dextrose Oral Gel 15 Gram(s) Oral once PRN  doxycycline IVPB      doxycycline IVPB 100 milliGRAM(s) IV Intermittent every 12 hours  DULoxetine 60 milliGRAM(s) Oral daily  gabapentin 600 milliGRAM(s) Oral at bedtime  glucagon  Injectable 1 milliGRAM(s) IntraMuscular once  guaifenesin/dextromethorphan Oral Liquid 10 milliLiter(s) Oral every 6 hours PRN  hydrocodone/homatropine Syrup 5 milliLiter(s) Oral every 6 hours  influenza   Vaccine 0.5 milliLiter(s) IntraMuscular once  insulin lispro (ADMELOG) corrective regimen sliding scale   SubCutaneous three times a day before meals  insulin lispro (ADMELOG) corrective regimen sliding scale   SubCutaneous at bedtime  melatonin 3 milliGRAM(s) Oral at bedtime PRN  montelukast 10 milliGRAM(s) Oral daily  predniSONE   Tablet 40 milliGRAM(s) Oral daily  rivaroxaban 20 milliGRAM(s) Oral with dinner  sodium chloride 0.65% Nasal 2 Spray(s) Both Nostrils every 6 hours  sodium chloride 3%  Inhalation 4 milliLiter(s) Inhalation every 6 hours  sucralfate 1 Gram(s) Oral every 6 hours  verapamil  milliGRAM(s) Oral daily      LABS:                          8.1    9.88  )-----------( 468      ( 02 Jan 2024 07:45 )             26.0     01-02    141  |  105  |  16  ----------------------------<  132<H>  3.2<L>   |  30  |  0.67    Ca    9.2      02 Jan 2024 07:45      CAPILLARY BLOOD GLUCOSE      POCT Blood Glucose.: 154 mg/dL (02 Jan 2024 07:51)  POCT Blood Glucose.: 159 mg/dL (01 Jan 2024 21:07)  POCT Blood Glucose.: 152 mg/dL (01 Jan 2024 16:23)  POCT Blood Glucose.: 228 mg/dL (01 Jan 2024 11:42)      Urinalysis Basic - ( 02 Jan 2024 07:45 )    Color: x / Appearance: x / SG: x / pH: x  Gluc: 132 mg/dL / Ketone: x  / Bili: x / Urobili: x   Blood: x / Protein: x / Nitrite: x   Leuk Esterase: x / RBC: x / WBC x   Sq Epi: x / Non Sq Epi: x / Bacteria: x      I&O's Summary    01 Jan 2024 07:01  -  02 Jan 2024 07:00  --------------------------------------------------------  IN: 500 mL / OUT: 0 mL / NET: 500 mL    02 Jan 2024 07:01  -  02 Jan 2024 09:56  --------------------------------------------------------  IN: 300 mL / OUT: 0 mL / NET: 300 mL      Vital Signs Last 24 Hrs  T(C): 36.7 (02 Jan 2024 05:16), Max: 37.1 (01 Jan 2024 23:54)  T(F): 98 (02 Jan 2024 05:16), Max: 98.7 (01 Jan 2024 23:54)  HR: 104 (02 Jan 2024 06:10) (77 - 108)  BP: 164/95 (02 Jan 2024 05:16) (112/74 - 164/95)  BP(mean): --  RR: 18 (02 Jan 2024 05:16) (18 - 20)  SpO2: 95% (02 Jan 2024 06:10) (93% - 99%)    Parameters below as of 02 Jan 2024 06:10  Patient On (Oxygen Delivery Method): nasal cannula          General Exam:   General Appearance: Appropriately dressed and in no acute distress       Head: Normocephalic, atraumatic and no dysmorphic features  Ear, Nose, and Throat: Moist mucous membranes  CVS: S1S2+  Resp: No SOB, no wheeze or rhonchi  Abd: soft NTND  Extremities: No edema, no cyanosis  Skin: No bruises, no rashes     Neurological Exam:  Mental Status: Awake, alert and oriented x 3.  Able to follow simple verbal commands. Able to name and repeat. Speech is fluent but slow  Cranial Nerves: PERRL, EOMI, VFFC, sensation V1-V3 intact,  no obvious facial asymmetry, equal elevation of palate, scm/trap 5/5, tongue is midline on protrusion.. hearing is grossly intact.   Motor: Normal bulk, tone. JIANG, LLE slightly weaker but poor efffort  Sensation: Intact to light touch and pinprick throughout. no right/left confusion. no extinction to tactile on DSS.   Reflexes: 1+ throughout at biceps, brachioradialis, triceps, patellars and ankles bilaterally and equal. No clonus. R toe and L toe were both downgoing.  Coordination: No dysmetria on FNF   Gait: deferred      I personally reviewed the below data/images/labs:      LABS:    < from: CT Brain Stroke Protocol (12.17.23 @ 04:51) >    ACC: [id number] EXAM:  CT BRAIN STROKE PROTOCOL   ORDERED BY: MINA CONROY     PROCEDURE DATE:  12/17/2023          INTERPRETATION:  CLINICAL INFORMATION:  Stroke Code    TECHNIQUE:  Axial CT images were acquired through the head.  Intravenouscontrast: None  Two-dimensional reformats were generated.    COMPARISON STUDY: MRI brain 11/17/2023, CTA head and neck 11/17/2023.    FINDINGS:    There is no CT evidence of acute intracranial hemorrhage, mass effect,   midline shift, or acute, largeterritorial infarct.  The ventricles and   sulci are age-appropriate in size and configuration. The basal cisterns   are patent.    The mastoid air cells and middle ear cavities are grossly clear. The   visualized paranasal sinuses are well aerated.    The calvarium and skull base are grossly intact.    IMPRESSION:  No acute intracranial hemorrhage or mass effect.    Results were discussed with Dr. Conroy by Dr. Gastelum at 4:50 AM on   12/17/2023. with read back followed.    < end of copied text >        < from: CT Angio Neck Stroke Protocol w/ IV Cont (12.17.23 @ 05:12) >    ACC: [id number] EXAM:  CT ANGIO NECK STROKE PROTCL IC   ORDERED BY: MINA CONROY     ACC: [id number] EXAM:  CT BRAIN PERFUSION MAPS STROKE   ORDERED BY: MINA CONROY     ACC: [id number] EXAM:  CT ANGIO BRAIN STROKE PROTC IC   ORDERED BY: MINA CONROY     PROCEDURE DATE:  12/17/2023          INTERPRETATION:  CT PERFUSION, CTA OF THE [place] OF [place] AND NECK:    INDICATIONS: Stroke code.    TECHNIQUE:    RAPID artificial intelligence was used for perfusion analysis and for   preliminary evaluation of intracranial hemorrhage.    CTA [place] [facility] [place]:    After the intravenous power injection of non-ionic contrast material,   serial thin sections were obtained through the intracranial circulation   on a multislice CT scanner.  Images were reformatted using a dedicated 3D   software package and viewed on a dedicated workstation in multiple planes.    CTA NECK:    After the intravenous power injection of non-ionic contrast material,   serial thin sections were obtained through the cervical circulation on a   multislice CT scanner.  Images were reformatted using a dedicated 3D   software package and viewed on a dedicated workstation in multiple planes.      COMPARISON EXAMINATION: Noncontrast head CT performed the same day. CTA   head and neck 11/17/2023    FINDINGS:      CT RAPID PERFUSION:  Markedly degraded by patient motion.    INFARCT CORE: 0 cc    TISSUE AT RISK: 8 cc combined in the left frontal and right temporal lobes    MISMATCH RATIO: N/A      CTA [place] [facility] [place]:    ANTERIOR CIRCULATION    ICA  CAVERNOUS, SUPRACLINOID, BIFURCATION SEGMENTS: Patent without flow   limiting stenosis. Atherosclerotic calcifications of the bilateral   cavernous ICA segments.    ANTERIOR CEREBRAL ARTERIES: Bilateral A1, anterior communicating and A2   anterior cerebral arteries are unremarkable in course and caliber without   flow limiting stenosis. Hypoplastic right A1 segment.    MIDDLE CEREBRAL ARTERIES: Patent bilateral M1, M2, and distal MCA   branches without flow limiting stenosis.    POSTERIOR CIRCULATION:    VERTEBRAL ARTERIES: Patent without flow limiting stenosis. Mild focal   narrowing of the right V4 segment.    BASILAR ARTERY: Patent no flow limiting stenosis.    POSTERIOR CEREBRAL ARTERIES: Patent without flow limiting stenosis.    CTA NECK:    GREAT VESSELS: Visualized segments are patent, no flow limiting stenosis.   Bovine aortic arch, normal variant.    COMMON CAROTID ARTERIES:  RIGHT: Patent without flow limiting stenosis  LEFT: Patent without flow limiting stenosis    CAROTID BULBS:  RIGHT: Patent without flow limiting stenosis  LEFT: Patent without flow limiting stenosis    INTERNAL CAROTID ARTERIES:  RIGHT: Patent no evidence for any hemodynamically significant stenosis at   the ICA origin by NASCET criteria. Stable mild ectasia of the proximal   cervical ICA measuring 0.9 cm in diameter. Partial retropharyngeal course.  LEFT: Patent no evidence for any hemodynamically significant stenosis at   the ICA origin by NASCET criteria. Stable fusiform aneurysmal dilatation   of the proximal cervical ICA measuring 1.4 cm in diameter and gradually   tapering in the mid segment. Partial retropharyngeal course.    VERTEBRAL ARTERIES:    RIGHT: Patent no evidence for any flow limiting stenosis.  LEFT: Patent no evidence for any flow limiting stenosis. Left dominant   vertebral system.      SOFT TISSUES: Patchy nonspecific groundglass opacities and consolidations   in the visualized upper lobes.    BONES: Multilevel degenerative changes inthe spine.    IMPRESSION:    CT PERFUSION: Markedly degraded by patient motion. No core infarct. Small   areas of abnormal perfusion in the left frontal and right temporal lobes   not confined to a vascular territory, likely artifactual.    If symptoms persist consider follow up head CT or MRI, MRA  if no   contraindication.    CTA COW:  Patent intracranial circulation without flow limiting stenosis.    CTA NECK: Patent, ECAs, ICAs, no  hemodynamically significant stenosis at    ICA origins by NASCET criteria. Stable fusiform aneurysmal dilatation of   the ICA origins/proximal segments.  Bilateral vertebral arteries are patent without flow limiting stenosis.    Patchy nonspecific groundglass and consolidations in the visualized upper   lobes.    --- End of Report ---      < end of copied text >    < from: MR Head No Cont (12.18.23 @ 12:52) >  IMPRESSION: No acute territorial infarcts are seen    < end of copied text >      EEG Classification / Summary:  Abnormal routine EEG in the awake state.  Mild diffuse slowing.  No epileptiform abnormalities.  Artifacts somewhat limit interpretation.    Clinical Impression:  Mild diffuse cerebral dysfunction is nonspecific in etiology.   Artifacts somewhat limit interpretation.

## 2024-01-02 NOTE — PROGRESS NOTE ADULT - SUBJECTIVE AND OBJECTIVE BOX
[facility]  INFECTIOUS DISEASES   [address] [place] [address]  Tel: [phone]     Fax: [phone]  ==============================================================================  MD Akbar Monteiro, DO Jazmin Forbes, NP   ==============================================================================      ROSSI ROJAS  MRN-[id number]  63y ([birth year])      Interval History:    ROS:    [ ] Unobtainable because:  [ ] All other systems negative except as noted    Constitutional: no fever, no chills  Head: no trauma  Eyes: no vision changes, no eye pain  ENT:  no sore throat, no rhinorrhea  Cardiovascular:  no chest pain, no palpitation  Respiratory:  no SOB, no cough  GI:  no abd pain, no vomiting, no diarrhea  urinary: no dysuria, no hematuria, no flank pain  musculoskeletal:  no joint pain, no joint swelling  skin:  no rash  neurology:  no headache, no seizure, no change in mental status  psych: no anxiety, no depression         Allergies  No Known Allergies        ANTIMICROBIALS:  cefepime   IVPB 1000 every 8 hours  doxycycline IVPB    doxycycline IVPB 100 every 12 hours        Physical Exam:  Vital Signs Last 24 Hrs  T(C): 36.7 (02 Jan 2024 05:16), Max: 37.1 (01 Jan 2024 23:54)  T(F): 98 (02 Jan 2024 05:16), Max: 98.7 (01 Jan 2024 23:54)  HR: 104 (02 Jan 2024 06:10) (77 - 108)  BP: 164/95 (02 Jan 2024 05:16) (112/74 - 164/95)  BP(mean): --  RR: 18 (02 Jan 2024 05:16) (18 - 20)  SpO2: 95% (02 Jan 2024 06:10) (93% - 99%)    Parameters below as of 02 Jan 2024 06:10  Patient On (Oxygen Delivery Method): nasal cannula        01-01-24 @ 07:01  -  01-02-24 @ 07:00  --------------------------------------------------------  IN: 500 mL / OUT: 0 mL / NET: 500 mL    01-02-24 @ 07:01  -  01-02-24 @ 10:23  --------------------------------------------------------  IN: 300 mL / OUT: 0 mL / NET: 300 mL      General:    NAD,  non toxic  Head: atraumatic, normocephalic  Eye: normal sclera and conjunctiva  ENT:    no oral lesions, neck supple  Cardio:     regular S1, S2,  no murmur  Respiratory:    clear b/l,    no wheezing  abd:     soft,   BS +,   no tenderness  :   no CVAT,  no suprapubic tenderness,   no  penny  Musculoskeletal:   no joint swelling,   no edema  vascular: no central lines, +PIV   Skin:    no rash  Neurologic:     no focal deficit  psych: normal affect    WBC Count: 9.88 K/uL (01-02 @ 07:45)  WBC Count: 12.58 K/uL (12-31 @ 05:45)  WBC Count: 14.66 K/uL (12-29 @ 06:40)  WBC Count: 16.47 K/uL (12-28 @ 12:17)  WBC Count: 15.66 K/uL (12-27 @ 05:42)                            8.1    9.88  )-----------( 468      ( 02 Jan 2024 07:45 )             26.0       01-02    141  |  105  |  16  ----------------------------<  132<H>  3.2<L>   |  30  |  0.67    Ca    9.2      02 Jan 2024 07:45        Urinalysis Basic - ( 02 Jan 2024 07:45 )    Color: x / Appearance: x / SG: x / pH: x  Gluc: 132 mg/dL / Ketone: x  / Bili: x / Urobili: x   Blood: x / Protein: x / Nitrite: x   Leuk Esterase: x / RBC: x / WBC x   Sq Epi: x / Non Sq Epi: x / Bacteria: x          Creatinine Trend: 0.67<--, 0.64<--, 0.75<--, 0.84<--, 0.74<--, 0.57<--      MICROBIOLOGY:  v  .Sputum Sputum  12-27-23   Normal Respiratory Valarie present  --    Few Squamous epithelial cells per low power field  Few polymorphonuclear leukocytes per low power field  Moderate Yeast per oil power field  Few Gram positive cocci in pairs per oil power field      .Blood Blood-Peripheral  12-22-23   No growth at 5 days  --  --      .Blood Blood-Peripheral  12-22-23   No growth at 5 days  --  --                  C-Reactive Protein, Serum: 159 (12-25)                  RADIOLOGY:   [facility]  INFECTIOUS DISEASES   [address] [place] [address]  Tel: [phone]     Fax: [phone]  ==============================================================================  MD Akbar Monteiro, DO Jazmin Forbes, NP   ==============================================================================      ROSSI ROJAS  MRN-[id number]  63y ([birth year])      Interval History:    ROS:    [ ] Unobtainable because:  [ ] All other systems negative except as noted    Constitutional: no fever, no chills  Head: no trauma  Eyes: no vision changes, no eye pain  ENT:  no sore throat, no rhinorrhea  Cardiovascular:  no chest pain, no palpitation  Respiratory:  no SOB, no cough  GI:  no abd pain, no vomiting, no diarrhea  urinary: no dysuria, no hematuria, no flank pain  musculoskeletal:  no joint pain, no joint swelling  skin:  no rash  neurology:  no headache, no seizure, no change in mental status  psych: no anxiety, no depression         Allergies  No Known Allergies        ANTIMICROBIALS:  cefepime   IVPB 1000 every 8 hours  doxycycline IVPB    doxycycline IVPB 100 every 12 hours        Physical Exam:  Vital Signs Last 24 Hrs  T(C): 36.7 (02 Jan 2024 05:16), Max: 37.1 (01 Jan 2024 23:54)  T(F): 98 (02 Jan 2024 05:16), Max: 98.7 (01 Jan 2024 23:54)  HR: 104 (02 Jan 2024 06:10) (77 - 108)  BP: 164/95 (02 Jan 2024 05:16) (112/74 - 164/95)  BP(mean): --  RR: 18 (02 Jan 2024 05:16) (18 - 20)  SpO2: 95% (02 Jan 2024 06:10) (93% - 99%)    Parameters below as of 02 Jan 2024 06:10  Patient On (Oxygen Delivery Method): nasal cannula        01-01-24 @ 07:01  -  01-02-24 @ 07:00  --------------------------------------------------------  IN: 500 mL / OUT: 0 mL / NET: 500 mL    01-02-24 @ 07:01  -  01-02-24 @ 10:23  --------------------------------------------------------  IN: 300 mL / OUT: 0 mL / NET: 300 mL      General:    NAD,  non toxic  Head: atraumatic, normocephalic  Eye: normal sclera and conjunctiva  ENT:    no oral lesions, neck supple  Cardio:     regular S1, S2,  no murmur  Respiratory:    clear b/l,    no wheezing  abd:     soft,   BS +,   no tenderness  :   no CVAT,  no suprapubic tenderness,   no  penny  Musculoskeletal:   no joint swelling,   no edema  vascular: no central lines, +PIV   Skin:    no rash  Neurologic:     no focal deficit  psych: normal affect    WBC Count: 9.88 K/uL (01-02 @ 07:45)  WBC Count: 12.58 K/uL (12-31 @ 05:45)  WBC Count: 14.66 K/uL (12-29 @ 06:40)  WBC Count: 16.47 K/uL (12-28 @ 12:17)  WBC Count: 15.66 K/uL (12-27 @ 05:42)                            8.1    9.88  )-----------( 468      ( 02 Jan 2024 07:45 )             26.0       01-02    141  |  105  |  16  ----------------------------<  132<H>  3.2<L>   |  30  |  0.67    Ca    9.2      02 Jan 2024 07:45        Urinalysis Basic - ( 02 Jan 2024 07:45 )    Color: x / Appearance: x / SG: x / pH: x  Gluc: 132 mg/dL / Ketone: x  / Bili: x / Urobili: x   Blood: x / Protein: x / Nitrite: x   Leuk Esterase: x / RBC: x / WBC x   Sq Epi: x / Non Sq Epi: x / Bacteria: x          Creatinine Trend: 0.67<--, 0.64<--, 0.75<--, 0.84<--, 0.74<--, 0.57<--      MICROBIOLOGY:  v  .Sputum Sputum  12-27-23   Normal Respiratory Valarie present  --    Few Squamous epithelial cells per low power field  Few polymorphonuclear leukocytes per low power field  Moderate Yeast per oil power field  Few Gram positive cocci in pairs per oil power field      .Blood Blood-Peripheral  12-22-23   No growth at 5 days  --  --      .Blood Blood-Peripheral  12-22-23   No growth at 5 days  --  --                  C-Reactive Protein, Serum: 159 (12-25)                  RADIOLOGY:   [facility]  INFECTIOUS DISEASES   [address] [place] [address]  Tel: [phone]     Fax: [phone]  ==============================================================================  MD Akbar Monteiro, DO Jazmin Forbes, NP   ==============================================================================      ROSSI ROJAS  MRN-[id number]  63y ([birth year])      Interval History:    Patient seen and examined today.  she is less sob today .  less cough as well.  afebrile since last week.    on 2L oxygen via [place].    denies any nausea or any diarrhea, denies any chest pain, denies any abd. pain.      ROS:      Constitutional: no fever, no chills  Head: no trauma  Eyes: no vision changes, no eye pain  ENT:  no sore throat, no rhinorrhea  Cardiovascular:  no chest pain, no palpitation  Respiratory:  no SOB, still has cough but much less than before  GI:  no abd pain, no vomiting, no diarrhea  urinary: no dysuria, no hematuria, no flank pain  musculoskeletal:  no joint pain, no joint swelling  skin:  no rash  neurology:  no headache,  no change in mental status  psych: no anxiety, no depression         Allergies  No Known Allergies        ANTIMICROBIALS:  cefepime   IVPB 1000 every 8 hours  doxycycline IVPB    doxycycline IVPB 100 every 12 hours        Physical Exam:  Vital Signs Last 24 Hrs  T(C): 36.7 (02 Jan 2024 05:16), Max: 37.1 (01 Jan 2024 23:54)  T(F): 98 (02 Jan 2024 05:16), Max: 98.7 (01 Jan 2024 23:54)  HR: 104 (02 Jan 2024 06:10) (77 - 108)  BP: 164/95 (02 Jan 2024 05:16) (112/74 - 164/95)  BP(mean): --  RR: 18 (02 Jan 2024 05:16) (18 - 20)  SpO2: 95% (02 Jan 2024 06:10) (93% - 99%)    Parameters below as of 02 Jan 2024 06:10  Patient On (Oxygen Delivery Method): nasal cannula        01-01-24 @ 07:01  -  01-02-24 @ 07:00  --------------------------------------------------------  IN: 500 mL / OUT: 0 mL / NET: 500 mL    01-02-24 @ 07:01  -  01-02-24 @ 10:23  --------------------------------------------------------  IN: 300 mL / OUT: 0 mL / NET: 300 mL      General:    NAD but gets sob  with any movement  Head: atraumatic, normocephalic  Eye: normal sclera and conjunctiva  ENT:    no oral lesions, neck supple  Cardio:     regular S1, S2,  no murmur  Respiratory:    no wheezing, decreased breath sounds at right base  region  no crackles  abd:     soft,   BS +,   no tenderness, ND  :   no CVAT,  no suprapubic tenderness  Musculoskeletal:   no joint swelling,   no edema  vascular: no central lines, +PIV   Skin:    no rash  Neurologic:     no focal deficit  psych: normal affect        WBC Count: 9.88 K/uL (01-02 @ 07:45)  WBC Count: 12.58 K/uL (12-31 @ 05:45)  WBC Count: 14.66 K/uL (12-29 @ 06:40)  WBC Count: 16.47 K/uL (12-28 @ 12:17)  WBC Count: 15.66 K/uL (12-27 @ 05:42)                            8.1    9.88  )-----------( 468      ( 02 Jan 2024 07:45 )             26.0       01-02    141  |  105  |  16  ----------------------------<  132<H>  3.2<L>   |  30  |  0.67    Ca    9.2      02 Jan 2024 07:45        Urinalysis Basic - ( 02 Jan 2024 07:45 )    Color: x / Appearance: x / SG: x / pH: x  Gluc: 132 mg/dL / Ketone: x  / Bili: x / Urobili: x   Blood: x / Protein: x / Nitrite: x   Leuk Esterase: x / RBC: x / WBC x   Sq Epi: x / Non Sq Epi: x / Bacteria: x          Creatinine Trend: 0.67<--, 0.64<--, 0.75<--, 0.84<--, 0.74<--, 0.57<--      MICROBIOLOGY:  v  .Sputum Sputum  12-27-23   Normal Respiratory Valarie present  --    Few Squamous epithelial cells per low power field  Few polymorphonuclear leukocytes per low power field  Moderate Yeast per oil power field  Few Gram positive cocci in pairs per oil power field      .Blood Blood-Peripheral  12-22-23   No growth at 5 days  --  --      .Blood Blood-Peripheral  12-22-23   No growth at 5 days  --  --          C-Reactive Protein, Serum: 159 (12-25)        RADIOLOGY:  < from: CT Angio Chest PE Protocol w/ IV Cont (01.01.24 @ 15:00) >  ACC: [id number] EXAM:  CT ANGIO CHEST PULM [facility]   ORDERED BY: CLEMENT DENNIS     PROCEDURE DATE:  01/01/2024          INTERPRETATION:  INDICATION:  62-year-old female with persistent cough    TECHNIQUE: A volumetric CTA acquisition of the chestwas obtained from   the thoracic inlet to the upper abdomen, after  administration of   intravenous contrast using a pulmonary embolus protocol. 3-D maximum   intensity projection images were created.    This CT scan was performed with one or more ofthe following dose   optimization techniques: iterative reconstruction, automatic exposure   control, and/or manual adjustment of mAs and kVp according to the   patient's size.    COMPARISON: CT chest dated 12/20/2023    CONTRAST: 60 cc of iohexol 350    FINDINGS:  LINES AND TUBES: None    PULMONARY ARTERIES:  There is suboptimal timing of the contrast bolus. There is opacification   through the level of the lobar pulmonary arteries.  There is no   respiratory motion artifact. No filling defects are present to the level   of the lobar pulmonary arteries. Evaluation of more distal segmental and   subsegmental pulmonary arteries is limited by the suboptimal contrast   bolus.  The main pulmonary artery is normal in size.  The heart size is   within normal limits.   The right heart is not enlarged.    MEDIASTINUM:  The thyroid and thoracic inlet are normal. There is no evidence of   enlarged mediastinal, hilar, or axillary lymph nodes.  No pericardial   effusion is present. The esophagus is normal.    LUNGS/PLEURA:  Central tracheobronchial tree is patent. There is redemonstration of   peribronchovascular and subpleural groundglass opacity and small   consolidation in all 5 lobes with interval minimal improvement. There is   associated traction bronchiectasis, a sequela of organization. There is   trace right pleural effusion. There is no pneumothorax    SUPERIOR ABDOMEN:  Visualized superior abdomen is unremarkable    OSSEOUS STRUCTURES AND SOFT TISSUES:  The osseous structures and visualized soft tissues demonstrate no acute   abnormality..Multilevel degenerative changes of thoracic spine.    IMPRESSION:  No pulmonary embolism through the level of the lobar pulmonary arteries.   Evaluation of more distal segmental and subsegmental pulmonary arteries   is limited by suboptimal contrast bolus timing.  Interval mild improvement of traction bronchiectasis and   peribronchovascular groundglass opacity and small consolidation in all 5   lobes.    --- End of Report ---            GISELLE BRITO MD; Attending Radiologist  This document has been electronically signed. Jan 1 2024  3:13PM    < end of copied text >  < from: CT Neck Soft Tissue w/ IV Cont (01.01.24 @ 15:00) >  ACC: [id number] EXAM:  CT NECK SOFT TISSUE IC   ORDERED BY: CLEMENT DENNIS     PROCEDURE DATE:  01/01/2024          INTERPRETATION:  CT neck with IV contrast      CLINICAL INFORMATION: Cough    TECHNIQUE:  Contiguous axial 3 mm thick sections were obtained through   the neck using single helical acquisition.  Images were acquired during   the intravenous administration of 40 cc of Omnipaque 350/ 60 cc   discarded.  Image data was reconstructed in the 3 mm sagittal and coronal   planes.  This scan was performed using automatic exposure control   (radiation dose reduction software) to obtain a diagnostic image quality   scan with patient dose as low as reasonably achievable.    FINDINGS:   No prior similar studies are available for review.    Noneck mass is found.  No pathologically enlarged lymph nodes are found.    The visualized lymph nodes demonstrate no central necrosis or extranodal   extension.    The mucosal surfaces of the upper aerodigestive tract demonstrate   physiologic mucosalenhancement and appear symmetric and unremarkable.    The larynx is intact.  The preepiglottic and paralaryngeal spaces are   intact.  Laryngeal cartilages remain intact.    The nasopharynx is symmetric.  No lateral retropharyngeal mass is found.    The underlying central skull base is intact.  The petrous temporal bones   and mastoids remain clear.  The visualized base of brain appears   unremarkable.    The parotid and submandibular glands are intact.  The thyroid gland is   intact.    The visualized paranasal sinuses are clear.  The nasal cavity is   unremarkable.    The cervical spine shows straightening with moderate to severe   degenerative disc disease and spondylosis at C3-4 through C6-7 with loss   of disc height and associated degenerative endplate changes.    The carotid and vertebral arteries demonstrate enhancement indicating   their patency.    The lung apices demonstrate a BILATERAL interstitial infiltrates with   bronchiectasis and honeycombing.      IMPRESSION: No mass orlymphadenopathy in the neck.   Straightening with   moderate to severe degenerative disc disease and spondylosis at C3-4   through C6-7 with loss of disc height and associated degenerative   endplate changes.  BILATERAL interstitial infiltrates with bronchiectasis   and honeycombing.    --- End of Report ---            NITA NAVA MD; Attending Radiologist  This document has been electronically signed. Jan 1 2024  7:16PM    < end of copied text >     [facility]  INFECTIOUS DISEASES   [address] [place] [address]  Tel: [phone]     Fax: [phone]  ==============================================================================  MD Akbar Monteiro, DO Jazmin Forbes, NP   ==============================================================================      ROSSI ROJAS  MRN-[id number]  63y ([birth year])      Interval History:    Patient seen and examined today.  she is less sob today .  less cough as well.  afebrile since last week.    on 2L oxygen via [place].    denies any nausea or any diarrhea, denies any chest pain, denies any abd. pain.      ROS:      Constitutional: no fever, no chills  Head: no trauma  Eyes: no vision changes, no eye pain  ENT:  no sore throat, no rhinorrhea  Cardiovascular:  no chest pain, no palpitation  Respiratory:  no SOB, still has cough but much less than before  GI:  no abd pain, no vomiting, no diarrhea  urinary: no dysuria, no hematuria, no flank pain  musculoskeletal:  no joint pain, no joint swelling  skin:  no rash  neurology:  no headache,  no change in mental status  psych: no anxiety, no depression         Allergies  No Known Allergies        ANTIMICROBIALS:  cefepime   IVPB 1000 every 8 hours  doxycycline IVPB    doxycycline IVPB 100 every 12 hours        Physical Exam:  Vital Signs Last 24 Hrs  T(C): 36.7 (02 Jan 2024 05:16), Max: 37.1 (01 Jan 2024 23:54)  T(F): 98 (02 Jan 2024 05:16), Max: 98.7 (01 Jan 2024 23:54)  HR: 104 (02 Jan 2024 06:10) (77 - 108)  BP: 164/95 (02 Jan 2024 05:16) (112/74 - 164/95)  BP(mean): --  RR: 18 (02 Jan 2024 05:16) (18 - 20)  SpO2: 95% (02 Jan 2024 06:10) (93% - 99%)    Parameters below as of 02 Jan 2024 06:10  Patient On (Oxygen Delivery Method): nasal cannula        01-01-24 @ 07:01  -  01-02-24 @ 07:00  --------------------------------------------------------  IN: 500 mL / OUT: 0 mL / NET: 500 mL    01-02-24 @ 07:01  -  01-02-24 @ 10:23  --------------------------------------------------------  IN: 300 mL / OUT: 0 mL / NET: 300 mL      General:    NAD but gets sob  with any movement  Head: atraumatic, normocephalic  Eye: normal sclera and conjunctiva  ENT:    no oral lesions, neck supple  Cardio:     regular S1, S2,  no murmur  Respiratory:    no wheezing, decreased breath sounds at right base  region  no crackles  abd:     soft,   BS +,   no tenderness, ND  :   no CVAT,  no suprapubic tenderness  Musculoskeletal:   no joint swelling,   no edema  vascular: no central lines, +PIV   Skin:    no rash  Neurologic:     no focal deficit  psych: normal affect        WBC Count: 9.88 K/uL (01-02 @ 07:45)  WBC Count: 12.58 K/uL (12-31 @ 05:45)  WBC Count: 14.66 K/uL (12-29 @ 06:40)  WBC Count: 16.47 K/uL (12-28 @ 12:17)  WBC Count: 15.66 K/uL (12-27 @ 05:42)                            8.1    9.88  )-----------( 468      ( 02 Jan 2024 07:45 )             26.0       01-02    141  |  105  |  16  ----------------------------<  132<H>  3.2<L>   |  30  |  0.67    Ca    9.2      02 Jan 2024 07:45        Urinalysis Basic - ( 02 Jan 2024 07:45 )    Color: x / Appearance: x / SG: x / pH: x  Gluc: 132 mg/dL / Ketone: x  / Bili: x / Urobili: x   Blood: x / Protein: x / Nitrite: x   Leuk Esterase: x / RBC: x / WBC x   Sq Epi: x / Non Sq Epi: x / Bacteria: x          Creatinine Trend: 0.67<--, 0.64<--, 0.75<--, 0.84<--, 0.74<--, 0.57<--      MICROBIOLOGY:  v  .Sputum Sputum  12-27-23   Normal Respiratory Valarie present  --    Few Squamous epithelial cells per low power field  Few polymorphonuclear leukocytes per low power field  Moderate Yeast per oil power field  Few Gram positive cocci in pairs per oil power field      .Blood Blood-Peripheral  12-22-23   No growth at 5 days  --  --      .Blood Blood-Peripheral  12-22-23   No growth at 5 days  --  --          C-Reactive Protein, Serum: 159 (12-25)        RADIOLOGY:  < from: CT Angio Chest PE Protocol w/ IV Cont (01.01.24 @ 15:00) >  ACC: [id number] EXAM:  CT ANGIO CHEST PULM [facility]   ORDERED BY: CLEMENT DENNIS     PROCEDURE DATE:  01/01/2024          INTERPRETATION:  INDICATION:  62-year-old female with persistent cough    TECHNIQUE: A volumetric CTA acquisition of the chestwas obtained from   the thoracic inlet to the upper abdomen, after  administration of   intravenous contrast using a pulmonary embolus protocol. 3-D maximum   intensity projection images were created.    This CT scan was performed with one or more ofthe following dose   optimization techniques: iterative reconstruction, automatic exposure   control, and/or manual adjustment of mAs and kVp according to the   patient's size.    COMPARISON: CT chest dated 12/20/2023    CONTRAST: 60 cc of iohexol 350    FINDINGS:  LINES AND TUBES: None    PULMONARY ARTERIES:  There is suboptimal timing of the contrast bolus. There is opacification   through the level of the lobar pulmonary arteries.  There is no   respiratory motion artifact. No filling defects are present to the level   of the lobar pulmonary arteries. Evaluation of more distal segmental and   subsegmental pulmonary arteries is limited by the suboptimal contrast   bolus.  The main pulmonary artery is normal in size.  The heart size is   within normal limits.   The right heart is not enlarged.    MEDIASTINUM:  The thyroid and thoracic inlet are normal. There is no evidence of   enlarged mediastinal, hilar, or axillary lymph nodes.  No pericardial   effusion is present. The esophagus is normal.    LUNGS/PLEURA:  Central tracheobronchial tree is patent. There is redemonstration of   peribronchovascular and subpleural groundglass opacity and small   consolidation in all 5 lobes with interval minimal improvement. There is   associated traction bronchiectasis, a sequela of organization. There is   trace right pleural effusion. There is no pneumothorax    SUPERIOR ABDOMEN:  Visualized superior abdomen is unremarkable    OSSEOUS STRUCTURES AND SOFT TISSUES:  The osseous structures and visualized soft tissues demonstrate no acute   abnormality..Multilevel degenerative changes of thoracic spine.    IMPRESSION:  No pulmonary embolism through the level of the lobar pulmonary arteries.   Evaluation of more distal segmental and subsegmental pulmonary arteries   is limited by suboptimal contrast bolus timing.  Interval mild improvement of traction bronchiectasis and   peribronchovascular groundglass opacity and small consolidation in all 5   lobes.    --- End of Report ---            GISELLE BRITO MD; Attending Radiologist  This document has been electronically signed. Jan 1 2024  3:13PM    < end of copied text >  < from: CT Neck Soft Tissue w/ IV Cont (01.01.24 @ 15:00) >  ACC: [id number] EXAM:  CT NECK SOFT TISSUE IC   ORDERED BY: CLEMENT DENNIS     PROCEDURE DATE:  01/01/2024          INTERPRETATION:  CT neck with IV contrast      CLINICAL INFORMATION: Cough    TECHNIQUE:  Contiguous axial 3 mm thick sections were obtained through   the neck using single helical acquisition.  Images were acquired during   the intravenous administration of 40 cc of Omnipaque 350/ 60 cc   discarded.  Image data was reconstructed in the 3 mm sagittal and coronal   planes.  This scan was performed using automatic exposure control   (radiation dose reduction software) to obtain a diagnostic image quality   scan with patient dose as low as reasonably achievable.    FINDINGS:   No prior similar studies are available for review.    Noneck mass is found.  No pathologically enlarged lymph nodes are found.    The visualized lymph nodes demonstrate no central necrosis or extranodal   extension.    The mucosal surfaces of the upper aerodigestive tract demonstrate   physiologic mucosalenhancement and appear symmetric and unremarkable.    The larynx is intact.  The preepiglottic and paralaryngeal spaces are   intact.  Laryngeal cartilages remain intact.    The nasopharynx is symmetric.  No lateral retropharyngeal mass is found.    The underlying central skull base is intact.  The petrous temporal bones   and mastoids remain clear.  The visualized base of brain appears   unremarkable.    The parotid and submandibular glands are intact.  The thyroid gland is   intact.    The visualized paranasal sinuses are clear.  The nasal cavity is   unremarkable.    The cervical spine shows straightening with moderate to severe   degenerative disc disease and spondylosis at C3-4 through C6-7 with loss   of disc height and associated degenerative endplate changes.    The carotid and vertebral arteries demonstrate enhancement indicating   their patency.    The lung apices demonstrate a BILATERAL interstitial infiltrates with   bronchiectasis and honeycombing.      IMPRESSION: No mass orlymphadenopathy in the neck.   Straightening with   moderate to severe degenerative disc disease and spondylosis at C3-4   through C6-7 with loss of disc height and associated degenerative   endplate changes.  BILATERAL interstitial infiltrates with bronchiectasis   and honeycombing.    --- End of Report ---            NITA NAVA MD; Attending Radiologist  This document has been electronically signed. Jan 1 2024  7:16PM    < end of copied text >

## 2024-01-02 NOTE — PROGRESS NOTE ADULT - ASSESSMENT
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 Of note pt recently developed URI symptoms and was started on Doxy as well as Hycodan, Alprazolam and Fioricet. Of note pt already on Percocet, flexeril for chronic pain, Gabapentin for seizures per  all which she took. Pt being admitted for Acute hypoxic respiratory failure due to Multifocal PNA, AMS r/o TIA/CVA  vs metabolic encephalopathy secondary to polypharmacy and/or hypoxia.      Acute metabolic encephalopathy   Acute respiratory failure with multifocal pneumonia.   Acute stroke is ruled out. EEG did not show any epileptiform activity.   Intermittent low grade temp, Seen by ID  s/p iv zosyn , completed 5 days   Added Azithromycin, + mycoplasma: course completed: for persistent symptoms now on doxy   Repeat CT with multifocal PNA  Mental status baseline patient AAO3  although RRT for acute worsening hypoxemia; ;likely due to new pna  echo 12/18: reviewe  plan:  Cough meds, albuterol PRN  Albuterol PRN  steroids  3 percent  trial of lasix as needed  ID follow up , pulm consulted     Coughing episode : acute blood loss anemia  hb 8 from 10  from steroids?  ordering protonix and carafate      # PE  - c/w Xarelto. no active gross bleeding.     # SLE stable.     # HTN/HLD. controlled with Verapemil, statin    # DM type 2. controlled.   - FS qAC and HS w/ SSI    #Weakness. PT eval>>[facility]: patient refusing wants to go home: will attempt for home 02    Stopped Elavil, no need for Elavil and Neurontin QHS.

## 2024-01-02 NOTE — PROGRESS NOTE ADULT - ASSESSMENT
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     multifocal pneumonia on CXR on admission was treated with 5 days of zosyn and improved  but  later  became hypoxemic and transferred  to [facility]      better today and is on 2 L O2 via NC  completed 7 days of zithromax for mycoplasma   Leukocytosis decreasing  afebrile past 7 days  RVP negative x 4 on this admission   MRSA PCR negative   Legionella ag - negative  Strep pneumo ag - negative  Mycoplasma IGM positive   sputum cx- negative  now on doxycyline for mycoplasma      plan-   multifocal pneumonia on most recent chest CT continue with cefepime , day #7  advise to d/c cefepime tomm.  advise to also continue with  doxycyline  for mycoplasma better.day #6  monitor O2 sat and wbc closely.  pulmonary follow up .    All labs and imaging and chart notes reviewed.     All above discussed with patient and patient verbalizes full understanding of all above and agrees with above plan of care.    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     multifocal pneumonia on CXR on admission was treated with 5 days of zosyn and improved  but  later  became hypoxemic and transferred  to [facility]      better today and is on 2 L O2 via NC  completed 7 days of zithromax for mycoplasma   Leukocytosis decreasing  afebrile past 7 days  RVP negative x 4 on this admission   MRSA PCR negative   Legionella ag - negative  Strep pneumo ag - negative  Mycoplasma IGM positive   sputum cx- negative  now on doxycyline for mycoplasma      plan-   multifocal pneumonia on most recent chest CT continue with cefepime , day #7  advise to d/c cefepime tomm.  advise to also continue with  doxycyline  for mycoplasma better.day #6  monitor O2 sat and wbc closely.  pulmonary follow up .    All labs and imaging and chart notes reviewed.     All above discussed with patient and patient verbalizes full understanding of all above and agrees with above plan of care.    Betina Soni MD  Infectious Disease Attending    for any questions please do not hesitate to contact me either via teams or by calling [phone]

## 2024-01-02 NOTE — PROGRESS NOTE ADULT - SUBJECTIVE AND OBJECTIVE BOX
24 hr events:  on 2L NC with O2 sat 98%  weaned to RA with O2 sat 96%  reports she is still coughing and gets dyspneic with exertion  no wheeze noted on exam  CTA chest performed yesterday    ## ROS:  [ ] unable to obtain  CONSTITUTIONAL: No fever, weight loss, or fatigue  EYES: No eye pain, visual disturbances, or discharge  ENMT:  No difficulty hearing, tinnitus, vertigo; No sinus or throat pain  NECK: No pain or stiffness  RESPIRATORY: No cough, wheezing, chills or hemoptysis; No shortness of breath  CARDIOVASCULAR: No chest pain, palpitations, dizziness, or leg swelling  GASTROINTESTINAL: No abdominal or epigastric pain. No nausea, vomiting, or hematemesis; No diarrhea or constipation. No melena or hematochezia.  GENITOURINARY: No dysuria, frequency, hematuria, or incontinence  NEUROLOGICAL: No headaches, memory loss, loss of strength, numbness, or tremors  SKIN: No itching, burning, rashes, or lesions   LYMPH NODES: No enlarged glands  ENDOCRINE: No heat or cold intolerance; No hair loss  MUSCULOSKELETAL: No joint pain or swelling; No muscle, back, or extremity pain  PSYCHIATRIC: No depression, anxiety, mood swings, or difficulty sleeping  HEME/LYMPH: No easy bruising, or bleeding gums  ALLERGY AND IMMUNOLOGIC: No hives or eczema    ## Labs:  CBC:                        8.1    9.88  )-----------( 468      ( 2024 07:45 )             26.0     Chem:      141  |  105  |  16  ----------------------------<  132<H>  3.2<L>   |  30  |  0.67    Ca    9.2      2024 07:45      Coags:          ## Imaging:    ## Medications:  cefepime   IVPB 1000 milliGRAM(s) IV Intermittent every 8 hours  doxycycline IVPB      doxycycline IVPB 100 milliGRAM(s) IV Intermittent every 12 hours    verapamil  milliGRAM(s) Oral daily    albuterol    90 MICROgram(s) HFA Inhaler 1 Puff(s) Inhalation every 4 hours PRN  albuterol/ipratropium for Nebulization 3 milliLiter(s) Nebulizer every 6 hours  benzonatate 100 milliGRAM(s) Oral every 8 hours  guaifenesin/dextromethorphan Oral Liquid 10 milliLiter(s) Oral every 6 hours PRN  montelukast 10 milliGRAM(s) Oral daily  sodium chloride 3%  Inhalation 4 milliLiter(s) Inhalation every 6 hours    atorvastatin 80 milliGRAM(s) Oral at bedtime  dextrose 50% Injectable 25 Gram(s) IV Push once  dextrose 50% Injectable 12.5 Gram(s) IV Push once  dextrose 50% Injectable 25 Gram(s) IV Push once  dextrose Oral Gel 15 Gram(s) Oral once PRN  glucagon  Injectable 1 milliGRAM(s) IntraMuscular once  insulin lispro (ADMELOG) corrective regimen sliding scale   SubCutaneous three times a day before meals  insulin lispro (ADMELOG) corrective regimen sliding scale   SubCutaneous at bedtime  predniSONE   Tablet 40 milliGRAM(s) Oral daily    aspirin enteric coated 81 milliGRAM(s) Oral daily  rivaroxaban 20 milliGRAM(s) Oral with dinner    sucralfate 1 Gram(s) Oral every 6 hours    acetaminophen     Tablet .. 650 milliGRAM(s) Oral every 6 hours PRN  celecoxib 200 milliGRAM(s) Oral daily  DULoxetine 60 milliGRAM(s) Oral daily  gabapentin 600 milliGRAM(s) Oral at bedtime  melatonin 3 milliGRAM(s) Oral at bedtime PRN      ## Vitals:  T(C): 36.4 (24 @ 17:05), Max: 37.1 (24 @ 23:54)  HR: 87 (24 @ 17:37) (87 - 110)  BP: 143/73 (24 @ 17:05) (112/74 - 164/95)  BP(mean): --  RR: 19 (24 @ 17:05) (18 - 19)  SpO2: 95% (24 @ 17:37) (95% - 97%)  Wt(kg): --  Vent:   AB-01 @ 07:01  -   @ 07:00  --------------------------------------------------------  IN: 500 mL / OUT: 0 mL / NET: 500 mL     @ 07: @ 22:26  --------------------------------------------------------  IN: 660 mL / OUT: 0 mL / NET: 660 mL          ## P/E:  Gen: lying comfortably in bed in no apparent distress  HEENT: PERRL, EOMI  Resp: CTA B/L no c/r/w  CVS: S1S2 no m/r/g  Abd: soft NT/ND +BS  Ext: no c/c/e  Neuro: A&Ox3    CENTRAL LINE: [ ] YES [ ] NO  LOCATION:   DATE INSERTED:  REMOVE: [ ] YES [ ] NO      BAIG: [ ] YES [ ] NO    DATE INSERTED:  REMOVE:  [ ] YES [ ] NO      A-LINE:  [ ] YES [ ] NO  LOCATION:   DATE INSERTED:  REMOVE:  [ ] YES [ ] NO  EXPLAIN:    GLOBAL ISSUE/BEST PRACTICE:  Analgesia:  Sedation:  HOB elevation: yes  Stress ulcer prophylaxis:  VTE prophylaxis:  Oral Care:  Glycemic control:  Nutrition:    CODE STATUS: [ ] full code  [ ] DNR  [ ] DNI  [ ] [facility]  Goals of care discussion: [ ] yes  24 hr events:  on 2L NC with O2 sat 98%  weaned to RA with O2 sat 96%  reports she is still coughing and gets dyspneic with exertion  no wheeze noted on exam  CTA chest performed yesterday    ## ROS:  [ ] unable to obtain  CONSTITUTIONAL: No fever, weight loss, or fatigue  EYES: No eye pain, visual disturbances, or discharge  ENMT:  No difficulty hearing, tinnitus, vertigo; No sinus or throat pain  NECK: No pain or stiffness  RESPIRATORY: No cough, wheezing, chills or hemoptysis; No shortness of breath  CARDIOVASCULAR: No chest pain, palpitations, dizziness, or leg swelling  GASTROINTESTINAL: No abdominal or epigastric pain. No nausea, vomiting, or hematemesis; No diarrhea or constipation. No melena or hematochezia.  GENITOURINARY: No dysuria, frequency, hematuria, or incontinence  NEUROLOGICAL: No headaches, memory loss, loss of strength, numbness, or tremors  SKIN: No itching, burning, rashes, or lesions   LYMPH NODES: No enlarged glands  ENDOCRINE: No heat or cold intolerance; No hair loss  MUSCULOSKELETAL: No joint pain or swelling; No muscle, back, or extremity pain  PSYCHIATRIC: No depression, anxiety, mood swings, or difficulty sleeping  HEME/LYMPH: No easy bruising, or bleeding gums  ALLERGY AND IMMUNOLOGIC: No hives or eczema    ## Labs:  CBC:                        8.1    9.88  )-----------( 468      ( 2024 07:45 )             26.0     Chem:      141  |  105  |  16  ----------------------------<  132<H>  3.2<L>   |  30  |  0.67    Ca    9.2      2024 07:45      Coags:          ## Imaging:    ## Medications:  cefepime   IVPB 1000 milliGRAM(s) IV Intermittent every 8 hours  doxycycline IVPB      doxycycline IVPB 100 milliGRAM(s) IV Intermittent every 12 hours    verapamil  milliGRAM(s) Oral daily    albuterol    90 MICROgram(s) HFA Inhaler 1 Puff(s) Inhalation every 4 hours PRN  albuterol/ipratropium for Nebulization 3 milliLiter(s) Nebulizer every 6 hours  benzonatate 100 milliGRAM(s) Oral every 8 hours  guaifenesin/dextromethorphan Oral Liquid 10 milliLiter(s) Oral every 6 hours PRN  montelukast 10 milliGRAM(s) Oral daily  sodium chloride 3%  Inhalation 4 milliLiter(s) Inhalation every 6 hours    atorvastatin 80 milliGRAM(s) Oral at bedtime  dextrose 50% Injectable 25 Gram(s) IV Push once  dextrose 50% Injectable 12.5 Gram(s) IV Push once  dextrose 50% Injectable 25 Gram(s) IV Push once  dextrose Oral Gel 15 Gram(s) Oral once PRN  glucagon  Injectable 1 milliGRAM(s) IntraMuscular once  insulin lispro (ADMELOG) corrective regimen sliding scale   SubCutaneous three times a day before meals  insulin lispro (ADMELOG) corrective regimen sliding scale   SubCutaneous at bedtime  predniSONE   Tablet 40 milliGRAM(s) Oral daily    aspirin enteric coated 81 milliGRAM(s) Oral daily  rivaroxaban 20 milliGRAM(s) Oral with dinner    sucralfate 1 Gram(s) Oral every 6 hours    acetaminophen     Tablet .. 650 milliGRAM(s) Oral every 6 hours PRN  celecoxib 200 milliGRAM(s) Oral daily  DULoxetine 60 milliGRAM(s) Oral daily  gabapentin 600 milliGRAM(s) Oral at bedtime  melatonin 3 milliGRAM(s) Oral at bedtime PRN      ## Vitals:  T(C): 36.4 (24 @ 17:05), Max: 37.1 (24 @ 23:54)  HR: 87 (24 @ 17:37) (87 - 110)  BP: 143/73 (24 @ 17:05) (112/74 - 164/95)  BP(mean): --  RR: 19 (24 @ 17:05) (18 - 19)  SpO2: 95% (24 @ 17:37) (95% - 97%)  Wt(kg): --  Vent:   AB-01 @ 07:01  -   @ 07:00  --------------------------------------------------------  IN: 500 mL / OUT: 0 mL / NET: 500 mL     @ 07: @ 22:26  --------------------------------------------------------  IN: 660 mL / OUT: 0 mL / NET: 660 mL          ## P/E:  Gen: lying comfortably in bed in no apparent distress  HEENT: PERRL, EOMI  Resp: CTA B/L no c/r/w  CVS: S1S2 no m/r/g  Abd: soft NT/ND +BS  Ext: no c/c/e  Neuro: A&Ox3    CENTRAL LINE: [ ] YES [ ] NO  LOCATION:   DATE INSERTED:  REMOVE: [ ] YES [ ] NO      BAIG: [ ] YES [ ] NO    DATE INSERTED:  REMOVE:  [ ] YES [ ] NO      A-LINE:  [ ] YES [ ] NO  LOCATION:   DATE INSERTED:  REMOVE:  [ ] YES [ ] NO  EXPLAIN:    GLOBAL ISSUE/BEST PRACTICE:  Analgesia:  Sedation:  HOB elevation: yes  Stress ulcer prophylaxis:  VTE prophylaxis:  Oral Care:  Glycemic control:  Nutrition:    CODE STATUS: [ ] full code  [ ] DNR  [ ] DNI  [ ] [facility]  Goals of care discussion: [ ] yes  24 hr events:  on 2L NC with O2 sat 98%  weaned to RA with O2 sat 96%  reports she is still coughing and gets dyspneic with exertion  no wheeze noted on exam  CTA chest performed yesterday    ## ROS:  afebrile  reports dyspnea with exertion and having difficulty ambulating due to SOB  no SOB at rest  reports still with dry cough  dry throat and mouth per patient  no CP  no abdominal pain  no edema of extremities  ROS otherwise negative      ## Labs:  CBC:                        8.1    9.88  )-----------( 468      ( 02 Jan 2024 07:45 )             26.0     Chem:  01-02    141  |  105  |  16  ----------------------------<  132<H>  3.2<L>   |  30  |  0.67    Ca    9.2      02 Jan 2024 07:45      Mycoplasma Pneumoniae IgM Antibody (12.18.23 @ 07:02)    Mycoplasma IgM AB: 1.23 Index   Mycoplasma: Positive: Method DELON           Interpretation:                                             Index                  Negative              <=0.90                  Equivocal            0.91-1.09                  Positive                >=1.10  The reference range has been updated as of 11/16/2020 for this test due  to an upgrade in the testing methodology (EIA to DELON). Result will be  flagged based upon the updated reference range.      Respiratory Viral Panel with COVID-19 by NETTIE (12.25.23 @ 10:15)    Rapid RVP Result: [facility]   [facility]    Culture - Sputum . (12.27.23 @ 00:30)   Specimen Source: .Sputum Sputum   Culture Results: Normal Respiratory Valarie present        ## Imaging:  CTA chest < from: CT Angio Chest PE Protocol w/ IV Cont (01.01.24 @ 15:00) >  No pulmonary embolism through the level of the lobar pulmonary arteries.   Evaluation of more distal segmental and subsegmental pulmonary arteries   is limited by suboptimal contrast bolus timing.  Interval mild improvement of traction bronchiectasis and   peribronchovascular groundglass opacity and small consolidation in all 5   lobes.    ------------------------------  < from: CT Neck Soft Tissue w/ IV Cont (01.01.24 @ 15:00) >  No mass orlymphadenopathy in the neck.   Straightening with   moderate to severe degenerative disc disease and spondylosis at C3-4   through C6-7 with loss of disc height and associated degenerative   endplate changes.  BILATERAL interstitial infiltrates with bronchiectasis   and honeycombing.          ## Medications:  cefepime   IVPB 1000 milliGRAM(s) IV Intermittent every 8 hours       doxycycline IVPB 100 milliGRAM(s) IV Intermittent every 12 hours    verapamil  milliGRAM(s) Oral daily    albuterol    90 MICROgram(s) HFA Inhaler 1 Puff(s) Inhalation every 4 hours PRN  albuterol/ipratropium for Nebulization 3 milliLiter(s) Nebulizer every 6 hours  benzonatate 100 milliGRAM(s) Oral every 8 hours  guaifenesin/dextromethorphan Oral Liquid 10 milliLiter(s) Oral every 6 hours PRN  montelukast 10 milliGRAM(s) Oral daily  sodium chloride 3%  Inhalation 4 milliLiter(s) Inhalation every 6 hours    atorvastatin 80 milliGRAM(s) Oral at bedtime  dextrose 50% Injectable 25 Gram(s) IV Push once  dextrose 50% Injectable 12.5 Gram(s) IV Push once  dextrose 50% Injectable 25 Gram(s) IV Push once  dextrose Oral Gel 15 Gram(s) Oral once PRN  glucagon  Injectable 1 milliGRAM(s) IntraMuscular once  insulin lispro (ADMELOG) corrective regimen sliding scale   SubCutaneous three times a day before meals  insulin lispro (ADMELOG) corrective regimen sliding scale   SubCutaneous at bedtime  predniSONE   Tablet 40 milliGRAM(s) Oral daily    aspirin enteric coated 81 milliGRAM(s) Oral daily  rivaroxaban 20 milliGRAM(s) Oral with dinner    sucralfate 1 Gram(s) Oral every 6 hours    acetaminophen     Tablet .. 650 milliGRAM(s) Oral every 6 hours PRN  celecoxib 200 milliGRAM(s) Oral daily  DULoxetine 60 milliGRAM(s) Oral daily  gabapentin 600 milliGRAM(s) Oral at bedtime  melatonin 3 milliGRAM(s) Oral at bedtime PRN      ## Vitals:  T(C): 36.4 (01-02-24 @ 17:05), Max: 37.1 (01-01-24 @ 23:54)  HR: 87 (01-02-24 @ 17:37) (87 - 110)  BP: 143/73 (01-02-24 @ 17:05) (112/74 - 164/95)  RR: 19 (01-02-24 @ 17:05) (18 - 19)  SpO2: 95% (01-02-24 @ 17:37) (95% - 97%)        01-01 @ 07:01  -  01-02 @ 07:00  --------------------------------------------------------  IN: 500 mL / OUT: 0 mL / NET: 500 mL    01-02 @ 07:01  -  01-02 @ 22:26  --------------------------------------------------------  IN: 660 mL / OUT: 0 mL / NET: 660 mL          ## P/E:  Gen: obese female, sitting in bed comfortably, no apparent distress  HEENT: NC/AT, EOMI, sclera white, oral pharynx clear  Resp: CTA B/L , no wheeze, no rhonchi, + coughing (Dry)  CVS: RRR  Abd: soft NT/ND +BS  Ext: no c/c/e  Neuro: A&Ox3     24 hr events:  on 2L NC with O2 sat 98%  weaned to RA with O2 sat 96%  reports she is still coughing and gets dyspneic with exertion  no wheeze noted on exam  CTA chest performed yesterday    ## ROS:  afebrile  reports dyspnea with exertion and having difficulty ambulating due to SOB  no SOB at rest  reports still with dry cough  dry throat and mouth per patient  no CP  no abdominal pain  no edema of extremities  ROS otherwise negative      ## Labs:  CBC:                        8.1    9.88  )-----------( 468      ( 02 Jan 2024 07:45 )             26.0     Chem:  01-02    141  |  105  |  16  ----------------------------<  132<H>  3.2<L>   |  30  |  0.67    Ca    9.2      02 Jan 2024 07:45      Mycoplasma Pneumoniae IgM Antibody (12.18.23 @ 07:02)    Mycoplasma IgM AB: 1.23 Index   Mycoplasma: Positive: Method DELON           Interpretation:                                             Index                  Negative              <=0.90                  Equivocal            0.91-1.09                  Positive                >=1.10  The reference range has been updated as of 11/16/2020 for this test due  to an upgrade in the testing methodology (EIA to DELON). Result will be  flagged based upon the updated reference range.      Respiratory Viral Panel with COVID-19 by NETTIE (12.25.23 @ 10:15)    Rapid RVP Result: [facility]   [facility]    Culture - Sputum . (12.27.23 @ 00:30)   Specimen Source: .Sputum Sputum   Culture Results: Normal Respiratory Valarie present        ## Imaging:  CTA chest < from: CT Angio Chest PE Protocol w/ IV Cont (01.01.24 @ 15:00) >  No pulmonary embolism through the level of the lobar pulmonary arteries.   Evaluation of more distal segmental and subsegmental pulmonary arteries   is limited by suboptimal contrast bolus timing.  Interval mild improvement of traction bronchiectasis and   peribronchovascular groundglass opacity and small consolidation in all 5   lobes.    ------------------------------  < from: CT Neck Soft Tissue w/ IV Cont (01.01.24 @ 15:00) >  No mass orlymphadenopathy in the neck.   Straightening with   moderate to severe degenerative disc disease and spondylosis at C3-4   through C6-7 with loss of disc height and associated degenerative   endplate changes.  BILATERAL interstitial infiltrates with bronchiectasis   and honeycombing.          ## Medications:  cefepime   IVPB 1000 milliGRAM(s) IV Intermittent every 8 hours       doxycycline IVPB 100 milliGRAM(s) IV Intermittent every 12 hours    verapamil  milliGRAM(s) Oral daily    albuterol    90 MICROgram(s) HFA Inhaler 1 Puff(s) Inhalation every 4 hours PRN  albuterol/ipratropium for Nebulization 3 milliLiter(s) Nebulizer every 6 hours  benzonatate 100 milliGRAM(s) Oral every 8 hours  guaifenesin/dextromethorphan Oral Liquid 10 milliLiter(s) Oral every 6 hours PRN  montelukast 10 milliGRAM(s) Oral daily  sodium chloride 3%  Inhalation 4 milliLiter(s) Inhalation every 6 hours    atorvastatin 80 milliGRAM(s) Oral at bedtime  dextrose 50% Injectable 25 Gram(s) IV Push once  dextrose 50% Injectable 12.5 Gram(s) IV Push once  dextrose 50% Injectable 25 Gram(s) IV Push once  dextrose Oral Gel 15 Gram(s) Oral once PRN  glucagon  Injectable 1 milliGRAM(s) IntraMuscular once  insulin lispro (ADMELOG) corrective regimen sliding scale   SubCutaneous three times a day before meals  insulin lispro (ADMELOG) corrective regimen sliding scale   SubCutaneous at bedtime  predniSONE   Tablet 40 milliGRAM(s) Oral daily    aspirin enteric coated 81 milliGRAM(s) Oral daily  rivaroxaban 20 milliGRAM(s) Oral with dinner    sucralfate 1 Gram(s) Oral every 6 hours    acetaminophen     Tablet .. 650 milliGRAM(s) Oral every 6 hours PRN  celecoxib 200 milliGRAM(s) Oral daily  DULoxetine 60 milliGRAM(s) Oral daily  gabapentin 600 milliGRAM(s) Oral at bedtime  melatonin 3 milliGRAM(s) Oral at bedtime PRN      ## Vitals:  T(C): 36.4 (01-02-24 @ 17:05), Max: 37.1 (01-01-24 @ 23:54)  HR: 87 (01-02-24 @ 17:37) (87 - 110)  BP: 143/73 (01-02-24 @ 17:05) (112/74 - 164/95)  RR: 19 (01-02-24 @ 17:05) (18 - 19)  SpO2: 95% (01-02-24 @ 17:37) (95% - 97%)        01-01 @ 07:01  -  01-02 @ 07:00  --------------------------------------------------------  IN: 500 mL / OUT: 0 mL / NET: 500 mL    01-02 @ 07:01  -  01-02 @ 22:26  --------------------------------------------------------  IN: 660 mL / OUT: 0 mL / NET: 660 mL          ## P/E:  Gen: obese female, sitting in bed comfortably, no apparent distress  HEENT: NC/AT, EOMI, sclera white, oral pharynx clear  Resp: CTA B/L , no wheeze, no rhonchi, + coughing (Dry)  CVS: RRR  Abd: soft NT/ND +BS  Ext: no c/c/e  Neuro: A&Ox3

## 2024-01-02 NOTE — PROGRESS NOTE ADULT - NSPROGADDITIONALINFOA_GEN_ALL_CORE
clinically better  still with cough  continue to taper down 02  will trial room air today
better  cw taper off 02  check CT ?? why patient reporting still cough and dyspneic
better  cw taper off 02  check CT ?? why patient reporting still cough and dyspneic

## 2024-01-02 NOTE — PROGRESS NOTE ADULT - SUBJECTIVE AND OBJECTIVE BOX
Patient seen and  examined  cough reports shortness of breath  ambulating  ct chest + traction bronchectasis    Review of Systems:  General:denies fever chills, headache, weakness  HEENT: denies blurry vision,diffculty swallowing, difficulty hearing, tinnitus  Cardiovascular: denies chest pain  ,palpitations  Pulmonary:+ shortness of breath, +cough, wheezing, hemoptysis  Gastrointestinal: denies abdominal pain, constipation, diarrhea,nausea , vomiting, hematochezia  : denies hematuria, dysuria, or incontinence  Neurological: denies weakness, numbness , tingling, dizziness, tremors  MSK: denies muscle pain, difficulty ambulating, swelling, back pain  skin: denies skin rash, itching, burning, or  skin lesions  Psychiatrical: denies mood disturbances, anxierty, feeling depressed, depression , or difficulty sleeping    Objective:  Vitals  T(C): 36.8 (01-02-24 @ 10:58), Max: 37.1 (01-01-24 @ 23:54)  HR: 110 (01-02-24 @ 10:58) (77 - 110)  BP: 126/75 (01-02-24 @ 10:58) (112/74 - 164/95)  RR: 19 (01-02-24 @ 10:58) (18 - 19)  SpO2: 96% (01-02-24 @ 10:58) (95% - 99%)    Physical Exam:  General: comfortable, no acute distress  HEENT: Atraumatic, no LAD, trachea midline, PERRLA  Cardiovascular: normal s1s2, no murmurs, gallops or fricition rubs  Pulmonary: clear to ausculation Bilaterally, no wheezing , rhonchi  Gastrointestinal: soft non tender non distended, no masses felt, no organomegally  Muscloskeletal: no lower extremity edema, intact bilateral lower extremity pulses  Neurological: CN II-12 intact. No focal weakness  Psychiatrical: normal mood, cooperative  SKIN: no rash, lesions or ulcers    Labs:                          8.1    9.88  )-----------( 468      ( 02 Jan 2024 07:45 )             26.0     01-02    141  |  105  |  16  ----------------------------<  132<H>  3.2<L>   |  30  |  0.67    Ca    9.2      02 Jan 2024 07:45              Active Medications  MEDICATIONS  (STANDING):  albuterol/ipratropium for Nebulization 3 milliLiter(s) Nebulizer every 6 hours  aspirin enteric coated 81 milliGRAM(s) Oral daily  atorvastatin 80 milliGRAM(s) Oral at bedtime  benzonatate 100 milliGRAM(s) Oral every 8 hours  cefepime   IVPB 1000 milliGRAM(s) IV Intermittent every 8 hours  celecoxib 200 milliGRAM(s) Oral daily  dextrose 5%. 1000 milliLiter(s) (50 mL/Hr) IV Continuous <Continuous>  dextrose 5%. 1000 milliLiter(s) (100 mL/Hr) IV Continuous <Continuous>  dextrose 50% Injectable 25 Gram(s) IV Push once  dextrose 50% Injectable 12.5 Gram(s) IV Push once  dextrose 50% Injectable 25 Gram(s) IV Push once  doxycycline IVPB      doxycycline IVPB 100 milliGRAM(s) IV Intermittent every 12 hours  DULoxetine 60 milliGRAM(s) Oral daily  gabapentin 600 milliGRAM(s) Oral at bedtime  glucagon  Injectable 1 milliGRAM(s) IntraMuscular once  influenza   Vaccine 0.5 milliLiter(s) IntraMuscular once  insulin lispro (ADMELOG) corrective regimen sliding scale   SubCutaneous three times a day before meals  insulin lispro (ADMELOG) corrective regimen sliding scale   SubCutaneous at bedtime  montelukast 10 milliGRAM(s) Oral daily  potassium chloride    Tablet ER 40 milliEquivalent(s) Oral every 4 hours  potassium chloride   Powder 40 milliEquivalent(s) Oral every 4 hours  predniSONE   Tablet 40 milliGRAM(s) Oral daily  rivaroxaban 20 milliGRAM(s) Oral with dinner  sodium chloride 0.65% Nasal 2 Spray(s) Both Nostrils every 6 hours  sodium chloride 3%  Inhalation 4 milliLiter(s) Inhalation every 6 hours  sucralfate 1 Gram(s) Oral every 6 hours  verapamil  milliGRAM(s) Oral daily    MEDICATIONS  (PRN):  acetaminophen     Tablet .. 650 milliGRAM(s) Oral every 6 hours PRN Temp greater or equal to 38C (100.4F), Mild Pain (1 - 3)  albuterol    90 MICROgram(s) HFA Inhaler 1 Puff(s) Inhalation every 4 hours PRN Shortness of Breath and/or Wheezing  dextrose Oral Gel 15 Gram(s) Oral once PRN Blood Glucose LESS THAN 70 milliGRAM(s)/deciliter  guaifenesin/dextromethorphan Oral Liquid 10 milliLiter(s) Oral every 6 hours PRN Cough  melatonin 3 milliGRAM(s) Oral at bedtime PRN Insomnia

## 2024-01-03 LAB
FLUAV AG NPH QL: DETECTED
FLUAV AG NPH QL: DETECTED
FLUBV AG NPH QL: SIGNIFICANT CHANGE UP
FLUBV AG NPH QL: SIGNIFICANT CHANGE UP
GLUCOSE BLDC GLUCOMTR-MCNC: 129 MG/DL — HIGH (ref 70–99)
GLUCOSE BLDC GLUCOMTR-MCNC: 129 MG/DL — HIGH (ref 70–99)
GLUCOSE BLDC GLUCOMTR-MCNC: 149 MG/DL — HIGH (ref 70–99)
GLUCOSE BLDC GLUCOMTR-MCNC: 149 MG/DL — HIGH (ref 70–99)
GLUCOSE BLDC GLUCOMTR-MCNC: 155 MG/DL — HIGH (ref 70–99)
GLUCOSE BLDC GLUCOMTR-MCNC: 155 MG/DL — HIGH (ref 70–99)
GLUCOSE BLDC GLUCOMTR-MCNC: 191 MG/DL — HIGH (ref 70–99)
GLUCOSE BLDC GLUCOMTR-MCNC: 191 MG/DL — HIGH (ref 70–99)
SARS-COV-2 RNA SPEC QL NAA+PROBE: SIGNIFICANT CHANGE UP
SARS-COV-2 RNA SPEC QL NAA+PROBE: SIGNIFICANT CHANGE UP

## 2024-01-03 PROCEDURE — 99233 SBSQ HOSP IP/OBS HIGH 50: CPT

## 2024-01-03 RX ADMIN — Medication 110 MILLIGRAM(S): at 05:11

## 2024-01-03 RX ADMIN — Medication 3 MILLILITER(S): at 05:50

## 2024-01-03 RX ADMIN — CELECOXIB 200 MILLIGRAM(S): 200 CAPSULE ORAL at 13:12

## 2024-01-03 RX ADMIN — MONTELUKAST 10 MILLIGRAM(S): 4 TABLET, CHEWABLE ORAL at 12:05

## 2024-01-03 RX ADMIN — Medication 1 GRAM(S): at 21:51

## 2024-01-03 RX ADMIN — DULOXETINE HYDROCHLORIDE 60 MILLIGRAM(S): 30 CAPSULE, DELAYED RELEASE ORAL at 12:05

## 2024-01-03 RX ADMIN — Medication 1 GRAM(S): at 12:05

## 2024-01-03 RX ADMIN — Medication 1 GRAM(S): at 05:10

## 2024-01-03 RX ADMIN — SODIUM CHLORIDE 4 MILLILITER(S): 9 INJECTION INTRAMUSCULAR; INTRAVENOUS; SUBCUTANEOUS at 11:42

## 2024-01-03 RX ADMIN — Medication 240 MILLIGRAM(S): at 05:10

## 2024-01-03 RX ADMIN — Medication 1 GRAM(S): at 17:18

## 2024-01-03 RX ADMIN — Medication 100 MILLIGRAM(S): at 05:10

## 2024-01-03 RX ADMIN — RIVAROXABAN 20 MILLIGRAM(S): KIT at 17:19

## 2024-01-03 RX ADMIN — SODIUM CHLORIDE 4 MILLILITER(S): 9 INJECTION INTRAMUSCULAR; INTRAVENOUS; SUBCUTANEOUS at 05:50

## 2024-01-03 RX ADMIN — Medication 2 SPRAY(S): at 05:11

## 2024-01-03 RX ADMIN — Medication 100 MILLIGRAM(S): at 13:04

## 2024-01-03 RX ADMIN — SODIUM CHLORIDE 4 MILLILITER(S): 9 INJECTION INTRAMUSCULAR; INTRAVENOUS; SUBCUTANEOUS at 18:14

## 2024-01-03 RX ADMIN — ATORVASTATIN CALCIUM 80 MILLIGRAM(S): 80 TABLET, FILM COATED ORAL at 21:55

## 2024-01-03 RX ADMIN — Medication 100 MILLIGRAM(S): at 21:55

## 2024-01-03 RX ADMIN — Medication 40 MILLIGRAM(S): at 05:11

## 2024-01-03 RX ADMIN — Medication 81 MILLIGRAM(S): at 12:06

## 2024-01-03 RX ADMIN — Medication 2 SPRAY(S): at 17:18

## 2024-01-03 RX ADMIN — CELECOXIB 200 MILLIGRAM(S): 200 CAPSULE ORAL at 12:05

## 2024-01-03 RX ADMIN — Medication 1: at 12:08

## 2024-01-03 RX ADMIN — Medication 2 SPRAY(S): at 21:50

## 2024-01-03 RX ADMIN — CEFEPIME 100 MILLIGRAM(S): 1 INJECTION, POWDER, FOR SOLUTION INTRAMUSCULAR; INTRAVENOUS at 06:17

## 2024-01-03 RX ADMIN — Medication 3 MILLILITER(S): at 11:42

## 2024-01-03 RX ADMIN — Medication 110 MILLIGRAM(S): at 17:18

## 2024-01-03 RX ADMIN — Medication 3 MILLILITER(S): at 18:13

## 2024-01-03 RX ADMIN — GABAPENTIN 600 MILLIGRAM(S): 400 CAPSULE ORAL at 21:55

## 2024-01-03 NOTE — PROGRESS NOTE ADULT - ASSESSMENT
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 Being treated for URI outpatient. Hypoxic to 84% on RA,  CT head without acute pathology  CTA H/N - bilateral ICA calcifications with ectasia of proximal R ICA and L cerivcal ICA fusiform aneurysm 1.4cm, R V4 narrowing  CTP motion degraded  CXR showed Multifocal PNA  Routine eeg with diffuse slowing    Impression:  AMS likely TME - infectious, metabolic, possible polypharmacy. Low suspicion for neurovascular etiology, seizure  SLE  PE on xarelto    Plan:  - On Xarelto  - EEG as above  - MRI no acute findings  - would hold sedating meds  - Some asterixis on exam likely 2/2 polypharmacy-consider lowering Gabapentin to 300 mg qhs

## 2024-01-03 NOTE — CHART NOTE - NSCHARTNOTEFT_GEN_A_CORE
Pt admitted last month for changes in mental status thought to be due to CVA/TIA. Now presented again c AMS; pt is confused, lethargic;  reported an unresponsive episode at home PTA. Pt has hx of chronic residual left-sided weakness as well as slurred speech, Per MBS (12/19) SLP recommended regular diet consistency c thin liquids.  MRI c no acute findings; per neurology note AMS likely toxic encephalopathy; infectious, metabolic, possible poly pharmacy;  low suspicion for neurovascular etiology or seizure.    Factors impacting intake: [ ] none [ ] nausea  [ ] vomiting [ ] diarrhea [ ] constipation  [ ]chewing problems [ ] swallowing issues  [ X] other: AMS; difficulty self-feeding, lethargy    Diet Prescription: Diet, DASH/TLC:   Sodium & Cholesterol Restricted  Consistent Carbohydrate {Evening Snack} (12-17-23 @ 10:22)    Intake:   pt consume 51-75% most meals; drinking supplement ([employer] Glucose Control x 1/day provides 300 kcal, 16 g protein)    Current Weight:   113.3 kg (1/3); previous wt 112.6 kg (12/24)  % Weight Change:  wt stable x 10 days    No edema noted since 12/30    Pertinent Medications: MEDICATIONS  (STANDING):  albuterol/ipratropium for Nebulization 3 milliLiter(s) Nebulizer every 6 hours  aspirin enteric coated 81 milliGRAM(s) Oral daily  atorvastatin 80 milliGRAM(s) Oral at bedtime  benzonatate 100 milliGRAM(s) Oral every 8 hours  celecoxib 200 milliGRAM(s) Oral daily  dextrose 5%. 1000 milliLiter(s) (50 mL/Hr) IV Continuous <Continuous>  dextrose 5%. 1000 milliLiter(s) (100 mL/Hr) IV Continuous <Continuous>  dextrose 50% Injectable 25 Gram(s) IV Push once  dextrose 50% Injectable 12.5 Gram(s) IV Push once  dextrose 50% Injectable 25 Gram(s) IV Push once  doxycycline IVPB      doxycycline IVPB 100 milliGRAM(s) IV Intermittent every 12 hours  DULoxetine 60 milliGRAM(s) Oral daily  gabapentin 600 milliGRAM(s) Oral at bedtime  glucagon  Injectable 1 milliGRAM(s) IntraMuscular once  influenza   Vaccine 0.5 milliLiter(s) IntraMuscular once  insulin lispro (ADMELOG) corrective regimen sliding scale   SubCutaneous three times a day before meals  insulin lispro (ADMELOG) corrective regimen sliding scale   SubCutaneous at bedtime  montelukast 10 milliGRAM(s) Oral daily  predniSONE   Tablet 40 milliGRAM(s) Oral daily  rivaroxaban 20 milliGRAM(s) Oral with dinner  sodium chloride 0.65% Nasal 2 Spray(s) Both Nostrils every 6 hours  sodium chloride 3%  Inhalation 4 milliLiter(s) Inhalation every 6 hours  sucralfate 1 Gram(s) Oral every 6 hours  verapamil  milliGRAM(s) Oral daily    MEDICATIONS  (PRN):  acetaminophen     Tablet .. 650 milliGRAM(s) Oral every 6 hours PRN Temp greater or equal to 38C (100.4F), Mild Pain (1 - 3)  albuterol    90 MICROgram(s) HFA Inhaler 1 Puff(s) Inhalation every 4 hours PRN Shortness of Breath and/or Wheezing  dextrose Oral Gel 15 Gram(s) Oral once PRN Blood Glucose LESS THAN 70 milliGRAM(s)/deciliter  guaifenesin/dextromethorphan Oral Liquid 10 milliLiter(s) Oral every 6 hours PRN Cough  melatonin 3 milliGRAM(s) Oral at bedtime PRN Insomnia    Pertinent Labs: 01-02 Na141 mmol/L Glu 132 mg/dL<H> K+ 3.2 mmol/L<L> Cr  0.67 mg/dL BUN 16 mg/dL 12-31 Phos 2.4 mg/dL<L> 12-18 Chol 91 mg/dL LDL --    HDL 46 mg/dL<L> Trig 52 mg/dL  01-02 POCT:  154, 200, 150, 120; 11-18 A1c 6.1%    Skin:   no pressure ulcers noted    Estimated Needs:   [X ] no change since previous assessment (12/24)  [ ] recalculated:     Previous Nutrition Diagnosis:   [X ] Inadequate Energy Intake  Etiology:  acute metabolic encephalopathy, acute respiratory failure, PNA  Signs & Symptoms:  Pt consume <75% nutrition needs >7 days    GOAL:  Pt to consume 50-75% of meals/supplements - not consistently met at this time    Nutrition Diagnosis is [x ] ongoing  [ ] resolved [ ] not applicable     New Nutrition Diagnosis: [x ] not applicable      Interventions:   continue current diet rx as noted  Recommend  [ ] Change Diet To:  [ ] Nutrition Supplement  [ ] Nutrition Support  [ ] Other:     Monitoring and Evaluation:   [ X] PO intake [ x ] Tolerance to diet prescription [ x ] weights [ x ] labs[ x ] follow up per protocol  [ ] other: Pt admitted last month for changes in mental status thought to be due to CVA/TIA. Now presented again c AMS; pt is confused, lethargic;  reported an unresponsive episode at home PTA. Pt has hx of chronic residual left-sided weakness as well as slurred speech, Per MBS (12/19) SLP recommended regular diet consistency c thin liquids.  MRI c no acute findings; per neurology note AMS likely toxic encephalopathy; infectious, metabolic, possible poly pharmacy;  low suspicion for neurovascular etiology or seizure.    Factors impacting intake: [ ] none [ ] nausea  [ ] vomiting [ ] diarrhea [ ] constipation  [ ]chewing problems [ ] swallowing issues  [ X] other: AMS; difficulty self-feeding, lethargy    Diet Prescription: Diet, DASH/TLC:   Sodium & Cholesterol Restricted  Consistent Carbohydrate {Evening Snack} (12-17-23 @ 10:22)    Intake:   pt consume 51-75% most meals; drinking supplement ([employer] Glucose Control x 1/day provides 300 kcal, 16 g protein)    Current Weight:   113.3 kg (1/3); previous wt 112.6 kg (12/24)  % Weight Change:  wt stable x 10 days    No edema noted since 12/30    Pertinent Medications: MEDICATIONS  (STANDING):  albuterol/ipratropium for Nebulization 3 milliLiter(s) Nebulizer every 6 hours  aspirin enteric coated 81 milliGRAM(s) Oral daily  atorvastatin 80 milliGRAM(s) Oral at bedtime  benzonatate 100 milliGRAM(s) Oral every 8 hours  celecoxib 200 milliGRAM(s) Oral daily  dextrose 5%. 1000 milliLiter(s) (50 mL/Hr) IV Continuous <Continuous>  dextrose 5%. 1000 milliLiter(s) (100 mL/Hr) IV Continuous <Continuous>  dextrose 50% Injectable 25 Gram(s) IV Push once  dextrose 50% Injectable 12.5 Gram(s) IV Push once  dextrose 50% Injectable 25 Gram(s) IV Push once  doxycycline IVPB      doxycycline IVPB 100 milliGRAM(s) IV Intermittent every 12 hours  DULoxetine 60 milliGRAM(s) Oral daily  gabapentin 600 milliGRAM(s) Oral at bedtime  glucagon  Injectable 1 milliGRAM(s) IntraMuscular once  influenza   Vaccine 0.5 milliLiter(s) IntraMuscular once  insulin lispro (ADMELOG) corrective regimen sliding scale   SubCutaneous three times a day before meals  insulin lispro (ADMELOG) corrective regimen sliding scale   SubCutaneous at bedtime  montelukast 10 milliGRAM(s) Oral daily  predniSONE   Tablet 40 milliGRAM(s) Oral daily  rivaroxaban 20 milliGRAM(s) Oral with dinner  sodium chloride 0.65% Nasal 2 Spray(s) Both Nostrils every 6 hours  sodium chloride 3%  Inhalation 4 milliLiter(s) Inhalation every 6 hours  sucralfate 1 Gram(s) Oral every 6 hours  verapamil  milliGRAM(s) Oral daily    MEDICATIONS  (PRN):  acetaminophen     Tablet .. 650 milliGRAM(s) Oral every 6 hours PRN Temp greater or equal to 38C (100.4F), Mild Pain (1 - 3)  albuterol    90 MICROgram(s) HFA Inhaler 1 Puff(s) Inhalation every 4 hours PRN Shortness of Breath and/or Wheezing  dextrose Oral Gel 15 Gram(s) Oral once PRN Blood Glucose LESS THAN 70 milliGRAM(s)/deciliter  guaifenesin/dextromethorphan Oral Liquid 10 milliLiter(s) Oral every 6 hours PRN Cough  melatonin 3 milliGRAM(s) Oral at bedtime PRN Insomnia    Pertinent Labs: 01-02 Na141 mmol/L Glu 132 mg/dL<H> K+ 3.2 mmol/L<L> Cr  0.67 mg/dL BUN 16 mg/dL 12-31 Phos 2.4 mg/dL<L> 12-18 Chol 91 mg/dL LDL --    HDL 46 mg/dL<L> Trig 52 mg/dL  01-02 POCT:  154, 200, 150, 120; 11-18 A1c 6.1%    Skin:   no pressure ulcers noted    Estimated Needs:   [X ] no change since previous assessment (12/24)  [ ] recalculated:     Previous Nutrition Diagnosis:   [X ] Inadequate Energy Intake  Etiology:  acute metabolic encephalopathy, acute respiratory failure, PNA  Signs & Symptoms:  Pt consume <75% nutrition needs >7 days    GOAL:  Pt to consume 50-75% of meals/supplements - not consistently met at this time    Nutrition Diagnosis is [x ] ongoing  [ ] resolved [ ] not applicable     New Nutrition Diagnosis: [x ] not applicable      Interventions:   continue current diet rx as noted  Recommend  [ ] Change Diet To:  [ ] Nutrition Supplement  [ ] Nutrition Support  [ ] Other:     Monitoring and Evaluation:   [ X] PO intake [ x ] Tolerance to diet prescription [ x ] weights [ x ] labs[ x ] follow up per protocol  [ ] other:

## 2024-01-03 NOTE — PROGRESS NOTE ADULT - NS ATTEND AMEND GEN_ALL_CORE FT
pt seen and examined with NP    weaned to RA yesterday  was placed back on 2L NC due to complaints of SOB and coughing fit   weaned back down to RA again today O2 sat > 90%  cough is dry  repeat viral nasal swab now with + flu A, covid negative, had multiple prior RVPs that were negative  she did have exposure to family visiting with URI symptoms and cough   afebrile  exam clear without wheeze    63F PMH obesity, HTN, DM type 2, SLE, hx of GBS s/p IVIG (required intubation), 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 Found to have bilateral diffuse ground glass opacities consistent with multifocal PNA on chest imaging. Tested + for Mycoplasma. Hypoxic necessitating increased O2 requirements during hospital course. Now weaning down O2. Hospital course complicated by exposure to COVID + roommate. Pt now with Flu A    Dx: acute hypoxic respiratory failure, multifocal PNA due to mycoplasma pneumonia, acute asthma exacerbation in setting of bacterial PNA/viral infection, influenza A      - had CT chest 12/25 showing multifocal PNA with diffuse bilateral GGO. primary team repeated CT chest non contrast 12/28 with again multifocal ground glass opacities consistent with PNA. would not anticipate drastic change in imaging of chest in such short duration in time  - sent for CTA chest 1/1/24 per primary team and CTA negative for PE, mild improvement in GGO, but with noted traction bronchiectasis  - would repeat CT chest in 6 weeks to assess for lung parenchyma, chest imaging may take several weeks to clear  - she had CT chest done 7/2019 which did not have any findings of GGO/traction bronchiectasis. Findings on current imaging likely reflect her infection/PNA    - she completed initial 7 day course of azithromycin for mycoplasma PNA but remained symptomatic  - seen by ID and placed on doxycycline to extend treatment for mycoplasma and started on cefepime for gram negative bacterial PNA / HCAP coverage, procalcitonin up trended since admission.   - to completed 7 day course of cefepime today  - remains on doxycycline  - between azithromycin and doxycycline she has had 14 day treatment for mycoplasma. follow up with ID regarding antibx duration  - sputum cx with normal respiratory hossein  - completed a course of steroids earlier, currently being monitored off systemic steroids as wheeze improved/resolved  - cont antitussives tessalon perles, hycodan  - change duonebs to prn, add ICS/LABA/LAMA combo symbicort + spiriva for underlying asthma/reactive airway  - goal to maintain O2 sat > 90%  - weaned off 2L NC to RA with O2 sat > 90%   - incentive spirometer, OOB to chair  - cont xarelto for underlying hx of PE  -  who had been visiting pt is reported by patient to have been sick for past week due to flu, he last visited her on 1/2. (?if pt contracted influenza from sick contact with family member)  - start tamiflu and complete 5 day course  - as she was also exposed to roommate who tested + COVID, her initial covid swab post exposure is negative for COVID, however would need repeat COVID swab in a few days

## 2024-01-03 NOTE — PROGRESS NOTE ADULT - SUBJECTIVE AND OBJECTIVE BOX
Patient is a 63y old  Female who presents with a chief complaint of AMS, HCAP, Acute Hypoxic respiratory failure (2024 10:17)    INTERVAL HPI/OVERNIGHT EVENTS: NAEON    MEDICATIONS  (STANDING):  albuterol/ipratropium for Nebulization 3 milliLiter(s) Nebulizer every 6 hours  aspirin enteric coated 81 milliGRAM(s) Oral daily  atorvastatin 80 milliGRAM(s) Oral at bedtime  benzonatate 100 milliGRAM(s) Oral every 8 hours  celecoxib 200 milliGRAM(s) Oral daily  dextrose 5%. 1000 milliLiter(s) (100 mL/Hr) IV Continuous <Continuous>  dextrose 5%. 1000 milliLiter(s) (50 mL/Hr) IV Continuous <Continuous>  dextrose 50% Injectable 25 Gram(s) IV Push once  dextrose 50% Injectable 12.5 Gram(s) IV Push once  dextrose 50% Injectable 25 Gram(s) IV Push once  doxycycline IVPB 100 milliGRAM(s) IV Intermittent every 12 hours  doxycycline IVPB      DULoxetine 60 milliGRAM(s) Oral daily  gabapentin 600 milliGRAM(s) Oral at bedtime  glucagon  Injectable 1 milliGRAM(s) IntraMuscular once  hydrocodone/homatropine Syrup 5 milliLiter(s) Oral every 6 hours  influenza   Vaccine 0.5 milliLiter(s) IntraMuscular once  insulin lispro (ADMELOG) corrective regimen sliding scale   SubCutaneous three times a day before meals  insulin lispro (ADMELOG) corrective regimen sliding scale   SubCutaneous at bedtime  montelukast 10 milliGRAM(s) Oral daily  predniSONE   Tablet 40 milliGRAM(s) Oral daily  rivaroxaban 20 milliGRAM(s) Oral with dinner  sodium chloride 0.65% Nasal 2 Spray(s) Both Nostrils every 6 hours  sodium chloride 3%  Inhalation 4 milliLiter(s) Inhalation every 6 hours  sucralfate 1 Gram(s) Oral every 6 hours  verapamil  milliGRAM(s) Oral daily    MEDICATIONS  (PRN):  acetaminophen     Tablet .. 650 milliGRAM(s) Oral every 6 hours PRN Temp greater or equal to 38C (100.4F), Mild Pain (1 - 3)  albuterol    90 MICROgram(s) HFA Inhaler 1 Puff(s) Inhalation every 4 hours PRN Shortness of Breath and/or Wheezing  dextrose Oral Gel 15 Gram(s) Oral once PRN Blood Glucose LESS THAN 70 milliGRAM(s)/deciliter  guaifenesin/dextromethorphan Oral Liquid 10 milliLiter(s) Oral every 6 hours PRN Cough  melatonin 3 milliGRAM(s) Oral at bedtime PRN Insomnia    Allergies    No Known Allergies    Intolerances    dislikes Glucerna Shake (Other)    REVIEW OF SYSTEMS:  All other systems reviewed and are negative    Vital Signs Last 24 Hrs  T(C): 36.7 (2024 15:36), Max: 37 (2024 04:46)  T(F): 98.1 (2024 15:36), Max: 98.6 (2024 04:46)  HR: 115 (2024 15:36) (88 - 115)  BP: 134/79 (2024 15:36) (132/81 - 159/85)  BP(mean): --  RR: 18 (2024 15:36) (18 - 18)  SpO2: 95% (2024 15:36) (94% - 97%)    Parameters below as of 2024 15:36  Patient On (Oxygen Delivery Method): room air      Daily     Daily Weight in k.3 (2024 04:46)  I&O's Summary    2024 07:01  -  2024 07:00  --------------------------------------------------------  IN: 660 mL / OUT: 0 mL / NET: 660 mL    2024 07:01  -  2024 18:18  --------------------------------------------------------  IN: 480 mL / OUT: 1 mL / NET: 479 mL      CAPILLARY BLOOD GLUCOSE      POCT Blood Glucose.: 155 mg/dL (2024 16:14)  POCT Blood Glucose.: 191 mg/dL (2024 11:45)  POCT Blood Glucose.: 129 mg/dL (2024 07:40)  POCT Blood Glucose.: 120 mg/dL (2024 21:13)    PHYSICAL EXAM:  General: comfortable, no acute distress  HEENT: Atraumatic, no LAD, trachea midline, PERRLA  Cardiovascular: normal s1s2, no murmurs, gallops or fricition rubs  Pulmonary: clear to ausculation Bilaterally, no wheezing , rhonchi  Gastrointestinal: soft non tender non distended, no masses felt, no organomegally  Muscloskeletal: no lower extremity edema, intact bilateral lower extremity pulses  Neurological: CN II-12 intact. No focal weakness  Psychiatrical: normal mood, cooperative  SKIN: no rash, lesions or ulcers    Labs                          8.1    9.88  )-----------( 468      ( 2024 07:45 )             26.0     -    141  |  105  |  16  ----------------------------<  132<H>  3.2<L>   |  30  |  0.67    Ca    9.2      2024 07:45            Urinalysis Basic - ( 2024 07:45 )    Color: x / Appearance: x / SG: x / pH: x  Gluc: 132 mg/dL / Ketone: x  / Bili: x / Urobili: x   Blood: x / Protein: x / Nitrite: x   Leuk Esterase: x / RBC: x / WBC x   Sq Epi: x / Non Sq Epi: x / Bacteria: x                  DVT prophylaxis: > Lovenox 40mg SQ daily  > Heparin   > SCD's

## 2024-01-03 NOTE — PROGRESS NOTE ADULT - SUBJECTIVE AND OBJECTIVE BOX
INTERVAL HPI/OVERNIGHT EVENTS: Yesterday was weaned to RA. Overnight complained of SOB and cough placed back on 2L for comfort. Afebrile.     SUBJECTIVE: Patient seen and examined at bedside.   ROS: All negative except as listed above.    VITAL SIGNS:  ICU Vital Signs Last 24 Hrs  T(C): 37 (03 Jan 2024 04:46), Max: 37 (03 Jan 2024 04:46)  T(F): 98.6 (03 Jan 2024 04:46), Max: 98.6 (03 Jan 2024 04:46)  HR: 104 (03 Jan 2024 05:57) (87 - 110)  BP: 146/77 (03 Jan 2024 04:46) (126/75 - 159/85)  BP(mean): --  ABP: --  ABP(mean): --  RR: 18 (03 Jan 2024 04:46) (18 - 19)  SpO2: 94% (03 Jan 2024 05:57) (94% - 97%)    O2 Parameters below as of 03 Jan 2024 05:57  Patient On (Oxygen Delivery Method): room air            Plateau pressure:   P/F ratio:     01-02 @ 07:01  -  01-03 @ 07:00  --------------------------------------------------------  IN: 660 mL / OUT: 0 mL / NET: 660 mL      CAPILLARY BLOOD GLUCOSE      POCT Blood Glucose.: 129 mg/dL (03 Jan 2024 07:40)      ECG: reviewed.    PHYSICAL EXAM:  GENERAL: obese female, NAD, coughing, gets dyspneic with speaking  HEAD:  Atraumatic, Normocephalic  EYES: EOMI, PERRLA, conjunctiva and sclera clear  ENMT: No tonsillar erythema, exudates, or enlargement; Moist mucous membranes, No lesions  NECK: Supple, No JVD  NERVOUS SYSTEM:  Alert & Oriented X3, Good concentration; moves all extremities spontaenously  CHEST/LUNG: CTA NO wheezing + coughing episodes   HEART: Regular rate and rhythm; No murmurs, rubs, or gallops  ABDOMEN: Soft, Nontender, Nondistended; Bowel sounds present  EXTREMITIES:  2+ Peripheral Pulses, No clubbing, cyanosis, or edema  LYMPH: No lymphadenopathy noted  SKIN: No rashes or lesions    MEDICATIONS:  MEDICATIONS  (STANDING):  albuterol/ipratropium for Nebulization 3 milliLiter(s) Nebulizer every 6 hours  aspirin enteric coated 81 milliGRAM(s) Oral daily  atorvastatin 80 milliGRAM(s) Oral at bedtime  benzonatate 100 milliGRAM(s) Oral every 8 hours  cefepime   IVPB 1000 milliGRAM(s) IV Intermittent every 8 hours  celecoxib 200 milliGRAM(s) Oral daily  dextrose 5%. 1000 milliLiter(s) (50 mL/Hr) IV Continuous <Continuous>  dextrose 5%. 1000 milliLiter(s) (100 mL/Hr) IV Continuous <Continuous>  dextrose 50% Injectable 25 Gram(s) IV Push once  dextrose 50% Injectable 12.5 Gram(s) IV Push once  dextrose 50% Injectable 25 Gram(s) IV Push once  doxycycline IVPB      doxycycline IVPB 100 milliGRAM(s) IV Intermittent every 12 hours  DULoxetine 60 milliGRAM(s) Oral daily  gabapentin 600 milliGRAM(s) Oral at bedtime  glucagon  Injectable 1 milliGRAM(s) IntraMuscular once  influenza   Vaccine 0.5 milliLiter(s) IntraMuscular once  insulin lispro (ADMELOG) corrective regimen sliding scale   SubCutaneous three times a day before meals  insulin lispro (ADMELOG) corrective regimen sliding scale   SubCutaneous at bedtime  montelukast 10 milliGRAM(s) Oral daily  predniSONE   Tablet 40 milliGRAM(s) Oral daily  rivaroxaban 20 milliGRAM(s) Oral with dinner  sodium chloride 0.65% Nasal 2 Spray(s) Both Nostrils every 6 hours  sodium chloride 3%  Inhalation 4 milliLiter(s) Inhalation every 6 hours  sucralfate 1 Gram(s) Oral every 6 hours  verapamil  milliGRAM(s) Oral daily    MEDICATIONS  (PRN):  acetaminophen     Tablet .. 650 milliGRAM(s) Oral every 6 hours PRN Temp greater or equal to 38C (100.4F), Mild Pain (1 - 3)  albuterol    90 MICROgram(s) HFA Inhaler 1 Puff(s) Inhalation every 4 hours PRN Shortness of Breath and/or Wheezing  dextrose Oral Gel 15 Gram(s) Oral once PRN Blood Glucose LESS THAN 70 milliGRAM(s)/deciliter  guaifenesin/dextromethorphan Oral Liquid 10 milliLiter(s) Oral every 6 hours PRN Cough  melatonin 3 milliGRAM(s) Oral at bedtime PRN Insomnia      ALLERGIES:  Allergies    No Known Allergies    Intolerances    dislikes Glucerna Shake (Other)      LABS:                        8.1    9.88  )-----------( 468      ( 02 Jan 2024 07:45 )             26.0     01-02    141  |  105  |  16  ----------------------------<  132<H>  3.2<L>   |  30  |  0.67    Ca    9.2      02 Jan 2024 07:45        Urinalysis Basic - ( 02 Jan 2024 07:45 )    Color: x / Appearance: x / SG: x / pH: x  Gluc: 132 mg/dL / Ketone: x  / Bili: x / Urobili: x   Blood: x / Protein: x / Nitrite: x   Leuk Esterase: x / RBC: x / WBC x   Sq Epi: x / Non Sq Epi: x / Bacteria: x      ABG:      vBG:    Micro:    Culture - Blood (collected 12-22-23 @ 16:15)  Source: .Blood Blood-Peripheral  Final Report (12-27-23 @ 22:01):    No growth at 5 days    Culture - Blood (collected 12-22-23 @ 16:00)  Source: .Blood Blood-Peripheral  Final Report (12-27-23 @ 22:01):    No growth at 5 days        Culture - Sputum (collected 12-27-23 @ 00:30)  Source: .Sputum Sputum  Gram Stain (12-28-23 @ 22:02):    Few Squamous epithelial cells per low power field    Few polymorphonuclear leukocytes per low power field    Moderate Yeast per oil power field    Few Gram positive cocci in pairs per oil power field  Final Report (12-28-23 @ 22:02):    Normal Respiratory Valarie present        RADIOLOGY & ADDITIONAL TESTS: Reviewed. INTERVAL HPI/OVERNIGHT EVENTS: Yesterday was weaned to RA. Overnight complained of SOB and cough placed back on 2L for comfort. Afebrile.  RVP positive for influenza +     SUBJECTIVE: Patient seen and examined at bedside.   ROS: All negative except as listed above.    VITAL SIGNS:  ICU Vital Signs Last 24 Hrs  T(C): 37 (03 Jan 2024 04:46), Max: 37 (03 Jan 2024 04:46)  T(F): 98.6 (03 Jan 2024 04:46), Max: 98.6 (03 Jan 2024 04:46)  HR: 104 (03 Jan 2024 05:57) (87 - 110)  BP: 146/77 (03 Jan 2024 04:46) (126/75 - 159/85)  BP(mean): --  ABP: --  ABP(mean): --  RR: 18 (03 Jan 2024 04:46) (18 - 19)  SpO2: 94% (03 Jan 2024 05:57) (94% - 97%)    O2 Parameters below as of 03 Jan 2024 05:57  Patient On (Oxygen Delivery Method): room air            Plateau pressure:   P/F ratio:     01-02 @ 07:01  -  01-03 @ 07:00  --------------------------------------------------------  IN: 660 mL / OUT: 0 mL / NET: 660 mL      CAPILLARY BLOOD GLUCOSE      POCT Blood Glucose.: 129 mg/dL (03 Jan 2024 07:40)      ECG: reviewed.    PHYSICAL EXAM:  GENERAL: obese female, NAD, coughing, gets dyspneic with speaking  HEAD:  Atraumatic, Normocephalic  EYES: EOMI, PERRLA, conjunctiva and sclera clear  ENMT: No tonsillar erythema, exudates, or enlargement; Moist mucous membranes, No lesions  NECK: Supple, No JVD  NERVOUS SYSTEM:  Alert & Oriented X3, Good concentration; moves all extremities spontaenously  CHEST/LUNG: CTA NO wheezing + coughing episodes   HEART: Regular rate and rhythm; No murmurs, rubs, or gallops  ABDOMEN: Soft, Nontender, Nondistended; Bowel sounds present  EXTREMITIES:  2+ Peripheral Pulses, No clubbing, cyanosis, or edema  LYMPH: No lymphadenopathy noted  SKIN: No rashes or lesions    MEDICATIONS:  MEDICATIONS  (STANDING):  albuterol/ipratropium for Nebulization 3 milliLiter(s) Nebulizer every 6 hours  aspirin enteric coated 81 milliGRAM(s) Oral daily  atorvastatin 80 milliGRAM(s) Oral at bedtime  benzonatate 100 milliGRAM(s) Oral every 8 hours  cefepime   IVPB 1000 milliGRAM(s) IV Intermittent every 8 hours  celecoxib 200 milliGRAM(s) Oral daily  dextrose 5%. 1000 milliLiter(s) (50 mL/Hr) IV Continuous <Continuous>  dextrose 5%. 1000 milliLiter(s) (100 mL/Hr) IV Continuous <Continuous>  dextrose 50% Injectable 25 Gram(s) IV Push once  dextrose 50% Injectable 12.5 Gram(s) IV Push once  dextrose 50% Injectable 25 Gram(s) IV Push once  doxycycline IVPB      doxycycline IVPB 100 milliGRAM(s) IV Intermittent every 12 hours  DULoxetine 60 milliGRAM(s) Oral daily  gabapentin 600 milliGRAM(s) Oral at bedtime  glucagon  Injectable 1 milliGRAM(s) IntraMuscular once  influenza   Vaccine 0.5 milliLiter(s) IntraMuscular once  insulin lispro (ADMELOG) corrective regimen sliding scale   SubCutaneous three times a day before meals  insulin lispro (ADMELOG) corrective regimen sliding scale   SubCutaneous at bedtime  montelukast 10 milliGRAM(s) Oral daily  predniSONE   Tablet 40 milliGRAM(s) Oral daily  rivaroxaban 20 milliGRAM(s) Oral with dinner  sodium chloride 0.65% Nasal 2 Spray(s) Both Nostrils every 6 hours  sodium chloride 3%  Inhalation 4 milliLiter(s) Inhalation every 6 hours  sucralfate 1 Gram(s) Oral every 6 hours  verapamil  milliGRAM(s) Oral daily    MEDICATIONS  (PRN):  acetaminophen     Tablet .. 650 milliGRAM(s) Oral every 6 hours PRN Temp greater or equal to 38C (100.4F), Mild Pain (1 - 3)  albuterol    90 MICROgram(s) HFA Inhaler 1 Puff(s) Inhalation every 4 hours PRN Shortness of Breath and/or Wheezing  dextrose Oral Gel 15 Gram(s) Oral once PRN Blood Glucose LESS THAN 70 milliGRAM(s)/deciliter  guaifenesin/dextromethorphan Oral Liquid 10 milliLiter(s) Oral every 6 hours PRN Cough  melatonin 3 milliGRAM(s) Oral at bedtime PRN Insomnia      ALLERGIES:  Allergies    No Known Allergies    Intolerances    dislikes Glucerna Shake (Other)      LABS:                        8.1    9.88  )-----------( 468      ( 02 Jan 2024 07:45 )             26.0     01-02    141  |  105  |  16  ----------------------------<  132<H>  3.2<L>   |  30  |  0.67    Ca    9.2      02 Jan 2024 07:45        Urinalysis Basic - ( 02 Jan 2024 07:45 )    Color: x / Appearance: x / SG: x / pH: x  Gluc: 132 mg/dL / Ketone: x  / Bili: x / Urobili: x   Blood: x / Protein: x / Nitrite: x   Leuk Esterase: x / RBC: x / WBC x   Sq Epi: x / Non Sq Epi: x / Bacteria: x      ABG:      vBG:    Micro:    Culture - Blood (collected 12-22-23 @ 16:15)  Source: .Blood Blood-Peripheral  Final Report (12-27-23 @ 22:01):    No growth at 5 days    Culture - Blood (collected 12-22-23 @ 16:00)  Source: .Blood Blood-Peripheral  Final Report (12-27-23 @ 22:01):    No growth at 5 days        Culture - Sputum (collected 12-27-23 @ 00:30)  Source: .Sputum Sputum  Gram Stain (12-28-23 @ 22:02):    Few Squamous epithelial cells per low power field    Few polymorphonuclear leukocytes per low power field    Moderate Yeast per oil power field    Few Gram positive cocci in pairs per oil power field  Final Report (12-28-23 @ 22:02):    Normal Respiratory Valarie present        RADIOLOGY & ADDITIONAL TESTS: Reviewed. INTERVAL HPI/OVERNIGHT EVENTS: Yesterday was weaned to RA. Overnight complained of SOB and cough placed back on 2L for comfort. Afebrile.  + FLU A    SUBJECTIVE: Patient seen and examined at bedside.   ROS: All negative except as listed above.    VITAL SIGNS:  Vital Signs Last 24 Hrs  T(C): 37 (03 Jan 2024 04:46), Max: 37 (03 Jan 2024 04:46)  T(F): 98.6 (03 Jan 2024 04:46), Max: 98.6 (03 Jan 2024 04:46)  HR: 104 (03 Jan 2024 05:57) (87 - 110)  BP: 146/77 (03 Jan 2024 04:46) (126/75 - 159/85)  RR: 18 (03 Jan 2024 04:46) (18 - 19)  SpO2: 94% (03 Jan 2024 05:57) (94% - 97%)    O2 Parameters below as of 03 Jan 2024 05:57  Patient On (Oxygen Delivery Method): room air          CAPILLARY BLOOD GLUCOSE  POCT Blood Glucose.: 129 mg/dL (03 Jan 2024 07:40)          PHYSICAL EXAM:  GENERAL: obese female, NAD, coughing, gets dyspneic with speaking  HEAD:  Atraumatic, Normocephalic  EYES: EOMI, PERRLA, conjunctiva and sclera clear  ENMT: No tonsillar erythema, exudates, or enlargement; Moist mucous membranes, No lesions  NECK: Supple, No JVD  NERVOUS SYSTEM:  Alert & Oriented X3, Good concentration; moves all extremities spontaneously  CHEST/LUNG: CTA NO wheezing + coughing episodes   HEART: Regular rate and rhythm; No murmurs, rubs, or gallops  ABDOMEN: Soft, Nontender, Nondistended; Bowel sounds present  EXTREMITIES:  2+ Peripheral Pulses, No clubbing, cyanosis, or edema  LYMPH: No lymphadenopathy noted  SKIN: No rashes or lesions    MEDICATIONS:  MEDICATIONS  (STANDING):  albuterol/ipratropium for Nebulization 3 milliLiter(s) Nebulizer every 6 hours  aspirin enteric coated 81 milliGRAM(s) Oral daily  atorvastatin 80 milliGRAM(s) Oral at bedtime  benzonatate 100 milliGRAM(s) Oral every 8 hours  cefepime   IVPB 1000 milliGRAM(s) IV Intermittent every 8 hours  celecoxib 200 milliGRAM(s) Oral daily  dextrose 5%. 1000 milliLiter(s) (50 mL/Hr) IV Continuous <Continuous>  dextrose 5%. 1000 milliLiter(s) (100 mL/Hr) IV Continuous <Continuous>  dextrose 50% Injectable 25 Gram(s) IV Push once  dextrose 50% Injectable 12.5 Gram(s) IV Push once  dextrose 50% Injectable 25 Gram(s) IV Push once  doxycycline IVPB      doxycycline IVPB 100 milliGRAM(s) IV Intermittent every 12 hours  DULoxetine 60 milliGRAM(s) Oral daily  gabapentin 600 milliGRAM(s) Oral at bedtime  glucagon  Injectable 1 milliGRAM(s) IntraMuscular once  influenza   Vaccine 0.5 milliLiter(s) IntraMuscular once  insulin lispro (ADMELOG) corrective regimen sliding scale   SubCutaneous three times a day before meals  insulin lispro (ADMELOG) corrective regimen sliding scale   SubCutaneous at bedtime  montelukast 10 milliGRAM(s) Oral daily  predniSONE   Tablet 40 milliGRAM(s) Oral daily  rivaroxaban 20 milliGRAM(s) Oral with dinner  sodium chloride 0.65% Nasal 2 Spray(s) Both Nostrils every 6 hours  sodium chloride 3%  Inhalation 4 milliLiter(s) Inhalation every 6 hours  sucralfate 1 Gram(s) Oral every 6 hours  verapamil  milliGRAM(s) Oral daily    MEDICATIONS  (PRN):  acetaminophen     Tablet .. 650 milliGRAM(s) Oral every 6 hours PRN Temp greater or equal to 38C (100.4F), Mild Pain (1 - 3)  albuterol    90 MICROgram(s) HFA Inhaler 1 Puff(s) Inhalation every 4 hours PRN Shortness of Breath and/or Wheezing  dextrose Oral Gel 15 Gram(s) Oral once PRN Blood Glucose LESS THAN 70 milliGRAM(s)/deciliter  guaifenesin/dextromethorphan Oral Liquid 10 milliLiter(s) Oral every 6 hours PRN Cough  melatonin 3 milliGRAM(s) Oral at bedtime PRN Insomnia        Allergies  No Known Allergies    Intolerances  dislikes Glucerna Shake (Other)      LABS:                        8.1    9.88  )-----------( 468      ( 02 Jan 2024 07:45 )             26.0     01-02    141  |  105  |  16  ----------------------------<  132<H>  3.2<L>   |  30  |  0.67    Ca    9.2      02 Jan 2024 07:45      Respiratory Viral Panel with COVID-19 by NETTIE (12.25.23 @ 10:15)   Rapid RVP Result: [facility]  SARS-CoV-2: [facility]      Mycoplasma Pneumoniae IgM Antibody (12.18.23 @ 07:02)   Mycoplasma IgM AB: 1.23 Index  Mycoplasma: Positive        Micro:    Culture - Blood (collected 12-22-23 @ 16:15)  Source: .Blood Blood-Peripheral  Final Report (12-27-23 @ 22:01):    No growth at 5 days    Culture - Blood (collected 12-22-23 @ 16:00)  Source: .Blood Blood-Peripheral  Final Report (12-27-23 @ 22:01):    No growth at 5 days        Culture - Sputum (collected 12-27-23 @ 00:30)  Source: .Sputum Sputum  Gram Stain (12-28-23 @ 22:02):    Few Squamous epithelial cells per low power field    Few polymorphonuclear leukocytes per low power field    Moderate Yeast per oil power field    Few Gram positive cocci in pairs per oil power field  Final Report (12-28-23 @ 22:02):    Normal Respiratory Valarie present        RADIOLOGY & ADDITIONAL TESTS: Reviewed. INTERVAL HPI/OVERNIGHT EVENTS: Yesterday was weaned to RA. Overnight complained of SOB and cough placed back on 2L for comfort. Afebrile.  + FLU A    SUBJECTIVE: Patient seen and examined at bedside.   ROS: All negative except as listed above.    VITAL SIGNS:  Vital Signs Last 24 Hrs  T(C): 37 (03 Jan 2024 04:46), Max: 37 (03 Jan 2024 04:46)  T(F): 98.6 (03 Jan 2024 04:46), Max: 98.6 (03 Jan 2024 04:46)  HR: 104 (03 Jan 2024 05:57) (87 - 110)  BP: 146/77 (03 Jan 2024 04:46) (126/75 - 159/85)  RR: 18 (03 Jan 2024 04:46) (18 - 19)  SpO2: 94% (03 Jan 2024 05:57) (94% - 97%)    O2 Parameters below as of 03 Jan 2024 05:57  Patient On (Oxygen Delivery Method): room air          CAPILLARY BLOOD GLUCOSE  POCT Blood Glucose.: 129 mg/dL (03 Jan 2024 07:40)          PHYSICAL EXAM:  GENERAL: obese female, NAD, coughing, gets dyspneic with speaking  HEAD:  Atraumatic, Normocephalic  EYES: EOMI, PERRLA, conjunctiva and sclera clear  ENMT: No tonsillar erythema, exudates, or enlargement; Moist mucous membranes, No lesions  NECK: Supple, No JVD  NERVOUS SYSTEM:  Alert & Oriented X3, Good concentration; moves all extremities spontaneously  CHEST/LUNG: CTA NO wheezing + coughing episodes   HEART: Regular rate and rhythm; No murmurs, rubs, or gallops  ABDOMEN: Soft, Nontender, Nondistended; Bowel sounds present  EXTREMITIES:  2+ Peripheral Pulses, No clubbing, cyanosis, or edema  LYMPH: No lymphadenopathy noted  SKIN: No rashes or lesions    MEDICATIONS:  MEDICATIONS  (STANDING):  albuterol/ipratropium for Nebulization 3 milliLiter(s) Nebulizer every 6 hours  aspirin enteric coated 81 milliGRAM(s) Oral daily  atorvastatin 80 milliGRAM(s) Oral at bedtime  benzonatate 100 milliGRAM(s) Oral every 8 hours  cefepime   IVPB 1000 milliGRAM(s) IV Intermittent every 8 hours  celecoxib 200 milliGRAM(s) Oral daily  dextrose 5%. 1000 milliLiter(s) (50 mL/Hr) IV Continuous <Continuous>  dextrose 5%. 1000 milliLiter(s) (100 mL/Hr) IV Continuous <Continuous>  dextrose 50% Injectable 25 Gram(s) IV Push once  dextrose 50% Injectable 12.5 Gram(s) IV Push once  dextrose 50% Injectable 25 Gram(s) IV Push once  doxycycline IVPB      doxycycline IVPB 100 milliGRAM(s) IV Intermittent every 12 hours  DULoxetine 60 milliGRAM(s) Oral daily  gabapentin 600 milliGRAM(s) Oral at bedtime  glucagon  Injectable 1 milliGRAM(s) IntraMuscular once  influenza   Vaccine 0.5 milliLiter(s) IntraMuscular once  insulin lispro (ADMELOG) corrective regimen sliding scale   SubCutaneous three times a day before meals  insulin lispro (ADMELOG) corrective regimen sliding scale   SubCutaneous at bedtime  montelukast 10 milliGRAM(s) Oral daily  predniSONE   Tablet 40 milliGRAM(s) Oral daily  rivaroxaban 20 milliGRAM(s) Oral with dinner  sodium chloride 0.65% Nasal 2 Spray(s) Both Nostrils every 6 hours  sodium chloride 3%  Inhalation 4 milliLiter(s) Inhalation every 6 hours  sucralfate 1 Gram(s) Oral every 6 hours  verapamil  milliGRAM(s) Oral daily    MEDICATIONS  (PRN):  acetaminophen     Tablet .. 650 milliGRAM(s) Oral every 6 hours PRN Temp greater or equal to 38C (100.4F), Mild Pain (1 - 3)  albuterol    90 MICROgram(s) HFA Inhaler 1 Puff(s) Inhalation every 4 hours PRN Shortness of Breath and/or Wheezing  dextrose Oral Gel 15 Gram(s) Oral once PRN Blood Glucose LESS THAN 70 milliGRAM(s)/deciliter  guaifenesin/dextromethorphan Oral Liquid 10 milliLiter(s) Oral every 6 hours PRN Cough  melatonin 3 milliGRAM(s) Oral at bedtime PRN Insomnia        Allergies  No Known Allergies    Intolerances  dislikes Glucerna Shake (Other)      LABS:                        8.1    9.88  )-----------( 468      ( 02 Jan 2024 07:45 )             26.0     01-02    141  |  105  |  16  ----------------------------<  132<H>  3.2<L>   |  30  |  0.67    Ca    9.2      02 Jan 2024 07:45      Respiratory Viral Panel with COVID-19 by NETTIE (12.25.23 @ 10:15)   [facility]: [facility]  [facility]2: [facility]      Mycoplasma Pneumoniae IgM Antibody (12.18.23 @ 07:02)   Mycoplasma IgM AB: 1.23 Index  Mycoplasma: Positive        Micro:    Culture - Blood (collected 12-22-23 @ 16:15)  Source: .Blood Blood-Peripheral  Final Report (12-27-23 @ 22:01):    No growth at 5 days    Culture - Blood (collected 12-22-23 @ 16:00)  Source: .Blood Blood-Peripheral  Final Report (12-27-23 @ 22:01):    No growth at 5 days        Culture - Sputum (collected 12-27-23 @ 00:30)  Source: .Sputum Sputum  Gram Stain (12-28-23 @ 22:02):    Few Squamous epithelial cells per low power field    Few polymorphonuclear leukocytes per low power field    Moderate Yeast per oil power field    Few Gram positive cocci in pairs per oil power field  Final Report (12-28-23 @ 22:02):    Normal Respiratory Valarie present        RADIOLOGY & ADDITIONAL TESTS: Reviewed.

## 2024-01-03 NOTE — PROGRESS NOTE ADULT - SUBJECTIVE AND OBJECTIVE BOX
Neurology Progress Note    S: Patient seen and examined. No new events overnight.     MEDICATIONS:    acetaminophen     Tablet .. 650 milliGRAM(s) Oral every 6 hours PRN  albuterol    90 MICROgram(s) HFA Inhaler 1 Puff(s) Inhalation every 4 hours PRN  albuterol/ipratropium for Nebulization 3 milliLiter(s) Nebulizer every 6 hours  aspirin enteric coated 81 milliGRAM(s) Oral daily  atorvastatin 80 milliGRAM(s) Oral at bedtime  benzonatate 100 milliGRAM(s) Oral every 8 hours  cefepime   IVPB 1000 milliGRAM(s) IV Intermittent every 8 hours  celecoxib 200 milliGRAM(s) Oral daily  dextrose 5%. 1000 milliLiter(s) IV Continuous <Continuous>  dextrose 5%. 1000 milliLiter(s) IV Continuous <Continuous>  dextrose 50% Injectable 25 Gram(s) IV Push once  dextrose 50% Injectable 12.5 Gram(s) IV Push once  dextrose 50% Injectable 25 Gram(s) IV Push once  dextrose Oral Gel 15 Gram(s) Oral once PRN  doxycycline IVPB      doxycycline IVPB 100 milliGRAM(s) IV Intermittent every 12 hours  DULoxetine 60 milliGRAM(s) Oral daily  gabapentin 600 milliGRAM(s) Oral at bedtime  glucagon  Injectable 1 milliGRAM(s) IntraMuscular once  guaifenesin/dextromethorphan Oral Liquid 10 milliLiter(s) Oral every 6 hours PRN  influenza   Vaccine 0.5 milliLiter(s) IntraMuscular once  insulin lispro (ADMELOG) corrective regimen sliding scale   SubCutaneous three times a day before meals  insulin lispro (ADMELOG) corrective regimen sliding scale   SubCutaneous at bedtime  melatonin 3 milliGRAM(s) Oral at bedtime PRN  montelukast 10 milliGRAM(s) Oral daily  predniSONE   Tablet 40 milliGRAM(s) Oral daily  rivaroxaban 20 milliGRAM(s) Oral with dinner  sodium chloride 0.65% Nasal 2 Spray(s) Both Nostrils every 6 hours  sodium chloride 3%  Inhalation 4 milliLiter(s) Inhalation every 6 hours  sucralfate 1 Gram(s) Oral every 6 hours  verapamil  milliGRAM(s) Oral daily      LABS:                          8.1    9.88  )-----------( 468      ( 02 Jan 2024 07:45 )             26.0     01-02    141  |  105  |  16  ----------------------------<  132<H>  3.2<L>   |  30  |  0.67    Ca    9.2      02 Jan 2024 07:45      CAPILLARY BLOOD GLUCOSE      POCT Blood Glucose.: 129 mg/dL (03 Jan 2024 07:40)  POCT Blood Glucose.: 120 mg/dL (02 Jan 2024 21:13)  POCT Blood Glucose.: 150 mg/dL (02 Jan 2024 16:49)  POCT Blood Glucose.: 200 mg/dL (02 Jan 2024 11:45)      Urinalysis Basic - ( 02 Jan 2024 07:45 )    Color: x / Appearance: x / SG: x / pH: x  Gluc: 132 mg/dL / Ketone: x  / Bili: x / Urobili: x   Blood: x / Protein: x / Nitrite: x   Leuk Esterase: x / RBC: x / WBC x   Sq Epi: x / Non Sq Epi: x / Bacteria: x      I&O's Summary    02 Jan 2024 07:01  -  03 Jan 2024 07:00  --------------------------------------------------------  IN: 660 mL / OUT: 0 mL / NET: 660 mL      Vital Signs Last 24 Hrs  T(C): 37 (03 Jan 2024 04:46), Max: 37 (03 Jan 2024 04:46)  T(F): 98.6 (03 Jan 2024 04:46), Max: 98.6 (03 Jan 2024 04:46)  HR: 104 (03 Jan 2024 05:57) (87 - 110)  BP: 146/77 (03 Jan 2024 04:46) (126/75 - 159/85)  BP(mean): --  RR: 18 (03 Jan 2024 04:46) (18 - 19)  SpO2: 94% (03 Jan 2024 05:57) (94% - 97%)    Parameters below as of 03 Jan 2024 05:57  Patient On (Oxygen Delivery Method): room air          General Exam:   General Appearance: Appropriately dressed and in no acute distress       Head: Normocephalic, atraumatic and no dysmorphic features  Ear, Nose, and Throat: Moist mucous membranes  CVS: S1S2+  Resp: No SOB, no wheeze or rhonchi  Abd: soft NTND  Extremities: No edema, no cyanosis  Skin: No bruises, no rashes     Neurological Exam:  Mental Status: Awake, alert and oriented x 3.  Able to follow simple verbal commands. Able to name and repeat. Speech is fluent but slow  Cranial Nerves: PERRL, EOMI, VFFC, sensation V1-V3 intact,  no obvious facial asymmetry, equal elevation of palate, scm/trap 5/5, tongue is midline on protrusion.. hearing is grossly intact.   Motor: Normal bulk, tone. JIANG, LLE slightly weaker but poor efffort  Sensation: Intact to light touch and pinprick throughout. no right/left confusion. no extinction to tactile on DSS.   Reflexes: 1+ throughout at biceps, brachioradialis, triceps, patellars and ankles bilaterally and equal. No clonus. R toe and L toe were both downgoing.  Coordination: No dysmetria on FNF   Gait: deferred      I personally reviewed the below data/images/labs:      LABS:    < from: CT Brain Stroke Protocol (12.17.23 @ 04:51) >    ACC: [id number] EXAM:  CT BRAIN STROKE PROTOCOL   ORDERED BY: MINA CONROY     PROCEDURE DATE:  12/17/2023          INTERPRETATION:  CLINICAL INFORMATION:  Stroke Code    TECHNIQUE:  Axial CT images were acquired through the head.  Intravenouscontrast: None  Two-dimensional reformats were generated.    COMPARISON STUDY: MRI brain 11/17/2023, CTA head and neck 11/17/2023.    FINDINGS:    There is no CT evidence of acute intracranial hemorrhage, mass effect,   midline shift, or acute, largeterritorial infarct.  The ventricles and   sulci are age-appropriate in size and configuration. The basal cisterns   are patent.    The mastoid air cells and middle ear cavities are grossly clear. The   visualized paranasal sinuses are well aerated.    The calvarium and skull base are grossly intact.    IMPRESSION:  No acute intracranial hemorrhage or mass effect.    Results were discussed with Dr. Conroy by Dr. Gastelum at 4:50 AM on   12/17/2023. with read back followed.    < end of copied text >        < from: CT Angio Neck Stroke Protocol w/ IV Cont (12.17.23 @ 05:12) >    ACC: [id number] EXAM:  CT ANGIO NECK STROKE PROTCL IC   ORDERED BY: MINA CONROY     ACC: [id number] EXAM:  CT BRAIN PERFUSION MAPS STROKE   ORDERED BY: MINA CONROY     ACC: [id number] EXAM:  CT ANGIO BRAIN STROKE PROTC IC   ORDERED BY: MINA CONROY     PROCEDURE DATE:  12/17/2023          INTERPRETATION:  CT PERFUSION, CTA OF THE [place] AND NECK:    INDICATIONS: Stroke code.    TECHNIQUE:    RAPID artificial intelligence was used for perfusion analysis and for   preliminary evaluation of intracranial hemorrhage.    CTA [place]:    After the intravenous power injection of non-ionic contrast material,   serial thin sections were obtained through the intracranial circulation   on a multislice CT scanner.  Images were reformatted using a dedicated 3D   software package and viewed on a dedicated workstation in multiple planes.    CTA NECK:    After the intravenous power injection of non-ionic contrast material,   serial thin sections were obtained through the cervical circulation on a   multislice CT scanner.  Images were reformatted using a dedicated 3D   software package and viewed on a dedicated workstation in multiple planes.      COMPARISON EXAMINATION: Noncontrast head CT performed the same day. CTA   head and neck 11/17/2023    FINDINGS:      CT RAPID PERFUSION:  Markedly degraded by patient motion.    INFARCT CORE: 0 cc    TISSUE AT RISK: 8 cc combined in the left frontal and right temporal lobes    MISMATCH RATIO: N/A      CTA [place]:    ANTERIOR CIRCULATION    ICA  CAVERNOUS, SUPRACLINOID, BIFURCATION SEGMENTS: Patent without flow   limiting stenosis. Atherosclerotic calcifications of the bilateral   cavernous ICA segments.    ANTERIOR CEREBRAL ARTERIES: Bilateral A1, anterior communicating and A2   anterior cerebral arteries are unremarkable in course and caliber without   flow limiting stenosis. Hypoplastic right A1 segment.    MIDDLE CEREBRAL ARTERIES: Patent bilateral M1, M2, and distal MCA   branches without flow limiting stenosis.    POSTERIOR CIRCULATION:    VERTEBRAL ARTERIES: Patent without flow limiting stenosis. Mild focal   narrowing of the right V4 segment.    BASILAR ARTERY: Patent no flow limiting stenosis.    POSTERIOR CEREBRAL ARTERIES: Patent without flow limiting stenosis.    CTA NECK:    GREAT VESSELS: Visualized segments are patent, no flow limiting stenosis.   Bovine aortic arch, normal variant.    COMMON CAROTID ARTERIES:  RIGHT: Patent without flow limiting stenosis  LEFT: Patent without flow limiting stenosis    CAROTID BULBS:  RIGHT: Patent without flow limiting stenosis  LEFT: Patent without flow limiting stenosis    INTERNAL CAROTID ARTERIES:  RIGHT: Patent no evidence for any hemodynamically significant stenosis at   the ICA origin by NASCET criteria. Stable mild ectasia of the proximal   cervical ICA measuring 0.9 cm in diameter. Partial retropharyngeal course.  LEFT: Patent no evidence for any hemodynamically significant stenosis at   the ICA origin by NASCET criteria. Stable fusiform aneurysmal dilatation   of the proximal cervical ICA measuring 1.4 cm in diameter and gradually   tapering in the mid segment. Partial retropharyngeal course.    VERTEBRAL ARTERIES:    RIGHT: Patent no evidence for any flow limiting stenosis.  LEFT: Patent no evidence for any flow limiting stenosis. Left dominant   vertebral system.      SOFT TISSUES: Patchy nonspecific groundglass opacities and consolidations   in the visualized upper lobes.    BONES: Multilevel degenerative changes inthe spine.    IMPRESSION:    CT PERFUSION: Markedly degraded by patient motion. No core infarct. Small   areas of abnormal perfusion in the left frontal and right temporal lobes   not confined to a vascular territory, likely artifactual.    If symptoms persist consider follow up head CT or MRI, MRA  if no   contraindication.    CTA COW:  Patent intracranial circulation without flow limiting stenosis.    CTA NECK: Patent, ECAs, ICAs, no  hemodynamically significant stenosis at    ICA origins by NASCET criteria. Stable fusiform aneurysmal dilatation of   the ICA origins/proximal segments.  Bilateral vertebral arteries are patent without flow limiting stenosis.    Patchy nonspecific groundglass and consolidations in the visualized upper   lobes.    --- End of Report ---      < end of copied text >    < from: MR Head No Cont (12.18.23 @ 12:52) >  IMPRESSION: No acute territorial infarcts are seen    < end of copied text >      EEG Classification / Summary:  Abnormal routine EEG in the awake state.  Mild diffuse slowing.  No epileptiform abnormalities.  Artifacts somewhat limit interpretation.    Clinical Impression:  Mild diffuse cerebral dysfunction is nonspecific in etiology.   Artifacts somewhat limit interpretation.   Neurology Progress Note    S: Patient seen and examined. No new events overnight.     MEDICATIONS:    acetaminophen     Tablet .. 650 milliGRAM(s) Oral every 6 hours PRN  albuterol    90 MICROgram(s) HFA Inhaler 1 Puff(s) Inhalation every 4 hours PRN  albuterol/ipratropium for Nebulization 3 milliLiter(s) Nebulizer every 6 hours  aspirin enteric coated 81 milliGRAM(s) Oral daily  atorvastatin 80 milliGRAM(s) Oral at bedtime  benzonatate 100 milliGRAM(s) Oral every 8 hours  cefepime   IVPB 1000 milliGRAM(s) IV Intermittent every 8 hours  celecoxib 200 milliGRAM(s) Oral daily  dextrose 5%. 1000 milliLiter(s) IV Continuous <Continuous>  dextrose 5%. 1000 milliLiter(s) IV Continuous <Continuous>  dextrose 50% Injectable 25 Gram(s) IV Push once  dextrose 50% Injectable 12.5 Gram(s) IV Push once  dextrose 50% Injectable 25 Gram(s) IV Push once  dextrose Oral Gel 15 Gram(s) Oral once PRN  doxycycline IVPB      doxycycline IVPB 100 milliGRAM(s) IV Intermittent every 12 hours  DULoxetine 60 milliGRAM(s) Oral daily  gabapentin 600 milliGRAM(s) Oral at bedtime  glucagon  Injectable 1 milliGRAM(s) IntraMuscular once  guaifenesin/dextromethorphan Oral Liquid 10 milliLiter(s) Oral every 6 hours PRN  influenza   Vaccine 0.5 milliLiter(s) IntraMuscular once  insulin lispro (ADMELOG) corrective regimen sliding scale   SubCutaneous three times a day before meals  insulin lispro (ADMELOG) corrective regimen sliding scale   SubCutaneous at bedtime  melatonin 3 milliGRAM(s) Oral at bedtime PRN  montelukast 10 milliGRAM(s) Oral daily  predniSONE   Tablet 40 milliGRAM(s) Oral daily  rivaroxaban 20 milliGRAM(s) Oral with dinner  sodium chloride 0.65% Nasal 2 Spray(s) Both Nostrils every 6 hours  sodium chloride 3%  Inhalation 4 milliLiter(s) Inhalation every 6 hours  sucralfate 1 Gram(s) Oral every 6 hours  verapamil  milliGRAM(s) Oral daily      LABS:                          8.1    9.88  )-----------( 468      ( 02 Jan 2024 07:45 )             26.0     01-02    141  |  105  |  16  ----------------------------<  132<H>  3.2<L>   |  30  |  0.67    Ca    9.2      02 Jan 2024 07:45      CAPILLARY BLOOD GLUCOSE      POCT Blood Glucose.: 129 mg/dL (03 Jan 2024 07:40)  POCT Blood Glucose.: 120 mg/dL (02 Jan 2024 21:13)  POCT Blood Glucose.: 150 mg/dL (02 Jan 2024 16:49)  POCT Blood Glucose.: 200 mg/dL (02 Jan 2024 11:45)      Urinalysis Basic - ( 02 Jan 2024 07:45 )    Color: x / Appearance: x / SG: x / pH: x  Gluc: 132 mg/dL / Ketone: x  / Bili: x / Urobili: x   Blood: x / Protein: x / Nitrite: x   Leuk Esterase: x / RBC: x / WBC x   Sq Epi: x / Non Sq Epi: x / Bacteria: x      I&O's Summary    02 Jan 2024 07:01  -  03 Jan 2024 07:00  --------------------------------------------------------  IN: 660 mL / OUT: 0 mL / NET: 660 mL      Vital Signs Last 24 Hrs  T(C): 37 (03 Jan 2024 04:46), Max: 37 (03 Jan 2024 04:46)  T(F): 98.6 (03 Jan 2024 04:46), Max: 98.6 (03 Jan 2024 04:46)  HR: 104 (03 Jan 2024 05:57) (87 - 110)  BP: 146/77 (03 Jan 2024 04:46) (126/75 - 159/85)  BP(mean): --  RR: 18 (03 Jan 2024 04:46) (18 - 19)  SpO2: 94% (03 Jan 2024 05:57) (94% - 97%)    Parameters below as of 03 Jan 2024 05:57  Patient On (Oxygen Delivery Method): room air          General Exam:   General Appearance: Appropriately dressed and in no acute distress       Head: Normocephalic, atraumatic and no dysmorphic features  Ear, Nose, and Throat: Moist mucous membranes  CVS: S1S2+  Resp: No SOB, no wheeze or rhonchi  Abd: soft NTND  Extremities: No edema, no cyanosis  Skin: No bruises, no rashes     Neurological Exam:  Mental Status: Awake, alert and oriented x 3.  Able to follow simple verbal commands. Able to name and repeat. Speech is fluent but slow  Cranial Nerves: PERRL, EOMI, VFFC, sensation V1-V3 intact,  no obvious facial asymmetry, equal elevation of palate, scm/trap 5/5, tongue is midline on protrusion.. hearing is grossly intact.   Motor: Normal bulk, tone. JIANG, LLE slightly weaker but poor efffort  Sensation: Intact to light touch and pinprick throughout. no right/left confusion. no extinction to tactile on DSS.   Reflexes: 1+ throughout at biceps, brachioradialis, triceps, patellars and ankles bilaterally and equal. No clonus. R toe and L toe were both downgoing.  Coordination: No dysmetria on FNF   Gait: deferred      I personally reviewed the below data/images/labs:      LABS:    < from: CT Brain Stroke Protocol (12.17.23 @ 04:51) >    ACC: [id number] EXAM:  CT BRAIN STROKE PROTOCOL   ORDERED BY: MINA CONROY     PROCEDURE DATE:  12/17/2023          INTERPRETATION:  CLINICAL INFORMATION:  Stroke Code    TECHNIQUE:  Axial CT images were acquired through the head.  Intravenouscontrast: None  Two-dimensional reformats were generated.    COMPARISON STUDY: MRI brain 11/17/2023, CTA head and neck 11/17/2023.    FINDINGS:    There is no CT evidence of acute intracranial hemorrhage, mass effect,   midline shift, or acute, largeterritorial infarct.  The ventricles and   sulci are age-appropriate in size and configuration. The basal cisterns   are patent.    The mastoid air cells and middle ear cavities are grossly clear. The   visualized paranasal sinuses are well aerated.    The calvarium and skull base are grossly intact.    IMPRESSION:  No acute intracranial hemorrhage or mass effect.    Results were discussed with Dr. Conroy by Dr. Gastelum at 4:50 AM on   12/17/2023. with read back followed.    < end of copied text >        < from: CT Angio Neck Stroke Protocol w/ IV Cont (12.17.23 @ 05:12) >    ACC: [id number] EXAM:  CT ANGIO NECK STROKE PROTCL IC   ORDERED BY: MINA CONROY     ACC: [id number] EXAM:  CT BRAIN PERFUSION MAPS STROKE   ORDERED BY: MINA CONROY     ACC: [id number] EXAM:  CT ANGIO BRAIN STROKE PROTC IC   ORDERED BY: MINA CONROY     PROCEDURE DATE:  12/17/2023          INTERPRETATION:  CT PERFUSION, CTA OF THE [place] OF [place] AND NECK:    INDICATIONS: Stroke code.    TECHNIQUE:    RAPID artificial intelligence was used for perfusion analysis and for   preliminary evaluation of intracranial hemorrhage.    CTA [place]:    After the intravenous power injection of non-ionic contrast material,   serial thin sections were obtained through the intracranial circulation   on a multislice CT scanner.  Images were reformatted using a dedicated 3D   software package and viewed on a dedicated workstation in multiple planes.    CTA NECK:    After the intravenous power injection of non-ionic contrast material,   serial thin sections were obtained through the cervical circulation on a   multislice CT scanner.  Images were reformatted using a dedicated 3D   software package and viewed on a dedicated workstation in multiple planes.      COMPARISON EXAMINATION: Noncontrast head CT performed the same day. CTA   head and neck 11/17/2023    FINDINGS:      CT RAPID PERFUSION:  Markedly degraded by patient motion.    INFARCT CORE: 0 cc    TISSUE AT RISK: 8 cc combined in the left frontal and right temporal lobes    MISMATCH RATIO: N/A      CTA [place]:    ANTERIOR CIRCULATION    ICA  CAVERNOUS, SUPRACLINOID, BIFURCATION SEGMENTS: Patent without flow   limiting stenosis. Atherosclerotic calcifications of the bilateral   cavernous ICA segments.    ANTERIOR CEREBRAL ARTERIES: Bilateral A1, anterior communicating and A2   anterior cerebral arteries are unremarkable in course and caliber without   flow limiting stenosis. Hypoplastic right A1 segment.    MIDDLE CEREBRAL ARTERIES: Patent bilateral M1, M2, and distal MCA   branches without flow limiting stenosis.    POSTERIOR CIRCULATION:    VERTEBRAL ARTERIES: Patent without flow limiting stenosis. Mild focal   narrowing of the right V4 segment.    BASILAR ARTERY: Patent no flow limiting stenosis.    POSTERIOR CEREBRAL ARTERIES: Patent without flow limiting stenosis.    CTA NECK:    GREAT VESSELS: Visualized segments are patent, no flow limiting stenosis.   Bovine aortic arch, normal variant.    COMMON CAROTID ARTERIES:  RIGHT: Patent without flow limiting stenosis  LEFT: Patent without flow limiting stenosis    CAROTID BULBS:  RIGHT: Patent without flow limiting stenosis  LEFT: Patent without flow limiting stenosis    INTERNAL CAROTID ARTERIES:  RIGHT: Patent no evidence for any hemodynamically significant stenosis at   the ICA origin by NASCET criteria. Stable mild ectasia of the proximal   cervical ICA measuring 0.9 cm in diameter. Partial retropharyngeal course.  LEFT: Patent no evidence for any hemodynamically significant stenosis at   the ICA origin by NASCET criteria. Stable fusiform aneurysmal dilatation   of the proximal cervical ICA measuring 1.4 cm in diameter and gradually   tapering in the mid segment. Partial retropharyngeal course.    VERTEBRAL ARTERIES:    RIGHT: Patent no evidence for any flow limiting stenosis.  LEFT: Patent no evidence for any flow limiting stenosis. Left dominant   vertebral system.      SOFT TISSUES: Patchy nonspecific groundglass opacities and consolidations   in the visualized upper lobes.    BONES: Multilevel degenerative changes inthe spine.    IMPRESSION:    CT PERFUSION: Markedly degraded by patient motion. No core infarct. Small   areas of abnormal perfusion in the left frontal and right temporal lobes   not confined to a vascular territory, likely artifactual.    If symptoms persist consider follow up head CT or MRI, MRA  if no   contraindication.    CTA COW:  Patent intracranial circulation without flow limiting stenosis.    CTA NECK: Patent, ECAs, ICAs, no  hemodynamically significant stenosis at    ICA origins by NASCET criteria. Stable fusiform aneurysmal dilatation of   the ICA origins/proximal segments.  Bilateral vertebral arteries are patent without flow limiting stenosis.    Patchy nonspecific groundglass and consolidations in the visualized upper   lobes.    --- End of Report ---      < end of copied text >    < from: MR Head No Cont (12.18.23 @ 12:52) >  IMPRESSION: No acute territorial infarcts are seen    < end of copied text >      EEG Classification / Summary:  Abnormal routine EEG in the awake state.  Mild diffuse slowing.  No epileptiform abnormalities.  Artifacts somewhat limit interpretation.    Clinical Impression:  Mild diffuse cerebral dysfunction is nonspecific in etiology.   Artifacts somewhat limit interpretation.

## 2024-01-03 NOTE — PROGRESS NOTE ADULT - ASSESSMENT
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 Of note pt recently developed URI symptoms and was started on Doxy as well as Hycodan, Alprazolam and Fioricet. Of note pt already on Percocet, flexeril for chronic pain, Gabapentin for seizures per  all which she took. Pt being admitted for Acute hypoxic respiratory failure due to Multifocal PNA, AMS r/o TIA/CVA  vs metabolic encephalopathy secondary to polypharmacy and/or hypoxia.    #Acute metabolic encephalopathy   #Acute respiratory failure with multifocal pneumonia  #Flu positive   AMS likely Toxic, polypharmacy. Acute stroke is ruled out. EEG did not show any epileptiform activity.   - S/p iv zosyn , completed 5 days   - Added Azithromycin, + mycoplasma: course completed: for persistent symptoms. Stopped cefepime now on doxy   - Mental status baseline patient AAO3  although RRT for acute worsening hypoxemia; ;likely due to new pna  echo 12/18: reviewed  - Continue Cough meds, albuterol PRN  - Flu positive - start tamiflu     # PE  - c/w Xarelto     # SLE  - C/w prednisone    # HTN/HLD  - controlled with Verapemil, statin    # DM type 2. controlled.   - FS qAC and HS w/ SSI    #Weakness   PT eval>>[facility]: patient refusing wants to go home: will attempt for home 02    Diet: DASH  DVT prophylaxis: Xarelto   Dispo: Admit   Code Status:

## 2024-01-03 NOTE — PROGRESS NOTE ADULT - ASSESSMENT
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 as well as slurred speech, Seizure disorder, admitted with respiratory failure 2/2 myocplasma pneumonia     Dx: acute hypoxic respiratory failure 2/2 mycoplasma pneumonia    - O2 titrated down to 2 L maintaining ( for comfort) > 92%  - CT chest consistent with multifocal pneumonia, rep[eat imaging with improvement   - Mycoplasma IgM positive, completed 7 days of azithromycin  - cefepime started 12/27 / doxycycline added 12/28 for further mycoplasma coverage - per ID, appreciate recs   - cont duonebs q6hr/hypersal nebs for airway clearance and encourage incentive spirometry  - continue pred 40mg PO  - patient with episodes of coughing @ which point desats slightly, will continue hycodan Q 6 and tesslon perle Q8 standing to suppress cough   - continue humidified O2   - will consider adding Symbicort to regiment  - Requires outpatient pulmonary FUP. Please call [phone] or email  [email or web address] for an appointment at the Pulmonary office ([address], [address], [place], [place]).     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 as well as slurred speech, Seizure disorder, admitted with respiratory failure 2/2 myocplasma pneumonia     Dx: acute hypoxic respiratory failure 2/2 mycoplasma pneumonia    - O2 titrated down to 2 L maintaining ( for comfort) > 92%  - CT chest consistent with multifocal pneumonia, rep[eat imaging with improvement   - Mycoplasma IgM positive, completed 7 days of azithromycin  - cefepime started 12/27 / doxycycline added 12/28 for further mycoplasma coverage - per ID, appreciate recs   - cont duonebs q6hr/hypersal nebs for airway clearance and encourage incentive spirometry  - continue pred 40mg PO  - patient with episodes of coughing @ which point desats slightly, will continue hycodan Q 6 and tesslon perle Q8 standing to suppress cough   - continue humidified O2   - will consider adding Symbicort to regiment  - Requires outpatient pulmonary FUP. Please call [phone] or email  [email or web address] for an appointment at the Pulmonary office ([address], [address], [place], [place]).     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 as well as slurred speech, Seizure disorder, admitted with respiratory failure 2/2 myocplasma pneumonia     Dx: acute hypoxic respiratory failure 2/2 mycoplasma pneumonia    - O2 titrated down to 2 L maintaining ( for comfort) > 92%, on RA o2 saturation > 90%  - please obtain an ambulatory saturation   - CT chest consistent with multifocal pneumonia, rep[eat imaging with improvement   - Mycoplasma IgM positive, completed 7 days of azithromycin  - cefepime started 12/27 / doxycycline added 12/28 for further mycoplasma coverage - per ID, appreciate recs   - cont duonebs q6hr/hypersal nebs for airway clearance and encourage incentive spirometry  - patient with episodes of coughing @ which point desats slightly, will continue hycodan Q 6 and tesslon perle Q8 standing to suppress cough   - continue humidified O2   - will d/c prednisone and will be adding Symbicort & Spiriva to regimen  - Today flu + will start on tamiflu and provide supportive care, maintain isolation precautions   - Requires outpatient pulmonary FUP. Please call [phone] or email  [email or web address] for an appointment at the Pulmonary office ([address], [address], [place], [place]).     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 as well as slurred speech, Seizure disorder, admitted with respiratory failure 2/2 myocplasma pneumonia     Dx: acute hypoxic respiratory failure 2/2 mycoplasma pneumonia    - O2 titrated down to 2 L maintaining ( for comfort) > 92%, on RA o2 saturation > 90%  - please obtain an ambulatory saturation   - CT chest consistent with multifocal pneumonia, rep[eat imaging with improvement   - Mycoplasma IgM positive, completed 7 days of azithromycin  - cefepime started 12/27 / doxycycline added 12/28 for further mycoplasma coverage - per ID, appreciate recs   - cont duonebs q6hr/hypersal nebs for airway clearance and encourage incentive spirometry  - patient with episodes of coughing @ which point desats slightly, will continue hycodan Q 6 and tesslon perle Q8 standing to suppress cough   - continue humidified O2   - will d/c prednisone and will be adding Symbicort & Spiriva to regimen  - Today flu + will start on tamiflu and provide supportive care, maintain isolation precautions   - Requires outpatient pulmonary FUP. Please call [phone] or email  [email or web address] for an appointment at the Pulmonary office ([address], [address], [place], [place]).     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 as well as slurred speech, Seizure disorder, admitted with respiratory failure 2/2 mycoplasma pneumonia     Dx: acute hypoxic respiratory failure 2/2 mycoplasma pneumonia    - O2 titrated down to 2 L maintaining ( for comfort) > 92%, on RA o2 saturation > 90%  - please obtain an ambulatory saturation   - CT chest consistent with multifocal pneumonia, rep[eat imaging with improvement   - Mycoplasma IgM positive, completed 7 days of azithromycin  - cefepime started 12/27 / doxycycline added 12/28 for further mycoplasma coverage - per ID, appreciate recs   - cont duonebs q6hr/hypersal nebs for airway clearance and encourage incentive spirometry  - patient with episodes of coughing @ which point desats slightly, will continue hycodan Q 6 and tesslon perle Q8 standing to suppress cough   - continue humidified O2   - will d/c prednisone and will be adding Symbicort & Spiriva to regimen  - Today flu + will start on tamiflu and provide supportive care, maintain isolation precautions   - Requires outpatient pulmonary FUP.      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 as well as slurred speech, Seizure disorder, admitted with respiratory failure 2/2 mycoplasma pneumonia     Dx: acute hypoxic respiratory failure 2/2 mycoplasma pneumonia    - O2 titrated down to 2 L maintaining ( for comfort) > 92%, on RA o2 saturation > 90%  - please obtain an ambulatory saturation   - CT chest consistent with multifocal pneumonia, rep[eat imaging with improvement   - Mycoplasma IgM positive, completed 7 days of azithromycin  - cefepime started 12/27 / doxycycline added 12/28 for further mycoplasma coverage - per ID, appreciate recs   - cont duonebs q6hr/hypersal nebs for airway clearance and encourage incentive spirometry  - patient with episodes of coughing @ which point desats slightly, will continue hycodan Q 6 and tesslon perle Q8 standing to suppress cough   - continue humidified O2   - will d/c prednisone and will be adding Symbicort & Spiriva to regimen  - Today flu + will start on tamiflu and provide supportive care, maintain isolation precautions   - Requires outpatient pulmonary FUP.      SHADY attending Shazia

## 2024-01-03 NOTE — CHART NOTE - NSCHARTNOTEFT_GEN_A_CORE
Medicine Hospitalist PA    Was notified by RN pt roomate tested positive for COVID. Will place patient on droplet precaution for COVID exposure and order STAT RVP. Will endorse to MD to f/u 2-3 days for repeat RVP order if needed.

## 2024-01-04 LAB
ANION GAP SERPL CALC-SCNC: 3 MMOL/L — LOW (ref 5–17)
ANION GAP SERPL CALC-SCNC: 3 MMOL/L — LOW (ref 5–17)
BASOPHILS # BLD AUTO: 0.02 K/UL — SIGNIFICANT CHANGE UP (ref 0–0.2)
BASOPHILS # BLD AUTO: 0.02 K/UL — SIGNIFICANT CHANGE UP (ref 0–0.2)
BASOPHILS NFR BLD AUTO: 0.2 % — SIGNIFICANT CHANGE UP (ref 0–2)
BASOPHILS NFR BLD AUTO: 0.2 % — SIGNIFICANT CHANGE UP (ref 0–2)
BUN SERPL-MCNC: 22 MG/DL — SIGNIFICANT CHANGE UP (ref 7–23)
BUN SERPL-MCNC: 22 MG/DL — SIGNIFICANT CHANGE UP (ref 7–23)
CALCIUM SERPL-MCNC: 9.1 MG/DL — SIGNIFICANT CHANGE UP (ref 8.5–10.1)
CALCIUM SERPL-MCNC: 9.1 MG/DL — SIGNIFICANT CHANGE UP (ref 8.5–10.1)
CHLORIDE SERPL-SCNC: 106 MMOL/L — SIGNIFICANT CHANGE UP (ref 96–108)
CHLORIDE SERPL-SCNC: 106 MMOL/L — SIGNIFICANT CHANGE UP (ref 96–108)
CO2 SERPL-SCNC: 31 MMOL/L — SIGNIFICANT CHANGE UP (ref 22–31)
CO2 SERPL-SCNC: 31 MMOL/L — SIGNIFICANT CHANGE UP (ref 22–31)
CREAT SERPL-MCNC: 0.7 MG/DL — SIGNIFICANT CHANGE UP (ref 0.5–1.3)
CREAT SERPL-MCNC: 0.7 MG/DL — SIGNIFICANT CHANGE UP (ref 0.5–1.3)
EGFR: 97 ML/MIN/1.73M2 — SIGNIFICANT CHANGE UP
EGFR: 97 ML/MIN/1.73M2 — SIGNIFICANT CHANGE UP
EOSINOPHIL # BLD AUTO: 0.16 K/UL — SIGNIFICANT CHANGE UP (ref 0–0.5)
EOSINOPHIL # BLD AUTO: 0.16 K/UL — SIGNIFICANT CHANGE UP (ref 0–0.5)
EOSINOPHIL NFR BLD AUTO: 1.9 % — SIGNIFICANT CHANGE UP (ref 0–6)
EOSINOPHIL NFR BLD AUTO: 1.9 % — SIGNIFICANT CHANGE UP (ref 0–6)
GLUCOSE BLDC GLUCOMTR-MCNC: 127 MG/DL — HIGH (ref 70–99)
GLUCOSE BLDC GLUCOMTR-MCNC: 127 MG/DL — HIGH (ref 70–99)
GLUCOSE BLDC GLUCOMTR-MCNC: 139 MG/DL — HIGH (ref 70–99)
GLUCOSE BLDC GLUCOMTR-MCNC: 139 MG/DL — HIGH (ref 70–99)
GLUCOSE BLDC GLUCOMTR-MCNC: 158 MG/DL — HIGH (ref 70–99)
GLUCOSE BLDC GLUCOMTR-MCNC: 158 MG/DL — HIGH (ref 70–99)
GLUCOSE BLDC GLUCOMTR-MCNC: 190 MG/DL — HIGH (ref 70–99)
GLUCOSE BLDC GLUCOMTR-MCNC: 190 MG/DL — HIGH (ref 70–99)
GLUCOSE SERPL-MCNC: 120 MG/DL — HIGH (ref 70–99)
GLUCOSE SERPL-MCNC: 120 MG/DL — HIGH (ref 70–99)
HCT VFR BLD CALC: 27.4 % — LOW (ref 34.5–45)
HCT VFR BLD CALC: 27.4 % — LOW (ref 34.5–45)
HGB BLD-MCNC: 8.5 G/DL — LOW (ref 11.5–15.5)
HGB BLD-MCNC: 8.5 G/DL — LOW (ref 11.5–15.5)
IMM GRANULOCYTES NFR BLD AUTO: 3.6 % — HIGH (ref 0–0.9)
IMM GRANULOCYTES NFR BLD AUTO: 3.6 % — HIGH (ref 0–0.9)
LYMPHOCYTES # BLD AUTO: 1.17 K/UL — SIGNIFICANT CHANGE UP (ref 1–3.3)
LYMPHOCYTES # BLD AUTO: 1.17 K/UL — SIGNIFICANT CHANGE UP (ref 1–3.3)
LYMPHOCYTES # BLD AUTO: 13.7 % — SIGNIFICANT CHANGE UP (ref 13–44)
LYMPHOCYTES # BLD AUTO: 13.7 % — SIGNIFICANT CHANGE UP (ref 13–44)
MAGNESIUM SERPL-MCNC: 1.9 MG/DL — SIGNIFICANT CHANGE UP (ref 1.6–2.6)
MAGNESIUM SERPL-MCNC: 1.9 MG/DL — SIGNIFICANT CHANGE UP (ref 1.6–2.6)
MCHC RBC-ENTMCNC: 26.1 PG — LOW (ref 27–34)
MCHC RBC-ENTMCNC: 26.1 PG — LOW (ref 27–34)
MCHC RBC-ENTMCNC: 31 G/DL — LOW (ref 32–36)
MCHC RBC-ENTMCNC: 31 G/DL — LOW (ref 32–36)
MCV RBC AUTO: 84 FL — SIGNIFICANT CHANGE UP (ref 80–100)
MCV RBC AUTO: 84 FL — SIGNIFICANT CHANGE UP (ref 80–100)
MONOCYTES # BLD AUTO: 0.72 K/UL — SIGNIFICANT CHANGE UP (ref 0–0.9)
MONOCYTES # BLD AUTO: 0.72 K/UL — SIGNIFICANT CHANGE UP (ref 0–0.9)
MONOCYTES NFR BLD AUTO: 8.4 % — SIGNIFICANT CHANGE UP (ref 2–14)
MONOCYTES NFR BLD AUTO: 8.4 % — SIGNIFICANT CHANGE UP (ref 2–14)
NEUTROPHILS # BLD AUTO: 6.19 K/UL — SIGNIFICANT CHANGE UP (ref 1.8–7.4)
NEUTROPHILS # BLD AUTO: 6.19 K/UL — SIGNIFICANT CHANGE UP (ref 1.8–7.4)
NEUTROPHILS NFR BLD AUTO: 72.2 % — SIGNIFICANT CHANGE UP (ref 43–77)
NEUTROPHILS NFR BLD AUTO: 72.2 % — SIGNIFICANT CHANGE UP (ref 43–77)
NRBC # BLD: 0 /100 WBCS — SIGNIFICANT CHANGE UP (ref 0–0)
NRBC # BLD: 0 /100 WBCS — SIGNIFICANT CHANGE UP (ref 0–0)
PLATELET # BLD AUTO: 387 K/UL — SIGNIFICANT CHANGE UP (ref 150–400)
PLATELET # BLD AUTO: 387 K/UL — SIGNIFICANT CHANGE UP (ref 150–400)
POTASSIUM SERPL-MCNC: 4 MMOL/L — SIGNIFICANT CHANGE UP (ref 3.5–5.3)
POTASSIUM SERPL-MCNC: 4 MMOL/L — SIGNIFICANT CHANGE UP (ref 3.5–5.3)
POTASSIUM SERPL-SCNC: 4 MMOL/L — SIGNIFICANT CHANGE UP (ref 3.5–5.3)
POTASSIUM SERPL-SCNC: 4 MMOL/L — SIGNIFICANT CHANGE UP (ref 3.5–5.3)
RBC # BLD: 3.26 M/UL — LOW (ref 3.8–5.2)
RBC # BLD: 3.26 M/UL — LOW (ref 3.8–5.2)
RBC # FLD: 18.5 % — HIGH (ref 10.3–14.5)
RBC # FLD: 18.5 % — HIGH (ref 10.3–14.5)
SODIUM SERPL-SCNC: 140 MMOL/L — SIGNIFICANT CHANGE UP (ref 135–145)
SODIUM SERPL-SCNC: 140 MMOL/L — SIGNIFICANT CHANGE UP (ref 135–145)
WBC # BLD: 8.57 K/UL — SIGNIFICANT CHANGE UP (ref 3.8–10.5)
WBC # BLD: 8.57 K/UL — SIGNIFICANT CHANGE UP (ref 3.8–10.5)
WBC # FLD AUTO: 8.57 K/UL — SIGNIFICANT CHANGE UP (ref 3.8–10.5)
WBC # FLD AUTO: 8.57 K/UL — SIGNIFICANT CHANGE UP (ref 3.8–10.5)

## 2024-01-04 PROCEDURE — 99232 SBSQ HOSP IP/OBS MODERATE 35: CPT

## 2024-01-04 RX ORDER — TIOTROPIUM BROMIDE 18 UG/1
2 CAPSULE ORAL; RESPIRATORY (INHALATION) DAILY
Refills: 0 | Status: DISCONTINUED | OUTPATIENT
Start: 2024-01-04 | End: 2024-01-10

## 2024-01-04 RX ORDER — BUDESONIDE AND FORMOTEROL FUMARATE DIHYDRATE 160; 4.5 UG/1; UG/1
2 AEROSOL RESPIRATORY (INHALATION)
Refills: 0 | Status: DISCONTINUED | OUTPATIENT
Start: 2024-01-04 | End: 2024-01-10

## 2024-01-04 RX ORDER — IPRATROPIUM/ALBUTEROL SULFATE 18-103MCG
3 AEROSOL WITH ADAPTER (GRAM) INHALATION EVERY 6 HOURS
Refills: 0 | Status: DISCONTINUED | OUTPATIENT
Start: 2024-01-04 | End: 2024-01-09

## 2024-01-04 RX ADMIN — TIOTROPIUM BROMIDE 2 PUFF(S): 18 CAPSULE ORAL; RESPIRATORY (INHALATION) at 17:46

## 2024-01-04 RX ADMIN — Medication 2 SPRAY(S): at 05:24

## 2024-01-04 RX ADMIN — Medication 1 GRAM(S): at 17:45

## 2024-01-04 RX ADMIN — CELECOXIB 200 MILLIGRAM(S): 200 CAPSULE ORAL at 12:20

## 2024-01-04 RX ADMIN — Medication 110 MILLIGRAM(S): at 17:44

## 2024-01-04 RX ADMIN — Medication 240 MILLIGRAM(S): at 05:25

## 2024-01-04 RX ADMIN — Medication 110 MILLIGRAM(S): at 05:17

## 2024-01-04 RX ADMIN — MONTELUKAST 10 MILLIGRAM(S): 4 TABLET, CHEWABLE ORAL at 11:18

## 2024-01-04 RX ADMIN — RIVAROXABAN 20 MILLIGRAM(S): KIT at 17:45

## 2024-01-04 RX ADMIN — Medication 100 MILLIGRAM(S): at 15:17

## 2024-01-04 RX ADMIN — Medication 1 GRAM(S): at 05:25

## 2024-01-04 RX ADMIN — Medication 1 GRAM(S): at 11:18

## 2024-01-04 RX ADMIN — ATORVASTATIN CALCIUM 80 MILLIGRAM(S): 80 TABLET, FILM COATED ORAL at 23:20

## 2024-01-04 RX ADMIN — BUDESONIDE AND FORMOTEROL FUMARATE DIHYDRATE 2 PUFF(S): 160; 4.5 AEROSOL RESPIRATORY (INHALATION) at 17:45

## 2024-01-04 RX ADMIN — Medication 3 MILLIGRAM(S): at 23:20

## 2024-01-04 RX ADMIN — GABAPENTIN 600 MILLIGRAM(S): 400 CAPSULE ORAL at 23:20

## 2024-01-04 RX ADMIN — Medication 100 MILLIGRAM(S): at 05:25

## 2024-01-04 RX ADMIN — Medication 40 MILLIGRAM(S): at 05:25

## 2024-01-04 RX ADMIN — CELECOXIB 200 MILLIGRAM(S): 200 CAPSULE ORAL at 11:18

## 2024-01-04 RX ADMIN — Medication 1: at 11:34

## 2024-01-04 RX ADMIN — Medication 81 MILLIGRAM(S): at 11:18

## 2024-01-04 RX ADMIN — Medication 75 MILLIGRAM(S): at 17:45

## 2024-01-04 RX ADMIN — Medication 2 SPRAY(S): at 11:19

## 2024-01-04 RX ADMIN — Medication 1 GRAM(S): at 23:19

## 2024-01-04 RX ADMIN — Medication 2 SPRAY(S): at 23:36

## 2024-01-04 RX ADMIN — Medication 100 MILLIGRAM(S): at 23:19

## 2024-01-04 RX ADMIN — Medication 2 SPRAY(S): at 18:12

## 2024-01-04 RX ADMIN — Medication 75 MILLIGRAM(S): at 05:26

## 2024-01-04 RX ADMIN — DULOXETINE HYDROCHLORIDE 60 MILLIGRAM(S): 30 CAPSULE, DELAYED RELEASE ORAL at 11:18

## 2024-01-04 NOTE — PROGRESS NOTE ADULT - ASSESSMENT
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 cont tamiflu x 5 days, duonebs prn and aerobika for airway clearance  -d/c prednisone today as pt has been on steroids for 10 days at this point   - added Symbicort & Spiriva for trellegy equivalent now that pt is off steroids and standing duonebs    - Requires outpatient pulmonary FUP. Please call [phone] or email  [email or web address] for an appointment at the Pulmonary office ([address] [place] [address], [address], [place], [place]).     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 cont tamiflu x 5 days, duonebs prn and aerobika for airway clearance  -d/c prednisone today as pt has been on steroids for 10 days at this point   - added Symbicort & Spiriva for trellegy equivalent now that pt is off steroids and standing duonebs    - Requires outpatient pulmonary FUP. Please call [phone] or email  [email or web address] for an appointment at the Pulmonary office ([address] [place] [address], [address], [place], [place]).     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 cont tamiflu x 5 days, duonebs prn and aerobika for airway clearance  - d/c prednisone today as pt has been on steroids for 10 days at this point   - add Symbicort & Spiriva for trellegy equivalent now that pt is off steroids and standing duonebs    - Requires outpatient pulmonary FUP.     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 cont tamiflu x 5 days, duonebs prn and aerobika for airway clearance  - d/c prednisone today as pt has been on steroids for 10 days at this point   - add Symbicort & Spiriva for trellegy equivalent now that pt is off steroids and standing duonebs    - Requires outpatient pulmonary FUP.     d/w dr zuluaga

## 2024-01-04 NOTE — PROGRESS NOTE ADULT - SUBJECTIVE AND OBJECTIVE BOX
INTERVAL HPI/OVERNIGHT EVENTS: no acute events overnight.     SUBJECTIVE: Patient seen and examined at bedside. Pt endorses feeling tired and weak but denies SOB but still has persistent dry cough  Pt satting well on 2LNC    ROS: All negative except as listed above.    VITAL SIGNS:  ICU Vital Signs Last 24 Hrs  T(C): 37.2 (04 Jan 2024 10:49), Max: 37.2 (04 Jan 2024 10:49)  T(F): 99 (04 Jan 2024 10:49), Max: 99 (04 Jan 2024 10:49)  HR: 103 (04 Jan 2024 10:49) (76 - 130)  BP: 151/89 (04 Jan 2024 10:49) (134/79 - 153/99)  BP(mean): --  ABP: --  ABP(mean): --  RR: 19 (04 Jan 2024 10:49) (18 - 19)  SpO2: 96% (04 Jan 2024 10:49) (94% - 97%)    O2 Parameters below as of 04 Jan 2024 10:49  Patient On (Oxygen Delivery Method): nasal cannula  O2 Flow (L/min): 2          Plateau pressure:   P/F ratio:     01-03 @ 07:01 - 01-04 @ 07:00  --------------------------------------------------------  IN: 480 mL / OUT: 1 mL / NET: 479 mL    01-04 @ 07:01 - 01-04 @ 14:28  --------------------------------------------------------  IN: 680 mL / OUT: 0 mL / NET: 680 mL      CAPILLARY BLOOD GLUCOSE      POCT Blood Glucose.: 190 mg/dL (04 Jan 2024 11:30)      ECG: reviewed.    PHYSICAL EXAM:    GENERAL: NAD, lying in bed comfortably  HEAD:  Atraumatic, normocephalic  EYES: EOMI, PERRLA, conjunctiva and sclera clear  NECK: Supple, trachea midline, no JVD  HEART: Regular rate and rhythm, no murmurs, rubs, or gallops  LUNGS: Unlabored respirations. mild scattered wheezing bilat    ABDOMEN: Soft, nontender, nondistended, +BS  EXTREMITIES: 2+ peripheral pulses bilaterally, cap refill<2 secs. No clubbing, cyanosis, or edema  NERVOUS SYSTEM:  A&Ox3, following commands, moving all extremities, no focal deficits   SKIN: No rashes or lesions    MEDICATIONS:  MEDICATIONS  (STANDING):  aspirin enteric coated 81 milliGRAM(s) Oral daily  atorvastatin 80 milliGRAM(s) Oral at bedtime  benzonatate 100 milliGRAM(s) Oral every 8 hours  celecoxib 200 milliGRAM(s) Oral daily  dextrose 5%. 1000 milliLiter(s) (50 mL/Hr) IV Continuous <Continuous>  dextrose 5%. 1000 milliLiter(s) (100 mL/Hr) IV Continuous <Continuous>  dextrose 50% Injectable 25 Gram(s) IV Push once  dextrose 50% Injectable 12.5 Gram(s) IV Push once  dextrose 50% Injectable 25 Gram(s) IV Push once  doxycycline IVPB      doxycycline IVPB 100 milliGRAM(s) IV Intermittent every 12 hours  DULoxetine 60 milliGRAM(s) Oral daily  gabapentin 600 milliGRAM(s) Oral at bedtime  glucagon  Injectable 1 milliGRAM(s) IntraMuscular once  hydrocodone/homatropine Syrup 5 milliLiter(s) Oral every 6 hours  influenza   Vaccine 0.5 milliLiter(s) IntraMuscular once  insulin lispro (ADMELOG) corrective regimen sliding scale   SubCutaneous three times a day before meals  insulin lispro (ADMELOG) corrective regimen sliding scale   SubCutaneous at bedtime  montelukast 10 milliGRAM(s) Oral daily  oseltamivir 75 milliGRAM(s) Oral two times a day  predniSONE   Tablet 40 milliGRAM(s) Oral daily  rivaroxaban 20 milliGRAM(s) Oral with dinner  sodium chloride 0.65% Nasal 2 Spray(s) Both Nostrils every 6 hours  sodium chloride 3%  Inhalation 4 milliLiter(s) Inhalation every 6 hours  sucralfate 1 Gram(s) Oral every 6 hours  verapamil  milliGRAM(s) Oral daily    MEDICATIONS  (PRN):  acetaminophen     Tablet .. 650 milliGRAM(s) Oral every 6 hours PRN Temp greater or equal to 38C (100.4F), Mild Pain (1 - 3)  albuterol    90 MICROgram(s) HFA Inhaler 1 Puff(s) Inhalation every 4 hours PRN Shortness of Breath and/or Wheezing  albuterol/ipratropium for Nebulization 3 milliLiter(s) Nebulizer every 6 hours PRN Shortness of Breath and/or Wheezing  dextrose Oral Gel 15 Gram(s) Oral once PRN Blood Glucose LESS THAN 70 milliGRAM(s)/deciliter  guaifenesin/dextromethorphan Oral Liquid 10 milliLiter(s) Oral every 6 hours PRN Cough  melatonin 3 milliGRAM(s) Oral at bedtime PRN Insomnia      ALLERGIES:  Allergies    No Known Allergies    Intolerances    dislikes Glucerna Shake (Other)      LABS:                        8.5    8.57  )-----------( 387      ( 04 Jan 2024 07:11 )             27.4     01-04    140  |  106  |  22  ----------------------------<  120<H>  4.0   |  31  |  0.70    Ca    9.1      04 Jan 2024 07:11  Mg     1.9     01-04        Urinalysis Basic - ( 04 Jan 2024 07:11 )    Color: x / Appearance: x / SG: x / pH: x  Gluc: 120 mg/dL / Ketone: x  / Bili: x / Urobili: x   Blood: x / Protein: x / Nitrite: x   Leuk Esterase: x / RBC: x / WBC x   Sq Epi: x / Non Sq Epi: x / Bacteria: x      ABG:      vBG:    Micro:    Culture - Blood (collected 12-22-23 @ 16:15)  Source: .Blood Blood-Peripheral  Final Report (12-27-23 @ 22:01):    No growth at 5 days    Culture - Blood (collected 12-22-23 @ 16:00)  Source: .Blood Blood-Peripheral  Final Report (12-27-23 @ 22:01):    No growth at 5 days        Culture - Sputum (collected 12-27-23 @ 00:30)  Source: .Sputum Sputum  Gram Stain (12-28-23 @ 22:02):    Few Squamous epithelial cells per low power field    Few polymorphonuclear leukocytes per low power field    Moderate Yeast per oil power field    Few Gram positive cocci in pairs per oil power field  Final Report (12-28-23 @ 22:02):    Normal Respiratory Valarie present        RADIOLOGY & ADDITIONAL TESTS: Reviewed.   INTERVAL HPI/OVERNIGHT EVENTS: no acute events overnight.     SUBJECTIVE: Patient seen and examined at bedside. Pt endorses feeling tired and weak but denies SOB but still has persistent dry cough  Pt satting well on 2LNC    ROS: All negative except as listed above.    VITAL SIGNS:  Vital Signs Last 24 Hrs  T(C): 37.2 (04 Jan 2024 10:49), Max: 37.2 (04 Jan 2024 10:49)  T(F): 99 (04 Jan 2024 10:49), Max: 99 (04 Jan 2024 10:49)  HR: 103 (04 Jan 2024 10:49) (76 - 130)  BP: 151/89 (04 Jan 2024 10:49) (134/79 - 153/99)  RR: 19 (04 Jan 2024 10:49) (18 - 19)  SpO2: 96% (04 Jan 2024 10:49) (94% - 97%)    O2 Parameters below as of 04 Jan 2024 10:49  Patient On (Oxygen Delivery Method): nasal cannula  O2 Flow (L/min): 2            CAPILLARY BLOOD GLUCOSE  POCT Blood Glucose.: 190 mg/dL (04 Jan 2024 11:30)          PHYSICAL EXAM:  GENERAL: obese female, NAD, lying in bed comfortably, intermittently coughing when she starts speaking  HEAD:  Atraumatic, normocephalic, oral pharynx clear  EYES: EOMI, PERRLA, conjunctiva and sclera clear  NECK: Supple, trachea midline, no JVD  HEART: Regular rate and rhythm, no murmurs, rubs, or gallops  LUNGS: Unlabored respirations. mild scattered wheezing bilat    ABDOMEN: Soft, nontender, nondistended, +BS  EXTREMITIES: 2+ peripheral pulses bilaterally, cap refill<2 secs. No clubbing, cyanosis, or edema  NERVOUS SYSTEM:  A&Ox3, following commands, moving all extremities, no focal deficits   SKIN: No rashes or lesions    MEDICATIONS:  MEDICATIONS  (STANDING):  aspirin enteric coated 81 milliGRAM(s) Oral daily  atorvastatin 80 milliGRAM(s) Oral at bedtime  benzonatate 100 milliGRAM(s) Oral every 8 hours  celecoxib 200 milliGRAM(s) Oral daily  dextrose 5%. 1000 milliLiter(s) (50 mL/Hr) IV Continuous <Continuous>  dextrose 5%. 1000 milliLiter(s) (100 mL/Hr) IV Continuous <Continuous>  dextrose 50% Injectable 25 Gram(s) IV Push once  dextrose 50% Injectable 12.5 Gram(s) IV Push once  dextrose 50% Injectable 25 Gram(s) IV Push once  doxycycline IVPB      doxycycline IVPB 100 milliGRAM(s) IV Intermittent every 12 hours  DULoxetine 60 milliGRAM(s) Oral daily  gabapentin 600 milliGRAM(s) Oral at bedtime  glucagon  Injectable 1 milliGRAM(s) IntraMuscular once  hydrocodone/homatropine Syrup 5 milliLiter(s) Oral every 6 hours  influenza   Vaccine 0.5 milliLiter(s) IntraMuscular once  insulin lispro (ADMELOG) corrective regimen sliding scale   SubCutaneous three times a day before meals  insulin lispro (ADMELOG) corrective regimen sliding scale   SubCutaneous at bedtime  montelukast 10 milliGRAM(s) Oral daily  oseltamivir 75 milliGRAM(s) Oral two times a day  predniSONE   Tablet 40 milliGRAM(s) Oral daily  rivaroxaban 20 milliGRAM(s) Oral with dinner  sodium chloride 0.65% Nasal 2 Spray(s) Both Nostrils every 6 hours  sodium chloride 3%  Inhalation 4 milliLiter(s) Inhalation every 6 hours  sucralfate 1 Gram(s) Oral every 6 hours  verapamil  milliGRAM(s) Oral daily    MEDICATIONS  (PRN):  acetaminophen     Tablet .. 650 milliGRAM(s) Oral every 6 hours PRN Temp greater or equal to 38C (100.4F), Mild Pain (1 - 3)  albuterol    90 MICROgram(s) HFA Inhaler 1 Puff(s) Inhalation every 4 hours PRN Shortness of Breath and/or Wheezing  albuterol/ipratropium for Nebulization 3 milliLiter(s) Nebulizer every 6 hours PRN Shortness of Breath and/or Wheezing  dextrose Oral Gel 15 Gram(s) Oral once PRN Blood Glucose LESS THAN 70 milliGRAM(s)/deciliter  guaifenesin/dextromethorphan Oral Liquid 10 milliLiter(s) Oral every 6 hours PRN Cough  melatonin 3 milliGRAM(s) Oral at bedtime PRN Insomnia        Allergies  No Known Allergies    Intolerances  dislikes Glucerna Shake (Other)      LABS:                        8.5    8.57  )-----------( 387      ( 04 Jan 2024 07:11 )             27.4     01-04    140  |  106  |  22  ----------------------------<  120<H>  4.0   |  31  |  0.70    Ca    9.1      04 Jan 2024 07:11  Mg     1.9     01-04      RVP: (12/17, 12/21, 12/25) NEGATIVE    Flu With COVID-19 By NETTIE (01.03.24 @ 15:30)    SARS-CoV-2 Result: NotDetec: EUA/IVD   Influenza A Result: Detected: EUA/IVD   Influenza B Result: NotDetec: EUA/IVD      Mycoplasma Pneumoniae IgM Antibody (12.18.23 @ 07:02)    Mycoplasma IgM AB: 1.23 Index   Mycoplasma: Positive: Method DELON           Interpretation:                                             Index                  Negative              <=0.90                  Equivocal            0.91-1.09                  Positive                >=1.10  The reference range has been updated as of 11/16/2020 for this test due  to an upgrade in the testing methodology (EIA to DELON). Result will be  flagged based upon the updated reference range.        Micro:    Culture - Blood (collected 12-22-23 @ 16:15)  Source: .Blood Blood-Peripheral  Final Report (12-27-23 @ 22:01):    No growth at 5 days    Culture - Blood (collected 12-22-23 @ 16:00)  Source: .Blood Blood-Peripheral  Final Report (12-27-23 @ 22:01):    No growth at 5 days        Culture - Sputum (collected 12-27-23 @ 00:30)  Source: .Sputum Sputum  Gram Stain (12-28-23 @ 22:02):    Few Squamous epithelial cells per low power field    Few polymorphonuclear leukocytes per low power field    Moderate Yeast per oil power field    Few Gram positive cocci in pairs per oil power field  Final Report (12-28-23 @ 22:02):    Normal Respiratory Valarie present        RADIOLOGY & ADDITIONAL TESTS: Reviewed.  < from: Xray Chest 1 View-PORTABLE IMMEDIATE (Xray Chest 1 View-PORTABLE IMMEDIATE .) (12.27.23 @ 13:06) >  1. Increasing right upper lobe infiltrate while that seen in the left   upper loberemains unchanged.    ------------------------------  < from: CT Angio Chest PE Protocol w/ IV Cont (01.01.24 @ 15:00) >  No pulmonary embolism through the level of the lobar pulmonary arteries.   Evaluation of more distal segmental and subsegmental pulmonary arteries   is limited by suboptimal contrast bolus timing.  Interval mild improvement of traction bronchiectasis and   peribronchovascular groundglass opacity and small consolidation in all 5   lobes.

## 2024-01-04 NOTE — PROGRESS NOTE ADULT - SUBJECTIVE AND OBJECTIVE BOX
ROSSI ROJAS  MRN-[id number]    Follow Up:  mycoplasma pna, influenza A    Interval History: the pt was seen and examined earlier, not in acute distress, however, has persistent dry cough. Pt is afebrile, NC 2L, no leukocytosis. Pt thinks that she is feeling a little better.     PAST MEDICAL & SURGICAL HISTORY:  Lupus      Diabetes      Hypertension      Asthma      Peripheral polyneuropathy      Pulmonary emboli  7/2019      History of hip replacement  left      H/O total hysterectomy      History of bilateral tubal ligation      S/P laminectomy with spinal fusion      History of shoulder surgery      H/O knee surgery          ROS:    [ ] Unobtainable because:  [ x] All other systems negative    Constitutional: no fever, no chills  Head: no trauma  Eyes: no vision changes, no eye pain  ENT:  no sore throat, no rhinorrhea  Cardiovascular:  no chest pain, no palpitation  Respiratory:  no SOB with supplemental O2, + cough  GI:  no abd pain, no vomiting, no diarrhea  urinary: no dysuria, no hematuria, no flank pain  musculoskeletal:  no joint pain, no joint swelling  skin:  no rash  neurology:  no headache, no seizure, no change in mental status  psych: no anxiety, no depression         Allergies  No Known Allergies        ANTIMICROBIALS:  doxycycline IVPB    doxycycline IVPB 100 every 12 hours  oseltamivir 75 two times a day      OTHER MEDS:  acetaminophen     Tablet .. 650 milliGRAM(s) Oral every 6 hours PRN  albuterol    90 MICROgram(s) HFA Inhaler 1 Puff(s) Inhalation every 4 hours PRN  albuterol/ipratropium for Nebulization 3 milliLiter(s) Nebulizer every 6 hours PRN  aspirin enteric coated 81 milliGRAM(s) Oral daily  atorvastatin 80 milliGRAM(s) Oral at bedtime  benzonatate 100 milliGRAM(s) Oral every 8 hours  budesonide 160 MICROgram(s)/formoterol 4.5 MICROgram(s) Inhaler 2 Puff(s) Inhalation two times a day  celecoxib 200 milliGRAM(s) Oral daily  dextrose 5%. 1000 milliLiter(s) IV Continuous <Continuous>  dextrose 5%. 1000 milliLiter(s) IV Continuous <Continuous>  dextrose 50% Injectable 25 Gram(s) IV Push once  dextrose 50% Injectable 12.5 Gram(s) IV Push once  dextrose 50% Injectable 25 Gram(s) IV Push once  dextrose Oral Gel 15 Gram(s) Oral once PRN  DULoxetine 60 milliGRAM(s) Oral daily  gabapentin 600 milliGRAM(s) Oral at bedtime  glucagon  Injectable 1 milliGRAM(s) IntraMuscular once  guaifenesin/dextromethorphan Oral Liquid 10 milliLiter(s) Oral every 6 hours PRN  hydrocodone/homatropine Syrup 5 milliLiter(s) Oral every 6 hours  influenza   Vaccine 0.5 milliLiter(s) IntraMuscular once  insulin lispro (ADMELOG) corrective regimen sliding scale   SubCutaneous three times a day before meals  insulin lispro (ADMELOG) corrective regimen sliding scale   SubCutaneous at bedtime  melatonin 3 milliGRAM(s) Oral at bedtime PRN  montelukast 10 milliGRAM(s) Oral daily  rivaroxaban 20 milliGRAM(s) Oral with dinner  sodium chloride 0.65% Nasal 2 Spray(s) Both Nostrils every 6 hours  sucralfate 1 Gram(s) Oral every 6 hours  tiotropium 2.5 MICROgram(s) Inhaler 2 Puff(s) Inhalation daily  verapamil  milliGRAM(s) Oral daily      Vital Signs Last 24 Hrs  T(C): 37.2 (04 Jan 2024 10:49), Max: 37.2 (04 Jan 2024 10:49)  T(F): 99 (04 Jan 2024 10:49), Max: 99 (04 Jan 2024 10:49)  HR: 103 (04 Jan 2024 10:49) (76 - 130)  BP: 151/89 (04 Jan 2024 10:49) (134/79 - 153/99)  BP(mean): --  RR: 19 (04 Jan 2024 10:49) (18 - 19)  SpO2: 96% (04 Jan 2024 10:49) (94% - 97%)    Parameters below as of 04 Jan 2024 10:49  Patient On (Oxygen Delivery Method): nasal cannula  O2 Flow (L/min): 2      Physical Exam:  General:    NAD, pt gets short of breath with cough  Head: atraumatic, normocephalic  Eye: normal sclera and conjunctiva  ENT:    no oral lesions, neck supple  Cardio:   regular S1, S2,  no murmur  Respiratory:  scattered mild wheezing, no rhonchi, decreased breath sounds at right base region, persistent dry cough  abd:  BS present, abd is soft,   not distended, not tender, no guarding   :   no CVAT,  no suprapubic tenderness  Musculoskeletal:   no joint swelling,   no edema  vascular: no central lines, +PIV   Skin:    no rash, warm to touch  Neurologic: awake and alert, answers questions and follows commands   psych: appropriate     WBC Count: 8.57 K/uL (01-04 @ 07:11)  WBC Count: 9.88 K/uL (01-02 @ 07:45)  WBC Count: 12.58 K/uL (12-31 @ 05:45)  WBC Count: 14.66 K/uL (12-29 @ 06:40)                            8.5    8.57  )-----------( 387      ( 04 Jan 2024 07:11 )             27.4       01-04    140  |  106  |  22  ----------------------------<  120<H>  4.0   |  31  |  0.70    Ca    9.1      04 Jan 2024 07:11  Mg     1.9     01-04        Urinalysis Basic - ( 04 Jan 2024 07:11 )    Color: x / Appearance: x / SG: x / pH: x  Gluc: 120 mg/dL / Ketone: x  / Bili: x / Urobili: x   Blood: x / Protein: x / Nitrite: x   Leuk Esterase: x / RBC: x / WBC x   Sq Epi: x / Non Sq Epi: x / Bacteria: x        Creatinine Trend: 0.70<--, 0.67<--, 0.64<--, 0.75<--, 0.84<--, 0.74<--      MICROBIOLOGY:  v  .Sputum Sputum  12-27-23   Normal Respiratory Valarie present  --    Few Squamous epithelial cells per low power field  Few polymorphonuclear leukocytes per low power field  Moderate Yeast per oil power field  Few Gram positive cocci in pairs per oil power field      .Blood Blood-Peripheral  12-22-23   No growth at 5 days  --  --      .Blood Blood-Peripheral  12-22-23   No growth at 5 days  --  --      C-Reactive Protein, Serum: 159 (12-25)      SARS-CoV-2 Result: NotDetec (01-03-24 @ 15:30)    SARS-CoV-2: NotDetec (25 Dec 2023 10:15)  SARS-CoV-2: NotDetec (21 Dec 2023 16:30)  SARS-CoV-2: NotDete (17 Dec 2023 05:51)  SARS-CoV-2: NotDete (17 Nov 2023 05:46)    RADIOLOGY:     ROSSI ROJAS  MRN-[id number]    Follow Up:  mycoplasma pna, influenza A    Interval History: the pt was seen and examined earlier, not in acute distress, however, has persistent dry cough. Pt is afebrile, NC 2L, no leukocytosis. Pt thinks that she is feeling a little better.     PAST MEDICAL & SURGICAL HISTORY:  Lupus      Diabetes      Hypertension      Asthma      Peripheral polyneuropathy      Pulmonary emboli  7/2019      History of hip replacement  left      H/O total hysterectomy      History of bilateral tubal ligation      S/P laminectomy with spinal fusion      History of shoulder surgery      H/O knee surgery          ROS:    [ ] Unobtainable because:  [ x] All other systems negative    Constitutional: no fever, no chills  Head: no trauma  Eyes: no vision changes, no eye pain  ENT:  no sore throat, no rhinorrhea  Cardiovascular:  no chest pain, no palpitation  Respiratory:  no SOB with supplemental O2, + cough  GI:  no abd pain, no vomiting, no diarrhea  urinary: no dysuria, no hematuria, no flank pain  musculoskeletal:  no joint pain, no joint swelling  skin:  no rash  neurology:  no headache, no seizure, no change in mental status  psych: no anxiety, no depression         Allergies  No Known Allergies        ANTIMICROBIALS:  doxycycline IVPB    doxycycline IVPB 100 every 12 hours  oseltamivir 75 two times a day      OTHER MEDS:  acetaminophen     Tablet .. 650 milliGRAM(s) Oral every 6 hours PRN  albuterol    90 MICROgram(s) HFA Inhaler 1 Puff(s) Inhalation every 4 hours PRN  albuterol/ipratropium for Nebulization 3 milliLiter(s) Nebulizer every 6 hours PRN  aspirin enteric coated 81 milliGRAM(s) Oral daily  atorvastatin 80 milliGRAM(s) Oral at bedtime  benzonatate 100 milliGRAM(s) Oral every 8 hours  budesonide 160 MICROgram(s)/formoterol 4.5 MICROgram(s) Inhaler 2 Puff(s) Inhalation two times a day  celecoxib 200 milliGRAM(s) Oral daily  dextrose 5%. 1000 milliLiter(s) IV Continuous <Continuous>  dextrose 5%. 1000 milliLiter(s) IV Continuous <Continuous>  dextrose 50% Injectable 25 Gram(s) IV Push once  dextrose 50% Injectable 12.5 Gram(s) IV Push once  dextrose 50% Injectable 25 Gram(s) IV Push once  dextrose Oral Gel 15 Gram(s) Oral once PRN  DULoxetine 60 milliGRAM(s) Oral daily  gabapentin 600 milliGRAM(s) Oral at bedtime  glucagon  Injectable 1 milliGRAM(s) IntraMuscular once  guaifenesin/dextromethorphan Oral Liquid 10 milliLiter(s) Oral every 6 hours PRN  hydrocodone/homatropine Syrup 5 milliLiter(s) Oral every 6 hours  influenza   Vaccine 0.5 milliLiter(s) IntraMuscular once  insulin lispro (ADMELOG) corrective regimen sliding scale   SubCutaneous three times a day before meals  insulin lispro (ADMELOG) corrective regimen sliding scale   SubCutaneous at bedtime  melatonin 3 milliGRAM(s) Oral at bedtime PRN  montelukast 10 milliGRAM(s) Oral daily  rivaroxaban 20 milliGRAM(s) Oral with dinner  sodium chloride 0.65% Nasal 2 Spray(s) Both Nostrils every 6 hours  sucralfate 1 Gram(s) Oral every 6 hours  tiotropium 2.5 MICROgram(s) Inhaler 2 Puff(s) Inhalation daily  verapamil  milliGRAM(s) Oral daily      Vital Signs Last 24 Hrs  T(C): 37.2 (04 Jan 2024 10:49), Max: 37.2 (04 Jan 2024 10:49)  T(F): 99 (04 Jan 2024 10:49), Max: 99 (04 Jan 2024 10:49)  HR: 103 (04 Jan 2024 10:49) (76 - 130)  BP: 151/89 (04 Jan 2024 10:49) (134/79 - 153/99)  BP(mean): --  RR: 19 (04 Jan 2024 10:49) (18 - 19)  SpO2: 96% (04 Jan 2024 10:49) (94% - 97%)    Parameters below as of 04 Jan 2024 10:49  Patient On (Oxygen Delivery Method): nasal cannula  O2 Flow (L/min): 2      Physical Exam:  General:    NAD, pt gets short of breath with cough  Head: atraumatic, normocephalic  Eye: normal sclera and conjunctiva  ENT:    no oral lesions, neck supple  Cardio:   regular S1, S2,  no murmur  Respiratory:  scattered mild wheezing, no rhonchi, decreased breath sounds at right base region, persistent dry cough  abd:  BS present, abd is soft,   not distended, not tender, no guarding   :   no CVAT,  no suprapubic tenderness  Musculoskeletal:   no joint swelling,   no edema  vascular: no central lines, +PIV   Skin:    no rash, warm to touch  Neurologic: awake and alert, answers questions and follows commands   psych: appropriate     WBC Count: 8.57 K/uL (01-04 @ 07:11)  WBC Count: 9.88 K/uL (01-02 @ 07:45)  WBC Count: 12.58 K/uL (12-31 @ 05:45)  WBC Count: 14.66 K/uL (12-29 @ 06:40)                            8.5    8.57  )-----------( 387      ( 04 Jan 2024 07:11 )             27.4       01-04    140  |  106  |  22  ----------------------------<  120<H>  4.0   |  31  |  0.70    Ca    9.1      04 Jan 2024 07:11  Mg     1.9     01-04        Urinalysis Basic - ( 04 Jan 2024 07:11 )    Color: x / Appearance: x / SG: x / pH: x  Gluc: 120 mg/dL / Ketone: x  / Bili: x / Urobili: x   Blood: x / Protein: x / Nitrite: x   Leuk Esterase: x / RBC: x / WBC x   Sq Epi: x / Non Sq Epi: x / Bacteria: x        Creatinine Trend: 0.70<--, 0.67<--, 0.64<--, 0.75<--, 0.84<--, 0.74<--      MICROBIOLOGY:  v  .Sputum Sputum  12-27-23   Normal Respiratory Valarie present  --    Few Squamous epithelial cells per low power field  Few polymorphonuclear leukocytes per low power field  Moderate Yeast per oil power field  Few Gram positive cocci in pairs per oil power field      .Blood Blood-Peripheral  12-22-23   No growth at 5 days  --  --      .Blood Blood-Peripheral  12-22-23   No growth at 5 days  --  --      C-Reactive Protein, Serum: 159 (12-25)      SARS-CoV-2 Result: NotDetec (01-03-24 @ 15:30)    SARS-CoV-2: NotDetec (25 Dec 2023 10:15)  SARS-CoV-2: NotDetec (21 Dec 2023 16:30)  SARS-CoV-2: NotDete (17 Dec 2023 05:51)  SARS-CoV-2: NotDete (17 Nov 2023 05:46)    RADIOLOGY:

## 2024-01-04 NOTE — PROGRESS NOTE ADULT - NS ATTEND AMEND GEN_ALL_CORE FT
All labs and cultures and imaging and pertinent chart notes reviewed by me.    case d/w Np Leidy at length and agree with her assessment and plan.  Betina Soni MD  Infectious Disease Attending    for any questions please do not hesitate to contact me either via teams or by calling [phone] All labs and cultures and imaging and pertinent chart notes reviewed by me.    case d/w Np Leidy at length and agree with her assessment and plan.  Betina Soni MD  Infectious Disease Attending    for any questions please do not hesitate to contact me either via teams or by calling [phone]

## 2024-01-04 NOTE — PROGRESS NOTE ADULT - SUBJECTIVE AND OBJECTIVE BOX
Patient is a 63y old  Female who presents with a chief complaint of AMS, HCAP, Acute Hypoxic respiratory failure (2024 14:27)    INTERVAL HPI/OVERNIGHT EVENTS: NAEON, now Flu+     MEDICATIONS  (STANDING):  aspirin enteric coated 81 milliGRAM(s) Oral daily  atorvastatin 80 milliGRAM(s) Oral at bedtime  benzonatate 100 milliGRAM(s) Oral every 8 hours  budesonide 160 MICROgram(s)/formoterol 4.5 MICROgram(s) Inhaler 2 Puff(s) Inhalation two times a day  celecoxib 200 milliGRAM(s) Oral daily  dextrose 5%. 1000 milliLiter(s) (50 mL/Hr) IV Continuous <Continuous>  dextrose 5%. 1000 milliLiter(s) (100 mL/Hr) IV Continuous <Continuous>  dextrose 50% Injectable 25 Gram(s) IV Push once  dextrose 50% Injectable 12.5 Gram(s) IV Push once  dextrose 50% Injectable 25 Gram(s) IV Push once  doxycycline IVPB      doxycycline IVPB 100 milliGRAM(s) IV Intermittent every 12 hours  DULoxetine 60 milliGRAM(s) Oral daily  gabapentin 600 milliGRAM(s) Oral at bedtime  glucagon  Injectable 1 milliGRAM(s) IntraMuscular once  hydrocodone/homatropine Syrup 5 milliLiter(s) Oral every 6 hours  influenza   Vaccine 0.5 milliLiter(s) IntraMuscular once  insulin lispro (ADMELOG) corrective regimen sliding scale   SubCutaneous three times a day before meals  insulin lispro (ADMELOG) corrective regimen sliding scale   SubCutaneous at bedtime  montelukast 10 milliGRAM(s) Oral daily  oseltamivir 75 milliGRAM(s) Oral two times a day  rivaroxaban 20 milliGRAM(s) Oral with dinner  sodium chloride 0.65% Nasal 2 Spray(s) Both Nostrils every 6 hours  sucralfate 1 Gram(s) Oral every 6 hours  tiotropium 2.5 MICROgram(s) Inhaler 2 Puff(s) Inhalation daily  verapamil  milliGRAM(s) Oral daily    MEDICATIONS  (PRN):  acetaminophen     Tablet .. 650 milliGRAM(s) Oral every 6 hours PRN Temp greater or equal to 38C (100.4F), Mild Pain (1 - 3)  albuterol    90 MICROgram(s) HFA Inhaler 1 Puff(s) Inhalation every 4 hours PRN Shortness of Breath and/or Wheezing  albuterol/ipratropium for Nebulization 3 milliLiter(s) Nebulizer every 6 hours PRN Shortness of Breath and/or Wheezing  dextrose Oral Gel 15 Gram(s) Oral once PRN Blood Glucose LESS THAN 70 milliGRAM(s)/deciliter  guaifenesin/dextromethorphan Oral Liquid 10 milliLiter(s) Oral every 6 hours PRN Cough  melatonin 3 milliGRAM(s) Oral at bedtime PRN Insomnia    Allergies    No Known Allergies    Intolerances    dislikes Glucerna Shake (Other)    REVIEW OF SYSTEMS:  All other systems reviewed and are negative    Vital Signs Last 24 Hrs  T(C): 37.2 (2024 10:49), Max: 37.2 (2024 10:49)  T(F): 99 (2024 10:49), Max: 99 (2024 10:49)  HR: 103 (2024 10:49) (76 - 130)  BP: 151/89 (2024 10:49) (134/79 - 153/99)  BP(mean): --  RR: 19 (2024 10:49) (18 - 19)  SpO2: 96% (2024 10:49) (94% - 97%)    Parameters below as of 2024 10:49  Patient On (Oxygen Delivery Method): nasal cannula  O2 Flow (L/min): 2    Daily     Daily Weight in k.1 (2024 05:08)  I&O's Summary    2024 07:01  -  2024 07:00  --------------------------------------------------------  IN: 480 mL / OUT: 1 mL / NET: 479 mL    2024 07:01  -  2024 14:46  --------------------------------------------------------  IN: 680 mL / OUT: 0 mL / NET: 680 mL      CAPILLARY BLOOD GLUCOSE      POCT Blood Glucose.: 190 mg/dL (2024 11:30)  POCT Blood Glucose.: 127 mg/dL (2024 08:00)  POCT Blood Glucose.: 149 mg/dL (2024 21:20)  POCT Blood Glucose.: 155 mg/dL (2024 16:14)    PHYSICAL EXAM:  GENERAL: NAD, lying in bed comfortably cough  HEAD:  Atraumatic, normocephalic  EYES: EOMI, PERRLA, conjunctiva and sclera clear  NECK: Supple, trachea midline, no JVD  HEART: Regular rate and rhythm, no murmurs, rubs, or gallops  LUNGS: Unlabored respirations. mild scattered wheezing bilat    ABDOMEN: Soft, nontender, nondistended, +BS  EXTREMITIES: 2+ peripheral pulses bilaterally, cap refill<2 secs. No clubbing, cyanosis, or edema  NERVOUS SYSTEM:  A&Ox3, following commands, moving all extremities, no focal deficits   SKIN: No rashes or lesions    Labs                          8.5    8.57  )-----------( 387      ( 2024 07:11 )             27.4     01-04    140  |  106  |  22  ----------------------------<  120<H>  4.0   |  31  |  0.70    Ca    9.1      2024 07:11  Mg     1.9     01-04            Urinalysis Basic - ( 2024 07:11 )    Color: x / Appearance: x / SG: x / pH: x  Gluc: 120 mg/dL / Ketone: x  / Bili: x / Urobili: x   Blood: x / Protein: x / Nitrite: x   Leuk Esterase: x / RBC: x / WBC x   Sq Epi: x / Non Sq Epi: x / Bacteria: x                  DVT prophylaxis: > Lovenox 40mg SQ daily  > Heparin   > SCD's

## 2024-01-04 NOTE — PROGRESS NOTE ADULT - ASSESSMENT
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 Of note pt recently developed URI symptoms and was started on Doxy as well as Hycodan, Alprazolam and Fioricet. Of note pt already on Percocet, flexeril for chronic pain, Gabapentin for seizures per  all which she took. Pt being admitted for Acute hypoxic respiratory failure due to Multifocal PNA, AMS r/o TIA/CVA  vs metabolic encephalopathy secondary to polypharmacy and/or hypoxia.    #Acute metabolic encephalopathy, resolved    #Acute respiratory failure with multifocal pneumonia  #Flu positive   AMS likely Toxic, polypharmacy. Acute stroke is ruled out. EEG did not show any epileptiform activity.   - S/p iv zosyn , completed 5 days   - Added Azithromycin, + mycoplasma: course completed: for persistent symptoms. Stopped cefepime now on doxy   - Continue Cough meds, albuterol PRN  - Flu positive - start tamiflu   - wean o2 as tolerated     # PE  - c/w Xarelto     # SLE  - C/w prednisone    # HTN/HLD  - controlled with Verapemil, statin    # DM type 2. controlled.   - FS qAC and HS w/ SSI    #Weakness   PT eval>>[facility]: patient refusing wants to go home: will attempt for home 02    Diet: DASH  DVT prophylaxis: Xarelto   Dispo: Admit   Code Status:

## 2024-01-04 NOTE — PROGRESS NOTE ADULT - ASSESSMENT
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     multifocal pneumonia on CXR on admission was treated with 5 days of zosyn and improved  but  later  became hypoxemic and transferred  to [facility]   completed 7 days of zithromax for mycoplasma   completed 7 days course of cefepime IV for the multifocal pna treatment   now on doxycyline for mycoplasma  found Influenza A positive on 1/3/24 - started on Tamiflu   Leukocytosis improved   remains afebrile since 12/25/23  MRSA PCR negative   Legionella ag - negative  Strep pneumo ag - negative  Mycoplasma IGM positive   sputum cx- negative      plan-  - continue doxycyline for mycoplasma day #7  - continue Tamiflu   - monitor O2 sat and wbc closely.  - pulmonary follow up is appreciated  - pt had covid 19 exposure, repeat RVP in a few days   - cough suppression prn  - wean off supplemental O2 as able    discussed with Dr. Soni   msg sent to Dr. 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     multifocal pneumonia on CXR on admission was treated with 5 days of zosyn and improved  but  later  became hypoxemic and transferred  to [facility]   completed 7 days of zithromax for mycoplasma   completed 7 days course of cefepime IV for the multifocal pna treatment   now on doxycyline for mycoplasma  found Influenza A positive on 1/3/24 - started on Tamiflu   Leukocytosis improved   remains afebrile since 12/25/23  MRSA PCR negative   Legionella ag - negative  Strep pneumo ag - negative  Mycoplasma IGM positive   sputum cx- negative      plan-  - continue doxycyline for mycoplasma day #7  - continue Tamiflu   - monitor O2 sat and wbc closely.  - pulmonary follow up is appreciated  - pt had covid 19 exposure, repeat RVP in a few days   - cough suppression prn  - wean off supplemental O2 as able    discussed with Dr. Soni   msg sent to Dr. Aguero

## 2024-01-04 NOTE — PROGRESS NOTE ADULT - NS ATTEND AMEND GEN_ALL_CORE FT
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 Found to have bilateral diffuse ground glass opacities consistent with multifocal PNA on chest imaging. Tested + for Mycoplasma. Hypoxic necessitating increased O2 requirements during hospital course. Now weaning down O2. Hospital course complicated by exposure to COVID + roommate. Pt now with Flu A    Dx: acute hypoxic respiratory failure, multifocal PNA due to mycoplasma pneumonia, acute asthma exacerbation in setting of bacterial PNA/viral infection, influenza A      - had CT chest 12/25 showing multifocal PNA with diffuse bilateral GGO. primary team repeated CT chest non contrast 12/28 with again multifocal ground glass opacities consistent with PNA. would not anticipate drastic change in imaging of chest in such short duration in time  - sent for CTA chest 1/1/24 per primary team and CTA negative for PE, mild improvement in GGO, but with noted traction bronchiectasis  - would repeat CT chest in 6 weeks to assess for lung parenchyma, chest imaging may take several weeks to clear  - she had CT chest done 7/2019 which did not have any findings of GGO/traction bronchiectasis. Findings on current imaging likely reflect her infection/PNA    - she completed initial 7 day course of azithromycin for mycoplasma PNA but remained symptomatic  - seen by ID and placed on doxycycline to extend treatment for mycoplasma and started on cefepime for gram negative bacterial PNA / HCAP coverage, procalcitonin up trended since admission.   - completed 7 day course of cefepime yesterday 1/3  - remains on doxycycline today is day #7  - between azithromycin and doxycycline she has had 14 day treatment for mycoplasma. ?d/c doxycycline. follow up with ID regarding antibx duration  - sputum cx with normal respiratory hossein  - had been on solumedrol x4 days with steroids tapered to prednisone 40mg daily on 12/29. (today is day 7 of prednisone)  - she's already completed 10 day course of steroids, can d/c prednisone  - cont with aerobika for cough assist/secretion mobilization  - has received more than a weeks course of hypertonic saline nebs to help mobilize secretions, her cough is mostly dry, will d/c hypertonic saline, condition no longer warrants  - cont antitussives tessalon perles, hycodan  - change duonebs to prn, add ICS/LABA combo symbicort for underlying asthma/reactive airway + spiriva to make ICS/LABA/LAMA combo regimen  - goal to maintain O2 sat > 90%  - weaned off 2L NC to RA with O2 sat > 90% however pt wearing O2 for comfort  - incentive spirometer, OOB to chair  - cont xarelto for underlying hx of PE  -  who had been visiting pt is reported by patient to have been sick for past week due to flu, he last visited her on 1/2. (?if pt contracted influenza from sick contact with family member)  - to complete 5 day course of tamiflu   - as she was also exposed to roommate who tested + COVID, her initial covid swab post exposure is negative for COVID, however would repeat COVID swab in a few days  - will need outpatient pulmonary follow up. She sees Dr. 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 Found to have bilateral diffuse ground glass opacities consistent with multifocal PNA on chest imaging. Tested + for Mycoplasma. Hypoxic necessitating increased O2 requirements during hospital course. Now weaning down O2. Hospital course complicated by exposure to COVID + roommate. Pt now with Flu A    Dx: acute hypoxic respiratory failure, multifocal PNA due to mycoplasma pneumonia, acute asthma exacerbation in setting of bacterial PNA/viral infection, influenza A      - had CT chest 12/25 showing multifocal PNA with diffuse bilateral GGO. primary team repeated CT chest non contrast 12/28 with again multifocal ground glass opacities consistent with PNA. would not anticipate drastic change in imaging of chest in such short duration in time  - sent for CTA chest 1/1/24 per primary team and CTA negative for PE, mild improvement in GGO, but with noted traction bronchiectasis  - would repeat CT chest in 6 weeks to assess for lung parenchyma, chest imaging may take several weeks to clear  - she had CT chest done 7/2019 which did not have any findings of GGO/traction bronchiectasis. Findings on current imaging likely reflect her infection/PNA    - she completed initial 7 day course of azithromycin for mycoplasma PNA but remained symptomatic  - seen by ID and placed on doxycycline to extend treatment for mycoplasma and started on cefepime for gram negative bacterial PNA / HCAP coverage, procalcitonin up trended since admission.   - completed 7 day course of cefepime yesterday 1/3  - remains on doxycycline today is day #7  - between azithromycin and doxycycline she has had 14 day treatment for mycoplasma. ?d/c doxycycline. follow up with ID regarding antibx duration  - sputum cx with normal respiratory hossein  - had been on solumedrol x4 days with steroids tapered to prednisone 40mg daily on 12/29. (today is day 7 of prednisone)  - she's already completed 10 day course of steroids, can d/c prednisone  - cont with aerobika for cough assist/secretion mobilization  - has received more than a weeks course of hypertonic saline nebs to help mobilize secretions, her cough is mostly dry, will d/c hypertonic saline, condition no longer warrants  - cont antitussives tessalon perles, hycodan  - change duonebs to prn, add ICS/LABA combo symbicort for underlying asthma/reactive airway + spiriva to make ICS/LABA/LAMA combo regimen  - goal to maintain O2 sat > 90%  - weaned off 2L NC to RA with O2 sat > 90% however pt wearing O2 for comfort  - incentive spirometer, OOB to chair  - cont xarelto for underlying hx of PE  -  who had been visiting pt is reported by patient to have been sick for past week due to flu, he last visited her on 1/2. (?if pt contracted influenza from sick contact with family member)  - to complete 5 day course of tamiflu   - as she was also exposed to roommate who tested + COVID, her initial covid swab post exposure is negative for COVID, however would repeat COVID swab in a few days  - will need outpatient pulmonary follow up. She sees Dr. García in [facility] and should follow up with him

## 2024-01-05 LAB
ANION GAP SERPL CALC-SCNC: 5 MMOL/L — SIGNIFICANT CHANGE UP (ref 5–17)
ANION GAP SERPL CALC-SCNC: 5 MMOL/L — SIGNIFICANT CHANGE UP (ref 5–17)
BASOPHILS # BLD AUTO: 0.02 K/UL — SIGNIFICANT CHANGE UP (ref 0–0.2)
BASOPHILS # BLD AUTO: 0.02 K/UL — SIGNIFICANT CHANGE UP (ref 0–0.2)
BASOPHILS NFR BLD AUTO: 0.3 % — SIGNIFICANT CHANGE UP (ref 0–2)
BASOPHILS NFR BLD AUTO: 0.3 % — SIGNIFICANT CHANGE UP (ref 0–2)
BUN SERPL-MCNC: 27 MG/DL — HIGH (ref 7–23)
BUN SERPL-MCNC: 27 MG/DL — HIGH (ref 7–23)
CALCIUM SERPL-MCNC: 9.3 MG/DL — SIGNIFICANT CHANGE UP (ref 8.5–10.1)
CALCIUM SERPL-MCNC: 9.3 MG/DL — SIGNIFICANT CHANGE UP (ref 8.5–10.1)
CHLORIDE SERPL-SCNC: 107 MMOL/L — SIGNIFICANT CHANGE UP (ref 96–108)
CHLORIDE SERPL-SCNC: 107 MMOL/L — SIGNIFICANT CHANGE UP (ref 96–108)
CO2 SERPL-SCNC: 29 MMOL/L — SIGNIFICANT CHANGE UP (ref 22–31)
CO2 SERPL-SCNC: 29 MMOL/L — SIGNIFICANT CHANGE UP (ref 22–31)
CREAT SERPL-MCNC: 0.69 MG/DL — SIGNIFICANT CHANGE UP (ref 0.5–1.3)
CREAT SERPL-MCNC: 0.69 MG/DL — SIGNIFICANT CHANGE UP (ref 0.5–1.3)
EGFR: 97 ML/MIN/1.73M2 — SIGNIFICANT CHANGE UP
EGFR: 97 ML/MIN/1.73M2 — SIGNIFICANT CHANGE UP
EOSINOPHIL # BLD AUTO: 0.08 K/UL — SIGNIFICANT CHANGE UP (ref 0–0.5)
EOSINOPHIL # BLD AUTO: 0.08 K/UL — SIGNIFICANT CHANGE UP (ref 0–0.5)
EOSINOPHIL NFR BLD AUTO: 1.1 % — SIGNIFICANT CHANGE UP (ref 0–6)
EOSINOPHIL NFR BLD AUTO: 1.1 % — SIGNIFICANT CHANGE UP (ref 0–6)
GLUCOSE BLDC GLUCOMTR-MCNC: 113 MG/DL — HIGH (ref 70–99)
GLUCOSE BLDC GLUCOMTR-MCNC: 113 MG/DL — HIGH (ref 70–99)
GLUCOSE BLDC GLUCOMTR-MCNC: 115 MG/DL — HIGH (ref 70–99)
GLUCOSE BLDC GLUCOMTR-MCNC: 115 MG/DL — HIGH (ref 70–99)
GLUCOSE BLDC GLUCOMTR-MCNC: 120 MG/DL — HIGH (ref 70–99)
GLUCOSE BLDC GLUCOMTR-MCNC: 120 MG/DL — HIGH (ref 70–99)
GLUCOSE BLDC GLUCOMTR-MCNC: 145 MG/DL — HIGH (ref 70–99)
GLUCOSE BLDC GLUCOMTR-MCNC: 145 MG/DL — HIGH (ref 70–99)
GLUCOSE SERPL-MCNC: 99 MG/DL — SIGNIFICANT CHANGE UP (ref 70–99)
GLUCOSE SERPL-MCNC: 99 MG/DL — SIGNIFICANT CHANGE UP (ref 70–99)
HCT VFR BLD CALC: 28.5 % — LOW (ref 34.5–45)
HCT VFR BLD CALC: 28.5 % — LOW (ref 34.5–45)
HGB BLD-MCNC: 9.1 G/DL — LOW (ref 11.5–15.5)
HGB BLD-MCNC: 9.1 G/DL — LOW (ref 11.5–15.5)
IMM GRANULOCYTES NFR BLD AUTO: 1.7 % — HIGH (ref 0–0.9)
IMM GRANULOCYTES NFR BLD AUTO: 1.7 % — HIGH (ref 0–0.9)
LYMPHOCYTES # BLD AUTO: 1.19 K/UL — SIGNIFICANT CHANGE UP (ref 1–3.3)
LYMPHOCYTES # BLD AUTO: 1.19 K/UL — SIGNIFICANT CHANGE UP (ref 1–3.3)
LYMPHOCYTES # BLD AUTO: 15.8 % — SIGNIFICANT CHANGE UP (ref 13–44)
LYMPHOCYTES # BLD AUTO: 15.8 % — SIGNIFICANT CHANGE UP (ref 13–44)
MAGNESIUM SERPL-MCNC: 1.9 MG/DL — SIGNIFICANT CHANGE UP (ref 1.6–2.6)
MAGNESIUM SERPL-MCNC: 1.9 MG/DL — SIGNIFICANT CHANGE UP (ref 1.6–2.6)
MCHC RBC-ENTMCNC: 26.6 PG — LOW (ref 27–34)
MCHC RBC-ENTMCNC: 26.6 PG — LOW (ref 27–34)
MCHC RBC-ENTMCNC: 31.9 G/DL — LOW (ref 32–36)
MCHC RBC-ENTMCNC: 31.9 G/DL — LOW (ref 32–36)
MCV RBC AUTO: 83.3 FL — SIGNIFICANT CHANGE UP (ref 80–100)
MCV RBC AUTO: 83.3 FL — SIGNIFICANT CHANGE UP (ref 80–100)
MONOCYTES # BLD AUTO: 0.61 K/UL — SIGNIFICANT CHANGE UP (ref 0–0.9)
MONOCYTES # BLD AUTO: 0.61 K/UL — SIGNIFICANT CHANGE UP (ref 0–0.9)
MONOCYTES NFR BLD AUTO: 8.1 % — SIGNIFICANT CHANGE UP (ref 2–14)
MONOCYTES NFR BLD AUTO: 8.1 % — SIGNIFICANT CHANGE UP (ref 2–14)
NEUTROPHILS # BLD AUTO: 5.49 K/UL — SIGNIFICANT CHANGE UP (ref 1.8–7.4)
NEUTROPHILS # BLD AUTO: 5.49 K/UL — SIGNIFICANT CHANGE UP (ref 1.8–7.4)
NEUTROPHILS NFR BLD AUTO: 73 % — SIGNIFICANT CHANGE UP (ref 43–77)
NEUTROPHILS NFR BLD AUTO: 73 % — SIGNIFICANT CHANGE UP (ref 43–77)
NRBC # BLD: 0 /100 WBCS — SIGNIFICANT CHANGE UP (ref 0–0)
NRBC # BLD: 0 /100 WBCS — SIGNIFICANT CHANGE UP (ref 0–0)
PLATELET # BLD AUTO: 408 K/UL — HIGH (ref 150–400)
PLATELET # BLD AUTO: 408 K/UL — HIGH (ref 150–400)
POTASSIUM SERPL-MCNC: 3.5 MMOL/L — SIGNIFICANT CHANGE UP (ref 3.5–5.3)
POTASSIUM SERPL-MCNC: 3.5 MMOL/L — SIGNIFICANT CHANGE UP (ref 3.5–5.3)
POTASSIUM SERPL-SCNC: 3.5 MMOL/L — SIGNIFICANT CHANGE UP (ref 3.5–5.3)
POTASSIUM SERPL-SCNC: 3.5 MMOL/L — SIGNIFICANT CHANGE UP (ref 3.5–5.3)
RBC # BLD: 3.42 M/UL — LOW (ref 3.8–5.2)
RBC # BLD: 3.42 M/UL — LOW (ref 3.8–5.2)
RBC # FLD: 18.9 % — HIGH (ref 10.3–14.5)
RBC # FLD: 18.9 % — HIGH (ref 10.3–14.5)
SODIUM SERPL-SCNC: 141 MMOL/L — SIGNIFICANT CHANGE UP (ref 135–145)
SODIUM SERPL-SCNC: 141 MMOL/L — SIGNIFICANT CHANGE UP (ref 135–145)
WBC # BLD: 7.52 K/UL — SIGNIFICANT CHANGE UP (ref 3.8–10.5)
WBC # BLD: 7.52 K/UL — SIGNIFICANT CHANGE UP (ref 3.8–10.5)
WBC # FLD AUTO: 7.52 K/UL — SIGNIFICANT CHANGE UP (ref 3.8–10.5)
WBC # FLD AUTO: 7.52 K/UL — SIGNIFICANT CHANGE UP (ref 3.8–10.5)

## 2024-01-05 PROCEDURE — 99232 SBSQ HOSP IP/OBS MODERATE 35: CPT

## 2024-01-05 RX ADMIN — TIOTROPIUM BROMIDE 2 PUFF(S): 18 CAPSULE ORAL; RESPIRATORY (INHALATION) at 12:35

## 2024-01-05 RX ADMIN — Medication 1 GRAM(S): at 06:46

## 2024-01-05 RX ADMIN — Medication 1 GRAM(S): at 17:41

## 2024-01-05 RX ADMIN — Medication 81 MILLIGRAM(S): at 11:35

## 2024-01-05 RX ADMIN — GABAPENTIN 600 MILLIGRAM(S): 400 CAPSULE ORAL at 21:22

## 2024-01-05 RX ADMIN — ATORVASTATIN CALCIUM 80 MILLIGRAM(S): 80 TABLET, FILM COATED ORAL at 21:22

## 2024-01-05 RX ADMIN — CELECOXIB 200 MILLIGRAM(S): 200 CAPSULE ORAL at 11:35

## 2024-01-05 RX ADMIN — Medication 110 MILLIGRAM(S): at 17:39

## 2024-01-05 RX ADMIN — Medication 110 MILLIGRAM(S): at 06:46

## 2024-01-05 RX ADMIN — DULOXETINE HYDROCHLORIDE 60 MILLIGRAM(S): 30 CAPSULE, DELAYED RELEASE ORAL at 11:35

## 2024-01-05 RX ADMIN — Medication 2 SPRAY(S): at 17:42

## 2024-01-05 RX ADMIN — Medication 1 GRAM(S): at 11:35

## 2024-01-05 RX ADMIN — Medication 2 SPRAY(S): at 21:22

## 2024-01-05 RX ADMIN — RIVAROXABAN 20 MILLIGRAM(S): KIT at 17:42

## 2024-01-05 RX ADMIN — Medication 240 MILLIGRAM(S): at 06:45

## 2024-01-05 RX ADMIN — BUDESONIDE AND FORMOTEROL FUMARATE DIHYDRATE 2 PUFF(S): 160; 4.5 AEROSOL RESPIRATORY (INHALATION) at 17:42

## 2024-01-05 RX ADMIN — Medication 2 SPRAY(S): at 11:39

## 2024-01-05 RX ADMIN — BUDESONIDE AND FORMOTEROL FUMARATE DIHYDRATE 2 PUFF(S): 160; 4.5 AEROSOL RESPIRATORY (INHALATION) at 07:26

## 2024-01-05 RX ADMIN — CELECOXIB 200 MILLIGRAM(S): 200 CAPSULE ORAL at 12:34

## 2024-01-05 RX ADMIN — Medication 75 MILLIGRAM(S): at 17:41

## 2024-01-05 RX ADMIN — MONTELUKAST 10 MILLIGRAM(S): 4 TABLET, CHEWABLE ORAL at 11:35

## 2024-01-05 RX ADMIN — Medication 75 MILLIGRAM(S): at 06:45

## 2024-01-05 RX ADMIN — Medication 2 SPRAY(S): at 07:26

## 2024-01-05 RX ADMIN — Medication 1 GRAM(S): at 21:22

## 2024-01-05 NOTE — PROGRESS NOTE ADULT - NS ATTEND AMEND GEN_ALL_CORE FT
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 Found to have bilateral diffuse ground glass opacities consistent with multifocal PNA on chest imaging. Tested + for Mycoplasma. Hypoxic necessitating increased O2 requirements during hospital course. Now weaning down O2. Hospital course complicated by exposure to COVID + roommate. Pt now with Flu A    Dx: acute hypoxic respiratory failure, multifocal PNA due to mycoplasma pneumonia, acute asthma exacerbation in setting of bacterial PNA/viral infection, influenza A      - had CT chest 12/25 showing multifocal PNA with diffuse bilateral GGO. primary team repeated CT chest non contrast 12/28 with again multifocal ground glass opacities consistent with PNA. would not anticipate drastic change in imaging of chest in such short duration of time  - sent for CTA chest 1/1/24 per primary team and CTA negative for PE, mild improvement in GGO, but with noted traction bronchiectasis  - would repeat CT chest in weeks to assess for lung parenchyma, chest imaging may take several weeks to clear  - she had CT chest done 7/2019 which did not have any findings of GGO/traction bronchiectasis. Findings on current imaging likely reflect her infection/PNA    - she completed initial 7 day course of azithromycin for mycoplasma PNA but remained symptomatic  - seen by ID and placed on doxycycline to extend treatment for mycoplasma and started on cefepime for gram negative bacterial PNA / HCAP coverage, procalcitonin up trended since admission.   - completed 7 day course of cefepime 1/3   - remains on doxycycline today is day #8  - between azithromycin and doxycycline she has had 14 day treatment for mycoplasma. ?d/c doxycycline. follow up with ID regarding antibx duration  - sputum cx with normal respiratory hossein  - had been on solumedrol x4 days with steroids tapered to prednisone 40mg daily on 12/29.   - completed 10 day course of steroids,  prednisone d/cherise yesterday  - cont with aerobika for cough assist/secretion mobilization  - has received more than a weeks course of hypertonic saline nebs to help mobilize secretions, her cough is mostly dry, hypertonic saline d/cherise, condition no longer warrants  - cont antitussives tessalon perles, hycodan  - duonebs to prn, started on ICS/LABA/LAMA combo symbicort for underlying asthma/reactive airway + spiriva  - goal to maintain O2 sat > 90%  - weaned off 2L NC to RA with O2 sat > 90% however pt wearing O2 for comfort  - incentive spirometer, OOB to chair  - cont xarelto for underlying hx of PE  -  who had been visiting pt is reported by patient to have been sick for past week due to flu, he last visited her on 1/2. (?if pt contracted influenza from sick contact with family member)  - to complete 5 day course of tamiflu   - as she was also exposed to roommate who tested + COVID, her initial covid swab post exposure is negative for COVID, however would repeat COVID swab this weekend to see if pt converted to COVID as well  - will need outpatient pulmonary follow up. She sees Dr. García in [facility] and should follow up with him. 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 Found to have bilateral diffuse ground glass opacities consistent with multifocal PNA on chest imaging. Tested + for Mycoplasma. Hypoxic necessitating increased O2 requirements during hospital course. Now weaning down O2. Hospital course complicated by exposure to COVID + roommate. Pt now with Flu A    Dx: acute hypoxic respiratory failure, multifocal PNA due to mycoplasma pneumonia, acute asthma exacerbation in setting of bacterial PNA/viral infection, influenza A      - had CT chest 12/25 showing multifocal PNA with diffuse bilateral GGO. primary team repeated CT chest non contrast 12/28 with again multifocal ground glass opacities consistent with PNA. would not anticipate drastic change in imaging of chest in such short duration of time  - sent for CTA chest 1/1/24 per primary team and CTA negative for PE, mild improvement in GGO, but with noted traction bronchiectasis  - would repeat CT chest in weeks to assess for lung parenchyma, chest imaging may take several weeks to clear  - she had CT chest done 7/2019 which did not have any findings of GGO/traction bronchiectasis. Findings on current imaging likely reflect her infection/PNA    - she completed initial 7 day course of azithromycin for mycoplasma PNA but remained symptomatic  - seen by ID and placed on doxycycline to extend treatment for mycoplasma and started on cefepime for gram negative bacterial PNA / HCAP coverage, procalcitonin up trended since admission.   - completed 7 day course of cefepime 1/3   - remains on doxycycline today is day #8  - between azithromycin and doxycycline she has had 14 day treatment for mycoplasma. ?d/c doxycycline. follow up with ID regarding antibx duration  - sputum cx with normal respiratory hossein  - had been on solumedrol x4 days with steroids tapered to prednisone 40mg daily on 12/29.   - completed 10 day course of steroids,  prednisone d/cherise yesterday  - cont with aerobika for cough assist/secretion mobilization  - has received more than a weeks course of hypertonic saline nebs to help mobilize secretions, her cough is mostly dry, hypertonic saline d/cherise, condition no longer warrants  - cont antitussives tessalon perles, hycodan  - duonebs to prn, started on ICS/LABA/LAMA combo symbicort for underlying asthma/reactive airway + spiriva  - goal to maintain O2 sat > 90%  - weaned off 2L NC to RA with O2 sat > 90% however pt wearing O2 for comfort  - incentive spirometer, OOB to chair  - cont xarelto for underlying hx of PE  -  who had been visiting pt is reported by patient to have been sick for past week due to flu, he last visited her on 1/2. (?if pt contracted influenza from sick contact with family member)  - to complete 5 day course of tamiflu   - as she was also exposed to roommate who tested + COVID, her initial covid swab post exposure is negative for COVID, however would repeat COVID swab this weekend to see if pt converted to COVID as well  - will need outpatient pulmonary follow up. She sees Dr. García in [facility] and should follow up with him.

## 2024-01-05 NOTE — PROGRESS NOTE ADULT - SUBJECTIVE AND OBJECTIVE BOX
ROSSI ROJAS  MRN-[id number]    Follow Up:  Influenza A, Mycoplasma PNA    Interval History: the pt was seen and examined earlier, not in acute distress, continues having persistent cough, sounds more wet now, reports sputum is grey color. Pt is afebrile, no leukocytosis, remains on 2L NC during my exam although O2 SAT was checked on RA and within normal range.     PAST MEDICAL & SURGICAL HISTORY:  Lupus      Diabetes      Hypertension      Asthma      Peripheral polyneuropathy      Pulmonary emboli  7/2019      History of hip replacement  left      H/O total hysterectomy      History of bilateral tubal ligation      S/P laminectomy with spinal fusion      History of shoulder surgery      H/O knee surgery          ROS:    [ ] Unobtainable because:  [x] All other systems negative    Constitutional: no fever, no chills  Head: no trauma  Eyes: no vision changes, no eye pain  ENT:  no sore throat, no rhinorrhea  Cardiovascular:  no chest pain, no palpitation  Respiratory:  SOB with cough  GI:  no abd pain, no vomiting, no diarrhea  urinary: no dysuria, no hematuria, no flank pain  musculoskeletal:  no joint pain, no joint swelling  skin:  no rash  neurology:  no headache, no seizure, no change in mental status  psych: + anxiety, no depression         Allergies  No Known Allergies        ANTIMICROBIALS:  doxycycline IVPB    doxycycline IVPB 100 every 12 hours  oseltamivir 75 two times a day      OTHER MEDS:  acetaminophen     Tablet .. 650 milliGRAM(s) Oral every 6 hours PRN  albuterol    90 MICROgram(s) HFA Inhaler 1 Puff(s) Inhalation every 4 hours PRN  albuterol/ipratropium for Nebulization 3 milliLiter(s) Nebulizer every 6 hours PRN  aspirin enteric coated 81 milliGRAM(s) Oral daily  atorvastatin 80 milliGRAM(s) Oral at bedtime  budesonide 160 MICROgram(s)/formoterol 4.5 MICROgram(s) Inhaler 2 Puff(s) Inhalation two times a day  celecoxib 200 milliGRAM(s) Oral daily  dextrose 5%. 1000 milliLiter(s) IV Continuous <Continuous>  dextrose 5%. 1000 milliLiter(s) IV Continuous <Continuous>  dextrose 50% Injectable 25 Gram(s) IV Push once  dextrose 50% Injectable 12.5 Gram(s) IV Push once  dextrose 50% Injectable 25 Gram(s) IV Push once  dextrose Oral Gel 15 Gram(s) Oral once PRN  DULoxetine 60 milliGRAM(s) Oral daily  gabapentin 600 milliGRAM(s) Oral at bedtime  glucagon  Injectable 1 milliGRAM(s) IntraMuscular once  guaifenesin/dextromethorphan Oral Liquid 10 milliLiter(s) Oral every 6 hours PRN  hydrocodone/homatropine Syrup 5 milliLiter(s) Oral every 6 hours  influenza   Vaccine 0.5 milliLiter(s) IntraMuscular once  insulin lispro (ADMELOG) corrective regimen sliding scale   SubCutaneous three times a day before meals  insulin lispro (ADMELOG) corrective regimen sliding scale   SubCutaneous at bedtime  melatonin 3 milliGRAM(s) Oral at bedtime PRN  montelukast 10 milliGRAM(s) Oral daily  rivaroxaban 20 milliGRAM(s) Oral with dinner  sodium chloride 0.65% Nasal 2 Spray(s) Both Nostrils every 6 hours  sucralfate 1 Gram(s) Oral every 6 hours  tiotropium 2.5 MICROgram(s) Inhaler 2 Puff(s) Inhalation daily  verapamil  milliGRAM(s) Oral daily      Vital Signs Last 24 Hrs  T(C): 36.9 (05 Jan 2024 10:59), Max: 36.9 (05 Jan 2024 00:30)  T(F): 98.5 (05 Jan 2024 10:59), Max: 98.5 (05 Jan 2024 10:59)  HR: 115 (05 Jan 2024 10:59) (77 - 115)  BP: 101/59 (05 Jan 2024 10:59) (101/59 - 146/84)  BP(mean): --  RR: 20 (05 Jan 2024 10:59) (18 - 20)  SpO2: 98% (05 Jan 2024 10:59) (93% - 99%)    Parameters below as of 05 Jan 2024 10:59  Patient On (Oxygen Delivery Method): nasal cannula  O2 Flow (L/min): 2      Physical Exam:  General:    NAD, pt gets short of breath with cough, NC  Head: atraumatic, normocephalic  Eye: normal sclera and conjunctiva  ENT:    no oral lesions, neck supple  Cardio: regular S1, S2,  no murmur  Respiratory:  decreased breath sounds b/l, no wheezing on today's exam, + persistent cough   abd:  BS present, abd is soft,   not distended, not tender, no guarding   :   no CVAT,  no suprapubic tenderness  Musculoskeletal:   no joint swelling,   no edema  vascular: no central lines, +PIV   Skin:    no rash, warm to touch  Neurologic: awake and alert, answers questions and follows commands   psych: appropriate       WBC Count: 7.52 K/uL (01-05 @ 06:15)  WBC Count: 8.57 K/uL (01-04 @ 07:11)  WBC Count: 9.88 K/uL (01-02 @ 07:45)  WBC Count: 12.58 K/uL (12-31 @ 05:45)                            9.1    7.52  )-----------( 408      ( 05 Jan 2024 06:15 )             28.5       01-05    141  |  107  |  27<H>  ----------------------------<  99  3.5   |  29  |  0.69    Ca    9.3      05 Jan 2024 06:15  Mg     1.9     01-05        Urinalysis Basic - ( 05 Jan 2024 06:15 )    Color: x / Appearance: x / SG: x / pH: x  Gluc: 99 mg/dL / Ketone: x  / Bili: x / Urobili: x   Blood: x / Protein: x / Nitrite: x   Leuk Esterase: x / RBC: x / WBC x   Sq Epi: x / Non Sq Epi: x / Bacteria: x        Creatinine Trend: 0.69<--, 0.70<--, 0.67<--, 0.64<--, 0.75<--, 0.84<--      MICROBIOLOGY:  v  .Sputum Sputum  12-27-23   Normal Respiratory Valarie present  --    Few Squamous epithelial cells per low power field  Few polymorphonuclear leukocytes per low power field  Moderate Yeast per oil power field  Few Gram positive cocci in pairs per oil power field      .Blood Blood-Peripheral  12-22-23   No growth at 5 days  --  --      .Blood Blood-Peripheral  12-22-23   No growth at 5 days  --  --      C-Reactive Protein, Serum: 159 (12-25)      SARS-CoV-2 Result: NotDetec (01-03-24 @ 15:30)    SARS-CoV-2: NotDetec (25 Dec 2023 10:15)  SARS-CoV-2: NotDetec (21 Dec 2023 16:30)  SARS-CoV-2: NotDetec (17 Dec 2023 05:51)  SARS-CoV-2: NotDetec (17 Nov 2023 05:46)    RADIOLOGY:     ROSSI ROJAS  MRN-[id number]    Follow Up:  Influenza A, Mycoplasma PNA    Interval History: the pt was seen and examined earlier, not in acute distress, continues having persistent cough, sounds more wet now, reports sputum is grey color. Pt is afebrile, no leukocytosis, remains on 2L NC during my exam although O2 SAT was checked on RA and within normal range.     PAST MEDICAL & SURGICAL HISTORY:  Lupus      Diabetes      Hypertension      Asthma      Peripheral polyneuropathy      Pulmonary emboli  7/2019      History of hip replacement  left      H/O total hysterectomy      History of bilateral tubal ligation      S/P laminectomy with spinal fusion      History of shoulder surgery      H/O knee surgery          ROS:    [ ] Unobtainable because:  [x] All other systems negative    Constitutional: no fever, no chills  Head: no trauma  Eyes: no vision changes, no eye pain  ENT:  no sore throat, no rhinorrhea  Cardiovascular:  no chest pain, no palpitation  Respiratory:  SOB with cough  GI:  no abd pain, no vomiting, no diarrhea  urinary: no dysuria, no hematuria, no flank pain  musculoskeletal:  no joint pain, no joint swelling  skin:  no rash  neurology:  no headache, no seizure, no change in mental status  psych: + anxiety, no depression         Allergies  No Known Allergies        ANTIMICROBIALS:  doxycycline IVPB    doxycycline IVPB 100 every 12 hours  oseltamivir 75 two times a day      OTHER MEDS:  acetaminophen     Tablet .. 650 milliGRAM(s) Oral every 6 hours PRN  albuterol    90 MICROgram(s) HFA Inhaler 1 Puff(s) Inhalation every 4 hours PRN  albuterol/ipratropium for Nebulization 3 milliLiter(s) Nebulizer every 6 hours PRN  aspirin enteric coated 81 milliGRAM(s) Oral daily  atorvastatin 80 milliGRAM(s) Oral at bedtime  budesonide 160 MICROgram(s)/formoterol 4.5 MICROgram(s) Inhaler 2 Puff(s) Inhalation two times a day  celecoxib 200 milliGRAM(s) Oral daily  dextrose 5%. 1000 milliLiter(s) IV Continuous <Continuous>  dextrose 5%. 1000 milliLiter(s) IV Continuous <Continuous>  dextrose 50% Injectable 25 Gram(s) IV Push once  dextrose 50% Injectable 12.5 Gram(s) IV Push once  dextrose 50% Injectable 25 Gram(s) IV Push once  dextrose Oral Gel 15 Gram(s) Oral once PRN  DULoxetine 60 milliGRAM(s) Oral daily  gabapentin 600 milliGRAM(s) Oral at bedtime  glucagon  Injectable 1 milliGRAM(s) IntraMuscular once  guaifenesin/dextromethorphan Oral Liquid 10 milliLiter(s) Oral every 6 hours PRN  hydrocodone/homatropine Syrup 5 milliLiter(s) Oral every 6 hours  influenza   Vaccine 0.5 milliLiter(s) IntraMuscular once  insulin lispro (ADMELOG) corrective regimen sliding scale   SubCutaneous three times a day before meals  insulin lispro (ADMELOG) corrective regimen sliding scale   SubCutaneous at bedtime  melatonin 3 milliGRAM(s) Oral at bedtime PRN  montelukast 10 milliGRAM(s) Oral daily  rivaroxaban 20 milliGRAM(s) Oral with dinner  sodium chloride 0.65% Nasal 2 Spray(s) Both Nostrils every 6 hours  sucralfate 1 Gram(s) Oral every 6 hours  tiotropium 2.5 MICROgram(s) Inhaler 2 Puff(s) Inhalation daily  verapamil  milliGRAM(s) Oral daily      Vital Signs Last 24 Hrs  T(C): 36.9 (05 Jan 2024 10:59), Max: 36.9 (05 Jan 2024 00:30)  T(F): 98.5 (05 Jan 2024 10:59), Max: 98.5 (05 Jan 2024 10:59)  HR: 115 (05 Jan 2024 10:59) (77 - 115)  BP: 101/59 (05 Jan 2024 10:59) (101/59 - 146/84)  BP(mean): --  RR: 20 (05 Jan 2024 10:59) (18 - 20)  SpO2: 98% (05 Jan 2024 10:59) (93% - 99%)    Parameters below as of 05 Jan 2024 10:59  Patient On (Oxygen Delivery Method): nasal cannula  O2 Flow (L/min): 2      Physical Exam:  General:    NAD, pt gets short of breath with cough, NC  Head: atraumatic, normocephalic  Eye: normal sclera and conjunctiva  ENT:    no oral lesions, neck supple  Cardio: regular S1, S2,  no murmur  Respiratory:  decreased breath sounds b/l, no wheezing on today's exam, + persistent cough   abd:  BS present, abd is soft,   not distended, not tender, no guarding   :   no CVAT,  no suprapubic tenderness  Musculoskeletal:   no joint swelling,   no edema  vascular: no central lines, +PIV   Skin:    no rash, warm to touch  Neurologic: awake and alert, answers questions and follows commands   psych: appropriate       WBC Count: 7.52 K/uL (01-05 @ 06:15)  WBC Count: 8.57 K/uL (01-04 @ 07:11)  WBC Count: 9.88 K/uL (01-02 @ 07:45)  WBC Count: 12.58 K/uL (12-31 @ 05:45)                            9.1    7.52  )-----------( 408      ( 05 Jan 2024 06:15 )             28.5       01-05    141  |  107  |  27<H>  ----------------------------<  99  3.5   |  29  |  0.69    Ca    9.3      05 Jan 2024 06:15  Mg     1.9     01-05        Urinalysis Basic - ( 05 Jan 2024 06:15 )    Color: x / Appearance: x / SG: x / pH: x  Gluc: 99 mg/dL / Ketone: x  / Bili: x / Urobili: x   Blood: x / Protein: x / Nitrite: x   Leuk Esterase: x / RBC: x / WBC x   Sq Epi: x / Non Sq Epi: x / Bacteria: x        Creatinine Trend: 0.69<--, 0.70<--, 0.67<--, 0.64<--, 0.75<--, 0.84<--      MICROBIOLOGY:  v  .Sputum Sputum  12-27-23   Normal Respiratory Valarie present  --    Few Squamous epithelial cells per low power field  Few polymorphonuclear leukocytes per low power field  Moderate Yeast per oil power field  Few Gram positive cocci in pairs per oil power field      .Blood Blood-Peripheral  12-22-23   No growth at 5 days  --  --      .Blood Blood-Peripheral  12-22-23   No growth at 5 days  --  --      C-Reactive Protein, Serum: 159 (12-25)      SARS-CoV-2 Result: NotDetec (01-03-24 @ 15:30)    SARS-CoV-2: NotDetec (25 Dec 2023 10:15)  SARS-CoV-2: NotDetec (21 Dec 2023 16:30)  SARS-CoV-2: NotDetec (17 Dec 2023 05:51)  SARS-CoV-2: NotDetec (17 Nov 2023 05:46)    RADIOLOGY:

## 2024-01-05 NOTE — PROGRESS NOTE ADULT - NS ATTEND AMEND GEN_ALL_CORE FT
All labs and cultures and imaging and pertinent chart notes reviewed by me.    case d/w Np Leidy at length and agree with her assessment and plan.  Betina Soni MD  Infectious Disease Attending    for any questions please do not hesitate to contact me either via teams or by calling [phone] All labs and cultures and imaging and pertinent chart notes reviewed by me.    case d/w Np Leidy at length and agree with her assessment and plan.  Betina Soni MD  Infectious Disease Attending    for any questions please do not hesitate to contact me either via teams or by calling [phone]

## 2024-01-05 NOTE — PROGRESS NOTE ADULT - SUBJECTIVE AND OBJECTIVE BOX
Patient is a 63y old  Female who presents with a chief complaint of AMS, HCAP, Acute Hypoxic respiratory failure (2024 13:30)    INTERVAL HPI/OVERNIGHT EVENTS: NAEON    MEDICATIONS  (STANDING):  aspirin enteric coated 81 milliGRAM(s) Oral daily  atorvastatin 80 milliGRAM(s) Oral at bedtime  budesonide 160 MICROgram(s)/formoterol 4.5 MICROgram(s) Inhaler 2 Puff(s) Inhalation two times a day  celecoxib 200 milliGRAM(s) Oral daily  dextrose 5%. 1000 milliLiter(s) (100 mL/Hr) IV Continuous <Continuous>  dextrose 5%. 1000 milliLiter(s) (50 mL/Hr) IV Continuous <Continuous>  dextrose 50% Injectable 25 Gram(s) IV Push once  dextrose 50% Injectable 12.5 Gram(s) IV Push once  dextrose 50% Injectable 25 Gram(s) IV Push once  doxycycline IVPB      doxycycline IVPB 100 milliGRAM(s) IV Intermittent every 12 hours  DULoxetine 60 milliGRAM(s) Oral daily  gabapentin 600 milliGRAM(s) Oral at bedtime  glucagon  Injectable 1 milliGRAM(s) IntraMuscular once  hydrocodone/homatropine Syrup 5 milliLiter(s) Oral every 6 hours  influenza   Vaccine 0.5 milliLiter(s) IntraMuscular once  insulin lispro (ADMELOG) corrective regimen sliding scale   SubCutaneous three times a day before meals  insulin lispro (ADMELOG) corrective regimen sliding scale   SubCutaneous at bedtime  montelukast 10 milliGRAM(s) Oral daily  oseltamivir 75 milliGRAM(s) Oral two times a day  rivaroxaban 20 milliGRAM(s) Oral with dinner  sodium chloride 0.65% Nasal 2 Spray(s) Both Nostrils every 6 hours  sucralfate 1 Gram(s) Oral every 6 hours  tiotropium 2.5 MICROgram(s) Inhaler 2 Puff(s) Inhalation daily  verapamil  milliGRAM(s) Oral daily    MEDICATIONS  (PRN):  acetaminophen     Tablet .. 650 milliGRAM(s) Oral every 6 hours PRN Temp greater or equal to 38C (100.4F), Mild Pain (1 - 3)  albuterol    90 MICROgram(s) HFA Inhaler 1 Puff(s) Inhalation every 4 hours PRN Shortness of Breath and/or Wheezing  albuterol/ipratropium for Nebulization 3 milliLiter(s) Nebulizer every 6 hours PRN Shortness of Breath and/or Wheezing  dextrose Oral Gel 15 Gram(s) Oral once PRN Blood Glucose LESS THAN 70 milliGRAM(s)/deciliter  guaifenesin/dextromethorphan Oral Liquid 10 milliLiter(s) Oral every 6 hours PRN Cough  melatonin 3 milliGRAM(s) Oral at bedtime PRN Insomnia    Allergies    No Known Allergies    Intolerances    dislikes Glucerna Shake (Other)    REVIEW OF SYSTEMS:  All other systems reviewed and are negative    Vital Signs Last 24 Hrs  T(C): 36.9 (2024 10:59), Max: 36.9 (2024 00:30)  T(F): 98.5 (2024 10:59), Max: 98.5 (2024 10:59)  HR: 115 (2024 10:59) (77 - 115)  BP: 101/59 (2024 10:59) (101/59 - 146/84)  BP(mean): --  RR: 20 (2024 10:59) (18 - 20)  SpO2: 98% (2024 10:59) (97% - 99%)    Parameters below as of 2024 10:59  Patient On (Oxygen Delivery Method): nasal cannula  O2 Flow (L/min): 2    Daily     Daily Weight in k.7 (2024 04:55)  I&O's Summary    2024 07:  -  2024 07:00  --------------------------------------------------------  IN: 680 mL / OUT: 0 mL / NET: 680 mL    2024 07:01  -  2024 15:55  --------------------------------------------------------  IN: 420 mL / OUT: 0 mL / NET: 420 mL      CAPILLARY BLOOD GLUCOSE      POCT Blood Glucose.: 145 mg/dL (2024 11:18)  POCT Blood Glucose.: 120 mg/dL (2024 07:59)  POCT Blood Glucose.: 158 mg/dL (2024 20:54)  POCT Blood Glucose.: 139 mg/dL (2024 16:54)    PHYSICAL EXAM:  GENERAL: NAD, lying in bed comfortably cough  HEAD:  Atraumatic, normocephalic  EYES: EOMI, PERRLA, conjunctiva and sclera clear  NECK: Supple, trachea midline, no JVD  HEART: Regular rate and rhythm, no murmurs, rubs, or gallops  LUNGS: Unlabored respirations. mild scattered wheezing bilat    ABDOMEN: Soft, nontender, nondistended, +BS  EXTREMITIES: 2+ peripheral pulses bilaterally, cap refill<2 secs. No clubbing, cyanosis, or edema  NERVOUS SYSTEM:  A&Ox3, following commands, moving all extremities, no focal deficits   SKIN: No rashes or lesions    Labs                          9.1    7.52  )-----------( 408      ( 2024 06:15 )             28.5     01-    141  |  107  |  27<H>  ----------------------------<  99  3.5   |  29  |  0.69    Ca    9.3      2024 06:15  Mg     1.9     01-            Urinalysis Basic - ( 2024 06:15 )    Color: x / Appearance: x / SG: x / pH: x  Gluc: 99 mg/dL / Ketone: x  / Bili: x / Urobili: x   Blood: x / Protein: x / Nitrite: x   Leuk Esterase: x / RBC: x / WBC x   Sq Epi: x / Non Sq Epi: x / Bacteria: x                  DVT prophylaxis: > Lovenox 40mg SQ daily  > Heparin   > SCD's

## 2024-01-05 NOTE — PROGRESS NOTE ADULT - SUBJECTIVE AND OBJECTIVE BOX
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  Pt lethargic rousable, but confused, oriented to self and time; not to place or situation. Hx obtained from  at bedside. Per  last evening he helped pt to the restroom and a few minutes later when he  on her she was unresponsive.  reports pt seemed to open eyes at times, however was not speaking thus he called EMS. Pt remained same way till after arrival to the ED. Per , pt recently developed URI symptoms and was started on Doxy as well as Hycodan, Alprazolam and Fioricet. Of note pt already on Percocet, flexeril for chronic pain, Gabapentin for seizures per . All of which  believes pt took last evening.     ED Course  Vitals BP 94/61, SPO2 84% on RA, rest of vitals stable. Labs sig for H/H 9.8/31.3, rest of labs stable. CT head neg for acute stroke, CT perfusion Markedly degraded by patient motion. No core infarct. Small areas of abnormal perfusion in the left frontal and right temporal lobes not confined to a vascular territory, likely artifactual. CXR showed Multifocalpneumonia.       OBJECTIVE:  ===========    ICU Vital Signs Last 24 Hrs  T(C): 36.9 (05 Jan 2024 10:59), Max: 36.9 (05 Jan 2024 00:30)  T(F): 98.5 (05 Jan 2024 10:59), Max: 98.5 (05 Jan 2024 10:59)  HR: 86 (05 Jan 2024 15:40) (77 - 115)  BP: 105/68 (05 Jan 2024 15:40) (101/59 - 146/84)  BP(mean): --  ABP: --  ABP(mean): --  RR: 20 (05 Jan 2024 15:40) (18 - 20)  SpO2: 96% (05 Jan 2024 15:40) (96% - 99%)    O2 Parameters below as of 05 Jan 2024 15:40  Patient On (Oxygen Delivery Method): nasal cannula  O2 Flow (L/min): 2            01-04 @ 07:01  -  01-05 @ 07:00  --------------------------------------------------------  IN: 680 mL / OUT: 0 mL / NET: 680 mL    01-05 @ 07:01  -  01-05 @ 18:31  --------------------------------------------------------  IN: 420 mL / OUT: 0 mL / NET: 420 mL      CAPILLARY BLOOD GLUCOSE      POCT Blood Glucose.: 115 mg/dL (05 Jan 2024 16:45)        PHYSICAL EXAM:  ==============  GENERAL: NAD, well-groomed, well-developed  HEAD:  Atraumatic, Normocephalic  EYES: EOMI, PERRLA, conjunctiva and sclera clear  ENMT: No tonsillar erythema, exudates, or enlargement; Moist mucous membranes, Good dentition, No lesions  NECK: Supple, No JVD, Normal thyroid  NERVOUS SYSTEM:  Alert & Oriented X3, Good concentration; Motor Strength 5/5 B/L upper and lower extremities; DTRs 2+ intact and symmetric  CHEST/LUNG: Clear to percussion bilaterally; No rales, rhonchi, wheezing, or rubs  HEART: Regular rate and rhythm; No murmurs, rubs, or gallops  ABDOMEN: Soft, Nontender, Nondistended; Bowel sounds present  EXTREMITIES:  2+ Peripheral Pulses, No clubbing, cyanosis, or edema  LYMPH: No lymphadenopathy noted  SKIN: No rashes or lesions        HOSPITAL MEDICATIONS:  ======================  aspirin enteric coated 81 milliGRAM(s) Oral daily  rivaroxaban 20 milliGRAM(s) Oral with dinner    doxycycline IVPB      doxycycline IVPB 100 milliGRAM(s) IV Intermittent every 12 hours  oseltamivir 75 milliGRAM(s) Oral two times a day    verapamil  milliGRAM(s) Oral daily    atorvastatin 80 milliGRAM(s) Oral at bedtime  dextrose 50% Injectable 25 Gram(s) IV Push once  dextrose 50% Injectable 12.5 Gram(s) IV Push once  dextrose 50% Injectable 25 Gram(s) IV Push once  dextrose Oral Gel 15 Gram(s) Oral once PRN  glucagon  Injectable 1 milliGRAM(s) IntraMuscular once  insulin lispro (ADMELOG) corrective regimen sliding scale   SubCutaneous three times a day before meals  insulin lispro (ADMELOG) corrective regimen sliding scale   SubCutaneous at bedtime    albuterol    90 MICROgram(s) HFA Inhaler 1 Puff(s) Inhalation every 4 hours PRN  albuterol/ipratropium for Nebulization 3 milliLiter(s) Nebulizer every 6 hours PRN  budesonide 160 MICROgram(s)/formoterol 4.5 MICROgram(s) Inhaler 2 Puff(s) Inhalation two times a day  guaifenesin/dextromethorphan Oral Liquid 10 milliLiter(s) Oral every 6 hours PRN  hydrocodone/homatropine Syrup 5 milliLiter(s) Oral every 6 hours  montelukast 10 milliGRAM(s) Oral daily  tiotropium 2.5 MICROgram(s) Inhaler 2 Puff(s) Inhalation daily    acetaminophen     Tablet .. 650 milliGRAM(s) Oral every 6 hours PRN  celecoxib 200 milliGRAM(s) Oral daily  DULoxetine 60 milliGRAM(s) Oral daily  gabapentin 600 milliGRAM(s) Oral at bedtime  melatonin 3 milliGRAM(s) Oral at bedtime PRN    sucralfate 1 Gram(s) Oral every 6 hours        dextrose 5%. 1000 milliLiter(s) IV Continuous <Continuous>  dextrose 5%. 1000 milliLiter(s) IV Continuous <Continuous>    influenza   Vaccine 0.5 milliLiter(s) IntraMuscular once    sodium chloride 0.65% Nasal 2 Spray(s) Both Nostrils every 6 hours        LABS:  =====                          9.1    7.52  )-----------( 408      ( 05 Jan 2024 06:15 )             28.5     Hgb Trend: 9.1<--, 8.5<--, 8.1<--, 8.1<--  01-05    141  |  107  |  27<H>  ----------------------------<  99  3.5   |  29  |  0.69    Ca    9.3      05 Jan 2024 06:15  Mg     1.9     01-05      Creatinine Trend: 0.69<--, 0.70<--, 0.67<--, 0.64<--, 0.75<--, 0.84<--    Procalcitonin, Serum: 0.24 ng/mL (12-25-23 @ 18:20)  Procalcitonin, Serum: 0.09 ng/mL (12-20-23 @ 07:01)    Urinalysis Basic - ( 05 Jan 2024 06:15 )    Color: x / Appearance: x / SG: x / pH: x  Gluc: 99 mg/dL / Ketone: x  / Bili: x / Urobili: x   Blood: x / Protein: x / Nitrite: x   Leuk Esterase: x / RBC: x / WBC x   Sq Epi: x / Non Sq Epi: x / Bacteria: x              MICROBIOLOGY:     Culture - Sputum  Source: .Sputum Sputum  Gram Stain (12-28-23 @ 22:02):    Few Squamous epithelial cells per low power field    Few polymorphonuclear leukocytes per low power field    Moderate Yeast per oil power field    Few Gram positive cocci in pairs per oil power field  Final Report (12-28-23 @ 22:02):    Normal Respiratory Valarie present    Culture - Blood  Source: .Blood Blood-Peripheral  Final Report (12-27-23 @ 22:01):    No growth at 5 days    Culture - Blood  Source: .Blood Blood-Peripheral  Final Report (12-27-23 @ 22:01):    No growth at 5 days    doxycycline IVPB    doxycycline IVPB 100 every 12 hours  oseltamivir 75 two times a day      Legionella Antigen, Urine: Negative (12-18-23 @ 07:50)    MRSA PCR Result.: NotDetec (12-18-23 @ 17:50)    Rapid RVP Result: NotDetec (12-25-23 @ 10:15)  Rapid RVP Result: NotDetec (12-21-23 @ 16:30)  Rapid RVP Result: NotDetec (12-17-23 @ 05:51)      RADIOLOGY:  ==========  < from: CT Neck Soft Tissue w/ IV Cont (01.01.24 @ 15:00) >    IMPRESSION: No mass orlymphadenopathy in the neck.   Straightening with   moderate to severe degenerative disc disease and spondylosis at C3-4   through C6-7 with loss of disc height and associated degenerative   endplate changes.  BILATERAL interstitial infiltrates with bronchiectasis   and honeycombing.      < from: CT Angio Chest PE Protocol w/ IV Cont (01.01.24 @ 15:00) >    IMPRESSION:  No pulmonary embolism through the level of the lobar pulmonary arteries.   Evaluation of more distal segmental and subsegmental pulmonary arteries   is limited by suboptimal contrast bolus timing.  Interval mild improvement of traction bronchiectasis and           PULMONARY:  ============    albuterol    90 MICROgram(s) HFA Inhaler 1 Inhalation every 4 hours PRN  albuterol/ipratropium for Nebulization 3 Nebulizer every 6 hours PRN  budesonide 160 MICROgram(s)/formoterol 4.5 MICROgram(s) Inhaler 2 Inhalation two times a day  guaifenesin/dextromethorphan Oral Liquid 10 Oral every 6 hours PRN  hydrocodone/homatropine Syrup 5 Oral every 6 hours  montelukast 10 Oral daily  tiotropium 2.5 MICROgram(s) Inhaler 2 Inhalation daily      CARDIAC:  ========  Summary:   1. Normal global left ventricular systolic function.   2. Left ventricular ejection fraction, by visual estimation, is 50 to   55%.   3. The left atrium is normal in size.   4. Elevated mean left atrial pressure.   5. Structurally normal mitral valve, with normal leaflet excursion.   6. Trace mitral valve regurgitation.   7. Normal trileaflet aortic valve with normal opening.   8. Structurally normal tricuspid valve, with normal leaflet excursion.   9. Mild-moderate tricuspid regurgitation.  10. Structurally normal pulmonic valve, with normal leaflet excursion.  11. Estimated pulmonary artery systolic pressure is 47.8 mmHg assuming a   right atrial pressure of 5 mmHg, which is consistent with mild pulmonary   hypertension.   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  Pt lethargic rousable, but confused, oriented to self and time; not to place or situation. Hx obtained from  at bedside. Per  last evening he helped pt to the restroom and a few minutes later when he  on her she was unresponsive.  reports pt seemed to open eyes at times, however was not speaking thus he called EMS. Pt remained same way till after arrival to the ED. Per , pt recently developed URI symptoms and was started on Doxy as well as Hycodan, Alprazolam and Fioricet. Of note pt already on Percocet, flexeril for chronic pain, Gabapentin for seizures per . All of which  believes pt took last evening.     ED Course  Vitals BP 94/61, SPO2 84% on RA, rest of vitals stable. Labs sig for H/H 9.8/31.3, rest of labs stable. CT head neg for acute stroke, CT perfusion Markedly degraded by patient motion. No core infarct. Small areas of abnormal perfusion in the left frontal and right temporal lobes not confined to a vascular territory, likely artifactual. CXR showed Multifocalpneumonia.       OBJECTIVE:  ===========    ICU Vital Signs Last 24 Hrs  T(C): 36.9 (05 Jan 2024 10:59), Max: 36.9 (05 Jan 2024 00:30)  T(F): 98.5 (05 Jan 2024 10:59), Max: 98.5 (05 Jan 2024 10:59)  HR: 86 (05 Jan 2024 15:40) (77 - 115)  BP: 105/68 (05 Jan 2024 15:40) (101/59 - 146/84)  BP(mean): --  ABP: --  ABP(mean): --  RR: 20 (05 Jan 2024 15:40) (18 - 20)  SpO2: 96% (05 Jan 2024 15:40) (96% - 99%)    O2 Parameters below as of 05 Jan 2024 15:40  Patient On (Oxygen Delivery Method): nasal cannula  O2 Flow (L/min): 2            01-04 @ 07:01  -  01-05 @ 07:00  --------------------------------------------------------  IN: 680 mL / OUT: 0 mL / NET: 680 mL    01-05 @ 07:01  -  01-05 @ 18:31  --------------------------------------------------------  IN: 420 mL / OUT: 0 mL / NET: 420 mL      CAPILLARY BLOOD GLUCOSE      POCT Blood Glucose.: 115 mg/dL (05 Jan 2024 16:45)        PHYSICAL EXAM:  ==============  GENERAL: NAD, well-groomed, well-developed  HEAD:  Atraumatic, Normocephalic  EYES: EOMI, PERRLA, conjunctiva and sclera clear  ENMT: No tonsillar erythema, exudates, or enlargement; Moist mucous membranes, Good dentition, No lesions  NECK: Supple, No JVD, Normal thyroid  NERVOUS SYSTEM:  Alert & Oriented X3, Good concentration; Motor Strength 5/5 B/L upper and lower extremities; DTRs 2+ intact and symmetric  CHEST/LUNG: Clear to percussion bilaterally; No rales, rhonchi, wheezing, or rubs  HEART: Regular rate and rhythm; No murmurs, rubs, or gallops  ABDOMEN: Soft, Nontender, Nondistended; Bowel sounds present  EXTREMITIES:  2+ Peripheral Pulses, No clubbing, cyanosis, or edema  LYMPH: No lymphadenopathy noted  SKIN: No rashes or lesions        HOSPITAL MEDICATIONS:  ======================  aspirin enteric coated 81 milliGRAM(s) Oral daily  rivaroxaban 20 milliGRAM(s) Oral with dinner    doxycycline IVPB      doxycycline IVPB 100 milliGRAM(s) IV Intermittent every 12 hours  oseltamivir 75 milliGRAM(s) Oral two times a day    verapamil  milliGRAM(s) Oral daily    atorvastatin 80 milliGRAM(s) Oral at bedtime  dextrose 50% Injectable 25 Gram(s) IV Push once  dextrose 50% Injectable 12.5 Gram(s) IV Push once  dextrose 50% Injectable 25 Gram(s) IV Push once  dextrose Oral Gel 15 Gram(s) Oral once PRN  glucagon  Injectable 1 milliGRAM(s) IntraMuscular once  insulin lispro (ADMELOG) corrective regimen sliding scale   SubCutaneous three times a day before meals  insulin lispro (ADMELOG) corrective regimen sliding scale   SubCutaneous at bedtime    albuterol    90 MICROgram(s) HFA Inhaler 1 Puff(s) Inhalation every 4 hours PRN  albuterol/ipratropium for Nebulization 3 milliLiter(s) Nebulizer every 6 hours PRN  budesonide 160 MICROgram(s)/formoterol 4.5 MICROgram(s) Inhaler 2 Puff(s) Inhalation two times a day  guaifenesin/dextromethorphan Oral Liquid 10 milliLiter(s) Oral every 6 hours PRN  hydrocodone/homatropine Syrup 5 milliLiter(s) Oral every 6 hours  montelukast 10 milliGRAM(s) Oral daily  tiotropium 2.5 MICROgram(s) Inhaler 2 Puff(s) Inhalation daily    acetaminophen     Tablet .. 650 milliGRAM(s) Oral every 6 hours PRN  celecoxib 200 milliGRAM(s) Oral daily  DULoxetine 60 milliGRAM(s) Oral daily  gabapentin 600 milliGRAM(s) Oral at bedtime  melatonin 3 milliGRAM(s) Oral at bedtime PRN    sucralfate 1 Gram(s) Oral every 6 hours        dextrose 5%. 1000 milliLiter(s) IV Continuous <Continuous>  dextrose 5%. 1000 milliLiter(s) IV Continuous <Continuous>    influenza   Vaccine 0.5 milliLiter(s) IntraMuscular once    sodium chloride 0.65% Nasal 2 Spray(s) Both Nostrils every 6 hours        LABS:  =====                          9.1    7.52  )-----------( 408      ( 05 Jan 2024 06:15 )             28.5     Hgb Trend: 9.1<--, 8.5<--, 8.1<--, 8.1<--  01-05    141  |  107  |  27<H>  ----------------------------<  99  3.5   |  29  |  0.69    Ca    9.3      05 Jan 2024 06:15  Mg     1.9     01-05      Creatinine Trend: 0.69<--, 0.70<--, 0.67<--, 0.64<--, 0.75<--, 0.84<--    Procalcitonin, Serum: 0.24 ng/mL (12-25-23 @ 18:20)  Procalcitonin, Serum: 0.09 ng/mL (12-20-23 @ 07:01)    Urinalysis Basic - ( 05 Jan 2024 06:15 )    Color: x / Appearance: x / SG: x / pH: x  Gluc: 99 mg/dL / Ketone: x  / Bili: x / Urobili: x   Blood: x / Protein: x / Nitrite: x   Leuk Esterase: x / RBC: x / WBC x   Sq Epi: x / Non Sq Epi: x / Bacteria: x              MICROBIOLOGY:     Culture - Sputum  Source: .Sputum Sputum  Gram Stain (12-28-23 @ 22:02):    Few Squamous epithelial cells per low power field    Few polymorphonuclear leukocytes per low power field    Moderate Yeast per oil power field    Few Gram positive cocci in pairs per oil power field  Final Report (12-28-23 @ 22:02):    Normal Respiratory Valarie present    Culture - Blood  Source: .Blood Blood-Peripheral  Final Report (12-27-23 @ 22:01):    No growth at 5 days    Culture - Blood  Source: .Blood Blood-Peripheral  Final Report (12-27-23 @ 22:01):    No growth at 5 days    doxycycline IVPB    doxycycline IVPB 100 every 12 hours  oseltamivir 75 two times a day      Legionella Antigen, Urine: Negative (12-18-23 @ 07:50)    MRSA PCR Result.: NotDetec (12-18-23 @ 17:50)    Rapid RVP Result: NotDetec (12-25-23 @ 10:15)  Rapid RVP Result: NotDetec (12-21-23 @ 16:30)  Rapid RVP Result: NotDetec (12-17-23 @ 05:51)      RADIOLOGY:  ==========  < from: CT Neck Soft Tissue w/ IV Cont (01.01.24 @ 15:00) >    IMPRESSION: No mass orlymphadenopathy in the neck.   Straightening with   moderate to severe degenerative disc disease and spondylosis at C3-4   through C6-7 with loss of disc height and associated degenerative   endplate changes.  BILATERAL interstitial infiltrates with bronchiectasis   and honeycombing.      < from: CT Angio Chest PE Protocol w/ IV Cont (01.01.24 @ 15:00) >    IMPRESSION:  No pulmonary embolism through the level of the lobar pulmonary arteries.   Evaluation of more distal segmental and subsegmental pulmonary arteries   is limited by suboptimal contrast bolus timing.  Interval mild improvement of traction bronchiectasis and           PULMONARY:  ============    albuterol    90 MICROgram(s) HFA Inhaler 1 Inhalation every 4 hours PRN  albuterol/ipratropium for Nebulization 3 Nebulizer every 6 hours PRN  budesonide 160 MICROgram(s)/formoterol 4.5 MICROgram(s) Inhaler 2 Inhalation two times a day  guaifenesin/dextromethorphan Oral Liquid 10 Oral every 6 hours PRN  hydrocodone/homatropine Syrup 5 Oral every 6 hours  montelukast 10 Oral daily  tiotropium 2.5 MICROgram(s) Inhaler 2 Inhalation daily      CARDIAC:  ========  Summary:   1. Normal global left ventricular systolic function.   2. Left ventricular ejection fraction, by visual estimation, is 50 to   55%.   3. The left atrium is normal in size.   4. Elevated mean left atrial pressure.   5. Structurally normal mitral valve, with normal leaflet excursion.   6. Trace mitral valve regurgitation.   7. Normal trileaflet aortic valve with normal opening.   8. Structurally normal tricuspid valve, with normal leaflet excursion.   9. Mild-moderate tricuspid regurgitation.  10. Structurally normal pulmonic valve, with normal leaflet excursion.  11. Estimated pulmonary artery systolic pressure is 47.8 mmHg assuming a   right atrial pressure of 5 mmHg, which is consistent with mild pulmonary   hypertension.   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  Pt lethargic rousable, but confused, oriented to self and time; not to place or situation. Hx obtained from  at bedside. Per  last evening he helped pt to the restroom and a few minutes later when he  on her she was unresponsive.  reports pt seemed to open eyes at times, however was not speaking thus he called EMS. Pt remained same way till after arrival to the ED. Per , pt recently developed URI symptoms and was started on Doxy as well as Hycodan, Alprazolam and Fioricet. Of note pt already on Percocet, flexeril for chronic pain, Gabapentin for seizures per . All of which  believes pt took last evening.     ED Course  Vitals BP 94/61, SPO2 84% on RA, rest of vitals stable. Labs sig for H/H 9.8/31.3, rest of labs stable. CT head neg for acute stroke, CT perfusion Markedly degraded by patient motion. No core infarct. Small areas of abnormal perfusion in the left frontal and right temporal lobes not confined to a vascular territory, likely artifactual. CXR showed Multifocalpneumonia.       OBJECTIVE:  ===========    Vital Signs Last 24 Hrs  T(C): 36.9 (05 Jan 2024 10:59), Max: 36.9 (05 Jan 2024 00:30)  T(F): 98.5 (05 Jan 2024 10:59), Max: 98.5 (05 Jan 2024 10:59)  HR: 86 (05 Jan 2024 15:40) (77 - 115)  BP: 105/68 (05 Jan 2024 15:40) (101/59 - 146/84)  RR: 20 (05 Jan 2024 15:40) (18 - 20)  SpO2: 96% (05 Jan 2024 15:40) (96% - 99%)    O2 Parameters below as of 05 Jan 2024 15:40  Patient On (Oxygen Delivery Method): nasal cannula  O2 Flow (L/min): 2          CAPILLARY BLOOD GLUCOSE  POCT Blood Glucose.: 115 mg/dL (05 Jan 2024 16:45)        PHYSICAL EXAM:  ==============  GENERAL: NAD, obese, comfortable in bed watching TV, coughing  HEAD:  Atraumatic, Normocephalic  EYES: EOMI, PERRLA, conjunctiva and sclera clear  ENMT: No tonsillar erythema, exudates, or enlargement; Moist mucous membranes, No lesions  NECK: Supple, No JVD  NERVOUS SYSTEM:  Alert & Oriented X3, Good concentration; moves all extremities spontaneously   CHEST/LUNG: Clear to ausculatation bilaterally; No rales, rhonchi, wheezing  HEART: Regular rate and rhythm  ABDOMEN: Soft, Nontender, Nondistended; Bowel sounds present  EXTREMITIES:  2+ Peripheral Pulses, No clubbing, cyanosis, or edema  LYMPH: No lymphadenopathy noted  SKIN: No rashes or lesions        HOSPITAL MEDICATIONS:  ======================  aspirin enteric coated 81 milliGRAM(s) Oral daily  rivaroxaban 20 milliGRAM(s) Oral with dinner    doxycycline IVPB      doxycycline IVPB 100 milliGRAM(s) IV Intermittent every 12 hours  oseltamivir 75 milliGRAM(s) Oral two times a day    verapamil  milliGRAM(s) Oral daily    atorvastatin 80 milliGRAM(s) Oral at bedtime  dextrose 50% Injectable 25 Gram(s) IV Push once  dextrose 50% Injectable 12.5 Gram(s) IV Push once  dextrose 50% Injectable 25 Gram(s) IV Push once  dextrose Oral Gel 15 Gram(s) Oral once PRN  glucagon  Injectable 1 milliGRAM(s) IntraMuscular once  insulin lispro (ADMELOG) corrective regimen sliding scale   SubCutaneous three times a day before meals  insulin lispro (ADMELOG) corrective regimen sliding scale   SubCutaneous at bedtime    albuterol    90 MICROgram(s) HFA Inhaler 1 Puff(s) Inhalation every 4 hours PRN  albuterol/ipratropium for Nebulization 3 milliLiter(s) Nebulizer every 6 hours PRN  budesonide 160 MICROgram(s)/formoterol 4.5 MICROgram(s) Inhaler 2 Puff(s) Inhalation two times a day  guaifenesin/dextromethorphan Oral Liquid 10 milliLiter(s) Oral every 6 hours PRN  hydrocodone/homatropine Syrup 5 milliLiter(s) Oral every 6 hours  montelukast 10 milliGRAM(s) Oral daily  tiotropium 2.5 MICROgram(s) Inhaler 2 Puff(s) Inhalation daily    acetaminophen     Tablet .. 650 milliGRAM(s) Oral every 6 hours PRN  celecoxib 200 milliGRAM(s) Oral daily  DULoxetine 60 milliGRAM(s) Oral daily  gabapentin 600 milliGRAM(s) Oral at bedtime  melatonin 3 milliGRAM(s) Oral at bedtime PRN    sucralfate 1 Gram(s) Oral every 6 hours        dextrose 5%. 1000 milliLiter(s) IV Continuous <Continuous>  dextrose 5%. 1000 milliLiter(s) IV Continuous <Continuous>    influenza   Vaccine 0.5 milliLiter(s) IntraMuscular once    sodium chloride 0.65% Nasal 2 Spray(s) Both Nostrils every 6 hours        LABS:  =====                          9.1    7.52  )-----------( 408      ( 05 Jan 2024 06:15 )             28.5     Hgb Trend: 9.1<--, 8.5<--, 8.1<--, 8.1<--  01-05    141  |  107  |  27<H>  ----------------------------<  99  3.5   |  29  |  0.69    Ca    9.3      05 Jan 2024 06:15  Mg     1.9     01-05      Creatinine Trend: 0.69<--, 0.70<--, 0.67<--, 0.64<--, 0.75<--, 0.84<--    Procalcitonin, Serum: 0.24 ng/mL (12-25-23 @ 18:20)  Procalcitonin, Serum: 0.09 ng/mL (12-20-23 @ 07:01)    Flu With COVID-19 By NETTIE (01.03.24 @ 15:30)    SARS-CoV-2 Result: NotDetec: EUA/IVD   Influenza A Result: Detected: EUA/IVD   Influenza B Result: NotDetec: EUA/IVD      Respiratory Viral Panel with COVID-19 by NETTIE (12.25.23 @ 10:15)    Rapid RVP Result: NotDetec   SARS-CoV-2: NotDetec              MICROBIOLOGY:     Culture - Sputum  Source: .Sputum Sputum  Gram Stain (12-28-23 @ 22:02):    Few Squamous epithelial cells per low power field    Few polymorphonuclear leukocytes per low power field    Moderate Yeast per oil power field    Few Gram positive cocci in pairs per oil power field  Final Report (12-28-23 @ 22:02):    Normal Respiratory Valarie present    Culture - Blood  Source: .Blood Blood-Peripheral  Final Report (12-27-23 @ 22:01):    No growth at 5 days    Culture - Blood  Source: .Blood Blood-Peripheral  Final Report (12-27-23 @ 22:01):    No growth at 5 days    doxycycline IVPB    doxycycline IVPB 100 every 12 hours  oseltamivir 75 two times a day      Legionella Antigen, Urine: Negative (12-18-23 @ 07:50)    MRSA PCR Result.: NotDetec (12-18-23 @ 17:50)    Rapid RVP Result: NotDetec (12-25-23 @ 10:15)  Rapid RVP Result: NotDetec (12-21-23 @ 16:30)  Rapid RVP Result: NotDetec (12-17-23 @ 05:51)      RADIOLOGY:  ==========  < from: CT Neck Soft Tissue w/ IV Cont (01.01.24 @ 15:00) >    IMPRESSION: No mass orlymphadenopathy in the neck.   Straightening with   moderate to severe degenerative disc disease and spondylosis at C3-4   through C6-7 with loss of disc height and associated degenerative   endplate changes.  BILATERAL interstitial infiltrates with bronchiectasis   and honeycombing.      < from: CT Angio Chest PE Protocol w/ IV Cont (01.01.24 @ 15:00) >    IMPRESSION:  No pulmonary embolism through the level of the lobar pulmonary arteries.   Evaluation of more distal segmental and subsegmental pulmonary arteries   is limited by suboptimal contrast bolus timing.  Interval mild improvement of traction bronchiectasis and           PULMONARY:  ============    albuterol    90 MICROgram(s) HFA Inhaler 1 Inhalation every 4 hours PRN  albuterol/ipratropium for Nebulization 3 Nebulizer every 6 hours PRN  budesonide 160 MICROgram(s)/formoterol 4.5 MICROgram(s) Inhaler 2 Inhalation two times a day  guaifenesin/dextromethorphan Oral Liquid 10 Oral every 6 hours PRN  hydrocodone/homatropine Syrup 5 Oral every 6 hours  montelukast 10 Oral daily  tiotropium 2.5 MICROgram(s) Inhaler 2 Inhalation daily      CARDIAC:  ========  Summary:   1. Normal global left ventricular systolic function.   2. Left ventricular ejection fraction, by visual estimation, is 50 to   55%.   3. The left atrium is normal in size.   4. Elevated mean left atrial pressure.   5. Structurally normal mitral valve, with normal leaflet excursion.   6. Trace mitral valve regurgitation.   7. Normal trileaflet aortic valve with normal opening.   8. Structurally normal tricuspid valve, with normal leaflet excursion.   9. Mild-moderate tricuspid regurgitation.  10. Structurally normal pulmonic valve, with normal leaflet excursion.  11. Estimated pulmonary artery systolic pressure is 47.8 mmHg assuming a   right atrial pressure of 5 mmHg, which is consistent with mild pulmonary   hypertension.   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  Pt lethargic rousable, but confused, oriented to self and time; not to place or situation. Hx obtained from  at bedside. Per  last evening he helped pt to the restroom and a few minutes later when he  on her she was unresponsive.  reports pt seemed to open eyes at times, however was not speaking thus he called EMS. Pt remained same way till after arrival to the ED. Per , pt recently developed URI symptoms and was started on Doxy as well as Hycodan, Alprazolam and Fioricet. Of note pt already on Percocet, flexeril for chronic pain, Gabapentin for seizures per . All of which  believes pt took last evening.     ED Course  Vitals BP 94/61, SPO2 84% on RA, rest of vitals stable. Labs sig for H/H 9.8/31.3, rest of labs stable. CT head neg for acute stroke, CT perfusion Markedly degraded by patient motion. No core infarct. Small areas of abnormal perfusion in the left frontal and right temporal lobes not confined to a vascular territory, likely artifactual. CXR showed Multifocalpneumonia.       OBJECTIVE:  ===========    Vital Signs Last 24 Hrs  T(C): 36.9 (05 Jan 2024 10:59), Max: 36.9 (05 Jan 2024 00:30)  T(F): 98.5 (05 Jan 2024 10:59), Max: 98.5 (05 Jan 2024 10:59)  HR: 86 (05 Jan 2024 15:40) (77 - 115)  BP: 105/68 (05 Jan 2024 15:40) (101/59 - 146/84)  RR: 20 (05 Jan 2024 15:40) (18 - 20)  SpO2: 96% (05 Jan 2024 15:40) (96% - 99%)    O2 Parameters below as of 05 Jan 2024 15:40  Patient On (Oxygen Delivery Method): nasal cannula  O2 Flow (L/min): 2          CAPILLARY BLOOD GLUCOSE  POCT Blood Glucose.: 115 mg/dL (05 Jan 2024 16:45)        PHYSICAL EXAM:  ==============  GENERAL: NAD, obese, comfortable in bed watching TV, coughing  HEAD:  Atraumatic, Normocephalic  EYES: EOMI, PERRLA, conjunctiva and sclera clear  ENMT: No tonsillar erythema, exudates, or enlargement; Moist mucous membranes, No lesions  NECK: Supple, No JVD  NERVOUS SYSTEM:  Alert & Oriented X3, Good concentration; moves all extremities spontaneously   CHEST/LUNG: Clear to ausculatation bilaterally; No rales, rhonchi, wheezing  HEART: Regular rate and rhythm  ABDOMEN: Soft, Nontender, Nondistended; Bowel sounds present  EXTREMITIES:  2+ Peripheral Pulses, No clubbing, cyanosis, or edema  LYMPH: No lymphadenopathy noted  SKIN: No rashes or lesions        HOSPITAL MEDICATIONS:  ======================  aspirin enteric coated 81 milliGRAM(s) Oral daily  rivaroxaban 20 milliGRAM(s) Oral with dinner    doxycycline IVPB      doxycycline IVPB 100 milliGRAM(s) IV Intermittent every 12 hours  oseltamivir 75 milliGRAM(s) Oral two times a day    verapamil  milliGRAM(s) Oral daily    atorvastatin 80 milliGRAM(s) Oral at bedtime  dextrose 50% Injectable 25 Gram(s) IV Push once  dextrose 50% Injectable 12.5 Gram(s) IV Push once  dextrose 50% Injectable 25 Gram(s) IV Push once  dextrose Oral Gel 15 Gram(s) Oral once PRN  glucagon  Injectable 1 milliGRAM(s) IntraMuscular once  insulin lispro (ADMELOG) corrective regimen sliding scale   SubCutaneous three times a day before meals  insulin lispro (ADMELOG) corrective regimen sliding scale   SubCutaneous at bedtime    albuterol    90 MICROgram(s) HFA Inhaler 1 Puff(s) Inhalation every 4 hours PRN  albuterol/ipratropium for Nebulization 3 milliLiter(s) Nebulizer every 6 hours PRN  budesonide 160 MICROgram(s)/formoterol 4.5 MICROgram(s) Inhaler 2 Puff(s) Inhalation two times a day  guaifenesin/dextromethorphan Oral Liquid 10 milliLiter(s) Oral every 6 hours PRN  hydrocodone/homatropine Syrup 5 milliLiter(s) Oral every 6 hours  montelukast 10 milliGRAM(s) Oral daily  tiotropium 2.5 MICROgram(s) Inhaler 2 Puff(s) Inhalation daily    acetaminophen     Tablet .. 650 milliGRAM(s) Oral every 6 hours PRN  celecoxib 200 milliGRAM(s) Oral daily  DULoxetine 60 milliGRAM(s) Oral daily  gabapentin 600 milliGRAM(s) Oral at bedtime  melatonin 3 milliGRAM(s) Oral at bedtime PRN    sucralfate 1 Gram(s) Oral every 6 hours        dextrose 5%. 1000 milliLiter(s) IV Continuous <Continuous>  dextrose 5%. 1000 milliLiter(s) IV Continuous <Continuous>    influenza   Vaccine 0.5 milliLiter(s) IntraMuscular once    sodium chloride 0.65% Nasal 2 Spray(s) Both Nostrils every 6 hours        LABS:  =====                          9.1    7.52  )-----------( 408      ( 05 Jan 2024 06:15 )             28.5     Hgb Trend: 9.1<--, 8.5<--, 8.1<--, 8.1<--  01-05    141  |  107  |  27<H>  ----------------------------<  99  3.5   |  29  |  0.69    Ca    9.3      05 Jan 2024 06:15  Mg     1.9     01-05      Creatinine Trend: 0.69<--, 0.70<--, 0.67<--, 0.64<--, 0.75<--, 0.84<--    Procalcitonin, Serum: 0.24 ng/mL (12-25-23 @ 18:20)  Procalcitonin, Serum: 0.09 ng/mL (12-20-23 @ 07:01)    Flu With COVID-19 By NETTIE (01.03.24 @ 15:30)    SARS-CoV-2 Result: NotDetec: EUA/IVD   Influenza A Result: Detected: EUA/IVD   Influenza B Result: NotDetec: EUA/IVD      Respiratory Viral Panel with COVID-19 by NETTIE (12.25.23 @ 10:15)    Rapid RVP Result: NotDetec   SARS-CoV-2: NotDetec              MICROBIOLOGY:     Culture - Sputum  Source: .Sputum Sputum  Gram Stain (12-28-23 @ 22:02):    Few Squamous epithelial cells per low power field    Few polymorphonuclear leukocytes per low power field    Moderate Yeast per oil power field    Few Gram positive cocci in pairs per oil power field  Final Report (12-28-23 @ 22:02):    Normal Respiratory Valarie present    Culture - Blood  Source: .Blood Blood-Peripheral  Final Report (12-27-23 @ 22:01):    No growth at 5 days    Culture - Blood  Source: .Blood Blood-Peripheral  Final Report (12-27-23 @ 22:01):    No growth at 5 days    doxycycline IVPB    doxycycline IVPB 100 every 12 hours  oseltamivir 75 two times a day      Legionella Antigen, Urine: Negative (12-18-23 @ 07:50)    MRSA PCR Result.: NotDetec (12-18-23 @ 17:50)    Rapid RVP Result: NotDetec (12-25-23 @ 10:15)  Rapid RVP Result: NotDetec (12-21-23 @ 16:30)  Rapid RVP Result: NotDetec (12-17-23 @ 05:51)      RADIOLOGY:  ==========  < from: CT Neck Soft Tissue w/ IV Cont (01.01.24 @ 15:00) >    IMPRESSION: No mass orlymphadenopathy in the neck.   Straightening with   moderate to severe degenerative disc disease and spondylosis at C3-4   through C6-7 with loss of disc height and associated degenerative   endplate changes.  BILATERAL interstitial infiltrates with bronchiectasis   and honeycombing.      < from: CT Angio Chest PE Protocol w/ IV Cont (01.01.24 @ 15:00) >    IMPRESSION:  No pulmonary embolism through the level of the lobar pulmonary arteries.   Evaluation of more distal segmental and subsegmental pulmonary arteries   is limited by suboptimal contrast bolus timing.  Interval mild improvement of traction bronchiectasis and           PULMONARY:  ============    albuterol    90 MICROgram(s) HFA Inhaler 1 Inhalation every 4 hours PRN  albuterol/ipratropium for Nebulization 3 Nebulizer every 6 hours PRN  budesonide 160 MICROgram(s)/formoterol 4.5 MICROgram(s) Inhaler 2 Inhalation two times a day  guaifenesin/dextromethorphan Oral Liquid 10 Oral every 6 hours PRN  hydrocodone/homatropine Syrup 5 Oral every 6 hours  montelukast 10 Oral daily  tiotropium 2.5 MICROgram(s) Inhaler 2 Inhalation daily      CARDIAC:  ========  Summary:   1. Normal global left ventricular systolic function.   2. Left ventricular ejection fraction, by visual estimation, is 50 to   55%.   3. The left atrium is normal in size.   4. Elevated mean left atrial pressure.   5. Structurally normal mitral valve, with normal leaflet excursion.   6. Trace mitral valve regurgitation.   7. Normal trileaflet aortic valve with normal opening.   8. Structurally normal tricuspid valve, with normal leaflet excursion.   9. Mild-moderate tricuspid regurgitation.  10. Structurally normal pulmonic valve, with normal leaflet excursion.  11. Estimated pulmonary artery systolic pressure is 47.8 mmHg assuming a   right atrial pressure of 5 mmHg, which is consistent with mild pulmonary   hypertension.

## 2024-01-05 NOTE — PROGRESS NOTE ADULT - ASSESSMENT
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     multifocal pneumonia on CXR on admission was treated with 5 days of zosyn and improved  but  later  became hypoxemic and transferred  to [facility]   completed 7 days of zithromax for mycoplasma   completed 7 days course of cefepime IV for the multifocal pna treatment   now on doxycyline for mycoplasma  found Influenza A positive on 1/3/24 - started on Tamiflu   Leukocytosis improved   remains afebrile since 12/25/23  MRSA PCR negative   Legionella ag - negative  Strep pneumo ag - negative  Mycoplasma IGM positive   sputum cx- negative      plan-  - continue doxycyline for mycoplasma day #8  - continue Tamiflu - day #3  - monitor O2 sat and wbc closely.  - pulmonary follow up  - completed 10 days of steroids, off steroids as of yesterday 1/4  - pt had covid 19 exposure, repeat RVP over the weekend   - cough suppression prn  - wean off supplemental O2 as able    discussed with Dr. Soni   discussed with Dr. 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     multifocal pneumonia on CXR on admission was treated with 5 days of zosyn and improved  but  later  became hypoxemic and transferred  to [facility]   completed 7 days of zithromax for mycoplasma   completed 7 days course of cefepime IV for the multifocal pna treatment   now on doxycyline for mycoplasma  found Influenza A positive on 1/3/24 - started on Tamiflu   Leukocytosis improved   remains afebrile since 12/25/23  MRSA PCR negative   Legionella ag - negative  Strep pneumo ag - negative  Mycoplasma IGM positive   sputum cx- negative      plan-  - continue doxycyline for mycoplasma day #8  - continue Tamiflu - day #3  - monitor O2 sat and wbc closely.  - pulmonary follow up  - completed 10 days of steroids, off steroids as of yesterday 1/4  - pt had covid 19 exposure, repeat RVP over the weekend   - cough suppression prn  - wean off supplemental O2 as able    discussed with Dr. Soni   discussed with Dr. Aguero

## 2024-01-05 NOTE — PROGRESS NOTE ADULT - ASSESSMENT
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 cont tamiflu x 5 days, duonebs prn and aerobika for airway clearance  - d/c prednisone today as pt has been on steroids for 10 days at this point   - add Symbicort & Spiriva for trellegy equivalent now that pt is off steroids and standing duonebs    - Requires outpatient pulmonary FUP.     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 cont tamiflu x 5 days, duonebs prn and aerobika for airway clearance  - prednisone d/cherise yesterday as pt has been on steroids for > 10 days at this point   - Symbicort & Spiriva added for asthma maintenance now that pt is off systemic steroids and standing duonebs    - Requires outpatient pulmonary FUP.     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 cont tamiflu x 5 days, duonebs prn and aerobika for airway clearance  - prednisone d/cherise yesterday as pt has been on steroids for > 10 days at this point   - Symbicort & Spiriva added for asthma maintenance now that pt is off systemic steroids and standing duonebs    - Requires outpatient pulmonary FUP.     d/w dr zuluaga

## 2024-01-05 NOTE — PROGRESS NOTE ADULT - ASSESSMENT
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 Of note pt recently developed URI symptoms and was started on Doxy as well as Hycodan, Alprazolam and Fioricet. Of note pt already on Percocet, flexeril for chronic pain, Gabapentin for seizures per  all which she took. Pt being admitted for Acute hypoxic respiratory failure due to Multifocal PNA, AMS r/o TIA/CVA  vs metabolic encephalopathy secondary to polypharmacy and/or hypoxia.    #Acute metabolic encephalopathy, resolved    #Acute respiratory failure with multifocal pneumonia  #Flu positive   AMS likely Toxic, polypharmacy. Acute stroke is ruled out. EEG did not show any epileptiform activity.   - S/p iv zosyn , completed 5 days   - Added Azithromycin, + mycoplasma: course completed: for persistent symptoms. Stopped cefepime now on doxy   - Continue Cough meds, albuterol PRN  - Flu positive - start tamiflu   - wean o2 as tolerated     # PE  - c/w Xarelto     # SLE  - C/w prednisone    # HTN/HLD  - controlled with Verapemil, statin    # DM type 2. controlled.   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 Of note pt recently developed URI symptoms and was started on Doxy as well as Hycodan, Alprazolam and Fioricet. Of note pt already on Percocet, flexeril for chronic pain, Gabapentin for seizures per  all which she took. Pt being admitted for Acute hypoxic respiratory failure due to Multifocal PNA, AMS r/o TIA/CVA  vs metabolic encephalopathy secondary to polypharmacy and/or hypoxia.    #Acute metabolic encephalopathy, resolved    #Acute respiratory failure with multifocal pneumonia  #Flu positive   AMS likely Toxic, polypharmacy. Acute stroke is ruled out. EEG did not show any epileptiform activity.   - S/p iv zosyn , completed 5 days   - Added Azithromycin, + mycoplasma: course completed: for persistent symptoms. Stopped cefepime now on doxy   - Continue Cough meds, albuterol PRN  - Flu positive - start tamiflu   - wean o2 as tolerated     # PE  - c/w Xarelto     # SLE  - C/w prednisone    # HTN/HLD  - controlled with Verapemil, statin    # DM type 2. controlled.   - FS qAC and HS w/ SSI    Diet: DASH  DVT prophylaxis: Xarelto   Dispo: Admit, will go home once off 02   Code Status:

## 2024-01-06 LAB
ANION GAP SERPL CALC-SCNC: 5 MMOL/L — SIGNIFICANT CHANGE UP (ref 5–17)
ANION GAP SERPL CALC-SCNC: 5 MMOL/L — SIGNIFICANT CHANGE UP (ref 5–17)
BASOPHILS # BLD AUTO: 0.03 K/UL — SIGNIFICANT CHANGE UP (ref 0–0.2)
BASOPHILS # BLD AUTO: 0.03 K/UL — SIGNIFICANT CHANGE UP (ref 0–0.2)
BASOPHILS NFR BLD AUTO: 0.5 % — SIGNIFICANT CHANGE UP (ref 0–2)
BASOPHILS NFR BLD AUTO: 0.5 % — SIGNIFICANT CHANGE UP (ref 0–2)
BUN SERPL-MCNC: 25 MG/DL — HIGH (ref 7–23)
BUN SERPL-MCNC: 25 MG/DL — HIGH (ref 7–23)
CALCIUM SERPL-MCNC: 9.1 MG/DL — SIGNIFICANT CHANGE UP (ref 8.5–10.1)
CALCIUM SERPL-MCNC: 9.1 MG/DL — SIGNIFICANT CHANGE UP (ref 8.5–10.1)
CHLORIDE SERPL-SCNC: 107 MMOL/L — SIGNIFICANT CHANGE UP (ref 96–108)
CHLORIDE SERPL-SCNC: 107 MMOL/L — SIGNIFICANT CHANGE UP (ref 96–108)
CO2 SERPL-SCNC: 31 MMOL/L — SIGNIFICANT CHANGE UP (ref 22–31)
CO2 SERPL-SCNC: 31 MMOL/L — SIGNIFICANT CHANGE UP (ref 22–31)
CREAT SERPL-MCNC: 0.69 MG/DL — SIGNIFICANT CHANGE UP (ref 0.5–1.3)
CREAT SERPL-MCNC: 0.69 MG/DL — SIGNIFICANT CHANGE UP (ref 0.5–1.3)
EGFR: 97 ML/MIN/1.73M2 — SIGNIFICANT CHANGE UP
EGFR: 97 ML/MIN/1.73M2 — SIGNIFICANT CHANGE UP
EOSINOPHIL # BLD AUTO: 0.4 K/UL — SIGNIFICANT CHANGE UP (ref 0–0.5)
EOSINOPHIL # BLD AUTO: 0.4 K/UL — SIGNIFICANT CHANGE UP (ref 0–0.5)
EOSINOPHIL NFR BLD AUTO: 6.1 % — HIGH (ref 0–6)
EOSINOPHIL NFR BLD AUTO: 6.1 % — HIGH (ref 0–6)
FLUAV H3 RNA SPEC QL NAA+PROBE: DETECTED
FLUAV H3 RNA SPEC QL NAA+PROBE: DETECTED
GLUCOSE BLDC GLUCOMTR-MCNC: 112 MG/DL — HIGH (ref 70–99)
GLUCOSE BLDC GLUCOMTR-MCNC: 112 MG/DL — HIGH (ref 70–99)
GLUCOSE BLDC GLUCOMTR-MCNC: 129 MG/DL — HIGH (ref 70–99)
GLUCOSE BLDC GLUCOMTR-MCNC: 129 MG/DL — HIGH (ref 70–99)
GLUCOSE BLDC GLUCOMTR-MCNC: 135 MG/DL — HIGH (ref 70–99)
GLUCOSE BLDC GLUCOMTR-MCNC: 135 MG/DL — HIGH (ref 70–99)
GLUCOSE BLDC GLUCOMTR-MCNC: 88 MG/DL — SIGNIFICANT CHANGE UP (ref 70–99)
GLUCOSE BLDC GLUCOMTR-MCNC: 88 MG/DL — SIGNIFICANT CHANGE UP (ref 70–99)
GLUCOSE SERPL-MCNC: 103 MG/DL — HIGH (ref 70–99)
GLUCOSE SERPL-MCNC: 103 MG/DL — HIGH (ref 70–99)
HCT VFR BLD CALC: 31.2 % — LOW (ref 34.5–45)
HCT VFR BLD CALC: 31.2 % — LOW (ref 34.5–45)
HGB BLD-MCNC: 9.8 G/DL — LOW (ref 11.5–15.5)
HGB BLD-MCNC: 9.8 G/DL — LOW (ref 11.5–15.5)
IMM GRANULOCYTES NFR BLD AUTO: 1.4 % — HIGH (ref 0–0.9)
IMM GRANULOCYTES NFR BLD AUTO: 1.4 % — HIGH (ref 0–0.9)
LYMPHOCYTES # BLD AUTO: 0.91 K/UL — LOW (ref 1–3.3)
LYMPHOCYTES # BLD AUTO: 0.91 K/UL — LOW (ref 1–3.3)
LYMPHOCYTES # BLD AUTO: 13.8 % — SIGNIFICANT CHANGE UP (ref 13–44)
LYMPHOCYTES # BLD AUTO: 13.8 % — SIGNIFICANT CHANGE UP (ref 13–44)
MAGNESIUM SERPL-MCNC: 1.8 MG/DL — SIGNIFICANT CHANGE UP (ref 1.6–2.6)
MAGNESIUM SERPL-MCNC: 1.8 MG/DL — SIGNIFICANT CHANGE UP (ref 1.6–2.6)
MCHC RBC-ENTMCNC: 26.6 PG — LOW (ref 27–34)
MCHC RBC-ENTMCNC: 26.6 PG — LOW (ref 27–34)
MCHC RBC-ENTMCNC: 31.4 G/DL — LOW (ref 32–36)
MCHC RBC-ENTMCNC: 31.4 G/DL — LOW (ref 32–36)
MCV RBC AUTO: 84.6 FL — SIGNIFICANT CHANGE UP (ref 80–100)
MCV RBC AUTO: 84.6 FL — SIGNIFICANT CHANGE UP (ref 80–100)
MONOCYTES # BLD AUTO: 0.41 K/UL — SIGNIFICANT CHANGE UP (ref 0–0.9)
MONOCYTES # BLD AUTO: 0.41 K/UL — SIGNIFICANT CHANGE UP (ref 0–0.9)
MONOCYTES NFR BLD AUTO: 6.2 % — SIGNIFICANT CHANGE UP (ref 2–14)
MONOCYTES NFR BLD AUTO: 6.2 % — SIGNIFICANT CHANGE UP (ref 2–14)
NEUTROPHILS # BLD AUTO: 4.75 K/UL — SIGNIFICANT CHANGE UP (ref 1.8–7.4)
NEUTROPHILS # BLD AUTO: 4.75 K/UL — SIGNIFICANT CHANGE UP (ref 1.8–7.4)
NEUTROPHILS NFR BLD AUTO: 72 % — SIGNIFICANT CHANGE UP (ref 43–77)
NEUTROPHILS NFR BLD AUTO: 72 % — SIGNIFICANT CHANGE UP (ref 43–77)
NRBC # BLD: 0 /100 WBCS — SIGNIFICANT CHANGE UP (ref 0–0)
NRBC # BLD: 0 /100 WBCS — SIGNIFICANT CHANGE UP (ref 0–0)
PLATELET # BLD AUTO: 369 K/UL — SIGNIFICANT CHANGE UP (ref 150–400)
PLATELET # BLD AUTO: 369 K/UL — SIGNIFICANT CHANGE UP (ref 150–400)
POTASSIUM SERPL-MCNC: 3.7 MMOL/L — SIGNIFICANT CHANGE UP (ref 3.5–5.3)
POTASSIUM SERPL-MCNC: 3.7 MMOL/L — SIGNIFICANT CHANGE UP (ref 3.5–5.3)
POTASSIUM SERPL-SCNC: 3.7 MMOL/L — SIGNIFICANT CHANGE UP (ref 3.5–5.3)
POTASSIUM SERPL-SCNC: 3.7 MMOL/L — SIGNIFICANT CHANGE UP (ref 3.5–5.3)
RAPID RVP RESULT: DETECTED
RAPID RVP RESULT: DETECTED
RBC # BLD: 3.69 M/UL — LOW (ref 3.8–5.2)
RBC # BLD: 3.69 M/UL — LOW (ref 3.8–5.2)
RBC # FLD: 19.1 % — HIGH (ref 10.3–14.5)
RBC # FLD: 19.1 % — HIGH (ref 10.3–14.5)
SARS-COV-2 RNA SPEC QL NAA+PROBE: SIGNIFICANT CHANGE UP
SARS-COV-2 RNA SPEC QL NAA+PROBE: SIGNIFICANT CHANGE UP
SODIUM SERPL-SCNC: 143 MMOL/L — SIGNIFICANT CHANGE UP (ref 135–145)
SODIUM SERPL-SCNC: 143 MMOL/L — SIGNIFICANT CHANGE UP (ref 135–145)
WBC # BLD: 6.59 K/UL — SIGNIFICANT CHANGE UP (ref 3.8–10.5)
WBC # BLD: 6.59 K/UL — SIGNIFICANT CHANGE UP (ref 3.8–10.5)
WBC # FLD AUTO: 6.59 K/UL — SIGNIFICANT CHANGE UP (ref 3.8–10.5)
WBC # FLD AUTO: 6.59 K/UL — SIGNIFICANT CHANGE UP (ref 3.8–10.5)

## 2024-01-06 PROCEDURE — 99232 SBSQ HOSP IP/OBS MODERATE 35: CPT

## 2024-01-06 RX ADMIN — Medication 81 MILLIGRAM(S): at 11:15

## 2024-01-06 RX ADMIN — CELECOXIB 200 MILLIGRAM(S): 200 CAPSULE ORAL at 11:15

## 2024-01-06 RX ADMIN — Medication 1 GRAM(S): at 05:23

## 2024-01-06 RX ADMIN — BUDESONIDE AND FORMOTEROL FUMARATE DIHYDRATE 2 PUFF(S): 160; 4.5 AEROSOL RESPIRATORY (INHALATION) at 05:19

## 2024-01-06 RX ADMIN — TIOTROPIUM BROMIDE 2 PUFF(S): 18 CAPSULE ORAL; RESPIRATORY (INHALATION) at 11:16

## 2024-01-06 RX ADMIN — CELECOXIB 200 MILLIGRAM(S): 200 CAPSULE ORAL at 12:15

## 2024-01-06 RX ADMIN — Medication 240 MILLIGRAM(S): at 05:24

## 2024-01-06 RX ADMIN — Medication 75 MILLIGRAM(S): at 05:24

## 2024-01-06 RX ADMIN — Medication 1 GRAM(S): at 11:15

## 2024-01-06 RX ADMIN — Medication 2 SPRAY(S): at 05:23

## 2024-01-06 RX ADMIN — Medication 110 MILLIGRAM(S): at 17:54

## 2024-01-06 RX ADMIN — Medication 2 SPRAY(S): at 17:21

## 2024-01-06 RX ADMIN — Medication 75 MILLIGRAM(S): at 17:20

## 2024-01-06 RX ADMIN — GABAPENTIN 600 MILLIGRAM(S): 400 CAPSULE ORAL at 21:32

## 2024-01-06 RX ADMIN — Medication 1 GRAM(S): at 21:33

## 2024-01-06 RX ADMIN — RIVAROXABAN 20 MILLIGRAM(S): KIT at 17:20

## 2024-01-06 RX ADMIN — MONTELUKAST 10 MILLIGRAM(S): 4 TABLET, CHEWABLE ORAL at 11:15

## 2024-01-06 RX ADMIN — ATORVASTATIN CALCIUM 80 MILLIGRAM(S): 80 TABLET, FILM COATED ORAL at 21:32

## 2024-01-06 RX ADMIN — Medication 2 SPRAY(S): at 11:15

## 2024-01-06 RX ADMIN — DULOXETINE HYDROCHLORIDE 60 MILLIGRAM(S): 30 CAPSULE, DELAYED RELEASE ORAL at 11:14

## 2024-01-06 RX ADMIN — Medication 110 MILLIGRAM(S): at 05:19

## 2024-01-06 RX ADMIN — Medication 1 GRAM(S): at 17:20

## 2024-01-06 RX ADMIN — BUDESONIDE AND FORMOTEROL FUMARATE DIHYDRATE 2 PUFF(S): 160; 4.5 AEROSOL RESPIRATORY (INHALATION) at 17:20

## 2024-01-06 NOTE — PROGRESS NOTE ADULT - SUBJECTIVE AND OBJECTIVE BOX
Patient is a 63y old  Female who presents with a chief complaint of AMS, HCAP, Acute Hypoxic respiratory failure (2024 18:30)    INTERVAL HPI/OVERNIGHT EVENTS: NAEON    MEDICATIONS  (STANDING):  aspirin enteric coated 81 milliGRAM(s) Oral daily  atorvastatin 80 milliGRAM(s) Oral at bedtime  budesonide 160 MICROgram(s)/formoterol 4.5 MICROgram(s) Inhaler 2 Puff(s) Inhalation two times a day  celecoxib 200 milliGRAM(s) Oral daily  dextrose 5%. 1000 milliLiter(s) (50 mL/Hr) IV Continuous <Continuous>  dextrose 5%. 1000 milliLiter(s) (100 mL/Hr) IV Continuous <Continuous>  dextrose 50% Injectable 25 Gram(s) IV Push once  dextrose 50% Injectable 12.5 Gram(s) IV Push once  dextrose 50% Injectable 25 Gram(s) IV Push once  doxycycline IVPB      doxycycline IVPB 100 milliGRAM(s) IV Intermittent every 12 hours  DULoxetine 60 milliGRAM(s) Oral daily  gabapentin 600 milliGRAM(s) Oral at bedtime  glucagon  Injectable 1 milliGRAM(s) IntraMuscular once  hydrocodone/homatropine Syrup 5 milliLiter(s) Oral every 6 hours  influenza   Vaccine 0.5 milliLiter(s) IntraMuscular once  insulin lispro (ADMELOG) corrective regimen sliding scale   SubCutaneous three times a day before meals  insulin lispro (ADMELOG) corrective regimen sliding scale   SubCutaneous at bedtime  montelukast 10 milliGRAM(s) Oral daily  oseltamivir 75 milliGRAM(s) Oral two times a day  rivaroxaban 20 milliGRAM(s) Oral with dinner  sodium chloride 0.65% Nasal 2 Spray(s) Both Nostrils every 6 hours  sucralfate 1 Gram(s) Oral every 6 hours  tiotropium 2.5 MICROgram(s) Inhaler 2 Puff(s) Inhalation daily  verapamil  milliGRAM(s) Oral daily    MEDICATIONS  (PRN):  acetaminophen     Tablet .. 650 milliGRAM(s) Oral every 6 hours PRN Temp greater or equal to 38C (100.4F), Mild Pain (1 - 3)  albuterol    90 MICROgram(s) HFA Inhaler 1 Puff(s) Inhalation every 4 hours PRN Shortness of Breath and/or Wheezing  albuterol/ipratropium for Nebulization 3 milliLiter(s) Nebulizer every 6 hours PRN Shortness of Breath and/or Wheezing  dextrose Oral Gel 15 Gram(s) Oral once PRN Blood Glucose LESS THAN 70 milliGRAM(s)/deciliter  guaifenesin/dextromethorphan Oral Liquid 10 milliLiter(s) Oral every 6 hours PRN Cough  melatonin 3 milliGRAM(s) Oral at bedtime PRN Insomnia    Allergies    No Known Allergies    Intolerances    dislikes Glucerna Shake (Other)    REVIEW OF SYSTEMS:  All other systems reviewed and are negative    Vital Signs Last 24 Hrs  T(C): 36.3 (2024 11:07), Max: 37.1 (2024 16:00)  T(F): 97.4 (2024 11:07), Max: 98.7 (2024 16:00)  HR: 107 (2024 11:07) (83 - 107)  BP: 112/70 (2024 11:07) (105/68 - 128/76)  BP(mean): --  RR: 19 (2024 11:07) (18 - 20)  SpO2: 97% (2024 11:07) (96% - 98%)    Parameters below as of 2024 11:07  Patient On (Oxygen Delivery Method): nasal cannula  O2 Flow (L/min): 2    Daily     Daily Weight in k.5 (2024 05:17)  I&O's Summary    2024 07:01  -  2024 07:00  --------------------------------------------------------  IN: 420 mL / OUT: 0 mL / NET: 420 mL    2024 07:01  -  2024 12:11  --------------------------------------------------------  IN: 300 mL / OUT: 0 mL / NET: 300 mL      CAPILLARY BLOOD GLUCOSE      POCT Blood Glucose.: 135 mg/dL (2024 11:41)  POCT Blood Glucose.: 88 mg/dL (2024 08:15)  POCT Blood Glucose.: 113 mg/dL (2024 21:38)  POCT Blood Glucose.: 115 mg/dL (2024 16:45)    PHYSICAL EXAM:  GENERAL: NAD, lying in bed comfortably cough  HEAD:  Atraumatic, normocephalic  EYES: EOMI, PERRLA, conjunctiva and sclera clear  NECK: Supple, trachea midline, no JVD  HEART: Regular rate and rhythm, no murmurs, rubs, or gallops  LUNGS: Unlabored respirations. mild scattered wheezing bilat    ABDOMEN: Soft, nontender, nondistended, +BS  EXTREMITIES: 2+ peripheral pulses bilaterally, cap refill<2 secs. No clubbing, cyanosis, or edema  NERVOUS SYSTEM:  A&Ox3, following commands, moving all extremities, no focal deficits   SKIN: No rashes or lesions    Labs                          9.1    7.52  )-----------( 408      ( 2024 06:15 )             28.5     01-05    141  |  107  |  27<H>  ----------------------------<  99  3.5   |  29  |  0.69    Ca    9.3      2024 06:15  Mg     1.9     01-05            Urinalysis Basic - ( 2024 06:15 )    Color: x / Appearance: x / SG: x / pH: x  Gluc: 99 mg/dL / Ketone: x  / Bili: x / Urobili: x   Blood: x / Protein: x / Nitrite: x   Leuk Esterase: x / RBC: x / WBC x   Sq Epi: x / Non Sq Epi: x / Bacteria: x                  DVT prophylaxis: > Lovenox 40mg SQ daily  > Heparin   > SCD's

## 2024-01-06 NOTE — PROGRESS NOTE ADULT - ASSESSMENT
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 Of note pt recently developed URI symptoms and was started on Doxy as well as Hycodan, Alprazolam and Fioricet. Of note pt already on Percocet, flexeril for chronic pain, Gabapentin for seizures per  all which she took. Pt being admitted for Acute hypoxic respiratory failure due to Multifocal PNA, AMS r/o TIA/CVA  vs metabolic encephalopathy secondary to polypharmacy and/or hypoxia.    #Acute metabolic encephalopathy, resolved    #Acute respiratory failure with multifocal pneumonia  #Flu positive   AMS likely Toxic, polypharmacy. Acute stroke is ruled out. EEG did not show any epileptiform activity.   - S/p iv zosyn , completed 5 days   - Added Azithromycin, + mycoplasma: course completed: for persistent symptoms. Stopped cefepime now on doxy IV  - Continue Cough meds, albuterol PRN. Duonebs q6hrs PRN  - Flu positive - start tamiflu   - wean o2 as tolerated     # PE  - c/w Xarelto     # SLE  - C/w now off steroids     # HTN/HLD  - controlled with Verapemil, statin    # DM type 2. controlled.   - FS qAC and HS w/ SSI    Diet: DASH  DVT prophylaxis: Xarelto   Dispo: Admit, will go home once off 02   Code Status:

## 2024-01-07 LAB
ANION GAP SERPL CALC-SCNC: 5 MMOL/L — SIGNIFICANT CHANGE UP (ref 5–17)
ANION GAP SERPL CALC-SCNC: 5 MMOL/L — SIGNIFICANT CHANGE UP (ref 5–17)
BASOPHILS # BLD AUTO: 0.02 K/UL — SIGNIFICANT CHANGE UP (ref 0–0.2)
BASOPHILS # BLD AUTO: 0.02 K/UL — SIGNIFICANT CHANGE UP (ref 0–0.2)
BASOPHILS NFR BLD AUTO: 0.4 % — SIGNIFICANT CHANGE UP (ref 0–2)
BASOPHILS NFR BLD AUTO: 0.4 % — SIGNIFICANT CHANGE UP (ref 0–2)
BUN SERPL-MCNC: 19 MG/DL — SIGNIFICANT CHANGE UP (ref 7–23)
BUN SERPL-MCNC: 19 MG/DL — SIGNIFICANT CHANGE UP (ref 7–23)
CALCIUM SERPL-MCNC: 8.7 MG/DL — SIGNIFICANT CHANGE UP (ref 8.5–10.1)
CALCIUM SERPL-MCNC: 8.7 MG/DL — SIGNIFICANT CHANGE UP (ref 8.5–10.1)
CHLORIDE SERPL-SCNC: 107 MMOL/L — SIGNIFICANT CHANGE UP (ref 96–108)
CHLORIDE SERPL-SCNC: 107 MMOL/L — SIGNIFICANT CHANGE UP (ref 96–108)
CO2 SERPL-SCNC: 31 MMOL/L — SIGNIFICANT CHANGE UP (ref 22–31)
CO2 SERPL-SCNC: 31 MMOL/L — SIGNIFICANT CHANGE UP (ref 22–31)
CREAT SERPL-MCNC: 0.64 MG/DL — SIGNIFICANT CHANGE UP (ref 0.5–1.3)
CREAT SERPL-MCNC: 0.64 MG/DL — SIGNIFICANT CHANGE UP (ref 0.5–1.3)
EGFR: 99 ML/MIN/1.73M2 — SIGNIFICANT CHANGE UP
EGFR: 99 ML/MIN/1.73M2 — SIGNIFICANT CHANGE UP
EOSINOPHIL # BLD AUTO: 0.46 K/UL — SIGNIFICANT CHANGE UP (ref 0–0.5)
EOSINOPHIL # BLD AUTO: 0.46 K/UL — SIGNIFICANT CHANGE UP (ref 0–0.5)
EOSINOPHIL NFR BLD AUTO: 9.4 % — HIGH (ref 0–6)
EOSINOPHIL NFR BLD AUTO: 9.4 % — HIGH (ref 0–6)
GLUCOSE BLDC GLUCOMTR-MCNC: 105 MG/DL — HIGH (ref 70–99)
GLUCOSE BLDC GLUCOMTR-MCNC: 105 MG/DL — HIGH (ref 70–99)
GLUCOSE BLDC GLUCOMTR-MCNC: 114 MG/DL — HIGH (ref 70–99)
GLUCOSE BLDC GLUCOMTR-MCNC: 114 MG/DL — HIGH (ref 70–99)
GLUCOSE BLDC GLUCOMTR-MCNC: 115 MG/DL — HIGH (ref 70–99)
GLUCOSE BLDC GLUCOMTR-MCNC: 115 MG/DL — HIGH (ref 70–99)
GLUCOSE BLDC GLUCOMTR-MCNC: 88 MG/DL — SIGNIFICANT CHANGE UP (ref 70–99)
GLUCOSE BLDC GLUCOMTR-MCNC: 88 MG/DL — SIGNIFICANT CHANGE UP (ref 70–99)
GLUCOSE SERPL-MCNC: 97 MG/DL — SIGNIFICANT CHANGE UP (ref 70–99)
GLUCOSE SERPL-MCNC: 97 MG/DL — SIGNIFICANT CHANGE UP (ref 70–99)
HCT VFR BLD CALC: 29.1 % — LOW (ref 34.5–45)
HCT VFR BLD CALC: 29.1 % — LOW (ref 34.5–45)
HGB BLD-MCNC: 9.1 G/DL — LOW (ref 11.5–15.5)
HGB BLD-MCNC: 9.1 G/DL — LOW (ref 11.5–15.5)
IMM GRANULOCYTES NFR BLD AUTO: 0.8 % — SIGNIFICANT CHANGE UP (ref 0–0.9)
IMM GRANULOCYTES NFR BLD AUTO: 0.8 % — SIGNIFICANT CHANGE UP (ref 0–0.9)
LYMPHOCYTES # BLD AUTO: 0.65 K/UL — LOW (ref 1–3.3)
LYMPHOCYTES # BLD AUTO: 0.65 K/UL — LOW (ref 1–3.3)
LYMPHOCYTES # BLD AUTO: 13.3 % — SIGNIFICANT CHANGE UP (ref 13–44)
LYMPHOCYTES # BLD AUTO: 13.3 % — SIGNIFICANT CHANGE UP (ref 13–44)
MAGNESIUM SERPL-MCNC: 1.7 MG/DL — SIGNIFICANT CHANGE UP (ref 1.6–2.6)
MAGNESIUM SERPL-MCNC: 1.7 MG/DL — SIGNIFICANT CHANGE UP (ref 1.6–2.6)
MCHC RBC-ENTMCNC: 26.3 PG — LOW (ref 27–34)
MCHC RBC-ENTMCNC: 26.3 PG — LOW (ref 27–34)
MCHC RBC-ENTMCNC: 31.3 G/DL — LOW (ref 32–36)
MCHC RBC-ENTMCNC: 31.3 G/DL — LOW (ref 32–36)
MCV RBC AUTO: 84.1 FL — SIGNIFICANT CHANGE UP (ref 80–100)
MCV RBC AUTO: 84.1 FL — SIGNIFICANT CHANGE UP (ref 80–100)
MONOCYTES # BLD AUTO: 0.4 K/UL — SIGNIFICANT CHANGE UP (ref 0–0.9)
MONOCYTES # BLD AUTO: 0.4 K/UL — SIGNIFICANT CHANGE UP (ref 0–0.9)
MONOCYTES NFR BLD AUTO: 8.2 % — SIGNIFICANT CHANGE UP (ref 2–14)
MONOCYTES NFR BLD AUTO: 8.2 % — SIGNIFICANT CHANGE UP (ref 2–14)
NEUTROPHILS # BLD AUTO: 3.33 K/UL — SIGNIFICANT CHANGE UP (ref 1.8–7.4)
NEUTROPHILS # BLD AUTO: 3.33 K/UL — SIGNIFICANT CHANGE UP (ref 1.8–7.4)
NEUTROPHILS NFR BLD AUTO: 67.9 % — SIGNIFICANT CHANGE UP (ref 43–77)
NEUTROPHILS NFR BLD AUTO: 67.9 % — SIGNIFICANT CHANGE UP (ref 43–77)
NRBC # BLD: 0 /100 WBCS — SIGNIFICANT CHANGE UP (ref 0–0)
NRBC # BLD: 0 /100 WBCS — SIGNIFICANT CHANGE UP (ref 0–0)
PLATELET # BLD AUTO: 318 K/UL — SIGNIFICANT CHANGE UP (ref 150–400)
PLATELET # BLD AUTO: 318 K/UL — SIGNIFICANT CHANGE UP (ref 150–400)
POTASSIUM SERPL-MCNC: 3.1 MMOL/L — LOW (ref 3.5–5.3)
POTASSIUM SERPL-MCNC: 3.1 MMOL/L — LOW (ref 3.5–5.3)
POTASSIUM SERPL-SCNC: 3.1 MMOL/L — LOW (ref 3.5–5.3)
POTASSIUM SERPL-SCNC: 3.1 MMOL/L — LOW (ref 3.5–5.3)
RBC # BLD: 3.46 M/UL — LOW (ref 3.8–5.2)
RBC # BLD: 3.46 M/UL — LOW (ref 3.8–5.2)
RBC # FLD: 18.9 % — HIGH (ref 10.3–14.5)
RBC # FLD: 18.9 % — HIGH (ref 10.3–14.5)
SODIUM SERPL-SCNC: 143 MMOL/L — SIGNIFICANT CHANGE UP (ref 135–145)
SODIUM SERPL-SCNC: 143 MMOL/L — SIGNIFICANT CHANGE UP (ref 135–145)
WBC # BLD: 4.9 K/UL — SIGNIFICANT CHANGE UP (ref 3.8–10.5)
WBC # BLD: 4.9 K/UL — SIGNIFICANT CHANGE UP (ref 3.8–10.5)
WBC # FLD AUTO: 4.9 K/UL — SIGNIFICANT CHANGE UP (ref 3.8–10.5)
WBC # FLD AUTO: 4.9 K/UL — SIGNIFICANT CHANGE UP (ref 3.8–10.5)

## 2024-01-07 PROCEDURE — 99232 SBSQ HOSP IP/OBS MODERATE 35: CPT

## 2024-01-07 RX ORDER — POTASSIUM CHLORIDE 20 MEQ
40 PACKET (EA) ORAL ONCE
Refills: 0 | Status: COMPLETED | OUTPATIENT
Start: 2024-01-07 | End: 2024-01-07

## 2024-01-07 RX ADMIN — Medication 2 SPRAY(S): at 21:51

## 2024-01-07 RX ADMIN — Medication 110 MILLIGRAM(S): at 06:16

## 2024-01-07 RX ADMIN — Medication 1 GRAM(S): at 21:51

## 2024-01-07 RX ADMIN — RIVAROXABAN 20 MILLIGRAM(S): KIT at 17:08

## 2024-01-07 RX ADMIN — GABAPENTIN 600 MILLIGRAM(S): 400 CAPSULE ORAL at 21:51

## 2024-01-07 RX ADMIN — ATORVASTATIN CALCIUM 80 MILLIGRAM(S): 80 TABLET, FILM COATED ORAL at 21:50

## 2024-01-07 RX ADMIN — Medication 75 MILLIGRAM(S): at 17:08

## 2024-01-07 RX ADMIN — Medication 2 SPRAY(S): at 17:09

## 2024-01-07 RX ADMIN — Medication 2 SPRAY(S): at 00:39

## 2024-01-07 RX ADMIN — BUDESONIDE AND FORMOTEROL FUMARATE DIHYDRATE 2 PUFF(S): 160; 4.5 AEROSOL RESPIRATORY (INHALATION) at 06:20

## 2024-01-07 RX ADMIN — Medication 81 MILLIGRAM(S): at 11:31

## 2024-01-07 RX ADMIN — DULOXETINE HYDROCHLORIDE 60 MILLIGRAM(S): 30 CAPSULE, DELAYED RELEASE ORAL at 11:31

## 2024-01-07 RX ADMIN — Medication 1 GRAM(S): at 11:31

## 2024-01-07 RX ADMIN — Medication 110 MILLIGRAM(S): at 17:09

## 2024-01-07 RX ADMIN — BUDESONIDE AND FORMOTEROL FUMARATE DIHYDRATE 2 PUFF(S): 160; 4.5 AEROSOL RESPIRATORY (INHALATION) at 17:09

## 2024-01-07 RX ADMIN — MONTELUKAST 10 MILLIGRAM(S): 4 TABLET, CHEWABLE ORAL at 11:31

## 2024-01-07 RX ADMIN — Medication 240 MILLIGRAM(S): at 06:18

## 2024-01-07 RX ADMIN — Medication 2 SPRAY(S): at 06:16

## 2024-01-07 RX ADMIN — Medication 1 GRAM(S): at 17:08

## 2024-01-07 RX ADMIN — CELECOXIB 200 MILLIGRAM(S): 200 CAPSULE ORAL at 11:31

## 2024-01-07 RX ADMIN — Medication 75 MILLIGRAM(S): at 06:18

## 2024-01-07 RX ADMIN — Medication 1 GRAM(S): at 06:18

## 2024-01-07 RX ADMIN — Medication 40 MILLIEQUIVALENT(S): at 11:30

## 2024-01-07 RX ADMIN — TIOTROPIUM BROMIDE 2 PUFF(S): 18 CAPSULE ORAL; RESPIRATORY (INHALATION) at 12:51

## 2024-01-07 RX ADMIN — Medication 2 SPRAY(S): at 11:37

## 2024-01-07 RX ADMIN — CELECOXIB 200 MILLIGRAM(S): 200 CAPSULE ORAL at 12:52

## 2024-01-07 NOTE — PROGRESS NOTE ADULT - ASSESSMENT
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 Of note pt recently developed URI symptoms and was started on Doxy as well as Hycodan, Alprazolam and Fioricet. Of note pt already on Percocet, flexeril for chronic pain, Gabapentin for seizures per  all which she took. Pt being admitted for Acute hypoxic respiratory failure due to Multifocal PNA, AMS r/o TIA/CVA  vs metabolic encephalopathy secondary to polypharmacy and/or hypoxia.    #Acute metabolic encephalopathy, resolved    #Acute respiratory failure with multifocal pneumonia  #Flu positive   AMS likely Toxic, polypharmacy. Acute stroke is ruled out. EEG did not show any epileptiform activity.   - S/p iv zosyn , completed 5 days   - Added Azithromycin, + mycoplasma: course completed: for persistent symptoms. Stopped cefepime now on doxy IV  - Continue Cough meds, albuterol PRN. Duonebs q6hrs PRN  - Flu positive - start tamiflu   - Covid negative  - wean o2 as tolerated; walk test today     # PE  - c/w Xarelto     # SLE  - C/w now off steroids     # HTN/HLD  - controlled with Verapamil, statin    # DM type 2. controlled.   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 Of note pt recently developed URI symptoms and was started on Doxy as well as Hycodan, Alprazolam and Fioricet. Of note pt already on Percocet, flexeril for chronic pain, Gabapentin for seizures per  all which she took. Pt being admitted for Acute hypoxic respiratory failure due to Multifocal PNA, AMS r/o TIA/CVA  vs metabolic encephalopathy secondary to polypharmacy and/or hypoxia.    #Acute metabolic encephalopathy, resolved    #Acute respiratory failure with multifocal pneumonia  #Flu positive   AMS likely Toxic, polypharmacy. Acute stroke is ruled out. EEG did not show any epileptiform activity.   - S/p iv zosyn , completed 5 days   - Added Azithromycin, + mycoplasma: course completed: for persistent symptoms. Stopped cefepime now on doxy IV  - Continue Cough meds, albuterol PRN. Duonebs q6hrs PRN  - Flu positive - start tamiflu   - Covid negative  - wean o2 as tolerated; walk test today     # PE  - c/w Xarelto     # SLE  - C/w now off steroids     # HTN/HLD  - controlled with Verapamil, statin    # DM type 2. controlled.   - FS qAC and HS w/ SSI    Diet: DASH  DVT prophylaxis: Xarelto   Dispo: Admit, will go home once off 02   Code Status:

## 2024-01-07 NOTE — PROGRESS NOTE ADULT - SUBJECTIVE AND OBJECTIVE BOX
Patient is a 63y old  Female who presents with a chief complaint of AMS, HCAP, Acute Hypoxic respiratory failure (2024 12:10)    INTERVAL HPI/OVERNIGHT EVENTS: NAEON    MEDICATIONS  (STANDING):  aspirin enteric coated 81 milliGRAM(s) Oral daily  atorvastatin 80 milliGRAM(s) Oral at bedtime  budesonide 160 MICROgram(s)/formoterol 4.5 MICROgram(s) Inhaler 2 Puff(s) Inhalation two times a day  celecoxib 200 milliGRAM(s) Oral daily  dextrose 5%. 1000 milliLiter(s) (50 mL/Hr) IV Continuous <Continuous>  dextrose 5%. 1000 milliLiter(s) (100 mL/Hr) IV Continuous <Continuous>  dextrose 50% Injectable 25 Gram(s) IV Push once  dextrose 50% Injectable 12.5 Gram(s) IV Push once  dextrose 50% Injectable 25 Gram(s) IV Push once  doxycycline IVPB      doxycycline IVPB 100 milliGRAM(s) IV Intermittent every 12 hours  DULoxetine 60 milliGRAM(s) Oral daily  gabapentin 600 milliGRAM(s) Oral at bedtime  glucagon  Injectable 1 milliGRAM(s) IntraMuscular once  hydrocodone/homatropine Syrup 5 milliLiter(s) Oral every 6 hours  influenza   Vaccine 0.5 milliLiter(s) IntraMuscular once  insulin lispro (ADMELOG) corrective regimen sliding scale   SubCutaneous three times a day before meals  insulin lispro (ADMELOG) corrective regimen sliding scale   SubCutaneous at bedtime  montelukast 10 milliGRAM(s) Oral daily  oseltamivir 75 milliGRAM(s) Oral two times a day  rivaroxaban 20 milliGRAM(s) Oral with dinner  sodium chloride 0.65% Nasal 2 Spray(s) Both Nostrils every 6 hours  sucralfate 1 Gram(s) Oral every 6 hours  tiotropium 2.5 MICROgram(s) Inhaler 2 Puff(s) Inhalation daily  verapamil  milliGRAM(s) Oral daily    MEDICATIONS  (PRN):  acetaminophen     Tablet .. 650 milliGRAM(s) Oral every 6 hours PRN Temp greater or equal to 38C (100.4F), Mild Pain (1 - 3)  albuterol    90 MICROgram(s) HFA Inhaler 1 Puff(s) Inhalation every 4 hours PRN Shortness of Breath and/or Wheezing  albuterol/ipratropium for Nebulization 3 milliLiter(s) Nebulizer every 6 hours PRN Shortness of Breath and/or Wheezing  dextrose Oral Gel 15 Gram(s) Oral once PRN Blood Glucose LESS THAN 70 milliGRAM(s)/deciliter  guaifenesin/dextromethorphan Oral Liquid 10 milliLiter(s) Oral every 6 hours PRN Cough  melatonin 3 milliGRAM(s) Oral at bedtime PRN Insomnia    Allergies    No Known Allergies    Intolerances    dislikes Glucerna Shake (Other)    REVIEW OF SYSTEMS:  All other systems reviewed and are negative    Vital Signs Last 24 Hrs  T(C): 36.9 (2024 11:14), Max: 37 (2024 16:33)  T(F): 98.5 (2024 11:14), Max: 98.6 (2024 16:33)  HR: 82 (2024 11:14) (81 - 96)  BP: 99/54 (2024 11:14) (99/54 - 115/71)  BP(mean): --  RR: 17 (2024 11:14) (17 - 18)  SpO2: 95% (2024 12:01) (91% - 98%)    Parameters below as of 2024 12:01  Patient On (Oxygen Delivery Method): nasal cannula  O2 Flow (L/min): 2    Daily     Daily Weight in k.6 (2024 05:30)  I&O's Summary    2024 07:01  -  2024 07:00  --------------------------------------------------------  IN: 974 mL / OUT: 0 mL / NET: 974 mL    2024 07:01  -  2024 12:17  --------------------------------------------------------  IN: 400 mL / OUT: 0 mL / NET: 400 mL      CAPILLARY BLOOD GLUCOSE      POCT Blood Glucose.: 88 mg/dL (2024 11:27)  POCT Blood Glucose.: 114 mg/dL (2024 07:42)  POCT Blood Glucose.: 112 mg/dL (2024 21:40)  POCT Blood Glucose.: 129 mg/dL (2024 16:42)    PHYSICAL EXAM:  GENERAL: NAD, lying in bed comfortably cough  HEAD:  Atraumatic, normocephalic  EYES: EOMI, PERRLA, conjunctiva and sclera clear  NECK: Supple, trachea midline, no JVD  HEART: Regular rate and rhythm, no murmurs, rubs, or gallops  LUNGS: Unlabored respirations. mild scattered wheezing bilat    ABDOMEN: Soft, nontender, nondistended, +BS  EXTREMITIES: 2+ peripheral pulses bilaterally, cap refill<2 secs. No clubbing, cyanosis, or edema  NERVOUS SYSTEM:  A&Ox3, following commands, moving all extremities, no focal deficits   SKIN: No rashes or lesions    Labs                          9.1    4.90  )-----------( 318      ( 2024 06:15 )             29.1     01-07    143  |  107  |  19  ----------------------------<  97  3.1<L>   |  31  |  0.64    Ca    8.7      2024 06:15  Mg     1.7     01-07            Urinalysis Basic - ( 2024 06:15 )    Color: x / Appearance: x / SG: x / pH: x  Gluc: 97 mg/dL / Ketone: x  / Bili: x / Urobili: x   Blood: x / Protein: x / Nitrite: x   Leuk Esterase: x / RBC: x / WBC x   Sq Epi: x / Non Sq Epi: x / Bacteria: x                  DVT prophylaxis: > Lovenox 40mg SQ daily  > Heparin   > SCD's

## 2024-01-08 LAB
GLUCOSE BLDC GLUCOMTR-MCNC: 116 MG/DL — HIGH (ref 70–99)
GLUCOSE BLDC GLUCOMTR-MCNC: 116 MG/DL — HIGH (ref 70–99)
GLUCOSE BLDC GLUCOMTR-MCNC: 118 MG/DL — HIGH (ref 70–99)
GLUCOSE BLDC GLUCOMTR-MCNC: 151 MG/DL — HIGH (ref 70–99)
GLUCOSE BLDC GLUCOMTR-MCNC: 151 MG/DL — HIGH (ref 70–99)

## 2024-01-08 PROCEDURE — 99232 SBSQ HOSP IP/OBS MODERATE 35: CPT

## 2024-01-08 RX ORDER — IPRATROPIUM/ALBUTEROL SULFATE 18-103MCG
3 AEROSOL WITH ADAPTER (GRAM) INHALATION EVERY 8 HOURS
Refills: 0 | Status: DISCONTINUED | OUTPATIENT
Start: 2024-01-08 | End: 2024-01-09

## 2024-01-08 RX ADMIN — BUDESONIDE AND FORMOTEROL FUMARATE DIHYDRATE 2 PUFF(S): 160; 4.5 AEROSOL RESPIRATORY (INHALATION) at 05:55

## 2024-01-08 RX ADMIN — CELECOXIB 200 MILLIGRAM(S): 200 CAPSULE ORAL at 12:54

## 2024-01-08 RX ADMIN — BUDESONIDE AND FORMOTEROL FUMARATE DIHYDRATE 2 PUFF(S): 160; 4.5 AEROSOL RESPIRATORY (INHALATION) at 17:43

## 2024-01-08 RX ADMIN — TIOTROPIUM BROMIDE 2 PUFF(S): 18 CAPSULE ORAL; RESPIRATORY (INHALATION) at 12:03

## 2024-01-08 RX ADMIN — ATORVASTATIN CALCIUM 80 MILLIGRAM(S): 80 TABLET, FILM COATED ORAL at 22:09

## 2024-01-08 RX ADMIN — Medication 1 GRAM(S): at 12:04

## 2024-01-08 RX ADMIN — Medication 75 MILLIGRAM(S): at 17:42

## 2024-01-08 RX ADMIN — Medication 2 SPRAY(S): at 17:42

## 2024-01-08 RX ADMIN — Medication 2 SPRAY(S): at 12:03

## 2024-01-08 RX ADMIN — RIVAROXABAN 20 MILLIGRAM(S): KIT at 17:42

## 2024-01-08 RX ADMIN — CELECOXIB 200 MILLIGRAM(S): 200 CAPSULE ORAL at 12:04

## 2024-01-08 RX ADMIN — Medication 1 GRAM(S): at 17:42

## 2024-01-08 RX ADMIN — Medication 75 MILLIGRAM(S): at 06:07

## 2024-01-08 RX ADMIN — Medication 3 MILLILITER(S): at 21:38

## 2024-01-08 RX ADMIN — Medication 100 MILLIGRAM(S): at 17:42

## 2024-01-08 RX ADMIN — Medication 110 MILLIGRAM(S): at 05:56

## 2024-01-08 RX ADMIN — Medication 240 MILLIGRAM(S): at 06:05

## 2024-01-08 RX ADMIN — Medication 81 MILLIGRAM(S): at 12:04

## 2024-01-08 RX ADMIN — DULOXETINE HYDROCHLORIDE 60 MILLIGRAM(S): 30 CAPSULE, DELAYED RELEASE ORAL at 12:03

## 2024-01-08 RX ADMIN — Medication 1 GRAM(S): at 06:05

## 2024-01-08 RX ADMIN — Medication 2 SPRAY(S): at 06:04

## 2024-01-08 RX ADMIN — GABAPENTIN 600 MILLIGRAM(S): 400 CAPSULE ORAL at 22:09

## 2024-01-08 RX ADMIN — MONTELUKAST 10 MILLIGRAM(S): 4 TABLET, CHEWABLE ORAL at 12:03

## 2024-01-08 NOTE — PROGRESS NOTE ADULT - NS ATTEND AMEND GEN_ALL_CORE FT
All labs and cultures and imaging and pertinent chart notes reviewed by me.    case d/w Np Leidy at length and agree with her assessment and plan.  Betina Soni MD  Infectious Disease Attending    for any questions please do not hesitate to contact me either via teams or by calling [phone] All labs and cultures and imaging and pertinent chart notes reviewed by me.    case d/w Np Leidy at length and agree with her assessment and plan.  Betina Soni MD  Infectious Disease Attending    for any questions please do not hesitate to contact me either via teams or by calling [phone]

## 2024-01-08 NOTE — PROGRESS NOTE ADULT - SUBJECTIVE AND OBJECTIVE BOX
Patient is a 63y old  Female who presents with a chief complaint of AMS, HCAP, Acute Hypoxic respiratory failure (2024 11:39)    INTERVAL HPI/OVERNIGHT EVENTS: NAEON    MEDICATIONS  (STANDING):  aspirin enteric coated 81 milliGRAM(s) Oral daily  atorvastatin 80 milliGRAM(s) Oral at bedtime  budesonide 160 MICROgram(s)/formoterol 4.5 MICROgram(s) Inhaler 2 Puff(s) Inhalation two times a day  celecoxib 200 milliGRAM(s) Oral daily  dextrose 5%. 1000 milliLiter(s) (50 mL/Hr) IV Continuous <Continuous>  dextrose 5%. 1000 milliLiter(s) (100 mL/Hr) IV Continuous <Continuous>  dextrose 50% Injectable 25 Gram(s) IV Push once  dextrose 50% Injectable 12.5 Gram(s) IV Push once  dextrose 50% Injectable 25 Gram(s) IV Push once  doxycycline IVPB 100 milliGRAM(s) IV Intermittent every 12 hours  DULoxetine 60 milliGRAM(s) Oral daily  gabapentin 600 milliGRAM(s) Oral at bedtime  glucagon  Injectable 1 milliGRAM(s) IntraMuscular once  hydrocodone/homatropine Syrup 5 milliLiter(s) Oral every 6 hours  influenza   Vaccine 0.5 milliLiter(s) IntraMuscular once  insulin lispro (ADMELOG) corrective regimen sliding scale   SubCutaneous three times a day before meals  insulin lispro (ADMELOG) corrective regimen sliding scale   SubCutaneous at bedtime  montelukast 10 milliGRAM(s) Oral daily  oseltamivir 75 milliGRAM(s) Oral two times a day  rivaroxaban 20 milliGRAM(s) Oral with dinner  sodium chloride 0.65% Nasal 2 Spray(s) Both Nostrils every 6 hours  sucralfate 1 Gram(s) Oral every 6 hours  tiotropium 2.5 MICROgram(s) Inhaler 2 Puff(s) Inhalation daily  verapamil  milliGRAM(s) Oral daily    MEDICATIONS  (PRN):  acetaminophen     Tablet .. 650 milliGRAM(s) Oral every 6 hours PRN Temp greater or equal to 38C (100.4F), Mild Pain (1 - 3)  albuterol    90 MICROgram(s) HFA Inhaler 1 Puff(s) Inhalation every 4 hours PRN Shortness of Breath and/or Wheezing  albuterol/ipratropium for Nebulization 3 milliLiter(s) Nebulizer every 6 hours PRN Shortness of Breath and/or Wheezing  dextrose Oral Gel 15 Gram(s) Oral once PRN Blood Glucose LESS THAN 70 milliGRAM(s)/deciliter  guaifenesin/dextromethorphan Oral Liquid 10 milliLiter(s) Oral every 6 hours PRN Cough  melatonin 3 milliGRAM(s) Oral at bedtime PRN Insomnia    Allergies    No Known Allergies    Intolerances    dislikes Glucerna Shake (Other)    REVIEW OF SYSTEMS:  All other systems reviewed and are negative    Vital Signs Last 24 Hrs  T(C): 36.7 (2024 11:03), Max: 36.9 (2024 17:17)  T(F): 98 (2024 11:03), Max: 98.4 (2024 17:17)  HR: 102 (2024 11:03) (91 - 117)  BP: 114/72 (2024 11:03) (105/67 - 117/82)  BP(mean): --  RR: 18 (2024 11:03) (18 - 18)  SpO2: 98% (2024 11:03) (96% - 98%)    Parameters below as of 2024 08:10  Patient On (Oxygen Delivery Method): nasal cannula, 2L      Daily     Daily Weight in k.3 (2024 05:23)  I&O's Summary    2024 07:01  -  2024 07:00  --------------------------------------------------------  IN: 1120 mL / OUT: 0 mL / NET: 1120 mL      CAPILLARY BLOOD GLUCOSE      POCT Blood Glucose.: 118 mg/dL (2024 12:41)  POCT Blood Glucose.: 116 mg/dL (2024 07:41)  POCT Blood Glucose.: 105 mg/dL (2024 23:41)  POCT Blood Glucose.: 115 mg/dL (2024 16:20)    PHYSICAL EXAM:  GENERAL: NAD  NERVOUS SYSTEM:  Alert & Oriented X3, Good concentration; Motor Strength 5/5 B/L upper and lower extremities; DTRs 2+ intact and symmetric  CHEST/LUNG: Clear to percussion bilaterally; No rales, rhonchi, wheezing, or rubs  HEART: Regular rate and rhythm; No murmurs, rubs, or gallops  ABDOMEN: Soft, Nontender, Nondistended; Bowel sounds present  EXTREMITIES:  2+ Peripheral Pulses, No clubbing, cyanosis, or edema  LYMPH: No lymphadenopathy noted  SKIN: No rashes or lesions      Labs                          9.1    4.90  )-----------( 318      ( 2024 06:15 )             29.1     -    143  |  107  |  19  ----------------------------<  97  3.1<L>   |  31  |  0.64    Ca    8.7      2024 06:15  Mg     1.7     -            Urinalysis Basic - ( 2024 06:15 )    Color: x / Appearance: x / SG: x / pH: x  Gluc: 97 mg/dL / Ketone: x  / Bili: x / Urobili: x   Blood: x / Protein: x / Nitrite: x   Leuk Esterase: x / RBC: x / WBC x   Sq Epi: x / Non Sq Epi: x / Bacteria: x                  DVT prophylaxis: > Lovenox 40mg SQ daily  > Heparin   > SCD's

## 2024-01-08 NOTE — PROGRESS NOTE ADULT - SUBJECTIVE AND OBJECTIVE BOX
ROSSI ROJAS  MRN-[id number]    Follow Up:  influenza, mycoplasma pna    Interval History: the pt was seen and examined earlier, not in acute distress, feeling a little better, still with cough but with some improvement, off supplemental O2 during my exam. Pt is afebrile, no new cbc.     PAST MEDICAL & SURGICAL HISTORY:  Lupus      Diabetes      Hypertension      Asthma      Peripheral polyneuropathy      Pulmonary emboli  7/2019      History of hip replacement  left      H/O total hysterectomy      History of bilateral tubal ligation      S/P laminectomy with spinal fusion      History of shoulder surgery      H/O knee surgery          ROS:    [ ] Unobtainable because:  [x ] All other systems negative    Constitutional: no fever, no chills  Head: no trauma  Eyes: no vision changes, no eye pain  ENT:  no sore throat, no rhinorrhea  Cardiovascular:  no chest pain, no palpitation  Respiratory:  no SOB, + cough  GI:  no abd pain, no vomiting, no diarrhea  urinary: no dysuria, no hematuria, no flank pain  musculoskeletal:  no joint pain, no joint swelling  skin:  no rash  neurology:  no headache, no seizure, no change in mental status  psych: no anxiety, no depression         Allergies  No Known Allergies        ANTIMICROBIALS:  doxycycline IVPB 100 every 12 hours  oseltamivir 75 two times a day      OTHER MEDS:  acetaminophen     Tablet .. 650 milliGRAM(s) Oral every 6 hours PRN  albuterol    90 MICROgram(s) HFA Inhaler 1 Puff(s) Inhalation every 4 hours PRN  albuterol/ipratropium for Nebulization 3 milliLiter(s) Nebulizer every 6 hours PRN  aspirin enteric coated 81 milliGRAM(s) Oral daily  atorvastatin 80 milliGRAM(s) Oral at bedtime  budesonide 160 MICROgram(s)/formoterol 4.5 MICROgram(s) Inhaler 2 Puff(s) Inhalation two times a day  celecoxib 200 milliGRAM(s) Oral daily  dextrose 5%. 1000 milliLiter(s) IV Continuous <Continuous>  dextrose 5%. 1000 milliLiter(s) IV Continuous <Continuous>  dextrose 50% Injectable 25 Gram(s) IV Push once  dextrose 50% Injectable 12.5 Gram(s) IV Push once  dextrose 50% Injectable 25 Gram(s) IV Push once  dextrose Oral Gel 15 Gram(s) Oral once PRN  DULoxetine 60 milliGRAM(s) Oral daily  gabapentin 600 milliGRAM(s) Oral at bedtime  glucagon  Injectable 1 milliGRAM(s) IntraMuscular once  guaifenesin/dextromethorphan Oral Liquid 10 milliLiter(s) Oral every 6 hours PRN  hydrocodone/homatropine Syrup 5 milliLiter(s) Oral every 6 hours  influenza   Vaccine 0.5 milliLiter(s) IntraMuscular once  insulin lispro (ADMELOG) corrective regimen sliding scale   SubCutaneous three times a day before meals  insulin lispro (ADMELOG) corrective regimen sliding scale   SubCutaneous at bedtime  melatonin 3 milliGRAM(s) Oral at bedtime PRN  montelukast 10 milliGRAM(s) Oral daily  rivaroxaban 20 milliGRAM(s) Oral with dinner  sodium chloride 0.65% Nasal 2 Spray(s) Both Nostrils every 6 hours  sucralfate 1 Gram(s) Oral every 6 hours  tiotropium 2.5 MICROgram(s) Inhaler 2 Puff(s) Inhalation daily  verapamil  milliGRAM(s) Oral daily      Vital Signs Last 24 Hrs  T(C): 36.7 (08 Jan 2024 11:03), Max: 36.9 (07 Jan 2024 17:17)  T(F): 98 (08 Jan 2024 11:03), Max: 98.4 (07 Jan 2024 17:17)  HR: 102 (08 Jan 2024 11:03) (91 - 117)  BP: 114/72 (08 Jan 2024 11:03) (105/67 - 117/82)  BP(mean): --  RR: 18 (08 Jan 2024 11:03) (18 - 18)  SpO2: 98% (08 Jan 2024 11:03) (96% - 98%)    Parameters below as of 08 Jan 2024 08:10  Patient On (Oxygen Delivery Method): nasal cannula, 2L        Physical Exam:  General:    NAD, pt gets short of breath with cough, RA  Head: atraumatic, normocephalic  Eye: normal sclera and conjunctiva  ENT:    no oral lesions, neck supple  Cardio: regular S1, S2,  no murmur  Respiratory:  decreased breath sounds b/l, no wheezing on today's exam, cough with improvement   abd:  BS present, abd is soft,   not distended, not tender, no guarding   :   no CVAT,  no suprapubic tenderness  Musculoskeletal:   no joint swelling,   no edema  vascular: no central lines, +PIV   Skin:    no rash, warm to touch  Neurologic: awake and alert, answers questions and follows commands   psych: appropriate     WBC Count: 4.90 K/uL (01-07 @ 06:15)  WBC Count: 6.59 K/uL (01-06 @ 10:15)  WBC Count: 7.52 K/uL (01-05 @ 06:15)  WBC Count: 8.57 K/uL (01-04 @ 07:11)  WBC Count: 9.88 K/uL (01-02 @ 07:45)                            9.1    4.90  )-----------( 318      ( 07 Jan 2024 06:15 )             29.1       01-07    143  |  107  |  19  ----------------------------<  97  3.1<L>   |  31  |  0.64    Ca    8.7      07 Jan 2024 06:15  Mg     1.7     01-07        Urinalysis Basic - ( 07 Jan 2024 06:15 )    Color: x / Appearance: x / SG: x / pH: x  Gluc: 97 mg/dL / Ketone: x  / Bili: x / Urobili: x   Blood: x / Protein: x / Nitrite: x   Leuk Esterase: x / RBC: x / WBC x   Sq Epi: x / Non Sq Epi: x / Bacteria: x        Creatinine Trend: 0.64<--, 0.69<--, 0.69<--, 0.70<--, 0.67<--, 0.64<--      MICROBIOLOGY:  v  .Sputum Sputum  12-27-23   Normal Respiratory Valarie present  --    Few Squamous epithelial cells per low power field  Few polymorphonuclear leukocytes per low power field  Moderate Yeast per oil power field  Few Gram positive cocci in pairs per oil power field      .Blood Blood-Peripheral  12-22-23   No growth at 5 days  --  --      .Blood Blood-Peripheral  12-22-23   No growth at 5 days  --  --          Rapid RVP Result: Detected (01-06 @ 11:30)        C-Reactive Protein, Serum: 159 (12-25)            SARS-CoV-2: NotDetec (01-06-24 @ 11:30)  Rapid RVP Result: Detected (01-06-24 @ 11:30)  SARS-CoV-2 Result: NotDetec (01-03-24 @ 15:30)    SARS-CoV-2: NotDetec (06 Jan 2024 11:30)  SARS-CoV-2: NotDetec (25 Dec 2023 10:15)  SARS-CoV-2: NotDetec (21 Dec 2023 16:30)  SARS-CoV-2: NotDetec (17 Dec 2023 05:51)  SARS-CoV-2: NotDetec (17 Nov 2023 05:46)    RADIOLOGY:     ROSSI ROJAS  MRN-[id number]    Follow Up:  influenza, mycoplasma pna    Interval History: the pt was seen and examined earlier, not in acute distress, feeling a little better, still with cough but with some improvement, off supplemental O2 during my exam. Pt is afebrile, no new cbc.     PAST MEDICAL & SURGICAL HISTORY:  Lupus      Diabetes      Hypertension      Asthma      Peripheral polyneuropathy      Pulmonary emboli  7/2019      History of hip replacement  left      H/O total hysterectomy      History of bilateral tubal ligation      S/P laminectomy with spinal fusion      History of shoulder surgery      H/O knee surgery          ROS:    [ ] Unobtainable because:  [x ] All other systems negative    Constitutional: no fever, no chills  Head: no trauma  Eyes: no vision changes, no eye pain  ENT:  no sore throat, no rhinorrhea  Cardiovascular:  no chest pain, no palpitation  Respiratory:  no SOB, + cough  GI:  no abd pain, no vomiting, no diarrhea  urinary: no dysuria, no hematuria, no flank pain  musculoskeletal:  no joint pain, no joint swelling  skin:  no rash  neurology:  no headache, no seizure, no change in mental status  psych: no anxiety, no depression         Allergies  No Known Allergies        ANTIMICROBIALS:  doxycycline IVPB 100 every 12 hours  oseltamivir 75 two times a day      OTHER MEDS:  acetaminophen     Tablet .. 650 milliGRAM(s) Oral every 6 hours PRN  albuterol    90 MICROgram(s) HFA Inhaler 1 Puff(s) Inhalation every 4 hours PRN  albuterol/ipratropium for Nebulization 3 milliLiter(s) Nebulizer every 6 hours PRN  aspirin enteric coated 81 milliGRAM(s) Oral daily  atorvastatin 80 milliGRAM(s) Oral at bedtime  budesonide 160 MICROgram(s)/formoterol 4.5 MICROgram(s) Inhaler 2 Puff(s) Inhalation two times a day  celecoxib 200 milliGRAM(s) Oral daily  dextrose 5%. 1000 milliLiter(s) IV Continuous <Continuous>  dextrose 5%. 1000 milliLiter(s) IV Continuous <Continuous>  dextrose 50% Injectable 25 Gram(s) IV Push once  dextrose 50% Injectable 12.5 Gram(s) IV Push once  dextrose 50% Injectable 25 Gram(s) IV Push once  dextrose Oral Gel 15 Gram(s) Oral once PRN  DULoxetine 60 milliGRAM(s) Oral daily  gabapentin 600 milliGRAM(s) Oral at bedtime  glucagon  Injectable 1 milliGRAM(s) IntraMuscular once  guaifenesin/dextromethorphan Oral Liquid 10 milliLiter(s) Oral every 6 hours PRN  hydrocodone/homatropine Syrup 5 milliLiter(s) Oral every 6 hours  influenza   Vaccine 0.5 milliLiter(s) IntraMuscular once  insulin lispro (ADMELOG) corrective regimen sliding scale   SubCutaneous three times a day before meals  insulin lispro (ADMELOG) corrective regimen sliding scale   SubCutaneous at bedtime  melatonin 3 milliGRAM(s) Oral at bedtime PRN  montelukast 10 milliGRAM(s) Oral daily  rivaroxaban 20 milliGRAM(s) Oral with dinner  sodium chloride 0.65% Nasal 2 Spray(s) Both Nostrils every 6 hours  sucralfate 1 Gram(s) Oral every 6 hours  tiotropium 2.5 MICROgram(s) Inhaler 2 Puff(s) Inhalation daily  verapamil  milliGRAM(s) Oral daily      Vital Signs Last 24 Hrs  T(C): 36.7 (08 Jan 2024 11:03), Max: 36.9 (07 Jan 2024 17:17)  T(F): 98 (08 Jan 2024 11:03), Max: 98.4 (07 Jan 2024 17:17)  HR: 102 (08 Jan 2024 11:03) (91 - 117)  BP: 114/72 (08 Jan 2024 11:03) (105/67 - 117/82)  BP(mean): --  RR: 18 (08 Jan 2024 11:03) (18 - 18)  SpO2: 98% (08 Jan 2024 11:03) (96% - 98%)    Parameters below as of 08 Jan 2024 08:10  Patient On (Oxygen Delivery Method): nasal cannula, 2L        Physical Exam:  General:    NAD, pt gets short of breath with cough, RA  Head: atraumatic, normocephalic  Eye: normal sclera and conjunctiva  ENT:    no oral lesions, neck supple  Cardio: regular S1, S2,  no murmur  Respiratory:  decreased breath sounds b/l, no wheezing on today's exam, cough with improvement   abd:  BS present, abd is soft,   not distended, not tender, no guarding   :   no CVAT,  no suprapubic tenderness  Musculoskeletal:   no joint swelling,   no edema  vascular: no central lines, +PIV   Skin:    no rash, warm to touch  Neurologic: awake and alert, answers questions and follows commands   psych: appropriate     WBC Count: 4.90 K/uL (01-07 @ 06:15)  WBC Count: 6.59 K/uL (01-06 @ 10:15)  WBC Count: 7.52 K/uL (01-05 @ 06:15)  WBC Count: 8.57 K/uL (01-04 @ 07:11)  WBC Count: 9.88 K/uL (01-02 @ 07:45)                            9.1    4.90  )-----------( 318      ( 07 Jan 2024 06:15 )             29.1       01-07    143  |  107  |  19  ----------------------------<  97  3.1<L>   |  31  |  0.64    Ca    8.7      07 Jan 2024 06:15  Mg     1.7     01-07        Urinalysis Basic - ( 07 Jan 2024 06:15 )    Color: x / Appearance: x / SG: x / pH: x  Gluc: 97 mg/dL / Ketone: x  / Bili: x / Urobili: x   Blood: x / Protein: x / Nitrite: x   Leuk Esterase: x / RBC: x / WBC x   Sq Epi: x / Non Sq Epi: x / Bacteria: x        Creatinine Trend: 0.64<--, 0.69<--, 0.69<--, 0.70<--, 0.67<--, 0.64<--      MICROBIOLOGY:  v  .Sputum Sputum  12-27-23   Normal Respiratory Valarie present  --    Few Squamous epithelial cells per low power field  Few polymorphonuclear leukocytes per low power field  Moderate Yeast per oil power field  Few Gram positive cocci in pairs per oil power field      .Blood Blood-Peripheral  12-22-23   No growth at 5 days  --  --      .Blood Blood-Peripheral  12-22-23   No growth at 5 days  --  --          Rapid RVP Result: Detected (01-06 @ 11:30)        C-Reactive Protein, Serum: 159 (12-25)            SARS-CoV-2: NotDetec (01-06-24 @ 11:30)  Rapid RVP Result: Detected (01-06-24 @ 11:30)  SARS-CoV-2 Result: NotDetec (01-03-24 @ 15:30)    SARS-CoV-2: NotDetec (06 Jan 2024 11:30)  SARS-CoV-2: NotDetec (25 Dec 2023 10:15)  SARS-CoV-2: NotDetec (21 Dec 2023 16:30)  SARS-CoV-2: NotDetec (17 Dec 2023 05:51)  SARS-CoV-2: NotDetec (17 Nov 2023 05:46)    RADIOLOGY:

## 2024-01-08 NOTE — PROGRESS NOTE ADULT - ASSESSMENT
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 Of note pt recently developed URI symptoms and was started on Doxy as well as Hycodan, Alprazolam and Fioricet. Of note pt already on Percocet, flexeril for chronic pain, Gabapentin for seizures per  all which she took. Pt being admitted for Acute hypoxic respiratory failure due to Multifocal PNA, AMS r/o TIA/CVA  vs metabolic encephalopathy secondary to polypharmacy and/or hypoxia.    #Acute metabolic encephalopathy, resolved    #Acute respiratory failure with multifocal pneumonia  #Flu positive   AMS likely Toxic, polypharmacy. Acute stroke is ruled out. EEG did not show any epileptiform activity.   - S/p iv zosyn , completed 5 days   - Added Azithromycin, + mycoplasma: course completed: for persistent symptoms. Stopped cefepime now on doxy IV. Switch to PO Doxy for additional 3 days.   - Continue Cough meds, albuterol PRN. Duonebs q6hrs PRN  - Flu positive - start tamiflu   - Covid negative  - wean o2 as tolerated; walk test repeat today     #H/o of PE  - c/w Xarelto     # SLE  - C/w now off steroids     # HTN/HLD  - controlled with Verapamil, statin    # DM type 2. controlled.   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 Of note pt recently developed URI symptoms and was started on Doxy as well as Hycodan, Alprazolam and Fioricet. Of note pt already on Percocet, flexeril for chronic pain, Gabapentin for seizures per  all which she took. Pt being admitted for Acute hypoxic respiratory failure due to Multifocal PNA, AMS r/o TIA/CVA  vs metabolic encephalopathy secondary to polypharmacy and/or hypoxia.    #Acute metabolic encephalopathy, resolved    #Acute respiratory failure with multifocal pneumonia  #Flu positive   AMS likely Toxic, polypharmacy. Acute stroke is ruled out. EEG did not show any epileptiform activity.   - S/p iv zosyn , completed 5 days   - Added Azithromycin, + mycoplasma: course completed: for persistent symptoms. Stopped cefepime now on doxy IV. Switch to PO Doxy for additional 3 days.   - Continue Cough meds, albuterol PRN. Duonebs q6hrs PRN  - Flu positive - start tamiflu   - Covid negative  - wean o2 as tolerated; walk test repeat today     #H/o of PE  - c/w Xarelto     # SLE  - C/w now off steroids     # HTN/HLD  - controlled with Verapamil, statin    # DM type 2. controlled.   - FS qAC and HS w/ SSI    Diet: DASH  DVT prophylaxis: Xarelto   Dispo: Admit, will go home once off 02   Code Status: FC

## 2024-01-08 NOTE — PROGRESS NOTE ADULT - ASSESSMENT
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     multifocal pneumonia on CXR on admission was treated with 5 days of zosyn and improved  but  later  became hypoxemic and transferred  to [facility]   completed 7 days of zithromax for mycoplasma   completed 7 days course of cefepime IV for the multifocal pna treatment   now on doxycyline for mycoplasma  found Influenza A positive on 1/3/24 - course of Tamiflu is complete   Leukocytosis improved   remains afebrile since 12/25/23  MRSA PCR negative   Legionella ag - negative  Strep pneumo ag - negative  Mycoplasma IGM positive   sputum cx- negative      plan-  - continue doxycyline for mycoplasma day #11 - needs three more days to complete 14 days course total  - monitor O2 sat and wbc closely.  - pulmonary follow up is appreciated   - completed 10 days of steroids  - pt had covid 19 exposure, RVP repeated over the weekend, no COVID 19 detected   - cough suppression prn    will sign off, re-consult as needed     discussed with Dr. Soni   msg sent to Dr. 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     multifocal pneumonia on CXR on admission was treated with 5 days of zosyn and improved  but  later  became hypoxemic and transferred  to [facility]   completed 7 days of zithromax for mycoplasma   completed 7 days course of cefepime IV for the multifocal pna treatment   now on doxycyline for mycoplasma  found Influenza A positive on 1/3/24 - course of Tamiflu is complete   Leukocytosis improved   remains afebrile since 12/25/23  MRSA PCR negative   Legionella ag - negative  Strep pneumo ag - negative  Mycoplasma IGM positive   sputum cx- negative      plan-  - continue doxycyline for mycoplasma day #11 - needs three more days to complete 14 days course total  - monitor O2 sat and wbc closely.  - pulmonary follow up is appreciated   - completed 10 days of steroids  - pt had covid 19 exposure, RVP repeated over the weekend, no COVID 19 detected   - cough suppression prn    will sign off, re-consult as needed     discussed with Dr. Soni   msg sent to Dr. 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     multifocal pneumonia on CXR on admission was treated with 5 days of zosyn and improved  but  later  became hypoxemic and transferred  to [facility]   completed 7 days of zithromax for mycoplasma   completed 7 days course of cefepime IV for the multifocal pna treatment   now on doxycyline for mycoplasma  found Influenza A positive on 1/3/24 - course of Tamiflu is complete   Leukocytosis improved   remains afebrile since 12/25/23  MRSA PCR negative   Legionella ag - negative  Strep pneumo ag - negative  Mycoplasma IGM positive   sputum cx- negative      plan-  - continue doxycyline for mycoplasma day #11 - needs three more days to complete 14 days course total can be switched to po doxycyline for th remaining 3 days .  - monitor O2 sat and wbc closely.  - pulmonary follow up is appreciated   - completed 10 days of steroids  - pt had covid 19 exposure, RVP repeated over the weekend, no COVID 19 detected   - cough suppression prn    will sign off, re-consult as needed     discussed with Dr. Soni   msg sent to Dr. 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     multifocal pneumonia on CXR on admission was treated with 5 days of zosyn and improved  but  later  became hypoxemic and transferred  to [facility]   completed 7 days of zithromax for mycoplasma   completed 7 days course of cefepime IV for the multifocal pna treatment   now on doxycyline for mycoplasma  found Influenza A positive on 1/3/24 - course of Tamiflu is complete   Leukocytosis improved   remains afebrile since 12/25/23  MRSA PCR negative   Legionella ag - negative  Strep pneumo ag - negative  Mycoplasma IGM positive   sputum cx- negative      plan-  - continue doxycyline for mycoplasma day #11 - needs three more days to complete 14 days course total can be switched to po doxycyline for th remaining 3 days .  - monitor O2 sat and wbc closely.  - pulmonary follow up is appreciated   - completed 10 days of steroids  - pt had covid 19 exposure, RVP repeated over the weekend, no COVID 19 detected   - cough suppression prn    will sign off, re-consult as needed     discussed with Dr. Soni   msg sent to Dr. Aguero

## 2024-01-08 NOTE — PROGRESS NOTE ADULT - NS ATTEND AMEND GEN_ALL_CORE FT
pt seen and examined with NP    pt had been weaned off O2 with O2 sat > 90%  however she intermittently is wearing nasal cannula for comfort  still with dry cough  no fevers  feels a little better  noted on exam to have slight wheeze today    63F PMH obesity, HTN, DM type 2, SLE, hx of GBS s/p IVIG (required intubation), CVA/TIA with residual L sided weakness with mild slurred speech, chronic left foot drop, seizure disorder, asthma, PE on Xarelto presents for AMS, unresponsiveness. Found to have bilateral diffuse ground glass opacities consistent with multifocal PNA on chest imaging. Tested + for Mycoplasma. Hypoxic necessitating increased O2 requirements during hospital course. Now weaned off O2. Hospital course complicated by exposure to COVID + roommate and is now + Flu A but COVID negative.    Dx: acute hypoxic respiratory failure, multifocal PNA due to mycoplasma pneumonia, acute asthma exacerbation in setting of bacterial PNA/viral infection, influenza A      - CT chest 12/25 showing multifocal PNA with diffuse bilateral GGO. repeat CT chest non contrast performed 12/28 by primary team again with multifocal ground glass opacities consistent with PNA. would not anticipate drastic change in imaging of chest in such short duration of time  - sent for CTA chest 1/1/24 per primary team and CTA negative for PE, mild improvement in GGO, but with noted traction bronchiectasis  - would repeat CT chest in 6 weeks to assess for lung parenchyma improvement, chest imaging may take several weeks to clear  - she had CT chest done 7/2019 which did not have any findings of GGO/traction bronchiectasis. Findings on recent imaging likely reflect her current infection and PNA      - she completed an initial 7 day course of azithromycin for mycoplasma PNA but remained symptomatic  - seen by ID and placed on doxycycline to extend treatment for mycoplasma and started on cefepime for gram negative bacterial PNA / HCAP coverage, procalcitonin trended since admission.   - she completed 7 day course of cefepime on 1/3   - remains on doxycycline to complete 14 days of doxycycline per ID  - antibx management per ID  - sputum cx with normal respiratory hossein  - completed 5 day course of tamiflu for flu A  - in hospital exposure to COVID roommate however has tested negative for COVID x2 post exposure.  - her flu A conversion likely due to + sick contact with family visiting who had flu. she reports her mother in law is now hospitalized with flu as well.    - had initially completed a course of steroids 12/25 - 1/4 for acute asthma exacerbation however with influenza A infection pt now has recurrent wheeze. will re-initiate back on systemic steroids (and will aim for a short course with tapering dose). start  prednisone 40mg daily  - antitussives tessalon perles, hycodan for cough  - cont with ICS/LABA/LAMA combo symbicort + spiriva for underlying asthma/reactive airway   - pt informed that cough may persist for several weeks due to post viral bronchospasm     - goal to maintain O2 sat > 90%  - weaned off oxygen to RA but wearing supplemental O2 for comfort from time to time  - incentive spirometer, OOB to chair  - cont xarelto for underlying hx of PE  - eventual outpatient pulmonary follow up with her pulmonologist Dr. Tj García.

## 2024-01-08 NOTE — PROGRESS NOTE ADULT - ASSESSMENT
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 Of note pt recently developed URI symptoms and was started on Doxy as well as Hycodan, Alprazolam and Fioricet. Of note pt already on Percocet, flexeril for chronic pain, Gabapentin for seizures per  all which she took. Pt being admitted for Acute hypoxic respiratory failure due to Multifocal PNA, AMS r/o TIA/CVA  vs metabolic encephalopathy secondary to polypharmacy and/or hypoxia.    #Acute metabolic encephalopathy, resolved    #Acute respiratory failure with multifocal pneumonia  #Flu positive   AMS likely Toxic, polypharmacy. Acute stroke is ruled out. EEG did not show any epileptiform activity.   - S/p iv zosyn , completed 5 days   - Added Azithromycin, + mycoplasma: course completed: for persistent symptoms. Stopped cefepime now on doxy IV. Switch to PO Doxy for additional 3 days.   - Continue Cough meds, albuterol PRN. Duonebs q6hrs PRN  - Flu positive - start tamiflu   - Covid negative  - wean o2 as tolerated; walk test repeat today     #H/o of PE  - c/w Xarelto     # SLE  - C/w now off steroids     # HTN/HLD  - controlled with Verapamil, statin    # DM type 2. controlled.   - FS qAC and HS w/ SSI    Diet: DASH  DVT prophylaxis: Xarelto   Dispo: Admit, will go home once off 02   Code Status: FC

## 2024-01-08 NOTE — PROGRESS NOTE ADULT - ASSESSMENT
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 as well as slurred speech, Seizure disorder, admitted with respiratory failure 2/2 myocplasma pneumonia     Dx: acute hypoxic respiratory failure 2/2 mycoplasma pneumonia    - O2 titrated down to 2 L maintaining ( for comfort) > 92%, on RA o2 saturation > 90%   - O2 assess on ambulation (Room AIr ) sats remain ~91%- patient will NOT qualify for home O2   - CT chest consistent with multifocal pneumonia, repeat imaging with improvement   - Mycoplasma IgM positive, completed 7 days of azithromycin  - cefepime started 12/27 / doxycycline added 12/28  ( today is day 11) for further mycoplasma coverage - per ID, appreciate recs - thus far 18 days ABX   - no wheeze on exam. cont tamiflu x 5 days - last day, duonebs prn and aerobika for airway clearance  - prednisone dc'd completed 10 days of steroids    - Symbicort & Spiriva added for asthma maintenance now that pt is off systemic steroids and standing duonebs    - cont with aerobika for cough assist/secretion mobilization  - cont antitussives tessalon perles, hycodan  - cont xarelto for underlying hx of PE  - will need outpatient pulmonary follow up. She sees Dr. García in [facility] and should follow up with him.      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 as well as slurred speech, Seizure disorder, admitted with respiratory failure 2/2 myocplasma pneumonia     Dx: acute hypoxic respiratory failure 2/2 mycoplasma pneumonia    - O2 titrated down to 2 L maintaining ( for comfort) > 92%, on RA o2 saturation > 90%   - O2 assess on ambulation (Room AIr ) sats remain ~91%- patient will NOT qualify for home O2   - CT chest consistent with multifocal pneumonia, repeat imaging with improvement   - Mycoplasma IgM positive, completed 7 days of azithromycin  - cefepime started 12/27 / doxycycline added 12/28  ( today is day 11) for further mycoplasma coverage - per ID, appreciate recs - thus far 18 days ABX   - no wheeze on exam. cont tamiflu x 5 days - last day, duonebs prn and aerobika for airway clearance  - prednisone dc'd completed 10 days of steroids    - Symbicort & Spiriva added for asthma maintenance now that pt is off systemic steroids and standing duonebs    - cont with aerobika for cough assist/secretion mobilization  - cont antitussives tessalon perles, hycodan  - cont xarelto for underlying hx of PE  - will need outpatient pulmonary follow up. She sees Dr. García in [facility] and should follow up with him.      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 as well as slurred speech, Seizure disorder, admitted with respiratory failure 2/2 mycoplasma pneumonia     Dx: acute hypoxic respiratory failure 2/2 mycoplasma pneumonia    - O2 titrated down to 2 L maintaining ( for comfort) > 92%, on RA o2 saturation > 90%   - O2 assess on ambulation (Room AIr ) sats remain ~91%- patient will NOT qualify for home O2   - CT chest consistent with multifocal pneumonia, repeat imaging with improvement   - Mycoplasma IgM positive, completed 7 days of azithromycin  - cefepime started 12/27 / doxycycline added 12/28  ( today is day 11) for further mycoplasma coverage - per ID, appreciate recs - thus far 18 days ABX   - no wheeze on exam. cont tamiflu x 5 days - last day, duonebs prn and aerobika for airway clearance  - prednisone dc'd completed 10 days of steroids    - Symbicort & Spiriva added for asthma maintenance now that pt is off systemic steroids and standing duonebs    - cont with aerobika for cough assist/secretion mobilization  - cont antitussives tessalon perles, hycodan  - cont xarelto for underlying hx of PE  - will need outpatient pulmonary follow up. She sees Dr. García in [facility] and should follow up with him.      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 as well as slurred speech, Seizure disorder, admitted with respiratory failure 2/2 mycoplasma pneumonia     Dx: acute hypoxic respiratory failure 2/2 mycoplasma pneumonia    - O2 titrated down to 2 L maintaining ( for comfort) > 92%, on RA o2 saturation > 90%   - O2 assess on ambulation (Room AIr ) sats remain ~91%- patient will NOT qualify for home O2   - CT chest consistent with multifocal pneumonia, repeat imaging with improvement   - Mycoplasma IgM positive, completed 7 days of azithromycin  - cefepime started 12/27 / doxycycline added 12/28  ( today is day 11) for further mycoplasma coverage - per ID, appreciate recs - thus far 18 days ABX   - no wheeze on exam. cont tamiflu x 5 days - last day, duonebs prn and aerobika for airway clearance  - prednisone dc'd completed 10 days of steroids    - Symbicort & Spiriva added for asthma maintenance now that pt is off systemic steroids and standing duonebs    - cont with aerobika for cough assist/secretion mobilization  - cont antitussives tessalon perles, hycodan  - cont xarelto for underlying hx of PE  - will need outpatient pulmonary follow up. She sees Dr. García in [facility] and should follow up with him.

## 2024-01-08 NOTE — PROGRESS NOTE ADULT - SUBJECTIVE AND OBJECTIVE BOX
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  Pt lethargic rousable, but confused, oriented to self and time; not to place or situation. Hx obtained from  at bedside. Per  last evening he helped pt to the restroom and a few minutes later when he  on her she was unresponsive.  reports pt seemed to open eyes at times, however was not speaking thus he called EMS. Pt remained same way till after arrival to the ED. Per , pt recently developed URI symptoms and was started on Doxy as well as Hycodan, Alprazolam and Fioricet. Of note pt already on Percocet, flexeril for chronic pain, Gabapentin for seizures per . All of which  believes pt took last evening.   Admitted with  CXR showed Multifocalpneumonia + mycoplasma PNA - completed course of ABX azithromycin, Then found to be + FLu A  now completed course of tamiflu     OBJECTIVE:  ===========  ICU Vital Signs Last 24 Hrs  T(C): 36.8 (08 Jan 2024 05:23), Max: 36.9 (07 Jan 2024 11:14)  T(F): 98.2 (08 Jan 2024 05:23), Max: 98.5 (07 Jan 2024 11:14)  HR: 101 (08 Jan 2024 05:23) (82 - 117)  BP: 105/67 (08 Jan 2024 05:23) (99/54 - 117/82)  RR: 18 (08 Jan 2024 05:23) (17 - 18)  SpO2: 96% (08 Jan 2024 05:23) (91% - 98%)    O2 Parameters below as of 08 Jan 2024 08:10  Patient On (Oxygen Delivery Method): nasal cannula, 2L      01-07 @ 07:01  -  01-08 @ 07:00  --------------------------------------------------------  IN: 1120 mL / OUT: 0 mL / NET: 1120 mL      CAPILLARY BLOOD GLUCOSE  POCT Blood Glucose.: 116 mg/dL (08 Jan 2024 07:41)      PHYSICAL EXAM:  ==============  GENERAL: NAD, well-groomed, well-developed  HEAD:  Atraumatic, Normocephalic  EYES: EOMI, PERRLA, conjunctiva and sclera clear  ENMT: No tonsillar erythema, exudates, or enlargement; Moist mucous membranes, Good dentition, No lesions  NECK: Supple, No JVD, Normal thyroid  NERVOUS SYSTEM:  Alert & Oriented X3, Good concentration; Motor Strength 5/5 B/L upper and lower extremities; DTRs 2+ intact and symmetric  CHEST/LUNG:   HEART: Regular rate and rhythm; No murmurs, rubs, or gallops  ABDOMEN: Soft, Nontender, Nondistended; Bowel sounds present  EXTREMITIES:  2+ Peripheral Pulses, No clubbing, cyanosis, or edema  LYMPH: No lymphadenopathy noted  SKIN: No rashes or lesions      HOSPITAL MEDICATIONS:  ======================  aspirin enteric coated 81 milliGRAM(s) Oral daily  rivaroxaban 20 milliGRAM(s) Oral with dinner  doxycycline IVPB      doxycycline IVPB 100 milliGRAM(s) IV Intermittent every 12 hours  oseltamivir 75 milliGRAM(s) Oral two times a day  verapamil  milliGRAM(s) Oral daily  atorvastatin 80 milliGRAM(s) Oral at bedtime  dextrose 50% Injectable 25 Gram(s) IV Push once  dextrose 50% Injectable 12.5 Gram(s) IV Push once  dextrose 50% Injectable 25 Gram(s) IV Push once  dextrose Oral Gel 15 Gram(s) Oral once PRN  glucagon  Injectable 1 milliGRAM(s) IntraMuscular once  insulin lispro (ADMELOG) corrective regimen sliding scale   SubCutaneous three times a day before meals  insulin lispro (ADMELOG) corrective regimen sliding scale   SubCutaneous at bedtime  albuterol    90 MICROgram(s) HFA Inhaler 1 Puff(s) Inhalation every 4 hours PRN  albuterol/ipratropium for Nebulization 3 milliLiter(s) Nebulizer every 6 hours PRN  budesonide 160 MICROgram(s)/formoterol 4.5 MICROgram(s) Inhaler 2 Puff(s) Inhalation two times a day  guaifenesin/dextromethorphan Oral Liquid 10 milliLiter(s) Oral every 6 hours PRN  hydrocodone/homatropine Syrup 5 milliLiter(s) Oral every 6 hours  montelukast 10 milliGRAM(s) Oral daily  tiotropium 2.5 MICROgram(s) Inhaler 2 Puff(s) Inhalation daily  acetaminophen     Tablet .. 650 milliGRAM(s) Oral every 6 hours PRN  celecoxib 200 milliGRAM(s) Oral daily  DULoxetine 60 milliGRAM(s) Oral daily  gabapentin 600 milliGRAM(s) Oral at bedtime  melatonin 3 milliGRAM(s) Oral at bedtime PRN  sucralfate 1 Gram(s) Oral every 6 hours  dextrose 5%. 1000 milliLiter(s) IV Continuous <Continuous>  dextrose 5%. 1000 milliLiter(s) IV Continuous <Continuous>  influenza   Vaccine 0.5 milliLiter(s) IntraMuscular once  sodium chloride 0.65% Nasal 2 Spray(s) Both Nostrils every 6 hours      LABS:  =====                          9.1    4.90  )-----------( 318      ( 07 Jan 2024 06:15 )             29.1     Hgb Trend: 9.1<--, 9.8<--, 9.1<--, 8.5<--, 8.1<--  01-07    143  |  107  |  19  ----------------------------<  97  3.1<L>   |  31  |  0.64    Ca    8.7      07 Jan 2024 06:15  Mg     1.7     01-07      Creatinine Trend: 0.64<--, 0.69<--, 0.69<--, 0.70<--, 0.67<--, 0.64<--    Procalcitonin, Serum: 0.24 ng/mL (12-25-23 @ 18:20)  Procalcitonin, Serum: 0.09 ng/mL (12-20-23 @ 07:01)    Urinalysis Basic - ( 07 Jan 2024 06:15 )    Color: x / Appearance: x / SG: x / pH: x  Gluc: 97 mg/dL / Ketone: x  / Bili: x / Urobili: x   Blood: x / Protein: x / Nitrite: x   Leuk Esterase: x / RBC: x / WBC x   Sq Epi: x / Non Sq Epi: x / Bacteria: x    MICROBIOLOGY:     Culture - Sputum  Source: .Sputum Sputum  Gram Stain (12-28-23 @ 22:02):    Few Squamous epithelial cells per low power field    Few polymorphonuclear leukocytes per low power field    Moderate Yeast per oil power field    Few Gram positive cocci in pairs per oil power field  Final Report (12-28-23 @ 22:02):    Normal Respiratory Valarie present    Culture - Blood  Source: .Blood Blood-Peripheral  Final Report (12-27-23 @ 22:01):    No growth at 5 days    Culture - Blood  Source: .Blood Blood-Peripheral  Final Report (12-27-23 @ 22:01):    No growth at 5 days    doxycycline IVPB    doxycycline IVPB 100 every 12 hours  oseltamivir 75 two times a day    Legionella Antigen, Urine: Negative (12-18-23 @ 07:50)    MRSA PCR Result.: NotDetec (12-18-23 @ 17:50)  Rapid RVP Result: Detected (01-06-24 @ 11:30)  Rapid RVP Result: NotDetec (12-25-23 @ 10:15)  Rapid RVP Result: NotDetec (12-21-23 @ 16:30)  Rapid RVP Result: NotDetec (12-17-23 @ 05:51)      RADIOLOGY:  ==========  < from: CT Neck Soft Tissue w/ IV Cont (01.01.24 @ 15:00) >  IMPRESSION: No mass orlymphadenopathy in the neck.   Straightening with   moderate to severe degenerative disc disease and spondylosis at C3-4   through C6-7 with loss of disc height and associated degenerative   endplate changes.  BILATERAL interstitial infiltrates with bronchiectasis   and honeycombing.    < end of copied text >  < from: CT Angio Chest PE Protocol w/ IV Cont (01.01.24 @ 15:00) >  IMPRESSION:  No pulmonary embolism through the level of the lobar pulmonary arteries.   Evaluation of more distal segmental and subsegmental pulmonary arteries   is limited by suboptimal contrast bolus timing.  Interval mild improvement of traction bronchiectasis and   peribronchovascular groundglass opacity and small consolidation in all 5   lobes.    < end of copied text >        PULMONARY:  ============    albuterol    90 MICROgram(s) HFA Inhaler 1 Inhalation every 4 hours PRN  albuterol/ipratropium for Nebulization 3 Nebulizer every 6 hours PRN  budesonide 160 MICROgram(s)/formoterol 4.5 MICROgram(s) Inhaler 2 Inhalation two times a day  guaifenesin/dextromethorphan Oral Liquid 10 Oral every 6 hours PRN  hydrocodone/homatropine Syrup 5 Oral every 6 hours  montelukast 10 Oral daily  tiotropium 2.5 MICROgram(s) Inhaler 2 Inhalation daily      CARDIAC:  ========  Summary:   1. Normal global left ventricular systolic function.   2. Left ventricular ejection fraction, by visual estimation, is 50 to   55%.   3. The left atrium is normal in size.   4. Elevated mean left atrial pressure.   5. Structurally normal mitral valve, with normal leaflet excursion.   6. Trace mitral valve regurgitation.   7. Normal trileaflet aortic valve with normal opening.   8. Structurally normal tricuspid valve, with normal leaflet excursion.   9. Mild-moderate tricuspid regurgitation.  10. Structurally normal pulmonic valve, with normal leaflet excursion.  11. Estimated pulmonary artery systolic pressure is 47.8 mmHg assuming a   right atrial pressure of 5 mmHg, which is consistent with mild pulmonary   hypertension.

## 2024-01-08 NOTE — PROGRESS NOTE ADULT - SUBJECTIVE AND OBJECTIVE BOX
Patient is a 63y old  Female who presents with a chief complaint of AMS, HCAP, Acute Hypoxic respiratory failure (2024 10:50)    INTERVAL HPI/OVERNIGHT EVENTS: NAEON    MEDICATIONS  (STANDING):  aspirin enteric coated 81 milliGRAM(s) Oral daily  atorvastatin 80 milliGRAM(s) Oral at bedtime  budesonide 160 MICROgram(s)/formoterol 4.5 MICROgram(s) Inhaler 2 Puff(s) Inhalation two times a day  celecoxib 200 milliGRAM(s) Oral daily  dextrose 5%. 1000 milliLiter(s) (100 mL/Hr) IV Continuous <Continuous>  dextrose 5%. 1000 milliLiter(s) (50 mL/Hr) IV Continuous <Continuous>  dextrose 50% Injectable 25 Gram(s) IV Push once  dextrose 50% Injectable 12.5 Gram(s) IV Push once  dextrose 50% Injectable 25 Gram(s) IV Push once  doxycycline IVPB      doxycycline IVPB 100 milliGRAM(s) IV Intermittent every 12 hours  DULoxetine 60 milliGRAM(s) Oral daily  gabapentin 600 milliGRAM(s) Oral at bedtime  glucagon  Injectable 1 milliGRAM(s) IntraMuscular once  hydrocodone/homatropine Syrup 5 milliLiter(s) Oral every 6 hours  influenza   Vaccine 0.5 milliLiter(s) IntraMuscular once  insulin lispro (ADMELOG) corrective regimen sliding scale   SubCutaneous three times a day before meals  insulin lispro (ADMELOG) corrective regimen sliding scale   SubCutaneous at bedtime  montelukast 10 milliGRAM(s) Oral daily  oseltamivir 75 milliGRAM(s) Oral two times a day  rivaroxaban 20 milliGRAM(s) Oral with dinner  sodium chloride 0.65% Nasal 2 Spray(s) Both Nostrils every 6 hours  sucralfate 1 Gram(s) Oral every 6 hours  tiotropium 2.5 MICROgram(s) Inhaler 2 Puff(s) Inhalation daily  verapamil  milliGRAM(s) Oral daily    MEDICATIONS  (PRN):  acetaminophen     Tablet .. 650 milliGRAM(s) Oral every 6 hours PRN Temp greater or equal to 38C (100.4F), Mild Pain (1 - 3)  albuterol    90 MICROgram(s) HFA Inhaler 1 Puff(s) Inhalation every 4 hours PRN Shortness of Breath and/or Wheezing  albuterol/ipratropium for Nebulization 3 milliLiter(s) Nebulizer every 6 hours PRN Shortness of Breath and/or Wheezing  dextrose Oral Gel 15 Gram(s) Oral once PRN Blood Glucose LESS THAN 70 milliGRAM(s)/deciliter  guaifenesin/dextromethorphan Oral Liquid 10 milliLiter(s) Oral every 6 hours PRN Cough  melatonin 3 milliGRAM(s) Oral at bedtime PRN Insomnia    Allergies    No Known Allergies    Intolerances    dislikes Glucerna Shake (Other)    REVIEW OF SYSTEMS:  All other systems reviewed and are negative    Vital Signs Last 24 Hrs  T(C): 36.7 (2024 11:03), Max: 36.9 (2024 17:17)  T(F): 98 (2024 11:03), Max: 98.4 (2024 17:17)  HR: 102 (2024 11:03) (91 - 117)  BP: 114/72 (2024 11:03) (105/67 - 117/82)  BP(mean): --  RR: 18 (2024 11:03) (18 - 18)  SpO2: 98% (2024 11:03) (91% - 98%)    Parameters below as of 2024 08:10  Patient On (Oxygen Delivery Method): nasal cannula, 2L      Daily     Daily Weight in k.3 (2024 05:23)  I&O's Summary    2024 07:01  -  2024 07:00  --------------------------------------------------------  IN: 1120 mL / OUT: 0 mL / NET: 1120 mL      CAPILLARY BLOOD GLUCOSE      POCT Blood Glucose.: 116 mg/dL (2024 07:41)  POCT Blood Glucose.: 105 mg/dL (2024 23:41)  POCT Blood Glucose.: 115 mg/dL (2024 16:20)    PHYSICAL EXAM:  GENERAL: NAD,    HEAD:  Atraumatic, Normocephalic  EYES: EOMI, PERRLA, conjunctiva and sclera clear  ENMT: No tonsillar erythema, exudates, or enlargement; Moist mucous membranes, Good dentition, No lesions  NECK: Supple, No JVD, Normal thyroid  NERVOUS SYSTEM:  Alert & Oriented X3, Good concentration; Motor Strength 5/5 B/L upper and lower extremities; DTRs 2+ intact and symmetric  CHEST/LUNG: Clear to percussion bilaterally; No rales, rhonchi, wheezing, or rubs  HEART: Regular rate and rhythm; No murmurs, rubs, or gallops  ABDOMEN: Soft, Nontender, Nondistended; Bowel sounds present  EXTREMITIES:  2+ Peripheral Pulses, No clubbing, cyanosis, or edema  LYMPH: No lymphadenopathy noted  SKIN: No rashes or lesions    Labs                          9.1    4.90  )-----------( 318      ( 2024 06:15 )             29.1     01-07    143  |  107  |  19  ----------------------------<  97  3.1<L>   |  31  |  0.64    Ca    8.7      2024 06:15  Mg     1.7     01-07            Urinalysis Basic - ( 2024 06:15 )    Color: x / Appearance: x / SG: x / pH: x  Gluc: 97 mg/dL / Ketone: x  / Bili: x / Urobili: x   Blood: x / Protein: x / Nitrite: x   Leuk Esterase: x / RBC: x / WBC x   Sq Epi: x / Non Sq Epi: x / Bacteria: x                  DVT prophylaxis: > Lovenox 40mg SQ daily  > Heparin   > SCD's

## 2024-01-09 LAB
GLUCOSE BLDC GLUCOMTR-MCNC: 128 MG/DL — HIGH (ref 70–99)
GLUCOSE BLDC GLUCOMTR-MCNC: 128 MG/DL — HIGH (ref 70–99)
GLUCOSE BLDC GLUCOMTR-MCNC: 133 MG/DL — HIGH (ref 70–99)
GLUCOSE BLDC GLUCOMTR-MCNC: 133 MG/DL — HIGH (ref 70–99)
GLUCOSE BLDC GLUCOMTR-MCNC: 182 MG/DL — HIGH (ref 70–99)
GLUCOSE BLDC GLUCOMTR-MCNC: 182 MG/DL — HIGH (ref 70–99)
GLUCOSE BLDC GLUCOMTR-MCNC: 214 MG/DL — HIGH (ref 70–99)
GLUCOSE BLDC GLUCOMTR-MCNC: 214 MG/DL — HIGH (ref 70–99)

## 2024-01-09 PROCEDURE — 99232 SBSQ HOSP IP/OBS MODERATE 35: CPT

## 2024-01-09 RX ORDER — IPRATROPIUM/ALBUTEROL SULFATE 18-103MCG
3 AEROSOL WITH ADAPTER (GRAM) INHALATION EVERY 6 HOURS
Refills: 0 | Status: DISCONTINUED | OUTPATIENT
Start: 2024-01-09 | End: 2024-01-10

## 2024-01-09 RX ADMIN — Medication 100 MILLIGRAM(S): at 05:31

## 2024-01-09 RX ADMIN — GABAPENTIN 600 MILLIGRAM(S): 400 CAPSULE ORAL at 23:43

## 2024-01-09 RX ADMIN — Medication 2 SPRAY(S): at 06:00

## 2024-01-09 RX ADMIN — RIVAROXABAN 20 MILLIGRAM(S): KIT at 17:47

## 2024-01-09 RX ADMIN — Medication 81 MILLIGRAM(S): at 11:19

## 2024-01-09 RX ADMIN — ATORVASTATIN CALCIUM 80 MILLIGRAM(S): 80 TABLET, FILM COATED ORAL at 23:43

## 2024-01-09 RX ADMIN — CELECOXIB 200 MILLIGRAM(S): 200 CAPSULE ORAL at 11:19

## 2024-01-09 RX ADMIN — Medication 2 SPRAY(S): at 11:16

## 2024-01-09 RX ADMIN — Medication 1 GRAM(S): at 11:17

## 2024-01-09 RX ADMIN — BUDESONIDE AND FORMOTEROL FUMARATE DIHYDRATE 2 PUFF(S): 160; 4.5 AEROSOL RESPIRATORY (INHALATION) at 05:35

## 2024-01-09 RX ADMIN — Medication 240 MILLIGRAM(S): at 05:31

## 2024-01-09 RX ADMIN — Medication 3 MILLILITER(S): at 06:39

## 2024-01-09 RX ADMIN — Medication 100 MILLIGRAM(S): at 17:57

## 2024-01-09 RX ADMIN — Medication 1 GRAM(S): at 17:46

## 2024-01-09 RX ADMIN — Medication 2 SPRAY(S): at 17:47

## 2024-01-09 RX ADMIN — Medication 2 SPRAY(S): at 23:46

## 2024-01-09 RX ADMIN — Medication 3 MILLILITER(S): at 23:36

## 2024-01-09 RX ADMIN — BUDESONIDE AND FORMOTEROL FUMARATE DIHYDRATE 2 PUFF(S): 160; 4.5 AEROSOL RESPIRATORY (INHALATION) at 17:48

## 2024-01-09 RX ADMIN — Medication 2: at 11:24

## 2024-01-09 RX ADMIN — Medication 1 GRAM(S): at 00:14

## 2024-01-09 RX ADMIN — Medication 3 MILLIGRAM(S): at 23:43

## 2024-01-09 RX ADMIN — Medication 1 GRAM(S): at 05:30

## 2024-01-09 RX ADMIN — Medication 40 MILLIGRAM(S): at 05:30

## 2024-01-09 RX ADMIN — Medication 2 SPRAY(S): at 00:14

## 2024-01-09 RX ADMIN — DULOXETINE HYDROCHLORIDE 60 MILLIGRAM(S): 30 CAPSULE, DELAYED RELEASE ORAL at 11:23

## 2024-01-09 RX ADMIN — Medication 3 MILLILITER(S): at 17:29

## 2024-01-09 RX ADMIN — Medication 1: at 17:49

## 2024-01-09 RX ADMIN — TIOTROPIUM BROMIDE 2 PUFF(S): 18 CAPSULE ORAL; RESPIRATORY (INHALATION) at 11:21

## 2024-01-09 RX ADMIN — MONTELUKAST 10 MILLIGRAM(S): 4 TABLET, CHEWABLE ORAL at 11:20

## 2024-01-09 RX ADMIN — Medication 1 GRAM(S): at 23:43

## 2024-01-09 NOTE — PROGRESS NOTE ADULT - SUBJECTIVE AND OBJECTIVE BOX
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  Pt lethargic rousable, but confused, oriented to self and time; not to place or situation. Hx obtained from  at bedside. Per  last evening he helped pt to the restroom and a few minutes later when he  on her she was unresponsive.  reports pt seemed to open eyes at times, however was not speaking thus he called EMS. Pt remained same way till after arrival to the ED. Per , pt recently developed URI symptoms and was started on Doxy as well as Hycodan, Alprazolam and Fioricet. Of note pt already on Percocet, flexeril for chronic pain, Gabapentin for seizures per . All of which  believes pt took last evening.   Admitted with  CXR showed Multifocalpneumonia + mycoplasma PNA - completed course of ABX azithromycin, Then found to be + FLu A  now completed course of tamiflu     OBJECTIVE:  ===========  ICU Vital Signs Last 24 Hrs  T(C): 36.8 (09 Jan 2024 10:43), Max: 37 (08 Jan 2024 15:35)  T(F): 98.2 (09 Jan 2024 10:43), Max: 98.6 (08 Jan 2024 15:35)  HR: 113 (09 Jan 2024 10:43) (89 - 113)  BP: 130/82 (09 Jan 2024 10:43) (122/72 - 135/52)  RR: 19 (09 Jan 2024 10:43) (18 - 19)  SpO2: 97% (09 Jan 2024 10:43) (94% - 97%)    O2 Parameters below as of 09 Jan 2024 10:43  Patient On (Oxygen Delivery Method): room air      01-09 @ 07:01  -  01-09 @ 11:34  --------------------------------------------------------  IN: 320 mL / OUT: 0 mL / NET: 320 mL      CAPILLARY BLOOD GLUCOSE  POCT Blood Glucose.: 214 mg/dL (09 Jan 2024 11:19)      PHYSICAL EXAM:  ==============  GENERAL: NAD, well-groomed, well-developed  HEAD:  Atraumatic, Normocephalic  EYES: EOMI, PERRLA, conjunctiva and sclera clear  ENMT: No tonsillar erythema, exudates, or enlargement; Moist mucous membranes, Good dentition, No lesions  NECK: Supple, No JVD, Normal thyroid  NERVOUS SYSTEM:  Alert & Oriented X3, Good concentration; Motor Strength 5/5 B/L upper and lower extremities; DTRs 2+ intact and symmetric  CHEST/LUNG: Clear to percussion bilaterally; No rales, rhonchi, wheezing, or rubs  HEART: Regular rate and rhythm; No murmurs, rubs, or gallops  ABDOMEN: Soft, Nontender, Nondistended; Bowel sounds present  EXTREMITIES:  2+ Peripheral Pulses, No clubbing, cyanosis, or edema  LYMPH: No lymphadenopathy noted  SKIN: No rashes or lesions      HOSPITAL MEDICATIONS:  ======================  aspirin enteric coated 81 milliGRAM(s) Oral daily  rivaroxaban 20 milliGRAM(s) Oral with dinner  doxycycline monohydrate Capsule 100 milliGRAM(s) Oral every 12 hours  verapamil  milliGRAM(s) Oral daily  atorvastatin 80 milliGRAM(s) Oral at bedtime  dextrose 50% Injectable 25 Gram(s) IV Push once  dextrose 50% Injectable 12.5 Gram(s) IV Push once  dextrose 50% Injectable 25 Gram(s) IV Push once  dextrose Oral Gel 15 Gram(s) Oral once PRN  glucagon  Injectable 1 milliGRAM(s) IntraMuscular once  insulin lispro (ADMELOG) corrective regimen sliding scale   SubCutaneous three times a day before meals  insulin lispro (ADMELOG) corrective regimen sliding scale   SubCutaneous at bedtime  predniSONE   Tablet 40 milliGRAM(s) Oral daily  albuterol    90 MICROgram(s) HFA Inhaler 1 Puff(s) Inhalation every 4 hours PRN  albuterol/ipratropium for Nebulization 3 milliLiter(s) Nebulizer every 6 hours PRN  albuterol/ipratropium for Nebulization 3 milliLiter(s) Nebulizer every 8 hours  budesonide 160 MICROgram(s)/formoterol 4.5 MICROgram(s) Inhaler 2 Puff(s) Inhalation two times a day  guaifenesin/dextromethorphan Oral Liquid 10 milliLiter(s) Oral every 6 hours PRN  hydrocodone/homatropine Syrup 5 milliLiter(s) Oral every 6 hours  montelukast 10 milliGRAM(s) Oral daily  tiotropium 2.5 MICROgram(s) Inhaler 2 Puff(s) Inhalation daily  acetaminophen     Tablet .. 650 milliGRAM(s) Oral every 6 hours PRN  celecoxib 200 milliGRAM(s) Oral daily  DULoxetine 60 milliGRAM(s) Oral daily  gabapentin 600 milliGRAM(s) Oral at bedtime  melatonin 3 milliGRAM(s) Oral at bedtime PRN  sucralfate 1 Gram(s) Oral every 6 hours  dextrose 5%. 1000 milliLiter(s) IV Continuous <Continuous>  dextrose 5%. 1000 milliLiter(s) IV Continuous <Continuous>  influenza   Vaccine 0.5 milliLiter(s) IntraMuscular once  sodium chloride 0.65% Nasal 2 Spray(s) Both Nostrils every 6 hours        LABS:  =====    Hgb Trend: 9.1<--, 9.8<--, 9.1<--, 8.5<--  Creatinine Trend: 0.64<--, 0.69<--, 0.69<--, 0.70<--, 0.67<--, 0.64<--  Procalcitonin, Serum: 0.24 ng/mL (12-25-23 @ 18:20)  Procalcitonin, Serum: 0.09 ng/mL (12-20-23 @ 07:01)      MICROBIOLOGY:     Culture - Sputum  Source: .Sputum Sputum  Gram Stain (12-28-23 @ 22:02):    Few Squamous epithelial cells per low power field    Few polymorphonuclear leukocytes per low power field    Moderate Yeast per oil power field    Few Gram positive cocci in pairs per oil power field  Final Report (12-28-23 @ 22:02):    Normal Respiratory Valarie present    Culture - Blood  Source: .Blood Blood-Peripheral  Final Report (12-27-23 @ 22:01):    No growth at 5 days    Culture - Blood  Source: .Blood Blood-Peripheral  Final Report (12-27-23 @ 22:01):    No growth at 5 days    doxycycline monohydrate Capsule 100 every 12 hours    Legionella Antigen, Urine: Negative (12-18-23 @ 07:50)    MRSA PCR Result.: NotDetec (12-18-23 @ 17:50)    Rapid RVP Result: Detected (01-06-24 @ 11:30)  Rapid RVP Result: NotDetec (12-25-23 @ 10:15)  Rapid RVP Result: NotDetec (12-21-23 @ 16:30)  Rapid RVP Result: NotDetec (12-17-23 @ 05:51)      RADIOLOGY:  ==========  < from: CT Neck Soft Tissue w/ IV Cont (01.01.24 @ 15:00) >  IMPRESSION: No mass orlymphadenopathy in the neck.   Straightening with   moderate to severe degenerative disc disease and spondylosis at C3-4   through C6-7 with loss of disc height and associated degenerative   endplate changes.  BILATERAL interstitial infiltrates with bronchiectasis   and honeycombing.    < end of copied text >  < from: CT Angio Chest PE Protocol w/ IV Cont (01.01.24 @ 15:00) >  IMPRESSION:  No pulmonary embolism through the level of the lobar pulmonary arteries.   Evaluation of more distal segmental and subsegmental pulmonary arteries   is limited by suboptimal contrast bolus timing.  Interval mild improvement of traction bronchiectasis and   peribronchovascular groundglass opacity and small consolidation in all 5   lobes.    < end of copied text >    PULMONARY:  ============    albuterol    90 MICROgram(s) HFA Inhaler 1 Inhalation every 4 hours PRN  albuterol/ipratropium for Nebulization 3 Nebulizer every 6 hours PRN  albuterol/ipratropium for Nebulization 3 Nebulizer every 8 hours  budesonide 160 MICROgram(s)/formoterol 4.5 MICROgram(s) Inhaler 2 Inhalation two times a day  guaifenesin/dextromethorphan Oral Liquid 10 Oral every 6 hours PRN  hydrocodone/homatropine Syrup 5 Oral every 6 hours  montelukast 10 Oral daily  tiotropium 2.5 MICROgram(s) Inhaler 2 Inhalation daily      CARDIAC:  ========  Summary:   1. Normal global left ventricular systolic function.   2. Left ventricular ejection fraction, by visual estimation, is 50 to   55%.   3. The left atrium is normal in size.   4. Elevated mean left atrial pressure.   5. Structurally normal mitral valve, with normal leaflet excursion.   6. Trace mitral valve regurgitation.   7. Normal trileaflet aortic valve with normal opening.   8. Structurally normal tricuspid valve, with normal leaflet excursion.   9. Mild-moderate tricuspid regurgitation.  10. Structurally normal pulmonic valve, with normal leaflet excursion.  11. Estimated pulmonary artery systolic pressure is 47.8 mmHg assuming a   right atrial pressure of 5 mmHg, which is consistent with mild pulmonary   hypertension.   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  Pt lethargic rousable, but confused, oriented to self and time; not to place or situation. Hx obtained from  at bedside. Per  last evening he helped pt to the restroom and a few minutes later when he  on her she was unresponsive.  reports pt seemed to open eyes at times, however was not speaking thus he called EMS. Pt remained same way till after arrival to the ED. Per , pt recently developed URI symptoms and was started on Doxy as well as Hycodan, Alprazolam and Fioricet. Of note pt already on Percocet, flexeril for chronic pain, Gabapentin for seizures per . All of which  believes pt took last evening.   Admitted with  CXR showed Multifocalpneumonia + mycoplasma PNA - completed course of ABX azithromycin, Then found to be + FLu A  now completed course of tamiflu     OBJECTIVE:  ===========  Vital Signs Last 24 Hrs  T(C): 36.8 (09 Jan 2024 10:43), Max: 37 (08 Jan 2024 15:35)  T(F): 98.2 (09 Jan 2024 10:43), Max: 98.6 (08 Jan 2024 15:35)  HR: 113 (09 Jan 2024 10:43) (89 - 113)  BP: 130/82 (09 Jan 2024 10:43) (122/72 - 135/52)  RR: 19 (09 Jan 2024 10:43) (18 - 19)  SpO2: 97% (09 Jan 2024 10:43) (94% - 97%)    O2 Parameters below as of 09 Jan 2024 10:43  Patient On (Oxygen Delivery Method): room air      CAPILLARY BLOOD GLUCOSE  POCT Blood Glucose.: 214 mg/dL (09 Jan 2024 11:19)      PHYSICAL EXAM:  ==============  GENERAL: NAD, well-groomed, well-developed, coughing when speaking  HEAD:  Atraumatic, Normocephalic  EYES: EOMI, PERRLA, conjunctiva and sclera clear  ENMT: No tonsillar erythema, exudates, or enlargement; Moist mucous membranes, Good dentition, No lesions, no thrush  NECK: Supple, No JVD, Normal thyroid  NERVOUS SYSTEM:  Alert & Oriented X3, Good concentration; moving all extremities spontaneously  CHEST/LUNG: Clear to auscultation bilaterally when not coughing; slight wheeze with coughing  HEART: Regular rate and rhythm; No murmurs, rubs, or gallops  ABDOMEN: Soft, Nontender, Nondistended; Bowel sounds present  EXTREMITIES:  2+ Peripheral Pulses, No clubbing, cyanosis, or edema  LYMPH: No lymphadenopathy noted  SKIN: No rashes or lesions      HOSPITAL MEDICATIONS:  ======================  aspirin enteric coated 81 milliGRAM(s) Oral daily  rivaroxaban 20 milliGRAM(s) Oral with dinner  doxycycline monohydrate Capsule 100 milliGRAM(s) Oral every 12 hours  verapamil  milliGRAM(s) Oral daily  atorvastatin 80 milliGRAM(s) Oral at bedtime  dextrose 50% Injectable 25 Gram(s) IV Push once  dextrose 50% Injectable 12.5 Gram(s) IV Push once  dextrose 50% Injectable 25 Gram(s) IV Push once  dextrose Oral Gel 15 Gram(s) Oral once PRN  glucagon  Injectable 1 milliGRAM(s) IntraMuscular once  insulin lispro (ADMELOG) corrective regimen sliding scale   SubCutaneous three times a day before meals  insulin lispro (ADMELOG) corrective regimen sliding scale   SubCutaneous at bedtime  predniSONE   Tablet 40 milliGRAM(s) Oral daily  albuterol    90 MICROgram(s) HFA Inhaler 1 Puff(s) Inhalation every 4 hours PRN  albuterol/ipratropium for Nebulization 3 milliLiter(s) Nebulizer every 6 hours PRN  albuterol/ipratropium for Nebulization 3 milliLiter(s) Nebulizer every 8 hours  budesonide 160 MICROgram(s)/formoterol 4.5 MICROgram(s) Inhaler 2 Puff(s) Inhalation two times a day  guaifenesin/dextromethorphan Oral Liquid 10 milliLiter(s) Oral every 6 hours PRN  hydrocodone/homatropine Syrup 5 milliLiter(s) Oral every 6 hours  montelukast 10 milliGRAM(s) Oral daily  tiotropium 2.5 MICROgram(s) Inhaler 2 Puff(s) Inhalation daily  acetaminophen     Tablet .. 650 milliGRAM(s) Oral every 6 hours PRN  celecoxib 200 milliGRAM(s) Oral daily  DULoxetine 60 milliGRAM(s) Oral daily  gabapentin 600 milliGRAM(s) Oral at bedtime  melatonin 3 milliGRAM(s) Oral at bedtime PRN  sucralfate 1 Gram(s) Oral every 6 hours  dextrose 5%. 1000 milliLiter(s) IV Continuous <Continuous>  dextrose 5%. 1000 milliLiter(s) IV Continuous <Continuous>  influenza   Vaccine 0.5 milliLiter(s) IntraMuscular once  sodium chloride 0.65% Nasal 2 Spray(s) Both Nostrils every 6 hours        LABS:  =====    Hgb Trend: 9.1<--, 9.8<--, 9.1<--, 8.5<--  Creatinine Trend: 0.64<--, 0.69<--, 0.69<--, 0.70<--, 0.67<--, 0.64<--  Procalcitonin, Serum: 0.24 ng/mL (12-25-23 @ 18:20)  Procalcitonin, Serum: 0.09 ng/mL (12-20-23 @ 07:01)      MICROBIOLOGY:     Culture - Sputum  Source: .Sputum Sputum  Gram Stain (12-28-23 @ 22:02):    Few Squamous epithelial cells per low power field    Few polymorphonuclear leukocytes per low power field    Moderate Yeast per oil power field    Few Gram positive cocci in pairs per oil power field  Final Report (12-28-23 @ 22:02):    Normal Respiratory Valarie present    Culture - Blood  Source: .Blood Blood-Peripheral  Final Report (12-27-23 @ 22:01):    No growth at 5 days    Culture - Blood  Source: .Blood Blood-Peripheral  Final Report (12-27-23 @ 22:01):    No growth at 5 days    doxycycline monohydrate Capsule 100 every 12 hours    Legionella Antigen, Urine: Negative (12-18-23 @ 07:50)    MRSA PCR Result.: NotDetec (12-18-23 @ 17:50)    Rapid RVP Result: Detected (01-06-24 @ 11:30)  Rapid RVP Result: NotDetec (12-25-23 @ 10:15)  Rapid RVP Result: NotDetec (12-21-23 @ 16:30)  Rapid RVP Result: NotDetec (12-17-23 @ 05:51)      RADIOLOGY:  ==========  < from: CT Neck Soft Tissue w/ IV Cont (01.01.24 @ 15:00) >  IMPRESSION: No mass orlymphadenopathy in the neck.   Straightening with   moderate to severe degenerative disc disease and spondylosis at C3-4   through C6-7 with loss of disc height and associated degenerative   endplate changes.  BILATERAL interstitial infiltrates with bronchiectasis   and honeycombing.      < from: CT Angio Chest PE Protocol w/ IV Cont (01.01.24 @ 15:00) >  IMPRESSION:  No pulmonary embolism through the level of the lobar pulmonary arteries.   Evaluation of more distal segmental and subsegmental pulmonary arteries   is limited by suboptimal contrast bolus timing.  Interval mild improvement of traction bronchiectasis and   peribronchovascular groundglass opacity and small consolidation in all 5   lobes.        PULMONARY:  ============    albuterol    90 MICROgram(s) HFA Inhaler 1 Inhalation every 4 hours PRN  albuterol/ipratropium for Nebulization 3 Nebulizer every 6 hours PRN  albuterol/ipratropium for Nebulization 3 Nebulizer every 8 hours  budesonide 160 MICROgram(s)/formoterol 4.5 MICROgram(s) Inhaler 2 Inhalation two times a day  guaifenesin/dextromethorphan Oral Liquid 10 Oral every 6 hours PRN  hydrocodone/homatropine Syrup 5 Oral every 6 hours  montelukast 10 Oral daily  tiotropium 2.5 MICROgram(s) Inhaler 2 Inhalation daily      CARDIAC:  ========  Summary:   1. Normal global left ventricular systolic function.   2. Left ventricular ejection fraction, by visual estimation, is 50 to 55%.   3. The left atrium is normal in size.   4. Elevated mean left atrial pressure.   5. Structurally normal mitral valve, with normal leaflet excursion.   6. Trace mitral valve regurgitation.   7. Normal trileaflet aortic valve with normal opening.   8. Structurally normal tricuspid valve, with normal leaflet excursion.   9. Mild-moderate tricuspid regurgitation.  10. Structurally normal pulmonic valve, with normal leaflet excursion.  11. Estimated pulmonary artery systolic pressure is 47.8 mmHg assuming a   right atrial pressure of 5 mmHg, which is consistent with mild pulmonary   hypertension.   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  Pt lethargic rousable, but confused, oriented to self and time; not to place or situation. Hx obtained from  at bedside. Per  last evening he helped pt to the restroom and a few minutes later when he  on her she was unresponsive.  reports pt seemed to open eyes at times, however was not speaking thus he called EMS. Pt remained same way till after arrival to the ED. Per , pt recently developed URI symptoms and was started on Doxy as well as Hycodan, Alprazolam and Fioricet. Of note pt already on Percocet, flexeril for chronic pain, Gabapentin for seizures per . All of which  believes pt took last evening.   Admitted with  CXR showed Multifocalpneumonia + mycoplasma PNA - completed course of ABX azithromycin, Then found to be + FLu A  now completed course of tamiflu     OBJECTIVE:  ===========  Vital Signs Last 24 Hrs  T(C): 36.8 (09 Jan 2024 10:43), Max: 37 (08 Jan 2024 15:35)  T(F): 98.2 (09 Jan 2024 10:43), Max: 98.6 (08 Jan 2024 15:35)  HR: 113 (09 Jan 2024 10:43) (89 - 113)  BP: 130/82 (09 Jan 2024 10:43) (122/72 - 135/52)  RR: 19 (09 Jan 2024 10:43) (18 - 19)  SpO2: 97% (09 Jan 2024 10:43) (94% - 97%)    O2 Parameters below as of 09 Jan 2024 10:43  Patient On (Oxygen Delivery Method): room air      CAPILLARY BLOOD GLUCOSE  POCT Blood Glucose.: 214 mg/dL (09 Jan 2024 11:19)      PHYSICAL EXAM:  ==============  GENERAL: NAD, well-groomed, well-developed, coughing when speaking  HEAD:  Atraumatic, Normocephalic  EYES: EOMI, PERRLA, conjunctiva and sclera clear  ENMT: No tonsillar erythema, exudates, or enlargement; Moist mucous membranes, Good dentition, No lesions, no thrush  NECK: Supple, No JVD, Normal thyroid  NERVOUS SYSTEM:  Alert & Oriented X3, Good concentration; moving all extremities spontaneously  CHEST/LUNG: Clear to auscultation bilaterally when not coughing; slight wheeze with coughing  HEART: Regular rate and rhythm; No murmurs, rubs, or gallops  ABDOMEN: Soft, Nontender, Nondistended; Bowel sounds present  EXTREMITIES:  2+ Peripheral Pulses, No clubbing, cyanosis, or edema  LYMPH: No lymphadenopathy noted  SKIN: No rashes or lesions      HOSPITAL MEDICATIONS:  ======================  aspirin enteric coated 81 milliGRAM(s) Oral daily  rivaroxaban 20 milliGRAM(s) Oral with dinner  doxycycline monohydrate Capsule 100 milliGRAM(s) Oral every 12 hours  verapamil  milliGRAM(s) Oral daily  atorvastatin 80 milliGRAM(s) Oral at bedtime  dextrose 50% Injectable 25 Gram(s) IV Push once  dextrose 50% Injectable 12.5 Gram(s) IV Push once  dextrose 50% Injectable 25 Gram(s) IV Push once  dextrose Oral Gel 15 Gram(s) Oral once PRN  glucagon  Injectable 1 milliGRAM(s) IntraMuscular once  insulin lispro (ADMELOG) corrective regimen sliding scale   SubCutaneous three times a day before meals  insulin lispro (ADMELOG) corrective regimen sliding scale   SubCutaneous at bedtime  predniSONE   Tablet 40 milliGRAM(s) Oral daily  albuterol    90 MICROgram(s) HFA Inhaler 1 Puff(s) Inhalation every 4 hours PRN  albuterol/ipratropium for Nebulization 3 milliLiter(s) Nebulizer every 6 hours PRN  albuterol/ipratropium for Nebulization 3 milliLiter(s) Nebulizer every 8 hours  budesonide 160 MICROgram(s)/formoterol 4.5 MICROgram(s) Inhaler 2 Puff(s) Inhalation two times a day  guaifenesin/dextromethorphan Oral Liquid 10 milliLiter(s) Oral every 6 hours PRN  hydrocodone/homatropine Syrup 5 milliLiter(s) Oral every 6 hours  montelukast 10 milliGRAM(s) Oral daily  tiotropium 2.5 MICROgram(s) Inhaler 2 Puff(s) Inhalation daily  acetaminophen     Tablet .. 650 milliGRAM(s) Oral every 6 hours PRN  celecoxib 200 milliGRAM(s) Oral daily  DULoxetine 60 milliGRAM(s) Oral daily  gabapentin 600 milliGRAM(s) Oral at bedtime  melatonin 3 milliGRAM(s) Oral at bedtime PRN  sucralfate 1 Gram(s) Oral every 6 hours  dextrose 5%. 1000 milliLiter(s) IV Continuous <Continuous>  dextrose 5%. 1000 milliLiter(s) IV Continuous <Continuous>  influenza   Vaccine 0.5 milliLiter(s) IntraMuscular once  sodium chloride 0.65% Nasal 2 Spray(s) Both Nostrils every 6 hours        LABS:  =====    Hgb Trend: 9.1<--, 9.8<--, 9.1<--, 8.5<--  Creatinine Trend: 0.64<--, 0.69<--, 0.69<--, 0.70<--, 0.67<--, 0.64<--  Procalcitonin, Serum: 0.24 ng/mL (12-25-23 @ 18:20)  Procalcitonin, Serum: 0.09 ng/mL (12-20-23 @ 07:01)      MICROBIOLOGY:     Culture - Sputum  Source: .Sputum Sputum  Gram Stain (12-28-23 @ 22:02):    Few Squamous epithelial cells per low power field    Few polymorphonuclear leukocytes per low power field    Moderate Yeast per oil power field    Few Gram positive cocci in pairs per oil power field  Final Report (12-28-23 @ 22:02):    Normal Respiratory Valarie present    Culture - Blood  Source: .Blood Blood-Peripheral  Final Report (12-27-23 @ 22:01):    No growth at 5 days    Culture - Blood  Source: .Blood Blood-Peripheral  Final Report (12-27-23 @ 22:01):    No growth at 5 days    doxycycline monohydrate Capsule 100 every 12 hours    Legionella Antigen, Urine: Negative (12-18-23 @ 07:50)    MRSA PCR Result.: NotDetec (12-18-23 @ 17:50)    Rapid RVP Result: Detected (01-06-24 @ 11:30)  Rapid RVP Result: NotDetec (12-25-23 @ 10:15)  Rapid RVP Result: NotDetec (12-21-23 @ 16:30)  Rapid RVP Result: NotDetec (12-17-23 @ 05:51)      RADIOLOGY:  ==========  < from: CT Neck Soft Tissue w/ IV Cont (01.01.24 @ 15:00) >  IMPRESSION: No mass orlymphadenopathy in the neck.   Straightening with   moderate to severe degenerative disc disease and spondylosis at C3-4   through C6-7 with loss of disc height and associated degenerative   endplate changes.  BILATERAL interstitial infiltrates with bronchiectasis   and honeycombing.      < from: CT Angio Chest PE Protocol w/ IV Cont (01.01.24 @ 15:00) >  IMPRESSION:  No pulmonary embolism through the level of the lobar pulmonary arteries.   Evaluation of more distal segmental and subsegmental pulmonary arteries   is limited by suboptimal contrast bolus timing.  Interval mild improvement of traction bronchiectasis and   peribronchovascular groundglass opacity and small consolidation in all 5   lobes.        PULMONARY:  ============    albuterol    90 MICROgram(s) HFA Inhaler 1 Inhalation every 4 hours PRN  albuterol/ipratropium for Nebulization 3 Nebulizer every 6 hours PRN  albuterol/ipratropium for Nebulization 3 Nebulizer every 8 hours  budesonide 160 MICROgram(s)/formoterol 4.5 MICROgram(s) Inhaler 2 Inhalation two times a day  guaifenesin/dextromethorphan Oral Liquid 10 Oral every 6 hours PRN  hydrocodone/homatropine Syrup 5 Oral every 6 hours  montelukast 10 Oral daily  tiotropium 2.5 MICROgram(s) Inhaler 2 Inhalation daily      CARDIAC:  ========  Summary:   1. Normal global left ventricular systolic function.   2. Left ventricular ejection fraction, by visual estimation, is 50 to 55%.   3. The left atrium is normal in size.   4. Elevated mean left atrial pressure.   5. Structurally normal mitral valve, with normal leaflet excursion.   6. Trace mitral valve regurgitation.   7. Normal trileaflet aortic valve with normal opening.   8. Structurally normal tricuspid valve, with normal leaflet excursion.   9. Mild-moderate tricuspid regurgitation.  10. Structurally normal pulmonic valve, with normal leaflet excursion.  11. Estimated pulmonary artery systolic pressure is 47.8 mmHg assuming a   right atrial pressure of 5 mmHg, which is consistent with mild pulmonary   hypertension.

## 2024-01-09 NOTE — PROGRESS NOTE ADULT - SUBJECTIVE AND OBJECTIVE BOX
Patient is a 63y old  Female who presents with a chief complaint of AMS, HCAP, Acute Hypoxic respiratory failure (09 Jan 2024 11:33)    INTERVAL HPI/OVERNIGHT EVENTS: NAEON, Still significantly wheezing     MEDICATIONS  (STANDING):  albuterol/ipratropium for Nebulization 3 milliLiter(s) Nebulizer every 8 hours  aspirin enteric coated 81 milliGRAM(s) Oral daily  atorvastatin 80 milliGRAM(s) Oral at bedtime  budesonide 160 MICROgram(s)/formoterol 4.5 MICROgram(s) Inhaler 2 Puff(s) Inhalation two times a day  celecoxib 200 milliGRAM(s) Oral daily  dextrose 5%. 1000 milliLiter(s) (100 mL/Hr) IV Continuous <Continuous>  dextrose 5%. 1000 milliLiter(s) (50 mL/Hr) IV Continuous <Continuous>  dextrose 50% Injectable 25 Gram(s) IV Push once  dextrose 50% Injectable 12.5 Gram(s) IV Push once  dextrose 50% Injectable 25 Gram(s) IV Push once  doxycycline monohydrate Capsule 100 milliGRAM(s) Oral every 12 hours  DULoxetine 60 milliGRAM(s) Oral daily  gabapentin 600 milliGRAM(s) Oral at bedtime  glucagon  Injectable 1 milliGRAM(s) IntraMuscular once  hydrocodone/homatropine Syrup 5 milliLiter(s) Oral every 6 hours  influenza   Vaccine 0.5 milliLiter(s) IntraMuscular once  insulin lispro (ADMELOG) corrective regimen sliding scale   SubCutaneous three times a day before meals  insulin lispro (ADMELOG) corrective regimen sliding scale   SubCutaneous at bedtime  montelukast 10 milliGRAM(s) Oral daily  predniSONE   Tablet 40 milliGRAM(s) Oral daily  rivaroxaban 20 milliGRAM(s) Oral with dinner  sodium chloride 0.65% Nasal 2 Spray(s) Both Nostrils every 6 hours  sucralfate 1 Gram(s) Oral every 6 hours  tiotropium 2.5 MICROgram(s) Inhaler 2 Puff(s) Inhalation daily  verapamil  milliGRAM(s) Oral daily    MEDICATIONS  (PRN):  acetaminophen     Tablet .. 650 milliGRAM(s) Oral every 6 hours PRN Temp greater or equal to 38C (100.4F), Mild Pain (1 - 3)  albuterol    90 MICROgram(s) HFA Inhaler 1 Puff(s) Inhalation every 4 hours PRN Shortness of Breath and/or Wheezing  albuterol/ipratropium for Nebulization 3 milliLiter(s) Nebulizer every 6 hours PRN Shortness of Breath and/or Wheezing  dextrose Oral Gel 15 Gram(s) Oral once PRN Blood Glucose LESS THAN 70 milliGRAM(s)/deciliter  guaifenesin/dextromethorphan Oral Liquid 10 milliLiter(s) Oral every 6 hours PRN Cough  melatonin 3 milliGRAM(s) Oral at bedtime PRN Insomnia    Allergies    No Known Allergies    Intolerances    dislikes Glucerna Shake (Other)    REVIEW OF SYSTEMS:  All other systems reviewed and are negative    Vital Signs Last 24 Hrs  T(C): 36.8 (09 Jan 2024 10:43), Max: 37 (08 Jan 2024 15:35)  T(F): 98.2 (09 Jan 2024 10:43), Max: 98.6 (08 Jan 2024 15:35)  HR: 113 (09 Jan 2024 10:43) (89 - 113)  BP: 130/82 (09 Jan 2024 10:43) (122/72 - 135/52)  BP(mean): --  RR: 19 (09 Jan 2024 10:43) (18 - 19)  SpO2: 97% (09 Jan 2024 10:43) (94% - 97%)    Parameters below as of 09 Jan 2024 10:43  Patient On (Oxygen Delivery Method): room air      Daily     Daily   I&O's Summary    09 Jan 2024 07:01  -  09 Jan 2024 12:12  --------------------------------------------------------  IN: 320 mL / OUT: 0 mL / NET: 320 mL      CAPILLARY BLOOD GLUCOSE      POCT Blood Glucose.: 214 mg/dL (09 Jan 2024 11:19)  POCT Blood Glucose.: 128 mg/dL (09 Jan 2024 08:30)  POCT Blood Glucose.: 151 mg/dL (08 Jan 2024 20:54)  POCT Blood Glucose.: 118 mg/dL (08 Jan 2024 16:23)  POCT Blood Glucose.: 118 mg/dL (08 Jan 2024 12:41)    PHYSICAL EXAM:  GENERAL: NAD,    HEAD:  Atraumatic, Normocephalic  EYES: EOMI, PERRLA, conjunctiva and sclera clear  ENMT: No tonsillar erythema, exudates, or enlargement; Moist mucous membranes, Good dentition, No lesions  NECK: Supple, No JVD, Normal thyroid  NERVOUS SYSTEM:  Alert & Oriented X3, Good concentration; Motor Strength 5/5 B/L upper and lower extremities; DTRs 2+ intact and symmetric  CHEST/LUNG: Clear to percussion bilaterally; significant wheezing   HEART: Regular rate and rhythm; No murmurs, rubs, or gallops  ABDOMEN: Soft, Nontender, Nondistended; Bowel sounds present  EXTREMITIES:  2+ Peripheral Pulses, No clubbing, cyanosis, or edema  LYMPH: No lymphadenopathy noted  SKIN: No rashes or lesions    Labs                                DVT prophylaxis: > Lovenox 40mg SQ daily  > Heparin   > SCD's

## 2024-01-09 NOTE — PROGRESS NOTE ADULT - NS ATTEND AMEND GEN_ALL_CORE FT
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 Found to have bilateral diffuse ground glass opacities consistent with multifocal PNA on chest imaging. Tested + for Mycoplasma. Hypoxic necessitating increased O2 requirements during hospital course. Now weaned off O2. Hospital course complicated by exposure to COVID + roommate and is now + Flu A but COVID negative.    Dx: acute hypoxic respiratory failure, multifocal PNA due to mycoplasma pneumonia, acute asthma exacerbation in setting of bacterial PNA/viral infection, influenza A      - CT chest 12/25 showing multifocal PNA with diffuse bilateral GGO. repeat CT chest non contrast performed 12/28 by primary team again with multifocal ground glass opacities consistent with PNA. would not anticipate drastic change in imaging of chest in such short duration of time  - sent for CTA chest 1/1/24 per primary team and CTA negative for PE, mild improvement in GGO, but with noted traction bronchiectasis  - would repeat CT chest in 6 weeks to assess for lung parenchyma improvement, chest imaging may take several weeks to clear  - she had CT chest done 7/2019 which did not have any findings of GGO/traction bronchiectasis. Findings on current imaging likely reflect her current/recent infection and PNA      - she completed an initial 7 day course of azithromycin for mycoplasma PNA but remained symptomatic  - seen by ID and placed on doxycycline to extend treatment for mycoplasma and started on cefepime for gram negative bacterial PNA / HCAP coverage, procalcitonin trended since admission.   - she completed 7 day course of cefepime on 1/3   - remains on doxycycline today is day #11 to complete 14 days of doxycycline per ID  - sputum cx with normal respiratory hossein  - completed 5 day course of tamiflu for flu A  - in hospital exposure to COVID roommate however has tested negative for COVID x2 post exposure.  - her flu A conversion likely due to + sick contact with family visiting who had flu. she reports her mother in law is now hospitalized with flu as well.    - had initially completed a course of steroids 12/25 - 1/4 for acute asthma exacerbation however with influenza A infection pt developed recurrent wheeze and is now placed back on systemic steroids prednisone 40mg daily  - would taper prednisone by 10mg every 3 days till off (prednisone 40mg x3 days, 20mg x3 days, 10mg x3 days, off)  - cont antitussives tessalon perles, hycodan for cough  - cont with ICS/LABA/LAMA combo symbicort + spiriva for underlying asthma/reactive airway     - goal to maintain O2 sat > 90%  - weaned off oxygen and doing well on RA  - incentive spirometer, OOB to chair  - cont xarelto for underlying hx of PE  - d/c planning with outpatient pulmonary follow up with her pulmonologist Dr. Tj García

## 2024-01-09 NOTE — PROGRESS NOTE ADULT - ASSESSMENT
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 as well as slurred speech, Seizure disorder, admitted with respiratory failure 2/2 myocplasma pneumonia     Dx: acute hypoxic respiratory failure 2/2 mycoplasma pneumonia    - O2 titrated down to 2 L maintaining ( for comfort) > 92%, on RA o2 saturation > 90%   - O2 assess on ambulation (Room AIr ) sats remain ~91%- patient will NOT qualify for home O2   - CT chest consistent with multifocal pneumonia, repeat imaging with improvement   - Mycoplasma IgM positive, completed 7 days of azithromycin  - cefepime started 12/27 / doxycycline added 12/28  ( today is day 12) for further mycoplasma coverage - per ID, appreciate recs - thus far 18 days ABX   - no wheeze on exam. cont tamiflu x 5 days - last day, duonebs prn and aerobika for airway clearance  - prednisone dc'd completed 10 days of steroids    - Symbicort & Spiriva added for asthma maintenance now that pt is off systemic steroids and standing duonebs    - cont with aerobika for cough assist/secretion mobilization  - cont antitussives tessalon perles, hycodan  - cont xarelto for underlying hx of PE  - will need outpatient pulmonary follow up. She sees Dr. García in [facility] and should follow up with him.      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 as well as slurred speech, Seizure disorder, admitted with respiratory failure 2/2 myocplasma pneumonia     Dx: acute hypoxic respiratory failure 2/2 mycoplasma pneumonia    - O2 titrated down to 2 L maintaining ( for comfort) > 92%, on RA o2 saturation > 90%   - O2 assess on ambulation (Room AIr ) sats remain ~91%- patient will NOT qualify for home O2   - CT chest consistent with multifocal pneumonia, repeat imaging with improvement   - Mycoplasma IgM positive, completed 7 days of azithromycin  - cefepime started 12/27 / doxycycline added 12/28  ( today is day 12) for further mycoplasma coverage - per ID, appreciate recs - thus far 18 days ABX   - no wheeze on exam. cont tamiflu x 5 days - last day, duonebs prn and aerobika for airway clearance  - prednisone dc'd completed 10 days of steroids    - Symbicort & Spiriva added for asthma maintenance now that pt is off systemic steroids and standing duonebs    - cont with aerobika for cough assist/secretion mobilization  - cont antitussives tessalon perles, hycodan  - cont xarelto for underlying hx of PE  - will need outpatient pulmonary follow up. She sees Dr. García in [facility] and should follow up with him.

## 2024-01-09 NOTE — PROGRESS NOTE ADULT - ASSESSMENT
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 Of note pt recently developed URI symptoms and was started on Doxy as well as Hycodan, Alprazolam and Fioricet. Of note pt already on Percocet, flexeril for chronic pain, Gabapentin for seizures per  all which she took. Pt being admitted for Acute hypoxic respiratory failure due to Multifocal PNA, AMS r/o TIA/CVA  vs metabolic encephalopathy secondary to polypharmacy and/or hypoxia.    #Acute metabolic encephalopathy, resolved    #Acute respiratory failure with multifocal pneumonia  #Flu positive   AMS likely Toxic, polypharmacy. Acute stroke is ruled out. EEG did not show any epileptiform activity. S/p iv zosyn, completed 5 days. Added Azithromycin, + mycoplasma: course completed: for persistent symptoms. Stopped cefepime now on doxy IV. Switch to PO Doxy for additional 3 days.   - Continue Cough meds, albuterol PRN.  - Restarted short course of steroids    - Change Duonebs to Q6hrs standing   - Flu positive - start tamiflu   - Covid negative  - wean o2 as tolerated; walk test repeat today     #H/o of PE  - c/w Xarelto     #SLE  - Now off steroids     # HTN/HLD  - controlled with Verapamil, statin    # DM type 2. controlled.   - FS qAC and HS w/ SSI    Diet: DASH  DVT prophylaxis: Xarelto   Dispo: Admit, will go home once off 02   Code Status: FC

## 2024-01-10 ENCOUNTER — TRANSCRIPTION ENCOUNTER (OUTPATIENT)
Age: 64
End: 2024-01-10

## 2024-01-10 VITALS
HEART RATE: 95 BPM | OXYGEN SATURATION: 97 % | TEMPERATURE: 98 F | RESPIRATION RATE: 19 BRPM | DIASTOLIC BLOOD PRESSURE: 69 MMHG | SYSTOLIC BLOOD PRESSURE: 120 MMHG

## 2024-01-10 LAB
ANION GAP SERPL CALC-SCNC: 5 MMOL/L — SIGNIFICANT CHANGE UP (ref 5–17)
ANION GAP SERPL CALC-SCNC: 5 MMOL/L — SIGNIFICANT CHANGE UP (ref 5–17)
BASOPHILS # BLD AUTO: 0.03 K/UL — SIGNIFICANT CHANGE UP (ref 0–0.2)
BASOPHILS # BLD AUTO: 0.03 K/UL — SIGNIFICANT CHANGE UP (ref 0–0.2)
BASOPHILS NFR BLD AUTO: 0.8 % — SIGNIFICANT CHANGE UP (ref 0–2)
BASOPHILS NFR BLD AUTO: 0.8 % — SIGNIFICANT CHANGE UP (ref 0–2)
BUN SERPL-MCNC: 17 MG/DL — SIGNIFICANT CHANGE UP (ref 7–23)
BUN SERPL-MCNC: 17 MG/DL — SIGNIFICANT CHANGE UP (ref 7–23)
CALCIUM SERPL-MCNC: 8.9 MG/DL — SIGNIFICANT CHANGE UP (ref 8.5–10.1)
CALCIUM SERPL-MCNC: 8.9 MG/DL — SIGNIFICANT CHANGE UP (ref 8.5–10.1)
CHLORIDE SERPL-SCNC: 110 MMOL/L — HIGH (ref 96–108)
CHLORIDE SERPL-SCNC: 110 MMOL/L — HIGH (ref 96–108)
CO2 SERPL-SCNC: 28 MMOL/L — SIGNIFICANT CHANGE UP (ref 22–31)
CO2 SERPL-SCNC: 28 MMOL/L — SIGNIFICANT CHANGE UP (ref 22–31)
CREAT SERPL-MCNC: 0.63 MG/DL — SIGNIFICANT CHANGE UP (ref 0.5–1.3)
CREAT SERPL-MCNC: 0.63 MG/DL — SIGNIFICANT CHANGE UP (ref 0.5–1.3)
EGFR: 100 ML/MIN/1.73M2 — SIGNIFICANT CHANGE UP
EGFR: 100 ML/MIN/1.73M2 — SIGNIFICANT CHANGE UP
EOSINOPHIL # BLD AUTO: 0.06 K/UL — SIGNIFICANT CHANGE UP (ref 0–0.5)
EOSINOPHIL # BLD AUTO: 0.06 K/UL — SIGNIFICANT CHANGE UP (ref 0–0.5)
EOSINOPHIL NFR BLD AUTO: 1.6 % — SIGNIFICANT CHANGE UP (ref 0–6)
EOSINOPHIL NFR BLD AUTO: 1.6 % — SIGNIFICANT CHANGE UP (ref 0–6)
FLUAV AG NPH QL: DETECTED
FLUAV AG NPH QL: DETECTED
FLUBV AG NPH QL: SIGNIFICANT CHANGE UP
FLUBV AG NPH QL: SIGNIFICANT CHANGE UP
GLUCOSE BLDC GLUCOMTR-MCNC: 116 MG/DL — HIGH (ref 70–99)
GLUCOSE BLDC GLUCOMTR-MCNC: 116 MG/DL — HIGH (ref 70–99)
GLUCOSE BLDC GLUCOMTR-MCNC: 168 MG/DL — HIGH (ref 70–99)
GLUCOSE BLDC GLUCOMTR-MCNC: 168 MG/DL — HIGH (ref 70–99)
GLUCOSE BLDC GLUCOMTR-MCNC: 267 MG/DL — HIGH (ref 70–99)
GLUCOSE BLDC GLUCOMTR-MCNC: 267 MG/DL — HIGH (ref 70–99)
GLUCOSE SERPL-MCNC: 116 MG/DL — HIGH (ref 70–99)
GLUCOSE SERPL-MCNC: 116 MG/DL — HIGH (ref 70–99)
HCT VFR BLD CALC: 26.6 % — LOW (ref 34.5–45)
HCT VFR BLD CALC: 26.6 % — LOW (ref 34.5–45)
HGB BLD-MCNC: 8.5 G/DL — LOW (ref 11.5–15.5)
HGB BLD-MCNC: 8.5 G/DL — LOW (ref 11.5–15.5)
IMM GRANULOCYTES NFR BLD AUTO: 0.5 % — SIGNIFICANT CHANGE UP (ref 0–0.9)
IMM GRANULOCYTES NFR BLD AUTO: 0.5 % — SIGNIFICANT CHANGE UP (ref 0–0.9)
LYMPHOCYTES # BLD AUTO: 1.26 K/UL — SIGNIFICANT CHANGE UP (ref 1–3.3)
LYMPHOCYTES # BLD AUTO: 1.26 K/UL — SIGNIFICANT CHANGE UP (ref 1–3.3)
LYMPHOCYTES # BLD AUTO: 33.5 % — SIGNIFICANT CHANGE UP (ref 13–44)
LYMPHOCYTES # BLD AUTO: 33.5 % — SIGNIFICANT CHANGE UP (ref 13–44)
MAGNESIUM SERPL-MCNC: 1.8 MG/DL — SIGNIFICANT CHANGE UP (ref 1.6–2.6)
MAGNESIUM SERPL-MCNC: 1.8 MG/DL — SIGNIFICANT CHANGE UP (ref 1.6–2.6)
MCHC RBC-ENTMCNC: 26.5 PG — LOW (ref 27–34)
MCHC RBC-ENTMCNC: 26.5 PG — LOW (ref 27–34)
MCHC RBC-ENTMCNC: 32 G/DL — SIGNIFICANT CHANGE UP (ref 32–36)
MCHC RBC-ENTMCNC: 32 G/DL — SIGNIFICANT CHANGE UP (ref 32–36)
MCV RBC AUTO: 82.9 FL — SIGNIFICANT CHANGE UP (ref 80–100)
MCV RBC AUTO: 82.9 FL — SIGNIFICANT CHANGE UP (ref 80–100)
MONOCYTES # BLD AUTO: 0.33 K/UL — SIGNIFICANT CHANGE UP (ref 0–0.9)
MONOCYTES # BLD AUTO: 0.33 K/UL — SIGNIFICANT CHANGE UP (ref 0–0.9)
MONOCYTES NFR BLD AUTO: 8.8 % — SIGNIFICANT CHANGE UP (ref 2–14)
MONOCYTES NFR BLD AUTO: 8.8 % — SIGNIFICANT CHANGE UP (ref 2–14)
NEUTROPHILS # BLD AUTO: 2.06 K/UL — SIGNIFICANT CHANGE UP (ref 1.8–7.4)
NEUTROPHILS # BLD AUTO: 2.06 K/UL — SIGNIFICANT CHANGE UP (ref 1.8–7.4)
NEUTROPHILS NFR BLD AUTO: 54.8 % — SIGNIFICANT CHANGE UP (ref 43–77)
NEUTROPHILS NFR BLD AUTO: 54.8 % — SIGNIFICANT CHANGE UP (ref 43–77)
NRBC # BLD: 0 /100 WBCS — SIGNIFICANT CHANGE UP (ref 0–0)
NRBC # BLD: 0 /100 WBCS — SIGNIFICANT CHANGE UP (ref 0–0)
PLATELET # BLD AUTO: 254 K/UL — SIGNIFICANT CHANGE UP (ref 150–400)
PLATELET # BLD AUTO: 254 K/UL — SIGNIFICANT CHANGE UP (ref 150–400)
POTASSIUM SERPL-MCNC: 3.3 MMOL/L — LOW (ref 3.5–5.3)
POTASSIUM SERPL-MCNC: 3.3 MMOL/L — LOW (ref 3.5–5.3)
POTASSIUM SERPL-SCNC: 3.3 MMOL/L — LOW (ref 3.5–5.3)
POTASSIUM SERPL-SCNC: 3.3 MMOL/L — LOW (ref 3.5–5.3)
RBC # BLD: 3.21 M/UL — LOW (ref 3.8–5.2)
RBC # BLD: 3.21 M/UL — LOW (ref 3.8–5.2)
RBC # FLD: 18.6 % — HIGH (ref 10.3–14.5)
RBC # FLD: 18.6 % — HIGH (ref 10.3–14.5)
SARS-COV-2 RNA SPEC QL NAA+PROBE: SIGNIFICANT CHANGE UP
SARS-COV-2 RNA SPEC QL NAA+PROBE: SIGNIFICANT CHANGE UP
SODIUM SERPL-SCNC: 143 MMOL/L — SIGNIFICANT CHANGE UP (ref 135–145)
SODIUM SERPL-SCNC: 143 MMOL/L — SIGNIFICANT CHANGE UP (ref 135–145)
WBC # BLD: 3.76 K/UL — LOW (ref 3.8–10.5)
WBC # BLD: 3.76 K/UL — LOW (ref 3.8–10.5)
WBC # FLD AUTO: 3.76 K/UL — LOW (ref 3.8–10.5)
WBC # FLD AUTO: 3.76 K/UL — LOW (ref 3.8–10.5)

## 2024-01-10 PROCEDURE — 99239 HOSP IP/OBS DSCHRG MGMT >30: CPT

## 2024-01-10 RX ORDER — GABAPENTIN 400 MG/1
0 CAPSULE ORAL
Qty: 0 | Refills: 1 | DISCHARGE

## 2024-01-10 RX ORDER — IPRATROPIUM/ALBUTEROL SULFATE 18-103MCG
3 AEROSOL WITH ADAPTER (GRAM) INHALATION
Qty: 0 | Refills: 0 | DISCHARGE
Start: 2024-01-10

## 2024-01-10 RX ORDER — POTASSIUM CHLORIDE 20 MEQ
40 PACKET (EA) ORAL ONCE
Refills: 0 | Status: COMPLETED | OUTPATIENT
Start: 2024-01-10 | End: 2024-01-10

## 2024-01-10 RX ORDER — RIVAROXABAN 15 MG-20MG
1 KIT ORAL
Qty: 0 | Refills: 0 | DISCHARGE
Start: 2024-01-10

## 2024-01-10 RX ORDER — MONTELUKAST 4 MG/1
0 TABLET, CHEWABLE ORAL
Qty: 0 | Refills: 1 | DISCHARGE

## 2024-01-10 RX ORDER — DULOXETINE HYDROCHLORIDE 30 MG/1
0 CAPSULE, DELAYED RELEASE ORAL
Qty: 0 | Refills: 0 | DISCHARGE

## 2024-01-10 RX ORDER — RIVAROXABAN 15 MG-20MG
0 KIT ORAL
Qty: 0 | Refills: 0 | DISCHARGE

## 2024-01-10 RX ORDER — TIOTROPIUM BROMIDE 18 UG/1
2 CAPSULE ORAL; RESPIRATORY (INHALATION)
Qty: 1 | Refills: 0
Start: 2024-01-10

## 2024-01-10 RX ORDER — AMITRIPTYLINE HCL 25 MG
0 TABLET ORAL
Qty: 0 | Refills: 0 | DISCHARGE

## 2024-01-10 RX ORDER — MONTELUKAST 4 MG/1
1 TABLET, CHEWABLE ORAL
Qty: 0 | Refills: 0 | DISCHARGE
Start: 2024-01-10

## 2024-01-10 RX ORDER — BUDESONIDE AND FORMOTEROL FUMARATE DIHYDRATE 160; 4.5 UG/1; UG/1
2 AEROSOL RESPIRATORY (INHALATION)
Qty: 1 | Refills: 0
Start: 2024-01-10

## 2024-01-10 RX ORDER — NEBIVOLOL HYDROCHLORIDE 5 MG/1
1 TABLET ORAL
Refills: 0 | DISCHARGE

## 2024-01-10 RX ORDER — GABAPENTIN 400 MG/1
2 CAPSULE ORAL
Qty: 0 | Refills: 0 | DISCHARGE
Start: 2024-01-10

## 2024-01-10 RX ORDER — HYDROCHLOROTHIAZIDE 25 MG
0 TABLET ORAL
Qty: 0 | Refills: 1 | DISCHARGE

## 2024-01-10 RX ORDER — HYDROCODONE BITARTRATE AND HOMATROPINE METHYLBROMIDE 5; 1.5 MG/5ML; MG/5ML
5 SOLUTION ORAL
Qty: 300 | Refills: 0
Start: 2024-01-10 | End: 2024-01-24

## 2024-01-10 RX ORDER — ALBUTEROL 90 UG/1
1 AEROSOL, METERED ORAL
Qty: 0 | Refills: 0 | DISCHARGE
Start: 2024-01-10

## 2024-01-10 RX ORDER — HYDROCODONE BITARTRATE AND HOMATROPINE METHYLBROMIDE 5; 1.5 MG/5ML; MG/5ML
0 SOLUTION ORAL
Qty: 0 | Refills: 0 | DISCHARGE

## 2024-01-10 RX ORDER — TIOTROPIUM BROMIDE 18 UG/1
2 CAPSULE ORAL; RESPIRATORY (INHALATION)
Qty: 0 | Refills: 0 | DISCHARGE
Start: 2024-01-10

## 2024-01-10 RX ORDER — OXYCODONE AND ACETAMINOPHEN 5; 325 MG/1; MG/1
0 TABLET ORAL
Qty: 0 | Refills: 0 | DISCHARGE

## 2024-01-10 RX ORDER — POTASSIUM CHLORIDE 20 MEQ
40 PACKET (EA) ORAL EVERY 4 HOURS
Refills: 0 | Status: DISCONTINUED | OUTPATIENT
Start: 2024-01-10 | End: 2024-01-10

## 2024-01-10 RX ORDER — DULOXETINE HYDROCHLORIDE 30 MG/1
1 CAPSULE, DELAYED RELEASE ORAL
Qty: 0 | Refills: 0 | DISCHARGE
Start: 2024-01-10

## 2024-01-10 RX ORDER — ALBUTEROL 90 UG/1
1 AEROSOL, METERED ORAL
Qty: 1 | Refills: 0
Start: 2024-01-10 | End: 2024-02-08

## 2024-01-10 RX ADMIN — Medication 3: at 12:00

## 2024-01-10 RX ADMIN — Medication 240 MILLIGRAM(S): at 05:32

## 2024-01-10 RX ADMIN — Medication 2 SPRAY(S): at 11:39

## 2024-01-10 RX ADMIN — Medication 40 MILLIEQUIVALENT(S): at 14:20

## 2024-01-10 RX ADMIN — Medication 40 MILLIEQUIVALENT(S): at 11:30

## 2024-01-10 RX ADMIN — Medication 100 MILLIGRAM(S): at 17:37

## 2024-01-10 RX ADMIN — CELECOXIB 200 MILLIGRAM(S): 200 CAPSULE ORAL at 12:29

## 2024-01-10 RX ADMIN — Medication 1: at 08:29

## 2024-01-10 RX ADMIN — BUDESONIDE AND FORMOTEROL FUMARATE DIHYDRATE 2 PUFF(S): 160; 4.5 AEROSOL RESPIRATORY (INHALATION) at 06:02

## 2024-01-10 RX ADMIN — BUDESONIDE AND FORMOTEROL FUMARATE DIHYDRATE 2 PUFF(S): 160; 4.5 AEROSOL RESPIRATORY (INHALATION) at 17:38

## 2024-01-10 RX ADMIN — Medication 2 SPRAY(S): at 06:03

## 2024-01-10 RX ADMIN — Medication 1 GRAM(S): at 17:36

## 2024-01-10 RX ADMIN — Medication 3 MILLILITER(S): at 06:04

## 2024-01-10 RX ADMIN — CELECOXIB 200 MILLIGRAM(S): 200 CAPSULE ORAL at 11:29

## 2024-01-10 RX ADMIN — RIVAROXABAN 20 MILLIGRAM(S): KIT at 17:05

## 2024-01-10 RX ADMIN — Medication 2 SPRAY(S): at 17:37

## 2024-01-10 RX ADMIN — Medication 81 MILLIGRAM(S): at 11:29

## 2024-01-10 RX ADMIN — TIOTROPIUM BROMIDE 2 PUFF(S): 18 CAPSULE ORAL; RESPIRATORY (INHALATION) at 12:39

## 2024-01-10 RX ADMIN — DULOXETINE HYDROCHLORIDE 60 MILLIGRAM(S): 30 CAPSULE, DELAYED RELEASE ORAL at 11:29

## 2024-01-10 RX ADMIN — Medication 1 GRAM(S): at 11:39

## 2024-01-10 RX ADMIN — Medication 40 MILLIGRAM(S): at 05:32

## 2024-01-10 RX ADMIN — Medication 100 MILLIGRAM(S): at 06:03

## 2024-01-10 RX ADMIN — Medication 1 GRAM(S): at 05:32

## 2024-01-10 RX ADMIN — Medication 3 MILLILITER(S): at 11:34

## 2024-01-10 RX ADMIN — MONTELUKAST 10 MILLIGRAM(S): 4 TABLET, CHEWABLE ORAL at 11:29

## 2024-01-10 NOTE — DISCHARGE NOTE PROVIDER - NSDCMRMEDTOKEN_GEN_ALL_CORE_FT
ALPRAZOLAM 0.5 MG TABLET: TAKE 1/2 TABLET (0.25 MG TOTAL) BY MOUTH NIGHTLY AS NEEDED FOR SLEEP. MAX DAILY AMOUNT: 0.25 MG.  AMITRIPTYLINE HCL 50 MG TAB: TAKE 1 TABLET BY MOUTH EVERY DAY AT NIGHT  aspirin 81 mg oral delayed release tablet: 1 tab(s) orally once a day  atorvastatin 80 mg oral tablet: 1 tab(s) orally once a day (at bedtime)  [id number] -40: TAKE 1 TABLET BY MOUTH EVERY 4 (FOUR) HOURS AS NEEDED FOR PAIN. MAX DAILY AMOUNT: 6 TABLETS.  CYCLOBENZAPRINE 10 MG TABLET: TAKE 1 TABLET BY MOUTH TWICE A DAY  DOXYCYCLINE HYCLATE  100 MG TABS:   DULOXETINE HCL DR 60 MG CAP: TAKE 1 CAPSULE BY MOUTH EVERY DAY  GABAPENTIN 300 MG CAPSULE: TAKE 2 CAPSULES BY MOUTH AT BEDTIME.  HYDROCHLOROTHIAZIDE 12.5 MG TB: TAKE 1 TABLET BY MOUTH DAILY AS NEEDED.  HYDROCODONE-HOMATROPINE SOLN: TAKE 5 MLS BY MOUTH 4 (FOUR) TIMES DAILY AS NEEDED. MAX DAILY AMOUNT: 20 MLS.  MELOXICAM 15 MG TABLET: TAKE 1 TABLET BY MOUTH EVERY DAY  METFORMIN HCL  MG TABLET: TAKE 1 TABLET BY MOUTH IN THE MORNING AND TAKE 1 TABLET IN THE EVENING  MONTELUKAST SOD 10 MG TABLET: TAKE 1 TABLET BY MOUTH EVERY DAY AS NEEDED  nebivolol 10 mg oral tablet: 1 orally once a day  OXYCODONE/ACETAMINOPHEN 5-325MG TAB: TAKE 1 TABLET BY MOUTH EVERY DAY  OZEMPIC 0.25-0.5 MG/DOSE PEN: INJECT 0.5 MG INTO THE SKIN ONCE A WEEK..  VERAPAMIL ER  MG CAPSULE: TAKE 1 CAPSULE BY MOUTH EVERY DAY  XARELTO 20 MG TABLET: TAKE 1 TABLET BY MOUTH EVERY DAY IN THE MORNING   ALPRAZOLAM 0.5 MG TABLET: TAKE 1/2 TABLET (0.25 MG TOTAL) BY MOUTH NIGHTLY AS NEEDED FOR SLEEP. MAX DAILY AMOUNT: 0.25 MG.  AMITRIPTYLINE HCL 50 MG TAB: TAKE 1 TABLET BY MOUTH EVERY DAY AT NIGHT  aspirin 81 mg oral delayed release tablet: 1 tab(s) orally once a day  atorvastatin 80 mg oral tablet: 1 tab(s) orally once a day (at bedtime)  [id number] -40: TAKE 1 TABLET BY MOUTH EVERY 4 (FOUR) HOURS AS NEEDED FOR PAIN. MAX DAILY AMOUNT: 6 TABLETS.  CYCLOBENZAPRINE 10 MG TABLET: TAKE 1 TABLET BY MOUTH TWICE A DAY  DOXYCYCLINE HYCLATE  100 MG TABS:   DULOXETINE HCL DR 60 MG CAP: TAKE 1 CAPSULE BY MOUTH EVERY DAY  GABAPENTIN 300 MG CAPSULE: TAKE 2 CAPSULES BY MOUTH AT BEDTIME.  HYDROCHLOROTHIAZIDE 12.5 MG TB: TAKE 1 TABLET BY MOUTH DAILY AS NEEDED.  HYDROCODONE-HOMATROPINE SOLN: TAKE 5 MLS BY MOUTH 4 (FOUR) TIMES DAILY AS NEEDED. MAX DAILY AMOUNT: 20 MLS.  MELOXICAM 15 MG TABLET: TAKE 1 TABLET BY MOUTH EVERY DAY  METFORMIN HCL  MG TABLET: TAKE 1 TABLET BY MOUTH IN THE MORNING AND TAKE 1 TABLET IN THE EVENING  MONTELUKAST SOD 10 MG TABLET: TAKE 1 TABLET BY MOUTH EVERY DAY AS NEEDED  nebivolol 10 mg oral tablet: 1 orally once a day  OXYCODONE/ACETAMINOPHEN 5-325MG TAB: TAKE 1 TABLET BY MOUTH EVERY DAY  OZEMPIC 0.25-0.5 MG/DOSE PEN: INJECT 0.5 MG INTO THE SKIN ONCE A WEEK..  VERAPAMIL ER  MG CAPSULE: TAKE 1 CAPSULE BY MOUTH EVERY DAY  XARELTO 20 MG TABLET: TAKE 1 TABLET BY MOUTH EVERY DAY IN THE MORNING   albuterol 90 mcg/inh inhalation aerosol: 1 puff(s) inhaled every 4 hours As needed Shortness of Breath and/or Wheezing  ALPRAZOLAM 0.5 MG TABLET: TAKE 1/2 TABLET (0.25 MG TOTAL) BY MOUTH NIGHTLY AS NEEDED FOR SLEEP. MAX DAILY AMOUNT: 0.25 MG.  aspirin 81 mg oral delayed release tablet: 1 tab(s) orally once a day  atorvastatin 80 mg oral tablet: 1 tab(s) orally once a day (at bedtime)  [id number] -40: TAKE 1 TABLET BY MOUTH EVERY 4 (FOUR) HOURS AS NEEDED FOR PAIN. MAX DAILY AMOUNT: 6 TABLETS.  CYCLOBENZAPRINE 10 MG TABLET: TAKE 1 TABLET BY MOUTH TWICE A DAY  doxycycline monohydrate 50 mg oral capsule: 2 cap(s) orally every 12 hours  DULoxetine 60 mg oral delayed release capsule: 1 cap(s) orally once a day  gabapentin 300 mg oral capsule: 2 cap(s) orally once a day (at bedtime)  hydrocodone-homatropine 5 mg-1.5 mg/5 mL oral syrup: 5 milliliter(s) orally every 6 hours MDD: 20 ml  ipratropium-albuterol 0.5 mg-2.5 mg/3 mL inhalation solution: 3 milliliter(s) inhaled every 6 hours  MELOXICAM 15 MG TABLET: TAKE 1 TABLET BY MOUTH EVERY DAY  METFORMIN HCL  MG TABLET: TAKE 1 TABLET BY MOUTH IN THE MORNING AND TAKE 1 TABLET IN THE EVENING  montelukast 10 mg oral tablet: 1 tab(s) orally once a day  OZEMPIC 0.25-0.5 MG/DOSE PEN: INJECT 0.5 MG INTO THE SKIN ONCE A WEEK..  predniSONE 20 mg oral tablet: 2 tab(s) orally once a day  rivaroxaban 20 mg oral tablet: 1 tab(s) orally once a day (before a meal)  tiotropium 2.5 mcg/inh inhalation aerosol: 2 puff(s) inhaled once a day  VERAPAMIL ER  MG CAPSULE: 1 cap(s) orally once a day TAKE 1 CAPSULE BY MOUTH EVERY DAY   albuterol 90 mcg/inh inhalation aerosol: 1 puff(s) inhaled every 4 hours As needed Shortness of Breath and/or Wheezing  ALPRAZOLAM 0.5 MG TABLET: TAKE 1/2 TABLET (0.25 MG TOTAL) BY MOUTH NIGHTLY AS NEEDED FOR SLEEP. MAX DAILY AMOUNT: 0.25 MG.  aspirin 81 mg oral delayed release tablet: 1 tab(s) orally once a day  atorvastatin 80 mg oral tablet: 1 tab(s) orally once a day (at bedtime)  [id number] -40: TAKE 1 TABLET BY MOUTH EVERY 4 (FOUR) HOURS AS NEEDED FOR PAIN. MAX DAILY AMOUNT: 6 TABLETS.  CYCLOBENZAPRINE 10 MG TABLET: TAKE 1 TABLET BY MOUTH TWICE A DAY  doxycycline monohydrate 50 mg oral capsule: 2 cap(s) orally every 12 hours  DULoxetine 60 mg oral delayed release capsule: 1 cap(s) orally once a day  gabapentin 300 mg oral capsule: 2 cap(s) orally once a day (at bedtime)  hydrocodone-homatropine 5 mg-1.5 mg/5 mL oral syrup: 5 milliliter(s) orally every 6 hours MDD: 20 ml  ipratropium-albuterol 0.5 mg-2.5 mg/3 mL inhalation solution: 3 milliliter(s) inhaled every 6 hours  MELOXICAM 15 MG TABLET: TAKE 1 TABLET BY MOUTH EVERY DAY  METFORMIN HCL  MG TABLET: TAKE 1 TABLET BY MOUTH IN THE MORNING AND TAKE 1 TABLET IN THE EVENING  montelukast 10 mg oral tablet: 1 tab(s) orally once a day  OZEMPIC 0.25-0.5 MG/DOSE PEN: INJECT 0.5 MG INTO THE SKIN ONCE A WEEK..  predniSONE 20 mg oral tablet: 2 tab(s) orally once a day  rivaroxaban 20 mg oral tablet: 1 tab(s) orally once a day (before a meal)  tiotropium 2.5 mcg/inh inhalation aerosol: 2 puff(s) inhaled once a day  VERAPAMIL ER  MG CAPSULE: 1 cap(s) orally once a day TAKE 1 CAPSULE BY MOUTH EVERY DAY

## 2024-01-10 NOTE — PROGRESS NOTE ADULT - SUBJECTIVE AND OBJECTIVE BOX
CHIEF COMPLAINT:    Interval Events:    REVIEW OF SYSTEMS:  Constitutional: [ ] negative [ ] fevers [ ] chills [ ] weight loss [ ] weight gain  HEENT: [ ] negative [ ] dry eyes [ ] eye irritation [ ] postnasal drip [ ] nasal congestion  CV: [ ] negative  [ ] chest pain [ ] orthopnea [ ] palpitations [ ] murmur  Resp: [ ] negative [ ] cough [ ] shortness of breath [ ] dyspnea [ ] wheezing [ ] sputum [ ] hemoptysis  GI: [ ] negative [ ] nausea [ ] vomiting [ ] diarrhea [ ] constipation [ ] abd pain [ ] dysphagia   : [ ] negative [ ] dysuria [ ] nocturia [ ] hematuria [ ] increased urinary frequency  Musculoskeletal: [ ] negative [ ] back pain [ ] myalgias [ ] arthralgias [ ] fracture  Skin: [ ] negative [ ] rash [ ] itch  Neurological: [ ] negative [ ] headache [ ] dizziness [ ] syncope [ ] weakness [ ] numbness  Psychiatric: [ ] negative [ ] anxiety [ ] depression  Endocrine: [ ] negative [ ] diabetes [ ] thyroid problem  Hematologic/Lymphatic: [ ] negative [ ] anemia [ ] bleeding problem  Allergic/Immunologic: [ ] negative [ ] itchy eyes [ ] nasal discharge [ ] hives [ ] angioedema  [ ] All other systems negative  [ ] Unable to assess ROS because ________    OBJECTIVE:  ICU Vital Signs Last 24 Hrs  T(C): 36.4 (10 Parmjit 2024 04:55), Max: 36.8 (09 Jan 2024 10:43)  T(F): 97.5 (10 Parmjit 2024 04:55), Max: 98.2 (09 Jan 2024 10:43)  HR: 111 (10 Parmjit 2024 04:55) (80 - 115)  BP: 129/73 (10 Parmjit 2024 04:55) (109/75 - 131/82)  BP(mean): --  ABP: --  ABP(mean): --  RR: 18 (10 Parmjit 2024 04:55) (18 - 19)  SpO2: 98% (10 Parmjit 2024 04:55) (95% - 98%)    O2 Parameters below as of 10 Parmjit 2024 04:55  Patient On (Oxygen Delivery Method): room air              01-09 @ 07:01  -  01-10 @ 07:00  --------------------------------------------------------  IN: 620 mL / OUT: 0 mL / NET: 620 mL      CAPILLARY BLOOD GLUCOSE      POCT Blood Glucose.: 168 mg/dL (10 Parmjit 2024 08:22)      PHYSICAL EXAM:  General:   HEENT:   Lymph Nodes:  Neck:   Respiratory:   Cardiovascular:   Abdomen:   Extremities:   Skin:   Neurological:  Psychiatry:    HOSPITAL MEDICATIONS:  aspirin enteric coated 81 milliGRAM(s) Oral daily  rivaroxaban 20 milliGRAM(s) Oral with dinner    doxycycline monohydrate Capsule 100 milliGRAM(s) Oral every 12 hours    verapamil  milliGRAM(s) Oral daily    atorvastatin 80 milliGRAM(s) Oral at bedtime  dextrose 50% Injectable 25 Gram(s) IV Push once  dextrose 50% Injectable 12.5 Gram(s) IV Push once  dextrose 50% Injectable 25 Gram(s) IV Push once  dextrose Oral Gel 15 Gram(s) Oral once PRN  glucagon  Injectable 1 milliGRAM(s) IntraMuscular once  insulin lispro (ADMELOG) corrective regimen sliding scale   SubCutaneous three times a day before meals  insulin lispro (ADMELOG) corrective regimen sliding scale   SubCutaneous at bedtime  predniSONE   Tablet 40 milliGRAM(s) Oral daily    albuterol    90 MICROgram(s) HFA Inhaler 1 Puff(s) Inhalation every 4 hours PRN  albuterol/ipratropium for Nebulization 3 milliLiter(s) Nebulizer every 6 hours  benzonatate 100 milliGRAM(s) Oral every 8 hours PRN  budesonide 160 MICROgram(s)/formoterol 4.5 MICROgram(s) Inhaler 2 Puff(s) Inhalation two times a day  guaifenesin/dextromethorphan Oral Liquid 10 milliLiter(s) Oral every 6 hours PRN  hydrocodone/homatropine Syrup 5 milliLiter(s) Oral every 6 hours  montelukast 10 milliGRAM(s) Oral daily  tiotropium 2.5 MICROgram(s) Inhaler 2 Puff(s) Inhalation daily    acetaminophen     Tablet .. 650 milliGRAM(s) Oral every 6 hours PRN  celecoxib 200 milliGRAM(s) Oral daily  DULoxetine 60 milliGRAM(s) Oral daily  gabapentin 600 milliGRAM(s) Oral at bedtime  melatonin 3 milliGRAM(s) Oral at bedtime PRN    sucralfate 1 Gram(s) Oral every 6 hours        dextrose 5%. 1000 milliLiter(s) IV Continuous <Continuous>  dextrose 5%. 1000 milliLiter(s) IV Continuous <Continuous>    influenza   Vaccine 0.5 milliLiter(s) IntraMuscular once    sodium chloride 0.65% Nasal 2 Spray(s) Both Nostrils every 6 hours        LABS:                        8.5    3.76  )-----------( 254      ( 10 Parmjit 2024 06:12 )             26.6     Hgb Trend: 8.5<--, 9.1<--, 9.8<--, 9.1<--, 8.5<--  01-10    143  |  110<H>  |  17  ----------------------------<  116<H>  3.3<L>   |  28  |  0.63    Ca    8.9      10 Parmjit 2024 06:12  Mg     1.8     01-10      Creatinine Trend: 0.63<--, 0.64<--, 0.69<--, 0.69<--, 0.70<--, 0.67<--    Urinalysis Basic - ( 10 Parmjit 2024 06:12 )    Color: x / Appearance: x / SG: x / pH: x  Gluc: 116 mg/dL / Ketone: x  / Bili: x / Urobili: x   Blood: x / Protein: x / Nitrite: x   Leuk Esterase: x / RBC: x / WBC x   Sq Epi: x / Non Sq Epi: x / Bacteria: x            MICROBIOLOGY:     RADIOLOGY:  [ ] Reviewed and interpreted by me    PULMONARY FUNCTION TESTS:    EKG: Interval Events: Patient seen and examined at bedside. C/O continued cough with some associated CP during exacerbations. Symptoms releived when coughing suppressed. No acute overnight events. Remains on room air with Spo2 stable. Pending DC today.    REVIEW OF SYSTEMS:  All negative unless listed within interval HPI.        OBJECTIVE:  Vital Signs Last 24 Hrs  T(C): 36.4 (10 Parmjit 2024 04:55), Max: 36.8 (09 Jan 2024 10:43)  T(F): 97.5 (10 Parmjit 2024 04:55), Max: 98.2 (09 Jan 2024 10:43)  HR: 111 (10 Parmjit 2024 04:55) (80 - 115)  BP: 129/73 (10 Parmjit 2024 04:55) (109/75 - 131/82)  BP(mean): --  ABP: --  ABP(mean): --  RR: 18 (10 Parmjit 2024 04:55) (18 - 19)  SpO2: 98% (10 Parmjit 2024 04:55) (95% - 98%)    O2 Parameters below as of 10 Parmjit 2024 04:55  Patient On (Oxygen Delivery Method): room air              01-09 @ 07:01  -  01-10 @ 07:00  --------------------------------------------------------  IN: 620 mL / OUT: 0 mL / NET: 620 mL      CAPILLARY BLOOD GLUCOSE      POCT Blood Glucose.: 168 mg/dL (10 Parmjit 2024 08:22)      PHYSICAL EXAM:  General: NAD, sitting upright in bed on room air  HEENT: NC/AT, PERRL, MMM, neck supple, no jvd trachea midline   Respiratory: + Cough, wheeze when coughing. No wheeze when no coughing. Good air entry B/L  Cardiovascular: RRR, no, m/r/g  Abdomen: soft, ND, NTTP, + BS  Extremities: no c/c/e  Skin: no rashes, lesions  Neurological: A&OX3, non focal    HOSPITAL MEDICATIONS:  aspirin enteric coated 81 milliGRAM(s) Oral daily  rivaroxaban 20 milliGRAM(s) Oral with dinner    doxycycline monohydrate Capsule 100 milliGRAM(s) Oral every 12 hours    verapamil  milliGRAM(s) Oral daily    atorvastatin 80 milliGRAM(s) Oral at bedtime  dextrose 50% Injectable 25 Gram(s) IV Push once  dextrose 50% Injectable 12.5 Gram(s) IV Push once  dextrose 50% Injectable 25 Gram(s) IV Push once  dextrose Oral Gel 15 Gram(s) Oral once PRN  glucagon  Injectable 1 milliGRAM(s) IntraMuscular once  insulin lispro (ADMELOG) corrective regimen sliding scale   SubCutaneous three times a day before meals  insulin lispro (ADMELOG) corrective regimen sliding scale   SubCutaneous at bedtime  predniSONE   Tablet 40 milliGRAM(s) Oral daily    albuterol    90 MICROgram(s) HFA Inhaler 1 Puff(s) Inhalation every 4 hours PRN  albuterol/ipratropium for Nebulization 3 milliLiter(s) Nebulizer every 6 hours  benzonatate 100 milliGRAM(s) Oral every 8 hours PRN  budesonide 160 MICROgram(s)/formoterol 4.5 MICROgram(s) Inhaler 2 Puff(s) Inhalation two times a day  guaifenesin/dextromethorphan Oral Liquid 10 milliLiter(s) Oral every 6 hours PRN  hydrocodone/homatropine Syrup 5 milliLiter(s) Oral every 6 hours  montelukast 10 milliGRAM(s) Oral daily  tiotropium 2.5 MICROgram(s) Inhaler 2 Puff(s) Inhalation daily    acetaminophen     Tablet .. 650 milliGRAM(s) Oral every 6 hours PRN  celecoxib 200 milliGRAM(s) Oral daily  DULoxetine 60 milliGRAM(s) Oral daily  gabapentin 600 milliGRAM(s) Oral at bedtime  melatonin 3 milliGRAM(s) Oral at bedtime PRN    sucralfate 1 Gram(s) Oral every 6 hours        dextrose 5%. 1000 milliLiter(s) IV Continuous <Continuous>  dextrose 5%. 1000 milliLiter(s) IV Continuous <Continuous>    influenza   Vaccine 0.5 milliLiter(s) IntraMuscular once    sodium chloride 0.65% Nasal 2 Spray(s) Both Nostrils every 6 hours        LABS:                        8.5    3.76  )-----------( 254      ( 10 Parmjit 2024 06:12 )             26.6     Hgb Trend: 8.5<--, 9.1<--, 9.8<--, 9.1<--, 8.5<--  01-10    143  |  110<H>  |  17  ----------------------------<  116<H>  3.3<L>   |  28  |  0.63    Ca    8.9      10 Parmjit 2024 06:12  Mg     1.8     01-10      Creatinine Trend: 0.63<--, 0.64<--, 0.69<--, 0.69<--, 0.70<--, 0.67<--    Urinalysis Basic - ( 10 Parmjit 2024 06:12 )    Color: x / Appearance: x / SG: x / pH: x  Gluc: 116 mg/dL / Ketone: x  / Bili: x / Urobili: x   Blood: x / Protein: x / Nitrite: x   Leuk Esterase: x / RBC: x / WBC x   Sq Epi: x / Non Sq Epi: x / Bacteria: x            MICROBIOLOGY:       Culture - Sputum  Source: .Sputum Sputum  Gram Stain (12-28-23 @ 22:02):    Few Squamous epithelial cells per low power field    Few polymorphonuclear leukocytes per low power field    Moderate Yeast per oil power field    Few Gram positive cocci in pairs per oil power field  Final Report (12-28-23 @ 22:02):    Normal Respiratory Valarie present    Culture - Blood  Source: .Blood Blood-Peripheral  Final Report (12-27-23 @ 22:01):    No growth at 5 days    Culture - Blood  Source: .Blood Blood-Peripheral  Final Report (12-27-23 @ 22:01):    No growth at 5 days      RADIOLOGY:  [X] Reviewed  Interval Events: Patient seen and examined at bedside. C/O continued cough with some associated CP during exacerbations. Symptoms releived when coughing suppressed. No acute overnight events. Remains on room air with Spo2 stable. Pending DC today.    REVIEW OF SYSTEMS:  All negative unless listed within interval HPI.        OBJECTIVE:  Vital Signs Last 24 Hrs  T(C): 36.4 (10 Parmjit 2024 04:55), Max: 36.8 (09 Jan 2024 10:43)  T(F): 97.5 (10 Parmjit 2024 04:55), Max: 98.2 (09 Jan 2024 10:43)  HR: 111 (10 Parmjit 2024 04:55) (80 - 115)  BP: 129/73 (10 Parmjit 2024 04:55) (109/75 - 131/82)  RR: 18 (10 Parmjit 2024 04:55) (18 - 19)  SpO2: 98% (10 Parmjit 2024 04:55) (95% - 98%)    O2 Parameters below as of 10 Parmjit 2024 04:55  Patient On (Oxygen Delivery Method): room air          CAPILLARY BLOOD GLUCOSE  POCT Blood Glucose.: 168 mg/dL (10 Parmjit 2024 08:22)      PHYSICAL EXAM:  General: NAD, sitting upright in bed on room air  HEENT: NC/AT, PERRL, MMM, neck supple, no jvd trachea midline   Respiratory: + Cough, wheeze when coughing. No wheeze when not coughing. Good air entry B/L  Cardiovascular: RRR, no m/r/g  Abdomen: soft, ND, NTTP, + BS  Extremities: no c/c/e  Skin: no rashes, lesions  Neurological: A&OX3, non focal    HOSPITAL MEDICATIONS:  aspirin enteric coated 81 milliGRAM(s) Oral daily  rivaroxaban 20 milliGRAM(s) Oral with dinner    doxycycline monohydrate Capsule 100 milliGRAM(s) Oral every 12 hours    verapamil  milliGRAM(s) Oral daily    atorvastatin 80 milliGRAM(s) Oral at bedtime  dextrose 50% Injectable 25 Gram(s) IV Push once  dextrose 50% Injectable 12.5 Gram(s) IV Push once  dextrose 50% Injectable 25 Gram(s) IV Push once  dextrose Oral Gel 15 Gram(s) Oral once PRN  glucagon  Injectable 1 milliGRAM(s) IntraMuscular once  insulin lispro (ADMELOG) corrective regimen sliding scale   SubCutaneous three times a day before meals  insulin lispro (ADMELOG) corrective regimen sliding scale   SubCutaneous at bedtime  predniSONE   Tablet 40 milliGRAM(s) Oral daily    albuterol    90 MICROgram(s) HFA Inhaler 1 Puff(s) Inhalation every 4 hours PRN  albuterol/ipratropium for Nebulization 3 milliLiter(s) Nebulizer every 6 hours  benzonatate 100 milliGRAM(s) Oral every 8 hours PRN  budesonide 160 MICROgram(s)/formoterol 4.5 MICROgram(s) Inhaler 2 Puff(s) Inhalation two times a day  guaifenesin/dextromethorphan Oral Liquid 10 milliLiter(s) Oral every 6 hours PRN  hydrocodone/homatropine Syrup 5 milliLiter(s) Oral every 6 hours  montelukast 10 milliGRAM(s) Oral daily  tiotropium 2.5 MICROgram(s) Inhaler 2 Puff(s) Inhalation daily    acetaminophen     Tablet .. 650 milliGRAM(s) Oral every 6 hours PRN  celecoxib 200 milliGRAM(s) Oral daily  DULoxetine 60 milliGRAM(s) Oral daily  gabapentin 600 milliGRAM(s) Oral at bedtime  melatonin 3 milliGRAM(s) Oral at bedtime PRN    sucralfate 1 Gram(s) Oral every 6 hours        dextrose 5%. 1000 milliLiter(s) IV Continuous <Continuous>  dextrose 5%. 1000 milliLiter(s) IV Continuous <Continuous>    influenza   Vaccine 0.5 milliLiter(s) IntraMuscular once    sodium chloride 0.65% Nasal 2 Spray(s) Both Nostrils every 6 hours        LABS:                        8.5    3.76  )-----------( 254      ( 10 Parmjit 2024 06:12 )             26.6     Hgb Trend: 8.5<--, 9.1<--, 9.8<--, 9.1<--, 8.5<--  01-10    143      |  110<H>  |  17  -------------------------------<  116<H>  3.3<L>   |  28        |  0.63    Ca    8.9      10 Parmjit 2024 06:12  Mg     1.8     01-10      Creatinine Trend: 0.63<--, 0.64<--, 0.69<--, 0.69<--, 0.70<--, 0.67<--      Mycoplasma Pneumoniae IgM Antibody (12.18.23 @ 07:02)   Mycoplasma IgM AB: 1.23 Index  Mycoplasma: Positive: Method DELON     Respiratory Viral Panel with COVID-19 by NETTIE (01.06.24 @ 11:30)   Rapid RVP Result: Detected  SARS-CoV-2: NotDete  Influenza AH1 2009 (RapRVP): Detected      MICROBIOLOGY:   Culture - Sputum  Source: .Sputum Sputum  Final Report (12-28-23 @ 22:02):    Normal Respiratory Valarie present    Culture - Blood  Source: .Blood Blood-Peripheral  Final Report (12-27-23 @ 22:01):    No growth at 5 days    Culture - Blood  Source: .Blood Blood-Peripheral  Final Report (12-27-23 @ 22:01):    No growth at 5 days      RADIOLOGY:  [X] Reviewed   < from: Xray Chest 1 View-PORTABLE IMMEDIATE (Xray Chest 1 View-PORTABLE IMMEDIATE .) (12.27.23 @ 13:06) >  Increasing right upper lobe infiltrate while that seen in the left upper lobe remains unchanged.

## 2024-01-10 NOTE — DISCHARGE NOTE PROVIDER - HOSPITAL COURSE
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     Patient was admitted to telemetry with influenza, treated with bronchodilators, supportive O2, tamiflu and improved. Patient is clinically stable now.    Acute metabolic encephalopathy, resolved    Acute respiratory failure with multifocal pneumonia secondary to influenza  H/o of PE  SLE  HTN/HLD  DM type 2. controlled  Anemia  Hyperhglycemia  Hypokalemia    On 1/10/24 this case was reviewed with Dr. Gonzalez, the patient is medically stable and optimized for discharge. 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     Patient was admitted to telemetry with influenza, treated with bronchodilators, supportive O2, tamiflu and improved. Patient is clinically stable now.    Acute metabolic encephalopathy, resolved    Acute respiratory failure with multifocal pneumonia secondary to influenza  H/o of PE  SLE  HTN/HLD  DM type 2. controlled  Anemia  Hyperhglycemia  Hypokalemia    On 1/10/24 this case was reviewed with Dr. Gonzalez, the patient is medically stable and optimized for discharge. 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     Patient was admitted to telemetry with influenza, treated with bronchodilators, supportive O2, tamiflu and improved. Patient is clinically stable now.    Acute metabolic encephalopathy, resolved    Acute respiratory failure with multifocal pneumonia secondary to influenza  H/o of PE  SLE  HTN/HLD  DM type 2. controlled  Anemia  Hyperglycemia  Hypokalemia    On 1/10/24 this case was reviewed with Dr. Gonzalez, the patient is medically stable and optimized for discharge. 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     Patient was admitted to telemetry with influenza, treated with bronchodilators, supportive O2, tamiflu and improved. Patient is clinically stable now.    Acute metabolic encephalopathy, resolved    Acute respiratory failure with multifocal pneumonia secondary to influenza  H/o of PE  SLE  HTN/HLD  DM type 2. controlled  Anemia  Hyperglycemia  Hypokalemia    On 1/10/24 this case was reviewed with Dr. Gonzalez, the patient is medically stable and optimized for discharge.

## 2024-01-10 NOTE — CHART NOTE - NSCHARTNOTEFT_GEN_A_CORE
Pt admitted last month for changes in mental status thought to be due to CVA/TIA. Now presented again c AMS; pt is confused, lethargic;  reported an unresponsive episode at home PTA. Pt has hx of chronic residual left-sided weakness as well as slurred speech, Per MBS (12/19) SLP recommended regular diet consistency c thin liquids.  MRI c no acute findings; per neurology note AMS likely toxic encephalopathy; infectious, metabolic, possible polypharmacy;  low suspicion for neurovascular etiology or seizure.  Pt found c multifocal PNA & Flu A.      Factors impacting intake: [ ] none [ ] nausea  [ ] vomiting [ ] diarrhea [ ] constipation  [ ]chewing problems [ ] swallowing issues  [ X] other: AMS; difficulty self-feeding, lethargy    Diet Prescription: Diet, DASH/TLC:   Sodium & Cholesterol Restricted  Consistent Carbohydrate {Evening Snack} (12-17-23 @ 10:22)    Intake:   pt consuming 51-75% most meals; drinking supplement ([employer] Glucose Control x 1/day provides 300 kcal, 16 g protein)    Current Weight:   113.3 kg (1/8); previous wt 113.2 kg (12/18)  % Weight Change:  wt stable x 3 weeks    No edema noted since 12/30      Pertinent Medications: MEDICATIONS  (STANDING):  albuterol/ipratropium for Nebulization 3 milliLiter(s) Nebulizer every 6 hours  aspirin enteric coated 81 milliGRAM(s) Oral daily  atorvastatin 80 milliGRAM(s) Oral at bedtime  budesonide 160 MICROgram(s)/formoterol 4.5 MICROgram(s) Inhaler 2 Puff(s) Inhalation two times a day  celecoxib 200 milliGRAM(s) Oral daily  dextrose 5%. 1000 milliLiter(s) (100 mL/Hr) IV Continuous <Continuous>  dextrose 5%. 1000 milliLiter(s) (50 mL/Hr) IV Continuous <Continuous>  dextrose 50% Injectable 25 Gram(s) IV Push once  dextrose 50% Injectable 12.5 Gram(s) IV Push once  dextrose 50% Injectable 25 Gram(s) IV Push once  doxycycline monohydrate Capsule 100 milliGRAM(s) Oral every 12 hours  DULoxetine 60 milliGRAM(s) Oral daily  gabapentin 600 milliGRAM(s) Oral at bedtime  glucagon  Injectable 1 milliGRAM(s) IntraMuscular once  hydrocodone/homatropine Syrup 5 milliLiter(s) Oral every 6 hours  influenza   Vaccine 0.5 milliLiter(s) IntraMuscular once  insulin lispro (ADMELOG) corrective regimen sliding scale   SubCutaneous three times a day before meals  insulin lispro (ADMELOG) corrective regimen sliding scale   SubCutaneous at bedtime  montelukast 10 milliGRAM(s) Oral daily  predniSONE   Tablet 40 milliGRAM(s) Oral daily  rivaroxaban 20 milliGRAM(s) Oral with dinner  sodium chloride 0.65% Nasal 2 Spray(s) Both Nostrils every 6 hours  sucralfate 1 Gram(s) Oral every 6 hours  tiotropium 2.5 MICROgram(s) Inhaler 2 Puff(s) Inhalation daily  verapamil  milliGRAM(s) Oral daily    MEDICATIONS  (PRN):  acetaminophen     Tablet .. 650 milliGRAM(s) Oral every 6 hours PRN Temp greater or equal to 38C (100.4F), Mild Pain (1 - 3)  albuterol    90 MICROgram(s) HFA Inhaler 1 Puff(s) Inhalation every 4 hours PRN Shortness of Breath and/or Wheezing  benzonatate 100 milliGRAM(s) Oral every 8 hours PRN Cough  dextrose Oral Gel 15 Gram(s) Oral once PRN Blood Glucose LESS THAN 70 milliGRAM(s)/deciliter  guaifenesin/dextromethorphan Oral Liquid 10 milliLiter(s) Oral every 6 hours PRN Cough  melatonin 3 milliGRAM(s) Oral at bedtime PRN Insomnia    Pertinent Labs: 01-10 Na143 mmol/L Glu 116 mg/dL<H> K+ 3.3 mmol/L<L> Cr  0.63 mg/dL BUN 17 mg/dL 12-18 Chol 91 mg/dL LDL --    HDL 46 mg/dL<L> Trig 52 mg/dL    Skin:  no pressure ulcers noted    Estimated Needs:   [X ] no change since previous assessment (12/24)  [ ] recalculated:     Previous Nutrition Diagnosis:   [X ] Inadequate Energy Intake  Etiology:  acute metabolic encephalopathy, acute respiratory failure, PNA  Signs & Symptoms:  Pt consume <75% nutrition needs >7 days    GOAL:  Pt to consume 50-75% of meals/supplements - not consistently met at this time    Nutrition Diagnosis is [x ] ongoing  [ ] resolved [ ] not applicable     New Nutrition Diagnosis: [x ] not applicable      Interventions:     Continue current diet rx as noted  Recommend  [ ] Change Diet To:  [ ] Nutrition Supplement  [ ] Nutrition Support  [ ] Other:     Monitoring and Evaluation:   [X ] PO intake [ x ] Tolerance to diet prescription [ x ] weights [ x ] labs[ x ] follow up per protocol  [ ] other: Pt admitted last month for changes in mental status thought to be due to CVA/TIA. Now presented again c AMS; pt is confused, lethargic;  reported an unresponsive episode at home PTA. Pt has hx of chronic residual left-sided weakness as well as slurred speech, Per MBS (12/19) SLP recommended regular diet consistency c thin liquids.  MRI c no acute findings; per neurology note AMS likely toxic encephalopathy; infectious, metabolic, possible polypharmacy;  low suspicion for neurovascular etiology or seizure.  Pt found c multifocal PNA & Flu A.      Factors impacting intake: [ ] none [ ] nausea  [ ] vomiting [ ] diarrhea [ ] constipation  [ ]chewing problems [ ] swallowing issues  [ X] other: AMS; difficulty self-feeding, lethargy    Diet Prescription: Diet, DASH/TLC:   Sodium & Cholesterol Restricted  Consistent Carbohydrate {Evening Snack} (12-17-23 @ 10:22)    Intake:   pt consuming 51-75% most meals; drinking supplement ([employer] Glucose Control x 1/day provides 300 kcal, 16 g protein)    Current Weight:   113.3 kg (1/8); previous wt 113.2 kg (12/18)  % Weight Change:  wt stable x 3 weeks    No edema noted since 12/30      Pertinent Medications: MEDICATIONS  (STANDING):  albuterol/ipratropium for Nebulization 3 milliLiter(s) Nebulizer every 6 hours  aspirin enteric coated 81 milliGRAM(s) Oral daily  atorvastatin 80 milliGRAM(s) Oral at bedtime  budesonide 160 MICROgram(s)/formoterol 4.5 MICROgram(s) Inhaler 2 Puff(s) Inhalation two times a day  celecoxib 200 milliGRAM(s) Oral daily  dextrose 5%. 1000 milliLiter(s) (100 mL/Hr) IV Continuous <Continuous>  dextrose 5%. 1000 milliLiter(s) (50 mL/Hr) IV Continuous <Continuous>  dextrose 50% Injectable 25 Gram(s) IV Push once  dextrose 50% Injectable 12.5 Gram(s) IV Push once  dextrose 50% Injectable 25 Gram(s) IV Push once  doxycycline monohydrate Capsule 100 milliGRAM(s) Oral every 12 hours  DULoxetine 60 milliGRAM(s) Oral daily  gabapentin 600 milliGRAM(s) Oral at bedtime  glucagon  Injectable 1 milliGRAM(s) IntraMuscular once  hydrocodone/homatropine Syrup 5 milliLiter(s) Oral every 6 hours  influenza   Vaccine 0.5 milliLiter(s) IntraMuscular once  insulin lispro (ADMELOG) corrective regimen sliding scale   SubCutaneous three times a day before meals  insulin lispro (ADMELOG) corrective regimen sliding scale   SubCutaneous at bedtime  montelukast 10 milliGRAM(s) Oral daily  predniSONE   Tablet 40 milliGRAM(s) Oral daily  rivaroxaban 20 milliGRAM(s) Oral with dinner  sodium chloride 0.65% Nasal 2 Spray(s) Both Nostrils every 6 hours  sucralfate 1 Gram(s) Oral every 6 hours  tiotropium 2.5 MICROgram(s) Inhaler 2 Puff(s) Inhalation daily  verapamil  milliGRAM(s) Oral daily    MEDICATIONS  (PRN):  acetaminophen     Tablet .. 650 milliGRAM(s) Oral every 6 hours PRN Temp greater or equal to 38C (100.4F), Mild Pain (1 - 3)  albuterol    90 MICROgram(s) HFA Inhaler 1 Puff(s) Inhalation every 4 hours PRN Shortness of Breath and/or Wheezing  benzonatate 100 milliGRAM(s) Oral every 8 hours PRN Cough  dextrose Oral Gel 15 Gram(s) Oral once PRN Blood Glucose LESS THAN 70 milliGRAM(s)/deciliter  guaifenesin/dextromethorphan Oral Liquid 10 milliLiter(s) Oral every 6 hours PRN Cough  melatonin 3 milliGRAM(s) Oral at bedtime PRN Insomnia    Pertinent Labs: 01-10 Na143 mmol/L Glu 116 mg/dL<H> K+ 3.3 mmol/L<L> Cr  0.63 mg/dL BUN 17 mg/dL 12-18 Chol 91 mg/dL LDL --    HDL 46 mg/dL<L> Trig 52 mg/dL    Skin:  no pressure ulcers noted    Estimated Needs:   [X ] no change since previous assessment (12/24)  [ ] recalculated:     Previous Nutrition Diagnosis:   [X ] Inadequate Energy Intake  Etiology:  acute metabolic encephalopathy, acute respiratory failure, PNA  Signs & Symptoms:  Pt consume <75% nutrition needs >7 days    GOAL:  Pt to consume 50-75% of meals/supplements - not consistently met at this time    Nutrition Diagnosis is [x ] ongoing  [ ] resolved [ ] not applicable     New Nutrition Diagnosis: [x ] not applicable      Interventions:     Continue current diet rx as noted  Recommend  [ ] Change Diet To:  [ ] Nutrition Supplement  [ ] Nutrition Support  [ ] Other:     Monitoring and Evaluation:   [X ] PO intake [ x ] Tolerance to diet prescription [ x ] weights [ x ] labs[ x ] follow up per protocol  [ ] other:

## 2024-01-10 NOTE — PROGRESS NOTE ADULT - REASON FOR ADMISSION
AMS, HCAP, Acute Hypoxic respiratory failure

## 2024-01-10 NOTE — PROGRESS NOTE ADULT - NS ATTEND AMEND GEN_ALL_CORE FT
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 Found to have bilateral diffuse ground glass opacities consistent with multifocal PNA on chest imaging. Tested + for Mycoplasma. Hypoxic necessitating increased O2 requirements during hospital course. Now weaned off O2. Hospital course complicated by exposure to COVID + roommate and is now + Flu A but COVID negative.    Dx: acute hypoxic respiratory failure, multifocal PNA due to mycoplasma pneumonia, acute asthma exacerbation in setting of bacterial PNA/viral infection, influenza A      - CT chest 12/25 showing multifocal PNA with diffuse bilateral GGO. repeat CT chest non contrast performed 12/28 by primary team again with multifocal ground glass opacities consistent with PNA. would not anticipate drastic change in imaging of chest in such short duration of time  - sent for CTA chest 1/1/24 per primary team and CTA negative for PE, mild improvement in GGO, but with noted traction bronchiectasis  - would repeat CT chest in 6 weeks to assess for lung parenchyma improvement, chest imaging may take several weeks to clear  - she had CT chest done 7/2019 which did not have any findings of GGO/traction bronchiectasis. Findings on current imaging likely reflect her current/recent infection and PNA    - she completed an initial 7 day course of azithromycin for mycoplasma PNA but remained symptomatic  - seen by ID and placed on doxycycline to extend treatment for mycoplasma and started on cefepime for gram negative bacterial PNA / HCAP coverage, procalcitonin trended since admission.   - she is now s/p 7 day course of cefepime on 1/3   - remains on doxycycline today is day #12 to complete 14 days of doxycycline per ID  - sputum cx with normal respiratory hossein  - had initially completed a course of steroids 12/25 - 1/4 for acute asthma exacerbation however with influenza A infection pt developed recurrent wheeze and was placed back on systemic steroids prednisone 40mg daily on 1/9  - would taper prednisone by 10mg every 3 days till off (prednisone 40mg x3 days, 20mg x3 days, 10mg x3 days, off)  - cont antitussives tessalon perles, hycodan  - cont with ICS/LABA/LAMA combo symbicort + spiriva for underlying asthma/reactive airway   - goal to maintain O2 sat > 90%  - weaned off oxygen and doing well on RA  - incentive spirometer, OOB to chair  - cont xarelto for underlying hx of PE  - completed 5 day course of tamiflu for flu A  - in hospital exposure to COVID roommate however has tested negative for COVID x2 post exposure.  - her flu A conversion likely due to + sick contact with family visiting who had flu (). she reports her mother in law is now hospitalized with flu as well.  - will need outpatient pulmonary follow up. She sees Dr. García in [facility] and should follow up with him. She declined home tele pulmonary visit post discharge and states she will see her own pulmonologist and can easily make an appointment herself.   - d/c planning pt seen and examined with NP    no acute events  remains on RA  no SOB  + cough (dry)  has wheeze only with episodes of coughing  afebrile    63F PMH obesity, HTN, DM type 2, SLE, hx of GBS s/p IVIG (required intubation), CVA/TIA with residual L sided weakness with mild slurred speech, chronic left foot drop, seizure disorder, asthma, PE on Xarelto presents for AMS, unresponsiveness. Found to have bilateral diffuse ground glass opacities consistent with multifocal PNA on chest imaging. Tested + for Mycoplasma. Hypoxic necessitating increased O2 requirements during hospital course. Now weaned off O2. Hospital course complicated by exposure to COVID + roommate and is now + Flu A but COVID negative.    Dx: acute hypoxic respiratory failure, multifocal PNA due to mycoplasma pneumonia, acute asthma exacerbation in setting of bacterial PNA/viral infection, influenza A      - CT chest 12/25 showing multifocal PNA with diffuse bilateral GGO. repeat CT chest non contrast performed 12/28 by primary team again with multifocal ground glass opacities consistent with PNA. would not anticipate drastic change in imaging of chest in such short duration of time  - sent for CTA chest 1/1/24 per primary team and CTA negative for PE, mild improvement in GGO, but with noted traction bronchiectasis  - would repeat CT chest in 6 weeks to assess for lung parenchyma improvement, chest imaging may take several weeks to clear  - she had CT chest done 7/2019 which did not have any findings of GGO/traction bronchiectasis. Findings on current imaging likely reflect her current/recent infection and PNA    - she completed an initial 7 day course of azithromycin for mycoplasma PNA but remained symptomatic  - seen by ID and placed on doxycycline to extend treatment for mycoplasma and started on cefepime for gram negative bacterial PNA / HCAP coverage, procalcitonin trended since admission.   - she is now s/p 7 day course of cefepime on 1/3   - remains on doxycycline today is day #12 to complete 14 days of doxycycline per ID  - sputum cx with normal respiratory hossein  - had initially completed a course of steroids 12/25 - 1/4 for acute asthma exacerbation however with influenza A infection pt developed recurrent wheeze and was placed back on systemic steroids prednisone 40mg daily on 1/9  - would taper prednisone by 10mg every 3 days till off (prednisone 40mg x3 days, 20mg x3 days, 10mg x3 days, off)  - cont antitussives tessalon perles, hycodan  - cont with ICS/LABA/LAMA combo symbicort + spiriva for underlying asthma/reactive airway   - goal to maintain O2 sat > 90%  - weaned off oxygen and doing well on RA  - incentive spirometer, OOB to chair  - cont xarelto for underlying hx of PE  - completed 5 day course of tamiflu for flu A  - in hospital exposure to COVID roommate however has tested negative for COVID x2 post exposure.  - her flu A conversion likely due to + sick contact with family visiting who had flu (). she reports her mother in law is now hospitalized with flu as well.  - will need outpatient pulmonary follow up. She sees Dr. García in [facility] and should follow up with him. She declined home tele pulmonary visit post discharge and states she will see her own pulmonologist and can easily make an appointment herself.   - d/c planning

## 2024-01-10 NOTE — DISCHARGE NOTE NURSING/CASE MANAGEMENT/SOCIAL WORK - PATIENT PORTAL LINK FT
You can access the FollowMyHealth Patient Portal offered by [facility] by registering at the following website: [email or web address]. By joining [employer]’s FollowMyHealth portal, you will also be able to view your health information using other applications (apps) compatible with our system. You can access the FollowMyHealth Patient Portal offered by [facility] by registering at the following website: [email or web address]. By joining [employer]’s FollowMyHealth portal, you will also be able to view your health information using other applications (apps) compatible with our system.

## 2024-01-10 NOTE — PROGRESS NOTE ADULT - PROVIDER SPECIALTY LIST ADULT
Hospitalist
Infectious Disease
Internal Medicine
Neurology
Pulmonology
Hospitalist
Hospitalist
Infectious Disease
Internal Medicine
Pulmonology
Hospitalist
Internal Medicine
Internal Medicine
Neurology
Pulmonology
Infectious Disease
Pulmonology
Pulmonology
Infectious Disease
Internal Medicine
Neurology
Pulmonology
Hospitalist
Hospitalist
Internal Medicine
Pulmonology
Hospitalist
Internal Medicine
Internal Medicine
Neurology
Neurology
Pulmonology
Hospitalist
Hospitalist

## 2024-01-10 NOTE — PROGRESS NOTE ADULT - ASSESSMENT
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 as well as slurred speech, Seizure disorder, admitted with respiratory failure 2/2 myocplasma pneumonia     Dx: acute hypoxic respiratory failure 2/2 mycoplasma pneumonia    - On room air with SpO2 stable  - O2 assess on ambulation (Room AIr ) sats remain ~91%- patient will NOT qualify for home O2   - CT chest consistent with multifocal pneumonia, repeat imaging with improvement   - Mycoplasma IgM positive, completed 7 days of azithromycin  - cefepime started 12/27 / doxycycline added 12/28  ( today is day 12) for further mycoplasma coverage - per ID, appreciate recs - thus far 18 days ABX   - Complete prednisone taper 40mg today, the  20 mg x 3 days and then 10 mg x 3 days.   - Symbicort & Spiriva added for asthma maintenance now that pt is off systemic steroids and standing duonebs    - cont with aerobika for cough assist/secretion mobilization  - cont antitussives tessalon perles, hycodan  - cont xarelto for underlying hx of PE  - will need outpatient pulmonary follow up. She sees Dr. García in [facility] and should follow up with him.   - Pending DC as per medical team    - Plan of care discussed with Pulmonology attending Dr. Mccray.    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 as well as slurred speech, Seizure disorder, admitted with respiratory failure 2/2 myocplasma pneumonia     Dx: acute hypoxic respiratory failure 2/2 mycoplasma pneumonia    - On room air with SpO2 stable  - O2 assess on ambulation (Room AIr ) sats remain ~91%- patient will NOT qualify for home O2   - CT chest consistent with multifocal pneumonia, repeat imaging with improvement   - Mycoplasma IgM positive, completed 7 days of azithromycin  - cefepime started 12/27 / doxycycline added 12/28  ( today is day 12) for further mycoplasma coverage - per ID, appreciate recs - thus far 18 days ABX   - Complete prednisone taper 40mg today, the  20 mg x 3 days and then 10 mg x 3 days.   - Symbicort & Spiriva added for asthma maintenance now that pt is off systemic steroids and standing duonebs    - cont with aerobika for cough assist/secretion mobilization  - cont antitussives tessalon perles, hycodan  - cont xarelto for underlying hx of PE  - will need outpatient pulmonary follow up. She sees Dr. García in [facility] and should follow up with him.   - Pending DC as per medical team    - Plan of care discussed with Pulmonology attending Dr. Mccray.    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 as well as slurred speech, Seizure disorder, admitted with respiratory failure 2/2 mycoplasma pneumonia     Dx: acute hypoxic respiratory failure 2/2 mycoplasma pneumonia, flu A    - On room air with SpO2 stable  - O2 assessed on ambulation (Room AIr ) sats remain ~91%- patient will NOT qualify for home O2   - CT chest consistent with multifocal pneumonia, repeat imaging with improvement   - Mycoplasma IgM positive, completed 7 days of azithromycin  - cefepime started 12/27 / doxycycline added 12/28  ( today is day 12) for further mycoplasma coverage - per ID, appreciate recs - thus far 18 days ABX   - Complete prednisone taper 40mg today, then  20 mg x 3 days and then 10 mg x 3 days.   - Symbicort & Spiriva added for asthma maintenance  - cont with aerobika for cough assist/secretion mobilization  - cont antitussives tessalon perles, hycodan  - cont xarelto for underlying hx of PE  - will need outpatient pulmonary follow up. She sees Dr. García in [facility] and should follow up with him.   - Pending DC as per medical team    - Plan of care discussed with Pulmonology attending Dr. Mccray.    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 as well as slurred speech, Seizure disorder, admitted with respiratory failure 2/2 mycoplasma pneumonia     Dx: acute hypoxic respiratory failure 2/2 mycoplasma pneumonia, flu A    - On room air with SpO2 stable  - O2 assessed on ambulation (Room AIr ) sats remain ~91%- patient will NOT qualify for home O2   - CT chest consistent with multifocal pneumonia, repeat imaging with improvement   - Mycoplasma IgM positive, completed 7 days of azithromycin  - cefepime started 12/27 / doxycycline added 12/28  ( today is day 12) for further mycoplasma coverage - per ID, appreciate recs - thus far 18 days ABX   - Complete prednisone taper 40mg today, then  20 mg x 3 days and then 10 mg x 3 days.   - Symbicort & Spiriva added for asthma maintenance  - cont with aerobika for cough assist/secretion mobilization  - cont antitussives tessalon perles, hycodan  - cont xarelto for underlying hx of PE  - will need outpatient pulmonary follow up. She sees Dr. García in [facility] and should follow up with him.   - Pending DC as per medical team    - Plan of care discussed with Pulmonology attending Dr. Mccray.

## 2024-01-10 NOTE — DISCHARGE NOTE NURSING/CASE MANAGEMENT/SOCIAL WORK - NSDCPEFALRISK_GEN_ALL_CORE
For information on Fall & Injury Prevention, visit: [email or web address] OR  [email or web address] OR  https://www.cdc.gov/steadi/patient.html For information on Fall & Injury Prevention, visit: [email or web address] OR  [email or web address] OR  https://www.cdc.gov/steadi/patient.html

## 2024-01-10 NOTE — DISCHARGE NOTE PROVIDER - ATTENDING DISCHARGE PHYSICAL EXAMINATION:
Vital Signs Last 24 Hrs  T(F): 98.3 (10 Parmjit 2024 11:08), Max: 98.3 (10 Parmjit 2024 11:08)  HR: 115 (10 Parmjit 2024 11:08) (80 - 115)  BP: 148/75 (10 Parmjit 2024 11:08) (109/75 - 148/75)  RR: 19 (10 Parmjit 2024 11:08) (18 - 19)  SpO2: 97% (10 Parmjit 2024 11:08) (95% - 98%)    Physical Exam:  Constitutional: NAD, awake and alert  Respiratory: cta b/l no wheezing no rhonchi  Cardiovascular: +s1/s2 no edema b/l le  Gastrointestinal: soft nt nd bs+  Vascular: 2+ peripheral pulses  Neurological: A/O x 3, no focal deficits

## 2024-01-10 NOTE — PROGRESS NOTE ADULT - NS ATTEND OPT1 GEN_ALL_CORE
I independently performed the documented:
I attest my time as attending is greater than 50% of the total combined time spent on qualifying patient care activities by the PA/NP and attending.
I independently performed the documented:
I independently performed the documented:
I attest my time as attending is greater than 50% of the total combined time spent on qualifying patient care activities by the PA/NP and attending.
I attest my time as attending is greater than 50% of the total combined time spent on qualifying patient care activities by the PA/NP and attending.
I independently performed the documented:
I independently performed the documented:
I attest my time as attending is greater than 50% of the total combined time spent on qualifying patient care activities by the PA/NP and attending.

## 2024-01-10 NOTE — PROGRESS NOTE ADULT - TIME BILLING
I have spent a total of 36 minutes to prepare to see the patient, obtaining and reviewing history, physical examination, explaining the diagnosis, prognosis and treatment plan with the patient/family/caregiver. I also have spent the time ordering studies and testing, interpreting results, medicine reconciliation, subspecialty consultation and documentation as above.
I have spent a total of 42 minutes to prepare to see the patient, obtaining and reviewing history, physical examination, explaining the diagnosis, prognosis and treatment plan with the patient/family/caregiver. I also have spent the time ordering studies and testing, interpreting results, medicine reconciliation, subspecialty consultation and documentation as above.
I have spent a total of 54 minutes to prepare to see the patient, obtaining and reviewing history, physical examination, explaining the diagnosis, prognosis and treatment plan with the patient/family/caregiver. I also have spent the time ordering studies and testing, interpreting results, medicine reconciliation, subspecialty consultation and documentation as above.
I have spent a total of 40 minutes to prepare to see the patient, obtaining and reviewing history, physical examination, explaining the diagnosis, prognosis and treatment plan with the patient/family/caregiver. I also have spent the time ordering studies and testing, interpreting results, medicine reconciliation, subspecialty consultation and documentation as above.
I have spent a total of 43 minutes to prepare to see the patient, obtaining and reviewing history, physical examination, explaining the diagnosis, prognosis and treatment plan with the patient/family/caregiver. I also have spent the time ordering studies and testing, interpreting results, medicine reconciliation, subspecialty consultation and documentation as above.
I have spent a total of 40 minutes to prepare to see the patient, obtaining and reviewing history, physical examination, explaining the diagnosis, prognosis and treatment plan with the patient/family/caregiver. I also have spent the time ordering studies and testing, interpreting results, medicine reconciliation, subspecialty consultation and documentation as above.
review of chart, blood work, vitals, medications, imaging, direct patient care
I have spent a total of 41 minutes to prepare to see the patient, obtaining and reviewing history, physical examination, explaining the diagnosis, prognosis and treatment plan with the patient/family/caregiver. I also have spent the time ordering studies and testing, interpreting results, medicine reconciliation, subspecialty consultation and documentation as above.
I have spent a total of 46 minutes to prepare to see the patient, obtaining and reviewing history, physical examination, explaining the diagnosis, prognosis and treatment plan with the patient/family/caregiver. I also have spent the time ordering studies and testing, interpreting results, medicine reconciliation, subspecialty consultation and documentation as above.
review of records, chart, blood work, vitals, medications, imaging, direct patient care, discussion with hospitalist and 
review of chart, blood work, vitals, medications, imaging, direct patient care
review of chart, blood work, vitals, medications, imaging, direct patient care
review of chart, blood work, vitals, medications, imaging, direct patient care, discussion with hospitalist
review of chart, vitals, blood work, medications, imaging, direct patient care
review of chart, blood work, vitals, medications, direct patient care
review of chart, blood work, vitals, medications, imaging, direct patient care

## 2024-01-10 NOTE — DISCHARGE NOTE PROVIDER - NSDCCPCAREPLAN_GEN_ALL_CORE_FT
PRINCIPAL DISCHARGE DIAGNOSIS  Diagnosis: AMS (altered mental status)  Assessment and Plan of Treatment:      PRINCIPAL DISCHARGE DIAGNOSIS  Diagnosis: AMS (altered mental status)  Assessment and Plan of Treatment: in setting of infection.      SECONDARY DISCHARGE DIAGNOSES  Diagnosis: Acute respiratory failure with hypoxia  Assessment and Plan of Treatment: In setting of pneumonia.

## 2024-01-16 ENCOUNTER — APPOINTMENT (OUTPATIENT)
Dept: PULMONOLOGY | Facility: CLINIC | Age: 64
End: 2024-01-16

## 2024-01-16 DIAGNOSIS — M32.9 SYSTEMIC LUPUS ERYTHEMATOSUS, UNSPECIFIED: ICD-10-CM

## 2024-01-16 DIAGNOSIS — Z22.330 CARRIER OF GROUP B STREPTOCOCCUS: ICD-10-CM

## 2024-01-16 DIAGNOSIS — I69.354 HEMIPLEGIA AND HEMIPARESIS FOLLOWING CEREBRAL INFARCTION AFFECTING LEFT NON-DOMINANT SIDE: ICD-10-CM

## 2024-01-16 DIAGNOSIS — Z79.01 LONG TERM (CURRENT) USE OF ANTICOAGULANTS: ICD-10-CM

## 2024-01-16 DIAGNOSIS — I10 ESSENTIAL (PRIMARY) HYPERTENSION: ICD-10-CM

## 2024-01-16 DIAGNOSIS — G93.41 METABOLIC ENCEPHALOPATHY: ICD-10-CM

## 2024-01-16 DIAGNOSIS — E78.5 HYPERLIPIDEMIA, UNSPECIFIED: ICD-10-CM

## 2024-01-16 DIAGNOSIS — J96.01 ACUTE RESPIRATORY FAILURE WITH HYPOXIA: ICD-10-CM

## 2024-01-16 DIAGNOSIS — Z79.4 LONG TERM (CURRENT) USE OF INSULIN: ICD-10-CM

## 2024-01-16 DIAGNOSIS — J18.8 OTHER PNEUMONIA, UNSPECIFIED ORGANISM: ICD-10-CM

## 2024-01-16 DIAGNOSIS — E87.6 HYPOKALEMIA: ICD-10-CM

## 2024-01-16 DIAGNOSIS — E11.65 TYPE 2 DIABETES MELLITUS WITH HYPERGLYCEMIA: ICD-10-CM

## 2024-01-16 DIAGNOSIS — Z86.711 PERSONAL HISTORY OF PULMONARY EMBOLISM: ICD-10-CM

## 2024-01-16 DIAGNOSIS — J09.X2 INFLUENZA DUE TO IDENTIFIED NOVEL INFLUENZA A VIRUS WITH OTHER RESPIRATORY MANIFESTATIONS: ICD-10-CM

## 2024-03-18 ENCOUNTER — INPATIENT (INPATIENT)
Facility: HOSPITAL | Age: 64
LOS: 23 days | Discharge: HOME | End: 2024-04-11
Attending: STUDENT IN AN ORGANIZED HEALTH CARE EDUCATION/TRAINING PROGRAM | Admitting: STUDENT IN AN ORGANIZED HEALTH CARE EDUCATION/TRAINING PROGRAM
Payer: MEDICARE

## 2024-03-18 VITALS
DIASTOLIC BLOOD PRESSURE: 84 MMHG | RESPIRATION RATE: 30 BRPM | OXYGEN SATURATION: 100 % | HEART RATE: 114 BPM | SYSTOLIC BLOOD PRESSURE: 116 MMHG | TEMPERATURE: 97 F

## 2024-03-18 DIAGNOSIS — Z98.890 OTHER SPECIFIED POSTPROCEDURAL STATES: Chronic | ICD-10-CM

## 2024-03-18 DIAGNOSIS — Z98.51 TUBAL LIGATION STATUS: Chronic | ICD-10-CM

## 2024-03-18 DIAGNOSIS — Z90.710 ACQUIRED ABSENCE OF BOTH CERVIX AND UTERUS: Chronic | ICD-10-CM

## 2024-03-18 DIAGNOSIS — Z98.1 ARTHRODESIS STATUS: Chronic | ICD-10-CM

## 2024-03-18 DIAGNOSIS — Z96.649 PRESENCE OF UNSPECIFIED ARTIFICIAL HIP JOINT: Chronic | ICD-10-CM

## 2024-03-18 PROCEDURE — 99285 EMERGENCY DEPT VISIT HI MDM: CPT

## 2024-03-18 RX ORDER — ACETAMINOPHEN 500 MG
1000 TABLET ORAL ONCE
Refills: 0 | Status: COMPLETED | OUTPATIENT
Start: 2024-03-18 | End: 2024-03-18

## 2024-03-18 RX ORDER — SODIUM CHLORIDE 9 MG/ML
1000 INJECTION, SOLUTION INTRAVENOUS ONCE
Refills: 0 | Status: COMPLETED | OUTPATIENT
Start: 2024-03-18 | End: 2024-03-18

## 2024-03-18 NOTE — ED PROVIDER NOTE - PHYSICAL EXAMINATION
GENERAL: Sitting uncomfortably in bed in obvious distress, significant coughing and speaking only 2 words at a time but able to complete full sentences  NEURO: Alert and Oriented to person, place, date and situation. Pupils symmetric, No ptosis. No facial asymmetry or dysarthria, no tremor noted.  HEENT: No conjunctival injection or scleral icterus.   CARD: Tachycardic rate and regular rhythm, no murmurs and no gallops appreciated.  RESP: Clear to auscultation bilaterally, No wheezes, rales or rhonchi. Good respiratory effort.  ABD: Nondistended, Soft and nontender to palpation in all quadrants, no guarding, no rigidity. No masses appreciated.  EXT: No pedal edema. 2+DP pulses bilaterally.  SKIN: No rashes, bruising or acute skin injuries on face, limbs, abdomen, chest or back

## 2024-03-18 NOTE — ED PROVIDER NOTE - NS ED ROS FT
CONSTITUTIONAL - No fever, No weight change, No lightheadedness  SKIN - No rash  HEMATOLOGIC - No abnormal bleeding or bruising  EYES - No eye pain, No blurred vision  ENT - No change in hearing, No sore throat, No neck pain, No rhinorrhea, No ear pain  RESPIRATORY -   + shortness of breath,  + cough  CARDIAC -No chest pain, No palpitations  GI - No abdominal pain, No nausea, No vomiting, No diarrhea, No constipation  - No dysuria, no frequency, no hematuria.   MUSCULOSKELETAL - No joint pain, No swelling, No back pain  NEUROLOGIC - No numbness, No focal weakness, No headache, No dizziness

## 2024-03-18 NOTE — ED PROVIDER NOTE - OBJECTIVE STATEMENT
64 old female past medical history lupus, CVA/TIA, asthma, PE in 2019 on Eliquis, history of GBS, recent admission to [facility] for flu and pneumonia in January was presenting with progressive worsening of cough and trouble breathing since that discharge.  She reports when she was discharged she was still having symptoms of significant cough that was leading to trouble breathing however over the last 3 weeks has gotten significantly worse where she feels she is coughing nonstop all day and night and then has a lot of trouble breathing and takes a long time for her to recover from these episodes.  She thinks she had a fever couple days ago but otherwise has not had fever or chills, no known sick contacts but was at a  last week, has had 2 days of bloody loose stools however these are common intermittently for her, no vomiting, has had minimal appetite so has not eaten much, has been taking her Eliquis religiously.  She has some bilateral lower frontal chest pain that only occurs when she coughs, does not appear to be pleuritic, has no history of CAD.  She denies any abdominal pain, back pain, swelling of the feet, orthopnea.  She reports the symptoms have been similar for the last 3 weeks and there was nothing that happened today that specifically caused her to come to the ER.  Patient does not believe that she is in the lupus flare, this usually presents as generalized malaise which she has not felt.

## 2024-03-18 NOTE — ED PROVIDER NOTE - CLINICAL SUMMARY MEDICAL DECISION MAKING FREE TEXT BOX
64-year-old female  with extensive past medical histor and lupus (no interstitial lung disease) and PE in the past, asthma currently on Eliquis presenting with progressively worsening coughing fits and associated shortness of breath for 3 weeks.  Vital signs on arrival significant for sinus tachycardia with heart rate 110s to 120s, blood pressure normal, afebrile, per EMS she was satting in the 80s on room air so she arrived on nonrebreather, while not coughing she remains 94% and above on room air however during coughing fits and after will remain as low as 85% and take 1 to 2 minutes to recover back up to normal so we are keeping her on 4 L.  On physical exam patient has no wheezing, rales, diminished lung sounds and has good air movement bilaterally, but is in obvious distress speaking only 1-2 words at a time and taking very deep quick breaths with intermittent long episodes of coughing, no pitting edema to the feet, no abdominal tenderness or distention.  Concerned patient has another viral URI or postviral syndrome, PE less likely given compliance with anticoagulation, no hx Heart failure and no other signs of volume overload to represent heart failure. will get a chest x-ray, CBC CMP VBG, EKG, BNP troponin due to her intermittent chest pains which could be atypical ACS although less likely, patient will need to be admitted regardless for her oxygen requirements.  At this time I do not think that CPAP or BiPAP for her work of breathing would be helpful as well seems to be triggered by sensitivity of the throat and coughing, will give cough syrup, IV fluids she says she has had minimal to eat and feels very dehydrated and has no history of kidney disease or heart failure, Tylenol for chest pain. 64-year-old female  with extensive past medical history and lupus (no interstitial lung disease) and PE in the past, asthma currently on Eliquis presenting with progressively worsening coughing fits and associated shortness of breath for 3 weeks.  Vital signs on arrival significant for sinus tachycardia with heart rate 110s to 120s, blood pressure normal, afebrile, per EMS she was satting in the 80s on room air so she arrived on nonrebreather, while not coughing she remains 94% and above on room air however during coughing fits and after will remain as low as 85% and take 1 to 2 minutes to recover back up to normal so we are keeping her on 4 L.  On physical exam patient has no wheezing, rales, diminished lung sounds and has good air movement bilaterally, but is in obvious distress speaking only 1-2 words at a time and taking very deep quick breaths with intermittent long episodes of coughing, no pitting edema to the feet, no abdominal tenderness or distention.  Concerned patient has another viral URI or postviral syndrome, PE less likely given compliance with anticoagulation, no hx Heart failure and no other signs of volume overload to represent heart failure. will get a chest x-ray, CBC CMP VBG, EKG, BNP troponin due to her intermittent chest pains which could be atypical ACS although less likely, patient will need to be admitted regardless for her oxygen requirements.  At this time I do not think that CPAP or BiPAP for her work of breathing would be helpful as well seems to be triggered by sensitivity of the throat and coughing, will give cough syrup, IV fluids she says she has had minimal to eat and feels very dehydrated and has no history of kidney disease or heart failure, Tylenol for chest pain.

## 2024-03-18 NOTE — ED PROVIDER NOTE - ATTENDING CONTRIBUTION TO CARE
Patient is a 64-year-old female with a history of  diabetes, lupus, asthma, history of CVA/TIA, PE in 2019, on Eliquis here for evaluation of persistent cough and shortness of breath.  Patient states that she was admitted to [facility] for pneumonia/flu and has had a worsening cough since then.  She reports she had a fever last week.  She reports she has been having 2 days of nonbloody loose stools.  She denies any vomiting.  She reports decreased p.o. intake.  She has been using her nebulizer without relief.  She reports chest pain with coughing.     VS noted  Gen.  persistent coughing, appears fatigued  HEENT: EOMI, mmm  Lungs: CTAB/L no C/ W /R   CVS:  tachycardic  Abd; Soft non tender, non distended   Ext: no edema  Skin: no rash  Neuro AAOx3 non focal clear speech  a/p:  coughing–patient reports progressive worsening of cough in the past few weeks.  She has a history of pneumonia/flu in January.  Patient is unable to get any sleep, reports decreased p.o. intake, reports significant fatigue.  Plan for nebulizer, labs, CT chest to evaluate for pneumonia.  Patient is on Eliquis and reports compliance.  Low suspicion for PE at this time.  - Elias MARSH

## 2024-03-18 NOTE — ED PROVIDER NOTE - PROGRESS NOTE DETAILS
Kathie MARSH, EM/IM PGY-3: Multifocal ggo on CT consistent with multifocal pneumonia, gave ceftriaxone and azithromycin and admitted to medicine. Confirmed medications with pt and she is not on daily prednisone, plaquenil or any other chronic immunosuppressant for Lupus.

## 2024-03-18 NOTE — ED ADULT TRIAGE NOTE - CHIEF COMPLAINT QUOTE
Notification received  for sob. PT was 70-80% on room air in the field, pt on 15l NC, Pt saturation 100%. Endorses cough x 3 weeks. Also has bloody and abdominal pain.  Past Medical History: L.upus, HTN, DM2, CVA, Seizures Pt  actively having  coughing spells, Charge RN Aware.

## 2024-03-19 DIAGNOSIS — R55 SYNCOPE AND COLLAPSE: ICD-10-CM

## 2024-03-19 DIAGNOSIS — J18.9 PNEUMONIA, UNSPECIFIED ORGANISM: ICD-10-CM

## 2024-03-19 DIAGNOSIS — M32.9 SYSTEMIC LUPUS ERYTHEMATOSUS, UNSPECIFIED: ICD-10-CM

## 2024-03-19 DIAGNOSIS — Z86.73 PERSONAL HISTORY OF TRANSIENT ISCHEMIC ATTACK (TIA), AND CEREBRAL INFARCTION WITHOUT RESIDUAL DEFICITS: ICD-10-CM

## 2024-03-19 DIAGNOSIS — I26.99 OTHER PULMONARY EMBOLISM WITHOUT ACUTE COR PULMONALE: ICD-10-CM

## 2024-03-19 DIAGNOSIS — J96.01 ACUTE RESPIRATORY FAILURE WITH HYPOXIA: ICD-10-CM

## 2024-03-19 DIAGNOSIS — R06.00 DYSPNEA, UNSPECIFIED: ICD-10-CM

## 2024-03-19 DIAGNOSIS — Z29.9 ENCOUNTER FOR PROPHYLACTIC MEASURES, UNSPECIFIED: ICD-10-CM

## 2024-03-19 DIAGNOSIS — E11.9 TYPE 2 DIABETES MELLITUS WITHOUT COMPLICATIONS: ICD-10-CM

## 2024-03-19 DIAGNOSIS — R19.7 DIARRHEA, UNSPECIFIED: ICD-10-CM

## 2024-03-19 DIAGNOSIS — I10 ESSENTIAL (PRIMARY) HYPERTENSION: ICD-10-CM

## 2024-03-19 LAB
ADD ON TEST-SPECIMEN IN LAB: SIGNIFICANT CHANGE UP
ADD ON TEST-SPECIMEN IN LAB: SIGNIFICANT CHANGE UP
ALBUMIN SERPL ELPH-MCNC: 3.3 G/DL — SIGNIFICANT CHANGE UP (ref 3.3–5)
ALP SERPL-CCNC: 75 U/L — SIGNIFICANT CHANGE UP (ref 40–120)
ALT FLD-CCNC: 10 U/L — SIGNIFICANT CHANGE UP (ref 4–33)
ANION GAP SERPL CALC-SCNC: 15 MMOL/L — HIGH (ref 7–14)
APTT BLD: 31.5 SEC — SIGNIFICANT CHANGE UP (ref 24.5–35.6)
AST SERPL-CCNC: 18 U/L — SIGNIFICANT CHANGE UP (ref 4–32)
BASE EXCESS BLDV CALC-SCNC: 1.4 MMOL/L — SIGNIFICANT CHANGE UP (ref -2–3)
BASOPHILS # BLD AUTO: 0.01 K/UL — SIGNIFICANT CHANGE UP (ref 0–0.2)
BASOPHILS NFR BLD AUTO: 0.1 % — SIGNIFICANT CHANGE UP (ref 0–2)
BILIRUB SERPL-MCNC: 0.4 MG/DL — SIGNIFICANT CHANGE UP (ref 0.2–1.2)
BLD GP AB SCN SERPL QL: NEGATIVE — SIGNIFICANT CHANGE UP
BLOOD GAS VENOUS COMPREHENSIVE RESULT: SIGNIFICANT CHANGE UP
BUN SERPL-MCNC: 8 MG/DL — SIGNIFICANT CHANGE UP (ref 7–23)
C3 SERPL-MCNC: 192 MG/DL — HIGH (ref 90–180)
C4 SERPL-MCNC: 60 MG/DL — HIGH (ref 10–40)
CALCIUM SERPL-MCNC: 9.3 MG/DL — SIGNIFICANT CHANGE UP (ref 8.4–10.5)
CHLORIDE BLDV-SCNC: 101 MMOL/L — SIGNIFICANT CHANGE UP (ref 96–108)
CHLORIDE SERPL-SCNC: 99 MMOL/L — SIGNIFICANT CHANGE UP (ref 98–107)
CO2 BLDV-SCNC: 28.6 MMOL/L — HIGH (ref 22–26)
CO2 SERPL-SCNC: 25 MMOL/L — SIGNIFICANT CHANGE UP (ref 22–31)
CREAT SERPL-MCNC: 0.77 MG/DL — SIGNIFICANT CHANGE UP (ref 0.5–1.3)
CRP SERPL-MCNC: 73.2 MG/L — HIGH
EGFR: 86 ML/MIN/1.73M2 — SIGNIFICANT CHANGE UP
EOSINOPHIL # BLD AUTO: 0.12 K/UL — SIGNIFICANT CHANGE UP (ref 0–0.5)
EOSINOPHIL NFR BLD AUTO: 1.6 % — SIGNIFICANT CHANGE UP (ref 0–6)
ERYTHROCYTE [SEDIMENTATION RATE] IN BLOOD: 96 MM/HR — HIGH (ref 4–25)
FLUAV AG NPH QL: SIGNIFICANT CHANGE UP
FLUBV AG NPH QL: SIGNIFICANT CHANGE UP
GAS PNL BLDV: 137 MMOL/L — SIGNIFICANT CHANGE UP (ref 136–145)
GLUCOSE BLDC GLUCOMTR-MCNC: 108 MG/DL — HIGH (ref 70–99)
GLUCOSE BLDC GLUCOMTR-MCNC: 125 MG/DL — HIGH (ref 70–99)
GLUCOSE BLDC GLUCOMTR-MCNC: 178 MG/DL — HIGH (ref 70–99)
GLUCOSE BLDC GLUCOMTR-MCNC: 83 MG/DL — SIGNIFICANT CHANGE UP (ref 70–99)
GLUCOSE BLDV-MCNC: 114 MG/DL — HIGH (ref 70–99)
GLUCOSE SERPL-MCNC: 103 MG/DL — HIGH (ref 70–99)
HCG SERPL-ACNC: <1 MIU/ML — SIGNIFICANT CHANGE UP
HCO3 BLDV-SCNC: 27 MMOL/L — SIGNIFICANT CHANGE UP (ref 22–29)
HCT VFR BLD CALC: 33.1 % — LOW (ref 34.5–45)
HCT VFR BLDA CALC: 32 % — LOW (ref 34.5–46.5)
HGB BLD CALC-MCNC: 10.8 G/DL — LOW (ref 11.7–16.1)
HGB BLD-MCNC: 10.5 G/DL — LOW (ref 11.5–15.5)
IANC: 5.58 K/UL — SIGNIFICANT CHANGE UP (ref 1.8–7.4)
IMM GRANULOCYTES NFR BLD AUTO: 0.7 % — SIGNIFICANT CHANGE UP (ref 0–0.9)
INR BLD: 1.12 RATIO — SIGNIFICANT CHANGE UP (ref 0.85–1.18)
LACTATE BLDV-MCNC: 1.9 MMOL/L — SIGNIFICANT CHANGE UP (ref 0.5–2)
LIDOCAIN IGE QN: 16 U/L — SIGNIFICANT CHANGE UP (ref 7–60)
LYMPHOCYTES # BLD AUTO: 0.98 K/UL — LOW (ref 1–3.3)
LYMPHOCYTES # BLD AUTO: 13.3 % — SIGNIFICANT CHANGE UP (ref 13–44)
MAGNESIUM SERPL-MCNC: 1.9 MG/DL — SIGNIFICANT CHANGE UP (ref 1.6–2.6)
MCHC RBC-ENTMCNC: 26.1 PG — LOW (ref 27–34)
MCHC RBC-ENTMCNC: 31.7 GM/DL — LOW (ref 32–36)
MCV RBC AUTO: 82.1 FL — SIGNIFICANT CHANGE UP (ref 80–100)
MONOCYTES # BLD AUTO: 0.63 K/UL — SIGNIFICANT CHANGE UP (ref 0–0.9)
MONOCYTES NFR BLD AUTO: 8.5 % — SIGNIFICANT CHANGE UP (ref 2–14)
NEUTROPHILS # BLD AUTO: 5.58 K/UL — SIGNIFICANT CHANGE UP (ref 1.8–7.4)
NEUTROPHILS NFR BLD AUTO: 75.8 % — SIGNIFICANT CHANGE UP (ref 43–77)
NRBC # BLD: 0 /100 WBCS — SIGNIFICANT CHANGE UP (ref 0–0)
NRBC # FLD: 0 K/UL — SIGNIFICANT CHANGE UP (ref 0–0)
NT-PROBNP SERPL-SCNC: 108 PG/ML — SIGNIFICANT CHANGE UP
PCO2 BLDV: 47 MMHG — SIGNIFICANT CHANGE UP (ref 39–52)
PH BLDV: 7.37 — SIGNIFICANT CHANGE UP (ref 7.32–7.43)
PLATELET # BLD AUTO: 400 K/UL — SIGNIFICANT CHANGE UP (ref 150–400)
PO2 BLDV: 34 MMHG — SIGNIFICANT CHANGE UP (ref 25–45)
POTASSIUM BLDV-SCNC: 2.8 MMOL/L — CRITICAL LOW (ref 3.5–5.1)
POTASSIUM SERPL-MCNC: 3 MMOL/L — LOW (ref 3.5–5.3)
POTASSIUM SERPL-SCNC: 3 MMOL/L — LOW (ref 3.5–5.3)
PROT SERPL-MCNC: 6.9 G/DL — SIGNIFICANT CHANGE UP (ref 6–8.3)
PROTHROM AB SERPL-ACNC: 12.5 SEC — SIGNIFICANT CHANGE UP (ref 9.5–13)
RBC # BLD: 4.03 M/UL — SIGNIFICANT CHANGE UP (ref 3.8–5.2)
RBC # FLD: 17.6 % — HIGH (ref 10.3–14.5)
RH IG SCN BLD-IMP: POSITIVE — SIGNIFICANT CHANGE UP
RSV RNA NPH QL NAA+NON-PROBE: SIGNIFICANT CHANGE UP
SAO2 % BLDV: 53.2 % — LOW (ref 67–88)
SARS-COV-2 RNA SPEC QL NAA+PROBE: SIGNIFICANT CHANGE UP
SODIUM SERPL-SCNC: 139 MMOL/L — SIGNIFICANT CHANGE UP (ref 135–145)
TROPONIN T, HIGH SENSITIVITY RESULT: 17 NG/L — SIGNIFICANT CHANGE UP
WBC # BLD: 7.37 K/UL — SIGNIFICANT CHANGE UP (ref 3.8–10.5)
WBC # FLD AUTO: 7.37 K/UL — SIGNIFICANT CHANGE UP (ref 3.8–10.5)

## 2024-03-19 PROCEDURE — 99232 SBSQ HOSP IP/OBS MODERATE 35: CPT | Mod: GC

## 2024-03-19 PROCEDURE — 70450 CT HEAD/BRAIN W/O DYE: CPT | Mod: 26

## 2024-03-19 PROCEDURE — 99222 1ST HOSP IP/OBS MODERATE 55: CPT | Mod: GC

## 2024-03-19 PROCEDURE — 99223 1ST HOSP IP/OBS HIGH 75: CPT | Mod: GC

## 2024-03-19 PROCEDURE — 99222 1ST HOSP IP/OBS MODERATE 55: CPT

## 2024-03-19 PROCEDURE — 71045 X-RAY EXAM CHEST 1 VIEW: CPT | Mod: 26

## 2024-03-19 PROCEDURE — 71250 CT THORAX DX C-: CPT | Mod: 26,MC

## 2024-03-19 RX ORDER — SODIUM CHLORIDE 9 MG/ML
1000 INJECTION, SOLUTION INTRAVENOUS
Refills: 0 | Status: DISCONTINUED | OUTPATIENT
Start: 2024-03-19 | End: 2024-04-11

## 2024-03-19 RX ORDER — CEFTRIAXONE 500 MG/1
1000 INJECTION, POWDER, FOR SOLUTION INTRAMUSCULAR; INTRAVENOUS ONCE
Refills: 0 | Status: COMPLETED | OUTPATIENT
Start: 2024-03-19 | End: 2024-03-19

## 2024-03-19 RX ORDER — RIVAROXABAN 15 MG-20MG
20 KIT ORAL
Refills: 0 | Status: DISCONTINUED | OUTPATIENT
Start: 2024-03-19 | End: 2024-03-21

## 2024-03-19 RX ORDER — INSULIN LISPRO 100/ML
VIAL (ML) SUBCUTANEOUS
Refills: 0 | Status: DISCONTINUED | OUTPATIENT
Start: 2024-03-19 | End: 2024-04-11

## 2024-03-19 RX ORDER — IPRATROPIUM/ALBUTEROL SULFATE 18-103MCG
3 AEROSOL WITH ADAPTER (GRAM) INHALATION EVERY 6 HOURS
Refills: 0 | Status: DISCONTINUED | OUTPATIENT
Start: 2024-03-19 | End: 2024-03-25

## 2024-03-19 RX ORDER — GUAIFENESIN/DEXTROMETHORPHAN 600MG-30MG
10 TABLET, EXTENDED RELEASE 12 HR ORAL EVERY 4 HOURS
Refills: 0 | Status: DISCONTINUED | OUTPATIENT
Start: 2024-03-19 | End: 2024-03-23

## 2024-03-19 RX ORDER — INFLUENZA VIRUS VACCINE 15; 15; 15; 15 UG/.5ML; UG/.5ML; UG/.5ML; UG/.5ML
0.5 SUSPENSION INTRAMUSCULAR ONCE
Refills: 0 | Status: DISCONTINUED | OUTPATIENT
Start: 2024-03-19 | End: 2024-04-11

## 2024-03-19 RX ORDER — ACETAMINOPHEN 500 MG
650 TABLET ORAL EVERY 6 HOURS
Refills: 0 | Status: DISCONTINUED | OUTPATIENT
Start: 2024-03-19 | End: 2024-04-11

## 2024-03-19 RX ORDER — POTASSIUM CHLORIDE 20 MEQ
10 PACKET (EA) ORAL
Refills: 0 | Status: COMPLETED | OUTPATIENT
Start: 2024-03-19 | End: 2024-03-19

## 2024-03-19 RX ORDER — DEXTROSE 50 % IN WATER 50 %
12.5 SYRINGE (ML) INTRAVENOUS ONCE
Refills: 0 | Status: DISCONTINUED | OUTPATIENT
Start: 2024-03-19 | End: 2024-04-11

## 2024-03-19 RX ORDER — IPRATROPIUM/ALBUTEROL SULFATE 18-103MCG
3 AEROSOL WITH ADAPTER (GRAM) INHALATION ONCE
Refills: 0 | Status: COMPLETED | OUTPATIENT
Start: 2024-03-19 | End: 2024-03-19

## 2024-03-19 RX ORDER — DEXTROSE 50 % IN WATER 50 %
25 SYRINGE (ML) INTRAVENOUS ONCE
Refills: 0 | Status: DISCONTINUED | OUTPATIENT
Start: 2024-03-19 | End: 2024-04-11

## 2024-03-19 RX ORDER — AZITHROMYCIN 500 MG/1
500 TABLET, FILM COATED ORAL ONCE
Refills: 0 | Status: COMPLETED | OUTPATIENT
Start: 2024-03-19 | End: 2024-03-19

## 2024-03-19 RX ORDER — INSULIN LISPRO 100/ML
VIAL (ML) SUBCUTANEOUS AT BEDTIME
Refills: 0 | Status: DISCONTINUED | OUTPATIENT
Start: 2024-03-19 | End: 2024-04-11

## 2024-03-19 RX ORDER — GLUCAGON INJECTION, SOLUTION 0.5 MG/.1ML
1 INJECTION, SOLUTION SUBCUTANEOUS ONCE
Refills: 0 | Status: DISCONTINUED | OUTPATIENT
Start: 2024-03-19 | End: 2024-04-11

## 2024-03-19 RX ORDER — MEROPENEM 1 G/30ML
1000 INJECTION INTRAVENOUS EVERY 8 HOURS
Refills: 0 | Status: DISCONTINUED | OUTPATIENT
Start: 2024-03-19 | End: 2024-03-22

## 2024-03-19 RX ORDER — DEXTROSE 50 % IN WATER 50 %
15 SYRINGE (ML) INTRAVENOUS ONCE
Refills: 0 | Status: DISCONTINUED | OUTPATIENT
Start: 2024-03-19 | End: 2024-04-11

## 2024-03-19 RX ADMIN — Medication 3 MILLILITER(S): at 16:09

## 2024-03-19 RX ADMIN — SODIUM CHLORIDE 1000 MILLILITER(S): 9 INJECTION, SOLUTION INTRAVENOUS at 00:23

## 2024-03-19 RX ADMIN — AZITHROMYCIN 255 MILLIGRAM(S): 500 TABLET, FILM COATED ORAL at 06:31

## 2024-03-19 RX ADMIN — CEFTRIAXONE 100 MILLIGRAM(S): 500 INJECTION, POWDER, FOR SOLUTION INTRAMUSCULAR; INTRAVENOUS at 05:36

## 2024-03-19 RX ADMIN — Medication 100 MILLIEQUIVALENT(S): at 06:31

## 2024-03-19 RX ADMIN — Medication 3 MILLILITER(S): at 00:23

## 2024-03-19 RX ADMIN — Medication 3 MILLILITER(S): at 09:48

## 2024-03-19 RX ADMIN — Medication 40 MILLIGRAM(S): at 16:58

## 2024-03-19 RX ADMIN — Medication 1000 MILLIGRAM(S): at 01:47

## 2024-03-19 RX ADMIN — RIVAROXABAN 20 MILLIGRAM(S): KIT at 16:58

## 2024-03-19 RX ADMIN — MEROPENEM 100 MILLIGRAM(S): 1 INJECTION INTRAVENOUS at 11:10

## 2024-03-19 RX ADMIN — Medication 10 MILLILITER(S): at 11:10

## 2024-03-19 RX ADMIN — Medication 100 MILLIGRAM(S): at 12:42

## 2024-03-19 RX ADMIN — Medication 10 MILLILITER(S): at 15:51

## 2024-03-19 RX ADMIN — Medication 100 MILLIEQUIVALENT(S): at 08:01

## 2024-03-19 RX ADMIN — Medication 3 MILLILITER(S): at 22:31

## 2024-03-19 RX ADMIN — Medication 400 MILLIGRAM(S): at 00:23

## 2024-03-19 RX ADMIN — MEROPENEM 100 MILLIGRAM(S): 1 INJECTION INTRAVENOUS at 21:42

## 2024-03-19 RX ADMIN — Medication 100 MILLIEQUIVALENT(S): at 03:22

## 2024-03-19 NOTE — CONSULT NOTE ADULT - SUBJECTIVE AND OBJECTIVE BOX
CHIEF COMPLAINT:Patient is a 64y old  Female who presents with a chief complaint of SOB, cough (19 Mar 2024 17:23)      HPI:  Ms Shea Thompson is a 64-year-old female w/ PMH of lupus, PE on Xarelto (dx 2019), asthma, HTN, T2DM, and GBS (dx 1997) presenting with 3wk h/o of worsening SOB and persistent cough. Patient was notably recently admitted at [place] in 01/2024 for superimposed mycoplasma PNA on influenza. Patient further reports several day h/o subjective fevers, chills, and headaches. Patient was then found on floor by  yesterday evening, patient does not remember falling down but does not endorse any acute pain. Patient further endorses loose BMs that began last week with some blood but color transitioned to yellow/orange color as of recent. In the ED, patient was afebrile but tacchycardic and tachyopneic. Labs significant for hypokalemia; RVP, troponin unremarkable. CXR showing bibasilar airspace opacities and CT chest revealing multifocal groundglass and consolidative opacities involving all 5 lobes, most predominant in the b/l lower lobes and right upper lobe. Patient admitted to medicine for further workup and management of AHRF. (19 Mar 2024 09:10)  Pulmonary was consulted for further workup. Pt currently with significant dry cough. She notes intermittent lower ext paresthesias, more often on the left, no associated with any particular movements or with certain sitting positions. She denies Raynaud, sinus congestion/rhinorrhea. She notes dark discoloration of her fingernails. Has not ad a lupus flare in "a long time," usually associated with significant joint pains.  Noted recent VS admission with repeat CT chest largely unchanged compared to repeat.  Seen by rheum at [facility] previously in 2021 with negative THERON, ENAs, notably elevated ESR but low CRP.    PAST MEDICAL & SURGICAL HISTORY:  Lupus      Diabetes      Hypertension      Asthma      Peripheral polyneuropathy      Pulmonary emboli  7/2019      TIA (transient ischemic attack)      GBS (Guillain Mitchell syndrome)      History of hip replacement  left      H/O total hysterectomy      History of bilateral tubal ligation      S/P laminectomy with spinal fusion      History of shoulder surgery      H/O knee surgery          FAMILY HISTORY:  FH: CHF (congestive heart failure) (Mother)    FH: type 2 diabetes (Mother)    FH: HTN (hypertension) (Mother)    FH: colon cancer (Father)        SOCIAL HISTORY:  Smoking: no    Allergies    No Known Allergies    Intolerances    dislikes Glucerna Shake (Other)      HOME MEDICATIONS:  Home Medications:  ALPRAZOLAM 0.5 MG TABLET: TAKE 1/2 TABLET (0.25 MG TOTAL) BY MOUTH NIGHTLY AS NEEDED FOR SLEEP. MAX DAILY AMOUNT: 0.25 MG. (17 Dec 2023 09:17)  aspirin 81 mg oral delayed release tablet: 1 tab(s) orally once a day (17 Dec 2023 09:17)  CVHVUA-ETXTHWSE-RRVI -40: TAKE 1 TABLET BY MOUTH EVERY 4 (FOUR) HOURS AS NEEDED FOR PAIN. MAX DAILY AMOUNT: 6 TABLETS. (17 Dec 2023 09:17)  CYCLOBENZAPRINE 10 MG TABLET: TAKE 1 TABLET BY MOUTH TWICE A DAY (17 Dec 2023 09:17)  DULoxetine 60 mg oral delayed release capsule: 1 cap(s) orally once a day (10 Parmjit 2024 12:17)  gabapentin 300 mg oral capsule: 2 cap(s) orally once a day (at bedtime) (10 Parmjit 2024 12:17)  ipratropium-albuterol 0.5 mg-2.5 mg/3 mL inhalation solution: 3 milliliter(s) inhaled every 6 hours (10 Parmjit 2024 12:17)  MELOXICAM 15 MG TABLET: TAKE 1 TABLET BY MOUTH EVERY DAY (17 Dec 2023 09:17)  METFORMIN HCL  MG TABLET: TAKE 1 TABLET BY MOUTH IN THE MORNING AND TAKE 1 TABLET IN THE EVENING (17 Dec 2023 09:17)  montelukast 10 mg oral tablet: 1 tab(s) orally once a day (10 Parmjit 2024 12:17)  OZEMPIC 0.25-0.5 MG/DOSE PEN: INJECT 0.5 MG INTO THE SKIN ONCE A WEEK.. (17 Dec 2023 09:17)  rivaroxaban 20 mg oral tablet: 1 tab(s) orally once a day (before a meal) (10 Parmjit 2024 12:17)  VERAPAMIL ER  MG CAPSULE: 1 cap(s) orally once a day TAKE 1 CAPSULE BY MOUTH EVERY DAY (10 Parmjit 2024 12:17)      REVIEW OF SYSTEMS:  Constitutional: [x ] negative [ ] fevers [ ] chills [ ] weight loss [ ] weight gain  HEENT: [x ] negative [ ] dry eyes [ ] eye irritation [ ] postnasal drip [ ] nasal congestion  CV: [x ] negative  [ ] chest pain [ ] orthopnea [ ] palpitations [ ] murmur  Resp: [ ] negative [x ] cough [ x] shortness of breath [ ] dyspnea [ ] wheezing [ ] sputum [ ] hemoptysis  GI: [ ] negative [ ] nausea [ ] vomiting [ ] diarrhea [ ] constipation [ ] abd pain [ ] dysphagia   : x[ ] negative [ ] dysuria [ ] nocturia [ ] hematuria [ ] increased urinary frequency  Musculoskeletal: [ ] negative [ ] back pain [ ] myalgias [ ] arthralgias [ ] fracture  Skin: [ ] negative [ ] rash [ ] itch  Neurological: [ ] negative [ ] headache [ ] dizziness [ ] syncope [ ] weakness [ ] numbness  Psychiatric: [ ] negative [ ] anxiety [ ] depression  Endocrine: [ ] negative [ ] diabetes [ ] thyroid problem  Hematologic/Lymphatic: [ ] negative [ ] anemia [ ] bleeding problem  Allergic/Immunologic: [ ] negative [ ] itchy eyes [ ] nasal discharge [ ] hives [ ] angioedema  [x ] All other systems negative  [ ] Unable to assess ROS because ________    OBJECTIVE:  ICU Vital Signs Last 24 Hrs  T(C): 37.1 (19 Mar 2024 17:57), Max: 37.2 (19 Mar 2024 08:36)  T(F): 98.7 (19 Mar 2024 17:57), Max: 98.9 (19 Mar 2024 08:36)  HR: 113 (19 Mar 2024 17:57) (90 - 114)  BP: 162/74 (19 Mar 2024 17:57) (116/84 - 162/74)  BP(mean): --  ABP: --  ABP(mean): --  RR: 20 (19 Mar 2024 17:57) (18 - 30)  SpO2: 96% (19 Mar 2024 17:57) (95% - 100%)    O2 Parameters below as of 19 Mar 2024 17:57  Patient On (Oxygen Delivery Method): nasal cannula  O2 Flow (L/min): 3            CAPILLARY BLOOD GLUCOSE      POCT Blood Glucose.: 108 mg/dL (19 Mar 2024 16:05)      PHYSICAL EXAM:  Gen: uncomfortable appearing  HEENT: MMM, PERRL, EOMI  CV: RRR, no m/r/g  Lung: mild wheeze b/l  Abd: Soft, NT, ND  Ext: nonpitting edema   Skin: No rash  Neuro: A&Ox3, no focal deficits    HOSPITAL MEDICATIONS:  Standing Meds:  albuterol/ipratropium for Nebulization 3 milliLiter(s) Nebulizer every 6 hours  dextrose 5%. 1000 milliLiter(s) IV Continuous <Continuous>  dextrose 5%. 1000 milliLiter(s) IV Continuous <Continuous>  dextrose 50% Injectable 25 Gram(s) IV Push once  dextrose 50% Injectable 12.5 Gram(s) IV Push once  dextrose 50% Injectable 25 Gram(s) IV Push once  doxycycline IVPB      doxycycline IVPB 100 milliGRAM(s) IV Intermittent every 12 hours  glucagon  Injectable 1 milliGRAM(s) IntraMuscular once  insulin lispro (ADMELOG) corrective regimen sliding scale   SubCutaneous three times a day before meals  insulin lispro (ADMELOG) corrective regimen sliding scale   SubCutaneous at bedtime  meropenem  IVPB 1000 milliGRAM(s) IV Intermittent every 8 hours  predniSONE   Tablet 40 milliGRAM(s) Oral daily  rivaroxaban 20 milliGRAM(s) Oral with dinner      PRN Meds:  acetaminophen     Tablet .. 650 milliGRAM(s) Oral every 6 hours PRN  dextrose Oral Gel 15 Gram(s) Oral once PRN  guaifenesin/dextromethorphan Oral Liquid 10 milliLiter(s) Oral every 4 hours PRN      LABS:    The Labs were reviewed by me   The Radiology was reviewed by me    EKG tracing reviewed by me    03-19    139  |  99  |  8   ----------------------------<  103<H>  3.0<L>   |  25  |  0.77    Ca    9.3      19 Mar 2024 00:19  Mg     1.90     03-19    TPro  6.9  /  Alb  3.3  /  TBili  0.4  /  DBili  x   /  AST  18  /  ALT  10  /  AlkPhos  75  03-19    Magnesium: 1.90 mg/dL (03-19-24 @ 00:19)        PT/INR - ( 19 Mar 2024 00:19 )   PT: 12.5 sec;   INR: 1.12 ratio         PTT - ( 19 Mar 2024 00:19 )  PTT:31.5 sec              Urinalysis Basic - ( 19 Mar 2024 00:19 )    Color: x / Appearance: x / SG: x / pH: x  Gluc: 103 mg/dL / Ketone: x  / Bili: x / Urobili: x   Blood: x / Protein: x / Nitrite: x   Leuk Esterase: x / RBC: x / WBC x   Sq Epi: x / Non Sq Epi: x / Bacteria: x                              10.5   7.37  )-----------( 400      ( 19 Mar 2024 00:19 )             33.1     CAPILLARY BLOOD GLUCOSE      POCT Blood Glucose.: 108 mg/dL (19 Mar 2024 16:05)  POCT Blood Glucose.: 125 mg/dL (19 Mar 2024 13:44)  POCT Blood Glucose.: 83 mg/dL (19 Mar 2024 09:56)  POCT Blood Glucose.: 110 mg/dL (19 Mar 2024 08:31)  POCT Blood Glucose.: 88 mg/dL (18 Mar 2024 23:02)        MICROBIOLOGY:       RADIOLOGY:  [ ] Reviewed and interpreted by me    PULMONARY FUNCTION TESTS:    EKG:

## 2024-03-19 NOTE — H&P ADULT - NSICDXFAMILYHX_GEN_ALL_CORE_FT
FAMILY HISTORY:  Father  Still living? No  FH: colon cancer, Age at diagnosis: Age Unknown    Mother  Still living? Yes, Estimated age: Age Unknown  FH: CHF (congestive heart failure), Age at diagnosis: Age Unknown  FH: HTN (hypertension), Age at diagnosis: Age Unknown  FH: type 2 diabetes, Age at diagnosis: Age Unknown

## 2024-03-19 NOTE — H&P ADULT - ATTENDING COMMENTS
acute hypoxic respiratory failure in this pt who had recently been admitted for multifocal PNA + asthma exacerbation  piror to these event, her CT was w normal lung parenchyma - on prior admission she was noted w diffuse GGO b/l w mycoplasma in sputum, hence treated w zirthro w minimal response, course then expanded to cefepime and doxy, completed prednisone taper but pt reporting her symptoms never really improved  now returning w hypoxemia, continued cough and CT w progression of dz in all lobes  differential is wide at this point - she has had broad coverage recently, unusual for no response for PNA  other etiologies such as PCP, ILD, or atypical infx are on differential  continues broad coverage w marlys and doxy for now  need pulm for possible bronch for lavage and path  ID on abx considering failure of broad treatment recently  check sputum cx, leg, bcx  syncope work up as above - suspect related to hypoxemia   pred taper as above for superimposed asthma exacerbation - RVP neg

## 2024-03-19 NOTE — CONSULT NOTE ADULT - ASSESSMENT
64-year-old female w/ PMH of lupus, PE on Xarelto (dx 2019), asthma, HTN, T2DM, and GBS (dx 1997) presenting with 3wk h/o of worsening SOB and persistent cough. Patient was notably recently admitted at [place] in 01/2024 for superimposed mycoplasma PNA on influenza now presenting with progressive dyspnea and persistent nonproductive cough    #Abnormal CT chest  #asthma  Denies complete resolution of symptoms since her prior discharge.   Noted recent  admission with repeat CT chest with improvement in some areas with new opacities in others  - check ESR, CRP, THERON, RF, ANCAs, MPO/PR3, aldolase, DS DNA, C3, C4  - send sputum if making  - c/w standing nebs for now  - on pred 40mg currently  - abx per ID  
63 yo woman with SLE, DM, h/o PE, CVA with left sided weakness presenting with fever, dry cough, SOB    Hospital admission Jan 2024 at [facility] for PNA - mycoplasma IgM + and flu A +   Received azithro, cefepime, doxy and prednisone taper at that time     CT chest today shows multifocal ground glass and consolidative opacities involving all 5 lobes  - more extensive c/w prior study and in slightly different distribution     Multifocal pulmonary infiltrates  Possible PNA-   atypical  non infectious etiologies in ddx     PCP PCR ordered   Legionella urine ag   Strep urine ag     would check blood cultures   check serum crypt ag   check quant gold     agree with doxycycline and meropenem     may need bronch for diagnosis

## 2024-03-19 NOTE — ED ADULT NURSE REASSESSMENT NOTE - NS ED NURSE REASSESS COMMENT FT1
Report received from KALEN Orellana. Pt resting comfortably in stretcher at this time. Respirations even and unlabored on 5L nasal cannula. Pt noted to have dry cough at this time. Denies headache, dizziness, chest pain and SOB. No acute distress noted. pt remains on continuos cardiac monitor, NSR noted. Plan of care ongoing, comfort measures provided and safety measures maintained. Awaiting bed assignment.

## 2024-03-19 NOTE — ED ADULT NURSE REASSESSMENT NOTE - NS ED NURSE REASSESS COMMENT FT1
Guy Harris MD teamsed messaged , pt endorsing an increase in cough and pt o2 down to 89% , MD made aware , at bedside. pt ordered for labs and medication . pt on cardaic monitor Sinus tach , MD aware. Pt awake and alert, A&OX4, resp even and unlabored. Denies CP, HA, dizziness, palpitations, blurry vision. Resting comfortably.

## 2024-03-19 NOTE — H&P ADULT - PROBLEM SELECTOR PLAN 2
- Patient found on fall by  prior to presentation  - Denies any acute bony pain  > F/u orthostatic vitals  > F/u CTH  > F/u TTE

## 2024-03-19 NOTE — CONSULT NOTE ADULT - SUBJECTIVE AND OBJECTIVE BOX
HPI:  Ms Shea Thompson is a 64-year-old female w/ PMH of lupus, PE on Xarelto (dx ), asthma, HTN, T2DM, and GBS (dx ) presenting with 3wk h/o of worsening SOB and persistent cough. Patient was notably recently admitted at [place] in 2024 for superimposed mycoplasma PNA on influenza. Patient further reports several day h/o subjective fevers, chills, and headaches. Patient was then found on floor by  yesterday evening, patient does not remember falling down but does not endorse any acute pain. Patient further endorses loose BMs that began last week with some blood but color transitioned to yellow/orange color as of recent. In the ED, patient was afebrile but tacchycardic and tachyopneic. Labs significant for hypokalemia; RVP, troponin unremarkable. CXR showing bibasilar airspace opacities and CT chest revealing multifocal groundglass and consolidative opacities involving all 5 lobes, most predominant in the b/l lower lobes and right upper lobe. Patient admitted to medicine for further workup and management of AHRF. (19 Mar 2024 09:10)    ID- patient reports had fever 102 at home   attended  last week   coughing when tries to speak       PAST MEDICAL & SURGICAL HISTORY:  Lupus      Diabetes      Hypertension      Asthma      Peripheral polyneuropathy      Pulmonary emboli  2019      TIA (transient ischemic attack)      GBS (Guillain Montgomery syndrome)      History of hip replacement  left      H/O total hysterectomy      History of bilateral tubal ligation      S/P laminectomy with spinal fusion      History of shoulder surgery      H/O knee surgery          Allergies    No Known Allergies    Intolerances    dislikes Glucerna Shake (Other)      ANTIMICROBIALS:  doxycycline IVPB    doxycycline IVPB 100 every 12 hours  meropenem  IVPB 1000 every 8 hours      OTHER MEDS:  acetaminophen     Tablet .. 650 milliGRAM(s) Oral every 6 hours PRN  albuterol/ipratropium for Nebulization 3 milliLiter(s) Nebulizer every 6 hours  dextrose 5%. 1000 milliLiter(s) IV Continuous <Continuous>  dextrose 5%. 1000 milliLiter(s) IV Continuous <Continuous>  dextrose 50% Injectable 25 Gram(s) IV Push once  dextrose 50% Injectable 12.5 Gram(s) IV Push once  dextrose 50% Injectable 25 Gram(s) IV Push once  dextrose Oral Gel 15 Gram(s) Oral once PRN  glucagon  Injectable 1 milliGRAM(s) IntraMuscular once  guaifenesin/dextromethorphan Oral Liquid 10 milliLiter(s) Oral every 4 hours PRN  insulin lispro (ADMELOG) corrective regimen sliding scale   SubCutaneous three times a day before meals  insulin lispro (ADMELOG) corrective regimen sliding scale   SubCutaneous at bedtime  predniSONE   Tablet 40 milliGRAM(s) Oral daily  rivaroxaban 20 milliGRAM(s) Oral with dinner      SOCIAL HISTORY:    FAMILY HISTORY:  FH: CHF (congestive heart failure) (Mother)    FH: type 2 diabetes (Mother)    FH: HTN (hypertension) (Mother)    FH: colon cancer (Father)        REVIEW OF SYSTEMS  [  ] ROS unobtainable because:    [x  ] All other systems negative except as noted below:	    Constitutional:  [ ] fever [ ] chills  [ ] weight loss  [ ] weakness  Skin:  [ ] rash [ ] phlebitis	  Eyes: [ ] icterus [ ] pain  [ ] discharge	  ENMT: [ ] sore throat  [ ] thrush [ ] ulcers [ ] exudates  Respiratory: [x ] dyspnea [ ] hemoptysis [x ] cough [ ] sputum	  Cardiovascular:  [ ] chest pain [ ] palpitations [ ] edema	  Gastrointestinal:  [ ] nausea [ ] vomiting [ ] diarrhea [ ] constipation [ ] pain	  Genitourinary:  [ ] dysuria [ ] frequency [ ] hematuria [ ] discharge [ ] flank pain  [ ] incontinence  Musculoskeletal:  [ ] myalgias [ ] arthralgias [ ] arthritis  [ ] back pain  Neurological:  [ ] headache [ ] seizures  [ ] confusion/altered mental status  Psychiatric:  [ ] anxiety [ ] depression	  Hematology/Lymphatics:  [ ] lymphadenopathy  Endocrine:  [ ] adrenal [ ] thyroid  Allergic/Immunologic:	 [ ] transplant [ ] seasonal    PHYSICAL EXAM:  Constitutional: uncomfortable due to cough  nasal O2  Eyes: No icterus.  Oral cavity: Clear, no lesions  Neck: Supple  RS:  occasional wheeze  CVS: tachy  Abdomen: Soft.   Skin: No rash   Neuro: Alert, oriented to time/place/person      Drug Dosing Weight  Height (cm): 172.7 (17 Dec 2023 04:40)  Weight (kg): 112.6 (17 Dec 2023 09:18)  BMI (kg/m2): 37.8 (17 Dec 2023 09:18)  BSA (m2): 2.24 (17 Dec 2023 09:18)    Vital Signs Last 24 Hrs  T(F): 98.1 (24 @ 15:18), Max: 98.9 (24 @ 08:36)    Vital Signs Last 24 Hrs  HR: 104 (24 @ 15:18) (90 - 114)  BP: 159/84 (24 @ 15:18) (116/84 - 159/84)  RR: 18 (24 @ 15:18)  SpO2: 95% (24 @ 15:18) (95% - 100%)  Wt(kg): --                          10.5   7.37  )-----------( 400      ( 19 Mar 2024 00:19 )             33.1           139  |  99  |  8   ----------------------------<  103<H>  3.0<L>   |  25  |  0.77    Ca    9.3      19 Mar 2024 00:19  Mg     1.90         TPro  6.9  /  Alb  3.3  /  TBili  0.4  /  DBili  x   /  AST  18  /  ALT  10  /  AlkPhos  75          MICROBIOLOGY:        Rapid RVP Result: NotDetec ( @ 23:51)          RADIOLOGY:    < from: CT Head No Cont (24 @ 11:50) >  IMPRESSION:  Age-appropriate involutional change and microvascular   ischemic disease. No significant interval change from prior study   2023    --- End of Report ---    < end of copied text >  < from: Xray Chest 1 View- PORTABLE-Urgent (Xray Chest 1 View- PORTABLE-Urgent .) (24 @ 02:27) >    ACC: [id number] EXAM:  XR CHEST PORTABLE URGENT 1V   ORDERED BY: IRWIN OCHOA     PROCEDURE DATE:  2024          INTERPRETATION:  EXAMINATION: XR CHEST URGENT    CLINICAL INDICATION: cough    TECHNIQUE: Single frontal, portable view of the chest was obtained.    COMPARISON: Chest x-ray 2023, same-day CT chest    FINDINGS:  The cardiomediastinal silhouette is  normal in size.  Bibasilar airspace opacities, right greater than left although improved   since the last radiograph.  There is no pneumothorax or pleural effusion.  No acute bony abnormality.    IMPRESSION:  Bibasilar airspace opacities, right greater than left consistent with   pneumonia although does appear improved since last chest radiograph.    --- End of Report ---          BRIONNA DELACRUZ MD; Resident Radiologist  This document has been electronically signed.  BRIONNA PAVON MD; Attending Radiologist  This document has been electronically signed. Mar 19 2024  5:56AM    < end of copied text >  < from: CT Chest No Cont (24 @ 02:04) >    ACC: [id number] EXAM:  CT CHEST   ORDERED BY: ROB BENITEZ     PROCEDURE DATE:  2024          INTERPRETATION:  CLINICAL INFORMATION: History of lupus and GBS.   Presenting with significantly worsening cough and dyspnea over the past 3   weeks after being diagnosed with pneumonia during 2024. Concern for   pneumonia.    COMPARISON: CT chest dated 2024, 2023, 2023-2019.    CONTRAST/COMPLICATIONS:  IV Contrast: None.  Oral Contrast: None.  Complications: None reported.    PROCEDURE:  CT of the Chest was performed.  Sagittal and coronal reformats were performed.    FINDINGS:    LUNGS AND AIRWAYS: Patent central airways.  There are multifocal   groundglass and consolidative opacities involving all lobes with   predominance in the bilateral lower lobes and right upper lobe.   Infiltrates are more extensive when compared to prior study and in a   slightly different distribution.  PLEURA: No pleural effusion.  MEDIASTINUM AND MADHU: No lymphadenopathy.  VESSELS: Withinnormal limits.  HEART: Heart size is normal. No pericardial effusion.  CHEST WALL AND LOWER NECK: Within normal limits.  VISUALIZED UPPER ABDOMEN: Within normal limits.  BONES: Degenerative changes.    IMPRESSION:    Multifocal groundglass and consolidative opacities involving all 5 lobes,   most predominant in the bilateral lower lobes and right upper lobe.        --- End of Report ---          CONNIE OLSEN DO; Resident Radiologist  This document has been electronically signed.  ELI NIETO MD; Attending Radiologist  This document has been electronically signed. Mar 19 2024  4:18AM    < end of copied text >

## 2024-03-19 NOTE — H&P ADULT - ASSESSMENT
Ms Shea Thompson is a 64-year-old female w/ PMH of lupus, PE on Xarelto (dx 2019), asthma, HTN, T2DM, and GBS (dx 1997) presenting with 3wk h/o of worsening SOB and persistent cough. CT chest revealing multifocal groundglass and consolidative opacities involving all 5 lobes, most predominant in the b/l lower lobes and right upper lobe. Patient admitted to medicine for further workup and management of AHRF.

## 2024-03-19 NOTE — H&P ADULT - HISTORY OF PRESENT ILLNESS
Ms Shea Thompson is a 64-year-old female w/ PMH of lupus, PE on Xarelto (dx 2019), asthma, HTN, T2DM, and GBS (dx 1997) presenting with 3wk h/o of worsening SOB and persistent cough. Patient was notably recently admitted at [place] in 01/2024 for superimposed mycoplasma PNA on influenza. Patient further reports several day h/o subjective fevers, chills, and headaches. Patient was then found on floor by  yesterday evening, patient does not remember falling down but does not endorse any acute pain. Patient further endorses loose BMs that began last week with some blood but color transitioned to yellow/orange color as of recent. In the ED, patient was afebrile but tacchycardic and tachyopneic. Labs significant for hypokalemia; RVP, troponin unremarkable. CXR showing bibasilar airspace opacities and CT chest revealing multifocal groundglass and consolidative opacities involving all 5 lobes, most predominant in the b/l lower lobes and right upper lobe. Patient admitted to medicine for further workup and management of AHRF.

## 2024-03-19 NOTE — ED ADULT NURSE REASSESSMENT NOTE - NS ED NURSE REASSESS COMMENT FT1
ED focused US guided PIV placement by Registered Nurse.      Indication - poor access.      Findings: compressible ( [left] )  AC vein.  Using direct US guidance, under clean conditions, a long [20]Ga peripheral catheter introduced into vessel.  US performed after procedure, functional catheter in vein, no complications.     Impression:  successful US guided (   [left]   ) AC PIV placement.    Hernando Dan RN

## 2024-03-19 NOTE — ED ADULT NURSE REASSESSMENT NOTE - NS ED NURSE REASSESS COMMENT FT1
Pt awake and alert, A&OX4, resp even and unlabored. pt on 2L NC , o2 98%. on cardiac monitor. NSR.  Denies CP, SOB, HA, dizziness, palpitations, blurry vision. Resting comfortably.

## 2024-03-19 NOTE — H&P ADULT - NSICDXPASTMEDICALHX_GEN_ALL_CORE_FT
PAST MEDICAL HISTORY:  Asthma     Diabetes     GBS (Guillain [place] syndrome)     Hypertension     Lupus     Peripheral polyneuropathy     Pulmonary emboli 7/2019    TIA (transient ischemic attack)

## 2024-03-19 NOTE — H&P ADULT - NSHPREVIEWOFSYSTEMS_GEN_ALL_CORE
REVIEW OF SYSTEMS:    CONSTITUTIONAL:  No weakness, fevers, or chills  EYES/ENT:  No visual changes, vertigo, or throat pain   NECK:  No pain or stiffness  RESPIRATORY:  SOB, cough, wheezing, no hemoptysis  CARDIOVASCULAR:  No chest pain or palpitations  GASTROINTESTINAL:  No abdominal pain, nausea, vomiting, or hematemesis; Diarrhea, no constipation. No melena or hematochezia.  GENITOURINARY:  No dysuria, change in frequency, or hematuria  NEUROLOGICAL:  No numbness or weakness  SKIN:  No itching or rashes

## 2024-03-19 NOTE — H&P ADULT - NSHPLABSRESULTS_GEN_ALL_CORE
LABS:                          10.5   7.37  )-----------( 400      ( 19 Mar 2024 00:19 )             33.1     03-19    139  |  99  |  8   ----------------------------<  103<H>  3.0<L>   |  25  |  0.77    Ca    9.3      19 Mar 2024 00:19  Mg     1.90     03-19    TPro  6.9  /  Alb  3.3  /  TBili  0.4  /  DBili  x   /  AST  18  /  ALT  10  /  AlkPhos  75  03-19    LIVER FUNCTIONS - ( 19 Mar 2024 00:19 )  Alb: 3.3 g/dL / Pro: 6.9 g/dL / ALK PHOS: 75 U/L / ALT: 10 U/L / AST: 18 U/L / GGT: x           PT/INR - ( 19 Mar 2024 00:19 )   PT: 12.5 sec;   INR: 1.12 ratio         PTT - ( 19 Mar 2024 00:19 )  PTT:31.5 sec  Urinalysis Basic - ( 19 Mar 2024 00:19 )    Color: x / Appearance: x / SG: x / pH: x  Gluc: 103 mg/dL / Ketone: x  / Bili: x / Urobili: x   Blood: x / Protein: x / Nitrite: x   Leuk Esterase: x / RBC: x / WBC x   Sq Epi: x / Non Sq Epi: x / Bacteria: x

## 2024-03-19 NOTE — ED ADULT NURSE NOTE - OBJECTIVE STATEMENT
Pt received to room 6, A&Ox4, ambulatory at baseline, c/o shortness of breath. Pt has history of TIA with no deficits, lupus, asthma, PE on eliquis, GBS, and PNA. Pt arrives on 15L nonrebreather, coughing, gasping for air. O2 sat 100% on nonrebreather, on cardiac monitor showing sinus tach in 110s. Placed on nasal cannula at 4L and pt is tolerating well with O2 sat of 98%. Otherwise vitally stable. Pt was recently admitted to [place] for flu/pneumonia and was discharged with the same symptoms. She has been having trouble breathing for 3 weeks with a severe dry cough that leads to shortness of breath which takes a long time to recover from. Pt denies chest pain, nausea, vomiting, abdominal pain. Has been taking her eliquis as ordered daily. Pt states she is not having any symptoms of her usual lupus flares. Patient's family member at bedside also reports that the pt had a fall at home this morning with no LOC, no head strike. Pt states she feels fine from the fall. 20G IV placed to right AC, labs drawn and sent. Medicated as ordered. Pending imaging.

## 2024-03-19 NOTE — H&P ADULT - PROBLEM SELECTOR PLAN 1
- P/w SOB, persitent cough  - H/o asthma on symbicort, spiriva at home  - Recent admission for superimposed mycoplasma PNA on influenza, s/p azithromycin/doxycycline  - RVP unremarkable  - CXR -> Bibasilar airspace opacities  - CT Chest -> Multifocal groundglass and consolidative opacities involving all 5 lobes, most predominant in the b/l lower lobes and right upper lobe  - S/p CTX, azithromycin x1 in ED  > F/u MRSA, legionella, strep Ag, procalcitonin, sputum cx  > C/w empiric meropenem, doxycyline  > C/w prednisone 40mg for 5d, duonebs q6, Robitussin-DM q4 prn  > Pulmonology consulted, will appreciate recs  > ID consulted, will appreciate recs

## 2024-03-19 NOTE — H&P ADULT - NSHPPHYSICALEXAM_GEN_ALL_CORE
T(C): 37.2 (03-19-24 @ 08:36), Max: 37.2 (03-19-24 @ 08:36)  HR: 92 (03-19-24 @ 08:36) (90 - 114)  BP: 124/64 (03-19-24 @ 08:36) (116/84 - 124/64)  RR: 25 (03-19-24 @ 08:36) (25 - 30)  SpO2: 97% (03-19-24 @ 08:36) (97% - 100%)    CONSTITUTIONAL: Well groomed, no apparent distress  EYES: PERRLA and symmetric, EOMI, No conjunctival or scleral injection, non-icteric  ENMT: Oral mucosa with moist membranes. Normal dentition; no pharyngeal injection or exudates             NECK: Supple, symmetric and without tracheal deviation   RESP: No respiratory distress, b/l diffuse wheezing appreciated  CV: RRR, +S1S2, no MRG; no JVD; 1+ pitting edema of b/l LEs  GI: Soft, NT, ND, no rebound, no guarding; no palpable masses; no hepatosplenomegaly; no hernia palpated  LYMPH: No cervical LAD or tenderness; no axillary LAD or tenderness; no inguinal LAD or tenderness  MSK: Normal gait; No digital clubbing or cyanosis; examination of the (head/neck/spine/ribs/pelvis, RUE, LUE, RLE, LLE) without misalignment,            Normal ROM without pain, no spinal tenderness, normal muscle strength/tone  SKIN: No rashes or ulcers noted; no subcutaneous nodules or induration palpable  NEURO: CN II-XII intact; normal reflexes in upper and lower extremities, sensation intact in upper and lower extremities b/l to light touch   PSYCH: Appropriate insight/judgment; A+O x 3, mood and affect appropriate, recent/remote memory intact

## 2024-03-19 NOTE — CONSULT NOTE ADULT - ATTENDING COMMENTS
64-year-old female w/ PMH of lupus, PE on Xarelto (dx 2019), asthma, HTN, T2DM, and GBS (dx 1997) presenting with 3wk h/o of worsening SOB and persistent cough. Patient was notably recently admitted at [place] [facility] in 01/2024 for superimposed mycoplasma PNA on influenza now presenting with progressive dyspnea and persistent nonproductive cough.    Patient seen and examined at bedside. Patient is in mild respiratory distress likely secondary to coughing. CT scan reviewed which did not show any significant improvement compared to prior study. This is unusual given there should be some improvement for which there is none. Need to evaluate for other causes of possible fibrosis. Please send rheumatologic workup except for DSDNA.     Can give opioids if needed in order to control cough.     Thank you for your consult. We will continue to follow the patient's care with you.

## 2024-03-19 NOTE — ED ADULT NURSE REASSESSMENT NOTE - NS ED NURSE REASSESS COMMENT FT1
Break RN: pt a&ox4, sleeping, arousable to voice. Pt denies chest pain, sob, n+v, headache, dizziness at this time. Breathing even, unlabored. Pt tolerating 3L NC at this time. Safety maintained.

## 2024-03-19 NOTE — H&P ADULT - BIRTH SEX
Fractions / Week Rx 3: 5 Port Dimensions-Y Axis In Cm: 2.5 Treatment Time / Fractionation (Optional- Include Units) Rx 2: 0.37 Female Bill For Dosimetry/Render Treatment Time Calculation In Note: No Treatment Device Design After Initial Simulation Justification (Will Render If Bill For Treatment Devices = Yes): The patient is status post radiation simulation and is evaluated as to the use of additional devices for shielding and placement for radiation therapy. Energy (Optional-Please Include Units): 70kV Number Of Treatment Days: 1 Information: Selecting Yes will display possible errors in your note based on the variables you have selected. This validation is only offered as a suggestion for you. PLEASE NOTE THAT THE VALIDATION TEXT WILL BE REMOVED WHEN YOU FINALIZE YOUR NOTE. IF YOU WANT TO FAX A PRELIMINARY NOTE YOU WILL NEED TO TOGGLE THIS TO 'NO' IF YOU DO NOT WANT IT IN YOUR FAXED NOTE. Dose / Tx In Cgy (Optional): 263.34 Daily Fractionated Dose (Optional- Include Units) Rx 2: 270.47 Intro Statement (Will Not Render If Left Blank): The patient is undergoing superficial radiation therapy for skin cancer and presents for weekly evaluation and management.  Per protocol and as documented on the flow sheet, the patient was questioned as to subjective redness, pruritus, pain, drainage, fatigue, or any other symptoms.  Objectively, the radiation area was evaluated with regards to erythema, atrophy, scale, crusting, erosion, ulceration, edema, purpura, tenderness, warmth, drainage, and any other findings.  The plan was extensively reviewed including the dose, and dosing schedule.  The simulation and clinical setup was also reviewed as was the external and any internal shields and based on this review the appropriateness and sufficiency of treatment was determined. Shielding Size (Optional- Include Units): 2.5x2.5cm Bill For Radiation Treatment: Yes Total Number Of Fractions Rx 4: 15 Initial Radiation Treatment Planning (Will Render If Bill Simulation = Yes): The patient had a complete consultation regarding all applicable modalities for the treatment of their skin cancer and based on a variety of factors including the type of tumor, size, and location, the relevant medical history as well as local tissue factors, the functional status of the individual, the ability to perform necessary postoperative wound instructions and the need for simultaneous treatments as well as overall wound healing status, it was determined that the patient would begin radiation therapy treatment for skin cancer.  A full simulation and treatment device design was performed including the determination and formulation of appropriate simple and complex devices including lead shield of 0.762 mm thickness to form molded customized shielding to specifically correlate with the lesion size including treatment margin.  The custom lead shield is adequate to accommodate the appropriate applicator and provide adequate shielding around the treatment site.  The specific field applicator, shields, and devices both simple and complex as well as the specific patient setup is outlined below.  The patient was given a full consent for superficial radiation to both verbally and in writing and the full determination of patient's eligibility for treatment and selection is outlined on the patient eligibility and treatment selection form.  The specific superficial radiotherapy prescription was determined and was documented on the superficial radiotherapy prescription form.  A treatment calculation was also performed and documented on the treatment calculation form.  Based on the prescription, the patient was scheduled for a series of fractional treatments. Dimensions-Y Axis In Cm: 1.5 Patient Positioning: Supine Computed Treatment Time In Min (Will Render The Same As Calculated Treatment Time If Left Blank): 0.42 Total Number Of Fractions Rx 2: 10 Cumulative Dose In Cgy (Optional): 2676.6 Assessment: Appropriate reaction Energy (Optional-Please Include Units) Rx 2: 50kV Depth (Optional-Please Include Units): 1.65mm Fractions / Week Rx 2: 3 Functional Status: 0 (fully active) Total Dose (Optional-Please Include Units) Rx 2: 2704.7 (5443.8)cGy Time Dose Fractionation (Optional- Include Units If Applicable) Rx 2: 48 (97) Field Size (Applicator): 3.0 cm Custom Shielding Afterword Text Will Not Be Included With Simple Simulations (X X Y Cm............): port to correlate with the lesion size, including treatment margin. The custom lead shield is adequate to accommodate the appropriate applicator and provide adequate shielding around the treatment site. Additional shielding (as noted below) is used to protect sensitive, normal tissues. Total Number Of Fractions: 20 Daily Fractionated Dose (Optional- Include Units): 272.58 Time Dose Fractionation (Optional- Include Units If Applicable): 96 Simple Simulation Preamble Text Will Be Included With Simple Simulations (.......... Indications): Simple simulation was performed today for the following reasons: Field Size (Applicator) Rx 2: 2.5 cm Treatment Margins In Cm: 0.5 Custom Shielding Preamble Text Will Not Be Included With Simple Simulations (.......... X X Y Cm): A lead shield of 0.762 mm thickness is utilized to form a molded, custom shield with a Detail Level: Detailed Total Dose (Optional-Please Include Units): 5451.6cGy

## 2024-03-20 LAB
A1C WITH ESTIMATED AVERAGE GLUCOSE RESULT: 6.4 % — HIGH (ref 4–5.6)
ALBUMIN SERPL ELPH-MCNC: 2.9 G/DL — LOW (ref 3.3–5)
ALP SERPL-CCNC: 65 U/L — SIGNIFICANT CHANGE UP (ref 40–120)
ALT FLD-CCNC: 7 U/L — SIGNIFICANT CHANGE UP (ref 4–33)
ANION GAP SERPL CALC-SCNC: 16 MMOL/L — HIGH (ref 7–14)
AST SERPL-CCNC: 16 U/L — SIGNIFICANT CHANGE UP (ref 4–32)
BILIRUB SERPL-MCNC: 0.2 MG/DL — SIGNIFICANT CHANGE UP (ref 0.2–1.2)
BUN SERPL-MCNC: 5 MG/DL — LOW (ref 7–23)
CALCIUM SERPL-MCNC: 9 MG/DL — SIGNIFICANT CHANGE UP (ref 8.4–10.5)
CHLORIDE SERPL-SCNC: 103 MMOL/L — SIGNIFICANT CHANGE UP (ref 98–107)
CO2 SERPL-SCNC: 22 MMOL/L — SIGNIFICANT CHANGE UP (ref 22–31)
CREAT SERPL-MCNC: 0.56 MG/DL — SIGNIFICANT CHANGE UP (ref 0.5–1.3)
CRYPTOC AG FLD QL: NEGATIVE — SIGNIFICANT CHANGE UP
EGFR: 102 ML/MIN/1.73M2 — SIGNIFICANT CHANGE UP
ESTIMATED AVERAGE GLUCOSE: 137 — SIGNIFICANT CHANGE UP
GI PCR PANEL: SIGNIFICANT CHANGE UP
GLUCOSE BLDC GLUCOMTR-MCNC: 131 MG/DL — HIGH (ref 70–99)
GLUCOSE BLDC GLUCOMTR-MCNC: 156 MG/DL — HIGH (ref 70–99)
GLUCOSE BLDC GLUCOMTR-MCNC: 161 MG/DL — HIGH (ref 70–99)
GLUCOSE BLDC GLUCOMTR-MCNC: 175 MG/DL — HIGH (ref 70–99)
GLUCOSE SERPL-MCNC: 153 MG/DL — HIGH (ref 70–99)
HCT VFR BLD CALC: 29.2 % — LOW (ref 34.5–45)
HGB BLD-MCNC: 9.2 G/DL — LOW (ref 11.5–15.5)
MAGNESIUM SERPL-MCNC: 2.1 MG/DL — SIGNIFICANT CHANGE UP (ref 1.6–2.6)
MCHC RBC-ENTMCNC: 26.7 PG — LOW (ref 27–34)
MCHC RBC-ENTMCNC: 31.5 GM/DL — LOW (ref 32–36)
MCV RBC AUTO: 84.9 FL — SIGNIFICANT CHANGE UP (ref 80–100)
MRSA PCR RESULT.: SIGNIFICANT CHANGE UP
NRBC # BLD: 0 /100 WBCS — SIGNIFICANT CHANGE UP (ref 0–0)
NRBC # FLD: 0 K/UL — SIGNIFICANT CHANGE UP (ref 0–0)
PHOSPHATE SERPL-MCNC: 3.1 MG/DL — SIGNIFICANT CHANGE UP (ref 2.5–4.5)
PLATELET # BLD AUTO: 263 K/UL — SIGNIFICANT CHANGE UP (ref 150–400)
POTASSIUM SERPL-MCNC: 3.7 MMOL/L — SIGNIFICANT CHANGE UP (ref 3.5–5.3)
POTASSIUM SERPL-SCNC: 3.7 MMOL/L — SIGNIFICANT CHANGE UP (ref 3.5–5.3)
PROT SERPL-MCNC: 6.3 G/DL — SIGNIFICANT CHANGE UP (ref 6–8.3)
RBC # BLD: 3.44 M/UL — LOW (ref 3.8–5.2)
RBC # FLD: 18 % — HIGH (ref 10.3–14.5)
S AUREUS DNA NOSE QL NAA+PROBE: SIGNIFICANT CHANGE UP
SODIUM SERPL-SCNC: 141 MMOL/L — SIGNIFICANT CHANGE UP (ref 135–145)
WBC # BLD: 6.02 K/UL — SIGNIFICANT CHANGE UP (ref 3.8–10.5)
WBC # FLD AUTO: 6.02 K/UL — SIGNIFICANT CHANGE UP (ref 3.8–10.5)

## 2024-03-20 PROCEDURE — 99233 SBSQ HOSP IP/OBS HIGH 50: CPT | Mod: GC

## 2024-03-20 PROCEDURE — 99232 SBSQ HOSP IP/OBS MODERATE 35: CPT | Mod: GC

## 2024-03-20 PROCEDURE — 99232 SBSQ HOSP IP/OBS MODERATE 35: CPT

## 2024-03-20 PROCEDURE — 71275 CT ANGIOGRAPHY CHEST: CPT | Mod: 26

## 2024-03-20 RX ORDER — SODIUM CHLORIDE 9 MG/ML
1000 INJECTION, SOLUTION INTRAVENOUS ONCE
Refills: 0 | Status: COMPLETED | OUTPATIENT
Start: 2024-03-20 | End: 2024-03-20

## 2024-03-20 RX ORDER — BUDESONIDE, MICRONIZED 100 %
0.25 POWDER (GRAM) MISCELLANEOUS EVERY 12 HOURS
Refills: 0 | Status: DISCONTINUED | OUTPATIENT
Start: 2024-03-20 | End: 2024-04-11

## 2024-03-20 RX ORDER — METFORMIN HYDROCHLORIDE 850 MG/1
0 TABLET ORAL
Qty: 0 | Refills: 0 | DISCHARGE

## 2024-03-20 RX ORDER — MELOXICAM 15 MG/1
0 TABLET ORAL
Qty: 0 | Refills: 0 | DISCHARGE

## 2024-03-20 RX ADMIN — Medication 3 MILLILITER(S): at 21:28

## 2024-03-20 RX ADMIN — Medication 3 MILLILITER(S): at 04:47

## 2024-03-20 RX ADMIN — Medication 100 MILLIGRAM(S): at 00:44

## 2024-03-20 RX ADMIN — Medication 40 MILLIGRAM(S): at 05:07

## 2024-03-20 RX ADMIN — SODIUM CHLORIDE 1000 MILLILITER(S): 9 INJECTION, SOLUTION INTRAVENOUS at 11:23

## 2024-03-20 RX ADMIN — MEROPENEM 100 MILLIGRAM(S): 1 INJECTION INTRAVENOUS at 22:43

## 2024-03-20 RX ADMIN — MEROPENEM 100 MILLIGRAM(S): 1 INJECTION INTRAVENOUS at 15:35

## 2024-03-20 RX ADMIN — Medication 100 MILLIGRAM(S): at 14:34

## 2024-03-20 RX ADMIN — RIVAROXABAN 20 MILLIGRAM(S): KIT at 18:30

## 2024-03-20 RX ADMIN — MEROPENEM 100 MILLIGRAM(S): 1 INJECTION INTRAVENOUS at 05:08

## 2024-03-20 RX ADMIN — Medication 1: at 09:05

## 2024-03-20 RX ADMIN — Medication 1: at 17:55

## 2024-03-20 RX ADMIN — Medication 3 MILLILITER(S): at 10:30

## 2024-03-20 RX ADMIN — Medication 1: at 13:28

## 2024-03-20 RX ADMIN — Medication 0.25 MILLIGRAM(S): at 22:26

## 2024-03-20 RX ADMIN — Medication 100 MILLIGRAM(S): at 23:03

## 2024-03-20 NOTE — PROGRESS NOTE ADULT - ASSESSMENT
64-year-old female w/ PMH of lupus, PE on Xarelto (dx 2019), asthma, HTN, T2DM, and GBS (dx 1997) presenting with 3wk h/o of worsening SOB and persistent cough. Patient was notably recently admitted at [facility] in 01/2024 for superimposed mycoplasma PNA on influenza now presenting with progressive dyspnea and persistent nonproductive cough    #Abnormal CT chest  #asthma  Denies complete resolution of symptoms since her prior discharge.   Noted recent  admission with repeat CT chest with improvement in some areas with new opacities in others  - ESR, CRP elevated; f/u THERON, RF, ANCAs, MPO/PR3, aldolase, DS DNA, C3, C4  - send sputum if making  - c/w standing nebs for now  - on pred 40mg currently  - abx per ID  - tentative plan for rigid bronchoscopy, date pending OR availability

## 2024-03-20 NOTE — PROGRESS NOTE ADULT - SUBJECTIVE AND OBJECTIVE BOX
CHIEF COMPLAINT:Patient is a 64y old  Female who presents with a chief complaint of SOB, cough (20 Mar 2024 08:04)      Interval Events:    REVIEW OF SYSTEMS:  [x] All other systems negative except per HPI   [ ] Unable to assess ROS because ________    OBJECTIVE:  ICU Vital Signs Last 24 Hrs  T(C): 36.5 (20 Mar 2024 12:51), Max: 37.1 (19 Mar 2024 17:57)  T(F): 97.7 (20 Mar 2024 12:51), Max: 98.7 (19 Mar 2024 17:57)  HR: 102 (20 Mar 2024 12:51) (98 - 113)  BP: 142/90 (20 Mar 2024 12:51) (140/79 - 162/74)  BP(mean): 96 (19 Mar 2024 22:30) (96 - 96)  ABP: --  ABP(mean): --  RR: 18 (20 Mar 2024 12:51) (18 - 20)  SpO2: 98% (20 Mar 2024 12:51) (96% - 100%)    O2 Parameters below as of 20 Mar 2024 12:51  Patient On (Oxygen Delivery Method): nasal cannula  O2 Flow (L/min): 5              PHYSICAL EXAM:  GENERAL: NAD, well-groomed, well-developed  HEAD:  Atraumatic, Normocephalic  EYES: EOMI, PERRLA, conjunctiva and sclera clear  ENMT: No tonsillar erythema, exudates, or enlargement; Moist mucous membranes, Good dentition, No lesions  NECK: Supple, No JVD, Normal thyroid  CHEST/LUNG: Clear to auscultation bilaterally; No rales, rhonchi, wheezing, or rubs  HEART: Regular rate and rhythm; No murmurs, rubs, or gallops  ABDOMEN: Soft, Nontender, Nondistended; Bowel sounds present  VASCULAR:  2+ Peripheral Pulses, No clubbing, cyanosis, or edema  LYMPH: No lymphadenopathy noted  SKIN: No rashes or lesions  NERVOUS SYSTEM:  Alert & Oriented X3, Good concentration; Motor Strength 5/5 B/L upper and lower extremities; DTRs 2+ intact and symmetric    HOSPITAL MEDICATIONS:  MEDICATIONS  (STANDING):  albuterol/ipratropium for Nebulization 3 milliLiter(s) Nebulizer every 6 hours  buDESOnide    Inhalation Suspension 0.25 milliGRAM(s) Inhalation every 12 hours  dextrose 5%. 1000 milliLiter(s) (50 mL/Hr) IV Continuous <Continuous>  dextrose 5%. 1000 milliLiter(s) (100 mL/Hr) IV Continuous <Continuous>  dextrose 50% Injectable 25 Gram(s) IV Push once  dextrose 50% Injectable 12.5 Gram(s) IV Push once  dextrose 50% Injectable 25 Gram(s) IV Push once  doxycycline IVPB      doxycycline IVPB 100 milliGRAM(s) IV Intermittent every 12 hours  glucagon  Injectable 1 milliGRAM(s) IntraMuscular once  influenza   Vaccine 0.5 milliLiter(s) IntraMuscular once  insulin lispro (ADMELOG) corrective regimen sliding scale   SubCutaneous three times a day before meals  insulin lispro (ADMELOG) corrective regimen sliding scale   SubCutaneous at bedtime  meropenem  IVPB 1000 milliGRAM(s) IV Intermittent every 8 hours  methylPREDNISolone sodium succinate Injectable 40 milliGRAM(s) IV Push every 12 hours  rivaroxaban 20 milliGRAM(s) Oral with dinner    MEDICATIONS  (PRN):  acetaminophen     Tablet .. 650 milliGRAM(s) Oral every 6 hours PRN Temp greater or equal to 38C (100.4F), Mild Pain (1 - 3)  dextrose Oral Gel 15 Gram(s) Oral once PRN Blood Glucose LESS THAN 70 milliGRAM(s)/deciliter  guaifenesin/dextromethorphan Oral Liquid 10 milliLiter(s) Oral every 4 hours PRN Cough  hydrocodone/homatropine Syrup 5 milliLiter(s) Oral every 8 hours PRN Cough      LABS:    The Labs were reviewed by me   The Radiology was reviewed by me    EKG tracing reviewed by me    03-20    141  |  103  |  5<L>  ----------------------------<  153<H>  3.7   |  22  |  0.56  03-19    139  |  99  |  8   ----------------------------<  103<H>  3.0<L>   |  25  |  0.77    Ca    9.0      20 Mar 2024 06:38  Ca    9.3      19 Mar 2024 00:19  Phos  3.1     03-20  Mg     2.10     03-20    TPro  6.3  /  Alb  2.9<L>  /  TBili  0.2  /  DBili  x   /  AST  16  /  ALT  7   /  AlkPhos  65  03-20  TPro  6.9  /  Alb  3.3  /  TBili  0.4  /  DBili  x   /  AST  18  /  ALT  10  /  AlkPhos  75  03-19    Magnesium: 2.10 mg/dL (03-20-24 @ 06:38)  Magnesium: 1.90 mg/dL (03-19-24 @ 00:19)    Phosphorus: 3.1 mg/dL (03-20-24 @ 06:38)      PT/INR - ( 19 Mar 2024 00:19 )   PT: 12.5 sec;   INR: 1.12 ratio         PTT - ( 19 Mar 2024 00:19 )  PTT:31.5 sec              Urinalysis Basic - ( 20 Mar 2024 06:38 )    Color: x / Appearance: x / SG: x / pH: x  Gluc: 153 mg/dL / Ketone: x  / Bili: x / Urobili: x   Blood: x / Protein: x / Nitrite: x   Leuk Esterase: x / RBC: x / WBC x   Sq Epi: x / Non Sq Epi: x / Bacteria: x                              9.2    6.02  )-----------( 263      ( 20 Mar 2024 06:38 )             29.2                         10.5   7.37  )-----------( 400      ( 19 Mar 2024 00:19 )             33.1     CAPILLARY BLOOD GLUCOSE      POCT Blood Glucose.: 175 mg/dL (20 Mar 2024 13:11)  POCT Blood Glucose.: 161 mg/dL (20 Mar 2024 08:45)  POCT Blood Glucose.: 178 mg/dL (19 Mar 2024 21:40)        MICROBIOLOGY:     RADIOLOGY:  [ ] Reviewed and interpreted by me    Point of Care Ultrasound Findings:    PFT:    EKG: CHIEF COMPLAINT:Patient is a 64y old  Female who presents with a chief complaint of SOB, cough (20 Mar 2024 08:04)      Interval Events:  still with significant dry cough, worse when speaking.    REVIEW OF SYSTEMS:  [x] All other systems negative except per HPI   [ ] Unable to assess ROS because ________    OBJECTIVE:  ICU Vital Signs Last 24 Hrs  T(C): 36.5 (20 Mar 2024 12:51), Max: 37.1 (19 Mar 2024 17:57)  T(F): 97.7 (20 Mar 2024 12:51), Max: 98.7 (19 Mar 2024 17:57)  HR: 102 (20 Mar 2024 12:51) (98 - 113)  BP: 142/90 (20 Mar 2024 12:51) (140/79 - 162/74)  BP(mean): 96 (19 Mar 2024 22:30) (96 - 96)  ABP: --  ABP(mean): --  RR: 18 (20 Mar 2024 12:51) (18 - 20)  SpO2: 98% (20 Mar 2024 12:51) (96% - 100%)    O2 Parameters below as of 20 Mar 2024 12:51  Patient On (Oxygen Delivery Method): nasal cannula  O2 Flow (L/min): 5              PHYSICAL EXAM:  Gen: uncomfortable appearing  HEENT: MMM, PERRL, EOMI  CV: RRR, no m/r/g  Lung: mild wheeze b/l  Abd: Soft, NT, ND  Ext: nonpitting edema   Skin: No rash  Neuro: A&Ox3, no focal deficits    HOSPITAL MEDICATIONS:  MEDICATIONS  (STANDING):  albuterol/ipratropium for Nebulization 3 milliLiter(s) Nebulizer every 6 hours  buDESOnide    Inhalation Suspension 0.25 milliGRAM(s) Inhalation every 12 hours  dextrose 5%. 1000 milliLiter(s) (50 mL/Hr) IV Continuous <Continuous>  dextrose 5%. 1000 milliLiter(s) (100 mL/Hr) IV Continuous <Continuous>  dextrose 50% Injectable 25 Gram(s) IV Push once  dextrose 50% Injectable 12.5 Gram(s) IV Push once  dextrose 50% Injectable 25 Gram(s) IV Push once  doxycycline IVPB      doxycycline IVPB 100 milliGRAM(s) IV Intermittent every 12 hours  glucagon  Injectable 1 milliGRAM(s) IntraMuscular once  influenza   Vaccine 0.5 milliLiter(s) IntraMuscular once  insulin lispro (ADMELOG) corrective regimen sliding scale   SubCutaneous three times a day before meals  insulin lispro (ADMELOG) corrective regimen sliding scale   SubCutaneous at bedtime  meropenem  IVPB 1000 milliGRAM(s) IV Intermittent every 8 hours  methylPREDNISolone sodium succinate Injectable 40 milliGRAM(s) IV Push every 12 hours  rivaroxaban 20 milliGRAM(s) Oral with dinner    MEDICATIONS  (PRN):  acetaminophen     Tablet .. 650 milliGRAM(s) Oral every 6 hours PRN Temp greater or equal to 38C (100.4F), Mild Pain (1 - 3)  dextrose Oral Gel 15 Gram(s) Oral once PRN Blood Glucose LESS THAN 70 milliGRAM(s)/deciliter  guaifenesin/dextromethorphan Oral Liquid 10 milliLiter(s) Oral every 4 hours PRN Cough  hydrocodone/homatropine Syrup 5 milliLiter(s) Oral every 8 hours PRN Cough      LABS:    The Labs were reviewed by me   The Radiology was reviewed by me    EKG tracing reviewed by me    03-20    141  |  103  |  5<L>  ----------------------------<  153<H>  3.7   |  22  |  0.56  03-19    139  |  99  |  8   ----------------------------<  103<H>  3.0<L>   |  25  |  0.77    Ca    9.0      20 Mar 2024 06:38  Ca    9.3      19 Mar 2024 00:19  Phos  3.1     03-20  Mg     2.10     03-20    TPro  6.3  /  Alb  2.9<L>  /  TBili  0.2  /  DBili  x   /  AST  16  /  ALT  7   /  AlkPhos  65  03-20  TPro  6.9  /  Alb  3.3  /  TBili  0.4  /  DBili  x   /  AST  18  /  ALT  10  /  AlkPhos  75  03-19    Magnesium: 2.10 mg/dL (03-20-24 @ 06:38)  Magnesium: 1.90 mg/dL (03-19-24 @ 00:19)    Phosphorus: 3.1 mg/dL (03-20-24 @ 06:38)      PT/INR - ( 19 Mar 2024 00:19 )   PT: 12.5 sec;   INR: 1.12 ratio         PTT - ( 19 Mar 2024 00:19 )  PTT:31.5 sec              Urinalysis Basic - ( 20 Mar 2024 06:38 )    Color: x / Appearance: x / SG: x / pH: x  Gluc: 153 mg/dL / Ketone: x  / Bili: x / Urobili: x   Blood: x / Protein: x / Nitrite: x   Leuk Esterase: x / RBC: x / WBC x   Sq Epi: x / Non Sq Epi: x / Bacteria: x                              9.2    6.02  )-----------( 263      ( 20 Mar 2024 06:38 )             29.2                         10.5   7.37  )-----------( 400      ( 19 Mar 2024 00:19 )             33.1     CAPILLARY BLOOD GLUCOSE      POCT Blood Glucose.: 175 mg/dL (20 Mar 2024 13:11)  POCT Blood Glucose.: 161 mg/dL (20 Mar 2024 08:45)  POCT Blood Glucose.: 178 mg/dL (19 Mar 2024 21:40)        MICROBIOLOGY:     RADIOLOGY:  [ ] Reviewed and interpreted by me    Point of Care Ultrasound Findings:    PFT:    EKG:

## 2024-03-20 NOTE — PROGRESS NOTE ADULT - ATTENDING COMMENTS
no meaningful improvement in symptoms   she is now tachy and with higher O2 demands  CT-A r.o PE pending  on broad spectrum abx  sending autoimmune serology per pulm - ? bronch  atypical infx serologies sent   still wheezing, change steroids to IV  solumedrol - cw standing nebs - I cant say for sure if her wheezing is asthma exacerbation or reactive airway d.t CT findings which would not necessarily improve w short course of steroids - for now will continue and await work up - will byron pulm

## 2024-03-20 NOTE — PROGRESS NOTE ADULT - SUBJECTIVE AND OBJECTIVE BOX
PROGRESS NOTE:     Patient is a 64y old  Female who presents with a chief complaint of SOB, cough (19 Mar 2024 18:08)      SUBJECTIVE / OVERNIGHT EVENTS:    OVERNIGHT: No acute overnight events.      Patient was examined at bedside and feels well this morning. Denies fever, chills, chest pain, SOB, nausea, vomiting. ROS otherwise negative and pt is amenable to current treatment plan.      REVIEW OF SYSTEMS:    CONSTITUTIONAL:  No weakness, fevers, or chills  EYES/ENT:  No visual changes, vertigo, or throat pain   NECK:  No pain or stiffness  RESPIRATORY:  No SOB, cough, wheezing, hemoptysis  CARDIOVASCULAR:  No chest pain or palpitations  GASTROINTESTINAL:  No abdominal pain, nausea, vomiting, or hematemesis; No diarrhea or constipation. No melena or hematochezia.  GENITOURINARY:  No dysuria, change in frequency, or hematuria  NEUROLOGICAL:  No numbness or weakness  SKIN:  No itching or rashes      MEDICATIONS  (STANDING):  albuterol/ipratropium for Nebulization 3 milliLiter(s) Nebulizer every 6 hours  dextrose 5%. 1000 milliLiter(s) (50 mL/Hr) IV Continuous <Continuous>  dextrose 5%. 1000 milliLiter(s) (100 mL/Hr) IV Continuous <Continuous>  dextrose 50% Injectable 25 Gram(s) IV Push once  dextrose 50% Injectable 12.5 Gram(s) IV Push once  dextrose 50% Injectable 25 Gram(s) IV Push once  doxycycline IVPB      doxycycline IVPB 100 milliGRAM(s) IV Intermittent every 12 hours  glucagon  Injectable 1 milliGRAM(s) IntraMuscular once  influenza   Vaccine 0.5 milliLiter(s) IntraMuscular once  insulin lispro (ADMELOG) corrective regimen sliding scale   SubCutaneous three times a day before meals  insulin lispro (ADMELOG) corrective regimen sliding scale   SubCutaneous at bedtime  meropenem  IVPB 1000 milliGRAM(s) IV Intermittent every 8 hours  predniSONE   Tablet 40 milliGRAM(s) Oral daily  rivaroxaban 20 milliGRAM(s) Oral with dinner    MEDICATIONS  (PRN):  acetaminophen     Tablet .. 650 milliGRAM(s) Oral every 6 hours PRN Temp greater or equal to 38C (100.4F), Mild Pain (1 - 3)  dextrose Oral Gel 15 Gram(s) Oral once PRN Blood Glucose LESS THAN 70 milliGRAM(s)/deciliter  guaifenesin/dextromethorphan Oral Liquid 10 milliLiter(s) Oral every 4 hours PRN Cough  hydrocodone/homatropine Syrup 5 milliLiter(s) Oral every 8 hours PRN Cough      CAPILLARY BLOOD GLUCOSE      POCT Blood Glucose.: 178 mg/dL (19 Mar 2024 21:40)  POCT Blood Glucose.: 108 mg/dL (19 Mar 2024 16:05)  POCT Blood Glucose.: 125 mg/dL (19 Mar 2024 13:44)  POCT Blood Glucose.: 83 mg/dL (19 Mar 2024 09:56)  POCT Blood Glucose.: 110 mg/dL (19 Mar 2024 08:31)    I&O's Summary      PHYSICAL EXAM:  Vital Signs Last 24 Hrs  T(C): 36.3 (20 Mar 2024 04:39), Max: 37.2 (19 Mar 2024 08:36)  T(F): 97.4 (20 Mar 2024 04:39), Max: 98.9 (19 Mar 2024 08:36)  HR: 98 (20 Mar 2024 04:39) (92 - 113)  BP: 146/85 (20 Mar 2024 04:39) (124/64 - 162/74)  BP(mean): 96 (19 Mar 2024 22:30) (96 - 96)  RR: 18 (20 Mar 2024 04:39) (18 - 25)  SpO2: 96% (20 Mar 2024 04:39) (95% - 100%)    Parameters below as of 20 Mar 2024 04:39  Patient On (Oxygen Delivery Method): nasal cannula  O2 Flow (L/min): 5      CONSTITUTIONAL: NAD; well-developed  HEENT: PERRL, clear conjunctiva  RESPIRATORY: Normal respiratory effort; lungs are clear to auscultation bilaterally; No Crackles/Rhonchi/Wheezing  CARDIOVASCULAR: Regular rate and rhythm, normal S1 and S2, no murmur/rub/gallop; No lower extremity edema; Peripheral pulses are 2+ bilaterally  ABDOMEN: Nontender to palpation, normoactive bowel sounds, no rebound/guarding; No hepatosplenomegaly  MUSCULOSKELETAL: no clubbing or cyanosis of digits; no joint swelling or tenderness to palpation  EXTREMITY: Lower extremities Non-tender to palpation; non-erythematous B/L  NEURO: A&Ox3; no focal deficits   PSYCH: normal mood; Affect appropriate    LABS:                        9.2    6.02  )-----------( 263      ( 20 Mar 2024 06:38 )             29.2     03-19    139  |  99  |  8   ----------------------------<  103<H>  3.0<L>   |  25  |  0.77    Ca    9.3      19 Mar 2024 00:19  Mg     1.90     03-19    TPro  6.9  /  Alb  3.3  /  TBili  0.4  /  DBili  x   /  AST  18  /  ALT  10  /  AlkPhos  75  03-19    PT/INR - ( 19 Mar 2024 00:19 )   PT: 12.5 sec;   INR: 1.12 ratio         PTT - ( 19 Mar 2024 00:19 )  PTT:31.5 sec      Urinalysis Basic - ( 19 Mar 2024 00:19 )    Color: x / Appearance: x / SG: x / pH: x  Gluc: 103 mg/dL / Ketone: x  / Bili: x / Urobili: x   Blood: x / Protein: x / Nitrite: x   Leuk Esterase: x / RBC: x / WBC x   Sq Epi: x / Non Sq Epi: x / Bacteria: x          RADIOLOGY & ADDITIONAL TESTS:    N/A

## 2024-03-20 NOTE — PROGRESS NOTE ADULT - SUBJECTIVE AND OBJECTIVE BOX
Follow Up:      Interval History/ROS:  coughs when tries to speak     Allergies  No Known Allergies        ANTIMICROBIALS:  doxycycline IVPB    doxycycline IVPB 100 every 12 hours  meropenem  IVPB 1000 every 8 hours      OTHER MEDS:  acetaminophen     Tablet .. 650 milliGRAM(s) Oral every 6 hours PRN  albuterol/ipratropium for Nebulization 3 milliLiter(s) Nebulizer every 6 hours  buDESOnide    Inhalation Suspension 0.25 milliGRAM(s) Inhalation every 12 hours  dextrose 5%. 1000 milliLiter(s) IV Continuous <Continuous>  dextrose 5%. 1000 milliLiter(s) IV Continuous <Continuous>  dextrose 50% Injectable 25 Gram(s) IV Push once  dextrose 50% Injectable 12.5 Gram(s) IV Push once  dextrose 50% Injectable 25 Gram(s) IV Push once  dextrose Oral Gel 15 Gram(s) Oral once PRN  glucagon  Injectable 1 milliGRAM(s) IntraMuscular once  guaifenesin/dextromethorphan Oral Liquid 10 milliLiter(s) Oral every 4 hours PRN  hydrocodone/homatropine Syrup 5 milliLiter(s) Oral every 8 hours PRN  influenza   Vaccine 0.5 milliLiter(s) IntraMuscular once  insulin lispro (ADMELOG) corrective regimen sliding scale   SubCutaneous three times a day before meals  insulin lispro (ADMELOG) corrective regimen sliding scale   SubCutaneous at bedtime  methylPREDNISolone sodium succinate Injectable 40 milliGRAM(s) IV Push every 12 hours  rivaroxaban 20 milliGRAM(s) Oral with dinner      Vital Signs Last 24 Hrs  T(C): 36.5 (20 Mar 2024 12:51), Max: 37.1 (19 Mar 2024 17:57)  T(F): 97.7 (20 Mar 2024 12:51), Max: 98.7 (19 Mar 2024 17:57)  HR: 102 (20 Mar 2024 12:51) (98 - 113)  BP: 142/90 (20 Mar 2024 12:51) (140/79 - 162/74)  BP(mean): 96 (19 Mar 2024 22:30) (96 - 96)  RR: 18 (20 Mar 2024 12:51) (18 - 20)  SpO2: 98% (20 Mar 2024 12:51) (96% - 100%)    Parameters below as of 20 Mar 2024 12:51  Patient On (Oxygen Delivery Method): nasal cannula  O2 Flow (L/min): 5      PHYSICAL EXAM:  Constitutional: Non toxic  Eyes: No icterus.  Oral cavity: Clear, no lesions  Neck: Supple  RS: decreased BS  nasal O2  CVS: S1, S2   Abdomen: Soft. No guarding/rigidity/tenderness.  :   Extremities: Warm. No pedal edema  Skin: No lesions noted  Vascular: No evidence of phlebitis  Neuro: Alert, oriented to time/place/person  Cranial nerves 2-12 grossly normal. No focal abnormalities                          9.2    6.02  )-----------( 263      ( 20 Mar 2024 06:38 )             29.2       03-20    141  |  103  |  5<L>  ----------------------------<  153<H>  3.7   |  22  |  0.56    Ca    9.0      20 Mar 2024 06:38  Phos  3.1     03-20  Mg     2.10     03-20    TPro  6.3  /  Alb  2.9<L>  /  TBili  0.2  /  DBili  x   /  AST  16  /  ALT  7   /  AlkPhos  65  03-20      Urinalysis Basic - ( 20 Mar 2024 06:38 )    Color: x / Appearance: x / SG: x / pH: x  Gluc: 153 mg/dL / Ketone: x  / Bili: x / Urobili: x   Blood: x / Protein: x / Nitrite: x   Leuk Esterase: x / RBC: x / WBC x   Sq Epi: x / Non Sq Epi: x / Bacteria: x        MICROBIOLOGY:  v      Rapid RVP Result: NotDetec (03-18 @ 23:51)        RADIOLOGY:    rad< from: CT Chest No Cont (03.19.24 @ 02:04) >    ACC: [id number] EXAM:  CT CHEST   ORDERED BY: ROB BENITEZ     PROCEDURE DATE:  03/19/2024          INTERPRETATION:  CLINICAL INFORMATION: History of lupus and GBS.   Presenting with significantly worsening cough and dyspnea over the past 3   weeks after being diagnosed with pneumonia during 1/2024. Concern for   pneumonia.    COMPARISON: CT chest dated 1/1/2024, 12/20/2023, 12/25/2023-7/12/2019.    CONTRAST/COMPLICATIONS:  IV Contrast: None.  Oral Contrast: None.  Complications: None reported.    PROCEDURE:  CT of the Chest was performed.  Sagittal and coronal reformats were performed.    FINDINGS:    LUNGS AND AIRWAYS: Patent central airways.  There are multifocal   groundglass and consolidative opacities involving all lobes with   predominance in the bilateral lower lobes and right upper lobe.   Infiltrates are more extensive when compared to prior study and in a   slightly different distribution.  PLEURA: No pleural effusion.  MEDIASTINUM AND MADHU: No lymphadenopathy.  VESSELS: Withinnormal limits.  HEART: Heart size is normal. No pericardial effusion.  CHEST WALL AND LOWER NECK: Within normal limits.  VISUALIZED UPPER ABDOMEN: Within normal limits.  BONES: Degenerative changes.    IMPRESSION:    Multifocal groundglass and consolidative opacities involving all 5 lobes,   most predominant in the bilateral lower lobes and right upper lobe.        --- End of Report ---          CONNIE OLSEN DO; Resident Radiologist  This document has been electronically signed.  ELI NIETO MD; Attending Radiologist  This document has been electronically signed. Mar 19 2024  4:18AM    < end of copied text >

## 2024-03-20 NOTE — PROGRESS NOTE ADULT - ATTENDING COMMENTS
64-year-old female w/ PMH of lupus, PE on Xarelto (dx 2019), asthma, HTN, T2DM, and GBS (dx 1997) presenting with 3wk h/o of worsening SOB and persistent cough. Patient was notably recently admitted at [place] in 01/2024 for superimposed mycoplasma PNA on influenza now presenting with progressive dyspnea and persistent nonproductive cough.    Patient seen and examined at bedside. Patient is in mild respiratory distress likely secondary to coughing. CT scan reviewed which did not show any significant improvement compared to prior study. This is unusual given there should be some improvement for which there is none. Need to evaluate for other causes of possible fibrosis. Follow up rheumatologic workup.     Can give opioids if needed in order to control cough.     Will try to put the patient on the OR schedule for a bronchoscopy with transbronchial biopsies.

## 2024-03-20 NOTE — PROGRESS NOTE ADULT - ASSESSMENT
63 yo woman with SLE, DM, h/o PE, CVA with left sided weakness presenting with fever, dry cough, SOB    Hospital admission Jan 2024 at [facility] for PNA - mycoplasma IgM + and flu A +   Received azithro, cefepime, doxy and prednisone taper at that time     CT chest today shows multifocal ground glass and consolidative opacities involving all 5 lobes  - more extensive c/w prior study and in slightly different distribution     Multifocal pulmonary infiltrates  Possible PNA-   atypical  non infectious etiologies in ddx     Appreciate pulmonary eval-       blood cultures,  serum crypt ag, quant gold testing     agree with doxycycline and meropenem     may need bronch for diagnosis

## 2024-03-21 LAB
ACE SERPL-CCNC: 62 U/L — SIGNIFICANT CHANGE UP (ref 14–82)
ALBUMIN SERPL ELPH-MCNC: 3.2 G/DL — LOW (ref 3.3–5)
ALDOLASE SERPL-CCNC: 4.5 U/L — SIGNIFICANT CHANGE UP (ref 3.3–10.3)
ALP SERPL-CCNC: 67 U/L — SIGNIFICANT CHANGE UP (ref 40–120)
ALT FLD-CCNC: 8 U/L — SIGNIFICANT CHANGE UP (ref 4–33)
ANION GAP SERPL CALC-SCNC: 15 MMOL/L — HIGH (ref 7–14)
AST SERPL-CCNC: 13 U/L — SIGNIFICANT CHANGE UP (ref 4–32)
BILIRUB SERPL-MCNC: 0.2 MG/DL — SIGNIFICANT CHANGE UP (ref 0.2–1.2)
BLOOD GAS VENOUS COMPREHENSIVE RESULT: SIGNIFICANT CHANGE UP
BUN SERPL-MCNC: 8 MG/DL — SIGNIFICANT CHANGE UP (ref 7–23)
CALCIUM SERPL-MCNC: 9.5 MG/DL — SIGNIFICANT CHANGE UP (ref 8.4–10.5)
CHLORIDE SERPL-SCNC: 102 MMOL/L — SIGNIFICANT CHANGE UP (ref 98–107)
CO2 SERPL-SCNC: 24 MMOL/L — SIGNIFICANT CHANGE UP (ref 22–31)
CREAT SERPL-MCNC: 0.63 MG/DL — SIGNIFICANT CHANGE UP (ref 0.5–1.3)
DSDNA AB SER-ACNC: <12 IU/ML — SIGNIFICANT CHANGE UP
EGFR: 99 ML/MIN/1.73M2 — SIGNIFICANT CHANGE UP
GLUCOSE BLDC GLUCOMTR-MCNC: 121 MG/DL — HIGH (ref 70–99)
GLUCOSE BLDC GLUCOMTR-MCNC: 163 MG/DL — HIGH (ref 70–99)
GLUCOSE BLDC GLUCOMTR-MCNC: 200 MG/DL — HIGH (ref 70–99)
GLUCOSE BLDC GLUCOMTR-MCNC: 203 MG/DL — HIGH (ref 70–99)
GLUCOSE SERPL-MCNC: 164 MG/DL — HIGH (ref 70–99)
HCT VFR BLD CALC: 36 % — SIGNIFICANT CHANGE UP (ref 34.5–45)
HGB BLD-MCNC: 11.1 G/DL — LOW (ref 11.5–15.5)
MAGNESIUM SERPL-MCNC: 2 MG/DL — SIGNIFICANT CHANGE UP (ref 1.6–2.6)
MCHC RBC-ENTMCNC: 26.4 PG — LOW (ref 27–34)
MCHC RBC-ENTMCNC: 30.8 GM/DL — LOW (ref 32–36)
MCV RBC AUTO: 85.5 FL — SIGNIFICANT CHANGE UP (ref 80–100)
NRBC # BLD: 0 /100 WBCS — SIGNIFICANT CHANGE UP (ref 0–0)
NRBC # FLD: 0 K/UL — SIGNIFICANT CHANGE UP (ref 0–0)
PHOSPHATE SERPL-MCNC: 3.1 MG/DL — SIGNIFICANT CHANGE UP (ref 2.5–4.5)
PLATELET # BLD AUTO: 324 K/UL — SIGNIFICANT CHANGE UP (ref 150–400)
POTASSIUM SERPL-MCNC: 3.3 MMOL/L — LOW (ref 3.5–5.3)
POTASSIUM SERPL-SCNC: 3.3 MMOL/L — LOW (ref 3.5–5.3)
PROT SERPL-MCNC: 6.5 G/DL — SIGNIFICANT CHANGE UP (ref 6–8.3)
RBC # BLD: 4.21 M/UL — SIGNIFICANT CHANGE UP (ref 3.8–5.2)
RBC # FLD: 18.4 % — HIGH (ref 10.3–14.5)
SODIUM SERPL-SCNC: 141 MMOL/L — SIGNIFICANT CHANGE UP (ref 135–145)
WBC # BLD: 10.42 K/UL — SIGNIFICANT CHANGE UP (ref 3.8–10.5)
WBC # FLD AUTO: 10.42 K/UL — SIGNIFICANT CHANGE UP (ref 3.8–10.5)

## 2024-03-21 PROCEDURE — 99232 SBSQ HOSP IP/OBS MODERATE 35: CPT | Mod: GC

## 2024-03-21 PROCEDURE — 99233 SBSQ HOSP IP/OBS HIGH 50: CPT | Mod: GC

## 2024-03-21 RX ORDER — ASPIRIN/CALCIUM CARB/MAGNESIUM 324 MG
1 TABLET ORAL
Refills: 0 | DISCHARGE

## 2024-03-21 RX ORDER — SEMAGLUTIDE 0.68 MG/ML
0 INJECTION, SOLUTION SUBCUTANEOUS
Qty: 0 | Refills: 1 | DISCHARGE

## 2024-03-21 RX ORDER — DUPILUMAB 300 MG/2ML
1.14 INJECTION, SOLUTION SUBCUTANEOUS
Refills: 0 | DISCHARGE

## 2024-03-21 RX ORDER — VERAPAMIL HCL 240 MG
1 CAPSULE, EXTENDED RELEASE PELLETS 24 HR ORAL
Refills: 0 | DISCHARGE

## 2024-03-21 RX ORDER — OXYCODONE AND ACETAMINOPHEN 5; 325 MG/1; MG/1
1 TABLET ORAL
Refills: 0 | DISCHARGE

## 2024-03-21 RX ORDER — NEBIVOLOL HYDROCHLORIDE 5 MG/1
1 TABLET ORAL
Refills: 0 | DISCHARGE

## 2024-03-21 RX ORDER — BENZOCAINE AND MENTHOL 5; 1 G/100ML; G/100ML
1 LIQUID ORAL ONCE
Refills: 0 | Status: COMPLETED | OUTPATIENT
Start: 2024-03-21 | End: 2024-03-21

## 2024-03-21 RX ORDER — RIVAROXABAN 15 MG-20MG
1 KIT ORAL
Refills: 0 | DISCHARGE

## 2024-03-21 RX ORDER — CYCLOBENZAPRINE HYDROCHLORIDE 10 MG/1
1 TABLET, FILM COATED ORAL
Refills: 0 | DISCHARGE

## 2024-03-21 RX ORDER — VERAPAMIL HCL 240 MG
1 CAPSULE, EXTENDED RELEASE PELLETS 24 HR ORAL
Qty: 0 | Refills: 0 | DISCHARGE

## 2024-03-21 RX ORDER — CYCLOBENZAPRINE HYDROCHLORIDE 10 MG/1
0 TABLET, FILM COATED ORAL
Qty: 0 | Refills: 0 | DISCHARGE

## 2024-03-21 RX ORDER — HEPARIN SODIUM 5000 [USP'U]/ML
4000 INJECTION INTRAVENOUS; SUBCUTANEOUS EVERY 6 HOURS
Refills: 0 | Status: DISCONTINUED | OUTPATIENT
Start: 2024-03-21 | End: 2024-03-23

## 2024-03-21 RX ORDER — GABAPENTIN 400 MG/1
600 CAPSULE ORAL AT BEDTIME
Refills: 0 | Status: DISCONTINUED | OUTPATIENT
Start: 2024-03-21 | End: 2024-04-02

## 2024-03-21 RX ORDER — TIOTROPIUM BROMIDE 18 UG/1
2 CAPSULE ORAL; RESPIRATORY (INHALATION)
Refills: 0 | DISCHARGE

## 2024-03-21 RX ORDER — HEPARIN SODIUM 5000 [USP'U]/ML
INJECTION INTRAVENOUS; SUBCUTANEOUS
Qty: 25000 | Refills: 0 | Status: DISCONTINUED | OUTPATIENT
Start: 2024-03-21 | End: 2024-03-24

## 2024-03-21 RX ORDER — ALPRAZOLAM 0.25 MG
0 TABLET ORAL
Qty: 0 | Refills: 0 | DISCHARGE

## 2024-03-21 RX ORDER — GABAPENTIN 400 MG/1
2 CAPSULE ORAL
Refills: 0 | DISCHARGE

## 2024-03-21 RX ORDER — HEPARIN SODIUM 5000 [USP'U]/ML
8500 INJECTION INTRAVENOUS; SUBCUTANEOUS EVERY 6 HOURS
Refills: 0 | Status: DISCONTINUED | OUTPATIENT
Start: 2024-03-21 | End: 2024-03-23

## 2024-03-21 RX ORDER — VERAPAMIL HCL 240 MG
120 CAPSULE, EXTENDED RELEASE PELLETS 24 HR ORAL DAILY
Refills: 0 | Status: DISCONTINUED | OUTPATIENT
Start: 2024-03-21 | End: 2024-04-11

## 2024-03-21 RX ORDER — DULOXETINE HYDROCHLORIDE 30 MG/1
60 CAPSULE, DELAYED RELEASE ORAL DAILY
Refills: 0 | Status: DISCONTINUED | OUTPATIENT
Start: 2024-03-21 | End: 2024-04-11

## 2024-03-21 RX ORDER — CHOLECALCIFEROL (VITAMIN D3) 125 MCG
1 CAPSULE ORAL
Refills: 0 | DISCHARGE

## 2024-03-21 RX ORDER — BUDESONIDE AND FORMOTEROL FUMARATE DIHYDRATE 160; 4.5 UG/1; UG/1
2 AEROSOL RESPIRATORY (INHALATION)
Refills: 0 | DISCHARGE

## 2024-03-21 RX ORDER — DULOXETINE HYDROCHLORIDE 30 MG/1
1 CAPSULE, DELAYED RELEASE ORAL
Refills: 0 | DISCHARGE

## 2024-03-21 RX ORDER — SEMAGLUTIDE 0.68 MG/ML
0.5 INJECTION, SOLUTION SUBCUTANEOUS
Refills: 0 | DISCHARGE

## 2024-03-21 RX ADMIN — Medication 3 MILLILITER(S): at 20:59

## 2024-03-21 RX ADMIN — Medication 10 MILLILITER(S): at 12:41

## 2024-03-21 RX ADMIN — MEROPENEM 100 MILLIGRAM(S): 1 INJECTION INTRAVENOUS at 06:37

## 2024-03-21 RX ADMIN — GABAPENTIN 600 MILLIGRAM(S): 400 CAPSULE ORAL at 21:38

## 2024-03-21 RX ADMIN — Medication 0.25 MILLIGRAM(S): at 21:00

## 2024-03-21 RX ADMIN — MEROPENEM 100 MILLIGRAM(S): 1 INJECTION INTRAVENOUS at 22:42

## 2024-03-21 RX ADMIN — Medication 40 MILLIGRAM(S): at 17:30

## 2024-03-21 RX ADMIN — Medication 1: at 17:28

## 2024-03-21 RX ADMIN — Medication 0.25 MILLIGRAM(S): at 08:30

## 2024-03-21 RX ADMIN — MEROPENEM 100 MILLIGRAM(S): 1 INJECTION INTRAVENOUS at 14:29

## 2024-03-21 RX ADMIN — Medication 2: at 12:40

## 2024-03-21 RX ADMIN — DULOXETINE HYDROCHLORIDE 60 MILLIGRAM(S): 30 CAPSULE, DELAYED RELEASE ORAL at 13:18

## 2024-03-21 RX ADMIN — Medication 3 MILLILITER(S): at 08:24

## 2024-03-21 RX ADMIN — HEPARIN SODIUM 1800 UNIT(S)/HR: 5000 INJECTION INTRAVENOUS; SUBCUTANEOUS at 18:34

## 2024-03-21 RX ADMIN — Medication 3 MILLILITER(S): at 15:22

## 2024-03-21 RX ADMIN — Medication 650 MILLIGRAM(S): at 13:23

## 2024-03-21 RX ADMIN — Medication 40 MILLIGRAM(S): at 06:37

## 2024-03-21 RX ADMIN — Medication 3 MILLILITER(S): at 04:36

## 2024-03-21 RX ADMIN — Medication 650 MILLIGRAM(S): at 14:23

## 2024-03-21 RX ADMIN — HEPARIN SODIUM 1800 UNIT(S)/HR: 5000 INJECTION INTRAVENOUS; SUBCUTANEOUS at 19:39

## 2024-03-21 RX ADMIN — Medication 100 MILLIGRAM(S): at 13:18

## 2024-03-21 RX ADMIN — BENZOCAINE AND MENTHOL 1 LOZENGE: 5; 1 LIQUID ORAL at 03:15

## 2024-03-21 NOTE — PROGRESS NOTE ADULT - PROBLEM SELECTOR PLAN 2
- Patient found on fall by  prior to presentation  - Denies any acute bony pain  > F/u orthostatic vitals  > F/u CTH  > F/u TTE - Patient found on fall by  prior to presentation  - Denies any acute bony pain  - CTH neg  > F/u orthostatic vitals  > F/u TTE

## 2024-03-21 NOTE — PROGRESS NOTE ADULT - PROBLEM SELECTOR PLAN 1
- P/w SOB, persitent cough  - H/o asthma on symbicort, spiriva at home  - Recent admission for superimposed mycoplasma PNA on influenza, s/p azithromycin/doxycycline  - RVP unremarkable  - CXR -> Bibasilar airspace opacities  - CT Chest -> Multifocal groundglass and consolidative opacities involving all 5 lobes, most predominant in the b/l lower lobes and right upper lobe  - S/p CTX, azithromycin x1 in ED  > F/u MRSA, legionella, strep Ag, procalcitonin, sputum cx  > C/w empiric meropenem, doxycyline  > C/w prednisone 40mg for 5d, duonebs q6, Robitussin-DM q4 prn  > Pulmonology consulted, will appreciate recs  > ID consulted, will appreciate recs - P/w SOB, persitent cough  - H/o asthma on symbicort, spiriva at home  - Recent admission for superimposed mycoplasma PNA on influenza, s/p azithromycin/doxycycline  - RVP unremarkable  - CXR -> Bibasilar airspace opacities  - CT Chest -> Multifocal groundglass and consolidative opacities involving all 5 lobes, most predominant in the b/l lower lobes and right upper lobe  - S/p CTX, azithromycin x1 in ED  > F/u MRSA, legionella, strep Ag, procalcitonin, sputum cx  > C/w empiric meropenem, doxycyline  > prednisone 40 switched to solumedrol 40 BID  > c/w duonebs q6, budesonide inhaled Q12, Robitussin-DM q4 prn, and hycodan PRN  > Pulmonology consulted, will appreciate recs, considering bronchoscopy   > ID consulted, will appreciate recs

## 2024-03-21 NOTE — PROGRESS NOTE ADULT - ASSESSMENT
64-year-old female w/ PMH of lupus, PE on Xarelto (dx 2019), asthma, HTN, T2DM, and GBS (dx 1997) presenting with 3wk h/o of worsening SOB and persistent cough. Patient was notably recently admitted at [facility] in 01/2024 for superimposed mycoplasma PNA on influenza now presenting with progressive dyspnea and persistent nonproductive cough    #Abnormal CT chest  #asthma  Denies complete resolution of symptoms since her prior discharge.   Noted recent  admission with repeat CT chest with improvement in some areas with new opacities in others  - ESR, CRP elevated; f/u THERON, RF, ANCAs, MPO/PR3, aldolase, DS DNA, C3, C4  - send sputum if making  - c/w standing nebs for now  - on pred 40mg currently  - abx per ID  - tentative plan for rigid bronchoscopy, date pending OR availability 64-year-old female w/ PMH of lupus, PE on Xarelto (dx 2019), asthma, HTN, T2DM, and GBS (dx 1997) presenting with 3wk h/o of worsening SOB and persistent cough. Patient was notably recently admitted at [place] in 01/2024 for superimposed mycoplasma PNA on influenza now presenting with progressive dyspnea and persistent nonproductive cough    #Abnormal CT chest  #asthma  Denies complete resolution of symptoms since her prior discharge.   Noted recent  admission with repeat CT chest with improvement in some areas with new opacities in others  - ESR, CRP elevated; f/u THERON, RF, ANCAs, MPO/PR3, aldolase, DS DNA, C3, C4  - send sputum if making  - c/w standing nebs for now  - on pred 40mg currently  - abx per ID  - tentative plan for rigid bronchoscopy, date pending OR availability  - hold rivaroxaban and start heparin gtt

## 2024-03-21 NOTE — PROGRESS NOTE ADULT - PROBLEM SELECTOR PLAN 3
- P/w loose BMs, initially w/ some blood  > F/u GI PCR  > CTM BMs - P/w loose BMs, initially w/ some blood  >GI PCR neg  > CTM BMs

## 2024-03-21 NOTE — PROGRESS NOTE ADULT - ATTENDING COMMENTS
pt w bouts of tachypnea especially after coughing - on 5 L NC now, check blood gas - monitor resp status closely, might require BiPAP  no clinical improvement - will dw ID if can dc abx  per pulm for bronch in AM  awaiting serologies

## 2024-03-21 NOTE — PROGRESS NOTE ADULT - ATTENDING COMMENTS
64-year-old female w/ PMH of lupus, PE on Xarelto (dx 2019), asthma, HTN, T2DM, and GBS (dx 1997) presenting with 3wk h/o of worsening SOB and persistent cough. Patient was notably recently admitted at [place] in 01/2024 for superimposed mycoplasma PNA on influenza now presenting with progressive dyspnea and persistent nonproductive cough.    Patient seen and examined at bedside. Patient is in mild respiratory distress likely secondary to coughing. CT scan reviewed which did not show any significant improvement compared to prior study. This is unusual given there should be some improvement for which there is none. Need to evaluate for other causes of possible fibrosis. Follow up rheumatologic workup.     Can give opioids if needed in order to control cough.     Patient for ridged bronch on Monday March 25th with Dr. Rodriguez.     Please change AC to heparin and DC NOAC.

## 2024-03-21 NOTE — PROGRESS NOTE ADULT - SUBJECTIVE AND OBJECTIVE BOX
CHIEF COMPLAINT:Patient is a 64y old  Female who presents with a chief complaint of SOB, cough (21 Mar 2024 06:44)      Interval Events:  persistent dyspnea on nonproductive cough  timing of bronchoscopy tbd    REVIEW OF SYSTEMS:  [x] All other systems negative except per HPI   [ ] Unable to assess ROS because ________    OBJECTIVE:  ICU Vital Signs Last 24 Hrs  T(C): 36.7 (21 Mar 2024 04:34), Max: 36.8 (20 Mar 2024 21:07)  T(F): 98 (21 Mar 2024 04:34), Max: 98.2 (20 Mar 2024 21:07)  HR: 113 (21 Mar 2024 08:33) (90 - 115)  BP: 117/86 (21 Mar 2024 04:34) (117/86 - 169/98)  BP(mean): 95 (21 Mar 2024 04:34) (95 - 95)  ABP: --  ABP(mean): --  RR: 18 (21 Mar 2024 04:34) (17 - 18)  SpO2: 98% (21 Mar 2024 08:33) (93% - 100%)    O2 Parameters below as of 21 Mar 2024 08:33  Patient On (Oxygen Delivery Method): nasal cannula                PHYSICAL EXAM:  Gen: uncomfortable appearing  HEENT: MMM, PERRL, EOMI  CV: RRR, no m/r/g  Lung: mild wheeze b/l  Abd: Soft, NT, ND  Ext: nonpitting edema   Skin: No rash  Neuro: A&Ox3, no focal deficits    HOSPITAL MEDICATIONS:  MEDICATIONS  (STANDING):  albuterol/ipratropium for Nebulization 3 milliLiter(s) Nebulizer every 6 hours  buDESOnide    Inhalation Suspension 0.25 milliGRAM(s) Inhalation every 12 hours  dextrose 5%. 1000 milliLiter(s) (50 mL/Hr) IV Continuous <Continuous>  dextrose 5%. 1000 milliLiter(s) (100 mL/Hr) IV Continuous <Continuous>  dextrose 50% Injectable 25 Gram(s) IV Push once  dextrose 50% Injectable 12.5 Gram(s) IV Push once  dextrose 50% Injectable 25 Gram(s) IV Push once  doxycycline IVPB      doxycycline IVPB 100 milliGRAM(s) IV Intermittent every 12 hours  DULoxetine 60 milliGRAM(s) Oral daily  gabapentin 600 milliGRAM(s) Oral at bedtime  glucagon  Injectable 1 milliGRAM(s) IntraMuscular once  influenza   Vaccine 0.5 milliLiter(s) IntraMuscular once  insulin lispro (ADMELOG) corrective regimen sliding scale   SubCutaneous three times a day before meals  insulin lispro (ADMELOG) corrective regimen sliding scale   SubCutaneous at bedtime  meropenem  IVPB 1000 milliGRAM(s) IV Intermittent every 8 hours  methylPREDNISolone sodium succinate Injectable 40 milliGRAM(s) IV Push every 12 hours  rivaroxaban 20 milliGRAM(s) Oral with dinner    MEDICATIONS  (PRN):  acetaminophen     Tablet .. 650 milliGRAM(s) Oral every 6 hours PRN Temp greater or equal to 38C (100.4F), Mild Pain (1 - 3)  dextrose Oral Gel 15 Gram(s) Oral once PRN Blood Glucose LESS THAN 70 milliGRAM(s)/deciliter  guaifenesin/dextromethorphan Oral Liquid 10 milliLiter(s) Oral every 4 hours PRN Cough  hydrocodone/homatropine Syrup 5 milliLiter(s) Oral every 8 hours PRN Cough      LABS:    The Labs were reviewed by me   The Radiology was reviewed by me    EKG tracing reviewed by me    03-20    141  |  103  |  5<L>  ----------------------------<  153<H>  3.7   |  22  |  0.56  03-19    139  |  99  |  8   ----------------------------<  103<H>  3.0<L>   |  25  |  0.77    Ca    9.0      20 Mar 2024 06:38  Ca    9.3      19 Mar 2024 00:19  Phos  3.1     03-20  Mg     2.10     03-20    TPro  6.3  /  Alb  2.9<L>  /  TBili  0.2  /  DBili  x   /  AST  16  /  ALT  7   /  AlkPhos  65  03-20  TPro  6.9  /  Alb  3.3  /  TBili  0.4  /  DBili  x   /  AST  18  /  ALT  10  /  AlkPhos  75  03-19    Magnesium: 2.10 mg/dL (03-20-24 @ 06:38)  Magnesium: 1.90 mg/dL (03-19-24 @ 00:19)    Phosphorus: 3.1 mg/dL (03-20-24 @ 06:38)                    Urinalysis Basic - ( 20 Mar 2024 06:38 )    Color: x / Appearance: x / SG: x / pH: x  Gluc: 153 mg/dL / Ketone: x  / Bili: x / Urobili: x   Blood: x / Protein: x / Nitrite: x   Leuk Esterase: x / RBC: x / WBC x   Sq Epi: x / Non Sq Epi: x / Bacteria: x                              11.1   10.42 )-----------( 324      ( 21 Mar 2024 07:37 )             36.0                         9.2    6.02  )-----------( 263      ( 20 Mar 2024 06:38 )             29.2                         10.5   7.37  )-----------( 400      ( 19 Mar 2024 00:19 )             33.1     CAPILLARY BLOOD GLUCOSE      POCT Blood Glucose.: 121 mg/dL (21 Mar 2024 08:38)  POCT Blood Glucose.: 131 mg/dL (20 Mar 2024 21:00)  POCT Blood Glucose.: 156 mg/dL (20 Mar 2024 17:35)  POCT Blood Glucose.: 175 mg/dL (20 Mar 2024 13:11)        MICROBIOLOGY:     RADIOLOGY:  [ ] Reviewed and interpreted by me    Point of Care Ultrasound Findings:    PFT:    EKG:

## 2024-03-21 NOTE — PHARMACOTHERAPY INTERVENTION NOTE - COMMENTS
Medication history complete and verified with outpatient insurance records (Dr. First) and with patient at bedside.     The patient takes Percocet 5/325mg as needed for hip/shoulder pain about two to three times a week only, but takes Flexeril 10mg every night.     Additionally, gabapentin 600mg QHS is taken for seizures. Patient states this is the only seizure medication she takes.     Mohammad Harris Jalili, PharmD  PGY-1 Pharmacy Resident  spectra: [phone]; ext: [phone]  Available on Teams     Medication history complete and verified with pharmacy fill history and with patient at bedside. Discrepancies relayed to provider.    The patient takes Percocet 5/325mg as needed for hip/shoulder pain about two to three times a week only, but takes Flexeril 10mg every night.     Additionally, gabapentin 600mg QHS is taken for seizures. Patient states this is the only seizure medication she takes.     Mohammad Harris Jalili, PharmD  PGY-1 Pharmacy Resident  spectra: [phone]; ext: [phone]  Available on Teams

## 2024-03-21 NOTE — PROGRESS NOTE ADULT - PROBLEM SELECTOR PLAN 7
- On metformin, ozempic at home  > F/u A1c  > C/w ISS, holding home meds  > CTM w/ FSGs - On metformin, ozempic at home  > A1c 6.4  > C/w ISS, holding home meds  > CTM w/ FSGs

## 2024-03-21 NOTE — PROGRESS NOTE ADULT - SUBJECTIVE AND OBJECTIVE BOX
***************************************************************  Jean Pierre Aguilar, PGY2  Internal Medicine   TEAMS Preferred  ***************************************************************    ROSSI ROJAS  64y  MRN: [id number]  03-19-24 (2d)    Patient is a 64y old  Female who presents with a chief complaint of SOB, cough (20 Mar 2024 16:55)      SUBJECTIVE / OVERNIGHT EVENTS:   No acute overnight events. Pt seen and examined at bedside. Denies fevers, chills, CP, SOB, Abdominal pain, N/V, Constipation, Diarrhea. Last BM     12 Point ROS negative with the exception of the above    MEDICATIONS  (STANDING):  albuterol/ipratropium for Nebulization 3 milliLiter(s) Nebulizer every 6 hours  buDESOnide    Inhalation Suspension 0.25 milliGRAM(s) Inhalation every 12 hours  dextrose 5%. 1000 milliLiter(s) (50 mL/Hr) IV Continuous <Continuous>  dextrose 5%. 1000 milliLiter(s) (100 mL/Hr) IV Continuous <Continuous>  dextrose 50% Injectable 25 Gram(s) IV Push once  dextrose 50% Injectable 12.5 Gram(s) IV Push once  dextrose 50% Injectable 25 Gram(s) IV Push once  doxycycline IVPB      doxycycline IVPB 100 milliGRAM(s) IV Intermittent every 12 hours  DULoxetine 60 milliGRAM(s) Oral daily  gabapentin 600 milliGRAM(s) Oral at bedtime  glucagon  Injectable 1 milliGRAM(s) IntraMuscular once  influenza   Vaccine 0.5 milliLiter(s) IntraMuscular once  insulin lispro (ADMELOG) corrective regimen sliding scale   SubCutaneous three times a day before meals  insulin lispro (ADMELOG) corrective regimen sliding scale   SubCutaneous at bedtime  meropenem  IVPB 1000 milliGRAM(s) IV Intermittent every 8 hours  methylPREDNISolone sodium succinate Injectable 40 milliGRAM(s) IV Push every 12 hours  rivaroxaban 20 milliGRAM(s) Oral with dinner    MEDICATIONS  (PRN):  acetaminophen     Tablet .. 650 milliGRAM(s) Oral every 6 hours PRN Temp greater or equal to 38C (100.4F), Mild Pain (1 - 3)  dextrose Oral Gel 15 Gram(s) Oral once PRN Blood Glucose LESS THAN 70 milliGRAM(s)/deciliter  guaifenesin/dextromethorphan Oral Liquid 10 milliLiter(s) Oral every 4 hours PRN Cough  hydrocodone/homatropine Syrup 5 milliLiter(s) Oral every 8 hours PRN Cough      OBJECTIVE:  Vital Signs Last 24 Hrs  T(C): 36.7 (21 Mar 2024 04:34), Max: 36.8 (20 Mar 2024 21:07)  T(F): 98 (21 Mar 2024 04:34), Max: 98.2 (20 Mar 2024 21:07)  HR: 112 (21 Mar 2024 04:34) (90 - 113)  BP: 117/86 (21 Mar 2024 04:34) (117/86 - 169/98)  BP(mean): 95 (21 Mar 2024 04:34) (95 - 95)  RR: 18 (21 Mar 2024 04:34) (17 - 18)  SpO2: 93% (21 Mar 2024 04:34) (93% - 100%)    Parameters below as of 21 Mar 2024 04:34  Patient On (Oxygen Delivery Method): nasal cannula  O2 Flow (L/min): 4      I&O's Summary      PHYSICAL EXAM:  GENERAL: Laying comfortably, NAD  HEENT: NCAT, PERRLA, EOMI, no scleral icterus, no LAD  NECK: No JVD, supple  LUNG: CTABL; No wheezes, crackles, or rhonchi  HEART: RRR; normal S1/S2; No murmurs, rubs, or gallops  ABDOMEN: +BS, soft, nontender, nondistended, no HSM; No rebound, guarding, or rigidity  EXTREMITIES:  No LE edema b/l, 2+ Peripheral Pulses, No clubbing or cyanosis  NEUROLOGY: AOx3, non-focal, strength 5/5 in all extremities, sensation intact  PSYCH: calm and cooperative  SKIN: No rashes or lesions    LABS:                        9.2    6.02  )-----------( 263      ( 20 Mar 2024 06:38 )             29.2     Auto Eosinophil # x     / Auto Eosinophil % x     / Auto Neutrophil # x     / Auto Neutrophil % x     / BANDS % x        03-20    141  |  103  |  5<L>  ----------------------------<  153<H>  3.7   |  22  |  0.56  03-19    139  |  99  |  8   ----------------------------<  103<H>  3.0<L>   |  25  |  0.77    Ca    9.0      20 Mar 2024 06:38  Ca    9.3      19 Mar 2024 00:19  Phos  3.1     03-20  Mg     2.10     03-20    TPro  6.3  /  Alb  2.9<L>  /  TBili  0.2  /  DBili  x   /  AST  16  /  ALT  7   /  AlkPhos  65  03-20  TPro  6.9  /  Alb  3.3  /  TBili  0.4  /  DBili  x   /  AST  18  /  ALT  10  /  AlkPhos  75  03-19          Urinalysis Basic - ( 20 Mar 2024 06:38 )    Color: x / Appearance: x / SG: x / pH: x  Gluc: 153 mg/dL / Ketone: x  / Bili: x / Urobili: x   Blood: x / Protein: x / Nitrite: x   Leuk Esterase: x / RBC: x / WBC x   Sq Epi: x / Non Sq Epi: x / Bacteria: x          CAPILLARY BLOOD GLUCOSE      POCT Blood Glucose.: 131 mg/dL (20 Mar 2024 21:00)  POCT Blood Glucose.: 156 mg/dL (20 Mar 2024 17:35)  POCT Blood Glucose.: 175 mg/dL (20 Mar 2024 13:11)  POCT Blood Glucose.: 161 mg/dL (20 Mar 2024 08:45)        RADIOLOGY & ADDITIONAL TESTS:     ***************************************************************  Jean Pierre Aguilar, PGY2  Internal Medicine   TEAMS Preferred  ***************************************************************    ROSSI ROJAS  64y  MRN: [id number]  03-19-24 (2d)    Patient is a 64y old  Female who presents with a chief complaint of SOB, cough (20 Mar 2024 16:55)      SUBJECTIVE / OVERNIGHT EVENTS:   No acute overnight events. Pt seen and examined at bedside. Endorses continued coughing, wheezing, and dyspnea. Feels unchanged compared to prior. Weaned to 4L NC. CTA negative for PE. Denies fevers, chills, nausea, and vomiting.      12 Point ROS negative with the exception of the above    MEDICATIONS  (STANDING):  albuterol/ipratropium for Nebulization 3 milliLiter(s) Nebulizer every 6 hours  buDESOnide    Inhalation Suspension 0.25 milliGRAM(s) Inhalation every 12 hours  dextrose 5%. 1000 milliLiter(s) (50 mL/Hr) IV Continuous <Continuous>  dextrose 5%. 1000 milliLiter(s) (100 mL/Hr) IV Continuous <Continuous>  dextrose 50% Injectable 25 Gram(s) IV Push once  dextrose 50% Injectable 12.5 Gram(s) IV Push once  dextrose 50% Injectable 25 Gram(s) IV Push once  doxycycline IVPB      doxycycline IVPB 100 milliGRAM(s) IV Intermittent every 12 hours  DULoxetine 60 milliGRAM(s) Oral daily  gabapentin 600 milliGRAM(s) Oral at bedtime  glucagon  Injectable 1 milliGRAM(s) IntraMuscular once  influenza   Vaccine 0.5 milliLiter(s) IntraMuscular once  insulin lispro (ADMELOG) corrective regimen sliding scale   SubCutaneous three times a day before meals  insulin lispro (ADMELOG) corrective regimen sliding scale   SubCutaneous at bedtime  meropenem  IVPB 1000 milliGRAM(s) IV Intermittent every 8 hours  methylPREDNISolone sodium succinate Injectable 40 milliGRAM(s) IV Push every 12 hours  rivaroxaban 20 milliGRAM(s) Oral with dinner    MEDICATIONS  (PRN):  acetaminophen     Tablet .. 650 milliGRAM(s) Oral every 6 hours PRN Temp greater or equal to 38C (100.4F), Mild Pain (1 - 3)  dextrose Oral Gel 15 Gram(s) Oral once PRN Blood Glucose LESS THAN 70 milliGRAM(s)/deciliter  guaifenesin/dextromethorphan Oral Liquid 10 milliLiter(s) Oral every 4 hours PRN Cough  hydrocodone/homatropine Syrup 5 milliLiter(s) Oral every 8 hours PRN Cough      OBJECTIVE:  Vital Signs Last 24 Hrs  T(C): 36.7 (21 Mar 2024 04:34), Max: 36.8 (20 Mar 2024 21:07)  T(F): 98 (21 Mar 2024 04:34), Max: 98.2 (20 Mar 2024 21:07)  HR: 112 (21 Mar 2024 04:34) (90 - 113)  BP: 117/86 (21 Mar 2024 04:34) (117/86 - 169/98)  BP(mean): 95 (21 Mar 2024 04:34) (95 - 95)  RR: 18 (21 Mar 2024 04:34) (17 - 18)  SpO2: 93% (21 Mar 2024 04:34) (93% - 100%)    Parameters below as of 21 Mar 2024 04:34  Patient On (Oxygen Delivery Method): nasal cannula  O2 Flow (L/min): 4      I&O's Summary      PHYSICAL EXAM:  GENERAL: Laying comfortably, NAD  HEENT: NCAT, PERRLA, EOMI, no scleral icterus, no LAD  LUNG: +wheezing b/l, +cough  HEART: RRR; normal S1/S2; No murmurs, rubs, or gallops  ABDOMEN: +BS, soft, nontender, nondistended, no HSM; No rebound, guarding, or rigidity  EXTREMITIES:  No LE edema b/l, 2+ Peripheral Pulses, No clubbing or cyanosis  NEUROLOGY: AOx3, non-focal, strength 5/5 in all extremities, sensation intact  PSYCH: calm and cooperative  SKIN: No rashes or lesions    LABS:                        11.1   10.42 )-----------( 324      ( 21 Mar 2024 07:37 )             36.0   03-20    141  |  103  |  5<L>  ----------------------------<  153<H>  3.7   |  22  |  0.56    Ca    9.0      20 Mar 2024 06:38  Phos  3.1     03-20  Mg     2.10     03-20    TPro  6.3  /  Alb  2.9<L>  /  TBili  0.2  /  DBili  x   /  AST  16  /  ALT  7   /  AlkPhos  65  03-20                          9.2    6.02  )-----------( 263      ( 20 Mar 2024 06:38 )             29.2     Auto Eosinophil # x     / Auto Eosinophil % x     / Auto Neutrophil # x     / Auto Neutrophil % x     / BANDS % x        03-20    141  |  103  |  5<L>  ----------------------------<  153<H>  3.7   |  22  |  0.56  03-19    139  |  99  |  8   ----------------------------<  103<H>  3.0<L>   |  25  |  0.77    Ca    9.0      20 Mar 2024 06:38  Ca    9.3      19 Mar 2024 00:19  Phos  3.1     03-20  Mg     2.10     03-20    TPro  6.3  /  Alb  2.9<L>  /  TBili  0.2  /  DBili  x   /  AST  16  /  ALT  7   /  AlkPhos  65  03-20  TPro  6.9  /  Alb  3.3  /  TBili  0.4  /  DBili  x   /  AST  18  /  ALT  10  /  AlkPhos  75  03-19          Urinalysis Basic - ( 20 Mar 2024 06:38 )    Color: x / Appearance: x / SG: x / pH: x  Gluc: 153 mg/dL / Ketone: x  / Bili: x / Urobili: x   Blood: x / Protein: x / Nitrite: x   Leuk Esterase: x / RBC: x / WBC x   Sq Epi: x / Non Sq Epi: x / Bacteria: x          CAPILLARY BLOOD GLUCOSE      POCT Blood Glucose.: 131 mg/dL (20 Mar 2024 21:00)  POCT Blood Glucose.: 156 mg/dL (20 Mar 2024 17:35)  POCT Blood Glucose.: 175 mg/dL (20 Mar 2024 13:11)  POCT Blood Glucose.: 161 mg/dL (20 Mar 2024 08:45)        RADIOLOGY & ADDITIONAL TESTS:

## 2024-03-22 ENCOUNTER — RESULT REVIEW (OUTPATIENT)
Age: 64
End: 2024-03-22

## 2024-03-22 LAB
ALBUMIN SERPL ELPH-MCNC: 2.8 G/DL — LOW (ref 3.3–5)
ALP SERPL-CCNC: 74 U/L — SIGNIFICANT CHANGE UP (ref 40–120)
ALT FLD-CCNC: 14 U/L — SIGNIFICANT CHANGE UP (ref 4–33)
ANA TITR SER: NEGATIVE — SIGNIFICANT CHANGE UP
ANION GAP SERPL CALC-SCNC: 20 MMOL/L — HIGH (ref 7–14)
APTT BLD: 42.4 SEC — HIGH (ref 24.5–35.6)
APTT BLD: >200 SEC — CRITICAL HIGH (ref 24.5–35.6)
APTT BLD: >200 SEC — CRITICAL HIGH (ref 24.5–35.6)
AST SERPL-CCNC: 27 U/L — SIGNIFICANT CHANGE UP (ref 4–32)
BASOPHILS # BLD AUTO: 0.02 K/UL — SIGNIFICANT CHANGE UP (ref 0–0.2)
BASOPHILS NFR BLD AUTO: 0.2 % — SIGNIFICANT CHANGE UP (ref 0–2)
BILIRUB SERPL-MCNC: 0.3 MG/DL — SIGNIFICANT CHANGE UP (ref 0.2–1.2)
BUN SERPL-MCNC: 8 MG/DL — SIGNIFICANT CHANGE UP (ref 7–23)
CALCIUM SERPL-MCNC: 9.6 MG/DL — SIGNIFICANT CHANGE UP (ref 8.4–10.5)
CHLORIDE SERPL-SCNC: 101 MMOL/L — SIGNIFICANT CHANGE UP (ref 98–107)
CO2 SERPL-SCNC: 17 MMOL/L — LOW (ref 22–31)
CREAT SERPL-MCNC: 0.51 MG/DL — SIGNIFICANT CHANGE UP (ref 0.5–1.3)
EGFR: 104 ML/MIN/1.73M2 — SIGNIFICANT CHANGE UP
EOSINOPHIL # BLD AUTO: 0 K/UL — SIGNIFICANT CHANGE UP (ref 0–0.5)
EOSINOPHIL NFR BLD AUTO: 0 % — SIGNIFICANT CHANGE UP (ref 0–6)
FUNGITELL: 410 PG/ML — HIGH
GLUCOSE BLDC GLUCOMTR-MCNC: 158 MG/DL — HIGH (ref 70–99)
GLUCOSE BLDC GLUCOMTR-MCNC: 160 MG/DL — HIGH (ref 70–99)
GLUCOSE BLDC GLUCOMTR-MCNC: 161 MG/DL — HIGH (ref 70–99)
GLUCOSE BLDC GLUCOMTR-MCNC: 178 MG/DL — HIGH (ref 70–99)
GLUCOSE SERPL-MCNC: 168 MG/DL — HIGH (ref 70–99)
HCT VFR BLD CALC: 33.8 % — LOW (ref 34.5–45)
HGB BLD-MCNC: 10.4 G/DL — LOW (ref 11.5–15.5)
IANC: 10.32 K/UL — HIGH (ref 1.8–7.4)
IMM GRANULOCYTES NFR BLD AUTO: 1.8 % — HIGH (ref 0–0.9)
INR BLD: 1.11 RATIO — SIGNIFICANT CHANGE UP (ref 0.85–1.18)
LYMPHOCYTES # BLD AUTO: 0.8 K/UL — LOW (ref 1–3.3)
LYMPHOCYTES # BLD AUTO: 6.7 % — LOW (ref 13–44)
MAGNESIUM SERPL-MCNC: 2.1 MG/DL — SIGNIFICANT CHANGE UP (ref 1.6–2.6)
MCHC RBC-ENTMCNC: 26.4 PG — LOW (ref 27–34)
MCHC RBC-ENTMCNC: 30.8 GM/DL — LOW (ref 32–36)
MCV RBC AUTO: 85.8 FL — SIGNIFICANT CHANGE UP (ref 80–100)
MONOCYTES # BLD AUTO: 0.54 K/UL — SIGNIFICANT CHANGE UP (ref 0–0.9)
MONOCYTES NFR BLD AUTO: 4.5 % — SIGNIFICANT CHANGE UP (ref 2–14)
NEUTROPHILS # BLD AUTO: 10.32 K/UL — HIGH (ref 1.8–7.4)
NEUTROPHILS NFR BLD AUTO: 86.8 % — HIGH (ref 43–77)
NRBC # BLD: 0 /100 WBCS — SIGNIFICANT CHANGE UP (ref 0–0)
NRBC # FLD: 0 K/UL — SIGNIFICANT CHANGE UP (ref 0–0)
PHOSPHATE SERPL-MCNC: 3.2 MG/DL — SIGNIFICANT CHANGE UP (ref 2.5–4.5)
PLATELET # BLD AUTO: 426 K/UL — HIGH (ref 150–400)
POTASSIUM SERPL-MCNC: 4.8 MMOL/L — SIGNIFICANT CHANGE UP (ref 3.5–5.3)
POTASSIUM SERPL-SCNC: 4.8 MMOL/L — SIGNIFICANT CHANGE UP (ref 3.5–5.3)
PROT SERPL-MCNC: 7 G/DL — SIGNIFICANT CHANGE UP (ref 6–8.3)
PROTHROM AB SERPL-ACNC: 12.4 SEC — SIGNIFICANT CHANGE UP (ref 9.5–13)
RBC # BLD: 3.94 M/UL — SIGNIFICANT CHANGE UP (ref 3.8–5.2)
RBC # FLD: 18.7 % — HIGH (ref 10.3–14.5)
SODIUM SERPL-SCNC: 138 MMOL/L — SIGNIFICANT CHANGE UP (ref 135–145)
WBC # BLD: 11.9 K/UL — HIGH (ref 3.8–10.5)
WBC # FLD AUTO: 11.9 K/UL — HIGH (ref 3.8–10.5)

## 2024-03-22 PROCEDURE — 99233 SBSQ HOSP IP/OBS HIGH 50: CPT | Mod: GC

## 2024-03-22 PROCEDURE — 93306 TTE W/DOPPLER COMPLETE: CPT | Mod: 26

## 2024-03-22 PROCEDURE — 99232 SBSQ HOSP IP/OBS MODERATE 35: CPT

## 2024-03-22 RX ADMIN — DULOXETINE HYDROCHLORIDE 60 MILLIGRAM(S): 30 CAPSULE, DELAYED RELEASE ORAL at 11:50

## 2024-03-22 RX ADMIN — HEPARIN SODIUM 2000 UNIT(S)/HR: 5000 INJECTION INTRAVENOUS; SUBCUTANEOUS at 08:06

## 2024-03-22 RX ADMIN — Medication 1: at 17:30

## 2024-03-22 RX ADMIN — HEPARIN SODIUM 2000 UNIT(S)/HR: 5000 INJECTION INTRAVENOUS; SUBCUTANEOUS at 03:04

## 2024-03-22 RX ADMIN — HEPARIN SODIUM 1700 UNIT(S)/HR: 5000 INJECTION INTRAVENOUS; SUBCUTANEOUS at 13:42

## 2024-03-22 RX ADMIN — GABAPENTIN 600 MILLIGRAM(S): 400 CAPSULE ORAL at 21:34

## 2024-03-22 RX ADMIN — HEPARIN SODIUM 0 UNIT(S)/HR: 5000 INJECTION INTRAVENOUS; SUBCUTANEOUS at 12:39

## 2024-03-22 RX ADMIN — HEPARIN SODIUM 1700 UNIT(S)/HR: 5000 INJECTION INTRAVENOUS; SUBCUTANEOUS at 19:19

## 2024-03-22 RX ADMIN — HEPARIN SODIUM 0 UNIT(S)/HR: 5000 INJECTION INTRAVENOUS; SUBCUTANEOUS at 20:40

## 2024-03-22 RX ADMIN — HEPARIN SODIUM 4000 UNIT(S): 5000 INJECTION INTRAVENOUS; SUBCUTANEOUS at 03:12

## 2024-03-22 RX ADMIN — HEPARIN SODIUM 1400 UNIT(S)/HR: 5000 INJECTION INTRAVENOUS; SUBCUTANEOUS at 21:48

## 2024-03-22 RX ADMIN — Medication 3 MILLILITER(S): at 03:43

## 2024-03-22 RX ADMIN — HEPARIN SODIUM 2000 UNIT(S)/HR: 5000 INJECTION INTRAVENOUS; SUBCUTANEOUS at 08:10

## 2024-03-22 RX ADMIN — Medication 1: at 09:35

## 2024-03-22 RX ADMIN — Medication 40 MILLIGRAM(S): at 17:30

## 2024-03-22 RX ADMIN — Medication 3 MILLILITER(S): at 15:14

## 2024-03-22 RX ADMIN — Medication 0.25 MILLIGRAM(S): at 21:38

## 2024-03-22 RX ADMIN — Medication 3 MILLILITER(S): at 21:38

## 2024-03-22 RX ADMIN — Medication 40 MILLIGRAM(S): at 08:11

## 2024-03-22 RX ADMIN — Medication 100 MILLIGRAM(S): at 01:31

## 2024-03-22 RX ADMIN — Medication 2 TABLET(S): at 21:34

## 2024-03-22 RX ADMIN — Medication 1: at 12:40

## 2024-03-22 RX ADMIN — MEROPENEM 100 MILLIGRAM(S): 1 INJECTION INTRAVENOUS at 08:10

## 2024-03-22 NOTE — PROGRESS NOTE ADULT - ATTENDING COMMENTS
64-year-old female w/ PMH of lupus, PE on Xarelto (dx 2019), asthma, HTN, T2DM, and GBS (dx 1997) presenting with 3wk h/o of worsening SOB and persistent cough. Patient was notably recently admitted at [place] in 01/2024 for superimposed mycoplasma PNA on influenza now presenting with progressive dyspnea and persistent nonproductive cough.    Patient seen and examined at bedside. Patient is in mild respiratory distress likely secondary to coughing. CT scan reviewed which did not show any significant improvement compared to prior study. This is unusual given there should be some improvement for which there is none. Need to evaluate for other causes of possible fibrosis. Follow up rheumatologic workup.     Can give opioids if needed in order to control cough.     Patient on the OR schedule for rigid bronch on Monday.     Please note that the patient will not be seen over the weekend. Please call the on call pulmonary fellow if any issues or questions arise. The patient will likely be seen again on Monday.

## 2024-03-22 NOTE — PROGRESS NOTE ADULT - SUBJECTIVE AND OBJECTIVE BOX
CHIEF COMPLAINT:Patient is a 64y old  Female who presents with a chief complaint of SOB, cough (22 Mar 2024 16:50)      Interval Events:  not feeling significantly improved although less coughing when speaking    REVIEW OF SYSTEMS:  [x] All other systems negative except per HPI   [ ] Unable to assess ROS because ________    OBJECTIVE:  ICU Vital Signs Last 24 Hrs  T(C): 36.4 (22 Mar 2024 15:50), Max: 36.4 (22 Mar 2024 06:20)  T(F): 97.6 (22 Mar 2024 15:50), Max: 97.6 (22 Mar 2024 06:20)  HR: 100 (22 Mar 2024 15:50) (87 - 102)  BP: 146/74 (22 Mar 2024 15:50) (146/74 - 148/91)  BP(mean): --  ABP: --  ABP(mean): --  RR: 18 (22 Mar 2024 15:50) (18 - 18)  SpO2: 99% (22 Mar 2024 15:50) (98% - 100%)    O2 Parameters below as of 22 Mar 2024 15:50  Patient On (Oxygen Delivery Method): nasal cannula  O2 Flow (L/min): 4            03-22 @ 07:01  -  03-22 @ 17:49  --------------------------------------------------------  IN: 400 mL / OUT: 0 mL / NET: 400 mL        PHYSICAL EXAM:  Gen: uncomfortable appearing  HEENT: MMM, PERRL, EOMI  CV: RRR, no m/r/g  Lung: mild wheeze b/l  Abd: Soft, NT, ND  Ext: nonpitting edema   Skin: No rash  Neuro: A&Ox3, no focal deficits    HOSPITAL MEDICATIONS:  MEDICATIONS  (STANDING):  albuterol/ipratropium for Nebulization 3 milliLiter(s) Nebulizer every 6 hours  buDESOnide    Inhalation Suspension 0.25 milliGRAM(s) Inhalation every 12 hours  dextrose 5%. 1000 milliLiter(s) (50 mL/Hr) IV Continuous <Continuous>  dextrose 5%. 1000 milliLiter(s) (100 mL/Hr) IV Continuous <Continuous>  dextrose 50% Injectable 25 Gram(s) IV Push once  dextrose 50% Injectable 12.5 Gram(s) IV Push once  dextrose 50% Injectable 25 Gram(s) IV Push once  DULoxetine 60 milliGRAM(s) Oral daily  gabapentin 600 milliGRAM(s) Oral at bedtime  glucagon  Injectable 1 milliGRAM(s) IntraMuscular once  heparin  Infusion.  Unit(s)/Hr (18 mL/Hr) IV Continuous <Continuous>  influenza   Vaccine 0.5 milliLiter(s) IntraMuscular once  insulin lispro (ADMELOG) corrective regimen sliding scale   SubCutaneous three times a day before meals  insulin lispro (ADMELOG) corrective regimen sliding scale   SubCutaneous at bedtime  methylPREDNISolone sodium succinate Injectable 40 milliGRAM(s) IV Push every 12 hours  trimethoprim  160 mG/sulfamethoxazole 800 mG 2 Tablet(s) Oral every 12 hours  verapamil  milliGRAM(s) Oral daily    MEDICATIONS  (PRN):  acetaminophen     Tablet .. 650 milliGRAM(s) Oral every 6 hours PRN Temp greater or equal to 38C (100.4F), Mild Pain (1 - 3)  dextrose Oral Gel 15 Gram(s) Oral once PRN Blood Glucose LESS THAN 70 milliGRAM(s)/deciliter  guaifenesin/dextromethorphan Oral Liquid 10 milliLiter(s) Oral every 4 hours PRN Cough  heparin   Injectable 8500 Unit(s) IV Push every 6 hours PRN For aPTT less than 40  heparin   Injectable 4000 Unit(s) IV Push every 6 hours PRN For aPTT between 40 - 57  hydrocodone/homatropine Syrup 5 milliLiter(s) Oral every 8 hours PRN Cough      LABS:    The Labs were reviewed by me   The Radiology was reviewed by me    EKG tracing reviewed by me    03-22    138  |  101  |  8   ----------------------------<  168<H>  4.8   |  17<L>  |  0.51  03-21    141  |  102  |  8   ----------------------------<  164<H>  3.3<L>   |  24  |  0.63  03-20    141  |  103  |  5<L>  ----------------------------<  153<H>  3.7   |  22  |  0.56    Ca    9.6      22 Mar 2024 11:19  Ca    9.5      21 Mar 2024 16:28  Ca    9.0      20 Mar 2024 06:38  Phos  3.2     03-22  Mg     2.10     03-22    TPro  7.0  /  Alb  2.8<L>  /  TBili  0.3  /  DBili  x   /  AST  27  /  ALT  14  /  AlkPhos  74  03-22  TPro  6.5  /  Alb  3.2<L>  /  TBili  0.2  /  DBili  x   /  AST  13  /  ALT  8   /  AlkPhos  67  03-21  TPro  6.3  /  Alb  2.9<L>  /  TBili  0.2  /  DBili  x   /  AST  16  /  ALT  7   /  AlkPhos  65  03-20    Magnesium: 2.10 mg/dL (03-22-24 @ 11:19)  Magnesium: 2.00 mg/dL (03-21-24 @ 16:28)  Magnesium: 2.10 mg/dL (03-20-24 @ 06:38)    Phosphorus: 3.2 mg/dL (03-22-24 @ 11:19)  Phosphorus: 3.1 mg/dL (03-21-24 @ 16:28)  Phosphorus: 3.1 mg/dL (03-20-24 @ 06:38)      PT/INR - ( 22 Mar 2024 11:19 )   PT: 12.4 sec;   INR: 1.11 ratio         PTT - ( 22 Mar 2024 11:19 )  PTT:>200.0 sec              Urinalysis Basic - ( 22 Mar 2024 11:19 )    Color: x / Appearance: x / SG: x / pH: x  Gluc: 168 mg/dL / Ketone: x  / Bili: x / Urobili: x   Blood: x / Protein: x / Nitrite: x   Leuk Esterase: x / RBC: x / WBC x   Sq Epi: x / Non Sq Epi: x / Bacteria: x                              10.4   11.90 )-----------( 426      ( 22 Mar 2024 11:19 )             33.8                         11.1   10.42 )-----------( 324      ( 21 Mar 2024 07:37 )             36.0                         9.2    6.02  )-----------( 263      ( 20 Mar 2024 06:38 )             29.2     CAPILLARY BLOOD GLUCOSE      POCT Blood Glucose.: 178 mg/dL (22 Mar 2024 17:23)  POCT Blood Glucose.: 160 mg/dL (22 Mar 2024 12:28)  POCT Blood Glucose.: 161 mg/dL (22 Mar 2024 09:23)  POCT Blood Glucose.: 163 mg/dL (21 Mar 2024 22:32)    Blood Gas Source Venous: Venous (03-21-24 @ 16:28)      MICROBIOLOGY:     RADIOLOGY:  [ ] Reviewed and interpreted by me    Point of Care Ultrasound Findings:    PFT:    EKG:

## 2024-03-22 NOTE — PROGRESS NOTE ADULT - PROBLEM SELECTOR PLAN 3
- P/w loose BMs, initially w/ some blood  >GI PCR neg  > CTM BMs - P/w loose BMs, initially w/ some blood  - GI PCR negative  > CTM BMs

## 2024-03-22 NOTE — PROGRESS NOTE ADULT - SUBJECTIVE AND OBJECTIVE BOX
Follow Up:      Interval History/ROS:  dry cough     Allergies  No Known Allergies        ANTIMICROBIALS:  doxycycline IVPB    doxycycline IVPB 100 every 12 hours  meropenem  IVPB 1000 every 8 hours      OTHER MEDS:  acetaminophen     Tablet .. 650 milliGRAM(s) Oral every 6 hours PRN  albuterol/ipratropium for Nebulization 3 milliLiter(s) Nebulizer every 6 hours  buDESOnide    Inhalation Suspension 0.25 milliGRAM(s) Inhalation every 12 hours  dextrose 5%. 1000 milliLiter(s) IV Continuous <Continuous>  dextrose 5%. 1000 milliLiter(s) IV Continuous <Continuous>  dextrose 50% Injectable 25 Gram(s) IV Push once  dextrose 50% Injectable 12.5 Gram(s) IV Push once  dextrose 50% Injectable 25 Gram(s) IV Push once  dextrose Oral Gel 15 Gram(s) Oral once PRN  glucagon  Injectable 1 milliGRAM(s) IntraMuscular once  guaifenesin/dextromethorphan Oral Liquid 10 milliLiter(s) Oral every 4 hours PRN  hydrocodone/homatropine Syrup 5 milliLiter(s) Oral every 8 hours PRN  influenza   Vaccine 0.5 milliLiter(s) IntraMuscular once  insulin lispro (ADMELOG) corrective regimen sliding scale   SubCutaneous three times a day before meals  insulin lispro (ADMELOG) corrective regimen sliding scale   SubCutaneous at bedtime  methylPREDNISolone sodium succinate Injectable 40 milliGRAM(s) IV Push every 12 hours  rivaroxaban 20 milliGRAM(s) Oral with dinner    Vital Signs Last 24 Hrs  T(F): 97.6 (03-22-24 @ 15:50), Max: 97.6 (03-22-24 @ 06:20)  HR: 100 (03-22-24 @ 15:50)  BP: 146/74 (03-22-24 @ 15:50)  RR: 18 (03-22-24 @ 15:50)  SpO2: 99% (03-22-24 @ 15:50) (98% - 100%)    nasal O2 4 L      PHYSICAL EXAM:  Constitutional: non toxi weak  Eyes: No icterus.  Oral cavity: Clear, no lesions  Neck: Supple  RS: decreased BS  nasal O2  CVS: S1, S2   Abdomen: Soft. No guarding/rigidity/tenderness.  Extremities: Warm. No pedal edema  Skin: No lesions noted  Vascular: No evidence of phlebitis  Neuro: Alert, oriented to time/place/person  Cranial nerves 2-12 grossly normal. No focal abnormalities                                     10.4   11.90 )-----------( 426      ( 22 Mar 2024 11:19 )             33.8 03-22    138  |  101  |  8   ----------------------------<  168  4.8   |  17  |  0.51  Ca    9.6      22 Mar 2024 11:19Phos  3.2     03-22Mg     2.10     03-22  TPro  7.0  /  Alb  2.8  /  TBili  0.3  /  DBili  x   /  AST  27  /  ALT  14  /  AlkPhos  74  03-22    Fungitell (03.19.24 @ 21:30)   Fungitell: 410      MICROBIOLOGY:      Culture - Blood (03.19.24 @ 21:28)   Specimen Source: .Blood Blood-Peripheral  Culture Results:   No growth at 48 HoursCulture - Blood (03.19.24 @ 21:20)   Specimen Source: .Blood Blood-Peripheral  Culture Results:   No growth at 48 Hours      Rapid RVP Result: NotDetec (03-18 @ 23:51)        RADIOLOGY:    rad< from: CT Chest No Cont (03.19.24 @ 02:04) >    ACC: [id number] EXAM:  CT CHEST   ORDERED BY: ROB BENITEZ     PROCEDURE DATE:  03/19/2024          INTERPRETATION:  CLINICAL INFORMATION: History of lupus and GBS.   Presenting with significantly worsening cough and dyspnea over the past 3   weeks after being diagnosed with pneumonia during 1/2024. Concern for   pneumonia.    COMPARISON: CT chest dated 1/1/2024, 12/20/2023, 12/25/2023-7/12/2019.    CONTRAST/COMPLICATIONS:  IV Contrast: None.  Oral Contrast: None.  Complications: None reported.    PROCEDURE:  CT of the Chest was performed.  Sagittal and coronal reformats were performed.    FINDINGS:    LUNGS AND AIRWAYS: Patent central airways.  There are multifocal   groundglass and consolidative opacities involving all lobes with   predominance in the bilateral lower lobes and right upper lobe.   Infiltrates are more extensive when compared to prior study and in a   slightly different distribution.  PLEURA: No pleural effusion.  MEDIASTINUM AND MADHU: No lymphadenopathy.  VESSELS: Withinnormal limits.  HEART: Heart size is normal. No pericardial effusion.  CHEST WALL AND LOWER NECK: Within normal limits.  VISUALIZED UPPER ABDOMEN: Within normal limits.  BONES: Degenerative changes.    IMPRESSION:    Multifocal groundglass and consolidative opacities involving all 5 lobes,   most predominant in the bilateral lower lobes and right upper lobe.        --- End of Report ---          CONNIE OLSEN DO; Resident Radiologist  This document has been electronically signed.  ELI NIETO MD; Attending Radiologist  This document has been electronically signed. Mar 19 2024  4:18AM    < end of copied text >

## 2024-03-22 NOTE — PROGRESS NOTE ADULT - ASSESSMENT
65 yo woman with SLE, DM, h/o PE, CVA with left sided weakness presenting with fever, dry cough, SOB    Hospital admission Jan 2024 at [facility] for PNA - mycoplasma IgM + and flu A +   Received azithro, cefepime, doxy and prednisone taper at that time     CT chest today shows multifocal ground glass and consolidative opacities involving all 5 lobes  - more extensive c/w prior study and in slightly different distribution     Multifocal pulmonary infiltrates  Possible PNA-   atypical  non infectious etiologies in ddx     Appreciate pulmonary eval-       blood cultures no growth to date   serum crypt ag neg  , quant gold testing    no change in clinical status on empiric doxycycline and meropenem      planning  bronch for diagnosis     fungitell 410    cT not typical for PCR  now on prednisone     Suggest:     d/c doxy, meropenem   start bactrim DS 2 tab po q 12 h   check PCP PCR     await bronch       ID service available over weekend

## 2024-03-22 NOTE — PROGRESS NOTE ADULT - ASSESSMENT
64-year-old female w/ PMH of lupus, PE on Xarelto (dx 2019), asthma, HTN, T2DM, and GBS (dx 1997) presenting with 3wk h/o of worsening SOB and persistent cough. Patient was notably recently admitted at [place] in 01/2024 for superimposed mycoplasma PNA on influenza now presenting with progressive dyspnea and persistent nonproductive cough    #Abnormal CT chest  #asthma  Denies complete resolution of symptoms since her prior discharge.   Noted recent  admission with repeat CT chest with improvement in some areas with new opacities in others  - ESR, CRP elevated; f/u THERON, RF, ANCAs, MPO/PR3, aldolase, DS DNA, C3, C4  - send sputum if making  - c/w standing nebs for now  - fungitell elevated, recommend empiric tx w bactrim while pending bronchoscopy  - abx per ID  - rigid bronchoscopy 3/25  -- NPO @[facility] on 3/24  -- AM labs with cbc, chem 7, coags, active T&S  -- hold hep gtt at [facility]  - hold rivaroxaban and start heparin gtt

## 2024-03-22 NOTE — PROGRESS NOTE ADULT - PROBLEM SELECTOR PLAN 4
- H/o PE in 2019, on Xarelto at home  > C/w Xarelto 20mg qd - H/o PE in 2019, on Xarelto at home  - S/p Xarelto 20mg qd  > C/w heparin gtt. iso planned bronchoscopy

## 2024-03-22 NOTE — PROGRESS NOTE ADULT - ATTENDING COMMENTS
she appears more comfortable today - satting well on 3-4 L NC  per pulm plan for bronch 3/25 - off Xarelto, on hep gtt in meantime   per pulm cw IV solumedrol for now as possible autoimmune component - pending bronch for lavage and path   so far autoimmune and infx serologies are unrevealing  will dw ID if can dc abx as doubt bacterial PNA as this point

## 2024-03-22 NOTE — PROGRESS NOTE ADULT - PROBLEM SELECTOR PLAN 7
- On metformin, ozempic at home  > A1c 6.4  > C/w ISS, holding home meds  > CTM w/ FSGs - On metformin, ozempic at home  - A1c 6.4  > C/w ISS, holding home meds  > CTM w/ FSGs

## 2024-03-22 NOTE — PROGRESS NOTE ADULT - PROBLEM SELECTOR PLAN 1
- P/w SOB, persitent cough  - H/o asthma on symbicort, spiriva at home  - Recent admission for superimposed mycoplasma PNA on influenza, s/p azithromycin/doxycycline  - RVP unremarkable  - CXR -> Bibasilar airspace opacities  - CT Chest -> Multifocal groundglass and consolidative opacities involving all 5 lobes, most predominant in the b/l lower lobes and right upper lobe  - S/p CTX, azithromycin x1 in ED  > F/u MRSA, legionella, strep Ag, procalcitonin, sputum cx  > C/w empiric meropenem, doxycyline  > prednisone 40 switched to solumedrol 40 BID  > c/w duonebs q6, budesonide inhaled Q12, Robitussin-DM q4 prn, and hycodan PRN  > Pulmonology consulted, will appreciate recs, considering bronchoscopy   > ID consulted, will appreciate recs - P/w SOB, persitent cough  - H/o asthma on symbicort, spiriva at home  - Recent admission for superimposed mycoplasma PNA on influenza, s/p azithromycin/doxycycline  - RVP unremarkable  - CXR -> Bibasilar airspace opacities  - CT Chest -> Multifocal groundglass and consolidative opacities involving all 5 lobes, most predominant in the b/l lower lobes and right upper lobe  - S/p CTX, azithromycin x1 in ED  > F/u legionella, strep Ag, procalcitonin, sputum cx  > C/w empiric meropenem, doxycyline  > C/w IV solumedrol 40mg BID  > C/w duonebs q6, inhaled budesonide BID, Robitussin-DM q4 prn, and hycodan PRN  > Pulmonology consulted, will continue to appreciate recs, bronchoscopy planned for 3/25  > ID consulted, will continue to appreciate recs

## 2024-03-22 NOTE — PROGRESS NOTE ADULT - PROBLEM SELECTOR PLAN 2
- Patient found on fall by  prior to presentation  - Denies any acute bony pain  - CTH neg  > F/u orthostatic vitals  > F/u TTE

## 2024-03-22 NOTE — PROGRESS NOTE ADULT - SUBJECTIVE AND OBJECTIVE BOX
PROGRESS NOTE:     Patient is a 64y old  Female who presents with a chief complaint of SOB, cough (21 Mar 2024 09:30)      SUBJECTIVE / OVERNIGHT EVENTS:    OVERNIGHT: No acute overnight events.      Patient was examined at bedside and feels well this morning. Denies fever, chills, chest pain, SOB, nausea, vomiting. ROS otherwise negative and pt is amenable to current treatment plan.      REVIEW OF SYSTEMS:    CONSTITUTIONAL:  No weakness, fevers, or chills  EYES/ENT:  No visual changes, vertigo, or throat pain   NECK:  No pain or stiffness  RESPIRATORY:  No SOB, cough, wheezing, hemoptysis  CARDIOVASCULAR:  No chest pain or palpitations  GASTROINTESTINAL:  No abdominal pain, nausea, vomiting, or hematemesis; No diarrhea or constipation. No melena or hematochezia.  GENITOURINARY:  No dysuria, change in frequency, or hematuria  NEUROLOGICAL:  No numbness or weakness  SKIN:  No itching or rashes      MEDICATIONS  (STANDING):  albuterol/ipratropium for Nebulization 3 milliLiter(s) Nebulizer every 6 hours  buDESOnide    Inhalation Suspension 0.25 milliGRAM(s) Inhalation every 12 hours  dextrose 5%. 1000 milliLiter(s) (50 mL/Hr) IV Continuous <Continuous>  dextrose 5%. 1000 milliLiter(s) (100 mL/Hr) IV Continuous <Continuous>  dextrose 50% Injectable 25 Gram(s) IV Push once  dextrose 50% Injectable 12.5 Gram(s) IV Push once  dextrose 50% Injectable 25 Gram(s) IV Push once  doxycycline IVPB      doxycycline IVPB 100 milliGRAM(s) IV Intermittent every 12 hours  DULoxetine 60 milliGRAM(s) Oral daily  gabapentin 600 milliGRAM(s) Oral at bedtime  glucagon  Injectable 1 milliGRAM(s) IntraMuscular once  heparin  Infusion.  Unit(s)/Hr (18 mL/Hr) IV Continuous <Continuous>  influenza   Vaccine 0.5 milliLiter(s) IntraMuscular once  insulin lispro (ADMELOG) corrective regimen sliding scale   SubCutaneous three times a day before meals  insulin lispro (ADMELOG) corrective regimen sliding scale   SubCutaneous at bedtime  meropenem  IVPB 1000 milliGRAM(s) IV Intermittent every 8 hours  methylPREDNISolone sodium succinate Injectable 40 milliGRAM(s) IV Push every 12 hours  verapamil  milliGRAM(s) Oral daily    MEDICATIONS  (PRN):  acetaminophen     Tablet .. 650 milliGRAM(s) Oral every 6 hours PRN Temp greater or equal to 38C (100.4F), Mild Pain (1 - 3)  dextrose Oral Gel 15 Gram(s) Oral once PRN Blood Glucose LESS THAN 70 milliGRAM(s)/deciliter  guaifenesin/dextromethorphan Oral Liquid 10 milliLiter(s) Oral every 4 hours PRN Cough  heparin   Injectable 8500 Unit(s) IV Push every 6 hours PRN For aPTT less than 40  heparin   Injectable 4000 Unit(s) IV Push every 6 hours PRN For aPTT between 40 - 57  hydrocodone/homatropine Syrup 5 milliLiter(s) Oral every 8 hours PRN Cough      CAPILLARY BLOOD GLUCOSE      POCT Blood Glucose.: 163 mg/dL (21 Mar 2024 22:32)  POCT Blood Glucose.: 200 mg/dL (21 Mar 2024 17:10)  POCT Blood Glucose.: 203 mg/dL (21 Mar 2024 12:08)  POCT Blood Glucose.: 121 mg/dL (21 Mar 2024 08:38)    I&O's Summary      PHYSICAL EXAM:  Vital Signs Last 24 Hrs  T(C): 36.4 (22 Mar 2024 06:20), Max: 36.4 (22 Mar 2024 06:20)  T(F): 97.6 (22 Mar 2024 06:20), Max: 97.6 (22 Mar 2024 06:20)  HR: 87 (22 Mar 2024 06:20) (87 - 115)  BP: 148/91 (22 Mar 2024 06:20) (148/91 - 160/96)  BP(mean): --  RR: 18 (22 Mar 2024 06:20) (18 - 18)  SpO2: 98% (22 Mar 2024 06:20) (96% - 98%)    Parameters below as of 22 Mar 2024 06:20  Patient On (Oxygen Delivery Method): nasal cannula  O2 Flow (L/min): 4      CONSTITUTIONAL: NAD; well-developed  HEENT: PERRL, clear conjunctiva  RESPIRATORY: Normal respiratory effort; lungs are clear to auscultation bilaterally; No Crackles/Rhonchi/Wheezing  CARDIOVASCULAR: Regular rate and rhythm, normal S1 and S2, no murmur/rub/gallop; No lower extremity edema; Peripheral pulses are 2+ bilaterally  ABDOMEN: Nontender to palpation, normoactive bowel sounds, no rebound/guarding; No hepatosplenomegaly  MUSCULOSKELETAL: no clubbing or cyanosis of digits; no joint swelling or tenderness to palpation  EXTREMITY: Lower extremities Non-tender to palpation; non-erythematous B/L  NEURO: A&Ox3; no focal deficits   PSYCH: normal mood; Affect appropriate    LABS:                        11.1   10.42 )-----------( 324      ( 21 Mar 2024 07:37 )             36.0     03-21    141  |  102  |  8   ----------------------------<  164<H>  3.3<L>   |  24  |  0.63    Ca    9.5      21 Mar 2024 16:28  Phos  3.1     03-21  Mg     2.00     03-21    TPro  6.5  /  Alb  3.2<L>  /  TBili  0.2  /  DBili  x   /  AST  13  /  ALT  8   /  AlkPhos  67  03-21    PTT - ( 22 Mar 2024 01:29 )  PTT:42.4 sec      Urinalysis Basic - ( 21 Mar 2024 16:28 )    Color: x / Appearance: x / SG: x / pH: x  Gluc: 164 mg/dL / Ketone: x  / Bili: x / Urobili: x   Blood: x / Protein: x / Nitrite: x   Leuk Esterase: x / RBC: x / WBC x   Sq Epi: x / Non Sq Epi: x / Bacteria: x        Culture - Blood (collected 19 Mar 2024 21:28)  Source: .Blood Blood-Peripheral  Preliminary Report (22 Mar 2024 05:01):    No growth at 48 Hours    Culture - Blood (collected 19 Mar 2024 21:20)  Source: .Blood Blood-Peripheral  Preliminary Report (22 Mar 2024 05:01):    No growth at 48 Hours        RADIOLOGY & ADDITIONAL TESTS:    N/A PROGRESS NOTE:     Patient is a 64y old  Female who presents with a chief complaint of SOB, cough (21 Mar 2024 09:30)      SUBJECTIVE / OVERNIGHT EVENTS:    OVERNIGHT: No acute overnight events.      Patient was examined at bedside and feels well this morning. SOB, cough still persistent. Denies fever, chills, chest pain, nausea, vomiting. ROS otherwise negative and pt is amenable to current treatment plan.      REVIEW OF SYSTEMS:    CONSTITUTIONAL:  No weakness, fevers, or chills  EYES/ENT:  No visual changes, vertigo, or throat pain   NECK:  No pain or stiffness  RESPIRATORY:  SOB, cough, no wheezing, hemoptysis  CARDIOVASCULAR:  No chest pain or palpitations  GASTROINTESTINAL:  No abdominal pain, nausea, vomiting, or hematemesis; No diarrhea or constipation. No melena or hematochezia.  GENITOURINARY:  No dysuria, change in frequency, or hematuria  NEUROLOGICAL:  No numbness or weakness  SKIN:  No itching or rashes      MEDICATIONS  (STANDING):  albuterol/ipratropium for Nebulization 3 milliLiter(s) Nebulizer every 6 hours  buDESOnide    Inhalation Suspension 0.25 milliGRAM(s) Inhalation every 12 hours  dextrose 5%. 1000 milliLiter(s) (50 mL/Hr) IV Continuous <Continuous>  dextrose 5%. 1000 milliLiter(s) (100 mL/Hr) IV Continuous <Continuous>  dextrose 50% Injectable 25 Gram(s) IV Push once  dextrose 50% Injectable 12.5 Gram(s) IV Push once  dextrose 50% Injectable 25 Gram(s) IV Push once  doxycycline IVPB      doxycycline IVPB 100 milliGRAM(s) IV Intermittent every 12 hours  DULoxetine 60 milliGRAM(s) Oral daily  gabapentin 600 milliGRAM(s) Oral at bedtime  glucagon  Injectable 1 milliGRAM(s) IntraMuscular once  heparin  Infusion.  Unit(s)/Hr (18 mL/Hr) IV Continuous <Continuous>  influenza   Vaccine 0.5 milliLiter(s) IntraMuscular once  insulin lispro (ADMELOG) corrective regimen sliding scale   SubCutaneous three times a day before meals  insulin lispro (ADMELOG) corrective regimen sliding scale   SubCutaneous at bedtime  meropenem  IVPB 1000 milliGRAM(s) IV Intermittent every 8 hours  methylPREDNISolone sodium succinate Injectable 40 milliGRAM(s) IV Push every 12 hours  verapamil  milliGRAM(s) Oral daily    MEDICATIONS  (PRN):  acetaminophen     Tablet .. 650 milliGRAM(s) Oral every 6 hours PRN Temp greater or equal to 38C (100.4F), Mild Pain (1 - 3)  dextrose Oral Gel 15 Gram(s) Oral once PRN Blood Glucose LESS THAN 70 milliGRAM(s)/deciliter  guaifenesin/dextromethorphan Oral Liquid 10 milliLiter(s) Oral every 4 hours PRN Cough  heparin   Injectable 8500 Unit(s) IV Push every 6 hours PRN For aPTT less than 40  heparin   Injectable 4000 Unit(s) IV Push every 6 hours PRN For aPTT between 40 - 57  hydrocodone/homatropine Syrup 5 milliLiter(s) Oral every 8 hours PRN Cough      CAPILLARY BLOOD GLUCOSE      POCT Blood Glucose.: 163 mg/dL (21 Mar 2024 22:32)  POCT Blood Glucose.: 200 mg/dL (21 Mar 2024 17:10)  POCT Blood Glucose.: 203 mg/dL (21 Mar 2024 12:08)  POCT Blood Glucose.: 121 mg/dL (21 Mar 2024 08:38)    I&O's Summary      PHYSICAL EXAM:  Vital Signs Last 24 Hrs  T(C): 36.4 (22 Mar 2024 06:20), Max: 36.4 (22 Mar 2024 06:20)  T(F): 97.6 (22 Mar 2024 06:20), Max: 97.6 (22 Mar 2024 06:20)  HR: 87 (22 Mar 2024 06:20) (87 - 115)  BP: 148/91 (22 Mar 2024 06:20) (148/91 - 160/96)  BP(mean): --  RR: 18 (22 Mar 2024 06:20) (18 - 18)  SpO2: 98% (22 Mar 2024 06:20) (96% - 98%)    Parameters below as of 22 Mar 2024 06:20  Patient On (Oxygen Delivery Method): nasal cannula  O2 Flow (L/min): 4      CONSTITUTIONAL: NAD; well-developed  HEENT: PERRL, clear conjunctiva  RESPIRATORY: Normal respiratory effort; diffuse b/l wheezing appreciated  CARDIOVASCULAR: Regular rate and rhythm, normal S1 and S2, no murmur/rub/gallop; No lower extremity edema; Peripheral pulses are 2+ bilaterally  ABDOMEN: Nontender to palpation, normoactive bowel sounds, no rebound/guarding; No hepatosplenomegaly  MUSCULOSKELETAL: no clubbing or cyanosis of digits; no joint swelling or tenderness to palpation  EXTREMITY: Lower extremities Non-tender to palpation; non-erythematous B/L  NEURO: A&Ox3; no focal deficits   PSYCH: normal mood; Affect appropriate    LABS:                        11.1   10.42 )-----------( 324      ( 21 Mar 2024 07:37 )             36.0     03-21    141  |  102  |  8   ----------------------------<  164<H>  3.3<L>   |  24  |  0.63    Ca    9.5      21 Mar 2024 16:28  Phos  3.1     03-21  Mg     2.00     03-21    TPro  6.5  /  Alb  3.2<L>  /  TBili  0.2  /  DBili  x   /  AST  13  /  ALT  8   /  AlkPhos  67  03-21    PTT - ( 22 Mar 2024 01:29 )  PTT:42.4 sec      Urinalysis Basic - ( 21 Mar 2024 16:28 )    Color: x / Appearance: x / SG: x / pH: x  Gluc: 164 mg/dL / Ketone: x  / Bili: x / Urobili: x   Blood: x / Protein: x / Nitrite: x   Leuk Esterase: x / RBC: x / WBC x   Sq Epi: x / Non Sq Epi: x / Bacteria: x        Culture - Blood (collected 19 Mar 2024 21:28)  Source: .Blood Blood-Peripheral  Preliminary Report (22 Mar 2024 05:01):    No growth at 48 Hours    Culture - Blood (collected 19 Mar 2024 21:20)  Source: .Blood Blood-Peripheral  Preliminary Report (22 Mar 2024 05:01):    No growth at 48 Hours        RADIOLOGY & ADDITIONAL TESTS:    N/A

## 2024-03-23 LAB
A FLAVUS AB FLD QL: NEGATIVE — SIGNIFICANT CHANGE UP
A NIGER AB FLD QL: NEGATIVE — SIGNIFICANT CHANGE UP
A NIGER AB FLD QL: NEGATIVE — SIGNIFICANT CHANGE UP
ALBUMIN SERPL ELPH-MCNC: 3.2 G/DL — LOW (ref 3.3–5)
ALP SERPL-CCNC: 69 U/L — SIGNIFICANT CHANGE UP (ref 40–120)
ALT FLD-CCNC: 16 U/L — SIGNIFICANT CHANGE UP (ref 4–33)
ANION GAP SERPL CALC-SCNC: 16 MMOL/L — HIGH (ref 7–14)
APTT BLD: 142.7 SEC — SIGNIFICANT CHANGE UP (ref 24.5–35.6)
APTT BLD: 73.2 SEC — HIGH (ref 24.5–35.6)
APTT BLD: 77.6 SEC — HIGH (ref 24.5–35.6)
AST SERPL-CCNC: 19 U/L — SIGNIFICANT CHANGE UP (ref 4–32)
BILIRUB SERPL-MCNC: 0.2 MG/DL — SIGNIFICANT CHANGE UP (ref 0.2–1.2)
BUN SERPL-MCNC: 11 MG/DL — SIGNIFICANT CHANGE UP (ref 7–23)
CALCIUM SERPL-MCNC: 9.9 MG/DL — SIGNIFICANT CHANGE UP (ref 8.4–10.5)
CHLORIDE SERPL-SCNC: 98 MMOL/L — SIGNIFICANT CHANGE UP (ref 98–107)
CO2 SERPL-SCNC: 25 MMOL/L — SIGNIFICANT CHANGE UP (ref 22–31)
CREAT SERPL-MCNC: 0.68 MG/DL — SIGNIFICANT CHANGE UP (ref 0.5–1.3)
EGFR: 97 ML/MIN/1.73M2 — SIGNIFICANT CHANGE UP
GLUCOSE BLDC GLUCOMTR-MCNC: 157 MG/DL — HIGH (ref 70–99)
GLUCOSE BLDC GLUCOMTR-MCNC: 159 MG/DL — HIGH (ref 70–99)
GLUCOSE BLDC GLUCOMTR-MCNC: 167 MG/DL — HIGH (ref 70–99)
GLUCOSE BLDC GLUCOMTR-MCNC: 178 MG/DL — HIGH (ref 70–99)
GLUCOSE SERPL-MCNC: 163 MG/DL — HIGH (ref 70–99)
HCT VFR BLD CALC: 34.7 % — SIGNIFICANT CHANGE UP (ref 34.5–45)
HGB BLD-MCNC: 10.9 G/DL — LOW (ref 11.5–15.5)
HIV 1+2 AB+HIV1 P24 AG SERPL QL IA: (no result)
MAGNESIUM SERPL-MCNC: 2.2 MG/DL — SIGNIFICANT CHANGE UP (ref 1.6–2.6)
MCHC RBC-ENTMCNC: 26.1 PG — LOW (ref 27–34)
MCHC RBC-ENTMCNC: 31.4 GM/DL — LOW (ref 32–36)
MCV RBC AUTO: 83 FL — SIGNIFICANT CHANGE UP (ref 80–100)
MPO AB + PR3 PNL SER: SIGNIFICANT CHANGE UP
NRBC # BLD: 0 /100 WBCS — SIGNIFICANT CHANGE UP (ref 0–0)
NRBC # FLD: 0.02 K/UL — HIGH (ref 0–0)
PHOSPHATE SERPL-MCNC: 3.5 MG/DL — SIGNIFICANT CHANGE UP (ref 2.5–4.5)
PLATELET # BLD AUTO: 505 K/UL — HIGH (ref 150–400)
POTASSIUM SERPL-MCNC: 3.7 MMOL/L — SIGNIFICANT CHANGE UP (ref 3.5–5.3)
POTASSIUM SERPL-SCNC: 3.7 MMOL/L — SIGNIFICANT CHANGE UP (ref 3.5–5.3)
PROT SERPL-MCNC: 7.1 G/DL — SIGNIFICANT CHANGE UP (ref 6–8.3)
RBC # BLD: 4.18 M/UL — SIGNIFICANT CHANGE UP (ref 3.8–5.2)
RBC # FLD: 18.6 % — HIGH (ref 10.3–14.5)
SODIUM SERPL-SCNC: 139 MMOL/L — SIGNIFICANT CHANGE UP (ref 135–145)
WBC # BLD: 16.39 K/UL — HIGH (ref 3.8–10.5)
WBC # FLD AUTO: 16.39 K/UL — HIGH (ref 3.8–10.5)

## 2024-03-23 PROCEDURE — 99233 SBSQ HOSP IP/OBS HIGH 50: CPT

## 2024-03-23 RX ORDER — PANTOPRAZOLE SODIUM 20 MG/1
40 TABLET, DELAYED RELEASE ORAL
Refills: 0 | Status: DISCONTINUED | OUTPATIENT
Start: 2024-03-23 | End: 2024-04-11

## 2024-03-23 RX ADMIN — HEPARIN SODIUM 1400 UNIT(S)/HR: 5000 INJECTION INTRAVENOUS; SUBCUTANEOUS at 01:22

## 2024-03-23 RX ADMIN — Medication 2 TABLET(S): at 05:35

## 2024-03-23 RX ADMIN — Medication 3 MILLILITER(S): at 03:50

## 2024-03-23 RX ADMIN — DULOXETINE HYDROCHLORIDE 60 MILLIGRAM(S): 30 CAPSULE, DELAYED RELEASE ORAL at 12:17

## 2024-03-23 RX ADMIN — Medication 40 MILLIGRAM(S): at 05:36

## 2024-03-23 RX ADMIN — Medication 1: at 09:01

## 2024-03-23 RX ADMIN — Medication 1: at 18:13

## 2024-03-23 RX ADMIN — Medication 2 TABLET(S): at 18:13

## 2024-03-23 RX ADMIN — Medication 120 MILLIGRAM(S): at 05:36

## 2024-03-23 RX ADMIN — HEPARIN SODIUM 1100 UNIT(S)/HR: 5000 INJECTION INTRAVENOUS; SUBCUTANEOUS at 23:19

## 2024-03-23 RX ADMIN — Medication 3 MILLILITER(S): at 11:19

## 2024-03-23 RX ADMIN — Medication 40 MILLIGRAM(S): at 18:13

## 2024-03-23 RX ADMIN — GABAPENTIN 600 MILLIGRAM(S): 400 CAPSULE ORAL at 21:40

## 2024-03-23 RX ADMIN — Medication 0.25 MILLIGRAM(S): at 23:28

## 2024-03-23 RX ADMIN — HEPARIN SODIUM 1100 UNIT(S)/HR: 5000 INJECTION INTRAVENOUS; SUBCUTANEOUS at 19:23

## 2024-03-23 RX ADMIN — HEPARIN SODIUM 1100 UNIT(S)/HR: 5000 INJECTION INTRAVENOUS; SUBCUTANEOUS at 07:38

## 2024-03-23 RX ADMIN — Medication 1: at 12:17

## 2024-03-23 RX ADMIN — HEPARIN SODIUM 1100 UNIT(S)/HR: 5000 INJECTION INTRAVENOUS; SUBCUTANEOUS at 14:44

## 2024-03-23 RX ADMIN — HEPARIN SODIUM 1100 UNIT(S)/HR: 5000 INJECTION INTRAVENOUS; SUBCUTANEOUS at 20:40

## 2024-03-23 RX ADMIN — HEPARIN SODIUM 0 UNIT(S)/HR: 5000 INJECTION INTRAVENOUS; SUBCUTANEOUS at 05:40

## 2024-03-23 RX ADMIN — HEPARIN SODIUM 1100 UNIT(S)/HR: 5000 INJECTION INTRAVENOUS; SUBCUTANEOUS at 07:11

## 2024-03-23 RX ADMIN — Medication 3 MILLILITER(S): at 16:01

## 2024-03-23 RX ADMIN — Medication 3 MILLILITER(S): at 23:28

## 2024-03-23 RX ADMIN — Medication 0.25 MILLIGRAM(S): at 11:19

## 2024-03-23 NOTE — PROGRESS NOTE ADULT - ATTENDING COMMENTS
Pt is a 63 yo F with PMH chronic back pain (s/p spinal fusion), T2D, HTN, HLD, SLE, CVA/TIA, asthma, PE (2019, xarelto), and GBS p/w persistent cough, SOB, diarrhea, and fall 2/2 weakness. Recently hospitalized at [facility] 1/2024 for influenza with superimposed mycoplasma PNA s/p doxycycline course. On arrival, CTH neg, CT chest with diffuse GGO and consolidations. Labs with leukocytosis, normocytic anemia, neg GI PCR, RVP neg, and MRSA neg. TTE with EF 53% and mild G1 diastolic dysfunction. Repeat CT PE neg but with multiple BL nodules and patchy opacities, mostly in the RLL/R base. ID following, s/p marlys/doxy now on bactrim d/t elevated fungitell. Pulm following, pending bronch 3/25 with xarelto transitioned to heparin gtt, also on IV methylprednisolone 40mg BID with protonix for gastric protection. Receiving nebs around the clock and currently 99% on 4L NC. Pt seen and examined at bedside resting comfortably but with significant coughing throughout encounter. Using hycodan intermittently with relief but was concerned about potential side effects -- reassured it is okay to use hycodan and she should use it around the clock to see if it improves symptoms -- discussed with RN, will increase frequency to q6h from q8h and monitor. Will also start incentive spirometry. Agreeable with plan for bronch Monday. Discussed with HS, rest as outlined above. Pt is a 63 yo F with PMH chronic back pain (s/p spinal fusion), T2D, HTN, HLD, SLE, CVA/TIA, asthma, PE (2019, xarelto), and GBS p/w persistent cough, SOB, diarrhea, and fall 2/2 weakness. Recently hospitalized at [facility] 1/2024 for influenza with superimposed mycoplasma PNA s/p doxycycline course. On arrival, CTH neg, CT chest with diffuse GGO and consolidations. Labs with leukocytosis, normocytic anemia, neg GI PCR, RVP neg, and MRSA neg. TTE with EF 53% and mild G1 diastolic dysfunction. Repeat CT PE neg but with multiple BL nodules and patchy opacities, mostly in the RLL/R base. ID following, s/p marlys/doxy now on bactrim d/t elevated fungitell. Pulm following, pending bronch 3/25 with xarelto transitioned to heparin gtt, also on IV methylprednisolone 40mg BID with protonix for gastric protection. Receiving nebs around the clock and currently 99% on 4L NC. Pt seen and examined at bedside resting comfortably but with significant coughing throughout encounter. Using hycodan intermittently with relief but was concerned about potential side effects -- reassured it is okay to use hycodan and she should use it around the clock to see if it improves symptoms -- discussed with RN, will increase frequency to q6h from q8h and monitor. Will also start incentive spirometry. Also discussed new ?tremor BL hands when reaching for things, has not been eating much or mobilizing from bed, discussed potential debility as source as she denies numbness/tingling and weakness, however instructed to let us know if these symptoms present/worsen. Agreeable with plan for bronch Monday. Discussed with HS, rest as outlined above.

## 2024-03-23 NOTE — PROGRESS NOTE ADULT - PROBLEM SELECTOR PLAN 1
- P/w SOB, persitent cough  - H/o asthma on symbicort, spiriva at home  - Recent admission for superimposed mycoplasma PNA on influenza, s/p azithromycin/doxycycline  - RVP unremarkable  - CXR -> Bibasilar airspace opacities  - CT Chest -> Multifocal groundglass and consolidative opacities involving all 5 lobes, most predominant in the b/l lower lobes and right upper lobe  - S/p CTX, azithromycin x1 in ED  > F/u legionella, strep Ag, procalcitonin, sputum cx  > C/w empiric meropenem, doxycyline  > C/w IV solumedrol 40mg BID  > C/w duonebs q6, inhaled budesonide BID, Robitussin-DM q4 prn, and hycodan PRN  > Pulmonology consulted, will continue to appreciate recs, bronchoscopy planned for 3/25  > ID consulted, will continue to appreciate recs - P/w SOB, persitent cough  - H/o asthma on symbicort, spiriva at home  - Recent admission for superimposed mycoplasma PNA on influenza, s/p azithromycin/doxycycline  - RVP unremarkable  - CXR -> Bibasilar airspace opacities  - CT Chest -> Multifocal groundglass and consolidative opacities involving all 5 lobes, most predominant in the b/l lower lobes and right upper lobe  - S/p CTX, azithromycin x1 in ED  - S/p empiric meropenem, doxycyline iso lack of clinical improvement, per ID  - Fungitell elevated  > F/u legionella, strep Ag, sputum cx, PJP cx  > C/w Bactrim-DS BID  > C/w IV solumedrol 40mg BID  > C/w duonebs q6, inhaled budesonide BID, Robitussin-DM q4 prn, and hycodan PRN  > Pulmonology consulted, will continue to appreciate recs, bronchoscopy planned for 3/25  > ID consulted, will continue to appreciate recs

## 2024-03-23 NOTE — PROGRESS NOTE ADULT - PROBLEM SELECTOR PLAN 2
- Patient found on fall by  prior to presentation  - Denies any acute bony pain  - CTH neg  > F/u orthostatic vitals  > F/u TTE - Patient found on fall by  prior to presentation  - Denies any acute bony pain  - CTH umremarkable  - TTE unremarkable  > F/u orthostatic vitals

## 2024-03-23 NOTE — PROGRESS NOTE ADULT - SUBJECTIVE AND OBJECTIVE BOX
PROGRESS NOTE:     Patient is a 64y old  Female who presents with a chief complaint of SOB, cough (22 Mar 2024 17:47)      SUBJECTIVE / OVERNIGHT EVENTS:    OVERNIGHT: No acute overnight events.      Patient was examined at bedside and feels well this morning. Denies fever, chills, chest pain, SOB, nausea, vomiting. ROS otherwise negative and pt is amenable to current treatment plan.      REVIEW OF SYSTEMS:    CONSTITUTIONAL:  No weakness, fevers, or chills  EYES/ENT:  No visual changes, vertigo, or throat pain   NECK:  No pain or stiffness  RESPIRATORY:  No SOB, cough, wheezing, hemoptysis  CARDIOVASCULAR:  No chest pain or palpitations  GASTROINTESTINAL:  No abdominal pain, nausea, vomiting, or hematemesis; No diarrhea or constipation. No melena or hematochezia.  GENITOURINARY:  No dysuria, change in frequency, or hematuria  NEUROLOGICAL:  No numbness or weakness  SKIN:  No itching or rashes      MEDICATIONS  (STANDING):  albuterol/ipratropium for Nebulization 3 milliLiter(s) Nebulizer every 6 hours  buDESOnide    Inhalation Suspension 0.25 milliGRAM(s) Inhalation every 12 hours  dextrose 5%. 1000 milliLiter(s) (100 mL/Hr) IV Continuous <Continuous>  dextrose 5%. 1000 milliLiter(s) (50 mL/Hr) IV Continuous <Continuous>  dextrose 50% Injectable 25 Gram(s) IV Push once  dextrose 50% Injectable 12.5 Gram(s) IV Push once  dextrose 50% Injectable 25 Gram(s) IV Push once  DULoxetine 60 milliGRAM(s) Oral daily  gabapentin 600 milliGRAM(s) Oral at bedtime  glucagon  Injectable 1 milliGRAM(s) IntraMuscular once  heparin  Infusion.  Unit(s)/Hr (18 mL/Hr) IV Continuous <Continuous>  influenza   Vaccine 0.5 milliLiter(s) IntraMuscular once  insulin lispro (ADMELOG) corrective regimen sliding scale   SubCutaneous three times a day before meals  insulin lispro (ADMELOG) corrective regimen sliding scale   SubCutaneous at bedtime  methylPREDNISolone sodium succinate Injectable 40 milliGRAM(s) IV Push every 12 hours  trimethoprim  160 mG/sulfamethoxazole 800 mG 2 Tablet(s) Oral every 12 hours  verapamil  milliGRAM(s) Oral daily    MEDICATIONS  (PRN):  acetaminophen     Tablet .. 650 milliGRAM(s) Oral every 6 hours PRN Temp greater or equal to 38C (100.4F), Mild Pain (1 - 3)  dextrose Oral Gel 15 Gram(s) Oral once PRN Blood Glucose LESS THAN 70 milliGRAM(s)/deciliter  guaifenesin/dextromethorphan Oral Liquid 10 milliLiter(s) Oral every 4 hours PRN Cough  heparin   Injectable 8500 Unit(s) IV Push every 6 hours PRN For aPTT less than 40  heparin   Injectable 4000 Unit(s) IV Push every 6 hours PRN For aPTT between 40 - 57  hydrocodone/homatropine Syrup 5 milliLiter(s) Oral every 8 hours PRN Cough      CAPILLARY BLOOD GLUCOSE      POCT Blood Glucose.: 158 mg/dL (22 Mar 2024 22:34)  POCT Blood Glucose.: 178 mg/dL (22 Mar 2024 17:23)  POCT Blood Glucose.: 160 mg/dL (22 Mar 2024 12:28)  POCT Blood Glucose.: 161 mg/dL (22 Mar 2024 09:23)    I&O's Summary    22 Mar 2024 07:01  -  23 Mar 2024 07:00  --------------------------------------------------------  IN: 799 mL / OUT: 0 mL / NET: 799 mL        PHYSICAL EXAM:  Vital Signs Last 24 Hrs  T(C): 36.3 (22 Mar 2024 21:32), Max: 36.4 (22 Mar 2024 15:50)  T(F): 97.3 (22 Mar 2024 21:32), Max: 97.6 (22 Mar 2024 15:50)  HR: 102 (22 Mar 2024 21:32) (100 - 102)  BP: 132/96 (22 Mar 2024 21:32) (132/96 - 146/74)  BP(mean): --  RR: 18 (22 Mar 2024 21:32) (18 - 18)  SpO2: 99% (22 Mar 2024 21:32) (99% - 100%)    Parameters below as of 22 Mar 2024 21:32  Patient On (Oxygen Delivery Method): nasal cannula  O2 Flow (L/min): 4      CONSTITUTIONAL: NAD; well-developed  HEENT: PERRL, clear conjunctiva  RESPIRATORY: Normal respiratory effort; lungs are clear to auscultation bilaterally; No Crackles/Rhonchi/Wheezing  CARDIOVASCULAR: Regular rate and rhythm, normal S1 and S2, no murmur/rub/gallop; No lower extremity edema; Peripheral pulses are 2+ bilaterally  ABDOMEN: Nontender to palpation, normoactive bowel sounds, no rebound/guarding; No hepatosplenomegaly  MUSCULOSKELETAL: no clubbing or cyanosis of digits; no joint swelling or tenderness to palpation  EXTREMITY: Lower extremities Non-tender to palpation; non-erythematous B/L  NEURO: A&Ox3; no focal deficits   PSYCH: normal mood; Affect appropriate    LABS:                        10.9   16.39 )-----------( 505      ( 23 Mar 2024 04:35 )             34.7     03-23    139  |  98  |  11  ----------------------------<  163<H>  3.7   |  25  |  0.68    Ca    9.9      23 Mar 2024 04:35  Phos  3.5     03-23  Mg     2.20     03-23    TPro  7.1  /  Alb  3.2<L>  /  TBili  0.2  /  DBili  x   /  AST  19  /  ALT  16  /  AlkPhos  69  03-23    PT/INR - ( 22 Mar 2024 11:19 )   PT: 12.4 sec;   INR: 1.11 ratio         PTT - ( 23 Mar 2024 04:35 )  PTT:142.7 sec      Urinalysis Basic - ( 23 Mar 2024 04:35 )    Color: x / Appearance: x / SG: x / pH: x  Gluc: 163 mg/dL / Ketone: x  / Bili: x / Urobili: x   Blood: x / Protein: x / Nitrite: x   Leuk Esterase: x / RBC: x / WBC x   Sq Epi: x / Non Sq Epi: x / Bacteria: x          RADIOLOGY & ADDITIONAL TESTS:    N/A PROGRESS NOTE:     Patient is a 64y old  Female who presents with a chief complaint of SOB, cough (22 Mar 2024 17:47)      SUBJECTIVE / OVERNIGHT EVENTS:    OVERNIGHT: No acute overnight events.      Patient was examined at bedside and feels well this morning. SOB, cough stable. Denies fever, chills, chest pain, nausea, vomiting. ROS otherwise negative and pt is amenable to current treatment plan.      REVIEW OF SYSTEMS:    CONSTITUTIONAL:  No weakness, fevers, or chills  EYES/ENT:  No visual changes, vertigo, or throat pain   NECK:  No pain or stiffness  RESPIRATORY:  SOB, cough, wheezing, no hemoptysis  CARDIOVASCULAR:  No chest pain or palpitations  GASTROINTESTINAL:  No abdominal pain, nausea, vomiting, or hematemesis; No diarrhea or constipation. No melena or hematochezia.  GENITOURINARY:  No dysuria, change in frequency, or hematuria  NEUROLOGICAL:  No numbness or weakness  SKIN:  No itching or rashes      MEDICATIONS  (STANDING):  albuterol/ipratropium for Nebulization 3 milliLiter(s) Nebulizer every 6 hours  buDESOnide    Inhalation Suspension 0.25 milliGRAM(s) Inhalation every 12 hours  dextrose 5%. 1000 milliLiter(s) (100 mL/Hr) IV Continuous <Continuous>  dextrose 5%. 1000 milliLiter(s) (50 mL/Hr) IV Continuous <Continuous>  dextrose 50% Injectable 25 Gram(s) IV Push once  dextrose 50% Injectable 12.5 Gram(s) IV Push once  dextrose 50% Injectable 25 Gram(s) IV Push once  DULoxetine 60 milliGRAM(s) Oral daily  gabapentin 600 milliGRAM(s) Oral at bedtime  glucagon  Injectable 1 milliGRAM(s) IntraMuscular once  heparin  Infusion.  Unit(s)/Hr (18 mL/Hr) IV Continuous <Continuous>  influenza   Vaccine 0.5 milliLiter(s) IntraMuscular once  insulin lispro (ADMELOG) corrective regimen sliding scale   SubCutaneous three times a day before meals  insulin lispro (ADMELOG) corrective regimen sliding scale   SubCutaneous at bedtime  methylPREDNISolone sodium succinate Injectable 40 milliGRAM(s) IV Push every 12 hours  trimethoprim  160 mG/sulfamethoxazole 800 mG 2 Tablet(s) Oral every 12 hours  verapamil  milliGRAM(s) Oral daily    MEDICATIONS  (PRN):  acetaminophen     Tablet .. 650 milliGRAM(s) Oral every 6 hours PRN Temp greater or equal to 38C (100.4F), Mild Pain (1 - 3)  dextrose Oral Gel 15 Gram(s) Oral once PRN Blood Glucose LESS THAN 70 milliGRAM(s)/deciliter  guaifenesin/dextromethorphan Oral Liquid 10 milliLiter(s) Oral every 4 hours PRN Cough  heparin   Injectable 8500 Unit(s) IV Push every 6 hours PRN For aPTT less than 40  heparin   Injectable 4000 Unit(s) IV Push every 6 hours PRN For aPTT between 40 - 57  hydrocodone/homatropine Syrup 5 milliLiter(s) Oral every 8 hours PRN Cough      CAPILLARY BLOOD GLUCOSE      POCT Blood Glucose.: 158 mg/dL (22 Mar 2024 22:34)  POCT Blood Glucose.: 178 mg/dL (22 Mar 2024 17:23)  POCT Blood Glucose.: 160 mg/dL (22 Mar 2024 12:28)  POCT Blood Glucose.: 161 mg/dL (22 Mar 2024 09:23)    I&O's Summary    22 Mar 2024 07:01  -  23 Mar 2024 07:00  --------------------------------------------------------  IN: 799 mL / OUT: 0 mL / NET: 799 mL        PHYSICAL EXAM:  Vital Signs Last 24 Hrs  T(C): 36.3 (22 Mar 2024 21:32), Max: 36.4 (22 Mar 2024 15:50)  T(F): 97.3 (22 Mar 2024 21:32), Max: 97.6 (22 Mar 2024 15:50)  HR: 102 (22 Mar 2024 21:32) (100 - 102)  BP: 132/96 (22 Mar 2024 21:32) (132/96 - 146/74)  BP(mean): --  RR: 18 (22 Mar 2024 21:32) (18 - 18)  SpO2: 99% (22 Mar 2024 21:32) (99% - 100%)    Parameters below as of 22 Mar 2024 21:32  Patient On (Oxygen Delivery Method): nasal cannula  O2 Flow (L/min): 4      CONSTITUTIONAL: NAD; well-developed  HEENT: PERRL, clear conjunctiva  RESPIRATORY: Normal respiratory effort; diffuse, b/l wheezing appreciated  CARDIOVASCULAR: Regular rate and rhythm, normal S1 and S2, no murmur/rub/gallop; No lower extremity edema; Peripheral pulses are 2+ bilaterally  ABDOMEN: Nontender to palpation, normoactive bowel sounds, no rebound/guarding; No hepatosplenomegaly  MUSCULOSKELETAL: no clubbing or cyanosis of digits; no joint swelling or tenderness to palpation  EXTREMITY: Lower extremities Non-tender to palpation; non-erythematous B/L  NEURO: A&Ox3; no focal deficits   PSYCH: normal mood; Affect appropriate    LABS:                        10.9   16.39 )-----------( 505      ( 23 Mar 2024 04:35 )             34.7     03-23    139  |  98  |  11  ----------------------------<  163<H>  3.7   |  25  |  0.68    Ca    9.9      23 Mar 2024 04:35  Phos  3.5     03-23  Mg     2.20     03-23    TPro  7.1  /  Alb  3.2<L>  /  TBili  0.2  /  DBili  x   /  AST  19  /  ALT  16  /  AlkPhos  69  03-23    PT/INR - ( 22 Mar 2024 11:19 )   PT: 12.4 sec;   INR: 1.11 ratio         PTT - ( 23 Mar 2024 04:35 )  PTT:142.7 sec      Urinalysis Basic - ( 23 Mar 2024 04:35 )    Color: x / Appearance: x / SG: x / pH: x  Gluc: 163 mg/dL / Ketone: x  / Bili: x / Urobili: x   Blood: x / Protein: x / Nitrite: x   Leuk Esterase: x / RBC: x / WBC x   Sq Epi: x / Non Sq Epi: x / Bacteria: x          RADIOLOGY & ADDITIONAL TESTS:    N/A

## 2024-03-23 NOTE — PROGRESS NOTE ADULT - PROBLEM SELECTOR PLAN 3
- P/w loose BMs, initially w/ some blood  - GI PCR negative  > CTM BMs - P/w loose BMs, initially w/ some blood  - GI PCR negative  - Diarrhea improved  > CTM BMs

## 2024-03-23 NOTE — PROGRESS NOTE ADULT - PROBLEM SELECTOR PLAN 4
- H/o PE in 2019, on Xarelto at home  - S/p Xarelto 20mg qd  > C/w heparin gtt. iso planned bronchoscopy

## 2024-03-24 ENCOUNTER — TRANSCRIPTION ENCOUNTER (OUTPATIENT)
Age: 64
End: 2024-03-24

## 2024-03-24 LAB
ALBUMIN SERPL ELPH-MCNC: 3.2 G/DL — LOW (ref 3.3–5)
ALP SERPL-CCNC: 64 U/L — SIGNIFICANT CHANGE UP (ref 40–120)
ALT FLD-CCNC: 13 U/L — SIGNIFICANT CHANGE UP (ref 4–33)
ANION GAP SERPL CALC-SCNC: 14 MMOL/L — SIGNIFICANT CHANGE UP (ref 7–14)
APTT BLD: 25.8 SEC — SIGNIFICANT CHANGE UP (ref 24.5–35.6)
APTT BLD: 57 SEC — HIGH (ref 24.5–35.6)
AST SERPL-CCNC: 16 U/L — SIGNIFICANT CHANGE UP (ref 4–32)
BILIRUB SERPL-MCNC: <0.2 MG/DL — SIGNIFICANT CHANGE UP (ref 0.2–1.2)
BUN SERPL-MCNC: 13 MG/DL — SIGNIFICANT CHANGE UP (ref 7–23)
CALCIUM SERPL-MCNC: 9.9 MG/DL — SIGNIFICANT CHANGE UP (ref 8.4–10.5)
CHLORIDE SERPL-SCNC: 101 MMOL/L — SIGNIFICANT CHANGE UP (ref 98–107)
CO2 SERPL-SCNC: 23 MMOL/L — SIGNIFICANT CHANGE UP (ref 22–31)
CREAT SERPL-MCNC: 0.84 MG/DL — SIGNIFICANT CHANGE UP (ref 0.5–1.3)
EGFR: 78 ML/MIN/1.73M2 — SIGNIFICANT CHANGE UP
GLUCOSE BLDC GLUCOMTR-MCNC: 127 MG/DL — HIGH (ref 70–99)
GLUCOSE BLDC GLUCOMTR-MCNC: 141 MG/DL — HIGH (ref 70–99)
GLUCOSE BLDC GLUCOMTR-MCNC: 197 MG/DL — HIGH (ref 70–99)
GLUCOSE BLDC GLUCOMTR-MCNC: 98 MG/DL — SIGNIFICANT CHANGE UP (ref 70–99)
GLUCOSE SERPL-MCNC: 158 MG/DL — HIGH (ref 70–99)
HCT VFR BLD CALC: 31.8 % — LOW (ref 34.5–45)
HGB BLD-MCNC: 9.8 G/DL — LOW (ref 11.5–15.5)
MAGNESIUM SERPL-MCNC: 2.1 MG/DL — SIGNIFICANT CHANGE UP (ref 1.6–2.6)
MCHC RBC-ENTMCNC: 25.8 PG — LOW (ref 27–34)
MCHC RBC-ENTMCNC: 30.8 GM/DL — LOW (ref 32–36)
MCV RBC AUTO: 83.7 FL — SIGNIFICANT CHANGE UP (ref 80–100)
NRBC # BLD: 0 /100 WBCS — SIGNIFICANT CHANGE UP (ref 0–0)
NRBC # FLD: 0.09 K/UL — HIGH (ref 0–0)
PHOSPHATE SERPL-MCNC: 4 MG/DL — SIGNIFICANT CHANGE UP (ref 2.5–4.5)
PLATELET # BLD AUTO: 525 K/UL — HIGH (ref 150–400)
POTASSIUM SERPL-MCNC: 3.6 MMOL/L — SIGNIFICANT CHANGE UP (ref 3.5–5.3)
POTASSIUM SERPL-SCNC: 3.6 MMOL/L — SIGNIFICANT CHANGE UP (ref 3.5–5.3)
PROT SERPL-MCNC: 6.5 G/DL — SIGNIFICANT CHANGE UP (ref 6–8.3)
RBC # BLD: 3.8 M/UL — SIGNIFICANT CHANGE UP (ref 3.8–5.2)
RBC # FLD: 18.7 % — HIGH (ref 10.3–14.5)
SODIUM SERPL-SCNC: 138 MMOL/L — SIGNIFICANT CHANGE UP (ref 135–145)
WBC # BLD: 18.64 K/UL — HIGH (ref 3.8–10.5)
WBC # FLD AUTO: 18.64 K/UL — HIGH (ref 3.8–10.5)

## 2024-03-24 PROCEDURE — 71045 X-RAY EXAM CHEST 1 VIEW: CPT | Mod: 26

## 2024-03-24 PROCEDURE — 99233 SBSQ HOSP IP/OBS HIGH 50: CPT

## 2024-03-24 RX ADMIN — Medication 650 MILLIGRAM(S): at 12:34

## 2024-03-24 RX ADMIN — Medication 2 TABLET(S): at 17:52

## 2024-03-24 RX ADMIN — HEPARIN SODIUM 1100 UNIT(S)/HR: 5000 INJECTION INTRAVENOUS; SUBCUTANEOUS at 07:31

## 2024-03-24 RX ADMIN — Medication 0.25 MILLIGRAM(S): at 21:49

## 2024-03-24 RX ADMIN — Medication 120 MILLIGRAM(S): at 07:04

## 2024-03-24 RX ADMIN — HEPARIN SODIUM 1700 UNIT(S)/HR: 5000 INJECTION INTRAVENOUS; SUBCUTANEOUS at 22:47

## 2024-03-24 RX ADMIN — Medication 40 MILLIGRAM(S): at 17:30

## 2024-03-24 RX ADMIN — Medication 3 MILLILITER(S): at 21:49

## 2024-03-24 RX ADMIN — HEPARIN SODIUM 1300 UNIT(S)/HR: 5000 INJECTION INTRAVENOUS; SUBCUTANEOUS at 07:33

## 2024-03-24 RX ADMIN — Medication 40 MILLIGRAM(S): at 07:00

## 2024-03-24 RX ADMIN — Medication 3 MILLILITER(S): at 15:34

## 2024-03-24 RX ADMIN — GABAPENTIN 600 MILLIGRAM(S): 400 CAPSULE ORAL at 22:04

## 2024-03-24 RX ADMIN — Medication 3 MILLILITER(S): at 04:07

## 2024-03-24 RX ADMIN — HEPARIN SODIUM 1700 UNIT(S)/HR: 5000 INJECTION INTRAVENOUS; SUBCUTANEOUS at 19:39

## 2024-03-24 RX ADMIN — Medication 3 MILLILITER(S): at 08:34

## 2024-03-24 RX ADMIN — HEPARIN SODIUM 1700 UNIT(S)/HR: 5000 INJECTION INTRAVENOUS; SUBCUTANEOUS at 16:03

## 2024-03-24 RX ADMIN — Medication 0.25 MILLIGRAM(S): at 08:34

## 2024-03-24 RX ADMIN — Medication 2 TABLET(S): at 07:01

## 2024-03-24 RX ADMIN — Medication 650 MILLIGRAM(S): at 10:15

## 2024-03-24 RX ADMIN — PANTOPRAZOLE SODIUM 40 MILLIGRAM(S): 20 TABLET, DELAYED RELEASE ORAL at 07:00

## 2024-03-24 RX ADMIN — DULOXETINE HYDROCHLORIDE 60 MILLIGRAM(S): 30 CAPSULE, DELAYED RELEASE ORAL at 14:01

## 2024-03-24 NOTE — DISCHARGE NOTE PROVIDER - NSDCCAREPROVSEEN_GEN_ALL_CORE_FT
T2 [facility] Medicine Team 2 [facility] Medicine Team 2  Infectious Diseases  Pulmonology  Otolaryngology

## 2024-03-24 NOTE — DISCHARGE NOTE PROVIDER - NSDCCPTREATMENT_GEN_ALL_CORE_FT
PRINCIPAL PROCEDURE  Procedure: CT chest w con  Findings and Treatment:   COMPARISON: CT chest dated 1/1/2024, 12/20/2023, 12/25/2023-7/12/2019.  CONTRAST/COMPLICATIONS:  IV Contrast: None.  Oral Contrast: None.  Complications: None reported.  PROCEDURE:  CT of the Chest was performed.  Sagittal and coronal reformats were performed.  FINDINGS:  LUNGS AND AIRWAYS: Patent central airways.  There are multifocal   groundglass and consolidative opacities involving all lobes with   predominance in the bilateral lower lobes and right upper lobe.   Infiltrates are more extensive when compared to prior study and in a   slightly different distribution.  PLEURA: No pleural effusion.  MEDIASTINUM AND MADHU: No lymphadenopathy.  VESSELS: Withinnormal limits.  HEART: Heart size is normal. No pericardial effusion.  CHEST WALL AND LOWER NECK: Within normal limits.  VISUALIZED UPPER ABDOMEN: Within normal limits.  BONES: Degenerative changes.  IMPRESSION:  Multifocal groundglass and consolidative opacities involving all 5 lobes,   most predominant in the bilateral lower lobes and right upper lobe.        SECONDARY PROCEDURE  Procedure: CT angiogram chest w contrast  Findings and Treatment: COMPARISON: CT chest 3/19/2024 and 1/1/2024  CONTRAST/COMPLICATIONS:  IV Contrast: Omnipaque 350  60 cc administered   40 cc discarded  Oral Contrast: NONE  Complications: None reported at time of study completion  PROCEDURE:  CT Angiography of the Chest.  Sagittal and coronal reformats were performed as well as 3D (MIP)   reconstructions.  FINDINGS:  PULMONARY ANGIOGRAM: There is no pulmonary embolism in the central main   and right and left main pulmonary arteries. Nondiagnostic evaluation of   the majority of the other vessels including the lobar, segmental, and   subsegmental pulmonary arteries due to superimposed significant   respiratory motion artifact.  LUNGS AND AIRWAYS: Patent central airways. Multiple bilateral ill-defined   nodular and patchy opacities most confluent within the right lower lobe   at the right lung base, some of which are new and some of which have   improved since January 1, 2024.  PLEURA: No pleural effusion.  MEDIASTINUM AND MADHU: No lymphadenopathy.  VESSELS: Within normal limits.  HEART: Heart size is normal. No pericardial effusion.  CHEST WALL AND LOWER NECK: Within normal limits.  VISUALIZED UPPER ABDOMEN: Tiny hiatal hernia. Subcentimeter nodule   arising from the partially imaged right kidney is too small to   characterize.  BONES: Within normal limits.  IMPRESSION:  No pulmonary embolism in the central main and right and left main   pulmonary arteries. Nondiagnostic evaluation of the majority of the   lobar, segmental, and subsegmental pulmonary arteries due to significant   superimposed respiratory motion artifact. Consider repeat CT pulmonary   angiogram for complete evaluation of PE if persistent clinical concern   for pulmonary embolism.  Waxing and waning bilateral ill-defined defined patchy and nodular   opacities. Differential diagnostic considerations include infectious   etiologies, and/or inflammatory etiologies such as cryptogenic organizing   pneumonia.

## 2024-03-24 NOTE — DISCHARGE NOTE PROVIDER - HOSPITAL COURSE
HPI:  Ms Shea Thompson is a 64-year-old female w/ PMH of lupus, PE on Xarelto (dx 2019), asthma, HTN, T2DM, and GBS (dx 1997) presenting with 3wk h/o of worsening SOB and persistent cough. Patient was notably recently admitted at [place] in 01/2024 for superimposed mycoplasma PNA on influenza. Patient further reports several day h/o subjective fevers, chills, and headaches. Patient was then found on floor by  yesterday evening, patient does not remember falling down but does not endorse any acute pain. Patient further endorses loose BMs that began last week with some blood but color transitioned to yellow/orange color as of recent. In the ED, patient was afebrile but tacchycardic and tachypneic. Labs significant for hypokalemia; RVP, troponin unremarkable. CXR showing bibasilar airspace opacities and CT chest revealing multifocal groundglass and consolidative opacities involving all 5 lobes, most predominant in the b/l lower lobes and right upper lobe. Patient admitted to medicine for further workup and management of AHRF. (19 Mar 2024 09:10)    Hospital Course:  Patient started on empiric antibiotics with doxycycline and meropenem, along with steroids, duonebs without clinical improvement. Autoimmune workup also unrevealing. ID was consulted and antibiotics were stopped pending further workup. Fungitell returned elevated so Bactrim was started for PCP coverage, per ID. Pulmonology was also consulted and recommended bronchoscopy. Home Xarelto was transitioned to heparin gtt. in anticipation of procedure. Furthermore, initial HIV screen was positive and confirmation testing showed ___. On 3/24, patient was hypoxic during episodes of severe coughing so O2 was escalated to HFNC.    Important Medication Changes and Reason:      Active or Pending Issues Requiring Follow-up:      Advanced Directives:   [X] Full code  [ ] DNR  [ ] Hospice      Discharge Diagnoses:         HPI:  Ms Shea Thompson is a 64-year-old female w/ PMH of lupus, PE on Xarelto (dx 2019), asthma, HTN, T2DM, and GBS (dx 1997) presenting with 3wk h/o of worsening SOB and persistent cough. Patient was notably recently admitted at [place] in 01/2024 for superimposed mycoplasma PNA on influenza. Patient further reports several day h/o subjective fevers, chills, and headaches. Patient was then found on floor by  yesterday evening, patient does not remember falling down but does not endorse any acute pain. Patient further endorses loose BMs that began last week with some blood but color transitioned to yellow/orange color as of recent. In the ED, patient was afebrile but tacchycardic and tachypneic. Labs significant for hypokalemia; RVP, troponin unremarkable. CXR showing bibasilar airspace opacities and CT chest revealing multifocal groundglass and consolidative opacities involving all 5 lobes, most predominant in the b/l lower lobes and right upper lobe. Patient admitted to medicine for further workup and management of AHRF. (19 Mar 2024 09:10)    Hospital Course:  Patient started on empiric antibiotics with doxycycline and meropenem, along with steroids, duonebs without clinical improvement. Autoimmune workup also unrevealing. ID was consulted and antibiotics were stopped pending further workup. Fungitell returned elevated so Bactrim was started for PCP coverage, per ID. Pulmonology was also consulted and recommended bronchoscopy which was deferred given HIV/ AIDS PJP diagnosis.  Her home Xarelto was briefly held pending any procedures however she was resumed on home AC dose. 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 She required nasal cannula for her episodes of hypoxia and desaturation with ambulation/ coughing. ID started her on Biktarvy, as well as completing short course of cefepime and azithromycin for antibiotic prophylaxis. Pt remained on bactrim until 4/11. She was able to produce 2 AFB sputum sample, she is pending the 3rd sputum sample for TB rule out.     Important Medication Changes and Reason:  (F/U ID recs)    Active or Pending Issues Requiring Follow-up:      Advanced Directives:   [X] Full code  [ ] DNR  [ ] Hospice      Discharge Diagnoses:         HPI:  Ms Shea Thompson is a 64-year-old female w/ PMH of lupus, PE on Xarelto (dx 2019), asthma, HTN, T2DM, and GBS (dx 1997) presenting with 3wk h/o of worsening SOB and persistent cough. Patient was notably recently admitted at [place] in 01/2024 for superimposed mycoplasma PNA on influenza. Patient further reports several day h/o subjective fevers, chills, and headaches. Patient was then found on floor by  yesterday evening, patient does not remember falling down but does not endorse any acute pain. Patient further endorses loose BMs that began last week with some blood but color transitioned to yellow/orange color as of recent. In the ED, patient was afebrile but tacchycardic and tachypneic. Labs significant for hypokalemia; RVP, troponin unremarkable. CXR showing bibasilar airspace opacities and CT chest revealing multifocal groundglass and consolidative opacities involving all 5 lobes, most predominant in the b/l lower lobes and right upper lobe. Patient admitted to medicine for further workup and management of AHRF. (19 Mar 2024 09:10)    Hospital Course:  Patient started on empiric antibiotics with doxycycline and meropenem, along with steroids, duonebs without clinical improvement. Autoimmune workup also unrevealing. ID was consulted and antibiotics were stopped pending further workup. Fungitell returned elevated so Bactrim was started for PCP coverage, per ID. Pulmonology was also consulted and recommended bronchoscopy which was deferred given HIV/ AIDS PJP diagnosis.  Her home Xarelto was briefly held pending any procedures however she was resumed on home AC dose. 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 She required nasal cannula for her episodes of hypoxia and desaturation with ambulation/ coughing. ID started her on Biktarvy, as well as completing short course of cefepime and azithromycin for antibiotic prophylaxis. Pt remained on bactrim until 4/11. She was able to produce 3 AFB sputum samples, which were all _____.     Important Medication Changes and Reason:  (F/U ID recs)    Active or Pending Issues Requiring Follow-up:      Advanced Directives:   [X] Full code  [ ] DNR  [ ] Hospice      Discharge Diagnoses:         HPI:  Ms Shea Thompson is a 64-year-old female w/ PMH of lupus, PE on Xarelto (dx 2019), asthma, HTN, T2DM, and GBS (dx 1997) presenting with 3wk h/o of worsening SOB and persistent cough. Patient was notably recently admitted at [place] in 01/2024 for superimposed mycoplasma PNA on influenza. Patient further reports several day h/o subjective fevers, chills, and headaches. Patient was then found on floor by  yesterday evening, patient does not remember falling down but does not endorse any acute pain. Patient further endorses loose BMs that began last week with some blood but color transitioned to yellow/orange color as of recent. In the ED, patient was afebrile but tacchycardic and tachypneic. Labs significant for hypokalemia; RVP, troponin unremarkable. CXR showing bibasilar airspace opacities and CT chest revealing multifocal groundglass and consolidative opacities involving all 5 lobes, most predominant in the b/l lower lobes and right upper lobe. Patient admitted to medicine for further workup and management of AHRF. (19 Mar 2024 09:10)    Hospital Course:  Patient started on empiric antibiotics with doxycycline and meropenem, along with steroids, duonebs without clinical improvement. Followed by pulmonology and infectious disease services. Antibiotics were held pending further workup. Given elevated fungitell, Bactrim was started for PCP coverage. 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 Antiretroviral therapy with Biktarvy was initiated. Autoimmune workup was unrevealing. PJP PCR was positive, confirming the diagnosis of PJP pneumonia. Additionally, there was suspicion for superimposed bacterial pneumonia and patient was started on cefepime and azithromycin. Initial recommendation for bronchoscopy was then deferred given HIV/AIDS PJP diagnosis. Quantiferon gold was indeterminate (likely 2/2 steroids), so tuberculosis was ruled out with 3 negative AFB sputum samples. Patient was also evaluated by ENT given persistent cough and concern for upper airway pathology, flexible indirect laryngoscopy was performed and findings were normal. Cough was felt to be 2/2 pneumonia and possible component of reflux, recommended symptomatic management. Her home Xarelto was briefly held early this admission pending any possible procedures however she was resumed on home AC dose. She required nasal cannula for her episodes of hypoxia and desaturation with ambulation/ coughing. Pt remained on bactrim until 4/11 per ID recommendations. Steroids were tapered, and taper will continue after discharge. Patient had persistent episodes of hypoxia requiring NC, and met criteria for home oxygen.     On day of discharge, patient is clinically stable with no new exam findings or acute symptoms compared to prior. The patient was seen by the attending physician on the date of discharge and deemed stable and acceptable for discharge. The patient's chronic medical conditions were treated accordingly per the patient's home medication regimen. The patient's medication reconciliation (with changes made to chronic medications), follow up appointments, discharge orders, instructions, and significant lab and diagnostic studies are as noted.     Important Medication Changes and Reason:  (F/U ID recs)    Active or Pending Issues Requiring Follow-up:      Advanced Directives:   [X] Full code  [ ] DNR  [ ] Hospice      Discharge Diagnoses:     HPI:  Ms Shea Thompson is a 64-year-old female w/ PMH of lupus, PE on Xarelto (dx 2019), asthma, HTN, T2DM, and GBS (dx 1997) presenting with 3wk h/o of worsening SOB and persistent cough. Patient was notably recently admitted at [place] in 01/2024 for superimposed mycoplasma PNA on influenza. Patient further reports several day h/o subjective fevers, chills, and headaches. Patient was then found on floor by  yesterday evening, patient does not remember falling down but does not endorse any acute pain. Patient further endorses loose BMs that began last week with some blood but color transitioned to yellow/orange color as of recent. In the ED, patient was afebrile but tacchycardic and tachypneic. Labs significant for hypokalemia; RVP, troponin unremarkable. CXR showing bibasilar airspace opacities and CT chest revealing multifocal groundglass and consolidative opacities involving all 5 lobes, most predominant in the b/l lower lobes and right upper lobe. Patient admitted to medicine for further workup and management of AHRF. (19 Mar 2024 09:10)    Hospital Course:  Patient started on empiric antibiotics with doxycycline and meropenem, along with steroids, duonebs without clinical improvement. Followed by pulmonology and infectious disease services. Antibiotics were held pending further workup. Given elevated fungitell, Bactrim was started for PCP coverage. 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 Antiretroviral therapy with Biktarvy was initiated. Autoimmune workup was unrevealing. PJP PCR was positive, confirming the diagnosis of PJP pneumonia. Additionally, there was suspicion for superimposed bacterial pneumonia and patient was started on cefepime and azithromycin. Initial recommendation for bronchoscopy was then deferred given HIV/AIDS PJP diagnosis. Quantiferon gold was indeterminate (likely 2/2 steroids), so tuberculosis was ruled out with 3 negative AFB sputum samples. Patient was also evaluated by ENT given persistent cough and concern for upper airway pathology, flexible indirect laryngoscopy was performed and findings were normal. Cough was felt to be 2/2 pneumonia and possible component of reflux, recommended symptomatic management. Her home Xarelto was briefly held early this admission pending any possible procedures however she was resumed on home AC dose. She required nasal cannula for her episodes of hypoxia and desaturation with ambulation/ coughing. Pt remained on bactrim until 4/11 per ID recommendations. Steroids were tapered, and taper will continue after discharge (prednisone 20 mg QD 4/8-4/12, then 10 mg QD x5 days 4/13-4/17, then 5 mg x5 days 4/18-4/4/22, then stop). Patient had persistent episodes of hypoxia requiring NC, and met criteria for home oxygen.     On day of discharge, patient is clinically stable with no new exam findings or acute symptoms compared to prior. The patient was seen by the attending physician on the date of discharge and deemed stable and acceptable for discharge. The patient's chronic medical conditions were treated accordingly per the patient's home medication regimen. The patient's medication reconciliation (with changes made to chronic medications), follow up appointments, discharge orders, instructions, and significant lab and diagnostic studies are as noted.     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 Patient was notably recently admitted at [place] in 01/2024 for superimposed mycoplasma PNA on influenza. Patient further reports several day h/o subjective fevers, chills, and headaches. Patient was then found on floor by  yesterday evening, patient does not remember falling down but does not endorse any acute pain. Patient further endorses loose BMs that began last week with some blood but color transitioned to yellow/orange color as of recent. In the ED, patient was afebrile but tacchycardic and tachypneic. Labs significant for hypokalemia; RVP, troponin unremarkable. CXR showing bibasilar airspace opacities and CT chest revealing multifocal groundglass and consolidative opacities involving all 5 lobes, most predominant in the b/l lower lobes and right upper lobe. Patient admitted to medicine for further workup and management of AHRF. (19 Mar 2024 09:10)    Hospital Course:  Patient started on empiric antibiotics with doxycycline and meropenem, along with steroids, duonebs without clinical improvement. Followed by pulmonology and infectious disease services. Antibiotics were held pending further workup. Given elevated fungitell, Bactrim was started for PCP coverage. 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 Antiretroviral therapy with Biktarvy was initiated. Autoimmune workup was unrevealing. PJP PCR was positive, confirming the diagnosis of PJP pneumonia. Additionally, there was suspicion for superimposed bacterial pneumonia and patient was started on cefepime and azithromycin. Initial recommendation for bronchoscopy was then deferred given HIV/AIDS PJP diagnosis. Quantiferon gold was indeterminate (likely 2/2 steroids), so tuberculosis was ruled out with 3 negative AFB sputum samples. Patient was also evaluated by ENT given persistent cough and concern for upper airway pathology, flexible indirect laryngoscopy was performed and findings were normal. Cough was felt to be 2/2 pneumonia and possible component of reflux, recommended symptomatic management. Her home Xarelto was briefly held early this admission pending any possible procedures however she was resumed on home AC dose. She required nasal cannula for her episodes of hypoxia and desaturation with ambulation/ coughing. Pt remained on bactrim until 4/11 per ID recommendations. Steroids were tapered, and taper will continue after discharge (prednisone 10 mg QD 4/12-4/16, then 5 mg QD x5 days 4/17-4/21, then stop). Patient had persistent episodes of hypoxia requiring NC, and met criteria for home oxygen.     On day of discharge, patient is clinically stable with no new exam findings or acute symptoms compared to prior. The patient was seen by the attending physician on the date of discharge and deemed stable and acceptable for discharge. The patient's chronic medical conditions were treated accordingly per the patient's home medication regimen. The patient's medication reconciliation (with changes made to chronic medications), follow up appointments, discharge orders, instructions, and significant lab and diagnostic studies are as noted.     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 Patient was notably recently admitted at [place] in 01/2024 for superimposed mycoplasma PNA on influenza. Patient further reports several day h/o subjective fevers, chills, and headaches. Patient was then found on floor by  yesterday evening, patient does not remember falling down but does not endorse any acute pain. Patient further endorses loose BMs that began last week with some blood but color transitioned to yellow/orange color as of recent. In the ED, patient was afebrile but tacchycardic and tachypneic. Labs significant for hypokalemia; RVP, troponin unremarkable. CXR showing bibasilar airspace opacities and CT chest revealing multifocal groundglass and consolidative opacities involving all 5 lobes, most predominant in the b/l lower lobes and right upper lobe. Patient admitted to medicine for further workup and management of AHRF. (19 Mar 2024 09:10)    Hospital Course:  Patient started on empiric antibiotics with doxycycline and meropenem, along with steroids, duonebs without clinical improvement. Followed by pulmonology and infectious disease services. Antibiotics were held pending further workup. Given elevated fungitell, Bactrim was started for PCP coverage. 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 Antiretroviral therapy with Biktarvy was initiated. Autoimmune workup was unrevealing. PJP PCR was positive, confirming the diagnosis of PJP pneumonia. Additionally, there was suspicion for superimposed bacterial pneumonia and patient was started on cefepime and azithromycin. Initial recommendation for bronchoscopy was then deferred given HIV/AIDS PJP diagnosis. Quantiferon gold was indeterminate (likely 2/2 steroids), so tuberculosis was ruled out with 3 negative AFB sputum samples. Patient was also evaluated by ENT given persistent cough and concern for upper airway pathology, flexible indirect laryngoscopy was performed and findings were normal. Cough was felt to be 2/2 pneumonia and possible component of reflux, recommended symptomatic management. Her home Xarelto was briefly held early this admission pending any possible procedures however she was resumed on home AC dose. She required nasal cannula for her episodes of hypoxia and desaturation with ambulation/ coughing. Pt remained on bactrim until 4/11 per ID recommendations. Steroids were tapered, and taper will continue after discharge (prednisone 10 mg QD 4/12-4/16, then 5 mg QD x5 days 4/17-4/21, then stop). Patient had persistent episodes of hypoxia requiring NC, and met criteria for home oxygen.     On day of discharge, patient is clinically stable with no new exam findings or acute symptoms compared to prior. The patient was seen by the attending physician on the date of discharge and deemed stable and acceptable for discharge. The patient's chronic medical conditions were treated accordingly per the patient's home medication regimen. The patient's medication reconciliation (with changes made to chronic medications), follow up appointments, discharge orders, instructions, and significant lab and diagnostic studies are as noted.     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

## 2024-03-24 NOTE — PROGRESS NOTE ADULT - SUBJECTIVE AND OBJECTIVE BOX
Follow Up:      Inverval History/ROS:Patient is a 64y old  Female who presents with a chief complaint of SOB, cough (24 Mar 2024 08:26)    Pt notes progressive sob/cough over months, acutely worsened around time of presentation  No fever    + prelim HIV test.  States she had a false positive HIV test in the past while participating in a [place]up[place] study in [place].   Denies high risk acitiviites    Allergies    No Known Allergies    Intolerances    dislikes Glucerna Shake (Other)      ANTIMICROBIALS:  trimethoprim  160 mG/sulfamethoxazole 800 mG 2 every 12 hours      OTHER MEDS:  acetaminophen     Tablet .. 650 milliGRAM(s) Oral every 6 hours PRN  albuterol/ipratropium for Nebulization 3 milliLiter(s) Nebulizer every 6 hours  buDESOnide    Inhalation Suspension 0.25 milliGRAM(s) Inhalation every 12 hours  dextrose 5%. 1000 milliLiter(s) IV Continuous <Continuous>  dextrose 5%. 1000 milliLiter(s) IV Continuous <Continuous>  dextrose 50% Injectable 25 Gram(s) IV Push once  dextrose 50% Injectable 12.5 Gram(s) IV Push once  dextrose 50% Injectable 25 Gram(s) IV Push once  dextrose Oral Gel 15 Gram(s) Oral once PRN  DULoxetine 60 milliGRAM(s) Oral daily  gabapentin 600 milliGRAM(s) Oral at bedtime  glucagon  Injectable 1 milliGRAM(s) IntraMuscular once  heparin  Infusion.  Unit(s)/Hr IV Continuous <Continuous>  hydrocodone/homatropine Syrup 5 milliLiter(s) Oral every 6 hours  influenza   Vaccine 0.5 milliLiter(s) IntraMuscular once  insulin lispro (ADMELOG) corrective regimen sliding scale   SubCutaneous three times a day before meals  insulin lispro (ADMELOG) corrective regimen sliding scale   SubCutaneous at bedtime  methylPREDNISolone sodium succinate Injectable 40 milliGRAM(s) IV Push every 12 hours  pantoprazole    Tablet 40 milliGRAM(s) Oral before breakfast  verapamil  milliGRAM(s) Oral daily      Vital Signs Last 24 Hrs  T(C): 37.1 (24 Mar 2024 06:55), Max: 37.1 (23 Mar 2024 23:00)  T(F): 98.7 (24 Mar 2024 06:55), Max: 98.7 (23 Mar 2024 23:00)  HR: 101 (24 Mar 2024 08:34) (96 - 111)  BP: 137/85 (24 Mar 2024 06:55) (135/87 - 148/90)  BP(mean): --  RR: 18 (24 Mar 2024 06:55) (18 - 18)  SpO2: 92% (24 Mar 2024 08:34) (90% - 96%)    Parameters below as of 24 Mar 2024 08:34  Patient On (Oxygen Delivery Method): nasal cannula        PHYSICAL EXAM:  General: [x ] non-toxic. 4 L NC  HEAD/EYES: [ ] PERRL [x ] white sclera [ ] icterus  ENT:  [ ] normal [x ] supple [ ] thrush [ ] pharyngeal exudate  Cardiovascular:   [ ] murmur [x ] normal [ ] PPM/AICD  Respiratory:  x[ ] course BS  GI:  [x ] soft, non-tender, normal bowel sounds  :  [ ] penny [ ] no CVA tenderness   Musculoskeletal:  [ ] no synovitis  Neurologic:  [ ] non-focal exam   Skin:  [ x] no rash  Lymph: [x ] no lymphadenopathy  Psychiatric:  [ ] appropriate affect [ ] alert & oriented  Lines:  [x ] no phlebitis [ ] central line                                9.8    18.64 )-----------( 525      ( 24 Mar 2024 05:45 )             31.8       03-24    138  |  101  |  13  ----------------------------<  158<H>  3.6   |  23  |  0.84    Ca    9.9      24 Mar 2024 05:45  Phos  4.0     03-24  Mg     2.10     03-24    TPro  6.5  /  Alb  3.2<L>  /  TBili  <0.2  /  DBili  x   /  AST  16  /  ALT  13  /  AlkPhos  64  03-24      Urinalysis Basic - ( 24 Mar 2024 05:45 )    Color: x / Appearance: x / SG: x / pH: x  Gluc: 158 mg/dL / Ketone: x  / Bili: x / Urobili: x   Blood: x / Protein: x / Nitrite: x   Leuk Esterase: x / RBC: x / WBC x   Sq Epi: x / Non Sq Epi: x / Bacteria: x        MICROBIOLOGY:Culture Results:   No growth at 4 days (03-19-24 @ 21:28)  Culture Results:   No growth at 4 days (03-19-24 @ 21:20)      RADIOLOGY:

## 2024-03-24 NOTE — DISCHARGE NOTE PROVIDER - DETAILS OF MALNUTRITION DIAGNOSIS/DIAGNOSES
This patient has been assessed with a concern for Malnutrition and was treated during this hospitalization for the following Nutrition diagnosis/diagnoses:     -  04/04/2024: Severe protein-calorie malnutrition

## 2024-03-24 NOTE — PROGRESS NOTE ADULT - PROBLEM SELECTOR PLAN 8
DVT Ppx: Heparin gtt.  Diet: CC  Dispo: TBD DVT Ppx: Heparin gtt.  Diet: CC, NPO after midnight  Dispo: TBD

## 2024-03-24 NOTE — PROGRESS NOTE ADULT - ATTENDING COMMENTS
Pt is a 65 yo F with PMH chronic back pain (s/p spinal fusion), T2D, HTN, HLD, SLE, CVA/TIA, asthma, PE (2019, xarelto), and GBS p/w persistent cough, SOB, diarrhea, and fall 2/2 weakness. Recently hospitalized at [facility] 1/2024 for influenza with superimposed mycoplasma PNA s/p doxycycline course. On arrival, CTH neg, CT chest with diffuse GGO and consolidations. Labs with leukocytosis, normocytic anemia, neg GI PCR, RVP neg, and MRSA neg. TTE with EF 53% and mild G1 diastolic dysfunction. Repeat CT PE neg but with multiple BL nodules and patchy opacities, mostly in the RLL/R base. ID following, s/p marlys/doxy now on bactrim d/t elevated fungitell.    per pulm plan for bronch 3/25 - off Xarelto, on hep gtt in meantime   per pulm cw IV solumedrol for now as possible autoimmune component - pending bronch for lavage and path   so far autoimmune and infx serologies are unrevealing  F/u ID recs re abx Pt is a 63 yo F with PMH chronic back pain (s/p spinal fusion), T2D, HTN, HLD, SLE, CVA/TIA, asthma, PE (2019, xarelto), and GBS p/w persistent cough, SOB, diarrhea, and fall 2/2 weakness. Recently hospitalized at [facility] 1/2024 for influenza with superimposed mycoplasma PNA s/p doxycycline course. On arrival, CTH neg, CT chest with diffuse GGO and consolidations. Labs with leukocytosis, normocytic anemia, neg GI PCR, RVP neg, and MRSA neg. TTE with EF 53% and mild G1 diastolic dysfunction. Repeat CT PE neg but with multiple BL nodules and patchy opacities, mostly in the RLL/R base. ID following, s/p marlys/doxy now on bactrim d/t elevated fungitell.     per pulm plan for bronch 3/25 - off Xarelto, on hep gtt in meantime   per pulm cw IV solumedrol for now as possible autoimmune component - pending bronch for lavage and path   so far autoimmune and infx serologies are unrevealing  C/w bactrim ppx  F/u ID recs re abx  F/U confirmatory HIV results

## 2024-03-24 NOTE — PROGRESS NOTE ADULT - PROBLEM SELECTOR PLAN 2
- Patient found on fall by  prior to presentation  - Denies any acute bony pain  - CTH umremarkable  - TTE unremarkable  > F/u orthostatic vitals

## 2024-03-24 NOTE — DISCHARGE NOTE PROVIDER - CARE PROVIDERS DIRECT ADDRESSES
,[email or web address]

## 2024-03-24 NOTE — DISCHARGE NOTE PROVIDER - IS THE PATIENT BEING DISCHARGED WITH A URINARY DRAINAGE DEVICE
Pt's phone doesn't seem to be working. Called sister and she is going to have pt call. Did not give any info. No

## 2024-03-24 NOTE — DISCHARGE NOTE PROVIDER - NSDCFUSCHEDAPPT_GEN_ALL_CORE_FT
Dominic Garcia  [facility]  [address]  Scheduled Appointment: 04/16/2024    [facility]  [address]  Scheduled Appointment: 04/16/2024

## 2024-03-24 NOTE — PROGRESS NOTE ADULT - ASSESSMENT
65 yo woman with SLE, DM, h/o PE, CVA with left sided weakness presenting with fever, dry cough, SOB    Hospital admission Jan 2024 at [facility] for PNA - mycoplasma IgM + and flu A +   Received azithro, cefepime, doxy and prednisone taper at that time     CT chest today shows multifocal ground glass and consolidative opacities involving all 5 lobes  - more extensive c/w prior study and in slightly different distribution     Multifocal pulmonary infiltrates  Possible PNA-   atypical  non infectious etiologies in ddx     Appreciate pulmonary eval-       blood cultures no growth to date   serum crypt ag neg  , quant gold testing    no change in clinical status on empiric doxycycline and meropenem      planning  bronch for diagnosis     fungitell 410-CT not typical for PCR  now on prednisone     Suggest:     d/c doxy, meropenem   start bactrim DS 2 tab po q 12 h   check PCP PCR     await bronch     HIV prelim positive.  H/o false positive HIV test.   Await HIV confirmatory test.  Check HIV viral load.     DIscussed with medicine team    ID service available over weekend

## 2024-03-24 NOTE — PROGRESS NOTE ADULT - SUBJECTIVE AND OBJECTIVE BOX
PROGRESS NOTE:     Patient is a 64y old  Female who presents with a chief complaint of SOB, cough (23 Mar 2024 07:49)      SUBJECTIVE / OVERNIGHT EVENTS:    OVERNIGHT: No acute overnight events.      Patient was examined at bedside and feels well this morning. Denies fever, chills, chest pain, SOB, nausea, vomiting. ROS otherwise negative and pt is amenable to current treatment plan.      REVIEW OF SYSTEMS:    CONSTITUTIONAL:  No weakness, fevers, or chills  EYES/ENT:  No visual changes, vertigo, or throat pain   NECK:  No pain or stiffness  RESPIRATORY:  No SOB, cough, wheezing, hemoptysis  CARDIOVASCULAR:  No chest pain or palpitations  GASTROINTESTINAL:  No abdominal pain, nausea, vomiting, or hematemesis; No diarrhea or constipation. No melena or hematochezia.  GENITOURINARY:  No dysuria, change in frequency, or hematuria  NEUROLOGICAL:  No numbness or weakness  SKIN:  No itching or rashes      MEDICATIONS  (STANDING):  albuterol/ipratropium for Nebulization 3 milliLiter(s) Nebulizer every 6 hours  buDESOnide    Inhalation Suspension 0.25 milliGRAM(s) Inhalation every 12 hours  dextrose 5%. 1000 milliLiter(s) (50 mL/Hr) IV Continuous <Continuous>  dextrose 5%. 1000 milliLiter(s) (100 mL/Hr) IV Continuous <Continuous>  dextrose 50% Injectable 25 Gram(s) IV Push once  dextrose 50% Injectable 12.5 Gram(s) IV Push once  dextrose 50% Injectable 25 Gram(s) IV Push once  DULoxetine 60 milliGRAM(s) Oral daily  gabapentin 600 milliGRAM(s) Oral at bedtime  glucagon  Injectable 1 milliGRAM(s) IntraMuscular once  heparin  Infusion.  Unit(s)/Hr (18 mL/Hr) IV Continuous <Continuous>  hydrocodone/homatropine Syrup 5 milliLiter(s) Oral every 6 hours  influenza   Vaccine 0.5 milliLiter(s) IntraMuscular once  insulin lispro (ADMELOG) corrective regimen sliding scale   SubCutaneous three times a day before meals  insulin lispro (ADMELOG) corrective regimen sliding scale   SubCutaneous at bedtime  methylPREDNISolone sodium succinate Injectable 40 milliGRAM(s) IV Push every 12 hours  pantoprazole    Tablet 40 milliGRAM(s) Oral before breakfast  trimethoprim  160 mG/sulfamethoxazole 800 mG 2 Tablet(s) Oral every 12 hours  verapamil  milliGRAM(s) Oral daily    MEDICATIONS  (PRN):  acetaminophen     Tablet .. 650 milliGRAM(s) Oral every 6 hours PRN Temp greater or equal to 38C (100.4F), Mild Pain (1 - 3)  dextrose Oral Gel 15 Gram(s) Oral once PRN Blood Glucose LESS THAN 70 milliGRAM(s)/deciliter      CAPILLARY BLOOD GLUCOSE      POCT Blood Glucose.: 157 mg/dL (23 Mar 2024 21:54)  POCT Blood Glucose.: 178 mg/dL (23 Mar 2024 17:24)  POCT Blood Glucose.: 159 mg/dL (23 Mar 2024 12:04)  POCT Blood Glucose.: 167 mg/dL (23 Mar 2024 08:31)    I&O's Summary    23 Mar 2024 07:01  -  24 Mar 2024 07:00  --------------------------------------------------------  IN: 732 mL / OUT: 0 mL / NET: 732 mL        PHYSICAL EXAM:  Vital Signs Last 24 Hrs  T(C): 37.1 (24 Mar 2024 06:55), Max: 37.1 (23 Mar 2024 23:00)  T(F): 98.7 (24 Mar 2024 06:55), Max: 98.7 (23 Mar 2024 23:00)  HR: 100 (24 Mar 2024 06:55) (96 - 111)  BP: 137/85 (24 Mar 2024 06:55) (135/87 - 148/90)  BP(mean): --  RR: 18 (24 Mar 2024 06:55) (18 - 18)  SpO2: 96% (24 Mar 2024 06:55) (90% - 96%)    Parameters below as of 24 Mar 2024 06:55  Patient On (Oxygen Delivery Method): nasal cannula  O2 Flow (L/min): 4      CONSTITUTIONAL: NAD; well-developed  HEENT: PERRL, clear conjunctiva  RESPIRATORY: Normal respiratory effort; lungs are clear to auscultation bilaterally; No Crackles/Rhonchi/Wheezing  CARDIOVASCULAR: Regular rate and rhythm, normal S1 and S2, no murmur/rub/gallop; No lower extremity edema; Peripheral pulses are 2+ bilaterally  ABDOMEN: Nontender to palpation, normoactive bowel sounds, no rebound/guarding; No hepatosplenomegaly  MUSCULOSKELETAL: no clubbing or cyanosis of digits; no joint swelling or tenderness to palpation  EXTREMITY: Lower extremities Non-tender to palpation; non-erythematous B/L  NEURO: A&Ox3; no focal deficits   PSYCH: normal mood; Affect appropriate    LABS:                        9.8    18.64 )-----------( 525      ( 24 Mar 2024 05:45 )             31.8     03-24    138  |  101  |  13  ----------------------------<  158<H>  3.6   |  23  |  0.84    Ca    9.9      24 Mar 2024 05:45  Phos  4.0     03-24  Mg     2.10     03-24    TPro  6.5  /  Alb  3.2<L>  /  TBili  <0.2  /  DBili  x   /  AST  16  /  ALT  13  /  AlkPhos  64  03-24    PT/INR - ( 22 Mar 2024 11:19 )   PT: 12.4 sec;   INR: 1.11 ratio         PTT - ( 24 Mar 2024 05:45 )  PTT:57.0 sec      Urinalysis Basic - ( 24 Mar 2024 05:45 )    Color: x / Appearance: x / SG: x / pH: x  Gluc: 158 mg/dL / Ketone: x  / Bili: x / Urobili: x   Blood: x / Protein: x / Nitrite: x   Leuk Esterase: x / RBC: x / WBC x   Sq Epi: x / Non Sq Epi: x / Bacteria: x          RADIOLOGY & ADDITIONAL TESTS:    N/A PROGRESS NOTE:     Patient is a 64y old  Female who presents with a chief complaint of SOB, cough (23 Mar 2024 07:49)      SUBJECTIVE / OVERNIGHT EVENTS:    OVERNIGHT: No acute overnight events.      Patient was examined at bedside and continues to endorse SOB, cough. Denies fever, chills, chest pain, nausea, vomiting. ROS otherwise negative and pt is amenable to current treatment plan.      REVIEW OF SYSTEMS:    CONSTITUTIONAL:  No weakness, fevers, or chills  EYES/ENT:  No visual changes, vertigo, or throat pain   NECK:  No pain or stiffness  RESPIRATORY:  SOB, cough, wheezing, no hemoptysis  CARDIOVASCULAR:  No chest pain or palpitations  GASTROINTESTINAL:  No abdominal pain, nausea, vomiting, or hematemesis; No diarrhea or constipation. No melena or hematochezia.  GENITOURINARY:  No dysuria, change in frequency, or hematuria  NEUROLOGICAL:  No numbness or weakness  SKIN:  No itching or rashes      MEDICATIONS  (STANDING):  albuterol/ipratropium for Nebulization 3 milliLiter(s) Nebulizer every 6 hours  buDESOnide    Inhalation Suspension 0.25 milliGRAM(s) Inhalation every 12 hours  dextrose 5%. 1000 milliLiter(s) (50 mL/Hr) IV Continuous <Continuous>  dextrose 5%. 1000 milliLiter(s) (100 mL/Hr) IV Continuous <Continuous>  dextrose 50% Injectable 25 Gram(s) IV Push once  dextrose 50% Injectable 12.5 Gram(s) IV Push once  dextrose 50% Injectable 25 Gram(s) IV Push once  DULoxetine 60 milliGRAM(s) Oral daily  gabapentin 600 milliGRAM(s) Oral at bedtime  glucagon  Injectable 1 milliGRAM(s) IntraMuscular once  heparin  Infusion.  Unit(s)/Hr (18 mL/Hr) IV Continuous <Continuous>  hydrocodone/homatropine Syrup 5 milliLiter(s) Oral every 6 hours  influenza   Vaccine 0.5 milliLiter(s) IntraMuscular once  insulin lispro (ADMELOG) corrective regimen sliding scale   SubCutaneous three times a day before meals  insulin lispro (ADMELOG) corrective regimen sliding scale   SubCutaneous at bedtime  methylPREDNISolone sodium succinate Injectable 40 milliGRAM(s) IV Push every 12 hours  pantoprazole    Tablet 40 milliGRAM(s) Oral before breakfast  trimethoprim  160 mG/sulfamethoxazole 800 mG 2 Tablet(s) Oral every 12 hours  verapamil  milliGRAM(s) Oral daily    MEDICATIONS  (PRN):  acetaminophen     Tablet .. 650 milliGRAM(s) Oral every 6 hours PRN Temp greater or equal to 38C (100.4F), Mild Pain (1 - 3)  dextrose Oral Gel 15 Gram(s) Oral once PRN Blood Glucose LESS THAN 70 milliGRAM(s)/deciliter      CAPILLARY BLOOD GLUCOSE      POCT Blood Glucose.: 157 mg/dL (23 Mar 2024 21:54)  POCT Blood Glucose.: 178 mg/dL (23 Mar 2024 17:24)  POCT Blood Glucose.: 159 mg/dL (23 Mar 2024 12:04)  POCT Blood Glucose.: 167 mg/dL (23 Mar 2024 08:31)    I&O's Summary    23 Mar 2024 07:01  -  24 Mar 2024 07:00  --------------------------------------------------------  IN: 732 mL / OUT: 0 mL / NET: 732 mL        PHYSICAL EXAM:  Vital Signs Last 24 Hrs  T(C): 37.1 (24 Mar 2024 06:55), Max: 37.1 (23 Mar 2024 23:00)  T(F): 98.7 (24 Mar 2024 06:55), Max: 98.7 (23 Mar 2024 23:00)  HR: 100 (24 Mar 2024 06:55) (96 - 111)  BP: 137/85 (24 Mar 2024 06:55) (135/87 - 148/90)  BP(mean): --  RR: 18 (24 Mar 2024 06:55) (18 - 18)  SpO2: 96% (24 Mar 2024 06:55) (90% - 96%)    Parameters below as of 24 Mar 2024 06:55  Patient On (Oxygen Delivery Method): nasal cannula  O2 Flow (L/min): 4      CONSTITUTIONAL: NAD; well-developed  HEENT: PERRL, clear conjunctiva  RESPIRATORY: Normal respiratory effort; diffuse b/l wheezing appreciated  CARDIOVASCULAR: Regular rate and rhythm, normal S1 and S2, no murmur/rub/gallop; No lower extremity edema; Peripheral pulses are 2+ bilaterally  ABDOMEN: Nontender to palpation, normoactive bowel sounds, no rebound/guarding; No hepatosplenomegaly  MUSCULOSKELETAL: no clubbing or cyanosis of digits; no joint swelling or tenderness to palpation  EXTREMITY: Lower extremities Non-tender to palpation; non-erythematous B/L  NEURO: A&Ox3; no focal deficits   PSYCH: normal mood; Affect appropriate    LABS:                        9.8    18.64 )-----------( 525      ( 24 Mar 2024 05:45 )             31.8     03-24    138  |  101  |  13  ----------------------------<  158<H>  3.6   |  23  |  0.84    Ca    9.9      24 Mar 2024 05:45  Phos  4.0     03-24  Mg     2.10     03-24    TPro  6.5  /  Alb  3.2<L>  /  TBili  <0.2  /  DBili  x   /  AST  16  /  ALT  13  /  AlkPhos  64  03-24    PT/INR - ( 22 Mar 2024 11:19 )   PT: 12.4 sec;   INR: 1.11 ratio         PTT - ( 24 Mar 2024 05:45 )  PTT:57.0 sec      Urinalysis Basic - ( 24 Mar 2024 05:45 )    Color: x / Appearance: x / SG: x / pH: x  Gluc: 158 mg/dL / Ketone: x  / Bili: x / Urobili: x   Blood: x / Protein: x / Nitrite: x   Leuk Esterase: x / RBC: x / WBC x   Sq Epi: x / Non Sq Epi: x / Bacteria: x          RADIOLOGY & ADDITIONAL TESTS:    N/A

## 2024-03-24 NOTE — DISCHARGE NOTE PROVIDER - CARE PROVIDER_API CALL
Blanca Grossman.  Pulmonary Disease  [address], [address]  [place] [address]  Phone: [phone]  Fax: [phone]  Follow Up Time:     Malia Messina  Infectious Disease  [address] [place] [id number]  Phone: [phone]  Fax: [phone]  Follow Up Time:    Blanca Grossman.  Pulmonary Disease  [address], [address]  [place] [address]  Phone: [phone]  Fax: [phone]  Follow Up Time:     Malia Messina  Infectious Disease  [address] [place] [address]  Phone: [phone]  Fax: [phone]  Follow Up Time:     MINA DEL ROSARIO  [address] [place] [address]  Phone: [phone]  Fax: [phone]  Established Patient  Follow Up Time: 1-3 days

## 2024-03-24 NOTE — DISCHARGE NOTE PROVIDER - PROVIDER TOKENS
PROVIDER:[TOKEN:[[id number]]],PROVIDER:[TOKEN:[[id number]]] PROVIDER:[TOKEN:[[id number]]],PROVIDER:[TOKEN:[[id number]]],PROVIDER:[TOKEN:[[id number]],FOLLOWUP:[1-3 days],ESTABLISHEDPATIENT:[T]]

## 2024-03-24 NOTE — DISCHARGE NOTE PROVIDER - NSDCCPCAREPLAN_GEN_ALL_CORE_FT
PRINCIPAL DISCHARGE DIAGNOSIS  Diagnosis: AIDS  Assessment and Plan of Treatment: You were seen for fatigue and weakness and HIV pre-veloz and confirmatory results showed CD count of 14 and confirmed AIDS. ID saw you and recommended starting biktarvy. You were also treated with a course of antibiotics cefepime and azithromycin for prophylaxis. You finished bactrim on 4/11. You did require oxygen during this admission due to your coughing fits for which hycodan cough syrup was continued. ENT saw you and did a direct laryngoscopy and did not find an etiology for your cough.   Please follow up with:  Pulmonology  Infectious Disease      SECONDARY DISCHARGE DIAGNOSES  Diagnosis: AIDS with pneumocystosis  Assessment and Plan of Treatment: You were treated with Biktarvy for your HIV as wel as bactrim for your diagnosis of PJP.   Please follow with ID outpatient  Medications to start:       PRINCIPAL DISCHARGE DIAGNOSIS  Diagnosis: AIDS  Assessment and Plan of Treatment: You were seen for fatigue and weakness and HIV pre-veloz and confirmatory results showed CD count of 14 and confirmed AIDS. ID saw you and recommended starting biktarvy. You were also treated with a course of antibiotics cefepime and azithromycin for prophylaxis. You finished bactrim on 4/11. You did require oxygen during this admission due to your coughing fits for which hycodan cough syrup was continued. ENT saw you and did a direct laryngoscopy and did not find an etiology for your cough.   Please follow up with:  Pulmonology  Infectious Disease      SECONDARY DISCHARGE DIAGNOSES  Diagnosis: AIDS with pneumocystosis  Assessment and Plan of Treatment: You were treated with Biktarvy for your HIV as well as bactrim for your diagnosis of PJP, which is a type of pneumonia caused by a virus that can affect people with compromised immune systems. You were started on medication to fight the HIV virus and another medication to fight the PJP virus.   Please follow with ID outpatient  Medications to start:  Biktarvy   Bactrim until 4/11        PRINCIPAL DISCHARGE DIAGNOSIS  Diagnosis: AIDS  Assessment and Plan of Treatment: You were seen for fatigue and weakness and you were found to have HIV (human immunodeficiency virus). This is a virus that can affect the body's "immune system," which is responsible for fighting infections. When a person has untreated HIV, they can become sick easily because their immune system cannot work as well to fight off infections. People with HIV can take medicines to control the virus, keep their immune system strong, and stay healthy.   When HIV causes significant weakening of the immune system, this is known as AIDS. This weakening of the immune system can be improved with HIV medications, known as antiretrovirals. You were seen by our Infectious Diseases team and started on Biktarvy, an antiretroviral medication for the treatment of HIV/AIDS. This will help restore your body's normal immune cells and allow you to appropriately fight off infections. Because your immune system was weak when you came to the hospital, you had an infection called Pneumocystis Jirovecii (PJP, or PCP). This is an opportunistic infection that causes pneumonia (infection of the lungs) in people with compromised immune systems. You were treated with a course of antibiotics for this infection. Due to your pneumonia, you required oxygen this admission and will need to continue to use oxygen at home while you recover. You were seen by our ENT (ear, nose & throat) team here and they performed a direct laryngoscopy to look for any upper airway abnormalities that would cause you to have a cough, but they did not see any abnormalities on their exam. We expect your cough to continue to improve as you recover.   It is very important that you continue to take your Biktarvy every day as prescribed. HIV can get worse if you skip or stop taking your medicines. Tell your doctor if you have any side effects.   Please follow up with Infectious Diseases, Pulmonology, and your Primary Care Provider after discharge.      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 This is an organism that typically causes infections in people with weakened immune systems, due to conditions like HIV. You were treated with an antibacterial medication called Bactrim through 4/11 for this infection. You required supplemental oxygen this admission due to intermittent episodes of low oxygen levels, and you will have supplemental oxygen to use at home while you continue to recover from this pneumonia. Please be sure to follow up with the infectious disease doctors and pulmonologists outside the hospital for further management.   If you develop worsening cough, new fevers, or low oxygen levels at home that do not improve with your supplemental oxygen, return to the Emergency Department immediately.     PRINCIPAL DISCHARGE DIAGNOSIS  Diagnosis: AIDS  Assessment and Plan of Treatment: You were seen for fatigue and weakness and you were found to have HIV (human immunodeficiency virus). This is a virus that can affect the body's "immune system," which is responsible for fighting infections. When a person has untreated HIV, they can become sick easily because their immune system cannot work as well to fight off infections. People with HIV can take medicines to control the virus, keep their immune system strong, and stay healthy.   When HIV causes significant weakening of the immune system, this is known as AIDS. This weakening of the immune system can be improved with HIV medications, known as antiretrovirals. You were seen by our Infectious Diseases team and started on Biktarvy, an antiretroviral medication for the treatment of HIV/AIDS. This will help restore your body's normal immune cells and allow you to appropriately fight off infections. Because your immune system was weak when you came to the hospital, you had an infection called Pneumocystis Jirovecii (PJP, or PCP). This is an opportunistic infection that causes pneumonia (infection of the lungs) in people with compromised immune systems. You were treated with a course of antibiotics for this infection. Due to your pneumonia, you required oxygen this admission and will need to continue to use oxygen at home while you recover. You were seen by our ENT (ear, nose & throat) team here and they performed a direct laryngoscopy to look for any upper airway abnormalities that would cause you to have a cough, but they did not see any abnormalities on their exam. We expect your cough to continue to improve as you recover.   It is very important that you continue to take your Biktarvy every day as prescribed. HIV can get worse if you skip or stop taking your medicines. Tell your doctor if you have any side effects.   Please follow up with Infectious Diseases, Pulmonology, and your Primary Care Provider after discharge.      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 This is an organism that typically causes infections in people with weakened immune systems, due to conditions like HIV. You were treated with an antibacterial medication called Bactrim through 4/11 for this infection. You required supplemental oxygen this admission due to intermittent episodes of low oxygen levels, and you will have supplemental oxygen to use at home while you continue to recover from this pneumonia. Please be sure to follow up with the infectious disease doctors and pulmonologists outside the hospital for further management.   You were also treated with steroids during your hospitalization, and you will continue taking your prednisone (steroid) as below:  - take 10 mg (2 tablets of 5mg prednisone) each morning for 5 days (4/12-4/16)  - then take 5 mg (1 tablet) each morning for 5 more days (4/17-4/21)  - stop taking prednisone after 4/21  If you develop worsening cough, new fevers, or low oxygen levels at home that do not improve with your supplemental oxygen, return to the Emergency Department immediately.

## 2024-03-24 NOTE — DISCHARGE NOTE PROVIDER - NSFOLLOWUPCLINICS_GEN_ALL_ED_FT
[facility] Infectious Disease  HIV/AIDS Research & Treatment  [address] [place] [address]  Phone: [phone]  Fax:

## 2024-03-24 NOTE — PROGRESS NOTE ADULT - PROBLEM SELECTOR PLAN 1
- P/w SOB, persitent cough  - H/o asthma on symbicort, spiriva at home  - Recent admission for superimposed mycoplasma PNA on influenza, s/p azithromycin/doxycycline  - RVP unremarkable  - CXR -> Bibasilar airspace opacities  - CT Chest -> Multifocal groundglass and consolidative opacities involving all 5 lobes, most predominant in the b/l lower lobes and right upper lobe  - S/p CTX, azithromycin x1 in ED  - S/p empiric meropenem, doxycyline iso lack of clinical improvement, per ID  - Fungitell elevated  > F/u legionella, strep Ag, sputum cx, PJP cx  > C/w Bactrim-DS BID  > C/w IV solumedrol 40mg BID  > C/w duonebs q6, inhaled budesonide BID, Robitussin-DM q4 prn, and hycodan PRN  > Pulmonology consulted, will continue to appreciate recs, bronchoscopy planned for 3/25  > ID consulted, will continue to appreciate recs - P/w SOB, persitent cough  - H/o asthma on symbicort, spiriva at home  - Recent admission for superimposed mycoplasma PNA on influenza, s/p azithromycin/doxycycline  - RVP unremarkable  - CXR -> Bibasilar airspace opacities  - CT Chest -> Multifocal groundglass and consolidative opacities involving all 5 lobes, most predominant in the b/l lower lobes and right upper lobe  - S/p CTX, azithromycin x1 in ED  - S/p empiric meropenem, doxycyline iso lack of clinical improvement, per ID  - Fungitell elevated  > F/u sputum cx, PJP cx  > C/w Bactrim-DS BID  > C/w IV solumedrol 40mg BID  > C/w duonebs q6, inhaled budesonide BID, Robitussin-DM q4 prn, and hycodan PRN  > Started HFNC, will wean prn  > Pulmonology consulted, will continue to appreciate recs, bronchoscopy planned for 3/25  > ID consulted, will continue to appreciate recs

## 2024-03-24 NOTE — DISCHARGE NOTE PROVIDER - NSDCMRMEDTOKEN_GEN_ALL_CORE_FT
albuterol 2.5 mg/3 mL (0.083%) inhalation solution: 3 milliliter(s) by nebulizer every 6 hours as needed for  shortness of breath and/or wheezing  ALPRAZolam 0.5 mg oral tablet: 1 tab(s) orally once a day as needed for  anxiety  aspirin 81 mg oral delayed release tablet: 1 tab(s) orally once a day  budesonide-formoterol 160 mcg-4.5 mcg/inh inhalation aerosol: 2 puff(s) inhaled 2 times a day  cholecalciferol 1250 mcg (50,000 intl units) oral capsule: 1 cap(s) orally once a week  cyclobenzaprine 10 mg oral tablet: 1 tab(s) orally once a day (at bedtime) (taken every night)  DULoxetine 60 mg oral delayed release capsule: 1 cap(s) orally once a day (at bedtime)  dupilumab 200 mg/1.14 mL subcutaneous solution: 1.14 milliliter(s) subcutaneously (last dose unknown)  gabapentin 300 mg oral capsule: 2 cap(s) orally once a day (at bedtime) (for seizures; this is the only anti-epileptic agent patient is on)  nebivolol 10 mg oral tablet: 1 tab(s) orally once a day (in the evening)  oxycodone-acetaminophen 5 mg-325 mg oral tablet: 1 tab(s) orally once a day as needed for  severe pain (about 2 to 3 times a week as needed)  rivaroxaban 20 mg oral tablet: 1 tab(s) orally once a day  semaglutide 0.25 mg/0.5 mL (0.25 mg dose) subcutaneous solution: 0.5 milligram(s) subcutaneously once a week  tiotropium 1.25 mcg/inh inhalation aerosol: 2 puff(s) inhaled once a day  verapamil 300 mg/24 hours oral capsule, extended release: 1 cap(s) orally once a day   albuterol 2.5 mg/3 mL (0.083%) inhalation solution: 3 milliliter(s) by nebulizer every 6 hours as needed for  shortness of breath and/or wheezing  ALPRAZolam 0.5 mg oral tablet: 1 tab(s) orally once a day as needed for  anxiety  aspirin 81 mg oral delayed release tablet: 1 tab(s) orally once a day  budesonide-formoterol 160 mcg-4.5 mcg/inh inhalation aerosol: 2 puff(s) inhaled 2 times a day  cholecalciferol 1250 mcg (50,000 intl units) oral capsule: 1 cap(s) orally once a week  cyclobenzaprine 10 mg oral tablet: 1 tab(s) orally once a day (at bedtime) (taken every night)  DULoxetine 60 mg oral delayed release capsule: 1 cap(s) orally once a day (at bedtime)  dupilumab 200 mg/1.14 mL subcutaneous solution: 1.14 milliliter(s) subcutaneously (last dose unknown)  gabapentin 300 mg oral capsule: 2 cap(s) orally once a day (at bedtime) (for seizures; this is the only anti-epileptic agent patient is on)  Home Oxygen Portable and Concentrator, 4L with exertion POC setting 4.. STACEY=99 Dx B59: 4L on exertion  nebivolol 10 mg oral tablet: 1 tab(s) orally once a day (in the evening)  oxycodone-acetaminophen 5 mg-325 mg oral tablet: 1 tab(s) orally once a day as needed for  severe pain (about 2 to 3 times a week as needed)  rivaroxaban 20 mg oral tablet: 1 tab(s) orally once a day  semaglutide 0.25 mg/0.5 mL (0.25 mg dose) subcutaneous solution: 0.5 milligram(s) subcutaneously once a week  tiotropium 1.25 mcg/inh inhalation aerosol: 2 puff(s) inhaled once a day  verapamil 300 mg/24 hours oral capsule, extended release: 1 cap(s) orally once a day   Albuterol (Eqv-Proventil HFA) 90 mcg/inh inhalation aerosol: 2 puff(s) inhaled every 6 hours as needed for  shortness of breath and/or wheezing  aspirin 81 mg oral delayed release tablet: 1 tab(s) orally once a day  bictegravir/emtricitabine/tenofovir 50 mg-200 mg-25 mg oral tablet: 1 tab(s) orally once a day  budesonide-formoterol 160 mcg-4.5 mcg/inh inhalation aerosol: 2 puff(s) inhaled 2 times a day  cholecalciferol 1250 mcg (50,000 intl units) oral capsule: 1 cap(s) orally once a week  cyclobenzaprine 10 mg oral tablet: 1 tab(s) orally once a day (at bedtime) (taken every night)  DULoxetine 60 mg oral delayed release capsule: 1 cap(s) orally once a day (at bedtime)  dupilumab 200 mg/1.14 mL subcutaneous solution: 1.14 milliliter(s) subcutaneously (last dose unknown)  gabapentin 300 mg oral capsule: 2 cap(s) orally once a day (at bedtime) (for seizures; this is the only anti-epileptic agent patient is on)  Home Oxygen Portable and Concentrator, 4L with exertion POC setting 4.. STACEY=99 Dx B59: 4L on exertion  lactulose 10 g/15 mL oral syrup: 30 milliliter(s) orally once a day as needed for constipation  naloxone 4 mg/0.1 mL nasal spray: 1 spray(s) intranasally once as needed for lethargy, decreased responsiveness after taking opioid medications  oxycodone-acetaminophen 5 mg-325 mg oral tablet: 1 tab(s) orally once a day as needed for  severe pain (about 2 to 3 times a week as needed)  pantoprazole 40 mg oral delayed release tablet: 1 tab(s) orally once a day (before a meal)  predniSONE 5 mg oral tablet: 2 tab(s) orally once a day take 2 tabs by mouth each morning for 5 days (4/12-4/16), THEN take 1 tab by mouth each morning for 5 days (4/17-4/21), then stop  rivaroxaban 20 mg oral tablet: 1 tab(s) orally once a day  semaglutide 0.25 mg/0.5 mL (0.25 mg dose) subcutaneous solution: 0.5 milligram(s) subcutaneously once a week  tiotropium 1.25 mcg/inh inhalation aerosol: 2 puff(s) inhaled once a day  verapamil 300 mg/24 hours oral capsule, extended release: 1 cap(s) orally once a day

## 2024-03-25 LAB
4/8 RATIO: 0.02 RATIO — LOW (ref 0.9–3.6)
ABS CD8: 559 CELLS/UL — SIGNIFICANT CHANGE UP (ref 142–740)
ALBUMIN SERPL ELPH-MCNC: 3.4 G/DL — SIGNIFICANT CHANGE UP (ref 3.3–5)
ALP SERPL-CCNC: 70 U/L — SIGNIFICANT CHANGE UP (ref 40–120)
ALT FLD-CCNC: 16 U/L — SIGNIFICANT CHANGE UP (ref 4–33)
ANION GAP SERPL CALC-SCNC: 19 MMOL/L — HIGH (ref 7–14)
APTT BLD: 103.1 SEC — HIGH (ref 24.5–35.6)
APTT BLD: 26.2 SEC — SIGNIFICANT CHANGE UP (ref 24.5–35.6)
AST SERPL-CCNC: 16 U/L — SIGNIFICANT CHANGE UP (ref 4–32)
BILIRUB SERPL-MCNC: 0.2 MG/DL — SIGNIFICANT CHANGE UP (ref 0.2–1.2)
BLD GP AB SCN SERPL QL: NEGATIVE — SIGNIFICANT CHANGE UP
BUN SERPL-MCNC: 13 MG/DL — SIGNIFICANT CHANGE UP (ref 7–23)
CALCIUM SERPL-MCNC: 10 MG/DL — SIGNIFICANT CHANGE UP (ref 8.4–10.5)
CD16+CD56+ CELLS NFR BLD: 13 % — SIGNIFICANT CHANGE UP (ref 5–23)
CD16+CD56+ CELLS NFR SPEC: 131 CELLS/UL — SIGNIFICANT CHANGE UP (ref 71–410)
CD19 BLASTS SPEC-ACNC: 226 CELLS/UL — SIGNIFICANT CHANGE UP (ref 84–469)
CD19 BLASTS SPEC-ACNC: 23 % — SIGNIFICANT CHANGE UP (ref 6–24)
CD3 BLASTS SPEC-ACNC: 583 CELLS/UL — LOW (ref 672–1870)
CD3 BLASTS SPEC-ACNC: 61 % — SIGNIFICANT CHANGE UP (ref 59–83)
CD4 %: 1 % — LOW (ref 30–62)
CD8 %: 59 % — HIGH (ref 12–36)
CHLORIDE SERPL-SCNC: 97 MMOL/L — LOW (ref 98–107)
CO2 SERPL-SCNC: 23 MMOL/L — SIGNIFICANT CHANGE UP (ref 22–31)
CREAT SERPL-MCNC: 0.86 MG/DL — SIGNIFICANT CHANGE UP (ref 0.5–1.3)
CULTURE RESULTS: SIGNIFICANT CHANGE UP
CULTURE RESULTS: SIGNIFICANT CHANGE UP
EGFR: 75 ML/MIN/1.73M2 — SIGNIFICANT CHANGE UP
GAMMA INTERFERON BACKGROUND BLD IA-ACNC: 0.04 IU/ML — SIGNIFICANT CHANGE UP
GLUCOSE BLDC GLUCOMTR-MCNC: 148 MG/DL — HIGH (ref 70–99)
GLUCOSE BLDC GLUCOMTR-MCNC: 150 MG/DL — HIGH (ref 70–99)
GLUCOSE BLDC GLUCOMTR-MCNC: 152 MG/DL — HIGH (ref 70–99)
GLUCOSE BLDC GLUCOMTR-MCNC: 232 MG/DL — HIGH (ref 70–99)
GLUCOSE SERPL-MCNC: 108 MG/DL — HIGH (ref 70–99)
HCT VFR BLD CALC: 29 % — LOW (ref 34.5–45)
HCT VFR BLD CALC: 35 % — SIGNIFICANT CHANGE UP (ref 34.5–45)
HGB BLD-MCNC: 11 G/DL — LOW (ref 11.5–15.5)
HGB BLD-MCNC: 9.5 G/DL — LOW (ref 11.5–15.5)
HIV-1 VIRAL LOAD RESULT: ABNORMAL
HIV1 RNA # SERPL NAA+PROBE: SIGNIFICANT CHANGE UP
HIV1 RNA SER-IMP: SIGNIFICANT CHANGE UP
HIV1 RNA SERPL NAA+PROBE-ACNC: ABNORMAL
HIV1 RNA SERPL NAA+PROBE-LOG#: 4.86 — SIGNIFICANT CHANGE UP
HIV1+2 AB SPEC QL: ABNORMAL
HIV1+2 AB SPEC QL: SIGNIFICANT CHANGE UP
INR BLD: 1.02 RATIO — SIGNIFICANT CHANGE UP (ref 0.85–1.18)
M TB IFN-G BLD-IMP: ABNORMAL
M TB IFN-G CD4+ BCKGRND COR BLD-ACNC: 0 IU/ML — SIGNIFICANT CHANGE UP
M TB IFN-G CD4+CD8+ BCKGRND COR BLD-ACNC: 0 IU/ML — SIGNIFICANT CHANGE UP
MAGNESIUM SERPL-MCNC: 2.3 MG/DL — SIGNIFICANT CHANGE UP (ref 1.6–2.6)
MCHC RBC-ENTMCNC: 26.6 PG — LOW (ref 27–34)
MCHC RBC-ENTMCNC: 27 PG — SIGNIFICANT CHANGE UP (ref 27–34)
MCHC RBC-ENTMCNC: 31.4 GM/DL — LOW (ref 32–36)
MCHC RBC-ENTMCNC: 32.8 GM/DL — SIGNIFICANT CHANGE UP (ref 32–36)
MCV RBC AUTO: 82.4 FL — SIGNIFICANT CHANGE UP (ref 80–100)
MCV RBC AUTO: 84.5 FL — SIGNIFICANT CHANGE UP (ref 80–100)
NRBC # BLD: 0 /100 WBCS — SIGNIFICANT CHANGE UP (ref 0–0)
NRBC # BLD: 0 /100 WBCS — SIGNIFICANT CHANGE UP (ref 0–0)
NRBC # FLD: 0.04 K/UL — HIGH (ref 0–0)
NRBC # FLD: 0.09 K/UL — HIGH (ref 0–0)
PHOSPHATE SERPL-MCNC: 3.8 MG/DL — SIGNIFICANT CHANGE UP (ref 2.5–4.5)
PLATELET # BLD AUTO: 459 K/UL — HIGH (ref 150–400)
PLATELET # BLD AUTO: 558 K/UL — HIGH (ref 150–400)
POTASSIUM SERPL-MCNC: 4.2 MMOL/L — SIGNIFICANT CHANGE UP (ref 3.5–5.3)
POTASSIUM SERPL-SCNC: 4.2 MMOL/L — SIGNIFICANT CHANGE UP (ref 3.5–5.3)
PROT SERPL-MCNC: 7.1 G/DL — SIGNIFICANT CHANGE UP (ref 6–8.3)
PROTHROM AB SERPL-ACNC: 11.5 SEC — SIGNIFICANT CHANGE UP (ref 9.5–13)
QUANT TB PLUS MITOGEN MINUS NIL: 0.23 IU/ML — SIGNIFICANT CHANGE UP
RBC # BLD: 3.52 M/UL — LOW (ref 3.8–5.2)
RBC # BLD: 4.14 M/UL — SIGNIFICANT CHANGE UP (ref 3.8–5.2)
RBC # FLD: 18.6 % — HIGH (ref 10.3–14.5)
RBC # FLD: 19.3 % — HIGH (ref 10.3–14.5)
RH IG SCN BLD-IMP: POSITIVE — SIGNIFICANT CHANGE UP
SODIUM SERPL-SCNC: 139 MMOL/L — SIGNIFICANT CHANGE UP (ref 135–145)
SPECIMEN SOURCE: SIGNIFICANT CHANGE UP
SPECIMEN SOURCE: SIGNIFICANT CHANGE UP
T-CELL CD4 SUBSET PNL BLD: 14 CELLS/UL — LOW (ref 489–1457)
WBC # BLD: 19 K/UL — HIGH (ref 3.8–10.5)
WBC # BLD: 19.15 K/UL — HIGH (ref 3.8–10.5)
WBC # FLD AUTO: 19 K/UL — HIGH (ref 3.8–10.5)
WBC # FLD AUTO: 19.15 K/UL — HIGH (ref 3.8–10.5)

## 2024-03-25 PROCEDURE — 99233 SBSQ HOSP IP/OBS HIGH 50: CPT | Mod: GC

## 2024-03-25 PROCEDURE — 99232 SBSQ HOSP IP/OBS MODERATE 35: CPT

## 2024-03-25 PROCEDURE — 99233 SBSQ HOSP IP/OBS HIGH 50: CPT

## 2024-03-25 RX ORDER — SENNA PLUS 8.6 MG/1
2 TABLET ORAL AT BEDTIME
Refills: 0 | Status: DISCONTINUED | OUTPATIENT
Start: 2024-03-25 | End: 2024-04-11

## 2024-03-25 RX ORDER — SODIUM CHLORIDE 9 MG/ML
4 INJECTION INTRAMUSCULAR; INTRAVENOUS; SUBCUTANEOUS EVERY 12 HOURS
Refills: 0 | Status: DISCONTINUED | OUTPATIENT
Start: 2024-03-25 | End: 2024-04-07

## 2024-03-25 RX ORDER — HEPARIN SODIUM 5000 [USP'U]/ML
INJECTION INTRAVENOUS; SUBCUTANEOUS
Qty: 25000 | Refills: 0 | Status: DISCONTINUED | OUTPATIENT
Start: 2024-03-25 | End: 2024-03-26

## 2024-03-25 RX ORDER — HEPARIN SODIUM 5000 [USP'U]/ML
INJECTION INTRAVENOUS; SUBCUTANEOUS
Qty: 25000 | Refills: 0 | Status: DISCONTINUED | OUTPATIENT
Start: 2024-03-25 | End: 2024-03-25

## 2024-03-25 RX ORDER — IPRATROPIUM/ALBUTEROL SULFATE 18-103MCG
3 AEROSOL WITH ADAPTER (GRAM) INHALATION EVERY 8 HOURS
Refills: 0 | Status: DISCONTINUED | OUTPATIENT
Start: 2024-03-25 | End: 2024-03-30

## 2024-03-25 RX ORDER — POLYETHYLENE GLYCOL 3350 17 G/17G
17 POWDER, FOR SOLUTION ORAL DAILY
Refills: 0 | Status: DISCONTINUED | OUTPATIENT
Start: 2024-03-25 | End: 2024-03-25

## 2024-03-25 RX ADMIN — HEPARIN SODIUM 1800 UNIT(S)/HR: 5000 INJECTION INTRAVENOUS; SUBCUTANEOUS at 19:18

## 2024-03-25 RX ADMIN — Medication 40 MILLIGRAM(S): at 18:05

## 2024-03-25 RX ADMIN — Medication 0.25 MILLIGRAM(S): at 09:30

## 2024-03-25 RX ADMIN — DULOXETINE HYDROCHLORIDE 60 MILLIGRAM(S): 30 CAPSULE, DELAYED RELEASE ORAL at 12:01

## 2024-03-25 RX ADMIN — Medication 3 MILLILITER(S): at 15:12

## 2024-03-25 RX ADMIN — Medication 120 MILLIGRAM(S): at 05:17

## 2024-03-25 RX ADMIN — Medication 2: at 12:59

## 2024-03-25 RX ADMIN — GABAPENTIN 600 MILLIGRAM(S): 400 CAPSULE ORAL at 22:43

## 2024-03-25 RX ADMIN — Medication 3 MILLILITER(S): at 09:30

## 2024-03-25 RX ADMIN — Medication 2 TABLET(S): at 22:47

## 2024-03-25 RX ADMIN — Medication 3 MILLILITER(S): at 04:28

## 2024-03-25 RX ADMIN — PANTOPRAZOLE SODIUM 40 MILLIGRAM(S): 20 TABLET, DELAYED RELEASE ORAL at 05:17

## 2024-03-25 RX ADMIN — Medication 40 MILLIGRAM(S): at 05:16

## 2024-03-25 RX ADMIN — HEPARIN SODIUM 1800 UNIT(S)/HR: 5000 INJECTION INTRAVENOUS; SUBCUTANEOUS at 12:03

## 2024-03-25 RX ADMIN — Medication 2 TABLET(S): at 05:18

## 2024-03-25 RX ADMIN — HEPARIN SODIUM 1800 UNIT(S)/HR: 5000 INJECTION INTRAVENOUS; SUBCUTANEOUS at 19:16

## 2024-03-25 NOTE — PROGRESS NOTE ADULT - ATTENDING COMMENTS
64-year-old female w/ PMH of lupus, PE on Xarelto (dx 2019), asthma, HTN, T2DM, and GBS (dx 1997) presenting with 3wk h/o of AHRF.  Pt now w/ confirmed AIDS and very low cd4 count.   Pt appears comfortable in bed but has worsening hypoxemia since Friday and now HFNC 60% fio2.     Given new dx of AIDS and w/ very high fungitell and CT chest showing at least some areas of GGO the likelihood of pjp seems high. The originally planned bronch better likely deferred as her  resp status has worsened and she has higher risk of respiratory complications.     - pjp tx w/ bactrim   - cont steroids for pjp  - ART tx as per ID   - RLL changes more consolidative and may benefit from full course of bacterial coverage for pna  - on a/c for PE hx

## 2024-03-25 NOTE — PROGRESS NOTE ADULT - SUBJECTIVE AND OBJECTIVE BOX
PATIENT:  ROSSI ROJAS  [id number]    CHIEF COMPLAINT:  Patient is a 64y old  Female who presents with a chief complaint of SOB, cough (25 Mar 2024 07:13)      INTERVAL HISTORY/OVERNIGHT EVENTS:  - no acute events    MEDICATIONS:  MEDICATIONS  (STANDING):  albuterol/ipratropium for Nebulization 3 milliLiter(s) Nebulizer every 6 hours  buDESOnide    Inhalation Suspension 0.25 milliGRAM(s) Inhalation every 12 hours  dextrose 5%. 1000 milliLiter(s) (50 mL/Hr) IV Continuous <Continuous>  dextrose 5%. 1000 milliLiter(s) (100 mL/Hr) IV Continuous <Continuous>  dextrose 50% Injectable 25 Gram(s) IV Push once  dextrose 50% Injectable 12.5 Gram(s) IV Push once  dextrose 50% Injectable 25 Gram(s) IV Push once  DULoxetine 60 milliGRAM(s) Oral daily  gabapentin 600 milliGRAM(s) Oral at bedtime  glucagon  Injectable 1 milliGRAM(s) IntraMuscular once  heparin  Infusion.  Unit(s)/Hr (18 mL/Hr) IV Continuous <Continuous>  hydrocodone/homatropine Syrup 5 milliLiter(s) Oral every 6 hours  influenza   Vaccine 0.5 milliLiter(s) IntraMuscular once  insulin lispro (ADMELOG) corrective regimen sliding scale   SubCutaneous three times a day before meals  insulin lispro (ADMELOG) corrective regimen sliding scale   SubCutaneous at bedtime  methylPREDNISolone sodium succinate Injectable 40 milliGRAM(s) IV Push every 12 hours  pantoprazole    Tablet 40 milliGRAM(s) Oral before breakfast  senna 2 Tablet(s) Oral at bedtime  trimethoprim  160 mG/sulfamethoxazole 800 mG 2 Tablet(s) Oral every 12 hours  verapamil  milliGRAM(s) Oral daily    MEDICATIONS  (PRN):  acetaminophen     Tablet .. 650 milliGRAM(s) Oral every 6 hours PRN Temp greater or equal to 38C (100.4F), Mild Pain (1 - 3)  dextrose Oral Gel 15 Gram(s) Oral once PRN Blood Glucose LESS THAN 70 milliGRAM(s)/deciliter      ALLERGIES:  Allergies    No Known Allergies    Intolerances    dislikes Glucerna Shake (Other)      OBJECTIVE:  ICU Vital Signs Last 24 Hrs  T(C): 36.5 (25 Mar 2024 05:36), Max: 36.6 (24 Mar 2024 21:00)  T(F): 97.7 (25 Mar 2024 05:36), Max: 97.9 (24 Mar 2024 21:00)  HR: 101 (25 Mar 2024 09:32) (101 - 112)  BP: 139/81 (25 Mar 2024 05:36) (122/83 - 139/81)  BP(mean): --  ABP: --  ABP(mean): --  RR: 18 (25 Mar 2024 06:55) (18 - 18)  SpO2: 97% (25 Mar 2024 09:32) (95% - 100%)    O2 Parameters below as of 25 Mar 2024 09:32  Patient On (Oxygen Delivery Method): nasal cannula, high flow            Adult Advanced Hemodynamics Last 24 Hrs  CVP(mm Hg): --  CVP(cm H2O): --  CO: --  CI: --  PA: --  PA(mean): --  PCWP: --  SVR: --  SVRI: --  PVR: --  PVRI: --  CAPILLARY BLOOD GLUCOSE      POCT Blood Glucose.: 232 mg/dL (25 Mar 2024 12:52)  POCT Blood Glucose.: 150 mg/dL (25 Mar 2024 08:35)  POCT Blood Glucose.: 197 mg/dL (24 Mar 2024 22:02)  POCT Blood Glucose.: 127 mg/dL (24 Mar 2024 17:13)    CAPILLARY BLOOD GLUCOSE      POCT Blood Glucose.: 232 mg/dL (25 Mar 2024 12:52)    I&O's Summary    24 Mar 2024 07:01  -  25 Mar 2024 07:00  --------------------------------------------------------  IN: 155 mL / OUT: 0 mL / NET: 155 mL      Daily     Daily     PHYSICAL EXAMINATION:  General: WN/WD NAD  HEENT: PERRLA, EOMI, moist mucous membranes  Neurology: A&Ox3, nonfocal, JIANG x 4  Respiratory: CTA B/L, normal respiratory effort, no wheezes, crackles, rales  CV: RRR, S1S2, no murmurs, rubs or gallops  Abdominal: Soft, NT, ND +BS, Last BM  Extremities: No edema, + peripheral pulses  Incisions:   Tubes:    LABS:                          11.0   19.15 )-----------( 558      ( 25 Mar 2024 05:56 )             35.0     03-25    139  |  97<L>  |  13  ----------------------------<  108<H>  4.2   |  23  |  0.86    Ca    10.0      25 Mar 2024 05:56  Phos  3.8     03-25  Mg     2.30     03-25    TPro  7.1  /  Alb  3.4  /  TBili  0.2  /  DBili  x   /  AST  16  /  ALT  16  /  AlkPhos  70  03-25    LIVER FUNCTIONS - ( 25 Mar 2024 05:56 )  Alb: 3.4 g/dL / Pro: 7.1 g/dL / ALK PHOS: 70 U/L / ALT: 16 U/L / AST: 16 U/L / GGT: x           PT/INR - ( 25 Mar 2024 05:56 )   PT: 11.5 sec;   INR: 1.02 ratio         PTT - ( 25 Mar 2024 05:56 )  PTT:26.2 sec        Urinalysis Basic - ( 25 Mar 2024 05:56 )    Color: x / Appearance: x / SG: x / pH: x  Gluc: 108 mg/dL / Ketone: x  / Bili: x / Urobili: x   Blood: x / Protein: x / Nitrite: x   Leuk Esterase: x / RBC: x / WBC x   Sq Epi: x / Non Sq Epi: x / Bacteria: x        TELEMETRY:     EKG:     IMAGING:       PATIENT:  ROSSI ROJAS  [id number]    CHIEF COMPLAINT:  Patient is a 64y old  Female who presents with a chief complaint of SOB, cough (25 Mar 2024 07:13)      INTERVAL HISTORY/OVERNIGHT EVENTS:  - no acute events    MEDICATIONS:  MEDICATIONS  (STANDING):  albuterol/ipratropium for Nebulization 3 milliLiter(s) Nebulizer every 6 hours  buDESOnide    Inhalation Suspension 0.25 milliGRAM(s) Inhalation every 12 hours  dextrose 5%. 1000 milliLiter(s) (50 mL/Hr) IV Continuous <Continuous>  dextrose 5%. 1000 milliLiter(s) (100 mL/Hr) IV Continuous <Continuous>  dextrose 50% Injectable 25 Gram(s) IV Push once  dextrose 50% Injectable 12.5 Gram(s) IV Push once  dextrose 50% Injectable 25 Gram(s) IV Push once  DULoxetine 60 milliGRAM(s) Oral daily  gabapentin 600 milliGRAM(s) Oral at bedtime  glucagon  Injectable 1 milliGRAM(s) IntraMuscular once  heparin  Infusion.  Unit(s)/Hr (18 mL/Hr) IV Continuous <Continuous>  hydrocodone/homatropine Syrup 5 milliLiter(s) Oral every 6 hours  influenza   Vaccine 0.5 milliLiter(s) IntraMuscular once  insulin lispro (ADMELOG) corrective regimen sliding scale   SubCutaneous three times a day before meals  insulin lispro (ADMELOG) corrective regimen sliding scale   SubCutaneous at bedtime  methylPREDNISolone sodium succinate Injectable 40 milliGRAM(s) IV Push every 12 hours  pantoprazole    Tablet 40 milliGRAM(s) Oral before breakfast  senna 2 Tablet(s) Oral at bedtime  trimethoprim  160 mG/sulfamethoxazole 800 mG 2 Tablet(s) Oral every 12 hours  verapamil  milliGRAM(s) Oral daily    MEDICATIONS  (PRN):  acetaminophen     Tablet .. 650 milliGRAM(s) Oral every 6 hours PRN Temp greater or equal to 38C (100.4F), Mild Pain (1 - 3)  dextrose Oral Gel 15 Gram(s) Oral once PRN Blood Glucose LESS THAN 70 milliGRAM(s)/deciliter      ALLERGIES:  Allergies    No Known Allergies    Intolerances    dislikes Glucerna Shake (Other)      OBJECTIVE:  ICU Vital Signs Last 24 Hrs  T(C): 36.5 (25 Mar 2024 05:36), Max: 36.6 (24 Mar 2024 21:00)  T(F): 97.7 (25 Mar 2024 05:36), Max: 97.9 (24 Mar 2024 21:00)  HR: 101 (25 Mar 2024 09:32) (101 - 112)  BP: 139/81 (25 Mar 2024 05:36) (122/83 - 139/81)  RR: 18 (25 Mar 2024 06:55) (18 - 18)  SpO2: 97% (25 Mar 2024 09:32) (95% - 100%)    O2 Parameters below as of 25 Mar 2024 09:32  Patient On (Oxygen Delivery Method): nasal cannula, high flow      POCT Blood Glucose.: 232 mg/dL (25 Mar 2024 12:52)  POCT Blood Glucose.: 150 mg/dL (25 Mar 2024 08:35)  POCT Blood Glucose.: 197 mg/dL (24 Mar 2024 22:02)  POCT Blood Glucose.: 127 mg/dL (24 Mar 2024 17:13)  POCT Blood Glucose.: 232 mg/dL (25 Mar 2024 12:52)    I&O's Summary    24 Mar 2024 07:01  -  25 Mar 2024 07:00  --------------------------------------------------------  IN: 155 mL / OUT: 0 mL / NET: 155 mL    PHYSICAL EXAMINATION:  General: WN/WD NAD  HEENT: EOMI, moist mucous membranes  Neurology: A&Ox3, nonfocal, JIANG x 4  Respiratory: HFNC 40L/60%, crackles, diminishment  CV: RRR, S1S2, no murmurs, rubs or gallops  Abdominal: Soft, NT, ND +BS, Last BM  Extremities: No edema, + peripheral pulses    LABS:                          11.0   19.15 )-----------( 558      ( 25 Mar 2024 05:56 )             35.0     03-25    139  |  97<L>  |  13  ----------------------------<  108<H>  4.2   |  23  |  0.86    Ca    10.0      25 Mar 2024 05:56  Phos  3.8     03-25  Mg     2.30     03-25    TPro  7.1  /  Alb  3.4  /  TBili  0.2  /  DBili  x   /  AST  16  /  ALT  16  /  AlkPhos  70  03-25    LIVER FUNCTIONS - ( 25 Mar 2024 05:56 )  Alb: 3.4 g/dL / Pro: 7.1 g/dL / ALK PHOS: 70 U/L / ALT: 16 U/L / AST: 16 U/L / GGT: x           PT/INR - ( 25 Mar 2024 05:56 )   PT: 11.5 sec;   INR: 1.02 ratio         PTT - ( 25 Mar 2024 05:56 )  PTT:26.2 sec        Urinalysis Basic - ( 25 Mar 2024 05:56 )    Color: x / Appearance: x / SG: x / pH: x  Gluc: 108 mg/dL / Ketone: x  / Bili: x / Urobili: x   Blood: x / Protein: x / Nitrite: x   Leuk Esterase: x / RBC: x / WBC x   Sq Epi: x / Non Sq Epi: x / Bacteria: x        TELEMETRY:     EKG:     IMAGING:

## 2024-03-25 NOTE — PROGRESS NOTE ADULT - SUBJECTIVE AND OBJECTIVE BOX
Follow Up:      Inverval History/ROS:Patient is a 64y old  Female who presents with a chief complaint of SOB, cough (24 Mar 2024 08:26)    SOB a bit better     + confirmatory HIV test.   informed patient.       Allergies    No Known Allergies    Intolerances    dislikes Glucerna Shake (Other)      ANTIMICROBIALS:        OTHER MEDS:  acetaminophen     Tablet .. 650 milliGRAM(s) Oral every 6 hours PRN  albuterol/ipratropium for Nebulization 3 milliLiter(s) Nebulizer every 6 hours  buDESOnide    Inhalation Suspension 0.25 milliGRAM(s) Inhalation every 12 hours  dextrose 5%. 1000 milliLiter(s) IV Continuous <Continuous>  dextrose 5%. 1000 milliLiter(s) IV Continuous <Continuous>  dextrose 50% Injectable 25 Gram(s) IV Push once  dextrose 50% Injectable 12.5 Gram(s) IV Push once  dextrose 50% Injectable 25 Gram(s) IV Push once  dextrose Oral Gel 15 Gram(s) Oral once PRN  DULoxetine 60 milliGRAM(s) Oral daily  gabapentin 600 milliGRAM(s) Oral at bedtime  glucagon  Injectable 1 milliGRAM(s) IntraMuscular once  heparin  Infusion.  Unit(s)/Hr IV Continuous <Continuous>  hydrocodone/homatropine Syrup 5 milliLiter(s) Oral every 6 hours  influenza   Vaccine 0.5 milliLiter(s) IntraMuscular once  insulin lispro (ADMELOG) corrective regimen sliding scale   SubCutaneous three times a day before meals  insulin lispro (ADMELOG) corrective regimen sliding scale   SubCutaneous at bedtime  methylPREDNISolone sodium succinate Injectable 40 milliGRAM(s) IV Push every 12 hours  pantoprazole    Tablet 40 milliGRAM(s) Oral before breakfast  verapamil  milliGRAM(s) Oral daily    Vital Signs Last 24 Hrs  T(F): 97.7 (03-25-24 @ 05:36), Max: 97.9 (03-24-24 @ 21:00)  HR: 101 (03-25-24 @ 09:32)  BP: 139/81 (03-25-24 @ 05:36)  RR: 18 (03-25-24 @ 06:55)  SpO2: 97% (03-25-24 @ 09:32) (95% - 100%)      PHYSICAL EXAM:  General: [x ] non-toxic.  HFNC  HEAD/EYES: [ ] PERRL [x ] white sclera [ ] icterus  ENT:  [ ] normal [x ] supple [ ] thrush [ ] pharyngeal exudate  Cardiovascular:   [ ] murmur [x ] normal [ ] PPM/AICD  Respiratory:  x[ ] course BS  GI:  [x ] soft, non-tender, normal bowel sounds  :  [ ] penny [ ] no CVA tenderness   Musculoskeletal:  [ ] no synovitis  Neurologic:  [ ] non-focal exam   Skin:  [ x] no rash  Lymph: [x ] no lymphadenopathy  Psychiatric:  [ ] appropriate affect [ ] alert & oriented  Lines:  [x ] no phlebitis [ ] central line                            11.0   19.15 )-----------( 558      ( 25 Mar 2024 05:56 )             35.0 03-25    139  |  97  |  13  ----------------------------<  108  4.2   |  23  |  0.86  Ca    10.0      25 Mar 2024 05:56Phos  3.8     03-25Mg     2.30     03-25  TPro  7.1  /  Alb  3.4  /  TBili  0.2  /  DBili  x   /  AST  16  /  ALT  16  /  AlkPhos  70  03-25          MICROBIOLOGY:    Culture - Blood (03.19.24 @ 21:28)   Specimen Source: .Blood Blood-Peripheral  Culture Results:   No growth at 5 daysCulture - Blood (03.19.24 @ 21:20)   Specimen Source: .Blood Blood-Peripheral  Culture Results:   No growth at 5 days      RADIOLOGY:    < from: Xray Chest 1 View- PORTABLE-Urgent (Xray Chest 1 View- PORTABLE-Urgent .) (03.24.24 @ 13:04) >    ACC: [id number] EXAM:  XR CHEST PORTABLE URGENT 1V   ORDERED BY: RAYMOND HO     PROCEDURE DATE:  03/24/2024          INTERPRETATION:  EXAMINATION: XR CHEST URGENT    CLINICAL INDICATION: Increased oxygen requirement    TECHNIQUE: Single frontal,portable view of the chest was obtained.    COMPARISON: Chest radiograph 3/19/2024.    FINDINGS:  The heart size is normal.  Interval decrease in bilateral airspace opacities.  No congestion or effusions to indicate CHF.  No pneumothorax.    IMPRESSION:  Interval decrease in bilateral airspace opacities    --- End of Report ---          JUAN LAMBERT MD; Resident Radiology  This document has been electronically signed.  EDWARD WIND MD; Attending Radiologist    < end of copied text >

## 2024-03-25 NOTE — PROGRESS NOTE ADULT - PROBLEM SELECTOR PLAN 1
- P/w SOB, persitent cough  - H/o asthma on symbicort, spiriva at home  - Recent admission for superimposed mycoplasma PNA on influenza, s/p azithromycin/doxycycline  - RVP unremarkable  - CXR -> Bibasilar airspace opacities  - CT Chest -> Multifocal groundglass and consolidative opacities involving all 5 lobes, most predominant in the b/l lower lobes and right upper lobe  - S/p CTX, azithromycin x1 in ED  - S/p empiric meropenem, doxycyline iso lack of clinical improvement, per ID  - Fungitell elevated  > F/u sputum cx, PJP cx  > C/w Bactrim-DS BID  > C/w IV solumedrol 40mg BID  > C/w duonebs q6, inhaled budesonide BID, Robitussin-DM q4 prn, and hycodan PRN  > Started HFNC, will wean prn  > Pulmonology consulted, will continue to appreciate recs, bronchoscopy planned for 3/25  > ID consulted, will continue to appreciate recs - P/w SOB, persistent cough  - H/o asthma on symbicort, spiriva at home  - Recent admission for superimposed mycoplasma PNA on influenza, s/p azithromycin/doxycycline  - RVP unremarkable  - CXR -> Bibasilar airspace opacities  - CT Chest -> Multifocal groundglass and consolidative opacities involving all 5 lobes, most predominant in the b/l lower lobes and right upper lobe  - S/p CTX, azithromycin x1 in ED  - S/p empiric meropenem, doxycyline iso lack of clinical improvement, per ID  - Fungitell elevated  > F/u sputum cx, PJP cx  > C/w Bactrim-DS BID  > C/w IV solumedrol 40mg BID as per pulm   > C/w duonebs q6, inhaled budesonide BID, Robitussin-DM q4 prn, and hycodan PRN  > Started HFNC, will wean prn  > Pulmonology consulted, will continue to appreciate recs, bronchoscopy planned for 3/26  > ID consulted, will continue to appreciate recs  HIV subset CD4 low, f/u HIV PCR confirmatory testing , possible add acyclovir (?) - P/w SOB, persistent cough  - H/o asthma on symbicort, spiriva at home  - Recent admission for superimposed mycoplasma PNA on influenza, s/p azithromycin/doxycycline  - RVP unremarkable  - CXR -> Bibasilar airspace opacities  - CT Chest -> Multifocal groundglass and consolidative opacities involving all 5 lobes, most predominant in the b/l lower lobes and right upper lobe  - S/p CTX, azithromycin x1 in ED  - S/p empiric meropenem, doxycyline iso lack of clinical improvement, per ID  - Fungitell elevated  > F/u sputum cx, PJP cx  > C/w Bactrim-DS q8hrs in the setting of HIV+   > C/w IV solumedrol 40mg BID as per pulm   > C/w duonebs q6, inhaled budesonide BID, Robitussin-DM q4 prn, and hycodan PRN  > Started HFNC, will wean prn  > Pulmonology consulted, will continue to appreciate recs, bronchoscopy planned for 3/26  > ID consulted, will continue to appreciate recs  HIV subset CD4 low, HIV PCR confirmatory testing also positive , pending viral load

## 2024-03-25 NOTE — PROGRESS NOTE ADULT - ASSESSMENT
63 yo woman with SLE, DM, h/o PE, CVA with left sided weakness presenting with fever, dry cough, SOB    Hospital admission Jan 2024 at [facility] for PNA - mycoplasma IgM + and flu A +   Received azithro, cefepime, doxy and prednisone taper at that time     CT chest today shows multifocal ground glass and consolidative opacities involving all 5 lobes  - more extensive c/w prior study and in slightly different distribution     Multifocal pulmonary infiltrates      Possible PNA-         HIV confirmatory+     CD4  14    PCP high in ddx     lfungitell 410    cxr improving on prednisone and bactrim     bood cultures no growth    serum crypt ag neg   quant gold indeterminate   PJP PCR testing         Suggest:     continue bactrim DS 2 tab po q 12 h   follow viral load       63 yo woman with SLE, DM, h/o PE, CVA with left sided weakness presenting with fever, dry cough, SOB    Hospital admission Jan 2024 at [facility] for PNA - mycoplasma IgM + and flu A +   Received azithro, cefepime, doxy and prednisone taper at that time     CT chest today shows multifocal ground glass and consolidative opacities involving all 5 lobes  - more extensive c/w prior study and in slightly different distribution     Multifocal pulmonary infiltrates      Possible PNA-         HIV confirmatory+     CD4  14    PCP high in ddx     lfungitell 410    cxr improving on prednisone and bactrim     bood cultures no growth    serum crypt ag neg   quant gold indeterminate   PJP PCR testing         Suggest:     increase bactrim DS 2 tab po q 8 h   follow viral load

## 2024-03-25 NOTE — PROGRESS NOTE ADULT - ATTENDING COMMENTS
Pt is a 65 yo F with PMH chronic back pain (s/p spinal fusion), T2D, HTN, HLD, SLE, CVA/TIA, asthma, PE (2019, xarelto), and GBS p/w persistent cough, SOB, diarrhea, and fall 2/2 weakness. Recently hospitalized at [facility] 1/2024 for influenza with superimposed mycoplasma PNA s/p doxycycline course. On arrival, CTH neg, CT chest with diffuse GGO and consolidations. Labs with leukocytosis, normocytic anemia, neg GI PCR, RVP neg, and MRSA neg. TTE with EF 53% and mild G1 diastolic dysfunction. Repeat CT PE neg but with multiple BL nodules and patchy opacities, mostly in the RLL/R base. ID following, s/p marlys/doxy now on bactrim d/t elevated fungitell. Pulm following, pending bronch 3/26 with xarelto transitioned to heparin gtt, also on IV methylprednisolone 40mg BID with protonix for gastric protection. Receiving nebs around the clock and was on 4L NC but escalated to HFNC d/t significant coughing episodes -- 100% on HFNC 60/40, would be candidate for NC again however pt requesting to c/w HFNC. Also found to have prelim HIV+, CD4 14 -- has had prior false positive, but concern for true infection given low CD4 --> ?initiate valacyclovir for ppx; pending HIV VL and confirmatory testing; will wait for ID recs. Pt seen and examined at bedside sleeping, requesting to be allowed to sleep and discuss more later. Hycodan significantly helping with coughing episodes. Discussed with HS, rest as outlined above.

## 2024-03-25 NOTE — PROGRESS NOTE ADULT - ASSESSMENT
64-year-old female w/ PMH of lupus, PE on Xarelto (dx 2019), asthma, HTN, T2DM, and GBS (dx 1997) presenting with 3wk h/o of worsening SOB and persistent cough. Patient was notably recently admitted at [facility] in 01/2024 for superimposed mycoplasma PNA on influenza now presenting with progressive dyspnea and persistent nonproductive cough    Told me +HIV in 2010 - never treated, was told it was "false+"    #Abnormal CT chest  #asthma  Denies complete resolution of symptoms since her prior discharge.   Noted recent  admission with repeat CT chest with improvement in some areas with new opacities in others  - send sputum if making  - c/w standing nebs for now and 3% NaCl inhalation BID  - she had AIDS and this changes our differential  - would start cefepime and doxycycline in addition to bactrim as pt is clinically worsening and the RLL appears more consolidative than a typical PJP, although the ground glass apically is c/w PJP as well with the high Fungitell  - please send blood, sputum, and urine culture  - please check abg and cxr daily  - we doubt she has lupus (negative THERON, other symtpoms can be explained by HIV untreated for a decade or more), please obtain rheum consult to re-assess prior dx

## 2024-03-25 NOTE — PROGRESS NOTE ADULT - SUBJECTIVE AND OBJECTIVE BOX
PROGRESS NOTE:   Authored by Dr. Darlene Vallejo MD (PGY-1). Pager [facility] [phone] / PHILLIP ( [phone]) or via TEAMS    Patient is a 64y old  Female who presents with a chief complaint of SOB, cough (24 Mar 2024 12:54)      SUBJECTIVE / OVERNIGHT EVENTS:  No acute events overnight.     ADDITIONAL REVIEW OF SYSTEMS:  Patient denies fevers, chills, chest pain, shortness of breath, nausea, abdominal pain, diarrhea, constipation, dysuria, leg swelling, headache, light headedness.    MEDICATIONS  (STANDING):  albuterol/ipratropium for Nebulization 3 milliLiter(s) Nebulizer every 6 hours  buDESOnide    Inhalation Suspension 0.25 milliGRAM(s) Inhalation every 12 hours  dextrose 5%. 1000 milliLiter(s) (50 mL/Hr) IV Continuous <Continuous>  dextrose 5%. 1000 milliLiter(s) (100 mL/Hr) IV Continuous <Continuous>  dextrose 50% Injectable 25 Gram(s) IV Push once  dextrose 50% Injectable 12.5 Gram(s) IV Push once  dextrose 50% Injectable 25 Gram(s) IV Push once  DULoxetine 60 milliGRAM(s) Oral daily  gabapentin 600 milliGRAM(s) Oral at bedtime  glucagon  Injectable 1 milliGRAM(s) IntraMuscular once  hydrocodone/homatropine Syrup 5 milliLiter(s) Oral every 6 hours  influenza   Vaccine 0.5 milliLiter(s) IntraMuscular once  insulin lispro (ADMELOG) corrective regimen sliding scale   SubCutaneous three times a day before meals  insulin lispro (ADMELOG) corrective regimen sliding scale   SubCutaneous at bedtime  methylPREDNISolone sodium succinate Injectable 40 milliGRAM(s) IV Push every 12 hours  pantoprazole    Tablet 40 milliGRAM(s) Oral before breakfast  trimethoprim  160 mG/sulfamethoxazole 800 mG 2 Tablet(s) Oral every 12 hours  verapamil  milliGRAM(s) Oral daily    MEDICATIONS  (PRN):  acetaminophen     Tablet .. 650 milliGRAM(s) Oral every 6 hours PRN Temp greater or equal to 38C (100.4F), Mild Pain (1 - 3)  dextrose Oral Gel 15 Gram(s) Oral once PRN Blood Glucose LESS THAN 70 milliGRAM(s)/deciliter      CAPILLARY BLOOD GLUCOSE      POCT Blood Glucose.: 197 mg/dL (24 Mar 2024 22:02)  POCT Blood Glucose.: 127 mg/dL (24 Mar 2024 17:13)  POCT Blood Glucose.: 98 mg/dL (24 Mar 2024 12:25)  POCT Blood Glucose.: 141 mg/dL (24 Mar 2024 08:50)    I&O's Summary      PHYSICAL EXAM:  Vital Signs Last 24 Hrs  T(C): 36.6 (24 Mar 2024 21:00), Max: 36.6 (24 Mar 2024 21:00)  T(F): 97.9 (24 Mar 2024 21:00), Max: 97.9 (24 Mar 2024 21:00)  HR: 107 (24 Mar 2024 22:55) (101 - 110)  BP: 122/83 (24 Mar 2024 21:00) (100/75 - 122/83)  BP(mean): --  RR: 18 (24 Mar 2024 22:55) (18 - 18)  SpO2: 100% (24 Mar 2024 22:55) (92% - 100%)    Parameters below as of 24 Mar 2024 22:55  Patient On (Oxygen Delivery Method): nasal cannula, high flow  O2 Flow (L/min): 40  O2 Concentration (%): 60    CONSTITUTIONAL: NAD, well-developed  RESPIRATORY: Normal respiratory effort; lungs are clear to auscultation bilaterally  CARDIOVASCULAR: Regular rate and rhythm, normal S1 and S2, no murmur/rub/gallop; No lower extremity edema; Peripheral pulses are 2+ bilaterally  ABDOMEN: Nontender to palpation, normoactive bowel sounds, no rebound/guarding; No hepatosplenomegaly  MSK: no clubbing or cyanosis of digits; no joint swelling or tenderness to palpation  PSYCH: A+O to person, place, and time; affect appropriate    LABS:                        11.0   19.15 )-----------( 558      ( 25 Mar 2024 05:56 )             35.0     03-24    138  |  101  |  13  ----------------------------<  158<H>  3.6   |  23  |  0.84    Ca    9.9      24 Mar 2024 05:45  Phos  4.0     03-24  Mg     2.10     03-24    TPro  6.5  /  Alb  3.2<L>  /  TBili  <0.2  /  DBili  x   /  AST  16  /  ALT  13  /  AlkPhos  64  03-24    PT/INR - ( 25 Mar 2024 05:56 )   PT: 11.5 sec;   INR: 1.02 ratio         PTT - ( 25 Mar 2024 05:56 )  PTT:26.2 sec      Urinalysis Basic - ( 24 Mar 2024 05:45 )    Color: x / Appearance: x / SG: x / pH: x  Gluc: 158 mg/dL / Ketone: x  / Bili: x / Urobili: x   Blood: x / Protein: x / Nitrite: x   Leuk Esterase: x / RBC: x / WBC x   Sq Epi: x / Non Sq Epi: x / Bacteria: x          Tele Reviewed:    RADIOLOGY & ADDITIONAL TESTS:  Results Reviewed:   Imaging Personally Reviewed:  Electrocardiogram Personally Reviewed:     PROGRESS NOTE:   Authored by Dr. Darlene Vallejo MD (PGY-1). Pager [facility] [phone] / PHILLIP ( [phone]) or via TEAMS    Patient is a 64y old  Female who presents with a chief complaint of SOB, cough (24 Mar 2024 12:54)      SUBJECTIVE / OVERNIGHT EVENTS:  Pt c/o ongoing coughs, only hycodan helps. No BM in several days (4). Denies abd pain, lower extremity swelling, CP.       MEDICATIONS  (STANDING):  albuterol/ipratropium for Nebulization 3 milliLiter(s) Nebulizer every 6 hours  buDESOnide    Inhalation Suspension 0.25 milliGRAM(s) Inhalation every 12 hours  dextrose 5%. 1000 milliLiter(s) (50 mL/Hr) IV Continuous <Continuous>  dextrose 5%. 1000 milliLiter(s) (100 mL/Hr) IV Continuous <Continuous>  dextrose 50% Injectable 25 Gram(s) IV Push once  dextrose 50% Injectable 12.5 Gram(s) IV Push once  dextrose 50% Injectable 25 Gram(s) IV Push once  DULoxetine 60 milliGRAM(s) Oral daily  gabapentin 600 milliGRAM(s) Oral at bedtime  glucagon  Injectable 1 milliGRAM(s) IntraMuscular once  hydrocodone/homatropine Syrup 5 milliLiter(s) Oral every 6 hours  influenza   Vaccine 0.5 milliLiter(s) IntraMuscular once  insulin lispro (ADMELOG) corrective regimen sliding scale   SubCutaneous three times a day before meals  insulin lispro (ADMELOG) corrective regimen sliding scale   SubCutaneous at bedtime  methylPREDNISolone sodium succinate Injectable 40 milliGRAM(s) IV Push every 12 hours  pantoprazole    Tablet 40 milliGRAM(s) Oral before breakfast  trimethoprim  160 mG/sulfamethoxazole 800 mG 2 Tablet(s) Oral every 12 hours  verapamil  milliGRAM(s) Oral daily    MEDICATIONS  (PRN):  acetaminophen     Tablet .. 650 milliGRAM(s) Oral every 6 hours PRN Temp greater or equal to 38C (100.4F), Mild Pain (1 - 3)  dextrose Oral Gel 15 Gram(s) Oral once PRN Blood Glucose LESS THAN 70 milliGRAM(s)/deciliter      CAPILLARY BLOOD GLUCOSE      POCT Blood Glucose.: 197 mg/dL (24 Mar 2024 22:02)  POCT Blood Glucose.: 127 mg/dL (24 Mar 2024 17:13)  POCT Blood Glucose.: 98 mg/dL (24 Mar 2024 12:25)  POCT Blood Glucose.: 141 mg/dL (24 Mar 2024 08:50)    I&O's Summary      PHYSICAL EXAM:  Vital Signs Last 24 Hrs  T(C): 36.6 (24 Mar 2024 21:00), Max: 36.6 (24 Mar 2024 21:00)  T(F): 97.9 (24 Mar 2024 21:00), Max: 97.9 (24 Mar 2024 21:00)  HR: 107 (24 Mar 2024 22:55) (101 - 110)  BP: 122/83 (24 Mar 2024 21:00) (100/75 - 122/83)  BP(mean): --  RR: 18 (24 Mar 2024 22:55) (18 - 18)  SpO2: 100% (24 Mar 2024 22:55) (92% - 100%)    Parameters below as of 24 Mar 2024 22:55  Patient On (Oxygen Delivery Method): nasal cannula, high flow  O2 Flow (L/min): 40  O2 Concentration (%): 60    CONSTITUTIONAL: NAD, well-developed  RESPIRATORY: wheezing b/l   CARDIOVASCULAR: Regular rate and rhythm, normal S1 and S2, no murmur/rub/gallop; No lower extremity edema; Peripheral pulses are 2+ bilaterally  ABDOMEN: Nontender to palpation, normoactive bowel sounds, no rebound/guarding; No hepatosplenomegaly  MSK: no clubbing or cyanosis of digits; no joint swelling or tenderness to palpation  PSYCH: A+O to person, place, and time; affect appropriate    LABS:                        11.0   19.15 )-----------( 558      ( 25 Mar 2024 05:56 )             35.0     03-24    138  |  101  |  13  ----------------------------<  158<H>  3.6   |  23  |  0.84    Ca    9.9      24 Mar 2024 05:45  Phos  4.0     03-24  Mg     2.10     03-24    TPro  6.5  /  Alb  3.2<L>  /  TBili  <0.2  /  DBili  x   /  AST  16  /  ALT  13  /  AlkPhos  64  03-24    PT/INR - ( 25 Mar 2024 05:56 )   PT: 11.5 sec;   INR: 1.02 ratio         PTT - ( 25 Mar 2024 05:56 )  PTT:26.2 sec      Urinalysis Basic - ( 24 Mar 2024 05:45 )    Color: x / Appearance: x / SG: x / pH: x  Gluc: 158 mg/dL / Ketone: x  / Bili: x / Urobili: x   Blood: x / Protein: x / Nitrite: x   Leuk Esterase: x / RBC: x / WBC x   Sq Epi: x / Non Sq Epi: x / Bacteria: x          Tele Reviewed:    RADIOLOGY & ADDITIONAL TESTS:  Results Reviewed:   Imaging Personally Reviewed:  Electrocardiogram Personally Reviewed:

## 2024-03-26 ENCOUNTER — NON-APPOINTMENT (OUTPATIENT)
Age: 64
End: 2024-03-26

## 2024-03-26 DIAGNOSIS — B20 HUMAN IMMUNODEFICIENCY VIRUS [HIV] DISEASE: ICD-10-CM

## 2024-03-26 PROBLEM — G45.9 TRANSIENT CEREBRAL ISCHEMIC ATTACK, UNSPECIFIED: Chronic | Status: ACTIVE | Noted: 2024-03-19

## 2024-03-26 PROBLEM — G61.0 GUILLAIN-BARRE SYNDROME: Chronic | Status: ACTIVE | Noted: 2024-03-19

## 2024-03-26 LAB
ALBUMIN SERPL ELPH-MCNC: 3.2 G/DL — LOW (ref 3.3–5)
ALP SERPL-CCNC: 58 U/L — SIGNIFICANT CHANGE UP (ref 40–120)
ALT FLD-CCNC: 17 U/L — SIGNIFICANT CHANGE UP (ref 4–33)
ANION GAP SERPL CALC-SCNC: 13 MMOL/L — SIGNIFICANT CHANGE UP (ref 7–14)
APTT BLD: 145.3 SEC — CRITICAL HIGH (ref 24.5–35.6)
APTT BLD: 168.3 SEC — CRITICAL HIGH (ref 24.5–35.6)
AST SERPL-CCNC: 18 U/L — SIGNIFICANT CHANGE UP (ref 4–32)
BASOPHILS # BLD AUTO: 0.03 K/UL — SIGNIFICANT CHANGE UP (ref 0–0.2)
BASOPHILS NFR BLD AUTO: 0.2 % — SIGNIFICANT CHANGE UP (ref 0–2)
BILIRUB SERPL-MCNC: 0.2 MG/DL — SIGNIFICANT CHANGE UP (ref 0.2–1.2)
BLD GP AB SCN SERPL QL: NEGATIVE — SIGNIFICANT CHANGE UP
BUN SERPL-MCNC: 15 MG/DL — SIGNIFICANT CHANGE UP (ref 7–23)
CALCIUM SERPL-MCNC: 9.4 MG/DL — SIGNIFICANT CHANGE UP (ref 8.4–10.5)
CHLORIDE SERPL-SCNC: 100 MMOL/L — SIGNIFICANT CHANGE UP (ref 98–107)
CO2 SERPL-SCNC: 24 MMOL/L — SIGNIFICANT CHANGE UP (ref 22–31)
CREAT SERPL-MCNC: 0.9 MG/DL — SIGNIFICANT CHANGE UP (ref 0.5–1.3)
EGFR: 71 ML/MIN/1.73M2 — SIGNIFICANT CHANGE UP
EOSINOPHIL # BLD AUTO: 0.09 K/UL — SIGNIFICANT CHANGE UP (ref 0–0.5)
EOSINOPHIL NFR BLD AUTO: 0.5 % — SIGNIFICANT CHANGE UP (ref 0–6)
GLUCOSE BLDC GLUCOMTR-MCNC: 140 MG/DL — HIGH (ref 70–99)
GLUCOSE BLDC GLUCOMTR-MCNC: 143 MG/DL — HIGH (ref 70–99)
GLUCOSE BLDC GLUCOMTR-MCNC: 162 MG/DL — HIGH (ref 70–99)
GLUCOSE BLDC GLUCOMTR-MCNC: 173 MG/DL — HIGH (ref 70–99)
GLUCOSE SERPL-MCNC: 129 MG/DL — HIGH (ref 70–99)
HAV IGM SER-ACNC: SIGNIFICANT CHANGE UP
HBV CORE IGM SER-ACNC: SIGNIFICANT CHANGE UP
HBV SURFACE AG SER-ACNC: SIGNIFICANT CHANGE UP
HCT VFR BLD CALC: 30.3 % — LOW (ref 34.5–45)
HCT VFR BLD CALC: 30.6 % — LOW (ref 34.5–45)
HCT VFR BLD CALC: 30.7 % — LOW (ref 34.5–45)
HCV AB S/CO SERPL IA: 0.05 S/CO — SIGNIFICANT CHANGE UP (ref 0–0.99)
HCV AB SERPL-IMP: SIGNIFICANT CHANGE UP
HGB BLD-MCNC: 9.4 G/DL — LOW (ref 11.5–15.5)
HGB BLD-MCNC: 9.5 G/DL — LOW (ref 11.5–15.5)
HGB BLD-MCNC: 9.5 G/DL — LOW (ref 11.5–15.5)
IANC: 13.96 K/UL — HIGH (ref 1.8–7.4)
IMM GRANULOCYTES NFR BLD AUTO: 3.3 % — HIGH (ref 0–0.9)
INR BLD: 1.04 RATIO — SIGNIFICANT CHANGE UP (ref 0.85–1.18)
LYMPHOCYTES # BLD AUTO: 1.3 K/UL — SIGNIFICANT CHANGE UP (ref 1–3.3)
LYMPHOCYTES # BLD AUTO: 7.7 % — LOW (ref 13–44)
MAGNESIUM SERPL-MCNC: 2.4 MG/DL — SIGNIFICANT CHANGE UP (ref 1.6–2.6)
MCHC RBC-ENTMCNC: 25.9 PG — LOW (ref 27–34)
MCHC RBC-ENTMCNC: 26.1 PG — LOW (ref 27–34)
MCHC RBC-ENTMCNC: 26.3 PG — LOW (ref 27–34)
MCHC RBC-ENTMCNC: 30.6 GM/DL — LOW (ref 32–36)
MCHC RBC-ENTMCNC: 31 GM/DL — LOW (ref 32–36)
MCHC RBC-ENTMCNC: 31.4 GM/DL — LOW (ref 32–36)
MCV RBC AUTO: 83.9 FL — SIGNIFICANT CHANGE UP (ref 80–100)
MCV RBC AUTO: 84.1 FL — SIGNIFICANT CHANGE UP (ref 80–100)
MCV RBC AUTO: 84.6 FL — SIGNIFICANT CHANGE UP (ref 80–100)
MONOCYTES # BLD AUTO: 0.99 K/UL — HIGH (ref 0–0.9)
MONOCYTES NFR BLD AUTO: 5.9 % — SIGNIFICANT CHANGE UP (ref 2–14)
NEUTROPHILS # BLD AUTO: 13.96 K/UL — HIGH (ref 1.8–7.4)
NEUTROPHILS NFR BLD AUTO: 82.4 % — HIGH (ref 43–77)
NRBC # BLD: 0 /100 WBCS — SIGNIFICANT CHANGE UP (ref 0–0)
NRBC # FLD: 0.03 K/UL — HIGH (ref 0–0)
NRBC # FLD: 0.03 K/UL — HIGH (ref 0–0)
NRBC # FLD: 0.04 K/UL — HIGH (ref 0–0)
PHOSPHATE SERPL-MCNC: 3.9 MG/DL — SIGNIFICANT CHANGE UP (ref 2.5–4.5)
PLATELET # BLD AUTO: 467 K/UL — HIGH (ref 150–400)
PLATELET # BLD AUTO: 468 K/UL — HIGH (ref 150–400)
PLATELET # BLD AUTO: 475 K/UL — HIGH (ref 150–400)
POTASSIUM SERPL-MCNC: 4.2 MMOL/L — SIGNIFICANT CHANGE UP (ref 3.5–5.3)
POTASSIUM SERPL-SCNC: 4.2 MMOL/L — SIGNIFICANT CHANGE UP (ref 3.5–5.3)
PROT SERPL-MCNC: 6 G/DL — SIGNIFICANT CHANGE UP (ref 6–8.3)
PROTHROM AB SERPL-ACNC: 11.6 SEC — SIGNIFICANT CHANGE UP (ref 9.5–13)
RBC # BLD: 3.61 M/UL — LOW (ref 3.8–5.2)
RBC # BLD: 3.63 M/UL — LOW (ref 3.8–5.2)
RBC # BLD: 3.64 M/UL — LOW (ref 3.8–5.2)
RBC # FLD: 19.2 % — HIGH (ref 10.3–14.5)
RBC # FLD: 19.3 % — HIGH (ref 10.3–14.5)
RBC # FLD: 19.4 % — HIGH (ref 10.3–14.5)
RH IG SCN BLD-IMP: POSITIVE — SIGNIFICANT CHANGE UP
SODIUM SERPL-SCNC: 137 MMOL/L — SIGNIFICANT CHANGE UP (ref 135–145)
WBC # BLD: 16.76 K/UL — HIGH (ref 3.8–10.5)
WBC # BLD: 16.92 K/UL — HIGH (ref 3.8–10.5)
WBC # BLD: 18.5 K/UL — HIGH (ref 3.8–10.5)
WBC # FLD AUTO: 16.76 K/UL — HIGH (ref 3.8–10.5)
WBC # FLD AUTO: 16.92 K/UL — HIGH (ref 3.8–10.5)
WBC # FLD AUTO: 18.5 K/UL — HIGH (ref 3.8–10.5)

## 2024-03-26 PROCEDURE — 71045 X-RAY EXAM CHEST 1 VIEW: CPT | Mod: 26

## 2024-03-26 PROCEDURE — 99233 SBSQ HOSP IP/OBS HIGH 50: CPT

## 2024-03-26 PROCEDURE — 99233 SBSQ HOSP IP/OBS HIGH 50: CPT | Mod: GC

## 2024-03-26 PROCEDURE — 99232 SBSQ HOSP IP/OBS MODERATE 35: CPT

## 2024-03-26 RX ORDER — CEFEPIME 1 G/1
INJECTION, POWDER, FOR SOLUTION INTRAMUSCULAR; INTRAVENOUS
Refills: 0 | Status: COMPLETED | OUTPATIENT
Start: 2024-03-26 | End: 2024-03-30

## 2024-03-26 RX ORDER — CEFEPIME 1 G/1
2000 INJECTION, POWDER, FOR SOLUTION INTRAMUSCULAR; INTRAVENOUS ONCE
Refills: 0 | Status: COMPLETED | OUTPATIENT
Start: 2024-03-26 | End: 2024-03-26

## 2024-03-26 RX ORDER — ASPIRIN/CALCIUM CARB/MAGNESIUM 324 MG
81 TABLET ORAL DAILY
Refills: 0 | Status: DISCONTINUED | OUTPATIENT
Start: 2024-03-26 | End: 2024-04-11

## 2024-03-26 RX ORDER — CEFEPIME 1 G/1
2000 INJECTION, POWDER, FOR SOLUTION INTRAMUSCULAR; INTRAVENOUS EVERY 12 HOURS
Refills: 0 | Status: COMPLETED | OUTPATIENT
Start: 2024-03-27 | End: 2024-03-30

## 2024-03-26 RX ORDER — AZITHROMYCIN 500 MG/1
1200 TABLET, FILM COATED ORAL
Refills: 0 | Status: DISCONTINUED | OUTPATIENT
Start: 2024-03-26 | End: 2024-03-26

## 2024-03-26 RX ORDER — RIVAROXABAN 15 MG-20MG
20 KIT ORAL DAILY
Refills: 0 | Status: DISCONTINUED | OUTPATIENT
Start: 2024-03-26 | End: 2024-04-11

## 2024-03-26 RX ORDER — BICTEGRAVIR SODIUM, EMTRICITABINE, AND TENOFOVIR ALAFENAMIDE FUMARATE 30; 120; 15 MG/1; MG/1; MG/1
1 TABLET ORAL DAILY
Refills: 0 | Status: DISCONTINUED | OUTPATIENT
Start: 2024-03-26 | End: 2024-04-11

## 2024-03-26 RX ORDER — AZITHROMYCIN 500 MG/1
1200 TABLET, FILM COATED ORAL
Refills: 0 | Status: DISCONTINUED | OUTPATIENT
Start: 2024-03-26 | End: 2024-04-01

## 2024-03-26 RX ADMIN — DULOXETINE HYDROCHLORIDE 60 MILLIGRAM(S): 30 CAPSULE, DELAYED RELEASE ORAL at 11:54

## 2024-03-26 RX ADMIN — SENNA PLUS 2 TABLET(S): 8.6 TABLET ORAL at 22:41

## 2024-03-26 RX ADMIN — RIVAROXABAN 20 MILLIGRAM(S): KIT at 11:54

## 2024-03-26 RX ADMIN — HEPARIN SODIUM 1800 UNIT(S)/HR: 5000 INJECTION INTRAVENOUS; SUBCUTANEOUS at 00:09

## 2024-03-26 RX ADMIN — Medication 1: at 11:55

## 2024-03-26 RX ADMIN — CEFEPIME 100 MILLIGRAM(S): 1 INJECTION, POWDER, FOR SOLUTION INTRAMUSCULAR; INTRAVENOUS at 12:50

## 2024-03-26 RX ADMIN — Medication 3 MILLILITER(S): at 14:52

## 2024-03-26 RX ADMIN — GABAPENTIN 600 MILLIGRAM(S): 400 CAPSULE ORAL at 22:54

## 2024-03-26 RX ADMIN — Medication 2 TABLET(S): at 05:46

## 2024-03-26 RX ADMIN — Medication 120 MILLIGRAM(S): at 05:45

## 2024-03-26 RX ADMIN — PANTOPRAZOLE SODIUM 40 MILLIGRAM(S): 20 TABLET, DELAYED RELEASE ORAL at 05:44

## 2024-03-26 RX ADMIN — SODIUM CHLORIDE 4 MILLILITER(S): 9 INJECTION INTRAMUSCULAR; INTRAVENOUS; SUBCUTANEOUS at 08:59

## 2024-03-26 RX ADMIN — HEPARIN SODIUM 1800 UNIT(S)/HR: 5000 INJECTION INTRAVENOUS; SUBCUTANEOUS at 01:41

## 2024-03-26 RX ADMIN — HEPARIN SODIUM 1200 UNIT(S)/HR: 5000 INJECTION INTRAVENOUS; SUBCUTANEOUS at 10:10

## 2024-03-26 RX ADMIN — Medication 3 MILLILITER(S): at 07:44

## 2024-03-26 RX ADMIN — Medication 40 MILLIGRAM(S): at 17:39

## 2024-03-26 RX ADMIN — HEPARIN SODIUM 1500 UNIT(S)/HR: 5000 INJECTION INTRAVENOUS; SUBCUTANEOUS at 08:47

## 2024-03-26 RX ADMIN — HEPARIN SODIUM 0 UNIT(S)/HR: 5000 INJECTION INTRAVENOUS; SUBCUTANEOUS at 09:02

## 2024-03-26 RX ADMIN — Medication 40 MILLIGRAM(S): at 05:44

## 2024-03-26 RX ADMIN — AZITHROMYCIN 1200 MILLIGRAM(S): 500 TABLET, FILM COATED ORAL at 12:50

## 2024-03-26 RX ADMIN — HEPARIN SODIUM 0 UNIT(S)/HR: 5000 INJECTION INTRAVENOUS; SUBCUTANEOUS at 01:58

## 2024-03-26 RX ADMIN — Medication 81 MILLIGRAM(S): at 11:55

## 2024-03-26 RX ADMIN — Medication 0.25 MILLIGRAM(S): at 07:45

## 2024-03-26 RX ADMIN — Medication 0.25 MILLIGRAM(S): at 21:56

## 2024-03-26 RX ADMIN — SODIUM CHLORIDE 4 MILLILITER(S): 9 INJECTION INTRAMUSCULAR; INTRAVENOUS; SUBCUTANEOUS at 21:56

## 2024-03-26 RX ADMIN — Medication 2 TABLET(S): at 22:41

## 2024-03-26 RX ADMIN — Medication 3 MILLILITER(S): at 22:00

## 2024-03-26 RX ADMIN — HEPARIN SODIUM 1500 UNIT(S)/HR: 5000 INJECTION INTRAVENOUS; SUBCUTANEOUS at 03:00

## 2024-03-26 RX ADMIN — Medication 2 TABLET(S): at 13:26

## 2024-03-26 NOTE — PROGRESS NOTE ADULT - PROBLEM SELECTOR PLAN 3
- P/w loose BMs, initially w/ some blood  - GI PCR negative  - Diarrhea improved  > CTM BMs - Patient found on fall by  prior to presentation  - Denies any acute bony pain  - CTH umremarkable  - TTE unremarkable  > orthostatics unremarkable

## 2024-03-26 NOTE — PHYSICAL THERAPY INITIAL EVALUATION ADULT - ADDITIONAL COMMENTS
Pt states she lives with her  in a house with 1 step to enter and resides on the first floor. Prior to admission pt reports being independent in ADLs and ambulation with a rollator, also can ambulate without device for short distances.    Following evaluation, pt was left semireclined in bed in no distress, all lines in tact, call bell in reach.

## 2024-03-26 NOTE — PROGRESS NOTE ADULT - ASSESSMENT
64-year-old female w/ PMH of lupus, PE on Xarelto (dx 2019), asthma, HTN, T2DM, and GBS (dx 1997) presenting with 3wk h/o of worsening SOB and persistent cough. Patient was notably recently admitted at [facility] in 01/2024 for superimposed mycoplasma PNA on influenza now presenting with progressive dyspnea and persistent nonproductive cough    Told me +HIV in 2010 - never treated, was told it was "false+"    #Abnormal CT chest  #asthma  Denies complete resolution of symptoms since her prior discharge.   Noted recent  admission with repeat CT chest with improvement in some areas with new opacities in others  - send sputum if making  - c/w standing nebs for now and 3% NaCl inhalation BID  - she had AIDS and this changes our differential  - would start cefepime and doxycycline in addition to bactrim as pt is clinically worsening and the RLL appears more consolidative than a typical PJP, although the ground glass apically is c/w PJP as well with the high Fungitell  - please send blood, sputum, and urine culture  - please check abg and cxr daily  - we doubt she has lupus (negative THERON, other symtpoms can be explained by HIV untreated for a decade or more), please obtain rheum consult to re-assess prior dx  64-year-old female w/ PMH of lupus, PE on Xarelto (dx 2019), asthma, HTN, T2DM, and GBS (dx 1997) presenting with 3wk h/o of worsening SOB and persistent cough. 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   Noted recent [facility] admission with repeat CT chest with improvement in some areas with new opacities in others  - c/w standing nebs for now and 3% NaCl inhalation BID  - she had AIDS and this changes our differential - very likely infectious here  - suggest cefepime/doxy/Bactrim  - ARV per ID  - if unchanged or worsening can entertain notion of bronchoscopy but would favor treating and reconstituting for now i.e. risk likely does not outweigh benefit at this time for bronch given HFNC  - bcx x 2, ucx, sputum cx  - please check abg and cxr daily  - we doubt she has lupus (negative THERON, other symptoms can be explained by HIV untreated for a decade or more), please obtain rheum consult to re-assess prior dx

## 2024-03-26 NOTE — DIETITIAN INITIAL EVALUATION ADULT - NSICDXPASTMEDICALHX_GEN_ALL_CORE_FT
PAST MEDICAL HISTORY:  Asthma     Diabetes     GBS (Guillain Milford syndrome)     Hypertension     Lupus     Peripheral polyneuropathy     Pulmonary emboli 7/2019    TIA (transient ischemic attack)

## 2024-03-26 NOTE — DIETITIAN INITIAL EVALUATION ADULT - PERTINENT MEDS FT
MEDICATIONS  (STANDING):  albuterol/ipratropium for Nebulization 3 milliLiter(s) Nebulizer every 8 hours  aspirin  chewable 81 milliGRAM(s) Oral daily  azithromycin   Tablet 1200 milliGRAM(s) Oral <User Schedule>  buDESOnide    Inhalation Suspension 0.25 milliGRAM(s) Inhalation every 12 hours  cefepime   IVPB      dextrose 5%. 1000 milliLiter(s) (50 mL/Hr) IV Continuous <Continuous>  dextrose 5%. 1000 milliLiter(s) (100 mL/Hr) IV Continuous <Continuous>  dextrose 50% Injectable 25 Gram(s) IV Push once  dextrose 50% Injectable 12.5 Gram(s) IV Push once  dextrose 50% Injectable 25 Gram(s) IV Push once  DULoxetine 60 milliGRAM(s) Oral daily  gabapentin 600 milliGRAM(s) Oral at bedtime  glucagon  Injectable 1 milliGRAM(s) IntraMuscular once  influenza   Vaccine 0.5 milliLiter(s) IntraMuscular once  insulin lispro (ADMELOG) corrective regimen sliding scale   SubCutaneous three times a day before meals  insulin lispro (ADMELOG) corrective regimen sliding scale   SubCutaneous at bedtime  methylPREDNISolone sodium succinate Injectable 40 milliGRAM(s) IV Push every 12 hours  pantoprazole    Tablet 40 milliGRAM(s) Oral before breakfast  rivaroxaban 20 milliGRAM(s) Oral daily  senna 2 Tablet(s) Oral at bedtime  sodium chloride 3%  Inhalation 4 milliLiter(s) Inhalation every 12 hours  trimethoprim  160 mG/sulfamethoxazole 800 mG 2 Tablet(s) Oral every 8 hours  verapamil  milliGRAM(s) Oral daily    MEDICATIONS  (PRN):  acetaminophen     Tablet .. 650 milliGRAM(s) Oral every 6 hours PRN Temp greater or equal to 38C (100.4F), Mild Pain (1 - 3)  dextrose Oral Gel 15 Gram(s) Oral once PRN Blood Glucose LESS THAN 70 milliGRAM(s)/deciliter  hydrocodone/homatropine Syrup 5 milliLiter(s) Oral every 6 hours PRN Cough

## 2024-03-26 NOTE — PROGRESS NOTE ADULT - PROBLEM SELECTOR PLAN 2
- Patient found on fall by  prior to presentation  - Denies any acute bony pain  - CTH umremarkable  - TTE unremarkable  > F/u orthostatic vitals -> sx of SOB, cough concern for HIV, prelim/ confirmatory test+ for HIV with CD4 of 14, viral load >70K  - ID consulted, continue bactrim-DS q8hr  -F/u sputum cx, PJP cx  > f/u ID recs plan to broaden abx/ antiviral/ coverage  - daily ABG's/ chest x ray as per pulm -> sx of SOB, cough concern for HIV, prelim/ confirmatory test+ for HIV with CD4 of 14, viral load >70K  - ID consulted, continue bactrim-DS q8hr, adding azithromycin and cefepime 3/26 HIV prophylaxis   -F/u sputum cx, PJP cx  - daily ABG's/ chest x ray as per pulm

## 2024-03-26 NOTE — PROGRESS NOTE ADULT - ATTENDING COMMENTS
64-year-old female w/ PMH of lupus, PE on Xarelto (dx 2019), asthma, HTN, T2DM, and GBS (dx 1997) presenting with 3wk h/o of AHRF.  Pt now w/ confirmed AIDS and very low cd4 count.   Pt appears comfortable in bed but has worsening hypoxemia since Friday and now HFNC 60% fio2.     Given new dx of AIDS and w/ very high fungitell and CT chest showing at least some areas of GGO the likelihood of pjp is high. The originally planned bronch better likely deferred as her  resp status has worsened and she has higher risk of respiratory complications.   AGree w/ pjp tx and bacterial coverage    - pjp tx w/ bactrim   - cont steroids for pjp  - ART tx as per ID   - RLL changes more consolidative and may benefit from full course of bacterial coverage for pna- now on cefepime (and azithro also for mac ppx)  - being r/o for TB for afb sputum, can use hypersal if needed  - on a/c for PE hx

## 2024-03-26 NOTE — PROGRESS NOTE ADULT - SUBJECTIVE AND OBJECTIVE BOX
Follow Up:      Inverval History/ROS:Patient is a 64y old  Female who presents with a chief complaint of SOB, cough (24 Mar 2024 08:26)    remains on HFNC      from [facility] visiting     Allergies    No Known Allergies    Intolerances    dislikes Glucerna Shake (Other)    worked as respiratory therapist years ago       ANTIMICROBIALS:  azithromycin   Tablet 1200 <User Schedule>  cefepime   IVPB    trimethoprim  160 mG/sulfamethoxazole 800 mG 2 every 8 hours    MEDICATIONS  (STANDING):  acetaminophen     Tablet .. 650 every 6 hours PRN  albuterol/ipratropium for Nebulization 3 every 8 hours  aspirin  chewable 81 daily  buDESOnide    Inhalation Suspension 0.25 every 12 hours  dextrose 50% Injectable 25 once  dextrose 50% Injectable 12.5 once  dextrose 50% Injectable 25 once  dextrose Oral Gel 15 once PRN  DULoxetine 60 daily  gabapentin 600 at bedtime  glucagon  Injectable 1 once  hydrocodone/homatropine Syrup 5 every 6 hours PRN  influenza   Vaccine 0.5 once  insulin lispro (ADMELOG) corrective regimen sliding scale  three times a day before meals  insulin lispro (ADMELOG) corrective regimen sliding scale  at bedtime  methylPREDNISolone sodium succinate Injectable 40 every 12 hours  pantoprazole    Tablet 40 before breakfast  rivaroxaban 20 daily  senna 2 at bedtime  sodium chloride 3%  Inhalation 4 every 12 hours  verapamil  daily    Vital Signs Last 24 Hrs  T(F): 98.3 (03-26-24 @ 17:19), Max: 98.7 (03-25-24 @ 19:54)  HR: 95 (03-26-24 @ 17:19)  BP: 128/68 (03-26-24 @ 17:19)  RR: 18 (03-26-24 @ 17:19)  SpO2: 99% (03-26-24 @ 17:19) (96% - 100%)    PHYSICAL EXAM:  General: [x ] non-toxic.  HFNC  HEAD/EYES: [ ] PERRL [x ] white sclera [ ] icterus  ENT:  [ ] normal [x ] supple [ ] thrush [ ] pharyngeal exudate  Cardiovascular:   [ ] murmur [x ] normal [ ] PPM/AICD  Respiratory:  x[ ] course BS  GI:  [x ] soft, non-tender, normal bowel sounds  :  [ ] penny [x ] no CVA tenderness   Musculoskeletal:  [ ] no synovitis  Neurologic:  [x ] non-focal exam   Skin:  [ x] no rash  Lymph: [x ] no lymphadenopathy  Psychiatric:  [x ] appropriate affect [x ] alert & oriented  Lines:  [x ] no phlebitis right arm                            9.5    16.92 )-----------( 475      ( 26 Mar 2024 08:22 )             30.6 03-26    137  |  100  |  15  ----------------------------<  129  4.2   |  24  |  0.90  Ca    9.4      26 Mar 2024 08:22Phos  3.9     03-26Mg     2.40     03-26  TPro  6.0  /  Alb  3.2  /  TBili  0.2  /  DBili  x   /  AST  18  /  ALT  17  /  AlkPhos  58  03-26    HIV-1 RNA Quantitative, Viral Load (03.25.24 @ 05:56)   HIV-1 RNA Quantitative, Viral Load: 72,947    ABS CD4: 14 cells/uL (03.25.24 @ 05:56)   crypt neg     MICROBIOLOGY:    Culture - Blood (03.19.24 @ 21:28)   Specimen Source: .Blood Blood-Peripheral  Culture Results:   No growth at 5 daysCulture - Blood (03.19.24 @ 21:20)   Specimen Source: .Blood Blood-Peripheral  Culture Results:   No growth at 5 days      RADIOLOGY:    < from: Xray Chest 1 View- PORTABLE-Urgent (Xray Chest 1 View- PORTABLE-Urgent .) (03.24.24 @ 13:04) >    ACC: [id number] EXAM:  XR CHEST PORTABLE URGENT 1V   ORDERED BY: RAYMOND HO     PROCEDURE DATE:  03/24/2024          INTERPRETATION:  EXAMINATION: XR CHEST URGENT    CLINICAL INDICATION: Increased oxygen requirement    TECHNIQUE: Single frontal,portable view of the chest was obtained.    COMPARISON: Chest radiograph 3/19/2024.    FINDINGS:  The heart size is normal.  Interval decrease in bilateral airspace opacities.  No congestion or effusions to indicate CHF.  No pneumothorax.    IMPRESSION:  Interval decrease in bilateral airspace opacities    --- End of Report ---          JUAN LAMBERT MD; Resident Radiology  This document has been electronically signed.  BRIONNA PAVON MD; Attending Radiologist    < end of copied text >

## 2024-03-26 NOTE — PROGRESS NOTE ADULT - PROBLEM SELECTOR PLAN 1
- P/w SOB, persistent cough  - H/o asthma on symbicort, spiriva at home  - Recent admission for superimposed mycoplasma PNA on influenza, s/p azithromycin/doxycycline  - RVP unremarkable  - CXR -> Bibasilar airspace opacities  - CT Chest -> Multifocal groundglass and consolidative opacities involving all 5 lobes, most predominant in the b/l lower lobes and right upper lobe  - S/p CTX, azithromycin x1 in ED  - S/p empiric meropenem, doxycyline iso lack of clinical improvement, per ID  - Fungitell elevated  > F/u sputum cx, PJP cx  > C/w Bactrim-DS q8hrs in the setting of HIV+   > C/w IV solumedrol 40mg BID as per pulm   > C/w duonebs q6, inhaled budesonide BID, Robitussin-DM q4 prn, and hycodan PRN  > Started HFNC, will wean prn  > Pulmonology consulted, will continue to appreciate recs, bronchoscopy planned for 3/26  > ID consulted, will continue to appreciate recs  HIV subset CD4 low, HIV PCR confirmatory testing also positive , pending viral load - initially P/w SOB, persistent cough  - H/o asthma on symbicort, spiriva at home  - Recent admission for superimposed mycoplasma PNA on influenza, s/p azithromycin/doxycycline  - RVP unremarkable, CXR -> Bibasilar airspace opacities  - CT Chest -> Multifocal groundglass and consolidative opacities involving all 5 lobes, most predominant in the b/l lower lobes and right upper lobe  - S/p empiric meropenem, doxycyline iso lack of clinical improvement, per ID  - Fungitell elevated  - c/w solumedrol daily as per pulm   > C/w duonebs q6, inhaled budesonide BID, Robitussin-DM q4 prn, and hycodan PRN  > Started HFNC, will wean prn  > Pulmonology consulted, will continue to appreciate recs, bronchoscopy planned for 3/26 now cancelled in setting of AIDS  > ID consulted, will continue to appreciate recs - initially P/w SOB, persistent cough  - Recent admission for superimposed mycoplasma PNA on influenza, s/p azithromycin/doxycycline  - RVP unremarkable, CXR -> Bibasilar airspace opacities  - CT Chest -> Multifocal groundglass and consolidative opacities involving all 5 lobes, most predominant in the b/l lower lobes and right upper lobe  - S/p empiric meropenem, doxycyline iso lack of clinical improvement, per ID  - Fungitell elevated  - c/w solumedrol daily as per pulm for PJP prophylaxis   > C/w duonebs q6, inhaled budesonide BID, Robitussin-DM q4 prn, and hycodan PRN  > Started HFNC, will wean prn  > Pulmonology consulted, will continue to appreciate recs, bronchoscopy planned for 3/26 now cancelled in setting of fulminant AIDS  > ID consulted, will continue to appreciate recs

## 2024-03-26 NOTE — DIETITIAN INITIAL EVALUATION ADULT - OTHER INFO
Patient is a 64y Female with PMH lupus, PE, asthma, HTN, T2DM, GBS (diagnosed 1997) who presents to [facility] with three week history of worsening shortness of breath and persistent cough.    Patient is currently ordered for a PO diet. Patient currently reports a continued poor appetite and PO intake at meals during course of admission. Patient dislikes oral nutrition supplements, does not wish to receive any. Reviewed food preferences, coordinated via CBORD. Patient denies any chewing or swallowing difficulty with current diet order. No report of GI distress (nausea, vomiting, diarrhea, constipation). Last BM 3/19/24 per RN flowsheet documentation. Noted to be on a bowel regimen. Noted HbA1c 6.4% (3/20)    Writer provided verbal education regarding current diet order. Patient verbalized understanding to the discussion.  Patient is a 64y Female with PMH lupus, PE, asthma, HTN, T2DM, GBS (diagnosed 1997) who presents to [facility] with three week history of worsening shortness of breath and persistent cough.    Patient is currently ordered for a PO diet. Patient currently reports a continued poor appetite and PO intake at meals during course of admission. Patient dislikes oral nutrition supplements, does not wish to receive any. Reviewed food preferences, coordinated via CBORD. Patient denies any chewing or swallowing difficulty with current diet order. No report of nausea, vomiting, diarrhea, constipation. Patient reports constipation. Last BM 3/19/24 per patient report and RN flowsheet documentation. Noted to be on a bowel regimen. Noted HbA1c 6.4% (3/20)    Writer provided verbal education regarding current diet order. Patient verbalized understanding to the discussion.

## 2024-03-26 NOTE — DIETITIAN INITIAL EVALUATION ADULT - PERTINENT LABORATORY DATA
03-26    137  |  100  |  15  ----------------------------<  129<H>  4.2   |  24  |  0.90    Ca    9.4      26 Mar 2024 08:22  Phos  3.9     03-26  Mg     2.40     03-26    TPro  6.0  /  Alb  3.2<L>  /  TBili  0.2  /  DBili  x   /  AST  18  /  ALT  17  /  AlkPhos  58  03-26  POCT Blood Glucose.: 173 mg/dL (03-26-24 @ 11:50)  A1C with Estimated Average Glucose Result: 6.4 % (03-20-24 @ 06:38)  A1C with Estimated Average Glucose Result: 6.1 % (11-18-23 @ 06:20)  A1C with Estimated Average Glucose Result: 6.6 % (11-17-23 @ 04:47)

## 2024-03-26 NOTE — DIETITIAN INITIAL EVALUATION ADULT - OTHER CALCULATIONS
IBW: 140 lb +/- 10%, %%  Upper range ideal body weight 154lb used to determine estimated energy needs

## 2024-03-26 NOTE — PROGRESS NOTE ADULT - PROBLEM SELECTOR PLAN 8
DVT Ppx: Heparin gtt.  Diet: CC, NPO after midnight  Dispo: TBD - On metformin, ozempic at home  - A1c 6.4  > C/w ISS, holding home meds  > CTM w/ FSGs

## 2024-03-26 NOTE — DIETITIAN INITIAL EVALUATION ADULT - ORAL INTAKE PTA/DIET HISTORY
Patient reports no known food allergies or food intolerances. Patient denies any chewing or swallowing difficulty with regular solids or thin liquids. Nutrition supplementation at home includes vitamin D. Patient reports she typically maintains a good appetite at baseline, however decreased to poor x2-3 prior to admission secondary to coughing and feeling unwell. Reports consuming little to no food during this timeframe. Likely meeting <75% estimated energy needs as a result    Patient reports usual body weight as 262lb, current weight as 227lb. Endorses 35lb weight loss x6 weeks period of time secondary to Ozempic use and increased physical activity.  Per Radha M[facility], noted the following weight history: 103kg (3/20), 102.1kg (2/14), 108kg (12/15), 112.5kg (11/17)  Objective weight measurements suggest 9.5kg (8%) weight loss x4 months period of time

## 2024-03-26 NOTE — PROGRESS NOTE ADULT - PROBLEM SELECTOR PLAN 6
> Holding home verapamil iso acute illness > Holding ASA iso planned bronchoscopy  > C/w statin > resume home aspirin   > C/w statin

## 2024-03-26 NOTE — PROGRESS NOTE ADULT - PROBLEM SELECTOR PLAN 4
- H/o PE in 2019, on Xarelto at home  - S/p Xarelto 20mg qd  > C/w heparin gtt. iso planned bronchoscopy - RESOLVED  P/w loose BMs, initially w/ some blood  - GI PCR negative  - Diarrhea improved

## 2024-03-26 NOTE — PROGRESS NOTE ADULT - SUBJECTIVE AND OBJECTIVE BOX
PROGRESS NOTE:   Authored by Dr. Darlene Vallejo MD (PGY-1). Pager [facility] [phone] / PHILLIP ( [phone]) or via TEAMS    Patient is a 64y old  Female who presents with a chief complaint of SOB, cough (25 Mar 2024 15:15)      SUBJECTIVE / OVERNIGHT EVENTS:  No acute events overnight.     ADDITIONAL REVIEW OF SYSTEMS:  Patient denies fevers, chills, chest pain, shortness of breath, nausea, abdominal pain, diarrhea, constipation, dysuria, leg swelling, headache, light headedness.    MEDICATIONS  (STANDING):  albuterol/ipratropium for Nebulization 3 milliLiter(s) Nebulizer every 8 hours  buDESOnide    Inhalation Suspension 0.25 milliGRAM(s) Inhalation every 12 hours  dextrose 5%. 1000 milliLiter(s) (50 mL/Hr) IV Continuous <Continuous>  dextrose 5%. 1000 milliLiter(s) (100 mL/Hr) IV Continuous <Continuous>  dextrose 50% Injectable 25 Gram(s) IV Push once  dextrose 50% Injectable 12.5 Gram(s) IV Push once  dextrose 50% Injectable 25 Gram(s) IV Push once  DULoxetine 60 milliGRAM(s) Oral daily  gabapentin 600 milliGRAM(s) Oral at bedtime  glucagon  Injectable 1 milliGRAM(s) IntraMuscular once  heparin  Infusion.  Unit(s)/Hr (18 mL/Hr) IV Continuous <Continuous>  influenza   Vaccine 0.5 milliLiter(s) IntraMuscular once  insulin lispro (ADMELOG) corrective regimen sliding scale   SubCutaneous three times a day before meals  insulin lispro (ADMELOG) corrective regimen sliding scale   SubCutaneous at bedtime  methylPREDNISolone sodium succinate Injectable 40 milliGRAM(s) IV Push every 12 hours  pantoprazole    Tablet 40 milliGRAM(s) Oral before breakfast  senna 2 Tablet(s) Oral at bedtime  sodium chloride 3%  Inhalation 4 milliLiter(s) Inhalation every 12 hours  trimethoprim  160 mG/sulfamethoxazole 800 mG 2 Tablet(s) Oral every 8 hours  verapamil  milliGRAM(s) Oral daily    MEDICATIONS  (PRN):  acetaminophen     Tablet .. 650 milliGRAM(s) Oral every 6 hours PRN Temp greater or equal to 38C (100.4F), Mild Pain (1 - 3)  dextrose Oral Gel 15 Gram(s) Oral once PRN Blood Glucose LESS THAN 70 milliGRAM(s)/deciliter  hydrocodone/homatropine Syrup 5 milliLiter(s) Oral every 6 hours PRN Cough      CAPILLARY BLOOD GLUCOSE      POCT Blood Glucose.: 152 mg/dL (25 Mar 2024 21:28)  POCT Blood Glucose.: 148 mg/dL (25 Mar 2024 17:49)  POCT Blood Glucose.: 232 mg/dL (25 Mar 2024 12:52)  POCT Blood Glucose.: 150 mg/dL (25 Mar 2024 08:35)    I&O's Summary    25 Mar 2024 07:01  -  26 Mar 2024 07:00  --------------------------------------------------------  IN: 78 mL / OUT: 0 mL / NET: 78 mL        PHYSICAL EXAM:  Vital Signs Last 24 Hrs  T(C): 36.8 (26 Mar 2024 06:16), Max: 37.1 (25 Mar 2024 19:54)  T(F): 98.2 (26 Mar 2024 06:16), Max: 98.7 (25 Mar 2024 19:54)  HR: 102 (26 Mar 2024 06:16) (90 - 120)  BP: 134/72 (26 Mar 2024 06:16) (121/67 - 145/72)  BP(mean): --  RR: 19 (26 Mar 2024 06:16) (18 - 26)  SpO2: 98% (26 Mar 2024 06:16) (95% - 100%)    Parameters below as of 26 Mar 2024 06:16  Patient On (Oxygen Delivery Method): high flow         CONSTITUTIONAL: NAD, well-developed  RESPIRATORY: Normal respiratory effort; lungs are clear to auscultation bilaterally  CARDIOVASCULAR: Regular rate and rhythm, normal S1 and S2, no murmur/rub/gallop; No lower extremity edema; Peripheral pulses are 2+ bilaterally  ABDOMEN: Nontender to palpation, normoactive bowel sounds, no rebound/guarding; No hepatosplenomegaly  MSK: no clubbing or cyanosis of digits; no joint swelling or tenderness to palpation  PSYCH: A+O to person, place, and time; affect appropriate    LABS:                        9.4    18.50 )-----------( 468      ( 26 Mar 2024 01:23 )             30.7     03-25    139  |  97<L>  |  13  ----------------------------<  108<H>  4.2   |  23  |  0.86    Ca    10.0      25 Mar 2024 05:56  Phos  3.8     03-25  Mg     2.30     03-25    TPro  7.1  /  Alb  3.4  /  TBili  0.2  /  DBili  x   /  AST  16  /  ALT  16  /  AlkPhos  70  03-25    PT/INR - ( 25 Mar 2024 05:56 )   PT: 11.5 sec;   INR: 1.02 ratio         PTT - ( 26 Mar 2024 01:23 )  PTT:168.3 sec      Urinalysis Basic - ( 25 Mar 2024 05:56 )    Color: x / Appearance: x / SG: x / pH: x  Gluc: 108 mg/dL / Ketone: x  / Bili: x / Urobili: x   Blood: x / Protein: x / Nitrite: x   Leuk Esterase: x / RBC: x / WBC x   Sq Epi: x / Non Sq Epi: x / Bacteria: x          Tele Reviewed:    RADIOLOGY & ADDITIONAL TESTS:  Results Reviewed:   Imaging Personally Reviewed:  Electrocardiogram Personally Reviewed:     PROGRESS NOTE:   Authored by Dr. Darlene Vallejo MD (PGY-1). Pager [facility] [phone] / PHILLIP ( [phone]) or via TEAMS    Patient is a 64y old  Female who presents with a chief complaint of SOB, cough (25 Mar 2024 15:15)      SUBJECTIVE / OVERNIGHT EVENTS:  Transferred to [address] [place] [address] r/o TB (?). This AM, notes improvement in cough when she is not moving, some mild blood in cough with forceful coughing. No fevers, still with wheezing. No BM yet.       MEDICATIONS  (STANDING):  albuterol/ipratropium for Nebulization 3 milliLiter(s) Nebulizer every 8 hours  buDESOnide    Inhalation Suspension 0.25 milliGRAM(s) Inhalation every 12 hours  dextrose 5%. 1000 milliLiter(s) (50 mL/Hr) IV Continuous <Continuous>  dextrose 5%. 1000 milliLiter(s) (100 mL/Hr) IV Continuous <Continuous>  dextrose 50% Injectable 25 Gram(s) IV Push once  dextrose 50% Injectable 12.5 Gram(s) IV Push once  dextrose 50% Injectable 25 Gram(s) IV Push once  DULoxetine 60 milliGRAM(s) Oral daily  gabapentin 600 milliGRAM(s) Oral at bedtime  glucagon  Injectable 1 milliGRAM(s) IntraMuscular once  heparin  Infusion.  Unit(s)/Hr (18 mL/Hr) IV Continuous <Continuous>  influenza   Vaccine 0.5 milliLiter(s) IntraMuscular once  insulin lispro (ADMELOG) corrective regimen sliding scale   SubCutaneous three times a day before meals  insulin lispro (ADMELOG) corrective regimen sliding scale   SubCutaneous at bedtime  methylPREDNISolone sodium succinate Injectable 40 milliGRAM(s) IV Push every 12 hours  pantoprazole    Tablet 40 milliGRAM(s) Oral before breakfast  senna 2 Tablet(s) Oral at bedtime  sodium chloride 3%  Inhalation 4 milliLiter(s) Inhalation every 12 hours  trimethoprim  160 mG/sulfamethoxazole 800 mG 2 Tablet(s) Oral every 8 hours  verapamil  milliGRAM(s) Oral daily    MEDICATIONS  (PRN):  acetaminophen     Tablet .. 650 milliGRAM(s) Oral every 6 hours PRN Temp greater or equal to 38C (100.4F), Mild Pain (1 - 3)  dextrose Oral Gel 15 Gram(s) Oral once PRN Blood Glucose LESS THAN 70 milliGRAM(s)/deciliter  hydrocodone/homatropine Syrup 5 milliLiter(s) Oral every 6 hours PRN Cough      CAPILLARY BLOOD GLUCOSE      POCT Blood Glucose.: 152 mg/dL (25 Mar 2024 21:28)  POCT Blood Glucose.: 148 mg/dL (25 Mar 2024 17:49)  POCT Blood Glucose.: 232 mg/dL (25 Mar 2024 12:52)  POCT Blood Glucose.: 150 mg/dL (25 Mar 2024 08:35)    I&O's Summary    25 Mar 2024 07:01  -  26 Mar 2024 07:00  --------------------------------------------------------  IN: 78 mL / OUT: 0 mL / NET: 78 mL        PHYSICAL EXAM:  Vital Signs Last 24 Hrs  T(C): 36.8 (26 Mar 2024 06:16), Max: 37.1 (25 Mar 2024 19:54)  T(F): 98.2 (26 Mar 2024 06:16), Max: 98.7 (25 Mar 2024 19:54)  HR: 102 (26 Mar 2024 06:16) (90 - 120)  BP: 134/72 (26 Mar 2024 06:16) (121/67 - 145/72)  BP(mean): --  RR: 19 (26 Mar 2024 06:16) (18 - 26)  SpO2: 98% (26 Mar 2024 06:16) (95% - 100%)    Parameters below as of 26 Mar 2024 06:16  Patient On (Oxygen Delivery Method): high flow         CONSTITUTIONAL: NAD, well-developed  RESPIRATORY: Normal respiratory effort; intermittent wheezing b/l posterior lung fields   CARDIOVASCULAR: Regular rate and rhythm, normal S1 and S2, no murmur/rub/gallop; No lower extremity edema; Peripheral pulses are 2+ bilaterally  ABDOMEN: Nontender to palpation, normoactive bowel sounds, no rebound/guarding; No hepatosplenomegaly  MSK: no clubbing or cyanosis of digits; no joint swelling or tenderness to palpation  PSYCH: A+O to person, place, and time; affect appropriate    LABS:                        9.4    18.50 )-----------( 468      ( 26 Mar 2024 01:23 )             30.7     03-25    139  |  97<L>  |  13  ----------------------------<  108<H>  4.2   |  23  |  0.86    Ca    10.0      25 Mar 2024 05:56  Phos  3.8     03-25  Mg     2.30     03-25    TPro  7.1  /  Alb  3.4  /  TBili  0.2  /  DBili  x   /  AST  16  /  ALT  16  /  AlkPhos  70  03-25    PT/INR - ( 25 Mar 2024 05:56 )   PT: 11.5 sec;   INR: 1.02 ratio         PTT - ( 26 Mar 2024 01:23 )  PTT:168.3 sec      Urinalysis Basic - ( 25 Mar 2024 05:56 )    Color: x / Appearance: x / SG: x / pH: x  Gluc: 108 mg/dL / Ketone: x  / Bili: x / Urobili: x   Blood: x / Protein: x / Nitrite: x   Leuk Esterase: x / RBC: x / WBC x   Sq Epi: x / Non Sq Epi: x / Bacteria: x          Tele Reviewed:    RADIOLOGY & ADDITIONAL TESTS:  Results Reviewed:   Imaging Personally Reviewed:  Electrocardiogram Personally Reviewed:

## 2024-03-26 NOTE — PROGRESS NOTE ADULT - PROBLEM SELECTOR PLAN 7
- On metformin, ozempic at home  - A1c 6.4  > C/w ISS, holding home meds  > CTM w/ FSGs > Holding home verapamil iso acute illness

## 2024-03-26 NOTE — PROGRESS NOTE ADULT - ASSESSMENT
63 yo woman with SLE, DM, h/o PE, CVA with left sided weakness presented 3/19 with fever, dry cough, SOB    Hospital admission Jan 2024 at [facility] for PNA - mycoplasma IgM + and flu A +   Received azithro, cefepime, doxy and prednisone taper at that time     CT chest 3/19 shows multifocal ground glass and consolidative opacities involving all 5 lobes  - more extensive c/w prior study and in slightly different distribution     Multifocal pulmonary infiltrates   New dx  HIV     CD4  14    PCP high in ddx     lfungitell 410    cxr improving on prednisone and bactrim      cefepime added for bacterial coverage     bood cultures no growth    serum crypt ag neg   quant gold indeterminate   PJP PCR testing     for sputum AFB x 3    Suggest:     c/w bactrim DS 2 tab po q 8 h   c/w cefepime  start biktarvy 1 tab po q 24 h   sputum AFB x 3   with next routine blood draw check syphilis screen, hepatitis B Ag, hepatitis B Ab, hepatitis C Ab, CMV IgM, CMV IgG, toxo IgM, toxo IgG

## 2024-03-26 NOTE — PHYSICAL THERAPY INITIAL EVALUATION ADULT - PATIENT PROFILE REVIEW, REHAB EVAL
Activity - Ambulate with Assistance. Pt cleared for PT evaluation prior to session by KALEN Trammell/yes

## 2024-03-26 NOTE — PHYSICAL THERAPY INITIAL EVALUATION ADULT - GAIT DEVIATIONS NOTED, PT EVAL
left foot drop/decreased cristofer/increased time in double stance/decreased weight-shifting ability

## 2024-03-26 NOTE — PROGRESS NOTE ADULT - PROBLEM SELECTOR PLAN 9
DVT Ppx: Heparin gtt.  Diet: regular DASH  Dispo: TBD DVT Ppx: xarelto   Diet: regular DASH  Dispo: TBD

## 2024-03-26 NOTE — PROGRESS NOTE ADULT - PROBLEM SELECTOR PLAN 5
> Holding ASA iso planned bronchoscopy  > C/w statin - H/o PE in 2019, on Xarelto at home  - S/p Xarelto 20mg qd  > C/w heparin gtt. - H/o PE in 2019, on Xarelto at home  - S/p Xarelto 20mg qd  > heparin drip transitioned to xarelto 3/26

## 2024-03-26 NOTE — PHYSICAL THERAPY INITIAL EVALUATION ADULT - NSPTDISCHREC_GEN_A_CORE
[facility] please follow PT noted for pt continued functional status/Outpatient PT

## 2024-03-26 NOTE — PROGRESS NOTE ADULT - ATTENDING COMMENTS
Pt is a 65 yo F with PMH chronic back pain (s/p spinal fusion), T2D, HTN, HLD, SLE, CVA/TIA, asthma, PE (2019, xarelto), and GBS p/w persistent cough, SOB, diarrhea, and fall 2/2 weakness. Recently hospitalized at [facility] 1/2024 for influenza with superimposed mycoplasma PNA s/p doxycycline course. On arrival, CTH neg, CT chest with diffuse GGO and consolidations. Labs with leukocytosis, normocytic anemia, neg GI PCR, RVP neg, and MRSA neg. TTE with EF 53% and mild G1 diastolic dysfunction. Repeat CT PE neg but with multiple BL nodules and patchy opacities, mostly in the RLL/R base. ID following, appreciate recs. Pulm following, was planned for bronch however now deferred given AIDS dx; on IV methylprednisolone 40mg BID with protonix for gastric protection. Receiving nebs around the clock and was on 4L NC but escalated to HFNC d/t significant coughing episodes -- 100% on HFNC 60/40, would be candidate for NC again however pt requesting to c/w HFNC. HIV+ with CD4 14 and VL 72K --- pending ID recs regarding initiation of ART. Fungitell elevated -- high suspicion for PJP PNA; on bactrim treatment. Will also start cefepime and azithromycin to cover likely superimposed bacterial PNA (typical and atypical coverage). Azithromycin will cover opportunistic infections, as will bactrim. Will transition hep gtt back to xarelto as no plan for bronch at present given risk >> benefit and being empirically treated now. Will inquire with pulm regarding plan for steroids. Discussed with HS, rest as outlined above.

## 2024-03-26 NOTE — PROGRESS NOTE ADULT - SUBJECTIVE AND OBJECTIVE BOX
PATIENT:  ROSSI ROJAS  [id number]    CHIEF COMPLAINT:  Patient is a 64y old  Female who presents with a chief complaint of SOB, cough (26 Mar 2024 07:06)      INTERVAL HISTORY/OVERNIGHT EVENTS:  - no acute events    MEDICATIONS:  MEDICATIONS  (STANDING):  albuterol/ipratropium for Nebulization 3 milliLiter(s) Nebulizer every 8 hours  aspirin  chewable 81 milliGRAM(s) Oral daily  azithromycin   Tablet 1200 milliGRAM(s) Oral <User Schedule>  buDESOnide    Inhalation Suspension 0.25 milliGRAM(s) Inhalation every 12 hours  dextrose 5%. 1000 milliLiter(s) (50 mL/Hr) IV Continuous <Continuous>  dextrose 5%. 1000 milliLiter(s) (100 mL/Hr) IV Continuous <Continuous>  dextrose 50% Injectable 25 Gram(s) IV Push once  dextrose 50% Injectable 12.5 Gram(s) IV Push once  dextrose 50% Injectable 25 Gram(s) IV Push once  DULoxetine 60 milliGRAM(s) Oral daily  gabapentin 600 milliGRAM(s) Oral at bedtime  glucagon  Injectable 1 milliGRAM(s) IntraMuscular once  influenza   Vaccine 0.5 milliLiter(s) IntraMuscular once  insulin lispro (ADMELOG) corrective regimen sliding scale   SubCutaneous three times a day before meals  insulin lispro (ADMELOG) corrective regimen sliding scale   SubCutaneous at bedtime  methylPREDNISolone sodium succinate Injectable 40 milliGRAM(s) IV Push every 12 hours  pantoprazole    Tablet 40 milliGRAM(s) Oral before breakfast  rivaroxaban 20 milliGRAM(s) Oral daily  senna 2 Tablet(s) Oral at bedtime  sodium chloride 3%  Inhalation 4 milliLiter(s) Inhalation every 12 hours  trimethoprim  160 mG/sulfamethoxazole 800 mG 2 Tablet(s) Oral every 8 hours  verapamil  milliGRAM(s) Oral daily    MEDICATIONS  (PRN):  acetaminophen     Tablet .. 650 milliGRAM(s) Oral every 6 hours PRN Temp greater or equal to 38C (100.4F), Mild Pain (1 - 3)  dextrose Oral Gel 15 Gram(s) Oral once PRN Blood Glucose LESS THAN 70 milliGRAM(s)/deciliter  hydrocodone/homatropine Syrup 5 milliLiter(s) Oral every 6 hours PRN Cough      ALLERGIES:  Allergies    No Known Allergies    Intolerances    dislikes Glucerna Shake (Other)      OBJECTIVE:  ICU Vital Signs Last 24 Hrs  T(C): 36.7 (26 Mar 2024 10:42), Max: 37.1 (25 Mar 2024 19:54)  T(F): 98.1 (26 Mar 2024 10:42), Max: 98.7 (25 Mar 2024 19:54)  HR: 99 (26 Mar 2024 10:42) (90 - 120)  BP: 123/78 (26 Mar 2024 10:42) (121/67 - 145/72)  BP(mean): --  ABP: --  ABP(mean): --  RR: 18 (26 Mar 2024 10:42) (18 - 26)  SpO2: 96% (26 Mar 2024 10:42) (95% - 100%)    O2 Parameters below as of 26 Mar 2024 10:42  Patient On (Oxygen Delivery Method): nasal cannula, high flow  O2 Flow (L/min): 40  O2 Concentration (%): 50        Adult Advanced Hemodynamics Last 24 Hrs  CVP(mm Hg): --  CVP(cm H2O): --  CO: --  CI: --  PA: --  PA(mean): --  PCWP: --  SVR: --  SVRI: --  PVR: --  PVRI: --  CAPILLARY BLOOD GLUCOSE      POCT Blood Glucose.: 140 mg/dL (26 Mar 2024 08:12)  POCT Blood Glucose.: 152 mg/dL (25 Mar 2024 21:28)  POCT Blood Glucose.: 148 mg/dL (25 Mar 2024 17:49)  POCT Blood Glucose.: 232 mg/dL (25 Mar 2024 12:52)    CAPILLARY BLOOD GLUCOSE      POCT Blood Glucose.: 140 mg/dL (26 Mar 2024 08:12)    I&O's Summary    25 Mar 2024 07:01  -  26 Mar 2024 07:00  --------------------------------------------------------  IN: 78 mL / OUT: 0 mL / NET: 78 mL      Daily     Daily     PHYSICAL EXAMINATION:  General: WN/WD NAD  HEENT: PERRLA, EOMI, moist mucous membranes  Neurology: A&Ox3, nonfocal, JIANG x 4  Respiratory: CTA B/L, normal respiratory effort, no wheezes, crackles, rales  CV: RRR, S1S2, no murmurs, rubs or gallops  Abdominal: Soft, NT, ND +BS, Last BM  Extremities: No edema, + peripheral pulses  Incisions:   Tubes:    LABS:                          9.5    16.92 )-----------( 475      ( 26 Mar 2024 08:22 )             30.6     03-26    137  |  100  |  15  ----------------------------<  129<H>  4.2   |  24  |  0.90    Ca    9.4      26 Mar 2024 08:22  Phos  3.9     03-26  Mg     2.40     03-26    TPro  6.0  /  Alb  3.2<L>  /  TBili  0.2  /  DBili  x   /  AST  18  /  ALT  17  /  AlkPhos  58  03-26    LIVER FUNCTIONS - ( 26 Mar 2024 08:22 )  Alb: 3.2 g/dL / Pro: 6.0 g/dL / ALK PHOS: 58 U/L / ALT: 17 U/L / AST: 18 U/L / GGT: x           PT/INR - ( 26 Mar 2024 08:22 )   PT: 11.6 sec;   INR: 1.04 ratio         PTT - ( 26 Mar 2024 08:22 )  PTT:145.3 sec        Urinalysis Basic - ( 26 Mar 2024 08:22 )    Color: x / Appearance: x / SG: x / pH: x  Gluc: 129 mg/dL / Ketone: x  / Bili: x / Urobili: x   Blood: x / Protein: x / Nitrite: x   Leuk Esterase: x / RBC: x / WBC x   Sq Epi: x / Non Sq Epi: x / Bacteria: x        TELEMETRY:     EKG:     IMAGING:       PATIENT:  ROSSI ROJAS  [id number]    CHIEF COMPLAINT:  Patient is a 64y old  Female who presents with a chief complaint of SOB, cough (26 Mar 2024 07:06)      INTERVAL HISTORY/OVERNIGHT EVENTS:  - no acute events    MEDICATIONS:  MEDICATIONS  (STANDING):  albuterol/ipratropium for Nebulization 3 milliLiter(s) Nebulizer every 8 hours  aspirin  chewable 81 milliGRAM(s) Oral daily  azithromycin   Tablet 1200 milliGRAM(s) Oral <User Schedule>  buDESOnide    Inhalation Suspension 0.25 milliGRAM(s) Inhalation every 12 hours  dextrose 5%. 1000 milliLiter(s) (50 mL/Hr) IV Continuous <Continuous>  dextrose 5%. 1000 milliLiter(s) (100 mL/Hr) IV Continuous <Continuous>  dextrose 50% Injectable 25 Gram(s) IV Push once  dextrose 50% Injectable 12.5 Gram(s) IV Push once  dextrose 50% Injectable 25 Gram(s) IV Push once  DULoxetine 60 milliGRAM(s) Oral daily  gabapentin 600 milliGRAM(s) Oral at bedtime  glucagon  Injectable 1 milliGRAM(s) IntraMuscular once  influenza   Vaccine 0.5 milliLiter(s) IntraMuscular once  insulin lispro (ADMELOG) corrective regimen sliding scale   SubCutaneous three times a day before meals  insulin lispro (ADMELOG) corrective regimen sliding scale   SubCutaneous at bedtime  methylPREDNISolone sodium succinate Injectable 40 milliGRAM(s) IV Push every 12 hours  pantoprazole    Tablet 40 milliGRAM(s) Oral before breakfast  rivaroxaban 20 milliGRAM(s) Oral daily  senna 2 Tablet(s) Oral at bedtime  sodium chloride 3%  Inhalation 4 milliLiter(s) Inhalation every 12 hours  trimethoprim  160 mG/sulfamethoxazole 800 mG 2 Tablet(s) Oral every 8 hours  verapamil  milliGRAM(s) Oral daily    MEDICATIONS  (PRN):  acetaminophen     Tablet .. 650 milliGRAM(s) Oral every 6 hours PRN Temp greater or equal to 38C (100.4F), Mild Pain (1 - 3)  dextrose Oral Gel 15 Gram(s) Oral once PRN Blood Glucose LESS THAN 70 milliGRAM(s)/deciliter  hydrocodone/homatropine Syrup 5 milliLiter(s) Oral every 6 hours PRN Cough      ALLERGIES:  Allergies    No Known Allergies    Intolerances    dislikes Glucerna Shake (Other)      OBJECTIVE:  ICU Vital Signs Last 24 Hrs  T(C): 36.7 (26 Mar 2024 10:42), Max: 37.1 (25 Mar 2024 19:54)  T(F): 98.1 (26 Mar 2024 10:42), Max: 98.7 (25 Mar 2024 19:54)  HR: 99 (26 Mar 2024 10:42) (90 - 120)  BP: 123/78 (26 Mar 2024 10:42) (121/67 - 145/72)  RR: 18 (26 Mar 2024 10:42) (18 - 26)  SpO2: 96% (26 Mar 2024 10:42) (95% - 100%)    O2 Parameters below as of 26 Mar 2024 10:42  Patient On (Oxygen Delivery Method): nasal cannula, high flow  O2 Flow (L/min): 40  O2 Concentration (%): 50    POCT Blood Glucose.: 140 mg/dL (26 Mar 2024 08:12)  POCT Blood Glucose.: 152 mg/dL (25 Mar 2024 21:28)  POCT Blood Glucose.: 148 mg/dL (25 Mar 2024 17:49)  POCT Blood Glucose.: 232 mg/dL (25 Mar 2024 12:52)  POCT Blood Glucose.: 140 mg/dL (26 Mar 2024 08:12)    I&O's Summary    25 Mar 2024 07:01  -  26 Mar 2024 07:00  --------------------------------------------------------  IN: 78 mL / OUT: 0 mL / NET: 78 mL      Daily     Daily     PHYSICAL EXAMINATION:  General: WN/WD NAD  HEENT: EOMI, moist mucous membranes  Neurology: A&Ox3, nonfocal, JIANG x 4  Respiratory: crackles; 40L 50% HFNC  CV: RRR, S1S2, no murmurs, rubs or gallops  Abdominal: Soft, NT, ND +BS  Extremities: No edema, +peripheral pulses    LABS:                          9.5    16.92 )-----------( 475      ( 26 Mar 2024 08:22 )             30.6     03-26    137  |  100  |  15  ----------------------------<  129<H>  4.2   |  24  |  0.90    Ca    9.4      26 Mar 2024 08:22  Phos  3.9     03-26  Mg     2.40     03-26    TPro  6.0  /  Alb  3.2<L>  /  TBili  0.2  /  DBili  x   /  AST  18  /  ALT  17  /  AlkPhos  58  03-26    LIVER FUNCTIONS - ( 26 Mar 2024 08:22 )  Alb: 3.2 g/dL / Pro: 6.0 g/dL / ALK PHOS: 58 U/L / ALT: 17 U/L / AST: 18 U/L / GGT: x           PT/INR - ( 26 Mar 2024 08:22 )   PT: 11.6 sec;   INR: 1.04 ratio    PTT - ( 26 Mar 2024 08:22 )  PTT:145.3 sec    Urinalysis Basic - ( 26 Mar 2024 08:22 )    Color: x / Appearance: x / SG: x / pH: x  Gluc: 129 mg/dL / Ketone: x  / Bili: x / Urobili: x   Blood: x / Protein: x / Nitrite: x   Leuk Esterase: x / RBC: x / WBC x   Sq Epi: x / Non Sq Epi: x / Bacteria: x

## 2024-03-27 LAB
ALBUMIN SERPL ELPH-MCNC: 3.3 G/DL — SIGNIFICANT CHANGE UP (ref 3.3–5)
ALP SERPL-CCNC: 62 U/L — SIGNIFICANT CHANGE UP (ref 40–120)
ALT FLD-CCNC: 19 U/L — SIGNIFICANT CHANGE UP (ref 4–33)
ANION GAP SERPL CALC-SCNC: 16 MMOL/L — HIGH (ref 7–14)
AST SERPL-CCNC: 14 U/L — SIGNIFICANT CHANGE UP (ref 4–32)
BASE EXCESS BLDA CALC-SCNC: -2 MMOL/L — SIGNIFICANT CHANGE UP (ref -2–3)
BILIRUB SERPL-MCNC: <0.2 MG/DL — SIGNIFICANT CHANGE UP (ref 0.2–1.2)
BUN SERPL-MCNC: 15 MG/DL — SIGNIFICANT CHANGE UP (ref 7–23)
CALCIUM SERPL-MCNC: 9.9 MG/DL — SIGNIFICANT CHANGE UP (ref 8.4–10.5)
CHLORIDE SERPL-SCNC: 97 MMOL/L — LOW (ref 98–107)
CMV IGG FLD QL: 4.9 U/ML — HIGH
CMV IGG SERPL-IMP: POSITIVE
CMV IGM FLD-ACNC: <8 AU/ML — SIGNIFICANT CHANGE UP
CMV IGM SERPL QL: NEGATIVE — SIGNIFICANT CHANGE UP
CO2 BLDA-SCNC: 25 MMOL/L — HIGH (ref 19–24)
CO2 SERPL-SCNC: 22 MMOL/L — SIGNIFICANT CHANGE UP (ref 22–31)
CREAT SERPL-MCNC: 0.82 MG/DL — SIGNIFICANT CHANGE UP (ref 0.5–1.3)
EGFR: 80 ML/MIN/1.73M2 — SIGNIFICANT CHANGE UP
GAS PNL BLDA: SIGNIFICANT CHANGE UP
GLUCOSE BLDC GLUCOMTR-MCNC: 131 MG/DL — HIGH (ref 70–99)
GLUCOSE BLDC GLUCOMTR-MCNC: 135 MG/DL — HIGH (ref 70–99)
GLUCOSE BLDC GLUCOMTR-MCNC: 161 MG/DL — HIGH (ref 70–99)
GLUCOSE BLDC GLUCOMTR-MCNC: 219 MG/DL — HIGH (ref 70–99)
GLUCOSE SERPL-MCNC: 92 MG/DL — SIGNIFICANT CHANGE UP (ref 70–99)
HBV SURFACE AB SER-ACNC: SIGNIFICANT CHANGE UP
HCO3 BLDA-SCNC: 24 MMOL/L — SIGNIFICANT CHANGE UP (ref 21–28)
HCT VFR BLD CALC: 30.8 % — LOW (ref 34.5–45)
HCV AB S/CO SERPL IA: 0.04 S/CO — SIGNIFICANT CHANGE UP (ref 0–0.99)
HCV AB SERPL-IMP: SIGNIFICANT CHANGE UP
HGB BLD-MCNC: 9.9 G/DL — LOW (ref 11.5–15.5)
HOROWITZ INDEX BLDA+IHG-RTO: 21 — SIGNIFICANT CHANGE UP
MAGNESIUM SERPL-MCNC: 2.4 MG/DL — SIGNIFICANT CHANGE UP (ref 1.6–2.6)
MCHC RBC-ENTMCNC: 26.8 PG — LOW (ref 27–34)
MCHC RBC-ENTMCNC: 32.1 GM/DL — SIGNIFICANT CHANGE UP (ref 32–36)
MCV RBC AUTO: 83.5 FL — SIGNIFICANT CHANGE UP (ref 80–100)
NRBC # BLD: 0 /100 WBCS — SIGNIFICANT CHANGE UP (ref 0–0)
NRBC # FLD: 0.03 K/UL — HIGH (ref 0–0)
P JIROVECII DNA L RESP QL NAA+NON-PROBE: POSITIVE
PCO2 BLDA: 43 MMHG — SIGNIFICANT CHANGE UP (ref 32–45)
PH BLDA: 7.35 — SIGNIFICANT CHANGE UP (ref 7.35–7.45)
PHOSPHATE SERPL-MCNC: 4.6 MG/DL — HIGH (ref 2.5–4.5)
PLATELET # BLD AUTO: 541 K/UL — HIGH (ref 150–400)
PO2 BLDA: 55 MMHG — LOW (ref 83–108)
POTASSIUM SERPL-MCNC: 4.3 MMOL/L — SIGNIFICANT CHANGE UP (ref 3.5–5.3)
POTASSIUM SERPL-SCNC: 4.3 MMOL/L — SIGNIFICANT CHANGE UP (ref 3.5–5.3)
PROT SERPL-MCNC: 6.2 G/DL — SIGNIFICANT CHANGE UP (ref 6–8.3)
RBC # BLD: 3.69 M/UL — LOW (ref 3.8–5.2)
RBC # FLD: 19.3 % — HIGH (ref 10.3–14.5)
SAO2 % BLDA: 84.4 % — LOW (ref 94–98)
SODIUM SERPL-SCNC: 135 MMOL/L — SIGNIFICANT CHANGE UP (ref 135–145)
SPECIMEN SOURCE: SIGNIFICANT CHANGE UP
T GONDII IGG SER QL: <3 IU/ML — SIGNIFICANT CHANGE UP
T GONDII IGG SER QL: NEGATIVE — SIGNIFICANT CHANGE UP
T GONDII IGM SER QL: <3 AU/ML — SIGNIFICANT CHANGE UP
T GONDII IGM SER QL: NEGATIVE — SIGNIFICANT CHANGE UP
T PALLIDUM AB TITR SER: NEGATIVE — SIGNIFICANT CHANGE UP
WBC # BLD: 18.34 K/UL — HIGH (ref 3.8–10.5)
WBC # FLD AUTO: 18.34 K/UL — HIGH (ref 3.8–10.5)

## 2024-03-27 PROCEDURE — 99232 SBSQ HOSP IP/OBS MODERATE 35: CPT

## 2024-03-27 PROCEDURE — 99233 SBSQ HOSP IP/OBS HIGH 50: CPT | Mod: GC

## 2024-03-27 RX ORDER — POLYETHYLENE GLYCOL 3350 17 G/17G
17 POWDER, FOR SOLUTION ORAL DAILY
Refills: 0 | Status: DISCONTINUED | OUTPATIENT
Start: 2024-03-27 | End: 2024-04-11

## 2024-03-27 RX ORDER — LACTULOSE 10 G/15ML
20 SOLUTION ORAL ONCE
Refills: 0 | Status: COMPLETED | OUTPATIENT
Start: 2024-03-27 | End: 2024-03-27

## 2024-03-27 RX ADMIN — Medication 2: at 12:08

## 2024-03-27 RX ADMIN — Medication 3 MILLILITER(S): at 16:16

## 2024-03-27 RX ADMIN — Medication 2 TABLET(S): at 13:08

## 2024-03-27 RX ADMIN — Medication 0.25 MILLIGRAM(S): at 08:10

## 2024-03-27 RX ADMIN — Medication 81 MILLIGRAM(S): at 12:09

## 2024-03-27 RX ADMIN — Medication 3 MILLILITER(S): at 08:09

## 2024-03-27 RX ADMIN — BICTEGRAVIR SODIUM, EMTRICITABINE, AND TENOFOVIR ALAFENAMIDE FUMARATE 1 TABLET(S): 30; 120; 15 TABLET ORAL at 12:09

## 2024-03-27 RX ADMIN — CEFEPIME 100 MILLIGRAM(S): 1 INJECTION, POWDER, FOR SOLUTION INTRAMUSCULAR; INTRAVENOUS at 17:46

## 2024-03-27 RX ADMIN — Medication 120 MILLIGRAM(S): at 06:08

## 2024-03-27 RX ADMIN — LACTULOSE 20 GRAM(S): 10 SOLUTION ORAL at 12:10

## 2024-03-27 RX ADMIN — Medication 2 TABLET(S): at 22:32

## 2024-03-27 RX ADMIN — POLYETHYLENE GLYCOL 3350 17 GRAM(S): 17 POWDER, FOR SOLUTION ORAL at 12:11

## 2024-03-27 RX ADMIN — SODIUM CHLORIDE 4 MILLILITER(S): 9 INJECTION INTRAMUSCULAR; INTRAVENOUS; SUBCUTANEOUS at 08:08

## 2024-03-27 RX ADMIN — GABAPENTIN 600 MILLIGRAM(S): 400 CAPSULE ORAL at 22:31

## 2024-03-27 RX ADMIN — Medication 2 TABLET(S): at 06:08

## 2024-03-27 RX ADMIN — RIVAROXABAN 20 MILLIGRAM(S): KIT at 12:09

## 2024-03-27 RX ADMIN — DULOXETINE HYDROCHLORIDE 60 MILLIGRAM(S): 30 CAPSULE, DELAYED RELEASE ORAL at 12:09

## 2024-03-27 RX ADMIN — Medication 40 MILLIGRAM(S): at 06:08

## 2024-03-27 RX ADMIN — Medication 40 MILLIGRAM(S): at 17:46

## 2024-03-27 RX ADMIN — CEFEPIME 100 MILLIGRAM(S): 1 INJECTION, POWDER, FOR SOLUTION INTRAMUSCULAR; INTRAVENOUS at 06:07

## 2024-03-27 RX ADMIN — PANTOPRAZOLE SODIUM 40 MILLIGRAM(S): 20 TABLET, DELAYED RELEASE ORAL at 06:08

## 2024-03-27 NOTE — PROGRESS NOTE ADULT - ATTENDING COMMENTS
64-year-old female w/ PMH of lupus, PE on Xarelto (dx 2019), asthma, HTN, T2DM, and GBS (dx 1997) presenting with 3wk h/o of AHRF.  Pt now w/ confirmed AIDS and very low cd4 count.   Pt appears comfortable in bed but has worsening hypoxemia since Friday and now HFNC 60% fio2.     Given new dx of AIDS and w/ very high fungitell and CT chest showing at least some areas of GGO the likelihood of pjp is high.    AGree w/ pjp tx and bacterial coverage. Pt improving with decreasing fio2 requirements    - pjp tx w/ bactrim   - cont steroids for pjp  - ART tx as per ID   - RLL changes more consolidative and may benefit from full course of bacterial coverage for pna- now on cefepime (and azithro also for mac ppx)  - being r/o for TB for afb sputum, can use hypersal if needed  - on a/c for PE hx

## 2024-03-27 NOTE — PROGRESS NOTE ADULT - PROBLEM SELECTOR PLAN 5
- H/o PE in 2019, on Xarelto at home  - S/p Xarelto 20mg qd  > heparin drip transitioned to xarelto 3/26

## 2024-03-27 NOTE — PROGRESS NOTE ADULT - ASSESSMENT
63 yo woman with SLE, DM, h/o PE, CVA with left sided weakness presented 3/19 with fever, dry cough, SOB    Hospital admission Jan 2024 at [facility] for PNA - mycoplasma IgM + and flu A +   Received azithro, cefepime, doxy and prednisone taper at that time     CT chest 3/19 shows multifocal ground glass and consolidative opacities involving all 5 lobes  - more extensive c/w prior study and in slightly different distribution     Multifocal pulmonary infiltrates   New dx  HIV     CD4  14    PCP high in ddx     lfungitell 410    cxr improving on prednisone and bactrim      cefepime added for bacterial coverage     bood cultures no growth    serum crypt ag neg   quant gold indeterminate   PJP PCR testing     for sputum AFB x 3    Suggest:     c/w bactrim DS 2 tab po q 8 h   c/w cefepime  start biktarvy 1 tab po q 24 h   sputum AFB x 3   follow syphilis screen, hepatitis B Ag, hepatitis B Ab, hepatitis C Ab, CMV IgM, CMV IgG, toxo IgM, toxo IgG      I will be away 3/28  ID service available

## 2024-03-27 NOTE — PROGRESS NOTE ADULT - SUBJECTIVE AND OBJECTIVE BOX
PROGRESS NOTE:   Authored by Dr. Darlene Vallejo MD (PGY-1). Pager [facility] [phone] / PHILLIP ( [phone]) or via TEAMS    Patient is a 64y old  Female who presents with a chief complaint of SOB, cough (26 Mar 2024 17:19)      SUBJECTIVE / OVERNIGHT EVENTS:  No acute events overnight.     ADDITIONAL REVIEW OF SYSTEMS:  Patient denies fevers, chills, chest pain, shortness of breath, nausea, abdominal pain, diarrhea, constipation, dysuria, leg swelling, headache, light headedness.    MEDICATIONS  (STANDING):  albuterol/ipratropium for Nebulization 3 milliLiter(s) Nebulizer every 8 hours  aspirin  chewable 81 milliGRAM(s) Oral daily  azithromycin   Tablet 1200 milliGRAM(s) Oral <User Schedule>  bictegravir 50 mG/emtricitabine 200 mG/tenofovir alafenamide 25 mG (BIKTARVY) 1 Tablet(s) Oral daily  buDESOnide    Inhalation Suspension 0.25 milliGRAM(s) Inhalation every 12 hours  cefepime   IVPB      cefepime   IVPB 2000 milliGRAM(s) IV Intermittent every 12 hours  dextrose 5%. 1000 milliLiter(s) (50 mL/Hr) IV Continuous <Continuous>  dextrose 5%. 1000 milliLiter(s) (100 mL/Hr) IV Continuous <Continuous>  dextrose 50% Injectable 12.5 Gram(s) IV Push once  dextrose 50% Injectable 25 Gram(s) IV Push once  dextrose 50% Injectable 25 Gram(s) IV Push once  DULoxetine 60 milliGRAM(s) Oral daily  gabapentin 600 milliGRAM(s) Oral at bedtime  glucagon  Injectable 1 milliGRAM(s) IntraMuscular once  influenza   Vaccine 0.5 milliLiter(s) IntraMuscular once  insulin lispro (ADMELOG) corrective regimen sliding scale   SubCutaneous at bedtime  insulin lispro (ADMELOG) corrective regimen sliding scale   SubCutaneous three times a day before meals  methylPREDNISolone sodium succinate Injectable 40 milliGRAM(s) IV Push every 12 hours  pantoprazole    Tablet 40 milliGRAM(s) Oral before breakfast  rivaroxaban 20 milliGRAM(s) Oral daily  senna 2 Tablet(s) Oral at bedtime  sodium chloride 3%  Inhalation 4 milliLiter(s) Inhalation every 12 hours  trimethoprim  160 mG/sulfamethoxazole 800 mG 2 Tablet(s) Oral every 8 hours  verapamil  milliGRAM(s) Oral daily    MEDICATIONS  (PRN):  acetaminophen     Tablet .. 650 milliGRAM(s) Oral every 6 hours PRN Temp greater or equal to 38C (100.4F), Mild Pain (1 - 3)  dextrose Oral Gel 15 Gram(s) Oral once PRN Blood Glucose LESS THAN 70 milliGRAM(s)/deciliter  hydrocodone/homatropine Syrup 5 milliLiter(s) Oral every 6 hours PRN Cough      CAPILLARY BLOOD GLUCOSE      POCT Blood Glucose.: 162 mg/dL (26 Mar 2024 22:08)  POCT Blood Glucose.: 143 mg/dL (26 Mar 2024 17:20)  POCT Blood Glucose.: 173 mg/dL (26 Mar 2024 11:50)  POCT Blood Glucose.: 140 mg/dL (26 Mar 2024 08:12)    I&O's Summary    26 Mar 2024 07:01  -  27 Mar 2024 07:00  --------------------------------------------------------  IN: 240 mL / OUT: 1000 mL / NET: -760 mL        PHYSICAL EXAM:  Vital Signs Last 24 Hrs  T(C): 36.8 (27 Mar 2024 05:13), Max: 37.1 (26 Mar 2024 20:16)  T(F): 98.2 (27 Mar 2024 05:13), Max: 98.7 (26 Mar 2024 20:16)  HR: 93 (27 Mar 2024 05:13) (84 - 101)  BP: 141/77 (27 Mar 2024 05:13) (123/78 - 148/87)  BP(mean): --  RR: 18 (27 Mar 2024 05:13) (18 - 20)  SpO2: 96% (27 Mar 2024 05:13) (96% - 99%)    Parameters below as of 27 Mar 2024 05:13  Patient On (Oxygen Delivery Method): nasal cannula, high flow        CONSTITUTIONAL: NAD, well-developed  RESPIRATORY: Normal respiratory effort; lungs are clear to auscultation bilaterally  CARDIOVASCULAR: Regular rate and rhythm, normal S1 and S2, no murmur/rub/gallop; No lower extremity edema; Peripheral pulses are 2+ bilaterally  ABDOMEN: Nontender to palpation, normoactive bowel sounds, no rebound/guarding; No hepatosplenomegaly  MSK: no clubbing or cyanosis of digits; no joint swelling or tenderness to palpation  PSYCH: A+O to person, place, and time; affect appropriate    LABS:                        9.5    16.92 )-----------( 475      ( 26 Mar 2024 08:22 )             30.6     03-26    137  |  100  |  15  ----------------------------<  129<H>  4.2   |  24  |  0.90    Ca    9.4      26 Mar 2024 08:22  Phos  3.9     03-26  Mg     2.40     03-26    TPro  6.0  /  Alb  3.2<L>  /  TBili  0.2  /  DBili  x   /  AST  18  /  ALT  17  /  AlkPhos  58  03-26    PT/INR - ( 26 Mar 2024 08:22 )   PT: 11.6 sec;   INR: 1.04 ratio         PTT - ( 26 Mar 2024 08:22 )  PTT:145.3 sec      Urinalysis Basic - ( 26 Mar 2024 08:22 )    Color: x / Appearance: x / SG: x / pH: x  Gluc: 129 mg/dL / Ketone: x  / Bili: x / Urobili: x   Blood: x / Protein: x / Nitrite: x   Leuk Esterase: x / RBC: x / WBC x   Sq Epi: x / Non Sq Epi: x / Bacteria: x        Culture - Blood (collected 26 Mar 2024 01:10)  Source: .Blood Blood-Peripheral  Preliminary Report (27 Mar 2024 07:02):    No growth at 24 hours    Culture - Blood (collected 25 Mar 2024 19:32)  Source: .Blood Blood-Peripheral  Preliminary Report (26 Mar 2024 22:02):    No growth at 24 hours        Tele Reviewed:    RADIOLOGY & ADDITIONAL TESTS:  Results Reviewed:   Imaging Personally Reviewed:  Electrocardiogram Personally Reviewed:     PROGRESS NOTE:   Authored by Dr. Darlene Vallejo MD (PGY-1). Pager [facility] [phone] / PHILLIP ( [phone]) or via TEAMS    Patient is a 64y old  Female who presents with a chief complaint of SOB, cough (26 Mar 2024 17:19)      SUBJECTIVE / OVERNIGHT EVENTS:  No acute events overnight. Still with wheezing and shortness of breath/ cough. No BM as of yet.       MEDICATIONS  (STANDING):  albuterol/ipratropium for Nebulization 3 milliLiter(s) Nebulizer every 8 hours  aspirin  chewable 81 milliGRAM(s) Oral daily  azithromycin   Tablet 1200 milliGRAM(s) Oral <User Schedule>  bictegravir 50 mG/emtricitabine 200 mG/tenofovir alafenamide 25 mG (BIKTARVY) 1 Tablet(s) Oral daily  buDESOnide    Inhalation Suspension 0.25 milliGRAM(s) Inhalation every 12 hours  cefepime   IVPB      cefepime   IVPB 2000 milliGRAM(s) IV Intermittent every 12 hours  dextrose 5%. 1000 milliLiter(s) (50 mL/Hr) IV Continuous <Continuous>  dextrose 5%. 1000 milliLiter(s) (100 mL/Hr) IV Continuous <Continuous>  dextrose 50% Injectable 12.5 Gram(s) IV Push once  dextrose 50% Injectable 25 Gram(s) IV Push once  dextrose 50% Injectable 25 Gram(s) IV Push once  DULoxetine 60 milliGRAM(s) Oral daily  gabapentin 600 milliGRAM(s) Oral at bedtime  glucagon  Injectable 1 milliGRAM(s) IntraMuscular once  influenza   Vaccine 0.5 milliLiter(s) IntraMuscular once  insulin lispro (ADMELOG) corrective regimen sliding scale   SubCutaneous at bedtime  insulin lispro (ADMELOG) corrective regimen sliding scale   SubCutaneous three times a day before meals  methylPREDNISolone sodium succinate Injectable 40 milliGRAM(s) IV Push every 12 hours  pantoprazole    Tablet 40 milliGRAM(s) Oral before breakfast  rivaroxaban 20 milliGRAM(s) Oral daily  senna 2 Tablet(s) Oral at bedtime  sodium chloride 3%  Inhalation 4 milliLiter(s) Inhalation every 12 hours  trimethoprim  160 mG/sulfamethoxazole 800 mG 2 Tablet(s) Oral every 8 hours  verapamil  milliGRAM(s) Oral daily    MEDICATIONS  (PRN):  acetaminophen     Tablet .. 650 milliGRAM(s) Oral every 6 hours PRN Temp greater or equal to 38C (100.4F), Mild Pain (1 - 3)  dextrose Oral Gel 15 Gram(s) Oral once PRN Blood Glucose LESS THAN 70 milliGRAM(s)/deciliter  hydrocodone/homatropine Syrup 5 milliLiter(s) Oral every 6 hours PRN Cough      CAPILLARY BLOOD GLUCOSE      POCT Blood Glucose.: 162 mg/dL (26 Mar 2024 22:08)  POCT Blood Glucose.: 143 mg/dL (26 Mar 2024 17:20)  POCT Blood Glucose.: 173 mg/dL (26 Mar 2024 11:50)  POCT Blood Glucose.: 140 mg/dL (26 Mar 2024 08:12)    I&O's Summary    26 Mar 2024 07:01  -  27 Mar 2024 07:00  --------------------------------------------------------  IN: 240 mL / OUT: 1000 mL / NET: -760 mL        PHYSICAL EXAM:  Vital Signs Last 24 Hrs  T(C): 36.8 (27 Mar 2024 05:13), Max: 37.1 (26 Mar 2024 20:16)  T(F): 98.2 (27 Mar 2024 05:13), Max: 98.7 (26 Mar 2024 20:16)  HR: 93 (27 Mar 2024 05:13) (84 - 101)  BP: 141/77 (27 Mar 2024 05:13) (123/78 - 148/87)  BP(mean): --  RR: 18 (27 Mar 2024 05:13) (18 - 20)  SpO2: 96% (27 Mar 2024 05:13) (96% - 99%)    Parameters below as of 27 Mar 2024 05:13  Patient On (Oxygen Delivery Method): nasal cannula, high flow        CONSTITUTIONAL: on high flow, coughing intermittently   RESPIRATORY: Normal respiratory effort; lungs are clear to auscultation bilaterally  CARDIOVASCULAR: Regular rate and rhythm, normal S1 and S2, no murmur/rub/gallop; No lower extremity edema; Peripheral pulses are 2+ bilaterally  ABDOMEN: Nontender to palpation, normoactive bowel sounds, no rebound/guarding; No hepatosplenomegaly  MSK: no clubbing or cyanosis of digits; no joint swelling or tenderness to palpation  PSYCH: A+O to person, place, and time; affect appropriate    LABS:                        9.5    16.92 )-----------( 475      ( 26 Mar 2024 08:22 )             30.6     03-26    137  |  100  |  15  ----------------------------<  129<H>  4.2   |  24  |  0.90    Ca    9.4      26 Mar 2024 08:22  Phos  3.9     03-26  Mg     2.40     03-26    TPro  6.0  /  Alb  3.2<L>  /  TBili  0.2  /  DBili  x   /  AST  18  /  ALT  17  /  AlkPhos  58  03-26    PT/INR - ( 26 Mar 2024 08:22 )   PT: 11.6 sec;   INR: 1.04 ratio         PTT - ( 26 Mar 2024 08:22 )  PTT:145.3 sec      Urinalysis Basic - ( 26 Mar 2024 08:22 )    Color: x / Appearance: x / SG: x / pH: x  Gluc: 129 mg/dL / Ketone: x  / Bili: x / Urobili: x   Blood: x / Protein: x / Nitrite: x   Leuk Esterase: x / RBC: x / WBC x   Sq Epi: x / Non Sq Epi: x / Bacteria: x        Culture - Blood (collected 26 Mar 2024 01:10)  Source: .Blood Blood-Peripheral  Preliminary Report (27 Mar 2024 07:02):    No growth at 24 hours    Culture - Blood (collected 25 Mar 2024 19:32)  Source: .Blood Blood-Peripheral  Preliminary Report (26 Mar 2024 22:02):    No growth at 24 hours        Tele Reviewed:    RADIOLOGY & ADDITIONAL TESTS:  Results Reviewed:   Imaging Personally Reviewed:  Electrocardiogram Personally Reviewed:

## 2024-03-27 NOTE — PROGRESS NOTE ADULT - ASSESSMENT
64-year-old female w/ PMH of lupus, PE on Xarelto (dx 2019), asthma, HTN, T2DM, and GBS (dx 1997) presenting with 3wk h/o of worsening SOB and persistent cough. Patient was notably recently admitted at [facility] in 01/2024 for superimposed mycoplasma PNA on influenza now presenting with progressive dyspnea and persistent nonproductive cough, found to have severe AIDS/HIV, with likely bacterial pneumonia and PJP    Told me +HIV a decade ago, was told it was "false+" from Lupus    Weaned FiO2 to 40%  Can probably try 6 L    #Abnormal CT chest  #asthma  Denies complete resolution of symptoms since her prior discharge.   Noted recent VS admission with repeat CT chest with improvement in some areas with new opacities in others  - c/w standing nebs for now and 3% NaCl inhalation BID  - she has AIDS and this changes our differential - very likely infectious here  - cefepime/Bactrim/Zithromax  - ARV  - if unchanged or worsening can entertain notion of bronchoscopy but would favor treating and reconstituting for now i.e. risk likely does not outweigh benefit at this time for bronch given HFNC  - bcx x 2, ucx, sputum cx  - please check abg and cxr daily  - we doubt she has lupus (negative THERON, other symptoms can be explained by HIV untreated for a decade or more), please obtain rheum consult to re-assess prior dx

## 2024-03-27 NOTE — PROGRESS NOTE ADULT - PROBLEM SELECTOR PLAN 2
-> sx of SOB, cough concern for HIV, prelim/ confirmatory test+ for HIV with CD4 of 14, viral load >70K  - ID consulted, continue bactrim-DS q8hr, adding azithromycin and cefepime 3/26 HIV prophylaxis   -F/u sputum cx, PJP cx  - daily ABG's/ chest x ray as per pulm -> sx of SOB, cough concern for HIV, prelim/ confirmatory test+ for HIV with CD4 of 14, viral load >70K  - ID consulted, continue bactrim-DS q8hr, adding azithromycin and cefepime 3/26 HIV prophylaxis   - adding Biktarvy 3/26   -F/u sputum cx, PJP cx  - daily ABG's/ chest x ray as per pulm

## 2024-03-27 NOTE — PROGRESS NOTE ADULT - PROBLEM SELECTOR PLAN 1
- initially P/w SOB, persistent cough  - Recent admission for superimposed mycoplasma PNA on influenza, s/p azithromycin/doxycycline  - RVP unremarkable, CXR -> Bibasilar airspace opacities  - CT Chest -> Multifocal groundglass and consolidative opacities involving all 5 lobes, most predominant in the b/l lower lobes and right upper lobe  - S/p empiric meropenem, doxycyline iso lack of clinical improvement, per ID  - Fungitell elevated  - c/w solumedrol daily as per pulm for PJP prophylaxis   > C/w duonebs q6, inhaled budesonide BID, Robitussin-DM q4 prn, and hycodan PRN  > Started HFNC, will wean prn  > Pulmonology consulted, will continue to appreciate recs, bronchoscopy planned for 3/26 now cancelled in setting of fulminant AIDS  > ID consulted, will continue to appreciate recs

## 2024-03-27 NOTE — PROGRESS NOTE ADULT - SUBJECTIVE AND OBJECTIVE BOX
PATIENT:  ROSSI ROJAS  [id number]    CHIEF COMPLAINT:  Patient is a 64y old  Female who presents with a chief complaint of SOB, cough (27 Mar 2024 07:07)      INTERVAL HISTORY/OVERNIGHT EVENTS:  - no acute events    MEDICATIONS:  MEDICATIONS  (STANDING):  albuterol/ipratropium for Nebulization 3 milliLiter(s) Nebulizer every 8 hours  aspirin  chewable 81 milliGRAM(s) Oral daily  azithromycin   Tablet 1200 milliGRAM(s) Oral <User Schedule>  bictegravir 50 mG/emtricitabine 200 mG/tenofovir alafenamide 25 mG (BIKTARVY) 1 Tablet(s) Oral daily  buDESOnide    Inhalation Suspension 0.25 milliGRAM(s) Inhalation every 12 hours  cefepime   IVPB 2000 milliGRAM(s) IV Intermittent every 12 hours  cefepime   IVPB      dextrose 5%. 1000 milliLiter(s) (50 mL/Hr) IV Continuous <Continuous>  dextrose 5%. 1000 milliLiter(s) (100 mL/Hr) IV Continuous <Continuous>  dextrose 50% Injectable 25 Gram(s) IV Push once  dextrose 50% Injectable 25 Gram(s) IV Push once  dextrose 50% Injectable 12.5 Gram(s) IV Push once  DULoxetine 60 milliGRAM(s) Oral daily  gabapentin 600 milliGRAM(s) Oral at bedtime  glucagon  Injectable 1 milliGRAM(s) IntraMuscular once  influenza   Vaccine 0.5 milliLiter(s) IntraMuscular once  insulin lispro (ADMELOG) corrective regimen sliding scale   SubCutaneous at bedtime  insulin lispro (ADMELOG) corrective regimen sliding scale   SubCutaneous three times a day before meals  methylPREDNISolone sodium succinate Injectable 40 milliGRAM(s) IV Push every 12 hours  pantoprazole    Tablet 40 milliGRAM(s) Oral before breakfast  polyethylene glycol 3350 17 Gram(s) Oral daily  rivaroxaban 20 milliGRAM(s) Oral daily  senna 2 Tablet(s) Oral at bedtime  sodium chloride 3%  Inhalation 4 milliLiter(s) Inhalation every 12 hours  trimethoprim  160 mG/sulfamethoxazole 800 mG 2 Tablet(s) Oral every 8 hours  verapamil  milliGRAM(s) Oral daily    MEDICATIONS  (PRN):  acetaminophen     Tablet .. 650 milliGRAM(s) Oral every 6 hours PRN Temp greater or equal to 38C (100.4F), Mild Pain (1 - 3)  dextrose Oral Gel 15 Gram(s) Oral once PRN Blood Glucose LESS THAN 70 milliGRAM(s)/deciliter  hydrocodone/homatropine Syrup 5 milliLiter(s) Oral every 6 hours PRN Cough      ALLERGIES:  Allergies    No Known Allergies    Intolerances    dislikes Glucerna Shake (Other)      OBJECTIVE:  ICU Vital Signs Last 24 Hrs  T(C): 36.7 (27 Mar 2024 09:15), Max: 37.1 (26 Mar 2024 20:16)  T(F): 98.1 (27 Mar 2024 09:15), Max: 98.7 (26 Mar 2024 20:16)  HR: 88 (27 Mar 2024 09:15) (80 - 101)  BP: 141/60 (27 Mar 2024 09:15) (123/82 - 148/87)  BP(mean): --  ABP: --  ABP(mean): --  RR: 20 (27 Mar 2024 09:15) (18 - 20)  SpO2: 99% (27 Mar 2024 09:15) (94% - 99%)    O2 Parameters below as of 27 Mar 2024 09:15  Patient On (Oxygen Delivery Method): nasal cannula, high flow  O2 Flow (L/min): 40  O2 Concentration (%): 50        Adult Advanced Hemodynamics Last 24 Hrs  CVP(mm Hg): --  CVP(cm H2O): --  CO: --  CI: --  PA: --  PA(mean): --  PCWP: --  SVR: --  SVRI: --  PVR: --  PVRI: --  CAPILLARY BLOOD GLUCOSE      POCT Blood Glucose.: 219 mg/dL (27 Mar 2024 11:45)  POCT Blood Glucose.: 131 mg/dL (27 Mar 2024 07:54)  POCT Blood Glucose.: 162 mg/dL (26 Mar 2024 22:08)  POCT Blood Glucose.: 143 mg/dL (26 Mar 2024 17:20)    CAPILLARY BLOOD GLUCOSE      POCT Blood Glucose.: 219 mg/dL (27 Mar 2024 11:45)    I&O's Summary    26 Mar 2024 07:01  -  27 Mar 2024 07:00  --------------------------------------------------------  IN: 240 mL / OUT: 1000 mL / NET: -760 mL      Daily     Daily Weight in k.1 (27 Mar 2024 00:04)    PHYSICAL EXAMINATION:  General: WN/WD NAD  HEENT: PERRLA, EOMI, moist mucous membranes  Neurology: A&Ox3, nonfocal, JIANG x 4  Respiratory: CTA B/L, normal respiratory effort, no wheezes, crackles, rales  CV: RRR, S1S2, no murmurs, rubs or gallops  Abdominal: Soft, NT, ND +BS, Last BM  Extremities: No edema, + peripheral pulses    LABS:  ABG - ( 27 Mar 2024 07:30 )  pH, Arterial: 7.35  pH, Blood: x     /  pCO2: 43    /  pO2: 55    / HCO3: 24    / Base Excess: -2.0  /  SaO2: 84.4                                    9.9    18.34 )-----------( 541      ( 27 Mar 2024 05:41 )             30.8     03-    135  |  97<L>  |  15  ----------------------------<  92  4.3   |  22  |  0.82    Ca    9.9      27 Mar 2024 05:41  Phos  4.6       Mg     2.40         TPro  6.2  /  Alb  3.3  /  TBili  <0.2  /  DBili  x   /  AST  14  /  ALT  19  /  AlkPhos  62  03-27    LIVER FUNCTIONS - ( 27 Mar 2024 05:41 )  Alb: 3.3 g/dL / Pro: 6.2 g/dL / ALK PHOS: 62 U/L / ALT: 19 U/L / AST: 14 U/L / GGT: x           PT/INR - ( 26 Mar 2024 08:22 )   PT: 11.6 sec;   INR: 1.04 ratio         PTT - ( 26 Mar 2024 08:22 )  PTT:145.3 sec        Urinalysis Basic - ( 27 Mar 2024 05:41 )    Color: x / Appearance: x / SG: x / pH: x  Gluc: 92 mg/dL / Ketone: x  / Bili: x / Urobili: x   Blood: x / Protein: x / Nitrite: x   Leuk Esterase: x / RBC: x / WBC x   Sq Epi: x / Non Sq Epi: x / Bacteria: x        TELEMETRY:     EKG:     IMAGING:

## 2024-03-27 NOTE — PROGRESS NOTE ADULT - SUBJECTIVE AND OBJECTIVE BOX
Follow Up:      Inverval History/ROS:Patient is a 64y old  Female who presents with a chief complaint of SOB, cough (24 Mar 2024 08:26)    feels tired     remains on HFNC     Allergies    No Known Allergies    Intolerances    dislikes Glucerna Shake (Other)    worked as respiratory therapist; retired 15 years ago       ANTIMICROBIALS: azithromycin   Tablet 1200 <User Schedule>  bictegravir 50 mG/emtricitabine 200 mG/tenofovir alafenamide 25 mG (BIKTARVY) 1 daily  cefepime   IVPB 2000 every 12 hours  cefepime   IVPB    trimethoprim  160 mG/sulfamethoxazole 800 mG 2 every 8 hours        MEDICATIONS  (STANDING):  acetaminophen     Tablet .. 650 every 6 hours PRN  albuterol/ipratropium for Nebulization 3 every 8 hours  aspirin  chewable 81 daily  buDESOnide    Inhalation Suspension 0.25 every 12 hours  dextrose 50% Injectable 25 once  dextrose 50% Injectable 12.5 once  dextrose 50% Injectable 25 once  dextrose Oral Gel 15 once PRN  DULoxetine 60 daily  gabapentin 600 at bedtime  glucagon  Injectable 1 once  hydrocodone/homatropine Syrup 5 every 6 hours PRN  influenza   Vaccine 0.5 once  insulin lispro (ADMELOG) corrective regimen sliding scale  three times a day before meals  insulin lispro (ADMELOG) corrective regimen sliding scale  at bedtime  methylPREDNISolone sodium succinate Injectable 40 every 12 hours  pantoprazole    Tablet 40 before breakfast  rivaroxaban 20 daily  senna 2 at bedtime  sodium chloride 3%  Inhalation 4 every 12 hours  verapamil  daily    Vital Signs Last 24 Hrs  T(F): 98.5 (03-27-24 @ 16:52), Max: 98.7 (03-26-24 @ 20:16)  HR: 93 (03-27-24 @ 16:52)  BP: 130/78 (03-27-24 @ 16:52)  RR: 19 (03-27-24 @ 16:52)  SpO2: 99% (03-27-24 @ 16:52) (92% - 99%)    PHYSICAL EXAM:  General: [x ] non-toxic.  HFNC  HEAD/EYES: [ ] PERRL [x ] white sclera [ ] icterus  ENT:  [ ] normal [x ] supple [ ] thrush [ ] pharyngeal exudate  Cardiovascular:   [ ] murmur [x ] normal [ ] PPM/AICD  Respiratory:  x[ ] course BS  GI:  [x ] soft, non-tender, normal bowel sounds  :  [ ] penny [x ] no CVA tenderness   Musculoskeletal:  [ ] no synovitis  Neurologic:  [x ] non-focal exam   Skin:  [ x] no rash  Lymph: [x ] no lymphadenopathy  Psychiatric:  [x ] appropriate affect [x ] alert & oriented  Lines:  [x ] no phlebitis right arm                            9.9    18.34 )-----------( 541      ( 27 Mar 2024 05:41 )             30.8 03-27    135  |  97  |  15  ----------------------------<  92  4.3   |  22  |  0.82  Ca    9.9      27 Mar 2024 05:41Phos  4.6     03-27Mg     2.40     03-27  TPro  6.2  /  Alb  3.3  /  TBili  <0.2  /  DBili  x   /  AST  14  /  ALT  19  /  AlkPhos  62  03-27    HIV-1 RNA Quantitative, Viral Load (03.25.24 @ 05:56)   HIV-1 RNA Quantitative, Viral Load: 72,947    ABS CD4: 14 cells/uL (03.25.24 @ 05:56)   crypt neg     MICROBIOLOGY:    Culture - Blood (03.19.24 @ 21:28)   Specimen Source: .Blood Blood-Peripheral  Culture Results:   No growth at 5 daysCulture - Blood (03.19.24 @ 21:20)   Specimen Source: .Blood Blood-Peripheral  Culture Results:   No growth at 5 days      RADIOLOGY:    < from: Xray Chest 1 View- PORTABLE-Urgent (Xray Chest 1 View- PORTABLE-Urgent .) (03.24.24 @ 13:04) >    ACC: [id number] EXAM:  XR CHEST PORTABLE URGENT 1V   ORDERED BY: RAYMOND HO     PROCEDURE DATE:  03/24/2024          INTERPRETATION:  EXAMINATION: XR CHEST URGENT    CLINICAL INDICATION: Increased oxygen requirement    TECHNIQUE: Single frontal,portable view of the chest was obtained.    COMPARISON: Chest radiograph 3/19/2024.    FINDINGS:  The heart size is normal.  Interval decrease in bilateral airspace opacities.  No congestion or effusions to indicate CHF.  No pneumothorax.    IMPRESSION:  Interval decrease in bilateral airspace opacities    --- End of Report ---          JUAN LAMBERT MD; Resident Radiology  This document has been electronically signed.  BRIONNA PAVON MD; Attending Radiologist    < end of copied text >

## 2024-03-27 NOTE — PROGRESS NOTE ADULT - ATTENDING COMMENTS
Pt is a 63 yo F with PMH chronic back pain (s/p spinal fusion), T2D, HTN, HLD, SLE, CVA/TIA, asthma, PE (2019, xarelto), and GBS p/w persistent cough, SOB, diarrhea, and fall 2/2 weakness. Recently hospitalized at [facility] 1/2024 for influenza with superimposed mycoplasma PNA s/p doxycycline course. On arrival, CTH neg, CT chest with diffuse GGO and consolidations. Labs with leukocytosis, normocytic anemia, neg GI PCR, RVP neg, and MRSA neg. TTE with EF 53% and mild G1 diastolic dysfunction. Repeat CT PE neg but with multiple BL nodules and patchy opacities, mostly in the RLL/R base. ID following, appreciate recs. Pulm following, was planned for bronch however now deferred given AIDS dx; on IV methylprednisolone 40mg BID with protonix for gastric protection. Receiving nebs around the clock and was on 4L NC but escalated to HFNC d/t significant coughing episodes -- 100% on HFNC 60/40, encouraged transition to NC 6L again -- pt amenable today. HIV+ with CD4 14 and VL 72K --- started on biktarvy; will need to closely monitor for IRIS. Fungitell elevated -- high suspicion for PJP PNA; on bactrim treatment. Will c/w cefepime and azithromycin to cover likely superimposed bacterial PNA (typical and atypical coverage). Azithromycin will cover opportunistic infections, as will bactrim. Transitioned hep gtt back to xarelto, as no plan for bronch at present given risk >> benefit and being empirically treated now. Will inquire with pulm regarding plan for steroids. Discussed with HS, rest as outlined above.

## 2024-03-28 LAB
ALBUMIN SERPL ELPH-MCNC: 3.5 G/DL — SIGNIFICANT CHANGE UP (ref 3.3–5)
ALP SERPL-CCNC: 62 U/L — SIGNIFICANT CHANGE UP (ref 40–120)
ALT FLD-CCNC: 28 U/L — SIGNIFICANT CHANGE UP (ref 4–33)
ANION GAP SERPL CALC-SCNC: 17 MMOL/L — HIGH (ref 7–14)
AST SERPL-CCNC: 19 U/L — SIGNIFICANT CHANGE UP (ref 4–32)
BILIRUB SERPL-MCNC: <0.2 MG/DL — SIGNIFICANT CHANGE UP (ref 0.2–1.2)
BUN SERPL-MCNC: 18 MG/DL — SIGNIFICANT CHANGE UP (ref 7–23)
CALCIUM SERPL-MCNC: 10.2 MG/DL — SIGNIFICANT CHANGE UP (ref 8.4–10.5)
CHLORIDE SERPL-SCNC: 96 MMOL/L — LOW (ref 98–107)
CO2 SERPL-SCNC: 20 MMOL/L — LOW (ref 22–31)
CREAT SERPL-MCNC: 0.92 MG/DL — SIGNIFICANT CHANGE UP (ref 0.5–1.3)
EGFR: 70 ML/MIN/1.73M2 — SIGNIFICANT CHANGE UP
GLUCOSE BLDC GLUCOMTR-MCNC: 125 MG/DL — HIGH (ref 70–99)
GLUCOSE BLDC GLUCOMTR-MCNC: 138 MG/DL — HIGH (ref 70–99)
GLUCOSE BLDC GLUCOMTR-MCNC: 160 MG/DL — HIGH (ref 70–99)
GLUCOSE BLDC GLUCOMTR-MCNC: 222 MG/DL — HIGH (ref 70–99)
GLUCOSE SERPL-MCNC: 123 MG/DL — HIGH (ref 70–99)
HCT VFR BLD CALC: 32.8 % — LOW (ref 34.5–45)
HGB BLD-MCNC: 10.7 G/DL — LOW (ref 11.5–15.5)
MAGNESIUM SERPL-MCNC: 2.3 MG/DL — SIGNIFICANT CHANGE UP (ref 1.6–2.6)
MCHC RBC-ENTMCNC: 26.5 PG — LOW (ref 27–34)
MCHC RBC-ENTMCNC: 32.6 GM/DL — SIGNIFICANT CHANGE UP (ref 32–36)
MCV RBC AUTO: 81.2 FL — SIGNIFICANT CHANGE UP (ref 80–100)
NIGHT BLUE STAIN TISS: SIGNIFICANT CHANGE UP
NRBC # BLD: 0 /100 WBCS — SIGNIFICANT CHANGE UP (ref 0–0)
NRBC # FLD: 0.04 K/UL — HIGH (ref 0–0)
PHOSPHATE SERPL-MCNC: 4.5 MG/DL — SIGNIFICANT CHANGE UP (ref 2.5–4.5)
PLATELET # BLD AUTO: 555 K/UL — HIGH (ref 150–400)
POTASSIUM SERPL-MCNC: 4.6 MMOL/L — SIGNIFICANT CHANGE UP (ref 3.5–5.3)
POTASSIUM SERPL-SCNC: 4.6 MMOL/L — SIGNIFICANT CHANGE UP (ref 3.5–5.3)
PROT SERPL-MCNC: 6.7 G/DL — SIGNIFICANT CHANGE UP (ref 6–8.3)
RBC # BLD: 4.04 M/UL — SIGNIFICANT CHANGE UP (ref 3.8–5.2)
RBC # FLD: 19.4 % — HIGH (ref 10.3–14.5)
SODIUM SERPL-SCNC: 133 MMOL/L — LOW (ref 135–145)
SPECIMEN SOURCE: SIGNIFICANT CHANGE UP
WBC # BLD: 19.51 K/UL — HIGH (ref 3.8–10.5)
WBC # FLD AUTO: 19.51 K/UL — HIGH (ref 3.8–10.5)

## 2024-03-28 PROCEDURE — 99233 SBSQ HOSP IP/OBS HIGH 50: CPT | Mod: GC

## 2024-03-28 PROCEDURE — 99232 SBSQ HOSP IP/OBS MODERATE 35: CPT

## 2024-03-28 RX ORDER — LACTULOSE 10 G/15ML
20 SOLUTION ORAL ONCE
Refills: 0 | Status: COMPLETED | OUTPATIENT
Start: 2024-03-28 | End: 2024-03-28

## 2024-03-28 RX ADMIN — Medication 2 TABLET(S): at 21:26

## 2024-03-28 RX ADMIN — Medication 81 MILLIGRAM(S): at 12:04

## 2024-03-28 RX ADMIN — Medication 2: at 12:00

## 2024-03-28 RX ADMIN — Medication 2 TABLET(S): at 13:29

## 2024-03-28 RX ADMIN — GABAPENTIN 600 MILLIGRAM(S): 400 CAPSULE ORAL at 21:25

## 2024-03-28 RX ADMIN — Medication 2 TABLET(S): at 05:33

## 2024-03-28 RX ADMIN — CEFEPIME 100 MILLIGRAM(S): 1 INJECTION, POWDER, FOR SOLUTION INTRAMUSCULAR; INTRAVENOUS at 05:28

## 2024-03-28 RX ADMIN — PANTOPRAZOLE SODIUM 40 MILLIGRAM(S): 20 TABLET, DELAYED RELEASE ORAL at 05:32

## 2024-03-28 RX ADMIN — RIVAROXABAN 20 MILLIGRAM(S): KIT at 12:05

## 2024-03-28 RX ADMIN — SENNA PLUS 1 TABLET(S): 8.6 TABLET ORAL at 21:27

## 2024-03-28 RX ADMIN — CEFEPIME 100 MILLIGRAM(S): 1 INJECTION, POWDER, FOR SOLUTION INTRAMUSCULAR; INTRAVENOUS at 17:25

## 2024-03-28 RX ADMIN — BICTEGRAVIR SODIUM, EMTRICITABINE, AND TENOFOVIR ALAFENAMIDE FUMARATE 1 TABLET(S): 30; 120; 15 TABLET ORAL at 12:04

## 2024-03-28 RX ADMIN — DULOXETINE HYDROCHLORIDE 60 MILLIGRAM(S): 30 CAPSULE, DELAYED RELEASE ORAL at 12:05

## 2024-03-28 RX ADMIN — Medication 40 MILLIGRAM(S): at 05:32

## 2024-03-28 RX ADMIN — Medication 120 MILLIGRAM(S): at 05:32

## 2024-03-28 RX ADMIN — Medication 40 MILLIGRAM(S): at 17:21

## 2024-03-28 NOTE — PROGRESS NOTE ADULT - SUBJECTIVE AND OBJECTIVE BOX
PATIENT:  ROSSI ROJAS  [id number]    CHIEF COMPLAINT:  Patient is a 64y old  Female who presents with a chief complaint of SOB, cough (28 Mar 2024 07:10)      INTERVAL HISTORY/OVERNIGHT EVENTS:  - no acute events    MEDICATIONS:  MEDICATIONS  (STANDING):  albuterol/ipratropium for Nebulization 3 milliLiter(s) Nebulizer every 8 hours  aspirin  chewable 81 milliGRAM(s) Oral daily  azithromycin   Tablet 1200 milliGRAM(s) Oral <User Schedule>  bictegravir 50 mG/emtricitabine 200 mG/tenofovir alafenamide 25 mG (BIKTARVY) 1 Tablet(s) Oral daily  buDESOnide    Inhalation Suspension 0.25 milliGRAM(s) Inhalation every 12 hours  cefepime   IVPB 2000 milliGRAM(s) IV Intermittent every 12 hours  cefepime   IVPB      dextrose 5%. 1000 milliLiter(s) (100 mL/Hr) IV Continuous <Continuous>  dextrose 5%. 1000 milliLiter(s) (50 mL/Hr) IV Continuous <Continuous>  dextrose 50% Injectable 12.5 Gram(s) IV Push once  dextrose 50% Injectable 25 Gram(s) IV Push once  dextrose 50% Injectable 25 Gram(s) IV Push once  DULoxetine 60 milliGRAM(s) Oral daily  gabapentin 600 milliGRAM(s) Oral at bedtime  glucagon  Injectable 1 milliGRAM(s) IntraMuscular once  influenza   Vaccine 0.5 milliLiter(s) IntraMuscular once  insulin lispro (ADMELOG) corrective regimen sliding scale   SubCutaneous at bedtime  insulin lispro (ADMELOG) corrective regimen sliding scale   SubCutaneous three times a day before meals  methylPREDNISolone sodium succinate Injectable 40 milliGRAM(s) IV Push every 12 hours  pantoprazole    Tablet 40 milliGRAM(s) Oral before breakfast  polyethylene glycol 3350 17 Gram(s) Oral daily  rivaroxaban 20 milliGRAM(s) Oral daily  senna 2 Tablet(s) Oral at bedtime  sodium chloride 3%  Inhalation 4 milliLiter(s) Inhalation every 12 hours  trimethoprim  160 mG/sulfamethoxazole 800 mG 2 Tablet(s) Oral every 8 hours  verapamil  milliGRAM(s) Oral daily    MEDICATIONS  (PRN):  acetaminophen     Tablet .. 650 milliGRAM(s) Oral every 6 hours PRN Temp greater or equal to 38C (100.4F), Mild Pain (1 - 3)  dextrose Oral Gel 15 Gram(s) Oral once PRN Blood Glucose LESS THAN 70 milliGRAM(s)/deciliter  hydrocodone/homatropine Syrup 5 milliLiter(s) Oral every 6 hours PRN Cough      ALLERGIES:  Allergies    No Known Allergies    Intolerances    dislikes Glucerna Shake (Other)      OBJECTIVE:  ICU Vital Signs Last 24 Hrs  T(C): 36.7 (28 Mar 2024 16:31), Max: 36.8 (27 Mar 2024 20:42)  T(F): 98 (28 Mar 2024 16:31), Max: 98.3 (28 Mar 2024 05:04)  HR: 92 (28 Mar 2024 16:31) (83 - 102)  BP: 131/63 (28 Mar 2024 16:31) (129/71 - 151/83)  BP(mean): --  ABP: --  ABP(mean): --  RR: 18 (28 Mar 2024 16:31) (18 - 19)  SpO2: 97% (28 Mar 2024 16:31) (90% - 98%)    O2 Parameters below as of 28 Mar 2024 16:31  Patient On (Oxygen Delivery Method): nasal cannula            Adult Advanced Hemodynamics Last 24 Hrs  CVP(mm Hg): --  CVP(cm H2O): --  CO: --  CI: --  PA: --  PA(mean): --  PCWP: --  SVR: --  SVRI: --  PVR: --  PVRI: --  CAPILLARY BLOOD GLUCOSE      POCT Blood Glucose.: 222 mg/dL (28 Mar 2024 11:52)  POCT Blood Glucose.: 138 mg/dL (28 Mar 2024 07:46)  POCT Blood Glucose.: 161 mg/dL (27 Mar 2024 21:45)  POCT Blood Glucose.: 135 mg/dL (27 Mar 2024 17:14)    CAPILLARY BLOOD GLUCOSE      POCT Blood Glucose.: 222 mg/dL (28 Mar 2024 11:52)    I&O's Summary    28 Mar 2024 07:01  -  28 Mar 2024 16:55  --------------------------------------------------------  IN: 150 mL / OUT: 0 mL / NET: 150 mL      Daily     Daily     PHYSICAL EXAMINATION:  General: WN/WD NAD  HEENT: PERRLA, EOMI, moist mucous membranes  Neurology: A&Ox3, nonfocal, JIANG x 4  Respiratory: CTA B/L, normal respiratory effort, no wheezes, crackles, rales  CV: RRR, S1S2, no murmurs, rubs or gallops  Abdominal: Soft, NT, ND +BS, Last BM  Extremities: No edema, + peripheral pulses  Incisions:   Tubes:    LABS:  ABG - ( 27 Mar 2024 07:30 )  pH, Arterial: 7.35  pH, Blood: x     /  pCO2: 43    /  pO2: 55    / HCO3: 24    / Base Excess: -2.0  /  SaO2: 84.4                                    10.7   19.51 )-----------( 555      ( 28 Mar 2024 05:20 )             32.8     03-28    133<L>  |  96<L>  |  18  ----------------------------<  123<H>  4.6   |  20<L>  |  0.92    Ca    10.2      28 Mar 2024 05:20  Phos  4.5     03-28  Mg     2.30     03-28    TPro  6.7  /  Alb  3.5  /  TBili  <0.2  /  DBili  x   /  AST  19  /  ALT  28  /  AlkPhos  62  03-28    LIVER FUNCTIONS - ( 28 Mar 2024 05:20 )  Alb: 3.5 g/dL / Pro: 6.7 g/dL / ALK PHOS: 62 U/L / ALT: 28 U/L / AST: 19 U/L / GGT: x                   Urinalysis Basic - ( 28 Mar 2024 05:20 )    Color: x / Appearance: x / SG: x / pH: x  Gluc: 123 mg/dL / Ketone: x  / Bili: x / Urobili: x   Blood: x / Protein: x / Nitrite: x   Leuk Esterase: x / RBC: x / WBC x   Sq Epi: x / Non Sq Epi: x / Bacteria: x        TELEMETRY:     EKG:     IMAGING:

## 2024-03-28 NOTE — PROGRESS NOTE ADULT - ASSESSMENT
64-year-old female w/ PMH of lupus, PE on Xarelto (dx 2019), asthma, HTN, T2DM, and GBS (dx 1997) presenting with 3wk h/o of worsening SOB and persistent cough. Patient was notably recently admitted at [facility] in 01/2024 for superimposed mycoplasma PNA on influenza now presenting with progressive dyspnea and persistent nonproductive cough, found to have severe AIDS/HIV, with likely bacterial pneumonia and PJP    Told me +HIV a decade ago, was told it was "false+" from Lupus    Weaned FiO2 to 40%  Can probably try 6 L    #Abnormal CT chest  #asthma  Denies complete resolution of symptoms since her prior discharge.   Noted recent VS admission with repeat CT chest with improvement in some areas with new opacities in others  - c/w standing nebs for now and 3% NaCl inhalation BID  - she has AIDS and this changes our differential - very likely infectious here  - cefepime/Bactrim/Zithromax  - ARV  - if unchanged or worsening can entertain notion of bronchoscopy but would favor treating and reconstituting for now i.e. risk likely does not outweigh benefit at this time for bronch given HFNC  - bcx x 2, ucx, sputum cx  - please check abg and cxr daily  - we doubt she has lupus (negative THERON, other symptoms can be explained by HIV untreated for a decade or more), please obtain rheum consult to re-assess prior dx

## 2024-03-28 NOTE — PROVIDER CONTACT NOTE (CRITICAL VALUE NOTIFICATION) - RECOMMENDATIONS
Follow heparin nomogram
continue treatment
Followed nomogram and contacted provider
notify provider
Follow heparin nomogram and pause heparin gtt for one hour as per nomogram

## 2024-03-28 NOTE — PROGRESS NOTE ADULT - ATTENDING COMMENTS
64-year-old female w/ PMH of lupus, PE on Xarelto (dx 2019), asthma, HTN, T2DM, and GBS (dx 1997) presenting with 3wk h/o of AHRF.  Pt now w/ confirmed AIDS and very low cd4 count.   Pt appears comfortable in bed but has worsening hypoxemia since Friday and now HFNC 60% fio2.     Given new dx of AIDS and w/ very high fungitell and CT chest showing at least some areas of GGO the likelihood of pjp is high.    Agree w/ pjp tx and bacterial coverage.   Pt cont to significantly improve with decreasing fio2 requirements (now on NC) and also appears more comfortable.     - pjp tx w/ bactrim   - cont steroids for pjp  - ART tx as per ID   - RLL changes more consolidative and may benefit from full course of bacterial coverage for pna- now on cefepime (and azithro also for mac ppx)  - being r/o for TB for afb sputum, can use hypersal if needed  - on a/c for PE hx

## 2024-03-28 NOTE — PROGRESS NOTE ADULT - PROBLEM SELECTOR PLAN 1
- initially P/w SOB, persistent cough  - Recent admission for superimposed mycoplasma PNA on influenza, s/p azithromycin/doxycycline  - RVP unremarkable, CXR -> Bibasilar airspace opacities  - CT Chest -> Multifocal groundglass and consolidative opacities involving all 5 lobes, most predominant in the b/l lower lobes and right upper lobe  - S/p empiric meropenem, doxycyline iso lack of clinical improvement, per ID  - Fungitell elevated  - c/w solumedrol daily as per pulm for PJP prophylaxis   > C/w duonebs q6, inhaled budesonide BID, Robitussin-DM q4 prn, and hycodan PRN  > Started HFNC, will wean prn  > Pulmonology consulted, will continue to appreciate recs, bronchoscopy planned for 3/26 now cancelled in setting of fulminant AIDS  > ID consulted, will continue to appreciate recs - initially P/w SOB, persistent cough  - Recent admission for superimposed mycoplasma PNA on influenza, s/p azithromycin/doxycycline  - RVP unremarkable, CXR -> Bibasilar airspace opacities  - CT Chest -> Multifocal groundglass and consolidative opacities involving all 5 lobes, most predominant in the b/l lower lobes and right upper lobe  - S/p empiric meropenem, doxycyline iso lack of clinical improvement, per ID  - Fungitell elevated  - c/w solumedrol daily as per pulm for PJP prophylaxis   > C/w duonebs q6, inhaled budesonide BID, Robitussin-DM q4 prn, and hycodan PRN  > wean HFNC--> O2  NC  > Pulmonology consulted, will continue to appreciate recs, bronchoscopy planned for 3/26 now cancelled in setting of fulminant AIDS  > ID consulted, will continue to appreciate recs

## 2024-03-28 NOTE — PROGRESS NOTE ADULT - PROBLEM SELECTOR PLAN 2
-> sx of SOB, cough concern for HIV, prelim/ confirmatory test+ for HIV with CD4 of 14, viral load >70K  - ID consulted, continue bactrim-DS q8hr, adding azithromycin and cefepime 3/26 HIV prophylaxis   - adding Biktarvy 3/26   -F/u sputum cx, PJP cx  - daily ABG's/ chest x ray as per pulm -> sx of SOB, cough concern for HIV, prelim/ confirmatory test+ for HIV with CD4 of 14, viral load >70K  - ID consulted, continue bactrim-DS q8hr, adding azithromycin and cefepime 3/26 HIV prophylaxis   - adding Biktarvy 3/26   -sputum PCR +PJP  - continue biktarvy

## 2024-03-28 NOTE — PROGRESS NOTE ADULT - ATTENDING COMMENTS
Pt is a 65 yo F with PMH chronic back pain (s/p spinal fusion), T2D, HTN, HLD, SLE, CVA/TIA, asthma, PE (2019, xarelto), and GBS p/w persistent cough, SOB, diarrhea, and fall 2/2 weakness. Recently hospitalized at [facility] 1/2024 for influenza with superimposed mycoplasma PNA s/p doxycycline course. On arrival, CTH neg, CT chest with diffuse GGO and consolidations. Labs with leukocytosis, normocytic anemia, neg GI PCR, RVP neg, and MRSA neg. TTE with EF 53% and mild G1 diastolic dysfunction. Repeat CT PE neg but with multiple BL nodules and patchy opacities, mostly in the RLL/R base. ID following, appreciate recs. Pulm following, was planned for bronch however now deferred given AIDS dx; on IV methylprednisolone 40mg BID with protonix for gastric protection. Receiving nebs around the clock and now weaned from HFNC to 2L NC. HIV+ with CD4 14 and VL 72K --- started on biktarvy; will need to closely monitor for IRIS. Fungitell elevated -- high suspicion for PJP PNA, now with PCP PCR+; on bactrim treatment. Will c/w cefepime and azithromycin to cover likely superimposed bacterial PNA (typical and atypical coverage). Azithromycin will cover opportunistic infections, as will bactrim. Transitioned hep gtt back to xarelto, as no plan for bronch at present given risk >> benefit and being empirically treated now. Will inquire with pulm regarding plan for steroids. Discussed with pt today as she has yet to inform her  of HIV+; it is a reportable disease to the [facility] and  must be notified. Pt states she will inform her  today. Discussed with HS, rest as outlined above.

## 2024-03-28 NOTE — PROGRESS NOTE ADULT - PROBLEM SELECTOR PLAN 3
- Patient found on fall by  prior to presentation  - Denies any acute bony pain  - CTH umremarkable  - TTE unremarkable  > orthostatics unremarkable

## 2024-03-28 NOTE — PROVIDER CONTACT NOTE (CRITICAL VALUE NOTIFICATION) - BACKGROUND
patient admitted with SOB and persistent cough  patient on heparin gtt  PMHX PE, DM, asthma, HTN
Pt admitted for pneumonia.
admitted for pneumonia due to infectious disease
hx of pneumonia
Ms Shea Thompson is a 64-year-old female w/ PMH of lupus, PE on Xarelto (dx 2019), asthma, HTN, T2DM,
Admit Diagnosis) Pneumonia due to infectious organism  (PMH) GBS (Guillain [place] syndrome)  (PMH) TIA (transient ischemic attack)  (PMH) Pulmonary emboli
(Admit Diagnosis) Pneumonia due to infectious organism  (PMH) GBS (Guillain [place] syndrome)  (PMH) TIA (transient ischemic attack)  (PMH) Pulmonary emboli  (PMH) Peripheral polyneuropathy

## 2024-03-28 NOTE — PROGRESS NOTE ADULT - SUBJECTIVE AND OBJECTIVE BOX
PROGRESS NOTE:   Authored by Dr. Darlene Vallejo MD (PGY-1). Pager [facility] [phone] / PHILLIP ( [phone]) or via TEAMS    Patient is a 64y old  Female who presents with a chief complaint of SOB, cough (27 Mar 2024 17:52)      SUBJECTIVE / OVERNIGHT EVENTS:  No acute events overnight.     ADDITIONAL REVIEW OF SYSTEMS:  Patient denies fevers, chills, chest pain, shortness of breath, nausea, abdominal pain, diarrhea, constipation, dysuria, leg swelling, headache, light headedness.    MEDICATIONS  (STANDING):  albuterol/ipratropium for Nebulization 3 milliLiter(s) Nebulizer every 8 hours  aspirin  chewable 81 milliGRAM(s) Oral daily  azithromycin   Tablet 1200 milliGRAM(s) Oral <User Schedule>  bictegravir 50 mG/emtricitabine 200 mG/tenofovir alafenamide 25 mG (BIKTARVY) 1 Tablet(s) Oral daily  buDESOnide    Inhalation Suspension 0.25 milliGRAM(s) Inhalation every 12 hours  cefepime   IVPB 2000 milliGRAM(s) IV Intermittent every 12 hours  cefepime   IVPB      dextrose 5%. 1000 milliLiter(s) (100 mL/Hr) IV Continuous <Continuous>  dextrose 5%. 1000 milliLiter(s) (50 mL/Hr) IV Continuous <Continuous>  dextrose 50% Injectable 12.5 Gram(s) IV Push once  dextrose 50% Injectable 25 Gram(s) IV Push once  dextrose 50% Injectable 25 Gram(s) IV Push once  DULoxetine 60 milliGRAM(s) Oral daily  gabapentin 600 milliGRAM(s) Oral at bedtime  glucagon  Injectable 1 milliGRAM(s) IntraMuscular once  influenza   Vaccine 0.5 milliLiter(s) IntraMuscular once  insulin lispro (ADMELOG) corrective regimen sliding scale   SubCutaneous at bedtime  insulin lispro (ADMELOG) corrective regimen sliding scale   SubCutaneous three times a day before meals  methylPREDNISolone sodium succinate Injectable 40 milliGRAM(s) IV Push every 12 hours  pantoprazole    Tablet 40 milliGRAM(s) Oral before breakfast  polyethylene glycol 3350 17 Gram(s) Oral daily  rivaroxaban 20 milliGRAM(s) Oral daily  senna 2 Tablet(s) Oral at bedtime  sodium chloride 3%  Inhalation 4 milliLiter(s) Inhalation every 12 hours  trimethoprim  160 mG/sulfamethoxazole 800 mG 2 Tablet(s) Oral every 8 hours  verapamil  milliGRAM(s) Oral daily    MEDICATIONS  (PRN):  acetaminophen     Tablet .. 650 milliGRAM(s) Oral every 6 hours PRN Temp greater or equal to 38C (100.4F), Mild Pain (1 - 3)  dextrose Oral Gel 15 Gram(s) Oral once PRN Blood Glucose LESS THAN 70 milliGRAM(s)/deciliter  hydrocodone/homatropine Syrup 5 milliLiter(s) Oral every 6 hours PRN Cough      CAPILLARY BLOOD GLUCOSE      POCT Blood Glucose.: 161 mg/dL (27 Mar 2024 21:45)  POCT Blood Glucose.: 135 mg/dL (27 Mar 2024 17:14)  POCT Blood Glucose.: 219 mg/dL (27 Mar 2024 11:45)  POCT Blood Glucose.: 131 mg/dL (27 Mar 2024 07:54)    I&O's Summary      PHYSICAL EXAM:  Vital Signs Last 24 Hrs  T(C): 36.8 (28 Mar 2024 05:04), Max: 36.9 (27 Mar 2024 16:52)  T(F): 98.3 (28 Mar 2024 05:04), Max: 98.5 (27 Mar 2024 16:52)  HR: 98 (28 Mar 2024 05:04) (77 - 102)  BP: 148/84 (28 Mar 2024 05:04) (129/71 - 150/84)  BP(mean): --  RR: 19 (28 Mar 2024 05:04) (18 - 20)  SpO2: 90% (28 Mar 2024 05:04) (90% - 99%)    Parameters below as of 28 Mar 2024 05:04  Patient On (Oxygen Delivery Method): nasal cannula        CONSTITUTIONAL: NAD, well-developed  RESPIRATORY: Normal respiratory effort; lungs are clear to auscultation bilaterally  CARDIOVASCULAR: Regular rate and rhythm, normal S1 and S2, no murmur/rub/gallop; No lower extremity edema; Peripheral pulses are 2+ bilaterally  ABDOMEN: Nontender to palpation, normoactive bowel sounds, no rebound/guarding; No hepatosplenomegaly  MSK: no clubbing or cyanosis of digits; no joint swelling or tenderness to palpation  PSYCH: A+O to person, place, and time; affect appropriate    LABS:                        9.9    18.34 )-----------( 541      ( 27 Mar 2024 05:41 )             30.8     03-27    135  |  97<L>  |  15  ----------------------------<  92  4.3   |  22  |  0.82    Ca    9.9      27 Mar 2024 05:41  Phos  4.6     03-27  Mg     2.40     03-27    TPro  6.2  /  Alb  3.3  /  TBili  <0.2  /  DBili  x   /  AST  14  /  ALT  19  /  AlkPhos  62  03-27    PT/INR - ( 26 Mar 2024 08:22 )   PT: 11.6 sec;   INR: 1.04 ratio         PTT - ( 26 Mar 2024 08:22 )  PTT:145.3 sec      Urinalysis Basic - ( 27 Mar 2024 05:41 )    Color: x / Appearance: x / SG: x / pH: x  Gluc: 92 mg/dL / Ketone: x  / Bili: x / Urobili: x   Blood: x / Protein: x / Nitrite: x   Leuk Esterase: x / RBC: x / WBC x   Sq Epi: x / Non Sq Epi: x / Bacteria: x        Culture - Blood (collected 26 Mar 2024 01:10)  Source: .Blood Blood-Peripheral  Preliminary Report (28 Mar 2024 07:01):    No growth at 48 Hours    Culture - Blood (collected 25 Mar 2024 19:32)  Source: .Blood Blood-Peripheral  Preliminary Report (27 Mar 2024 22:02):    No growth at 48 Hours        Tele Reviewed:    RADIOLOGY & ADDITIONAL TESTS:  Results Reviewed:   Imaging Personally Reviewed:  Electrocardiogram Personally Reviewed:     PROGRESS NOTE:   Authored by Dr. Darlene Vallejo MD (PGY-1). Pager [facility] [phone] / PHILLIP ( [phone]) or via TEAMS    Patient is a 64y old  Female who presents with a chief complaint of SOB, cough (27 Mar 2024 17:52)      SUBJECTIVE / OVERNIGHT EVENTS:  No acute events overnight. Still with coughing, on NC. NO BM yet. Denies subjective fevers, CP.         MEDICATIONS  (STANDING):  albuterol/ipratropium for Nebulization 3 milliLiter(s) Nebulizer every 8 hours  aspirin  chewable 81 milliGRAM(s) Oral daily  azithromycin   Tablet 1200 milliGRAM(s) Oral <User Schedule>  bictegravir 50 mG/emtricitabine 200 mG/tenofovir alafenamide 25 mG (BIKTARVY) 1 Tablet(s) Oral daily  buDESOnide    Inhalation Suspension 0.25 milliGRAM(s) Inhalation every 12 hours  cefepime   IVPB 2000 milliGRAM(s) IV Intermittent every 12 hours  cefepime   IVPB      dextrose 5%. 1000 milliLiter(s) (100 mL/Hr) IV Continuous <Continuous>  dextrose 5%. 1000 milliLiter(s) (50 mL/Hr) IV Continuous <Continuous>  dextrose 50% Injectable 12.5 Gram(s) IV Push once  dextrose 50% Injectable 25 Gram(s) IV Push once  dextrose 50% Injectable 25 Gram(s) IV Push once  DULoxetine 60 milliGRAM(s) Oral daily  gabapentin 600 milliGRAM(s) Oral at bedtime  glucagon  Injectable 1 milliGRAM(s) IntraMuscular once  influenza   Vaccine 0.5 milliLiter(s) IntraMuscular once  insulin lispro (ADMELOG) corrective regimen sliding scale   SubCutaneous at bedtime  insulin lispro (ADMELOG) corrective regimen sliding scale   SubCutaneous three times a day before meals  methylPREDNISolone sodium succinate Injectable 40 milliGRAM(s) IV Push every 12 hours  pantoprazole    Tablet 40 milliGRAM(s) Oral before breakfast  polyethylene glycol 3350 17 Gram(s) Oral daily  rivaroxaban 20 milliGRAM(s) Oral daily  senna 2 Tablet(s) Oral at bedtime  sodium chloride 3%  Inhalation 4 milliLiter(s) Inhalation every 12 hours  trimethoprim  160 mG/sulfamethoxazole 800 mG 2 Tablet(s) Oral every 8 hours  verapamil  milliGRAM(s) Oral daily    MEDICATIONS  (PRN):  acetaminophen     Tablet .. 650 milliGRAM(s) Oral every 6 hours PRN Temp greater or equal to 38C (100.4F), Mild Pain (1 - 3)  dextrose Oral Gel 15 Gram(s) Oral once PRN Blood Glucose LESS THAN 70 milliGRAM(s)/deciliter  hydrocodone/homatropine Syrup 5 milliLiter(s) Oral every 6 hours PRN Cough      CAPILLARY BLOOD GLUCOSE      POCT Blood Glucose.: 161 mg/dL (27 Mar 2024 21:45)  POCT Blood Glucose.: 135 mg/dL (27 Mar 2024 17:14)  POCT Blood Glucose.: 219 mg/dL (27 Mar 2024 11:45)  POCT Blood Glucose.: 131 mg/dL (27 Mar 2024 07:54)    I&O's Summary      PHYSICAL EXAM:  Vital Signs Last 24 Hrs  T(C): 36.8 (28 Mar 2024 05:04), Max: 36.9 (27 Mar 2024 16:52)  T(F): 98.3 (28 Mar 2024 05:04), Max: 98.5 (27 Mar 2024 16:52)  HR: 98 (28 Mar 2024 05:04) (77 - 102)  BP: 148/84 (28 Mar 2024 05:04) (129/71 - 150/84)  BP(mean): --  RR: 19 (28 Mar 2024 05:04) (18 - 20)  SpO2: 90% (28 Mar 2024 05:04) (90% - 99%)    Parameters below as of 28 Mar 2024 05:04  Patient On (Oxygen Delivery Method): nasal cannula        CONSTITUTIONAL: NAD, resting comfortably intermittently coughing  RESPIRATORY: intermittent wheezing coughing   CARDIOVASCULAR: Regular rate and rhythm, normal S1 and S2, no murmur/rub/gallop; No lower extremity edema; Peripheral pulses are 2+ bilaterally  ABDOMEN: Nontender to palpation, normoactive bowel sounds, no rebound/guarding; No hepatosplenomegaly  MSK: no clubbing or cyanosis of digits; no joint swelling or tenderness to palpation  PSYCH: A+O to person, place, and time; affect appropriate    LABS:                        9.9    18.34 )-----------( 541      ( 27 Mar 2024 05:41 )             30.8     03-27    135  |  97<L>  |  15  ----------------------------<  92  4.3   |  22  |  0.82    Ca    9.9      27 Mar 2024 05:41  Phos  4.6     03-27  Mg     2.40     03-27    TPro  6.2  /  Alb  3.3  /  TBili  <0.2  /  DBili  x   /  AST  14  /  ALT  19  /  AlkPhos  62  03-27    PT/INR - ( 26 Mar 2024 08:22 )   PT: 11.6 sec;   INR: 1.04 ratio         PTT - ( 26 Mar 2024 08:22 )  PTT:145.3 sec      Urinalysis Basic - ( 27 Mar 2024 05:41 )    Color: x / Appearance: x / SG: x / pH: x  Gluc: 92 mg/dL / Ketone: x  / Bili: x / Urobili: x   Blood: x / Protein: x / Nitrite: x   Leuk Esterase: x / RBC: x / WBC x   Sq Epi: x / Non Sq Epi: x / Bacteria: x        Culture - Blood (collected 26 Mar 2024 01:10)  Source: .Blood Blood-Peripheral  Preliminary Report (28 Mar 2024 07:01):    No growth at 48 Hours    Culture - Blood (collected 25 Mar 2024 19:32)  Source: .Blood Blood-Peripheral  Preliminary Report (27 Mar 2024 22:02):    No growth at 48 Hours        Tele Reviewed:    RADIOLOGY & ADDITIONAL TESTS:  Results Reviewed:   Imaging Personally Reviewed:  Electrocardiogram Personally Reviewed:

## 2024-03-29 LAB
ALBUMIN SERPL ELPH-MCNC: 4.1 G/DL — SIGNIFICANT CHANGE UP (ref 3.3–5)
ALP SERPL-CCNC: 66 U/L — SIGNIFICANT CHANGE UP (ref 40–120)
ALT FLD-CCNC: 30 U/L — SIGNIFICANT CHANGE UP (ref 4–33)
ANION GAP SERPL CALC-SCNC: 15 MMOL/L — HIGH (ref 7–14)
AST SERPL-CCNC: 16 U/L — SIGNIFICANT CHANGE UP (ref 4–32)
BILIRUB SERPL-MCNC: 0.2 MG/DL — SIGNIFICANT CHANGE UP (ref 0.2–1.2)
BUN SERPL-MCNC: 17 MG/DL — SIGNIFICANT CHANGE UP (ref 7–23)
CALCIUM SERPL-MCNC: 10.6 MG/DL — HIGH (ref 8.4–10.5)
CHLORIDE SERPL-SCNC: 97 MMOL/L — LOW (ref 98–107)
CO2 SERPL-SCNC: 25 MMOL/L — SIGNIFICANT CHANGE UP (ref 22–31)
CREAT SERPL-MCNC: 0.93 MG/DL — SIGNIFICANT CHANGE UP (ref 0.5–1.3)
EGFR: 69 ML/MIN/1.73M2 — SIGNIFICANT CHANGE UP
GLUCOSE BLDC GLUCOMTR-MCNC: 130 MG/DL — HIGH (ref 70–99)
GLUCOSE BLDC GLUCOMTR-MCNC: 155 MG/DL — HIGH (ref 70–99)
GLUCOSE BLDC GLUCOMTR-MCNC: 170 MG/DL — HIGH (ref 70–99)
GLUCOSE BLDC GLUCOMTR-MCNC: 177 MG/DL — HIGH (ref 70–99)
GLUCOSE SERPL-MCNC: 106 MG/DL — HIGH (ref 70–99)
HCT VFR BLD CALC: 35.5 % — SIGNIFICANT CHANGE UP (ref 34.5–45)
HGB BLD-MCNC: 11.6 G/DL — SIGNIFICANT CHANGE UP (ref 11.5–15.5)
HIV 2 PROVIRAL DNA SERPL QL NAA+PROBE: SIGNIFICANT CHANGE UP
MAGNESIUM SERPL-MCNC: 2.5 MG/DL — SIGNIFICANT CHANGE UP (ref 1.6–2.6)
MCHC RBC-ENTMCNC: 26.9 PG — LOW (ref 27–34)
MCHC RBC-ENTMCNC: 32.7 GM/DL — SIGNIFICANT CHANGE UP (ref 32–36)
MCV RBC AUTO: 82.4 FL — SIGNIFICANT CHANGE UP (ref 80–100)
NIGHT BLUE STAIN TISS: SIGNIFICANT CHANGE UP
NRBC # BLD: 0 /100 WBCS — SIGNIFICANT CHANGE UP (ref 0–0)
NRBC # FLD: 0.02 K/UL — HIGH (ref 0–0)
PHOSPHATE SERPL-MCNC: 4.5 MG/DL — SIGNIFICANT CHANGE UP (ref 2.5–4.5)
PLATELET # BLD AUTO: 501 K/UL — HIGH (ref 150–400)
POTASSIUM SERPL-MCNC: 5.2 MMOL/L — SIGNIFICANT CHANGE UP (ref 3.5–5.3)
POTASSIUM SERPL-SCNC: 5.2 MMOL/L — SIGNIFICANT CHANGE UP (ref 3.5–5.3)
PROT SERPL-MCNC: 7.4 G/DL — SIGNIFICANT CHANGE UP (ref 6–8.3)
RBC # BLD: 4.31 M/UL — SIGNIFICANT CHANGE UP (ref 3.8–5.2)
RBC # FLD: 19.9 % — HIGH (ref 10.3–14.5)
SODIUM SERPL-SCNC: 137 MMOL/L — SIGNIFICANT CHANGE UP (ref 135–145)
SPECIMEN SOURCE: SIGNIFICANT CHANGE UP
WBC # BLD: 17.83 K/UL — HIGH (ref 3.8–10.5)
WBC # FLD AUTO: 17.83 K/UL — HIGH (ref 3.8–10.5)

## 2024-03-29 PROCEDURE — 99233 SBSQ HOSP IP/OBS HIGH 50: CPT

## 2024-03-29 PROCEDURE — 99232 SBSQ HOSP IP/OBS MODERATE 35: CPT

## 2024-03-29 PROCEDURE — 99233 SBSQ HOSP IP/OBS HIGH 50: CPT | Mod: GC

## 2024-03-29 RX ORDER — LACTULOSE 10 G/15ML
20 SOLUTION ORAL ONCE
Refills: 0 | Status: COMPLETED | OUTPATIENT
Start: 2024-03-29 | End: 2024-03-29

## 2024-03-29 RX ADMIN — CEFEPIME 100 MILLIGRAM(S): 1 INJECTION, POWDER, FOR SOLUTION INTRAMUSCULAR; INTRAVENOUS at 06:23

## 2024-03-29 RX ADMIN — BICTEGRAVIR SODIUM, EMTRICITABINE, AND TENOFOVIR ALAFENAMIDE FUMARATE 1 TABLET(S): 30; 120; 15 TABLET ORAL at 11:16

## 2024-03-29 RX ADMIN — CEFEPIME 100 MILLIGRAM(S): 1 INJECTION, POWDER, FOR SOLUTION INTRAMUSCULAR; INTRAVENOUS at 17:51

## 2024-03-29 RX ADMIN — Medication 2 TABLET(S): at 22:31

## 2024-03-29 RX ADMIN — Medication 40 MILLIGRAM(S): at 17:52

## 2024-03-29 RX ADMIN — RIVAROXABAN 20 MILLIGRAM(S): KIT at 11:16

## 2024-03-29 RX ADMIN — Medication 2 TABLET(S): at 14:54

## 2024-03-29 RX ADMIN — POLYETHYLENE GLYCOL 3350 17 GRAM(S): 17 POWDER, FOR SOLUTION ORAL at 11:16

## 2024-03-29 RX ADMIN — Medication 1: at 08:45

## 2024-03-29 RX ADMIN — Medication 1: at 12:02

## 2024-03-29 RX ADMIN — Medication 40 MILLIGRAM(S): at 06:24

## 2024-03-29 RX ADMIN — PANTOPRAZOLE SODIUM 40 MILLIGRAM(S): 20 TABLET, DELAYED RELEASE ORAL at 06:24

## 2024-03-29 RX ADMIN — Medication 81 MILLIGRAM(S): at 11:16

## 2024-03-29 RX ADMIN — Medication 2 TABLET(S): at 06:24

## 2024-03-29 RX ADMIN — GABAPENTIN 600 MILLIGRAM(S): 400 CAPSULE ORAL at 22:29

## 2024-03-29 RX ADMIN — DULOXETINE HYDROCHLORIDE 60 MILLIGRAM(S): 30 CAPSULE, DELAYED RELEASE ORAL at 11:16

## 2024-03-29 RX ADMIN — Medication 120 MILLIGRAM(S): at 06:24

## 2024-03-29 NOTE — PROGRESS NOTE ADULT - ATTENDING COMMENTS
Pt is a 65 yo F with PMH chronic back pain (s/p spinal fusion), T2D, HTN, HLD, SLE, CVA/TIA, asthma, PE (2019, xarelto), and GBS p/w persistent cough, SOB, diarrhea, and fall 2/2 weakness. Recently hospitalized at [facility] 1/2024 for influenza with superimposed mycoplasma PNA s/p doxycycline course. On arrival, CTH neg, CT chest with diffuse GGO and consolidations. Labs with leukocytosis, normocytic anemia, neg GI PCR, RVP neg, and MRSA neg. TTE with EF 53% and mild G1 diastolic dysfunction. Repeat CT PE neg but with multiple BL nodules and patchy opacities, mostly in the RLL/R base. ID following, appreciate recs. Pulm following, was planned for bronch however now deferred given AIDS dx; on IV methylprednisolone 40mg BID with protonix for gastric protection. Receiving nebs around the clock and now weaned from HFNC to 2L NC. HIV+ with CD4 14 and VL 72K --- started on biktarvy; will need to closely monitor for IRIS. Fungitell elevated -- high suspicion for PJP PNA, now with PCP PCR+; on bactrim treatment. Will c/w cefepime and azithromycin to cover likely superimposed bacterial PNA (typical and atypical coverage). Azithromycin will cover opportunistic infections, as will bactrim. Transitioned hep gtt back to xarelto, as no plan for bronch at present given risk >> benefit and being empirically treated now. Will inquire with pulm regarding plan for steroids. Discussed with pt again today as she has yet to inform her  of HIV+; it is a reportable disease to the [facility] and  must be notified. Pt states she has held discussion with  that they "need to talk" and plans to inform him today/this weekend. Aware that [facility] is notified and that partner(s) will be contacted to inform them of need for testing. Understanding of need for reporting but states it is a lot to digest in addition with all of her current problems. Does not want to keep discussing with the team what she has / has not told her  -- says she will handle it. Regardless, she is understanding that [facility] is notified. Discussed with HS, rest as outlined above.

## 2024-03-29 NOTE — PROGRESS NOTE ADULT - SUBJECTIVE AND OBJECTIVE BOX
PROGRESS NOTE:   Authored by Dr. Darlene Vallejo MD (PGY-1). Pager [facility] [phone] / PHILLIP ( [phone]) or via TEAMS    Patient is a 64y old  Female who presents with a chief complaint of SOB, cough (28 Mar 2024 16:55)      SUBJECTIVE / OVERNIGHT EVENTS:  No acute events overnight.     ADDITIONAL REVIEW OF SYSTEMS:  Patient denies fevers, chills, chest pain, shortness of breath, nausea, abdominal pain, diarrhea, constipation, dysuria, leg swelling, headache, light headedness.    MEDICATIONS  (STANDING):  albuterol/ipratropium for Nebulization 3 milliLiter(s) Nebulizer every 8 hours  aspirin  chewable 81 milliGRAM(s) Oral daily  azithromycin   Tablet 1200 milliGRAM(s) Oral <User Schedule>  bictegravir 50 mG/emtricitabine 200 mG/tenofovir alafenamide 25 mG (BIKTARVY) 1 Tablet(s) Oral daily  buDESOnide    Inhalation Suspension 0.25 milliGRAM(s) Inhalation every 12 hours  cefepime   IVPB 2000 milliGRAM(s) IV Intermittent every 12 hours  cefepime   IVPB      dextrose 5%. 1000 milliLiter(s) (100 mL/Hr) IV Continuous <Continuous>  dextrose 5%. 1000 milliLiter(s) (50 mL/Hr) IV Continuous <Continuous>  dextrose 50% Injectable 12.5 Gram(s) IV Push once  dextrose 50% Injectable 25 Gram(s) IV Push once  dextrose 50% Injectable 25 Gram(s) IV Push once  DULoxetine 60 milliGRAM(s) Oral daily  gabapentin 600 milliGRAM(s) Oral at bedtime  glucagon  Injectable 1 milliGRAM(s) IntraMuscular once  influenza   Vaccine 0.5 milliLiter(s) IntraMuscular once  insulin lispro (ADMELOG) corrective regimen sliding scale   SubCutaneous at bedtime  insulin lispro (ADMELOG) corrective regimen sliding scale   SubCutaneous three times a day before meals  methylPREDNISolone sodium succinate Injectable 40 milliGRAM(s) IV Push every 12 hours  pantoprazole    Tablet 40 milliGRAM(s) Oral before breakfast  polyethylene glycol 3350 17 Gram(s) Oral daily  rivaroxaban 20 milliGRAM(s) Oral daily  senna 2 Tablet(s) Oral at bedtime  sodium chloride 3%  Inhalation 4 milliLiter(s) Inhalation every 12 hours  trimethoprim  160 mG/sulfamethoxazole 800 mG 2 Tablet(s) Oral every 8 hours  verapamil  milliGRAM(s) Oral daily    MEDICATIONS  (PRN):  acetaminophen     Tablet .. 650 milliGRAM(s) Oral every 6 hours PRN Temp greater or equal to 38C (100.4F), Mild Pain (1 - 3)  dextrose Oral Gel 15 Gram(s) Oral once PRN Blood Glucose LESS THAN 70 milliGRAM(s)/deciliter  hydrocodone/homatropine Syrup 5 milliLiter(s) Oral every 6 hours PRN Cough      CAPILLARY BLOOD GLUCOSE      POCT Blood Glucose.: 160 mg/dL (28 Mar 2024 21:12)  POCT Blood Glucose.: 125 mg/dL (28 Mar 2024 17:12)  POCT Blood Glucose.: 222 mg/dL (28 Mar 2024 11:52)  POCT Blood Glucose.: 138 mg/dL (28 Mar 2024 07:46)    I&O's Summary    28 Mar 2024 07:01  -  29 Mar 2024 07:00  --------------------------------------------------------  IN: 150 mL / OUT: 0 mL / NET: 150 mL        PHYSICAL EXAM:  Vital Signs Last 24 Hrs  T(C): 36.8 (29 Mar 2024 05:45), Max: 37.2 (28 Mar 2024 20:50)  T(F): 98.2 (29 Mar 2024 05:45), Max: 98.9 (28 Mar 2024 20:50)  HR: 89 (29 Mar 2024 05:45) (57 - 97)  BP: 139/72 (29 Mar 2024 05:45) (119/74 - 151/83)  BP(mean): --  RR: 18 (29 Mar 2024 05:45) (18 - 20)  SpO2: 98% (29 Mar 2024 05:45) (93% - 98%)    Parameters below as of 29 Mar 2024 05:45  Patient On (Oxygen Delivery Method): nasal cannula  O2 Flow (L/min): 2      CONSTITUTIONAL: NAD, well-developed  RESPIRATORY: Normal respiratory effort; lungs are clear to auscultation bilaterally  CARDIOVASCULAR: Regular rate and rhythm, normal S1 and S2, no murmur/rub/gallop; No lower extremity edema; Peripheral pulses are 2+ bilaterally  ABDOMEN: Nontender to palpation, normoactive bowel sounds, no rebound/guarding; No hepatosplenomegaly  MSK: no clubbing or cyanosis of digits; no joint swelling or tenderness to palpation  PSYCH: A+O to person, place, and time; affect appropriate    LABS:                        11.6   17.83 )-----------( 501      ( 29 Mar 2024 04:45 )             35.5     03-29    137  |  97<L>  |  17  ----------------------------<  106<H>  5.2   |  25  |  0.93    Ca    10.6<H>      29 Mar 2024 04:45  Phos  4.5     03-29  Mg     2.50     03-29    TPro  7.4  /  Alb  4.1  /  TBili  0.2  /  DBili  x   /  AST  16  /  ALT  30  /  AlkPhos  66  03-29          Urinalysis Basic - ( 29 Mar 2024 04:45 )    Color: x / Appearance: x / SG: x / pH: x  Gluc: 106 mg/dL / Ketone: x  / Bili: x / Urobili: x   Blood: x / Protein: x / Nitrite: x   Leuk Esterase: x / RBC: x / WBC x   Sq Epi: x / Non Sq Epi: x / Bacteria: x        Culture - Acid Fast - Sputum w/Smear (collected 27 Mar 2024 18:38)  Source: .Sputum Sputum        Tele Reviewed:    RADIOLOGY & ADDITIONAL TESTS:  Results Reviewed:   Imaging Personally Reviewed:  Electrocardiogram Personally Reviewed:     PROGRESS NOTE:   Authored by Dr. Darlene Vallejo MD (PGY-1). Pager [facility] [phone] / PHILLIP ( [phone]) or via TEAMS    Patient is a 64y old  Female who presents with a chief complaint of SOB, cough (28 Mar 2024 16:55)      SUBJECTIVE / OVERNIGHT EVENTS:  No acute events overnight. On NC, coughing often. 'small' bowel movement.       MEDICATIONS  (STANDING):  albuterol/ipratropium for Nebulization 3 milliLiter(s) Nebulizer every 8 hours  aspirin  chewable 81 milliGRAM(s) Oral daily  azithromycin   Tablet 1200 milliGRAM(s) Oral <User Schedule>  bictegravir 50 mG/emtricitabine 200 mG/tenofovir alafenamide 25 mG (BIKTARVY) 1 Tablet(s) Oral daily  buDESOnide    Inhalation Suspension 0.25 milliGRAM(s) Inhalation every 12 hours  cefepime   IVPB 2000 milliGRAM(s) IV Intermittent every 12 hours  cefepime   IVPB      dextrose 5%. 1000 milliLiter(s) (100 mL/Hr) IV Continuous <Continuous>  dextrose 5%. 1000 milliLiter(s) (50 mL/Hr) IV Continuous <Continuous>  dextrose 50% Injectable 12.5 Gram(s) IV Push once  dextrose 50% Injectable 25 Gram(s) IV Push once  dextrose 50% Injectable 25 Gram(s) IV Push once  DULoxetine 60 milliGRAM(s) Oral daily  gabapentin 600 milliGRAM(s) Oral at bedtime  glucagon  Injectable 1 milliGRAM(s) IntraMuscular once  influenza   Vaccine 0.5 milliLiter(s) IntraMuscular once  insulin lispro (ADMELOG) corrective regimen sliding scale   SubCutaneous at bedtime  insulin lispro (ADMELOG) corrective regimen sliding scale   SubCutaneous three times a day before meals  methylPREDNISolone sodium succinate Injectable 40 milliGRAM(s) IV Push every 12 hours  pantoprazole    Tablet 40 milliGRAM(s) Oral before breakfast  polyethylene glycol 3350 17 Gram(s) Oral daily  rivaroxaban 20 milliGRAM(s) Oral daily  senna 2 Tablet(s) Oral at bedtime  sodium chloride 3%  Inhalation 4 milliLiter(s) Inhalation every 12 hours  trimethoprim  160 mG/sulfamethoxazole 800 mG 2 Tablet(s) Oral every 8 hours  verapamil  milliGRAM(s) Oral daily    MEDICATIONS  (PRN):  acetaminophen     Tablet .. 650 milliGRAM(s) Oral every 6 hours PRN Temp greater or equal to 38C (100.4F), Mild Pain (1 - 3)  dextrose Oral Gel 15 Gram(s) Oral once PRN Blood Glucose LESS THAN 70 milliGRAM(s)/deciliter  hydrocodone/homatropine Syrup 5 milliLiter(s) Oral every 6 hours PRN Cough      CAPILLARY BLOOD GLUCOSE      POCT Blood Glucose.: 160 mg/dL (28 Mar 2024 21:12)  POCT Blood Glucose.: 125 mg/dL (28 Mar 2024 17:12)  POCT Blood Glucose.: 222 mg/dL (28 Mar 2024 11:52)  POCT Blood Glucose.: 138 mg/dL (28 Mar 2024 07:46)    I&O's Summary    28 Mar 2024 07:01  -  29 Mar 2024 07:00  --------------------------------------------------------  IN: 150 mL / OUT: 0 mL / NET: 150 mL        PHYSICAL EXAM:  Vital Signs Last 24 Hrs  T(C): 36.8 (29 Mar 2024 05:45), Max: 37.2 (28 Mar 2024 20:50)  T(F): 98.2 (29 Mar 2024 05:45), Max: 98.9 (28 Mar 2024 20:50)  HR: 89 (29 Mar 2024 05:45) (57 - 97)  BP: 139/72 (29 Mar 2024 05:45) (119/74 - 151/83)  BP(mean): --  RR: 18 (29 Mar 2024 05:45) (18 - 20)  SpO2: 98% (29 Mar 2024 05:45) (93% - 98%)    Parameters below as of 29 Mar 2024 05:45  Patient On (Oxygen Delivery Method): nasal cannula  O2 Flow (L/min): 2      CONSTITUTIONAL: NAD, well-developed  RESPIRATORY: diminished b/l wheezing, coughing often   CARDIOVASCULAR: Regular rate and rhythm, normal S1 and S2, no murmur/rub/gallop; No lower extremity edema; Peripheral pulses are 2+ bilaterally  ABDOMEN: Nontender to palpation, normoactive bowel sounds, no rebound/guarding; No hepatosplenomegaly  MSK: no clubbing or cyanosis of digits; no joint swelling or tenderness to palpation  PSYCH: A+O to person, place, and time; affect appropriate    LABS:                        11.6   17.83 )-----------( 501      ( 29 Mar 2024 04:45 )             35.5     03-29    137  |  97<L>  |  17  ----------------------------<  106<H>  5.2   |  25  |  0.93    Ca    10.6<H>      29 Mar 2024 04:45  Phos  4.5     03-29  Mg     2.50     03-29    TPro  7.4  /  Alb  4.1  /  TBili  0.2  /  DBili  x   /  AST  16  /  ALT  30  /  AlkPhos  66  03-29          Urinalysis Basic - ( 29 Mar 2024 04:45 )    Color: x / Appearance: x / SG: x / pH: x  Gluc: 106 mg/dL / Ketone: x  / Bili: x / Urobili: x   Blood: x / Protein: x / Nitrite: x   Leuk Esterase: x / RBC: x / WBC x   Sq Epi: x / Non Sq Epi: x / Bacteria: x        Culture - Acid Fast - Sputum w/Smear (collected 27 Mar 2024 18:38)  Source: .Sputum Sputum        Tele Reviewed:    RADIOLOGY & ADDITIONAL TESTS:  Results Reviewed:   Imaging Personally Reviewed:  Electrocardiogram Personally Reviewed:

## 2024-03-29 NOTE — PROGRESS NOTE ADULT - PROBLEM SELECTOR PLAN 1
- initially P/w SOB, persistent cough  - Recent admission for superimposed mycoplasma PNA on influenza, s/p azithromycin/doxycycline  - RVP unremarkable, CXR -> Bibasilar airspace opacities  - CT Chest -> Multifocal groundglass and consolidative opacities involving all 5 lobes, most predominant in the b/l lower lobes and right upper lobe  - S/p empiric meropenem, doxycyline iso lack of clinical improvement, per ID  - Fungitell elevated  - c/w solumedrol daily as per pulm for PJP prophylaxis   > C/w duonebs q6, inhaled budesonide BID, Robitussin-DM q4 prn, and hycodan PRN  > wean HFNC--> O2  NC  > Pulmonology consulted, will continue to appreciate recs, bronchoscopy planned for 3/26 now cancelled in setting of fulminant AIDS  > ID consulted, will continue to appreciate recs

## 2024-03-29 NOTE — PROGRESS NOTE ADULT - ASSESSMENT
64-year-old female w/ PMH of lupus, PE on Xarelto (dx 2019), asthma, HTN, T2DM, and GBS (dx 1997) presenting with 3wk h/o of worsening SOB and persistent cough. Patient was notably recently admitted at [facility] in 01/2024 for superimposed mycoplasma PNA on influenza now presenting with progressive dyspnea and persistent nonproductive cough, found to have severe AIDS/HIV, with likely bacterial pneumonia and PJP    Told me +HIV a decade ago, was told it was "false+" from Lupus    Weaned FiO2 to 40%  Can probably try 6 L    #Abnormal CT chest  #asthma  Denies complete resolution of symptoms since her prior discharge.   Noted recent VS admission with repeat CT chest with improvement in some areas with new opacities in others  - c/w standing nebs for now and 3% NaCl inhalation BID  - PJP+  - ruling out TB due to indeterminate quant  - cefepime/Bactrim/Zithromax  - ARV  - if unchanged or worsening can entertain notion of bronchoscopy but would favor treating and reconstituting for now i.e. risk likely does not outweigh benefit at this time for bronch given HFNC  - bcx x 2, ucx, sputum cx  - please check abg and cxr daily  - we doubt she has lupus (negative THERON, other symptoms can be explained by HIV untreated for a decade or more), please obtain rheum consult to re-assess prior dx

## 2024-03-29 NOTE — PROGRESS NOTE ADULT - SUBJECTIVE AND OBJECTIVE BOX
PATIENT:  ROSSI ROJAS  [id number]    CHIEF COMPLAINT:  Patient is a 64y old  Female who presents with a chief complaint of SOB, cough (29 Mar 2024 07:08)    INTERVAL HISTORY/OVERNIGHT EVENTS:  - no acute events    MEDICATIONS:  MEDICATIONS  (STANDING):  albuterol/ipratropium for Nebulization 3 milliLiter(s) Nebulizer every 8 hours  aspirin  chewable 81 milliGRAM(s) Oral daily  azithromycin   Tablet 1200 milliGRAM(s) Oral <User Schedule>  bictegravir 50 mG/emtricitabine 200 mG/tenofovir alafenamide 25 mG (BIKTARVY) 1 Tablet(s) Oral daily  buDESOnide    Inhalation Suspension 0.25 milliGRAM(s) Inhalation every 12 hours  cefepime   IVPB 2000 milliGRAM(s) IV Intermittent every 12 hours  cefepime   IVPB      dextrose 5%. 1000 milliLiter(s) (100 mL/Hr) IV Continuous <Continuous>  dextrose 5%. 1000 milliLiter(s) (50 mL/Hr) IV Continuous <Continuous>  dextrose 50% Injectable 12.5 Gram(s) IV Push once  dextrose 50% Injectable 25 Gram(s) IV Push once  dextrose 50% Injectable 25 Gram(s) IV Push once  DULoxetine 60 milliGRAM(s) Oral daily  gabapentin 600 milliGRAM(s) Oral at bedtime  glucagon  Injectable 1 milliGRAM(s) IntraMuscular once  influenza   Vaccine 0.5 milliLiter(s) IntraMuscular once  insulin lispro (ADMELOG) corrective regimen sliding scale   SubCutaneous three times a day before meals  insulin lispro (ADMELOG) corrective regimen sliding scale   SubCutaneous at bedtime  methylPREDNISolone sodium succinate Injectable 40 milliGRAM(s) IV Push every 12 hours  pantoprazole    Tablet 40 milliGRAM(s) Oral before breakfast  polyethylene glycol 3350 17 Gram(s) Oral daily  rivaroxaban 20 milliGRAM(s) Oral daily  senna 2 Tablet(s) Oral at bedtime  sodium chloride 3%  Inhalation 4 milliLiter(s) Inhalation every 12 hours  trimethoprim  160 mG/sulfamethoxazole 800 mG 2 Tablet(s) Oral every 8 hours  verapamil  milliGRAM(s) Oral daily    MEDICATIONS  (PRN):  acetaminophen     Tablet .. 650 milliGRAM(s) Oral every 6 hours PRN Temp greater or equal to 38C (100.4F), Mild Pain (1 - 3)  dextrose Oral Gel 15 Gram(s) Oral once PRN Blood Glucose LESS THAN 70 milliGRAM(s)/deciliter  hydrocodone/homatropine Syrup 5 milliLiter(s) Oral every 6 hours PRN Cough      ALLERGIES:  Allergies    No Known Allergies    Intolerances    dislikes Glucerna Shake (Other)      OBJECTIVE:  ICU Vital Signs Last 24 Hrs  T(C): 36.8 (29 Mar 2024 12:13), Max: 37.2 (28 Mar 2024 20:50)  T(F): 98.3 (29 Mar 2024 12:13), Max: 98.9 (28 Mar 2024 20:50)  HR: 101 (29 Mar 2024 12:13) (57 - 101)  BP: 132/75 (29 Mar 2024 12:13) (119/74 - 144/88)  RR: 18 (29 Mar 2024 12:13) (18 - 20)  SpO2: 92% (29 Mar 2024 12:13) (92% - 98%)    O2 Parameters below as of 29 Mar 2024 12:13  Patient On (Oxygen Delivery Method): nasal cannula  O2 Flow (L/min): 4    POCT Blood Glucose.: 170 mg/dL (29 Mar 2024 11:49)  POCT Blood Glucose.: 177 mg/dL (29 Mar 2024 07:49)  POCT Blood Glucose.: 160 mg/dL (28 Mar 2024 21:12)  POCT Blood Glucose.: 125 mg/dL (28 Mar 2024 17:12)  POCT Blood Glucose.: 170 mg/dL (29 Mar 2024 11:49)    I&O's Summary  28 Mar 2024 07:01  -  29 Mar 2024 07:00  --------------------------------------------------------  IN: 150 mL / OUT: 0 mL / NET: 150 mL    29 Mar 2024 07:01  -  29 Mar 2024 12:46  --------------------------------------------------------  IN: 240 mL / OUT: 0 mL / NET: 240 mL    PHYSICAL EXAMINATION:  General: WN/WD NAD  HEENT: EOMI, moist mucous membranes  Neurology: A&Ox3, nonfocal, JIANG x 4  Respiratory: CTA B/L, normal respiratory effort, no wheezes, crackles, rales  CV: RRR, S1S2, no murmurs, rubs or gallops  Abdominal: Soft, NT, ND +BS  Extremities: No edema, +peripheral pulses    LABS:                        11.6   17.83 )-----------( 501      ( 29 Mar 2024 04:45 )             35.5     03-29    137  |  97<L>  |  17  ----------------------------<  106<H>  5.2   |  25  |  0.93    Ca    10.6<H>      29 Mar 2024 04:45  Phos  4.5     03-29  Mg     2.50     03-29    TPro  7.4  /  Alb  4.1  /  TBili  0.2  /  DBili  x   /  AST  16  /  ALT  30  /  AlkPhos  66  03-29    LIVER FUNCTIONS - ( 29 Mar 2024 04:45 )  Alb: 4.1 g/dL / Pro: 7.4 g/dL / ALK PHOS: 66 U/L / ALT: 30 U/L / AST: 16 U/L / GGT: x           Urinalysis Basic - ( 29 Mar 2024 04:45 )    Color: x / Appearance: x / SG: x / pH: x  Gluc: 106 mg/dL / Ketone: x  / Bili: x / Urobili: x   Blood: x / Protein: x / Nitrite: x   Leuk Esterase: x / RBC: x / WBC x   Sq Epi: x / Non Sq Epi: x / Bacteria: x

## 2024-03-29 NOTE — PROGRESS NOTE ADULT - PROBLEM SELECTOR PLAN 2
-> sx of SOB, cough concern for HIV, prelim/ confirmatory test+ for HIV with CD4 of 14, viral load >70K  - ID consulted, continue bactrim-DS q8hr, adding azithromycin and cefepime 3/26 HIV prophylaxis   - adding Biktarvy 3/26   -sputum PCR +PJP  - continue biktarvy

## 2024-03-29 NOTE — PROGRESS NOTE ADULT - ATTENDING COMMENTS
64-year-old female w/ PMH of lupus, PE on Xarelto (dx 2019), asthma, HTN, T2DM, and GBS (dx 1997) presenting with 3wk h/o of AHRF.  Pt now w/ confirmed AIDS and very low cd4 count.       Given new dx of AIDS and w/ very high fungitell and CT chest showing at least some areas of GGO the likelihood of pjp is high.    Sputum pcr +for pjp.     Pt cont to significantly improve with decreasing fio2 requirements (now on NC) and also appears more comfortable.     - pjp tx w/ bactrim   - cont steroids for pjp  - ART tx as per ID   - RLL changes more consolidative and may benefit from full course of bacterial coverage for pna- now on cefepime (and azithro also for mac ppx)  - being r/o for TB for afb sputum, can use hypersal if needed  - AFB negative x1  - on a/c for PE hx

## 2024-03-29 NOTE — PROGRESS NOTE ADULT - ASSESSMENT
65 yo woman with SLE, DM, h/o PE, CVA with left sided weakness presented 3/19 with fever, dry cough, SOB    Hospital admission Jan 2024 at [facility] for PNA - mycoplasma IgM + and flu A +   Received azithro, cefepime, doxy and prednisone taper at that time     CT chest 3/19 shows multifocal ground glass and consolidative opacities involving all 5 lobes  - more extensive c/w prior study and in slightly different distribution     Multifocal pulmonary infiltrates   New dx  HIV     CD4  14    PCP     lfungitell 410    cxr improving on prednisone and bactrim      cefepime added for bacterial coverage      oxygen requirement improving      bood cultures no growth    serum crypt ag neg   quant gold indeterminate   PJP PCR positive     for sputum AFB x 3   neg x 1    syphilis screen neg   CMV IgM neg IgG positive   hep B Ag neg hep Ab +  hep c neg  toxo IgM neg IgG neg     Suggest:     c/w bactrim DS 2 tab po q 8 h   c/w cefepime --> 3/30  c/w biktarvy 1 tab po q 24 h   sputum AFB x 2       ID service available over weekend    65 yo woman with SLE, DM, h/o PE, CVA with left sided weakness presented 3/19 with fever, dry cough, SOB    Hospital admission Jan 2024 at [facility] for PNA - mycoplasma IgM + and flu A +   Received azithro, cefepime, doxy and prednisone taper at that time     CT chest 3/19 shows multifocal ground glass and consolidative opacities involving all 5 lobes  - more extensive c/w prior study and in slightly different distribution     Multifocal pulmonary infiltrates   New dx  HIV     CD4  14    PCP     lfungitell 410    cxr improving on prednisone and bactrim      cefepime added for bacterial coverage      oxygen requirement improving      bood cultures no growth    serum crypt ag neg   quant gold indeterminate   PJP PCR positive     for sputum AFB x 3   neg x 1    syphilis screen neg   CMV IgM neg IgG positive   hep B Ag neg hep Ab neg   hep c neg  toxo IgM neg IgG neg     Suggest:     c/w bactrim DS 2 tab po q 8 h   c/w cefepime --> 3/30  c/w biktarvy 1 tab po q 24 h   sputum AFB x 2       ID service available over weekend

## 2024-03-29 NOTE — PROGRESS NOTE ADULT - SUBJECTIVE AND OBJECTIVE BOX
Follow Up:      Inverval History/ROS:Patient is a 64y old  Female who presents with a chief complaint of SOB, cough (24 Mar 2024 08:26)    feels slightly better  in chair eating lunch  still gets SOB with activity       Allergies    No Known Allergies    Intolerances    dislikes Glucerna Shake (Other)    worked as respiratory therapist; retired 15 years ago       ANTIMICROBIALS: azithromycin   Tablet 1200 <User Schedule>  bictegravir 50 mG/emtricitabine 200 mG/tenofovir alafenamide 25 mG (BIKTARVY) 1 daily  cefepime   IVPB 2000 every 12 hours  cefepime   IVPB    trimethoprim  160 mG/sulfamethoxazole 800 mG 2 every 8 hours    MEDICATIONS  (STANDING):  acetaminophen     Tablet .. 650 every 6 hours PRN  albuterol/ipratropium for Nebulization 3 every 8 hours  aspirin  chewable 81 daily  buDESOnide    Inhalation Suspension 0.25 every 12 hours  dextrose 50% Injectable 12.5 once  dextrose 50% Injectable 25 once  dextrose 50% Injectable 25 once  dextrose Oral Gel 15 once PRN  DULoxetine 60 daily  gabapentin 600 at bedtime  glucagon  Injectable 1 once  hydrocodone/homatropine Syrup 5 every 6 hours PRN  influenza   Vaccine 0.5 once  insulin lispro (ADMELOG) corrective regimen sliding scale  at bedtime  insulin lispro (ADMELOG) corrective regimen sliding scale  three times a day before meals  methylPREDNISolone sodium succinate Injectable 40 every 12 hours  pantoprazole    Tablet 40 before breakfast  polyethylene glycol 3350 17 daily  rivaroxaban 20 daily  senna 2 at bedtime  sodium chloride 3%  Inhalation 4 every 12 hours  verapamil  daily    Vital Signs Last 24 Hrs  T(F): 98.3 (03-29-24 @ 12:13), Max: 98.9 (03-28-24 @ 20:50)  HR: 101 (03-29-24 @ 12:13)  BP: 132/75 (03-29-24 @ 12:13)  RR: 18 (03-29-24 @ 12:13)  SpO2: 92% (03-29-24 @ 12:13) (92% - 98%)    PHYSICAL EXAM:  General: [x ] non-toxic.  nasal canula 4 L  HEAD/EYES: [ ] PERRL [x ] white sclera [ ] icterus  ENT:  [ ] normal [x ] supple [ ] thrush [ ] pharyngeal exudate  Cardiovascular:   [ ] murmur [x ] normal [ ] PPM/AICD  Respiratory:  x[ ] clear  GI:  [x ] soft, non-tender, normal bowel sounds  :  [ ] penny [x ] no CVA tenderness   Musculoskeletal:  [ ] no synovitis  Neurologic:  [x ] non-focal exam   Skin:  [ x] no rash  Lymph: [x ] no lymphadenopathy  Psychiatric:  [x ] appropriate affect [x ] alert & oriented  Lines:                            11.6   17.83 )-----------( 501      ( 29 Mar 2024 04:45 )             35.5 03-29    137  |  97  |  17  ----------------------------<  106  5.2   |  25  |  0.93  Ca    10.6      29 Mar 2024 04:45Phos  4.5     03-29Mg     2.50     03-29  TPro  7.4  /  Alb  4.1  /  TBili  0.2  /  DBili  x   /  AST  16  /  ALT  30  /  AlkPhos  66  03-29    Pneumocystis jiroveci PCR (03.25.24 @ 07:42)   Pneumocystis Specimen Source: sputum  Pneumocystis PCR Result: Positive: Critical Result.     HIV-1 RNA Quantitative, Viral Load (03.25.24 @ 05:56)   HIV-1 RNA Quantitative, Viral Load: 72,947    ABS CD4: 14 cells/uL (03.25.24 @ 05:56)   crypt neg     MICROBIOLOGY:    Culture - Blood (03.19.24 @ 21:28)   Specimen Source: .Blood Blood-Peripheral  Culture Results:   No growth at 5 daysCulture - Blood (03.19.24 @ 21:20)   Specimen Source: .Blood Blood-Peripheral  Culture Results:   No growth at 5 days      RADIOLOGY:    < from: Xray Chest 1 View- PORTABLE-Urgent (Xray Chest 1 View- PORTABLE-Urgent .) (03.24.24 @ 13:04) >    ACC: [id number] EXAM:  XR CHEST PORTABLE URGENT 1V   ORDERED BY: RAYMOND HO     PROCEDURE DATE:  03/24/2024          INTERPRETATION:  EXAMINATION: XR CHEST URGENT    CLINICAL INDICATION: Increased oxygen requirement    TECHNIQUE: Single frontal,portable view of the chest was obtained.    COMPARISON: Chest radiograph 3/19/2024.    FINDINGS:  The heart size is normal.  Interval decrease in bilateral airspace opacities.  No congestion or effusions to indicate CHF.  No pneumothorax.    IMPRESSION:  Interval decrease in bilateral airspace opacities    --- End of Report ---          JUAN LAMBERT MD; Resident Radiology  This document has been electronically signed.  BRIONNA PAVON MD; Attending Radiologist    < end of copied text >

## 2024-03-30 LAB
ALBUMIN SERPL ELPH-MCNC: 3.2 G/DL — LOW (ref 3.3–5)
ALP SERPL-CCNC: 60 U/L — SIGNIFICANT CHANGE UP (ref 40–120)
ALT FLD-CCNC: 33 U/L — SIGNIFICANT CHANGE UP (ref 4–33)
ANION GAP SERPL CALC-SCNC: 15 MMOL/L — HIGH (ref 7–14)
ANION GAP SERPL CALC-SCNC: 16 MMOL/L — HIGH (ref 7–14)
AST SERPL-CCNC: 41 U/L — HIGH (ref 4–32)
BILIRUB SERPL-MCNC: 0.2 MG/DL — SIGNIFICANT CHANGE UP (ref 0.2–1.2)
BUN SERPL-MCNC: 20 MG/DL — SIGNIFICANT CHANGE UP (ref 7–23)
BUN SERPL-MCNC: 20 MG/DL — SIGNIFICANT CHANGE UP (ref 7–23)
CALCIUM SERPL-MCNC: 9.7 MG/DL — SIGNIFICANT CHANGE UP (ref 8.4–10.5)
CALCIUM SERPL-MCNC: 9.7 MG/DL — SIGNIFICANT CHANGE UP (ref 8.4–10.5)
CHLORIDE SERPL-SCNC: 97 MMOL/L — LOW (ref 98–107)
CHLORIDE SERPL-SCNC: 98 MMOL/L — SIGNIFICANT CHANGE UP (ref 98–107)
CO2 SERPL-SCNC: 16 MMOL/L — LOW (ref 22–31)
CO2 SERPL-SCNC: 19 MMOL/L — LOW (ref 22–31)
CREAT SERPL-MCNC: 0.78 MG/DL — SIGNIFICANT CHANGE UP (ref 0.5–1.3)
CREAT SERPL-MCNC: 0.88 MG/DL — SIGNIFICANT CHANGE UP (ref 0.5–1.3)
CULTURE RESULTS: SIGNIFICANT CHANGE UP
EGFR: 73 ML/MIN/1.73M2 — SIGNIFICANT CHANGE UP
EGFR: 85 ML/MIN/1.73M2 — SIGNIFICANT CHANGE UP
GLUCOSE BLDC GLUCOMTR-MCNC: 114 MG/DL — HIGH (ref 70–99)
GLUCOSE BLDC GLUCOMTR-MCNC: 140 MG/DL — HIGH (ref 70–99)
GLUCOSE BLDC GLUCOMTR-MCNC: 176 MG/DL — HIGH (ref 70–99)
GLUCOSE BLDC GLUCOMTR-MCNC: 268 MG/DL — HIGH (ref 70–99)
GLUCOSE SERPL-MCNC: 252 MG/DL — HIGH (ref 70–99)
GLUCOSE SERPL-MCNC: 91 MG/DL — SIGNIFICANT CHANGE UP (ref 70–99)
HCT VFR BLD CALC: 36.6 % — SIGNIFICANT CHANGE UP (ref 34.5–45)
HGB BLD-MCNC: 11.5 G/DL — SIGNIFICANT CHANGE UP (ref 11.5–15.5)
MAGNESIUM SERPL-MCNC: 2.1 MG/DL — SIGNIFICANT CHANGE UP (ref 1.6–2.6)
MAGNESIUM SERPL-MCNC: 2.4 MG/DL — SIGNIFICANT CHANGE UP (ref 1.6–2.6)
MCHC RBC-ENTMCNC: 26.7 PG — LOW (ref 27–34)
MCHC RBC-ENTMCNC: 31.4 GM/DL — LOW (ref 32–36)
MCV RBC AUTO: 85.1 FL — SIGNIFICANT CHANGE UP (ref 80–100)
NRBC # BLD: 0 /100 WBCS — SIGNIFICANT CHANGE UP (ref 0–0)
NRBC # FLD: 0.03 K/UL — HIGH (ref 0–0)
PHOSPHATE SERPL-MCNC: 3.3 MG/DL — SIGNIFICANT CHANGE UP (ref 2.5–4.5)
PHOSPHATE SERPL-MCNC: 3.9 MG/DL — SIGNIFICANT CHANGE UP (ref 2.5–4.5)
PLATELET # BLD AUTO: 436 K/UL — HIGH (ref 150–400)
POTASSIUM SERPL-MCNC: 4.6 MMOL/L — SIGNIFICANT CHANGE UP (ref 3.5–5.3)
POTASSIUM SERPL-MCNC: SIGNIFICANT CHANGE UP MMOL/L (ref 3.5–5.3)
POTASSIUM SERPL-SCNC: 4.6 MMOL/L — SIGNIFICANT CHANGE UP (ref 3.5–5.3)
POTASSIUM SERPL-SCNC: SIGNIFICANT CHANGE UP MMOL/L (ref 3.5–5.3)
PROT SERPL-MCNC: 6.5 G/DL — SIGNIFICANT CHANGE UP (ref 6–8.3)
RBC # BLD: 4.3 M/UL — SIGNIFICANT CHANGE UP (ref 3.8–5.2)
RBC # FLD: 20.1 % — HIGH (ref 10.3–14.5)
SODIUM SERPL-SCNC: 129 MMOL/L — LOW (ref 135–145)
SODIUM SERPL-SCNC: 132 MMOL/L — LOW (ref 135–145)
SPECIMEN SOURCE: SIGNIFICANT CHANGE UP
WBC # BLD: 17.37 K/UL — HIGH (ref 3.8–10.5)
WBC # FLD AUTO: 17.37 K/UL — HIGH (ref 3.8–10.5)

## 2024-03-30 PROCEDURE — 99232 SBSQ HOSP IP/OBS MODERATE 35: CPT

## 2024-03-30 RX ORDER — IPRATROPIUM/ALBUTEROL SULFATE 18-103MCG
3 AEROSOL WITH ADAPTER (GRAM) INHALATION EVERY 6 HOURS
Refills: 0 | Status: DISCONTINUED | OUTPATIENT
Start: 2024-03-30 | End: 2024-04-11

## 2024-03-30 RX ADMIN — CEFEPIME 100 MILLIGRAM(S): 1 INJECTION, POWDER, FOR SOLUTION INTRAMUSCULAR; INTRAVENOUS at 18:07

## 2024-03-30 RX ADMIN — Medication 40 MILLIGRAM(S): at 06:32

## 2024-03-30 RX ADMIN — PANTOPRAZOLE SODIUM 40 MILLIGRAM(S): 20 TABLET, DELAYED RELEASE ORAL at 06:32

## 2024-03-30 RX ADMIN — DULOXETINE HYDROCHLORIDE 60 MILLIGRAM(S): 30 CAPSULE, DELAYED RELEASE ORAL at 12:43

## 2024-03-30 RX ADMIN — Medication 3 MILLILITER(S): at 23:03

## 2024-03-30 RX ADMIN — Medication 2 TABLET(S): at 22:22

## 2024-03-30 RX ADMIN — Medication 2 TABLET(S): at 14:23

## 2024-03-30 RX ADMIN — Medication 120 MILLIGRAM(S): at 06:32

## 2024-03-30 RX ADMIN — RIVAROXABAN 20 MILLIGRAM(S): KIT at 12:43

## 2024-03-30 RX ADMIN — Medication 81 MILLIGRAM(S): at 12:43

## 2024-03-30 RX ADMIN — Medication 3: at 12:42

## 2024-03-30 RX ADMIN — CEFEPIME 100 MILLIGRAM(S): 1 INJECTION, POWDER, FOR SOLUTION INTRAMUSCULAR; INTRAVENOUS at 06:32

## 2024-03-30 RX ADMIN — GABAPENTIN 600 MILLIGRAM(S): 400 CAPSULE ORAL at 22:20

## 2024-03-30 RX ADMIN — Medication 2 TABLET(S): at 06:32

## 2024-03-30 RX ADMIN — SODIUM CHLORIDE 4 MILLILITER(S): 9 INJECTION INTRAMUSCULAR; INTRAVENOUS; SUBCUTANEOUS at 23:03

## 2024-03-30 RX ADMIN — Medication 40 MILLIGRAM(S): at 17:15

## 2024-03-30 RX ADMIN — BICTEGRAVIR SODIUM, EMTRICITABINE, AND TENOFOVIR ALAFENAMIDE FUMARATE 1 TABLET(S): 30; 120; 15 TABLET ORAL at 12:44

## 2024-03-30 RX ADMIN — Medication 0.25 MILLIGRAM(S): at 23:04

## 2024-03-30 RX ADMIN — Medication 3 MILLILITER(S): at 23:05

## 2024-03-30 NOTE — PROVIDER CONTACT NOTE (CRITICAL VALUE NOTIFICATION) - ASSESSMENT
Patient A&Ox4, denies chest pain or shortness of breath at this time.  Patient asymptomatic, in no acute distress at this time.
lab called with critical lab ptt of 168.3
patient asymptomatic
Patient A&Ox4, denies chest pain or shortness of breath at this time.  patient in no acute distress at this time. No s/s of bleeding or bruising noted
Patient lying in bed, awake, alert and oriented x4. Able to make needs known. VSS. Denies c/o discomfort.
Pt A&Ox4, afebrile, not in distress. VS stable.
Patient on 6l O2 satting at 93%

## 2024-03-30 NOTE — PROGRESS NOTE ADULT - PROBLEM SELECTOR PLAN 2
-> sx of SOB, cough concern for HIV, prelim/ confirmatory test+ for HIV with CD4 of 14, viral load >70K  - ID consulted, continue bactrim-DS q8hr, adding azithromycin and cefepime 3/26 HIV prophylaxis   - adding Biktarvy 3/26   -sputum PCR +PJP  - continue biktarvy -> sx of SOB, cough concern for HIV, prelim/ confirmatory test+ for HIV with CD4 of 14, viral load >70K  - ID consulted, continue bactrim-DS q8hr, adding azithromycin and cefepime 3/26 HIV prophylaxis   - adding Biktarvy 3/26   -sputum PCR +PJP  - continue biktarvy  - pending rule out Tb (2/3 negative, pending 3rd sputum order 3/30)

## 2024-03-30 NOTE — PROVIDER CONTACT NOTE (CRITICAL VALUE NOTIFICATION) - ACTION/TREATMENT ORDERED:
Provider notified and aware
provider notified, continue heparin gtt per nomogram orders
Dr. Del Valle notified, heparin nomogram followed.  No other interventions ordered at this time.
Followed the nomogram
Primary RN Ebony Santos notified
Patient already on the appropriate treatment
Repeat Stat BNP level !!
Dr. Del Valle notified, no new interventions ordered at this time.  Heparin nomogram followed.
provider notified, nomogram followed.

## 2024-03-30 NOTE — PROVIDER CONTACT NOTE (CRITICAL VALUE NOTIFICATION) - SITUATION
Pt admitted to 8T from 5n to rule out TB
Patient admitted to  with heparin running at 18cc/hr. after ptt result heparin pause for 1hr and restarted at 15cc/hr
64 year old female problem dx with AIDS, syncope, and acute respiratory failure.
Patient's aptt >200
LAB called and informed pt's aPTT resulted 145.3
Potassium level 6.8, and Glucose level 749.
PTT >200
HIV1 positive
Aptt 142.7

## 2024-03-30 NOTE — PROVIDER CONTACT NOTE (CRITICAL VALUE NOTIFICATION) - TEST AND RESULT REPORTED:
HIV1 positive
Aptt >200
Aptt 142.7
Pneumocystis PCR
.3
High Potassium and Glucose level.
PTT >200
Ptt
aPTT 145.3

## 2024-03-30 NOTE — PROVIDER CONTACT NOTE (CRITICAL VALUE NOTIFICATION) - NAME OF MD/NP/PA/DO NOTIFIED:
Surgery E Team notified.
Yoni Colon
Darlene Vallejo
David Guerrero
DANAE Del Valle
DR. Guy Harris
Ebony Santos RN
HS2 Dr. Eitan Del Valle
David Guerrero Via teams

## 2024-03-30 NOTE — PROGRESS NOTE ADULT - ATTENDING COMMENTS
Pt is a 65 yo F with PMH chronic back pain (s/p spinal fusion), T2D, HTN, HLD, SLE, CVA/TIA, asthma, PE (2019, xarelto), and GBS p/w persistent cough, SOB, diarrhea, and fall 2/2 weakness. [facility] for influenza with superimposed mycoplasma PNA s/p doxycycline course. On arrival, CTH neg, CT chest with diffuse GGO and consolidations. Labs with leukocytosis, normocytic anemia, neg GI PCR, RVP neg, and MRSA neg. TTE with EF 53% and mild G1 diastolic dysfunction. Repeat CT PE neg but with multiple BL nodules and patchy opacities, mostly in the RLL/R base. ID following, appreciate recs. Pulm following, was planned for bronch however now deferred given AIDS dx; on IV methylprednisolone 40mg BID with protonix for gastric protection. Receiving nebs around the clock and now weaned from HFNC to 2L NC. HIV+ with CD4 14 and VL 72K --- started on biktarvy; will need to closely monitor for IRIS. Fungitell elevated -- high suspicion for PJP PNA, now with PCP PCR+; on bactrim treatment. Will c/w cefepime and azithromycin to cover likely superimposed bacterial PNA (typical and atypical coverage). Cefepime thru 3/30 per ID. Azithromycin will cover opportunistic infections, as will bactrim. Transitioned hep gtt back to xarelto, as no plan for bronch at present given risk >> benefit and being empirically treated now. C/w steroid for now and will discuss with pulm regarding plan for steroid. Continues to report cough and SOB today and unable to bring up sputum when she feels that there is sputum. Noted for some wheezing on exam. Increase duoneb from q8 to q6. Aerobika. Continue to monitor respiratory status.    Discussed with HS. Rest as above.

## 2024-03-30 NOTE — PROVIDER CONTACT NOTE (CRITICAL VALUE NOTIFICATION) - PERSON GIVING RESULT:
Cindi Scruggs
Antonella Reeves
LULU Tai
Routine Labs, ERIN Gambino
GHADA Mercedes
Valerie Santo
LAB, SUSANA Johnson
 Y
Santo Padilla/ Lab

## 2024-03-30 NOTE — PROGRESS NOTE ADULT - SUBJECTIVE AND OBJECTIVE BOX
INTERVAL HPI/OVERNIGHT EVENTS:    SUBJECTIVE: Patient seen and examined at bedside.    OBJECTIVE:    VITAL SIGNS:  ICU Vital Signs Last 24 Hrs  T(C): 36.9 (30 Mar 2024 05:50), Max: 36.9 (29 Mar 2024 21:34)  T(F): 98.4 (30 Mar 2024 05:50), Max: 98.5 (29 Mar 2024 21:34)  HR: 93 (30 Mar 2024 05:50) (93 - 109)  BP: 147/80 (30 Mar 2024 05:50) (105/76 - 147/80)  BP(mean): --  ABP: --  ABP(mean): --  RR: 18 (30 Mar 2024 05:50) (18 - 18)  SpO2: 96% (30 Mar 2024 05:50) (92% - 96%)    O2 Parameters below as of 30 Mar 2024 05:50  Patient On (Oxygen Delivery Method): nasal cannula  O2 Flow (L/min): 4            03-29 @ 07:01  -  03-30 @ 07:00  --------------------------------------------------------  IN: 760 mL / OUT: 0 mL / NET: 760 mL      CAPILLARY BLOOD GLUCOSE      POCT Blood Glucose.: 155 mg/dL (29 Mar 2024 21:22)      PHYSICAL EXAM:    General: NAD  HEENT: NC/AT; PERRL, clear conjunctiva  Neck: supple  Respiratory: CTA b/l  Cardiovascular: +S1/S2; RRR  Abdomen: soft, NT/ND; +BS x4  Extremities:  no LE edema  Vascular: WWP, 2+ peripheral pulses b/l;  Skin: normal color and turgor; no rash  Neurological: A&Ox3, move all extremities. CN II-XII intact    MEDICATIONS:  MEDICATIONS  (STANDING):  albuterol/ipratropium for Nebulization 3 milliLiter(s) Nebulizer every 8 hours  aspirin  chewable 81 milliGRAM(s) Oral daily  azithromycin   Tablet 1200 milliGRAM(s) Oral <User Schedule>  bictegravir 50 mG/emtricitabine 200 mG/tenofovir alafenamide 25 mG (BIKTARVY) 1 Tablet(s) Oral daily  buDESOnide    Inhalation Suspension 0.25 milliGRAM(s) Inhalation every 12 hours  cefepime   IVPB 2000 milliGRAM(s) IV Intermittent every 12 hours  cefepime   IVPB      dextrose 5%. 1000 milliLiter(s) (100 mL/Hr) IV Continuous <Continuous>  dextrose 5%. 1000 milliLiter(s) (50 mL/Hr) IV Continuous <Continuous>  dextrose 50% Injectable 12.5 Gram(s) IV Push once  dextrose 50% Injectable 25 Gram(s) IV Push once  dextrose 50% Injectable 25 Gram(s) IV Push once  DULoxetine 60 milliGRAM(s) Oral daily  gabapentin 600 milliGRAM(s) Oral at bedtime  glucagon  Injectable 1 milliGRAM(s) IntraMuscular once  influenza   Vaccine 0.5 milliLiter(s) IntraMuscular once  insulin lispro (ADMELOG) corrective regimen sliding scale   SubCutaneous at bedtime  insulin lispro (ADMELOG) corrective regimen sliding scale   SubCutaneous three times a day before meals  methylPREDNISolone sodium succinate Injectable 40 milliGRAM(s) IV Push every 12 hours  pantoprazole    Tablet 40 milliGRAM(s) Oral before breakfast  polyethylene glycol 3350 17 Gram(s) Oral daily  rivaroxaban 20 milliGRAM(s) Oral daily  senna 2 Tablet(s) Oral at bedtime  sodium chloride 3%  Inhalation 4 milliLiter(s) Inhalation every 12 hours  trimethoprim  160 mG/sulfamethoxazole 800 mG 2 Tablet(s) Oral every 8 hours  verapamil  milliGRAM(s) Oral daily    MEDICATIONS  (PRN):  acetaminophen     Tablet .. 650 milliGRAM(s) Oral every 6 hours PRN Temp greater or equal to 38C (100.4F), Mild Pain (1 - 3)  dextrose Oral Gel 15 Gram(s) Oral once PRN Blood Glucose LESS THAN 70 milliGRAM(s)/deciliter  hydrocodone/homatropine Syrup 5 milliLiter(s) Oral every 6 hours PRN Cough      ALLERGIES:  Allergies    No Known Allergies    Intolerances    dislikes Glucerna Shake (Other)      LABS:                        11.5   17.37 )-----------( 436      ( 30 Mar 2024 05:41 )             36.6     03-30    129<L>  |  97<L>  |  20  ----------------------------<  91  TNP   |  16<L>  |  0.78    Ca    9.7      30 Mar 2024 05:41  Phos  3.9     03-30  Mg     2.40     03-30    TPro  6.5  /  Alb  3.2<L>  /  TBili  0.2  /  DBili  x   /  AST  41<H>  /  ALT  33  /  AlkPhos  60  03-30      Urinalysis Basic - ( 30 Mar 2024 05:41 )    Color: x / Appearance: x / SG: x / pH: x  Gluc: 91 mg/dL / Ketone: x  / Bili: x / Urobili: x   Blood: x / Protein: x / Nitrite: x   Leuk Esterase: x / RBC: x / WBC x   Sq Epi: x / Non Sq Epi: x / Bacteria: x        RADIOLOGY & ADDITIONAL TESTS: Reviewed. INTERVAL HPI/OVERNIGHT EVENTS: JN OVN     SUBJECTIVE: Patient seen and examined at bedside. Patient endorsing significant dyspnea especially when talking and cough.     OBJECTIVE:    VITAL SIGNS:  ICU Vital Signs Last 24 Hrs  T(C): 36.9 (30 Mar 2024 05:50), Max: 36.9 (29 Mar 2024 21:34)  T(F): 98.4 (30 Mar 2024 05:50), Max: 98.5 (29 Mar 2024 21:34)  HR: 93 (30 Mar 2024 05:50) (93 - 109)  BP: 147/80 (30 Mar 2024 05:50) (105/76 - 147/80)  BP(mean): --  ABP: --  ABP(mean): --  RR: 18 (30 Mar 2024 05:50) (18 - 18)  SpO2: 96% (30 Mar 2024 05:50) (92% - 96%)    O2 Parameters below as of 30 Mar 2024 05:50  Patient On (Oxygen Delivery Method): nasal cannula  O2 Flow (L/min): 4            03-29 @ 07:01  -  03-30 @ 07:00  --------------------------------------------------------  IN: 760 mL / OUT: 0 mL / NET: 760 mL      CAPILLARY BLOOD GLUCOSE      POCT Blood Glucose.: 155 mg/dL (29 Mar 2024 21:22)      PHYSICAL EXAM:    General: NAD  HEENT: NC/AT; PERRL, clear conjunctiva  Neck: supple  Respiratory: Rhonchorus   Cardiovascular: +S1/S2; RRR  Abdomen: soft, NT/ND; +BS x4  Extremities:  no LE edema  Vascular: WWP, 2+ peripheral pulses b/l;  Skin: normal color and turgor; no rash  Neurological: A&Ox3, move all extremities.     MEDICATIONS:  MEDICATIONS  (STANDING):  albuterol/ipratropium for Nebulization 3 milliLiter(s) Nebulizer every 8 hours  aspirin  chewable 81 milliGRAM(s) Oral daily  azithromycin   Tablet 1200 milliGRAM(s) Oral <User Schedule>  bictegravir 50 mG/emtricitabine 200 mG/tenofovir alafenamide 25 mG (BIKTARVY) 1 Tablet(s) Oral daily  buDESOnide    Inhalation Suspension 0.25 milliGRAM(s) Inhalation every 12 hours  cefepime   IVPB 2000 milliGRAM(s) IV Intermittent every 12 hours  cefepime   IVPB      dextrose 5%. 1000 milliLiter(s) (100 mL/Hr) IV Continuous <Continuous>  dextrose 5%. 1000 milliLiter(s) (50 mL/Hr) IV Continuous <Continuous>  dextrose 50% Injectable 12.5 Gram(s) IV Push once  dextrose 50% Injectable 25 Gram(s) IV Push once  dextrose 50% Injectable 25 Gram(s) IV Push once  DULoxetine 60 milliGRAM(s) Oral daily  gabapentin 600 milliGRAM(s) Oral at bedtime  glucagon  Injectable 1 milliGRAM(s) IntraMuscular once  influenza   Vaccine 0.5 milliLiter(s) IntraMuscular once  insulin lispro (ADMELOG) corrective regimen sliding scale   SubCutaneous at bedtime  insulin lispro (ADMELOG) corrective regimen sliding scale   SubCutaneous three times a day before meals  methylPREDNISolone sodium succinate Injectable 40 milliGRAM(s) IV Push every 12 hours  pantoprazole    Tablet 40 milliGRAM(s) Oral before breakfast  polyethylene glycol 3350 17 Gram(s) Oral daily  rivaroxaban 20 milliGRAM(s) Oral daily  senna 2 Tablet(s) Oral at bedtime  sodium chloride 3%  Inhalation 4 milliLiter(s) Inhalation every 12 hours  trimethoprim  160 mG/sulfamethoxazole 800 mG 2 Tablet(s) Oral every 8 hours  verapamil  milliGRAM(s) Oral daily    MEDICATIONS  (PRN):  acetaminophen     Tablet .. 650 milliGRAM(s) Oral every 6 hours PRN Temp greater or equal to 38C (100.4F), Mild Pain (1 - 3)  dextrose Oral Gel 15 Gram(s) Oral once PRN Blood Glucose LESS THAN 70 milliGRAM(s)/deciliter  hydrocodone/homatropine Syrup 5 milliLiter(s) Oral every 6 hours PRN Cough      ALLERGIES:  Allergies    No Known Allergies    Intolerances    dislikes Glucerna Shake (Other)      LABS:                        11.5   17.37 )-----------( 436      ( 30 Mar 2024 05:41 )             36.6     03-30    129<L>  |  97<L>  |  20  ----------------------------<  91  TNP   |  16<L>  |  0.78    Ca    9.7      30 Mar 2024 05:41  Phos  3.9     03-30  Mg     2.40     03-30    TPro  6.5  /  Alb  3.2<L>  /  TBili  0.2  /  DBili  x   /  AST  41<H>  /  ALT  33  /  AlkPhos  60  03-30      Urinalysis Basic - ( 30 Mar 2024 05:41 )    Color: x / Appearance: x / SG: x / pH: x  Gluc: 91 mg/dL / Ketone: x  / Bili: x / Urobili: x   Blood: x / Protein: x / Nitrite: x   Leuk Esterase: x / RBC: x / WBC x   Sq Epi: x / Non Sq Epi: x / Bacteria: x        RADIOLOGY & ADDITIONAL TESTS: Reviewed.

## 2024-03-31 LAB
ALBUMIN SERPL ELPH-MCNC: 3.7 G/DL — SIGNIFICANT CHANGE UP (ref 3.3–5)
ALP SERPL-CCNC: 60 U/L — SIGNIFICANT CHANGE UP (ref 40–120)
ALT FLD-CCNC: 33 U/L — SIGNIFICANT CHANGE UP (ref 4–33)
ANION GAP SERPL CALC-SCNC: 13 MMOL/L — SIGNIFICANT CHANGE UP (ref 7–14)
AST SERPL-CCNC: 13 U/L — SIGNIFICANT CHANGE UP (ref 4–32)
BILIRUB SERPL-MCNC: <0.2 MG/DL — SIGNIFICANT CHANGE UP (ref 0.2–1.2)
BUN SERPL-MCNC: 19 MG/DL — SIGNIFICANT CHANGE UP (ref 7–23)
CALCIUM SERPL-MCNC: 10.1 MG/DL — SIGNIFICANT CHANGE UP (ref 8.4–10.5)
CHLORIDE SERPL-SCNC: 97 MMOL/L — LOW (ref 98–107)
CO2 SERPL-SCNC: 23 MMOL/L — SIGNIFICANT CHANGE UP (ref 22–31)
CREAT SERPL-MCNC: 0.91 MG/DL — SIGNIFICANT CHANGE UP (ref 0.5–1.3)
CULTURE RESULTS: SIGNIFICANT CHANGE UP
EGFR: 70 ML/MIN/1.73M2 — SIGNIFICANT CHANGE UP
GLUCOSE BLDC GLUCOMTR-MCNC: 118 MG/DL — HIGH (ref 70–99)
GLUCOSE BLDC GLUCOMTR-MCNC: 155 MG/DL — HIGH (ref 70–99)
GLUCOSE BLDC GLUCOMTR-MCNC: 163 MG/DL — HIGH (ref 70–99)
GLUCOSE BLDC GLUCOMTR-MCNC: 180 MG/DL — HIGH (ref 70–99)
GLUCOSE SERPL-MCNC: 105 MG/DL — HIGH (ref 70–99)
HCT VFR BLD CALC: 35.6 % — SIGNIFICANT CHANGE UP (ref 34.5–45)
HGB BLD-MCNC: 11.3 G/DL — LOW (ref 11.5–15.5)
MAGNESIUM SERPL-MCNC: 2.2 MG/DL — SIGNIFICANT CHANGE UP (ref 1.6–2.6)
MCHC RBC-ENTMCNC: 26.4 PG — LOW (ref 27–34)
MCHC RBC-ENTMCNC: 31.7 GM/DL — LOW (ref 32–36)
MCV RBC AUTO: 83.2 FL — SIGNIFICANT CHANGE UP (ref 80–100)
NRBC # BLD: 0 /100 WBCS — SIGNIFICANT CHANGE UP (ref 0–0)
NRBC # FLD: 0.02 K/UL — HIGH (ref 0–0)
PHOSPHATE SERPL-MCNC: 3.4 MG/DL — SIGNIFICANT CHANGE UP (ref 2.5–4.5)
PLATELET # BLD AUTO: 401 K/UL — HIGH (ref 150–400)
POTASSIUM SERPL-MCNC: 5.3 MMOL/L — SIGNIFICANT CHANGE UP (ref 3.5–5.3)
POTASSIUM SERPL-SCNC: 5.3 MMOL/L — SIGNIFICANT CHANGE UP (ref 3.5–5.3)
PROT SERPL-MCNC: 6.5 G/DL — SIGNIFICANT CHANGE UP (ref 6–8.3)
RBC # BLD: 4.28 M/UL — SIGNIFICANT CHANGE UP (ref 3.8–5.2)
RBC # FLD: 19.8 % — HIGH (ref 10.3–14.5)
SODIUM SERPL-SCNC: 133 MMOL/L — LOW (ref 135–145)
SPECIMEN SOURCE: SIGNIFICANT CHANGE UP
WBC # BLD: 18.87 K/UL — HIGH (ref 3.8–10.5)
WBC # FLD AUTO: 18.87 K/UL — HIGH (ref 3.8–10.5)

## 2024-03-31 PROCEDURE — 93010 ELECTROCARDIOGRAM REPORT: CPT

## 2024-03-31 PROCEDURE — 99231 SBSQ HOSP IP/OBS SF/LOW 25: CPT | Mod: GC

## 2024-03-31 RX ORDER — LACTULOSE 10 G/15ML
20 SOLUTION ORAL
Refills: 0 | Status: DISCONTINUED | OUTPATIENT
Start: 2024-03-31 | End: 2024-04-01

## 2024-03-31 RX ADMIN — Medication 1: at 12:26

## 2024-03-31 RX ADMIN — Medication 40 MILLIGRAM(S): at 05:00

## 2024-03-31 RX ADMIN — RIVAROXABAN 20 MILLIGRAM(S): KIT at 12:27

## 2024-03-31 RX ADMIN — Medication 40 MILLIGRAM(S): at 18:10

## 2024-03-31 RX ADMIN — LACTULOSE 20 GRAM(S): 10 SOLUTION ORAL at 11:48

## 2024-03-31 RX ADMIN — Medication 2 TABLET(S): at 14:39

## 2024-03-31 RX ADMIN — PANTOPRAZOLE SODIUM 40 MILLIGRAM(S): 20 TABLET, DELAYED RELEASE ORAL at 05:01

## 2024-03-31 RX ADMIN — Medication 1: at 08:19

## 2024-03-31 RX ADMIN — Medication 120 MILLIGRAM(S): at 05:01

## 2024-03-31 RX ADMIN — BICTEGRAVIR SODIUM, EMTRICITABINE, AND TENOFOVIR ALAFENAMIDE FUMARATE 1 TABLET(S): 30; 120; 15 TABLET ORAL at 12:26

## 2024-03-31 RX ADMIN — Medication 2 TABLET(S): at 05:01

## 2024-03-31 RX ADMIN — Medication 2 TABLET(S): at 20:58

## 2024-03-31 RX ADMIN — GABAPENTIN 600 MILLIGRAM(S): 400 CAPSULE ORAL at 21:09

## 2024-03-31 RX ADMIN — Medication 81 MILLIGRAM(S): at 12:26

## 2024-03-31 RX ADMIN — DULOXETINE HYDROCHLORIDE 60 MILLIGRAM(S): 30 CAPSULE, DELAYED RELEASE ORAL at 12:26

## 2024-03-31 NOTE — PROGRESS NOTE ADULT - ATTENDING COMMENTS
Pt is a 65 yo F with PMH chronic back pain (s/p spinal fusion), T2D, HTN, HLD, SLE, CVA/TIA, asthma, PE (2019, xarelto), and GBS p/w persistent cough, SOB, diarrhea, and fall 2/2 weakness. Recently hospitalized at [facility] 1/2024 for influenza with superimposed mycoplasma PNA s/p doxycycline course. On arrival, CTH neg, CT chest with diffuse GGO and consolidations. Labs with leukocytosis, normocytic anemia, neg GI PCR, RVP neg, and MRSA neg. TTE with EF 53% and mild G1 diastolic dysfunction. Repeat CT PE neg but with multiple BL nodules and patchy opacities, mostly in the RLL/R base. ID following, appreciate recs. Pulm following, was planned for bronch however now deferred given AIDS dx; on IV methylprednisolone 40mg BID with protonix for gastric protection. Receiving nebs around the clock and now weaned from HFNC to 2L NC. HIV+ with CD4 14 and VL 72K --- started on biktarvy; will need to closely monitor for IRIS. Fungitell elevated -- high suspicion for PJP PNA, now with PCP PCR+; on bactrim treatment. Appreciated ID recs. Transitioned hep gtt back to xarelto, as no plan for bronch at present given risk >> benefit and being empirically treated now. C/w steroid for now and will discuss with pulm regarding plan for steroid. Continues to report cough and SOB today though cough meds help. C/w prn cough meds. C/o constipation, will do lactulose.     Discussed with HS. Rest as above.

## 2024-03-31 NOTE — PROGRESS NOTE ADULT - PROBLEM SELECTOR PLAN 2
-> sx of SOB, cough concern for HIV, prelim/ confirmatory test+ for HIV with CD4 of 14, viral load >70K  - ID consulted, continue bactrim-DS q8hr, adding azithromycin and cefepime 3/26 HIV prophylaxis   - adding Biktarvy 3/26   -sputum PCR +PJP  - continue biktarvy  - pending rule out Tb (2/3 negative, pending 3rd sputum order 3/30) -> sx of SOB, cough concern for HIV, prelim/ confirmatory test+ for HIV with CD4 of 14, viral load >70K  - ID consulted, continue bactrim-DS q8hr, adding azithromycin and cefepime 3/26 HIV prophylaxis   - cefepime ended on 3/30   - adding Biktarvy 3/26   -sputum PCR +PJP  - continue biktarvy  - pending rule out Tb (2/3 negative, pending 3rd sputum order 3/30)

## 2024-03-31 NOTE — PROGRESS NOTE ADULT - SUBJECTIVE AND OBJECTIVE BOX
PROGRESS NOTE:   Authored by Dr. Darlene Vallejo MD (PGY-1). Pager [facility] [phone] / PHILLIP ( [phone]) or via TEAMS    Patient is a 64y old  Female who presents with a chief complaint of SOB, cough (30 Mar 2024 08:21)      SUBJECTIVE / OVERNIGHT EVENTS:  No acute events overnight.     ADDITIONAL REVIEW OF SYSTEMS:  Patient denies fevers, chills, chest pain, shortness of breath, nausea, abdominal pain, diarrhea, constipation, dysuria, leg swelling, headache, light headedness.    MEDICATIONS  (STANDING):  albuterol/ipratropium for Nebulization 3 milliLiter(s) Nebulizer every 6 hours  aspirin  chewable 81 milliGRAM(s) Oral daily  azithromycin   Tablet 1200 milliGRAM(s) Oral <User Schedule>  bictegravir 50 mG/emtricitabine 200 mG/tenofovir alafenamide 25 mG (BIKTARVY) 1 Tablet(s) Oral daily  buDESOnide    Inhalation Suspension 0.25 milliGRAM(s) Inhalation every 12 hours  dextrose 5%. 1000 milliLiter(s) (100 mL/Hr) IV Continuous <Continuous>  dextrose 5%. 1000 milliLiter(s) (50 mL/Hr) IV Continuous <Continuous>  dextrose 50% Injectable 12.5 Gram(s) IV Push once  dextrose 50% Injectable 25 Gram(s) IV Push once  dextrose 50% Injectable 25 Gram(s) IV Push once  DULoxetine 60 milliGRAM(s) Oral daily  gabapentin 600 milliGRAM(s) Oral at bedtime  glucagon  Injectable 1 milliGRAM(s) IntraMuscular once  influenza   Vaccine 0.5 milliLiter(s) IntraMuscular once  insulin lispro (ADMELOG) corrective regimen sliding scale   SubCutaneous at bedtime  insulin lispro (ADMELOG) corrective regimen sliding scale   SubCutaneous three times a day before meals  methylPREDNISolone sodium succinate Injectable 40 milliGRAM(s) IV Push every 12 hours  pantoprazole    Tablet 40 milliGRAM(s) Oral before breakfast  polyethylene glycol 3350 17 Gram(s) Oral daily  rivaroxaban 20 milliGRAM(s) Oral daily  senna 2 Tablet(s) Oral at bedtime  sodium chloride 3%  Inhalation 4 milliLiter(s) Inhalation every 12 hours  trimethoprim  160 mG/sulfamethoxazole 800 mG 2 Tablet(s) Oral every 8 hours  verapamil  milliGRAM(s) Oral daily    MEDICATIONS  (PRN):  acetaminophen     Tablet .. 650 milliGRAM(s) Oral every 6 hours PRN Temp greater or equal to 38C (100.4F), Mild Pain (1 - 3)  dextrose Oral Gel 15 Gram(s) Oral once PRN Blood Glucose LESS THAN 70 milliGRAM(s)/deciliter  hydrocodone/homatropine Syrup 5 milliLiter(s) Oral every 6 hours PRN Cough      CAPILLARY BLOOD GLUCOSE      POCT Blood Glucose.: 176 mg/dL (30 Mar 2024 22:15)  POCT Blood Glucose.: 114 mg/dL (30 Mar 2024 17:06)  POCT Blood Glucose.: 268 mg/dL (30 Mar 2024 12:12)  POCT Blood Glucose.: 140 mg/dL (30 Mar 2024 08:35)    I&O's Summary    30 Mar 2024 07:01  -  31 Mar 2024 07:00  --------------------------------------------------------  IN: 1230 mL / OUT: 0 mL / NET: 1230 mL        PHYSICAL EXAM:  Vital Signs Last 24 Hrs  T(C): 37.1 (31 Mar 2024 05:00), Max: 37.1 (31 Mar 2024 00:00)  T(F): 98.7 (31 Mar 2024 05:00), Max: 98.7 (31 Mar 2024 00:00)  HR: 91 (31 Mar 2024 05:00) (88 - 100)  BP: 157/93 (31 Mar 2024 05:00) (122/83 - 157/93)  BP(mean): --  RR: 18 (31 Mar 2024 05:00) (18 - 18)  SpO2: 100% (31 Mar 2024 05:00) (95% - 100%)    Parameters below as of 31 Mar 2024 05:00  Patient On (Oxygen Delivery Method): nasal cannula  O2 Flow (L/min): 4      CONSTITUTIONAL: NAD, well-developed  RESPIRATORY: Normal respiratory effort; lungs are clear to auscultation bilaterally  CARDIOVASCULAR: Regular rate and rhythm, normal S1 and S2, no murmur/rub/gallop; No lower extremity edema; Peripheral pulses are 2+ bilaterally  ABDOMEN: Nontender to palpation, normoactive bowel sounds, no rebound/guarding; No hepatosplenomegaly  MSK: no clubbing or cyanosis of digits; no joint swelling or tenderness to palpation  PSYCH: A+O to person, place, and time; affect appropriate    LABS:                        11.5   17.37 )-----------( 436      ( 30 Mar 2024 05:41 )             36.6     03-30    132<L>  |  98  |  20  ----------------------------<  252<H>  4.6   |  19<L>  |  0.88    Ca    9.7      30 Mar 2024 11:37  Phos  3.3     03-30  Mg     2.10     03-30    TPro  6.5  /  Alb  3.2<L>  /  TBili  0.2  /  DBili  x   /  AST  41<H>  /  ALT  33  /  AlkPhos  60  03-30          Urinalysis Basic - ( 30 Mar 2024 11:37 )    Color: x / Appearance: x / SG: x / pH: x  Gluc: 252 mg/dL / Ketone: x  / Bili: x / Urobili: x   Blood: x / Protein: x / Nitrite: x   Leuk Esterase: x / RBC: x / WBC x   Sq Epi: x / Non Sq Epi: x / Bacteria: x        Culture - Acid Fast - Sputum w/Smear (collected 29 Mar 2024 11:07)  Source: .Sputum Sputum  Preliminary Report (30 Mar 2024 15:10):    Culture is being performed.        Tele Reviewed:    RADIOLOGY & ADDITIONAL TESTS:  Results Reviewed:   Imaging Personally Reviewed:  Electrocardiogram Personally Reviewed:

## 2024-04-01 LAB
ALBUMIN SERPL ELPH-MCNC: 3.8 G/DL — SIGNIFICANT CHANGE UP (ref 3.3–5)
ALP SERPL-CCNC: 59 U/L — SIGNIFICANT CHANGE UP (ref 40–120)
ALT FLD-CCNC: 36 U/L — HIGH (ref 4–33)
ANION GAP SERPL CALC-SCNC: 11 MMOL/L — SIGNIFICANT CHANGE UP (ref 7–14)
AST SERPL-CCNC: 13 U/L — SIGNIFICANT CHANGE UP (ref 4–32)
BILIRUB SERPL-MCNC: <0.2 MG/DL — SIGNIFICANT CHANGE UP (ref 0.2–1.2)
BUN SERPL-MCNC: 16 MG/DL — SIGNIFICANT CHANGE UP (ref 7–23)
CALCIUM SERPL-MCNC: 10.3 MG/DL — SIGNIFICANT CHANGE UP (ref 8.4–10.5)
CHLORIDE SERPL-SCNC: 97 MMOL/L — LOW (ref 98–107)
CO2 SERPL-SCNC: 25 MMOL/L — SIGNIFICANT CHANGE UP (ref 22–31)
CREAT SERPL-MCNC: 0.89 MG/DL — SIGNIFICANT CHANGE UP (ref 0.5–1.3)
EGFR: 72 ML/MIN/1.73M2 — SIGNIFICANT CHANGE UP
GLUCOSE BLDC GLUCOMTR-MCNC: 170 MG/DL — HIGH (ref 70–99)
GLUCOSE BLDC GLUCOMTR-MCNC: 196 MG/DL — HIGH (ref 70–99)
GLUCOSE BLDC GLUCOMTR-MCNC: 204 MG/DL — HIGH (ref 70–99)
GLUCOSE BLDC GLUCOMTR-MCNC: 234 MG/DL — HIGH (ref 70–99)
GLUCOSE SERPL-MCNC: 120 MG/DL — HIGH (ref 70–99)
HCT VFR BLD CALC: 36.8 % — SIGNIFICANT CHANGE UP (ref 34.5–45)
HGB BLD-MCNC: 11.8 G/DL — SIGNIFICANT CHANGE UP (ref 11.5–15.5)
MAGNESIUM SERPL-MCNC: 2.2 MG/DL — SIGNIFICANT CHANGE UP (ref 1.6–2.6)
MCHC RBC-ENTMCNC: 26.4 PG — LOW (ref 27–34)
MCHC RBC-ENTMCNC: 32.1 GM/DL — SIGNIFICANT CHANGE UP (ref 32–36)
MCV RBC AUTO: 82.3 FL — SIGNIFICANT CHANGE UP (ref 80–100)
NRBC # BLD: 0 /100 WBCS — SIGNIFICANT CHANGE UP (ref 0–0)
NRBC # FLD: 0 K/UL — SIGNIFICANT CHANGE UP (ref 0–0)
PHOSPHATE SERPL-MCNC: 3.5 MG/DL — SIGNIFICANT CHANGE UP (ref 2.5–4.5)
PLATELET # BLD AUTO: 361 K/UL — SIGNIFICANT CHANGE UP (ref 150–400)
POTASSIUM SERPL-MCNC: 4.6 MMOL/L — SIGNIFICANT CHANGE UP (ref 3.5–5.3)
POTASSIUM SERPL-SCNC: 4.6 MMOL/L — SIGNIFICANT CHANGE UP (ref 3.5–5.3)
PROT SERPL-MCNC: 6.5 G/DL — SIGNIFICANT CHANGE UP (ref 6–8.3)
RBC # BLD: 4.47 M/UL — SIGNIFICANT CHANGE UP (ref 3.8–5.2)
RBC # FLD: 19.9 % — HIGH (ref 10.3–14.5)
SODIUM SERPL-SCNC: 133 MMOL/L — LOW (ref 135–145)
WBC # BLD: 15.15 K/UL — HIGH (ref 3.8–10.5)
WBC # FLD AUTO: 15.15 K/UL — HIGH (ref 3.8–10.5)

## 2024-04-01 PROCEDURE — 99232 SBSQ HOSP IP/OBS MODERATE 35: CPT

## 2024-04-01 PROCEDURE — 99233 SBSQ HOSP IP/OBS HIGH 50: CPT | Mod: GC

## 2024-04-01 RX ADMIN — PANTOPRAZOLE SODIUM 40 MILLIGRAM(S): 20 TABLET, DELAYED RELEASE ORAL at 06:27

## 2024-04-01 RX ADMIN — SENNA PLUS 2 TABLET(S): 8.6 TABLET ORAL at 22:44

## 2024-04-01 RX ADMIN — RIVAROXABAN 20 MILLIGRAM(S): KIT at 11:55

## 2024-04-01 RX ADMIN — Medication 3 MILLILITER(S): at 16:14

## 2024-04-01 RX ADMIN — Medication 40 MILLIGRAM(S): at 06:27

## 2024-04-01 RX ADMIN — Medication 81 MILLIGRAM(S): at 11:56

## 2024-04-01 RX ADMIN — Medication 2 TABLET(S): at 16:33

## 2024-04-01 RX ADMIN — SODIUM CHLORIDE 4 MILLILITER(S): 9 INJECTION INTRAMUSCULAR; INTRAVENOUS; SUBCUTANEOUS at 09:23

## 2024-04-01 RX ADMIN — Medication 2: at 18:17

## 2024-04-01 RX ADMIN — Medication 120 MILLIGRAM(S): at 06:27

## 2024-04-01 RX ADMIN — Medication 2 TABLET(S): at 06:26

## 2024-04-01 RX ADMIN — Medication 3 MILLILITER(S): at 09:23

## 2024-04-01 RX ADMIN — Medication 40 MILLIGRAM(S): at 16:33

## 2024-04-01 RX ADMIN — Medication 1: at 08:40

## 2024-04-01 RX ADMIN — POLYETHYLENE GLYCOL 3350 17 GRAM(S): 17 POWDER, FOR SOLUTION ORAL at 11:57

## 2024-04-01 RX ADMIN — GABAPENTIN 600 MILLIGRAM(S): 400 CAPSULE ORAL at 22:44

## 2024-04-01 RX ADMIN — Medication 0.25 MILLIGRAM(S): at 09:24

## 2024-04-01 RX ADMIN — Medication 2 TABLET(S): at 22:44

## 2024-04-01 RX ADMIN — BICTEGRAVIR SODIUM, EMTRICITABINE, AND TENOFOVIR ALAFENAMIDE FUMARATE 1 TABLET(S): 30; 120; 15 TABLET ORAL at 11:54

## 2024-04-01 RX ADMIN — Medication 2: at 11:58

## 2024-04-01 RX ADMIN — Medication 0.25 MILLIGRAM(S): at 21:40

## 2024-04-01 RX ADMIN — Medication 3 MILLILITER(S): at 21:40

## 2024-04-01 RX ADMIN — SODIUM CHLORIDE 4 MILLILITER(S): 9 INJECTION INTRAMUSCULAR; INTRAVENOUS; SUBCUTANEOUS at 21:40

## 2024-04-01 RX ADMIN — DULOXETINE HYDROCHLORIDE 60 MILLIGRAM(S): 30 CAPSULE, DELAYED RELEASE ORAL at 11:54

## 2024-04-01 NOTE — PROGRESS NOTE ADULT - ATTENDING COMMENTS
64-year-old with acute hypoxemic respiratory failure due to PJP pneumonia in setting of AIDS.  - Complete course of Bactrim and steroids for PJP.   - Duonebs Q6H, Budesonide neb Q12h  - D/C hypertonic saline after one more AFB sputum collection  - Wean O2

## 2024-04-01 NOTE — PROGRESS NOTE ADULT - PROBLEM SELECTOR PLAN 9
DVT Ppx: xarelto   Diet: regular DASH  Dispo: TBD DVT Ppx: xarelto   Diet: regular DASH  Dispo: TBD    *ETHICS*- BABATUNDE contacted 4/1, spoke to patient, informed primary team of contacting partner to inform him of diagnosis, attempted to call  on 4/1 with no answer- [facility] aware*  [facility]

## 2024-04-01 NOTE — PROGRESS NOTE ADULT - PROBLEM SELECTOR PLAN 2
-> sx of SOB, cough concern for HIV, prelim/ confirmatory test+ for HIV with CD4 of 14, viral load >70K  - ID consulted, continue bactrim-DS q8hr, adding azithromycin and cefepime 3/26 HIV prophylaxis   - cefepime ended on 3/30   - adding Biktarvy 3/26   -sputum PCR +PJP  - continue biktarvy  - pending rule out Tb (2/3 negative, pending 3rd sputum order 3/30)

## 2024-04-01 NOTE — PROGRESS NOTE ADULT - ASSESSMENT
63 yo woman with SLE, DM, h/o PE, CVA with left sided weakness presented 3/19 with fever, dry cough, SOB    Hospital admission Jan 2024 at [facility] for PNA - mycoplasma IgM + and flu A +   Received azithro, cefepime, doxy and prednisone taper at that time     CT chest 3/19 shows multifocal ground glass and consolidative opacities involving all 5 lobes  - more extensive c/w prior study and in slightly different distribution     Multifocal pulmonary infiltrates   New dx  HIV     CD4  14    PCP     lfungitell 410    cxr improving on prednisone and bactrim     bactrim 3/22-      cefepime added for bacterial coverage now complete     oxygen requirement improving      bood cultures no growth    serum crypt ag neg   quant gold indeterminate   PJP PCR positive     for sputum AFB x 3   neg x 2    syphilis screen neg   CMV IgM neg IgG positive   hep B Ag neg hep Ab neg   hep c neg  toxo IgM neg IgG neg     Suggest:     c/w bactrim DS 2 tab po q 8 h --> 4/11  c/w biktarvy 1 tab po q 24 h   sputum AFB x 1

## 2024-04-01 NOTE — PROGRESS NOTE ADULT - ATTENDING COMMENTS
Pt is a 63 yo F with PMH chronic back pain (s/p spinal fusion), T2D, HTN, HLD, SLE, CVA/TIA, asthma, PE (2019, xarelto), and GBS p/w persistent cough, SOB, diarrhea, and fall 2/2 weakness. Recently hospitalized at [facility] 1/2024 for influenza with superimposed mycoplasma PNA s/p doxycycline course. On arrival, CTH neg, CT chest with diffuse GGO and consolidations. Labs with leukocytosis, normocytic anemia, neg GI PCR, RVP neg, and MRSA neg. TTE with EF 53% and mild G1 diastolic dysfunction. Repeat CT PE neg but with multiple BL nodules and patchy opacities, mostly in the RLL/R base. ID following, appreciate recs. Pulm following, was planned for bronch however now deferred given AIDS dx; on IV methylprednisolone 40mg BID with protonix for gastric protection. Receiving nebs around the clock and now weaned from HFNC to 4L NC. HIV+ with CD4 14 and VL 72K --- started on biktarvy; will need to closely monitor for IRIS. Fungitell elevated -- high suspicion for PJP PNA, now with PCP PCR+; on bactrim treatment. Appreciate ID recs -- will dc azithromycin as pt now on ART. Transitioned hep gtt back to xarelto, as no plan for bronch at present given risk >> benefit and being empirically treated now. C/w steroid for now and will discuss with pulm regarding plan for steroid. Now having BMs, will dc lactulose and resume miralax/senna regimen. [facility] representatives visited team today in [facility], discussed need for pt  being notified -- I expressed that pt had told me she was going to inform her  over the weekend and likely has done so but requested we not continue to ask her, as she is struggling to accept all that is going on while feeling so sick. BABATUNDE initially requested we notify  -- we requested that they reach out to  to confirm as it is in line with their responsibilities. [facility] representative Ashley [phone] to check in with . Discussed with ID Dr. Messina as well who states her office has plenty of resources available for pt support and associated contacts support. Discussed with HS, rest as outlined above.

## 2024-04-01 NOTE — PROGRESS NOTE ADULT - SUBJECTIVE AND OBJECTIVE BOX
Follow Up:      Inverval History/ROS:Patient is a 64y old  Female who presents with a chief complaint of SOB, cough (24 Mar 2024 08:26)    upset about BABATUNDE visit this AM      remains on nasal O2   less cough    Allergies    No Known Allergies    Intolerances    dislikes Glucerna Shake (Other)    worked as respiratory therapist; retired 15 years ago       ANTIMICROBIALS: bictegravir 50 mG/emtricitabine 200 mG/tenofovir alafenamide 25 mG (BIKTARVY) 1 daily  trimethoprim  160 mG/sulfamethoxazole 800 mG 2 every 8 hours    MEDICATIONS  (STANDING):  acetaminophen     Tablet .. 650 every 6 hours PRN  albuterol/ipratropium for Nebulization 3 every 6 hours  aspirin  chewable 81 daily  buDESOnide    Inhalation Suspension 0.25 every 12 hours  dextrose 50% Injectable 12.5 once  dextrose 50% Injectable 25 once  dextrose 50% Injectable 25 once  dextrose Oral Gel 15 once PRN  DULoxetine 60 daily  gabapentin 600 at bedtime  glucagon  Injectable 1 once  hydrocodone/homatropine Syrup 5 every 6 hours PRN  influenza   Vaccine 0.5 once  insulin lispro (ADMELOG) corrective regimen sliding scale  at bedtime  insulin lispro (ADMELOG) corrective regimen sliding scale  three times a day before meals  methylPREDNISolone sodium succinate Injectable 40 every 12 hours  pantoprazole    Tablet 40 before breakfast  polyethylene glycol 3350 17 daily  rivaroxaban 20 daily  senna 2 at bedtime  sodium chloride 3%  Inhalation 4 every 12 hours  verapamil  daily    Vital Signs Last 24 Hrs  T(F): 97.8 (04-01-24 @ 12:30), Max: 97.9 (04-01-24 @ 00:08)  HR: 100 (04-01-24 @ 12:30)  BP: 133/90 (04-01-24 @ 12:30)  RR: 18 (04-01-24 @ 12:30)  SpO2: 97% (04-01-24 @ 12:30) (95% - 99%)    PHYSICAL EXAM:  General: [x ] non-toxic.  nasal canula 4 L  HEAD/EYES: [ ] PERRL [x ] white sclera [ ] icterus  ENT:  [ ] normal [x ] supple [ ] thrush [ ] pharyngeal exudate  Cardiovascular:   [ ] murmur [x ] normal [ ] PPM/AICD  Respiratory:  x clear  GI:  [x ] soft, non-tender, normal bowel sounds  :  [ ] penny [x ] no CVA tenderness   Musculoskeletal:  [ ] no synovitis  Neurologic:  [x ] non-focal exam   Skin:  [ x] no rash  Lymph: [x ] no lymphadenopathy  Psychiatric:  [x ] appropriate affect [x ] alert & oriented  Lines:  iv right arm                                     11.8   15.15 )-----------( 361      ( 01 Apr 2024 06:40 )             36.8 04-01    133  |  97  |  16  ----------------------------<  120  4.6   |  25  |  0.89  Ca    10.3      01 Apr 2024 06:40Phos  3.5     04-01Mg     2.20     04-01  TPro  6.5  /  Alb  3.8  /  TBili  <0.2  /  DBili  x   /  AST  13  /  ALT  36  /  AlkPhos  59  04-01    Pneumocystis jiroveci PCR (03.25.24 @ 07:42)   Pneumocystis Specimen Source: sputum  Pneumocystis PCR Result: Positive: Critical Result.     HIV-1 RNA Quantitative, Viral Load (03.25.24 @ 05:56)   HIV-1 RNA Quantitative, Viral Load: 72,947    ABS CD4: 14 cells/uL (03.25.24 @ 05:56)   crypt neg     MICROBIOLOGY:    Culture - Blood (03.19.24 @ 21:28)   Specimen Source: .Blood Blood-Peripheral  Culture Results:   No growth at 5 daysCulture - Blood (03.19.24 @ 21:20)   Specimen Source: .Blood Blood-Peripheral  Culture Results:   No growth at 5 days      RADIOLOGY:    < from: Xray Chest 1 View- PORTABLE-Urgent (Xray Chest 1 View- PORTABLE-Urgent .) (03.24.24 @ 13:04) >    ACC: [id number] EXAM:  XR CHEST PORTABLE URGENT 1V   ORDERED BY: RAYMOND HO     PROCEDURE DATE:  03/24/2024          INTERPRETATION:  EXAMINATION: XR CHEST URGENT    CLINICAL INDICATION: Increased oxygen requirement    TECHNIQUE: Single frontal,portable view of the chest was obtained.    COMPARISON: Chest radiograph 3/19/2024.    FINDINGS:  The heart size is normal.  Interval decrease in bilateral airspace opacities.  No congestion or effusions to indicate CHF.  No pneumothorax.    IMPRESSION:  Interval decrease in bilateral airspace opacities    --- End of Report ---          JUAN LAMBERT MD; Resident Radiology  This document has been electronically signed.  BRIONNA PAVON MD; Attending Radiologist    < end of copied text >

## 2024-04-01 NOTE — PROGRESS NOTE ADULT - ASSESSMENT
64-year-old female w/ PMH of lupus, PE on Xarelto (dx 2019), asthma, HTN, T2DM, and GBS (dx 1997) presenting with 3wk h/o of worsening SOB and persistent cough. Patient was notably recently admitted at [place] in 01/2024 for superimposed mycoplasma PNA on influenza now presenting with progressive dyspnea and persistent nonproductive cough, found to have severe AIDS/HIV, with likely bacterial pneumonia and PJP    Told me +HIV a decade ago, was told it was "false+" from Lupus    #Abnormal CT chest  #asthma  Denies complete resolution of symptoms since her prior discharge.   Noted recent  admission with repeat CT chest with improvement in some areas with new opacities in others  - c/w standing nebs  - d/c 3% inhaled saline  - PJP+  - ruling out TB due to indeterminate quant  - complete course of bactrim  - decrease steroids to pred 40mg qday  - ARV  - if unchanged or worsening can entertain notion of bronchoscopy but would favor treating and reconstituting for now i.e. risk likely does not outweigh benefit at this time for bronch given HFNC  - bcx x 2, ucx, sputum cx  - please check abg and cxr daily  - we doubt she has lupus (negative THERON, other symptoms can be explained by HIV untreated for a decade or more), please obtain rheum consult to re-assess prior dc 64-year-old female w/ PMH of lupus, PE on Xarelto (dx 2019), asthma, HTN, T2DM, and GBS (dx 1997) presenting with 3wk h/o of worsening SOB and persistent cough. Patient was notably recently admitted at [place] in 01/2024 for superimposed mycoplasma PNA on influenza now presenting with progressive dyspnea and persistent nonproductive cough, found to have severe AIDS/HIV, with likely bacterial pneumonia and PJP    Told me +HIV a decade ago, was told it was "false+" from Lupus    #Abnormal CT chest  #asthma  Denies complete resolution of symptoms since her prior discharge.   Noted recent  admission with repeat CT chest with improvement in some areas with new opacities in others  - c/w standing nebs  - d/c 3% inhaled saline  - PJP+  - ruling out TB due to indeterminate quant  - complete course of bactrim  - decrease steroids to pred 40mg qday  - ARV  - if unchanged or worsening can entertain notion of bronchoscopy but would favor treating and reconstituting for now i.e. risk likely does not outweigh benefit at this time for bronch given HFNC  - bcx x 2, ucx, sputum cx

## 2024-04-01 NOTE — PROGRESS NOTE ADULT - SUBJECTIVE AND OBJECTIVE BOX
CHIEF COMPLAINT:Patient is a 64y old  Female who presents with a chief complaint of SOB, cough (01 Apr 2024 16:19)      Interval Events:  minimal O2 requirement  still with significant nonproductive cough, has hycodan which she has been using in the evenings    REVIEW OF SYSTEMS:  [x] All other systems negative except per HPI   [ ] Unable to assess ROS because ________    OBJECTIVE:  ICU Vital Signs Last 24 Hrs  T(C): 36.6 (01 Apr 2024 16:30), Max: 36.6 (01 Apr 2024 00:08)  T(F): 97.8 (01 Apr 2024 16:30), Max: 97.9 (01 Apr 2024 00:08)  HR: 79 (01 Apr 2024 16:34) (71 - 112)  BP: 139/83 (01 Apr 2024 16:30) (128/80 - 160/90)  BP(mean): --  ABP: --  ABP(mean): --  RR: 17 (01 Apr 2024 16:30) (17 - 18)  SpO2: 100% (01 Apr 2024 16:30) (95% - 100%)    O2 Parameters below as of 01 Apr 2024 16:30  Patient On (Oxygen Delivery Method): nasal cannula  O2 Flow (L/min): 4            03-31 @ 07:01 - 04-01 @ 07:00  --------------------------------------------------------  IN: 1080 mL / OUT: 0 mL / NET: 1080 mL    04-01 @ 07:01 - 04-01 @ 19:17  --------------------------------------------------------  IN: 780 mL / OUT: 675 mL / NET: 105 mL        PHYSICAL EXAM:  General: WN/WD NAD  HEENT: EOMI, moist mucous membranes  Neurology: A&Ox3, nonfocal, JIANG x 4  Respiratory: CTA B/L, normal respiratory effort, no wheezes, crackles, rales  CV: RRR, S1S2, no murmurs, rubs or gallops  Abdominal: Soft, NT, ND +BS  Extremities: No edema, +peripheral pulses      HOSPITAL MEDICATIONS:  MEDICATIONS  (STANDING):  albuterol/ipratropium for Nebulization 3 milliLiter(s) Nebulizer every 6 hours  aspirin  chewable 81 milliGRAM(s) Oral daily  bictegravir 50 mG/emtricitabine 200 mG/tenofovir alafenamide 25 mG (BIKTARVY) 1 Tablet(s) Oral daily  buDESOnide    Inhalation Suspension 0.25 milliGRAM(s) Inhalation every 12 hours  dextrose 5%. 1000 milliLiter(s) (100 mL/Hr) IV Continuous <Continuous>  dextrose 5%. 1000 milliLiter(s) (50 mL/Hr) IV Continuous <Continuous>  dextrose 50% Injectable 12.5 Gram(s) IV Push once  dextrose 50% Injectable 25 Gram(s) IV Push once  dextrose 50% Injectable 25 Gram(s) IV Push once  DULoxetine 60 milliGRAM(s) Oral daily  gabapentin 600 milliGRAM(s) Oral at bedtime  glucagon  Injectable 1 milliGRAM(s) IntraMuscular once  influenza   Vaccine 0.5 milliLiter(s) IntraMuscular once  insulin lispro (ADMELOG) corrective regimen sliding scale   SubCutaneous at bedtime  insulin lispro (ADMELOG) corrective regimen sliding scale   SubCutaneous three times a day before meals  methylPREDNISolone sodium succinate Injectable 40 milliGRAM(s) IV Push every 12 hours  pantoprazole    Tablet 40 milliGRAM(s) Oral before breakfast  polyethylene glycol 3350 17 Gram(s) Oral daily  rivaroxaban 20 milliGRAM(s) Oral daily  senna 2 Tablet(s) Oral at bedtime  sodium chloride 3%  Inhalation 4 milliLiter(s) Inhalation every 12 hours  trimethoprim  160 mG/sulfamethoxazole 800 mG 2 Tablet(s) Oral every 8 hours  verapamil  milliGRAM(s) Oral daily    MEDICATIONS  (PRN):  acetaminophen     Tablet .. 650 milliGRAM(s) Oral every 6 hours PRN Temp greater or equal to 38C (100.4F), Mild Pain (1 - 3)  dextrose Oral Gel 15 Gram(s) Oral once PRN Blood Glucose LESS THAN 70 milliGRAM(s)/deciliter  hydrocodone/homatropine Syrup 5 milliLiter(s) Oral every 6 hours PRN Cough      LABS:    The Labs were reviewed by me   The Radiology was reviewed by me    EKG tracing reviewed by me    04-01    133<L>  |  97<L>  |  16  ----------------------------<  120<H>  4.6   |  25  |  0.89  03-31    133<L>  |  97<L>  |  19  ----------------------------<  105<H>  5.3   |  23  |  0.91  03-30    132<L>  |  98  |  20  ----------------------------<  252<H>  4.6   |  19<L>  |  0.88    Ca    10.3      01 Apr 2024 06:40  Ca    10.1      31 Mar 2024 05:23  Ca    9.7      30 Mar 2024 11:37  Phos  3.5     04-01  Mg     2.20     04-01    TPro  6.5  /  Alb  3.8  /  TBili  <0.2  /  DBili  x   /  AST  13  /  ALT  36<H>  /  AlkPhos  59  04-01  TPro  6.5  /  Alb  3.7  /  TBili  <0.2  /  DBili  x   /  AST  13  /  ALT  33  /  AlkPhos  60  03-31  TPro  6.5  /  Alb  3.2<L>  /  TBili  0.2  /  DBili  x   /  AST  41<H>  /  ALT  33  /  AlkPhos  60  03-30    Magnesium: 2.20 mg/dL (04-01-24 @ 06:40)  Magnesium: 2.20 mg/dL (03-31-24 @ 05:23)  Magnesium: 2.10 mg/dL (03-30-24 @ 11:37)  Magnesium: 2.40 mg/dL (03-30-24 @ 05:41)    Phosphorus: 3.5 mg/dL (04-01-24 @ 06:40)  Phosphorus: 3.4 mg/dL (03-31-24 @ 05:23)  Phosphorus: 3.3 mg/dL (03-30-24 @ 11:37)  Phosphorus: 3.9 mg/dL (03-30-24 @ 05:41)                    Urinalysis Basic - ( 01 Apr 2024 06:40 )    Color: x / Appearance: x / SG: x / pH: x  Gluc: 120 mg/dL / Ketone: x  / Bili: x / Urobili: x   Blood: x / Protein: x / Nitrite: x   Leuk Esterase: x / RBC: x / WBC x   Sq Epi: x / Non Sq Epi: x / Bacteria: x                              11.8   15.15 )-----------( 361      ( 01 Apr 2024 06:40 )             36.8                         11.3   18.87 )-----------( 401      ( 31 Mar 2024 05:23 )             35.6                         11.5   17.37 )-----------( 436      ( 30 Mar 2024 05:41 )             36.6     CAPILLARY BLOOD GLUCOSE      POCT Blood Glucose.: 204 mg/dL (01 Apr 2024 18:08)  POCT Blood Glucose.: 234 mg/dL (01 Apr 2024 11:46)  POCT Blood Glucose.: 170 mg/dL (01 Apr 2024 07:40)  POCT Blood Glucose.: 180 mg/dL (31 Mar 2024 21:38)        MICROBIOLOGY:     RADIOLOGY:  [ ] Reviewed and interpreted by me    Point of Care Ultrasound Findings:    PFT:    EKG:

## 2024-04-01 NOTE — PROGRESS NOTE ADULT - SUBJECTIVE AND OBJECTIVE BOX
PROGRESS NOTE:   Authored by Dr. Darlene Vallejo MD (PGY-1). Pager [facility] [phone] / PHILLIP ( [phone]) or via TEAMS    Patient is a 64y old  Female who presents with a chief complaint of SOB, cough (31 Mar 2024 07:03)      SUBJECTIVE / OVERNIGHT EVENTS:  No acute events overnight.     ADDITIONAL REVIEW OF SYSTEMS:  Patient denies fevers, chills, chest pain, shortness of breath, nausea, abdominal pain, diarrhea, constipation, dysuria, leg swelling, headache, light headedness.    MEDICATIONS  (STANDING):  albuterol/ipratropium for Nebulization 3 milliLiter(s) Nebulizer every 6 hours  aspirin  chewable 81 milliGRAM(s) Oral daily  azithromycin   Tablet 1200 milliGRAM(s) Oral <User Schedule>  bictegravir 50 mG/emtricitabine 200 mG/tenofovir alafenamide 25 mG (BIKTARVY) 1 Tablet(s) Oral daily  buDESOnide    Inhalation Suspension 0.25 milliGRAM(s) Inhalation every 12 hours  dextrose 5%. 1000 milliLiter(s) (100 mL/Hr) IV Continuous <Continuous>  dextrose 5%. 1000 milliLiter(s) (50 mL/Hr) IV Continuous <Continuous>  dextrose 50% Injectable 12.5 Gram(s) IV Push once  dextrose 50% Injectable 25 Gram(s) IV Push once  dextrose 50% Injectable 25 Gram(s) IV Push once  DULoxetine 60 milliGRAM(s) Oral daily  gabapentin 600 milliGRAM(s) Oral at bedtime  glucagon  Injectable 1 milliGRAM(s) IntraMuscular once  influenza   Vaccine 0.5 milliLiter(s) IntraMuscular once  insulin lispro (ADMELOG) corrective regimen sliding scale   SubCutaneous at bedtime  insulin lispro (ADMELOG) corrective regimen sliding scale   SubCutaneous three times a day before meals  lactulose Syrup 20 Gram(s) Oral two times a day  methylPREDNISolone sodium succinate Injectable 40 milliGRAM(s) IV Push every 12 hours  pantoprazole    Tablet 40 milliGRAM(s) Oral before breakfast  polyethylene glycol 3350 17 Gram(s) Oral daily  rivaroxaban 20 milliGRAM(s) Oral daily  senna 2 Tablet(s) Oral at bedtime  sodium chloride 3%  Inhalation 4 milliLiter(s) Inhalation every 12 hours  trimethoprim  160 mG/sulfamethoxazole 800 mG 2 Tablet(s) Oral every 8 hours  verapamil  milliGRAM(s) Oral daily    MEDICATIONS  (PRN):  acetaminophen     Tablet .. 650 milliGRAM(s) Oral every 6 hours PRN Temp greater or equal to 38C (100.4F), Mild Pain (1 - 3)  dextrose Oral Gel 15 Gram(s) Oral once PRN Blood Glucose LESS THAN 70 milliGRAM(s)/deciliter  hydrocodone/homatropine Syrup 5 milliLiter(s) Oral every 6 hours PRN Cough      CAPILLARY BLOOD GLUCOSE      POCT Blood Glucose.: 180 mg/dL (31 Mar 2024 21:38)  POCT Blood Glucose.: 118 mg/dL (31 Mar 2024 17:13)  POCT Blood Glucose.: 163 mg/dL (31 Mar 2024 12:15)  POCT Blood Glucose.: 155 mg/dL (31 Mar 2024 07:50)    I&O's Summary    31 Mar 2024 07:01  -  01 Apr 2024 07:00  --------------------------------------------------------  IN: 1080 mL / OUT: 0 mL / NET: 1080 mL        PHYSICAL EXAM:  Vital Signs Last 24 Hrs  T(C): 36.6 (01 Apr 2024 06:05), Max: 36.6 (31 Mar 2024 08:21)  T(F): 97.9 (01 Apr 2024 06:05), Max: 97.9 (31 Mar 2024 11:53)  HR: 94 (01 Apr 2024 06:05) (90 - 112)  BP: 140/80 (01 Apr 2024 06:05) (124/65 - 160/90)  BP(mean): --  RR: 18 (01 Apr 2024 06:05) (18 - 18)  SpO2: 99% (01 Apr 2024 06:05) (95% - 99%)    Parameters below as of 01 Apr 2024 06:05  Patient On (Oxygen Delivery Method): nasal cannula  O2 Flow (L/min): 4      CONSTITUTIONAL: NAD, well-developed  RESPIRATORY: Normal respiratory effort; lungs are clear to auscultation bilaterally  CARDIOVASCULAR: Regular rate and rhythm, normal S1 and S2, no murmur/rub/gallop; No lower extremity edema; Peripheral pulses are 2+ bilaterally  ABDOMEN: Nontender to palpation, normoactive bowel sounds, no rebound/guarding; No hepatosplenomegaly  MSK: no clubbing or cyanosis of digits; no joint swelling or tenderness to palpation  PSYCH: A+O to person, place, and time; affect appropriate    LABS:                        11.8   15.15 )-----------( 361      ( 01 Apr 2024 06:40 )             36.8     03-31    133<L>  |  97<L>  |  19  ----------------------------<  105<H>  5.3   |  23  |  0.91    Ca    10.1      31 Mar 2024 05:23  Phos  3.4     03-31  Mg     2.20     03-31    TPro  6.5  /  Alb  3.7  /  TBili  <0.2  /  DBili  x   /  AST  13  /  ALT  33  /  AlkPhos  60  03-31          Urinalysis Basic - ( 31 Mar 2024 05:23 )    Color: x / Appearance: x / SG: x / pH: x  Gluc: 105 mg/dL / Ketone: x  / Bili: x / Urobili: x   Blood: x / Protein: x / Nitrite: x   Leuk Esterase: x / RBC: x / WBC x   Sq Epi: x / Non Sq Epi: x / Bacteria: x        Culture - Acid Fast - Sputum w/Smear (collected 29 Mar 2024 11:07)  Source: .Sputum Sputum  Preliminary Report (30 Mar 2024 15:10):    Culture is being performed.        Tele Reviewed:    RADIOLOGY & ADDITIONAL TESTS:  Results Reviewed:   Imaging Personally Reviewed:  Electrocardiogram Personally Reviewed:     PROGRESS NOTE:   Authored by Dr. Darlene Vallejo MD (PGY-1). Pager [facility] [phone] / PHILLIP ( [phone]) or via TEAMS    Patient is a 64y old  Female who presents with a chief complaint of SOB, cough (31 Mar 2024 07:03)      SUBJECTIVE / OVERNIGHT EVENTS:  No acute events overnight. Had a BM. Still coughing.       MEDICATIONS  (STANDING):  albuterol/ipratropium for Nebulization 3 milliLiter(s) Nebulizer every 6 hours  aspirin  chewable 81 milliGRAM(s) Oral daily  azithromycin   Tablet 1200 milliGRAM(s) Oral <User Schedule>  bictegravir 50 mG/emtricitabine 200 mG/tenofovir alafenamide 25 mG (BIKTARVY) 1 Tablet(s) Oral daily  buDESOnide    Inhalation Suspension 0.25 milliGRAM(s) Inhalation every 12 hours  dextrose 5%. 1000 milliLiter(s) (100 mL/Hr) IV Continuous <Continuous>  dextrose 5%. 1000 milliLiter(s) (50 mL/Hr) IV Continuous <Continuous>  dextrose 50% Injectable 12.5 Gram(s) IV Push once  dextrose 50% Injectable 25 Gram(s) IV Push once  dextrose 50% Injectable 25 Gram(s) IV Push once  DULoxetine 60 milliGRAM(s) Oral daily  gabapentin 600 milliGRAM(s) Oral at bedtime  glucagon  Injectable 1 milliGRAM(s) IntraMuscular once  influenza   Vaccine 0.5 milliLiter(s) IntraMuscular once  insulin lispro (ADMELOG) corrective regimen sliding scale   SubCutaneous at bedtime  insulin lispro (ADMELOG) corrective regimen sliding scale   SubCutaneous three times a day before meals  lactulose Syrup 20 Gram(s) Oral two times a day  methylPREDNISolone sodium succinate Injectable 40 milliGRAM(s) IV Push every 12 hours  pantoprazole    Tablet 40 milliGRAM(s) Oral before breakfast  polyethylene glycol 3350 17 Gram(s) Oral daily  rivaroxaban 20 milliGRAM(s) Oral daily  senna 2 Tablet(s) Oral at bedtime  sodium chloride 3%  Inhalation 4 milliLiter(s) Inhalation every 12 hours  trimethoprim  160 mG/sulfamethoxazole 800 mG 2 Tablet(s) Oral every 8 hours  verapamil  milliGRAM(s) Oral daily    MEDICATIONS  (PRN):  acetaminophen     Tablet .. 650 milliGRAM(s) Oral every 6 hours PRN Temp greater or equal to 38C (100.4F), Mild Pain (1 - 3)  dextrose Oral Gel 15 Gram(s) Oral once PRN Blood Glucose LESS THAN 70 milliGRAM(s)/deciliter  hydrocodone/homatropine Syrup 5 milliLiter(s) Oral every 6 hours PRN Cough      CAPILLARY BLOOD GLUCOSE      POCT Blood Glucose.: 180 mg/dL (31 Mar 2024 21:38)  POCT Blood Glucose.: 118 mg/dL (31 Mar 2024 17:13)  POCT Blood Glucose.: 163 mg/dL (31 Mar 2024 12:15)  POCT Blood Glucose.: 155 mg/dL (31 Mar 2024 07:50)    I&O's Summary    31 Mar 2024 07:01  -  01 Apr 2024 07:00  --------------------------------------------------------  IN: 1080 mL / OUT: 0 mL / NET: 1080 mL        PHYSICAL EXAM:  Vital Signs Last 24 Hrs  T(C): 36.6 (01 Apr 2024 06:05), Max: 36.6 (31 Mar 2024 08:21)  T(F): 97.9 (01 Apr 2024 06:05), Max: 97.9 (31 Mar 2024 11:53)  HR: 94 (01 Apr 2024 06:05) (90 - 112)  BP: 140/80 (01 Apr 2024 06:05) (124/65 - 160/90)  BP(mean): --  RR: 18 (01 Apr 2024 06:05) (18 - 18)  SpO2: 99% (01 Apr 2024 06:05) (95% - 99%)    Parameters below as of 01 Apr 2024 06:05  Patient On (Oxygen Delivery Method): nasal cannula  O2 Flow (L/min): 4      CONSTITUTIONAL: NAD, well-developed  RESPIRATORY: coughing, b/l wheezing  CARDIOVASCULAR: Regular rate and rhythm, normal S1 and S2, no murmur/rub/gallop; No lower extremity edema; Peripheral pulses are 2+ bilaterally  ABDOMEN: Nontender to palpation, normoactive bowel sounds, no rebound/guarding; No hepatosplenomegaly  MSK: no clubbing or cyanosis of digits; no joint swelling or tenderness to palpation  PSYCH: A+O to person, place, and time; affect appropriate    LABS:                        11.8   15.15 )-----------( 361      ( 01 Apr 2024 06:40 )             36.8     03-31    133<L>  |  97<L>  |  19  ----------------------------<  105<H>  5.3   |  23  |  0.91    Ca    10.1      31 Mar 2024 05:23  Phos  3.4     03-31  Mg     2.20     03-31    TPro  6.5  /  Alb  3.7  /  TBili  <0.2  /  DBili  x   /  AST  13  /  ALT  33  /  AlkPhos  60  03-31          Urinalysis Basic - ( 31 Mar 2024 05:23 )    Color: x / Appearance: x / SG: x / pH: x  Gluc: 105 mg/dL / Ketone: x  / Bili: x / Urobili: x   Blood: x / Protein: x / Nitrite: x   Leuk Esterase: x / RBC: x / WBC x   Sq Epi: x / Non Sq Epi: x / Bacteria: x        Culture - Acid Fast - Sputum w/Smear (collected 29 Mar 2024 11:07)  Source: .Sputum Sputum  Preliminary Report (30 Mar 2024 15:10):    Culture is being performed.        Tele Reviewed:    RADIOLOGY & ADDITIONAL TESTS:  Results Reviewed:   Imaging Personally Reviewed:  Electrocardiogram Personally Reviewed:

## 2024-04-02 ENCOUNTER — RESULT REVIEW (OUTPATIENT)
Age: 64
End: 2024-04-02

## 2024-04-02 LAB
ALBUMIN SERPL ELPH-MCNC: 3.5 G/DL — SIGNIFICANT CHANGE UP (ref 3.3–5)
ALP SERPL-CCNC: 57 U/L — SIGNIFICANT CHANGE UP (ref 40–120)
ALT FLD-CCNC: 35 U/L — HIGH (ref 4–33)
ANION GAP SERPL CALC-SCNC: 17 MMOL/L — HIGH (ref 7–14)
AST SERPL-CCNC: 17 U/L — SIGNIFICANT CHANGE UP (ref 4–32)
BILIRUB SERPL-MCNC: <0.2 MG/DL — SIGNIFICANT CHANGE UP (ref 0.2–1.2)
BUN SERPL-MCNC: 16 MG/DL — SIGNIFICANT CHANGE UP (ref 7–23)
CALCIUM SERPL-MCNC: 9.8 MG/DL — SIGNIFICANT CHANGE UP (ref 8.4–10.5)
CHLORIDE SERPL-SCNC: 94 MMOL/L — LOW (ref 98–107)
CO2 SERPL-SCNC: 18 MMOL/L — LOW (ref 22–31)
CREAT SERPL-MCNC: 0.88 MG/DL — SIGNIFICANT CHANGE UP (ref 0.5–1.3)
EGFR: 73 ML/MIN/1.73M2 — SIGNIFICANT CHANGE UP
GLUCOSE BLDC GLUCOMTR-MCNC: 116 MG/DL — HIGH (ref 70–99)
GLUCOSE BLDC GLUCOMTR-MCNC: 125 MG/DL — HIGH (ref 70–99)
GLUCOSE BLDC GLUCOMTR-MCNC: 163 MG/DL — HIGH (ref 70–99)
GLUCOSE BLDC GLUCOMTR-MCNC: 238 MG/DL — HIGH (ref 70–99)
GLUCOSE SERPL-MCNC: 139 MG/DL — HIGH (ref 70–99)
MAGNESIUM SERPL-MCNC: 2.2 MG/DL — SIGNIFICANT CHANGE UP (ref 1.6–2.6)
PHOSPHATE SERPL-MCNC: 3.7 MG/DL — SIGNIFICANT CHANGE UP (ref 2.5–4.5)
POTASSIUM SERPL-MCNC: 5 MMOL/L — SIGNIFICANT CHANGE UP (ref 3.5–5.3)
POTASSIUM SERPL-SCNC: 5 MMOL/L — SIGNIFICANT CHANGE UP (ref 3.5–5.3)
PROT SERPL-MCNC: 6.5 G/DL — SIGNIFICANT CHANGE UP (ref 6–8.3)
SODIUM SERPL-SCNC: 129 MMOL/L — LOW (ref 135–145)

## 2024-04-02 PROCEDURE — 93306 TTE W/DOPPLER COMPLETE: CPT | Mod: 26

## 2024-04-02 PROCEDURE — 99232 SBSQ HOSP IP/OBS MODERATE 35: CPT

## 2024-04-02 PROCEDURE — 99233 SBSQ HOSP IP/OBS HIGH 50: CPT | Mod: GC

## 2024-04-02 RX ORDER — GABAPENTIN 400 MG/1
600 CAPSULE ORAL AT BEDTIME
Refills: 0 | Status: DISCONTINUED | OUTPATIENT
Start: 2024-04-02 | End: 2024-04-11

## 2024-04-02 RX ADMIN — Medication 2 TABLET(S): at 06:33

## 2024-04-02 RX ADMIN — BICTEGRAVIR SODIUM, EMTRICITABINE, AND TENOFOVIR ALAFENAMIDE FUMARATE 1 TABLET(S): 30; 120; 15 TABLET ORAL at 12:22

## 2024-04-02 RX ADMIN — Medication 0.25 MILLIGRAM(S): at 09:23

## 2024-04-02 RX ADMIN — Medication 3 MILLILITER(S): at 04:47

## 2024-04-02 RX ADMIN — Medication 2 TABLET(S): at 22:30

## 2024-04-02 RX ADMIN — Medication 2 TABLET(S): at 16:05

## 2024-04-02 RX ADMIN — Medication 40 MILLIGRAM(S): at 06:33

## 2024-04-02 RX ADMIN — Medication 120 MILLIGRAM(S): at 06:34

## 2024-04-02 RX ADMIN — Medication 3 MILLILITER(S): at 09:23

## 2024-04-02 RX ADMIN — Medication 1: at 08:27

## 2024-04-02 RX ADMIN — RIVAROXABAN 20 MILLIGRAM(S): KIT at 12:21

## 2024-04-02 RX ADMIN — Medication 81 MILLIGRAM(S): at 12:21

## 2024-04-02 RX ADMIN — DULOXETINE HYDROCHLORIDE 60 MILLIGRAM(S): 30 CAPSULE, DELAYED RELEASE ORAL at 12:21

## 2024-04-02 RX ADMIN — SODIUM CHLORIDE 4 MILLILITER(S): 9 INJECTION INTRAMUSCULAR; INTRAVENOUS; SUBCUTANEOUS at 09:22

## 2024-04-02 RX ADMIN — PANTOPRAZOLE SODIUM 40 MILLIGRAM(S): 20 TABLET, DELAYED RELEASE ORAL at 06:34

## 2024-04-02 RX ADMIN — POLYETHYLENE GLYCOL 3350 17 GRAM(S): 17 POWDER, FOR SOLUTION ORAL at 12:22

## 2024-04-02 RX ADMIN — Medication 2: at 12:18

## 2024-04-02 NOTE — PROGRESS NOTE ADULT - ASSESSMENT
64-year-old female w/ PMH of lupus, PE on Xarelto (dx 2019), asthma, HTN, T2DM, and GBS (dx 1997) presenting with 3wk h/o of worsening SOB and persistent cough. 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   Noted recent  admission with repeat CT chest with improvement in some areas with new opacities in others  - c/w standing nebs  - d/c 3% inhaled saline after last AFB sputum induced  - PJP+  - ruling out TB due to indeterminate quant  - complete course of bactrim  - pred 40mg qday x5 days then decrease to 20mg qday  - ARV  - if unchanged or worsening can entertain notion of bronchoscopy but would favor treating and reconstituting for now i.e. risk likely does not outweigh benefit at this time  - fluctuating O2 sat, worse after coughing fit ?shunt. Check TTE with bubbles

## 2024-04-02 NOTE — PROGRESS NOTE ADULT - SUBJECTIVE AND OBJECTIVE BOX
CHIEF COMPLAINT:Patient is a 64y old  Female who presents with a chief complaint of SOB, cough (02 Apr 2024 12:51)      Interval Events:  still with significant cough when speaking    REVIEW OF SYSTEMS:  [x] All other systems negative except per HPI   [ ] Unable to assess ROS because ________    OBJECTIVE:  ICU Vital Signs Last 24 Hrs  T(C): 36.6 (02 Apr 2024 17:24), Max: 36.9 (02 Apr 2024 00:20)  T(F): 97.8 (02 Apr 2024 17:24), Max: 98.4 (02 Apr 2024 00:20)  HR: 110 (02 Apr 2024 17:24) (76 - 135)  BP: 138/74 (02 Apr 2024 17:24) (132/92 - 167/97)  BP(mean): --  ABP: --  ABP(mean): --  RR: 18 (02 Apr 2024 17:24) (17 - 18)  SpO2: 100% (02 Apr 2024 17:24) (91% - 101%)    O2 Parameters below as of 02 Apr 2024 17:24  Patient On (Oxygen Delivery Method): nasal cannula              04-01 @ 07:01  -  04-02 @ 07:00  --------------------------------------------------------  IN: 780 mL / OUT: 675 mL / NET: 105 mL        PHYSICAL EXAM:  General: WN/WD NAD  HEENT: EOMI, moist mucous membranes  Neurology: A&Ox3, nonfocal, JIANG x 4  Respiratory: CTA B/L, normal respiratory effort, no wheezes, crackles, rales  CV: RRR, S1S2, no murmurs, rubs or gallops  Abdominal: Soft, NT, ND +BS  Extremities: No edema, +peripheral pulses    HOSPITAL MEDICATIONS:  MEDICATIONS  (STANDING):  albuterol/ipratropium for Nebulization 3 milliLiter(s) Nebulizer every 6 hours  aspirin  chewable 81 milliGRAM(s) Oral daily  bictegravir 50 mG/emtricitabine 200 mG/tenofovir alafenamide 25 mG (BIKTARVY) 1 Tablet(s) Oral daily  buDESOnide    Inhalation Suspension 0.25 milliGRAM(s) Inhalation every 12 hours  dextrose 5%. 1000 milliLiter(s) (100 mL/Hr) IV Continuous <Continuous>  dextrose 5%. 1000 milliLiter(s) (50 mL/Hr) IV Continuous <Continuous>  dextrose 50% Injectable 12.5 Gram(s) IV Push once  dextrose 50% Injectable 25 Gram(s) IV Push once  dextrose 50% Injectable 25 Gram(s) IV Push once  DULoxetine 60 milliGRAM(s) Oral daily  gabapentin 600 milliGRAM(s) Oral at bedtime  glucagon  Injectable 1 milliGRAM(s) IntraMuscular once  influenza   Vaccine 0.5 milliLiter(s) IntraMuscular once  insulin lispro (ADMELOG) corrective regimen sliding scale   SubCutaneous at bedtime  insulin lispro (ADMELOG) corrective regimen sliding scale   SubCutaneous three times a day before meals  pantoprazole    Tablet 40 milliGRAM(s) Oral before breakfast  polyethylene glycol 3350 17 Gram(s) Oral daily  rivaroxaban 20 milliGRAM(s) Oral daily  senna 2 Tablet(s) Oral at bedtime  sodium chloride 3%  Inhalation 4 milliLiter(s) Inhalation every 12 hours  trimethoprim  160 mG/sulfamethoxazole 800 mG 2 Tablet(s) Oral every 8 hours  verapamil  milliGRAM(s) Oral daily    MEDICATIONS  (PRN):  acetaminophen     Tablet .. 650 milliGRAM(s) Oral every 6 hours PRN Temp greater or equal to 38C (100.4F), Mild Pain (1 - 3)  dextrose Oral Gel 15 Gram(s) Oral once PRN Blood Glucose LESS THAN 70 milliGRAM(s)/deciliter  hydrocodone/homatropine Syrup 5 milliLiter(s) Oral every 6 hours PRN Cough      LABS:    The Labs were reviewed by me   The Radiology was reviewed by me    EKG tracing reviewed by me    04-02    129<L>  |  94<L>  |  16  ----------------------------<  139<H>  5.0   |  18<L>  |  0.88  04-01    133<L>  |  97<L>  |  16  ----------------------------<  120<H>  4.6   |  25  |  0.89  03-31    133<L>  |  97<L>  |  19  ----------------------------<  105<H>  5.3   |  23  |  0.91    Ca    9.8      02 Apr 2024 07:35  Ca    10.3      01 Apr 2024 06:40  Ca    10.1      31 Mar 2024 05:23  Phos  3.7     04-02  Mg     2.20     04-02    TPro  6.5  /  Alb  3.5  /  TBili  <0.2  /  DBili  x   /  AST  17  /  ALT  35<H>  /  AlkPhos  57  04-02  TPro  6.5  /  Alb  3.8  /  TBili  <0.2  /  DBili  x   /  AST  13  /  ALT  36<H>  /  AlkPhos  59  04-01  TPro  6.5  /  Alb  3.7  /  TBili  <0.2  /  DBili  x   /  AST  13  /  ALT  33  /  AlkPhos  60  03-31    Magnesium: 2.20 mg/dL (04-02-24 @ 07:35)  Magnesium: 2.20 mg/dL (04-01-24 @ 06:40)  Magnesium: 2.20 mg/dL (03-31-24 @ 05:23)    Phosphorus: 3.7 mg/dL (04-02-24 @ 07:35)  Phosphorus: 3.5 mg/dL (04-01-24 @ 06:40)  Phosphorus: 3.4 mg/dL (03-31-24 @ 05:23)                    Urinalysis Basic - ( 02 Apr 2024 07:35 )    Color: x / Appearance: x / SG: x / pH: x  Gluc: 139 mg/dL / Ketone: x  / Bili: x / Urobili: x   Blood: x / Protein: x / Nitrite: x   Leuk Esterase: x / RBC: x / WBC x   Sq Epi: x / Non Sq Epi: x / Bacteria: x                              11.8   15.15 )-----------( 361      ( 01 Apr 2024 06:40 )             36.8                         11.3   18.87 )-----------( 401      ( 31 Mar 2024 05:23 )             35.6     CAPILLARY BLOOD GLUCOSE      POCT Blood Glucose.: 116 mg/dL (02 Apr 2024 17:17)  POCT Blood Glucose.: 238 mg/dL (02 Apr 2024 11:45)  POCT Blood Glucose.: 163 mg/dL (02 Apr 2024 07:42)  POCT Blood Glucose.: 196 mg/dL (01 Apr 2024 22:42)        MICROBIOLOGY:     RADIOLOGY:  [ ] Reviewed and interpreted by me    Point of Care Ultrasound Findings:    PFT:    EKG:

## 2024-04-02 NOTE — PROGRESS NOTE ADULT - PROBLEM SELECTOR PLAN 9
DVT Ppx: xarelto   Diet: regular DASH  Dispo: TBD    *ETHICS*- BABATUNDE contacted 4/1, spoke to patient, informed primary team of contacting partner to inform him of diagnosis, attempted to call  on 4/1 with no answer- [facility] made aware*  [facility]

## 2024-04-02 NOTE — PROGRESS NOTE ADULT - ATTENDING COMMENTS
64-year-old with acute hypoxemic respiratory failure due to PJP pneumonia in setting of AIDS.  - Complete course of Bactrim and steroids for PJP.   - Duonebs Q6H, Budesonide neb Q12h  - D/C hypertonic saline after one more AFB sputum collection  - Wean O2 as tolerated  - Please check TTE with bubble study

## 2024-04-02 NOTE — PROGRESS NOTE ADULT - ATTENDING COMMENTS
Pt is a 65 yo F with PMH chronic back pain (s/p spinal fusion), T2D, HTN, HLD, SLE, CVA/TIA, asthma, PE (2019, xarelto), and GBS p/w persistent cough, SOB, diarrhea, and fall 2/2 weakness. Recently hospitalized at [facility] 1/2024 for influenza with superimposed mycoplasma PNA s/p doxycycline course. On arrival, CTH neg, CT chest with diffuse GGO and consolidations. Labs with leukocytosis, normocytic anemia, neg GI PCR, RVP neg, and MRSA neg. TTE with EF 53% and mild G1 diastolic dysfunction. Repeat CT PE neg but with multiple BL nodules and patchy opacities, mostly in the RLL/R base. ID following, appreciate recs. Pulm following, was planned for bronch however now deferred given AIDS dx; on IV methylprednisolone 40mg BID with protonix for gastric protection. Receiving nebs around the clock and now weaned from HFNC to 4L NC. HIV+ with CD4 14 and VL 72K --- started on biktarvy; will need to closely monitor for IRIS. Fungitell elevated -- high suspicion for PJP PNA, now with PCP PCR+; on bactrim treatment. Appreciate ID recs -- dc'd azithromycin as pt now on ART. Transitioned hep gtt back to xarelto, as no plan for bronch at present given risk >> benefit and being empirically treated now. Will decrease steroids to prednisone 40mg daily per pulm. Discussed with HS, rest as outlined above.

## 2024-04-02 NOTE — PROGRESS NOTE ADULT - PROBLEM SELECTOR PLAN 2
-> sx of SOB, cough concern for HIV, prelim/ confirmatory test+ for HIV with CD4 of 14, viral load >70K  - ID consulted, continue bactrim-DS q8hr, adding azithromycin and cefepime 3/26 HIV prophylaxis   - cefepime ended on 3/30   - adding Biktarvy 3/26   -sputum PCR +PJP  - continue biktarvy  - pending rule out Tb (2/3 negative, pending 3rd sputum order 3/30) -> sx of SOB, cough concern for HIV, prelim/ confirmatory test+ for HIV with CD4 of 14, viral load >70K  - ID consulted, continue bactrim-DS q8hr, adding azithromycin  (dc'd on 4/2) and cefepime 3/26 HIV prophylaxis   - cefepime ended on 3/30   - adding Biktarvy 3/26   -sputum PCR +PJP  - continue biktarvy  - pending rule out Tb (2/3 negative, pending 3rd sputum order 3/30)

## 2024-04-02 NOTE — PROGRESS NOTE ADULT - SUBJECTIVE AND OBJECTIVE BOX
PROGRESS NOTE:   Authored by Dr. Darlene Vallejo MD (PGY-1). Pager [facility] [phone] / PHILLIP ( [phone]) or via TEAMS    Patient is a 64y old  Female who presents with a chief complaint of SOB, cough (01 Apr 2024 19:17)      SUBJECTIVE / OVERNIGHT EVENTS:  No acute events overnight.     ADDITIONAL REVIEW OF SYSTEMS:  Patient denies fevers, chills, chest pain, shortness of breath, nausea, abdominal pain, diarrhea, constipation, dysuria, leg swelling, headache, light headedness.    MEDICATIONS  (STANDING):  albuterol/ipratropium for Nebulization 3 milliLiter(s) Nebulizer every 6 hours  aspirin  chewable 81 milliGRAM(s) Oral daily  bictegravir 50 mG/emtricitabine 200 mG/tenofovir alafenamide 25 mG (BIKTARVY) 1 Tablet(s) Oral daily  buDESOnide    Inhalation Suspension 0.25 milliGRAM(s) Inhalation every 12 hours  dextrose 5%. 1000 milliLiter(s) (50 mL/Hr) IV Continuous <Continuous>  dextrose 5%. 1000 milliLiter(s) (100 mL/Hr) IV Continuous <Continuous>  dextrose 50% Injectable 12.5 Gram(s) IV Push once  dextrose 50% Injectable 25 Gram(s) IV Push once  dextrose 50% Injectable 25 Gram(s) IV Push once  DULoxetine 60 milliGRAM(s) Oral daily  gabapentin 600 milliGRAM(s) Oral at bedtime  glucagon  Injectable 1 milliGRAM(s) IntraMuscular once  influenza   Vaccine 0.5 milliLiter(s) IntraMuscular once  insulin lispro (ADMELOG) corrective regimen sliding scale   SubCutaneous at bedtime  insulin lispro (ADMELOG) corrective regimen sliding scale   SubCutaneous three times a day before meals  methylPREDNISolone sodium succinate Injectable 40 milliGRAM(s) IV Push every 12 hours  pantoprazole    Tablet 40 milliGRAM(s) Oral before breakfast  polyethylene glycol 3350 17 Gram(s) Oral daily  rivaroxaban 20 milliGRAM(s) Oral daily  senna 2 Tablet(s) Oral at bedtime  sodium chloride 3%  Inhalation 4 milliLiter(s) Inhalation every 12 hours  trimethoprim  160 mG/sulfamethoxazole 800 mG 2 Tablet(s) Oral every 8 hours  verapamil  milliGRAM(s) Oral daily    MEDICATIONS  (PRN):  acetaminophen     Tablet .. 650 milliGRAM(s) Oral every 6 hours PRN Temp greater or equal to 38C (100.4F), Mild Pain (1 - 3)  dextrose Oral Gel 15 Gram(s) Oral once PRN Blood Glucose LESS THAN 70 milliGRAM(s)/deciliter  hydrocodone/homatropine Syrup 5 milliLiter(s) Oral every 6 hours PRN Cough      CAPILLARY BLOOD GLUCOSE      POCT Blood Glucose.: 196 mg/dL (01 Apr 2024 22:42)  POCT Blood Glucose.: 204 mg/dL (01 Apr 2024 18:08)  POCT Blood Glucose.: 234 mg/dL (01 Apr 2024 11:46)  POCT Blood Glucose.: 170 mg/dL (01 Apr 2024 07:40)    I&O's Summary    01 Apr 2024 07:01  -  02 Apr 2024 07:00  --------------------------------------------------------  IN: 780 mL / OUT: 675 mL / NET: 105 mL        PHYSICAL EXAM:  Vital Signs Last 24 Hrs  T(C): 36.6 (02 Apr 2024 05:24), Max: 36.9 (02 Apr 2024 00:20)  T(F): 97.9 (02 Apr 2024 05:24), Max: 98.4 (02 Apr 2024 00:20)  HR: 105 (02 Apr 2024 05:24) (71 - 105)  BP: 155/97 (02 Apr 2024 05:24) (128/80 - 155/97)  BP(mean): --  RR: 18 (02 Apr 2024 05:24) (17 - 18)  SpO2: 96% (02 Apr 2024 05:24) (95% - 101%)    Parameters below as of 02 Apr 2024 05:24  Patient On (Oxygen Delivery Method): nasal cannula        CONSTITUTIONAL: NAD, well-developed  RESPIRATORY: Normal respiratory effort; lungs are clear to auscultation bilaterally  CARDIOVASCULAR: Regular rate and rhythm, normal S1 and S2, no murmur/rub/gallop; No lower extremity edema; Peripheral pulses are 2+ bilaterally  ABDOMEN: Nontender to palpation, normoactive bowel sounds, no rebound/guarding; No hepatosplenomegaly  MSK: no clubbing or cyanosis of digits; no joint swelling or tenderness to palpation  PSYCH: A+O to person, place, and time; affect appropriate    LABS:                        11.8   15.15 )-----------( 361      ( 01 Apr 2024 06:40 )             36.8     04-01    133<L>  |  97<L>  |  16  ----------------------------<  120<H>  4.6   |  25  |  0.89    Ca    10.3      01 Apr 2024 06:40  Phos  3.5     04-01  Mg     2.20     04-01    TPro  6.5  /  Alb  3.8  /  TBili  <0.2  /  DBili  x   /  AST  13  /  ALT  36<H>  /  AlkPhos  59  04-01          Urinalysis Basic - ( 01 Apr 2024 06:40 )    Color: x / Appearance: x / SG: x / pH: x  Gluc: 120 mg/dL / Ketone: x  / Bili: x / Urobili: x   Blood: x / Protein: x / Nitrite: x   Leuk Esterase: x / RBC: x / WBC x   Sq Epi: x / Non Sq Epi: x / Bacteria: x          Tele Reviewed:    RADIOLOGY & ADDITIONAL TESTS:  Results Reviewed:   Imaging Personally Reviewed:  Electrocardiogram Personally Reviewed:     PROGRESS NOTE:   Authored by Dr. Darlene Vallejo MD (PGY-1). Pager [facility] [phone] / PHILLIP ( [phone]) or via TEAMS    Patient is a 64y old  Female who presents with a chief complaint of SOB, cough (01 Apr 2024 19:17)      SUBJECTIVE / OVERNIGHT EVENTS:  No acute events overnight. Still coughing. Denies CP, SOB. Minimal BM yesterday.      MEDICATIONS  (STANDING):  albuterol/ipratropium for Nebulization 3 milliLiter(s) Nebulizer every 6 hours  aspirin  chewable 81 milliGRAM(s) Oral daily  bictegravir 50 mG/emtricitabine 200 mG/tenofovir alafenamide 25 mG (BIKTARVY) 1 Tablet(s) Oral daily  buDESOnide    Inhalation Suspension 0.25 milliGRAM(s) Inhalation every 12 hours  dextrose 5%. 1000 milliLiter(s) (50 mL/Hr) IV Continuous <Continuous>  dextrose 5%. 1000 milliLiter(s) (100 mL/Hr) IV Continuous <Continuous>  dextrose 50% Injectable 12.5 Gram(s) IV Push once  dextrose 50% Injectable 25 Gram(s) IV Push once  dextrose 50% Injectable 25 Gram(s) IV Push once  DULoxetine 60 milliGRAM(s) Oral daily  gabapentin 600 milliGRAM(s) Oral at bedtime  glucagon  Injectable 1 milliGRAM(s) IntraMuscular once  influenza   Vaccine 0.5 milliLiter(s) IntraMuscular once  insulin lispro (ADMELOG) corrective regimen sliding scale   SubCutaneous at bedtime  insulin lispro (ADMELOG) corrective regimen sliding scale   SubCutaneous three times a day before meals  methylPREDNISolone sodium succinate Injectable 40 milliGRAM(s) IV Push every 12 hours  pantoprazole    Tablet 40 milliGRAM(s) Oral before breakfast  polyethylene glycol 3350 17 Gram(s) Oral daily  rivaroxaban 20 milliGRAM(s) Oral daily  senna 2 Tablet(s) Oral at bedtime  sodium chloride 3%  Inhalation 4 milliLiter(s) Inhalation every 12 hours  trimethoprim  160 mG/sulfamethoxazole 800 mG 2 Tablet(s) Oral every 8 hours  verapamil  milliGRAM(s) Oral daily    MEDICATIONS  (PRN):  acetaminophen     Tablet .. 650 milliGRAM(s) Oral every 6 hours PRN Temp greater or equal to 38C (100.4F), Mild Pain (1 - 3)  dextrose Oral Gel 15 Gram(s) Oral once PRN Blood Glucose LESS THAN 70 milliGRAM(s)/deciliter  hydrocodone/homatropine Syrup 5 milliLiter(s) Oral every 6 hours PRN Cough      CAPILLARY BLOOD GLUCOSE      POCT Blood Glucose.: 196 mg/dL (01 Apr 2024 22:42)  POCT Blood Glucose.: 204 mg/dL (01 Apr 2024 18:08)  POCT Blood Glucose.: 234 mg/dL (01 Apr 2024 11:46)  POCT Blood Glucose.: 170 mg/dL (01 Apr 2024 07:40)    I&O's Summary    01 Apr 2024 07:01  -  02 Apr 2024 07:00  --------------------------------------------------------  IN: 780 mL / OUT: 675 mL / NET: 105 mL        PHYSICAL EXAM:  Vital Signs Last 24 Hrs  T(C): 36.6 (02 Apr 2024 05:24), Max: 36.9 (02 Apr 2024 00:20)  T(F): 97.9 (02 Apr 2024 05:24), Max: 98.4 (02 Apr 2024 00:20)  HR: 105 (02 Apr 2024 05:24) (71 - 105)  BP: 155/97 (02 Apr 2024 05:24) (128/80 - 155/97)  BP(mean): --  RR: 18 (02 Apr 2024 05:24) (17 - 18)  SpO2: 96% (02 Apr 2024 05:24) (95% - 101%)    Parameters below as of 02 Apr 2024 05:24  Patient On (Oxygen Delivery Method): nasal cannula        CONSTITUTIONAL: NAD, well-developed  RESPIRATORY: coughing, normal respiratory effort; wheezing b/l   CARDIOVASCULAR: Regular rate and rhythm, normal S1 and S2, no murmur/rub/gallop; No lower extremity edema; Peripheral pulses are 2+ bilaterally  ABDOMEN: Nontender to palpation, normoactive bowel sounds, no rebound/guarding; No hepatosplenomegaly  MSK: no clubbing or cyanosis of digits; no joint swelling or tenderness to palpation  PSYCH: A+O to person, place, and time; affect appropriate    LABS:                        11.8   15.15 )-----------( 361      ( 01 Apr 2024 06:40 )             36.8     04-01    133<L>  |  97<L>  |  16  ----------------------------<  120<H>  4.6   |  25  |  0.89    Ca    10.3      01 Apr 2024 06:40  Phos  3.5     04-01  Mg     2.20     04-01    TPro  6.5  /  Alb  3.8  /  TBili  <0.2  /  DBili  x   /  AST  13  /  ALT  36<H>  /  AlkPhos  59  04-01          Urinalysis Basic - ( 01 Apr 2024 06:40 )    Color: x / Appearance: x / SG: x / pH: x  Gluc: 120 mg/dL / Ketone: x  / Bili: x / Urobili: x   Blood: x / Protein: x / Nitrite: x   Leuk Esterase: x / RBC: x / WBC x   Sq Epi: x / Non Sq Epi: x / Bacteria: x          Tele Reviewed:    RADIOLOGY & ADDITIONAL TESTS:  Results Reviewed:   Imaging Personally Reviewed:  Electrocardiogram Personally Reviewed:

## 2024-04-03 LAB
GLUCOSE BLDC GLUCOMTR-MCNC: 107 MG/DL — HIGH (ref 70–99)
GLUCOSE BLDC GLUCOMTR-MCNC: 161 MG/DL — HIGH (ref 70–99)
GLUCOSE BLDC GLUCOMTR-MCNC: 244 MG/DL — HIGH (ref 70–99)

## 2024-04-03 PROCEDURE — 99232 SBSQ HOSP IP/OBS MODERATE 35: CPT

## 2024-04-03 RX ORDER — GABAPENTIN 400 MG/1
600 CAPSULE ORAL ONCE
Refills: 0 | Status: COMPLETED | OUTPATIENT
Start: 2024-04-03 | End: 2024-04-03

## 2024-04-03 RX ORDER — GABAPENTIN 400 MG/1
300 CAPSULE ORAL ONCE
Refills: 0 | Status: DISCONTINUED | OUTPATIENT
Start: 2024-04-03 | End: 2024-04-03

## 2024-04-03 RX ORDER — LACTULOSE 10 G/15ML
20 SOLUTION ORAL DAILY
Refills: 0 | Status: DISCONTINUED | OUTPATIENT
Start: 2024-04-03 | End: 2024-04-11

## 2024-04-03 RX ORDER — GABAPENTIN 400 MG/1
600 CAPSULE ORAL ONCE
Refills: 0 | Status: DISCONTINUED | OUTPATIENT
Start: 2024-04-03 | End: 2024-04-03

## 2024-04-03 RX ORDER — LACTULOSE 10 G/15ML
20 SOLUTION ORAL ONCE
Refills: 0 | Status: DISCONTINUED | OUTPATIENT
Start: 2024-04-03 | End: 2024-04-03

## 2024-04-03 RX ADMIN — Medication 2 TABLET(S): at 14:27

## 2024-04-03 RX ADMIN — GABAPENTIN 600 MILLIGRAM(S): 400 CAPSULE ORAL at 01:39

## 2024-04-03 RX ADMIN — Medication 1: at 17:34

## 2024-04-03 RX ADMIN — PANTOPRAZOLE SODIUM 40 MILLIGRAM(S): 20 TABLET, DELAYED RELEASE ORAL at 12:17

## 2024-04-03 RX ADMIN — Medication 120 MILLIGRAM(S): at 06:26

## 2024-04-03 RX ADMIN — Medication 2 TABLET(S): at 06:25

## 2024-04-03 RX ADMIN — DULOXETINE HYDROCHLORIDE 60 MILLIGRAM(S): 30 CAPSULE, DELAYED RELEASE ORAL at 12:15

## 2024-04-03 RX ADMIN — Medication 2: at 12:10

## 2024-04-03 RX ADMIN — Medication 0.25 MILLIGRAM(S): at 20:48

## 2024-04-03 RX ADMIN — RIVAROXABAN 20 MILLIGRAM(S): KIT at 12:15

## 2024-04-03 RX ADMIN — BICTEGRAVIR SODIUM, EMTRICITABINE, AND TENOFOVIR ALAFENAMIDE FUMARATE 1 TABLET(S): 30; 120; 15 TABLET ORAL at 12:16

## 2024-04-03 RX ADMIN — Medication 81 MILLIGRAM(S): at 12:15

## 2024-04-03 RX ADMIN — Medication 3 MILLILITER(S): at 20:48

## 2024-04-03 RX ADMIN — Medication 40 MILLIGRAM(S): at 06:25

## 2024-04-03 RX ADMIN — SODIUM CHLORIDE 4 MILLILITER(S): 9 INJECTION INTRAMUSCULAR; INTRAVENOUS; SUBCUTANEOUS at 20:48

## 2024-04-03 NOTE — PROGRESS NOTE ADULT - ASSESSMENT
Call back to Johana.    She had her UA done yesterday- see results.  Culture is still pending.     She is having symptoms of a UTI; frequent and burning urination.       If she needs an antibiotic, she would like this sent to [facility] on [address]  in [place].     Please advise       65 yo woman with SLE, DM, h/o PE, CVA with left sided weakness presented 3/19 with fever, dry cough, SOB    Hospital admission Jan 2024 at [facility] for PNA - mycoplasma IgM + and flu A +   Received azithro, cefepime, doxy and prednisone taper at that time     CT chest 3/19 shows multifocal ground glass and consolidative opacities involving all 5 lobes  - more extensive c/w prior study and in slightly different distribution     Multifocal pulmonary infiltrates   New dx  HIV     CD4  14    PCP     lfungitell 410    cxr improving on prednisone and bactrim     bactrim 3/22-      cefepime added for bacterial coverage now complete     oxygen requirement improving  still JARVIS    bood cultures no growth    serum crypt ag neg   quant gold indeterminate   PJP PCR positive     for sputum AFB x 3   neg x 2    syphilis screen neg   CMV IgM neg IgG positive   hep B Ag neg hep Ab neg   hep c neg  toxo IgM neg IgG neg     Suggest:     c/w bactrim DS 2 tab po q 8 h --> 4/11  c/w biktarvy 1 tab po q 24 h   sputum AFB x 1   planning to follow in our [facility]ART once discharge

## 2024-04-03 NOTE — PROGRESS NOTE ADULT - PROBLEM SELECTOR PLAN 1
- initially P/w SOB, persistent cough  - Recent admission for superimposed mycoplasma PNA on influenza, s/p azithromycin/doxycycline  - RVP unremarkable, CXR -> Bibasilar airspace opacities  - CT Chest -> Multifocal groundglass and consolidative opacities involving all 5 lobes, most predominant in the b/l lower lobes and right upper lobe  - S/p empiric meropenem, doxycyline iso lack of clinical improvement, per ID  - Fungitell elevated  - c/w solumedrol daily as per pulm for PJP prophylaxis   > C/w duonebs q6, inhaled budesonide BID, Robitussin-DM q4 prn, and hycodan PRN  > wean HFNC--> O2  NC  > Pulmonology consulted, will continue to appreciate recs, bronchoscopy planned for 3/26 now cancelled in setting of fulminant AIDS  > ID consulted, will continue to appreciate recs - initially P/w SOB, persistent cough  - Recent admission for superimposed mycoplasma PNA on influenza, s/p azithromycin/doxycycline  - RVP unremarkable, CXR -> Bibasilar airspace opacities  - CT Chest -> Multifocal groundglass and consolidative opacities involving all 5 lobes, most predominant in the b/l lower lobes and right upper lobe  - S/p empiric meropenem, doxycyline iso lack of clinical improvement, per ID  - Fungitell elevated  -  initially treated with solumedrol 40mg IV daily, now switched to prednisone 40mg x5 days, with plans to switch to pred 20mg qd  > C/w duonebs q6, inhaled budesonide BID, Robitussin-DM q4 prn, and hycodan PRN  > wean HFNC--> O2  NC  > Pulmonology consulted, will continue to appreciate recs, bronchoscopy planned for 3/26 now 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

## 2024-04-03 NOTE — PROGRESS NOTE ADULT - PROBLEM SELECTOR PLAN 9
DVT Ppx: xarelto   Diet: regular DASH  Dispo: TBD    *ETHICS*- BABATUNDE contacted 4/1, spoke to patient, informed primary team of contacting partner to inform him of diagnosis, attempted to call  on 4/1 with no answer- [facility] aware*  [facility]

## 2024-04-03 NOTE — PROGRESS NOTE ADULT - ATTENDING COMMENTS
Pt is a 63 yo F with PMH chronic back pain (s/p spinal fusion), T2D, HTN, HLD, SLE, CVA/TIA, asthma, PE (2019, xarelto), and GBS p/w persistent cough, SOB, diarrhea, and fall 2/2 weakness. Recently hospitalized at [facility] 1/2024 for influenza with superimposed mycoplasma PNA s/p doxycycline course. On arrival, CTH neg, CT chest with diffuse GGO and consolidations. Labs with leukocytosis, normocytic anemia, neg GI PCR, RVP neg, and MRSA neg. TTE with EF 53% and mild G1 diastolic dysfunction. Repeat CT PE neg but with multiple BL nodules and patchy opacities, mostly in the RLL/R base. ID following, appreciate recs. Pulm following, was planned for bronch however now deferred given AIDS dx; weaning steroids, now on prednisone 40mg daily x5d with protonix for gastric protection. Receiving nebs around the clock and now weaned from HFNC to 2L NC. HIV+ with CD4 14 and VL 72K --- now on biktarvy. Fungitell elevated -- high suspicion for PJP PNA, now with PCP PCR+; on bactrim treatment. Appreciate ID recs -- dc'd azithromycin as pt now on ART. Transitioned hep gtt back to xarelto, as no plan for bronch at present given risk >> benefit and being empirically treated now. Discussed with HS, rest as outlined above.

## 2024-04-03 NOTE — PROGRESS NOTE ADULT - SUBJECTIVE AND OBJECTIVE BOX
Follow Up:      Inverval History/ROS:Patient is a 64y old  Female who presents with a chief complaint of SOB, cough (24 Mar 2024 08:26)    desaturates with ambulating   ok at rest      Allergies    No Known Allergies    Intolerances    dislikes Glucerna Shake (Other)    worked as respiratory therapist; retired 15 years ago       ANTIMICROBIALS:  bictegravir 50 mG/emtricitabine 200 mG/tenofovir alafenamide 25 mG (BIKTARVY) 1 daily  trimethoprim  160 mG/sulfamethoxazole 800 mG 2 every 8 hours      MEDICATIONS  (STANDING):  acetaminophen     Tablet .. 650 every 6 hours PRN  albuterol/ipratropium for Nebulization 3 every 6 hours  aspirin  chewable 81 daily  buDESOnide    Inhalation Suspension 0.25 every 12 hours  dextrose 50% Injectable 12.5 once  dextrose 50% Injectable 25 once  dextrose 50% Injectable 25 once  dextrose Oral Gel 15 once PRN  DULoxetine 60 daily  gabapentin 600 at bedtime  glucagon  Injectable 1 once  hydrocodone/homatropine Syrup 5 every 6 hours PRN  influenza   Vaccine 0.5 once  insulin lispro (ADMELOG) corrective regimen sliding scale  at bedtime  insulin lispro (ADMELOG) corrective regimen sliding scale  three times a day before meals  methylPREDNISolone sodium succinate Injectable 40 every 12 hours  pantoprazole    Tablet 40 before breakfast  polyethylene glycol 3350 17 daily  rivaroxaban 20 daily  senna 2 at bedtime  sodium chloride 3%  Inhalation 4 every 12 hours  verapamil  daily    Vital Signs Last 24 Hrs  T(F): 98.1 (04-03-24 @ 11:54), Max: 98.3 (04-03-24 @ 06:00)  HR: 98 (04-03-24 @ 11:54)  BP: 138/84 (04-03-24 @ 11:54)  RR: 18 (04-03-24 @ 11:54)  SpO2: 95% (04-03-24 @ 11:54) (95% - 100%)    PHYSICAL EXAM:  General: comfortable at rest  nasal canula 4 L  HEAD/EYES: [ ] PERRL [x ] white sclera [ ] icterus  ENT:  [ ] normal [x ] supple [ ] thrush [ ] pharyngeal exudate  Cardiovascular:   [ ] murmur [x ] normal [ ] PPM/AICD  Respiratory:  x clear  GI:  [x ] soft, non-tender, normal bowel sounds  :  [ ] penny [x ] no CVA tenderness   Musculoskeletal:  [ ] no synovitis  Neurologic:  [x ] non-focal exam   Skin:  [ x] no rash  Lymph: [x ] no lymphadenopathy  Psychiatric:  [x ] appropriate affect [x ] alert & oriented  Lines:  iv left hand    04-02    129  |  94  |  16  ----------------------------<  139  5.0   |  18  |  0.88  Ca    9.8      02 Apr 2024 07:35Phos  3.7     04-02Mg     2.20     04-02  TPro  6.5  /  Alb  3.5  /  TBili  <0.2  /  DBili  x   /  AST  17  /  ALT  35  /  AlkPhos  57  04-02    Pneumocystis jiroveci PCR (03.25.24 @ 07:42)   Pneumocystis Specimen Source: sputum  Pneumocystis PCR Result: Positive: Critical Result.     HIV-1 RNA Quantitative, Viral Load (03.25.24 @ 05:56)   HIV-1 RNA Quantitative, Viral Load: 72,947    ABS CD4: 14 cells/uL (03.25.24 @ 05:56)   crypt neg     MICROBIOLOGY:    Culture - Blood (03.19.24 @ 21:28)   Specimen Source: .Blood Blood-Peripheral  Culture Results:   No growth at 5 daysCulture - Blood (03.19.24 @ 21:20)   Specimen Source: .Blood Blood-Peripheral  Culture Results:   No growth at 5 days      RADIOLOGY:    < from: Xray Chest 1 View- PORTABLE-Urgent (Xray Chest 1 View- PORTABLE-Urgent .) (03.24.24 @ 13:04) >    ACC: [id number] EXAM:  XR CHEST PORTABLE URGENT 1V   ORDERED BY: RAYMOND HO     PROCEDURE DATE:  03/24/2024          INTERPRETATION:  EXAMINATION: XR CHEST URGENT    CLINICAL INDICATION: Increased oxygen requirement    TECHNIQUE: Single frontal,portable view of the chest was obtained.    COMPARISON: Chest radiograph 3/19/2024.    FINDINGS:  The heart size is normal.  Interval decrease in bilateral airspace opacities.  No congestion or effusions to indicate CHF.  No pneumothorax.    IMPRESSION:  Interval decrease in bilateral airspace opacities    --- End of Report ---          JUAN LAMBERT MD; Resident Radiology  This document has been electronically signed.  BRIONNA PAVON MD; Attending Radiologist    < end of copied text >

## 2024-04-03 NOTE — PROGRESS NOTE ADULT - PROBLEM SELECTOR PLAN 2
-> sx of SOB, cough concern for HIV, prelim/ confirmatory test+ for HIV with CD4 of 14, viral load >70K  - ID consulted, continue bactrim-DS q8hr, adding azithromycin  (dc'd on 4/2) and cefepime 3/26 HIV prophylaxis   - cefepime ended on 3/30   - adding Biktarvy 3/26   -sputum PCR +PJP  - continue biktarvy  - pending rule out Tb (2/3 negative, pending 3rd sputum order 3/30) -> sx of SOB, cough concern for HIV, prelim/ confirmatory test+ for HIV with CD4 of 14, viral load >70K  - ID consulted, continue bactrim-DS q8hr, adding azithromycin  (dc'd on 4/2) and cefepime 3/26 HIV prophylaxis   - cefepime ended on 3/30   - adding Biktarvy 3/26   -sputum PCR +PJP  - continue biktarvy  - Bactrim D q8hr until 4/11   - pending rule out Tb (2/3 negative, pending 3rd sputum order 3/30)

## 2024-04-03 NOTE — PROGRESS NOTE ADULT - SUBJECTIVE AND OBJECTIVE BOX
PROGRESS NOTE:   Authored by Dr. Darlene Vallejo MD (PGY-1). Pager [facility] [phone] / PHILLIP ( [phone]) or via TEAMS    Patient is a 64y old  Female who presents with a chief complaint of SOB, cough (02 Apr 2024 12:51)      SUBJECTIVE / OVERNIGHT EVENTS:  No acute events overnight.     ADDITIONAL REVIEW OF SYSTEMS:  Patient denies fevers, chills, chest pain, shortness of breath, nausea, abdominal pain, diarrhea, constipation, dysuria, leg swelling, headache, light headedness.    MEDICATIONS  (STANDING):  albuterol/ipratropium for Nebulization 3 milliLiter(s) Nebulizer every 6 hours  aspirin  chewable 81 milliGRAM(s) Oral daily  bictegravir 50 mG/emtricitabine 200 mG/tenofovir alafenamide 25 mG (BIKTARVY) 1 Tablet(s) Oral daily  buDESOnide    Inhalation Suspension 0.25 milliGRAM(s) Inhalation every 12 hours  dextrose 5%. 1000 milliLiter(s) (50 mL/Hr) IV Continuous <Continuous>  dextrose 5%. 1000 milliLiter(s) (100 mL/Hr) IV Continuous <Continuous>  dextrose 50% Injectable 12.5 Gram(s) IV Push once  dextrose 50% Injectable 25 Gram(s) IV Push once  dextrose 50% Injectable 25 Gram(s) IV Push once  DULoxetine 60 milliGRAM(s) Oral daily  gabapentin 600 milliGRAM(s) Oral at bedtime  glucagon  Injectable 1 milliGRAM(s) IntraMuscular once  influenza   Vaccine 0.5 milliLiter(s) IntraMuscular once  insulin lispro (ADMELOG) corrective regimen sliding scale   SubCutaneous at bedtime  insulin lispro (ADMELOG) corrective regimen sliding scale   SubCutaneous three times a day before meals  pantoprazole    Tablet 40 milliGRAM(s) Oral before breakfast  polyethylene glycol 3350 17 Gram(s) Oral daily  predniSONE   Tablet 40 milliGRAM(s) Oral daily  rivaroxaban 20 milliGRAM(s) Oral daily  senna 2 Tablet(s) Oral at bedtime  sodium chloride 3%  Inhalation 4 milliLiter(s) Inhalation every 12 hours  trimethoprim  160 mG/sulfamethoxazole 800 mG 2 Tablet(s) Oral every 8 hours  verapamil  milliGRAM(s) Oral daily    MEDICATIONS  (PRN):  acetaminophen     Tablet .. 650 milliGRAM(s) Oral every 6 hours PRN Temp greater or equal to 38C (100.4F), Mild Pain (1 - 3)  dextrose Oral Gel 15 Gram(s) Oral once PRN Blood Glucose LESS THAN 70 milliGRAM(s)/deciliter  hydrocodone/homatropine Syrup 5 milliLiter(s) Oral every 6 hours PRN Cough      CAPILLARY BLOOD GLUCOSE      POCT Blood Glucose.: 125 mg/dL (02 Apr 2024 22:27)  POCT Blood Glucose.: 116 mg/dL (02 Apr 2024 17:17)  POCT Blood Glucose.: 238 mg/dL (02 Apr 2024 11:45)  POCT Blood Glucose.: 163 mg/dL (02 Apr 2024 07:42)    I&O's Summary      PHYSICAL EXAM:  Vital Signs Last 24 Hrs  T(C): 36.8 (03 Apr 2024 06:00), Max: 36.8 (03 Apr 2024 06:00)  T(F): 98.3 (03 Apr 2024 06:00), Max: 98.3 (03 Apr 2024 06:00)  HR: 112 (03 Apr 2024 06:00) (105 - 135)  BP: 140/73 (03 Apr 2024 06:00) (138/74 - 167/97)  BP(mean): --  RR: 18 (03 Apr 2024 06:00) (17 - 18)  SpO2: 99% (03 Apr 2024 06:00) (91% - 100%)    Parameters below as of 03 Apr 2024 06:00  Patient On (Oxygen Delivery Method): nasal cannula        CONSTITUTIONAL: NAD, well-developed  RESPIRATORY: Normal respiratory effort; lungs are clear to auscultation bilaterally  CARDIOVASCULAR: Regular rate and rhythm, normal S1 and S2, no murmur/rub/gallop; No lower extremity edema; Peripheral pulses are 2+ bilaterally  ABDOMEN: Nontender to palpation, normoactive bowel sounds, no rebound/guarding; No hepatosplenomegaly  MSK: no clubbing or cyanosis of digits; no joint swelling or tenderness to palpation  PSYCH: A+O to person, place, and time; affect appropriate    LABS:    04-02    129<L>  |  94<L>  |  16  ----------------------------<  139<H>  5.0   |  18<L>  |  0.88    Ca    9.8      02 Apr 2024 07:35  Phos  3.7     04-02  Mg     2.20     04-02    TPro  6.5  /  Alb  3.5  /  TBili  <0.2  /  DBili  x   /  AST  17  /  ALT  35<H>  /  AlkPhos  57  04-02          Urinalysis Basic - ( 02 Apr 2024 07:35 )    Color: x / Appearance: x / SG: x / pH: x  Gluc: 139 mg/dL / Ketone: x  / Bili: x / Urobili: x   Blood: x / Protein: x / Nitrite: x   Leuk Esterase: x / RBC: x / WBC x   Sq Epi: x / Non Sq Epi: x / Bacteria: x          Tele Reviewed:    RADIOLOGY & ADDITIONAL TESTS:  Results Reviewed:   Imaging Personally Reviewed:  Electrocardiogram Personally Reviewed:     PROGRESS NOTE:   Authored by Dr. Darlene Vallejo MD (PGY-1). Pager [facility] [phone] / PHILLIP ( [phone]) or via TEAMS    Patient is a 64y old  Female who presents with a chief complaint of SOB, cough (02 Apr 2024 12:51)      SUBJECTIVE / OVERNIGHT EVENTS:  No acute events overnight. pt did desat to 80s and tachy to 130s when ambulating around the room without oxygen. States she feels okay when at rest. Able to produce minimal sputum sample. No BM in 2 days.       MEDICATIONS  (STANDING):  albuterol/ipratropium for Nebulization 3 milliLiter(s) Nebulizer every 6 hours  aspirin  chewable 81 milliGRAM(s) Oral daily  bictegravir 50 mG/emtricitabine 200 mG/tenofovir alafenamide 25 mG (BIKTARVY) 1 Tablet(s) Oral daily  buDESOnide    Inhalation Suspension 0.25 milliGRAM(s) Inhalation every 12 hours  dextrose 5%. 1000 milliLiter(s) (50 mL/Hr) IV Continuous <Continuous>  dextrose 5%. 1000 milliLiter(s) (100 mL/Hr) IV Continuous <Continuous>  dextrose 50% Injectable 12.5 Gram(s) IV Push once  dextrose 50% Injectable 25 Gram(s) IV Push once  dextrose 50% Injectable 25 Gram(s) IV Push once  DULoxetine 60 milliGRAM(s) Oral daily  gabapentin 600 milliGRAM(s) Oral at bedtime  glucagon  Injectable 1 milliGRAM(s) IntraMuscular once  influenza   Vaccine 0.5 milliLiter(s) IntraMuscular once  insulin lispro (ADMELOG) corrective regimen sliding scale   SubCutaneous at bedtime  insulin lispro (ADMELOG) corrective regimen sliding scale   SubCutaneous three times a day before meals  pantoprazole    Tablet 40 milliGRAM(s) Oral before breakfast  polyethylene glycol 3350 17 Gram(s) Oral daily  predniSONE   Tablet 40 milliGRAM(s) Oral daily  rivaroxaban 20 milliGRAM(s) Oral daily  senna 2 Tablet(s) Oral at bedtime  sodium chloride 3%  Inhalation 4 milliLiter(s) Inhalation every 12 hours  trimethoprim  160 mG/sulfamethoxazole 800 mG 2 Tablet(s) Oral every 8 hours  verapamil  milliGRAM(s) Oral daily    MEDICATIONS  (PRN):  acetaminophen     Tablet .. 650 milliGRAM(s) Oral every 6 hours PRN Temp greater or equal to 38C (100.4F), Mild Pain (1 - 3)  dextrose Oral Gel 15 Gram(s) Oral once PRN Blood Glucose LESS THAN 70 milliGRAM(s)/deciliter  hydrocodone/homatropine Syrup 5 milliLiter(s) Oral every 6 hours PRN Cough      CAPILLARY BLOOD GLUCOSE      POCT Blood Glucose.: 125 mg/dL (02 Apr 2024 22:27)  POCT Blood Glucose.: 116 mg/dL (02 Apr 2024 17:17)  POCT Blood Glucose.: 238 mg/dL (02 Apr 2024 11:45)  POCT Blood Glucose.: 163 mg/dL (02 Apr 2024 07:42)    I&O's Summary      PHYSICAL EXAM:  Vital Signs Last 24 Hrs  T(C): 36.8 (03 Apr 2024 06:00), Max: 36.8 (03 Apr 2024 06:00)  T(F): 98.3 (03 Apr 2024 06:00), Max: 98.3 (03 Apr 2024 06:00)  HR: 112 (03 Apr 2024 06:00) (105 - 135)  BP: 140/73 (03 Apr 2024 06:00) (138/74 - 167/97)  BP(mean): --  RR: 18 (03 Apr 2024 06:00) (17 - 18)  SpO2: 99% (03 Apr 2024 06:00) (91% - 100%)    Parameters below as of 03 Apr 2024 06:00  Patient On (Oxygen Delivery Method): nasal cannula        CONSTITUTIONAL: NAD, well-developed  RESPIRATORY: wheezing b/l, coughing often   CARDIOVASCULAR: Regular rate and rhythm, normal S1 and S2, no murmur/rub/gallop; No lower extremity edema; Peripheral pulses are 2+ bilaterally  ABDOMEN: Nontender to palpation, normoactive bowel sounds, no rebound/guarding; No hepatosplenomegaly  MSK: no clubbing or cyanosis of digits; no joint swelling or tenderness to palpation  PSYCH: A+O to person, place, and time; affect appropriate    LABS:    04-02    129<L>  |  94<L>  |  16  ----------------------------<  139<H>  5.0   |  18<L>  |  0.88    Ca    9.8      02 Apr 2024 07:35  Phos  3.7     04-02  Mg     2.20     04-02    TPro  6.5  /  Alb  3.5  /  TBili  <0.2  /  DBili  x   /  AST  17  /  ALT  35<H>  /  AlkPhos  57  04-02          Urinalysis Basic - ( 02 Apr 2024 07:35 )    Color: x / Appearance: x / SG: x / pH: x  Gluc: 139 mg/dL / Ketone: x  / Bili: x / Urobili: x   Blood: x / Protein: x / Nitrite: x   Leuk Esterase: x / RBC: x / WBC x   Sq Epi: x / Non Sq Epi: x / Bacteria: x          Tele Reviewed:    RADIOLOGY & ADDITIONAL TESTS:  Results Reviewed:   Imaging Personally Reviewed:  Electrocardiogram Personally Reviewed:

## 2024-04-04 LAB
GLUCOSE BLDC GLUCOMTR-MCNC: 115 MG/DL — HIGH (ref 70–99)
GLUCOSE BLDC GLUCOMTR-MCNC: 121 MG/DL — HIGH (ref 70–99)
GLUCOSE BLDC GLUCOMTR-MCNC: 125 MG/DL — HIGH (ref 70–99)
GLUCOSE BLDC GLUCOMTR-MCNC: 182 MG/DL — HIGH (ref 70–99)
GLUCOSE BLDC GLUCOMTR-MCNC: 272 MG/DL — HIGH (ref 70–99)

## 2024-04-04 PROCEDURE — 99232 SBSQ HOSP IP/OBS MODERATE 35: CPT

## 2024-04-04 RX ADMIN — DULOXETINE HYDROCHLORIDE 60 MILLIGRAM(S): 30 CAPSULE, DELAYED RELEASE ORAL at 12:12

## 2024-04-04 RX ADMIN — Medication 2 TABLET(S): at 00:08

## 2024-04-04 RX ADMIN — Medication 2 TABLET(S): at 22:47

## 2024-04-04 RX ADMIN — SENNA PLUS 2 TABLET(S): 8.6 TABLET ORAL at 00:09

## 2024-04-04 RX ADMIN — GABAPENTIN 600 MILLIGRAM(S): 400 CAPSULE ORAL at 22:47

## 2024-04-04 RX ADMIN — SODIUM CHLORIDE 4 MILLILITER(S): 9 INJECTION INTRAMUSCULAR; INTRAVENOUS; SUBCUTANEOUS at 22:13

## 2024-04-04 RX ADMIN — GABAPENTIN 600 MILLIGRAM(S): 400 CAPSULE ORAL at 00:07

## 2024-04-04 RX ADMIN — Medication 40 MILLIGRAM(S): at 06:18

## 2024-04-04 RX ADMIN — Medication 1: at 17:40

## 2024-04-04 RX ADMIN — Medication 3 MILLILITER(S): at 03:28

## 2024-04-04 RX ADMIN — POLYETHYLENE GLYCOL 3350 17 GRAM(S): 17 POWDER, FOR SOLUTION ORAL at 12:12

## 2024-04-04 RX ADMIN — Medication 81 MILLIGRAM(S): at 12:11

## 2024-04-04 RX ADMIN — Medication 120 MILLIGRAM(S): at 06:18

## 2024-04-04 RX ADMIN — PANTOPRAZOLE SODIUM 40 MILLIGRAM(S): 20 TABLET, DELAYED RELEASE ORAL at 06:21

## 2024-04-04 RX ADMIN — BICTEGRAVIR SODIUM, EMTRICITABINE, AND TENOFOVIR ALAFENAMIDE FUMARATE 1 TABLET(S): 30; 120; 15 TABLET ORAL at 12:11

## 2024-04-04 RX ADMIN — RIVAROXABAN 20 MILLIGRAM(S): KIT at 12:12

## 2024-04-04 RX ADMIN — LACTULOSE 20 GRAM(S): 10 SOLUTION ORAL at 12:12

## 2024-04-04 RX ADMIN — SODIUM CHLORIDE 4 MILLILITER(S): 9 INJECTION INTRAMUSCULAR; INTRAVENOUS; SUBCUTANEOUS at 10:43

## 2024-04-04 RX ADMIN — Medication 2 TABLET(S): at 15:28

## 2024-04-04 RX ADMIN — Medication 2 TABLET(S): at 06:18

## 2024-04-04 RX ADMIN — Medication 3: at 12:14

## 2024-04-04 NOTE — DIETITIAN NUTRITION RISK NOTIFICATION - ADDITIONAL COMMENTS/DIETITIAN RECOMMENDATIONS
Please see Dietitian Initial Assessment for complete recommendations.    Jovita Willoughby MS RDN CDN  On [employer] Teams, Pager #[phone]

## 2024-04-04 NOTE — PROGRESS NOTE ADULT - PROBLEM SELECTOR PLAN 1
- initially P/w SOB, persistent cough  - Recent admission for superimposed mycoplasma PNA on influenza, s/p azithromycin/doxycycline  - RVP unremarkable, CXR -> Bibasilar airspace opacities  - CT Chest -> Multifocal groundglass and consolidative opacities involving all 5 lobes, most predominant in the b/l lower lobes and right upper lobe  - S/p empiric meropenem, doxycyline iso lack of clinical improvement, per ID  - Fungitell elevated  -  initially treated with solumedrol 40mg IV daily, now switched to prednisone 40mg x5 days, with plans to switch to pred 20mg qd  > C/w duonebs q6, inhaled budesonide BID, Robitussin-DM q4 prn, and hycodan PRN  > wean HFNC--> O2  NC  > Pulmonology consulted, will continue to appreciate recs, bronchoscopy planned for 3/26 now 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

## 2024-04-04 NOTE — PROGRESS NOTE ADULT - PROBLEM SELECTOR PLAN 2
-> sx of SOB, cough concern for HIV, prelim/ confirmatory test+ for HIV with CD4 of 14, viral load >70K  - ID consulted, continue bactrim-DS q8hr, adding azithromycin  (dc'd on 4/2) and cefepime 3/26 HIV prophylaxis   - cefepime ended on 3/30   - adding Biktarvy 3/26   -sputum PCR +PJP  - continue biktarvy  - Bactrim D q8hr until 4/11   - pending rule out Tb (2/3 negative, pending 3rd sputum order 3/30)

## 2024-04-04 NOTE — PROGRESS NOTE ADULT - SUBJECTIVE AND OBJECTIVE BOX
PROGRESS NOTE:   Authored by Dr. Darlene Vallejo MD (PGY-1). Pager [facility] [phone] / PHILLIP ( [phone]) or via TEAMS    Patient is a 64y old  Female who presents with a chief complaint of SOB, cough (03 Apr 2024 17:01)      SUBJECTIVE / OVERNIGHT EVENTS:  No acute events overnight.     ADDITIONAL REVIEW OF SYSTEMS:  Patient denies fevers, chills, chest pain, shortness of breath, nausea, abdominal pain, diarrhea, constipation, dysuria, leg swelling, headache, light headedness.    MEDICATIONS  (STANDING):  albuterol/ipratropium for Nebulization 3 milliLiter(s) Nebulizer every 6 hours  aspirin  chewable 81 milliGRAM(s) Oral daily  bictegravir 50 mG/emtricitabine 200 mG/tenofovir alafenamide 25 mG (BIKTARVY) 1 Tablet(s) Oral daily  buDESOnide    Inhalation Suspension 0.25 milliGRAM(s) Inhalation every 12 hours  dextrose 5%. 1000 milliLiter(s) (100 mL/Hr) IV Continuous <Continuous>  dextrose 5%. 1000 milliLiter(s) (50 mL/Hr) IV Continuous <Continuous>  dextrose 50% Injectable 12.5 Gram(s) IV Push once  dextrose 50% Injectable 25 Gram(s) IV Push once  dextrose 50% Injectable 25 Gram(s) IV Push once  DULoxetine 60 milliGRAM(s) Oral daily  gabapentin 600 milliGRAM(s) Oral at bedtime  glucagon  Injectable 1 milliGRAM(s) IntraMuscular once  influenza   Vaccine 0.5 milliLiter(s) IntraMuscular once  insulin lispro (ADMELOG) corrective regimen sliding scale   SubCutaneous three times a day before meals  insulin lispro (ADMELOG) corrective regimen sliding scale   SubCutaneous at bedtime  lactulose Syrup 20 Gram(s) Oral daily  pantoprazole    Tablet 40 milliGRAM(s) Oral before breakfast  polyethylene glycol 3350 17 Gram(s) Oral daily  predniSONE   Tablet 40 milliGRAM(s) Oral daily  rivaroxaban 20 milliGRAM(s) Oral daily  senna 2 Tablet(s) Oral at bedtime  sodium chloride 3%  Inhalation 4 milliLiter(s) Inhalation every 12 hours  trimethoprim  160 mG/sulfamethoxazole 800 mG 2 Tablet(s) Oral every 8 hours  verapamil  milliGRAM(s) Oral daily    MEDICATIONS  (PRN):  acetaminophen     Tablet .. 650 milliGRAM(s) Oral every 6 hours PRN Temp greater or equal to 38C (100.4F), Mild Pain (1 - 3)  dextrose Oral Gel 15 Gram(s) Oral once PRN Blood Glucose LESS THAN 70 milliGRAM(s)/deciliter  hydrocodone/homatropine Syrup 5 milliLiter(s) Oral every 6 hours PRN Cough      CAPILLARY BLOOD GLUCOSE      POCT Blood Glucose.: 115 mg/dL (04 Apr 2024 00:05)  POCT Blood Glucose.: 161 mg/dL (03 Apr 2024 17:32)  POCT Blood Glucose.: 244 mg/dL (03 Apr 2024 11:49)  POCT Blood Glucose.: 107 mg/dL (03 Apr 2024 07:50)    I&O's Summary    03 Apr 2024 07:01  -  04 Apr 2024 07:00  --------------------------------------------------------  IN: 840 mL / OUT: 800 mL / NET: 40 mL        PHYSICAL EXAM:  Vital Signs Last 24 Hrs  T(C): 36.6 (04 Apr 2024 06:13), Max: 36.9 (04 Apr 2024 00:13)  T(F): 97.8 (04 Apr 2024 06:13), Max: 98.5 (04 Apr 2024 00:13)  HR: 107 (04 Apr 2024 06:13) (95 - 110)  BP: 129/70 (04 Apr 2024 06:13) (122/71 - 138/84)  BP(mean): 88 (03 Apr 2024 08:45) (88 - 88)  RR: 18 (04 Apr 2024 06:13) (17 - 18)  SpO2: 98% (04 Apr 2024 06:13) (95% - 100%)    Parameters below as of 04 Apr 2024 06:13  Patient On (Oxygen Delivery Method): nasal cannula        CONSTITUTIONAL: NAD, well-developed  RESPIRATORY: Normal respiratory effort; lungs are clear to auscultation bilaterally  CARDIOVASCULAR: Regular rate and rhythm, normal S1 and S2, no murmur/rub/gallop; No lower extremity edema; Peripheral pulses are 2+ bilaterally  ABDOMEN: Nontender to palpation, normoactive bowel sounds, no rebound/guarding; No hepatosplenomegaly  MSK: no clubbing or cyanosis of digits; no joint swelling or tenderness to palpation  PSYCH: A+O to person, place, and time; affect appropriate    LABS:    04-02    129<L>  |  94<L>  |  16  ----------------------------<  139<H>  5.0   |  18<L>  |  0.88    Ca    9.8      02 Apr 2024 07:35  Phos  3.7     04-02  Mg     2.20     04-02    TPro  6.5  /  Alb  3.5  /  TBili  <0.2  /  DBili  x   /  AST  17  /  ALT  35<H>  /  AlkPhos  57  04-02          Urinalysis Basic - ( 02 Apr 2024 07:35 )    Color: x / Appearance: x / SG: x / pH: x  Gluc: 139 mg/dL / Ketone: x  / Bili: x / Urobili: x   Blood: x / Protein: x / Nitrite: x   Leuk Esterase: x / RBC: x / WBC x   Sq Epi: x / Non Sq Epi: x / Bacteria: x          Tele Reviewed:    RADIOLOGY & ADDITIONAL TESTS:  Results Reviewed:   Imaging Personally Reviewed:  Electrocardiogram Personally Reviewed:     PROGRESS NOTE:   Authored by Dr. Darlene Vallejo MD (PGY-1). Pager [facility] [phone] / PHILLIP ( [phone]) or via TEAMS    Patient is a 64y old  Female who presents with a chief complaint of SOB, cough (03 Apr 2024 17:01)      SUBJECTIVE / OVERNIGHT EVENTS:  No acute events overnight. Coughing slightly improved. Had a BM yesterday formed, also some intermittent 'white speck's that she thinks may be worms? Denies CP, abd pain.       MEDICATIONS  (STANDING):  albuterol/ipratropium for Nebulization 3 milliLiter(s) Nebulizer every 6 hours  aspirin  chewable 81 milliGRAM(s) Oral daily  bictegravir 50 mG/emtricitabine 200 mG/tenofovir alafenamide 25 mG (BIKTARVY) 1 Tablet(s) Oral daily  buDESOnide    Inhalation Suspension 0.25 milliGRAM(s) Inhalation every 12 hours  dextrose 5%. 1000 milliLiter(s) (100 mL/Hr) IV Continuous <Continuous>  dextrose 5%. 1000 milliLiter(s) (50 mL/Hr) IV Continuous <Continuous>  dextrose 50% Injectable 12.5 Gram(s) IV Push once  dextrose 50% Injectable 25 Gram(s) IV Push once  dextrose 50% Injectable 25 Gram(s) IV Push once  DULoxetine 60 milliGRAM(s) Oral daily  gabapentin 600 milliGRAM(s) Oral at bedtime  glucagon  Injectable 1 milliGRAM(s) IntraMuscular once  influenza   Vaccine 0.5 milliLiter(s) IntraMuscular once  insulin lispro (ADMELOG) corrective regimen sliding scale   SubCutaneous three times a day before meals  insulin lispro (ADMELOG) corrective regimen sliding scale   SubCutaneous at bedtime  lactulose Syrup 20 Gram(s) Oral daily  pantoprazole    Tablet 40 milliGRAM(s) Oral before breakfast  polyethylene glycol 3350 17 Gram(s) Oral daily  predniSONE   Tablet 40 milliGRAM(s) Oral daily  rivaroxaban 20 milliGRAM(s) Oral daily  senna 2 Tablet(s) Oral at bedtime  sodium chloride 3%  Inhalation 4 milliLiter(s) Inhalation every 12 hours  trimethoprim  160 mG/sulfamethoxazole 800 mG 2 Tablet(s) Oral every 8 hours  verapamil  milliGRAM(s) Oral daily    MEDICATIONS  (PRN):  acetaminophen     Tablet .. 650 milliGRAM(s) Oral every 6 hours PRN Temp greater or equal to 38C (100.4F), Mild Pain (1 - 3)  dextrose Oral Gel 15 Gram(s) Oral once PRN Blood Glucose LESS THAN 70 milliGRAM(s)/deciliter  hydrocodone/homatropine Syrup 5 milliLiter(s) Oral every 6 hours PRN Cough      CAPILLARY BLOOD GLUCOSE      POCT Blood Glucose.: 115 mg/dL (04 Apr 2024 00:05)  POCT Blood Glucose.: 161 mg/dL (03 Apr 2024 17:32)  POCT Blood Glucose.: 244 mg/dL (03 Apr 2024 11:49)  POCT Blood Glucose.: 107 mg/dL (03 Apr 2024 07:50)    I&O's Summary    03 Apr 2024 07:01  -  04 Apr 2024 07:00  --------------------------------------------------------  IN: 840 mL / OUT: 800 mL / NET: 40 mL        PHYSICAL EXAM:  Vital Signs Last 24 Hrs  T(C): 36.6 (04 Apr 2024 06:13), Max: 36.9 (04 Apr 2024 00:13)  T(F): 97.8 (04 Apr 2024 06:13), Max: 98.5 (04 Apr 2024 00:13)  HR: 107 (04 Apr 2024 06:13) (95 - 110)  BP: 129/70 (04 Apr 2024 06:13) (122/71 - 138/84)  BP(mean): 88 (03 Apr 2024 08:45) (88 - 88)  RR: 18 (04 Apr 2024 06:13) (17 - 18)  SpO2: 98% (04 Apr 2024 06:13) (95% - 100%)    Parameters below as of 04 Apr 2024 06:13  Patient On (Oxygen Delivery Method): nasal cannula        CONSTITUTIONAL: NAD, well-developed  RESPIRATORY: Normal respiratory effort; intermittent wheezing b/l   CARDIOVASCULAR: Regular rate and rhythm, normal S1 and S2, no murmur/rub/gallop; No lower extremity edema; Peripheral pulses are 2+ bilaterally  ABDOMEN: Nontender to palpation, normoactive bowel sounds, no rebound/guarding; No hepatosplenomegaly  MSK: no clubbing or cyanosis of digits; no joint swelling or tenderness to palpation  PSYCH: A+O to person, place, and time; affect appropriate    LABS:    04-02    129<L>  |  94<L>  |  16  ----------------------------<  139<H>  5.0   |  18<L>  |  0.88    Ca    9.8      02 Apr 2024 07:35  Phos  3.7     04-02  Mg     2.20     04-02    TPro  6.5  /  Alb  3.5  /  TBili  <0.2  /  DBili  x   /  AST  17  /  ALT  35<H>  /  AlkPhos  57  04-02          Urinalysis Basic - ( 02 Apr 2024 07:35 )    Color: x / Appearance: x / SG: x / pH: x  Gluc: 139 mg/dL / Ketone: x  / Bili: x / Urobili: x   Blood: x / Protein: x / Nitrite: x   Leuk Esterase: x / RBC: x / WBC x   Sq Epi: x / Non Sq Epi: x / Bacteria: x          Tele Reviewed:    RADIOLOGY & ADDITIONAL TESTS:  Results Reviewed:   Imaging Personally Reviewed:  Electrocardiogram Personally Reviewed:

## 2024-04-04 NOTE — PROGRESS NOTE ADULT - PROBLEM SELECTOR PLAN 9
DVT Ppx: xarelto   Diet: regular DASH  Dispo: TBD    *ETHICS*- BABATUNDE contacted 4/1, spoke to patient, informed primary team of contacting partner to inform him of diagnosis, attempted to call  on 4/1 with no answer- [facility]*  [facility]

## 2024-04-04 NOTE — PROGRESS NOTE ADULT - ASSESSMENT
Ms Shae Thompson is a 64-year-old female w/ PMH of lupus, PE on Xarelto (dx 2019), asthma, HTN, T2DM, and GBS (dx 1997) presenting with 3wk h/o of worsening SOB and persistent cough. CT chest revealing multifocal groundglass and consolidative opacities involving all 5 lobes, most predominant in the b/l lower lobes and right upper lobe. Patient admitted to medicine for further workup and management of AHRF.

## 2024-04-04 NOTE — PROGRESS NOTE ADULT - ATTENDING COMMENTS
Pt is a 65 yo F with PMH chronic back pain (s/p spinal fusion), T2D, HTN, HLD, SLE, CVA/TIA, asthma, PE (2019, xarelto), and GBS p/w persistent cough, SOB, diarrhea, and fall 2/2 weakness. Recently hospitalized at [facility] 1/2024 for influenza with superimposed mycoplasma PNA s/p doxycycline course. On arrival, CTH neg, CT chest with diffuse GGO and consolidations. Labs with leukocytosis, normocytic anemia, neg GI PCR, RVP neg, and MRSA neg. TTE with EF 53% and mild G1 diastolic dysfunction. Repeat CT PE neg but with multiple BL nodules and patchy opacities, mostly in the RLL/R base. ID following, appreciate recs. Pulm following, was planned for bronch however now deferred given AIDS dx; weaning steroids, now on prednisone 40mg daily x5d with protonix for gastric protection. Receiving nebs around the clock and now weaned from HFNC to 2L NC. HIV+ with CD4 14 and VL 72K --- now on biktarvy. Fungitell elevated -- high suspicion for PJP PNA, now with PCP PCR+; on bactrim treatment through 4/11. Appreciate ID recs -- dc'd azithromycin as pt now on ART. [facility] at [facility] given risk >> benefit and being empirically treated now. Still pending 1x AFB sputum sample, then can plan for DC. Will likely need home O2. PT recs [facility]. Discussed with HS, rest as outlined above.

## 2024-04-05 DIAGNOSIS — R05.8 OTHER SPECIFIED COUGH: ICD-10-CM

## 2024-04-05 LAB
ALBUMIN SERPL ELPH-MCNC: 3.5 G/DL — SIGNIFICANT CHANGE UP (ref 3.3–5)
ALP SERPL-CCNC: 52 U/L — SIGNIFICANT CHANGE UP (ref 40–120)
ALT FLD-CCNC: 28 U/L — SIGNIFICANT CHANGE UP (ref 4–33)
ANION GAP SERPL CALC-SCNC: 15 MMOL/L — HIGH (ref 7–14)
AST SERPL-CCNC: 13 U/L — SIGNIFICANT CHANGE UP (ref 4–32)
BASOPHILS # BLD AUTO: 0.01 K/UL — SIGNIFICANT CHANGE UP (ref 0–0.2)
BASOPHILS NFR BLD AUTO: 0.1 % — SIGNIFICANT CHANGE UP (ref 0–2)
BILIRUB SERPL-MCNC: <0.2 MG/DL — SIGNIFICANT CHANGE UP (ref 0.2–1.2)
BUN SERPL-MCNC: 13 MG/DL — SIGNIFICANT CHANGE UP (ref 7–23)
CALCIUM SERPL-MCNC: 9.7 MG/DL — SIGNIFICANT CHANGE UP (ref 8.4–10.5)
CHLORIDE SERPL-SCNC: 96 MMOL/L — LOW (ref 98–107)
CO2 SERPL-SCNC: 18 MMOL/L — LOW (ref 22–31)
CREAT SERPL-MCNC: 0.91 MG/DL — SIGNIFICANT CHANGE UP (ref 0.5–1.3)
EGFR: 70 ML/MIN/1.73M2 — SIGNIFICANT CHANGE UP
EOSINOPHIL # BLD AUTO: 0.05 K/UL — SIGNIFICANT CHANGE UP (ref 0–0.5)
EOSINOPHIL NFR BLD AUTO: 0.6 % — SIGNIFICANT CHANGE UP (ref 0–6)
GLUCOSE BLDC GLUCOMTR-MCNC: 101 MG/DL — HIGH (ref 70–99)
GLUCOSE BLDC GLUCOMTR-MCNC: 137 MG/DL — HIGH (ref 70–99)
GLUCOSE BLDC GLUCOMTR-MCNC: 152 MG/DL — HIGH (ref 70–99)
GLUCOSE BLDC GLUCOMTR-MCNC: 221 MG/DL — HIGH (ref 70–99)
GLUCOSE SERPL-MCNC: 128 MG/DL — HIGH (ref 70–99)
HCT VFR BLD CALC: 32.8 % — LOW (ref 34.5–45)
HGB BLD-MCNC: 11.1 G/DL — LOW (ref 11.5–15.5)
IANC: 6.91 K/UL — SIGNIFICANT CHANGE UP (ref 1.8–7.4)
IMM GRANULOCYTES NFR BLD AUTO: 1 % — HIGH (ref 0–0.9)
LYMPHOCYTES # BLD AUTO: 0.84 K/UL — LOW (ref 1–3.3)
LYMPHOCYTES # BLD AUTO: 10.1 % — LOW (ref 13–44)
MAGNESIUM SERPL-MCNC: 2.1 MG/DL — SIGNIFICANT CHANGE UP (ref 1.6–2.6)
MCHC RBC-ENTMCNC: 27.3 PG — SIGNIFICANT CHANGE UP (ref 27–34)
MCHC RBC-ENTMCNC: 33.8 GM/DL — SIGNIFICANT CHANGE UP (ref 32–36)
MCV RBC AUTO: 80.8 FL — SIGNIFICANT CHANGE UP (ref 80–100)
MONOCYTES # BLD AUTO: 0.46 K/UL — SIGNIFICANT CHANGE UP (ref 0–0.9)
MONOCYTES NFR BLD AUTO: 5.5 % — SIGNIFICANT CHANGE UP (ref 2–14)
NEUTROPHILS # BLD AUTO: 6.91 K/UL — SIGNIFICANT CHANGE UP (ref 1.8–7.4)
NEUTROPHILS NFR BLD AUTO: 82.7 % — HIGH (ref 43–77)
NRBC # BLD: 0 /100 WBCS — SIGNIFICANT CHANGE UP (ref 0–0)
NRBC # FLD: 0 K/UL — SIGNIFICANT CHANGE UP (ref 0–0)
PHOSPHATE SERPL-MCNC: 2.8 MG/DL — SIGNIFICANT CHANGE UP (ref 2.5–4.5)
PLATELET # BLD AUTO: 264 K/UL — SIGNIFICANT CHANGE UP (ref 150–400)
POTASSIUM SERPL-MCNC: 4.6 MMOL/L — SIGNIFICANT CHANGE UP (ref 3.5–5.3)
POTASSIUM SERPL-SCNC: 4.6 MMOL/L — SIGNIFICANT CHANGE UP (ref 3.5–5.3)
PROT SERPL-MCNC: 6.1 G/DL — SIGNIFICANT CHANGE UP (ref 6–8.3)
RBC # BLD: 4.06 M/UL — SIGNIFICANT CHANGE UP (ref 3.8–5.2)
RBC # FLD: 19.6 % — HIGH (ref 10.3–14.5)
SODIUM SERPL-SCNC: 129 MMOL/L — LOW (ref 135–145)
WBC # BLD: 8.35 K/UL — SIGNIFICANT CHANGE UP (ref 3.8–10.5)
WBC # FLD AUTO: 8.35 K/UL — SIGNIFICANT CHANGE UP (ref 3.8–10.5)

## 2024-04-05 PROCEDURE — 99232 SBSQ HOSP IP/OBS MODERATE 35: CPT

## 2024-04-05 PROCEDURE — 99233 SBSQ HOSP IP/OBS HIGH 50: CPT | Mod: GC

## 2024-04-05 RX ADMIN — PANTOPRAZOLE SODIUM 40 MILLIGRAM(S): 20 TABLET, DELAYED RELEASE ORAL at 05:23

## 2024-04-05 RX ADMIN — POLYETHYLENE GLYCOL 3350 17 GRAM(S): 17 POWDER, FOR SOLUTION ORAL at 12:28

## 2024-04-05 RX ADMIN — Medication 2 TABLET(S): at 21:33

## 2024-04-05 RX ADMIN — RIVAROXABAN 20 MILLIGRAM(S): KIT at 12:29

## 2024-04-05 RX ADMIN — Medication 81 MILLIGRAM(S): at 12:28

## 2024-04-05 RX ADMIN — Medication 120 MILLIGRAM(S): at 05:23

## 2024-04-05 RX ADMIN — Medication 2 TABLET(S): at 08:25

## 2024-04-05 RX ADMIN — SODIUM CHLORIDE 4 MILLILITER(S): 9 INJECTION INTRAMUSCULAR; INTRAVENOUS; SUBCUTANEOUS at 19:34

## 2024-04-05 RX ADMIN — GABAPENTIN 600 MILLIGRAM(S): 400 CAPSULE ORAL at 21:32

## 2024-04-05 RX ADMIN — Medication 40 MILLIGRAM(S): at 05:23

## 2024-04-05 RX ADMIN — BICTEGRAVIR SODIUM, EMTRICITABINE, AND TENOFOVIR ALAFENAMIDE FUMARATE 1 TABLET(S): 30; 120; 15 TABLET ORAL at 12:29

## 2024-04-05 RX ADMIN — LACTULOSE 20 GRAM(S): 10 SOLUTION ORAL at 12:28

## 2024-04-05 RX ADMIN — Medication 2: at 12:27

## 2024-04-05 RX ADMIN — Medication 2 TABLET(S): at 17:26

## 2024-04-05 RX ADMIN — DULOXETINE HYDROCHLORIDE 60 MILLIGRAM(S): 30 CAPSULE, DELAYED RELEASE ORAL at 12:28

## 2024-04-05 RX ADMIN — Medication 1: at 17:25

## 2024-04-05 NOTE — PROGRESS NOTE ADULT - SUBJECTIVE AND OBJECTIVE BOX
PROGRESS NOTE:   Authored by Dr. Darlene Vallejo MD (PGY-1). Pager [facility] [phone] / PHILLIP ( [phone]) or via TEAMS    Patient is a 64y old  Female who presents with a chief complaint of SOB, cough (04 Apr 2024 07:19)      SUBJECTIVE / OVERNIGHT EVENTS:  No acute events overnight.     ADDITIONAL REVIEW OF SYSTEMS:  Patient denies fevers, chills, chest pain, shortness of breath, nausea, abdominal pain, diarrhea, constipation, dysuria, leg swelling, headache, light headedness.    MEDICATIONS  (STANDING):  albuterol/ipratropium for Nebulization 3 milliLiter(s) Nebulizer every 6 hours  aspirin  chewable 81 milliGRAM(s) Oral daily  bictegravir 50 mG/emtricitabine 200 mG/tenofovir alafenamide 25 mG (BIKTARVY) 1 Tablet(s) Oral daily  buDESOnide    Inhalation Suspension 0.25 milliGRAM(s) Inhalation every 12 hours  dextrose 5%. 1000 milliLiter(s) (100 mL/Hr) IV Continuous <Continuous>  dextrose 5%. 1000 milliLiter(s) (50 mL/Hr) IV Continuous <Continuous>  dextrose 50% Injectable 25 Gram(s) IV Push once  dextrose 50% Injectable 12.5 Gram(s) IV Push once  dextrose 50% Injectable 25 Gram(s) IV Push once  DULoxetine 60 milliGRAM(s) Oral daily  gabapentin 600 milliGRAM(s) Oral at bedtime  glucagon  Injectable 1 milliGRAM(s) IntraMuscular once  influenza   Vaccine 0.5 milliLiter(s) IntraMuscular once  insulin lispro (ADMELOG) corrective regimen sliding scale   SubCutaneous at bedtime  insulin lispro (ADMELOG) corrective regimen sliding scale   SubCutaneous three times a day before meals  lactulose Syrup 20 Gram(s) Oral daily  pantoprazole    Tablet 40 milliGRAM(s) Oral before breakfast  polyethylene glycol 3350 17 Gram(s) Oral daily  predniSONE   Tablet 40 milliGRAM(s) Oral daily  rivaroxaban 20 milliGRAM(s) Oral daily  senna 2 Tablet(s) Oral at bedtime  sodium chloride 3%  Inhalation 4 milliLiter(s) Inhalation every 12 hours  trimethoprim  160 mG/sulfamethoxazole 800 mG 2 Tablet(s) Oral every 8 hours  verapamil  milliGRAM(s) Oral daily    MEDICATIONS  (PRN):  acetaminophen     Tablet .. 650 milliGRAM(s) Oral every 6 hours PRN Temp greater or equal to 38C (100.4F), Mild Pain (1 - 3)  dextrose Oral Gel 15 Gram(s) Oral once PRN Blood Glucose LESS THAN 70 milliGRAM(s)/deciliter  hydrocodone/homatropine Syrup 5 milliLiter(s) Oral every 6 hours PRN Cough      CAPILLARY BLOOD GLUCOSE      POCT Blood Glucose.: 121 mg/dL (04 Apr 2024 22:45)  POCT Blood Glucose.: 182 mg/dL (04 Apr 2024 17:36)  POCT Blood Glucose.: 272 mg/dL (04 Apr 2024 11:42)  POCT Blood Glucose.: 125 mg/dL (04 Apr 2024 07:56)    I&O's Summary    04 Apr 2024 07:01  -  05 Apr 2024 07:00  --------------------------------------------------------  IN: 1470 mL / OUT: 0 mL / NET: 1470 mL        PHYSICAL EXAM:  Vital Signs Last 24 Hrs  T(C): 36.4 (05 Apr 2024 04:30), Max: 36.9 (04 Apr 2024 17:00)  T(F): 97.5 (05 Apr 2024 04:30), Max: 98.4 (04 Apr 2024 17:00)  HR: 107 (05 Apr 2024 04:30) (95 - 107)  BP: 140/79 (05 Apr 2024 04:30) (130/76 - 165/100)  BP(mean): --  RR: 18 (05 Apr 2024 04:30) (18 - 20)  SpO2: 100% (05 Apr 2024 04:30) (100% - 100%)    Parameters below as of 05 Apr 2024 04:30  Patient On (Oxygen Delivery Method): nasal cannula  O2 Flow (L/min): 4      CONSTITUTIONAL: NAD, well-developed  RESPIRATORY: Normal respiratory effort; lungs are clear to auscultation bilaterally  CARDIOVASCULAR: Regular rate and rhythm, normal S1 and S2, no murmur/rub/gallop; No lower extremity edema; Peripheral pulses are 2+ bilaterally  ABDOMEN: Nontender to palpation, normoactive bowel sounds, no rebound/guarding; No hepatosplenomegaly  MSK: no clubbing or cyanosis of digits; no joint swelling or tenderness to palpation  PSYCH: A+O to person, place, and time; affect appropriate    LABS:                      Tele Reviewed:    RADIOLOGY & ADDITIONAL TESTS:  Results Reviewed:   Imaging Personally Reviewed:  Electrocardiogram Personally Reviewed:     PROGRESS NOTE:   Authored by Dr. Darlene Vallejo MD (PGY-1). Pager [facility] [phone] / PHILLIP ( [phone]) or via TEAMS    Patient is a 64y old  Female who presents with a chief complaint of SOB, cough (04 Apr 2024 07:19)      SUBJECTIVE / OVERNIGHT EVENTS:  No acute events overnight. Coughing somewhat better however still persistent. Having normal BMs now.       MEDICATIONS  (STANDING):  albuterol/ipratropium for Nebulization 3 milliLiter(s) Nebulizer every 6 hours  aspirin  chewable 81 milliGRAM(s) Oral daily  bictegravir 50 mG/emtricitabine 200 mG/tenofovir alafenamide 25 mG (BIKTARVY) 1 Tablet(s) Oral daily  buDESOnide    Inhalation Suspension 0.25 milliGRAM(s) Inhalation every 12 hours  dextrose 5%. 1000 milliLiter(s) (100 mL/Hr) IV Continuous <Continuous>  dextrose 5%. 1000 milliLiter(s) (50 mL/Hr) IV Continuous <Continuous>  dextrose 50% Injectable 25 Gram(s) IV Push once  dextrose 50% Injectable 12.5 Gram(s) IV Push once  dextrose 50% Injectable 25 Gram(s) IV Push once  DULoxetine 60 milliGRAM(s) Oral daily  gabapentin 600 milliGRAM(s) Oral at bedtime  glucagon  Injectable 1 milliGRAM(s) IntraMuscular once  influenza   Vaccine 0.5 milliLiter(s) IntraMuscular once  insulin lispro (ADMELOG) corrective regimen sliding scale   SubCutaneous at bedtime  insulin lispro (ADMELOG) corrective regimen sliding scale   SubCutaneous three times a day before meals  lactulose Syrup 20 Gram(s) Oral daily  pantoprazole    Tablet 40 milliGRAM(s) Oral before breakfast  polyethylene glycol 3350 17 Gram(s) Oral daily  predniSONE   Tablet 40 milliGRAM(s) Oral daily  rivaroxaban 20 milliGRAM(s) Oral daily  senna 2 Tablet(s) Oral at bedtime  sodium chloride 3%  Inhalation 4 milliLiter(s) Inhalation every 12 hours  trimethoprim  160 mG/sulfamethoxazole 800 mG 2 Tablet(s) Oral every 8 hours  verapamil  milliGRAM(s) Oral daily    MEDICATIONS  (PRN):  acetaminophen     Tablet .. 650 milliGRAM(s) Oral every 6 hours PRN Temp greater or equal to 38C (100.4F), Mild Pain (1 - 3)  dextrose Oral Gel 15 Gram(s) Oral once PRN Blood Glucose LESS THAN 70 milliGRAM(s)/deciliter  hydrocodone/homatropine Syrup 5 milliLiter(s) Oral every 6 hours PRN Cough      CAPILLARY BLOOD GLUCOSE      POCT Blood Glucose.: 121 mg/dL (04 Apr 2024 22:45)  POCT Blood Glucose.: 182 mg/dL (04 Apr 2024 17:36)  POCT Blood Glucose.: 272 mg/dL (04 Apr 2024 11:42)  POCT Blood Glucose.: 125 mg/dL (04 Apr 2024 07:56)    I&O's Summary    04 Apr 2024 07:01  -  05 Apr 2024 07:00  --------------------------------------------------------  IN: 1470 mL / OUT: 0 mL / NET: 1470 mL        PHYSICAL EXAM:  Vital Signs Last 24 Hrs  T(C): 36.4 (05 Apr 2024 04:30), Max: 36.9 (04 Apr 2024 17:00)  T(F): 97.5 (05 Apr 2024 04:30), Max: 98.4 (04 Apr 2024 17:00)  HR: 107 (05 Apr 2024 04:30) (95 - 107)  BP: 140/79 (05 Apr 2024 04:30) (130/76 - 165/100)  BP(mean): --  RR: 18 (05 Apr 2024 04:30) (18 - 20)  SpO2: 100% (05 Apr 2024 04:30) (100% - 100%)    Parameters below as of 05 Apr 2024 04:30  Patient On (Oxygen Delivery Method): nasal cannula  O2 Flow (L/min): 4      CONSTITUTIONAL: NAD, well-developed  RESPIRATORY: Normal respiratory effort; coughing often, intermittent wheezing b/l   CARDIOVASCULAR: Regular rate and rhythm, normal S1 and S2, no murmur/rub/gallop; No lower extremity edema; Peripheral pulses are 2+ bilaterally  ABDOMEN: Nontender to palpation, normoactive bowel sounds, no rebound/guarding; No hepatosplenomegaly  MSK: no clubbing or cyanosis of digits; no joint swelling or tenderness to palpation  PSYCH: A+O to person, place, and time; affect appropriate    LABS:                      Tele Reviewed:    RADIOLOGY & ADDITIONAL TESTS:  Results Reviewed:   Imaging Personally Reviewed:  Electrocardiogram Personally Reviewed:

## 2024-04-05 NOTE — PROGRESS NOTE ADULT - ASSESSMENT
63 yo woman with SLE, DM, h/o PE, CVA with left sided weakness presented 3/19 with fever, dry cough, SOB    Hospital admission Jan 2024 at [facility] for PNA - mycoplasma IgM + and flu A +   Received azithro, cefepime, doxy and prednisone taper at that time     CT chest 3/19 shows multifocal ground glass and consolidative opacities involving all 5 lobes  - more extensive c/w prior study and in slightly different distribution     Multifocal pulmonary infiltrates   New dx  HIV     CD4  14    PCP     fungitell 410    PJP PCR +    cxr improving on prednisone and bactrim     bactrim 3/22-      cefepime added for bacterial coverage now complete     oxygen requirement improving  still JARVIS    bood cultures no growth    serum crypt ag neg   quant gold indeterminate      for sputum AFB x 3   neg x 2    syphilis screen neg   CMV IgM neg IgG positive   hep B Ag neg hep Ab neg   hep c neg  toxo IgM neg IgG neg     Suggest:     c/w bactrim DS 2 tab po q 8 h --> 4/11  c/w biktarvy 1 tab po q 24 h   sputum AFB x 1   has appointment  to follow in [facility] once discharge

## 2024-04-05 NOTE — PROGRESS NOTE ADULT - PROBLEM SELECTOR PLAN 4
- RESOLVED  P/w loose BMs, initially w/ some blood  - GI PCR negative  - Diarrhea improved - Patient found on fall by  prior to presentation  - Denies any acute bony pain  - CTH umremarkable  - TTE unremarkable  > orthostatics unremarkable

## 2024-04-05 NOTE — PROGRESS NOTE ADULT - PROBLEM SELECTOR PLAN 5
- H/o PE in 2019, on Xarelto at home  - S/p Xarelto 20mg qd  > heparin drip transitioned to xarelto 3/26 - RESOLVED  P/w loose BMs, initially w/ some blood  - GI PCR negative  - Diarrhea improved

## 2024-04-05 NOTE — PROGRESS NOTE ADULT - PROBLEM SELECTOR PLAN 10
DVT Ppx: xarelto   Diet: regular DASH  Dispo: TBD    *ETHICS*- BABATUNDE contacted 4/1, spoke to patient, informed primary team of contacting partner to inform him of diagnosis, attempted to call  on 4/1 with no answer- [facility] made aware*  [facility]

## 2024-04-05 NOTE — PROGRESS NOTE ADULT - PROBLEM SELECTOR PLAN 3
- Patient found on fall by  prior to presentation  - Denies any acute bony pain  - CTH umremarkable  - TTE unremarkable  > orthostatics unremarkable - persistent coughing throughout duration of admission  - hycodan prn  - ENT consult to eval upper airway dx in setting of PJP - persistent coughing throughout duration of admission  - hycodan prn  - ENT consulted to eval upper airway dx in setting of PJP  >s/p fiberoptic laryngoscopy unremarkable, no new recs per ENT, cough may take weeks to resolve

## 2024-04-05 NOTE — PROGRESS NOTE ADULT - PROBLEM SELECTOR PLAN 6
> resume home aspirin   > C/w statin - H/o PE in 2019, on Xarelto at home  - S/p Xarelto 20mg qd  > heparin drip transitioned to xarelto 3/26

## 2024-04-05 NOTE — PROGRESS NOTE ADULT - ATTENDING COMMENTS
Pt is a 65 yo F with PMH chronic back pain (s/p spinal fusion), T2D, HTN, HLD, SLE, CVA/TIA, asthma, PE (2019, xarelto), and GBS p/w persistent cough, SOB, diarrhea, and fall 2/2 weakness. Recently hospitalized at [facility] 1/2024 for influenza with superimposed mycoplasma PNA s/p doxycycline course. On arrival, CTH neg, CT chest with diffuse GGO and consolidations. Labs with leukocytosis, normocytic anemia, neg GI PCR, RVP neg, and MRSA neg. TTE with EF 53% and mild G1 diastolic dysfunction. Repeat CT PE neg but with multiple BL nodules and patchy opacities, mostly in the RLL/R base. ID following, appreciate recs. Pulm following, was planned for bronch however now deferred given AIDS dx; weaning steroids, now on prednisone 40mg daily x5d and then 20mg daily x5d with protonix for gastric protection. Receiving nebs around the clock and now weaned from HFNC to 2L NC. HIV+ with CD4 14 and VL 72K --- now on biktarvy. Fungitell elevated -- high suspicion for PJP PNA, now with PCP PCR+; on bactrim treatment through 4/11. Appreciate ID recs -- dc'd azithromycin as pt now on ART. Transitioned hep gtt back to xarelto, as no plan for bronch at present given risk >> benefit and being empirically treated now. Still pending 1x AFB sputum sample, then can plan for DC. ENT consulted per pulm recs, recommending no additional intervention, prolonged cough likely in setting of reflux and PNA, already on appropriate supportive care. PT recs WINSTON. Discussed with HS, rest as outlined above.

## 2024-04-05 NOTE — PROGRESS NOTE ADULT - SUBJECTIVE AND OBJECTIVE BOX
Follow Up:      Inverval History/ROS:Patient is a 64y old  Female who presents with a chief complaint of SOB, cough (24 Mar 2024 08:26)    no respiratory distress at rest  gets SOB with ambulating in room         Allergies    No Known Allergies    Intolerances    dislikes Glucerna Shake (Other)    worked as respiratory therapist; retired 15 years ago       ANTIMICROBIALS:  bictegravir 50 mG/emtricitabine 200 mG/tenofovir alafenamide 25 mG (BIKTARVY) 1 daily  trimethoprim  160 mG/sulfamethoxazole 800 mG 2 every 8 hours        MEDICATIONS  (STANDING):  acetaminophen     Tablet .. 650 every 6 hours PRN  albuterol/ipratropium for Nebulization 3 every 6 hours  aspirin  chewable 81 daily  buDESOnide    Inhalation Suspension 0.25 every 12 hours  dextrose 50% Injectable 12.5 once  dextrose 50% Injectable 25 once  dextrose 50% Injectable 25 once  dextrose Oral Gel 15 once PRN  DULoxetine 60 daily  gabapentin 600 at bedtime  glucagon  Injectable 1 once  hydrocodone/homatropine Syrup 5 every 6 hours PRN  influenza   Vaccine 0.5 once  insulin lispro (ADMELOG) corrective regimen sliding scale  at bedtime  insulin lispro (ADMELOG) corrective regimen sliding scale  three times a day before meals  methylPREDNISolone sodium succinate Injectable 40 every 12 hours  pantoprazole    Tablet 40 before breakfast  polyethylene glycol 3350 17 daily  rivaroxaban 20 daily  senna 2 at bedtime  sodium chloride 3%  Inhalation 4 every 12 hours  verapamil  daily    Vital Signs Last 24 Hrs  T(F): 97.7 (04-05-24 @ 16:04), Max: 98.2 (04-05-24 @ 08:28)  HR: 101 (04-05-24 @ 16:04)  BP: 140/78 (04-05-24 @ 16:04)  RR: 18 (04-05-24 @ 16:04)  SpO2: 98% (04-05-24 @ 16:04) (98% - 100%)    PHYSICAL EXAM:  General: comfortable at rest  nasal canula 4 L  HEAD/EYES: [ ] PERRL [x ] white sclera [ ] icterus  ENT:  [ ] normal [x ] supple [ ] thrush [ ] pharyngeal exudate  Cardiovascular:   [ ] murmur [x ] normal [ ] PPM/AICD  Respiratory:  x clear  GI:  [x ] soft, non-tender, normal bowel sounds  :  [ ] penny [x ] no CVA tenderness   Musculoskeletal:  [ ] no synovitis  Neurologic:  [x ] non-focal exam   Skin:  [ x] no rash  Lymph: [x ] no lymphadenopathy  Psychiatric:  [x ] appropriate affect [x ] alert & oriented                            11.1   8.35  )-----------( 264      ( 05 Apr 2024 06:51 )             32.8 04-05    129  |  96  |  13  ----------------------------<  128  4.6   |  18  |  0.91  Ca    9.7      05 Apr 2024 06:51Phos  2.8     04-05Mg     2.10     04-05  TPro  6.1  /  Alb  3.5  /  TBili  <0.2  /  DBili  x   /  AST  13  /  ALT  28  /  AlkPhos  52  04-05  Pneumocystis jiroveci PCR (03.25.24 @ 07:42)   Pneumocystis Specimen Source: sputum  Pneumocystis PCR Result: Positive: Critical Result.     HIV-1 RNA Quantitative, Viral Load (03.25.24 @ 05:56)   HIV-1 RNA Quantitative, Viral Load: 72,947    ABS CD4: 14 cells/uL (03.25.24 @ 05:56)   crypt neg     MICROBIOLOGY:    Culture - Blood (03.19.24 @ 21:28)   Specimen Source: .Blood Blood-Peripheral  Culture Results:   No growth at 5 daysCulture - Blood (03.19.24 @ 21:20)   Specimen Source: .Blood Blood-Peripheral  Culture Results:   No growth at 5 days      RADIOLOGY:    < from: Xray Chest 1 View- PORTABLE-Urgent (Xray Chest 1 View- PORTABLE-Urgent .) (03.24.24 @ 13:04) >    ACC: [id number] EXAM:  XR CHEST PORTABLE URGENT 1V   ORDERED BY: RAYMOND HO     PROCEDURE DATE:  03/24/2024          INTERPRETATION:  EXAMINATION: XR CHEST URGENT    CLINICAL INDICATION: Increased oxygen requirement    TECHNIQUE: Single frontal,portable view of the chest was obtained.    COMPARISON: Chest radiograph 3/19/2024.    FINDINGS:  The heart size is normal.  Interval decrease in bilateral airspace opacities.  No congestion or effusions to indicate CHF.  No pneumothorax.    IMPRESSION:  Interval decrease in bilateral airspace opacities    --- End of Report ---          JUAN LAMBERT MD; Resident Radiology  This document has been electronically signed.  BRIONNA PAVON MD; Attending Radiologist    < end of copied text >

## 2024-04-05 NOTE — PROGRESS NOTE ADULT - NS ATTEST RISK PROBLEM GEN_ALL_CORE FT
PJP, hypoxemia
Acute hypoxemic respiratory failure, asthma, pneumonia vs ILD
PJP pna, hypoxemia
PJP, AIDS

## 2024-04-05 NOTE — CONSULT NOTE ADULT - SUBJECTIVE AND OBJECTIVE BOX
CC: cough    HPI:       PAST MEDICAL & SURGICAL HISTORY:  Lupus      Diabetes      Hypertension      Asthma      Peripheral polyneuropathy      Pulmonary emboli  7/2019      TIA (transient ischemic attack)      GBS (Guillain [place] syndrome)      History of hip replacement  left      H/O total hysterectomy      History of bilateral tubal ligation      S/P laminectomy with spinal fusion      History of shoulder surgery      H/O knee surgery        Allergies    No Known Allergies    Intolerances    dislikes Glucerna Shake (Other)    MEDICATIONS  (STANDING):  albuterol/ipratropium for Nebulization 3 milliLiter(s) Nebulizer every 6 hours  aspirin  chewable 81 milliGRAM(s) Oral daily  bictegravir 50 mG/emtricitabine 200 mG/tenofovir alafenamide 25 mG (BIKTARVY) 1 Tablet(s) Oral daily  buDESOnide    Inhalation Suspension 0.25 milliGRAM(s) Inhalation every 12 hours  dextrose 5%. 1000 milliLiter(s) (50 mL/Hr) IV Continuous <Continuous>  dextrose 5%. 1000 milliLiter(s) (100 mL/Hr) IV Continuous <Continuous>  dextrose 50% Injectable 25 Gram(s) IV Push once  dextrose 50% Injectable 12.5 Gram(s) IV Push once  dextrose 50% Injectable 25 Gram(s) IV Push once  DULoxetine 60 milliGRAM(s) Oral daily  gabapentin 600 milliGRAM(s) Oral at bedtime  glucagon  Injectable 1 milliGRAM(s) IntraMuscular once  influenza   Vaccine 0.5 milliLiter(s) IntraMuscular once  insulin lispro (ADMELOG) corrective regimen sliding scale   SubCutaneous three times a day before meals  insulin lispro (ADMELOG) corrective regimen sliding scale   SubCutaneous at bedtime  lactulose Syrup 20 Gram(s) Oral daily  pantoprazole    Tablet 40 milliGRAM(s) Oral before breakfast  polyethylene glycol 3350 17 Gram(s) Oral daily  predniSONE   Tablet 40 milliGRAM(s) Oral daily  rivaroxaban 20 milliGRAM(s) Oral daily  senna 2 Tablet(s) Oral at bedtime  sodium chloride 3%  Inhalation 4 milliLiter(s) Inhalation every 12 hours  trimethoprim  160 mG/sulfamethoxazole 800 mG 2 Tablet(s) Oral every 8 hours  verapamil  milliGRAM(s) Oral daily    MEDICATIONS  (PRN):  acetaminophen     Tablet .. 650 milliGRAM(s) Oral every 6 hours PRN Temp greater or equal to 38C (100.4F), Mild Pain (1 - 3)  dextrose Oral Gel 15 Gram(s) Oral once PRN Blood Glucose LESS THAN 70 milliGRAM(s)/deciliter  hydrocodone/homatropine Syrup 5 milliLiter(s) Oral every 6 hours PRN Cough      Social History: **??**    Family history: No pertinent family history in first degree relatives    ROS:   ENT: all negative except as noted in HPI   CV: denies palpitations  Pulm: denies SOB, cough, hemoptysis  GI: denies change in appetite, indigestion, n/v  : denies pertinent urinary symptoms, urgency  Neuro: denies numbness/tingling, loss of sensation  Psych: denies anxiety  MS: denies muscle weakness, instability  Heme: denies easy bruising or bleeding  Endo: denies heat/cold intolerance, excessive sweating  Vascular: denies LE edema    Vital Signs Last 24 Hrs  T(C): 36.8 (05 Apr 2024 08:28), Max: 36.9 (04 Apr 2024 17:00)  T(F): 98.2 (05 Apr 2024 08:28), Max: 98.4 (04 Apr 2024 17:00)  HR: 104 (05 Apr 2024 08:28) (95 - 107)  BP: 121/78 (05 Apr 2024 08:28) (121/78 - 165/100)  BP(mean): --  RR: 18 (05 Apr 2024 08:28) (18 - 20)  SpO2: 98% (05 Apr 2024 08:28) (98% - 100%)    Parameters below as of 05 Apr 2024 08:28  Patient On (Oxygen Delivery Method): nasal cannula  O2 Flow (L/min): 4                            11.1   8.35  )-----------( 264      ( 05 Apr 2024 06:51 )             32.8    04-05    129<L>  |  96<L>  |  13  ----------------------------<  128<H>  4.6   |  18<L>  |  0.91    Ca    9.7      05 Apr 2024 06:51  Phos  2.8     04-05  Mg     2.10     04-05    TPro  6.1  /  Alb  3.5  /  TBili  <0.2  /  DBili  x   /  AST  13  /  ALT  28  /  AlkPhos  52  04-05       PHYSICAL EXAM:  Gen: NAD  Skin: No rashes, bruises, or lesions  Head: Normocephalic, Atraumatic  Face: no edema, erythema, or fluctuance.  Nose: Nares bilaterally patent, no discharge  Mouth: No stridor, no drooling, no trismus.  Mucosa moist, tongue/uvula midline, oropharynx clear  Neck: Flat, supple, no lymphadenopathy, trachea midline, no masses  Lymphatic: No lymphadenopathy  Resp: breathing easily, no stridor    Fiberoptic Indirect laryngoscopy:  (Scope #2 used)    IMAGING/ADDITIONAL STUDIES:  CC: cough    HPI: 63 yo F with hx of lupus, PE on xarelto (dx 2019), asthma, HTN, DMII, GBS (dx 1997), and HIV+ presents here with 3 week h/o of worsening SOB and persistent cough, now admitted to medicine for pneumonia and further workup and management of AHRF. ENT was consulted for this persistent, dry cough that patient states she has had since her last admission for PNA in mid-January earlier this year. Patient adds that she was not started on any new medications at that time, aside from abx to treat her pneumonia. She denies any hx of smoking.      PAST MEDICAL & SURGICAL HISTORY:  Lupus      Diabetes      Hypertension      Asthma      Peripheral polyneuropathy      Pulmonary emboli  7/2019      TIA (transient ischemic attack)      GBS (Guillain [place] syndrome)      History of hip replacement  left      H/O total hysterectomy      History of bilateral tubal ligation      S/P laminectomy with spinal fusion      History of shoulder surgery      H/O knee surgery        Allergies    No Known Allergies    Intolerances    dislikes Glucerna Shake (Other)    MEDICATIONS  (STANDING):  albuterol/ipratropium for Nebulization 3 milliLiter(s) Nebulizer every 6 hours  aspirin  chewable 81 milliGRAM(s) Oral daily  bictegravir 50 mG/emtricitabine 200 mG/tenofovir alafenamide 25 mG (BIKTARVY) 1 Tablet(s) Oral daily  buDESOnide    Inhalation Suspension 0.25 milliGRAM(s) Inhalation every 12 hours  dextrose 5%. 1000 milliLiter(s) (50 mL/Hr) IV Continuous <Continuous>  dextrose 5%. 1000 milliLiter(s) (100 mL/Hr) IV Continuous <Continuous>  dextrose 50% Injectable 25 Gram(s) IV Push once  dextrose 50% Injectable 12.5 Gram(s) IV Push once  dextrose 50% Injectable 25 Gram(s) IV Push once  DULoxetine 60 milliGRAM(s) Oral daily  gabapentin 600 milliGRAM(s) Oral at bedtime  glucagon  Injectable 1 milliGRAM(s) IntraMuscular once  influenza   Vaccine 0.5 milliLiter(s) IntraMuscular once  insulin lispro (ADMELOG) corrective regimen sliding scale   SubCutaneous three times a day before meals  insulin lispro (ADMELOG) corrective regimen sliding scale   SubCutaneous at bedtime  lactulose Syrup 20 Gram(s) Oral daily  pantoprazole    Tablet 40 milliGRAM(s) Oral before breakfast  polyethylene glycol 3350 17 Gram(s) Oral daily  predniSONE   Tablet 40 milliGRAM(s) Oral daily  rivaroxaban 20 milliGRAM(s) Oral daily  senna 2 Tablet(s) Oral at bedtime  sodium chloride 3%  Inhalation 4 milliLiter(s) Inhalation every 12 hours  trimethoprim  160 mG/sulfamethoxazole 800 mG 2 Tablet(s) Oral every 8 hours  verapamil  milliGRAM(s) Oral daily    MEDICATIONS  (PRN):  acetaminophen     Tablet .. 650 milliGRAM(s) Oral every 6 hours PRN Temp greater or equal to 38C (100.4F), Mild Pain (1 - 3)  dextrose Oral Gel 15 Gram(s) Oral once PRN Blood Glucose LESS THAN 70 milliGRAM(s)/deciliter  hydrocodone/homatropine Syrup 5 milliLiter(s) Oral every 6 hours PRN Cough      Social History: Denies tobacco use. Drinks alcohol very rarely. Denies recreational drug use.    Family history: No pertinent family history in first degree relatives    ROS:   ENT: all negative except as noted in HPI   Pulm: denies hemoptysis  Neuro: denies numbness/tingling, loss of sensation  Endo: denies heat/cold intolerance, excessive sweating    Vital Signs Last 24 Hrs  T(C): 36.8 (05 Apr 2024 08:28), Max: 36.9 (04 Apr 2024 17:00)  T(F): 98.2 (05 Apr 2024 08:28), Max: 98.4 (04 Apr 2024 17:00)  HR: 104 (05 Apr 2024 08:28) (95 - 107)  BP: 121/78 (05 Apr 2024 08:28) (121/78 - 165/100)  BP(mean): --  RR: 18 (05 Apr 2024 08:28) (18 - 20)  SpO2: 98% (05 Apr 2024 08:28) (98% - 100%)    Parameters below as of 05 Apr 2024 08:28  Patient On (Oxygen Delivery Method): nasal cannula  O2 Flow (L/min): 4                            11.1   8.35  )-----------( 264      ( 05 Apr 2024 06:51 )             32.8    04-05    129<L>  |  96<L>  |  13  ----------------------------<  128<H>  4.6   |  18<L>  |  0.91    Ca    9.7      05 Apr 2024 06:51  Phos  2.8     04-05  Mg     2.10     04-05    TPro  6.1  /  Alb  3.5  /  TBili  <0.2  /  DBili  x   /  AST  13  /  ALT  28  /  AlkPhos  52  04-05       PHYSICAL EXAM:  Gen: NAD  Head: Normocephalic, Atraumatic  Face: no edema, erythema, or fluctuance.  Mouth: No stridor, no drooling, no trismus.  Mucosa moist, tongue/uvula midline, oropharynx clear  Neck: Flat, supple, no lymphadenopathy, trachea midline, no masses  Resp: breathing easily, no stridor    Fiberoptic Indirect laryngoscopy:  Nasopharynx, oropharynx, and hypopharynx clear, no bleeding. Tongue base, posterior pharyngeal wall, vallecula, epiglottis, and subglottis appear normal. No erythema, edema, pooling of secretions, masses or lesions. Airway patent, no foreign body visualized. No glottic/supraglottic edema. True vocal cords, arytenoids, vestibular folds, ventricles, pyriform sinuses, and aryepiglottic folds appear normal bilaterally. Vocal cords mobile with good contact b/l.

## 2024-04-05 NOTE — PROGRESS NOTE ADULT - ATTENDING COMMENTS
64-year-old with acute hypoxemic respiratory failure due to PJP pneumonia in setting of AIDS.  - Complete course of Bactrim and steroids for PJP.   - Complete prednisone 40 mg for 2 more days and decrease to prednisone 20 mg once daily until Bactrim completion.   - Duonebs Q6H, Budesonide neb Q12h  - D/C hypertonic saline after one more AFB sputum collection  - Wean O2 as tolerated 64-year-old with acute hypoxemic respiratory failure due to PJP pneumonia in setting of AIDS.  - Complete course of Bactrim and steroids for PJP.   - Complete prednisone 40 mg for 2 more days and decrease to prednisone 20 mg once daily until Bactrim completion.   - Given she was on prolonged steroids so then can taper to prednisone 10 mg x 5 days, then prednisone 5 mg x 5 days and stop.   - Duonebs Q6H, Budesonide neb Q12h  - D/C hypertonic saline after one more AFB sputum collection  - Wean O2 as tolerated

## 2024-04-05 NOTE — PROGRESS NOTE ADULT - PROBLEM SELECTOR PLAN 9
DVT Ppx: xarelto   Diet: regular DASH  Dispo: TBD    *ETHICS*- BABATUNDE contacted 4/1, spoke to patient, informed primary team of contacting partner to inform him of diagnosis, attempted to call  on 4/1 with no answer- [facility] aware*  [facility]  - On metformin, ozempic at home  - A1c 6.4  > C/w ISS, holding home meds  > CTM w/ FSGs

## 2024-04-05 NOTE — CONSULT NOTE ADULT - PROBLEM SELECTOR RECOMMENDATION 9
- Likely 2/2 pneumonia and reflux  - May continue to use Hicodan PRN cough  - Patient may also start PPI daily for reflux  - Otherwise, cough may take weeks to fully resolve  - No further ENT intervention at this time, please call back if any other questions  - Case discussed with ENT attending, Dr. Porter

## 2024-04-05 NOTE — PROGRESS NOTE ADULT - ASSESSMENT
64-year-old female w/ PMH of lupus, PE on Xarelto (dx 2019), asthma, HTN, T2DM, and GBS (dx 1997) presenting with 3wk h/o of worsening SOB and persistent cough. Patient was notably recently admitted at [facility] in 01/2024 for superimposed mycoplasma PNA on influenza now presenting with progressive dyspnea and persistent nonproductive cough, found to have severe AIDS/HIV, with likely bacterial pneumonia and PJP    Told me +HIV a decade ago, was told it was "false+" from Lupus    #Abnormal CT chest  #asthma  Denies complete resolution of symptoms since her prior discharge.   Noted recent  admission with repeat CT chest with improvement in some areas with new opacities in others  - c/w standing nebs  - d/c 3% inhaled saline after last AFB sputum induced  - PJP+  - ruling out TB due to indeterminate quant  - complete course of bactrim  - pred 40mg qday x5 days then decrease to 20mg qday x5 days (4/8-4/12)  - ARV  - TTE w bubble study negative for intracardiac shunt  - persistent nonproductive cough, exacerbated by speaking, possible upper airway/VC pathology especially in the setting of immunocompromised patient, recommend ENT evaluation

## 2024-04-05 NOTE — PROGRESS NOTE ADULT - SUBJECTIVE AND OBJECTIVE BOX
CHIEF COMPLAINT:Patient is a 64y old  Female who presents with a chief complaint of SOB, cough (05 Apr 2024 07:12)      Interval Events:  overall improvement in dyspnea, cough is somewhat improved although remains present when speaking.  No new concerns.    REVIEW OF SYSTEMS:  [x] All other systems negative except per HPI   [ ] Unable to assess ROS because ________    OBJECTIVE:  ICU Vital Signs Last 24 Hrs  T(C): 36.8 (05 Apr 2024 08:28), Max: 36.9 (04 Apr 2024 17:00)  T(F): 98.2 (05 Apr 2024 08:28), Max: 98.4 (04 Apr 2024 17:00)  HR: 104 (05 Apr 2024 08:28) (95 - 107)  BP: 121/78 (05 Apr 2024 08:28) (121/78 - 165/100)  BP(mean): --  ABP: --  ABP(mean): --  RR: 18 (05 Apr 2024 08:28) (18 - 20)  SpO2: 98% (05 Apr 2024 08:28) (98% - 100%)    O2 Parameters below as of 05 Apr 2024 08:28  Patient On (Oxygen Delivery Method): nasal cannula  O2 Flow (L/min): 4            04-04 @ 07:01  -  04-05 @ 07:00  --------------------------------------------------------  IN: 1470 mL / OUT: 0 mL / NET: 1470 mL        PHYSICAL EXAM:  General: WN/WD NAD  HEENT: EOMI, moist mucous membranes  Neurology: A&Ox3, nonfocal, JIANG x 4  Respiratory: CTA B/L, normal respiratory effort, no wheezes, crackles, rales  CV: RRR, S1S2, no murmurs, rubs or gallops  Abdominal: Soft, NT, ND +BS  Extremities: No edema, +peripheral pulses    HOSPITAL MEDICATIONS:  MEDICATIONS  (STANDING):  albuterol/ipratropium for Nebulization 3 milliLiter(s) Nebulizer every 6 hours  aspirin  chewable 81 milliGRAM(s) Oral daily  bictegravir 50 mG/emtricitabine 200 mG/tenofovir alafenamide 25 mG (BIKTARVY) 1 Tablet(s) Oral daily  buDESOnide    Inhalation Suspension 0.25 milliGRAM(s) Inhalation every 12 hours  dextrose 5%. 1000 milliLiter(s) (50 mL/Hr) IV Continuous <Continuous>  dextrose 5%. 1000 milliLiter(s) (100 mL/Hr) IV Continuous <Continuous>  dextrose 50% Injectable 12.5 Gram(s) IV Push once  dextrose 50% Injectable 25 Gram(s) IV Push once  dextrose 50% Injectable 25 Gram(s) IV Push once  DULoxetine 60 milliGRAM(s) Oral daily  gabapentin 600 milliGRAM(s) Oral at bedtime  glucagon  Injectable 1 milliGRAM(s) IntraMuscular once  influenza   Vaccine 0.5 milliLiter(s) IntraMuscular once  insulin lispro (ADMELOG) corrective regimen sliding scale   SubCutaneous at bedtime  insulin lispro (ADMELOG) corrective regimen sliding scale   SubCutaneous three times a day before meals  lactulose Syrup 20 Gram(s) Oral daily  pantoprazole    Tablet 40 milliGRAM(s) Oral before breakfast  polyethylene glycol 3350 17 Gram(s) Oral daily  predniSONE   Tablet 40 milliGRAM(s) Oral daily  rivaroxaban 20 milliGRAM(s) Oral daily  senna 2 Tablet(s) Oral at bedtime  sodium chloride 3%  Inhalation 4 milliLiter(s) Inhalation every 12 hours  trimethoprim  160 mG/sulfamethoxazole 800 mG 2 Tablet(s) Oral every 8 hours  verapamil  milliGRAM(s) Oral daily    MEDICATIONS  (PRN):  acetaminophen     Tablet .. 650 milliGRAM(s) Oral every 6 hours PRN Temp greater or equal to 38C (100.4F), Mild Pain (1 - 3)  dextrose Oral Gel 15 Gram(s) Oral once PRN Blood Glucose LESS THAN 70 milliGRAM(s)/deciliter  hydrocodone/homatropine Syrup 5 milliLiter(s) Oral every 6 hours PRN Cough      LABS:    The Labs were reviewed by me   The Radiology was reviewed by me    EKG tracing reviewed by me    04-05    129<L>  |  96<L>  |  13  ----------------------------<  128<H>  4.6   |  18<L>  |  0.91    Ca    9.7      05 Apr 2024 06:51  Phos  2.8     04-05  Mg     2.10     04-05    TPro  6.1  /  Alb  3.5  /  TBili  <0.2  /  DBili  x   /  AST  13  /  ALT  28  /  AlkPhos  52  04-05    Magnesium: 2.10 mg/dL (04-05-24 @ 06:51)    Phosphorus: 2.8 mg/dL (04-05-24 @ 06:51)                    Urinalysis Basic - ( 05 Apr 2024 06:51 )    Color: x / Appearance: x / SG: x / pH: x  Gluc: 128 mg/dL / Ketone: x  / Bili: x / Urobili: x   Blood: x / Protein: x / Nitrite: x   Leuk Esterase: x / RBC: x / WBC x   Sq Epi: x / Non Sq Epi: x / Bacteria: x                              11.1   8.35  )-----------( 264      ( 05 Apr 2024 06:51 )             32.8     CAPILLARY BLOOD GLUCOSE      POCT Blood Glucose.: 137 mg/dL (05 Apr 2024 07:49)  POCT Blood Glucose.: 121 mg/dL (04 Apr 2024 22:45)  POCT Blood Glucose.: 182 mg/dL (04 Apr 2024 17:36)  POCT Blood Glucose.: 272 mg/dL (04 Apr 2024 11:42)        MICROBIOLOGY:     RADIOLOGY:  [ ] Reviewed and interpreted by me    Point of Care Ultrasound Findings:    PFT:    EKG:

## 2024-04-06 LAB
GLUCOSE BLDC GLUCOMTR-MCNC: 107 MG/DL — HIGH (ref 70–99)
GLUCOSE BLDC GLUCOMTR-MCNC: 130 MG/DL — HIGH (ref 70–99)
GLUCOSE BLDC GLUCOMTR-MCNC: 131 MG/DL — HIGH (ref 70–99)
GLUCOSE BLDC GLUCOMTR-MCNC: 325 MG/DL — HIGH (ref 70–99)

## 2024-04-06 PROCEDURE — 99232 SBSQ HOSP IP/OBS MODERATE 35: CPT

## 2024-04-06 RX ORDER — SODIUM CHLORIDE 0.65 %
1 AEROSOL, SPRAY (ML) NASAL
Refills: 0 | Status: DISCONTINUED | OUTPATIENT
Start: 2024-04-06 | End: 2024-04-11

## 2024-04-06 RX ADMIN — DULOXETINE HYDROCHLORIDE 60 MILLIGRAM(S): 30 CAPSULE, DELAYED RELEASE ORAL at 12:35

## 2024-04-06 RX ADMIN — Medication 2 TABLET(S): at 21:50

## 2024-04-06 RX ADMIN — PANTOPRAZOLE SODIUM 40 MILLIGRAM(S): 20 TABLET, DELAYED RELEASE ORAL at 06:27

## 2024-04-06 RX ADMIN — GABAPENTIN 600 MILLIGRAM(S): 400 CAPSULE ORAL at 21:50

## 2024-04-06 RX ADMIN — Medication 120 MILLIGRAM(S): at 06:27

## 2024-04-06 RX ADMIN — Medication 4: at 12:33

## 2024-04-06 RX ADMIN — Medication 1 SPRAY(S): at 18:10

## 2024-04-06 RX ADMIN — SODIUM CHLORIDE 4 MILLILITER(S): 9 INJECTION INTRAMUSCULAR; INTRAVENOUS; SUBCUTANEOUS at 21:20

## 2024-04-06 RX ADMIN — Medication 1 SPRAY(S): at 06:46

## 2024-04-06 RX ADMIN — Medication 2 TABLET(S): at 12:37

## 2024-04-06 RX ADMIN — Medication 40 MILLIGRAM(S): at 06:27

## 2024-04-06 RX ADMIN — Medication 2 TABLET(S): at 06:27

## 2024-04-06 RX ADMIN — RIVAROXABAN 20 MILLIGRAM(S): KIT at 12:35

## 2024-04-06 RX ADMIN — Medication 81 MILLIGRAM(S): at 12:35

## 2024-04-06 RX ADMIN — BICTEGRAVIR SODIUM, EMTRICITABINE, AND TENOFOVIR ALAFENAMIDE FUMARATE 1 TABLET(S): 30; 120; 15 TABLET ORAL at 12:34

## 2024-04-06 NOTE — PROGRESS NOTE ADULT - PROBLEM SELECTOR PLAN 10
DVT Ppx: xarelto   Diet: regular DASH  Dispo: TBD    *ETHICS*- BABATUNDE contacted 4/1, spoke to patient, informed primary team of contacting partner to inform him of diagnosis, attempted to call  on 4/1 with no answer- [facility] aware*  [facility]

## 2024-04-06 NOTE — PROGRESS NOTE ADULT - PROBLEM SELECTOR PLAN 3
- persistent coughing throughout duration of admission  - hycodan prn  - ENT consulted to eval upper airway dx in setting of PJP  >s/p fiberoptic laryngoscopy unremarkable, no new recs per ENT, cough may take weeks to resolve

## 2024-04-06 NOTE — PROGRESS NOTE ADULT - ATTENDING COMMENTS
Pt is a 65 yo F with PMH chronic back pain (s/p spinal fusion), T2D, HTN, HLD, SLE, CVA/TIA, asthma, PE (2019, xarelto), and GBS p/w persistent cough, SOB, diarrhea, and fall 2/2 weakness. Recently hospitalized at [facility] 1/2024 for influenza with superimposed mycoplasma PNA s/p doxycycline course. On arrival, CTH neg, CT chest with diffuse GGO and consolidations. Labs with leukocytosis, normocytic anemia, neg GI PCR, RVP neg, and MRSA neg. TTE with EF 53% and mild G1 diastolic dysfunction. Repeat CT PE neg but with multiple BL nodules and patchy opacities, mostly in the RLL/R base. ID following, appreciate recs. Pulm following, was planned for bronch however now deferred given AIDS dx; weaning steroids, now on prednisone 40mg daily x5d and then 20mg daily x5d with protonix for gastric protection. Receiving nebs around the clock and now weaned from HFNC to 2L NC. HIV+ with CD4 14 and VL 72K --- now on biktarvy. Fungitell elevated -- high suspicion for PJP PNA, now with PCP PCR+; on bactrim treatment through 4/11. Appreciate ID recs -- dc'd azithromycin as pt now on ART. Transitioned hep gtt back to xarelto, as no plan for bronch at present given risk >> benefit and being empirically treated now. Still pending 1x AFB sputum sample, then can plan for DC. ENT consulted per pulm recs, recommending no additional intervention, prolonged cough likely in setting of reflux and PNA, already on appropriate supportive care. PT recs WINSTON. Discussed with HS, rest as outlined above.

## 2024-04-06 NOTE — PROGRESS NOTE ADULT - SUBJECTIVE AND OBJECTIVE BOX
PROGRESS NOTE:   Authored by Dr. Darlene Vallejo MD (PGY-1). Pager [facility] [phone] / PHILLIP ( [phone]) or via TEAMS    Patient is a 64y old  Female who presents with a chief complaint of SOB, cough (05 Apr 2024 17:24)      SUBJECTIVE / OVERNIGHT EVENTS:  No acute events overnight.     ADDITIONAL REVIEW OF SYSTEMS:  Patient denies fevers, chills, chest pain, shortness of breath, nausea, abdominal pain, diarrhea, constipation, dysuria, leg swelling, headache, light headedness.    MEDICATIONS  (STANDING):  albuterol/ipratropium for Nebulization 3 milliLiter(s) Nebulizer every 6 hours  aspirin  chewable 81 milliGRAM(s) Oral daily  bictegravir 50 mG/emtricitabine 200 mG/tenofovir alafenamide 25 mG (BIKTARVY) 1 Tablet(s) Oral daily  buDESOnide    Inhalation Suspension 0.25 milliGRAM(s) Inhalation every 12 hours  dextrose 5%. 1000 milliLiter(s) (100 mL/Hr) IV Continuous <Continuous>  dextrose 5%. 1000 milliLiter(s) (50 mL/Hr) IV Continuous <Continuous>  dextrose 50% Injectable 12.5 Gram(s) IV Push once  dextrose 50% Injectable 25 Gram(s) IV Push once  dextrose 50% Injectable 25 Gram(s) IV Push once  DULoxetine 60 milliGRAM(s) Oral daily  gabapentin 600 milliGRAM(s) Oral at bedtime  glucagon  Injectable 1 milliGRAM(s) IntraMuscular once  influenza   Vaccine 0.5 milliLiter(s) IntraMuscular once  insulin lispro (ADMELOG) corrective regimen sliding scale   SubCutaneous at bedtime  insulin lispro (ADMELOG) corrective regimen sliding scale   SubCutaneous three times a day before meals  lactulose Syrup 20 Gram(s) Oral daily  pantoprazole    Tablet 40 milliGRAM(s) Oral before breakfast  polyethylene glycol 3350 17 Gram(s) Oral daily  predniSONE   Tablet 40 milliGRAM(s) Oral daily  rivaroxaban 20 milliGRAM(s) Oral daily  senna 2 Tablet(s) Oral at bedtime  sodium chloride 0.65% Nasal 1 Spray(s) Both Nostrils two times a day  sodium chloride 3%  Inhalation 4 milliLiter(s) Inhalation every 12 hours  trimethoprim  160 mG/sulfamethoxazole 800 mG 2 Tablet(s) Oral every 8 hours  verapamil  milliGRAM(s) Oral daily    MEDICATIONS  (PRN):  acetaminophen     Tablet .. 650 milliGRAM(s) Oral every 6 hours PRN Temp greater or equal to 38C (100.4F), Mild Pain (1 - 3)  dextrose Oral Gel 15 Gram(s) Oral once PRN Blood Glucose LESS THAN 70 milliGRAM(s)/deciliter  hydrocodone/homatropine Syrup 5 milliLiter(s) Oral every 6 hours PRN Cough      CAPILLARY BLOOD GLUCOSE      POCT Blood Glucose.: 101 mg/dL (05 Apr 2024 22:01)  POCT Blood Glucose.: 152 mg/dL (05 Apr 2024 17:17)  POCT Blood Glucose.: 221 mg/dL (05 Apr 2024 11:48)  POCT Blood Glucose.: 137 mg/dL (05 Apr 2024 07:49)    I&O's Summary    05 Apr 2024 07:01  -  06 Apr 2024 07:00  --------------------------------------------------------  IN: 940 mL / OUT: 0 mL / NET: 940 mL        PHYSICAL EXAM:  Vital Signs Last 24 Hrs  T(C): 37 (06 Apr 2024 04:00), Max: 37 (06 Apr 2024 04:00)  T(F): 98.6 (06 Apr 2024 04:00), Max: 98.6 (06 Apr 2024 04:00)  HR: 98 (06 Apr 2024 04:00) (95 - 104)  BP: 123/77 (06 Apr 2024 04:00) (121/78 - 140/78)  BP(mean): --  RR: 18 (06 Apr 2024 04:00) (18 - 20)  SpO2: 105% (06 Apr 2024 04:00) (93% - 105%)    Parameters below as of 06 Apr 2024 04:00  Patient On (Oxygen Delivery Method): nasal cannula  O2 Flow (L/min): 4      CONSTITUTIONAL: NAD, well-developed  RESPIRATORY: Normal respiratory effort; lungs are clear to auscultation bilaterally  CARDIOVASCULAR: Regular rate and rhythm, normal S1 and S2, no murmur/rub/gallop; No lower extremity edema; Peripheral pulses are 2+ bilaterally  ABDOMEN: Nontender to palpation, normoactive bowel sounds, no rebound/guarding; No hepatosplenomegaly  MSK: no clubbing or cyanosis of digits; no joint swelling or tenderness to palpation  PSYCH: A+O to person, place, and time; affect appropriate    LABS:                        11.1   8.35  )-----------( 264      ( 05 Apr 2024 06:51 )             32.8     04-05    129<L>  |  96<L>  |  13  ----------------------------<  128<H>  4.6   |  18<L>  |  0.91    Ca    9.7      05 Apr 2024 06:51  Phos  2.8     04-05  Mg     2.10     04-05    TPro  6.1  /  Alb  3.5  /  TBili  <0.2  /  DBili  x   /  AST  13  /  ALT  28  /  AlkPhos  52  04-05          Urinalysis Basic - ( 05 Apr 2024 06:51 )    Color: x / Appearance: x / SG: x / pH: x  Gluc: 128 mg/dL / Ketone: x  / Bili: x / Urobili: x   Blood: x / Protein: x / Nitrite: x   Leuk Esterase: x / RBC: x / WBC x   Sq Epi: x / Non Sq Epi: x / Bacteria: x          Tele Reviewed:    RADIOLOGY & ADDITIONAL TESTS:  Results Reviewed:   Imaging Personally Reviewed:  Electrocardiogram Personally Reviewed:     PROGRESS NOTE:   Authored by Dr. Darlene Vallejo MD (PGY-1). Pager [facility] [phone] / PHILLIP ( [phone]) or via TEAMS    Patient is a 64y old  Female who presents with a chief complaint of SOB, cough (05 Apr 2024 17:24)      SUBJECTIVE / OVERNIGHT EVENTS:  No acute events overnights. Had some mild epistaxis this AM. Having normal BM. Denies fevers, CP.         MEDICATIONS  (STANDING):  albuterol/ipratropium for Nebulization 3 milliLiter(s) Nebulizer every 6 hours  aspirin  chewable 81 milliGRAM(s) Oral daily  bictegravir 50 mG/emtricitabine 200 mG/tenofovir alafenamide 25 mG (BIKTARVY) 1 Tablet(s) Oral daily  buDESOnide    Inhalation Suspension 0.25 milliGRAM(s) Inhalation every 12 hours  dextrose 5%. 1000 milliLiter(s) (100 mL/Hr) IV Continuous <Continuous>  dextrose 5%. 1000 milliLiter(s) (50 mL/Hr) IV Continuous <Continuous>  dextrose 50% Injectable 12.5 Gram(s) IV Push once  dextrose 50% Injectable 25 Gram(s) IV Push once  dextrose 50% Injectable 25 Gram(s) IV Push once  DULoxetine 60 milliGRAM(s) Oral daily  gabapentin 600 milliGRAM(s) Oral at bedtime  glucagon  Injectable 1 milliGRAM(s) IntraMuscular once  influenza   Vaccine 0.5 milliLiter(s) IntraMuscular once  insulin lispro (ADMELOG) corrective regimen sliding scale   SubCutaneous at bedtime  insulin lispro (ADMELOG) corrective regimen sliding scale   SubCutaneous three times a day before meals  lactulose Syrup 20 Gram(s) Oral daily  pantoprazole    Tablet 40 milliGRAM(s) Oral before breakfast  polyethylene glycol 3350 17 Gram(s) Oral daily  predniSONE   Tablet 40 milliGRAM(s) Oral daily  rivaroxaban 20 milliGRAM(s) Oral daily  senna 2 Tablet(s) Oral at bedtime  sodium chloride 0.65% Nasal 1 Spray(s) Both Nostrils two times a day  sodium chloride 3%  Inhalation 4 milliLiter(s) Inhalation every 12 hours  trimethoprim  160 mG/sulfamethoxazole 800 mG 2 Tablet(s) Oral every 8 hours  verapamil  milliGRAM(s) Oral daily    MEDICATIONS  (PRN):  acetaminophen     Tablet .. 650 milliGRAM(s) Oral every 6 hours PRN Temp greater or equal to 38C (100.4F), Mild Pain (1 - 3)  dextrose Oral Gel 15 Gram(s) Oral once PRN Blood Glucose LESS THAN 70 milliGRAM(s)/deciliter  hydrocodone/homatropine Syrup 5 milliLiter(s) Oral every 6 hours PRN Cough      CAPILLARY BLOOD GLUCOSE      POCT Blood Glucose.: 101 mg/dL (05 Apr 2024 22:01)  POCT Blood Glucose.: 152 mg/dL (05 Apr 2024 17:17)  POCT Blood Glucose.: 221 mg/dL (05 Apr 2024 11:48)  POCT Blood Glucose.: 137 mg/dL (05 Apr 2024 07:49)    I&O's Summary    05 Apr 2024 07:01  -  06 Apr 2024 07:00  --------------------------------------------------------  IN: 940 mL / OUT: 0 mL / NET: 940 mL        PHYSICAL EXAM:  Vital Signs Last 24 Hrs  T(C): 37 (06 Apr 2024 04:00), Max: 37 (06 Apr 2024 04:00)  T(F): 98.6 (06 Apr 2024 04:00), Max: 98.6 (06 Apr 2024 04:00)  HR: 98 (06 Apr 2024 04:00) (95 - 104)  BP: 123/77 (06 Apr 2024 04:00) (121/78 - 140/78)  BP(mean): --  RR: 18 (06 Apr 2024 04:00) (18 - 20)  SpO2: 105% (06 Apr 2024 04:00) (93% - 105%)    Parameters below as of 06 Apr 2024 04:00  Patient On (Oxygen Delivery Method): nasal cannula  O2 Flow (L/min): 4      CONSTITUTIONAL: NAD, well-developed  RESPIRATORY: Normal respiratory effort; lungs are clear to auscultation bilaterally  CARDIOVASCULAR: Regular rate and rhythm, normal S1 and S2, no murmur/rub/gallop; No lower extremity edema; Peripheral pulses are 2+ bilaterally  ABDOMEN: Nontender to palpation, normoactive bowel sounds, no rebound/guarding; No hepatosplenomegaly  MSK: no clubbing or cyanosis of digits; no joint swelling or tenderness to palpation  PSYCH: A+O to person, place, and time; affect appropriate    LABS:                        11.1   8.35  )-----------( 264      ( 05 Apr 2024 06:51 )             32.8     04-05    129<L>  |  96<L>  |  13  ----------------------------<  128<H>  4.6   |  18<L>  |  0.91    Ca    9.7      05 Apr 2024 06:51  Phos  2.8     04-05  Mg     2.10     04-05    TPro  6.1  /  Alb  3.5  /  TBili  <0.2  /  DBili  x   /  AST  13  /  ALT  28  /  AlkPhos  52  04-05          Urinalysis Basic - ( 05 Apr 2024 06:51 )    Color: x / Appearance: x / SG: x / pH: x  Gluc: 128 mg/dL / Ketone: x  / Bili: x / Urobili: x   Blood: x / Protein: x / Nitrite: x   Leuk Esterase: x / RBC: x / WBC x   Sq Epi: x / Non Sq Epi: x / Bacteria: x          Tele Reviewed:    RADIOLOGY & ADDITIONAL TESTS:  Results Reviewed:   Imaging Personally Reviewed:  Electrocardiogram Personally Reviewed:

## 2024-04-06 NOTE — PROGRESS NOTE ADULT - PROBLEM SELECTOR PLAN 1
- initially P/w SOB, persistent cough  - Recent admission for superimposed mycoplasma PNA on influenza, s/p azithromycin/doxycycline  - RVP unremarkable, CXR -> Bibasilar airspace opacities  - CT Chest -> Multifocal groundglass and consolidative opacities involving all 5 lobes, most predominant in the b/l lower lobes and right upper lobe  - S/p empiric meropenem, doxycyline iso lack of clinical improvement, per ID  - Fungitell elevated  -  initially treated with solumedrol 40mg IV daily, now switched to prednisone 40mg x5 days, with plans to switch to pred 20mg qd  > C/w duonebs q6, inhaled budesonide BID, Robitussin-DM q4 prn, and hycodan PRN  > wean HFNC--> O2  NC  > Pulmonology consulted, will continue to appreciate recs, bronchoscopy planned for 3/26 now 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 - initially P/w SOB, persistent cough  - Recent admission for superimposed mycoplasma PNA on influenza, s/p azithromycin/doxycycline  - RVP unremarkable, CXR -> Bibasilar airspace opacities  - CT Chest -> Multifocal groundglass and consolidative opacities involving all 5 lobes, most predominant in the b/l lower lobes and right upper lobe  - S/p empiric meropenem, doxycyline iso lack of clinical improvement, per ID  - Fungitell elevated  -  initially treated with solumedrol 40mg IV daily, now switched to prednisone 40mg x5 days until 4/7, with plans to switch to pred 20mg qd  x2,  then prednisone 10mg x5days and [id number]  > C/w duonebs q6, inhaled budesonide BID, Robitussin-DM q4 prn, and hycodan PRN  > wean HFNC--> O2  NC  > Pulmonology consulted, will continue to appreciate recs, bronchoscopy planned for 3/26 now 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

## 2024-04-07 LAB
ALBUMIN SERPL ELPH-MCNC: 3.5 G/DL — SIGNIFICANT CHANGE UP (ref 3.3–5)
ALP SERPL-CCNC: 51 U/L — SIGNIFICANT CHANGE UP (ref 40–120)
ALT FLD-CCNC: 29 U/L — SIGNIFICANT CHANGE UP (ref 4–33)
ANION GAP SERPL CALC-SCNC: 16 MMOL/L — HIGH (ref 7–14)
AST SERPL-CCNC: 14 U/L — SIGNIFICANT CHANGE UP (ref 4–32)
BILIRUB SERPL-MCNC: <0.2 MG/DL — SIGNIFICANT CHANGE UP (ref 0.2–1.2)
BUN SERPL-MCNC: 15 MG/DL — SIGNIFICANT CHANGE UP (ref 7–23)
CALCIUM SERPL-MCNC: 9.6 MG/DL — SIGNIFICANT CHANGE UP (ref 8.4–10.5)
CHLORIDE SERPL-SCNC: 95 MMOL/L — LOW (ref 98–107)
CO2 SERPL-SCNC: 19 MMOL/L — LOW (ref 22–31)
CREAT SERPL-MCNC: 0.87 MG/DL — SIGNIFICANT CHANGE UP (ref 0.5–1.3)
EGFR: 74 ML/MIN/1.73M2 — SIGNIFICANT CHANGE UP
GLUCOSE BLDC GLUCOMTR-MCNC: 127 MG/DL — HIGH (ref 70–99)
GLUCOSE BLDC GLUCOMTR-MCNC: 179 MG/DL — HIGH (ref 70–99)
GLUCOSE BLDC GLUCOMTR-MCNC: 245 MG/DL — HIGH (ref 70–99)
GLUCOSE BLDC GLUCOMTR-MCNC: 347 MG/DL — HIGH (ref 70–99)
GLUCOSE SERPL-MCNC: 87 MG/DL — SIGNIFICANT CHANGE UP (ref 70–99)
HCT VFR BLD CALC: 32.5 % — LOW (ref 34.5–45)
HGB BLD-MCNC: 10.9 G/DL — LOW (ref 11.5–15.5)
MAGNESIUM SERPL-MCNC: 2.2 MG/DL — SIGNIFICANT CHANGE UP (ref 1.6–2.6)
MCHC RBC-ENTMCNC: 27.2 PG — SIGNIFICANT CHANGE UP (ref 27–34)
MCHC RBC-ENTMCNC: 33.5 GM/DL — SIGNIFICANT CHANGE UP (ref 32–36)
MCV RBC AUTO: 81 FL — SIGNIFICANT CHANGE UP (ref 80–100)
NIGHT BLUE STAIN TISS: SIGNIFICANT CHANGE UP
NRBC # BLD: 0 /100 WBCS — SIGNIFICANT CHANGE UP (ref 0–0)
NRBC # FLD: 0 K/UL — SIGNIFICANT CHANGE UP (ref 0–0)
PHOSPHATE SERPL-MCNC: 3.6 MG/DL — SIGNIFICANT CHANGE UP (ref 2.5–4.5)
PLATELET # BLD AUTO: 262 K/UL — SIGNIFICANT CHANGE UP (ref 150–400)
POTASSIUM SERPL-MCNC: 4.1 MMOL/L — SIGNIFICANT CHANGE UP (ref 3.5–5.3)
POTASSIUM SERPL-SCNC: 4.1 MMOL/L — SIGNIFICANT CHANGE UP (ref 3.5–5.3)
PROT SERPL-MCNC: 6.3 G/DL — SIGNIFICANT CHANGE UP (ref 6–8.3)
RBC # BLD: 4.01 M/UL — SIGNIFICANT CHANGE UP (ref 3.8–5.2)
RBC # FLD: 19.4 % — HIGH (ref 10.3–14.5)
SODIUM SERPL-SCNC: 130 MMOL/L — LOW (ref 135–145)
SPECIMEN SOURCE: SIGNIFICANT CHANGE UP
WBC # BLD: 7.74 K/UL — SIGNIFICANT CHANGE UP (ref 3.8–10.5)
WBC # FLD AUTO: 7.74 K/UL — SIGNIFICANT CHANGE UP (ref 3.8–10.5)

## 2024-04-07 PROCEDURE — 99232 SBSQ HOSP IP/OBS MODERATE 35: CPT

## 2024-04-07 RX ORDER — BICTEGRAVIR SODIUM, EMTRICITABINE, AND TENOFOVIR ALAFENAMIDE FUMARATE 30; 120; 15 MG/1; MG/1; MG/1
1 TABLET ORAL
Qty: 30 | Refills: 0
Start: 2024-04-07 | End: 2024-05-06

## 2024-04-07 RX ORDER — HYDROCODONE BITARTRATE AND HOMATROPINE METHYLBROMIDE 5; 1.5 MG/5ML; MG/5ML
5 SOLUTION ORAL
Qty: 600 | Refills: 0
Start: 2024-04-07 | End: 2024-05-06

## 2024-04-07 RX ORDER — PANTOPRAZOLE SODIUM 20 MG/1
1 TABLET, DELAYED RELEASE ORAL
Qty: 30 | Refills: 0
Start: 2024-04-07 | End: 2024-05-06

## 2024-04-07 RX ORDER — SENNA PLUS 8.6 MG/1
2 TABLET ORAL
Qty: 60 | Refills: 0
Start: 2024-04-07 | End: 2024-05-06

## 2024-04-07 RX ORDER — POLYETHYLENE GLYCOL 3350 17 G/17G
17 POWDER, FOR SOLUTION ORAL
Qty: 510 | Refills: 0
Start: 2024-04-07 | End: 2024-05-06

## 2024-04-07 RX ADMIN — BICTEGRAVIR SODIUM, EMTRICITABINE, AND TENOFOVIR ALAFENAMIDE FUMARATE 1 TABLET(S): 30; 120; 15 TABLET ORAL at 12:13

## 2024-04-07 RX ADMIN — RIVAROXABAN 20 MILLIGRAM(S): KIT at 12:14

## 2024-04-07 RX ADMIN — DULOXETINE HYDROCHLORIDE 60 MILLIGRAM(S): 30 CAPSULE, DELAYED RELEASE ORAL at 12:13

## 2024-04-07 RX ADMIN — Medication 1 SPRAY(S): at 05:54

## 2024-04-07 RX ADMIN — Medication 0.25 MILLIGRAM(S): at 21:03

## 2024-04-07 RX ADMIN — Medication 40 MILLIGRAM(S): at 05:55

## 2024-04-07 RX ADMIN — Medication 0.25 MILLIGRAM(S): at 09:23

## 2024-04-07 RX ADMIN — SODIUM CHLORIDE 4 MILLILITER(S): 9 INJECTION INTRAMUSCULAR; INTRAVENOUS; SUBCUTANEOUS at 09:23

## 2024-04-07 RX ADMIN — PANTOPRAZOLE SODIUM 40 MILLIGRAM(S): 20 TABLET, DELAYED RELEASE ORAL at 05:55

## 2024-04-07 RX ADMIN — Medication 3 MILLILITER(S): at 21:03

## 2024-04-07 RX ADMIN — Medication 2 TABLET(S): at 12:12

## 2024-04-07 RX ADMIN — Medication 2 TABLET(S): at 21:41

## 2024-04-07 RX ADMIN — Medication 3 MILLILITER(S): at 09:23

## 2024-04-07 RX ADMIN — Medication 2: at 21:47

## 2024-04-07 RX ADMIN — Medication 2: at 12:10

## 2024-04-07 RX ADMIN — Medication 1 SPRAY(S): at 17:42

## 2024-04-07 RX ADMIN — Medication 2 TABLET(S): at 05:55

## 2024-04-07 RX ADMIN — Medication 120 MILLIGRAM(S): at 05:55

## 2024-04-07 RX ADMIN — Medication 3 MILLILITER(S): at 16:29

## 2024-04-07 RX ADMIN — Medication 1: at 17:41

## 2024-04-07 RX ADMIN — Medication 81 MILLIGRAM(S): at 12:12

## 2024-04-07 RX ADMIN — GABAPENTIN 600 MILLIGRAM(S): 400 CAPSULE ORAL at 21:41

## 2024-04-07 NOTE — PROGRESS NOTE ADULT - PROBLEM SELECTOR PLAN 10
DVT Ppx: xarelto   Diet: regular DASH  Dispo: TBD    *ETHICS*- BABATUNDE contacted 4/1, spoke to patient, informed primary team of contacting partner to inform him of diagnosis, attempted to call  on 4/1 with no answer- [facility] made aware*  [facility]

## 2024-04-07 NOTE — PROGRESS NOTE ADULT - ATTENDING COMMENTS
Pt is a 63 yo F with PMH chronic back pain (s/p spinal fusion), T2D, HTN, HLD, SLE, CVA/TIA, asthma, PE (2019, xarelto), and GBS p/w persistent cough, SOB, diarrhea, and fall 2/2 weakness. Recently hospitalized at [facility] 1/2024 for influenza with superimposed mycoplasma PNA s/p doxycycline course. On arrival, CTH neg, CT chest with diffuse GGO and consolidations. Labs with leukocytosis, normocytic anemia, neg GI PCR, RVP neg, and MRSA neg. TTE with EF 53% and mild G1 diastolic dysfunction. Repeat CT PE neg but with multiple BL nodules and patchy opacities, mostly in the RLL/R base. ID following, appreciate recs. Pulm following, was planned for bronch however now deferred given AIDS dx; weaning steroids, now on prednisone 40mg daily x5d and then 20mg daily x5d with protonix for gastric protection. Receiving nebs around the clock and now weaned from HFNC to 2L NC. HIV+ with CD4 14 and VL 72K --- now on biktarvy. Fungitell elevated -- high suspicion for PJP PNA, now with PCP PCR+; on bactrim treatment through 4/11. Appreciate ID recs -- dc'd azithromycin as pt now on ART. Transitioned hep gtt back to xarel, as no plan for bronch at present given risk >> benefit and being empirically treated now. 3rd sputum AFB in lab today. ENT consulted per pulm recs, recommending no additional intervention, prolonged cough likely in setting of reflux and PNA, already on appropriate supportive care. Now on RA with O2sat >95%. Will need ambulatory sat in AM on RA. PT recs outpt PT. Will start DC planning in AM. Discussed with HS, rest as outlined above.

## 2024-04-07 NOTE — PROGRESS NOTE ADULT - PROBLEM SELECTOR PLAN 2
-> sx of SOB, cough concern for HIV, prelim/ confirmatory test+ for HIV with CD4 of 14, viral load >70K  - ID consulted, continue bactrim-DS q8hr, adding azithromycin  (dc'd on 4/2) and cefepime 3/26 HIV prophylaxis   - cefepime ended on 3/30   - adding Biktarvy 3/26   -sputum PCR +PJP  - continue biktarvy  - Bactrim D q8hr until 4/11   - pending rule out Tb (2/3 negative, pending 3rd sputum order 3/30) -> sx of SOB, cough concern for HIV, prelim/ confirmatory test+ for HIV with CD4 of 14, viral load >70K  - ID consulted, continue bactrim-DS q8hr, adding azithromycin  (dc'd on 4/2) and cefepime 3/26 HIV prophylaxis   - cefepime ended on 3/30   - adding Biktarvy 3/26   -sputum PCR +PJP  - continue biktarvy  - Bactrim D q8hr until 4/11   - pending rule out Tb (2/3 negative, pending 3rd sputum)

## 2024-04-07 NOTE — PROGRESS NOTE ADULT - SUBJECTIVE AND OBJECTIVE BOX
PROGRESS NOTE:     Patient is a 64y old  Female who presents with a chief complaint of SOB, cough (06 Apr 2024 07:08)      SUBJECTIVE / OVERNIGHT EVENTS:    No acute events overnight. Patient examined at bedside with no acute complaints.     Pain:  Bowel Movements:  Urination:  OOB:  PT:    REVIEW OF SYSTEMS:    CONSTITUTIONAL: No weakness, fevers or chills  EYES/ENT: No visual changes;  No vertigo or throat pain   NECK: No pain or stiffness  RESPIRATORY: No cough, wheezing, hemoptysis; No shortness of breath  CARDIOVASCULAR: No chest pain or palpitations  GASTROINTESTINAL: No abdominal or epigastric pain. No nausea, vomiting, or hematemesis; No diarrhea or constipation. No melena or hematochezia.  GENITOURINARY: No dysuria, frequency or hematuria  NEUROLOGICAL: No numbness or weakness  SKIN: No itching, rashes      MEDICATIONS  (STANDING):  albuterol/ipratropium for Nebulization 3 milliLiter(s) Nebulizer every 6 hours  aspirin  chewable 81 milliGRAM(s) Oral daily  bictegravir 50 mG/emtricitabine 200 mG/tenofovir alafenamide 25 mG (BIKTARVY) 1 Tablet(s) Oral daily  buDESOnide    Inhalation Suspension 0.25 milliGRAM(s) Inhalation every 12 hours  dextrose 5%. 1000 milliLiter(s) (100 mL/Hr) IV Continuous <Continuous>  dextrose 5%. 1000 milliLiter(s) (50 mL/Hr) IV Continuous <Continuous>  dextrose 50% Injectable 12.5 Gram(s) IV Push once  dextrose 50% Injectable 25 Gram(s) IV Push once  dextrose 50% Injectable 25 Gram(s) IV Push once  DULoxetine 60 milliGRAM(s) Oral daily  gabapentin 600 milliGRAM(s) Oral at bedtime  glucagon  Injectable 1 milliGRAM(s) IntraMuscular once  influenza   Vaccine 0.5 milliLiter(s) IntraMuscular once  insulin lispro (ADMELOG) corrective regimen sliding scale   SubCutaneous three times a day before meals  insulin lispro (ADMELOG) corrective regimen sliding scale   SubCutaneous at bedtime  lactulose Syrup 20 Gram(s) Oral daily  pantoprazole    Tablet 40 milliGRAM(s) Oral before breakfast  polyethylene glycol 3350 17 Gram(s) Oral daily  rivaroxaban 20 milliGRAM(s) Oral daily  senna 2 Tablet(s) Oral at bedtime  sodium chloride 0.65% Nasal 1 Spray(s) Both Nostrils two times a day  sodium chloride 3%  Inhalation 4 milliLiter(s) Inhalation every 12 hours  trimethoprim  160 mG/sulfamethoxazole 800 mG 2 Tablet(s) Oral every 8 hours  verapamil  milliGRAM(s) Oral daily    MEDICATIONS  (PRN):  acetaminophen     Tablet .. 650 milliGRAM(s) Oral every 6 hours PRN Temp greater or equal to 38C (100.4F), Mild Pain (1 - 3)  dextrose Oral Gel 15 Gram(s) Oral once PRN Blood Glucose LESS THAN 70 milliGRAM(s)/deciliter  hydrocodone/homatropine Syrup 5 milliLiter(s) Oral every 6 hours PRN Cough      CAPILLARY BLOOD GLUCOSE      POCT Blood Glucose.: 107 mg/dL (06 Apr 2024 21:45)  POCT Blood Glucose.: 131 mg/dL (06 Apr 2024 18:09)  POCT Blood Glucose.: 325 mg/dL (06 Apr 2024 11:52)  POCT Blood Glucose.: 130 mg/dL (06 Apr 2024 07:49)    I&O's Summary    06 Apr 2024 07:01  -  07 Apr 2024 07:00  --------------------------------------------------------  IN: 1600 mL / OUT: 0 mL / NET: 1600 mL        VITALS:   T(C): 36.8 (04-07-24 @ 04:00), Max: 36.9 (04-06-24 @ 08:20)  HR: 90 (04-07-24 @ 04:00) (90 - 108)  BP: 141/89 (04-07-24 @ 04:00) (103/65 - 160/98)  RR: 17 (04-07-24 @ 04:00) (17 - 20)  SpO2: 95% (04-07-24 @ 04:00) (72% - 99%)    GENERAL: NAD, lying in bed comfortably  HEAD:  Atraumatic, normocephalic  EYES: EOMI, PERRLA, conjunctiva and sclera clear  ENT: Moist mucous membranes  NECK: Supple, no JVD  HEART: Regular rate and rhythm, no murmurs, rubs, or gallops  LUNGS: Unlabored respirations.  Clear to auscultation bilaterally, no crackles, wheezing, or rhonchi  ABDOMEN: Soft, nontender, nondistended, +BS  EXTREMITIES: 2+ peripheral pulses bilaterally. No clubbing, cyanosis, or edema  NERVOUS SYSTEM:  A&Ox3, no focal deficits   SKIN: No rashes or lesions    LABS:                        10.9   7.74  )-----------( 262      ( 07 Apr 2024 05:10 )             32.5     04-07    130<L>  |  95<L>  |  15  ----------------------------<  87  4.1   |  19<L>  |  0.87    Ca    9.6      07 Apr 2024 05:10  Phos  3.6     04-07  Mg     2.20     04-07    TPro  6.3  /  Alb  3.5  /  TBili  <0.2  /  DBili  x   /  AST  14  /  ALT  29  /  AlkPhos  51  04-07          Urinalysis Basic - ( 07 Apr 2024 05:10 )    Color: x / Appearance: x / SG: x / pH: x  Gluc: 87 mg/dL / Ketone: x  / Bili: x / Urobili: x   Blood: x / Protein: x / Nitrite: x   Leuk Esterase: x / RBC: x / WBC x   Sq Epi: x / Non Sq Epi: x / Bacteria: x          RADIOLOGY & ADDITIONAL TESTS:  Results Reviewed:   Imaging Personally Reviewed:  Electrocardiogram Personally Reviewed:    COORDINATION OF CARE:  Care Discussed with Consultants/Other Providers [Y/N]:  Prior or Outpatient Records Reviewed [Y/N]:   PROGRESS NOTE:     Patient is a 64y old  Female who presents with a chief complaint of SOB, cough (06 Apr 2024 07:08)      SUBJECTIVE / OVERNIGHT EVENTS:    No acute events overnight. Patient examined at bedside and reports persistent cough.     Pain: N/A   Bowel Movements: regular   Urination: regular   OOB: as tolerated   PT: outpatient PT     REVIEW OF SYSTEMS:    CONSTITUTIONAL: No weakness, fevers or chills  EYES/ENT: No visual changes;  No vertigo or throat pain   NECK: No pain or stiffness  RESPIRATORY: + cough, No wheezing, hemoptysis; Improved shortness of breath  CARDIOVASCULAR: No chest pain or palpitations  GASTROINTESTINAL: No abdominal or epigastric pain. No nausea, vomiting, or hematemesis; No diarrhea or constipation. No melena or hematochezia.  GENITOURINARY: No dysuria, frequency or hematuria  NEUROLOGICAL: No numbness or weakness  SKIN: No itching, rashes      MEDICATIONS  (STANDING):  albuterol/ipratropium for Nebulization 3 milliLiter(s) Nebulizer every 6 hours  aspirin  chewable 81 milliGRAM(s) Oral daily  bictegravir 50 mG/emtricitabine 200 mG/tenofovir alafenamide 25 mG (BIKTARVY) 1 Tablet(s) Oral daily  buDESOnide    Inhalation Suspension 0.25 milliGRAM(s) Inhalation every 12 hours  dextrose 5%. 1000 milliLiter(s) (100 mL/Hr) IV Continuous <Continuous>  dextrose 5%. 1000 milliLiter(s) (50 mL/Hr) IV Continuous <Continuous>  dextrose 50% Injectable 12.5 Gram(s) IV Push once  dextrose 50% Injectable 25 Gram(s) IV Push once  dextrose 50% Injectable 25 Gram(s) IV Push once  DULoxetine 60 milliGRAM(s) Oral daily  gabapentin 600 milliGRAM(s) Oral at bedtime  glucagon  Injectable 1 milliGRAM(s) IntraMuscular once  influenza   Vaccine 0.5 milliLiter(s) IntraMuscular once  insulin lispro (ADMELOG) corrective regimen sliding scale   SubCutaneous three times a day before meals  insulin lispro (ADMELOG) corrective regimen sliding scale   SubCutaneous at bedtime  lactulose Syrup 20 Gram(s) Oral daily  pantoprazole    Tablet 40 milliGRAM(s) Oral before breakfast  polyethylene glycol 3350 17 Gram(s) Oral daily  rivaroxaban 20 milliGRAM(s) Oral daily  senna 2 Tablet(s) Oral at bedtime  sodium chloride 0.65% Nasal 1 Spray(s) Both Nostrils two times a day  sodium chloride 3%  Inhalation 4 milliLiter(s) Inhalation every 12 hours  trimethoprim  160 mG/sulfamethoxazole 800 mG 2 Tablet(s) Oral every 8 hours  verapamil  milliGRAM(s) Oral daily    MEDICATIONS  (PRN):  acetaminophen     Tablet .. 650 milliGRAM(s) Oral every 6 hours PRN Temp greater or equal to 38C (100.4F), Mild Pain (1 - 3)  dextrose Oral Gel 15 Gram(s) Oral once PRN Blood Glucose LESS THAN 70 milliGRAM(s)/deciliter  hydrocodone/homatropine Syrup 5 milliLiter(s) Oral every 6 hours PRN Cough      CAPILLARY BLOOD GLUCOSE      POCT Blood Glucose.: 107 mg/dL (06 Apr 2024 21:45)  POCT Blood Glucose.: 131 mg/dL (06 Apr 2024 18:09)  POCT Blood Glucose.: 325 mg/dL (06 Apr 2024 11:52)  POCT Blood Glucose.: 130 mg/dL (06 Apr 2024 07:49)    I&O's Summary    06 Apr 2024 07:01  -  07 Apr 2024 07:00  --------------------------------------------------------  IN: 1600 mL / OUT: 0 mL / NET: 1600 mL        VITALS:   T(C): 36.8 (04-07-24 @ 04:00), Max: 36.9 (04-06-24 @ 08:20)  HR: 90 (04-07-24 @ 04:00) (90 - 108)  BP: 141/89 (04-07-24 @ 04:00) (103/65 - 160/98)  RR: 17 (04-07-24 @ 04:00) (17 - 20)  SpO2: 95% (04-07-24 @ 04:00) (72% - 99%)    GENERAL: NAD, lying in bed comfortably  HEAD:  Atraumatic, normocephalic  EYES: EOMI, PERRLA, conjunctiva and sclera clear  ENT: Moist mucous membranes  NECK: Supple, no JVD  HEART: Regular rate and rhythm, no murmurs, rubs, or gallops  LUNGS: Unlabored respirations.  Clear to auscultation bilaterally, no crackles, wheezing, or rhonchi  ABDOMEN: Soft, nontender, nondistended, +BS  EXTREMITIES: 2+ peripheral pulses bilaterally. No clubbing, cyanosis, or edema  NERVOUS SYSTEM:  A&Ox3, no focal deficits   SKIN: No rashes or lesions    LABS:                        10.9   7.74  )-----------( 262      ( 07 Apr 2024 05:10 )             32.5     04-07    130<L>  |  95<L>  |  15  ----------------------------<  87  4.1   |  19<L>  |  0.87    Ca    9.6      07 Apr 2024 05:10  Phos  3.6     04-07  Mg     2.20     04-07    TPro  6.3  /  Alb  3.5  /  TBili  <0.2  /  DBili  x   /  AST  14  /  ALT  29  /  AlkPhos  51  04-07          Urinalysis Basic - ( 07 Apr 2024 05:10 )    Color: x / Appearance: x / SG: x / pH: x  Gluc: 87 mg/dL / Ketone: x  / Bili: x / Urobili: x   Blood: x / Protein: x / Nitrite: x   Leuk Esterase: x / RBC: x / WBC x   Sq Epi: x / Non Sq Epi: x / Bacteria: x          RADIOLOGY & ADDITIONAL TESTS:  Results Reviewed:   Imaging Personally Reviewed:  Electrocardiogram Personally Reviewed:    COORDINATION OF CARE:  Care Discussed with Consultants/Other Providers [Y/N]:  Prior or Outpatient Records Reviewed [Y/N]:

## 2024-04-07 NOTE — PROGRESS NOTE ADULT - PROBLEM SELECTOR PLAN 1
- initially P/w SOB, persistent cough  - Recent admission for superimposed mycoplasma PNA on influenza, s/p azithromycin/doxycycline  - RVP unremarkable, CXR -> Bibasilar airspace opacities  - CT Chest -> Multifocal groundglass and consolidative opacities involving all 5 lobes, most predominant in the b/l lower lobes and right upper lobe  - S/p empiric meropenem, doxycyline iso lack of clinical improvement, per ID  - Fungitell elevated  -  initially treated with solumedrol 40mg IV daily, now switched to prednisone 40mg x5 days until 4/7, with plans to switch to pred 20mg qd  x2,  then prednisone 10mg x5days and [id number]  > C/w duonebs q6, inhaled budesonide BID, Robitussin-DM q4 prn, and hycodan PRN  > wean HFNC--> O2  NC  > Pulmonology consulted, will continue to appreciate recs, bronchoscopy planned for 3/26 now 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

## 2024-04-07 NOTE — PROVIDER CONTACT NOTE (OTHER) - ASSESSMENT
patient vitals noted in flowsheets
Patient is a&ox4. Denies chest pain and shortness of breath. Patient is asymptomatic,  while ambulating, O2 sats between %

## 2024-04-08 DIAGNOSIS — E87.1 HYPO-OSMOLALITY AND HYPONATREMIA: ICD-10-CM

## 2024-04-08 DIAGNOSIS — N17.9 ACUTE KIDNEY FAILURE, UNSPECIFIED: ICD-10-CM

## 2024-04-08 LAB
ANION GAP SERPL CALC-SCNC: 13 MMOL/L — SIGNIFICANT CHANGE UP (ref 7–14)
BUN SERPL-MCNC: 14 MG/DL — SIGNIFICANT CHANGE UP (ref 7–23)
CALCIUM SERPL-MCNC: 9.9 MG/DL — SIGNIFICANT CHANGE UP (ref 8.4–10.5)
CHLORIDE SERPL-SCNC: 94 MMOL/L — LOW (ref 98–107)
CO2 SERPL-SCNC: 21 MMOL/L — LOW (ref 22–31)
CREAT SERPL-MCNC: 1.05 MG/DL — SIGNIFICANT CHANGE UP (ref 0.5–1.3)
EGFR: 59 ML/MIN/1.73M2 — LOW
GLUCOSE BLDC GLUCOMTR-MCNC: 116 MG/DL — HIGH (ref 70–99)
GLUCOSE BLDC GLUCOMTR-MCNC: 135 MG/DL — HIGH (ref 70–99)
GLUCOSE BLDC GLUCOMTR-MCNC: 151 MG/DL — HIGH (ref 70–99)
GLUCOSE BLDC GLUCOMTR-MCNC: 236 MG/DL — HIGH (ref 70–99)
GLUCOSE SERPL-MCNC: 145 MG/DL — HIGH (ref 70–99)
HCT VFR BLD CALC: 32.5 % — LOW (ref 34.5–45)
HGB BLD-MCNC: 10.9 G/DL — LOW (ref 11.5–15.5)
MAGNESIUM SERPL-MCNC: 2.1 MG/DL — SIGNIFICANT CHANGE UP (ref 1.6–2.6)
MCHC RBC-ENTMCNC: 27.5 PG — SIGNIFICANT CHANGE UP (ref 27–34)
MCHC RBC-ENTMCNC: 33.5 GM/DL — SIGNIFICANT CHANGE UP (ref 32–36)
MCV RBC AUTO: 82.1 FL — SIGNIFICANT CHANGE UP (ref 80–100)
NRBC # BLD: 0 /100 WBCS — SIGNIFICANT CHANGE UP (ref 0–0)
NRBC # FLD: 0 K/UL — SIGNIFICANT CHANGE UP (ref 0–0)
PHOSPHATE SERPL-MCNC: 3.6 MG/DL — SIGNIFICANT CHANGE UP (ref 2.5–4.5)
PLATELET # BLD AUTO: 255 K/UL — SIGNIFICANT CHANGE UP (ref 150–400)
POTASSIUM SERPL-MCNC: 4.5 MMOL/L — SIGNIFICANT CHANGE UP (ref 3.5–5.3)
POTASSIUM SERPL-SCNC: 4.5 MMOL/L — SIGNIFICANT CHANGE UP (ref 3.5–5.3)
RBC # BLD: 3.96 M/UL — SIGNIFICANT CHANGE UP (ref 3.8–5.2)
RBC # FLD: 19.9 % — HIGH (ref 10.3–14.5)
SODIUM SERPL-SCNC: 128 MMOL/L — LOW (ref 135–145)
WBC # BLD: 7.99 K/UL — SIGNIFICANT CHANGE UP (ref 3.8–10.5)
WBC # FLD AUTO: 7.99 K/UL — SIGNIFICANT CHANGE UP (ref 3.8–10.5)

## 2024-04-08 PROCEDURE — 99232 SBSQ HOSP IP/OBS MODERATE 35: CPT | Mod: GC

## 2024-04-08 RX ADMIN — DULOXETINE HYDROCHLORIDE 60 MILLIGRAM(S): 30 CAPSULE, DELAYED RELEASE ORAL at 12:25

## 2024-04-08 RX ADMIN — Medication 2 TABLET(S): at 14:26

## 2024-04-08 RX ADMIN — Medication 120 MILLIGRAM(S): at 04:47

## 2024-04-08 RX ADMIN — Medication 20 MILLIGRAM(S): at 04:47

## 2024-04-08 RX ADMIN — Medication 3 MILLILITER(S): at 09:23

## 2024-04-08 RX ADMIN — Medication 2 TABLET(S): at 04:47

## 2024-04-08 RX ADMIN — Medication 81 MILLIGRAM(S): at 12:25

## 2024-04-08 RX ADMIN — Medication 2: at 12:21

## 2024-04-08 RX ADMIN — Medication 2 TABLET(S): at 23:51

## 2024-04-08 RX ADMIN — Medication 3 MILLILITER(S): at 21:19

## 2024-04-08 RX ADMIN — BICTEGRAVIR SODIUM, EMTRICITABINE, AND TENOFOVIR ALAFENAMIDE FUMARATE 1 TABLET(S): 30; 120; 15 TABLET ORAL at 12:26

## 2024-04-08 RX ADMIN — Medication 1 SPRAY(S): at 04:46

## 2024-04-08 RX ADMIN — LACTULOSE 20 GRAM(S): 10 SOLUTION ORAL at 12:26

## 2024-04-08 RX ADMIN — Medication 0.25 MILLIGRAM(S): at 21:19

## 2024-04-08 RX ADMIN — GABAPENTIN 600 MILLIGRAM(S): 400 CAPSULE ORAL at 23:40

## 2024-04-08 RX ADMIN — Medication 1: at 17:30

## 2024-04-08 RX ADMIN — Medication 1 SPRAY(S): at 17:32

## 2024-04-08 RX ADMIN — Medication 3 MILLILITER(S): at 15:12

## 2024-04-08 RX ADMIN — PANTOPRAZOLE SODIUM 40 MILLIGRAM(S): 20 TABLET, DELAYED RELEASE ORAL at 04:47

## 2024-04-08 RX ADMIN — Medication 3 MILLILITER(S): at 04:02

## 2024-04-08 RX ADMIN — Medication 650 MILLIGRAM(S): at 04:42

## 2024-04-08 RX ADMIN — Medication 0.25 MILLIGRAM(S): at 09:22

## 2024-04-08 RX ADMIN — RIVAROXABAN 20 MILLIGRAM(S): KIT at 12:23

## 2024-04-08 NOTE — PROGRESS NOTE ADULT - PROBLEM SELECTOR PLAN 2
-> sx of SOB, cough concern for HIV, prelim/ confirmatory test+ for HIV with CD4 of 14, viral load >70K  - ID consulted, continue bactrim-DS q8hr, adding azithromycin  (dc'd on 4/2) and cefepime 3/26 HIV prophylaxis   - cefepime ended on 3/30   - adding Biktarvy 3/26   -sputum PCR +PJP  - continue biktarvy  - Bactrim D q8hr until 4/11   - pending rule out Tb (2/3 negative, pending 3rd sputum) -> sx of SOB, cough concern for HIV, prelim/ confirmatory test+ for HIV with CD4 of 14, viral load >70K  - ID consulted, continue bactrim-DS q8hr, adding azithromycin  (dc'd on 4/2) and cefepime 3/26 HIV prophylaxis   - cefepime ended on 3/30   - adding Biktarvy 3/26   -sputum PCR +PJP  - continue biktarvy  - Bactrim D q8hr until 4/11   - TB r/o with negative AFB x3 - last negative 4/7, can d/c isolation

## 2024-04-08 NOTE — PROGRESS NOTE ADULT - PROBLEM SELECTOR PLAN 4
- Patient found on fall by  prior to presentation  - Denies any acute bony pain  - CTH umremarkable  - TTE unremarkable  > orthostatics unremarkable - Patient found on fall by  prior to presentation  - Denies any acute bony pain  - CTH umremarkable  - TTE unremarkable  > orthostatics unremarkable  - no further syncopal episodes this admission

## 2024-04-08 NOTE — PROGRESS NOTE ADULT - PROBLEM SELECTOR PLAN 10
DVT Ppx: xarelto   Diet: regular DASH  Dispo: TBD    *ETHICS*- BABATUNDE contacted 4/1, spoke to patient, informed primary team of contacting partner to inform him of diagnosis, attempted to call  on 4/1 with no answer- [facility] made aware*  [facility]  - On metformin, ozempic at home  - A1c 6.4  > C/w ISS, holding home meds  > CTM w/ FSGs - on home verapamil, BPs stable

## 2024-04-08 NOTE — PROGRESS NOTE ADULT - PROBLEM SELECTOR PLAN 1
- initially P/w SOB, persistent cough  - Recent admission for superimposed mycoplasma PNA on influenza, s/p azithromycin/doxycycline  - RVP unremarkable, CXR -> Bibasilar airspace opacities  - CT Chest -> Multifocal groundglass and consolidative opacities involving all 5 lobes, most predominant in the b/l lower lobes and right upper lobe  - S/p empiric meropenem, doxycyline iso lack of clinical improvement, per ID  - Fungitell elevated  -  initially treated with solumedrol 40mg IV daily, now switched to prednisone 40mg x5 days until 4/7, with plans to switch to pred 20mg qd  x2,  then prednisone 10mg x5days and [id number]  > C/w duonebs q6, inhaled budesonide BID, Robitussin-DM q4 prn, and hycodan PRN  > wean HFNC--> O2  NC  > Pulmonology consulted, will continue to appreciate recs, bronchoscopy planned for 3/26 now 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 - initially P/w SOB, persistent cough  - Recent admission for superimposed mycoplasma PNA on influenza, s/p azithromycin/doxycycline  - RVP unremarkable, CXR -> Bibasilar airspace opacities  - CT Chest -> Multifocal groundglass and consolidative opacities involving all 5 lobes, most predominant in the b/l lower lobes and right upper lobe  - S/p empiric meropenem, doxycyline iso lack of clinical improvement, per ID  - Fungitell elevated  -  initially treated with solumedrol 40mg IV daily, now switched to prednisone 40mg x5 days until 4/7 -> tapered to 20 mg QD x5 days 4/8-4/12,  then decrease to 10 mg x5days and [id number]  > C/w duonebs q6, inhaled budesonide BID, Robitussin-DM q4 prn, and hycodan PRN  > wean HFNC--> O2  NC  > Pulmonology consulted, will continue to appreciate recs, bronchoscopy planned for 3/26 now 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

## 2024-04-08 NOTE — PROGRESS NOTE ADULT - PROBLEM SELECTOR PLAN 12
DVT Ppx: xarelto   Diet: regular DASH  Dispo: TBD    *ETHICS*- BABATUNDE contacted 4/1, spoke to patient, informed primary team of contacting partner to inform him of diagnosis, attempted to call  on 4/1 with no answer- [facility] aware*  [facility]

## 2024-04-08 NOTE — PROGRESS NOTE ADULT - PROBLEM SELECTOR PLAN 9
- On metformin, ozempic at home  - A1c 6.4  > C/w ISS, holding home meds  > CTM w/ FSGs > Holding home verapamil iso acute illness > resume home aspirin   > C/w statin

## 2024-04-08 NOTE — PROGRESS NOTE ADULT - PROBLEM SELECTOR PLAN 11
DVT Ppx: xarelto   Diet: regular DASH  Dispo: TBD    *ETHICS*- BABATUNDE contacted 4/1, spoke to patient, informed primary team of contacting partner to inform him of diagnosis, attempted to call  on 4/1 with no answer- [facility] made aware*  [facility]  - On metformin, ozempic at home  - A1c 6.4  > C/w ISS, holding home meds  > CTM w/ FSGs

## 2024-04-08 NOTE — PROGRESS NOTE ADULT - ATTENDING COMMENTS
64F with PMH of chronic back pain (s/p spinal fusion), HTN, HLD, T2DM, SLE, CVA/TIA, asthma, PE (2019, xarelto), and GBS p/w persistent cough, SOB, diarrhea, and fall 2/2 weakness. Recently hospitalized at [facility] 1/2024 for influenza with superimposed mycoplasma PNA s/p doxycycline course. On arrival, CTH neg, CT chest with diffuse GGO and consolidations. Labs with leukocytosis, normocytic anemia, neg GI PCR, RVP neg, and MRSA neg. TTE with EF 53% and mild G1 diastolic dysfunction. Repeat CT PE neg but with multiple BL nodules and patchy opacities, mostly in the RLL/R base. ID following, appreciate recs. Pulm following, was planned for bronch however now deferred given AIDS dx; weaning steroids, now on prednisone 40mg daily x5d and then 20mg daily x5d with protonix for gastric protection. Receiving nebs around the clock and now weaned from HFNC to 2L NC. HIV+ with CD4 14 and VL 72K --- now on biktarvy. Fungitell elevated -- high suspicion for PJP PNA, now with PCP PCR+; on bactrim treatment through 4/11. Appreciate ID recs -- dc'd azithromycin as pt now on ART. Transitioned hep gtt back to [facility], as no plan for bronch at present given risk >> benefit and being empirically treated now. Quant TB indeterminate, now with 3 negative sputum AFBs - will d/c TB isolation precautions. ENT consulted per pulm recs, recommending no additional intervention, prolonged cough likely in setting of reflux and PNA, already on appropriate supportive care. Overnight, patient placed back on NC for reported desaturation, however, no documented hypoxia in chart. Pt with significant respiratory distress with ambulation. Will wean oxygen, get ambulatory sat, and encourage nursing staff to document hypoxia prior to placing patient on supplemental O2. May need home oxygen. PT recs outpt PT.    Rest of plan above as per Dr. Pompa.

## 2024-04-08 NOTE — PROGRESS NOTE ADULT - PROBLEM SELECTOR PLAN 6
- H/o PE in 2019, on Xarelto at home  - S/p Xarelto 20mg qd  > heparin drip transitioned to xarelto 3/26 - RESOLVED  P/w loose BMs, initially w/ some blood  - GI PCR negative  - Diarrhea improved Scr ~0.6 on admission -> increased to 1, now stable ~0.9-1  - f/u urine lytes  - bladder scan to r/o retention

## 2024-04-08 NOTE — PROGRESS NOTE ADULT - SUBJECTIVE AND OBJECTIVE BOX
ROSSI ROJAS  64y  Female    Patient is a 64y old  Female who presents with a chief complaint of SOB, cough (07 Apr 2024 07:40)    INTERVAL HPI/OVERNIGHT EVENTS:  - afebrile ON, -120  - on 4L NC       T(C): 36.3 (04-08-24 @ 04:30), Max: 36.8 (04-07-24 @ 12:22)  HR: 120 (04-08-24 @ 04:30) (75 - 120)  BP: 130/70 (04-08-24 @ 04:30) (122/79 - 147/96)  RR: 18 (04-08-24 @ 04:30) (18 - 18)  SpO2: 99% (04-08-24 @ 04:30) (95% - 100%)  Wt(kg): --Vital Signs Last 24 Hrs  T(C): 36.3 (08 Apr 2024 04:30), Max: 36.8 (07 Apr 2024 12:22)  T(F): 97.3 (08 Apr 2024 04:30), Max: 98.3 (07 Apr 2024 12:22)  HR: 120 (08 Apr 2024 04:30) (75 - 120)  BP: 130/70 (08 Apr 2024 04:30) (122/79 - 147/96)  BP(mean): --  RR: 18 (08 Apr 2024 04:30) (18 - 18)  SpO2: 99% (08 Apr 2024 04:30) (95% - 100%)    Parameters below as of 08 Apr 2024 04:30  Patient On (Oxygen Delivery Method): nasal cannula  O2 Flow (L/min): 4      PHYSICAL EXAM:  GENERAL: NAD, lying in bed comfortably  HEAD:  Atraumatic, normocephalic  EYES: EOMI, PERRLA, conjunctiva and sclera clear  ENT: Moist mucous membranes  NECK: Supple, no JVD  HEART: Regular rate and rhythm, no murmurs, rubs, or gallops  LUNGS: Unlabored respirations.  Clear to auscultation bilaterally, no crackles, wheezing, or rhonchi  ABDOMEN: Soft, nontender, nondistended, +BS  EXTREMITIES: 2+ peripheral pulses bilaterally. No clubbing, cyanosis, or edema  NERVOUS SYSTEM:  A&Ox3, no focal deficits   SKIN: No rashes or lesions    Consultant(s) Notes Reviewed:  [x ] YES  [ ] NO  Care Discussed with Consultants/Other Providers [ x] YES  [ ] NO    LABS:                        10.9   7.74  )-----------( 262      ( 07 Apr 2024 05:10 )             32.5     04-07    130<L>  |  95<L>  |  15  ----------------------------<  87  4.1   |  19<L>  |  0.87    Ca    9.6      07 Apr 2024 05:10  Phos  3.6     04-07  Mg     2.20     04-07    TPro  6.3  /  Alb  3.5  /  TBili  <0.2  /  DBili  x   /  AST  14  /  ALT  29  /  AlkPhos  51  04-07        Urinalysis Basic - ( 07 Apr 2024 05:10 )    Color: x / Appearance: x / SG: x / pH: x  Gluc: 87 mg/dL / Ketone: x  / Bili: x / Urobili: x   Blood: x / Protein: x / Nitrite: x   Leuk Esterase: x / RBC: x / WBC x   Sq Epi: x / Non Sq Epi: x / Bacteria: x      CAPILLARY BLOOD GLUCOSE      POCT Blood Glucose.: 135 mg/dL (08 Apr 2024 07:35)  POCT Blood Glucose.: 347 mg/dL (07 Apr 2024 21:46)  POCT Blood Glucose.: 179 mg/dL (07 Apr 2024 17:39)  POCT Blood Glucose.: 245 mg/dL (07 Apr 2024 11:52)  POCT Blood Glucose.: 127 mg/dL (07 Apr 2024 07:56)        Urinalysis Basic - ( 07 Apr 2024 05:10 )    Color: x / Appearance: x / SG: x / pH: x  Gluc: 87 mg/dL / Ketone: x  / Bili: x / Urobili: x   Blood: x / Protein: x / Nitrite: x   Leuk Esterase: x / RBC: x / WBC x   Sq Epi: x / Non Sq Epi: x / Bacteria: x        RADIOLOGY & ADDITIONAL TESTS:    Imaging Personally Reviewed:  [ ] YES  [ ] NO    HEALTH ISSUES - PROBLEM Dx:  Acute respiratory failure with hypoxia    Pulmonary embolism    HTN (hypertension)    History of TIA (transient ischemic attack)    Type 2 diabetes mellitus    Need for prophylactic measure    Diarrhea    Syncope    AIDS    Dry cough         ROSSI ROJAS  64y  Female    Patient is a 64y old  Female who presents with a chief complaint of SOB, cough (07 Apr 2024 07:40)    INTERVAL HPI/OVERNIGHT EVENTS:  - afebrile ON, -120 (tachycardia with coughing episode)  - on 2-4L NC overnight, per RN has not tolerated attempts to wean to RA - desats to 80s intermittently  - endorsing persistent cough, unchanged from previous days. Cough is nonproductive. Denies headache, dizziness, chest pain, n/v, diarrhea, dysuria  - no new complaints    T(C): 36.3 (04-08-24 @ 04:30), Max: 36.8 (04-07-24 @ 12:22)  HR: 120 (04-08-24 @ 04:30) (75 - 120)  BP: 130/70 (04-08-24 @ 04:30) (122/79 - 147/96)  RR: 18 (04-08-24 @ 04:30) (18 - 18)  SpO2: 99% (04-08-24 @ 04:30) (95% - 100%)  Wt(kg): --Vital Signs Last 24 Hrs  T(C): 36.3 (08 Apr 2024 04:30), Max: 36.8 (07 Apr 2024 12:22)  T(F): 97.3 (08 Apr 2024 04:30), Max: 98.3 (07 Apr 2024 12:22)  HR: 120 (08 Apr 2024 04:30) (75 - 120)  BP: 130/70 (08 Apr 2024 04:30) (122/79 - 147/96)  BP(mean): --  RR: 18 (08 Apr 2024 04:30) (18 - 18)  SpO2: 99% (08 Apr 2024 04:30) (95% - 100%)    Parameters below as of 08 Apr 2024 04:30  Patient On (Oxygen Delivery Method): nasal cannula  O2 Flow (L/min): 4      PHYSICAL EXAM:  GENERAL: NAD, lying in bed comfortably  HEAD:  Atraumatic, normocephalic  EYES: EOMI, PERRLA, conjunctiva and sclera clear  ENT: Moist mucous membranes  NECK: Supple, no JVD  HEART: Regular rate and rhythm, no murmurs, rubs, or gallops  LUNGS: Intermittent cough, difficulty completing sentences due to interruptions from cough. Lungs Clear to auscultation bilaterally, no crackles, wheezing, or rhonchi  ABDOMEN: Soft, nontender, nondistended, +BS  EXTREMITIES: 2+ peripheral pulses bilaterally. No clubbing, cyanosis, or edema  NERVOUS SYSTEM:  A&Ox3, no focal deficits   SKIN: No rashes or lesions    Consultant(s) Notes Reviewed:  [x ] YES  [ ] NO  Care Discussed with Consultants/Other Providers [ x] YES  [ ] NO    LABS:                        10.9   7.74  )-----------( 262      ( 07 Apr 2024 05:10 )             32.5     04-07    130<L>  |  95<L>  |  15  ----------------------------<  87  4.1   |  19<L>  |  0.87    Ca    9.6      07 Apr 2024 05:10  Phos  3.6     04-07  Mg     2.20     04-07    TPro  6.3  /  Alb  3.5  /  TBili  <0.2  /  DBili  x   /  AST  14  /  ALT  29  /  AlkPhos  51  04-07        Urinalysis Basic - ( 07 Apr 2024 05:10 )    Color: x / Appearance: x / SG: x / pH: x  Gluc: 87 mg/dL / Ketone: x  / Bili: x / Urobili: x   Blood: x / Protein: x / Nitrite: x   Leuk Esterase: x / RBC: x / WBC x   Sq Epi: x / Non Sq Epi: x / Bacteria: x      CAPILLARY BLOOD GLUCOSE      POCT Blood Glucose.: 135 mg/dL (08 Apr 2024 07:35)  POCT Blood Glucose.: 347 mg/dL (07 Apr 2024 21:46)  POCT Blood Glucose.: 179 mg/dL (07 Apr 2024 17:39)  POCT Blood Glucose.: 245 mg/dL (07 Apr 2024 11:52)  POCT Blood Glucose.: 127 mg/dL (07 Apr 2024 07:56)        Urinalysis Basic - ( 07 Apr 2024 05:10 )    Color: x / Appearance: x / SG: x / pH: x  Gluc: 87 mg/dL / Ketone: x  / Bili: x / Urobili: x   Blood: x / Protein: x / Nitrite: x   Leuk Esterase: x / RBC: x / WBC x   Sq Epi: x / Non Sq Epi: x / Bacteria: x        RADIOLOGY & ADDITIONAL TESTS:    Imaging Personally Reviewed:  [ ] YES  [ ] NO    HEALTH ISSUES - PROBLEM Dx:  Acute respiratory failure with hypoxia    Pulmonary embolism    HTN (hypertension)    History of TIA (transient ischemic attack)    Type 2 diabetes mellitus    Need for prophylactic measure    Diarrhea    Syncope    AIDS    Dry cough         ROSSI ROJAS  64y  Female    Patient is a 64y old  Female who presents with a chief complaint of SOB, cough (07 Apr 2024 07:40)    INTERVAL HPI/OVERNIGHT EVENTS:  - afebrile ON, -120 (tachycardia with coughing episode)  - on 2-4L NC overnight, per RN has not tolerated attempts to wean to RA - desats to 80s intermittently  - endorsing persistent cough, unchanged from previous days. Cough is nonproductive. Denies headache, dizziness, chest pain, n/v, diarrhea, dysuria  - no new complaints    T(C): 36.3 (04-08-24 @ 04:30), Max: 36.8 (04-07-24 @ 12:22)  HR: 120 (04-08-24 @ 04:30) (75 - 120)  BP: 130/70 (04-08-24 @ 04:30) (122/79 - 147/96)  RR: 18 (04-08-24 @ 04:30) (18 - 18)  SpO2: 99% (04-08-24 @ 04:30) (95% - 100%)  Wt(kg): --Vital Signs Last 24 Hrs  T(C): 36.3 (08 Apr 2024 04:30), Max: 36.8 (07 Apr 2024 12:22)  T(F): 97.3 (08 Apr 2024 04:30), Max: 98.3 (07 Apr 2024 12:22)  HR: 120 (08 Apr 2024 04:30) (75 - 120)  BP: 130/70 (08 Apr 2024 04:30) (122/79 - 147/96)  BP(mean): --  RR: 18 (08 Apr 2024 04:30) (18 - 18)  SpO2: 99% (08 Apr 2024 04:30) (95% - 100%)    Parameters below as of 08 Apr 2024 04:30  Patient On (Oxygen Delivery Method): nasal cannula  O2 Flow (L/min): 4    PHYSICAL EXAM:  GENERAL: NAD, lying in bed comfortably  HEAD:  Atraumatic, normocephalic  EYES: EOMI, PERRLA, conjunctiva and sclera clear  ENT: Moist mucous membranes  NECK: Supple, no JVD  HEART: Regular rate and rhythm, no murmurs, rubs, or gallops  LUNGS: Intermittent cough, difficulty completing sentences due to interruptions from cough. Lungs Clear to auscultation bilaterally, no crackles, wheezing, or rhonchi  ABDOMEN: Soft, nontender, nondistended, +BS  EXTREMITIES: 2+ peripheral pulses bilaterally. No clubbing, cyanosis, or edema  NERVOUS SYSTEM:  A&Ox3, no focal deficits   SKIN: No rashes or lesions    Consultant(s) Notes Reviewed:  [x ] YES  [ ] NO  Care Discussed with Consultants/Other Providers [ x] YES  [ ] NO    LABS:                        10.9   7.74  )-----------( 262      ( 07 Apr 2024 05:10 )             32.5     04-07    130<L>  |  95<L>  |  15  ----------------------------<  87  4.1   |  19<L>  |  0.87    Ca    9.6      07 Apr 2024 05:10  Phos  3.6     04-07  Mg     2.20     04-07    TPro  6.3  /  Alb  3.5  /  TBili  <0.2  /  DBili  x   /  AST  14  /  ALT  29  /  AlkPhos  51  04-07        Urinalysis Basic - ( 07 Apr 2024 05:10 )    Color: x / Appearance: x / SG: x / pH: x  Gluc: 87 mg/dL / Ketone: x  / Bili: x / Urobili: x   Blood: x / Protein: x / Nitrite: x   Leuk Esterase: x / RBC: x / WBC x   Sq Epi: x / Non Sq Epi: x / Bacteria: x      CAPILLARY BLOOD GLUCOSE      POCT Blood Glucose.: 135 mg/dL (08 Apr 2024 07:35)  POCT Blood Glucose.: 347 mg/dL (07 Apr 2024 21:46)  POCT Blood Glucose.: 179 mg/dL (07 Apr 2024 17:39)  POCT Blood Glucose.: 245 mg/dL (07 Apr 2024 11:52)  POCT Blood Glucose.: 127 mg/dL (07 Apr 2024 07:56)        Urinalysis Basic - ( 07 Apr 2024 05:10 )    Color: x / Appearance: x / SG: x / pH: x  Gluc: 87 mg/dL / Ketone: x  / Bili: x / Urobili: x   Blood: x / Protein: x / Nitrite: x   Leuk Esterase: x / RBC: x / WBC x   Sq Epi: x / Non Sq Epi: x / Bacteria: x        RADIOLOGY & ADDITIONAL TESTS:    Imaging Personally Reviewed:  [ ] YES  [ ] NO    HEALTH ISSUES - PROBLEM Dx:  Acute respiratory failure with hypoxia    Pulmonary embolism    HTN (hypertension)    History of TIA (transient ischemic attack)    Type 2 diabetes mellitus    Need for prophylactic measure    Diarrhea    Syncope    AIDS    Dry cough

## 2024-04-08 NOTE — PROGRESS NOTE ADULT - PROBLEM SELECTOR PLAN 8
> Holding home verapamil iso acute illness > resume home aspirin   > C/w statin - H/o PE in 2019, on Xarelto at home  - S/p Xarelto 20mg qd  > heparin drip transitioned to xarelto 3/26

## 2024-04-08 NOTE — PROGRESS NOTE ADULT - PROBLEM SELECTOR PLAN 7
> resume home aspirin   > C/w statin - H/o PE in 2019, on Xarelto at home  - S/p Xarelto 20mg qd  > heparin drip transitioned to xarelto 3/26 - RESOLVED  P/w loose BMs, initially w/ some blood  - GI PCR negative  - Diarrhea improved

## 2024-04-08 NOTE — PROGRESS NOTE ADULT - PROBLEM SELECTOR PLAN 5
- RESOLVED  P/w loose BMs, initially w/ some blood  - GI PCR negative  - Diarrhea improved - relatively stable this admission  - Na 128   - f/u serum osm, urine osm, urine Na

## 2024-04-09 LAB
ANION GAP SERPL CALC-SCNC: 14 MMOL/L — SIGNIFICANT CHANGE UP (ref 7–14)
APPEARANCE UR: CLEAR — SIGNIFICANT CHANGE UP
BACTERIA # UR AUTO: NEGATIVE /HPF — SIGNIFICANT CHANGE UP
BASOPHILS # BLD AUTO: 0.01 K/UL — SIGNIFICANT CHANGE UP (ref 0–0.2)
BASOPHILS NFR BLD AUTO: 0.1 % — SIGNIFICANT CHANGE UP (ref 0–2)
BILIRUB UR-MCNC: NEGATIVE — SIGNIFICANT CHANGE UP
BUN SERPL-MCNC: 12 MG/DL — SIGNIFICANT CHANGE UP (ref 7–23)
CALCIUM SERPL-MCNC: 9.8 MG/DL — SIGNIFICANT CHANGE UP (ref 8.4–10.5)
CAST: 4 /LPF — SIGNIFICANT CHANGE UP (ref 0–4)
CHLORIDE SERPL-SCNC: 94 MMOL/L — LOW (ref 98–107)
CO2 SERPL-SCNC: 21 MMOL/L — LOW (ref 22–31)
COLOR SPEC: YELLOW — SIGNIFICANT CHANGE UP
CREAT ?TM UR-MCNC: 73 MG/DL — SIGNIFICANT CHANGE UP
CREAT SERPL-MCNC: 0.93 MG/DL — SIGNIFICANT CHANGE UP (ref 0.5–1.3)
CYSTATIN C SERPL-MCNC: 1.09 MG/L — SIGNIFICANT CHANGE UP (ref 0.66–1.26)
DIFF PNL FLD: NEGATIVE — SIGNIFICANT CHANGE UP
EGFR: 69 ML/MIN/1.73M2 — SIGNIFICANT CHANGE UP
EOSINOPHIL # BLD AUTO: 0.07 K/UL — SIGNIFICANT CHANGE UP (ref 0–0.5)
EOSINOPHIL NFR BLD AUTO: 0.9 % — SIGNIFICANT CHANGE UP (ref 0–6)
GFR/BSA.PRED SERPLBLD CYS-BASED-ARV: 64 ML/MIN/1.73M2 — SIGNIFICANT CHANGE UP
GLUCOSE BLDC GLUCOMTR-MCNC: 123 MG/DL — HIGH (ref 70–99)
GLUCOSE BLDC GLUCOMTR-MCNC: 144 MG/DL — HIGH (ref 70–99)
GLUCOSE BLDC GLUCOMTR-MCNC: 148 MG/DL — HIGH (ref 70–99)
GLUCOSE BLDC GLUCOMTR-MCNC: 224 MG/DL — HIGH (ref 70–99)
GLUCOSE SERPL-MCNC: 118 MG/DL — HIGH (ref 70–99)
GLUCOSE UR QL: NEGATIVE MG/DL — SIGNIFICANT CHANGE UP
HCT VFR BLD CALC: 32 % — LOW (ref 34.5–45)
HGB BLD-MCNC: 10.5 G/DL — LOW (ref 11.5–15.5)
IANC: 6.82 K/UL — SIGNIFICANT CHANGE UP (ref 1.8–7.4)
IMM GRANULOCYTES NFR BLD AUTO: 0.5 % — SIGNIFICANT CHANGE UP (ref 0–0.9)
KETONES UR-MCNC: NEGATIVE MG/DL — SIGNIFICANT CHANGE UP
LEUKOCYTE ESTERASE UR-ACNC: ABNORMAL
LYMPHOCYTES # BLD AUTO: 0.92 K/UL — LOW (ref 1–3.3)
LYMPHOCYTES # BLD AUTO: 11.3 % — LOW (ref 13–44)
MAGNESIUM SERPL-MCNC: 1.9 MG/DL — SIGNIFICANT CHANGE UP (ref 1.6–2.6)
MCHC RBC-ENTMCNC: 27.1 PG — SIGNIFICANT CHANGE UP (ref 27–34)
MCHC RBC-ENTMCNC: 32.8 GM/DL — SIGNIFICANT CHANGE UP (ref 32–36)
MCV RBC AUTO: 82.7 FL — SIGNIFICANT CHANGE UP (ref 80–100)
MONOCYTES # BLD AUTO: 0.28 K/UL — SIGNIFICANT CHANGE UP (ref 0–0.9)
MONOCYTES NFR BLD AUTO: 3.4 % — SIGNIFICANT CHANGE UP (ref 2–14)
NEUTROPHILS # BLD AUTO: 6.82 K/UL — SIGNIFICANT CHANGE UP (ref 1.8–7.4)
NEUTROPHILS NFR BLD AUTO: 83.8 % — HIGH (ref 43–77)
NITRITE UR-MCNC: NEGATIVE — SIGNIFICANT CHANGE UP
NRBC # BLD: 0 /100 WBCS — SIGNIFICANT CHANGE UP (ref 0–0)
NRBC # FLD: 0 K/UL — SIGNIFICANT CHANGE UP (ref 0–0)
OSMOLALITY SERPL: 282 MOSM/KG — SIGNIFICANT CHANGE UP (ref 275–295)
OSMOLALITY UR: 411 MOSM/KG — SIGNIFICANT CHANGE UP (ref 50–1200)
PH UR: 6 — SIGNIFICANT CHANGE UP (ref 5–8)
PHOSPHATE SERPL-MCNC: 3 MG/DL — SIGNIFICANT CHANGE UP (ref 2.5–4.5)
PLATELET # BLD AUTO: 245 K/UL — SIGNIFICANT CHANGE UP (ref 150–400)
POTASSIUM SERPL-MCNC: 3.8 MMOL/L — SIGNIFICANT CHANGE UP (ref 3.5–5.3)
POTASSIUM SERPL-SCNC: 3.8 MMOL/L — SIGNIFICANT CHANGE UP (ref 3.5–5.3)
PROT UR-MCNC: SIGNIFICANT CHANGE UP MG/DL
RBC # BLD: 3.87 M/UL — SIGNIFICANT CHANGE UP (ref 3.8–5.2)
RBC # FLD: 20 % — HIGH (ref 10.3–14.5)
RBC CASTS # UR COMP ASSIST: 2 /HPF — SIGNIFICANT CHANGE UP (ref 0–4)
SODIUM SERPL-SCNC: 129 MMOL/L — LOW (ref 135–145)
SODIUM UR-SCNC: 41 MMOL/L — SIGNIFICANT CHANGE UP
SP GR SPEC: 1.02 — SIGNIFICANT CHANGE UP (ref 1–1.03)
SQUAMOUS # UR AUTO: 1 /HPF — SIGNIFICANT CHANGE UP (ref 0–5)
UROBILINOGEN FLD QL: 0.2 MG/DL — SIGNIFICANT CHANGE UP (ref 0.2–1)
WBC # BLD: 8.14 K/UL — SIGNIFICANT CHANGE UP (ref 3.8–10.5)
WBC # FLD AUTO: 8.14 K/UL — SIGNIFICANT CHANGE UP (ref 3.8–10.5)
WBC UR QL: 4 /HPF — SIGNIFICANT CHANGE UP (ref 0–5)

## 2024-04-09 PROCEDURE — 99232 SBSQ HOSP IP/OBS MODERATE 35: CPT

## 2024-04-09 PROCEDURE — 99232 SBSQ HOSP IP/OBS MODERATE 35: CPT | Mod: GC

## 2024-04-09 RX ADMIN — PANTOPRAZOLE SODIUM 40 MILLIGRAM(S): 20 TABLET, DELAYED RELEASE ORAL at 07:03

## 2024-04-09 RX ADMIN — Medication 0.25 MILLIGRAM(S): at 21:33

## 2024-04-09 RX ADMIN — Medication 81 MILLIGRAM(S): at 12:31

## 2024-04-09 RX ADMIN — Medication 1 SPRAY(S): at 17:27

## 2024-04-09 RX ADMIN — Medication 20 MILLIGRAM(S): at 07:03

## 2024-04-09 RX ADMIN — Medication 2 TABLET(S): at 12:32

## 2024-04-09 RX ADMIN — DULOXETINE HYDROCHLORIDE 60 MILLIGRAM(S): 30 CAPSULE, DELAYED RELEASE ORAL at 12:30

## 2024-04-09 RX ADMIN — BICTEGRAVIR SODIUM, EMTRICITABINE, AND TENOFOVIR ALAFENAMIDE FUMARATE 1 TABLET(S): 30; 120; 15 TABLET ORAL at 12:31

## 2024-04-09 RX ADMIN — Medication 3 MILLILITER(S): at 04:26

## 2024-04-09 RX ADMIN — GABAPENTIN 600 MILLIGRAM(S): 400 CAPSULE ORAL at 22:34

## 2024-04-09 RX ADMIN — Medication 2 TABLET(S): at 22:35

## 2024-04-09 RX ADMIN — RIVAROXABAN 20 MILLIGRAM(S): KIT at 12:31

## 2024-04-09 RX ADMIN — Medication 3 MILLILITER(S): at 10:31

## 2024-04-09 RX ADMIN — Medication 3 MILLILITER(S): at 21:33

## 2024-04-09 RX ADMIN — Medication 0.25 MILLIGRAM(S): at 10:30

## 2024-04-09 RX ADMIN — Medication 2: at 12:47

## 2024-04-09 RX ADMIN — Medication 2 TABLET(S): at 07:00

## 2024-04-09 RX ADMIN — Medication 1 SPRAY(S): at 07:00

## 2024-04-09 NOTE — PROGRESS NOTE ADULT - PROBLEM SELECTOR PLAN 12
DVT Ppx: xarelto   Diet: regular DASH, fluid restrict 1L   Dispo: TBD, needs home O2     *ETHICS*- BABATUNDE contacted 4/1, spoke to patient, informed primary team of contacting partner to inform him of diagnosis, attempted to call  on 4/1 with no answer- BABATUNDE made aware*  [facility]

## 2024-04-09 NOTE — PROGRESS NOTE ADULT - SUBJECTIVE AND OBJECTIVE BOX
ROSSI ROJAS  64y  Female    Patient is a 64y old  Female who presents with a chief complaint of SOB, cough (09 Apr 2024 09:32)    INTERVAL HPI/OVERNIGHT EVENTS:  - NAEON  - on RA overnight but desat to 78% this AM -> back on NC  - feeling ok today, still with persistent cough triggered by coughing, no new symptoms/concerns  - afebrile, HDS    T(C): 36.7 (04-09-24 @ 11:02), Max: 36.8 (04-09-24 @ 07:01)  HR: 103 (04-09-24 @ 11:02) (103 - 160)  BP: 136/81 (04-09-24 @ 11:02) (95/70 - 136/81)  RR: 19 (04-09-24 @ 11:02) (17 - 22)  SpO2: 100% (04-09-24 @ 11:02) (78% - 100%)  Wt(kg): --Vital Signs Last 24 Hrs  T(C): 36.7 (09 Apr 2024 11:02), Max: 36.8 (09 Apr 2024 07:01)  T(F): 98.1 (09 Apr 2024 11:02), Max: 98.2 (09 Apr 2024 07:01)  HR: 103 (09 Apr 2024 11:02) (103 - 160)  BP: 136/81 (09 Apr 2024 11:02) (95/70 - 136/81)  BP(mean): --  RR: 19 (09 Apr 2024 11:02) (17 - 22)  SpO2: 100% (09 Apr 2024 11:02) (78% - 100%)    Parameters below as of 09 Apr 2024 11:02  Patient On (Oxygen Delivery Method): nasal cannula    PHYSICAL EXAM:  GENERAL: NAD, lying in bed comfortably  HEAD:  Atraumatic, normocephalic  EYES: EOMI, conjunctiva and sclera clear  ENT: Moist mucous membranes  NECK: Supple, no JVD  HEART: Regular rate and rhythm, no murmurs, rubs, or gallops  LUNGS: frequent cough, difficulty completing sentences due to interruptions from cough. Slight expiratory wheeze bilaterally, no crackles or rhonchi  ABDOMEN: Soft, nontender, nondistended, +BS  EXTREMITIES: 2+ peripheral pulses bilaterally. No clubbing, cyanosis, or edema  NERVOUS SYSTEM:  A&Ox3, no focal deficits   SKIN: No rashes or lesions      Consultant(s) Notes Reviewed:  [x ] YES  [ ] NO  Care Discussed with Consultants/Other Providers [ x] YES  [ ] NO    LABS:                        10.5   8.14  )-----------( 245      ( 09 Apr 2024 03:55 )             32.0     04-09    129<L>  |  94<L>  |  12  ----------------------------<  118<H>  3.8   |  21<L>  |  0.93    Ca    9.8      09 Apr 2024 03:55  Phos  3.0     04-09  Mg     1.90     04-09          Urinalysis Basic - ( 09 Apr 2024 03:55 )    Color: x / Appearance: x / SG: x / pH: x  Gluc: 118 mg/dL / Ketone: x  / Bili: x / Urobili: x   Blood: x / Protein: x / Nitrite: x   Leuk Esterase: x / RBC: x / WBC x   Sq Epi: x / Non Sq Epi: x / Bacteria: x      CAPILLARY BLOOD GLUCOSE      POCT Blood Glucose.: 224 mg/dL (09 Apr 2024 11:56)  POCT Blood Glucose.: 144 mg/dL (09 Apr 2024 08:19)  POCT Blood Glucose.: 116 mg/dL (08 Apr 2024 23:39)  POCT Blood Glucose.: 151 mg/dL (08 Apr 2024 17:28)        Urinalysis Basic - ( 09 Apr 2024 03:55 )    Color: x / Appearance: x / SG: x / pH: x  Gluc: 118 mg/dL / Ketone: x  / Bili: x / Urobili: x   Blood: x / Protein: x / Nitrite: x   Leuk Esterase: x / RBC: x / WBC x   Sq Epi: x / Non Sq Epi: x / Bacteria: x        RADIOLOGY & ADDITIONAL TESTS:    Imaging Personally Reviewed:  [ ] YES  [ ] NO    HEALTH ISSUES - PROBLEM Dx:  Acute respiratory failure with hypoxia    AIDS    Dry cough    Syncope    Hyponatremia    LETTY (acute kidney injury)    Diarrhea    Pulmonary embolism    History of TIA (transient ischemic attack)    HTN (hypertension)    Type 2 diabetes mellitus    Need for prophylactic measure

## 2024-04-09 NOTE — PROGRESS NOTE ADULT - ATTENDING COMMENTS
64F with PMH of chronic back pain (s/p spinal fusion), HTN, HLD, T2DM, SLE, CVA/TIA, asthma, PE (2019, xarelto), and GBS p/w persistent cough, SOB, diarrhea, and fall 2/2 weakness. Recently hospitalized at [facility] 1/2024 for influenza with superimposed mycoplasma PNA s/p doxycycline course. On arrival, CTH neg, CT chest with diffuse GGO and consolidations. Labs with leukocytosis, normocytic anemia, neg GI PCR, RVP neg, and MRSA neg. TTE with EF 53% and mild G1 diastolic dysfunction. Repeat CT PE neg but with multiple BL nodules and patchy opacities, mostly in the RLL/R base. ID following, appreciate recs. Pulm following, was planned for bronch however now deferred given AIDS dx; weaning steroids, now on prednisone 40mg daily x5d and then 20mg daily x5d with protonix for gastric protection. Receiving nebs around the clock and now weaned from HFNC to 2L NC. HIV+ with CD4 14 and VL 72K --- now on biktarvy. Fungitell elevated -- high suspicion for PJP PNA, now with PCP PCR+; on bactrim treatment through 4/11. Appreciate ID recs -- dc'd azithromycin as pt now on ART. Transitioned hep gtt back to [facility], as no plan for bronch at present given risk >> benefit and being empirically treated now. Quant TB indeterminate, now with 3 negative sputum AFBs - TB isolation precautions discontinued. ENT consulted per pulm recs, recommending no additional intervention, prolonged cough likely in setting of reflux and PNA, already on appropriate supportive care. Patient continuing to intermittently requiring supplemental O2, will need home O2 on discharge. Appreciated SW assistance in obtaining home O2.     Dispo: home with outpatient PT pending clinical improvement     Rest of plan above as per Dr. Pompa.

## 2024-04-09 NOTE — CHART NOTE - NSCHARTNOTEFT_GEN_A_CORE
Source: Patient [X]    Family [ ]     Other (Chart Review) [X]    Medical Course: Per chart review, patient is a 64y Female with PMH lupus, PE on Xarelto (dx 2019), asthma, HTN, T2DM, and GBS (dx 1997) presenting with 3wk history of worsening SOB and persistent cough. CT chest revealing multifocal groundglass and consolidative opacities involving all 5 lobes, most predominant in the b/l lower lobes and right upper lobe. Patient admitted to medicine for further workup and management of AHRF.    Nutrition Course: Patient is currently ordered for a PO diet. Patient currently reports a poor appetite and PO intake at meals during course of admission, reports only taking a few bites per meal. Documented on RN flowsheet as consuming 26-50% of meals. Patient likely meeting <50% estimated energy needs for >/=5 days. Patient dislikes oral nutrition supplements, does not wish to receive any. Patient with multiple food preferences, recorded and adjusted via CBORD. Patient denies any chewing or swallowing difficulty with current diet order. No report of GI distress (nausea, vomiting, diarrhea, constipation). Noted HbA1c 6.4% (3/20).    Diet : Diet, Consistent Carbohydrate/No Snacks (03-19-24 @ 09:16)    Anthropometrics  Weights per RN flowsheets: 99.8kg (4/3), 103.1kg (3/27), 103kg (3/20)  % Weight Change: -3.3kg (3%) x1 week period of time (significant)    Pertinent Medications: MEDICATIONS  (STANDING):  albuterol/ipratropium for Nebulization 3 milliLiter(s) Nebulizer every 6 hours  aspirin  chewable 81 milliGRAM(s) Oral daily  bictegravir 50 mG/emtricitabine 200 mG/tenofovir alafenamide 25 mG (BIKTARVY) 1 Tablet(s) Oral daily  buDESOnide    Inhalation Suspension 0.25 milliGRAM(s) Inhalation every 12 hours  dextrose 5%. 1000 milliLiter(s) (100 mL/Hr) IV Continuous <Continuous>  dextrose 5%. 1000 milliLiter(s) (50 mL/Hr) IV Continuous <Continuous>  dextrose 50% Injectable 12.5 Gram(s) IV Push once  dextrose 50% Injectable 25 Gram(s) IV Push once  dextrose 50% Injectable 25 Gram(s) IV Push once  DULoxetine 60 milliGRAM(s) Oral daily  gabapentin 600 milliGRAM(s) Oral at bedtime  glucagon  Injectable 1 milliGRAM(s) IntraMuscular once  influenza   Vaccine 0.5 milliLiter(s) IntraMuscular once  insulin lispro (ADMELOG) corrective regimen sliding scale   SubCutaneous three times a day before meals  insulin lispro (ADMELOG) corrective regimen sliding scale   SubCutaneous at bedtime  lactulose Syrup 20 Gram(s) Oral daily  pantoprazole    Tablet 40 milliGRAM(s) Oral before breakfast  polyethylene glycol 3350 17 Gram(s) Oral daily  predniSONE   Tablet 40 milliGRAM(s) Oral daily  rivaroxaban 20 milliGRAM(s) Oral daily  senna 2 Tablet(s) Oral at bedtime  sodium chloride 3%  Inhalation 4 milliLiter(s) Inhalation every 12 hours  trimethoprim  160 mG/sulfamethoxazole 800 mG 2 Tablet(s) Oral every 8 hours  verapamil  milliGRAM(s) Oral daily    MEDICATIONS  (PRN):  acetaminophen     Tablet .. 650 milliGRAM(s) Oral every 6 hours PRN Temp greater or equal to 38C (100.4F), Mild Pain (1 - 3)  dextrose Oral Gel 15 Gram(s) Oral once PRN Blood Glucose LESS THAN 70 milliGRAM(s)/deciliter  hydrocodone/homatropine Syrup 5 milliLiter(s) Oral every 6 hours PRN Cough    Pertinent Labs:  04-02 Na129 mmol/L<L> Glu 139 mg/dL<H> K+ 5.0 mmol/L Cr  0.88 mg/dL BUN 16 mg/dL 04-02 Phos 3.7 mg/dL 04-02 Alb 3.5 g/dL    Skin: No pressure ulcers/injuries documented per RN flowsheets     Fluid: No edema documented per RN flowsheets     GI: Last BM 3/30/24 per RN flowsheet documentation. Noted to be on a bowel regimen.     Estimated Needs:   Weight Used: Upper range ideal body weight 154lb/70kg  Estimated Energy Needs: 1750-2100kcal (based on 25-30kcal/kg)  Estimated Protein Needs: 98-112gms (based on 1.4-1.6gms/kg)     Previous Nutrition Diagnosis: Inadequate energy intake    Nutrition Diagnosis is [X] ongoing    New Nutrition Diagnosis: Severe malnutrition related to acute illness/injury as evidenced by consuming </=50% estimated energy needs for >/=5 days, >2% weight loss x1 week  Goal: Patient will meet >75% estimated energy requirements.     Education: [X] Given today    Type of education provided: Patient provided with verbal and written education regarding nutrition in the context of diabetes mellitus management. Printed handouts regarding Carbohydrate Counting for People with Diabetes, Plate Method for Diabetes, and Diabetes Label Reading Tips provided to patient. Encouraged patient to follow-up with an outpatient dietitian for continued nutrition counseling. Patient verbalized understanding to the discussion.     Nutrition Recommendations  - Continue current diet order, as tolerated  - Monitor weights, labs, BM's, skin integrity, p.o. intake.  - Please monitor % PO intake on flowsheets   - Honor food preferences as able within therapeutic diet order.   - RD remains available, please consult as needed     Monitoring and Evaluation:   [X] PO intake [ ] Tolerance to diet prescription [X] weights [X] follow up per protocol    Jovita Willoughby MS RDN CDN  On Microsoft Teams or Pager #[phone]
Source: Patient [X]    Family [ ]     Other (Chart Review) [X]    Patient is seen for follow-up nutrition assessment for severe malnutrition.     Medical Course: Per chart review, patient is a 64y Female with PMH lupus, PE on Xarelto (dx 2019), asthma, HTN, T2DM, and GBS (dx 1997) presenting with 3wk history of worsening SOB and persistent cough. CT chest revealing multifocal groundglass and consolidative opacities involving all 5 lobes, most predominant in the b/l lower lobes and right upper lobe. Patient admitted to medicine for further workup and management of AHRF.    Nutrition Course: Patient is currently ordered for a PO diet with 1000mL fluid restriction. Patient currently reports a continued poor appetite and PO intake at meals during course of admission, states taste changes are a contributing factor. Patient dislikes oral nutrition supplements, does not wish to receive any. Reviewed food preferences, adjusted via CBORD as appropriate. Patient denies any chewing or swallowing difficulty with current diet order. No report of GI distress (nausea, vomiting, diarrhea, constipation). Noted HbA1c 6.4% (3/20). POCT blood glucose x24hrs ranges 116-236mg/dL, managed on an insulin regimen.    Diet : Diet, Consistent Carbohydrate/No Snacks:   1000mL Fluid Restriction ([id number]) (04-09-24 @ 08:48)    Anthropometrics  Weights per RN flowsheets: 99.8kg (4/3), 103.1kg (3/27), 103kg (3/20)  % Weight Change: No weight changes since previous assessment    Pertinent Medications: MEDICATIONS  (STANDING):  albuterol/ipratropium for Nebulization 3 milliLiter(s) Nebulizer every 6 hours  aspirin  chewable 81 milliGRAM(s) Oral daily  bictegravir 50 mG/emtricitabine 200 mG/tenofovir alafenamide 25 mG (BIKTARVY) 1 Tablet(s) Oral daily  buDESOnide    Inhalation Suspension 0.25 milliGRAM(s) Inhalation every 12 hours  dextrose 5%. 1000 milliLiter(s) (100 mL/Hr) IV Continuous <Continuous>  dextrose 5%. 1000 milliLiter(s) (50 mL/Hr) IV Continuous <Continuous>  dextrose 50% Injectable 12.5 Gram(s) IV Push once  dextrose 50% Injectable 25 Gram(s) IV Push once  dextrose 50% Injectable 25 Gram(s) IV Push once  DULoxetine 60 milliGRAM(s) Oral daily  gabapentin 600 milliGRAM(s) Oral at bedtime  glucagon  Injectable 1 milliGRAM(s) IntraMuscular once  influenza   Vaccine 0.5 milliLiter(s) IntraMuscular once  insulin lispro (ADMELOG) corrective regimen sliding scale   SubCutaneous at bedtime  insulin lispro (ADMELOG) corrective regimen sliding scale   SubCutaneous three times a day before meals  lactulose Syrup 20 Gram(s) Oral daily  pantoprazole    Tablet 40 milliGRAM(s) Oral before breakfast  polyethylene glycol 3350 17 Gram(s) Oral daily  predniSONE   Tablet 20 milliGRAM(s) Oral daily  rivaroxaban 20 milliGRAM(s) Oral daily  senna 2 Tablet(s) Oral at bedtime  sodium chloride 0.65% Nasal 1 Spray(s) Both Nostrils two times a day  trimethoprim  160 mG/sulfamethoxazole 800 mG 2 Tablet(s) Oral every 8 hours  verapamil  milliGRAM(s) Oral daily    MEDICATIONS  (PRN):  acetaminophen     Tablet .. 650 milliGRAM(s) Oral every 6 hours PRN Temp greater or equal to 38C (100.4F), Mild Pain (1 - 3)  dextrose Oral Gel 15 Gram(s) Oral once PRN Blood Glucose LESS THAN 70 milliGRAM(s)/deciliter  hydrocodone/homatropine Syrup 5 milliLiter(s) Oral every 6 hours PRN Cough    Pertinent Labs:  04-09 Na129 mmol/L<L> Glu 118 mg/dL<H> K+ 3.8 mmol/L Cr  0.93 mg/dL BUN 12 mg/dL 04-09 Phos 3.0 mg/dL 04-07 Alb 3.5 g/dL    Skin: No pressure ulcers/injuries documented per RN flowsheets     Fluid: No edema documented per RN flowsheets     GI: Last BM 3/30/24? per RN flowsheet documentation. Noted to be on a bowel regimen.     Estimated Needs:   [X] no change since previous assessment  Weight Used: Upper range ideal body weight 154lb/70kg  Estimated Energy Needs: 1750-2100kcal (based on 25-30kcal/kg)  Estimated Protein Needs: 98-112gms (based on 1.4-1.6gms/kg)     Previous Nutrition Diagnosis: [X] Inadequate energy intake, [X] Severe malnutrition    Nutrition Diagnosis is [X] ongoing    New Nutrition Diagnosis: Not applicable    Education: [X] Given on previous assessment by RD    Nutrition Recommendations  - Continue current diet order, as tolerated  - Defer fluid management to medical team   - Consider appetite stimulant to help support PO intake at medical team's discretion  - Monitor weights, labs, BM's, skin integrity, p.o. intake.  - Please monitor % PO intake on flowsheets   - Honor food preferences as able within therapeutic diet order.   - RD remains available, please consult as needed     Monitoring and Evaluation:   [X] PO intake [ ] Tolerance to diet prescription [X] weights [X] follow up per protocol    Jovita Willoughby MS RDN CDN  On Microsoft Teams or Pager #[phone]
Patient has been scheduled for a Rigid Bronchoscopy with cryobiopsy at [facility] on 3/26/24 at [       ].     Please ensure the following is done in preparation for the procedure:     Day prior to procedure:   [ ] Pre-operative labs ordered (Coags, CBC, BMP, Type and screen, bCHG [if applicable]). Labs must be drawn and sent to the lab prior to 4am to ensure labs are resulted prior to procedure.  [ ] Appropriate clearance, if required, obtained for inpatients   [ ] NPO ordered  [ ] Medications reviewed, anticoagulation held  [ ] Timing of needed interventions planned (e.g. hemodialysis)     Day of procedure  [ ] Labs reviewed  [ ] NPO status confirmed

## 2024-04-09 NOTE — PROGRESS NOTE ADULT - PROBLEM SELECTOR PLAN 1
- initially P/w SOB, persistent cough  - Recent admission for superimposed mycoplasma PNA on influenza, s/p azithromycin/doxycycline  - RVP unremarkable, CXR -> Bibasilar airspace opacities  - CT Chest -> Multifocal groundglass and consolidative opacities involving all 5 lobes, most predominant in the b/l lower lobes and right upper lobe  - S/p empiric meropenem, doxycyline iso lack of clinical improvement, per ID  - Fungitell elevated  -  initially treated with solumedrol 40mg IV daily, now switched to prednisone 40mg x5 days until 4/7 -> tapered to 20 mg QD x5 days 4/8-4/12,  then decrease to 10 mg x5days and [id number]  > C/w duonebs q6, inhaled budesonide BID, Robitussin-DM q4 prn, and hycodan PRN  > wean HFNC--> O2  NC  > Pulmonology consulted, will continue to appreciate recs, bronchoscopy planned for 3/26 now 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

## 2024-04-09 NOTE — PROGRESS NOTE ADULT - SUBJECTIVE AND OBJECTIVE BOX
Follow Up:      Inverval History/ROS:Patient is a 64y old  Female who presents with a chief complaint of SOB, cough (24 Mar 2024 08:26)    cough +    gets tachycardic and SOB when ambulating        Allergies    No Known Allergies    Intolerances    dislikes Glucerna Shake (Other)    worked as respiratory therapist; retired 15 years ago       ANTIMICROBIALS:  bictegravir 50 mG/emtricitabine 200 mG/tenofovir alafenamide 25 mG (BIKTARVY) 1 daily  trimethoprim  160 mG/sulfamethoxazole 800 mG 2 every 8 hours    MEDICATIONS  (STANDING):  acetaminophen     Tablet .. 650 every 6 hours PRN  albuterol/ipratropium for Nebulization 3 every 6 hours  aspirin  chewable 81 daily  buDESOnide    Inhalation Suspension 0.25 every 12 hours  dextrose 50% Injectable 12.5 once  dextrose 50% Injectable 25 once  dextrose 50% Injectable 25 once  dextrose Oral Gel 15 once PRN  DULoxetine 60 daily  gabapentin 600 at bedtime  glucagon  Injectable 1 once  hydrocodone/homatropine Syrup 5 every 6 hours PRN  influenza   Vaccine 0.5 once  insulin lispro (ADMELOG) corrective regimen sliding scale  at bedtime  insulin lispro (ADMELOG) corrective regimen sliding scale  three times a day before meals  lactulose Syrup 20 daily  pantoprazole    Tablet 40 before breakfast  polyethylene glycol 3350 17 daily  predniSONE   Tablet 20 daily  rivaroxaban 20 daily  senna 2 at bedtime  verapamil  daily    Vital Signs Last 24 Hrs  T(F): 98.6 (04-09-24 @ 16:18), Max: 98.6 (04-09-24 @ 16:18)  HR: 120 (04-09-24 @ 16:18)  BP: 143/90 (04-09-24 @ 16:18)  RR: 19 (04-09-24 @ 16:18)  SpO2: 98% (04-09-24 @ 16:18) (78% - 100%)    PHYSICAL EXAM:  General: comfortable at rest  nasal canula   HEAD/EYES: [ ] PERRL [x ] white sclera [ ] icterus  ENT:  [ ] normal [x ] supple [ ] thrush [ ] pharyngeal exudate  Cardiovascular:   [ ] murmur [x ] normal [ ] PPM/AICD  Respiratory:  x clear  GI:  [x ] soft, non-tender, normal bowel sounds  :  [ ] penny [x ] no CVA tenderness   Musculoskeletal:  [ ] no synovitis  Neurologic:  [x ] non-focal exam   Skin:  [ x] no rash   Lymph: [x ] no lymphadenopathy  Psychiatric:  [x ] appropriate affect [x ] alert & oriented                            10.5   8.14  )-----------( 245      ( 09 Apr 2024 03:55 )             32.0 04-09    129  |  94  |  12  ----------------------------<  118  3.8   |  21  |  0.93  Ca    9.8      09 Apr 2024 03:55Phos  3.0     04-09Mg     1.90     04-09    Pneumocystis jiroveci PCR (03.25.24 @ 07:42)   Pneumocystis Specimen Source: sputum  Pneumocystis PCR Result: Positive: Critical Result.     HIV-1 RNA Quantitative, Viral Load (03.25.24 @ 05:56)   HIV-1 RNA Quantitative, Viral Load: 72,947    ABS CD4: 14 cells/uL (03.25.24 @ 05:56)   crypt neg     MICROBIOLOGY:    Culture - Blood (03.19.24 @ 21:28)   Specimen Source: .Blood Blood-Peripheral  Culture Results:   No growth at 5 daysCulture - Blood (03.19.24 @ 21:20)   Specimen Source: .Blood Blood-Peripheral  Culture Results:   No growth at 5 days      RADIOLOGY:    < from: Xray Chest 1 View- PORTABLE-Urgent (Xray Chest 1 View- PORTABLE-Urgent .) (03.24.24 @ 13:04) >    ACC: [id number] EXAM:  XR CHEST PORTABLE URGENT 1V   ORDERED BY: RAYMOND HO     PROCEDURE DATE:  03/24/2024          INTERPRETATION:  EXAMINATION: XR CHEST URGENT    CLINICAL INDICATION: Increased oxygen requirement    TECHNIQUE: Single frontal,portable view of the chest was obtained.    COMPARISON: Chest radiograph 3/19/2024.    FINDINGS:  The heart size is normal.  Interval decrease in bilateral airspace opacities.  No congestion or effusions to indicate CHF.  No pneumothorax.    IMPRESSION:  Interval decrease in bilateral airspace opacities    --- End of Report ---          JUAN LAMBERT MD; Resident Radiology  This document has been electronically signed.  BRIONNA PAVON MD; Attending Radiologist    < end of copied text >

## 2024-04-09 NOTE — PROGRESS NOTE ADULT - PROBLEM SELECTOR PLAN 5
- relatively stable this admission  - Na 128 -> 129  - serum osm/urine osm/urine Na c/w SIADH, likely 2/2 pneumonia  - fluid restrict to 1L  - trend

## 2024-04-09 NOTE — PROGRESS NOTE ADULT - ASSESSMENT
65 yo woman with SLE, DM, h/o PE, CVA with left sided weakness presented 3/19 with fever, dry cough, SOB    Hospital admission Jan 2024 at [facility] for PNA - mycoplasma IgM + and flu A +   Received azithro, cefepime, doxy and prednisone taper at that time     CT chest 3/19 shows multifocal ground glass and consolidative opacities involving all 5 lobes  - more extensive c/w prior study and in slightly different distribution     Multifocal pulmonary infiltrates   New dx  HIV   Hypoxic respiratory failure     CD4  14    PCP     fungitell 410    PJP PCR +    cxr improving on prednisone and bactrim     bactrim 3/22-      cefepime added for bacterial coverage now complete     oxygen requirement improving  still JARVIS    bood cultures no growth    serum crypt ag neg   quant gold indeterminate       sputum AFB  smear x 3   neg     syphilis screen neg   CMV IgM neg IgG positive   hep B Ag neg hep Ab neg   hep c neg  toxo IgM neg IgG neg     Suggest:     c/w bactrim DS 2 tab po q 8 h --> 4/11  c/w biktarvy 1 tab po q 24 h   has appointment  to follow in [facility] once discharge   pulmonary following

## 2024-04-09 NOTE — PROGRESS NOTE ADULT - PROBLEM SELECTOR PLAN 4
- Patient found on fall by  prior to presentation  - Denies any acute bony pain  - CTH umremarkable  - TTE unremarkable  > orthostatics unremarkable  - no further syncopal episodes this admission

## 2024-04-09 NOTE — PROGRESS NOTE ADULT - PROBLEM SELECTOR PLAN 6
Scr ~0.6 on admission -> increased to 1, now stable ~0.9-1  - f/u urine lytes  - post-void residual 5cc, does not appear to be retaining

## 2024-04-09 NOTE — PROGRESS NOTE ADULT - PROBLEM SELECTOR PLAN 2
-> sx of SOB, cough concern for HIV, prelim/ confirmatory test+ for HIV with CD4 of 14, viral load >70K  - ID consulted, continue bactrim-DS q8hr, adding azithromycin  (dc'd on 4/2) and cefepime 3/26 HIV prophylaxis   - cefepime ended on 3/30   - adding Biktarvy 3/26   -sputum PCR +PJP  - continue biktarvy  - Bactrim D q8hr until 4/11   - TB r/o with negative AFB x3 - last negative 4/7, can d/c isolation

## 2024-04-10 ENCOUNTER — TRANSCRIPTION ENCOUNTER (OUTPATIENT)
Age: 64
End: 2024-04-10

## 2024-04-10 LAB
ANION GAP SERPL CALC-SCNC: 15 MMOL/L — HIGH (ref 7–14)
BASOPHILS # BLD AUTO: 0.04 K/UL — SIGNIFICANT CHANGE UP (ref 0–0.2)
BASOPHILS NFR BLD AUTO: 0.6 % — SIGNIFICANT CHANGE UP (ref 0–2)
BUN SERPL-MCNC: 12 MG/DL — SIGNIFICANT CHANGE UP (ref 7–23)
CALCIUM SERPL-MCNC: 9.7 MG/DL — SIGNIFICANT CHANGE UP (ref 8.4–10.5)
CHLORIDE SERPL-SCNC: 98 MMOL/L — SIGNIFICANT CHANGE UP (ref 98–107)
CO2 SERPL-SCNC: 19 MMOL/L — LOW (ref 22–31)
CREAT SERPL-MCNC: 0.9 MG/DL — SIGNIFICANT CHANGE UP (ref 0.5–1.3)
EGFR: 71 ML/MIN/1.73M2 — SIGNIFICANT CHANGE UP
EOSINOPHIL # BLD AUTO: 0.11 K/UL — SIGNIFICANT CHANGE UP (ref 0–0.5)
EOSINOPHIL NFR BLD AUTO: 1.8 % — SIGNIFICANT CHANGE UP (ref 0–6)
GLUCOSE BLDC GLUCOMTR-MCNC: 123 MG/DL — HIGH (ref 70–99)
GLUCOSE BLDC GLUCOMTR-MCNC: 155 MG/DL — HIGH (ref 70–99)
GLUCOSE BLDC GLUCOMTR-MCNC: 234 MG/DL — HIGH (ref 70–99)
GLUCOSE BLDC GLUCOMTR-MCNC: 89 MG/DL — SIGNIFICANT CHANGE UP (ref 70–99)
GLUCOSE SERPL-MCNC: 126 MG/DL — HIGH (ref 70–99)
HCT VFR BLD CALC: 30.6 % — LOW (ref 34.5–45)
HGB BLD-MCNC: 10.4 G/DL — LOW (ref 11.5–15.5)
IANC: 5.09 K/UL — SIGNIFICANT CHANGE UP (ref 1.8–7.4)
IMM GRANULOCYTES NFR BLD AUTO: 0.5 % — SIGNIFICANT CHANGE UP (ref 0–0.9)
LYMPHOCYTES # BLD AUTO: 0.72 K/UL — LOW (ref 1–3.3)
LYMPHOCYTES # BLD AUTO: 11.5 % — LOW (ref 13–44)
MAGNESIUM SERPL-MCNC: 2.1 MG/DL — SIGNIFICANT CHANGE UP (ref 1.6–2.6)
MCHC RBC-ENTMCNC: 28 PG — SIGNIFICANT CHANGE UP (ref 27–34)
MCHC RBC-ENTMCNC: 34 GM/DL — SIGNIFICANT CHANGE UP (ref 32–36)
MCV RBC AUTO: 82.3 FL — SIGNIFICANT CHANGE UP (ref 80–100)
MONOCYTES # BLD AUTO: 0.28 K/UL — SIGNIFICANT CHANGE UP (ref 0–0.9)
MONOCYTES NFR BLD AUTO: 4.5 % — SIGNIFICANT CHANGE UP (ref 2–14)
NEUTROPHILS # BLD AUTO: 5.09 K/UL — SIGNIFICANT CHANGE UP (ref 1.8–7.4)
NEUTROPHILS NFR BLD AUTO: 81.1 % — HIGH (ref 43–77)
NRBC # BLD: 0 /100 WBCS — SIGNIFICANT CHANGE UP (ref 0–0)
NRBC # FLD: 0 K/UL — SIGNIFICANT CHANGE UP (ref 0–0)
PHOSPHATE SERPL-MCNC: 3.5 MG/DL — SIGNIFICANT CHANGE UP (ref 2.5–4.5)
PLATELET # BLD AUTO: 214 K/UL — SIGNIFICANT CHANGE UP (ref 150–400)
POTASSIUM SERPL-MCNC: 4.3 MMOL/L — SIGNIFICANT CHANGE UP (ref 3.5–5.3)
POTASSIUM SERPL-SCNC: 4.3 MMOL/L — SIGNIFICANT CHANGE UP (ref 3.5–5.3)
RBC # BLD: 3.72 M/UL — LOW (ref 3.8–5.2)
RBC # FLD: 20.2 % — HIGH (ref 10.3–14.5)
SODIUM SERPL-SCNC: 132 MMOL/L — LOW (ref 135–145)
WBC # BLD: 6.27 K/UL — SIGNIFICANT CHANGE UP (ref 3.8–10.5)
WBC # FLD AUTO: 6.27 K/UL — SIGNIFICANT CHANGE UP (ref 3.8–10.5)

## 2024-04-10 PROCEDURE — 99232 SBSQ HOSP IP/OBS MODERATE 35: CPT | Mod: GC

## 2024-04-10 RX ADMIN — PANTOPRAZOLE SODIUM 40 MILLIGRAM(S): 20 TABLET, DELAYED RELEASE ORAL at 05:58

## 2024-04-10 RX ADMIN — Medication 81 MILLIGRAM(S): at 12:42

## 2024-04-10 RX ADMIN — Medication 2 TABLET(S): at 21:21

## 2024-04-10 RX ADMIN — Medication 1 SPRAY(S): at 17:55

## 2024-04-10 RX ADMIN — Medication 0.25 MILLIGRAM(S): at 10:28

## 2024-04-10 RX ADMIN — DULOXETINE HYDROCHLORIDE 60 MILLIGRAM(S): 30 CAPSULE, DELAYED RELEASE ORAL at 12:42

## 2024-04-10 RX ADMIN — LACTULOSE 20 GRAM(S): 10 SOLUTION ORAL at 12:42

## 2024-04-10 RX ADMIN — GABAPENTIN 600 MILLIGRAM(S): 400 CAPSULE ORAL at 21:20

## 2024-04-10 RX ADMIN — BICTEGRAVIR SODIUM, EMTRICITABINE, AND TENOFOVIR ALAFENAMIDE FUMARATE 1 TABLET(S): 30; 120; 15 TABLET ORAL at 12:42

## 2024-04-10 RX ADMIN — Medication 3 MILLILITER(S): at 10:28

## 2024-04-10 RX ADMIN — Medication 120 MILLIGRAM(S): at 05:57

## 2024-04-10 RX ADMIN — Medication 20 MILLIGRAM(S): at 05:58

## 2024-04-10 RX ADMIN — Medication 2 TABLET(S): at 05:58

## 2024-04-10 RX ADMIN — Medication 2 TABLET(S): at 14:31

## 2024-04-10 RX ADMIN — Medication 1: at 17:55

## 2024-04-10 RX ADMIN — Medication 2: at 12:40

## 2024-04-10 RX ADMIN — Medication 3 MILLILITER(S): at 04:07

## 2024-04-10 RX ADMIN — RIVAROXABAN 20 MILLIGRAM(S): KIT at 12:41

## 2024-04-10 NOTE — PROGRESS NOTE ADULT - PROBLEM SELECTOR PLAN 4
- Patient found on fall by  prior to presentation  - Denies any acute bony pain  - CTH unremarkable  - TTE unremarkable  > orthostatics unremarkable  - no further syncopal episodes this admission

## 2024-04-10 NOTE — PROGRESS NOTE ADULT - PROBLEM SELECTOR PLAN 2
-> sx of SOB, cough concern for HIV, prelim/ confirmatory test+ for HIV with CD4 of 14, viral load >70K  - ID consulted, continue bactrim-DS q8hr, adding azithromycin  (dc'd on 4/2) and cefepime 3/26 HIV prophylaxis   - cefepime ended on 3/30   - adding Biktarvy 3/26   -sputum PCR +PJP  - continue biktarvy  - Bactrim D q8hr until 4/11   - TB r/o with negative AFB x3 - last negative 4/7, d/c isolation

## 2024-04-10 NOTE — DISCHARGE NOTE NURSING/CASE MANAGEMENT/SOCIAL WORK - PATIENT PORTAL LINK FT
You can access the FollowMyHealth Patient Portal offered by [facility] by registering at the following website: [email or web address]. By joining [employer]’s FollowMyHealth portal, you will also be able to view your health information using other applications (apps) compatible with our system.

## 2024-04-10 NOTE — PROGRESS NOTE ADULT - PROBLEM SELECTOR PLAN 1
- initially P/w SOB, persistent cough  - Recent admission for superimposed mycoplasma PNA on influenza, s/p azithromycin/doxycycline  - RVP unremarkable, CXR -> Bibasilar airspace opacities  - CT Chest -> Multifocal groundglass and consolidative opacities involving all 5 lobes, most predominant in the b/l lower lobes and right upper lobe  - S/p empiric meropenem, doxycyline iso lack of clinical improvement, per ID  - Fungitell elevated  -  initially treated with solumedrol 40mg IV daily, now switched to prednisone 40mg x5 days until 4/7 -> tapered to 20 mg QD x5 days 4/8-4/12,  then decrease to 10 mg x5days and [id number]  > C/w duonebs q6, inhaled budesonide BID, Robitussin-DM q4 prn, and hycodan PRN  > wean HFNC--> O2  NC  > Pulmonology consulted, will continue to appreciate recs, bronchoscopy planned for 3/26 now 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

## 2024-04-10 NOTE — PROGRESS NOTE ADULT - PROBLEM SELECTOR PLAN 5
- serum osm/urine osm/urine Na c/w SIADH, likely 2/2 pneumonia  - fluid restrict to 1L -> now improving with fluid restriction  - trend

## 2024-04-10 NOTE — PROGRESS NOTE ADULT - ATTENDING COMMENTS
64F with PMH of chronic back pain (s/p spinal fusion), HTN, HLD, T2DM, SLE, CVA/TIA, asthma, PE (2019, xarelto), and GBS p/w persistent cough, SOB, diarrhea, and fall 2/2 weakness. Recently hospitalized at [facility] 1/2024 for influenza with superimposed mycoplasma PNA s/p doxycycline course. On arrival, CTH neg, CT chest with diffuse GGO and consolidations. Labs with leukocytosis, normocytic anemia, neg GI PCR, RVP neg, and MRSA neg. TTE with EF 53% and mild G1 diastolic dysfunction. Repeat CT PE neg but with multiple BL nodules and patchy opacities, mostly in the RLL/R base. ID following, appreciate recs. Pulm following, was planned for bronch however now deferred given AIDS dx; weaning steroids, now on prednisone 40mg daily x5d and then 20mg daily x5d with protonix for gastric protection. Receiving nebs around the clock and now weaned from HFNC to 2L NC. HIV+ with CD4 14 and VL 72K --- now on biktarvy. Fungitell elevated -- high suspicion for PJP PNA, now with PCP PCR+; on bactrim treatment through 4/11. Appreciate ID recs -- dc'd azithromycin as pt now on ART. Transitioned hep gtt back to [facility], as no plan for bronch at present given risk >> benefit and being empirically treated now. Quant TB indeterminate, now with 3 negative sputum AFBs - TB isolation precautions discontinued. ENT consulted per pulm recs, recommending no additional intervention, prolonged cough likely in setting of reflux and PNA, already on appropriate supportive care. Patient continuing to intermittently requiring supplemental O2, will need home O2 on discharge. Appreciated SW assistance in obtaining home O2.     Dispo: home with outpatient PT pending delivery of home oxygen, likely 4/11. Will need f/u with Pulm, ID ([facility] clinic set up for 4/16 at 9:30AM), and PCP.     Rest of plan above as per Dr. Pompa.

## 2024-04-10 NOTE — PROGRESS NOTE ADULT - PROBLEM SELECTOR PLAN 12
DVT Ppx: xarelto   Diet: regular DASH, fluid restrict 1L   Dispo: TBD, needs home O2     *ETHICS*- BABATUNDE contacted 4/1, spoke to patient, informed primary team of contacting partner to inform him of diagnosis, attempted to call  on 4/1 with no answer- BABATUNDE made aware*  [facility]  DVT Ppx: xarelto   Diet: regular DASH, fluid restrict 1L   Dispo: pending delivery of home O2     *ETHICS*- BABATUNDE contacted 4/1, spoke to patient, informed primary team of contacting partner to inform him of diagnosis, attempted to call  on 4/1 with no answer- [facility] made aware*  Ashley

## 2024-04-10 NOTE — PROGRESS NOTE ADULT - SUBJECTIVE AND OBJECTIVE BOX
BOB ROSSI  64y  Female    Patient is a 64y old  Female who presents with a chief complaint of SOB, cough (09 Apr 2024 18:32)    INTERVAL HPI/OVERNIGHT EVENTS:  - NAEON  - on 2L NC overnight   - persistently tachycardic with coughing episodes  - states cough is unchanged from 4/9,  denies chest pain. Comfortable at rest but notes considerable difficulty with ambulation, notes dyspnea and increased cough with attempted ambulation  - denies headaches, nasal congestion, abdominal pain, n/v, diarrhea, constipation, subjective fevers     T(C): 36.6 (04-10-24 @ 08:27), Max: 37 (04-09-24 @ 16:18)  HR: 119 (04-10-24 @ 08:27) (103 - 160)  BP: 136/79 (04-10-24 @ 08:27) (117/77 - 143/96)  RR: 18 (04-10-24 @ 08:27) (16 - 22)  SpO2: 91% (04-10-24 @ 08:27) (78% - 100%)  Wt(kg): --Vital Signs Last 24 Hrs  T(C): 36.6 (10 Apr 2024 08:27), Max: 37 (09 Apr 2024 16:18)  T(F): 97.9 (10 Apr 2024 08:27), Max: 98.6 (09 Apr 2024 16:18)  HR: 119 (10 Apr 2024 08:27) (103 - 160)  BP: 136/79 (10 Apr 2024 08:27) (117/77 - 143/96)  BP(mean): --  RR: 18 (10 Apr 2024 08:27) (16 - 22)  SpO2: 91% (10 Apr 2024 08:27) (78% - 100%)    Parameters below as of 10 Apr 2024 08:27  Patient On (Oxygen Delivery Method): nasal cannula  O2 Flow (L/min): 2    PHYSICAL EXAM:  GENERAL: NAD, lying in bed comfortably   HEAD:  Atraumatic, normocephalic  EYES: EOMI, conjunctiva and sclera clear  ENT: Moist mucous membranes  NECK: Supple, no JVD  HEART: tachycardic, regular rhythm, no murmurs, rubs, or gallops  LUNGS: frequent cough, difficulty completing sentences due to interruptions from cough. Slight expiratory wheeze bilaterally. No crackles or rhonchi  ABDOMEN: Soft, nontender, nondistended, +BS  EXTREMITIES: 2+ peripheral pulses bilaterally. No clubbing, cyanosis, or edema  NERVOUS SYSTEM:  A&Ox3, no focal deficits   SKIN: No rashes or lesions    Consultant(s) Notes Reviewed:  [x ] YES  [ ] NO  Care Discussed with Consultants/Other Providers [ x] YES  [ ] NO    LABS:                        10.4   6.27  )-----------( 214      ( 10 Apr 2024 07:38 )             30.6     04-10    132<L>  |  98  |  12  ----------------------------<  126<H>  4.3   |  19<L>  |  0.90    Ca    9.7      10 Apr 2024 07:38  Phos  3.5     04-10  Mg     2.10     04-10          Urinalysis Basic - ( 10 Apr 2024 07:38 )    Color: x / Appearance: x / SG: x / pH: x  Gluc: 126 mg/dL / Ketone: x  / Bili: x / Urobili: x   Blood: x / Protein: x / Nitrite: x   Leuk Esterase: x / RBC: x / WBC x   Sq Epi: x / Non Sq Epi: x / Bacteria: x      CAPILLARY BLOOD GLUCOSE      POCT Blood Glucose.: 123 mg/dL (10 Apr 2024 07:38)  POCT Blood Glucose.: 123 mg/dL (09 Apr 2024 22:18)  POCT Blood Glucose.: 148 mg/dL (09 Apr 2024 17:10)  POCT Blood Glucose.: 224 mg/dL (09 Apr 2024 11:56)        Urinalysis Basic - ( 10 Apr 2024 07:38 )    Color: x / Appearance: x / SG: x / pH: x  Gluc: 126 mg/dL / Ketone: x  / Bili: x / Urobili: x   Blood: x / Protein: x / Nitrite: x   Leuk Esterase: x / RBC: x / WBC x   Sq Epi: x / Non Sq Epi: x / Bacteria: x        RADIOLOGY & ADDITIONAL TESTS:    Imaging Personally Reviewed:  [ ] YES  [ ] NO    HEALTH ISSUES - PROBLEM Dx:  Acute respiratory failure with hypoxia    AIDS    Dry cough    Syncope    Hyponatremia    LETTY (acute kidney injury)    Diarrhea    Pulmonary embolism    History of TIA (transient ischemic attack)    HTN (hypertension)    Type 2 diabetes mellitus    Need for prophylactic measure

## 2024-04-10 NOTE — PROGRESS NOTE ADULT - PROBLEM SELECTOR PLAN 6
Scr ~0.6 on admission -> increased to 1, now stable ~0.9-1  - FeNa 0.4%, prerenal vs PILI  - post-void residual 5cc, does not appear to be retaining  - trend

## 2024-04-10 NOTE — PROGRESS NOTE ADULT - TIME BILLING
review of laboratory data, radiology results, discussion with primary team\patient, and monitoring for potential decompensation. Interventions were performed as documented above
review of laboratory data, radiology results, consultants' recommendations, documentation in [place], discussion with patient/ACP and interdisciplinary staff (such as , social workers, etc). Interventions were performed as documented above.
review of laboratory data, radiology results, consultants' recommendations, documentation in [place], discussion with patient/ACP and interdisciplinary staff (such as , social workers, etc). Interventions were performed as documented above.
review of laboratory data, radiology results, consultants' recommendations, documentation in [place], discussion with patient/ACP and interdisciplinary staff (such as , social workers, etc). Interventions were performed as documented above.
review of laboratory data, radiology results, consultants' recommendations, documentation in [place], discussion with patient/ACP and interdisciplinary staff (such as , social workers, etc). Interventions were performed as documented above.
review of laboratory data, radiology results, consultants' recommendations, documentation in [place], discussion with patient/ACP and interdisciplinary staff (such as , social workers, etc). Interventions were performed as documented above.
review of laboratory data, radiology results, consultants' recommendations, documentation in [place], discussion with patient/ACP and interdisciplinary staff (such as , social workers, etc). Interventions were performed as documented above.
review of laboratory data, radiology results, consultants' recommendations, documentation in [place], discussion with patient/ACP and interdisciplinary staff (such as , social workers, etc). Interventions were performed as documented above.
review of laboratory data, radiology results, consultants' recommendations, documentation in [place], discussion with patient/ACP and interdisciplinary staff (such as , social workers, etc). Interventions were performed as documented above.
review of laboratory data, radiology results, consultants' recommendations, documentation in [place], discussion with patient/ACP and interdisciplinary staff (such as , social workers, etc). Interventions were performed as documented above.
Face to face encounter. Reviewed labs, vitals, imaging. Reviewed consultant recs. Discussed plan of care with patient. Documentation in [place].
review of laboratory data, radiology results, consultants' recommendations, documentation in [place], discussion with patient/ACP and interdisciplinary staff (such as , social workers, etc). Interventions were performed as documented above.
review of laboratory data, radiology results, consultants' recommendations, documentation in [place], discussion with patient/ACP and interdisciplinary staff (such as , social workers, etc). Interventions were performed as documented above.
review of laboratory data, radiology results, consultants' recommendations, documentation in [place], discussion with patient/resident and interdisciplinary staff (such as , social workers, etc). Interventions were performed as documented above.
review of laboratory data, radiology results, consultants' recommendations, documentation in [place], discussion with patient/resident and interdisciplinary staff (such as , social workers, etc). Interventions were performed as documented above.
review of laboratory data, radiology results, consultants' recommendations, documentation in [place], discussion with patient/ACP and interdisciplinary staff (such as , social workers, etc). Interventions were performed as documented above.
review of laboratory data, radiology results, consultants' recommendations, documentation in [place], discussion with patient/ACP and interdisciplinary staff (such as , social workers, etc). Interventions were performed as documented above.
review of laboratory data, radiology results, consultants' recommendations, documentation in [place], discussion with patient/resident and interdisciplinary staff (such as , social workers, etc). Interventions were performed as documented above.

## 2024-04-10 NOTE — DISCHARGE NOTE NURSING/CASE MANAGEMENT/SOCIAL WORK - CASE MANAGER'S NAME
CHF Week 3 Survey      Responses   [facility] patient discharged from?  [place]   Does the patient have one of the following disease processes/diagnoses(primary or secondary)?  CHF   Week 3 attempt successful?  Yes   Call start time  0927   Call end time  0929   Discharge diagnosis  CHF   Meds reviewed with patient/caregiver?  Yes   Is the patient taking all medications as directed (includes completed medication regime)?  Yes   Has the patient kept scheduled appointments due by today?  Yes   Pulse Ox monitoring  None   Psychosocial issues?  No   Comments  Pt has SOA w/exertion, which is new to her she reports. Having LHC 4-21   What is the patient's perception of their health status since discharge?  Improving   Is the patient weighing daily?  Yes   Is the patient able to teach back Heart Failure diet management?  Yes   Is the patient/caregiver able to teach back the hierarchy of who to call/visit for symptoms/problems? PCP, Specialist, Home health nurse, Urgent Care, ED, 911  Yes   CHF Week 3 call completed?  Yes   Revoked  No further contact(revokes)-requires comment   Is the patient interested in additional calls from an ambulatory ?  NOTE:  applies to high risk patients requiring additional follow-up.  No   Graduated/Revoked comments  goals met   Wrap up additional comments  .          Hui Prabhakar, RN   Pippa Kaminski RN

## 2024-04-10 NOTE — PROGRESS NOTE ADULT - TIME-BASED BILLING (NON-CRITICAL CARE)
Radiology Post-Procedure Note    Pre Op Diagnosis: Ascites  Post Op Diagnosis: Same    Procedure: Paracentesis    Procedure performed by: Justin Rivas MD and George Peña MD     Following informed consent, the patient underwent sterile preparation and 1% lidocaine for local anesthesia. Ultrasound guidance was used to insert a 5-[place] Yueh catheter into the peritoneal space. 5.6L of clear, yellow-colored fluid was successfully removed with no complications.    Written Informed Consent Obtained: Yes  Specimen Removed: YES; 20cc's of ascites collected and sent to lab per ordering physican request.  Estimated Blood Loss: Minimal    Findings:   Successful paracentesis.  Albumin administered PRN per protocol.    Patient tolerated procedure well.    Justin Rivas M.D.  PGY-2 Radiology  Pager# [phone]    
Time-based billing (NON-critical care)

## 2024-04-11 VITALS — OXYGEN SATURATION: 98 %

## 2024-04-11 LAB
GLUCOSE BLDC GLUCOMTR-MCNC: 135 MG/DL — HIGH (ref 70–99)
GLUCOSE BLDC GLUCOMTR-MCNC: 164 MG/DL — HIGH (ref 70–99)
GLUCOSE BLDC GLUCOMTR-MCNC: 253 MG/DL — HIGH (ref 70–99)

## 2024-04-11 PROCEDURE — 99239 HOSP IP/OBS DSCHRG MGMT >30: CPT | Mod: GC

## 2024-04-11 RX ORDER — LACTULOSE 10 G/15ML
30 SOLUTION ORAL
Qty: 1 | Refills: 0
Start: 2024-04-11

## 2024-04-11 RX ORDER — ALBUTEROL 90 UG/1
2 AEROSOL, METERED ORAL
Qty: 1 | Refills: 0
Start: 2024-04-11

## 2024-04-11 RX ORDER — ALPRAZOLAM 0.25 MG
1 TABLET ORAL
Refills: 0 | DISCHARGE

## 2024-04-11 RX ORDER — NALOXONE HYDROCHLORIDE 4 MG/.1ML
1 SPRAY NASAL
Qty: 1 | Refills: 0
Start: 2024-04-11

## 2024-04-11 RX ORDER — ALBUTEROL 90 UG/1
3 AEROSOL, METERED ORAL
Refills: 0 | DISCHARGE

## 2024-04-11 RX ORDER — NEBIVOLOL HYDROCHLORIDE 5 MG/1
1 TABLET ORAL
Refills: 0 | DISCHARGE

## 2024-04-11 RX ADMIN — Medication 3 MILLILITER(S): at 10:02

## 2024-04-11 RX ADMIN — POLYETHYLENE GLYCOL 3350 17 GRAM(S): 17 POWDER, FOR SOLUTION ORAL at 11:23

## 2024-04-11 RX ADMIN — Medication 1 SPRAY(S): at 16:07

## 2024-04-11 RX ADMIN — DULOXETINE HYDROCHLORIDE 60 MILLIGRAM(S): 30 CAPSULE, DELAYED RELEASE ORAL at 11:23

## 2024-04-11 RX ADMIN — PANTOPRAZOLE SODIUM 40 MILLIGRAM(S): 20 TABLET, DELAYED RELEASE ORAL at 05:32

## 2024-04-11 RX ADMIN — Medication 3 MILLILITER(S): at 03:40

## 2024-04-11 RX ADMIN — Medication 81 MILLIGRAM(S): at 11:23

## 2024-04-11 RX ADMIN — Medication 2 TABLET(S): at 05:32

## 2024-04-11 RX ADMIN — BICTEGRAVIR SODIUM, EMTRICITABINE, AND TENOFOVIR ALAFENAMIDE FUMARATE 1 TABLET(S): 30; 120; 15 TABLET ORAL at 11:24

## 2024-04-11 RX ADMIN — RIVAROXABAN 20 MILLIGRAM(S): KIT at 11:23

## 2024-04-11 RX ADMIN — Medication 0.25 MILLIGRAM(S): at 10:02

## 2024-04-11 RX ADMIN — Medication 1: at 18:07

## 2024-04-11 RX ADMIN — Medication 3: at 12:56

## 2024-04-11 RX ADMIN — Medication 3 MILLILITER(S): at 15:38

## 2024-04-11 RX ADMIN — Medication 120 MILLIGRAM(S): at 05:32

## 2024-04-11 RX ADMIN — Medication 20 MILLIGRAM(S): at 05:32

## 2024-04-11 RX ADMIN — LACTULOSE 20 GRAM(S): 10 SOLUTION ORAL at 11:22

## 2024-04-11 NOTE — PROGRESS NOTE ADULT - PROBLEM SELECTOR PLAN 1
- initially P/w SOB, persistent cough  - Recent admission for superimposed mycoplasma PNA on influenza, s/p azithromycin/doxycycline  - RVP unremarkable, CXR -> Bibasilar airspace opacities  - CT Chest -> Multifocal groundglass and consolidative opacities involving all 5 lobes, most predominant in the b/l lower lobes and right upper lobe  - S/p empiric meropenem, doxycyline iso lack of clinical improvement, per ID  - Fungitell elevated  -  initially treated with solumedrol 40mg IV daily, now switched to prednisone 40mg x5 days until 4/7 -> tapered to 20 mg QD x5 days 4/8-4/12,  then decrease to 10 mg x5days and [id number]  > C/w duonebs q6, inhaled budesonide BID, Robitussin-DM q4 prn, and hycodan PRN  > wean HFNC--> O2  NC  > Pulmonology consulted, will continue to appreciate recs, bronchoscopy planned for 3/26 now 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 - initially P/w SOB, persistent cough  - Recent admission for superimposed mycoplasma PNA on influenza, s/p azithromycin/doxycycline  - RVP unremarkable, CXR -> Bibasilar airspace opacities  - CT Chest -> Multifocal groundglass and consolidative opacities involving all 5 lobes, most predominant in the b/l lower lobes and right upper lobe  - S/p empiric meropenem, doxycyline iso lack of clinical improvement, per ID  - Fungitell elevated  -  initially treated with solumedrol 40mg IV daily, now switched to prednisone 40mg x5 days until 4/7 -> tapered to 20 mg QD x5 days 4/8-4/11, then decrease to 10 mg x5days and [id number]  > C/w duonebs q6, inhaled budesonide BID, Robitussin-DM q4 prn, and hycodan PRN  > wean HFNC--> O2  NC  > Pulmonology consulted, will continue to appreciate recs, bronchoscopy planned for 3/26 now 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

## 2024-04-11 NOTE — PROGRESS NOTE ADULT - ATTENDING COMMENTS
64F with PMH of chronic back pain (s/p spinal fusion), HTN, HLD, T2DM, SLE, CVA/TIA, asthma, PE (2019, xarelto), and GBS p/w persistent cough, SOB, diarrhea, and fall 2/2 weakness. Recently hospitalized at [facility] 1/2024 for influenza with superimposed mycoplasma PNA s/p doxycycline course. Pulm following, was planned for bronch however now deferred given AIDS dx; weaning steroids, now on steroid taper x5d with protonix for gastric protection.  HIV+ with CD4 14 and VL 72K --- now on biktarvy. Completed Bactrim for PJP PNA. Will be d/cherise home on steroid taper. Home O2 set up and pt taught how to use compressor.         Dispo: home with outpatient PT; taught how to use O2 compressor.     Pt is medically stable for discharge. Will f/u with o/p ID. A total of 40 minutes were spent on discharge coordination.

## 2024-04-11 NOTE — PROGRESS NOTE ADULT - PROBLEM SELECTOR PLAN 6
Scr ~0.6 on admission -> increased to 1, now stable ~0.9-1  - FeNa 0.4%, prerenal vs PILI  - post-void residual 5cc, does not appear to be retaining  - trend Scr ~0.6 on admission -> increased to 1, now stable ~0.9-1  - FeNa 0.4%, prerenal vs PILI  - post-void residual 5cc, does not appear to be retaining  - trend, stable

## 2024-04-11 NOTE — PROGRESS NOTE ADULT - PROBLEM SELECTOR PROBLEM 1
Acute respiratory failure with hypoxia

## 2024-04-11 NOTE — PROGRESS NOTE ADULT - PROBLEM SELECTOR PLAN 2
-> sx of SOB, cough concern for HIV, prelim/ confirmatory test+ for HIV with CD4 of 14, viral load >70K  - ID consulted, continue bactrim-DS q8hr, adding azithromycin  (dc'd on 4/2) and cefepime 3/26 HIV prophylaxis   - cefepime ended on 3/30   - adding Biktarvy 3/26   -sputum PCR +PJP  - continue biktarvy  - Bactrim D q8hr until 4/11   - TB r/o with negative AFB x3 - last negative 4/7 -> sx of SOB, cough concern for HIV, prelim/ confirmatory test+ for HIV with CD4 of 14, viral load >70K  - ID consulted, continue bactrim-DS q8hr, adding azithromycin  (dc'd on 4/2) and cefepime 3/26 HIV prophylaxis   - cefepime ended on 3/30   - adding Biktarvy 3/26   -sputum PCR +PJP  - continue biktarvy  - Bactrim D q8hr until 4/11 - completed   - TB r/o with negative AFB x3 - last negative 4/7

## 2024-04-11 NOTE — PROGRESS NOTE ADULT - PROBLEM SELECTOR PLAN 5
- serum osm/urine osm/urine Na c/w SIADH, likely 2/2 pneumonia  - fluid restrict to 1L -> now improving with fluid restriction  - trend - serum osm/urine osm/urine Na c/w SIADH, likely 2/2 pneumonia  - fluid restrict to 1L -> now improved with fluid restriction  - trend

## 2024-04-11 NOTE — PROGRESS NOTE ADULT - ATTENDING SUPERVISION STATEMENT
Fellow
Resident

## 2024-04-11 NOTE — PROGRESS NOTE ADULT - PROBLEM SELECTOR PLAN 3
- persistent coughing throughout duration of admission  - hycodan prn  - ENT consulted to eval upper airway dx in setting of PJP  >s/p fiberoptic laryngoscopy unremarkable, no new recs per ENT, cough may take weeks to resolve - persistent coughing throughout duration of admission  - hycodan prn  - ENT consulted to eval upper airway dx in setting of PJP  >s/p fiberoptic laryngoscopy unremarkable, no new recs per ENT, cough may take weeks to resolve  - will send albuterol rescue inhaler on dc

## 2024-04-11 NOTE — PROGRESS NOTE ADULT - NUTRITIONAL ASSESSMENT
This patient has been assessed with a concern for Malnutrition and has been determined to have a diagnosis/diagnoses of Severe protein-calorie malnutrition.    This patient is being managed with:   Diet Consistent Carbohydrate/No Snacks-  1000mL Fluid Restriction ([id number])  Entered: Apr 9 2024  8:48AM  
This patient has been assessed with a concern for Malnutrition and has been determined to have a diagnosis/diagnoses of Severe protein-calorie malnutrition.    This patient is being managed with:   Diet Consistent Carbohydrate/No Snacks-  1000mL Fluid Restriction ([id number])  Entered: Apr 9 2024  8:48AM  
This patient has been assessed with a concern for Malnutrition and has been determined to have a diagnosis/diagnoses of Severe protein-calorie malnutrition.    This patient is being managed with:   Diet Consistent Carbohydrate/No Snacks-  Entered: Mar 19 2024  9:14AM  
This patient has been assessed with a concern for Malnutrition and has been determined to have a diagnosis/diagnoses of Severe protein-calorie malnutrition.    This patient is being managed with:   Diet Consistent Carbohydrate/No Snacks-  1000mL Fluid Restriction ([id number])  Entered: Apr 9 2024  8:48AM  
This patient has been assessed with a concern for Malnutrition and has been determined to have a diagnosis/diagnoses of Severe protein-calorie malnutrition.    This patient is being managed with:   Diet Consistent Carbohydrate/No Snacks-  Entered: Mar 19 2024  9:14AM  

## 2024-04-11 NOTE — PROGRESS NOTE ADULT - REASON FOR ADMISSION
SOB, cough

## 2024-04-11 NOTE — PROGRESS NOTE ADULT - PROVIDER SPECIALTY LIST ADULT
Infectious Disease
Pulmonology
Infectious Disease
Internal Medicine
Pulmonology
Infectious Disease
Internal Medicine

## 2024-04-11 NOTE — PROGRESS NOTE ADULT - PROBLEM SELECTOR PLAN 12
DVT Ppx: xarelto   Diet: regular DASH, fluid restrict 1L   Dispo: pending delivery of home O2, likely d/c today    *ETHICS*- BABATUNDE contacted 4/1, spoke to patient, informed primary team of contacting partner to inform him of diagnosis, attempted to call  on 4/1 with no answer- BABATUNDE made aware*  Ashley

## 2024-04-11 NOTE — PROGRESS NOTE ADULT - SUBJECTIVE AND OBJECTIVE BOX
ROSSI ROJAS  64y  Female    Patient is a 64y old  Female who presents with a chief complaint of SOB, cough (10 Apr 2024 09:19)    INTERVAL HPI/OVERNIGHT EVENTS:  - NAEON  - afebrile, VSS, on 2-4L NC overnight  - feeling a little better today, able to speak longer sentences without coughing, still endorses dyspnea on exertion. Denies chest pain   - no new concerns/complaints    T(C): 36.4 (04-10-24 @ 21:00), Max: 36.6 (04-10-24 @ 08:27)  HR: 82 (04-11-24 @ 03:40) (82 - 119)  BP: 140/90 (04-10-24 @ 21:00) (128/54 - 140/90)  RR: 18 (04-10-24 @ 21:00) (18 - 18)  SpO2: 98% (04-11-24 @ 03:40) (91% - 100%)  Wt(kg): --Vital Signs Last 24 Hrs  T(C): 36.4 (10 Apr 2024 21:00), Max: 36.6 (10 Apr 2024 08:27)  T(F): 97.5 (10 Apr 2024 21:00), Max: 97.9 (10 Apr 2024 08:27)  HR: 82 (11 Apr 2024 03:40) (82 - 119)  BP: 140/90 (10 Apr 2024 21:00) (128/54 - 140/90)  BP(mean): --  RR: 18 (10 Apr 2024 21:00) (18 - 18)  SpO2: 98% (11 Apr 2024 03:40) (91% - 100%)    Parameters below as of 11 Apr 2024 03:40  Patient On (Oxygen Delivery Method): nasal cannula    PHYSICAL EXAM:  GENERAL: NAD, lying in bed comfortably   HEAD:  Atraumatic, normocephalic  EYES: EOMI, conjunctiva and sclera clear  ENT: Moist mucous membranes  NECK: Supple, no JVD  HEART: RRR, no murmurs, rubs, or gallops  LUNGS: intermittent cough, frequency of cough improved from 4/10. Slight expiratory wheeze bilaterally which improves with cough. No crackles or rhonchi  ABDOMEN: Soft, nontender, nondistended, +BS  EXTREMITIES: 2+ peripheral pulses bilaterally. No clubbing, cyanosis, or edema  NERVOUS SYSTEM:  A&Ox3, no focal deficits   SKIN: No rashes or lesions    Consultant(s) Notes Reviewed:  [x ] YES  [ ] NO  Care Discussed with Consultants/Other Providers [ x] YES  [ ] NO    LABS:                        10.4   6.27  )-----------( 214      ( 10 Apr 2024 07:38 )             30.6     04-10    132<L>  |  98  |  12  ----------------------------<  126<H>  4.3   |  19<L>  |  0.90    Ca    9.7      10 Apr 2024 07:38  Phos  3.5     04-10  Mg     2.10     04-10          Urinalysis Basic - ( 10 Apr 2024 07:38 )    Color: x / Appearance: x / SG: x / pH: x  Gluc: 126 mg/dL / Ketone: x  / Bili: x / Urobili: x   Blood: x / Protein: x / Nitrite: x   Leuk Esterase: x / RBC: x / WBC x   Sq Epi: x / Non Sq Epi: x / Bacteria: x      CAPILLARY BLOOD GLUCOSE      POCT Blood Glucose.: 89 mg/dL (10 Apr 2024 21:24)  POCT Blood Glucose.: 155 mg/dL (10 Apr 2024 17:27)  POCT Blood Glucose.: 234 mg/dL (10 Apr 2024 11:42)        Urinalysis Basic - ( 10 Apr 2024 07:38 )    Color: x / Appearance: x / SG: x / pH: x  Gluc: 126 mg/dL / Ketone: x  / Bili: x / Urobili: x   Blood: x / Protein: x / Nitrite: x   Leuk Esterase: x / RBC: x / WBC x   Sq Epi: x / Non Sq Epi: x / Bacteria: x        RADIOLOGY & ADDITIONAL TESTS:    Imaging Personally Reviewed:  [ ] YES  [ ] NO    HEALTH ISSUES - PROBLEM Dx:  Acute respiratory failure with hypoxia    AIDS    Dry cough    Syncope    Hyponatremia    LETTY (acute kidney injury)    Diarrhea    Pulmonary embolism    History of TIA (transient ischemic attack)    HTN (hypertension)    Type 2 diabetes mellitus    Need for prophylactic measure

## 2024-04-12 ENCOUNTER — NON-APPOINTMENT (OUTPATIENT)
Age: 64
End: 2024-04-12

## 2024-04-12 RX ORDER — PANTOPRAZOLE SODIUM 20 MG/1
1 TABLET, DELAYED RELEASE ORAL
Qty: 30 | Refills: 0
Start: 2024-04-12 | End: 2024-05-11

## 2024-04-12 RX ORDER — BICTEGRAVIR SODIUM, EMTRICITABINE, AND TENOFOVIR ALAFENAMIDE FUMARATE 30; 120; 15 MG/1; MG/1; MG/1
1 TABLET ORAL
Qty: 30 | Refills: 0
Start: 2024-04-12 | End: 2024-05-11

## 2024-04-12 NOTE — PHARMACOTHERAPY INTERVENTION NOTE - COMMENTS
Discharge medications reviewed with the patient over the phone. Current medication schedule was discussed in detail including: medication name, indication, dose, administration times, treatment duration, side effects, and special instructions. Educated patient on rivaroxaban including the purpose and administration. Discussed adverse effects in detail and when to seek medical attention (blood in stool, vomit, etc.). Patient understands importance of medication adherence. Questions and concerns were answered and addressed. Patient demonstrated understanding.     New medications: adan Lopez, PharmD, BCPS  Clinical Pharmacy Specialist  [id number]

## 2024-04-15 ENCOUNTER — NON-APPOINTMENT (OUTPATIENT)
Age: 64
End: 2024-04-15

## 2024-04-16 ENCOUNTER — APPOINTMENT (OUTPATIENT)
Dept: INFECTIOUS DISEASE | Facility: CLINIC | Age: 64
End: 2024-04-16

## 2024-04-17 ENCOUNTER — NON-APPOINTMENT (OUTPATIENT)
Age: 64
End: 2024-04-17

## 2024-05-05 NOTE — PATIENT PROFILE ADULT - CENTRAL VENOUS CATHETER/PICC LINE
Initial SW/CM Assessment/Plan of Care Note     Baseline Assessment  65 year old admitted 5/3/2024 as Inpatient with a diagnosis of Pyelonephritis .   Prior to admission patient was living with Spouse and residing at House    .  Patient does  have a Power of  for Healthcare.  Document is activated.  Agent is Leonelwilber Suzanne. Patient’s Primary Care Provider is Bernheim, Bruce S, MD.     Progress Note  Chart reviewed; met with patient at bedside, CM  introduce role, verified face sheet information and patient prior level of function, initiated safe dc planning conversations. DC plans /goal is go home w/ family support , case discussed with RN, continue with plan of care and medical management; further recs pending.CM to follow up and re-eval discharge needs.    Plan  Patient/Family Discharge Goal: Home        SW/CM - Recommendations for Discharge: DME     Barriers to Discharge  Identified Barriers to Discharge/Transition Planning:          Anticipate patient will need post-hospital services. Necessary services are available.  Anticipate patient can return to the environment from which patient entered the hospital.   Anticipate patient can provide self-care at discharge.    Refer to SW/CM Flowsheet for objective data.     Medical History  Past Medical History:   Diagnosis Date    Diabetes mellitus  (CMD)     Dry eye syndrome of both eyes     Essential (primary) hypertension     Hematuria, microscopic 03/11/2007    Hodgkin's disease  (CMD)     s/p chemo and radiation about 30 yrs ago    Hodgkin's disease of lymph nodes of multiple sites  (CMD) 08/27/2003    Hypercholesterolemia     Incomplete bladder emptying     Malignant lymphoma  (CMD) 1991    Nuclear senile cataract of both eyes     Renal cyst, acquired     Thyroid disease     Urge incontinence     Urinary urgency        Prior to Admission Status  Functional Status  Ambulation: Independent/Self  Bathing: Independent/Self  Dressing: Independent/Self  Toileting: 
Independent/Self  Meal Preparation: Independent/Self  Shopping: Independent/Self  Medication Preparation: Independent/Self  Medication Administration: Independent/Self  Housekeeping: Independent/Self  Laundry: Independent/Self    Agency/Support  Type of Services Prior to Hospitalization: None               Support Systems: Spouse, Family members   Home Devices/Equipment: None         Mobility Assist Devices: None  Sensory Support Devices: Eyeglasses    Prior Function  Bed Mobility: Independent (05/05/24 0817 : Rachel Chavez, MELY)  Transfers: Independent (05/05/24 0817 : Rachel Chavez, PT)  Ambulation in the Home: Independent (05/05/24 0817 : Rachel Chavez, PT)  Ambulation in the Community: Independent (05/05/24 0817 : Rachel Chavez, MELY)    Current Function  independent      Insurance  Primary:  [employer]  Secondary: N/A    Disposition Recommendations:  ALESIA/DIANE recommendation for discharge: home          
None
no

## 2024-05-11 LAB
CULTURE RESULTS: SIGNIFICANT CHANGE UP
SPECIMEN SOURCE: SIGNIFICANT CHANGE UP

## 2024-05-13 ENCOUNTER — NON-APPOINTMENT (OUTPATIENT)
Age: 64
End: 2024-05-13

## 2024-05-14 ENCOUNTER — LABORATORY RESULT (OUTPATIENT)
Age: 64
End: 2024-05-14

## 2024-05-14 ENCOUNTER — APPOINTMENT (OUTPATIENT)
Dept: INFECTIOUS DISEASE | Facility: CLINIC | Age: 64
End: 2024-05-14
Payer: MEDICARE

## 2024-05-14 VITALS
DIASTOLIC BLOOD PRESSURE: 76 MMHG | HEIGHT: 68 IN | TEMPERATURE: 97.6 F | WEIGHT: 205 LBS | SYSTOLIC BLOOD PRESSURE: 121 MMHG | BODY MASS INDEX: 31.07 KG/M2 | RESPIRATION RATE: 16 BRPM | HEART RATE: 110 BPM | OXYGEN SATURATION: 99 %

## 2024-05-14 DIAGNOSIS — B59 PNEUMOCYSTOSIS: ICD-10-CM

## 2024-05-14 DIAGNOSIS — Z78.9 OTHER SPECIFIED HEALTH STATUS: ICD-10-CM

## 2024-05-14 DIAGNOSIS — Z01.00 ENCOUNTER FOR EXAMINATION OF EYES AND VISION W/OUT ABNORMAL FINDINGS: ICD-10-CM

## 2024-05-14 PROCEDURE — 90715 TDAP VACCINE 7 YRS/> IM: CPT

## 2024-05-14 PROCEDURE — G0010: CPT

## 2024-05-14 PROCEDURE — 90739 HEPB VACC 2/4 DOSE ADULT IM: CPT

## 2024-05-14 PROCEDURE — 90472 IMMUNIZATION ADMIN EACH ADD: CPT

## 2024-05-14 NOTE — HISTORY OF PRESENT ILLNESS
[FreeTextEntry1] : 65 yo woman with SLE, DM, h/o PE, CVA with left sided weakness presented 3/19 with fever, dry cough, SOB. She was found to have new diagnosis of HIV on 3/25/24 - VL 73K, CD4 count 14, initiated on Biktarvy 3/26. Also found with acute hypoxic respiratory failure caused by PCP pneumonia confirmed by sputum PCR on 3/25 with elevated fungitell - completed a 3 week course of Bactrim 3/22-4/11 and course of prednisone. Also completed course of cefepime for superimposed bacterial coverage. Blood cultures no growth  Serum crypt ag neg, quant gold indeterminate, sputum AFB  smear x 3  neg, syphilis screen neg. Hep B non-immune.  Since discharge she has been feeling overall well. She continues to use oxygen when ambulating as her saturation drops moving around. At rest her saturation is mid-90s off oxygen. She has been consistently taking Biktarvy daily, without missed doses. She has been off Bactrim since the hospitalization and soon afterwards finished a course of prednisone.  She is , however has not had sex with him ever in the past. She lives with her  and her son often visits.  In the 1990s she had Guillan-[place] syndrome, etiology was unclear but there was concern it was from Pneumovax.

## 2024-05-14 NOTE — END OF VISIT
[] : Fellow [FreeTextEntry3] : 65 yo woman with SLE, DM, h/o PE, CVA with left sided weakness admitted in March to Mid April- diagnosed with HIV and PJP. Treated with bactrim + steroid taper, started on Biktarvy. Has been tolerating well, missed only 1 dose- ran out yesterday. Uses home O2, was not transitioned to secondary PCP prophylaxis.  Continue Biktarvy, start Bactrim ss daily (had troulel swallowing DS tab, no OT or other dysphagia), F/U pulmonary, B/L VL was 73,000 and CD4 14. Check VL, try for RNA GT (if unable next labs check archive), CD4, LFTs. renal function onmeds, Heplisav tday, if VL suppressed- [id number] next visit.

## 2024-05-14 NOTE — ASSESSMENT
[FreeTextEntry1] : 63 yo woman with SLE, DM, h/o PE, CVA with left sided weakness presents for new HIV diagnosis and PCP pneumonia follow-up.  HIV with AIDS New diagnosis of HIV on 3/25/24 - VL 73K, CD4 count 14, initiated on Biktarvy 3/26 with good compliance. - check VL, CD4 count, CMP, CBC, A1c, GC/chlamydia, syphilis - continue Biktarvy - check genosure prime  PCP Pneumonia Confirmed by sputum PCR on 3/25 - s/p 3 week course of Bactrim 3/22-4/11 and prednisone course - restart Bactrim SS tab daily per pt preference, to continue until VL undetectable and CD4 count >200 for 3 months - patient will follow-up with pulmonologist for oxygen titration  Indeterminate QuantiFERON gold In setting of steroid use and HIV - she did not respond to the test - Repeat QuantiFERON today  HCM Vaccines: - Check Hep A serology - Hep B NOT immune, give 1st dose Heplisav today, second dose in 1 month - Tdap today (5/14/24) - Needs PCV-20 - Needs Menquadfi - Needs Shingrix - Further vaccines to be given when immune system reconstitutes  Screenings: To be addressed at future visits - CRC: - Mammo: - Pap:  RTC 1 month.

## 2024-05-14 NOTE — PHYSICAL EXAM
[General Appearance - Alert] : alert [General Appearance - Well Nourished] : well nourished [Sclera] : the sclera and conjunctiva were normal [Exaggerated Use Of Accessory Muscles For Inspiration] : no accessory muscle use [Auscultation Breath Sounds / Voice Sounds] : lungs were clear to auscultation bilaterally [Heart Rate And Rhythm] : heart rate was normal and rhythm regular [Heart Sounds] : normal S1 and S2 [Edema] : there was no peripheral edema [Abdomen Soft] : soft [Abdomen Tenderness] : non-tender [] : no rash [No Focal Deficits] : no focal deficits [Oriented To Time, Place, And Person] : oriented to person, place, and time [FreeTextEntry1] : No thrush

## 2024-05-15 LAB
CULTURE RESULTS: SIGNIFICANT CHANGE UP
SPECIMEN SOURCE: SIGNIFICANT CHANGE UP

## 2024-05-20 RX ORDER — DULOXETINE HYDROCHLORIDE 60 MG/1
60 CAPSULE, DELAYED RELEASE PELLETS ORAL DAILY
Refills: 0 | Status: ACTIVE | COMMUNITY
Start: 2024-05-20

## 2024-05-20 RX ORDER — TIOTROPIUM BROMIDE INHALATION SPRAY 1.56 UG/1
1.25 SPRAY, METERED RESPIRATORY (INHALATION) DAILY
Refills: 0 | Status: ACTIVE | COMMUNITY
Start: 2024-05-20

## 2024-05-20 RX ORDER — ALBUTEROL SULFATE 90 UG/1
108 (90 BASE) INHALANT RESPIRATORY (INHALATION)
Refills: 0 | Status: ACTIVE | COMMUNITY
Start: 2024-05-20

## 2024-05-20 RX ORDER — CYCLOBENZAPRINE HYDROCHLORIDE 10 MG/1
10 TABLET, FILM COATED ORAL DAILY
Refills: 0 | Status: ACTIVE | COMMUNITY
Start: 2024-05-20

## 2024-05-20 RX ORDER — BUDESONIDE AND FORMOTEROL FUMARATE DIHYDRATE 160; 4.5 UG/1; UG/1
160-4.5 AEROSOL RESPIRATORY (INHALATION) TWICE DAILY
Refills: 0 | Status: ACTIVE | COMMUNITY
Start: 2024-05-20

## 2024-05-20 RX ORDER — RIVAROXABAN 20 MG/1
20 TABLET, FILM COATED ORAL DAILY
Refills: 0 | Status: ACTIVE | COMMUNITY
Start: 2024-05-20

## 2024-05-20 RX ORDER — PANTOPRAZOLE 40 MG/1
40 TABLET, DELAYED RELEASE ORAL DAILY
Refills: 0 | Status: ACTIVE | COMMUNITY
Start: 2024-05-20

## 2024-05-20 RX ORDER — GABAPENTIN 300 MG/1
300 CAPSULE ORAL AT BEDTIME
Refills: 0 | Status: ACTIVE | COMMUNITY
Start: 2024-05-20

## 2024-05-22 LAB
CULTURE RESULTS: SIGNIFICANT CHANGE UP
SPECIMEN SOURCE: SIGNIFICANT CHANGE UP

## 2024-05-28 ENCOUNTER — NON-APPOINTMENT (OUTPATIENT)
Age: 64
End: 2024-05-28

## 2024-06-04 ENCOUNTER — NON-APPOINTMENT (OUTPATIENT)
Age: 64
End: 2024-06-04

## 2024-06-04 ENCOUNTER — APPOINTMENT (OUTPATIENT)
Dept: INFECTIOUS DISEASE | Facility: CLINIC | Age: 64
End: 2024-06-04
Payer: MEDICARE

## 2024-06-04 VITALS
SYSTOLIC BLOOD PRESSURE: 100 MMHG | TEMPERATURE: 98.3 F | WEIGHT: 205 LBS | OXYGEN SATURATION: 97 % | DIASTOLIC BLOOD PRESSURE: 65 MMHG | BODY MASS INDEX: 31.07 KG/M2 | HEIGHT: 68 IN | HEART RATE: 90 BPM

## 2024-06-04 DIAGNOSIS — Z29.89 ENCOUNTER. FOR OTHER SPECIFIED PROPHYLACTIC MEASURES: ICD-10-CM

## 2024-06-04 PROCEDURE — 99215 OFFICE O/P EST HI 40 MIN: CPT

## 2024-06-04 RX ORDER — SULFAMETHOXAZOLE AND TRIMETHOPRIM 400; 80 MG/1; MG/1
400-80 TABLET ORAL DAILY
Qty: 30 | Refills: 1 | Status: COMPLETED | COMMUNITY
Start: 2024-05-14 | End: 2024-06-04

## 2024-06-05 LAB
T GONDII AB SER-IMP: NEGATIVE
T GONDII IGG SER QL: <3 IU/ML

## 2024-06-11 LAB — G6PD SER-CCNC: 16.7 U/G HGB

## 2024-06-12 NOTE — REVIEW OF SYSTEMS
[Fever] : no fever [Chills] : no chills [Feeling Tired] : feeling tired [SOB on Exertion] : shortness of breath during exertion [Negative] : Psychiatric [FreeTextEntry4] : nasal canula  [de-identified] : rash a few days ago

## 2024-06-12 NOTE — PHYSICAL EXAM
[General Appearance - Alert] : alert [General Appearance - In No Acute Distress] : in no acute distress [General Appearance - Well Nourished] : well nourished [Sclera] : the sclera and conjunctiva were normal [Auscultation Breath Sounds / Voice Sounds] : lungs were clear to auscultation bilaterally [Heart Sounds] : normal S1 and S2 [Bowel Sounds] : normal bowel sounds [Abdomen Soft] : soft [] : no rash [Oriented To Time, Place, And Person] : oriented to person, place, and time [Affect] : the affect was normal [FreeTextEntry1] : nasal O2

## 2024-06-12 NOTE — HISTORY OF PRESENT ILLNESS
[FreeTextEntry1] : 65 yo woman h/o lupus hospitalized [facility] in Jan for mycoplasma pneumonia treated with antibiotics and steroids, then hospitalized [facility] 3/19/2024 with SOB, cough, Sputum PCR PJP + HIV + new diagnosis  cd4 14  treated with bactrim and tapering prednisone  started biktarvy  d/cherise home on O2 which continues on  seen in HIV clinic 5/14   received Tdap and hep B vaccine  hasn't made appointment with pulmonary as yet  states rash on bactrim stopped a few days ago retired respiratory therapist  tested + for hiv in 2009 but felt to be false positive

## 2024-06-12 NOTE — ASSESSMENT
[FreeTextEntry1] : 63 yo woman new dx HIV during hospitalization [facility] in March for SOB, cough.   sputum PCR PJP + completed bactrim and prednisone taper  started Biktarvy during hospitalization  taking bactrim ppx until few days ago  discussed switching to mepron  cd4 during f/u visit in clinic 63 likely due to prolonged steroids (took in Jan for pneumonia as well) viral load det < 30 plan: d/c bactrim start mepron for ppx  needs to f/u with pulmonary regarding O2 will f/u with Dr. Mathur 6/18

## 2024-06-18 ENCOUNTER — APPOINTMENT (OUTPATIENT)
Dept: INFECTIOUS DISEASE | Facility: CLINIC | Age: 64
End: 2024-06-18
Payer: MEDICARE

## 2024-06-18 VITALS
TEMPERATURE: 97.8 F | WEIGHT: 205 LBS | SYSTOLIC BLOOD PRESSURE: 115 MMHG | HEIGHT: 68 IN | OXYGEN SATURATION: 98 % | HEART RATE: 86 BPM | DIASTOLIC BLOOD PRESSURE: 77 MMHG | BODY MASS INDEX: 31.07 KG/M2

## 2024-06-18 DIAGNOSIS — Z12.39 ENCOUNTER FOR OTHER SCREENING FOR MALIGNANT NEOPLASM OF BREAST: ICD-10-CM

## 2024-06-18 DIAGNOSIS — Z01.411 ENCOUNTER FOR GYNECOLOGICAL EXAMINATION (GENERAL) (ROUTINE) WITH ABNORMAL FINDINGS: ICD-10-CM

## 2024-06-18 DIAGNOSIS — Z23 ENCOUNTER FOR IMMUNIZATION: ICD-10-CM

## 2024-06-18 DIAGNOSIS — Z01.419 ENCOUNTER FOR GYNECOLOGICAL EXAMINATION (GENERAL) (ROUTINE) W/OUT ABNORMAL FINDINGS: ICD-10-CM

## 2024-06-18 DIAGNOSIS — N89.8 OTHER SPECIFIED NONINFLAMMATORY DISORDERS OF VAGINA: ICD-10-CM

## 2024-06-18 DIAGNOSIS — Z92.89 PERSONAL HISTORY OF OTHER MEDICAL TREATMENT: ICD-10-CM

## 2024-06-18 DIAGNOSIS — B20 HUMAN IMMUNODEFICIENCY VIRUS [HIV] DISEASE: ICD-10-CM

## 2024-06-18 PROCEDURE — 90739 HEPB VACC 2/4 DOSE ADULT IM: CPT

## 2024-06-18 PROCEDURE — 99396 PREV VISIT EST AGE 40-64: CPT

## 2024-06-18 PROCEDURE — 90619 MENACWY-TT VACCINE IM: CPT

## 2024-06-18 PROCEDURE — G0010: CPT

## 2024-06-18 PROCEDURE — 99212 OFFICE O/P EST SF 10 MIN: CPT | Mod: 25

## 2024-06-18 PROCEDURE — G0101: CPT

## 2024-06-18 PROCEDURE — 99214 OFFICE O/P EST MOD 30 MIN: CPT | Mod: 25

## 2024-06-18 PROCEDURE — 90472 IMMUNIZATION ADMIN EACH ADD: CPT

## 2024-06-18 RX ORDER — ATOVAQUONE 750 MG/5ML
750 SUSPENSION ORAL DAILY
Qty: 300 | Refills: 6 | Status: ACTIVE | COMMUNITY
Start: 2024-06-10 | End: 1900-01-01

## 2024-06-18 NOTE — PHYSICAL EXAM
[General Appearance - Alert] : alert [General Appearance - Well-Appearing] : healthy appearing [Sclera] : the sclera and conjunctiva were normal [Exaggerated Use Of Accessory Muscles For Inspiration] : no accessory muscle use [Auscultation Breath Sounds / Voice Sounds] : lungs were clear to auscultation bilaterally [Heart Rate And Rhythm] : heart rate was normal and rhythm regular [Heart Sounds] : normal S1 and S2 [Edema] : there was no peripheral edema [Abdomen Soft] : soft [Abdomen Tenderness] : non-tender [] : no rash [Oriented To Time, Place, And Person] : oriented to person, place, and time [FreeTextEntry1] : No thrush

## 2024-06-18 NOTE — END OF VISIT
[] : Fellow [FreeTextEntry3] : 64F with SLE, DM, h/o PE, CVA with left sided weakness presents for HIV and PCP pneumonia follow-up. Doing well on Biktarvy, continue current meds. Still on )2, takes off at home. Improved stamina. Can change Mepron from twice daily to 1500 mg once daily, monitor CD4. Complete Hep B vaccine today. Reports GBS with pneumovax, but unclear why she would have received it in her 40s, may be confusing vaccine, will hold on Prevnar until she gets old records.

## 2024-06-18 NOTE — ASSESSMENT
[FreeTextEntry1] : 63 yo woman with SLE, DM, h/o PE, CVA with left sided weakness presents for HIV and PCP pneumonia follow-up.  HIV with AIDS New diagnosis of HIV on 3/25/24 - VL 73K, CD4 count 14, initiated on Biktarvy 3/26 with good compliance. Now well-controlled with undetectable VL - continue Biktarvy - low CD4 count (14 -> 63), continue atovaquone as below - genosure was not able to be run by lab - will check genosure archive  PCP Pneumonia Confirmed by sputum PCR on 3/25 - s/p 3 week course of Bactrim 3/22-4/11 and prednisone course - restarted on Bactrim for secondary PCP ppx in May, developed rash and diarrhea, now switched to atovaquone 6/4/24 with improvement in symptoms, to continue until VL undetectable and CD4 count >200 for 3 months. She is taking 5 ml twice a day which we will switch to 10 ml once a day for ppx - has pulmonology appointment pending re: O2 requirements  HCM Vaccines: - Hep A immune - Hep B NOT immune, 1st dose Heplisav 5/14/24, second dose to be given today 6/18/24 - Tdap 5/14/24 - Needs PCV-20, has history of GBS to either flu or Pneumovax in the 1990s, was told to avoid in the future. We will need to determine the safety prior to giving this in the setting of her high-risk pulmonary disease, likely benefits of current formulation of PCV-20 will outweigh the potential risks of GBS - Needs Menquadfi, will give today 6/18/24 - Needs Shingrix in future  Screenings: To be addressed at future visits - CRC: has had in the past, agreeable to colonoscopy, will refer oncer respiratory status is more stable - Mammo: send referral today - Pap: Hx ASCUS II per patient, will have gynecology follow-up later today with Pap

## 2024-06-18 NOTE — HISTORY OF PRESENT ILLNESS
[FreeTextEntry1] : 65 yo woman with SLE, DM, h/o PE, CVA with left sided weakness presents for HIV and PCP pneumonia follow-up.  She has been feeling well since the last visit. Rash and diarrhea have resolved since switching from Bactrim to atovaquone. She continues to require oxygen and says that her O2 sats decrease to the high 80s when exerting herself such as climbing stairs. Has made an appointment with a pulmonologist. She has been fully adherent to Biktarvy and has not missed any doses.

## 2024-06-19 ENCOUNTER — RX RENEWAL (OUTPATIENT)
Age: 64
End: 2024-06-19

## 2024-06-19 PROBLEM — N89.8 VAGINAL ITCHING: Status: ACTIVE | Noted: 2024-06-19

## 2024-06-19 LAB
C TRACH RRNA SPEC QL NAA+PROBE: NOT DETECTED
CANDIDA VAG CYTO: NOT DETECTED
G VAGINALIS+PREV SP MTYP VAG QL MICRO: NOT DETECTED
HPV HIGH+LOW RISK DNA PNL CVX: NOT DETECTED
N GONORRHOEA RRNA SPEC QL NAA+PROBE: NOT DETECTED
SOURCE ANAL: NORMAL
T VAGINALIS VAG QL WET PREP: NOT DETECTED

## 2024-06-19 RX ORDER — BICTEGRAVIR SODIUM, EMTRICITABINE, AND TENOFOVIR ALAFENAMIDE FUMARATE 50; 200; 25 MG/1; MG/1; MG/1
50-200-25 TABLET ORAL
Qty: 30 | Refills: 0 | Status: ACTIVE | COMMUNITY
Start: 2024-05-14 | End: 1900-01-01

## 2024-06-19 NOTE — PHYSICAL EXAM
[General Appearance - Alert] : alert [General Appearance - In No Acute Distress] : in no acute distress [General Appearance - Well Nourished] : well nourished [General Appearance - Well-Appearing] : healthy appearing [No Lesions] : no lesions were seen on the external genitalia [Labia Majora] : were normal [Labia Minora] : were normal [Bartholin's Gland] : both Bartholin's glands were normal  [Atrophy] : atrophy [No Bleeding] : there was no active vaginal bleeding [Discharge] : had no discharge [Pap Obtained] : a Pap smear was performed [Soft] :  the cervix was not soft [Absent] : absent [Uterine Adnexae] : were not tender and not enlarged [Normal] : no abnormalities [FreeTextEntry1] : Bilat breasts- multicystic, mildly tender around cyst areas, no skin changes, discharge, erythema, dimpling [Oriented To Time, Place, And Person] : oriented to person, place, and time [Affect] : the affect was normal

## 2024-06-19 NOTE — REASON FOR VISIT
[Follow-Up: _____] : a [unfilled] follow-up visit [FreeTextEntry1] : [facility]- GYN cervical and breast cancer screening

## 2024-06-19 NOTE — HISTORY OF PRESENT ILLNESS
[FreeTextEntry1] : 63 yo female here for GYN- cervical and breast cancer screening  taking Biktarvy daily with no missed doses or SE  noted with vaginal itching for the past 6 months  placed Vagecil with temporary results    From previous consult 6/4/2024 :  63 yo woman h/o lupus hospitalized 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 continue current treatment  2. f/u as scheduled   Cervical cancer screening 1. pap and cultures done   Breast cancer screening  1. mammo ordered by previous provider  2. US breasts ordered for further evaluation in addition to mammo.    Vaginal itching 1. counselled patient to avoid OTC vaginal products as can worsen symptoms 2. use cotton underwear only.  3. cultures done.  4. use hypoallergenic soaps only - such as Dove Hypoallergenic for washing.

## 2024-06-19 NOTE — REVIEW OF SYSTEMS
[As Noted in HPI] : as noted in HPI [FreeTextEntry8] : vaginal itching [FreeTextEntry9] : Bilat breasts - denies any c/o

## 2024-06-19 NOTE — ASSESSMENT
[FreeTextEntry1] : [facility]- GYN cervical and breast cancer screening.      6/18/2024 Plan HIV  1. continue current treatment  2. f/u as scheduled   Cervical cancer screening 1. pap and cultures done   Breast cancer screening  1. mammo ordered by previous provider  2. US breasts ordered for further evaluation in addition to mammo.    Vaginal itching 1. counselled patient to avoid OTC vaginal products as can worsen symptoms 2. use cotton underwear only.  3. cultures done.  4. use hypoallergenic soaps only - such as Dove Hypoallergenic for washing.       [Treatment Education] : treatment education [Rx Dose / Side Effects] : Rx dose/side effects [Medical Care Issues] : medical care issues

## 2024-06-24 LAB — CYTOLOGY CVX/VAG DOC THIN PREP: NORMAL

## 2024-07-03 ENCOUNTER — NON-APPOINTMENT (OUTPATIENT)
Age: 64
End: 2024-07-03

## 2024-07-16 ENCOUNTER — RX RENEWAL (OUTPATIENT)
Age: 64
End: 2024-07-16

## 2024-07-30 ENCOUNTER — NON-APPOINTMENT (OUTPATIENT)
Age: 64
End: 2024-07-30

## 2024-07-30 ENCOUNTER — APPOINTMENT (OUTPATIENT)
Dept: INFECTIOUS DISEASE | Facility: CLINIC | Age: 64
End: 2024-07-30

## 2024-08-01 ENCOUNTER — NON-APPOINTMENT (OUTPATIENT)
Age: 64
End: 2024-08-01

## 2024-08-05 ENCOUNTER — NON-APPOINTMENT (OUTPATIENT)
Age: 64
End: 2024-08-05

## 2024-08-27 ENCOUNTER — APPOINTMENT (OUTPATIENT)
Dept: ORTHOPEDIC SURGERY | Facility: CLINIC | Age: 64
End: 2024-08-27
Payer: MEDICARE

## 2024-08-27 VITALS — WEIGHT: 220 LBS | BODY MASS INDEX: 33.34 KG/M2 | HEIGHT: 68 IN

## 2024-08-27 DIAGNOSIS — T84.018A BROKEN INTERNAL JOINT PROSTHESIS, OTHER SITE, INITIAL ENCOUNTER: ICD-10-CM

## 2024-08-27 DIAGNOSIS — M16.11 UNILATERAL PRIMARY OSTEOARTHRITIS, RIGHT HIP: ICD-10-CM

## 2024-08-27 DIAGNOSIS — Z96.649 BROKEN INTERNAL JOINT PROSTHESIS, OTHER SITE, INITIAL ENCOUNTER: ICD-10-CM

## 2024-08-27 PROCEDURE — 73502 X-RAY EXAM HIP UNI 2-3 VIEWS: CPT

## 2024-08-27 PROCEDURE — 99214 OFFICE O/P EST MOD 30 MIN: CPT

## 2024-08-27 NOTE — HISTORY OF PRESENT ILLNESS
[de-identified] : 64-year-old female presents with bilateral hip pain. She describes severe right hip pain wrapping around the hip and radiating to the groin. The pain substantially limits activities of daily living. Walking tolerance is reduced. Medication including NSAIDs and activity modification have been minimally effective for a period lasting greater than three months in duration. Assistive devices and external support were not deemed by the patient to be helpful in improving their function.  She underwent left PARIS in 2008 at [facility] by Dr. Nye.  This is a metal-on-metal hip.  She reports mild pain and a feeling of grinding in her left hip at times. The pain is mild in comparison to her right hip.  She denies groin pain or hip weakness. PMH: Pulmonary embolus 2019 on Xarelto, HIV, asthma and now on oxygen for the past few months She sees hematology and pulmonology

## 2024-08-27 NOTE — PHYSICAL EXAM
[Walker] : ambulates with walker [de-identified] : General: No acute distress Mental: Alert and oriented x3 Eyes: Conjunctivitis not seen Chest: Symmetric chest rise, no audible wheezing Skin: Bilateral lower extremities absent from rashes and ulcers Abdomen: No distention  Right hip: Skin: Clean, dry and intact Inspection: No obvious deformity, no swelling, no ecchymosis. Tenderness: no tenderness over greater trochanter/gluteus medius insertion. No tenderness pubic symphysis, pubic tubercle, hip flexors. No tenderness ischial tuberosity or buttock. Nontender over the ASIS/Illiac crest. ROM: 0-70. Internal rotation 20, external rotation 40 Special tests: Positive Stinchfield, positive FADIR, positive MADDIE, negative logroll Additional tests: No pain with circumduction, negative impingement test at 90 Strength: 5/5 hip flexion/Abduction/Q/H/TA/GS/EHL Neuro: Sensation intact to light touch throughout in dp/sp/tib/adi/saph distributions Pulses: 2+ DP/PT pulses  Left hip: Skin: Clean, dry and intact, healed incision Inspection: No obvious deformity, no swelling, no ecchymosis. Tenderness: no tenderness over greater trochanter/gluteus medius insertion. No tenderness pubic symphysis, pubic tubercle, hip flexors. No tenderness ischial tuberosity or buttock. Nontender over the ASIS/Illiac crest. ROM: 0-120. Internal rotation 40, external rotation 70 Special tests: negative Stinchfield, positive FADIR, positive MADDIE, negative logroll Additional tests: No pain with circumduction, negative impingement test at 90 Strength: 5/5 hip flexion/Abduction/Q/H/TA/GS/EHL Neuro: Sensation intact to light touch throughout in dp/sp/tib/adi/saph distributions Pulses: 2+ DP/PT pulses [de-identified] : Pelvis and right hip x-ray shows severe narrowing of the right hip joint space, bone-on-bone sclerosis, osteophytes, subchondral cysts.  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

## 2024-08-27 NOTE — DISCUSSION/SUMMARY
[de-identified] : 64-year-old female with bilateral hip pain.  She has chronic pain in the right hip secondary to advanced degenerative arthritis. I discussed the treatment of degenerative arthritis with the patient at length today. I described the spectrum of treatment from nonoperative modalities to total joint arthroplasty. Noninvasive and nonoperative treatment modalities include weight reduction, activity modification with low impact exercise, PRN use of acetaminophen or anti-inflammatory medication if tolerated, glucosamine/chondroitin supplements, and physical therapy. Further treatments can include corticosteroid injection and the use of hyaluronic acid injections. Definitive treatment can certainly include total joint arthroplasty also.  She will need significant medical optimization including pulmonology and hematology.  She is on 24/7 oxygen support. She has pain in the left hip related to metal-on-metal total hip arthroplasty.  Check ESR, CRP, serum cobalt and chromium levels.  Consider MRI if elevated. She will be contacted with results.  She will follow-up approximately 3 months.

## 2024-08-29 ENCOUNTER — NON-APPOINTMENT (OUTPATIENT)
Age: 64
End: 2024-08-29

## 2024-08-29 LAB
CRP SERPL-MCNC: <3 MG/L
ERYTHROCYTE [SEDIMENTATION RATE] IN BLOOD BY WESTERGREN METHOD: 68 MM/HR

## 2024-08-30 LAB — COBALT: <1 UG/L

## 2024-09-20 ENCOUNTER — RX RENEWAL (OUTPATIENT)
Age: 64
End: 2024-09-20

## 2024-09-24 LAB
ALBUMIN SERPL ELPH-MCNC: 3.8 G/DL
ALP BLD-CCNC: 78 U/L
ALT SERPL-CCNC: 21 U/L
ANION GAP SERPL CALC-SCNC: 15 MMOL/L
AST SERPL-CCNC: 17 U/L
BASOPHILS # BLD AUTO: 0.04 K/UL
BASOPHILS NFR BLD AUTO: 0.6 %
BILIRUB SERPL-MCNC: 0.3 MG/DL
BUN SERPL-MCNC: 13 MG/DL
CALCIUM SERPL-MCNC: 10.3 MG/DL
CD3 CELLS # BLD: 1068 CELLS/UL
CD3 CELLS NFR BLD: 65 %
CD3+CD4+ CELLS # BLD: 120 CELLS/UL
CD3+CD4+ CELLS NFR BLD: 7 %
CD3+CD4+ CELLS/CD3+CD8+ CLL SPEC: 0.13 RATIO
CD3+CD8+ CELLS # SPEC: 903 CELLS/UL
CD3+CD8+ CELLS NFR BLD: 55 %
CHLORIDE SERPL-SCNC: 96 MMOL/L
CHOLEST SERPL-MCNC: 140 MG/DL
CO2 SERPL-SCNC: 26 MMOL/L
CREAT SERPL-MCNC: 1.07 MG/DL
CYSTATIN C SERPL-MCNC: 1.38 MG/L
EGFR: 58 ML/MIN/1.73M2
EOSINOPHIL # BLD AUTO: 0.29 K/UL
EOSINOPHIL NFR BLD AUTO: 4.1 %
ERYTHROCYTE [SEDIMENTATION RATE] IN BLOOD BY WESTERGREN METHOD: 59 MM/HR
ESTIMATED AVERAGE GLUCOSE: 131 MG/DL
GFR/BSA.PRED SERPLBLD CYS-BASED-ARV: 47 ML/MIN/1.73M2
GLUCOSE SERPL-MCNC: 96 MG/DL
HBA1C MFR BLD HPLC: 6.2 %
HBV SURFACE AB SER QL: NONREACTIVE
HCT VFR BLD CALC: 34 %
HDLC SERPL-MCNC: 65 MG/DL
HGB BLD-MCNC: 10.4 G/DL
HIV1 RNA # SERPL NAA+PROBE: ABNORMAL
HIV1 RNA # SERPL NAA+PROBE: ABNORMAL COPIES/ML
IMM GRANULOCYTES NFR BLD AUTO: 0.4 %
LDLC SERPL CALC-MCNC: 58 MG/DL
LYMPHOCYTES # BLD AUTO: 1.65 K/UL
LYMPHOCYTES NFR BLD AUTO: 23.1 %
MAN DIFF?: NORMAL
MCHC RBC-ENTMCNC: 27.6 PG
MCHC RBC-ENTMCNC: 30.6 GM/DL
MCV RBC AUTO: 90.2 FL
MONOCYTES # BLD AUTO: 0.35 K/UL
MONOCYTES NFR BLD AUTO: 4.9 %
NEUTROPHILS # BLD AUTO: 4.78 K/UL
NEUTROPHILS NFR BLD AUTO: 66.9 %
NONHDLC SERPL-MCNC: 75 MG/DL
PLATELET # BLD AUTO: 434 K/UL
POTASSIUM SERPL-SCNC: 4.3 MMOL/L
PROT SERPL-MCNC: 7.2 G/DL
RBC # BLD: 3.77 M/UL
RBC # FLD: 14.8 %
SODIUM SERPL-SCNC: 137 MMOL/L
TRIGL SERPL-MCNC: 88 MG/DL
VIRAL LOAD INTERP: NORMAL
VIRAL LOAD LOG: ABNORMAL LG COP/ML
WBC # FLD AUTO: 7.14 K/UL

## 2024-10-04 NOTE — PROVIDER CONTACT NOTE (OTHER) - ACTION/TREATMENT ORDERED:
[facility] AMBULATORY ENCOUNTER  INTERNAL MEDICINE OFFICE VISIT      Date of Visit:  10/4/2024  Patient Name:  Carrie Doll  Medical Record Number:  [id number]  YOB: 1956  Primary Physician:  Barry Galan MD    CHIEF COMPLAINT: Hospital F/U (Hospital f/u:/9/30/2024 (5 hours)/[facility]/Ayush Peters MD/Last attending · Treatment team Dyspnea, unspecified type/Clinical impression Palpitations/Chief complaint), Medication Issue, and Depression (No appetite )      History of Present Illness     Carrie Doll is a 68 year old female that  has a past medical history of Abnormal serum protein test (08/28/2019), Abrasion of leg with infection (11/2022), Blood clot associated with vein wall inflammation (10/2018), Bruise, CAD (coronary artery disease), Cardiac murmur, Carpal tunnel syndrome, Carpal tunnel syndrome on left, Change in bowel habits, Chronic kidney disease, Coronary angioplasty status (11/04/2022), Delayed emergence from anesthesia, Dental crown present, Depression, Diabetes mellitus  (CMD), Diabetic retinopathy  (CMD), Difficulty waking, Essential (primary) hypertension, Fractures, Hearing loss, Hiatal hernia (01/2024), History of COVID-19 (11/21/2023), Hyperlipidemia, Impaired mobility and ADLs, Left knee DJD s/p cortisone by dr schwartz (08/14/2014), Obesity, Osteoarthrosis, unspecified whether generalized or localized, lower leg, S/P insertion of INTERSTIM Bladder stimulator (2008), Sleep apnea, SOB (shortness of breath) (01/04/2024), Straining during bowel movements (12/07/2023), UI (urinary incontinence), Umbilical hernia, Urge incontinence of urine (10/04/2016), Urinary tract infection, Use of cane as ambulatory aid (01/19/2023), Venous insufficiency, Vitamin D deficiency, and Wears reading eyeglasses. Carrie went to the emergency room for intermittent shortness of breath. Patient was admitted on 9/30. Carrie Doll was diagnosed with dyspnea and was cleared 
Provider made aware no new orders at this time
with CXR and chest CT . Patient was seen and followed by ER provider. Since leaving the ED, Carrie states that they are doing well. Carrie Doll is taking all of the medications as prescribed. Please refer to hospital course below for further details.     Patient's current concerns today:   Carrie states that the SOB has slightly improved, however still has times where she feels like she has to take a deep breath. Her anxiety has been worsening, especially after discussing traumatic past events with there     HOSPITAL COURSE:    Carrie is a 68-year-old who presents emergency department with the feeling that she needs to take a deep breath.  She is not hypoxic or in respiratory distress.  Her lung sounds are unremarkable.  Chest x-ray shows no acute disease.  There is no evidence of acute coronary syndrome.  She is not anemic.  Her D-dimer was mildly positive after a CT scan was obtained.  This shows atelectasis and stable enlarged lymph nodes.  She does not have a cough or fever to suggest infectious symptoms.  There are some pulmonary nodules which were discussed with her and the need for follow-up in 12 months.  She admits that she is very stressed and has a lot of anxiety and depression that could be causing her symptoms.  She had a stress test 5 months ago that was unremarkable.  I do not believe she requires further evaluation in the emergency department but is stable for discharge and follow-up with her primary care physician.  She to return for new symptoms or concerns.      Health Maintenance Due   Topic Date Due    DTaP/Tdap/Td Vaccine (1 - Tdap) Never done    Shingles Vaccine (1 of 2) Never done    COVID-19 Vaccine (5 - 2023-24 season) 09/01/2024       Patient Care Team:  Barry Galan MD as PCP - General (Internal Medicine)  Shahab Sommer MD (Peripheral Vascular Medicine)  Poly Cheng DPM as Podiatry (Podiatry - Foot & Ankle Surgery)  Pablo Rubio MD (Urology)    Assessment and Plan     1. 
Shortness of breath  2. Wheezing        - Patient with persistent feeling of dyspnea, evaluated in ER with chest x-ray and chest CT.  Dr. Villa was contacted regarding stress test however patient had done 4 months ago, so not needed at this time.  Patient does report relief from albuterol inhaler.  Discussed possible obstructive pulmonary disease and recommended pulmonary function test.  Patient agreeable with this plan.         - VSS, SPO2 95% on RA        - Lungs clear on exam        - PFTs        - Albuterol refill    3. Anxiety       - Patient with significant anxiety and believes that when her anxiety is high it is often when she feels shortness of breath.  She is seeing a psychiatrist, however is going to be transferred to a trauma specialized psychiatrist.  Previously has declined all antidepressants and anxiolytics.  Discussed with patient today that managing her anxiety, may help with her dyspnea.  Agreeable to starting low-dose Lexapro.  - Escitalopram 5 mg to take 1 tablet daily    4. Need for vaccination        - Flu vaccine today    Patient to follow-up in 1 month    Orders Placed This Encounter    INFLUENZA (FLUZONE) HIGH DOSE    PFT     Order Specific Question:   Which tests would you like?     Answer:   Complete PFT(Sanjeev w/BD,Diff Cap,Lung Vol)    albuterol 108 (90 Base) MCG/ACT inhaler     Sig: Inhale 2 puffs into the lungs every 4 hours as needed for Shortness of Breath or Wheezing.     Dispense:  8.5 g     Refill:  3     This prescription was filled on 7/12/2024. Any refills authorized will be placed on file.    escitalopram (LEXAPRO) 5 MG tablet     Sig: Take 1 tablet by mouth daily.     Dispense:  30 tablet     Refill:  1       Carrie is doing well since recent ED visit. Carrie discussed follow up appointments with specialists as recommended. Advised patient to continue to monitor symptoms and if not improving to give our clinic a call. Patient will follow up as necessary.     Past Histories     I 
have reviewed the past medical history, family history, social history, medications and allergies listed in the medical record as obtained by my nursing staff and support staff and agree with their documentation.    ALLERGIES:   Allergen Reactions    Macrodantin [Nitrofurantoin Macrocrystal] SWELLING    Bactrim [Sulfamethoxazole W/Trimethoprim] Other (See Comments)     Itchy feeling on her chest and hands    Latex   (Environmental) RASH    Tylenol With Codeine DIZZINESS and INSOMNIA     Dizziness / lightheadedness / vivid dreams / insomnia    Bactrim Ds Other (See Comments)    Milk   (Food Or Med) GI UPSET     Current Outpatient Medications   Medication Sig Dispense Refill    albuterol 108 (90 Base) MCG/ACT inhaler Inhale 2 puffs into the lungs every 4 hours as needed for Shortness of Breath or Wheezing. 8.5 g 3    escitalopram (LEXAPRO) 5 MG tablet Take 1 tablet by mouth daily. 30 tablet 1    hydroCHLOROthiazide 12.5 MG tablet TAKE 1 Tablet BY MOUTH every day 30 tablet 3    mirabegron ER (Myrbetriq) 50 MG 24 hr tablet Take 1 tablet by mouth every evening. 90 tablet 1    metoPROLOL succinate (TOPROL-XL) 25 MG 24 hr tablet TAKE 1 Tablet BY MOUTH every day 90 tablet 3    metFORMIN (GLUCOPHAGE-XR) 500 MG 24 hr tablet TAKE 1 Tablet BY MOUTH every day 30 tablet 5    Aspirin Low Dose 81 MG chewable tablet TAKE 1 Tablet BY MOUTH every day (chewable tablet) 90 tablet 0    psyllium 28.3 % powder for oral suspension Take 4 g by mouth daily. (Patient not taking: Reported on 10/4/2024) 283 g 0    FeroSul 325 (65 Fe) MG tablet TAKE 1 Tablet BY MOUTH ON MONDAY, WEDNESDAY, FRIDAY 30 tablet 3    pantoprazole (PROTONIX) 40 MG tablet Take 1 tablet by mouth daily. 90 tablet 3    pravastatin (PRAVACHOL) 40 MG tablet Take 1 tablet by mouth at bedtime. 90 tablet 3    OneTouch Ultra test strip USE TO TEST BLOOD SUGAR EVERY DAY (Patient not taking: Reported on 10/4/2024) 100 strip 1    Cholecalciferol (vitamin D3) 125 mcg (5,000 units) 
capsule Take 1 capsule by mouth daily.      fluticasone (FLONASE) 50 MCG/ACT nasal spray Spray 2 sprays in each nostril nightly. 16 g 12    polyethylene glycol (MIRALAX) 17 GM/SCOOP powder Take 17 g by mouth daily. Stir and dissolve powder in any 4 to 8 ounces of beverage, then drink. 510 g 0    lactobacillus acidophilus (BACID) Take 1 tablet by mouth in the morning and 1 tablet in the evening. (Patient not taking: Reported on 10/4/2024) 60 tablet 0    Alcohol Swabs (Pro Comfort Alcohol) 70 % Pads USE 2 times a day BEFORE testing TO prevent infections (Patient not taking: Reported on 10/4/2024) 100 each 3    Lancets (Safety Lancet 30G/Pressure Act) Misc USE A NEW LANCET WITH EACH TESTING. DO NOT REUSE LANCETS. EVERY DAY (Patient not taking: Reported on 10/4/2024) 100 each 3    acetaminophen (TYLENOL) 650 MG CR tablet Take 1 tablet by mouth every 8 hours as needed for Pain. 60 tablet 0    estradiol 0.05 mg/g (0.005 %) cream (in natural base) Apply 1 gram (1 mL) into the vagina nightly. (Patient taking differently: as needed. Apply 1 gram (1 mL) into the vagina nightly.) 30 g 11    Multiple Vitamins-Minerals (Tab-A-Penny) Tab Take 1 Tablet by mouth 1 time a day 30 tablet 11     No current facility-administered medications for this visit.     Immunization History   Administered Date(s) Administered    COVID Pfizer 12Y+ 07/06/2022    COVID Pfizer 12Y+ (Requires Dilution) 12/22/2021, 01/12/2022    COVID Pfizer Bivalent 12Y+ 11/09/2022    Influenza, MDCK, quadrivalent, PF 10/03/2018    Influenza, high dose, quadrivalent, PF 12/22/2021, 09/15/2022, 10/19/2023    Influenza, high-dose, trivalent, PF 03/02/2020, 10/04/2024    Influenza, split virus, quadrivalent, PF 10/26/2017, 03/02/2020, 12/09/2020    Influenza, split virus, trivalent 11/14/2001, 11/14/2001, 11/27/2002, 11/27/2002, 11/12/2003, 11/12/2003, 11/22/2004, 11/22/2004, 12/19/2011, 12/19/2011    Influenza, unspecified formulation 12/19/2011    Pneumococcal 
Polysaccharide PPV23 08/28/2019    Pneumococcal conjugate PCV20 09/15/2022    Tuberculin Skin Test Unspecified Formulation 08/04/2020     Past Medical History:   Diagnosis Date    Abnormal serum protein test 08/28/2019    Abnormal serum protein test, spep/upep negative for monoclonal protein Noted 8/28/2019    Abrasion of leg with infection 11/2022    treated antibiotics from fall resolved    Blood clot associated with vein wall inflammation 10/2018    Acute PE  Provoked Pulmonary Embolism  10/2018-  Small PE in R Pul Art, right hip arthroplasty 3/2018, anticoag d/marie 5/2019 after doppler leg negative    Bruise     pt states left arm from IV start in ED 12/2023    CAD (coronary artery disease)     Cardiac murmur     Carpal tunnel syndrome     bilateral hands    Carpal tunnel syndrome on left     S/P carpal tunnel release. left hand 1/2023    Change in bowel habits     Chronic kidney disease     Coronary angioplasty status 11/04/2022    Delayed emergence from anesthesia     hard to wake up after surgery    Dental crown present     1/15/2024 lower    Depression     Diabetes mellitus  (CMD)     Diabetic retinopathy  (CMD)     Difficulty waking     AFTER ANESTHESIA    Essential (primary) hypertension     Fractures     Rib fracture, right ankle fx - casted    Hearing loss     Hiatal hernia 01/2024    History of COVID-19 11/21/2023    hosptialized    Hyperlipidemia     Impaired mobility and ADLs     uses a cane     Left knee DJD s/p cortisone by dr schwartz 08/14/2014    Obesity     Osteoarthrosis, unspecified whether generalized or localized, lower leg     back, hip and knee    S/P insertion of INTERSTIM Bladder stimulator 2008    turned off per pt.    Sleep apnea     no CPAP    SOB (shortness of breath) 01/04/2024    1/15/2024 pt states improved with inhaler, ED 12/28/2023; f/u with NP    Straining during bowel movements 12/07/2023    UI (urinary incontinence)     'overactive bladder\"    Umbilical hernia     Urge 
incontinence of urine 10/04/2016    wears pads, 2008 Interstim Bladder stimulator- \"shut off\" per pt.    Urinary tract infection     Use of cane as ambulatory aid 01/19/2023    Venous insufficiency     Vitamin D deficiency     Wears reading eyeglasses      Past Surgical History:   Procedure Laterality Date    Bladder surgery  2008    placement of interstim device for bladder incontinence (not successful, so remains turned off)    Cardiac catherization  2003, 2004    stent    Cardiac catherization  2008    stent    Carpal tunnel release  01/07/2005    left    Carpal tunnel release  03/06/2004    right    Carpal tunnel release Left 01/24/2023    Colon surgery      Colonoscopy  03/05/2021    Dr. Franklyn Mata, Recall in 10 years, Large nonbleeding internal hemorrhoids,    Colonoscopy diagnostic  09/02/2010    Coronary angiogram - cv  01/02/2008    stents patent    Esophagogastroduodenoscopy  09/02/2010    gatsritis    Esophagogastroduodenoscopy  01/18/2024    Mild gastric antral erythema noted, Lax GE junction, hiatal hernia,    Esophagogastroduodenoscopy transoral flex w/bx single or mult  01/18/2024    Excision of umbilicus  07/17/2019    Excision of umbilicus  07/17/2019    Eye surgery Left 05/02/2017    Cataract Extraction with IOL implant     Eye surgery Right 05/16/2017    cataract extraction with IOL implant    Fl fluoro guide hip injection right Right 05/16/2016    steroid injection    Flexible sigmoidoscopy  07/22/2020    Hernia repair      Joint replacement Right 03/07/2018    right total hip replacement    Joint replacement Left 07/14/2020    Hip; pt states \"I woke up in ICU, I don't know what happened\"    Knee scope,diagnostic      left     Open rx rib fracture      Ptca with stent  11/09/2003    drug eluting stents mid LAD, and prox LAD    Ptca with stent  11/29/2004    drug eluting stent mid LAD    Removal of omentum  07/17/2019    Repair umbilical melissa,5+y/o,che  07/17/2019    Service to urology  
provider made aware, team will come to bedside to assess patient
several - 10/2016, 5/2016, etc    cysto, bladder wall botox injection    Trigger finger release Left 01/24/2023    left ring finger    Vaginal delivery      x2     Social History     Tobacco Use    Smoking status: Never    Smokeless tobacco: Never   Vaping Use    Vaping status: never used   Substance Use Topics    Alcohol use: No     Alcohol/week: 0.0 standard drinks of alcohol    Drug use: No     Social History     Tobacco Use   Smoking Status Never   Smokeless Tobacco Never     Family History   Problem Relation Age of Onset    Dementia/Alzheimers Mother     Heart disease Father     Patient is unaware of any medical problems Sister     Heart disease Brother     Patient is unaware of any medical problems Brother     Patient is unaware of any medical problems Brother     Patient is unaware of any medical problems Brother     Diabetes Brother     Diabetes Daughter     Asthma Son     Cancer, Breast Neg Hx     Cancer, Endometrial Neg Hx     Cancer, Ovarian Neg Hx     Cancer, Colon Neg Hx     Cancer, Prostate Neg Hx     Cancer, Pancreatic Neg Hx      Health Maintenance   Topic Date Due    DTaP/Tdap/Td Vaccine (1 - Tdap) Never done    Shingles Vaccine (1 of 2) Never done    COVID-19 Vaccine (5 - 2023-24 season) 09/01/2024    Diabetes Foot Exam  01/16/2025    Diabetes A1C  02/21/2025    Diabetes Eye Exam  03/05/2025    DM/CKD Microalbumin Ratio  08/21/2025    Depression Screening  08/21/2025    DM/CKD GFR  09/30/2025    Breast Cancer Screening  07/16/2026    Colorectal Cancer Risk - Colonoscopy  03/05/2031    Osteoporosis Screening  Completed    Influenza Vaccine  Completed    Hepatitis C Screening  Completed    Medicare Advantage- Medicare Wellness Visit  Completed    Pneumococcal Vaccine 65+  Completed    Meningococcal Vaccine  Aged Out    Hepatitis B Vaccine (For Physician/APC Discussion)  Aged Out    HPV Vaccine  Aged Out    Colorectal Cancer Screen  Discontinued        Review of Systems   Review of Systems 
  Constitutional:  Negative for chills, fatigue and fever.   HENT:  Negative for congestion, rhinorrhea, sinus pressure and sore throat.    Respiratory:  Positive for shortness of breath and wheezing. Negative for choking.    Cardiovascular:  Negative for chest pain, palpitations and leg swelling.   Gastrointestinal:  Negative for abdominal pain, constipation, diarrhea, nausea and vomiting.   Genitourinary:  Negative for dysuria.   Musculoskeletal:  Negative for arthralgias and myalgias.   Neurological:  Negative for dizziness, weakness and headaches.   Psychiatric/Behavioral:  The patient is nervous/anxious.          Physical Examination   VITALS:   Vitals:    10/04/24 1152   BP: 132/80   BP Location: LUE - Left upper extremity   Patient Position: Sitting   Cuff Size: Large Adult   Pulse: 66   Temp: 98.6 °F (37 °C)   TempSrc: Oral   SpO2: 95%   Weight: 103.1 kg (227 lb 6.4 oz)   Height: 5' 3\" (1.6 m)       BP Readings from Last 4 Encounters:   10/04/24 132/80   09/30/24 139/74   08/21/24 110/70   06/25/24 126/84     Wt Readings from Last 4 Encounters:   10/04/24 103.1 kg (227 lb 6.4 oz)   08/21/24 102.1 kg (225 lb)   06/25/24 99.8 kg (220 lb)   06/20/24 99.8 kg (220 lb 0.3 oz)       PHYSICAL EXAM:    Physical Exam  Constitutional:       General: She is not in acute distress.     Appearance: Normal appearance. She is obese. She is not ill-appearing.   HENT:      Head: Normocephalic and atraumatic.   Cardiovascular:      Rate and Rhythm: Normal rate and regular rhythm.      Pulses: Normal pulses.      Heart sounds: Normal heart sounds.   Pulmonary:      Effort: Pulmonary effort is normal. No respiratory distress.      Breath sounds: Normal breath sounds. No stridor. No wheezing, rhonchi or rales.   Chest:      Chest wall: No tenderness.   Musculoskeletal:         General: Normal range of motion.      Cervical back: Normal range of motion.   Skin:     General: Skin is warm and dry.      Capillary Refill: Capillary 
refill takes less than 2 seconds.   Neurological:      General: No focal deficit present.      Mental Status: She is alert and oriented to person, place, and time. Mental status is at baseline.   Psychiatric:         Attention and Perception: Attention normal.         Mood and Affect: Mood is anxious.         Speech: Speech normal.         Behavior: Behavior normal.         Thought Content: Thought content normal.         Judgment: Judgment normal.           LABORATORY DATA:    Most Recent Labs:  Admission on 09/30/2024, Discharged on 09/30/2024   Component Date Value    Extra Tube 09/30/2024 Hold for Add Ons     Extra Tube 09/30/2024 Hold for Add Ons     Extra Tube 09/30/2024 Hold for Add Ons     Extra Tube 09/30/2024 Hold for Add Ons     Ventricular Rate EKG/Min* 09/30/2024 74     Atrial Rate (BPM) 09/30/2024 74     AK-Interval (MSEC) 09/30/2024 178     QRS-Interval (MSEC) 09/30/2024 92     QT-Interval (MSEC) 09/30/2024 398     QTc 09/30/2024 442     P Axis (Degrees) 09/30/2024 32     R Axis (Degrees) 09/30/2024 -24     T Axis (Degrees) 09/30/2024 34     REPORT TEXT 09/30/2024                      Value:Normal sinus rhythm  Low voltage QRS  Cannot rule out  Anterior infarct  , age undetermined  Abnormal ECG  When compared with ECG of  20-JUN-2024 01:42,  No significant change was found  Confirmed by DILEEP WU, KIERA OCONNOR ([id number]),  Ping Jones ([id number]) on 9/30/2024 4:03:40 PM      Sodium 09/30/2024 141     Potassium 09/30/2024 3.8     Chloride 09/30/2024 103     Carbon Dioxide 09/30/2024 30     Anion Gap 09/30/2024 12     Glucose 09/30/2024 121 (H)     BUN 09/30/2024 20     Creatinine 09/30/2024 1.32 (H)     Glomerular Filtration Ra* 09/30/2024 44 (L)     BUN/Cr 09/30/2024 15     Calcium 09/30/2024 9.2     Bilirubin, Total 09/30/2024 0.9     GOT/AST 09/30/2024 22     GPT/ALT 09/30/2024 13     Alkaline Phosphatase 09/30/2024 91     Albumin 09/30/2024 2.9 (L)     Protein, Total 09/30/2024 7.5     Globulin 
09/30/2024 4.6 (H)     A/G Ratio 09/30/2024 0.6 (L)     Troponin I, High Sensiti* 09/30/2024 7     NT-proBNP 09/30/2024 533 (H)     D Dimer, Quantitative 09/30/2024 0.97 (H)     WBC 09/30/2024 5.5     RBC 09/30/2024 4.74     HGB 09/30/2024 12.4     HCT 09/30/2024 39.9     MCV 09/30/2024 84.2     MCH 09/30/2024 26.2     MCHC 09/30/2024 31.1 (L)     RDW-CV 09/30/2024 15.5 (H)     RDW-SD 09/30/2024 46.4     PLT 09/30/2024 250     NRBC 09/30/2024 0     Neutrophil, Percent 09/30/2024 66     Lymphocytes, Percent 09/30/2024 23     Mono, Percent 09/30/2024 7     Eosinophils, Percent 09/30/2024 3     Basophils, Percent 09/30/2024 1     Immature Granulocytes 09/30/2024 0     Absolute Neutrophils 09/30/2024 3.6     Absolute Lymphocytes 09/30/2024 1.3     Absolute Monocytes 09/30/2024 0.4     Absolute Eosinophils  09/30/2024 0.2     Absolute Basophils 09/30/2024 0.0     Absolute Immature Granul* 09/30/2024 0.0          Eva Hernandez, [facility]  Collaborating Provider: Barry Galan MD    Internal Medicine Clinic  [facility] Building  [address] [place] [address]  [address]  P:  [phone]  F:  [phone]

## 2024-10-08 ENCOUNTER — APPOINTMENT (OUTPATIENT)
Dept: ORTHOPEDIC SURGERY | Facility: CLINIC | Age: 64
End: 2024-10-08

## 2024-10-08 ENCOUNTER — APPOINTMENT (OUTPATIENT)
Dept: INFECTIOUS DISEASE | Facility: CLINIC | Age: 64
End: 2024-10-08

## 2024-10-08 VITALS
OXYGEN SATURATION: 97 % | BODY MASS INDEX: 35.16 KG/M2 | WEIGHT: 232 LBS | HEIGHT: 68 IN | HEART RATE: 80 BPM | TEMPERATURE: 99.1 F | SYSTOLIC BLOOD PRESSURE: 112 MMHG | DIASTOLIC BLOOD PRESSURE: 73 MMHG

## 2024-10-08 DIAGNOSIS — Z23 ENCOUNTER FOR IMMUNIZATION: ICD-10-CM

## 2024-10-08 DIAGNOSIS — Z21 ASYMPTOMATIC HUMAN IMMUNODEFICIENCY VIRUS [HIV] INFECTION STATUS: ICD-10-CM

## 2024-10-08 DIAGNOSIS — I63.9 CEREBRAL INFARCTION, UNSPECIFIED: ICD-10-CM

## 2024-10-08 PROCEDURE — 90472 IMMUNIZATION ADMIN EACH ADD: CPT

## 2024-10-08 PROCEDURE — 96156 HLTH BHV ASSMT/REASSESSMENT: CPT | Mod: 1L

## 2024-10-08 PROCEDURE — 90739 HEPB VACC 2/4 DOSE ADULT IM: CPT

## 2024-10-08 PROCEDURE — 90750 HZV VACC RECOMBINANT IM: CPT

## 2024-10-08 PROCEDURE — G0010: CPT

## 2024-10-08 RX ORDER — ZOSTER VACCINE RECOMBINANT, ADJUVANTED 50 MCG/0.5
50 KIT INTRAMUSCULAR
Qty: 1 | Refills: 0 | Status: ACTIVE | COMMUNITY
Start: 2024-10-08 | End: 1900-01-01

## 2024-10-08 RX ORDER — ATORVASTATIN CALCIUM 20 MG/1
20 TABLET, FILM COATED ORAL
Qty: 90 | Refills: 1 | Status: ACTIVE | COMMUNITY
Start: 2024-10-08 | End: 1900-01-01

## 2024-10-09 RX ORDER — CABOTEGRAVIR AND RILPIVIRINE 600-900/3
600 & 900 KIT INTRAMUSCULAR
Qty: 1 | Refills: 1 | Status: ACTIVE | COMMUNITY
Start: 2024-10-08

## 2024-10-09 RX ORDER — CABOTEGRAVIR AND RILPIVIRINE 600-900/3
600 & 900 KIT INTRAMUSCULAR
Qty: 1 | Refills: 5 | Status: ACTIVE | COMMUNITY
Start: 2024-10-08

## 2024-10-22 ENCOUNTER — APPOINTMENT (OUTPATIENT)
Dept: INFECTIOUS DISEASE | Facility: CLINIC | Age: 64
End: 2024-10-22

## 2024-10-25 ENCOUNTER — RX RENEWAL (OUTPATIENT)
Age: 64
End: 2024-10-25

## 2024-10-25 ENCOUNTER — NON-APPOINTMENT (OUTPATIENT)
Age: 64
End: 2024-10-25

## 2024-10-29 ENCOUNTER — APPOINTMENT (OUTPATIENT)
Dept: INFECTIOUS DISEASE | Facility: CLINIC | Age: 64
End: 2024-10-29
Payer: MEDICARE

## 2024-10-29 ENCOUNTER — MED ADMIN CHARGE (OUTPATIENT)
Age: 64
End: 2024-10-29

## 2024-10-29 ENCOUNTER — NON-APPOINTMENT (OUTPATIENT)
Age: 64
End: 2024-10-29

## 2024-10-29 PROCEDURE — 96372 THER/PROPH/DIAG INJ SC/IM: CPT

## 2024-10-29 RX ORDER — CABOTEGRAVIR AND RILPIVIRINE 600-900/3
600 & 900 KIT INTRAMUSCULAR
Qty: 0 | Refills: 0 | Status: COMPLETED | OUTPATIENT
Start: 2024-10-22

## 2024-10-29 RX ADMIN — CABOTEGRAVIR AND RILPIVIRINE 0 MG/3ML: KIT at 00:00

## 2024-11-18 ENCOUNTER — NON-APPOINTMENT (OUTPATIENT)
Age: 64
End: 2024-11-18

## 2024-12-03 ENCOUNTER — APPOINTMENT (OUTPATIENT)
Dept: INFECTIOUS DISEASE | Facility: CLINIC | Age: 64
End: 2024-12-03
Payer: MEDICARE

## 2024-12-03 PROCEDURE — 96372 THER/PROPH/DIAG INJ SC/IM: CPT

## 2025-01-14 ENCOUNTER — APPOINTMENT (OUTPATIENT)
Dept: INFECTIOUS DISEASE | Facility: CLINIC | Age: 65
End: 2025-01-14

## 2025-01-15 ENCOUNTER — NON-APPOINTMENT (OUTPATIENT)
Age: 65
End: 2025-01-15

## 2025-01-16 ENCOUNTER — NON-APPOINTMENT (OUTPATIENT)
Age: 65
End: 2025-01-16

## 2025-01-17 ENCOUNTER — NON-APPOINTMENT (OUTPATIENT)
Age: 65
End: 2025-01-17

## 2025-01-31 ENCOUNTER — APPOINTMENT (OUTPATIENT)
Dept: INFECTIOUS DISEASE | Facility: CLINIC | Age: 65
End: 2025-01-31
Payer: MEDICARE

## 2025-01-31 ENCOUNTER — NON-APPOINTMENT (OUTPATIENT)
Age: 65
End: 2025-01-31

## 2025-01-31 PROCEDURE — 96372 THER/PROPH/DIAG INJ SC/IM: CPT

## 2025-01-31 RX ORDER — CABOTEGRAVIR AND RILPIVIRINE 600-900/3
600 & 900 KIT INTRAMUSCULAR
Qty: 0 | Refills: 0 | Status: COMPLETED | OUTPATIENT
Start: 2025-01-22

## 2025-01-31 RX ADMIN — CABOTEGRAVIR AND RILPIVIRINE 0 MG/3ML: KIT at 00:00

## 2025-02-18 ENCOUNTER — APPOINTMENT (OUTPATIENT)
Dept: INFECTIOUS DISEASE | Facility: CLINIC | Age: 65
End: 2025-02-18
Payer: MEDICARE

## 2025-02-18 VITALS
HEART RATE: 90 BPM | TEMPERATURE: 98.1 F | HEIGHT: 68 IN | WEIGHT: 267 LBS | BODY MASS INDEX: 40.47 KG/M2 | SYSTOLIC BLOOD PRESSURE: 134 MMHG | RESPIRATION RATE: 16 BRPM | DIASTOLIC BLOOD PRESSURE: 76 MMHG | OXYGEN SATURATION: 99 %

## 2025-02-18 DIAGNOSIS — Z92.89 PERSONAL HISTORY OF OTHER MEDICAL TREATMENT: ICD-10-CM

## 2025-02-18 DIAGNOSIS — Z21 ASYMPTOMATIC HUMAN IMMUNODEFICIENCY VIRUS [HIV] INFECTION STATUS: ICD-10-CM

## 2025-02-18 DIAGNOSIS — Z23 ENCOUNTER FOR IMMUNIZATION: ICD-10-CM

## 2025-02-18 PROCEDURE — 90750 HZV VACC RECOMBINANT IM: CPT

## 2025-02-18 PROCEDURE — 90684 PCV21 VACCINE IM: CPT

## 2025-02-18 PROCEDURE — 99214 OFFICE O/P EST MOD 30 MIN: CPT | Mod: 25,GC

## 2025-02-18 PROCEDURE — G0009: CPT

## 2025-02-18 PROCEDURE — 90472 IMMUNIZATION ADMIN EACH ADD: CPT

## 2025-02-18 PROCEDURE — 90472 IMMUNIZATION ADMIN EACH ADD: CPT | Mod: NC

## 2025-02-18 RX ORDER — ZOSTER VACCINE RECOMBINANT, ADJUVANTED 50 MCG/0.5
50 KIT INTRAMUSCULAR
Qty: 1 | Refills: 0 | Status: ACTIVE | COMMUNITY
Start: 2025-02-18

## 2025-02-19 ENCOUNTER — NON-APPOINTMENT (OUTPATIENT)
Age: 65
End: 2025-02-19

## 2025-02-19 RX ORDER — PENTAMIDINE ISETHIONATE 300 MG/6ML
300 INHALANT RESPIRATORY (INHALATION)
Qty: 1 | Refills: 3 | Status: COMPLETED | COMMUNITY
Start: 2025-02-18 | End: 2025-02-19

## 2025-02-28 ENCOUNTER — NON-APPOINTMENT (OUTPATIENT)
Age: 65
End: 2025-02-28

## 2025-04-01 ENCOUNTER — APPOINTMENT (OUTPATIENT)
Dept: INFECTIOUS DISEASE | Facility: CLINIC | Age: 65
End: 2025-04-01
Payer: MEDICARE

## 2025-04-01 PROCEDURE — 96372 THER/PROPH/DIAG INJ SC/IM: CPT

## 2025-04-01 RX ADMIN — CABOTEGRAVIR AND RILPIVIRINE 0 MG/3ML: KIT at 00:00

## 2025-04-10 ENCOUNTER — RX RENEWAL (OUTPATIENT)
Age: 65
End: 2025-04-10

## 2025-05-06 ENCOUNTER — NON-APPOINTMENT (OUTPATIENT)
Age: 65
End: 2025-05-06

## 2025-05-27 ENCOUNTER — APPOINTMENT (OUTPATIENT)
Dept: INFECTIOUS DISEASE | Facility: CLINIC | Age: 65
End: 2025-05-27
Payer: MEDICARE

## 2025-05-27 ENCOUNTER — NON-APPOINTMENT (OUTPATIENT)
Age: 65
End: 2025-05-27

## 2025-05-27 ENCOUNTER — MED ADMIN CHARGE (OUTPATIENT)
Age: 65
End: 2025-05-27

## 2025-05-27 PROCEDURE — 96372 THER/PROPH/DIAG INJ SC/IM: CPT

## 2025-05-27 RX ADMIN — CABOTEGRAVIR AND RILPIVIRINE 0 MG/3ML: KIT at 00:00

## 2025-06-24 ENCOUNTER — NON-APPOINTMENT (OUTPATIENT)
Age: 65
End: 2025-06-24

## 2025-06-24 ENCOUNTER — APPOINTMENT (OUTPATIENT)
Dept: INFECTIOUS DISEASE | Facility: CLINIC | Age: 65
End: 2025-06-24
Payer: MEDICARE

## 2025-06-24 VITALS
DIASTOLIC BLOOD PRESSURE: 63 MMHG | HEIGHT: 68 IN | SYSTOLIC BLOOD PRESSURE: 112 MMHG | WEIGHT: 280 LBS | OXYGEN SATURATION: 96 % | BODY MASS INDEX: 42.44 KG/M2 | HEART RATE: 81 BPM | TEMPERATURE: 98.3 F

## 2025-06-24 PROCEDURE — 99214 OFFICE O/P EST MOD 30 MIN: CPT | Mod: GC

## 2025-06-24 RX ORDER — ATOVAQUONE 750 MG/5ML
750 SUSPENSION ORAL TWICE DAILY
Qty: 210 | Refills: 0 | Status: ACTIVE | COMMUNITY
Start: 2025-06-24 | End: 1900-01-01

## 2025-07-03 ENCOUNTER — NON-APPOINTMENT (OUTPATIENT)
Age: 65
End: 2025-07-03

## 2025-07-22 ENCOUNTER — NON-APPOINTMENT (OUTPATIENT)
Age: 65
End: 2025-07-22

## 2025-07-22 ENCOUNTER — APPOINTMENT (OUTPATIENT)
Dept: INFECTIOUS DISEASE | Facility: CLINIC | Age: 65
End: 2025-07-22
Payer: MEDICARE

## 2025-07-22 ENCOUNTER — MED ADMIN CHARGE (OUTPATIENT)
Age: 65
End: 2025-07-22

## 2025-07-22 VITALS
HEART RATE: 91 BPM | OXYGEN SATURATION: 98 % | HEIGHT: 68 IN | SYSTOLIC BLOOD PRESSURE: 108 MMHG | BODY MASS INDEX: 40.32 KG/M2 | DIASTOLIC BLOOD PRESSURE: 67 MMHG | WEIGHT: 266 LBS | TEMPERATURE: 98 F

## 2025-07-22 DIAGNOSIS — R91.8 OTHER NONSPECIFIC ABNORMAL FINDING OF LUNG FIELD: ICD-10-CM

## 2025-07-22 DIAGNOSIS — Z92.89 PERSONAL HISTORY OF OTHER MEDICAL TREATMENT: ICD-10-CM

## 2025-07-22 DIAGNOSIS — R91.1 SOLITARY PULMONARY NODULE: ICD-10-CM

## 2025-07-22 PROCEDURE — 96372 THER/PROPH/DIAG INJ SC/IM: CPT

## 2025-07-22 PROCEDURE — 99214 OFFICE O/P EST MOD 30 MIN: CPT | Mod: GC,25

## 2025-07-22 RX ADMIN — CABOTEGRAVIR AND RILPIVIRINE 0 MG/3ML: KIT at 00:00

## 2025-07-24 ENCOUNTER — APPOINTMENT (OUTPATIENT)
Dept: INFECTIOUS DISEASE | Facility: CLINIC | Age: 65
End: 2025-07-24

## 2025-07-25 LAB
M TB IFN-G BLD-IMP: NEGATIVE
QUANTIFERON TB PLUS MITOGEN MINUS NIL: 2.9 IU/ML
QUANTIFERON TB PLUS NIL: 0.11 IU/ML
QUANTIFERON TB PLUS TB1 MINUS NIL: 0 IU/ML
QUANTIFERON TB PLUS TB2 MINUS NIL: 0.02 IU/ML

## 2025-07-28 LAB
H CAPSUL AG SER IA-ACNC: NORMAL NG/ML
HISTPL INTERPRETATION: NEGATIVE
SPECIMEN SOURCE: NORMAL